# Patient Record
Sex: FEMALE | Race: BLACK OR AFRICAN AMERICAN | Employment: OTHER | URBAN - METROPOLITAN AREA
[De-identification: names, ages, dates, MRNs, and addresses within clinical notes are randomized per-mention and may not be internally consistent; named-entity substitution may affect disease eponyms.]

---

## 2017-01-05 ENCOUNTER — OFFICE VISIT (OUTPATIENT)
Dept: INTERNAL MEDICINE CLINIC | Age: 72
End: 2017-01-05

## 2017-01-05 VITALS
RESPIRATION RATE: 18 BRPM | HEART RATE: 77 BPM | DIASTOLIC BLOOD PRESSURE: 93 MMHG | SYSTOLIC BLOOD PRESSURE: 168 MMHG | WEIGHT: 167 LBS | BODY MASS INDEX: 25.31 KG/M2 | HEIGHT: 68 IN | TEMPERATURE: 96.2 F

## 2017-01-05 DIAGNOSIS — K59.00 CONSTIPATION, UNSPECIFIED CONSTIPATION TYPE: ICD-10-CM

## 2017-01-05 DIAGNOSIS — Z91.14 NONCOMPLIANCE WITH MEDICATION REGIMEN: ICD-10-CM

## 2017-01-05 DIAGNOSIS — F41.9 SEVERE ANXIETY: ICD-10-CM

## 2017-01-05 DIAGNOSIS — F32.A DEPRESSION WITH SOMATIZATION: ICD-10-CM

## 2017-01-05 DIAGNOSIS — F45.0 DEPRESSION WITH SOMATIZATION: ICD-10-CM

## 2017-01-05 DIAGNOSIS — M15.9 GENERALIZED OA: Primary | ICD-10-CM

## 2017-01-05 DIAGNOSIS — Z71.2 ENCOUNTER TO DISCUSS TEST RESULTS: ICD-10-CM

## 2017-01-05 RX ORDER — CLONAZEPAM 1 MG/1
1 TABLET ORAL 2 TIMES DAILY
Qty: 40 TAB | Refills: 0 | Status: SHIPPED | OUTPATIENT
Start: 2017-01-05 | End: 2017-01-26 | Stop reason: SDUPTHER

## 2017-01-05 NOTE — PATIENT INSTRUCTIONS
Limit the use of miralax if you having regular bowel movements done take it   Call and see a psychiatrist right away. Call your insurance company and get a list of doctors.

## 2017-01-05 NOTE — PROGRESS NOTES
Chief Complaint   Patient presents with    Head Pain     says head is tight; eye sight blurry    Rib Pain     bilateral;            Subjective:   Teddy Everett 70 y.o.  female with a  past medical history reviewed see below. Here for f/up from the er she was given miralx for consitpation  She has bene gong all week. bs 79-92 136 highest.  She has been to the neurologist. But has not been to the psychiatrist.   Jordy Liters weight loss but pt is at normal weight   Treated for a uti as well finished all the abx   She is not taking her chol medication as it was too expensive   ROS: \"my vaginia hurts\"  otherwise feeling generally well. All other systems reviewed and are negative      Current Outpatient Prescriptions   Medication Sig Dispense Refill    clonazePAM (KLONOPIN) 1 mg tablet Take 1 Tab by mouth two (2) times a day. Max Daily Amount: 2 mg. As needed 40 Tab 0    polyethylene glycol (MIRALAX) 17 gram/dose powder TAKE 1 CAPFUL IN 8 OUNCES OF WATER EVERY  g 3    losartan (COZAAR) 25 mg tablet Take 0.5 Tabs by mouth nightly. Do not take the 50mg losartan pharmacist please discard all 50mg losartan rx's 45 Tab 3    aspirin delayed-release 81 mg tablet Take 1 Tab by mouth daily. 30 Tab 11    hydrocortisone (HYCORT) 1 % ointment Apply  to affected area two (2) times a day. use thin layer 30 g 0    gabapentin (NEURONTIN) 300 mg capsule Take 1 Cap by mouth three (3) times daily. Indications: NEUROPATHIC PAIN 90 Cap 6    dilTIAZem XR (DILACOR XR) 240 mg XR capsule Take  by mouth daily.  famotidine (PEPCID) 40 mg tablet Take 40 mg by mouth two (2) times a day.  fluticasone (FLONASE) 50 mcg/actuation nasal spray 2 Sprays by Both Nostrils route daily.  mirtazapine (REMERON) 15 mg tablet Take  by mouth nightly.  traZODone (DESYREL) 100 mg tablet Take 100 mg by mouth nightly.       camphor-menthol (SARNA ANTI-ITCH) 0.5-0.5 % lotion Apply  to affected area two (2) times daily as needed for Itching.  pitavastatin (LIVALO) 2 mg tablet Take 1 Tab by mouth daily. 30 Tab 1    diltiazem CD (CARDIZEM CD) 120 mg ER capsule TAKE ONE CAPSULE BY MOUTH DAILY **THIS REPLACES THE 240MG**  1    venlafaxine-SR (EFFEXOR-XR) 75 mg capsule TAKE 1 CAP BY MOUTH DAILY. 60 Cap 3    risperiDONE (RISPERDAL) 0.25 mg tablet Take 1 Tab by mouth two (2) times a day. 60 Tab 0    azelastine (OPTIVAR) 0.05 % ophthalmic solution INSTILL 1 DROP INTO BOTH EYES TWICE A DAY 6 mL 3    Diclofenac Potassium (CAMBIA) 50 mg pwpk Take 1 Package by mouth daily as needed. 1 Packet 0    escitalopram oxalate (LEXAPRO) 10 mg tablet Take 1 Tab by mouth daily. 30 Tab 5    ONETOUCH ULTRA TEST strip TEST AS DIRECTED 100 Strip 11    ONETOUCH ULTRA TEST strip TEST AS DIRECTED 100 Strip 0    glucose blood VI test strips (ASCENSIA AUTODISC VI, ONE TOUCH ULTRA TEST VI) strip by Does Not Apply route See Admin Instructions. DX NIDDM test daily 100 Strip 0    dextromethorphan-quiNIDine (NUEDEXTA) 20-10 mg per capsule 1 daily 30 Cap 3    lidocaine-prilocaine (EMLA) topical cream Apply  to affected area as needed for Pain. 30 g 0    nystatin (MYCOSTATIN) topical cream Apply  to affected area two (2) times a day. 15 g 0    glipiZIDE (GLUCOTROL) 5 mg tablet Take 0.5 Tabs by mouth daily. If blood sugar is > 150 90 Tab 0    meclizine (ANTIVERT) 25 mg tablet Take 1 Tab by mouth three (3) times daily as needed for Dizziness. 21 Tab 0    ranitidine (ZANTAC) 150 mg tablet Take 1 Tab by mouth two (2) times a day. 60 Tab 6    docusate sodium (COLACE) 100 mg capsule Take 100 mg by mouth two (2) times daily as needed.        Allergies   Allergen Reactions    Amoxicillin Hives    Sulfa (Sulfonamide Antibiotics) Hives and Itching    Amoxicillin Hives and Itching     Long time ago - patient not exactly certain what it does    Mirtazapine Itching and Nausea Only     Funny feeling in chest    Percocet [Oxycodone-Acetaminophen] Nausea and Vomiting    Codeine Nausea and Vomiting    Prednisone Itching    Sulfa (Sulfonamide Antibiotics) Hives, Itching and Palpitations     Think it was increased heart rate or itching - many years ago    Zithromax [Azithromycin] Itching     Not sure what it does,taken long time ago     Past Medical History   Diagnosis Date    Agoraphobia without mention of panic attacks 2/17/2014    Anxiety disorder 8/18/2013    Arthritis      osteo    Breast pain, left 4/7/2015    CAD (coronary artery disease), native coronary artery 12/1/2015    Chronic chest pain 1/13/2014    Chronic pain associated with significant psychosocial dysfunction 2/17/2014    Depression 8/18/2013    Diabetes ([facility] [address]      type II    Duplicated right renal collecting system 3/13/2014    GERD (gastroesophageal reflux disease)     Gout, joint     HTN, goal below 130/80 9/7/2012    Hypertension     Nephrolithiasis 3/13/2014    Other ill-defined conditions(799.89)      hyercholesterolemia    Other ill-defined conditions(799.89)      anxiety    Other ill-defined conditions(799.89)      pt states head get tight,due to wax bill up    Other ill-defined conditions(799.89)      pain left shoulder due to fall many years ago    Personal history of noncompliance with medical treatment, presenting hazards to health 5/30/2014    Psychiatric disorder      anxiety/ depression    Psychotic disorder     Vaginal pain 7/30/2014     Past Surgical History   Procedure Laterality Date    Egd  4/23/2010          Hx urological       kidney stones    Hx tubal ligation      Hx hysterectomy       partial    Hx gyn       cyst removed from vagina    Hx gyn       vaginal birth x7    Hx other surgical       egd    Hx other surgical       cyst removed from left wrist    Hx other surgical       bladder dilitation     Family History   Problem Relation Age of Onset    Stroke Mother     Heart Disease Mother     Cancer Father     Heart Disease Son     Liver Disease Son    Nolia Stamp Heart Disease Daughter     Malignant Hyperthermia Neg Hx     Pseudocholinesterase Deficiency Neg Hx     Delayed Awakening Neg Hx     Post-op Nausea/Vomiting Neg Hx     Emergence Delirium Neg Hx     Post-op Cognitive Dysfunction Neg Hx     Other Neg Hx      Social History   Substance Use Topics    Smoking status: Never Smoker    Smokeless tobacco: Never Used    Alcohol use No          Objective:     Visit Vitals    BP (!) 168/93 (BP 1 Location: Left arm, BP Patient Position: Sitting)    Pulse 77    Temp 96.2 °F (35.7 °C) (Oral)    Resp 18    Ht 5' 8\" (1.727 m)    Wt 167 lb (75.8 kg)    BMI 25.39 kg/m2     Gen: NAD, pleasant  HEENT: normal appearing head, nares patent, PERRLA, EOMI, oropharynx no erythema, no cervical lymphadenopathy neck supple   Cardio: RRR nl S1S2 no murmur  Lungs CTAB no wheeze no rales no rhonchi  ABD Soft non tender non distended + bowel sounds  Extremities: full ROM X 4 no clubbing no cyanosis  Neuro: no gross focal deficits noted, alert and orientated X 3  Psych.: well groomed  Anxiety and + stable depression. No si/hi. Assessment/Plan:   Sherry Talbert was seen today for head pain and rib pain. Diagnoses and all orders for this visit:    Generalized OA    Constipation, unspecified constipation type    Encounter to discuss test results    Depression with somatization    Severe anxiety  -     clonazePAM (KLONOPIN) 1 mg tablet; Take 1 Tab by mouth two (2) times a day. Max Daily Amount: 2 mg. As needed    Noncompliance with medication regimen-chol medication       Follow-up Disposition:  Return in about 3 weeks (around 1/26/2017) for 30 min appt f/up somatization disorder . .  avs printed and given to the pt. Stop taking the miralax daily as no longer constipated advised to call for another appt. with the gyn regarding vaginal pain. The patient voiced understanding of the above. Medication side effects were reviewed with the patient. Call with any concerns.

## 2017-01-05 NOTE — MR AVS SNAPSHOT
Visit Information Date & Time Provider Department Dept. Phone Encounter #  
 1/5/2017  1:30  [facility], [address] [place] [address] [phone] [id number] Follow-up Instructions Return in about 3 weeks (around 1/26/2017) for 30 min appt f/up somatization disorder . Upcoming Health Maintenance Date Due DTaP/Tdap/Td series (1 - Tdap) 4/30/2006 Pneumococcal 65+ Low/Medium Risk (1 of 2 - PCV13) 9/22/2010 FOOT EXAM Q1 6/12/2015 EYE EXAM RETINAL OR DILATED Q1 7/1/2015 FOBT Q 1 YEAR AGE 50-75 7/29/2015 GLAUCOMA SCREENING Q2Y 7/1/2016 HEMOGLOBIN A1C Q6M 7/18/2016 INFLUENZA AGE 9 TO ADULT 8/1/2016 MEDICARE YEARLY EXAM 8/10/2016 MICROALBUMIN Q1 8/10/2016 BREAST CANCER SCRN MAMMOGRAM 11/2/2017 LIPID PANEL Q1 12/15/2017 Allergies as of 1/5/2017  Review Complete On: 1/5/2017 By: Min Zapien RN Severity Noted Reaction Type Reactions Amoxicillin High 09/10/2010   Systemic Hives Sulfa (Sulfonamide Antibiotics) High 09/10/2010   Systemic Hives, Itching Amoxicillin Medium 04/22/2010   Systemic Hives, Itching Long time ago - patient not exactly certain what it does Mirtazapine Medium 11/20/2012   Side Effect Itching, Nausea Only Funny feeling in chest  
 Percocet [Oxycodone-acetaminophen] Medium 01/15/2014   Intolerance Nausea and Vomiting Codeine  11/26/2012    Nausea and Vomiting Prednisone  05/27/2010    Itching Sulfa (Sulfonamide Antibiotics)  04/22/2010   Systemic Hives, Itching, Palpitations Think it was increased heart rate or itching - many years ago Zithromax [Azithromycin]  11/20/2012   Side Effect Itching Not sure what it does,taken long time ago Current Immunizations  Reviewed on 12/15/2016 Name Date Pneumococcal Polysaccharide (PPSV-23)  Deferred (Patient Refused) TD Vaccine 4/29/2006 Not reviewed this visit You Were Diagnosed With   
  
 Codes Comments Generalized OA    -  Primary ICD-10-CM: M15.9 ICD-9-CM: 715.00 Constipation, unspecified constipation type     ICD-10-CM: K59.00 ICD-9-CM: 564.00 Encounter to discuss test results     ICD-10-CM: Z71.89 ICD-9-CM: V65.49 Depression with somatization     ICD-10-CM: F32.9, F45.0 ICD-9-CM: 311, 300.81 Severe anxiety     ICD-10-CM: F41.9 ICD-9-CM: 300.00 Noncompliance with medication regimen     ICD-10-CM: Z91.14 
ICD-9-CM: V15.81 Vitals BP Pulse Temp Resp Height(growth percentile) Weight(growth percentile) (!) 168/93 (BP 1 Location: Left arm, BP Patient Position: Sitting) 77 96.2 °F (35.7 °C) (Oral) 18 5' 8\" (1.727 m) 167 lb (75.8 kg) BMI OB Status Smoking Status 25.39 kg/m2 Hysterectomy Never Smoker Vitals History BMI and BSA Data Body Mass Index Body Surface Area  
 25.39 kg/m 2 1.91 m 2 Preferred Pharmacy Pharmacy Name Phone [id number]- [place], [place] - [address] [phone] Your Updated Medication List  
  
   
This list is accurate as of: 1/5/17  2:16 PM.  Always use your most recent med list.  
  
  
  
  
 aspirin delayed-release 81 mg tablet Take 1 Tab by mouth daily. azelastine 0.05 % ophthalmic solution Commonly known as:  OPTIVAR INSTILL 1 DROP INTO BOTH EYES TWICE A DAY  
  
 clonazePAM 1 mg tablet Commonly known as:  Angelina Kava Take 1 Tab by mouth two (2) times a day. Max Daily Amount: 2 mg. As needed  
  
 dextromethorphan-quiNIDine 20-10 mg per capsule Commonly known as:  Denita Knight 1 daily Diclofenac Potassium 50 mg Pwpk Commonly known as:  CAMBIA Take 1 Package by mouth daily as needed. * dilTIAZem  mg XR capsule Commonly known as:  DILACOR XR Take  by mouth daily. * dilTIAZem  mg ER capsule Commonly known as:  CARDIZEM CD  
TAKE ONE CAPSULE BY MOUTH DAILY **THIS REPLACES THE 240MG**  
  
 docusate sodium 100 mg capsule Commonly known as:  Eliud Bañuelos Take 100 mg by mouth two (2) times daily as needed. escitalopram oxalate 10 mg tablet Commonly known as:  Miguel Tapia Take 1 Tab by mouth daily. famotidine 40 mg tablet Commonly known as:  PEPCID Take 40 mg by mouth two (2) times a day. FLONASE 50 mcg/actuation nasal spray Generic drug:  fluticasone 2 Sprays by Both Nostrils route daily. gabapentin 300 mg capsule Commonly known as:  NEURONTIN Take 1 Cap by mouth three (3) times daily. Indications: NEUROPATHIC PAIN  
  
 glipiZIDE 5 mg tablet Commonly known as:  Selestine Rodriguez Take 0.5 Tabs by mouth daily. If blood sugar is > 150 * glucose blood VI test strips strip Commonly known as:  ASCENSIA AUTODISC VI, ONE TOUCH ULTRA TEST VI  
by Does Not Apply route See Admin Instructions. DX NIDDM test daily * ONETOUCH ULTRA TEST strip Generic drug:  glucose blood VI test strips TEST AS DIRECTED * ONETOUCH ULTRA TEST strip Generic drug:  glucose blood VI test strips TEST AS DIRECTED  
  
 hydrocortisone 1 % ointment Commonly known as:  HYCORT Apply  to affected area two (2) times a day. use thin layer  
  
 lidocaine-prilocaine topical cream  
Commonly known as:  EMLA Apply  to affected area as needed for Pain.  
  
 losartan 25 mg tablet Commonly known as:  COZAAR Take 0.5 Tabs by mouth nightly. Do not take the 50mg losartan pharmacist please discard all 50mg losartan rx's  
  
 meclizine 25 mg tablet Commonly known as:  ANTIVERT Take 1 Tab by mouth three (3) times daily as needed for Dizziness. mirtazapine 15 mg tablet Commonly known as:  Jennifer Dom Take  by mouth nightly. nystatin topical cream  
Commonly known as:  MYCOSTATIN Apply  to affected area two (2) times a day. pitavastatin 2 mg tablet Commonly known as:  LIVALO Take 1 Tab by mouth daily. polyethylene glycol 17 gram/dose powder Commonly known as:  Leata Sans TAKE 1 CAPFUL IN 8 OUNCES OF WATER EVERY DAY  
  
 raNITIdine 150 mg tablet Commonly known as:  ZANTAC Take 1 Tab by mouth two (2) times a day. risperiDONE 0.25 mg tablet Commonly known as:  RisperDAL Take 1 Tab by mouth two (2) times a day. SARNA ANTI-ITCH 0.5-0.5 % lotion Generic drug:  camphor-menthol Apply  to affected area two (2) times daily as needed for Itching. traZODone 100 mg tablet Commonly known as:  Debbe Gunner Take 100 mg by mouth nightly. venlafaxine-SR 75 mg capsule Commonly known as:  EFFEXOR-XR  
TAKE 1 CAP BY MOUTH DAILY. * Notice: This list has 5 medication(s) that are the same as other medications prescribed for you. Read the directions carefully, and ask your doctor or other care provider to review them with you. Prescriptions Printed Refills  
 clonazePAM (KLONOPIN) 1 mg tablet 0 Sig: Take 1 Tab by mouth two (2) times a day. Max Daily Amount: 2 mg. As needed Class: Print Route: Oral  
  
Follow-up Instructions Return in about 3 weeks (around 1/26/2017) for 30 min appt f/up somatization disorder . Patient Instructions Limit the use of miralax if you having regular bowel movements done take it Call and see a psychiatrist right away. Call your insurance company and get a list of doctors. Introducing [facility]! Magdalene Bach introduces [employer] patient portal. Now you can access parts of your medical record, email your doctor's office, and request medication refills online. 1. In your internet browser, go to [email or web address] 2. Click on the First Time User? Click Here link in the Sign In box. You will see the New Member Sign Up page. 3. Enter your [employer] Access Code exactly as it appears below. You will not need to use this code after youve completed the sign-up process.  If you do not sign up before the expiration date, you must request a new code. · [employer] Access Code: [email or web address] Expires: 2/15/2017  3:17 PM 
 
4. Enter the last four digits of your Social Security Number (xxxx) and Date of Birth (mm/dd/yyyy) as indicated and click Submit. You will be taken to the next sign-up page. 5. Create a [employer] ID. This will be your [employer] login ID and cannot be changed, so think of one that is secure and easy to remember. 6. Create a [employer] password. You can change your password at any time. 7. Enter your Password Reset Question and Answer. This can be used at a later time if you forget your password. 8. Enter your e-mail address. You will receive e-mail notification when new information is available in [address] 9. Click Sign Up. You can now view and download portions of your medical record. 10. Click the Download Summary menu link to download a portable copy of your medical information. If you have questions, please visit the Frequently Asked Questions section of the [employer] website. Remember, [employer] is NOT to be used for urgent needs. For medical emergencies, dial 911. Now available from your iPhone and Android! Please provide this summary of care documentation to your next provider. Your primary care clinician is listed as Princess Velazquez. If you have any questions after today's visit, please call [phone].

## 2017-01-26 ENCOUNTER — OFFICE VISIT (OUTPATIENT)
Dept: INTERNAL MEDICINE CLINIC | Age: 72
End: 2017-01-26

## 2017-01-26 VITALS
OXYGEN SATURATION: 99 % | HEART RATE: 66 BPM | WEIGHT: 168.7 LBS | SYSTOLIC BLOOD PRESSURE: 132 MMHG | BODY MASS INDEX: 25.57 KG/M2 | HEIGHT: 68 IN | RESPIRATION RATE: 14 BRPM | TEMPERATURE: 96.9 F | DIASTOLIC BLOOD PRESSURE: 80 MMHG

## 2017-01-26 DIAGNOSIS — F41.9 SEVERE ANXIETY: ICD-10-CM

## 2017-01-26 DIAGNOSIS — Z86.69 HISTORY OF BLURRY VISION: ICD-10-CM

## 2017-01-26 DIAGNOSIS — E11.9 DIABETES MELLITUS TYPE 2, DIET-CONTROLLED (HCC): ICD-10-CM

## 2017-01-26 DIAGNOSIS — E78.2 MIXED HYPERLIPIDEMIA: Primary | ICD-10-CM

## 2017-01-26 DIAGNOSIS — K59.00 CONSTIPATION, UNSPECIFIED CONSTIPATION TYPE: ICD-10-CM

## 2017-01-26 DIAGNOSIS — F22 SOMATIC DELUSION DISORDER (HCC): ICD-10-CM

## 2017-01-26 RX ORDER — ROSUVASTATIN CALCIUM 5 MG/1
5 TABLET, COATED ORAL
Qty: 30 TAB | Refills: 0 | Status: SHIPPED | OUTPATIENT
Start: 2017-01-26 | End: 2017-02-23 | Stop reason: CLARIF

## 2017-01-26 RX ORDER — CLONAZEPAM 1 MG/1
1 TABLET ORAL 2 TIMES DAILY
Qty: 40 TAB | Refills: 0 | Status: SHIPPED | OUTPATIENT
Start: 2017-01-26 | End: 2017-08-15 | Stop reason: ALTCHOICE

## 2017-01-26 NOTE — PATIENT INSTRUCTIONS
Check bs in the am and 2 hrs after  (do not test 4 times a day) eating bring in a blood sugar log to the next appt.       fasting blood sugar first thing in the am __________   __________  ___________    2 hrs after break fast _______     ___________    ___________    2 hrs after lunch _______  ________  __________    2 hrs after dinner ________  _________ _________    Random one around 10 pm  _________ ___________    _______

## 2017-01-26 NOTE — MR AVS SNAPSHOT
Visit Information Date & Time Provider Department Dept. Phone Encounter #  
 1/26/2017  1:30  [facility], [address] [address] - [place] [id number] Follow-up Instructions Return in about 3 weeks (around 2/16/2017) for somatization disorder 30 min at 1:30 please . Your Appointments 2/3/2017  9:00 AM  
Follow Up with Shannon Ortiz NP [facility] [address]) Appt Note: follow up. ..tlw 1/6/17  
 [address] [place] [address], 
[address] [facility], [address] [phone]  
[address] [facility], [address] [facility], [address] [facility] [address] [address] Upcoming Health Maintenance Date Due DTaP/Tdap/Td series (1 - Tdap) 4/30/2006 Pneumococcal 65+ Low/Medium Risk (1 of 2 - PCV13) 9/22/2010 FOOT EXAM Q1 6/12/2015 EYE EXAM RETINAL OR DILATED Q1 7/1/2015 FOBT Q 1 YEAR AGE 50-75 7/29/2015 GLAUCOMA SCREENING Q2Y 7/1/2016 HEMOGLOBIN A1C Q6M 7/18/2016 MEDICARE YEARLY EXAM 8/10/2016 MICROALBUMIN Q1 8/10/2016 BREAST CANCER SCRN MAMMOGRAM 11/2/2017 LIPID PANEL Q1 12/15/2017 Allergies as of 1/26/2017  Review Complete On: 1/26/2017 By: Prudence Guess, LPN Severity Noted Reaction Type Reactions Amoxicillin High 09/10/2010   Systemic Hives Sulfa (Sulfonamide Antibiotics) High 09/10/2010   Systemic Hives, Itching Amoxicillin Medium 04/22/2010   Systemic Hives, Itching Long time ago - patient not exactly certain what it does Mirtazapine Medium 11/20/2012   Side Effect Itching, Nausea Only Funny feeling in chest  
 Percocet [Oxycodone-acetaminophen] Medium 01/15/2014   Intolerance Nausea and Vomiting Codeine  11/26/2012    Nausea and Vomiting Prednisone  05/27/2010    Itching Sulfa (Sulfonamide Antibiotics)  04/22/2010   Systemic Hives, Itching, Palpitations Think it was increased heart rate or itching - many years ago Zithromax [Azithromycin]  11/20/2012   Side Effect Itching Not sure what it does,taken long time ago Current Immunizations  Reviewed on 12/15/2016 Name Date Pneumococcal Polysaccharide (PPSV-23)  Deferred (Patient Refused) TD Vaccine 4/29/2006 Not reviewed this visit You Were Diagnosed With   
  
 Codes Comments Mixed hyperlipidemia    -  Primary ICD-10-CM: R77.3 ICD-9-CM: 272.2 Constipation, unspecified constipation type     ICD-10-CM: K59.00 ICD-9-CM: 564.00 Somatic delusion disorder ([facility]     ICD-10-CM: F22 
ICD-9-CM: 297.1 History of blurry vision     ICD-10-CM: Z86.69 
ICD-9-CM: V12.49 Severe anxiety     ICD-10-CM: F41.9 ICD-9-CM: 300.00 Diabetes mellitus type 2, diet-controlled ([facility]     ICD-10-CM: E11.9 ICD-9-CM: 250.00 Vitals BP Pulse Temp Resp Height(growth percentile) Weight(growth percentile) 142/84 (BP 1 Location: Left arm, BP Patient Position: At rest) 66 96.9 °F (36.1 °C) (Oral) 14 5' 8\" (1.727 m) 168 lb 11.2 oz (76.5 kg) SpO2 BMI OB Status Smoking Status 99% 25.65 kg/m2 Hysterectomy Never Smoker Vitals History BMI and BSA Data Body Mass Index Body Surface Area  
 25.65 kg/m 2 1.92 m 2 Preferred Pharmacy Pharmacy Name Phone [id number] [address] [phone] Your Updated Medication List  
  
   
This list is accurate as of: 1/26/17  1:55 PM.  Always use your most recent med list.  
  
  
  
  
 aspirin delayed-release 81 mg tablet Take 1 Tab by mouth daily. azelastine 0.05 % ophthalmic solution Commonly known as:  OPTIVAR INSTILL 1 DROP INTO BOTH EYES TWICE A DAY  
  
 clonazePAM 1 mg tablet Commonly known as:  Nel Morgan Take 1 Tab by mouth two (2) times a day. Max Daily Amount: 2 mg. As needed fill after 2/5/17  
  
 dextromethorphan-quiNIDine 20-10 mg per capsule Commonly known as:  Mabeline Patient 1 daily Diclofenac Potassium 50 mg Pwpk Commonly known as:  CAMBIA Take 1 Package by mouth daily as needed. * dilTIAZem  mg XR capsule Commonly known as:  DILACOR XR Take  by mouth daily. * dilTIAZem  mg ER capsule Commonly known as:  CARDIZEM CD  
TAKE ONE CAPSULE BY MOUTH DAILY **THIS REPLACES THE 240MG**  
  
 docusate sodium 100 mg capsule Commonly known as:  Dorthey Matsu Take 100 mg by mouth two (2) times daily as needed. escitalopram oxalate 10 mg tablet Commonly known as:  Zak Fabián Take 1 Tab by mouth daily. famotidine 40 mg tablet Commonly known as:  PEPCID Take 40 mg by mouth two (2) times a day. FLONASE 50 mcg/actuation nasal spray Generic drug:  fluticasone 2 Sprays by Both Nostrils route daily. gabapentin 300 mg capsule Commonly known as:  NEURONTIN Take 1 Cap by mouth three (3) times daily. Indications: NEUROPATHIC PAIN  
  
 glipiZIDE 5 mg tablet Commonly known as:  Deanna Alcala Take 0.5 Tabs by mouth daily. If blood sugar is > 150 * glucose blood VI test strips strip Commonly known as:  ASCENSIA AUTODISC VI, ONE TOUCH ULTRA TEST VI  
by Does Not Apply route See Admin Instructions. DX NIDDM test daily * ONETOUCH ULTRA TEST strip Generic drug:  glucose blood VI test strips TEST AS DIRECTED * ONETOUCH ULTRA TEST strip Generic drug:  glucose blood VI test strips TEST AS DIRECTED  
  
 hydrocortisone 1 % ointment Commonly known as:  HYCORT Apply  to affected area two (2) times a day. use thin layer  
  
 lidocaine-prilocaine topical cream  
Commonly known as:  EMLA Apply  to affected area as needed for Pain.  
  
 losartan 25 mg tablet Commonly known as:  COZAAR Take 0.5 Tabs by mouth nightly. Do not take the 50mg losartan pharmacist please discard all 50mg losartan rx's  
  
 meclizine 25 mg tablet Commonly known as:  ANTIVERT Take 1 Tab by mouth three (3) times daily as needed for Dizziness. mirtazapine 15 mg tablet Commonly known as:  Jennifer Dom Take  by mouth nightly. nystatin topical cream  
Commonly known as:  MYCOSTATIN Apply  to affected area two (2) times a day. polyethylene glycol 17 gram/dose powder Commonly known as:  Kreg Patron TAKE 1 CAPFUL IN 8 OUNCES OF WATER EVERY DAY  
  
 raNITIdine 150 mg tablet Commonly known as:  ZANTAC Take 1 Tab by mouth two (2) times a day. risperiDONE 0.25 mg tablet Commonly known as:  RisperDAL Take 1 Tab by mouth two (2) times a day. rosuvastatin 5 mg tablet Commonly known as:  CRESTOR Take 1 Tab by mouth nightly. SARNA ANTI-ITCH 0.5-0.5 % lotion Generic drug:  camphor-menthol Apply  to affected area two (2) times daily as needed for Itching. traZODone 100 mg tablet Commonly known as:  Rebbecca Bunde Take 100 mg by mouth nightly. venlafaxine-SR 75 mg capsule Commonly known as:  EFFEXOR-XR  
TAKE 1 CAP BY MOUTH DAILY. * Notice: This list has 5 medication(s) that are the same as other medications prescribed for you. Read the directions carefully, and ask your doctor or other care provider to review them with you. Prescriptions Printed Refills  
 clonazePAM (KLONOPIN) 1 mg tablet 0 Sig: Take 1 Tab by mouth two (2) times a day. Max Daily Amount: 2 mg. As needed fill after 2/5/17 Class: Print Route: Oral  
  
Prescriptions Sent to Pharmacy Refills  
 rosuvastatin (CRESTOR) 5 mg tablet 0 Sig: Take 1 Tab by mouth nightly. Class: Normal  
 Pharmacy: [facility]/pharmacy [address] [place] [address] [address] Ph #: [phone] Route: Oral  
  
We Performed the Following HEMOGLOBIN A1C WITH EAG [02902 CPT(R)] LDL, DIRECT [id number] CPT(R)] MAGNESIUM [id number] CPT(R)] METABOLIC PANEL, COMPREHENSIVE [49309 CPT(R)] Follow-up Instructions  Return in about 3 weeks (around 2/16/2017) for somatization disorder 30 min at 1:30 please . To-Do List   
 01/26/2017 Imaging:  XR ABD FLAT/ ERECT Patient Instructions Check bs in the am and 2 hrs after  (do not test 4 times a day) eating bring in a blood sugar log to the next appt. fasting blood sugar first thing in the am __________   __________  ___________ 
 
2 hrs after break fast _______     ___________    ___________ 
 
2 hrs after lunch _______  ________  __________ 
 
2 hrs after dinner ________  _________ _________ Random one around 10 pm  _________ ___________    _______ Introducing [facility]! Femi Carrington introduces [employer] patient portal. Now you can access parts of your medical record, email your doctor's office, and request medication refills online. 1. In your internet browser, go to [email or web address] 2. Click on the First Time User? Click Here link in the Sign In box. You will see the New Member Sign Up page. 3. Enter your [employer] Access Code exactly as it appears below. You will not need to use this code after youve completed the sign-up process. If you do not sign up before the expiration date, you must request a new code. · [employer] Access Code: [email or web address] Expires: 2/15/2017  3:17 PM 
 
4. Enter the last four digits of your Social Security Number (xxxx) and Date of Birth (mm/dd/yyyy) as indicated and click Submit. You will be taken to the next sign-up page. 5. Create a [employer] ID. This will be your [employer] login ID and cannot be changed, so think of one that is secure and easy to remember. 6. Create a [employer] password. You can change your password at any time. 7. Enter your Password Reset Question and Answer. This can be used at a later time if you forget your password. 8. Enter your e-mail address. You will receive e-mail notification when new information is available in [address] 9. Click Sign Up. You can now view and download portions of your medical record. 10. Click the Download Summary menu link to download a portable copy of your medical information. If you have questions, please visit the Frequently Asked Questions section of the [employer] website. Remember, [employer] is NOT to be used for urgent needs. For medical emergencies, dial 911. Now available from your iPhone and Android! Please provide this summary of care documentation to your next provider. Your primary care clinician is listed as Adry Clark. If you have any questions after today's visit, please call [phone].

## 2017-01-26 NOTE — PROGRESS NOTES
1. Have you been to the ER, urgent care clinic since your last visit? Hospitalized since your last visit? No    2. Have you seen or consulted any other health care providers outside of the [facility] since your last visit? Include any pap smears or colon screening.  No     Chief Complaint   Patient presents with    Follow-up     3 week follow up on somatization disorder     Not fasting

## 2017-01-26 NOTE — PROGRESS NOTES
Chief Complaint   Patient presents with    Follow-up     3 week follow up on somatization disorder           Subjective:   Margarita Salcedo 70 y.o.  female with a  past medical history reviewed see below. Here for f/up she has not yet set up a psychiatrist   She is having pain in her collar bone, still. She is stil c/o poor eye sight/blurry she has been to the eye doctor. And there was no abnormalities   She still reports pain \"down below\". C.o constipation and has bought  MOM she wants to drink the whole bottle   Still having insomnia -she reports the nerve pills help her sleep an hour or two. fbs [phone]  ROS: otherwise feeling generally well. All other systems reviewed and are negative      Current Outpatient Prescriptions   Medication Sig Dispense Refill    clonazePAM (KLONOPIN) 1 mg tablet Take 1 Tab by mouth two (2) times a day. Max Daily Amount: 2 mg. As needed fill after 2/5/17 40 Tab 0    rosuvastatin (CRESTOR) 5 mg tablet Take 1 Tab by mouth nightly. 30 Tab 0    polyethylene glycol (MIRALAX) 17 gram/dose powder TAKE 1 CAPFUL IN 8 OUNCES OF WATER EVERY  g 3    camphor-menthol (SARNA ANTI-ITCH) 0.5-0.5 % lotion Apply  to affected area two (2) times daily as needed for Itching.  diltiazem CD (CARDIZEM CD) 120 mg ER capsule TAKE ONE CAPSULE BY MOUTH DAILY **THIS REPLACES THE 240MG**  1    losartan (COZAAR) 25 mg tablet Take 0.5 Tabs by mouth nightly. Do not take the 50mg losartan pharmacist please discard all 50mg losartan rx's 45 Tab 3    ONETOUCH ULTRA TEST strip TEST AS DIRECTED 100 Strip 11    ONETOUCH ULTRA TEST strip TEST AS DIRECTED 100 Strip 0    glucose blood VI test strips (ASCENSIA AUTODISC VI, ONE TOUCH ULTRA TEST VI) strip by Does Not Apply route See Admin Instructions. DX NIDDM test daily 100 Strip 0    lidocaine-prilocaine (EMLA) topical cream Apply  to affected area as needed for Pain.  30 g 0    docusate sodium (COLACE) 100 mg capsule Take 100 mg by mouth two (2) times daily as needed.  aspirin delayed-release 81 mg tablet Take 1 Tab by mouth daily. 30 Tab 11    hydrocortisone (HYCORT) 1 % ointment Apply  to affected area two (2) times a day. use thin layer 30 g 0    gabapentin (NEURONTIN) 300 mg capsule Take 1 Cap by mouth three (3) times daily. Indications: NEUROPATHIC PAIN 90 Cap 6    dilTIAZem XR (DILACOR XR) 240 mg XR capsule Take  by mouth daily.  famotidine (PEPCID) 40 mg tablet Take 40 mg by mouth two (2) times a day.  fluticasone (FLONASE) 50 mcg/actuation nasal spray 2 Sprays by Both Nostrils route daily.  mirtazapine (REMERON) 15 mg tablet Take  by mouth nightly.  traZODone (DESYREL) 100 mg tablet Take 100 mg by mouth nightly.  venlafaxine-SR (EFFEXOR-XR) 75 mg capsule TAKE 1 CAP BY MOUTH DAILY. 60 Cap 3    risperiDONE (RISPERDAL) 0.25 mg tablet Take 1 Tab by mouth two (2) times a day. 60 Tab 0    azelastine (OPTIVAR) 0.05 % ophthalmic solution INSTILL 1 DROP INTO BOTH EYES TWICE A DAY 6 mL 3    Diclofenac Potassium (CAMBIA) 50 mg pwpk Take 1 Package by mouth daily as needed. 1 Packet 0    escitalopram oxalate (LEXAPRO) 10 mg tablet Take 1 Tab by mouth daily. 30 Tab 5    dextromethorphan-quiNIDine (NUEDEXTA) 20-10 mg per capsule 1 daily 30 Cap 3    nystatin (MYCOSTATIN) topical cream Apply  to affected area two (2) times a day. 15 g 0    glipiZIDE (GLUCOTROL) 5 mg tablet Take 0.5 Tabs by mouth daily. If blood sugar is > 150 90 Tab 0    meclizine (ANTIVERT) 25 mg tablet Take 1 Tab by mouth three (3) times daily as needed for Dizziness. 21 Tab 0    ranitidine (ZANTAC) 150 mg tablet Take 1 Tab by mouth two (2) times a day.  60 Tab 6     Allergies   Allergen Reactions    Amoxicillin Hives    Sulfa (Sulfonamide Antibiotics) Hives and Itching    Amoxicillin Hives and Itching     Long time ago - patient not exactly certain what it does    Mirtazapine Itching and Nausea Only     Funny feeling in chest    Percocet [Oxycodone-Acetaminophen] Nausea and Vomiting    Codeine Nausea and Vomiting    Prednisone Itching    Sulfa (Sulfonamide Antibiotics) Hives, Itching and Palpitations     Think it was increased heart rate or itching - many years ago    Zithromax [Azithromycin] Itching     Not sure what it does,taken long time ago     Past Medical History   Diagnosis Date    Agoraphobia without mention of panic attacks 2/17/2014    Anxiety disorder 8/18/2013    Arthritis      osteo    Breast pain, left 4/7/2015    CAD (coronary artery disease), native coronary artery 12/1/2015    Chronic chest pain 1/13/2014    Chronic pain associated with significant psychosocial dysfunction 2/17/2014    Depression 8/18/2013    Diabetes ([facility]      type II    Duplicated right renal collecting system 3/13/2014    GERD (gastroesophageal reflux disease)     Gout, joint     HTN, goal below 130/80 9/7/2012    Hypertension     Nephrolithiasis 3/13/2014    Other ill-defined conditions(799.89)      hyercholesterolemia    Other ill-defined conditions(799.89)      anxiety    Other ill-defined conditions(799.89)      pt states head get tight,due to wax bill up    Other ill-defined conditions(799.89)      pain left shoulder due to fall many years ago    Personal history of noncompliance with medical treatment, presenting hazards to health 5/30/2014    Psychiatric disorder      anxiety/ depression    Psychotic disorder     Vaginal pain 7/30/2014     Past Surgical History   Procedure Laterality Date    Egd  4/23/2010          Hx urological       kidney stones    Hx tubal ligation      Hx hysterectomy       partial    Hx gyn       cyst removed from vagina    Hx gyn       vaginal birth x7    Hx other surgical       egd    Hx other surgical       cyst removed from left wrist    Hx other surgical       bladder dilitation     Family History   Problem Relation Age of Onset    Stroke Mother     Heart Disease Mother     Cancer Father  Heart Disease Son     Liver Disease Son     Heart Disease Daughter     Malignant Hyperthermia Neg Hx     Pseudocholinesterase Deficiency Neg Hx     Delayed Awakening Neg Hx     Post-op Nausea/Vomiting Neg Hx     Emergence Delirium Neg Hx     Post-op Cognitive Dysfunction Neg Hx     Other Neg Hx      Social History   Substance Use Topics    Smoking status: Never Smoker    Smokeless tobacco: Never Used    Alcohol use No          Objective:     Visit Vitals    /80 (BP 1 Location: Left arm, BP Patient Position: At rest)    Pulse 66    Temp 96.9 °F (36.1 °C) (Oral)    Resp 14    Ht 5' 8\" (1.727 m)    Wt 168 lb 11.2 oz (76.5 kg)    SpO2 99%    BMI 25.65 kg/m2     Gen: NAD, pleasant  HEENT: normal appearing head, nares patent, PERRLA, EOMI, oropharynx no erythema, no cervical lymphadenopathy neck supple arthritic changes noted in collar bone   Cardio: RRR nl S1S2 no murmur  Lungs CTAB no wheeze no rales no rhonchi  ABD Soft non tender non distended + bowel sounds  Extremities: full ROM X 4  Shuffling like gait not new no clubbing no cyanosis  Neuro: no gross focal deficits noted, alert and orientated X 3  Psych.: well groomed stable anxiety and  depression. Tangential thoughts tearful thru out exam at baseline no new psych issues     Assessment/Plan:   Mojgan Gallagher was seen today for follow-up. Diagnoses and all orders for this visit:    Mixed hyperlipidemia  -     METABOLIC PANEL, COMPREHENSIVE  -     LDL, DIRECT    Constipation, unspecified constipation type  -     METABOLIC PANEL, COMPREHENSIVE  -     MAGNESIUM  -     XR ABD FLAT/ ERECT; Future    Somatic delusion disorder ([facility]    History of blurry vision    Severe anxiety  -     clonazePAM (KLONOPIN) 1 mg tablet; Take 1 Tab by mouth two (2) times a day. Max Daily Amount: 2 mg.  As needed fill after 2/5/17    Diabetes mellitus type 2, diet-controlled ([address]  -     HEMOGLOBIN A1C WITH EAG    Other orders  -     rosuvastatin (CRESTOR) 5 mg tablet; Take 1 Tab by mouth nightly. Follow-up Disposition:  Return in about 3 weeks (around 2/16/2017) for somatization disorder 30 min at 1:30 please . .she is again encouraged to see the gynecologist for her female concerns and make an appt with a psychiatrist   avs printed and given to the pt. .  Advised to drink 1/2 the bottle of MOM not the entire bottle. And monitor consitapation   The patient voiced understanding of the above. Medication side effects were reviewed with the patient. Call with any concerns.

## 2017-01-27 LAB
ALBUMIN SERPL-MCNC: 4 G/DL (ref 3.5–4.8)
ALBUMIN/GLOB SERPL: 1.5 {RATIO} (ref 1.1–2.5)
ALP SERPL-CCNC: 72 IU/L (ref 39–117)
ALT SERPL-CCNC: 7 IU/L (ref 0–32)
AST SERPL-CCNC: 11 IU/L (ref 0–40)
BILIRUB SERPL-MCNC: 0.3 MG/DL (ref 0–1.2)
BUN SERPL-MCNC: 14 MG/DL (ref 8–27)
BUN/CREAT SERPL: 15 (ref 11–26)
CALCIUM SERPL-MCNC: 9.4 MG/DL (ref 8.7–10.3)
CHLORIDE SERPL-SCNC: 100 MMOL/L (ref 96–106)
CO2 SERPL-SCNC: 28 MMOL/L (ref 18–29)
COMMENT, 011824: ABNORMAL
CREAT SERPL-MCNC: 0.91 MG/DL (ref 0.57–1)
EST. AVERAGE GLUCOSE BLD GHB EST-MCNC: 123 MG/DL
GLOBULIN SER CALC-MCNC: 2.7 G/DL (ref 1.5–4.5)
GLUCOSE SERPL-MCNC: 112 MG/DL (ref 65–99)
HBA1C MFR BLD: 5.9 % (ref 4.8–5.6)
LDLC SERPL DIRECT ASSAY-MCNC: 209 MG/DL (ref 0–99)
MAGNESIUM SERPL-MCNC: 2.1 MG/DL (ref 1.6–2.3)
POTASSIUM SERPL-SCNC: 4.5 MMOL/L (ref 3.5–5.2)
PROT SERPL-MCNC: 6.7 G/DL (ref 6–8.5)
SODIUM SERPL-SCNC: 143 MMOL/L (ref 134–144)

## 2017-02-23 ENCOUNTER — APPOINTMENT (OUTPATIENT)
Dept: GENERAL RADIOLOGY | Age: 72
End: 2017-02-23
Attending: PHYSICIAN ASSISTANT
Payer: MEDICARE

## 2017-02-23 ENCOUNTER — HOSPITAL ENCOUNTER (EMERGENCY)
Age: 72
Discharge: HOME | End: 2017-02-23
Attending: EMERGENCY MEDICINE
Payer: MEDICARE

## 2017-02-23 VITALS
TEMPERATURE: 98.2 F | HEART RATE: 73 BPM | RESPIRATION RATE: 17 BRPM | WEIGHT: 163.14 LBS | SYSTOLIC BLOOD PRESSURE: 158 MMHG | BODY MASS INDEX: 24.73 KG/M2 | DIASTOLIC BLOOD PRESSURE: 93 MMHG | HEIGHT: 68 IN | OXYGEN SATURATION: 97 %

## 2017-02-23 DIAGNOSIS — R51.9 CHRONIC NONINTRACTABLE HEADACHE, UNSPECIFIED HEADACHE TYPE: Chronic | ICD-10-CM

## 2017-02-23 DIAGNOSIS — G89.29 CHRONIC NONINTRACTABLE HEADACHE, UNSPECIFIED HEADACHE TYPE: Chronic | ICD-10-CM

## 2017-02-23 DIAGNOSIS — M15.9 GENERALIZED OA: Primary | ICD-10-CM

## 2017-02-23 DIAGNOSIS — G89.4 CHRONIC PAIN ASSOCIATED WITH SIGNIFICANT PSYCHOSOCIAL DYSFUNCTION: Chronic | ICD-10-CM

## 2017-02-23 DIAGNOSIS — R05.9 COUGH: ICD-10-CM

## 2017-02-23 LAB
ALBUMIN SERPL BCP-MCNC: 3.5 G/DL (ref 3.5–5)
ALBUMIN/GLOB SERPL: 0.9 {RATIO} (ref 1.1–2.2)
ALP SERPL-CCNC: 60 U/L (ref 45–117)
ALT SERPL-CCNC: 24 U/L (ref 12–78)
ANION GAP BLD CALC-SCNC: 9 MMOL/L (ref 5–15)
APPEARANCE UR: ABNORMAL
AST SERPL W P-5'-P-CCNC: 38 U/L (ref 15–37)
BACTERIA URNS QL MICRO: ABNORMAL /HPF
BASOPHILS # BLD AUTO: 0 K/UL
BASOPHILS # BLD: 0 %
BILIRUB SERPL-MCNC: 0.4 MG/DL (ref 0.2–1)
BILIRUB UR QL CFM: NEGATIVE
BUN SERPL-MCNC: 17 MG/DL (ref 6–20)
BUN/CREAT SERPL: 14 (ref 12–20)
CALCIUM SERPL-MCNC: 8.6 MG/DL (ref 8.5–10.1)
CHLORIDE SERPL-SCNC: 102 MMOL/L (ref 97–108)
CO2 SERPL-SCNC: 27 MMOL/L (ref 21–32)
COLOR UR: ABNORMAL
CREAT SERPL-MCNC: 1.22 MG/DL (ref 0.55–1.02)
EOSINOPHIL # BLD: 0 K/UL
EOSINOPHIL NFR BLD: 0 %
EPITH CASTS URNS QL MICRO: ABNORMAL /LPF
ERYTHROCYTE [DISTWIDTH] IN BLOOD BY AUTOMATED COUNT: 13.4 % (ref 11.5–14.5)
GLOBULIN SER CALC-MCNC: 3.9 G/DL (ref 2–4)
GLUCOSE SERPL-MCNC: 104 MG/DL (ref 65–100)
GLUCOSE UR STRIP.AUTO-MCNC: NEGATIVE MG/DL
HCT VFR BLD AUTO: 39.8 % (ref 35–47)
HGB BLD-MCNC: 12.7 G/DL (ref 11.5–16)
HGB UR QL STRIP: ABNORMAL
KETONES UR QL STRIP.AUTO: ABNORMAL MG/DL
LEUKOCYTE ESTERASE UR QL STRIP.AUTO: NEGATIVE
LYMPHOCYTES # BLD AUTO: 46 %
LYMPHOCYTES # BLD: 0.9 K/UL
MCH RBC QN AUTO: 26.5 PG (ref 26–34)
MCHC RBC AUTO-ENTMCNC: 31.9 G/DL (ref 30–36.5)
MCV RBC AUTO: 82.9 FL (ref 80–99)
MONOCYTES # BLD: 0.1 K/UL
MONOCYTES NFR BLD AUTO: 4 %
MUCOUS THREADS URNS QL MICRO: ABNORMAL /LPF
NEUTS BAND NFR BLD MANUAL: 5 %
NEUTS SEG # BLD: 0.9 K/UL
NEUTS SEG NFR BLD AUTO: 45 %
NITRITE UR QL STRIP.AUTO: NEGATIVE
PH UR STRIP: 5.5 [PH] (ref 5–8)
PLATELET # BLD AUTO: 132 K/UL (ref 150–400)
POTASSIUM SERPL-SCNC: 4.1 MMOL/L (ref 3.5–5.1)
PROT SERPL-MCNC: 7.4 G/DL (ref 6.4–8.2)
PROT UR STRIP-MCNC: 100 MG/DL
RBC # BLD AUTO: 4.8 M/UL (ref 3.8–5.2)
RBC #/AREA URNS HPF: ABNORMAL /HPF (ref 0–5)
RBC MORPH BLD: ABNORMAL
SODIUM SERPL-SCNC: 138 MMOL/L (ref 136–145)
SP GR UR REFRACTOMETRY: 1.03 (ref 1–1.03)
TROPONIN I SERPL-MCNC: <0.04 NG/ML
UA: UC IF INDICATED,UAUC: ABNORMAL
UROBILINOGEN UR QL STRIP.AUTO: 2 EU/DL (ref 0.2–1)
WBC # BLD AUTO: 1.9 K/UL (ref 3.6–11)
WBC MORPH BLD: ABNORMAL
WBC URNS QL MICRO: ABNORMAL /HPF (ref 0–4)

## 2017-02-23 PROCEDURE — 99284 EMERGENCY DEPT VISIT MOD MDM: CPT

## 2017-02-23 PROCEDURE — 36415 COLL VENOUS BLD VENIPUNCTURE: CPT | Performed by: EMERGENCY MEDICINE

## 2017-02-23 PROCEDURE — 93005 ELECTROCARDIOGRAM TRACING: CPT

## 2017-02-23 PROCEDURE — 80053 COMPREHEN METABOLIC PANEL: CPT | Performed by: EMERGENCY MEDICINE

## 2017-02-23 PROCEDURE — 85025 COMPLETE CBC W/AUTO DIFF WBC: CPT | Performed by: EMERGENCY MEDICINE

## 2017-02-23 PROCEDURE — 81001 URINALYSIS AUTO W/SCOPE: CPT | Performed by: EMERGENCY MEDICINE

## 2017-02-23 PROCEDURE — 71020 XR CHEST PA LAT: CPT

## 2017-02-23 PROCEDURE — 74011250637 HC RX REV CODE- 250/637: Performed by: PHYSICIAN ASSISTANT

## 2017-02-23 PROCEDURE — 87086 URINE CULTURE/COLONY COUNT: CPT | Performed by: EMERGENCY MEDICINE

## 2017-02-23 PROCEDURE — 84484 ASSAY OF TROPONIN QUANT: CPT | Performed by: EMERGENCY MEDICINE

## 2017-02-23 RX ORDER — ACETAMINOPHEN 325 MG/1
325 TABLET ORAL
Status: COMPLETED | OUTPATIENT
Start: 2017-02-23 | End: 2017-02-23

## 2017-02-23 RX ORDER — MECLIZINE HCL 12.5 MG 12.5 MG/1
12.5 TABLET ORAL
Status: COMPLETED | OUTPATIENT
Start: 2017-02-23 | End: 2017-02-23

## 2017-02-23 RX ORDER — ROSUVASTATIN CALCIUM 5 MG/1
5 TABLET, COATED ORAL
COMMUNITY
End: 2017-04-16 | Stop reason: SDUPTHER

## 2017-02-23 RX ORDER — MECLIZINE HCL 12.5 MG 12.5 MG/1
12.5 TABLET ORAL
Qty: 20 TAB | Refills: 0 | Status: SHIPPED | OUTPATIENT
Start: 2017-02-23 | End: 2017-03-05

## 2017-02-23 RX ORDER — DEXTROMETHORPHAN HYDROBROMIDE, GUAIFENESIN 5; 100 MG/5ML; MG/5ML
650 LIQUID ORAL 2 TIMES DAILY
Qty: 20 TAB | Refills: 0 | Status: SHIPPED | OUTPATIENT
Start: 2017-02-23 | End: 2017-04-04

## 2017-02-23 RX ORDER — IBUPROFEN 400 MG/1
400 TABLET ORAL
Status: COMPLETED | OUTPATIENT
Start: 2017-02-23 | End: 2017-02-23

## 2017-02-23 RX ADMIN — MECLIZINE 12.5 MG: 12.5 TABLET ORAL at 17:41

## 2017-02-23 RX ADMIN — ACETAMINOPHEN 325 MG: 325 TABLET, FILM COATED ORAL at 17:41

## 2017-02-23 RX ADMIN — IBUPROFEN 400 MG: 400 TABLET, FILM COATED ORAL at 17:39

## 2017-02-23 NOTE — ED NOTES
Patient presents to ED with C/O dysuria, weakness, \"head tight\" and dizziness that started weeks ago. The pt stated she visited her daughter's [facility], was dx with vaginal dryness, and given \"a cream\", but stated the cream did not help. The pt stated she is always dizzy and feels like is she is going to have a syncopal episode, but has never had an episode. Patient is A&Ox3, side rails up, call bell w/in reach, and aware of plan of care. The patient is in NAD.

## 2017-02-23 NOTE — ED PROVIDER NOTES
HPI Comments: Alma Dobson is a 70 y.o. female with extensive PMHx including but not limited to HTN, GERD, anxiety, depression, arthritis, psychoses, chronic HA, chronic clavicle pain, chronic pain associated with significant psychosocial dysfunction, non-compliance, and chronic vaginal pain presenting ambulatory to ED with chronic complaints. Pt states her Marques Willoughby been getting tight on me\" x awhile. Pt denies any acute change recently as well as vision changes, N/V, acute thunderclap HA, coordination change, head injury. Of note, patient states that she saw her eye doctor last week and had a normal eye exam. Pt also indicates \"everyday I wake up and I got pains down below. \" Pt states that she has experienced burning with urination. Pt has followed-up with her gynecologist in the last two weeks, Dr. Crissy Guardado, who informed her that she has vaginal atrophy and prescribed her a cream to apply. Pt has not been compliant with this cream. Pt indicates that she has also felt dizzy without experiencing a bout of syncope or loss of coordination. Pt denies associated symptoms of CP, SOB, N/V, diarrhea, constipation, hematuria, abdominal pain, SI/HI. PCP: [address] [place], MD  Social Hx: Tobacco (-), alcohol use (-), illicit drug use (-)    PMH: per HPI, see list  Surgical Hx: tubal ligation, hysterectomy, bladder dilatation    There are no other complaints, changes, or physical findings at this time. Patient is a 70 y.o. female presenting with dizziness and urinary pain. The history is provided by the patient. Dizziness   Pertinent negatives include no shortness of breath, no chest pain, no vomiting, no headaches and no nausea. Urinary Pain    Pertinent negatives include no chills, no nausea, no vomiting and no abdominal pain.         Past Medical History:   Diagnosis Date    Agoraphobia without mention of panic attacks 2/17/2014    Anxiety disorder 8/18/2013    Arthritis     osteo    Breast pain, left 4/7/2015    CAD (coronary artery disease), native coronary artery 12/1/2015    Chronic chest pain 1/13/2014    Chronic pain associated with significant psychosocial dysfunction 2/17/2014    Depression 8/18/2013    Diabetes ([facility] [address]     type II    Duplicated right renal collecting system 3/13/2014    GERD (gastroesophageal reflux disease)     Gout, joint     HTN, goal below 130/80 9/7/2012    Hypertension     Nephrolithiasis 3/13/2014    Other ill-defined conditions(799.89)     hyercholesterolemia    Other ill-defined conditions(799.89)     anxiety    Other ill-defined conditions(799.89)     pt states head get tight,due to wax bill up    Other ill-defined conditions(799.89)     pain left shoulder due to fall many years ago    Personal history of noncompliance with medical treatment, presenting hazards to health 5/30/2014    Psychiatric disorder     anxiety/ depression    Psychotic disorder     Vaginal pain 7/30/2014       Past Surgical History:   Procedure Laterality Date    EGD  4/23/2010         HX GYN      cyst removed from vagina    HX GYN      vaginal birth x7    HX HYSTERECTOMY      partial    HX OTHER SURGICAL      egd    HX OTHER SURGICAL      cyst removed from left wrist    HX OTHER SURGICAL      bladder dilitation    HX TUBAL LIGATION      HX UROLOGICAL      kidney stones         Family History:   Problem Relation Age of Onset    Stroke Mother     Heart Disease Mother     Cancer Father     Heart Disease Son     Liver Disease Son     Heart Disease Daughter     Malignant Hyperthermia Neg Hx     Pseudocholinesterase Deficiency Neg Hx     Delayed Awakening Neg Hx     Post-op Nausea/Vomiting Neg Hx     Emergence Delirium Neg Hx     Post-op Cognitive Dysfunction Neg Hx     Other Neg Hx        Social History     Social History    Marital status:      Spouse name: N/A    Number of children: N/A    Years of education: N/A     Occupational History    Not on file. Social History Main Topics    Smoking status: Never Smoker    Smokeless tobacco: Never Used    Alcohol use No    Drug use: No    Sexual activity: No     Other Topics Concern    Not on file     Social History Narrative    ** Merged History Encounter **              ALLERGIES: Amoxicillin; Sulfa (sulfonamide antibiotics); Amoxicillin; Mirtazapine; Percocet [oxycodone-acetaminophen]; Codeine; Prednisone; Sulfa (sulfonamide antibiotics); and Zithromax [azithromycin]    Review of Systems   Constitutional: Negative. Negative for activity change, appetite change, chills, diaphoresis, fever and unexpected weight change. HENT: Negative for congestion, hearing loss, rhinorrhea, sinus pressure, sneezing, sore throat and trouble swallowing. Eyes: Negative for pain, redness, itching and visual disturbance. Respiratory: Negative for cough, shortness of breath and wheezing. Cardiovascular: Negative for chest pain, palpitations and leg swelling. Gastrointestinal: Negative for abdominal pain, constipation, diarrhea, nausea and vomiting. Genitourinary: Positive for dysuria and vaginal pain. Musculoskeletal: Positive for arthralgias. Negative for gait problem and myalgias. Skin: Negative for color change, pallor, rash and wound. Neurological: Positive for dizziness. Negative for tremors, weakness, light-headedness, numbness and headaches. All other systems reviewed and are negative. Vitals:    02/23/17 1208 02/23/17 1343   BP: 120/79 101/70   Pulse: 84 75   Resp: 16 17   Temp: 98.2 °F (36.8 °C)    SpO2: 100% 98%   Weight: 74 kg (163 lb 2.3 oz)    Height: 5' 8\" (1.727 m)             Physical Exam   Constitutional: She is oriented to person, place, and time. Vital signs are normal. She appears well-developed and well-nourished. No distress. 71 yo AAF   Thin, awake, alert in NAD   HENT:   Head: Normocephalic and atraumatic.    Right Ear: External ear normal.   Left Ear: External ear normal. Mouth/Throat: Oropharynx is clear and moist. No oropharyngeal exudate. Eyes: Conjunctivae and EOM are normal. Pupils are equal, round, and reactive to light. Right eye exhibits no discharge. Left eye exhibits no discharge. No scleral icterus. Neck: Normal range of motion. Neck supple. No tracheal deviation present. Cardiovascular: Normal rate, regular rhythm, normal heart sounds and intact distal pulses. Exam reveals no gallop and no friction rub. No murmur heard. Pulmonary/Chest: Effort normal and breath sounds normal. No stridor. No respiratory distress. She has no wheezes. She has no rales. She exhibits no tenderness. Active dry cough   Abdominal: Soft. Bowel sounds are normal. She exhibits no distension. There is no tenderness. No abdominal pain elicited with light and deep palpation   Genitourinary:   Genitourinary Comments: Declines  exam due to stroud bed placement   Musculoskeletal: Normal range of motion. She exhibits no edema, tenderness or deformity. No neurologic, motor, vascular, or compartment embarrassment observed on exam. No focal neurologic deficits. Ambulatory throughout department   Lymphadenopathy:     She has no cervical adenopathy. Neurological: She is alert and oriented to person, place, and time. No cranial nerve deficit. Coordination normal.   Skin: Skin is warm and dry. No rash noted. She is not diaphoretic. No erythema. No pallor. Nursing note and vitals reviewed. MDM  Number of Diagnoses or Management Options  Chronic nonintractable headache, unspecified headache type:   Chronic pain associated with significant psychosocial dysfunction:   Cough:   Generalized OA:   Diagnosis management comments: DDx: chronic issues, OA, PNA, cough, low suspicion ACS, psychiatric issue    69 yo presenting with chronic complaints. Labs baseline, CXR negative for PNA, vitals stable, reassuring physical exam. Pt feels better following tylenol and meclizine.  Stable for discharge and encouraged strict follow-up. Amount and/or Complexity of Data Reviewed  Clinical lab tests: ordered and reviewed  Tests in the radiology section of CPT®: ordered and reviewed  Tests in the medicine section of CPT®: ordered and reviewed  Review and summarize past medical records: yes  Independent visualization of images, tracings, or specimens: yes      ED Course       Procedures    I reviewed our electronic medical record system for any past medical records that were available that may contribute to the patients current condition, the nursing notes and and vital signs from today's visit     Nursing notes will be reviewed as they become available in realtime while the pt is in the ED. Progress Note:  3:49 PM PM  Chart review complete. Pt has been evaluated numerous times by [facility] ED and her PCP for similar complaints. Pt has had \"pain down below\" x years, collar bone pain x years with normal XR, multiple negative head CTs, most recently being 08/16/2016, and negative abdominal CTs. Pt states she has not been seeing her psychiatrist but she would like to see him again. Urged patient to follow up with psychiatrist. Will continue to monitor. Progress Note:  5:27 PM  Pt is feeling much improved following NSAID and meclizine. Son is at bedside and explained patient's work-up thus far. Stable for discharge      DISCHARGE NOTE:  5:35 PM  Renan Sow  results have been reviewed with her. She has been counseled regarding her diagnosis. She verbally conveys understanding and agreement of the signs, symptoms, diagnosis, treatment and prognosis and additionally agrees to follow up as recommended with Dr. [address] [place], MD in 24 - 48 hours. She also agrees with the care-plan and conveys that all of her questions have been answered.   I have also put together some discharge instructions for her that include: 1) educational information regarding their diagnosis, 2) how to care for their diagnosis at home, as well a 3) list of reasons why they would want to return to the ED prior to their follow-up appointment, should their condition change. She and/or family's questions have been answered. I have encouraged them to see the official results in [facility]\" or to retrieve the specifics of their results from medical records.        LABS COMPLETED AND REVIEWED:  Recent Results (from the past 12 hour(s))   EKG, 12 LEAD, INITIAL    Collection Time: 02/23/17 11:57 AM   Result Value Ref Range    Ventricular Rate 82 BPM    Atrial Rate 82 BPM    P-R Interval 136 ms    QRS Duration 94 ms    Q-T Interval 380 ms    QTC Calculation (Bezet) 443 ms    Calculated P Axis 58 degrees    Calculated R Axis 17 degrees    Calculated T Axis 44 degrees    Diagnosis       Normal sinus rhythm  Normal ECG  When compared with ECG of 16-AUG-2016 13:54,  No significant change was found     URINALYSIS W/ REFLEX CULTURE    Collection Time: 02/23/17  4:24 PM   Result Value Ref Range    Color DARK YELLOW      Appearance CLOUDY (A) CLEAR      Specific gravity 1.026 1.003 - 1.030      pH (UA) 5.5 5.0 - 8.0      Protein 100 (A) NEG mg/dL    Glucose NEGATIVE  NEG mg/dL    Ketone TRACE (A) NEG mg/dL    Blood SMALL (A) NEG      Urobilinogen 2.0 (H) 0.2 - 1.0 EU/dL    Nitrites NEGATIVE  NEG      Leukocyte Esterase NEGATIVE  NEG      WBC 5-10 0 - 4 /hpf    RBC 0-5 0 - 5 /hpf    Epithelial cells FEW FEW /lpf    Bacteria 1+ (A) NEG /hpf    UA:UC IF INDICATED URINE CULTURE ORDERED (A) CNI      Mucus 1+ (A) NEG /lpf   CBC WITH AUTOMATED DIFF    Collection Time: 02/23/17  4:24 PM   Result Value Ref Range    WBC 1.9 (L) 3.6 - 11.0 K/uL    RBC 4.80 3.80 - 5.20 M/uL    HGB 12.7 11.5 - 16.0 g/dL    HCT 39.8 35.0 - 47.0 %    MCV 82.9 80.0 - 99.0 FL    MCH 26.5 26.0 - 34.0 PG    MCHC 31.9 30.0 - 36.5 g/dL    RDW 13.4 11.5 - 14.5 %    PLATELET 915 (L) 183 - 400 K/uL    NEUTROPHILS 45 %    BAND NEUTROPHILS 5 %    LYMPHOCYTES 46 %    MONOCYTES 4 %    EOSINOPHILS 0 % BASOPHILS 0 %    ABS. NEUTROPHILS 0.9 K/UL    ABS. LYMPHOCYTES 0.9 K/UL    ABS. MONOCYTES 0.1 K/UL    ABS. EOSINOPHILS 0.0 K/UL    ABS. BASOPHILS 0.0 K/UL    RBC COMMENTS NORMOCYTIC, NORMOCHROMIC      WBC COMMENTS REACTIVE LYMPHS     METABOLIC PANEL, COMPREHENSIVE    Collection Time: 02/23/17  4:24 PM   Result Value Ref Range    Sodium 138 136 - 145 mmol/L    Potassium 4.1 3.5 - 5.1 mmol/L    Chloride 102 97 - 108 mmol/L    CO2 27 21 - 32 mmol/L    Anion gap 9 5 - 15 mmol/L    Glucose 104 (H) 65 - 100 mg/dL    BUN 17 6 - 20 MG/DL    Creatinine 1.22 (H) 0.55 - 1.02 MG/DL    BUN/Creatinine ratio 14 12 - 20      GFR est AA 53 (L) >60 ml/min/1.73m2    GFR est non-AA 43 (L) >60 ml/min/1.73m2    Calcium 8.6 8.5 - 10.1 MG/DL    Bilirubin, total 0.4 0.2 - 1.0 MG/DL    ALT (SGPT) 24 12 - 78 U/L    AST (SGOT) 38 (H) 15 - 37 U/L    Alk. phosphatase 60 45 - 117 U/L    Protein, total 7.4 6.4 - 8.2 g/dL    Albumin 3.5 3.5 - 5.0 g/dL    Globulin 3.9 2.0 - 4.0 g/dL    A-G Ratio 0.9 (L) 1.1 - 2.2     TROPONIN I    Collection Time: 02/23/17  4:24 PM   Result Value Ref Range    Troponin-I, Qt. <0.04 <0.05 ng/mL   BILIRUBIN, CONFIRM    Collection Time: 02/23/17  4:24 PM   Result Value Ref Range    Bilirubin UA, confirm NEGATIVE  NEG         IMAGING COMPLETED AND REVIEWED:  CXR Results  (Last 48 hours)               02/23/17 1708  XR CHEST PA LAT Final result    Impression:  IMPRESSION: No airspace disease or other acute abnormality. Narrative:  EXAM:  XR CHEST PA LAT. INDICATION: Productive cough. COMPARISON: 12/23/2016. FINDINGS:    PA and lateral radiographs of the chest were obtained. Lungs: The lungs are clear of mass, nodule, airspace disease or edema. Pleura: There is no pleural effusion or pneumothorax. Mediastinum: The cardiac and mediastinal contours and pulmonary vascularity are   normal.  The aorta is atherosclerotic. Bones and soft tissues: There are degenerative changes of the spine. CLINICAL IMPRESSION:  1. Generalized OA    2. Chronic nonintractable headache, unspecified headache type    3. Chronic pain associated with significant psychosocial dysfunction    4. Cough        Plan:  1. Return precautions  2. Medications as prescribed  3. Follow-ups as discussed  4. Psychiatrist follow-up  5. Compliance with medical regime    Discharge Medication List as of 2/23/2017  5:33 PM      START taking these medications    Details   acetaminophen (TYLENOL ARTHRITIS PAIN) 650 mg CR tablet Take 1 Tab by mouth two (2) times a day., Normal, Disp-20 Tab, R-0         CONTINUE these medications which have CHANGED    Details   meclizine (ANTIVERT) 12.5 mg tablet Take 1 Tab by mouth three (3) times daily as needed for up to 10 days. , Normal, Disp-20 Tab, R-0         CONTINUE these medications which have NOT CHANGED    Details   rosuvastatin (CRESTOR) 5 mg tablet Take 5 mg by mouth nightly., Historical Med      clonazePAM (KLONOPIN) 1 mg tablet Take 1 Tab by mouth two (2) times a day. Max Daily Amount: 2 mg. As needed fill after 2/5/17, Print, Disp-40 Tab, R-0      polyethylene glycol (MIRALAX) 17 gram/dose powder TAKE 1 CAPFUL IN 8 OUNCES OF WATER EVERY DAY, Normal, Disp-255 g, R-3      hydrocortisone (HYCORT) 1 % ointment Apply  to affected area two (2) times a day. use thin layer, Normal, Disp-30 g, R-0      famotidine (PEPCID) 40 mg tablet Take 40 mg by mouth two (2) times a day., Historical Med      losartan (COZAAR) 25 mg tablet Take 0.5 Tabs by mouth nightly. Do not take the 50mg losartan pharmacist please discard all 50mg losartan rx's, Normal, Disp-45 Tab, R-3      !! ONETOUCH ULTRA TEST strip TEST AS DIRECTED, Normal, Disp-100 Strip, R-11      !! ONETOUCH ULTRA TEST strip TEST AS DIRECTED, Normal, Disp-100 Strip, R-0      !! glucose blood VI test strips (ASCENSIA AUTODISC VI, ONE TOUCH ULTRA TEST VI) strip by Does Not Apply route See Admin Instructions.  DX NIDDM test daily, Normal, Disp-100 Strip, R-0      aspirin delayed-release 81 mg tablet Take 1 Tab by mouth daily. , No Print, Disp-30 Tab, R-11      gabapentin (NEURONTIN) 300 mg capsule Take 1 Cap by mouth three (3) times daily. Indications: NEUROPATHIC PAIN, Normal, Disp-90 Cap, R-6      dilTIAZem XR (DILACOR XR) 240 mg XR capsule Take  by mouth daily. , Historical Med      fluticasone (FLONASE) 50 mcg/actuation nasal spray 2 Sprays by Both Nostrils route daily. , Historical Med      mirtazapine (REMERON) 15 mg tablet Take  by mouth nightly., Historical Med      traZODone (DESYREL) 100 mg tablet Take 100 mg by mouth nightly., Historical Med      camphor-menthol (SARNA ANTI-ITCH) 0.5-0.5 % lotion Apply  to affected area two (2) times daily as needed for Itching., Historical Med      diltiazem CD (CARDIZEM CD) 120 mg ER capsule TAKE ONE CAPSULE BY MOUTH DAILY **THIS REPLACES THE 240MG**, Historical Med, R-1      azelastine (OPTIVAR) 0.05 % ophthalmic solution INSTILL 1 DROP INTO BOTH EYES TWICE A DAY, Normal, Disp-6 mL, R-3      Diclofenac Potassium (CAMBIA) 50 mg pwpk Take 1 Package by mouth daily as needed. , Sample, Disp-1 Packet, R-0      dextromethorphan-quiNIDine (NUEDEXTA) 20-10 mg per capsule 1 daily, Normal, Disp-30 Cap, R-3      nystatin (MYCOSTATIN) topical cream Apply  to affected area two (2) times a day., Normal, Disp-15 g, R-0      ranitidine (ZANTAC) 150 mg tablet Take 1 Tab by mouth two (2) times a day., Normal, Disp-60 Tab, R-6      docusate sodium (COLACE) 100 mg capsule Take 100 mg by mouth two (2) times daily as needed., OTC       !! - Potential duplicate medications found. Please discuss with provider. STOP taking these medications       lidocaine-prilocaine (EMLA) topical cream Comments:   Reason for Stopping:              Follow-up Information     Follow up With Details Comments Josh Esparza [address] MD Schedule an appointment as soon as possible for a visit in 1 day As needed, If symptoms worsen, Possible further evaluation and treatment, Follow-up about lab results [address] [facility] [address]  [phone]      [facility] EMERGENCY DEPT Go to As needed, If symptoms worsen [address] [place] [address]  [phone]        Return to the closest emergency room or follow up sooner for any deterioration                       This note will not be viewable in [facility].

## 2017-02-23 NOTE — ED NOTES
Patient discharged and given discharge instructions by JD Appiah. Patient had an opportunity to ask questions. Patient verbalized understanding of discharge instructions. Patient d/c from ED in wheelchair, discharge instructions and prescriptions in hand. Patient accompanied by family. PA notified of pt's BP of 158/93. PA okay for pt to be d/c'd.

## 2017-02-23 NOTE — DISCHARGE INSTRUCTIONS
Thank you for allowing us to provide you with excellent care today. We hope we addressed all of your concerns and needs. We strive to provide excellent quality care in the Emergency Department. Please rate us as excellent, as anything less than excellent does not meet our expectations. If you feel that you have not received excellent quality care or timely care, please ask to speak to the nurse manager. Please choose us in the future for your continued health care needs. The exam and treatment you received in the Emergency Department were for an urgent problem and are not intended as complete care. It is important that you follow-up with a doctor, nurse practitioner, or physician assistant to:  (1) confirm your diagnosis,  (2) re-evaluation of changes in your illness and treatment, and  (3) for ongoing care. If your symptoms become worse or you do not improve as expected and you are unable to reach your usual health care provider, you should return to the Emergency Department. We are available 24 hours a day. Take this sheet with you when you go to your follow-up visit. If you have any problem arranging the follow-up visit, contact [address] [place] [address] 21 [phone])    Make an appointment with your Primary Care doctor for follow up of this visit. Return to the ER if you are unable to be seen in the time recommended on your discharge instructions. If a prescription has been provided, please have it filled as soon as possible to avoid a delay in treatment. Read the entire medication instruction sheet provided to you by the pharmacy. If you have any questions or reservations about taking the medication due to side effects or interactions with other medications please call the ER or your primary care physician. The exam and treatment you received in the Emergency Department were for an emergent problem and is not intended as complete care.  It is important that you follow up with a doctor, nurse practitioner, or [de-identified] assistant for ongoing care. If your symptoms worsen or you do not improve as expected and you are unable to reach your usual health care provider, you should return to the Emergency Department. We are available 24 hours a day. You may be contacted by a [address] for your opinion of this visit. We would appreciate it if you answer \"very satisfied\" to the survey questions. If you are unable to answer that you are \"very satisfied\" with your visit please contact our nurse manager at [phone] to discuss your concerns. We strive to do the best we can and your opinions are important to us and anything less that \"very satisfied\" is not acceptable to Kanwal Wilson or [facility]. Arthritis: Care Instructions  Your Care Instructions  Arthritis, also called osteoarthritis, is a breakdown of the cartilage that cushions your joints. When the cartilage wears down, your bones rub against each other. This causes pain and stiffness. Many people have some arthritis as they age. Arthritis most often affects the joints of the spine, hands, hips, knees, or feet. You can take simple measures to protect your joints, ease your pain, and help you stay active. Follow-up care is a key part of your treatment and safety. Be sure to make and go to all appointments, and call your doctor if you are having problems. It's also a good idea to know your test results and keep a list of the medicines you take. How can you care for yourself at home? · Stay at a healthy weight. Being overweight puts extra strain on your joints. · Talk to your doctor or physical therapist about exercises that will help ease joint pain. ¨ Stretch. You may enjoy gentle forms of yoga to help keep your joints and muscles flexible. ¨ Walk instead of jog. Other types of exercise that are less stressful on the joints include riding a bicycle, swimming, or water exercise. ¨ Lift weights. Strong muscles help reduce stress on your joints.  Stronger thigh muscles, for example, take some of the stress off of the knees and hips. Learn the right way to lift weights so you do not make joint pain worse. · Take your medicines exactly as prescribed. Call your doctor if you think you are having a problem with your medicine. · Take pain medicines exactly as directed. ¨ If the doctor gave you a prescription medicine for pain, take it as prescribed. ¨ If you are not taking a prescription pain medicine, ask your doctor if you can take an over-the-counter medicine. · Use a cane, crutch, walker, or another device if you need help to get around. These can help rest your joints. You also can use other things to make life easier, such as a higher toilet seat and padded handles on kitchen utensils. · Do not sit in low chairs, which can make it hard to get up. · Put heat or cold on your sore joints as needed. Use whichever helps you most. You also can take turns with hot and cold packs. ¨ Apply heat 2 or 3 times a day for 20 to 30 minutes--using a heating pad, hot shower, or hot pack--to relieve pain and stiffness. ¨ Put ice or a cold pack on your sore joint for 10 to 20 minutes at a time. Put a thin cloth between the ice and your skin. When should you call for help? Call your doctor now or seek immediate medical care if:  · You have sudden swelling, warmth, or pain in any joint. · You have joint pain and a fever or rash. · You have such bad pain that you cannot use a joint. Watch closely for changes in your health, and be sure to contact your doctor if:  · You have mild joint symptoms that continue even with more than 6 weeks of care at home. · You have stomach pain or other problems with your medicine. Where can you learn more? Go to http://lobo-michel.info/. Enter X931 in the search box to learn more about \"Arthritis: Care Instructions. \"  Current as of: February 24, 2016  Content Version: 11.1  © 5936-7517 [employer].  Care instructions adapted under license by [employer] (which disclaims liability or warranty for this information). If you have questions about a medical condition or this instruction, always ask your healthcare professional. [address] any warranty or liability for your use of this information. Chronic Cough: Care Instructions  Your Care Instructions    A cough is your body's response to something that bothers your throat or airways. Many things can cause a cough. You might cough because of a cold or the flu, bronchitis, or asthma. Smoking, postnasal drip, allergies, and stomach acid that backs up into your throat also can cause a cough. A cough can be short-term (acute) or long-term (chronic). A chronic cough lasts more than 3 weeks. A chronic cough is often caused by a long-term problem, such as asthma. Another cause might be a medicine, such as an ACE inhibitor. A cough is a symptom, not a disease. To treat a chronic cough, you may need to treat the problem that causes it. You can take a few steps at home to cough less and feel better. Some people cough or clear their throat out of habit for no clear reason. Follow-up care is a key part of your treatment and safety. Be sure to make and go to all appointments, and call your doctor if you are having problems. It's also a good idea to know your test results and keep a list of the medicines you take. How can you care for yourself at home? · Drink plenty of water and other fluids. This may help soothe a dry or sore throat. Honey or lemon juice in hot water or tea may ease a dry cough. · Prop up your head on pillows to help you breathe and ease a cough. · Do not smoke or allow others to smoke around you. Smoke can make a cough worse. If you need help quitting, talk to your doctor about stop-smoking programs and medicines. These can increase your chances of quitting for good.   · Avoid exposure to smoke, dust, or other pollutants, or wear a face mask. Check with your doctor or pharmacist to find out which type of face mask will give you the most benefit. · Take cough medicine as directed by your doctor. · Try cough drops to soothe a dry or sore throat. Cough drops don't stop a cough. Medicine-flavored cough drops are no better than candy-flavored drops or hard candy. Throat clearing  When you have a chronic cough or a disease that may cause this type of cough, you may often feel like you want to clear your throat. This helps bring up mucus. But throat clearing does not always have a cause. Throat clearing can become a habit. The more you do it, the more you feel like you need to do it. But frequent throat clearing can be hard on your vocal cords. It's like slamming them together. To help lessen throat clearing, you can try:  · Taking small sips of water. · Not clearing your throat when you feel you need to. · Swallowing hard when you want to clear your throat. You may want to ask your doctor if a medicine that thins mucus would help. When should you call for help? Call 911 anytime you think you may need emergency care. For example, call if:  · You have severe trouble breathing. Call your doctor now or seek immediate medical care if:  · You cough up blood. · You have new or worse trouble breathing. · You have a new or higher fever. Watch closely for changes in your health, and be sure to contact your doctor if:  · You cough more deeply or more often, especially if you notice more mucus or a change in the color of your mucus. · You do not get better as expected. Where can you learn more? Go to http://lobo-michel.info/. Enter F924 in the search box to learn more about \"Chronic Cough: Care Instructions. \"  Current as of: May 23, 2016  Content Version: 11.1  © 3189-6917 [employer].  Care instructions adapted under license by [employer] (which disclaims liability or warranty for this information). If you have questions about a medical condition or this instruction, always ask your healthcare professional. [place] [address] any warranty or liability for your use of this information. Head or Face Pain: Care Instructions  Your Care Instructions  Common causes of head or face pain are allergies, stress, and injuries. Other causes include tooth problems and sinus infections. Eating certain foods, such as chocolate or cheese, or drinking certain liquids, such as coffee or cola, can cause head pain for some people. If you have mild head pain, you may not need treatment. It is important to watch your symptoms and talk to your doctor if your pain continues or gets worse. Follow-up care is a key part of your treatment and safety. Be sure to make and go to all appointments, and call your doctor if you are having problems. It's also a good idea to know your test results and keep a list of the medicines you take. How can you care for yourself at home? · Take pain medicines exactly as directed. ¨ If the doctor gave you a prescription medicine for pain, take it as prescribed. ¨ If you are not taking a prescription pain medicine, ask your doctor if you can take an over-the-counter pain medicine. · Take it easy for the next few days or longer if you are not feeling well. · Use a warm, moist towel or heating pad set on low to relax tight muscles in your shoulder and neck. Have someone gently massage your neck and shoulders. · Put ice or a cold pack on the area for 10 to 20 minutes at a time. Put a thin cloth between the ice and your skin. When should you call for help? Call 911 anytime you think you may need emergency care. For example, call if:  · You have twitching, jerking, or a seizure. · You passed out (lost consciousness). · You have symptoms of a stroke.  These may include:  ¨ Sudden numbness, tingling, weakness, or loss of movement in your face, arm, or leg, especially on only one side of your body. ¨ Sudden vision changes. ¨ Sudden trouble speaking. ¨ Sudden confusion or trouble understanding simple statements. ¨ Sudden problems with walking or balance. ¨ A sudden, severe headache that is different from past headaches. · You have jaw pain and pain in your chest, shoulder, neck, or arm. Call your doctor now or seek immediate medical care if:  · You have a fever with a stiff neck or a severe headache. · You have nausea and vomiting, or you cannot keep food or liquids down. Watch closely for changes in your health, and be sure to contact your doctor if:  · Your head or face pain does not get better as expected. Where can you learn more? Go to http://lobo-michel.info/. Enter P568 in the search box to learn more about \"Head or Face Pain: Care Instructions. \"  Current as of: May 27, 2016  Content Version: 11.1  © 4487-8429 [employer]. Care instructions adapted under license by [employer] (which disclaims liability or warranty for this information). If you have questions about a medical condition or this instruction, always ask your healthcare professional. [place] [address] any warranty or liability for your use of this information.

## 2017-02-24 LAB
ATRIAL RATE: 82 BPM
CALCULATED P AXIS, ECG09: 58 DEGREES
CALCULATED R AXIS, ECG10: 17 DEGREES
CALCULATED T AXIS, ECG11: 44 DEGREES
DIAGNOSIS, 93000: NORMAL
P-R INTERVAL, ECG05: 136 MS
Q-T INTERVAL, ECG07: 380 MS
QRS DURATION, ECG06: 94 MS
QTC CALCULATION (BEZET), ECG08: 443 MS
VENTRICULAR RATE, ECG03: 82 BPM

## 2017-02-25 LAB
BACTERIA SPEC CULT: NORMAL
CC UR VC: NORMAL
SERVICE CMNT-IMP: NORMAL

## 2017-02-25 NOTE — PROGRESS NOTES
Talked to patient and pt stated she wasn't feeling much better. Advised that she could come back in to be seen or f/u with Dr Wade Soliman. No abx needed for urine culture. Jonathan Close  Pagé FNP-BC

## 2017-02-27 ENCOUNTER — OFFICE VISIT (OUTPATIENT)
Dept: INTERNAL MEDICINE CLINIC | Age: 72
End: 2017-02-27

## 2017-02-27 VITALS
HEART RATE: 86 BPM | BODY MASS INDEX: 24.57 KG/M2 | DIASTOLIC BLOOD PRESSURE: 79 MMHG | WEIGHT: 162.1 LBS | OXYGEN SATURATION: 98 % | HEIGHT: 68 IN | SYSTOLIC BLOOD PRESSURE: 146 MMHG | RESPIRATION RATE: 17 BRPM | TEMPERATURE: 98.4 F

## 2017-02-27 DIAGNOSIS — R05.8 COUGH PRODUCTIVE OF CLEAR SPUTUM: ICD-10-CM

## 2017-02-27 DIAGNOSIS — N28.9 RENAL INSUFFICIENCY: ICD-10-CM

## 2017-02-27 DIAGNOSIS — E78.01 FAMILIAL HYPERCHOLESTEREMIA: ICD-10-CM

## 2017-02-27 DIAGNOSIS — Z71.2 ENCOUNTER TO DISCUSS TEST RESULTS: ICD-10-CM

## 2017-02-27 DIAGNOSIS — R63.4 WEIGHT LOSS: ICD-10-CM

## 2017-02-27 DIAGNOSIS — D70.9 NEUTROPENIA, UNSPECIFIED TYPE (HCC): Primary | ICD-10-CM

## 2017-02-27 RX ORDER — BENZONATATE 100 MG/1
100 CAPSULE ORAL
Qty: 21 CAP | Refills: 0 | Status: SHIPPED | OUTPATIENT
Start: 2017-02-27 | End: 2017-03-06

## 2017-02-27 NOTE — PROGRESS NOTES
Chief Complaint   Patient presents with    Cough    Urinary Pain     vaginal pain    Eye Problem    [address] Follow Up     Bacterial infection      Room 7

## 2017-02-27 NOTE — PROGRESS NOTES
Chief Complaint   Patient presents with    Cough    Urinary Pain     vaginal pain    Eye Problem    Dizziness   [facility] Follow Up     Bacterial infection            Subjective:   Lily Santos 70 y.o.  female with a  past medical history reviewed see below. comes in today c/o:Coughing  for over a week  States she went to [facility] notes reviewed not a bacterial infection not a urinary tract infection  C/o cough no fever or chills roof of mouth is \"bitter and feels like she has something on it, taste sensation is abnl water taste bitter  Symptoms present for  \"a minute\"  Continues to hae weight loss she has been using her chol medication   She has been using her crestor h/o Familial hypercholesterolemia   ROS: otherwise feeling generally well. All other systems reviewed and are negative      Current Outpatient Prescriptions   Medication Sig Dispense Refill    rosuvastatin (CRESTOR) 5 mg tablet Take 5 mg by mouth nightly.  acetaminophen (TYLENOL ARTHRITIS PAIN) 650 mg CR tablet Take 1 Tab by mouth two (2) times a day. 20 Tab 0    meclizine (ANTIVERT) 12.5 mg tablet Take 1 Tab by mouth three (3) times daily as needed for up to 10 days. 20 Tab 0    clonazePAM (KLONOPIN) 1 mg tablet Take 1 Tab by mouth two (2) times a day. Max Daily Amount: 2 mg. As needed fill after 2/5/17 40 Tab 0    polyethylene glycol (MIRALAX) 17 gram/dose powder TAKE 1 CAPFUL IN 8 OUNCES OF WATER EVERY  g 3    hydrocortisone (HYCORT) 1 % ointment Apply  to affected area two (2) times a day. use thin layer 30 g 0    gabapentin (NEURONTIN) 300 mg capsule Take 1 Cap by mouth three (3) times daily. Indications: NEUROPATHIC PAIN 90 Cap 6    famotidine (PEPCID) 40 mg tablet Take 40 mg by mouth two (2) times a day.  fluticasone (FLONASE) 50 mcg/actuation nasal spray 2 Sprays by Both Nostrils route daily.  mirtazapine (REMERON) 15 mg tablet Take  by mouth nightly.       camphor-menthol (SARNA ANTI-ITCH) 0.5-0.5 % lotion Apply  to affected area two (2) times daily as needed for Itching.  diltiazem CD (CARDIZEM CD) 120 mg ER capsule TAKE ONE CAPSULE BY MOUTH DAILY **THIS REPLACES THE 240MG**  1    losartan (COZAAR) 25 mg tablet Take 0.5 Tabs by mouth nightly. Do not take the 50mg losartan pharmacist please discard all 50mg losartan rx's 45 Tab 3    ONETOUCH ULTRA TEST strip TEST AS DIRECTED 100 Strip 11    ONETOUCH ULTRA TEST strip TEST AS DIRECTED 100 Strip 0    glucose blood VI test strips (ASCENSIA AUTODISC VI, ONE TOUCH ULTRA TEST VI) strip by Does Not Apply route See Admin Instructions. DX NIDDM test daily 100 Strip 0    dextromethorphan-quiNIDine (NUEDEXTA) 20-10 mg per capsule 1 daily 30 Cap 3    nystatin (MYCOSTATIN) topical cream Apply  to affected area two (2) times a day. 15 g 0    ranitidine (ZANTAC) 150 mg tablet Take 1 Tab by mouth two (2) times a day. 60 Tab 6    docusate sodium (COLACE) 100 mg capsule Take 100 mg by mouth two (2) times daily as needed.  aspirin delayed-release 81 mg tablet Take 1 Tab by mouth daily. 30 Tab 11    dilTIAZem XR (DILACOR XR) 240 mg XR capsule Take  by mouth daily.  traZODone (DESYREL) 100 mg tablet Take 100 mg by mouth nightly.  azelastine (OPTIVAR) 0.05 % ophthalmic solution INSTILL 1 DROP INTO BOTH EYES TWICE A DAY 6 mL 3    Diclofenac Potassium (CAMBIA) 50 mg pwpk Take 1 Package by mouth daily as needed.  1 Packet 0     Allergies   Allergen Reactions    Amoxicillin Hives    Sulfa (Sulfonamide Antibiotics) Hives and Itching    Amoxicillin Hives and Itching     Long time ago - patient not exactly certain what it does    Mirtazapine Itching and Nausea Only     Funny feeling in chest    Percocet [Oxycodone-Acetaminophen] Nausea and Vomiting    Codeine Nausea and Vomiting    Prednisone Itching    Sulfa (Sulfonamide Antibiotics) Hives, Itching and Palpitations     Think it was increased heart rate or itching - many years ago    Zithromax [Azithromycin] Itching     Not sure what it does,taken long time ago     Past Medical History:   Diagnosis Date    Agoraphobia without mention of panic attacks 2/17/2014    Anxiety disorder 8/18/2013    Arthritis     osteo    Breast pain, left 4/7/2015    CAD (coronary artery disease), native coronary artery 12/1/2015    Chronic chest pain 1/13/2014    Chronic pain associated with significant psychosocial dysfunction 2/17/2014    Depression 8/18/2013    Diabetes ([facility] [address]     type II    Duplicated right renal collecting system 3/13/2014    GERD (gastroesophageal reflux disease)     Gout, joint     HTN, goal below 130/80 9/7/2012    Hypertension     Nephrolithiasis 3/13/2014    Other ill-defined conditions(799.89)     hyercholesterolemia    Other ill-defined conditions(799.89)     anxiety    Other ill-defined conditions(799.89)     pt states head get tight,due to wax bill up    Other ill-defined conditions(799.89)     pain left shoulder due to fall many years ago    Personal history of noncompliance with medical treatment, presenting hazards to health 5/30/2014    Psychiatric disorder     anxiety/ depression    Psychotic disorder     Vaginal pain 7/30/2014     Past Surgical History:   Procedure Laterality Date    EGD  4/23/2010         HX GYN      cyst removed from vagina    HX GYN      vaginal birth x7    HX HYSTERECTOMY      partial    HX OTHER SURGICAL      egd    HX OTHER SURGICAL      cyst removed from left wrist    HX OTHER SURGICAL      bladder dilitation    HX TUBAL LIGATION      HX UROLOGICAL      kidney stones     Family History   Problem Relation Age of Onset    Stroke Mother     Heart Disease Mother     Cancer Father     Heart Disease Son     Liver Disease Son     Heart Disease Daughter     Malignant Hyperthermia Neg Hx     Pseudocholinesterase Deficiency Neg Hx     Delayed Awakening Neg Hx     Post-op Nausea/Vomiting Neg Hx     Emergence Delirium Neg Hx     Post-op Cognitive Dysfunction Neg Hx     Other Neg Hx      Social History   Substance Use Topics    Smoking status: Never Smoker    Smokeless tobacco: Never Used    Alcohol use No          Objective:     Visit Vitals    /79 (BP 1 Location: Left arm, BP Patient Position: Sitting)    Pulse 86    Temp 98.4 °F (36.9 °C) (Oral)    Resp 17    Ht 5' 8\" (1.727 m)    Wt 162 lb 1.6 oz (73.5 kg)    SpO2 98%    BMI 24.65 kg/m2     Gen: NAD, pleasant  HEENT: normal appearing head, nares patent, PERRLA, EOMI, oropharynx no erythema, no cervical lymphadenopathy neck supple   Cardio: RRR nl S1S2 no murmur  Lungs Cpugh clr sputum no wheeze no rales no rhonchi  ABD Soft non tender non distended + bowel sounds  Extremities: full ROM X 4 no clubbing no cyanosis  Neuro: no gross focal deficits noted, alert and orientated X 3  Psych.: well groomed +stable  depression. Assessment/Plan:   Luzma Michael was seen today for cough, urinary pain, eye problem, dizziness and hospital follow up. Diagnoses and all orders for this visit:    Neutropenia, unspecified type ([facility] [address]  -     CBC WITH AUTOMATED DIFF  -     PATHOLOGIST REVIEW SMEARS  -     CRP, HIGH SENSITIVITY  -     LDH  -     CALCIUM  -     METABOLIC PANEL, BASIC  -     ALBUMIN    Weight loss  -     CRP, HIGH SENSITIVITY  -     LDH  -     CALCIUM  -     METABOLIC PANEL, BASIC  -     ALBUMIN    Cough productive of clear sputum    Renal insufficiency  -     CRP, HIGH SENSITIVITY  -     LDH  -     CALCIUM  -     METABOLIC PANEL, BASIC    Encounter to discuss test results    Familial hypercholesteremia    Other orders  -     benzonatate (TESSALON) 100 mg capsule; Take 1 Cap by mouth three (3) times daily as needed for Cough for up to 7 days. -     alirocumab (PRALUENT PEN) 75 mg/mL injector pen; 1 mL by SubCUTAneous route every fourteen (14) days for 3 doses.       Follow-up Disposition:  Return in about 8 days (around 3/7/2017) for 1:30 -30 min please call transportation for pt review labs discuss cough . .  avs printed and given to the pt. .    The patient voiced understanding of the above. Medication side effects were reviewed with the patient. Call with any concerns.

## 2017-02-27 NOTE — MR AVS SNAPSHOT
Visit Information Date & Time Provider Department Dept. Phone Encounter #  
 2/27/2017 11:00  Medical Virginie Alvarez MD [address] [facility] [phone] [id number] Follow-up Instructions Return in about 8 days (around 3/7/2017) for 1:30 -30 min please call transportation for pt review labs discuss cough . Your Appointments 3/3/2017 10:15 AM  
ROUTINE CARE with Kristy Torres MD  
[address] [facility] ([facility]) Appt Note: Check up [address] [facility],[address] [place] [facility]  
[phone]  
  
   
 [address] [facility],[address] [place] [address]  
  
    
 3/8/2017  2:00 PM  
Follow Up with Sushil De Luna NP [facility]) Appt Note: follow up. ..tlw 2/3/17  
 [address] [place] [address], 
[address] [place] [address], [address] [phone]  
[address] [facility], [address] [place] [address], [address] [place] [address] St [address] [phone] Upcoming Health Maintenance Date Due DTaP/Tdap/Td series (1 - Tdap) 4/30/2006 Pneumococcal 65+ Low/Medium Risk (1 of 2 - PCV13) 9/22/2010 FOOT EXAM Q1 6/12/2015 EYE EXAM RETINAL OR DILATED Q1 7/1/2015 FOBT Q 1 YEAR AGE 50-75 7/29/2015 GLAUCOMA SCREENING Q2Y 7/1/2016 MEDICARE YEARLY EXAM 8/10/2016 MICROALBUMIN Q1 8/10/2016 HEMOGLOBIN A1C Q6M 7/26/2017 BREAST CANCER SCRN MAMMOGRAM 11/2/2017 LIPID PANEL Q1 12/15/2017 Allergies as of 2/27/2017  Review Complete On: 2/27/2017 By: Shaniqua Yun Severity Noted Reaction Type Reactions Amoxicillin High 09/10/2010   Systemic Hives Sulfa (Sulfonamide Antibiotics) High 09/10/2010   Systemic Hives, Itching Amoxicillin Medium 04/22/2010   Systemic Hives, Itching Long time ago - patient not exactly certain what it does Mirtazapine Medium 11/20/2012   Side Effect Itching, Nausea Only  Funny feeling in chest  
 Percocet [Oxycodone-acetaminophen] Medium 01/15/2014   Intolerance Nausea and Vomiting Codeine  11/26/2012    Nausea and Vomiting Prednisone  05/27/2010    Itching Sulfa (Sulfonamide Antibiotics)  04/22/2010   Systemic Hives, Itching, Palpitations Think it was increased heart rate or itching - many years ago Zithromax [Azithromycin]  11/20/2012   Side Effect Itching Not sure what it does,taken long time ago Current Immunizations  Reviewed on 12/15/2016 Name Date Pneumococcal Polysaccharide (PPSV-23)  Deferred (Patient Refused) TD Vaccine 4/29/2006 Not reviewed this visit You Were Diagnosed With   
  
 Codes Comments Neutropenia, unspecified type ([facility]    -  Primary ICD-10-CM: D70.9 ICD-9-CM: 288.00 Weight loss     ICD-10-CM: R63.4 ICD-9-CM: 783.21 Cough productive of clear sputum     ICD-10-CM: R05 ICD-9-CM: 786.2 Renal insufficiency     ICD-10-CM: N28.9 ICD-9-CM: 593.9 Encounter to discuss test results     ICD-10-CM: Z71.89 ICD-9-CM: V65.49 Familial hypercholesteremia     ICD-10-CM: E78.01 
ICD-9-CM: 272.0 Vitals BP  
  
  
  
  
  
 146/79 (BP 1 Location: Left arm, BP Patient Position: Sitting) Vitals History BMI and BSA Data Body Mass Index Body Surface Area  
 24.65 kg/m 2 1.88 m 2 Preferred Pharmacy Pharmacy Name Phone [id number]- [place], [place] - [address] [phone] Your Updated Medication List  
  
   
This list is accurate as of: 2/27/17 12:08 PM.  Always use your most recent med list.  
  
  
  
  
 acetaminophen 650 mg CR tablet Commonly known as:  TYLENOL ARTHRITIS PAIN Take 1 Tab by mouth two (2) times a day. alirocumab 75 mg/mL injector pen Commonly known as:  PRALUENT PEN  
1 mL by SubCUTAneous route every fourteen (14) days for 3 doses. aspirin delayed-release 81 mg tablet Take 1 Tab by mouth daily. azelastine 0.05 % ophthalmic solution Commonly known as:  OPTIVAR INSTILL 1 DROP INTO BOTH EYES TWICE A DAY  
  
 benzonatate 100 mg capsule Commonly known as:  TESSALON Take 1 Cap by mouth three (3) times daily as needed for Cough for up to 7 days. clonazePAM 1 mg tablet Commonly known as:  Maida Humble Take 1 Tab by mouth two (2) times a day. Max Daily Amount: 2 mg. As needed fill after 2/5/17 CRESTOR 5 mg tablet Generic drug:  rosuvastatin Take 5 mg by mouth nightly. dextromethorphan-quiNIDine 20-10 mg per capsule Commonly known as:  Ricka Sachs 1 daily Diclofenac Potassium 50 mg Pwpk Commonly known as:  CAMBIA Take 1 Package by mouth daily as needed. * dilTIAZem  mg XR capsule Commonly known as:  DILACOR XR Take  by mouth daily. * dilTIAZem  mg ER capsule Commonly known as:  CARDIZEM CD  
TAKE ONE CAPSULE BY MOUTH DAILY **THIS REPLACES THE 240MG**  
  
 docusate sodium 100 mg capsule Commonly known as:  Maretta Mandujano Take 100 mg by mouth two (2) times daily as needed. famotidine 40 mg tablet Commonly known as:  PEPCID Take 40 mg by mouth two (2) times a day. FLONASE 50 mcg/actuation nasal spray Generic drug:  fluticasone 2 Sprays by Both Nostrils route daily. gabapentin 300 mg capsule Commonly known as:  NEURONTIN Take 1 Cap by mouth three (3) times daily. Indications: NEUROPATHIC PAIN  
  
 * glucose blood VI test strips strip Commonly known as:  ASCENSIA AUTODISC VI, ONE TOUCH ULTRA TEST VI  
by Does Not Apply route See Admin Instructions. DX NIDDM test daily * ONETOUCH ULTRA TEST strip Generic drug:  glucose blood VI test strips TEST AS DIRECTED * ONETOUCH ULTRA TEST strip Generic drug:  glucose blood VI test strips TEST AS DIRECTED  
  
 hydrocortisone 1 % ointment Commonly known as:  HYCORT Apply  to affected area two (2) times a day. use thin layer  
  
 losartan 25 mg tablet Commonly known as:  COZAAR Take 0.5 Tabs by mouth nightly. Do not take the 50mg losartan pharmacist please discard all 50mg losartan rx's  
  
 meclizine 12.5 mg tablet Commonly known as:  ANTIVERT Take 1 Tab by mouth three (3) times daily as needed for up to 10 days. mirtazapine 15 mg tablet Commonly known as:  Dalila Costain Take  by mouth nightly. nystatin topical cream  
Commonly known as:  MYCOSTATIN Apply  to affected area two (2) times a day. polyethylene glycol 17 gram/dose powder Commonly known as:  Alina Clos TAKE 1 CAPFUL IN 8 OUNCES OF WATER EVERY DAY  
  
 raNITIdine 150 mg tablet Commonly known as:  ZANTAC Take 1 Tab by mouth two (2) times a day. SARNA ANTI-ITCH 0.5-0.5 % lotion Generic drug:  camphor-menthol Apply  to affected area two (2) times daily as needed for Itching. traZODone 100 mg tablet Commonly known as:  Renzo Sahu Take 100 mg by mouth nightly. * Notice: This list has 5 medication(s) that are the same as other medications prescribed for you. Read the directions carefully, and ask your doctor or other care provider to review them with you. Prescriptions Printed Refills  
 alirocumab (PRALUENT PEN) 75 mg/mL injector pen 0 Si mL by SubCUTAneous route every fourteen (14) days for 3 doses. Class: Print Route: SubCUTAneous Prescriptions Sent to Pharmacy Refills  
 benzonatate (TESSALON) 100 mg capsule 0 Sig: Take 1 Cap by mouth three (3) times daily as needed for Cough for up to 7 days. Class: Normal  
 Pharmacy: [facility]/pharmacy [id number] Ph #: [phone] Route: Oral  
  
We Performed the Following ALBUMIN [id number] CPT(R)] CALCIUM [id number] CPT(R)] CBC WITH AUTOMATED DIFF [55980 CPT(R)] CRP, HIGH SENSITIVITY [27135 CPT(R)]   
 LD [44028 CPT(R)] METABOLIC PANEL, BASIC [17162 CPT(R)] PATHOLOGIST REVIEW SMEARS [[id number] Custom] Follow-up Instructions Return in about 8 days (around 3/7/2017) for 1:30 -30 min please call transportation for pt review labs discuss cough . Patient Instructions Call and set up your transportation Introducing [facility] SERVICES! Poppy Ravi introduces [employer] patient portal. Now you can access parts of your medical record, email your doctor's office, and request medication refills online. 1. In your internet browser, go to [email or web address] 2. Click on the First Time User? Click Here link in the Sign In box. You will see the New Member Sign Up page. 3. Enter your [employer] Access Code exactly as it appears below. You will not need to use this code after youve completed the sign-up process. If you do not sign up before the expiration date, you must request a new code. · [employer] Access Code: [email or web address] Expires: 5/24/2017  1:24 PM 
 
4. Enter the last four digits of your Social Security Number (xxxx) and Date of Birth (mm/dd/yyyy) as indicated and click Submit. You will be taken to the next sign-up page. 5. Create a [employer] ID. This will be your [employer] login ID and cannot be changed, so think of one that is secure and easy to remember. 6. Create a [employer] password. You can change your password at any time. 7. Enter your Password Reset Question and Answer. This can be used at a later time if you forget your password. 8. Enter your e-mail address. You will receive e-mail notification when new information is available in [address] 9. Click Sign Up. You can now view and download portions of your medical record. 10. Click the Download Summary menu link to download a portable copy of your medical information. If you have questions, please visit the Frequently Asked Questions section of the [employer] website. Remember, [employer] is NOT to be used for urgent needs. For medical emergencies, dial 911. Now available from your iPhone and Android! Please provide this summary of care documentation to your next provider. Your primary care clinician is listed as Lilo Noel. If you have any questions after today's visit, please call [phone].

## 2017-02-28 LAB
ALBUMIN SERPL-MCNC: 3.6 G/DL (ref 3.5–4.8)
BASOPHILS # BLD AUTO: 0 X10E3/UL (ref 0–0.2)
BASOPHILS NFR BLD AUTO: 0 %
BUN SERPL-MCNC: 16 MG/DL (ref 8–27)
BUN/CREAT SERPL: 18 (ref 11–26)
CALCIUM SERPL-MCNC: 8.6 MG/DL (ref 8.7–10.3)
CHLORIDE SERPL-SCNC: 101 MMOL/L (ref 96–106)
CO2 SERPL-SCNC: 29 MMOL/L (ref 18–29)
CREAT SERPL-MCNC: 0.9 MG/DL (ref 0.57–1)
CRP SERPL HS-MCNC: 197.02 MG/L (ref 0–3)
EOSINOPHIL # BLD AUTO: 0 X10E3/UL (ref 0–0.4)
EOSINOPHIL NFR BLD AUTO: 0 %
ERYTHROCYTE [DISTWIDTH] IN BLOOD BY AUTOMATED COUNT: 14.6 % (ref 12.3–15.4)
GLUCOSE SERPL-MCNC: 131 MG/DL (ref 65–99)
HCT VFR BLD AUTO: 35.3 % (ref 34–46.6)
HGB BLD-MCNC: 11.5 G/DL (ref 11.1–15.9)
IMM GRANULOCYTES # BLD: 0 X10E3/UL (ref 0–0.1)
IMM GRANULOCYTES NFR BLD: 0 %
LDH SERPL-CCNC: 221 IU/L (ref 119–226)
LYMPHOCYTES # BLD AUTO: 1 X10E3/UL (ref 0.7–3.1)
LYMPHOCYTES NFR BLD AUTO: 11 %
MCH RBC QN AUTO: 25.9 PG (ref 26.6–33)
MCHC RBC AUTO-ENTMCNC: 32.6 G/DL (ref 31.5–35.7)
MCV RBC AUTO: 80 FL (ref 79–97)
MONOCYTES # BLD AUTO: 0.4 X10E3/UL (ref 0.1–0.9)
MONOCYTES NFR BLD AUTO: 5 %
NEUTROPHILS # BLD AUTO: 7.5 X10E3/UL (ref 1.4–7)
NEUTROPHILS NFR BLD AUTO: 84 %
PLATELET # BLD AUTO: 224 X10E3/UL (ref 150–379)
POTASSIUM SERPL-SCNC: 3.9 MMOL/L (ref 3.5–5.2)
RBC # BLD AUTO: 4.44 X10E6/UL (ref 3.77–5.28)
SODIUM SERPL-SCNC: 143 MMOL/L (ref 134–144)
WBC # BLD AUTO: 9 X10E3/UL (ref 3.4–10.8)

## 2017-03-06 LAB
PATH REV BLD -IMP: NORMAL
PATHOLOGIST NAME: NORMAL

## 2017-03-08 ENCOUNTER — OFFICE VISIT (OUTPATIENT)
Dept: NEUROLOGY | Age: 72
End: 2017-03-08

## 2017-03-08 VITALS
WEIGHT: 162 LBS | OXYGEN SATURATION: 96 % | BODY MASS INDEX: 24.55 KG/M2 | SYSTOLIC BLOOD PRESSURE: 98 MMHG | DIASTOLIC BLOOD PRESSURE: 62 MMHG | HEART RATE: 87 BPM | HEIGHT: 68 IN

## 2017-03-08 DIAGNOSIS — G44.229 CHRONIC TENSION-TYPE HEADACHE, NOT INTRACTABLE: Chronic | ICD-10-CM

## 2017-03-08 DIAGNOSIS — G89.4 CHRONIC PAIN ASSOCIATED WITH SIGNIFICANT PSYCHOSOCIAL DYSFUNCTION: Chronic | ICD-10-CM

## 2017-03-08 DIAGNOSIS — Z78.9 TIGHTNESS SENSATION: ICD-10-CM

## 2017-03-08 DIAGNOSIS — F41.8 DEPRESSION WITH ANXIETY: Chronic | ICD-10-CM

## 2017-03-08 DIAGNOSIS — F45.0 SOMATIZATION DISORDER: Primary | ICD-10-CM

## 2017-03-08 RX ORDER — TOPIRAMATE 25 MG/1
TABLET ORAL
Qty: 60 TAB | Refills: 5 | Status: SHIPPED | OUTPATIENT
Start: 2017-03-08 | End: 2017-04-16 | Stop reason: SDUPTHER

## 2017-03-08 NOTE — PROGRESS NOTES
Date:             2017    Name:  Omero Falcon YOB: 1945  MRN:  [id number]     PCP:  Sai Crowe MD    Chief Complaint   Patient presents with    Follow-up    Headache     HISTORY OF PRESENT ILLNESS:  Rosy Smart is a 70 y.o., female who presents today for follow up for headaches, anxiety, depression. Her daughter is with her, she helps provide her history. Her primary complaint today is tightness in her head. She has tried and failed depakote, cymbalta, effexor, elavil for preventative. She was last started on celexa, she stopped taking it because she thinks that it made her sick. Historically, it has been difficult to  get her to stay on a medication trial for preventative long enough for to be effective. She has severe depression and anxiety with somatization, she used to see Dr Scooter Correa for that but her daughter believes that she was discharged for missing appointments. She is no longer seeing any psychiatrist. She also complains repeatedly of blurriness in her vision, has cataracts and these are too small to operate on. She will be seen her ophthalmologist again later this year. As far as her other medications, she is not entirely sure what she is taking, she reports that she is taking a \"nerve pill\" which she thinks is clonazepam and a blood pressure pill. She does not appear to be taking most of the medication that she is prescribed. She is not taking her statin. She manages her own medications, she lives with her friend and her grandson. Takes Tylenol PRN. She has been referred for neuropsychological testing in the past, has not set up any appointments. 2016 recap  Ms. Amara David is a 79year-old female presents to the clinic today for headaches, inability to sleep and continued depression. Patient has had multiple recent call to our office and her PCP to attempt to get refills of Klonopin.  She has empty bottle with her today, and states she needs this medication. She is anxious and feels that something is wrong with her. She states that her chest hurts, but she has had an extensive workup by cardiology. She feels that she is going blind. Patient has vision problems, and states increased eye pain and blurred vision. She is not wearing glasses today. She has been consistent with ophthalmology appointments. She was told she has cataracts. She has trouble walking due to her vision issues. Patient also reports head pressure that is unchanged from previous appointments. She has not taken any preventatives successfully due to side effects. Patient has not taken her Klonopin 1mg or Respirdin for 2 weeks as she was not able to get refill for her medications. She discontinued Effexor as she had side effects. She had CT scan of head in July 2016 that was normal.   She plans to follow up with Dr. Colonel Mcgrath. She reports of weight loss (approximately 10 pounds) since her last office visit in August 16, 2016. She reports decreased in appetite and loss of energy. She states this might be due to her anxiety. She denies history of stroke or seizures. Patient reports of abdominal pain, with bloating. She has taken milk of magnesia that provided her some relief. She has constipation and general abdominal discomfort. X-ray recently conducted was normal.   Patient lives with her daughter and granddaughter. Current Outpatient Prescriptions   Medication Sig    topiramate (TOPAMAX) 25 mg tablet Take 1 tab at night for 1 week, then 2 tabs at night    acetaminophen (TYLENOL ARTHRITIS PAIN) 650 mg CR tablet Take 1 Tab by mouth two (2) times a day.  clonazePAM (KLONOPIN) 1 mg tablet Take 1 Tab by mouth two (2) times a day. Max Daily Amount: 2 mg. As needed fill after 2/5/17    polyethylene glycol (MIRALAX) 17 gram/dose powder TAKE 1 CAPFUL IN 8 OUNCES OF WATER EVERY DAY    hydrocortisone (HYCORT) 1 % ointment Apply  to affected area two (2) times a day.  use thin layer    losartan (COZAAR) 25 mg tablet Take 0.5 Tabs by mouth nightly. Do not take the 50mg losartan pharmacist please discard all 50mg losartan rx's    aspirin delayed-release 81 mg tablet Take 1 Tab by mouth daily.  rosuvastatin (CRESTOR) 5 mg tablet Take 5 mg by mouth nightly. No current facility-administered medications for this visit.       Allergies   Allergen Reactions    Amoxicillin Hives    Sulfa (Sulfonamide Antibiotics) Hives and Itching    Amoxicillin Hives and Itching     Long time ago - patient not exactly certain what it does    Mirtazapine Itching and Nausea Only     Funny feeling in chest    Percocet [Oxycodone-Acetaminophen] Nausea and Vomiting    Codeine Nausea and Vomiting    Prednisone Itching    Sulfa (Sulfonamide Antibiotics) Hives, Itching and Palpitations     Think it was increased heart rate or itching - many years ago    Zithromax [Azithromycin] Itching     Not sure what it does,taken long time ago     Past Medical History:   Diagnosis Date    Agoraphobia without mention of panic attacks 2/17/2014    Anxiety disorder 8/18/2013    Arthritis     osteo    Breast pain, left 4/7/2015    CAD (coronary artery disease), native coronary artery 12/1/2015    Chronic chest pain 1/13/2014    Chronic pain associated with significant psychosocial dysfunction 2/17/2014    Depression 8/18/2013    Diabetes ([facility] [address]     type II    Duplicated right renal collecting system 3/13/2014    GERD (gastroesophageal reflux disease)     Gout, joint     HTN, goal below 130/80 9/7/2012    Hypertension     Nephrolithiasis 3/13/2014    Other ill-defined conditions(799.89)     hyercholesterolemia    Other ill-defined conditions(799.89)     anxiety    Other ill-defined conditions(799.89)     pt states head get tight,due to wax bill up    Other ill-defined conditions(799.89)     pain left shoulder due to fall many years ago    Personal history of noncompliance with medical treatment, presenting hazards to health 5/30/2014    Psychiatric disorder     anxiety/ depression    Psychotic disorder     Vaginal pain 7/30/2014     Past Surgical History:   Procedure Laterality Date    EGD  4/23/2010         HX GYN      cyst removed from vagina    HX GYN      vaginal birth x7    HX HYSTERECTOMY      partial    HX OTHER SURGICAL      egd    HX OTHER SURGICAL      cyst removed from left wrist    HX OTHER SURGICAL      bladder dilitation    HX TUBAL LIGATION      HX UROLOGICAL      kidney stones     Social History     Social History    Marital status:      Spouse name: N/A    Number of children: N/A    Years of education: N/A     Occupational History    Not on file. Social History Main Topics    Smoking status: Never Smoker    Smokeless tobacco: Never Used    Alcohol use No    Drug use: No    Sexual activity: No     Other Topics Concern    Not on file     Social History Narrative    ** Merged History Encounter **          Family History   Problem Relation Age of Onset    Stroke Mother     Heart Disease Mother     Cancer Father     Heart Disease Son     Liver Disease Son     Heart Disease Daughter     Malignant Hyperthermia Neg Hx     Pseudocholinesterase Deficiency Neg Hx     Delayed Awakening Neg Hx     Post-op Nausea/Vomiting Neg Hx     Emergence Delirium Neg Hx     Post-op Cognitive Dysfunction Neg Hx     Other Neg Hx          PHYSICAL EXAMINATION:    Visit Vitals    BP 98/62    Pulse 87    Ht 5' 8\" (1.727 m)    Wt 162 lb (73.5 kg)    SpO2 96%    BMI 24.63 kg/m2     General:  Well defined, nourished, and groomed individual in no acute distress. Neck: Supple, nontender, no bruits, no pain with resistance to active range of motion. Heart: Regular rate and rhythm, no murmurs, rub, or gallop. Normal S1S2.   Lungs:  Clear to auscultation bilaterally with equal chest expansion, no cough, no wheeze  Musculoskeletal:  Extremities revealed no edema and had full range of motion of joints. Psych: Flat, depressed appearing    NEUROLOGICAL EXAMINATION:     Mental Status:   Alert. Follows simple commands well, difficulty with multistep. Unable to provide conference history, history provided is tangential.  She tends to repeat herself, perseverates. Cranial Nerves:    II, III, IV, VI:  Visual acuity grossly intact. Pupils are equal, round, and non-reactive to light. Bilateral cataracts  Extra-ocular movements are full and fluid. No ptosis or nystagmus. V-XII: Hearing is grossly intact. Facial features are symmetric, with normal sensation and strength. The palate rises symmetrically and the tongue protrudes midline. Sternocleidomastoids 5/5. Motor Examination: Normal tone, bulk, and strength, 5/5 muscle strength throughout. Coordination:  Finger to nose testing was normal.   No resting or intention tremor  Gait and Station:  Steady while walking. Normal arm swing. No pronator drift. No muscle wasting or fasciculations noted. ASSESSMENT AND PLAN    ICD-10-CM ICD-9-CM    1. Somatization disorder F45.0 300.81 REFERRAL TO PSYCHIATRY   2. Tightness sensation-head Z78.9 V49.89    3. Depression with anxiety F41.8 300.4 REFERRAL TO PSYCHIATRY   4. Chronic pain associated with significant psychosocial dysfunction G89.4 338.4    5. Chronic tension-type headache, not intractable G44.229 339.12 topiramate (TOPAMAX) 25 mg tablet     79-year-old female seen in follow-up for headaches, anxiety, depression, somatization. She has tried and failed a host of preventatives, she has difficulty with compliance. She is not taking most of medications she is prescribed. Family is not helping her manage her medications, although she does live with a friend and her grandson. Complains primarily about tightness in her head and difficulty  with blurry vision. She has seen an ophthalmologist for the latter, has cataracts that are not big enough to fix surgically.   Is not currently seeing a psychiatrist, saw Dr. Keira Rock in the past.  Has not had neuropsychological testing is ordered. 1.  We will try Topamax for headache preventative, 25 mg for 1 week, then 50 mg for 1 week. Patient's daughter or patient can call if this medication is tolerated and we can discuss an increase  2. Extensive discussion with patient and daughter of the fact that she needs to give preventative medicine a good month trial before deciding that he does not work for her  3. Referral put in for patient to go back to see Dr. Keira Rock, many of her symptoms are likely related to poorly managed psychiatric issues  4. Encouraged patient and her daughter to schedule her neuropsychological testing to fully evaluate her mood, memory. I am concerned that this patient is not safe living in her current environment if family is not helping her with medication adherence and ensuring that she can get good psychiatric care  5.   Neuroimaging has been negative for explanation for vision loss, encouraged patient to follow with ophthalmologist for cataracts    Follow-up after neuropsych testing, call sooner with concerns    Andrea Camp NP

## 2017-03-08 NOTE — PATIENT INSTRUCTIONS
Topamax (topiramate) 25 mg tabs: Take 1 tab at night for 1 week, then 2 tabs at night    If you tolerate this medication, call and we can increase the dose        10 [facility]   Statement to Patients  April 1, 2014      In an effort to ensure the large volume of patient prescription refills is processed in the most efficient and expeditious manner, we are asking our patients to assist us by calling your Pharmacy for all prescription refills, this will include also your  Mail Order Pharmacy. The pharmacy will contact our office electronically to continue the refill process. Please do not wait until the last minute to call your pharmacy. We need at least 48 hours (2days) to fill prescriptions. We also encourage you to call your pharmacy before going to  your prescription to make sure it is ready. With regard to controlled substance prescription refill requests (narcotic refills) that need to be picked up at our office, we ask your cooperation by providing us with at least 72 hours (3days) notice that you will need a refill. We will not refill narcotic prescription refill requests after 4:00pm on any weekday, Monday through Thursday, or after 2:00pm on Fridays, or on the weekends. We encourage everyone to explore another way of getting your prescription refill request processed using Rudder, our patient web portal through our electronic medical record system. Rudder is an efficient and effective way to communicate your medication request directly to the office and  downloadable as an dereje on your smart phone . Rudder also features a review functionality that allows you to view your medication list as well as leave messages for your physician. Are you ready to get connected? If so please review the attatched instructions or speak to any of our staff to get you set up right away! Thank you so much for your cooperation.  Should you have any questions please contact our Practice Administrator. The Physicians and Staff,  [facility]          A Healthy Lifestyle: Care Instructions  Your Care Instructions  A healthy lifestyle can help you feel good, stay at a healthy weight, and have plenty of energy for both work and play. A healthy lifestyle is something you can share with your whole family. A healthy lifestyle also can lower your risk for serious health problems, such as high blood pressure, heart disease, and diabetes. You can follow a few steps listed below to improve your health and the health of your family. Follow-up care is a key part of your treatment and safety. Be sure to make and go to all appointments, and call your doctor if you are having problems. Its also a good idea to know your test results and keep a list of the medicines you take. How can you care for yourself at home? · Do not eat too much sugar, fat, or fast foods. You can still have dessert and treats now and then. The goal is moderation. · Start small to improve your eating habits. Pay attention to portion sizes, drink less juice and soda pop, and eat more fruits and vegetables. ¨ Eat a healthy amount of food. A 3-ounce serving of meat, for example, is about the size of a deck of cards. Fill the rest of your plate with vegetables and whole grains. ¨ Limit the amount of soda and sports drinks you have every day. Drink more water when you are thirsty. ¨ Eat at least 5 servings of fruits and vegetables every day. It may seem like a lot, but it is not hard to reach this goal. A serving or helping is 1 piece of fruit, 1 cup of vegetables, or 2 cups of leafy, raw vegetables. Have an apple or some carrot sticks as an afternoon snack instead of a candy bar. Try to have fruits and/or vegetables at every meal.  · Make exercise part of your daily routine. You may want to start with simple activities, such as walking, bicycling, or slow swimming.  Try to be active 30 to 60 minutes every day. You do not need to do all 30 to 60 minutes all at once. For example, you can exercise 3 times a day for 10 or 20 minutes. Moderate exercise is safe for most people, but it is always a good idea to talk to your doctor before starting an exercise program.  · Keep moving. Andriy Jorge L the lawn, work in the garden, or Dianxin. Take the stairs instead of the elevator at work. · If you smoke, quit. People who smoke have an increased risk for heart attack, stroke, cancer, and other lung illnesses. Quitting is hard, but there are ways to boost your chance of quitting tobacco for good. ¨ Use nicotine gum, patches, or lozenges. ¨ Ask your doctor about stop-smoking programs and medicines. ¨ Keep trying. In addition to reducing your risk of diseases in the future, you will notice some benefits soon after you stop using tobacco. If you have shortness of breath or asthma symptoms, they will likely get better within a few weeks after you quit. · Limit how much alcohol you drink. Moderate amounts of alcohol (up to 2 drinks a day for men, 1 drink a day for women) are okay. But drinking too much can lead to liver problems, high blood pressure, and other health problems. Family health  If you have a family, there are many things you can do together to improve your health. · Eat meals together as a family as often as possible. · Eat healthy foods. This includes fruits, vegetables, lean meats and dairy, and whole grains. · Include your family in your fitness plan. Most people think of activities such as jogging or tennis as the way to fitness, but there are many ways you and your family can be more active. Anything that makes you breathe hard and gets your heart pumping is exercise. Here are some tips:  ¨ Walk to do errands or to take your child to school or the bus. ¨ Go for a family bike ride after dinner instead of watching TV. Where can you learn more?   Go to http://lobo-michel.info/. Enter E978 in the search box to learn more about \"A Healthy Lifestyle: Care Instructions. \"  Current as of: July 26, 2016  Content Version: 11.1  © 0016-2274 [employer]. Care instructions adapted under license by [employer] (which disclaims liability or warranty for this information). If you have questions about a medical condition or this instruction, always ask your healthcare professional. [place] [address] any warranty or liability for your use of this information.

## 2017-03-08 NOTE — MR AVS SNAPSHOT
Visit Information Date & Time Provider Department Dept. Phone Encounter #  
 3/8/2017  2:00 PM Delfino House NP [facility] at [facility] [phone] [id number] Follow-up Instructions Return for after neuropsych. Upcoming Health Maintenance Date Due DTaP/Tdap/Td series (1 - Tdap) 4/30/2006 Pneumococcal 65+ Low/Medium Risk (1 of 2 - PCV13) 9/22/2010 FOOT EXAM Q1 6/12/2015 EYE EXAM RETINAL OR DILATED Q1 7/1/2015 FOBT Q 1 YEAR AGE 50-75 7/29/2015 GLAUCOMA SCREENING Q2Y 7/1/2016 MEDICARE YEARLY EXAM 8/10/2016 MICROALBUMIN Q1 8/10/2016 HEMOGLOBIN A1C Q6M 7/26/2017 BREAST CANCER SCRN MAMMOGRAM 11/2/2017 LIPID PANEL Q1 12/15/2017 Allergies as of 3/8/2017  Review Complete On: 3/8/2017 By: Jeffery Live LPN Severity Noted Reaction Type Reactions Amoxicillin High 09/10/2010   Systemic Hives Sulfa (Sulfonamide Antibiotics) High 09/10/2010   Systemic Hives, Itching Amoxicillin Medium 04/22/2010   Systemic Hives, Itching Long time ago - patient not exactly certain what it does Mirtazapine Medium 11/20/2012   Side Effect Itching, Nausea Only Funny feeling in chest  
 Percocet [Oxycodone-acetaminophen] Medium 01/15/2014   Intolerance Nausea and Vomiting Codeine  11/26/2012    Nausea and Vomiting Prednisone  05/27/2010    Itching Sulfa (Sulfonamide Antibiotics)  04/22/2010   Systemic Hives, Itching, Palpitations Think it was increased heart rate or itching - many years ago Zithromax [Azithromycin]  11/20/2012   Side Effect Itching Not sure what it does,taken long time ago Current Immunizations  Reviewed on 12/15/2016 Name Date Pneumococcal Polysaccharide (PPSV-23)  Deferred (Patient Refused) TD Vaccine 4/29/2006 Not reviewed this visit You Were Diagnosed With   
  
 Codes Comments Somatization disorder    -  Primary ICD-10-CM: F45.0 ICD-9-CM: 300.81   
 Tightness sensation     ICD-10-CM: Z78.9 ICD-9-CM: V49.89 Depression with anxiety     ICD-10-CM: F41.8 ICD-9-CM: 300.4 Chronic pain associated with significant psychosocial dysfunction     ICD-10-CM: G89.4 ICD-9-CM: 338. 4 Chronic tension-type headache, not intractable     ICD-10-CM: O78.150 ICD-9-CM: 339.12 Vitals BP Pulse Height(growth percentile) Weight(growth percentile) SpO2 BMI  
 98/62 87 5' 8\" (1.727 m) 162 lb (73.5 kg) 96% 24.63 kg/m2 OB Status Smoking Status Hysterectomy Never Smoker Vitals History BMI and BSA Data Body Mass Index Body Surface Area  
 24.63 kg/m 2 1.88 m 2 Preferred Pharmacy Pharmacy Name Phone [id number], [place] - [address] [phone] Your Updated Medication List  
  
   
This list is accurate as of: 3/8/17  2:40 PM.  Always use your most recent med list.  
  
  
  
  
 acetaminophen 650 mg CR tablet Commonly known as:  TYLENOL ARTHRITIS PAIN Take 1 Tab by mouth two (2) times a day. aspirin delayed-release 81 mg tablet Take 1 Tab by mouth daily. clonazePAM 1 mg tablet Commonly known as:  [place] Take 1 Tab by mouth two (2) times a day. Max Daily Amount: 2 mg. As needed fill after 2/5/17 CRESTOR 5 mg tablet Generic drug:  rosuvastatin Take 5 mg by mouth nightly. hydrocortisone 1 % ointment Commonly known as:  HYCORT Apply  to affected area two (2) times a day. use thin layer  
  
 losartan 25 mg tablet Commonly known as:  COZAAR Take 0.5 Tabs by mouth nightly. Do not take the 50mg losartan pharmacist please discard all 50mg losartan rx's  
  
 polyethylene glycol 17 gram/dose powder Commonly known as:  Nighat  TAKE 1 CAPFUL IN 8 OUNCES OF WATER EVERY DAY  
  
 topiramate 25 mg tablet Commonly known as:  TOPAMAX Take 1 tab at night for 1 week, then 2 tabs at night Prescriptions Sent to Pharmacy Refills  
 topiramate (TOPAMAX) 25 mg tablet 5 Sig: Take 1 tab at night for 1 week, then 2 tabs at night Class: Normal  
 Pharmacy: [facility]/pharmacy [address] [facility] [address]  #: [phone] We Performed the Following REFERRAL TO PSYCHIATRY [REF91 Custom] Comments: Anxiety, depression, somatization Follow-up Instructions Return for after neuropsych. Referral Information Referral ID Referred By Referred To  
  
 [id number] Iris CLAUDIO MD   
   [address] [facility] [address] [place] [address], [address] [facility] Phone: [phone] Fax: [phone] Visits Status Start Date End Date 1 New Request 3/8/17 3/8/18 If your referral has a status of pending review or denied, additional information will be sent to support the outcome of this decision. Patient Instructions Topamax (topiramate) 25 mg tabs: Take 1 tab at night for 1 week, then 2 tabs at night If you tolerate this medication, call and we can increase the dose PRESCRIPTION REFILL POLICY [facility] Statement to Patients April 1, 2014 In an effort to ensure the large volume of patient prescription refills is processed in the most efficient and expeditious manner, we are asking our patients to assist us by calling your Pharmacy for all prescription refills, this will include also your  Mail Order Pharmacy. The pharmacy will contact our office electronically to continue the refill process. Please do not wait until the last minute to call your pharmacy. We need at least 48 hours (2days) to fill prescriptions. We also encourage you to call your pharmacy before going to  your prescription to make sure it is ready.   
 
With regard to controlled substance prescription refill requests (narcotic refills) that need to be picked up at our office, we ask your cooperation by providing us with at least 72 hours (3days) notice that you will need a refill. We will not refill narcotic prescription refill requests after 4:00pm on any weekday, Monday through Thursday, or after 2:00pm on Fridays, or on the weekends. We encourage everyone to explore another way of getting your prescription refill request processed using Warm Health, our patient web portal through our electronic medical record system. Warm Health is an efficient and effective way to communicate your medication request directly to the office and  downloadable as an dereje on your smart phone . Warm Health also features a review functionality that allows you to view your medication list as well as leave messages for your physician. Are you ready to get connected? If so please review the attatched instructions or speak to any of our staff to get you set up right away! Thank you so much for your cooperation. Should you have any questions please contact our Practice Administrator. The Physicians and Staff,  [facility] A Healthy Lifestyle: Care Instructions Your Care Instructions A healthy lifestyle can help you feel good, stay at a healthy weight, and have plenty of energy for both work and play. A healthy lifestyle is something you can share with your whole family. A healthy lifestyle also can lower your risk for serious health problems, such as high blood pressure, heart disease, and diabetes. You can follow a few steps listed below to improve your health and the health of your family. Follow-up care is a key part of your treatment and safety. Be sure to make and go to all appointments, and call your doctor if you are having problems. Its also a good idea to know your test results and keep a list of the medicines you take. How can you care for yourself at home? · Do not eat too much sugar, fat, or fast foods. You can still have dessert and treats now and then. The goal is moderation. · Start small to improve your eating habits. Pay attention to portion sizes, drink less juice and soda pop, and eat more fruits and vegetables. ¨ Eat a healthy amount of food. A 3-ounce serving of meat, for example, is about the size of a deck of cards. Fill the rest of your plate with vegetables and whole grains. ¨ Limit the amount of soda and sports drinks you have every day. Drink more water when you are thirsty. ¨ Eat at least 5 servings of fruits and vegetables every day. It may seem like a lot, but it is not hard to reach this goal. A serving or helping is 1 piece of fruit, 1 cup of vegetables, or 2 cups of leafy, raw vegetables. Have an apple or some carrot sticks as an afternoon snack instead of a candy bar. Try to have fruits and/or vegetables at every meal. 
· Make exercise part of your daily routine. You may want to start with simple activities, such as walking, bicycling, or slow swimming. Try to be active 30 to 60 minutes every day. You do not need to do all 30 to 60 minutes all at once. For example, you can exercise 3 times a day for 10 or 20 minutes. Moderate exercise is safe for most people, but it is always a good idea to talk to your doctor before starting an exercise program. 
· Keep moving. Pierce Cainis the lawn, work in the garden, or [place]. Take the stairs instead of the elevator at work. · If you smoke, quit. People who smoke have an increased risk for heart attack, stroke, cancer, and other lung illnesses. Quitting is hard, but there are ways to boost your chance of quitting tobacco for good. ¨ Use nicotine gum, patches, or lozenges. ¨ Ask your doctor about stop-smoking programs and medicines. ¨ Keep trying.  
In addition to reducing your risk of diseases in the future, you will notice some benefits soon after you stop using tobacco. If you have shortness of breath or asthma symptoms, they will likely get better within a few weeks after you quit. · Limit how much alcohol you drink. Moderate amounts of alcohol (up to 2 drinks a day for men, 1 drink a day for women) are okay. But drinking too much can lead to liver problems, high blood pressure, and other health problems. Family health If you have a family, there are many things you can do together to improve your health. · Eat meals together as a family as often as possible. · Eat healthy foods. This includes fruits, vegetables, lean meats and dairy, and whole grains. · Include your family in your fitness plan. Most people think of activities such as jogging or tennis as the way to fitness, but there are many ways you and your family can be more active. Anything that makes you breathe hard and gets your heart pumping is exercise. Here are some tips: 
¨ Walk to do errands or to take your child to school or the bus. ¨ Go for a family bike ride after dinner instead of watching TV. Where can you learn more? Go to http://lobo-michel.info/. Enter H566 in the search box to learn more about \"A Healthy Lifestyle: Care Instructions. \" Current as of: July 26, 2016 Content Version: 11.1 © 6409-5615 [employer]. Care instructions adapted under license by [employer] (which disclaims liability or warranty for this information). If you have questions about a medical condition or this instruction, always ask your healthcare professional. [place] [address] any warranty or liability for your use of this information. Introducing [facility]! Mildred Kumar introduces Motiga patient portal. Now you can access parts of your medical record, email your doctor's office, and request medication refills online. 1. In your internet browser, go to [email or web address] 2. Click on the First Time User? Click Here link in the Sign In box. You will see the New Member Sign Up page. 3. Enter your VoiceObjects Access Code exactly as it appears below. You will not need to use this code after youve completed the sign-up process. If you do not sign up before the expiration date, you must request a new code. · VoiceObjects Access Code: [email or web address] Expires: 5/24/2017  1:24 PM 
 
4. Enter the last four digits of your Social Security Number (xxxx) and Date of Birth (mm/dd/yyyy) as indicated and click Submit. You will be taken to the next sign-up page. 5. Create a VoiceObjects ID. This will be your VoiceObjects login ID and cannot be changed, so think of one that is secure and easy to remember. 6. Create a VoiceObjects password. You can change your password at any time. 7. Enter your Password Reset Question and Answer. This can be used at a later time if you forget your password. 8. Enter your e-mail address. You will receive e-mail notification when new information is available in [address] 9. Click Sign Up. You can now view and download portions of your medical record. 10. Click the Download Summary menu link to download a portable copy of your medical information. If you have questions, please visit the Frequently Asked Questions section of the VoiceObjects website. Remember, VoiceObjects is NOT to be used for urgent needs. For medical emergencies, dial 911. Now available from your iPhone and Android! Please provide this summary of care documentation to your next provider. Your primary care clinician is listed as Moe Calvert. If you have any questions after today's visit, please call [phone].

## 2017-03-24 ENCOUNTER — TELEPHONE (OUTPATIENT)
Dept: OBGYN CLINIC | Age: 72
End: 2017-03-24

## 2017-03-24 NOTE — TELEPHONE ENCOUNTER
Patient called with complaints of vaginal pain and request for hydrocortisone cream refill; Dr. Jamal Caraballo informed and states patient can receive medication with 3 refills. Prescription called to patient's pharmacy and filled by Ruben Anton, pharmacist.   Appointment offered to patient but declined at this time; she wants to use the cream for now and will call if she needs an appointment.

## 2017-04-04 ENCOUNTER — APPOINTMENT (OUTPATIENT)
Dept: GENERAL RADIOLOGY | Age: 72
DRG: 312 | End: 2017-04-04
Attending: EMERGENCY MEDICINE
Payer: MEDICARE

## 2017-04-04 ENCOUNTER — HOSPITAL ENCOUNTER (INPATIENT)
Age: 72
LOS: 10 days | Discharge: HOME | DRG: 312 | End: 2017-04-14
Attending: EMERGENCY MEDICINE | Admitting: FAMILY MEDICINE
Payer: MEDICARE

## 2017-04-04 ENCOUNTER — APPOINTMENT (OUTPATIENT)
Dept: CT IMAGING | Age: 72
DRG: 312 | End: 2017-04-04
Attending: EMERGENCY MEDICINE
Payer: MEDICARE

## 2017-04-04 DIAGNOSIS — F41.8 DEPRESSION WITH ANXIETY: Chronic | ICD-10-CM

## 2017-04-04 DIAGNOSIS — R26.81 GAIT INSTABILITY: ICD-10-CM

## 2017-04-04 DIAGNOSIS — E11.9 DIABETES MELLITUS TYPE 2, DIET-CONTROLLED (HCC): ICD-10-CM

## 2017-04-04 DIAGNOSIS — G89.29 CHRONIC CHEST PAIN: Chronic | ICD-10-CM

## 2017-04-04 DIAGNOSIS — G47.00 INSOMNIA, UNSPECIFIED TYPE: Chronic | ICD-10-CM

## 2017-04-04 DIAGNOSIS — R33.9 URINARY RETENTION: ICD-10-CM

## 2017-04-04 DIAGNOSIS — F45.8 OTHER SOMATOFORM DISORDERS: Chronic | ICD-10-CM

## 2017-04-04 DIAGNOSIS — R10.84 GENERALIZED ABDOMINAL PAIN: ICD-10-CM

## 2017-04-04 DIAGNOSIS — R51.9 NONINTRACTABLE HEADACHE, UNSPECIFIED CHRONICITY PATTERN, UNSPECIFIED HEADACHE TYPE: ICD-10-CM

## 2017-04-04 DIAGNOSIS — K59.00 CONSTIPATION, UNSPECIFIED CONSTIPATION TYPE: Primary | ICD-10-CM

## 2017-04-04 DIAGNOSIS — Z91.14 NONCOMPLIANCE WITH MEDICATION REGIMEN: ICD-10-CM

## 2017-04-04 DIAGNOSIS — I10 HTN, GOAL BELOW 130/80: Chronic | ICD-10-CM

## 2017-04-04 DIAGNOSIS — G89.29 CHRONIC NONINTRACTABLE HEADACHE, UNSPECIFIED HEADACHE TYPE: ICD-10-CM

## 2017-04-04 DIAGNOSIS — N94.10 DYSPAREUNIA, FEMALE: ICD-10-CM

## 2017-04-04 DIAGNOSIS — Z74.8 ASSISTANCE NEEDED WITH TRANSPORTATION: ICD-10-CM

## 2017-04-04 DIAGNOSIS — N13.30 HYDRONEPHROSIS OF LEFT KIDNEY: ICD-10-CM

## 2017-04-04 DIAGNOSIS — R53.1 WEAKNESS: ICD-10-CM

## 2017-04-04 DIAGNOSIS — R51.9 CHRONIC NONINTRACTABLE HEADACHE, UNSPECIFIED HEADACHE TYPE: ICD-10-CM

## 2017-04-04 DIAGNOSIS — R07.9 CHRONIC CHEST PAIN: Chronic | ICD-10-CM

## 2017-04-04 DIAGNOSIS — I95.1 ORTHOSTATIC HYPOTENSION: ICD-10-CM

## 2017-04-04 DIAGNOSIS — G89.4 CHRONIC PAIN ASSOCIATED WITH SIGNIFICANT PSYCHOSOCIAL DYSFUNCTION: Chronic | ICD-10-CM

## 2017-04-04 LAB
ALBUMIN SERPL BCP-MCNC: 3.7 G/DL (ref 3.5–5)
ALBUMIN/GLOB SERPL: 0.8 {RATIO} (ref 1.1–2.2)
ALP SERPL-CCNC: 89 U/L (ref 45–117)
ALT SERPL-CCNC: 89 U/L (ref 12–78)
ANION GAP BLD CALC-SCNC: 7 MMOL/L (ref 5–15)
APPEARANCE UR: ABNORMAL
AST SERPL W P-5'-P-CCNC: 49 U/L (ref 15–37)
ATRIAL RATE: 84 BPM
BACTERIA URNS QL MICRO: NEGATIVE /HPF
BASOPHILS # BLD AUTO: 0 K/UL (ref 0–0.1)
BASOPHILS # BLD: 0 % (ref 0–1)
BILIRUB SERPL-MCNC: 0.4 MG/DL (ref 0.2–1)
BILIRUB UR QL: NEGATIVE
BUN SERPL-MCNC: 18 MG/DL (ref 6–20)
BUN/CREAT SERPL: 17 (ref 12–20)
CALCIUM SERPL-MCNC: 9.6 MG/DL (ref 8.5–10.1)
CALCULATED P AXIS, ECG09: 41 DEGREES
CALCULATED R AXIS, ECG10: 17 DEGREES
CALCULATED T AXIS, ECG11: 14 DEGREES
CHLORIDE SERPL-SCNC: 105 MMOL/L (ref 97–108)
CK MB CFR SERPL CALC: 1 % (ref 0–2.5)
CK MB SERPL-MCNC: 1.1 NG/ML (ref 5–25)
CK SERPL-CCNC: 110 U/L (ref 26–192)
CLUE CELLS VAG QL WET PREP: NORMAL
CO2 SERPL-SCNC: 29 MMOL/L (ref 21–32)
COLOR UR: ABNORMAL
CREAT SERPL-MCNC: 1.09 MG/DL (ref 0.55–1.02)
DIAGNOSIS, 93000: NORMAL
EOSINOPHIL # BLD: 0 K/UL (ref 0–0.4)
EOSINOPHIL NFR BLD: 0 % (ref 0–7)
EPITH CASTS URNS QL MICRO: ABNORMAL /LPF
ERYTHROCYTE [DISTWIDTH] IN BLOOD BY AUTOMATED COUNT: 14.2 % (ref 11.5–14.5)
GLOBULIN SER CALC-MCNC: 4.4 G/DL (ref 2–4)
GLUCOSE BLD STRIP.AUTO-MCNC: 82 MG/DL (ref 65–100)
GLUCOSE BLD STRIP.AUTO-MCNC: 95 MG/DL (ref 65–100)
GLUCOSE BLD STRIP.AUTO-MCNC: 96 MG/DL (ref 65–100)
GLUCOSE SERPL-MCNC: 162 MG/DL (ref 65–100)
GLUCOSE UR STRIP.AUTO-MCNC: NEGATIVE MG/DL
HCT VFR BLD AUTO: 38.4 % (ref 35–47)
HGB BLD-MCNC: 12 G/DL (ref 11.5–16)
HGB UR QL STRIP: ABNORMAL
HYALINE CASTS URNS QL MICRO: ABNORMAL /LPF (ref 0–5)
KETONES UR QL STRIP.AUTO: NEGATIVE MG/DL
KOH PREP SPEC: NORMAL
LEUKOCYTE ESTERASE UR QL STRIP.AUTO: NEGATIVE
LYMPHOCYTES # BLD AUTO: 22 % (ref 12–49)
LYMPHOCYTES # BLD: 1.6 K/UL (ref 0.8–3.5)
MCH RBC QN AUTO: 26.4 PG (ref 26–34)
MCHC RBC AUTO-ENTMCNC: 31.3 G/DL (ref 30–36.5)
MCV RBC AUTO: 84.4 FL (ref 80–99)
MONOCYTES # BLD: 0.4 K/UL (ref 0–1)
MONOCYTES NFR BLD AUTO: 5 % (ref 5–13)
NEUTS SEG # BLD: 5.2 K/UL (ref 1.8–8)
NEUTS SEG NFR BLD AUTO: 73 % (ref 32–75)
NITRITE UR QL STRIP.AUTO: NEGATIVE
P-R INTERVAL, ECG05: 144 MS
PH UR STRIP: 6.5 [PH] (ref 5–8)
PLATELET # BLD AUTO: 251 K/UL (ref 150–400)
POTASSIUM SERPL-SCNC: 3.9 MMOL/L (ref 3.5–5.1)
PROT SERPL-MCNC: 8.1 G/DL (ref 6.4–8.2)
PROT UR STRIP-MCNC: NEGATIVE MG/DL
Q-T INTERVAL, ECG07: 370 MS
QRS DURATION, ECG06: 100 MS
QTC CALCULATION (BEZET), ECG08: 437 MS
RBC # BLD AUTO: 4.55 M/UL (ref 3.8–5.2)
RBC #/AREA URNS HPF: ABNORMAL /HPF (ref 0–5)
SERVICE CMNT-IMP: NORMAL
SODIUM SERPL-SCNC: 141 MMOL/L (ref 136–145)
SP GR UR REFRACTOMETRY: 1 (ref 1–1.03)
T VAGINALIS VAG QL WET PREP: NORMAL
TROPONIN I SERPL-MCNC: <0.04 NG/ML
UA: UC IF INDICATED,UAUC: ABNORMAL
UROBILINOGEN UR QL STRIP.AUTO: 0.2 EU/DL (ref 0.2–1)
VENTRICULAR RATE, ECG03: 84 BPM
WBC # BLD AUTO: 7.2 K/UL (ref 3.6–11)
WBC URNS QL MICRO: ABNORMAL /HPF (ref 0–4)

## 2017-04-04 PROCEDURE — 84484 ASSAY OF TROPONIN QUANT: CPT | Performed by: EMERGENCY MEDICINE

## 2017-04-04 PROCEDURE — 74011250637 HC RX REV CODE- 250/637: Performed by: FAMILY MEDICINE

## 2017-04-04 PROCEDURE — 73120 X-RAY EXAM OF HAND: CPT

## 2017-04-04 PROCEDURE — 65270000015 HC RM PRIVATE ONCOLOGY

## 2017-04-04 PROCEDURE — 74011250637 HC RX REV CODE- 250/637: Performed by: EMERGENCY MEDICINE

## 2017-04-04 PROCEDURE — 74011250636 HC RX REV CODE- 250/636: Performed by: EMERGENCY MEDICINE

## 2017-04-04 PROCEDURE — 87210 SMEAR WET MOUNT SALINE/INK: CPT | Performed by: EMERGENCY MEDICINE

## 2017-04-04 PROCEDURE — 74011250636 HC RX REV CODE- 250/636: Performed by: FAMILY MEDICINE

## 2017-04-04 PROCEDURE — 85025 COMPLETE CBC W/AUTO DIFF WBC: CPT | Performed by: EMERGENCY MEDICINE

## 2017-04-04 PROCEDURE — 99285 EMERGENCY DEPT VISIT HI MDM: CPT

## 2017-04-04 PROCEDURE — 80053 COMPREHEN METABOLIC PANEL: CPT | Performed by: EMERGENCY MEDICINE

## 2017-04-04 PROCEDURE — 70450 CT HEAD/BRAIN W/O DYE: CPT

## 2017-04-04 PROCEDURE — 82550 ASSAY OF CK (CPK): CPT | Performed by: EMERGENCY MEDICINE

## 2017-04-04 PROCEDURE — 73110 X-RAY EXAM OF WRIST: CPT

## 2017-04-04 PROCEDURE — 82962 GLUCOSE BLOOD TEST: CPT

## 2017-04-04 PROCEDURE — 36415 COLL VENOUS BLD VENIPUNCTURE: CPT | Performed by: EMERGENCY MEDICINE

## 2017-04-04 PROCEDURE — 51798 US URINE CAPACITY MEASURE: CPT

## 2017-04-04 PROCEDURE — 51702 INSERT TEMP BLADDER CATH: CPT

## 2017-04-04 PROCEDURE — 94762 N-INVAS EAR/PLS OXIMTRY CONT: CPT

## 2017-04-04 PROCEDURE — 81001 URINALYSIS AUTO W/SCOPE: CPT | Performed by: EMERGENCY MEDICINE

## 2017-04-04 PROCEDURE — 77030034849

## 2017-04-04 PROCEDURE — 74020 XR ABD FLAT/ ERECT: CPT

## 2017-04-04 PROCEDURE — 73130 X-RAY EXAM OF HAND: CPT

## 2017-04-04 PROCEDURE — 87491 CHLMYD TRACH DNA AMP PROBE: CPT | Performed by: EMERGENCY MEDICINE

## 2017-04-04 PROCEDURE — 93005 ELECTROCARDIOGRAM TRACING: CPT

## 2017-04-04 RX ORDER — CLONAZEPAM 1 MG/1
1 TABLET ORAL 2 TIMES DAILY
Status: DISCONTINUED | OUTPATIENT
Start: 2017-04-04 | End: 2017-04-09

## 2017-04-04 RX ORDER — SODIUM CHLORIDE 0.9 % (FLUSH) 0.9 %
5-10 SYRINGE (ML) INJECTION EVERY 8 HOURS
Status: DISCONTINUED | OUTPATIENT
Start: 2017-04-04 | End: 2017-04-11

## 2017-04-04 RX ORDER — ACETAMINOPHEN 325 MG/1
650 TABLET ORAL
Status: DISCONTINUED | OUTPATIENT
Start: 2017-04-04 | End: 2017-04-04

## 2017-04-04 RX ORDER — HEPARIN SODIUM 5000 [USP'U]/ML
5000 INJECTION, SOLUTION INTRAVENOUS; SUBCUTANEOUS EVERY 8 HOURS
Status: DISCONTINUED | OUTPATIENT
Start: 2017-04-04 | End: 2017-04-14 | Stop reason: HOSPADM

## 2017-04-04 RX ORDER — SODIUM CHLORIDE 9 MG/ML
75 INJECTION, SOLUTION INTRAVENOUS CONTINUOUS
Status: DISCONTINUED | OUTPATIENT
Start: 2017-04-04 | End: 2017-04-14 | Stop reason: HOSPADM

## 2017-04-04 RX ORDER — MAGNESIUM SULFATE 100 %
4 CRYSTALS MISCELLANEOUS AS NEEDED
Status: DISCONTINUED | OUTPATIENT
Start: 2017-04-04 | End: 2017-04-14 | Stop reason: HOSPADM

## 2017-04-04 RX ORDER — INSULIN LISPRO 100 [IU]/ML
INJECTION, SOLUTION INTRAVENOUS; SUBCUTANEOUS
Status: DISCONTINUED | OUTPATIENT
Start: 2017-04-04 | End: 2017-04-14 | Stop reason: HOSPADM

## 2017-04-04 RX ORDER — ASPIRIN 81 MG/1
81 TABLET ORAL DAILY
Status: DISCONTINUED | OUTPATIENT
Start: 2017-04-05 | End: 2017-04-14 | Stop reason: HOSPADM

## 2017-04-04 RX ORDER — DOXYCYCLINE HYCLATE 100 MG
100 TABLET ORAL
Status: COMPLETED | OUTPATIENT
Start: 2017-04-04 | End: 2017-04-04

## 2017-04-04 RX ORDER — IPRATROPIUM BROMIDE AND ALBUTEROL SULFATE 2.5; .5 MG/3ML; MG/3ML
3 SOLUTION RESPIRATORY (INHALATION)
Status: DISCONTINUED | OUTPATIENT
Start: 2017-04-04 | End: 2017-04-14 | Stop reason: HOSPADM

## 2017-04-04 RX ORDER — SODIUM CHLORIDE 0.9 % (FLUSH) 0.9 %
5-10 SYRINGE (ML) INJECTION AS NEEDED
Status: DISCONTINUED | OUTPATIENT
Start: 2017-04-04 | End: 2017-04-14 | Stop reason: HOSPADM

## 2017-04-04 RX ORDER — DEXTROSE 50 % IN WATER (D50W) INTRAVENOUS SYRINGE
12.5-25 AS NEEDED
Status: DISCONTINUED | OUTPATIENT
Start: 2017-04-04 | End: 2017-04-14 | Stop reason: HOSPADM

## 2017-04-04 RX ORDER — POLYETHYLENE GLYCOL 3350 17 G/17G
17 POWDER, FOR SOLUTION ORAL DAILY
Status: DISCONTINUED | OUTPATIENT
Start: 2017-04-05 | End: 2017-04-10

## 2017-04-04 RX ADMIN — SODIUM CHLORIDE 75 ML/HR: 900 INJECTION, SOLUTION INTRAVENOUS at 16:34

## 2017-04-04 RX ADMIN — DOXYCYCLINE HYCLATE 100 MG: 100 TABLET, COATED ORAL at 13:48

## 2017-04-04 RX ADMIN — SODIUM CHLORIDE 1000 ML: 900 INJECTION, SOLUTION INTRAVENOUS at 08:33

## 2017-04-04 RX ADMIN — Medication 10 ML: at 17:35

## 2017-04-04 RX ADMIN — SODIUM CHLORIDE 1000 ML: 900 INJECTION, SOLUTION INTRAVENOUS at 08:26

## 2017-04-04 RX ADMIN — HEPARIN SODIUM 5000 UNITS: 5000 INJECTION, SOLUTION INTRAVENOUS; SUBCUTANEOUS at 17:35

## 2017-04-04 NOTE — IP AVS SNAPSHOT
[address] [facility] 
[phone] Patient: Edelmira Enrique MRN: [id number] RWY:[id number] You are allergic to the following Allergen Reactions Amoxicillin Hives Sulfa (Sulfonamide Antibiotics) Hives Itching Amoxicillin Hives Itching Long time ago - patient not exactly certain what it does Mirtazapine Itching Nausea Only Funny feeling in chest  
    
 Percocet (Oxycodone-Acetaminophen) Nausea and Vomiting Codeine Nausea and Vomiting Prednisone Itching Sulfa (Sulfonamide Antibiotics) Hives Itching Palpitations Think it was increased heart rate or itching - many years ago Zithromax (Azithromycin) Itching Not sure what it does,taken long time ago Recent Documentation Height Weight BMI OB Status Smoking Status 1.727 m 77.1 kg 25.85 kg/m2 Hysterectomy Never Smoker Emergency Contacts Name Discharge Info Relation Home Work Mobile Maral Duncan DISCHARGE CAREGIVER [3] Child [2] [phone] About your hospitalization You were admitted on:  April 4, 2017 You last received care in the:  [facility] 3 RENAL MEDICINE You were discharged on:  April 14, 2017 Unit phone number:  [phone] Why you were hospitalized Your primary diagnosis was:  Abdominal Pain Your diagnoses also included:  Orthostatic Hypotension, Hydronephrosis Of Left Kidney, Diabetes Mellitus Type 2, Diet-Controlled (Hcc), Htn, Goal Below 130/80, Other Somatoform Disorders, Left-Sided Chest Wall Pain, Weakness, Gait Instability, Assistance Needed With Transportation, 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, Constipation, Chronic Chest Pain, Depression With Anxiety Providers Seen During Your Hospitalizations Provider Role Specialty Primary office phone Umberto Henderson MD Attending Provider Emergency Medicine [phone] [address], [place] Attending Provider [facility] [phone] Your Primary Care Physician (PCP) Primary Care Physician Office Phone Office Fax Gui Banks [phone] Follow-up Information Follow up With Details Comments Contact Info [address], MD Schedule an appointment as soon as possible for a visit  [address] [address], [place] 
[phone] Vaishali Spring MD Schedule an appointment as soon as possible for a visit in 2 days Urology [facility] [address] [facility] 
[phone] [facility] EMERGENCY DEPT  If symptoms worsen [address] [facility] 
[phone] Everett Donohue MD Schedule an appointment as soon as possible for a visit in 1 month  [address] 
[phone] Current Discharge Medication List  
  
START taking these medications Dose & Instructions Dispensing Information Comments Morning Noon Evening Bedtime  
 dilTIAZem  mg ER capsule Commonly known as:  CARDIZEM CD Your last dose was: Your next dose is:    
   
   
 Dose:  120 mg Take 1 Cap by mouth daily. Quantity:  30 Cap Refills:  1  
     
   
   
   
  
 docusate sodium 100 mg capsule Commonly known as:  Mary Sers Your last dose was: Your next dose is:    
   
   
 Dose:  100 mg Take 1 Cap by mouth daily as needed for Constipation for up to 90 days. Quantity:  90 Cap Refills:  0  
     
   
   
   
  
 lactulose 10 gram/15 mL solution Commonly known as:  Merissa Brunt Your last dose was: Your next dose is:    
   
   
 Dose:  30 g Take 45 mL by mouth two (2) times a day for 30 days. Quantity:  1200 mL Refills:  0  
     
   
   
   
  
 sertraline 25 mg tablet Commonly known as:  ZOLOFT  
   
 Your last dose was: Your next dose is:    
   
   
 Dose:  25 mg Take 1 Tab by mouth daily. Quantity:  30 Tab Refills:  0  
     
   
   
   
  
 valsartan 80 mg tablet Commonly known as:  DIOVAN Your last dose was: Your next dose is:    
   
   
 Dose:  80 mg Take 1 Tab by mouth daily. Quantity:  30 Tab Refills:  0 CONTINUE these medications which have NOT CHANGED Dose & Instructions Dispensing Information Comments Morning Noon Evening Bedtime  
 aspirin delayed-release 81 mg tablet Your last dose was: Your next dose is:    
   
   
 Dose:  81 mg Take 1 Tab by mouth daily. Quantity:  30 Tab Refills:  11  
     
   
   
   
  
 clonazePAM 1 mg tablet Commonly known as:  Darvin Souza Your last dose was: Your next dose is:    
   
   
 Dose:  1 mg Take 1 Tab by mouth two (2) times a day. Max Daily Amount: 2 mg. As needed fill after 2/5/17 Quantity:  40 Tab Refills:  0  
     
   
   
   
  
 CRESTOR 5 mg tablet Generic drug:  rosuvastatin Your last dose was: Your next dose is:    
   
   
 Dose:  5 mg Take 5 mg by mouth nightly. Refills:  0  
     
   
   
   
  
 polyethylene glycol 17 gram/dose powder Commonly known as:  Beuford Ely Your last dose was: Your next dose is: TAKE 1 CAPFUL IN 8 OUNCES OF WATER EVERY DAY Quantity:  255 g Refills:  3  
     
   
   
   
  
 topiramate 25 mg tablet Commonly known as:  TOPAMAX Your last dose was: Your next dose is: Take 1 tab at night for 1 week, then 2 tabs at night Quantity:  60 Tab Refills:  5 STOP taking these medications   
 hydrocortisone 1 % ointment Commonly known as:  HYCORT  
   
  
 losartan 25 mg tablet Commonly known as:  COZAAR Where to Get Your Medications Information on where to get these meds will be given to you by the nurse or doctor. ! Ask your nurse or doctor about these medications  
  dilTIAZem  mg ER capsule  
 docusate sodium 100 mg capsule  
 lactulose 10 gram/15 mL solution  
 sertraline 25 mg tablet  
 valsartan 80 mg tablet Discharge Instructions Constipation: Care Instructions Your Care Instructions Constipation means that you have a hard time passing stools (bowel movements). People pass stools from 3 times a day to once every 3 days. What is normal for you may be different. Constipation may occur with pain in the rectum and cramping. The pain may get worse when you try to pass stools. Sometimes there are small amounts of bright red blood on toilet paper or the surface of stools. This is because of enlarged veins near the rectum (hemorrhoids). A few changes in your diet and lifestyle may help you avoid ongoing constipation. Your doctor may also prescribe medicine to help loosen your stool. Some medicines can cause constipation. These include pain medicines and antidepressants. Tell your doctor about all the medicines you take. Your doctor may want to make a medicine change to ease your symptoms. Follow-up care is a key part of your treatment and safety. Be sure to make and go to all appointments, and call your doctor if you are having problems. It's also a good idea to know your test results and keep a list of the medicines you take. How can you care for yourself at home? · Drink plenty of fluids, enough so that your urine is light yellow or clear like water. If you have kidney, heart, or liver disease and have to limit fluids, talk with your doctor before you increase the amount of fluids you drink. · Include high-fiber foods in your diet each day. These include fruits, vegetables, beans, and whole grains. · Get at least 30 minutes of exercise on most days of the week.  Walking is a good choice. You also may want to do other activities, such as running, swimming, cycling, or playing tennis or team sports. · Take a fiber supplement, such as Citrucel or Metamucil, every day. Read and follow all instructions on the label. · Schedule time each day for a bowel movement. A daily routine may help. Take your time having your bowel movement. · Support your feet with a small step stool when you sit on the toilet. This helps flex your hips and places your pelvis in a squatting position. · Your doctor may recommend an over-the-counter laxative to relieve your constipation. Examples are Milk of Magnesia and MiraLax. Read and follow all instructions on the label. Do not use laxatives on a long-term basis. When should you call for help? Call your doctor now or seek immediate medical care if: 
· You have new or worse belly pain. · You have new or worse nausea or vomiting. · You have blood in your stools. Watch closely for changes in your health, and be sure to contact your doctor if: 
· Your constipation is getting worse. · You do not get better as expected. Where can you learn more? Go to http://lboo-mcihel.info/. Enter 21  in the search box to learn more about \"Constipation: Care Instructions. \" Current as of: May 27, 2016 Content Version: 11.2 © 3915-3865 [employer]. Care instructions adapted under license by [employer] (which disclaims liability or warranty for this information). If you have questions about a medical condition or this instruction, always ask your healthcare professional. [address] [address] any warranty or liability for your use of this information. Headache: Care Instructions Your Care Instructions Headaches have many possible causes. Most headaches aren't a sign of a more serious problem, and they will get better on their own. Home treatment may help you feel better faster. The doctor has checked you carefully, but problems can develop later. If you notice any problems or new symptoms, get medical treatment right away. Follow-up care is a key part of your treatment and safety. Be sure to make and go to all appointments, and call your doctor if you are having problems. It's also a good idea to know your test results and keep a list of the medicines you take. How can you care for yourself at home? · Do not drive if you have taken a prescription pain medicine. · Rest in a quiet, dark room until your headache is gone. Close your eyes and try to relax or go to sleep. Don't watch TV or read. · Put a cold, moist cloth or cold pack on the painful area for 10 to 20 minutes at a time. Put a thin cloth between the cold pack and your skin. · Use a warm, moist towel or a heating pad set on low to relax tight shoulder and neck muscles. · Have someone gently massage your neck and shoulders. · Take pain medicines exactly as directed. ¨ If the doctor gave you a prescription medicine for pain, take it as prescribed. ¨ If you are not taking a prescription pain medicine, ask your doctor if you can take an over-the-counter medicine. · Be careful not to take pain medicine more often than the instructions allow, because you may get worse or more frequent headaches when the medicine wears off. · Do not ignore new symptoms that occur with a headache, such as a fever, weakness or numbness, vision changes, or confusion. These may be signs of a more serious problem. To prevent headaches · Keep a headache diary so you can figure out what triggers your headaches. Avoiding triggers may help you prevent headaches. Record when each headache began, how long it lasted, and what the pain was like (throbbing, aching, stabbing, or dull).  Write down any other symptoms you had with the headache, such as nausea, flashing lights or dark spots, or sensitivity to bright light or loud noise. Note if the headache occurred near your period. List anything that might have triggered the headache, such as certain foods (chocolate, cheese, wine) or odors, smoke, bright light, stress, or lack of sleep. · Find healthy ways to deal with stress. Headaches are most common during or right after stressful times. Take time to relax before and after you do something that has caused a headache in the past. 
· Try to keep your muscles relaxed by keeping good posture. Check your jaw, face, neck, and shoulder muscles for tension, and try relaxing them. When sitting at a desk, change positions often, and stretch for 30 seconds each hour. · Get plenty of sleep and exercise. · Eat regularly and well. Long periods without food can trigger a headache. · Treat yourself to a massage. Some people find that regular massages are very helpful in relieving tension. · Limit caffeine by not drinking too much coffee, tea, or soda. But don't quit caffeine suddenly, because that can also give you headaches. · Reduce eyestrain from computers by blinking frequently and looking away from the computer screen every so often. Make sure you have proper eyewear and that your monitor is set up properly, about an arm's length away. · Seek help if you have depression or anxiety. Your headaches may be linked to these conditions. Treatment can both prevent headaches and help with symptoms of anxiety or depression. When should you call for help? Call 911 anytime you think you may need emergency care. For example, call if: 
· You have signs of a stroke. These may include: 
¨ Sudden numbness, paralysis, or weakness in your face, arm, or leg, especially on only one side of your body. ¨ Sudden vision changes. ¨ Sudden trouble speaking. ¨ Sudden confusion or trouble understanding simple statements. ¨ Sudden problems with walking or balance. ¨ A sudden, severe headache that is different from past headaches. Call your doctor now or seek immediate medical care if: 
· You have a new or worse headache. · Your headache gets much worse. Where can you learn more? Go to http://lobo-michel.info/. Enter M271 in the search box to learn more about \"Headache: Care Instructions. \" Current as of: October 14, 2016 Content Version: 11.2 © 2063-3540 [employer]. Care instructions adapted under license by [employer] (which disclaims liability or warranty for this information). If you have questions about a medical condition or this instruction, always ask your healthcare professional. [place] [address] any warranty or liability for your use of this information. Urinary Retention: Care Instructions Your Care Instructions Urinary retention means that you aren't able to urinate. In men, it is often caused by a blockage of the urinary tract from an enlarged prostate gland. In men and women, it can also be caused by an infection or nerve damage. Or it may be a side effect of a medicine. The doctor may have drained the urine from your bladder. If you still have problems passing urine, you may need to use a catheter at home. This is used to empty your bladder until the problem can be fixed. Your doctor may put a catheter in your bladder before you go home. If so, he or she will tell you when to come back to have the catheter removed. The doctor has checked you closely. But problems can develop later. If you notice any problems or new symptoms, get medical treatment right away. Follow-up care is a key part of your treatment and safety. Be sure to make and go to all appointments, and call your doctor if you are having problems. It's also a good idea to know your test results and keep a list of the medicines you take. How can you care for yourself at home? · Take your medicines exactly as prescribed. Call your doctor if you think you are having a problem with your medicine. You will get more details on the specific medicines your doctor prescribes. · Check with your doctor before you use any over-the-counter medicines. Many cold and allergy medicines, for example, can make this problem worse. Make sure your doctor knows all of the medicines, vitamins, supplements, and herbal remedies you take. · Spread out through the day the amount of fluid you drink. Do not drink a lot at bedtime. · Avoid alcohol and caffeine. · If you have been given a catheter, or if one is already in place, follow the instructions you were given. Always wash your hands before and after you handle the catheter. When should you call for help? Call your doctor now or seek immediate medical care if: 
· You cannot urinate at all, or it is getting harder to urinate. · You have symptoms of a urinary tract infection. These may include: 
¨ Pain or burning when you urinate. ¨ A frequent need to urinate without being able to pass much urine. ¨ Pain in the flank, which is just below the rib cage and above the waist on either side of the back. ¨ Blood in your urine. ¨ A fever. Watch closely for changes in your health, and be sure to contact your doctor if: 
· You have any problems with your catheter. · You do not get better as expected. Where can you learn more? Go to http://lobo-michel.info/. Enter M244 in the search box to learn more about \"Urinary Retention: Care Instructions. \" Current as of: August 12, 2016 Content Version: 11.2 © 0696-7971 [employer]. Care instructions adapted under license by [employer] (which disclaims liability or warranty for this information).  If you have questions about a medical condition or this instruction, always ask your healthcare professional. [employer] disclaims any warranty or liability for your use of this information. PATIENT DISCHARGE INSTRUCTIONS PATIENT DISCHARGE INSTRUCTIONS Estela Vargas / [id number] : 1945 Admitted 2017 Discharged: 2017 · It is important that you take the medication exactly as they are prescribed. · Keep your medication in the bottles provided by the pharmacist and keep a list of the medication names, dosages, and times to be taken in your wallet. · Do not take other medications without consulting your doctor. What to do at [facility] Recommended Diet: Cardiac Diet and Diabetic Diet Recommended Activity: Activity as tolerated If you experience any concerns contact your doctor Signed By: Galo Palm MD   
 2017 Discharge Orders None ACO Transitions of Care Introducing [employer] [address] Jackie Humphrey offers a voluntary care coordination program to provide high quality service and care to [facility] fee-for-service beneficiaries. Eden Adame was designed to help you enhance your health and well-being through the following services: ? Transitions of Care  support for individuals who are transitioning from one care setting to another (example: Hospital to home). ? Chronic and Complex Care Coordination  support for individuals and caregivers of those with serious or chronic illnesses or with more than one chronic (ongoing) condition and those who take a number of different medications. If you meet specific medical criteria, a [facility] [address] may call you directly to coordinate your care with your primary care physician and your other care providers. For questions about the [facility] programs, please, contact your physicians office. For general questions or additional information about Accountable Care Organizations: Please visit www.medicare.gov/acos. html or call 1-800-MEDICARE (8-326.720.4093) TTY users should call 6-810.769.9814. [employer] Announcement We are excited to announce that we are making your provider's discharge notes available to you in [employer]. You will see these notes when they are completed and signed by the physician that discharged you from your recent hospital stay. If you have any questions or concerns about any information you see in [employer], please call the Health Information Department where you were seen or reach out to your Primary Care Provider for more information about your plan of care. Introducing hospitals & HEALTH SERVICES! Cherise Nash introduces [employer] patient portal. Now you can access parts of your medical record, email your doctor's office, and request medication refills online. 1. In your internet browser, go to [email or web address] 2. Click on the First Time User? Click Here link in the Sign In box. You will see the New Member Sign Up page. 3. Enter your [employer] Access Code exactly as it appears below. You will not need to use this code after youve completed the sign-up process. If you do not sign up before the expiration date, you must request a new code. · [employer] Access Code: [email or web address] Expires: 5/24/2017  2:24 PM 
 
4. Enter the last four digits of your Social Security Number (xxxx) and Date of Birth (mm/dd/yyyy) as indicated and click Submit. You will be taken to the next sign-up page. 5. Create a Aristotlt ID. This will be your [employer] login ID and cannot be changed, so think of one that is secure and easy to remember. 6. Create a [employer] password. You can change your password at any time. 7. Enter your Password Reset Question and Answer. This can be used at a later time if you forget your password. 8. Enter your e-mail address. You will receive e-mail notification when new information is available in [address] 9. Click Sign Up. You can now view and download portions of your medical record. 10. Click the Download Summary menu link to download a portable copy of your medical information. If you have questions, please visit the Frequently Asked Questions section of the [employer] website. Remember, [employer] is NOT to be used for urgent needs. For medical emergencies, dial 911. Now available from your iPhone and Android! General Information Please provide this summary of care documentation to your next provider. Patient Signature:  ____________________________________________________________ Date:  ____________________________________________________________  
  
Toshia Flores Provider Signature:  ____________________________________________________________ Date:  ____________________________________________________________

## 2017-04-04 NOTE — ED NOTES
Patient now complaining of vaginal pain. Reports she has the vaginal pain after intercourse. MD at the bedside to do a modified pelvic exam. Patient tolerated well.

## 2017-04-04 NOTE — ED NOTES
Assumed care of the patient. Patient is resting comfortably, ED tech at bedside completing IV. Patient reports that \"her pee hole\" hurts. MD made aware. Patient connected to the monitor x3 resting comfortably. Call bell in hand. Will continue monitor.

## 2017-04-04 NOTE — ED PROVIDER NOTES
HPI Comments: Luana Flores is a 70 y.o. female, pmhx HTN / DM / GERD / CAD, who presents ambulatory to the ED c/o constant constipation x 2 weeks with new urinary retention x yesterday. She reports additional suprapubic abd pain, dizziness, and HA. She notes a hx of urinary retention \"a long time ago\". Per chart review, pt's last episode of constipation was in 12/2016. Pt specifically denies any recent nausea, vomiting, diarrhea, CP, SOB, heart palpitations, BLE swelling, or lightheadedness. PCP: [address] [place], [place]    PMHx: Significant for HTN, GERD, DM, arthritis, gout, anxiety, depression, hypercholesterolemia, CAD  PSHx: Significant for EGD, kidney stones, hysterectomy, tubal ligation, vaginal birth (x7), vaginal cyst removal, bladder dilation  Social Hx: -tobacco, -EtOH, -Illicit Drugs    There are no other complaints, changes, or physical findings at this time. The history is provided by the patient.         Past Medical History:   Diagnosis Date    Agoraphobia without mention of panic attacks 2/17/2014    Anxiety disorder 8/18/2013    Arthritis     osteo    Breast pain, left 4/7/2015    CAD (coronary artery disease), native coronary artery 12/1/2015    Chronic chest pain 1/13/2014    Chronic pain associated with significant psychosocial dysfunction 2/17/2014    Depression 8/18/2013    Diabetes ([facility] [address]     type II    Duplicated right renal collecting system 3/13/2014    GERD (gastroesophageal reflux disease)     Gout, joint     HTN, goal below 130/80 9/7/2012    Hypertension     Nephrolithiasis 3/13/2014    Other ill-defined conditions     hyercholesterolemia    Other ill-defined conditions     anxiety    Other ill-defined conditions     pt states head get tight,due to wax bill up    Other ill-defined conditions     pain left shoulder due to fall many years ago    Personal history of noncompliance with medical treatment, presenting hazards to health 5/30/2014   [facility] disorder     anxiety/ depression    Psychotic disorder     Vaginal pain 7/30/2014       Past Surgical History:   Procedure Laterality Date    EGD  4/23/2010         HX GYN      cyst removed from vagina    HX GYN      vaginal birth x7    HX HYSTERECTOMY      partial    HX OTHER SURGICAL      egd    HX OTHER SURGICAL      cyst removed from left wrist    HX OTHER SURGICAL      bladder dilitation    HX TUBAL LIGATION      HX UROLOGICAL      kidney stones         Family History:   Problem Relation Age of Onset    Stroke Mother     Heart Disease Mother     Cancer Father     Heart Disease Son     Liver Disease Son     Heart Disease Daughter     Malignant Hyperthermia Neg Hx     Pseudocholinesterase Deficiency Neg Hx     Delayed Awakening Neg Hx     Post-op Nausea/Vomiting Neg Hx     Emergence Delirium Neg Hx     Post-op Cognitive Dysfunction Neg Hx     Other Neg Hx        Social History     Social History    Marital status:      Spouse name: N/A    Number of children: N/A    Years of education: N/A     Occupational History    Not on file. Social History Main Topics    Smoking status: Never Smoker    Smokeless tobacco: Never Used    Alcohol use No    Drug use: No    Sexual activity: No     Other Topics Concern    Not on file     Social History Narrative    ** Merged History Encounter **              ALLERGIES: Amoxicillin; Sulfa (sulfonamide antibiotics); Amoxicillin; Mirtazapine; Percocet [oxycodone-acetaminophen]; Codeine; Prednisone; Sulfa (sulfonamide antibiotics); and Zithromax [azithromycin]    Review of Systems   Constitutional: Negative. Negative for chills and fever. Eyes: Negative. Respiratory: Negative. Negative for shortness of breath. Cardiovascular: Negative for chest pain. Gastrointestinal: Positive for abdominal pain and constipation. Negative for diarrhea, nausea and vomiting. Endocrine: Negative. Genitourinary: Positive for difficulty urinating. Negative for dysuria, frequency and hematuria. Musculoskeletal: Negative. Skin: Negative. Allergic/Immunologic: Negative. Neurological: Positive for headaches. Negative for dizziness and light-headedness. Psychiatric/Behavioral: Negative for suicidal ideas. All other systems reviewed and are negative. Vitals:    04/04/17 0827 04/04/17 0830 04/04/17 1100 04/04/17 1130   BP: (!) 168/91 (!) 167/94 170/85 158/90   Pulse: 83 83 85 85   Resp:  14 20 18   Temp:       SpO2:  98%  94%   Weight:       Height:                Physical Exam   Nursing note and vitals reviewed.   General appearance - well nourished, well appearing, and in no distress  Eyes - pupils equal and reactive, extraocular eye movements intact  ENT - mucous membranes moist, pharynx normal without lesions  Neck - supple, no significant adenopathy; non-tender to palpation  Chest - clear to auscultation, no wheezes, rales or rhonchi; non-tender to palpation  Heart - normal rate and regular rhythm, S1 and S2 normal, no murmurs noted  Abdomen - soft, generalized abd tenderness to palpation greater across the lower abdomen, minimal abd distention, no masses or organomegaly   - erythema to left lower labia majora, yellow vaginal discharge  Musculoskeletal - no joint tenderness, deformity; normal ROM, trace BLE edema  Extremities - peripheral pulses normal  Skin - normal coloration and turgor, no rashes  Neurological - alert, oriented x3, normal speech, no focal findings or movement disorder noted  Written by TATO Peterson, as dictated by Chuy Johnson MD     MDM  Number of Diagnoses or Management Options  Constipation, unspecified constipation type:   Dyspareunia, female:   Nonintractable headache, unspecified chronicity pattern, unspecified headache type:   Orthostatic hypotension:   Urinary retention:   Diagnosis management comments:   DDx: constipation, urinary retention, dehydration, anemia, electrolyte abnormality Amount and/or Complexity of Data Reviewed  Clinical lab tests: reviewed and ordered  Tests in the radiology section of CPT®: ordered and reviewed  Tests in the medicine section of CPT®: reviewed and ordered  Review and summarize past medical records: yes  Discuss the patient with other providers: yes (Hospitalist)  Independent visualization of images, tracings, or specimens: yes    Patient Progress  Patient progress: stable        Procedures    EKG interpretation: (Preliminary) [id number]  Rhythm: normal sinus rhythm. Rate (approx.): 84bpm; Axis: normal; Normal GA, QRS, QTc intervals; ST/T wave: Non-specific st changes  Written by TATO Grant, as dictated by Alex Christy MD    Procedure Note - Rectal Exam:   7:27 AM  Performed by: Alex Christy MD  Rectal exam performed. Large amount of soft stool in the rectal vault. No impaction noted. The procedure took 1-15 minutes, and pt tolerated well. Written by TATO Grant, as dictated by Alex Christy MD    SIGN OUT:  7:50 AM  Patient's presentation, labs/imaging and plan of care was reviewed with Henry Guardado MD as part of sign out. They will reassess pt following enema and dispo as part of the plan discussed with the patient. Henry Guardado MD's assistance in completion of this plan is greatly appreciated but it should be noted that I will be the provider of record for this patient. Written by TATO Grant, as dictated by Alex Christy MD    This note is prepared by Karen Johnson, acting as Scribe for MD Sridevi Melchor MD : The scribe's documentation has been prepared under my direction and personally reviewed by me in its entirety. I confirm that the note above accurately reflects all work, treatment, procedures, and medical decision making performed by me. PROGRESS NOTE:  8:37 AM  Pt was lightheaded while doing orthostatics.   Written by TATO Mejias, as dictated by Dawson John MD    PROGRESS NOTE:   10:52 AM  Pt has only gotten 100mL of the fluids ordered by Dr. Montse Del Castillo and has not started the second bag ordered so nurse will put in a second line. Pt is also c/o intermittent right hand pain which has been ongoing for 6 months and thinks it is from a catheterization performed by Dr. Leana Medeiros, so will order an x-ray. She is also now requesting a pelvic exam because she has had vaginal pain for the last 6 months which worsens after intercourse, saw her PCP regarding the pain, had a pelvic exam performed, and was told everything looked normal, but the pain has persisted. Written by TATO Brown, as dictated by Dawson John MD.     PROCEDURE NOTE - PELVIC EXAM:    11:04 AM  Performed by: Dawson John MD  Pelvic exam was performed using bimanual and speculum. Further findings noted in physical exam. Specimen collected and sent to lab. Procedure chaperoned by nursing staff. The procedure took 1-15 minutes, and pt tolerated well. Written by TATO Brown, as dictated by Dawson John MD.    PROGRESS NOTE:   1:07 PM  Pt is still orthostatic, c/o lightheadedness upon standing, and her BP dropped from the 180's to 140's when she went from sitting to standing. Will admit to the hospitalist.  Written by TATO Brown, as dictated by Dawson John MD.     CONSULT NOTE:   1:15 PM  Dawson John MD spoke with Dr. Estephaine Patricia,   Specialty: Hospitalist  Discussed pt's hx, disposition, and available diagnostic and imaging results. Reviewed care plans. Consultant will evaluate pt for admission.   Written by TATO Brown, as dictated by Dawson John MD.    LABORATORY TESTS:  Recent Results (from the past 12 hour(s))   CBC WITH AUTOMATED DIFF    Collection Time: 04/04/17  4:51 AM   Result Value Ref Range    WBC 7.2 3.6 - 11.0 K/uL    RBC 4.55 3.80 - 5.20 M/uL    HGB 12.0 11.5 - 16.0 g/dL    HCT 38.4 35.0 - 47.0 % MCV 84.4 80.0 - 99.0 FL    MCH 26.4 26.0 - 34.0 PG    MCHC 31.3 30.0 - 36.5 g/dL    RDW 14.2 11.5 - 14.5 %    PLATELET 273 478 - 619 K/uL    NEUTROPHILS 73 32 - 75 %    LYMPHOCYTES 22 12 - 49 %    MONOCYTES 5 5 - 13 %    EOSINOPHILS 0 0 - 7 %    BASOPHILS 0 0 - 1 %    ABS. NEUTROPHILS 5.2 1.8 - 8.0 K/UL    ABS. LYMPHOCYTES 1.6 0.8 - 3.5 K/UL    ABS. MONOCYTES 0.4 0.0 - 1.0 K/UL    ABS. EOSINOPHILS 0.0 0.0 - 0.4 K/UL    ABS. BASOPHILS 0.0 0.0 - 0.1 K/UL   METABOLIC PANEL, COMPREHENSIVE    Collection Time: 04/04/17  4:51 AM   Result Value Ref Range    Sodium 141 136 - 145 mmol/L    Potassium 3.9 3.5 - 5.1 mmol/L    Chloride 105 97 - 108 mmol/L    CO2 29 21 - 32 mmol/L    Anion gap 7 5 - 15 mmol/L    Glucose 162 (H) 65 - 100 mg/dL    BUN 18 6 - 20 MG/DL    Creatinine 1.09 (H) 0.55 - 1.02 MG/DL    BUN/Creatinine ratio 17 12 - 20      GFR est AA 60 (L) >60 ml/min/1.73m2    GFR est non-AA 49 (L) >60 ml/min/1.73m2    Calcium 9.6 8.5 - 10.1 MG/DL    Bilirubin, total 0.4 0.2 - 1.0 MG/DL    ALT (SGPT) 89 (H) 12 - 78 U/L    AST (SGOT) 49 (H) 15 - 37 U/L    Alk.  phosphatase 89 45 - 117 U/L    Protein, total 8.1 6.4 - 8.2 g/dL    Albumin 3.7 3.5 - 5.0 g/dL    Globulin 4.4 (H) 2.0 - 4.0 g/dL    A-G Ratio 0.8 (L) 1.1 - 2.2     CK W/ CKMB & INDEX    Collection Time: 04/04/17  4:51 AM   Result Value Ref Range     26 - 192 U/L    CK - MB 1.1 <3.6 NG/ML    CK-MB Index 1.0 0 - 2.5     TROPONIN I    Collection Time: 04/04/17  4:51 AM   Result Value Ref Range    Troponin-I, Qt. <0.04 <0.05 ng/mL   EKG, 12 LEAD, INITIAL    Collection Time: 04/04/17  6:03 AM   Result Value Ref Range    Ventricular Rate 84 BPM    Atrial Rate 84 BPM    P-R Interval 144 ms    QRS Duration 100 ms    Q-T Interval 370 ms    QTC Calculation (Bezet) 437 ms    Calculated P Axis 41 degrees    Calculated R Axis 17 degrees    Calculated T Axis 14 degrees    Diagnosis       Normal sinus rhythm  Cannot rule out Inferior infarct , age undetermined  Abnormal ECG  When compared with ECG of 23-FEB-2017 11:57,  Minimal criteria for Inferior infarct are now present  ST elevation now present in Anterior leads     URINALYSIS W/ REFLEX CULTURE    Collection Time: 04/04/17  7:09 AM   Result Value Ref Range    Color YELLOW/STRAW      Appearance CLOUDY (A) CLEAR      Specific gravity 1.005 1.003 - 1.030      pH (UA) 6.5 5.0 - 8.0      Protein NEGATIVE  NEG mg/dL    Glucose NEGATIVE  NEG mg/dL    Ketone NEGATIVE  NEG mg/dL    Bilirubin NEGATIVE  NEG      Blood SMALL (A) NEG      Urobilinogen 0.2 0.2 - 1.0 EU/dL    Nitrites NEGATIVE  NEG      Leukocyte Esterase NEGATIVE  NEG      WBC 0-4 0 - 4 /hpf    RBC 0-5 0 - 5 /hpf    Epithelial cells FEW FEW /lpf    Bacteria NEGATIVE  NEG /hpf    UA:UC IF INDICATED CULTURE NOT INDICATED BY UA RESULT CNI      Hyaline cast 0-2 0 - 5 /lpf   WET PREP    Collection Time: 04/04/17 11:20 AM   Result Value Ref Range    Clue cells CLUE CELLS ABSENT      Wet prep NO TRICHOMONAS SEEN     KOH, OTHER SOURCES    Collection Time: 04/04/17 11:20 AM   Result Value Ref Range    Special Requests: NO SPECIAL REQUESTS      KOH NO YEAST SEEN         IMAGING RESULTS:  XR HAND RT MIN 3 V   Final Result   EXAM: XR HAND RT MIN 3 V     INDICATION: pain. Patient grabbed by wrist several weeks ago; pain persists.     COMPARISON: None.     FINDINGS: Three views of the right hand demonstrate no fracture or other acute  osseous or articular abnormality. The soft tissues are within normal limits. There is diffuse osteopenia. Well-corticated ossicle at ulnar styloid process  likely reflect prior trauma. Mild osteophytic change. Atherosclerotic vascular  calcifications are noted.     IMPRESSION  IMPRESSION: No fracture or other acute abnormality. Chronic changes as above. .  Signed by      Signed Date/Time    Phone Pager     Daisy Maffucci 4/04/2017 12:10 [phone]       XR WRIST RT AP/LAT/OBL MIN 3V   Final Result   EXAM: XR WRIST RT AP/LAT/OBL MIN 3V     INDICATION: pain. Patient grabbed by wrist several weeks ago; pain persists.     COMPARISON: None.     FINDINGS: Three views of the right wrist demonstrate no fracture or other acute  osseous or articular abnormality. The soft tissues are within normal limits. There is diffuse osteopenia. There is a well-corticated ossicle just distal to  the ulnar styloid process likely reflective of prior injury. There is mild  osteoarthritic change. Atherosclerotic calcifications are noted.     IMPRESSION  IMPRESSION: No fracture or other acute abnormality. Chronic changes as above  Area  Signed by      Signed Date/Time    Phone Pager     Aubreylluvia Ella 4/04/2017 12:09 [phone]       XR ABD FLAT/ ERECT   Final Result   INDICATION: Abdominal pain     EXAM: 2 views of the abdomen . Comparison December 23, 2016      FINDINGS: There is significant retained stool but no dilated loops of bowel or  air-fluid levels. No free air. No pathologic calcifications.      IMPRESSION  IMPRESSION:  1. Significant retained stool with large rectal stool ball. No evidence of  Obstruction. Signed by      Signed Date/Time    Phone Pager     Rani Azarner 4/04/2017 06:40 [phone]       CT HEAD WO CONT   Final Result   EXAM: CT HEAD WO CONT     INDICATION: dizzy, headache     COMPARISON: August 16, 2016.     TECHNIQUE: Unenhanced CT of the head was performed using 5 mm images. Brain and  bone windows were generated. CT dose reduction was achieved through use of a  standardized protocol tailored for this examination and automatic exposure  control for dose modulation.      FINDINGS:  The ventricles and sulci are normal in size, shape and configuration and  midline. There is no significant white matter disease. There is no intracranial  hemorrhage, extra-axial collection, mass, mass effect or midline shift. The  basilar cisterns are open. No acute infarct is identified. The bone windows  demonstrate no abnormalities. No bone destruction.  No depressed skull fracture. Near complete opacification right maxillary sinus.     IMPRESSION  IMPRESSION:     No acute intracranial abnormality  Signed by      Signed Date/Time    Phone Pager     Almita Valladares 4/04/2017 06:32 [phone]                MEDICATIONS GIVEN:  Medications   doxycycline (VIBRA-TABS) tablet 100 mg (not administered)   sodium chloride 0.9 % bolus infusion 1,000 mL (1,000 mL IntraVENous New Bag 4/4/17 0897)   sodium chloride 0.9 % bolus infusion 1,000 mL (1,000 mL IntraVENous New Bag 4/4/17 0818)       IMPRESSION:  1. Constipation, unspecified constipation type    2. Urinary retention    3. Nonintractable headache, unspecified chronicity pattern, unspecified headache type    4. Orthostatic hypotension    5. Dyspareunia, female        PLAN: Admit to Hospitalist    Admit Note:  1:15 PM  Patient is being admitted to the hospital by Dr. Carrol Castañeda. The results of their tests and reasons for their admission have been discussed with the patient and/or available family. They convey their agreement and understanding for the need to be admitted and for their admission diagnosis. Written by Briana Mcconnell, ED Scribe, as dictated by Kenna Aguirre MD.       Attestation: This note is prepared by Briana Mcconnell, acting as Scribe for Kenna Aguirre MD.    Kenna Aguirre MD: The scribe's documentation has been prepared under my direction and personally reviewed by me in its entirety. I confirm that the note above accurately reflects all work, treatment, procedures, and medical decision making performed by me. This note will not be viewable in [address]

## 2017-04-04 NOTE — ED NOTES
TRANSFER - OUT REPORT:    Verbal report given to Francesco (name) on Ariana Coombs  being transferred to [facility] (unit) for routine progression of care       Report consisted of patients Situation, Background, Assessment and   Recommendations(SBAR). Information from the following report(s) SBAR, Kardex, ED Summary, Procedure Summary, Intake/Output, MAR, Recent Results and Med Rec Status was reviewed with the receiving nurse. Lines:   Peripheral IV 04/04/17 Right Wrist (Active)   Site Assessment Clean, dry, & intact 4/4/2017  4:56 PM   Phlebitis Assessment 0 4/4/2017  4:56 PM   Infiltration Assessment 0 4/4/2017  4:56 PM   Dressing Status Clean, dry, & intact 4/4/2017  4:56 PM   Hub Color/Line Status Blue 4/4/2017  4:56 PM        Opportunity for questions and clarification was provided.

## 2017-04-04 NOTE — ED NOTES
Patient is back into the room, connected to the monitor, repositioned for comfort.  Updated on the plan of care: xray results and discharge

## 2017-04-04 NOTE — ED TRIAGE NOTES
Pt arrived to ED w2ith c/o constipation for 2-3 weeks. Unable to urinate since yesterday. Also c/o head feeling tight and blurry vision for a few months. C/o L sided pain for 3 or 4 months.

## 2017-04-04 NOTE — ED NOTES
Soap Suds Enema complete. Patient had a small bowel movement, but expelled a moderate amount of dark brown liquid.

## 2017-04-04 NOTE — IP AVS SNAPSHOT
Current Discharge Medication List  
  
START taking these medications Dose & Instructions Dispensing Information Comments Morning Noon Evening Bedtime  
 dilTIAZem  mg ER capsule Commonly known as:  CARDIZEM CD Your last dose was: Your next dose is:    
   
   
 Dose:  120 mg Take 1 Cap by mouth daily. Quantity:  30 Cap Refills:  1  
     
   
   
   
  
 docusate sodium 100 mg capsule Commonly known as:  Sara Gaw Your last dose was: Your next dose is:    
   
   
 Dose:  100 mg Take 1 Cap by mouth daily as needed for Constipation for up to 90 days. Quantity:  90 Cap Refills:  0  
     
   
   
   
  
 lactulose 10 gram/15 mL solution Commonly known as:  Kellie Rochelle Your last dose was: Your next dose is:    
   
   
 Dose:  30 g Take 45 mL by mouth two (2) times a day for 30 days. Quantity:  1200 mL Refills:  0  
     
   
   
   
  
 sertraline 25 mg tablet Commonly known as:  ZOLOFT Your last dose was: Your next dose is:    
   
   
 Dose:  25 mg Take 1 Tab by mouth daily. Quantity:  30 Tab Refills:  0  
     
   
   
   
  
 valsartan 80 mg tablet Commonly known as:  DIOVAN Your last dose was: Your next dose is:    
   
   
 Dose:  80 mg Take 1 Tab by mouth daily. Quantity:  30 Tab Refills:  0 CONTINUE these medications which have NOT CHANGED Dose & Instructions Dispensing Information Comments Morning Noon Evening Bedtime  
 aspirin delayed-release 81 mg tablet Your last dose was: Your next dose is:    
   
   
 Dose:  81 mg Take 1 Tab by mouth daily. Quantity:  30 Tab Refills:  11  
     
   
   
   
  
 clonazePAM 1 mg tablet Commonly known as:  Binu Donnelly Your last dose was: Your next dose is:    
   
   
 Dose:  1 mg Take 1 Tab by mouth two (2) times a day. Max Daily Amount: 2 mg.  As needed fill after 2/5/17 Quantity:  40 Tab Refills:  0  
     
   
   
   
  
 CRESTOR 5 mg tablet Generic drug:  rosuvastatin Your last dose was: Your next dose is:    
   
   
 Dose:  5 mg Take 5 mg by mouth nightly. Refills:  0  
     
   
   
   
  
 polyethylene glycol 17 gram/dose powder Commonly known as:  Bettylou [place] Your last dose was: Your next dose is: TAKE 1 CAPFUL IN 8 OUNCES OF WATER EVERY DAY Quantity:  255 g Refills:  3  
     
   
   
   
  
 topiramate 25 mg tablet Commonly known as:  TOPAMAX Your last dose was: Your next dose is: Take 1 tab at night for 1 week, then 2 tabs at night Quantity:  60 Tab Refills:  5 STOP taking these medications   
 hydrocortisone 1 % ointment Commonly known as:  HYCORT  
   
  
 losartan 25 mg tablet Commonly known as:  COZAAR Where to Get Your Medications Information on where to get these meds will be given to you by the nurse or doctor. ! Ask your nurse or doctor about these medications  
  dilTIAZem  mg ER capsule  
 docusate sodium 100 mg capsule  
 lactulose 10 gram/15 mL solution  
 sertraline 25 mg tablet  
 valsartan 80 mg tablet
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
## 2017-04-04 NOTE — ED NOTES
Hourly rounding complete on the patient. Patient is resting comfortably, bed in the lowest position, side rails raised, call bell in hand, lights dim. Instructed patient to not get up without assistance, and to ring the call bell for any questions or concerns.  Updated patient on the plan of care: reeval from Dr. Alfredo Pillai

## 2017-04-05 ENCOUNTER — APPOINTMENT (OUTPATIENT)
Dept: ULTRASOUND IMAGING | Age: 72
DRG: 312 | End: 2017-04-05
Attending: FAMILY MEDICINE
Payer: MEDICARE

## 2017-04-05 LAB
ANION GAP BLD CALC-SCNC: 10 MMOL/L (ref 5–15)
BUN SERPL-MCNC: 15 MG/DL (ref 6–20)
BUN/CREAT SERPL: 17 (ref 12–20)
C TRACH DNA SPEC QL NAA+PROBE: NEGATIVE
CALCIUM SERPL-MCNC: 8.4 MG/DL (ref 8.5–10.1)
CHLORIDE SERPL-SCNC: 111 MMOL/L (ref 97–108)
CO2 SERPL-SCNC: 25 MMOL/L (ref 21–32)
CREAT SERPL-MCNC: 0.86 MG/DL (ref 0.55–1.02)
ERYTHROCYTE [DISTWIDTH] IN BLOOD BY AUTOMATED COUNT: 14.4 % (ref 11.5–14.5)
GLUCOSE BLD STRIP.AUTO-MCNC: 103 MG/DL (ref 65–100)
GLUCOSE BLD STRIP.AUTO-MCNC: 151 MG/DL (ref 65–100)
GLUCOSE BLD STRIP.AUTO-MCNC: 59 MG/DL (ref 65–100)
GLUCOSE BLD STRIP.AUTO-MCNC: 65 MG/DL (ref 65–100)
GLUCOSE BLD STRIP.AUTO-MCNC: 74 MG/DL (ref 65–100)
GLUCOSE BLD STRIP.AUTO-MCNC: 83 MG/DL (ref 65–100)
GLUCOSE BLD STRIP.AUTO-MCNC: 99 MG/DL (ref 65–100)
GLUCOSE SERPL-MCNC: 82 MG/DL (ref 65–100)
HCT VFR BLD AUTO: 35.5 % (ref 35–47)
HGB BLD-MCNC: 11.2 G/DL (ref 11.5–16)
MCH RBC QN AUTO: 26.5 PG (ref 26–34)
MCHC RBC AUTO-ENTMCNC: 31.5 G/DL (ref 30–36.5)
MCV RBC AUTO: 84.1 FL (ref 80–99)
N GONORRHOEA DNA SPEC QL NAA+PROBE: NEGATIVE
PLATELET # BLD AUTO: 199 K/UL (ref 150–400)
POTASSIUM SERPL-SCNC: 4 MMOL/L (ref 3.5–5.1)
RBC # BLD AUTO: 4.22 M/UL (ref 3.8–5.2)
SAMPLE TYPE: NORMAL
SERVICE CMNT-IMP: ABNORMAL
SERVICE CMNT-IMP: NORMAL
SODIUM SERPL-SCNC: 146 MMOL/L (ref 136–145)
SPECIMEN SOURCE: NORMAL
WBC # BLD AUTO: 4.7 K/UL (ref 3.6–11)

## 2017-04-05 PROCEDURE — 74011250636 HC RX REV CODE- 250/636: Performed by: FAMILY MEDICINE

## 2017-04-05 PROCEDURE — 82962 GLUCOSE BLOOD TEST: CPT

## 2017-04-05 PROCEDURE — 74011250637 HC RX REV CODE- 250/637: Performed by: FAMILY MEDICINE

## 2017-04-05 PROCEDURE — 65270000015 HC RM PRIVATE ONCOLOGY

## 2017-04-05 PROCEDURE — 74011636637 HC RX REV CODE- 636/637: Performed by: FAMILY MEDICINE

## 2017-04-05 PROCEDURE — 85027 COMPLETE CBC AUTOMATED: CPT | Performed by: FAMILY MEDICINE

## 2017-04-05 PROCEDURE — 36415 COLL VENOUS BLD VENIPUNCTURE: CPT | Performed by: FAMILY MEDICINE

## 2017-04-05 PROCEDURE — 76700 US EXAM ABDOM COMPLETE: CPT

## 2017-04-05 PROCEDURE — 51798 US URINE CAPACITY MEASURE: CPT

## 2017-04-05 PROCEDURE — 80048 BASIC METABOLIC PNL TOTAL CA: CPT | Performed by: FAMILY MEDICINE

## 2017-04-05 RX ORDER — HYDROCODONE BITARTRATE AND ACETAMINOPHEN 5; 325 MG/1; MG/1
1 TABLET ORAL
Status: DISCONTINUED | OUTPATIENT
Start: 2017-04-05 | End: 2017-04-14 | Stop reason: HOSPADM

## 2017-04-05 RX ADMIN — Medication 10 ML: at 21:05

## 2017-04-05 RX ADMIN — HEPARIN SODIUM 5000 UNITS: 5000 INJECTION, SOLUTION INTRAVENOUS; SUBCUTANEOUS at 05:54

## 2017-04-05 RX ADMIN — CLONAZEPAM 1 MG: 1 TABLET ORAL at 06:04

## 2017-04-05 RX ADMIN — SODIUM CHLORIDE 75 ML/HR: 900 INJECTION, SOLUTION INTRAVENOUS at 08:15

## 2017-04-05 RX ADMIN — HYDROCODONE BITARTRATE AND ACETAMINOPHEN 1 TABLET: 5; 325 TABLET ORAL at 22:51

## 2017-04-05 RX ADMIN — HYDROCODONE BITARTRATE AND ACETAMINOPHEN 1 TABLET: 5; 325 TABLET ORAL at 17:34

## 2017-04-05 RX ADMIN — CLONAZEPAM 1 MG: 1 TABLET ORAL at 17:34

## 2017-04-05 RX ADMIN — INSULIN LISPRO 2 UNITS: 100 INJECTION, SOLUTION INTRAVENOUS; SUBCUTANEOUS at 19:14

## 2017-04-05 RX ADMIN — HEPARIN SODIUM 5000 UNITS: 5000 INJECTION, SOLUTION INTRAVENOUS; SUBCUTANEOUS at 17:33

## 2017-04-05 RX ADMIN — Medication 10 ML: at 14:46

## 2017-04-05 RX ADMIN — ASPIRIN 81 MG: 81 TABLET, COATED ORAL at 12:41

## 2017-04-05 NOTE — PROGRESS NOTES
1300:  Patient is anxious and stated she is concerned because she had not had a bowel movement. Patient has had 1 medium formed stool in patient bathroom this afternoon. Patient was informed by nurse she had a bowel movement today.

## 2017-04-05 NOTE — PROGRESS NOTES
9: 27 AM order to remove martinez and bladder scan after first void. Primary RN made aware. 10:00 AM Martinez removed per order. Pt updated that she has 6 hrs to void. 2:04 PM pt with c/o being very uncomfortable and being able to void, stating \"something is wrong, I need you to fix me. \". Bladder scan shows 569mL. Called Dr. Germania Orona cell, no answer, no voicemail set up.

## 2017-04-05 NOTE — PROGRESS NOTES
HYPOGLYCEMIC EPISODE DOCUMENTATION    Patient with hypoglycemic episode(s) at 1220(time) on 4/5/17(date). BG value(s) pre-treatment 72    Was patient symptomatic?  [] yes, [x] no  Patient was treated with the following rescue medications/treatments: [] D50                [] Glucose tablets                [] Glucagon                [x] 4oz juice                [] 6oz reg soda                [] 8oz low fat milk  BG value post-treatment: 99  Once BG treated and value greater than 80mg/dl, pt was provided with the following:  [] snack  [x] meal  Name of MD notified:Walker  The following orders were received: Continue to asssess

## 2017-04-05 NOTE — PROGRESS NOTES
Urine specimen collected and labeled. Additional yellow tube sent without patient label and was notified by lab they could not run collection. Duplicate order put into for urine culture. Will test specimen when patient is able to produce urine or if straight cath is needed due to retention. Patient currently does not report any pain or discomfort.

## 2017-04-05 NOTE — PROGRESS NOTES
Spiritual Care Partner Volunteer visited patient in Oncology on 4/5/17.   Documented by:  EARNESTINE Barrett, [facility], [address]     Paging Service  [phone]

## 2017-04-05 NOTE — PROGRESS NOTES
General Daily Progress Note    Admit Date: 4/4/2017  Hospital day 2    Subjective:     Patient has no complaint of cp but feeling a bit dizzy. Medication side effects: none    Current Facility-Administered Medications   Medication Dose Route Frequency    sodium chloride (NS) flush 5-10 mL  5-10 mL IntraVENous Q8H    sodium chloride (NS) flush 5-10 mL  5-10 mL IntraVENous PRN    heparin (porcine) injection 5,000 Units  5,000 Units SubCUTAneous Q8H    albuterol-ipratropium (DUO-NEB) 2.5 MG-0.5 MG/3 ML  3 mL Nebulization Q6H PRN    insulin lispro (HUMALOG) injection   SubCUTAneous AC&HS    glucose chewable tablet 16 g  4 Tab Oral PRN    dextrose (D50W) injection syrg 12.5-25 g  12.5-25 g IntraVENous PRN    glucagon (GLUCAGEN) injection 1 mg  1 mg IntraMUSCular PRN    0.9% sodium chloride infusion  75 mL/hr IntraVENous CONTINUOUS    aspirin delayed-release tablet 81 mg  81 mg Oral DAILY    clonazePAM (KlonoPIN) tablet 1 mg  1 mg Oral BID    polyethylene glycol (MIRALAX) packet 17 g  17 g Oral DAILY        Review of Systems  A comprehensive review of systems was negative except for that written in the HPI. Objective:     Patient Vitals for the past 8 hrs:   BP Temp Pulse Resp SpO2   04/05/17 0718 150/81 98.2 °F (36.8 °C) 72 17 98 %        04/03 1901 - 04/05 0700  In: -   Out: 3200 [Urine:3200]    Physical Exam: General appearance: alert, fatigued, cooperative, no distress, appears stated age  Lungs: clear to auscultation bilaterally  Heart: regular rate and rhythm  Abdomen: soft, non-tender.  Bowel sounds normal. No masses,  no organomegaly  Extremities: extremities normal, atraumatic, no cyanosis or edema  Neurologic: Grossly normal      Data Review   Recent Results (from the past 24 hour(s))   WET PREP    Collection Time: 04/04/17 11:20 AM   Result Value Ref Range    Clue cells CLUE CELLS ABSENT      Wet prep NO TRICHOMONAS SEEN     KOH, OTHER SOURCES    Collection Time: 04/04/17 11:20 AM   Result Value Ref Range    Special Requests: NO SPECIAL REQUESTS      KOH NO YEAST SEEN     GLUCOSE, POC    Collection Time: 04/04/17  4:36 PM   Result Value Ref Range    Glucose (POC) 82 65 - 100 mg/dL    Performed by Raysa MATHIS (AUREA)    GLUCOSE, POC    Collection Time: 04/04/17  5:35 PM   Result Value Ref Range    Glucose (POC) 96 65 - 100 mg/dL    Performed by Lily Luis    GLUCOSE, POC    Collection Time: 04/04/17 10:16 PM   Result Value Ref Range    Glucose (POC) 95 65 - 100 mg/dL    Performed by Enrique Hunt, BASIC    Collection Time: 04/05/17  5:52 AM   Result Value Ref Range    Sodium 146 (H) 136 - 145 mmol/L    Potassium 4.0 3.5 - 5.1 mmol/L    Chloride 111 (H) 97 - 108 mmol/L    CO2 25 21 - 32 mmol/L    Anion gap 10 5 - 15 mmol/L    Glucose 82 65 - 100 mg/dL    BUN 15 6 - 20 MG/DL    Creatinine 0.86 0.55 - 1.02 MG/DL    BUN/Creatinine ratio 17 12 - 20      GFR est AA >60 >60 ml/min/1.73m2    GFR est non-AA >60 >60 ml/min/1.73m2    Calcium 8.4 (L) 8.5 - 10.1 MG/DL   CBC W/O DIFF    Collection Time: 04/05/17  5:52 AM   Result Value Ref Range    WBC 4.7 3.6 - 11.0 K/uL    RBC 4.22 3.80 - 5.20 M/uL    HGB 11.2 (L) 11.5 - 16.0 g/dL    HCT 35.5 35.0 - 47.0 %    MCV 84.1 80.0 - 99.0 FL    MCH 26.5 26.0 - 34.0 PG    MCHC 31.5 30.0 - 36.5 g/dL    RDW 14.4 11.5 - 14.5 %    PLATELET 953 454 - 952 K/uL   GLUCOSE, POC    Collection Time: 04/05/17  7:20 AM   Result Value Ref Range    Glucose (POC) 83 65 - 100 mg/dL    Performed by Yuni Lua            Assessment:     Active Problems:    Orthostatic hypotension (4/4/2017)        Plan:   Asked nursing to check orthostatics   hydroneph- imaging reviewed will send off a urine cx ua reviewed will ask nephrology to see pt as well  Suspect pt was not complaint with medications as she thinks she may have mixed up her medications  Will restart home bp meds and monitor  Kali Tracy M.D.   Family Medicine [phone] call with any concerns before 5pm

## 2017-04-06 ENCOUNTER — APPOINTMENT (OUTPATIENT)
Dept: CT IMAGING | Age: 72
DRG: 312 | End: 2017-04-06
Attending: UROLOGY
Payer: MEDICARE

## 2017-04-06 LAB
ANION GAP BLD CALC-SCNC: 8 MMOL/L (ref 5–15)
BASOPHILS # BLD AUTO: 0 K/UL (ref 0–0.1)
BASOPHILS # BLD: 1 % (ref 0–1)
BUN SERPL-MCNC: 13 MG/DL (ref 6–20)
BUN/CREAT SERPL: 15 (ref 12–20)
CALCIUM SERPL-MCNC: 8.8 MG/DL (ref 8.5–10.1)
CHLORIDE SERPL-SCNC: 108 MMOL/L (ref 97–108)
CO2 SERPL-SCNC: 26 MMOL/L (ref 21–32)
CREAT SERPL-MCNC: 0.85 MG/DL (ref 0.55–1.02)
EOSINOPHIL # BLD: 0.1 K/UL (ref 0–0.4)
EOSINOPHIL NFR BLD: 3 % (ref 0–7)
ERYTHROCYTE [DISTWIDTH] IN BLOOD BY AUTOMATED COUNT: 14.3 % (ref 11.5–14.5)
GLUCOSE BLD STRIP.AUTO-MCNC: 111 MG/DL (ref 65–100)
GLUCOSE BLD STRIP.AUTO-MCNC: 114 MG/DL (ref 65–100)
GLUCOSE BLD STRIP.AUTO-MCNC: 120 MG/DL (ref 65–100)
GLUCOSE BLD STRIP.AUTO-MCNC: 66 MG/DL (ref 65–100)
GLUCOSE BLD STRIP.AUTO-MCNC: 92 MG/DL (ref 65–100)
GLUCOSE SERPL-MCNC: 94 MG/DL (ref 65–100)
HCT VFR BLD AUTO: 37.1 % (ref 35–47)
HGB BLD-MCNC: 11.7 G/DL (ref 11.5–16)
LYMPHOCYTES # BLD AUTO: 38 % (ref 12–49)
LYMPHOCYTES # BLD: 1.6 K/UL (ref 0.8–3.5)
MCH RBC QN AUTO: 26.7 PG (ref 26–34)
MCHC RBC AUTO-ENTMCNC: 31.5 G/DL (ref 30–36.5)
MCV RBC AUTO: 84.7 FL (ref 80–99)
MONOCYTES # BLD: 0.3 K/UL (ref 0–1)
MONOCYTES NFR BLD AUTO: 7 % (ref 5–13)
NEUTS SEG # BLD: 2.3 K/UL (ref 1.8–8)
NEUTS SEG NFR BLD AUTO: 51 % (ref 32–75)
PLATELET # BLD AUTO: 194 K/UL (ref 150–400)
POTASSIUM SERPL-SCNC: 3.6 MMOL/L (ref 3.5–5.1)
RBC # BLD AUTO: 4.38 M/UL (ref 3.8–5.2)
SERVICE CMNT-IMP: ABNORMAL
SERVICE CMNT-IMP: NORMAL
SERVICE CMNT-IMP: NORMAL
SODIUM SERPL-SCNC: 142 MMOL/L (ref 136–145)
WBC # BLD AUTO: 4.4 K/UL (ref 3.6–11)

## 2017-04-06 PROCEDURE — 77030034849

## 2017-04-06 PROCEDURE — 74176 CT ABD & PELVIS W/O CONTRAST: CPT

## 2017-04-06 PROCEDURE — 36415 COLL VENOUS BLD VENIPUNCTURE: CPT | Performed by: FAMILY MEDICINE

## 2017-04-06 PROCEDURE — 82962 GLUCOSE BLOOD TEST: CPT

## 2017-04-06 PROCEDURE — 74011250636 HC RX REV CODE- 250/636: Performed by: FAMILY MEDICINE

## 2017-04-06 PROCEDURE — 85025 COMPLETE CBC W/AUTO DIFF WBC: CPT | Performed by: FAMILY MEDICINE

## 2017-04-06 PROCEDURE — 87086 URINE CULTURE/COLONY COUNT: CPT | Performed by: FAMILY MEDICINE

## 2017-04-06 PROCEDURE — 80048 BASIC METABOLIC PNL TOTAL CA: CPT | Performed by: FAMILY MEDICINE

## 2017-04-06 PROCEDURE — 51798 US URINE CAPACITY MEASURE: CPT

## 2017-04-06 PROCEDURE — 65270000015 HC RM PRIVATE ONCOLOGY

## 2017-04-06 PROCEDURE — 74011250637 HC RX REV CODE- 250/637: Performed by: FAMILY MEDICINE

## 2017-04-06 PROCEDURE — 77030005538 HC CATH URETH FOL44 BARD -B

## 2017-04-06 RX ORDER — VALSARTAN 40 MG/1
40 TABLET ORAL DAILY
Status: DISCONTINUED | OUTPATIENT
Start: 2017-04-06 | End: 2017-04-12

## 2017-04-06 RX ADMIN — CLONAZEPAM 1 MG: 1 TABLET ORAL at 17:39

## 2017-04-06 RX ADMIN — VALSARTAN 40 MG: 40 TABLET ORAL at 10:53

## 2017-04-06 RX ADMIN — POLYETHYLENE GLYCOL 3350 17 G: 17 POWDER, FOR SOLUTION ORAL at 10:56

## 2017-04-06 RX ADMIN — Medication 10 ML: at 06:00

## 2017-04-06 RX ADMIN — ASPIRIN 81 MG: 81 TABLET, COATED ORAL at 10:52

## 2017-04-06 RX ADMIN — HEPARIN SODIUM 5000 UNITS: 5000 INJECTION, SOLUTION INTRAVENOUS; SUBCUTANEOUS at 10:56

## 2017-04-06 RX ADMIN — Medication 10 ML: at 17:40

## 2017-04-06 RX ADMIN — HEPARIN SODIUM 5000 UNITS: 5000 INJECTION, SOLUTION INTRAVENOUS; SUBCUTANEOUS at 17:40

## 2017-04-06 RX ADMIN — CLONAZEPAM 1 MG: 1 TABLET ORAL at 10:53

## 2017-04-06 NOTE — PROGRESS NOTES
2219 Accucheck 59. Pt given orange juice with 2 packs of sugar. 2247 Accucheck 74. Pt given orange juice with 4 packs of sugar. [address] [facility] [address] Pt refused any bedtime snacks or beverage. 0300 Pt bladder scanned. Greater than 999 noted to be in bladder. Pt straight cathed. 1100ml of urine emptied from bladder                    Oncology Nursing Communication Tool      6:40 AM  4/6/2017     Bedside shift change report given to Lindsey Ramos RN (incoming nurse) by Luis Arrington RN (outgoing nurse) on Jourdan Nazario a 70 y.o. female who was admitted on 4/4/2017  5:15 AM. Report included the following information SBAR, Kardex, MAR, Accordion and Recent Results. Significant changes during shift: Straight cath x1, medicated for pain x1, multiple small BM, pt constantly reoriented to place      Issues for physician to address: urinary retention            Code Status: Full Code     Infections: No current active infections     Allergies: Amoxicillin; Sulfa (sulfonamide antibiotics); Amoxicillin; Mirtazapine; Percocet [oxycodone-acetaminophen];  Codeine; Prednisone; Sulfa (sulfonamide antibiotics); and Zithromax [azithromycin]     Current diet: DIET DIABETIC CONSISTENT CARB Regular       Pain Controlled [x] yes [] no   Bowel Movement [x] yes [] no   Last Bowel Movement (date)     4-6-17            Vital Signs:   Patient Vitals for the past 12 hrs:   Temp Pulse Resp BP SpO2   04/05/17 2118 98.2 °F (36.8 °C) 77 18 167/88 100 %      Intake & Output:     Intake/Output Summary (Last 24 hours) at 04/06/17 0640  Last data filed at 04/06/17 2496   Gross per 24 hour   Intake                0 ml   Output             3300 ml   Net            -3300 ml      Laboratory Results:     Recent Results (from the past 12 hour(s))   GLUCOSE, POC    Collection Time: 04/05/17 10:19 PM   Result Value Ref Range    Glucose (POC) 59 (L) 65 - 100 mg/dL    Performed by Jennifer Crowder, POC    Collection Time: 04/05/17 10:47 PM Result Value Ref Range    Glucose (POC) 74 65 - 100 mg/dL    Performed by Janey Brantley    GLUCOSE, POC    Collection Time: 04/05/17 11:14 PM   Result Value Ref Range    Glucose (POC) 103 (H) 65 - 100 mg/dL    Performed by [facility]    METABOLIC PANEL, BASIC    Collection Time: 04/06/17  3:24 AM   Result Value Ref Range    Sodium 142 136 - 145 mmol/L    Potassium 3.6 3.5 - 5.1 mmol/L    Chloride 108 97 - 108 mmol/L    CO2 26 21 - 32 mmol/L    Anion gap 8 5 - 15 mmol/L    Glucose 94 65 - 100 mg/dL    BUN 13 6 - 20 MG/DL    Creatinine 0.85 0.55 - 1.02 MG/DL    BUN/Creatinine ratio 15 12 - 20      GFR est AA >60 >60 ml/min/1.73m2    GFR est non-AA >60 >60 ml/min/1.73m2    Calcium 8.8 8.5 - 10.1 MG/DL   CBC WITH AUTOMATED DIFF    Collection Time: 04/06/17  3:24 AM   Result Value Ref Range    WBC 4.4 3.6 - 11.0 K/uL    RBC 4.38 3.80 - 5.20 M/uL    HGB 11.7 11.5 - 16.0 g/dL    HCT 37.1 35.0 - 47.0 %    MCV 84.7 80.0 - 99.0 FL    MCH 26.7 26.0 - 34.0 PG    MCHC 31.5 30.0 - 36.5 g/dL    RDW 14.3 11.5 - 14.5 %    PLATELET 140 453 - 075 K/uL    NEUTROPHILS 51 32 - 75 %    LYMPHOCYTES 38 12 - 49 %    MONOCYTES 7 5 - 13 %    EOSINOPHILS 3 0 - 7 %    BASOPHILS 1 0 - 1 %    ABS. NEUTROPHILS 2.3 1.8 - 8.0 K/UL    ABS. LYMPHOCYTES 1.6 0.8 - 3.5 K/UL    ABS. MONOCYTES 0.3 0.0 - 1.0 K/UL    ABS. EOSINOPHILS 0.1 0.0 - 0.4 K/UL    ABS. BASOPHILS 0.0 0.0 - 0.1 K/UL              Opportunity for questions and clarifications were given to the incoming nurse. Patient's bed is in low position, side rails x2, door open PRN, call bell within reach and patient not in distress.       Valente Mishra RN

## 2017-04-06 NOTE — DIABETES MGMT
DTC Progress Note    Recommendations/ Comments: If appropriate, please consider changing correctional insulin scale to high sensitivity given pt and she received 2 units of correction last night and had BG of 59mg/dL. Pt was also hypoglycemic again this morning. Also may consider adding D5% to IVF to support BG. Chart reviewed on Beulah Chau for hypoglcyemia. Patient is a 70 y.o. female with known history of Type 2 Diabetes on none at home. A1c:   Lab Results   Component Value Date/Time    Hemoglobin A1c 5.9 01/26/2017 02:06 PM    Hemoglobin A1c 6.0 01/18/2016 11:47 AM       Recent Glucose Results:   Lab Results   Component Value Date/Time    GLU 94 04/06/2017 03:24 AM    GLUCPOC 92 04/06/2017 08:00 AM    GLUCPOC 66 04/06/2017 07:38 AM    GLUCPOC 103 (H) 04/05/2017 11:14 PM        Lab Results   Component Value Date/Time    Creatinine 0.85 04/06/2017 03:24 AM       Active Orders   Diet    DIET DIABETIC CONSISTENT CARB Regular        PO intake: No data found. Current hospital DM medication:   -Humalog normal sensitivity correction    Will continue to follow as needed.     Thank you    Ronna Goel, [address] [facility]  Office: [phone]

## 2017-04-06 NOTE — CONSULTS
Urology Consult    Patient: Sage Pierce MRN: [id number]  SSN: [id number]    YOB: 1945  Age: 70 y.o. Sex: female          Date of Consultation:  April 6, 2017  Requesting Physician: [address] [place], [place]  Reason for Consultation:  Urinary retention, left hydronephrosis  Pre-existing [facility] Patient: yes  Physician: Dr. Ammon Carr         Assessment/Plan:  1. Left hydronephrosis by renal ultrasound  2. Urinary retention  -Will obtain a noncontrast CT to evaluate for ureteral stone (remote history of punctate renal stones). Avoid alpha blockers for now given history of orthostatic hypotension/dizziness. Catheter will likely need to stay at least 4-5 days given amount of over distention (>1L when martinez placed). Can perform voiding trial in office if she is ready for discharge prior to Monday. Will check on CT later today. History of Present Illness:  Patient is a 70 y.o. female admitted 4/4/2017 to the hospital for dizziness/orthostatic hypotension. She reports generalized abdominal pain only upon questioning but nothing lateralized. Difficult historian. No hematuria. CT from 2014 showed punctate bilateral renal stones, duplicated right collecting system, no hydro. Renal US yesterday showed left hydro. Renal function is normal. She complained of bladder pain and bladder scan was >1L therefore martinez catheter was placed after several rounds of straight cathing with high volumes. Underwent cystoscopy with Dr. Ammon Carr in 2014 which was negative. Past Medical History:   Allergies   Allergen Reactions    Amoxicillin Hives    Sulfa (Sulfonamide Antibiotics) Hives and Itching    Amoxicillin Hives and Itching     Long time ago - patient not exactly certain what it does    Mirtazapine Itching and Nausea Only     Funny feeling in chest    Percocet [Oxycodone-Acetaminophen] Nausea and Vomiting    Codeine Nausea and Vomiting    Prednisone Itching    Sulfa (Sulfonamide Antibiotics) Hives, Itching and Palpitations     Think it was increased heart rate or itching - many years ago    Zithromax [Azithromycin] Itching     Not sure what it does,taken long time ago      Prior to Admission medications    Medication Sig Start Date End Date Taking? Authorizing Provider   clonazePAM (KLONOPIN) 1 mg tablet Take 1 Tab by mouth two (2) times a day. Max Daily Amount: 2 mg. As needed fill after 2/5/17 1/26/17  Yes [address] [place], MD   losartan (COZAAR) 25 mg tablet Take 0.5 Tabs by mouth nightly. Do not take the 50mg losartan pharmacist please discard all 50mg losartan rx's 9/20/16  Yes [address] [place], MD   topiramate (TOPAMAX) 25 mg tablet Take 1 tab at night for 1 week, then 2 tabs at night 3/8/17   Cris Londono NP   rosuvastatin (CRESTOR) 5 mg tablet Take 5 mg by mouth nightly. Shun Sevilla MD   polyethylene glycol (MIRALAX) 17 gram/dose powder TAKE 1 CAPFUL IN 8 OUNCES OF WATER EVERY DAY 12/15/16   Kristy Yanes MD   hydrocortisone (HYCORT) 1 % ointment Apply  to affected area two (2) times a day. use thin layer 11/9/16   Aline Warren MD   aspirin delayed-release 81 mg tablet Take 1 Tab by mouth daily. 9/10/13   Mark Vicente MD      PMHx:  has a past medical history of Agoraphobia without mention of panic attacks (2/17/2014); Anxiety disorder (8/18/2013); Arthritis; Breast pain, left (4/7/2015); CAD (coronary artery disease), native coronary artery (12/1/2015); Chronic chest pain (1/13/2014); Chronic pain associated with significant psychosocial dysfunction (2/17/2014); Depression (8/18/2013); Diabetes ([facility] [address] Duplicated right renal collecting system (3/13/2014); GERD (gastroesophageal reflux disease); Gout, joint; HTN, goal below 130/80 (9/7/2012); Hypertension; Nephrolithiasis (3/13/2014); Other ill-defined conditions; Other ill-defined conditions; Other ill-defined conditions;  Other ill-defined conditions; Personal history of noncompliance with medical treatment, presenting hazards to health (2014); Psychiatric disorder; Psychotic disorder; and Vaginal pain (2014). She also has no past medical history of History of abuse. PSurgHx:  has a past surgical history that includes egd (2010); urological; tubal ligation; hysterectomy; gyn; gyn; other surgical; other surgical; and other surgical.  PSocHx:  reports that she has never smoked. She has never used smokeless tobacco. She reports that she does not drink alcohol or use illicit drugs. ROS:  Admission ROS by Jaycee Alfredo MD from 2017 were reviewed with the patient and changes (other than per HPI) include: none. Physical Exam:            Vitals[de-identified]    Temp (24hrs), Av.2 °F (36.8 °C), Min:98.2 °F (36.8 °C), Max:98.2 °F (36.8 °C)   Blood pressure 167/88, pulse 77, temperature 98.2 °F (36.8 °C), resp. rate 18, height 5' 8\" (1.727 m), weight 77.1 kg (170 lb), SpO2 100 %.              I&O's:                  General:    appears nontoxic                     Skin:  no clubbing, cyanosis, edema                HEENT:  NCAT, O/P Clear        Throat/Neck:  supple, no LAD                 Chest[de-identified]  CTA      Heart[de-identified]  Regular rate and rhythm             Abdomen/Flank[de-identified]  no CVAT, non-tender abdomen without masses             Bladder[de-identified]  Bladder not palpable, urine clear in martinez tubing   Lymph nodes:  no Cervical, supraclavicular, and axillary LAD     Lab Results   Component Value Date/Time    WBC 4.4 2017 03:24 AM    HCT 37.1 2017 03:24 AM    PLATELET 126  03:24 AM    Sodium 142 2017 03:24 AM    Potassium 3.6 2017 03:24 AM    Chloride 108 2017 03:24 AM    CO2 26 2017 03:24 AM    BUN 13 2017 03:24 AM    Creatinine 0.85 2017 03:24 AM    Glucose 94 2017 03:24 AM    Calcium 8.8 2017 03:24 AM    Magnesium 2.1 2017 02:06 PM    INR 1.0 2014 03:25 PM    INR (POC) 1.1 2015 09:20 AM       UA:   Lab Results   Component Value Date/Time    Color YELLOW/STRAW 04/04/2017 07:09 AM    Appearance CLOUDY 04/04/2017 07:09 AM    Specific gravity 1.005 04/04/2017 07:09 AM    Specific gravity 1.015 05/04/2015 02:59 PM    pH (UA) 6.5 04/04/2017 07:09 AM    Protein NEGATIVE  04/04/2017 07:09 AM    Glucose NEGATIVE  04/04/2017 07:09 AM    Ketone NEGATIVE  04/04/2017 07:09 AM    Bilirubin NEGATIVE  04/04/2017 07:09 AM    Urobilinogen 0.2 04/04/2017 07:09 AM    Nitrites NEGATIVE  04/04/2017 07:09 AM    Leukocyte Esterase NEGATIVE  04/04/2017 07:09 AM    Epithelial cells FEW 04/04/2017 07:09 AM    Bacteria NEGATIVE  04/04/2017 07:09 AM    WBC 0-4 04/04/2017 07:09 AM    RBC 0-5 04/04/2017 07:09 AM           Signed By: Sam Bravo MD  - April 6, 2017

## 2017-04-06 NOTE — CONSULTS
Gabo Giraldo  RLO:[id number]   DQB:0/18/4340   -          CONSULT RECEIVED FOR Hydronephrosis  Her cr. Stable. i have d./w DR. Ludy Plascencia - we need urology to see pt for hydronephrosis. Patient not seen. We will be available to see her if renal function get worse. Alli Barreto, [address] [place] [address] [facility]  [facility] SYSTM FRANCISCount includes the [facility]  [address]  [place], [address] [facility]  Phone - [phone]         Fax - [phone] [facility] Office  [address] [place] [address], [facility]  Phone - [phone]        Fax - [phone]     [email or web address]

## 2017-04-06 NOTE — PROGRESS NOTES
Spiritual Care Assessment/Progress Notes    Vesna Elmore [id number]  [id number]    1945  70 y.o.  female    Patient Telephone Number: [phone] (home)   Worship Affiliation: [employer]   Language: English   Extended Emergency Contact Information  Primary Emergency Contact: [address] Phone: [phone]  Relation: Child   Patient Active Problem List    Diagnosis Date Noted    Orthostatic hypotension 04/04/2017    Generalized OA 01/05/2017    Noncompliance with medication regimen-chol medication  01/05/2017    Tightness sensation-head 09/20/2016    Diabetes mellitus type 2, diet-controlled ([address] 05/26/2016    Somatization disorder 05/26/2016    Death of parent 05/26/2016    Somatic delusion disorder ([address] 03/22/2016    Death of child 02/17/2016    Broken heart syndrome 01/18/2016    SOB (shortness of breath) 12/22/2015    CAD (coronary artery disease), native coronary artery 12/01/2015    Pseudobulbar affect 10/16/2015    Breast pain, left 04/07/2015    Personal history of noncompliance with medical treatment, presenting hazards to health 05/30/2014     Class: Chronic    Duplicated right renal collecting system 03/13/2014    Nephrolithiasis 03/13/2014    Onycholysis of toenail 02/27/2014     Class: Chronic    Chronic pain associated with significant psychosocial dysfunction 02/17/2014    Depression with anxiety 02/17/2014     Class: Chronic    Other somatoform disorders 02/17/2014     Class: Chronic    Chronic chest pain 01/13/2014     Class: Chronic    Hyperlipidemia 12/09/2013     Class: Chronic    UTI (urinary tract infection) 12/09/2013    Constipation 08/22/2013     Class: Chronic    Abdominal pain 08/16/2013    Dizziness of unknown cause 08/13/2013    Neutropenia ([address] 08/13/2013    HTN, goal below 130/80 09/07/2012     Class: Chronic    Chronic headache 09/07/2012     Class: Chronic    Acid reflux 09/07/2012        Date: 4/6/2017 Level of Samaritan/Spiritual Activity:  [x]         Involved in hossein tradition/spiritual practice    []         Not involved in hossein tradition/spiritual practice  [x]         Spiritually oriented    []         Claims no spiritual orientation    []         seeking spiritual identity  []         Feels alienated from Voodoo practice/tradition  []         Feels angry about Voodoo practice/tradition  [x]         Spirituality/Voodoo tradition is a resource for coping at this time. []         Not able to assess due to medical condition    Services Provided Today:  []         crisis intervention    []         reading Scriptures  [x]         spiritual assessment    []         prayer  [x]         empathic listening/emotional support  []         rites and rituals (cite in comments)  []         life review     []         Voodoo support  []         theological development   []         advocacy  []         ethical dialog     []         blessing  []         bereavement support    []         support to family  []         anticipatory grief support   []         help with AMD  []         spiritual guidance    []         meditation      Spiritual Care Needs  []         Emotional Support  [x]         Spiritual/Samaritan Care  []         Loss/Adjustment  []         Advocacy/Referral                /Ethics  []         No needs expressed at               this time  []         Other: (note in               comments)  [address]  []         Follow up visits with               Pt/family as able  []         Provide materials  []         Schedule sacraments  []         Contact Community               Clergy  [x]         Follow up as needed  []         Other: (note in               comments)     Comments:  initiated visit due to pt's length of stay. Pt shared that she was in a lot of pain,  notified her RN about it.  Pt mentioned that she is a member of [employer] and appreciates prayer and visits from . Pt uses hossein as a coping resource. Advised of  availability and assured her of continued support as needed/ desired. Nalini Mcfarland M.S.   Spiritual Care Department  If need arise please call YOVANNY [phone])

## 2017-04-06 NOTE — PROGRESS NOTES
Oncology Nursing Communication Tool      8:20 PM  4/5/2017     Bedside shift change report given to Dunia RN (incoming nurse) by Veronica Parker RN (outgoing nurse) on Luana Flores a 70 y.o. female who was admitted on 4/4/2017  5:15 AM. Report included the following information SBAR, Kardex, Intake/Output, MAR, Accordion and Recent Results. Significant changes during shift: DC'd martinez catheter; patient retaining urine, bladder scanning q6h; Straight cathed patient for urine >600 and Dr. Laymond Severe notified. Urine culture ordered when specimen is available. Issues for physician to address: Urine retention            Code Status: Full Code     Infections: No current active infections     Allergies: Amoxicillin; Sulfa (sulfonamide antibiotics); Amoxicillin; Mirtazapine; Percocet [oxycodone-acetaminophen];  Codeine; Prednisone; Sulfa (sulfonamide antibiotics); and Zithromax [azithromycin]     Current diet: DIET DIABETIC CONSISTENT CARB Regular       Pain Controlled [] yes [x] no   Bowel Movement [x] yes [] no   Last Bowel Movement (date) 4/5/17            Vital Signs:   Patient Vitals for the past 12 hrs:   Temp Pulse Resp BP SpO2   04/05/17 1650 - - - 186/84 -   04/05/17 1345 98.2 °F (36.8 °C) 71 17 183/90 100 %      Intake & Output:     Intake/Output Summary (Last 24 hours) at 04/05/17 2020  Last data filed at 04/05/17 1724   Gross per 24 hour   Intake                0 ml   Output             2200 ml   Net            -2200 ml      Laboratory Results:     Recent Results (from the past 12 hour(s))   GLUCOSE, POC    Collection Time: 04/05/17 12:13 PM   Result Value Ref Range    Glucose (POC) 65 65 - 100 mg/dL    Performed by Neelam Duval    GLUCOSE, POC    Collection Time: 04/05/17 12:36 PM   Result Value Ref Range    Glucose (POC) 99 65 - 100 mg/dL    Performed by Mayda De La Rosa    GLUCOSE, POC    Collection Time: 04/05/17  4:43 PM   Result Value Ref Range    Glucose (POC) 151 (H) 65 - 100 mg/dL    Performed by Forest Burnham \"Danica\"               Opportunity for questions and clarifications were given to the incoming nurse. Patient's bed is in low position, side rails x2, door open PRN, call bell within reach and patient not in distress.       Jose Moore RN

## 2017-04-06 NOTE — PROGRESS NOTES
General Daily Progress Note    Admit Date: 4/4/2017  Hospital day 2    Subjective:     Patient has no complaint of cp but feeling a bit dizzy. S/w nursing   Medication side effects: none    Current Facility-Administered Medications   Medication Dose Route Frequency    HYDROcodone-acetaminophen (NORCO) 5-325 mg per tablet 1 Tab  1 Tab Oral Q6H PRN    sodium chloride (NS) flush 5-10 mL  5-10 mL IntraVENous Q8H    sodium chloride (NS) flush 5-10 mL  5-10 mL IntraVENous PRN    heparin (porcine) injection 5,000 Units  5,000 Units SubCUTAneous Q8H    albuterol-ipratropium (DUO-NEB) 2.5 MG-0.5 MG/3 ML  3 mL Nebulization Q6H PRN    insulin lispro (HUMALOG) injection   SubCUTAneous AC&HS    glucose chewable tablet 16 g  4 Tab Oral PRN    dextrose (D50W) injection syrg 12.5-25 g  12.5-25 g IntraVENous PRN    glucagon (GLUCAGEN) injection 1 mg  1 mg IntraMUSCular PRN    0.9% sodium chloride infusion  75 mL/hr IntraVENous CONTINUOUS    aspirin delayed-release tablet 81 mg  81 mg Oral DAILY    clonazePAM (KlonoPIN) tablet 1 mg  1 mg Oral BID    polyethylene glycol (MIRALAX) packet 17 g  17 g Oral DAILY        Review of Systems  A comprehensive review of systems was negative except for that written in the HPI. Objective:     No data found. 04/04 1901 - 04/06 0700  In: -   Out: 3300 [Urine:3300]    Physical Exam: General appearance: alert, fatigued, cooperative, no distress, appears stated age  Lungs: clear to auscultation bilaterally  Heart: regular rate and rhythm  Abdomen: soft, non-tender.  Bowel sounds normal. No masses,  no organomegaly  Extremities: extremities normal, atraumatic, no cyanosis or edema  Neurologic: Grossly normal      Data Review   Recent Results (from the past 24 hour(s))   GLUCOSE, POC    Collection Time: 04/05/17 12:13 PM   Result Value Ref Range    Glucose (POC) 65 65 - 100 mg/dL    Performed by Nesha Abdalla, POC    Collection Time: 04/05/17 12:36 PM   Result Value Ref Range    Glucose (POC) 99 65 - 100 mg/dL    Performed by Shikha Martinez    GLUCOSE, POC    Collection Time: 04/05/17  4:43 PM   Result Value Ref Range    Glucose (POC) 151 (H) 65 - 100 mg/dL    Performed by Rebecca Otero \"Danica\"    GLUCOSE, POC    Collection Time: 04/05/17 10:19 PM   Result Value Ref Range    Glucose (POC) 59 (L) 65 - 100 mg/dL    Performed by Jennifer 61, POC    Collection Time: 04/05/17 10:47 PM   Result Value Ref Range    Glucose (POC) 74 65 - 100 mg/dL    Performed by Janey Brantley    GLUCOSE, POC    Collection Time: 04/05/17 11:14 PM   Result Value Ref Range    Glucose (POC) 103 (H) 65 - 100 mg/dL    Performed by Pasquale [facility]    METABOLIC PANEL, BASIC    Collection Time: 04/06/17  3:24 AM   Result Value Ref Range    Sodium 142 136 - 145 mmol/L    Potassium 3.6 3.5 - 5.1 mmol/L    Chloride 108 97 - 108 mmol/L    CO2 26 21 - 32 mmol/L    Anion gap 8 5 - 15 mmol/L    Glucose 94 65 - 100 mg/dL    BUN 13 6 - 20 MG/DL    Creatinine 0.85 0.55 - 1.02 MG/DL    BUN/Creatinine ratio 15 12 - 20      GFR est AA >60 >60 ml/min/1.73m2    GFR est non-AA >60 >60 ml/min/1.73m2    Calcium 8.8 8.5 - 10.1 MG/DL   CBC WITH AUTOMATED DIFF    Collection Time: 04/06/17  3:24 AM   Result Value Ref Range    WBC 4.4 3.6 - 11.0 K/uL    RBC 4.38 3.80 - 5.20 M/uL    HGB 11.7 11.5 - 16.0 g/dL    HCT 37.1 35.0 - 47.0 %    MCV 84.7 80.0 - 99.0 FL    MCH 26.7 26.0 - 34.0 PG    MCHC 31.5 30.0 - 36.5 g/dL    RDW 14.3 11.5 - 14.5 %    PLATELET 063 498 - 595 K/uL    NEUTROPHILS 51 32 - 75 %    LYMPHOCYTES 38 12 - 49 %    MONOCYTES 7 5 - 13 %    EOSINOPHILS 3 0 - 7 %    BASOPHILS 1 0 - 1 %    ABS. NEUTROPHILS 2.3 1.8 - 8.0 K/UL    ABS. LYMPHOCYTES 1.6 0.8 - 3.5 K/UL    ABS. MONOCYTES 0.3 0.0 - 1.0 K/UL    ABS. EOSINOPHILS 0.1 0.0 - 0.4 K/UL    ABS.  BASOPHILS 0.0 0.0 - 0.1 K/UL           Assessment:     Active Problems:    Orthostatic hypotension (4/4/2017)        Plan:   Asked nursing to check orthostatics   hydroneph- imaging reviewed will send off a urine cx ua reviewed urology to see pt   Suspect pt was not complaint with medications as she thinks she may have mixed up her medications  htn bps restarted home meds and will monitor   Myrna Yarbrough M.D.   Family Medicine [phone] call with any concerns before 5pm

## 2017-04-07 PROBLEM — N13.30 HYDRONEPHROSIS OF LEFT KIDNEY: Status: ACTIVE | Noted: 2017-04-07

## 2017-04-07 LAB
BACTERIA SPEC CULT: NORMAL
CC UR VC: NORMAL
GLUCOSE BLD STRIP.AUTO-MCNC: 101 MG/DL (ref 65–100)
GLUCOSE BLD STRIP.AUTO-MCNC: 114 MG/DL (ref 65–100)
GLUCOSE BLD STRIP.AUTO-MCNC: 182 MG/DL (ref 65–100)
GLUCOSE BLD STRIP.AUTO-MCNC: 88 MG/DL (ref 65–100)
SERVICE CMNT-IMP: ABNORMAL
SERVICE CMNT-IMP: NORMAL
SERVICE CMNT-IMP: NORMAL

## 2017-04-07 PROCEDURE — 74011250637 HC RX REV CODE- 250/637: Performed by: FAMILY MEDICINE

## 2017-04-07 PROCEDURE — 65270000015 HC RM PRIVATE ONCOLOGY

## 2017-04-07 PROCEDURE — 74011250637 HC RX REV CODE- 250/637: Performed by: INTERNAL MEDICINE

## 2017-04-07 PROCEDURE — 74011250636 HC RX REV CODE- 250/636: Performed by: FAMILY MEDICINE

## 2017-04-07 PROCEDURE — 82962 GLUCOSE BLOOD TEST: CPT

## 2017-04-07 RX ORDER — DOCUSATE SODIUM 100 MG/1
100 CAPSULE, LIQUID FILLED ORAL
Status: DISCONTINUED | OUTPATIENT
Start: 2017-04-07 | End: 2017-04-14 | Stop reason: HOSPADM

## 2017-04-07 RX ADMIN — LACTULOSE 40 G: 10 SOLUTION ORAL at 16:43

## 2017-04-07 RX ADMIN — HYDROCODONE BITARTRATE AND ACETAMINOPHEN 1 TABLET: 5; 325 TABLET ORAL at 22:38

## 2017-04-07 RX ADMIN — Medication 10 ML: at 21:46

## 2017-04-07 RX ADMIN — CLONAZEPAM 1 MG: 1 TABLET ORAL at 17:31

## 2017-04-07 RX ADMIN — HEPARIN SODIUM 5000 UNITS: 5000 INJECTION, SOLUTION INTRAVENOUS; SUBCUTANEOUS at 02:20

## 2017-04-07 RX ADMIN — POLYETHYLENE GLYCOL 3350 17 G: 17 POWDER, FOR SOLUTION ORAL at 09:45

## 2017-04-07 RX ADMIN — HYDROCODONE BITARTRATE AND ACETAMINOPHEN 1 TABLET: 5; 325 TABLET ORAL at 02:15

## 2017-04-07 RX ADMIN — LACTULOSE 40 G: 10 SOLUTION ORAL at 21:37

## 2017-04-07 RX ADMIN — HEPARIN SODIUM 5000 UNITS: 5000 INJECTION, SOLUTION INTRAVENOUS; SUBCUTANEOUS at 16:43

## 2017-04-07 RX ADMIN — ASPIRIN 81 MG: 81 TABLET, COATED ORAL at 09:46

## 2017-04-07 RX ADMIN — VALSARTAN 40 MG: 40 TABLET ORAL at 09:46

## 2017-04-07 RX ADMIN — HEPARIN SODIUM 5000 UNITS: 5000 INJECTION, SOLUTION INTRAVENOUS; SUBCUTANEOUS at 09:45

## 2017-04-07 RX ADMIN — CLONAZEPAM 1 MG: 1 TABLET ORAL at 09:46

## 2017-04-07 NOTE — PROGRESS NOTES
CT reviewed. Left hydronephrosis secondary to massive distention of rectum from stool, displacing base of bladder. Renal function is normal so I do not feel a ureteral stent is necessary at this time. Would treat the apparent primary cause which would be severe constipation.     Impression: left hydronephrosis secondary to severe constipation/massive distention of rectum    Recs:  -would consider GI consult if unable to relieve impaction  -will need follow up renal ultrasound 2-3 days after relief of impaction to make sure left hydro has improved

## 2017-04-07 NOTE — PROGRESS NOTES
Oncology Nursing Communication Tool      8:32 PM  4/6/2017     Bedside and Verbal shift change report given to Juan Latham RN (incoming nurse) by Ramya Peck RN (outgoing nurse) on Vesna Elmore a 70 y.o. female who was admitted on 4/4/2017  5:15 AM. Report included the following information SBAR, Kardex, Procedure Summary, Intake/Output, MAR, Accordion and Recent Results. Significant changes during shift: Crowder catheter placed with 900 cc out on insertion. CT of abdomen. Issues for physician to address: Bowel status          Code Status: Full Code     Infections: No current active infections     Allergies: Amoxicillin; Sulfa (sulfonamide antibiotics); Amoxicillin; Mirtazapine; Percocet [oxycodone-acetaminophen]; Codeine; Prednisone; Sulfa (sulfonamide antibiotics); and Zithromax [azithromycin]     Current diet: DIET DIABETIC CONSISTENT CARB Regular       Pain Meds [] yes [x] no   Bowel Movement [x] yes [] no   Last Bowel Movement (date)     4/6/17            Vital Signs:   Patient Vitals for the past 12 hrs:   Temp Pulse Resp BP SpO2   04/06/17 1438 98.2 °F (36.8 °C) 81 18 149/84 99 %      Intake & Output:     Intake/Output Summary (Last 24 hours) at 04/06/17 2032  Last data filed at 04/06/17 1515   Gross per 24 hour   Intake                0 ml   Output             2425 ml   Net            -2425 ml      Laboratory Results:     Recent Results (from the past 12 hour(s))   GLUCOSE, POC    Collection Time: 04/06/17 11:14 AM   Result Value Ref Range    Glucose (POC) 114 (H) 65 - 100 mg/dL    Performed by [employer] [address] [place] [address], POC    Collection Time: 04/06/17  4:21 PM   Result Value Ref Range    Glucose (POC) 111 (H) 65 - 100 mg/dL    Performed by [address] [place] [address] for questions and clarifications were given to the incoming nurse. Patient's bed is in low position, side rails x2, door open PRN, call bell within reach and patient not in distress.       Ramya Peck RN

## 2017-04-07 NOTE — CONSULTS
[facility] [address]  1945  [id number]    Referring Physician:    Consult Date: 4/7/2017     Subjective:     Chief Complaint: constipation    History of Present Illness: Vesna Elmore is a 70 y.o. female who is seen in consultation for severe constipation with a stool every 2 weeks. Now she cant urinate and has a catheter. CT shows lots of stool and distended rectum. No melena or hematochezia. Normal colonoscopy 2010 by me.   Multiple symptoms noted by hospitalist.  .    Past Medical History:   Diagnosis Date    Agoraphobia without mention of panic attacks 2/17/2014    Anxiety disorder 8/18/2013    Arthritis     osteo    Breast pain, left 4/7/2015    CAD (coronary artery disease), native coronary artery 12/1/2015    Chronic chest pain 1/13/2014    Chronic pain associated with significant psychosocial dysfunction 2/17/2014    Depression 8/18/2013    Diabetes ([address]     type II    Duplicated right renal collecting system 3/13/2014    GERD (gastroesophageal reflux disease)     Gout, joint     HTN, goal below 130/80 9/7/2012    Hypertension     Nephrolithiasis 3/13/2014    Other ill-defined conditions     hyercholesterolemia    Other ill-defined conditions     anxiety    Other ill-defined conditions     pt states head get tight,due to wax bill up    Other ill-defined conditions     pain left shoulder due to fall many years ago    Personal history of noncompliance with medical treatment, presenting hazards to health 5/30/2014    Psychiatric disorder     anxiety/ depression    Psychotic disorder     Vaginal pain 7/30/2014     Past Surgical History:   Procedure Laterality Date    EGD  4/23/2010         HX GYN      cyst removed from vagina    HX GYN      vaginal birth x7    HX HYSTERECTOMY      partial    HX OTHER SURGICAL      egd    HX OTHER SURGICAL      cyst removed from left wrist    HX OTHER SURGICAL      bladder dilitation    HX TUBAL LIGATION      HX UROLOGICAL      kidney stones      Family History   Problem Relation Age of Onset    Stroke Mother     Heart Disease Mother     Cancer Father     Heart Disease Son     Liver Disease Son     Heart Disease Daughter     Malignant Hyperthermia Neg Hx     Pseudocholinesterase Deficiency Neg Hx     Delayed Awakening Neg Hx     Post-op Nausea/Vomiting Neg Hx     Emergence Delirium Neg Hx     Post-op Cognitive Dysfunction Neg Hx     Other Neg Hx      Social History   Substance Use Topics    Smoking status: Never Smoker    Smokeless tobacco: Never Used    Alcohol use No      Allergies   Allergen Reactions    Amoxicillin Hives    Sulfa (Sulfonamide Antibiotics) Hives and Itching    Amoxicillin Hives and Itching     Long time ago - patient not exactly certain what it does    Mirtazapine Itching and Nausea Only     Funny feeling in chest    Percocet [Oxycodone-Acetaminophen] Nausea and Vomiting    Codeine Nausea and Vomiting    Prednisone Itching    Sulfa (Sulfonamide Antibiotics) Hives, Itching and Palpitations     Think it was increased heart rate or itching - many years ago    Zithromax [Azithromycin] Itching     Not sure what it does,taken long time ago     Current Facility-Administered Medications   Medication Dose Route Frequency    docusate sodium (COLACE) capsule 100 mg  100 mg Oral DAILY PRN    lactulose (CHRONULAC) solution 40 g  60 mL Oral TID    valsartan (DIOVAN) tablet 40 mg  40 mg Oral DAILY    HYDROcodone-acetaminophen (NORCO) 5-325 mg per tablet 1 Tab  1 Tab Oral Q6H PRN    sodium chloride (NS) flush 5-10 mL  5-10 mL IntraVENous Q8H    sodium chloride (NS) flush 5-10 mL  5-10 mL IntraVENous PRN    heparin (porcine) injection 5,000 Units  5,000 Units SubCUTAneous Q8H    albuterol-ipratropium (DUO-NEB) 2.5 MG-0.5 MG/3 ML  3 mL Nebulization Q6H PRN    insulin lispro (HUMALOG) injection   SubCUTAneous AC&HS    glucose chewable tablet 16 g  4 Tab Oral PRN    dextrose (D50W) injection syrg 12.5-25 g  12.5-25 g IntraVENous PRN    glucagon (GLUCAGEN) injection 1 mg  1 mg IntraMUSCular PRN    0.9% sodium chloride infusion  75 mL/hr IntraVENous CONTINUOUS    aspirin delayed-release tablet 81 mg  81 mg Oral DAILY    clonazePAM (KlonoPIN) tablet 1 mg  1 mg Oral BID    polyethylene glycol (MIRALAX) packet 17 g  17 g Oral DAILY        Review of Systems:  A detailed 10 organ review of systems is obtained with pertinent positives as listed in the History of Present Illness and Past Medical History. All others are negative. Objective:     Physical Exam:  Visit Vitals    /80 (BP 1 Location: Right arm, BP Patient Position: At rest)  Comment: nurse notified    Pulse 73    Temp 98 °F (36.7 °C)    Resp 16    Ht 5' 8\" (1.727 m)    Wt 77.1 kg (170 lb)    SpO2 100%    BMI 25.85 kg/m2        Skin:  Extremities and face reveal no rashes. No blackwell erythema. No telangiectasias on the chest wall. HEENT: Sclerae anicteric. Extra-occular muscles are intact. No oral ulcers. No abnormal pigmentation of the lips. The neck is supple. Cardiovascular: Regular rate and rhythm. No murmurs, gallops, or rubs. PMI nondisplaced. Carotids without bruits. Respiratory:  Comfortable breathing with no accessory muscle use. Clear breath sounds with no wheezes, rales, or rhonchi. GI:  Abdomen nondistended, soft, and nontender. Normal active bowel sounds. No enlargement of the liver or spleen. No masses palpable. Rectal:  Deferred  Musculoskeletal:  No pitting edema of the lower legs. Extremities have good range of motion. No costovertebral tenderness. Neurological:  Gross memory appears intact. Patient is alert and oriented. Psychiatric:  Mood appears appropriate with judgement intact. Lymphatic:  No cervical or supraclavicular adenopathy.     Laboratory:    Recent Results (from the past 24 hour(s))   GLUCOSE, POC    Collection Time: 04/06/17  4:21 PM   Result Value Ref Range    Glucose (POC) 111 (H) 65 - 100 mg/dL    Performed by Adryan Bryant, POC    Collection Time: 04/06/17  9:36 PM   Result Value Ref Range    Glucose (POC) 120 (H) 65 - 100 mg/dL    Performed by [address] [place] [address], POC    Collection Time: 04/07/17  7:22 AM   Result Value Ref Range    Glucose (POC) 88 65 - 100 mg/dL    Performed by Alec Frederick    GLUCOSE, POC    Collection Time: 04/07/17 11:11 AM   Result Value Ref Range    Glucose (POC) 114 (H) 65 - 100 mg/dL    Performed by Ludwig Haji          Assessment/Plan:     Active Problems:    HTN, goal below 130/80 (9/7/2012)      Overview: Goal BP: < 130/80      Goal Progress: Had been at goal and is now not at goal.      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   Increase dose if needed

## 2017-04-07 NOTE — PROGRESS NOTES
General Daily Progress Note    Admit Date: 4/4/2017  Hospital day 3    Subjective:     Patient has no complaint of cp but feeling a bit dizzy normal head pain and chest tightness. S/w nursing   Medication side effects: none    Current Facility-Administered Medications   Medication Dose Route Frequency    valsartan (DIOVAN) tablet 40 mg  40 mg Oral DAILY    HYDROcodone-acetaminophen (NORCO) 5-325 mg per tablet 1 Tab  1 Tab Oral Q6H PRN    sodium chloride (NS) flush 5-10 mL  5-10 mL IntraVENous Q8H    sodium chloride (NS) flush 5-10 mL  5-10 mL IntraVENous PRN    heparin (porcine) injection 5,000 Units  5,000 Units SubCUTAneous Q8H    albuterol-ipratropium (DUO-NEB) 2.5 MG-0.5 MG/3 ML  3 mL Nebulization Q6H PRN    insulin lispro (HUMALOG) injection   SubCUTAneous AC&HS    glucose chewable tablet 16 g  4 Tab Oral PRN    dextrose (D50W) injection syrg 12.5-25 g  12.5-25 g IntraVENous PRN    glucagon (GLUCAGEN) injection 1 mg  1 mg IntraMUSCular PRN    0.9% sodium chloride infusion  75 mL/hr IntraVENous CONTINUOUS    aspirin delayed-release tablet 81 mg  81 mg Oral DAILY    clonazePAM (KlonoPIN) tablet 1 mg  1 mg Oral BID    polyethylene glycol (MIRALAX) packet 17 g  17 g Oral DAILY        Review of Systems  A comprehensive review of systems was negative except for that written in the HPI. Objective:     Patient Vitals for the past 8 hrs:   BP Temp Pulse Resp SpO2   04/07/17 0648 154/87 97.8 °F (36.6 °C) 72 17 99 %        04/05 1901 - 04/07 0700  In: -   Out: 6295 [Urine:2775]    Physical Exam: General appearance: alert, fatigued, cooperative, no distress, appears stated age  Lungs: clear to auscultation bilaterally  Heart: regular rate and rhythm  Abdomen: soft, improved tenderness.  Bowel sounds normal. No masses,  no organomegaly  Extremities: extremities normal, atraumatic, no cyanosis or edema  Neurologic: Grossly normal      Data Review   Recent Results (from the past 24 hour(s))   GLUCOSE, POC Collection Time: 04/06/17 11:14 AM   Result Value Ref Range    Glucose (POC) 114 (H) 65 - 100 mg/dL    Performed by Irma Becerra, POC    Collection Time: 04/06/17  4:21 PM   Result Value Ref Range    Glucose (POC) 111 (H) 65 - 100 mg/dL    Performed by Darlin Parks, POC    Collection Time: 04/06/17  9:36 PM   Result Value Ref Range    Glucose (POC) 120 (H) 65 - 100 mg/dL    Performed by Robert Zamora    GLUCOSE, POC    Collection Time: 04/07/17  7:22 AM   Result Value Ref Range    Glucose (POC) 88 65 - 100 mg/dL    Performed by Radha Dotson            Assessment:     Active Problems:    HTN, goal below 130/80 (9/7/2012)      Overview: Goal BP: < 130/80      Goal Progress: Had been at goal and is now not at goal.      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   Family Medicine [phone] call with any concerns before 5pm       Ct completed pt is FOS consulted GI

## 2017-04-08 LAB
GLUCOSE BLD STRIP.AUTO-MCNC: 103 MG/DL (ref 65–100)
GLUCOSE BLD STRIP.AUTO-MCNC: 104 MG/DL (ref 65–100)
GLUCOSE BLD STRIP.AUTO-MCNC: 118 MG/DL (ref 65–100)
GLUCOSE BLD STRIP.AUTO-MCNC: 74 MG/DL (ref 65–100)
GLUCOSE BLD STRIP.AUTO-MCNC: 76 MG/DL (ref 65–100)
GLUCOSE BLD STRIP.AUTO-MCNC: 87 MG/DL (ref 65–100)
SERVICE CMNT-IMP: ABNORMAL
SERVICE CMNT-IMP: NORMAL

## 2017-04-08 PROCEDURE — 74011250637 HC RX REV CODE- 250/637: Performed by: FAMILY MEDICINE

## 2017-04-08 PROCEDURE — 74011250637 HC RX REV CODE- 250/637: Performed by: INTERNAL MEDICINE

## 2017-04-08 PROCEDURE — 74011250636 HC RX REV CODE- 250/636: Performed by: FAMILY MEDICINE

## 2017-04-08 PROCEDURE — 65270000015 HC RM PRIVATE ONCOLOGY

## 2017-04-08 PROCEDURE — 82962 GLUCOSE BLOOD TEST: CPT

## 2017-04-08 RX ORDER — MAGNESIUM CITRATE
296 SOLUTION, ORAL ORAL
Status: COMPLETED | OUTPATIENT
Start: 2017-04-08 | End: 2017-04-08

## 2017-04-08 RX ADMIN — SODIUM CHLORIDE 75 ML/HR: 900 INJECTION, SOLUTION INTRAVENOUS at 02:46

## 2017-04-08 RX ADMIN — LACTULOSE 40 G: 10 SOLUTION ORAL at 08:46

## 2017-04-08 RX ADMIN — SODIUM CHLORIDE 75 ML/HR: 900 INJECTION, SOLUTION INTRAVENOUS at 19:34

## 2017-04-08 RX ADMIN — LACTULOSE 40 G: 10 SOLUTION ORAL at 16:55

## 2017-04-08 RX ADMIN — HYDROCODONE BITARTRATE AND ACETAMINOPHEN 1 TABLET: 5; 325 TABLET ORAL at 19:42

## 2017-04-08 RX ADMIN — POLYETHYLENE GLYCOL 3350 17 G: 17 POWDER, FOR SOLUTION ORAL at 08:46

## 2017-04-08 RX ADMIN — MAGESIUM CITRATE 296 ML: 1.75 LIQUID ORAL at 11:59

## 2017-04-08 RX ADMIN — Medication 10 ML: at 16:55

## 2017-04-08 RX ADMIN — HEPARIN SODIUM 5000 UNITS: 5000 INJECTION, SOLUTION INTRAVENOUS; SUBCUTANEOUS at 23:46

## 2017-04-08 RX ADMIN — LACTULOSE 40 G: 10 SOLUTION ORAL at 23:52

## 2017-04-08 RX ADMIN — ASPIRIN 81 MG: 81 TABLET, COATED ORAL at 08:46

## 2017-04-08 RX ADMIN — HEPARIN SODIUM 5000 UNITS: 5000 INJECTION, SOLUTION INTRAVENOUS; SUBCUTANEOUS at 08:46

## 2017-04-08 RX ADMIN — Medication 10 ML: at 21:04

## 2017-04-08 RX ADMIN — SODIUM PHOSPHATE, DIBASIC AND SODIUM PHOSPHATE, MONOBASIC 118 ML: 7; 19 ENEMA RECTAL at 11:59

## 2017-04-08 RX ADMIN — CLONAZEPAM 1 MG: 1 TABLET ORAL at 16:56

## 2017-04-08 RX ADMIN — CLONAZEPAM 1 MG: 1 TABLET ORAL at 08:46

## 2017-04-08 RX ADMIN — VALSARTAN 40 MG: 40 TABLET ORAL at 08:46

## 2017-04-08 RX ADMIN — HEPARIN SODIUM 5000 UNITS: 5000 INJECTION, SOLUTION INTRAVENOUS; SUBCUTANEOUS at 16:55

## 2017-04-08 RX ADMIN — HYDROCODONE BITARTRATE AND ACETAMINOPHEN 1 TABLET: 5; 325 TABLET ORAL at 10:33

## 2017-04-08 RX ADMIN — HEPARIN SODIUM 5000 UNITS: 5000 INJECTION, SOLUTION INTRAVENOUS; SUBCUTANEOUS at 01:10

## 2017-04-08 NOTE — PROGRESS NOTES
Oncology Nursing Communication Tool      7:05 PM  4/8/2017     Bedside shift change report given to Saul Cheng RN (incoming nurse) by America Russell (outgoing nurse) on Beulah Chau a 70 y.o. female who was admitted on 4/4/2017  5:15 AM. Report included the following information SBAR, Kardex, Intake/Output, MAR and Recent Results. Significant changes during shift:       Issues for physician to address:             Code Status: Full Code     Infections: No current active infections     Allergies: Amoxicillin; Sulfa (sulfonamide antibiotics); Amoxicillin; Mirtazapine; Percocet [oxycodone-acetaminophen];  Codeine; Prednisone; Sulfa (sulfonamide antibiotics); and Zithromax [azithromycin]     Current diet: DIET DIABETIC CONSISTENT CARB Regular       Pain Controlled [] yes [] no   Bowel Movement [] yes [] no   Last Bowel Movement (date)                Vital Signs:   Patient Vitals for the past 12 hrs:   Temp Pulse Resp BP SpO2   04/08/17 1322 - 73 - 159/80 -   04/08/17 1304 98.6 °F (37 °C) 89 16 185/89 99 %   04/08/17 0846 - 74 - 153/81 -      Intake & Output:     Intake/Output Summary (Last 24 hours) at 04/08/17 1905  Last data filed at 04/08/17 1200   Gross per 24 hour   Intake                0 ml   Output             1700 ml   Net            -1700 ml      Laboratory Results:     Recent Results (from the past 12 hour(s))   GLUCOSE, POC    Collection Time: 04/08/17  7:27 AM   Result Value Ref Range    Glucose (POC) 76 65 - 100 mg/dL    Performed by [employer]    GLUCOSE, POC    Collection Time: 04/08/17  7:38 AM   Result Value Ref Range    Glucose (POC) 87 65 - 100 mg/dL    Performed by [employer]    GLUCOSE, POC    Collection Time: 04/08/17 11:07 AM   Result Value Ref Range    Glucose (POC) 74 65 - 100 mg/dL    Performed by [employer]    GLUCOSE, POC    Collection Time: 04/08/17 11:48 AM   Result Value Ref Range    Glucose (POC) 118 (H) 65 - 100 mg/dL    Performed by Kurt Vargas    GLUCOSE, POC    Collection Time: 04/08/17  4:52 PM   Result Value Ref Range    Glucose (POC) 103 (H) 65 - 100 mg/dL    Performed by [email or web address] for questions and clarifications were given to the incoming nurse. Patient's bed is in low position, side rails x2, door open PRN, call bell within reach and patient not in distress.       Amalia Donovan

## 2017-04-08 NOTE — PROGRESS NOTES
Progress Note    Patient: Jocelyn Steele MRN: [id number]  SSN: [id number]    YOB: 1945  Age: 70 y.o. Sex: female        ADMITTED:  2017 to Abram Carranza MD  for Orthostatic hypotension         Jocelyn Steele was admitted for Orthostatic hypotension. Has severe constipation and L hydro/urinary retention    C/o abd pain and headache    Vitals:  Temp (24hrs), Av.1 °F (36.7 °C), Min:98 °F (36.7 °C), Max:98.3 °F (36.8 °C)     Blood pressure 153/81, pulse 74, temperature 98 °F (36.7 °C), resp. rate 16, height 5' 8\" (1.727 m), weight 77.1 kg (170 lb), SpO2 99 %.       I&O's:   1901 -  0700  In: 6307.5 [P.O.:1080; I.V.:5227.5]  Out: 2900 [Urine:2900]         Exam:   NAD     Labs:   Recent Labs      17   0324   WBC  4.4   HGB  11.7   HCT  37.1   PLT  194     Recent Labs      17   0324   NA  142   K  3.6   CL  108   CO2  26   GLU  94   BUN  13   CREA  0.85   CA  8.8        Cultures:      Imaging:       Assessment:     - Active Problems:    HTN, goal below 130/80 (2012)      Overview: Goal BP: < 130/80      Goal Progress: Had been at goal and is now not at goal.      BP Readings from Last 3 Encounters:       14 165/91       14 130/90       14 124/80                   Other somatoform disorders (2014)      Diabetes mellitus type 2, diet-controlled ([facility] (2016)      Orthostatic hypotension (2017)      Hydronephrosis of left kidney (2017)        Plan:     - await BM, GI following  - repeat US post bm, cr nl, urine output adequate    Signed By: Akila Almonte MD - 2017

## 2017-04-08 NOTE — PROGRESS NOTES
General Daily Progress Note    Admit Date: 4/4/2017  Hospital day 4    Subjective:     Patient has no complaint of vomiting. STill hasnt had bm , has some mild abdominal pain. Very miserable. Says that golytely has made her throw up in the past, has had some success with mag citrate. Taking lactulose 40 g tid. ..   Medication side effects: none    Current Facility-Administered Medications   Medication Dose Route Frequency    magnesium citrate solution 296 mL  296 mL Oral NOW    sodium phosphate (FLEET'S) enema 118 mL  1 Enema Rectal NOW    docusate sodium (COLACE) capsule 100 mg  100 mg Oral DAILY PRN    lactulose (CHRONULAC) solution 40 g  60 mL Oral TID    valsartan (DIOVAN) tablet 40 mg  40 mg Oral DAILY    HYDROcodone-acetaminophen (NORCO) 5-325 mg per tablet 1 Tab  1 Tab Oral Q6H PRN    sodium chloride (NS) flush 5-10 mL  5-10 mL IntraVENous Q8H    sodium chloride (NS) flush 5-10 mL  5-10 mL IntraVENous PRN    heparin (porcine) injection 5,000 Units  5,000 Units SubCUTAneous Q8H    albuterol-ipratropium (DUO-NEB) 2.5 MG-0.5 MG/3 ML  3 mL Nebulization Q6H PRN    insulin lispro (HUMALOG) injection   SubCUTAneous AC&HS    glucose chewable tablet 16 g  4 Tab Oral PRN    dextrose (D50W) injection syrg 12.5-25 g  12.5-25 g IntraVENous PRN    glucagon (GLUCAGEN) injection 1 mg  1 mg IntraMUSCular PRN    0.9% sodium chloride infusion  75 mL/hr IntraVENous CONTINUOUS    aspirin delayed-release tablet 81 mg  81 mg Oral DAILY    clonazePAM (KlonoPIN) tablet 1 mg  1 mg Oral BID    polyethylene glycol (MIRALAX) packet 17 g  17 g Oral DAILY        Review of Systems  Pertinent items are noted in HPI.     Objective:     Patient Vitals for the past 8 hrs:   BP Temp Pulse Resp SpO2   04/08/17 0846 153/81 - 74 - -   04/08/17 0530 144/87 98 °F (36.7 °C) 78 16 99 %        04/06 1901 - 04/08 0700  In: 6307.5 [P.O.:1080; I.V.:5227.5]  Out: 2900 [Urine:2900]    Physical Exam:   Visit Vitals    /81    Pulse 74  Temp 98 °F (36.7 °C)    Resp 16    Ht 5' 8\" (1.727 m)    Wt 170 lb (77.1 kg)    SpO2 99%    BMI 25.85 kg/m2     Lungs: clear to auscultation bilaterally  Heart: regular rate and rhythm, S1, S2 normal, no murmur, click, rub or gallop  Abdomen: mild lower abdominal tenderness  Extremities: extremities normal, atraumatic, no cyanosis or edema      ECG: normal sinus rhythm     Data Review   Recent Results (from the past 24 hour(s))   GLUCOSE, POC    Collection Time: 04/07/17  4:47 PM   Result Value Ref Range    Glucose (POC) 101 (H) 65 - 100 mg/dL    Performed by Kathe Gandhi    GLUCOSE, POC    Collection Time: 04/07/17  9:45 PM   Result Value Ref Range    Glucose (POC) 182 (H) 65 - 100 mg/dL    Performed by Romelia Naranjo    GLUCOSE, POC    Collection Time: 04/08/17  7:27 AM   Result Value Ref Range    Glucose (POC) 76 65 - 100 mg/dL    Performed by Marquis Higgins    GLUCOSE, POC    Collection Time: 04/08/17  7:38 AM   Result Value Ref Range    Glucose (POC) 87 65 - 100 mg/dL    Performed by Marquis Higgins    GLUCOSE, POC    Collection Time: 04/08/17 11:07 AM   Result Value Ref Range    Glucose (POC) 74 65 - 100 mg/dL    Performed by Marquis Higgins            Assessment:     Active Problems:    HTN, goal below 130/80 (9/7/2012)      Overview: Goal BP: < 130/80      Goal Progress: Had been at goal and is now not at goal.      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 Severe constipation- will try mag citrate and fleets enema, continue lactulose  2. L hydronephrosis secondary to constipation  3. Diabetes- will adjust sliding scale insulin downwards.

## 2017-04-08 NOTE — PROGRESS NOTES
Responded to staff request to visit with patient on Oncology who was very lonely. Provided listening presence and emotional support as patient shared about 4 significant losses in her life since December of 2016 including her son and her mother. She also lost a daughter many years ago and raised her 3 children. She had just finished speaking with her  on the telephone and her daughter had visited earlier in the day. Patient is confined to bed due to current condition. She tired of being in bed. Ms. Astrid Nash expressed that she fears she will always have to have her \"pee bag\". She talked about always being nervous and was complaining of a headache at the time. Shared prayer for patient's concerns. Chaplains and Spirit[employer] volunteers will provide ongoing support as able.   EARNESTINE Barrett, [address] [facility], [address] [place] [address] [address]     Paging Service  [phone]

## 2017-04-09 LAB
GLUCOSE BLD STRIP.AUTO-MCNC: 111 MG/DL (ref 65–100)
GLUCOSE BLD STRIP.AUTO-MCNC: 166 MG/DL (ref 65–100)
GLUCOSE BLD STRIP.AUTO-MCNC: 86 MG/DL (ref 65–100)
GLUCOSE BLD STRIP.AUTO-MCNC: 97 MG/DL (ref 65–100)
SERVICE CMNT-IMP: ABNORMAL
SERVICE CMNT-IMP: ABNORMAL
SERVICE CMNT-IMP: NORMAL
SERVICE CMNT-IMP: NORMAL
TROPONIN I SERPL-MCNC: <0.04 NG/ML

## 2017-04-09 PROCEDURE — 74011250637 HC RX REV CODE- 250/637: Performed by: FAMILY MEDICINE

## 2017-04-09 PROCEDURE — 93005 ELECTROCARDIOGRAM TRACING: CPT

## 2017-04-09 PROCEDURE — 82962 GLUCOSE BLOOD TEST: CPT

## 2017-04-09 PROCEDURE — 36415 COLL VENOUS BLD VENIPUNCTURE: CPT | Performed by: FAMILY MEDICINE

## 2017-04-09 PROCEDURE — 84484 ASSAY OF TROPONIN QUANT: CPT | Performed by: FAMILY MEDICINE

## 2017-04-09 PROCEDURE — 74011250637 HC RX REV CODE- 250/637: Performed by: INTERNAL MEDICINE

## 2017-04-09 PROCEDURE — 74011250636 HC RX REV CODE- 250/636: Performed by: FAMILY MEDICINE

## 2017-04-09 PROCEDURE — 65660000000 HC RM CCU STEPDOWN

## 2017-04-09 RX ORDER — CLONAZEPAM 1 MG/1
1 TABLET ORAL 3 TIMES DAILY
Status: DISCONTINUED | OUTPATIENT
Start: 2017-04-09 | End: 2017-04-14 | Stop reason: HOSPADM

## 2017-04-09 RX ADMIN — VALSARTAN 40 MG: 40 TABLET ORAL at 07:59

## 2017-04-09 RX ADMIN — LACTULOSE 40 G: 10 SOLUTION ORAL at 16:09

## 2017-04-09 RX ADMIN — POLYETHYLENE GLYCOL 3350 17 G: 17 POWDER, FOR SOLUTION ORAL at 07:58

## 2017-04-09 RX ADMIN — Medication 10 ML: at 20:47

## 2017-04-09 RX ADMIN — HYDROCODONE BITARTRATE AND ACETAMINOPHEN 1 TABLET: 5; 325 TABLET ORAL at 05:23

## 2017-04-09 RX ADMIN — CLONAZEPAM 1 MG: 1 TABLET ORAL at 21:09

## 2017-04-09 RX ADMIN — HEPARIN SODIUM 5000 UNITS: 5000 INJECTION, SOLUTION INTRAVENOUS; SUBCUTANEOUS at 16:04

## 2017-04-09 RX ADMIN — LACTULOSE 40 G: 10 SOLUTION ORAL at 22:39

## 2017-04-09 RX ADMIN — SODIUM CHLORIDE 75 ML/HR: 900 INJECTION, SOLUTION INTRAVENOUS at 06:59

## 2017-04-09 RX ADMIN — CLONAZEPAM 1 MG: 1 TABLET ORAL at 16:04

## 2017-04-09 RX ADMIN — CLONAZEPAM 1 MG: 1 TABLET ORAL at 07:59

## 2017-04-09 RX ADMIN — Medication 10 ML: at 16:05

## 2017-04-09 RX ADMIN — SODIUM CHLORIDE 75 ML/HR: 900 INJECTION, SOLUTION INTRAVENOUS at 21:10

## 2017-04-09 RX ADMIN — HYDROCODONE BITARTRATE AND ACETAMINOPHEN 1 TABLET: 5; 325 TABLET ORAL at 20:46

## 2017-04-09 RX ADMIN — HEPARIN SODIUM 5000 UNITS: 5000 INJECTION, SOLUTION INTRAVENOUS; SUBCUTANEOUS at 07:58

## 2017-04-09 RX ADMIN — LACTULOSE 40 G: 10 SOLUTION ORAL at 07:59

## 2017-04-09 RX ADMIN — ASPIRIN 81 MG: 81 TABLET, COATED ORAL at 08:00

## 2017-04-09 RX ADMIN — Medication 10 ML: at 05:13

## 2017-04-09 NOTE — PROGRESS NOTES
Oncology Nursing Communication Tool      7:44 AM  4/9/2017     Bedside and Verbal shift change report given to Manjinder Allen RN (incoming nurse) by Selwyn Contreras RN (outgoing nurse) [facility] [place] [facility] [facility] 70 y.[facility] female who was admitted on 4/4/2017  5:15 AM. Report included the following information SBAR, Kardex, [facility], Recent Results and Med Rec Status. Significant changes during shift: none      Issues for physician to address: none           Code Status: Full Code     Infections: No current active infections     Allergies: Amoxicillin; Sulfa (sulfonamide antibiotics); Amoxicillin; Mirtazapine; Percocet [oxycodone-acetaminophen]; Codeine; Prednisone; Sulfa (sulfonamide antibiotics); and Zithromax [azithromycin]     Current diet: DIET DIABETIC CONSISTENT CARB Regular       Pain Controlled [x] yes [] no   Bowel Movement [x] yes [] no   Last Bowel Movement (date) 4/9/2017            Vital Signs:   Patient Vitals for the past 12 hrs:   Temp Pulse Resp BP SpO2   04/09/17 0508 98.5 °F (36.9 °C) 77 18 171/79 99 %   04/08/17 2055 98 °F (36.7 °C) 79 18 154/88 100 %      Intake & Output:     Intake/Output Summary (Last 24 hours) at 04/09/17 0744  Last data filed at 04/09/17 0516   Gross per 24 hour   Intake                0 ml   Output             3000 ml   Net            -3000 ml      Laboratory Results:     Recent Results (from the past 12 hour(s))   GLUCOSE, POC    Collection Time: 04/08/17  9:02 PM   Result Value Ref Range    Glucose (POC) 104 (H) 65 - 100 mg/dL    Performed by Amy Benson 30, POC    Collection Time: 04/09/17  7:22 AM   Result Value Ref Range    Glucose (POC) 86 65 - 100 mg/dL    Performed by [address] [place] [address] for questions and clarifications were given to the incoming nurse. Patient's bed is in low position, side rails x2, door open PRN, call bell within reach and patient not in distress.       Selwyn Contreras RN

## 2017-04-09 NOTE — PROGRESS NOTES
[address] [facility], [address]    GI PROGRESS NOTE    NAME: Estela Vargas   :  1945   MRN:  [id number]       Subjective:   Having bowel movements since yesterday. Still feels constipated. Review of Systems    Constitutional: negative fever, negative chills, negative weight loss  Eyes:   negative visual changes  ENT:   negative sore throat, tongue or lip swelling  Respiratory:  negative cough, negative dyspnea  Cards:  negative for chest pain, palpitations, lower extremity edema  GI:   See HPI  :  negative for frequency, dysuria  Integument:  negative for rash and pruritus  Heme:  negative for easy bruising and gum/nose bleeding  Musculoskel: negative for myalgias,  back pain and muscle weakness  Neuro: negative for headaches, dizziness, vertigo  Psych:  negative for feelings of anxiety, depression         Objective:     VITALS:   Last 24hrs VS reviewed since prior progress note. Most recent are:  Visit Vitals    BP (!) 172/97    Pulse 88    Temp 98.6 °F (37 °C)    Resp 18    Ht 5' 8\" (1.727 m)    Wt 77.1 kg (170 lb)    SpO2 100%    BMI 25.85 kg/m2       Intake/Output Summary (Last 24 hours) at 17 1625  Last data filed at 17 0516   Gross per 24 hour   Intake                0 ml   Output             1300 ml   Net            -1300 ml     PHYSICAL EXAM:  General: WD, WN. Alert, cooperative, no acute distress    HEENT: NC, Atraumatic. Anicteric sclerae. Abdomen: Soft, Non distended, Non tender.  +Bowel sounds, no HSM  Extremities: No c/c/e  Neurologic:  Alert and oriented X 3. No acute neurological distress       Lab Data Reviewed:   No results for input(s): WBC, HGB, HCT, PLT, HGBEXT, HCTEXT, PLTEXT in the last 72 hours. No results for input(s): NA, K, CL, CO2, BUN, CREA, GLU, PHOS, CA in the last 72 hours. No results for input(s): SGOT, GPT, AP, TBIL, TP, ALB, GLOB, GGT, AML, LPSE in the last 72 hours.     No lab exists for component: Rosendo Vaughan    ________________________________________________________________________       Assessment:   · Constipation     Plan:   · Discontinue opiates  · Continue scheduled lactulose and Miralax as this seems to help  · Would try mineral oil enemas next  · Dr. Parish Scott to follow on Monday     Signed By: Ana María Renee MD     4/9/2017  4:25 PM

## 2017-04-09 NOTE — PROGRESS NOTES
General Daily Progress Note    Admit Date: 4/4/2017  Hospital day 5    Subjective:     Patient has no complaint of abdominal pain. Large bm last night. Remains quite anxious, co chest tightness this am. ..   Medication side effects: none    Current Facility-Administered Medications   Medication Dose Route Frequency    docusate sodium (COLACE) capsule 100 mg  100 mg Oral DAILY PRN    lactulose (CHRONULAC) solution 40 g  60 mL Oral TID    valsartan (DIOVAN) tablet 40 mg  40 mg Oral DAILY    HYDROcodone-acetaminophen (NORCO) 5-325 mg per tablet 1 Tab  1 Tab Oral Q6H PRN    sodium chloride (NS) flush 5-10 mL  5-10 mL IntraVENous Q8H    sodium chloride (NS) flush 5-10 mL  5-10 mL IntraVENous PRN    heparin (porcine) injection 5,000 Units  5,000 Units SubCUTAneous Q8H    albuterol-ipratropium (DUO-NEB) 2.5 MG-0.5 MG/3 ML  3 mL Nebulization Q6H PRN    insulin lispro (HUMALOG) injection   SubCUTAneous AC&HS    glucose chewable tablet 16 g  4 Tab Oral PRN    dextrose (D50W) injection syrg 12.5-25 g  12.5-25 g IntraVENous PRN    glucagon (GLUCAGEN) injection 1 mg  1 mg IntraMUSCular PRN    0.9% sodium chloride infusion  75 mL/hr IntraVENous CONTINUOUS    aspirin delayed-release tablet 81 mg  81 mg Oral DAILY    clonazePAM (KlonoPIN) tablet 1 mg  1 mg Oral BID    polyethylene glycol (MIRALAX) packet 17 g  17 g Oral DAILY        Review of Systems  Pertinent items are noted in HPI.     Objective:     Patient Vitals for the past 8 hrs:   BP Temp Pulse Resp SpO2   04/09/17 0759 140/73 - 80 - -   04/09/17 0508 171/79 98.5 °F (36.9 °C) 77 18 99 %        04/07 1901 - 04/09 0700  In: -   Out: 3000 [Urine:3000]    Physical Exam:   Visit Vitals    /73    Pulse 80    Temp 98.5 °F (36.9 °C)    Resp 18    Ht 5' 8\" (1.727 m)    Wt 170 lb (77.1 kg)    SpO2 99%    BMI 25.85 kg/m2     Lungs: clear to auscultation bilaterally  Heart: regular rate and rhythm, S1, S2 normal, no murmur, click, rub or gallop  Abdomen: soft, non-tender. Bowel sounds normal. No masses,  no organomegaly  Extremities: extremities normal, atraumatic, no cyanosis or edema      ECG: normal sinus rhythm     Data Review   Recent Results (from the past 24 hour(s))   GLUCOSE, POC    Collection Time: 04/08/17 11:07 AM   Result Value Ref Range    Glucose (POC) 74 65 - 100 mg/dL    Performed by [address] [place] [address], POC    Collection Time: 04/08/17 11:48 AM   Result Value Ref Range    Glucose (POC) 118 (H) 65 - 100 mg/dL    Performed by [employer]    GLUCOSE, POC    Collection Time: 04/08/17  4:52 PM   Result Value Ref Range    Glucose (POC) 103 (H) 65 - 100 mg/dL    Performed by [employer]    GLUCOSE, POC    Collection Time: 04/08/17  9:02 PM   Result Value Ref Range    Glucose (POC) 104 (H) 65 - 100 mg/dL    Performed by Amy Benson [address], POC    Collection Time: 04/09/17  7:22 AM   Result Value Ref Range    Glucose (POC) 86 65 - 100 mg/dL    Performed by Cameron Hope            Assessment:     Active Problems:    HTN, goal below 130/80 (9/7/2012)      Overview: Goal BP: < 130/80      Goal Progress: Had been at goal and is now not at goal.      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 Severe constipation- had several large bms last night. Continue lactulose and miralax for now. 2. L hydronephrosis secondary to constipation- recheck ultrasound in another day or two  3. Diabetes- will adjust sliding scale insulin downwards. 4. Chest tightness- had cath by Dr. Lakia Romo 2 years ago, showing some 30-50% lesions.  I think chest tightness may just be anxiety (she is very anxious), however, will check EKG and troponin and  move to telemetry. Increase klonopin to tid.

## 2017-04-09 NOTE — PROGRESS NOTES
TRANSFER - IN REPORT:    Verbal report received from Abhay RN(name) on Tati Hair  being received from Oncology(unit) for routine progression of care      Report consisted of patients Situation, Background, Assessment and   Recommendations(SBAR). Information from the following report(s) SBAR and Kardex was reviewed with the receiving nurse. Opportunity for questions and clarification was provided. Assessment completed upon patients arrival to unit and care assumed.

## 2017-04-09 NOTE — PROGRESS NOTES
Primary Nurse Nelson Dueñas, SANTOSH and Diego Johnson RN performed a dual skin assessment on this patient No impairment noted  Freddy score is 19

## 2017-04-10 LAB
ATRIAL RATE: 76 BPM
CALCULATED P AXIS, ECG09: 51 DEGREES
CALCULATED R AXIS, ECG10: 27 DEGREES
CALCULATED T AXIS, ECG11: 20 DEGREES
DIAGNOSIS, 93000: NORMAL
GLUCOSE BLD STRIP.AUTO-MCNC: 101 MG/DL (ref 65–100)
GLUCOSE BLD STRIP.AUTO-MCNC: 116 MG/DL (ref 65–100)
GLUCOSE BLD STRIP.AUTO-MCNC: 81 MG/DL (ref 65–100)
GLUCOSE BLD STRIP.AUTO-MCNC: 84 MG/DL (ref 65–100)
P-R INTERVAL, ECG05: 168 MS
Q-T INTERVAL, ECG07: 380 MS
QRS DURATION, ECG06: 94 MS
QTC CALCULATION (BEZET), ECG08: 427 MS
SERVICE CMNT-IMP: ABNORMAL
SERVICE CMNT-IMP: ABNORMAL
SERVICE CMNT-IMP: NORMAL
SERVICE CMNT-IMP: NORMAL
VENTRICULAR RATE, ECG03: 76 BPM

## 2017-04-10 PROCEDURE — 74011250637 HC RX REV CODE- 250/637: Performed by: INTERNAL MEDICINE

## 2017-04-10 PROCEDURE — 74011250637 HC RX REV CODE- 250/637: Performed by: FAMILY MEDICINE

## 2017-04-10 PROCEDURE — 74011250636 HC RX REV CODE- 250/636: Performed by: FAMILY MEDICINE

## 2017-04-10 PROCEDURE — 82962 GLUCOSE BLOOD TEST: CPT

## 2017-04-10 PROCEDURE — 65660000000 HC RM CCU STEPDOWN

## 2017-04-10 RX ORDER — POLYETHYLENE GLYCOL 3350 17 G/17G
17 POWDER, FOR SOLUTION ORAL 2 TIMES DAILY
Status: DISCONTINUED | OUTPATIENT
Start: 2017-04-10 | End: 2017-04-14 | Stop reason: HOSPADM

## 2017-04-10 RX ADMIN — POLYETHYLENE GLYCOL 3350 17 G: 17 POWDER, FOR SOLUTION ORAL at 17:16

## 2017-04-10 RX ADMIN — HEPARIN SODIUM 5000 UNITS: 5000 INJECTION, SOLUTION INTRAVENOUS; SUBCUTANEOUS at 23:54

## 2017-04-10 RX ADMIN — LACTULOSE 40 G: 10 SOLUTION ORAL at 09:22

## 2017-04-10 RX ADMIN — LACTULOSE 40 G: 10 SOLUTION ORAL at 14:35

## 2017-04-10 RX ADMIN — VALSARTAN 40 MG: 40 TABLET ORAL at 09:22

## 2017-04-10 RX ADMIN — CLONAZEPAM 1 MG: 1 TABLET ORAL at 09:22

## 2017-04-10 RX ADMIN — LACTULOSE 40 G: 10 SOLUTION ORAL at 17:15

## 2017-04-10 RX ADMIN — CLONAZEPAM 1 MG: 1 TABLET ORAL at 22:36

## 2017-04-10 RX ADMIN — HEPARIN SODIUM 5000 UNITS: 5000 INJECTION, SOLUTION INTRAVENOUS; SUBCUTANEOUS at 00:33

## 2017-04-10 RX ADMIN — ASPIRIN 81 MG: 81 TABLET, COATED ORAL at 09:22

## 2017-04-10 RX ADMIN — Medication 10 ML: at 23:57

## 2017-04-10 RX ADMIN — HEPARIN SODIUM 5000 UNITS: 5000 INJECTION, SOLUTION INTRAVENOUS; SUBCUTANEOUS at 09:23

## 2017-04-10 RX ADMIN — SODIUM CHLORIDE 75 ML/HR: 900 INJECTION, SOLUTION INTRAVENOUS at 14:38

## 2017-04-10 RX ADMIN — CLONAZEPAM 1 MG: 1 TABLET ORAL at 17:15

## 2017-04-10 RX ADMIN — HEPARIN SODIUM 5000 UNITS: 5000 INJECTION, SOLUTION INTRAVENOUS; SUBCUTANEOUS at 17:16

## 2017-04-10 RX ADMIN — Medication 10 ML: at 14:35

## 2017-04-10 RX ADMIN — Medication 10 ML: at 22:38

## 2017-04-10 RX ADMIN — Medication 10 ML: at 06:43

## 2017-04-10 RX ADMIN — HYDROCODONE BITARTRATE AND ACETAMINOPHEN 1 TABLET: 5; 325 TABLET ORAL at 17:15

## 2017-04-10 RX ADMIN — POLYETHYLENE GLYCOL 3350 17 G: 17 POWDER, FOR SOLUTION ORAL at 11:39

## 2017-04-10 NOTE — PROGRESS NOTES
General Daily Progress Note    Admit Date: 4/4/2017  Hospital day 5    Subjective:     Patient has no complaint of abdominal pain. Large bm last night. Remains quite anxious, co chest tightness this am.normal complaint present for yrs reviewed weekend notes cardiac ez's neg   Medication side effects: none    Current Facility-Administered Medications   Medication Dose Route Frequency    clonazePAM (KlonoPIN) tablet 1 mg  1 mg Oral TID    docusate sodium (COLACE) capsule 100 mg  100 mg Oral DAILY PRN    lactulose (CHRONULAC) solution 40 g  60 mL Oral TID    valsartan (DIOVAN) tablet 40 mg  40 mg Oral DAILY    HYDROcodone-acetaminophen (NORCO) 5-325 mg per tablet 1 Tab  1 Tab Oral Q6H PRN    sodium chloride (NS) flush 5-10 mL  5-10 mL IntraVENous Q8H    sodium chloride (NS) flush 5-10 mL  5-10 mL IntraVENous PRN    heparin (porcine) injection 5,000 Units  5,000 Units SubCUTAneous Q8H    albuterol-ipratropium (DUO-NEB) 2.5 MG-0.5 MG/3 ML  3 mL Nebulization Q6H PRN    insulin lispro (HUMALOG) injection   SubCUTAneous AC&HS    glucose chewable tablet 16 g  4 Tab Oral PRN    dextrose (D50W) injection syrg 12.5-25 g  12.5-25 g IntraVENous PRN    glucagon (GLUCAGEN) injection 1 mg  1 mg IntraMUSCular PRN    0.9% sodium chloride infusion  75 mL/hr IntraVENous CONTINUOUS    aspirin delayed-release tablet 81 mg  81 mg Oral DAILY    polyethylene glycol (MIRALAX) packet 17 g  17 g Oral DAILY        Review of Systems  Pertinent items are noted in HPI.     Objective:     Patient Vitals for the past 8 hrs:   BP Temp Pulse Resp SpO2   04/10/17 0407 121/56 98.1 °F (36.7 °C) 60 16 98 %        04/08 1901 - 04/10 0700  In: 480 [P.O.:480]  Out: 3376 [Urine:3375]    Physical Exam:   Visit Vitals    /56    Pulse 60    Temp 98.1 °F (36.7 °C)    Resp 16    Ht 5' 8\" (1.727 m)    Wt 170 lb (77.1 kg)    SpO2 98%    BMI 25.85 kg/m2     Lungs: clear to auscultation bilaterally  Heart: regular rate and rhythm, S1, S2 normal, no murmur, click, rub or gallop  Abdomen: soft, non-tender. Bowel sounds normal. No masses,  no organomegaly  Extremities: extremities normal, atraumatic, no cyanosis or edema      ECG: normal sinus rhythm     Data Review   Recent Results (from the past 24 hour(s))   TROPONIN I    Collection Time: 04/09/17  8:53 AM   Result Value Ref Range    Troponin-I, Qt. <0.04 <0.05 ng/mL   GLUCOSE, POC    Collection Time: 04/09/17 11:29 AM   Result Value Ref Range    Glucose (POC) 97 65 - 100 mg/dL    Performed by Chanlele Sevilla (PCT)    GLUCOSE, POC    Collection Time: 04/09/17  3:28 PM   Result Value Ref Range    Glucose (POC) 111 (H) 65 - 100 mg/dL    Performed by Chanelle Sevilla (PCT)    GLUCOSE, POC    Collection Time: 04/09/17  9:30 PM   Result Value Ref Range    Glucose (POC) 166 (H) 65 - 100 mg/dL    Performed by Vania Brain            Assessment:     Active Problems:    HTN, goal below 130/80 (9/7/2012)      Overview: Goal BP: < 130/80      Goal Progress: Had been at goal and is now not at goal.      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 Severe constipation- had several large bms last night. Continue lactulose and miralax for now. 2. L hydronephrosis secondary to constipation- recheck ultrasound in another day or two  3. Diabetes- will adjust sliding scale insulin downwards. 4. Chest tightness- had cath by Dr. Lesli Tena 2 years ago, showing some 30-50% lesions. I think chest tightness may just be anxiety (she is very anxious), however, will check EKG and troponin and  move to telemetry. Increase klonopin to tid. Discharge telemetry in am   Christina Kay M.D.   Family Medicine

## 2017-04-10 NOTE — PROGRESS NOTES
Chart reviewed. Please order PT consult and Preston Luna for nursing//PT// if in agreement. Care Management Interventions  PCP Verified by CM: Yes (Marika Jones [phone])  Transition of Care Consult (CM Consult): Discharge Planning  Current Support Network:  Other  Confirm Follow Up Transport: Family

## 2017-04-10 NOTE — PROGRESS NOTES
Bedside and Verbal shift change report given to Charles Bright (oncoming nurse) by Dimas Garcia (offgoing nurse). Report included the following information SBAR, Kardex, Intake/Output, MAR, Recent Results and Med Rec Status. Zone Phone for oncoming shift:   [phone]    Shift Summary: Uneventful shift. LDAs               Peripheral IV 04/04/17 Left Wrist (Active)   Site Assessment Clean, dry, & intact 4/10/2017  3:50 AM   Phlebitis Assessment 0 4/10/2017  3:50 AM   Infiltration Assessment 0 4/10/2017  3:50 AM   Dressing Status Clean, dry, & intact 4/10/2017  3:50 AM   Dressing Type Transparent 4/10/2017  3:50 AM   Hub Color/Line Status Blue;Flushed 4/10/2017  3:50 AM   Action Taken Other (comment) 4/5/2017  8:15 AM                    [REMOVED] Urinary Catheter 04/04/17 Crowder-Criteria for Appropriate Use: Obstruction/retention  Urinary Catheter 04/06/17 Crowder-Criteria for Appropriate Use: Obstruction/retention   Intake & Output   Date 04/09/17 0700 - 04/10/17 0659 04/10/17 0700 - 04/11/17 0659   Shift 4917-9629 0749-0893 24 Hour Total 4477-2853 1918-7082 24 Hour Total   I  N  T  A  K  E   P.O. 240 240 480         P. O. 240 240 480       Shift Total  (mL/kg) 240  (3.1) 240  (3.1) 480  (6.2)      O  U  T  P  U  T   Urine  (mL/kg/hr) 1800  (1.9) 275  (0.3) 2075  (1.1)         Urine Voided 1200  1200         Urine Output (mL) (Urinary Catheter 04/06/17 Crowder) 600 275 875       Stool 1  1         Stool Occurrence(s) 1 x  1 x         Stool 1  1       Shift Total  (mL/kg) 1801  (23.4) 275  (3.6) 2076  (26.9)      NET -1513 -13 -4072      Weight (kg) 77.1 77.1 77.1 77.1 77.1 77.1      Last Bowel Movement Last Bowel Movement Date: 04/09/17   Glucose Checks [] N/A  [x] AC/HS  [] Q6  Concerns:   Nutrition Active Orders   Diet    DIET DIABETIC CONSISTENT CARB Regular       Consults []PT  []OT  []Speech  []Case Management   Cardiac Monitoring []N/A [x]Yes Expires:

## 2017-04-10 NOTE — PROGRESS NOTES
support requested by staff. Pt's cousin Suma Cole), and cousin's wife and granddaughter was bedside. Alex Villanuevalier shared that he is a deacon at [employer] and that the pt had a lot of family as well as spiritual support in the area. Pt shared that she was scared and wanted to be healthy again but the mystery of the unknown is keeping her spirits down.  acknowledged her concern and normalized it. Pt became teary as she spoke about her loneliness despite everyone who visits her.  advised her of availability and assured her of continued support through her stay as able. Laura Toney M.S.   Spiritual Care Department  If need arise please call Dominique-MARVIN ([phone])

## 2017-04-10 NOTE — PROGRESS NOTES
Bedside and Verbal shift change report given to Darlene French RN (oncoming nurse) by Glenna Quezada RN (offgoing nurse). Report included the following information SBAR, Kardex and Cardiac Rhythm . [facility] Phone for oncoming shift:   [phone]    Shift Summary: Patient alert and verbal with periods of confusion. Patient frequently asks the same questions. LDAs               Peripheral IV 04/04/17 Left Wrist (Active)   Site Assessment Clean, dry, & intact 4/9/2017  4:00 PM   Phlebitis Assessment 0 4/9/2017  4:00 PM   Infiltration Assessment 0 4/9/2017  4:00 PM   Dressing Status Clean, dry, & intact 4/9/2017  4:00 PM   Dressing Type Tape;Transparent 4/9/2017  4:00 PM   Hub Color/Line Status Blue;Patent 4/9/2017  4:00 PM   Action Taken Other (comment) 4/5/2017  8:15 AM                    [REMOVED] Urinary Catheter 04/04/17 Crowder-Criteria for Appropriate Use: Obstruction/retention  Urinary Catheter 04/06/17 Crowder-Criteria for Appropriate Use: Obstruction/retention   Intake & Output   Date 04/08/17 1900 - 04/09/17 0659 04/09/17 0700 - 04/10/17 0659   Shift 3337-5795 24 Hour Total 4631-6791 3268-3189 24 Hour Total   I  N  T  A  K  E   P.O.   240  240      P. O.   240  240    Shift Total  (mL/kg)   240  (3.1)  240  (3.1)   O  U  T  P  U  T   Urine  (mL/kg/hr) 1300 3000 1800  (1.9)  1800      Urine 800 800         Urine Voided   1200  1200      Urine Output (mL) (Urinary Catheter 04/06/17 Crowder) 500 2200 600  600    Stool   1  1      Stool Occurrence(s) 1 x 1 x 1 x  1 x      Stool   1  1    Shift Total  (mL/kg) 1300  (16.9) 3000  (38.9) 1801  (23.4)  1801  (23.4)   NET -1300 -3000 -1561  -1561   Weight (kg) 77.1 77.1 77.1 77.1 77.1      Last Bowel Movement Last Bowel Movement Date: 04/09/17   Glucose Checks [] N/A  [x] AC/HS  [] Q6  Concerns:   Nutrition Active Orders   Diet    DIET DIABETIC CONSISTENT CARB Regular       Consults []PT  []OT  []Speech  []Case Management   Cardiac Monitoring []N/A [x]Yes Expires:

## 2017-04-10 NOTE — PROGRESS NOTES
General Daily Progress Note    Admit Date: 4/4/2017  Hospital day 4/10/2017    Subjective:one large stool today, no GI complaints but has chest pain, blurred vision and head discomfort which I told her to discuss with Dr Jerad Tijerina  [] [x]  Abd Pain:    [] [x]  Nausea:  [] [x] Vomiting:  [] []  Diarrhea:  [] []  Constipation:  [] []  Melena:  [] []  Hematochezia:  [x] []  Tolerating Diet:    Current Facility-Administered Medications   Medication Dose Route Frequency    polyethylene glycol (MIRALAX) packet 17 g  17 g Oral BID    clonazePAM (KlonoPIN) tablet 1 mg  1 mg Oral TID    docusate sodium (COLACE) capsule 100 mg  100 mg Oral DAILY PRN    lactulose (CHRONULAC) solution 40 g  60 mL Oral TID    valsartan (DIOVAN) tablet 40 mg  40 mg Oral DAILY    HYDROcodone-acetaminophen (NORCO) 5-325 mg per tablet 1 Tab  1 Tab Oral Q6H PRN    sodium chloride (NS) flush 5-10 mL  5-10 mL IntraVENous Q8H    sodium chloride (NS) flush 5-10 mL  5-10 mL IntraVENous PRN    heparin (porcine) injection 5,000 Units  5,000 Units SubCUTAneous Q8H    albuterol-ipratropium (DUO-NEB) 2.5 MG-0.5 MG/3 ML  3 mL Nebulization Q6H PRN    insulin lispro (HUMALOG) injection   SubCUTAneous AC&HS    glucose chewable tablet 16 g  4 Tab Oral PRN    dextrose (D50W) injection syrg 12.5-25 g  12.5-25 g IntraVENous PRN    glucagon (GLUCAGEN) injection 1 mg  1 mg IntraMUSCular PRN    0.9% sodium chloride infusion  75 mL/hr IntraVENous CONTINUOUS    aspirin delayed-release tablet 81 mg  81 mg Oral DAILY        Review of Systems  Pertinent items are noted in HPI.     Objective:     Patient Vitals for the past 8 hrs:   BP Temp Pulse Resp SpO2   04/10/17 0744 158/78 98 °F (36.7 °C) 73 18 98 %   04/10/17 0407 121/56 98.1 °F (36.7 °C) 60 16 98 %        04/08 1901 - 04/10 0700  In: 480 [P.O.:480]  Out: 3376 [Urine:3375]    Physical Exam:   Visit Vitals    /78 (BP 1 Location: Right arm, BP Patient Position: At rest)    Pulse 73    Temp 98 °F (36.7 °C)    Resp 18    Ht 5' 8\" (1.727 m)    Wt 77.1 kg (170 lb)    SpO2 98%    BMI 25.85 kg/m2     General appearance: alert, cooperative, no distress, appears stated age  Abdomen: soft, non-tender.  Bowel sounds normal. No masses,  no organomegaly      Data Review   Recent Results (from the past 24 hour(s))   GLUCOSE, POC    Collection Time: 04/09/17 11:29 AM   Result Value Ref Range    Glucose (POC) 97 65 - 100 mg/dL    Performed by Loco Tello (PCT)    GLUCOSE, POC    Collection Time: 04/09/17  3:28 PM   Result Value Ref Range    Glucose (POC) 111 (H) 65 - 100 mg/dL    Performed by Loco Tello (PCT)    GLUCOSE, POC    Collection Time: 04/09/17  9:30 PM   Result Value Ref Range    Glucose (POC) 166 (H) 65 - 100 mg/dL    Performed by [address], POC    Collection Time: 04/10/17  7:47 AM   Result Value Ref Range    Glucose (POC) 81 65 - 100 mg/dL    Performed by Arabella Guerrero          Assessment:     Active Problems:    HTN, goal below 130/80 (9/7/2012)      Overview: Goal BP: < 130/80      Goal Progress: Had been at goal and is now not at goal.      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

## 2017-04-10 NOTE — PROGRESS NOTES
Patient requested  to visit for prayer.  provided empathic listening, compassionate presence, and prayer as pt shared of her fears of the unknown. Pt also shared that she has been thinking about her own death and if the time has come. Pt shared that she was scared of that as well and wanted prayer so that she can rest. Pt is a member of [facility] [employer] and Taisha Michael has been supporting her well. Reminded pt of availability and assured her of continued support. Silver Ramirez M.S.   Spiritual Care Department  If need arise please call [email or web address] ([phone])

## 2017-04-11 ENCOUNTER — APPOINTMENT (OUTPATIENT)
Dept: ULTRASOUND IMAGING | Age: 72
DRG: 312 | End: 2017-04-11
Attending: UROLOGY
Payer: MEDICARE

## 2017-04-11 PROBLEM — R07.89 LEFT-SIDED CHEST WALL PAIN: Status: ACTIVE | Noted: 2017-04-11

## 2017-04-11 LAB
GLUCOSE BLD STRIP.AUTO-MCNC: 106 MG/DL (ref 65–100)
GLUCOSE BLD STRIP.AUTO-MCNC: 132 MG/DL (ref 65–100)
GLUCOSE BLD STRIP.AUTO-MCNC: 79 MG/DL (ref 65–100)
GLUCOSE BLD STRIP.AUTO-MCNC: 87 MG/DL (ref 65–100)
GLUCOSE BLD STRIP.AUTO-MCNC: 99 MG/DL (ref 65–100)
SERVICE CMNT-IMP: ABNORMAL
SERVICE CMNT-IMP: ABNORMAL
SERVICE CMNT-IMP: NORMAL

## 2017-04-11 PROCEDURE — 65660000000 HC RM CCU STEPDOWN

## 2017-04-11 PROCEDURE — 76770 US EXAM ABDO BACK WALL COMP: CPT

## 2017-04-11 PROCEDURE — 74011250637 HC RX REV CODE- 250/637: Performed by: FAMILY MEDICINE

## 2017-04-11 PROCEDURE — 74011250637 HC RX REV CODE- 250/637: Performed by: INTERNAL MEDICINE

## 2017-04-11 PROCEDURE — 74011250637 HC RX REV CODE- 250/637: Performed by: NURSE PRACTITIONER

## 2017-04-11 PROCEDURE — 74011250636 HC RX REV CODE- 250/636: Performed by: FAMILY MEDICINE

## 2017-04-11 PROCEDURE — 82962 GLUCOSE BLOOD TEST: CPT

## 2017-04-11 PROCEDURE — 74011250636 HC RX REV CODE- 250/636: Performed by: NURSE PRACTITIONER

## 2017-04-11 RX ORDER — DILTIAZEM HYDROCHLORIDE 120 MG/1
120 CAPSULE, COATED, EXTENDED RELEASE ORAL DAILY
Status: DISCONTINUED | OUTPATIENT
Start: 2017-04-11 | End: 2017-04-14 | Stop reason: HOSPADM

## 2017-04-11 RX ORDER — IBUPROFEN 400 MG/1
400 TABLET ORAL
Status: DISCONTINUED | OUTPATIENT
Start: 2017-04-11 | End: 2017-04-14 | Stop reason: HOSPADM

## 2017-04-11 RX ORDER — KETOROLAC TROMETHAMINE 30 MG/ML
30 INJECTION, SOLUTION INTRAMUSCULAR; INTRAVENOUS
Status: COMPLETED | OUTPATIENT
Start: 2017-04-11 | End: 2017-04-11

## 2017-04-11 RX ADMIN — LACTULOSE 40 G: 10 SOLUTION ORAL at 18:00

## 2017-04-11 RX ADMIN — CLONAZEPAM 1 MG: 1 TABLET ORAL at 17:16

## 2017-04-11 RX ADMIN — DILTIAZEM HYDROCHLORIDE 120 MG: 120 CAPSULE, COATED, EXTENDED RELEASE ORAL at 12:15

## 2017-04-11 RX ADMIN — Medication 10 ML: at 05:57

## 2017-04-11 RX ADMIN — HYDROCODONE BITARTRATE AND ACETAMINOPHEN 1 TABLET: 5; 325 TABLET ORAL at 06:12

## 2017-04-11 RX ADMIN — CLONAZEPAM 1 MG: 1 TABLET ORAL at 08:21

## 2017-04-11 RX ADMIN — ASPIRIN 81 MG: 81 TABLET, COATED ORAL at 08:20

## 2017-04-11 RX ADMIN — HEPARIN SODIUM 5000 UNITS: 5000 INJECTION, SOLUTION INTRAVENOUS; SUBCUTANEOUS at 17:16

## 2017-04-11 RX ADMIN — KETOROLAC TROMETHAMINE 30 MG: 30 INJECTION, SOLUTION INTRAMUSCULAR at 12:16

## 2017-04-11 RX ADMIN — SODIUM CHLORIDE 75 ML/HR: 900 INJECTION, SOLUTION INTRAVENOUS at 05:58

## 2017-04-11 RX ADMIN — SODIUM CHLORIDE 75 ML/HR: 900 INJECTION, SOLUTION INTRAVENOUS at 19:28

## 2017-04-11 RX ADMIN — Medication 10 ML: at 12:17

## 2017-04-11 RX ADMIN — POLYETHYLENE GLYCOL 3350 17 G: 17 POWDER, FOR SOLUTION ORAL at 08:23

## 2017-04-11 RX ADMIN — VALSARTAN 40 MG: 40 TABLET ORAL at 08:20

## 2017-04-11 RX ADMIN — HEPARIN SODIUM 5000 UNITS: 5000 INJECTION, SOLUTION INTRAVENOUS; SUBCUTANEOUS at 08:22

## 2017-04-11 RX ADMIN — POLYETHYLENE GLYCOL 3350 17 G: 17 POWDER, FOR SOLUTION ORAL at 17:19

## 2017-04-11 RX ADMIN — HYDROCODONE BITARTRATE AND ACETAMINOPHEN 1 TABLET: 5; 325 TABLET ORAL at 08:20

## 2017-04-11 RX ADMIN — LACTULOSE 40 G: 10 SOLUTION ORAL at 08:24

## 2017-04-11 RX ADMIN — CLONAZEPAM 1 MG: 1 TABLET ORAL at 21:25

## 2017-04-11 NOTE — PROGRESS NOTES
General Daily Progress Note    Admit Date: 4/4/2017  Hospital day 4/11/2017    Subjective:vomited lunch, 2-3 stools yesterday, none today     Y  N  [] []  Abd Pain:    [] []  Nausea:  [x] [] Vomiting:  [] []  Diarrhea:  [] []  Constipation:  [] []  Melena:  [] []  Hematochezia:  [] [x]  Tolerating Diet:    Current Facility-Administered Medications   Medication Dose Route Frequency    ibuprofen (MOTRIN) tablet 400 mg  400 mg Oral Q6H PRN    dilTIAZem CD (CARDIZEM CD) capsule 120 mg  120 mg Oral DAILY    polyethylene glycol (MIRALAX) packet 17 g  17 g Oral BID    lactulose (CHRONULAC) solution 40 g  60 mL Oral QID    clonazePAM (KlonoPIN) tablet 1 mg  1 mg Oral TID    docusate sodium (COLACE) capsule 100 mg  100 mg Oral DAILY PRN    valsartan (DIOVAN) tablet 40 mg  40 mg Oral DAILY    HYDROcodone-acetaminophen (NORCO) 5-325 mg per tablet 1 Tab  1 Tab Oral Q6H PRN    sodium chloride (NS) flush 5-10 mL  5-10 mL IntraVENous Q8H    sodium chloride (NS) flush 5-10 mL  5-10 mL IntraVENous PRN    heparin (porcine) injection 5,000 Units  5,000 Units SubCUTAneous Q8H    albuterol-ipratropium (DUO-NEB) 2.5 MG-0.5 MG/3 ML  3 mL Nebulization Q6H PRN    insulin lispro (HUMALOG) injection   SubCUTAneous AC&HS    glucose chewable tablet 16 g  4 Tab Oral PRN    dextrose (D50W) injection syrg 12.5-25 g  12.5-25 g IntraVENous PRN    glucagon (GLUCAGEN) injection 1 mg  1 mg IntraMUSCular PRN    0.9% sodium chloride infusion  75 mL/hr IntraVENous CONTINUOUS    aspirin delayed-release tablet 81 mg  81 mg Oral DAILY        Review of Systems  Pertinent items are noted in HPI.     Objective:     Patient Vitals for the past 8 hrs:   BP Temp Pulse Resp SpO2   04/11/17 1115 148/90 98.5 °F (36.9 °C) 72 18 99 %   04/11/17 0800 161/87 97.8 °F (36.6 °C) 78 16 98 %     04/11 0701 - 04/11 1900  In: -   Out: 200 [Urine:200]  04/09 1901 - 04/11 0700  In: 480 [P.O.:480]  Out: 2500 [Urine:2500]    Physical Exam:   Visit Vitals    BP 148/90 (BP 1 Location: Right arm, BP Patient Position: At rest)    Pulse 72    Temp 98.5 °F (36.9 °C)    Resp 18    Ht 5' 8\" (1.727 m)    Wt 77.1 kg (170 lb)    SpO2 99%    BMI 25.85 kg/m2     General appearance: alert, cooperative, no distress, appears stated age  Abdomen: soft, non-tender.  Bowel sounds normal. No masses,  no organomegaly      Data Review   Recent Results (from the past 24 hour(s))   GLUCOSE, POC    Collection Time: 04/10/17  4:14 PM   Result Value Ref Range    Glucose (POC) 101 (H) 65 - 100 mg/dL    Performed by Chanelle Sevilla (PCT)    GLUCOSE, POC    Collection Time: 04/10/17  9:08 PM   Result Value Ref Range    Glucose (POC) 116 (H) 65 - 100 mg/dL    Performed by Jennifer Hancock (PCT)    GLUCOSE, POC    Collection Time: 04/11/17  7:27 AM   Result Value Ref Range    Glucose (POC) 79 65 - 100 mg/dL    Performed by Jonathon TRISTAN(CON)    GLUCOSE, POC    Collection Time: 04/11/17  7:48 AM   Result Value Ref Range    Glucose (POC) 87 65 - 100 mg/dL    Performed by Jonathon TRISTAN(CON)    GLUCOSE, POC    Collection Time: 04/11/17 12:07 PM   Result Value Ref Range    Glucose (POC) 132 (H) 65 - 100 mg/dL    Performed by [facility] BRENDEN RODRIGES(CON)          Assessment:     Active Problems:    HTN, goal below 130/80 (9/7/2012)      Overview: Goal BP: < 130/80      Goal Progress: Had been at goal and is now not at goal.      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

## 2017-04-11 NOTE — PROGRESS NOTES
Spiritual Care Partner Volunteer visited patient in Renal on 04/11/17. Documented by:    Bronson uGan M.S., M.Div.   [address] [place] [address] Sarkis ([phone])

## 2017-04-11 NOTE — PROGRESS NOTES
Tele room called and stated patient had 12 beat run vtach. Patient asyptomatic. Dr. Horace Pineda called , no orders given except watch patient and call if any changes.

## 2017-04-11 NOTE — PROGRESS NOTES
Initial Nutrition Assessment:    INTERVENTIONS/RECOMMENDATIONS:   · Meals/Snacks: General/healthful diet: Continue CCD  · Supplements: Commercial supplement: Glucerna daily     ASSESSMENT:   Chart reviewed; Pt admitted with orthostatic hypotension; Past medical hx of T2DM, GERD and CAD. Pt was sleeping at time of visit. Nursing unsure of usual intake. Nurse stated pt used menu and room service to order breakfast this morning but unsure how much the pt actually consumed. Will send Glucerna shake for pt to try. Will monitor appetite/po intake and acceptance of supplement. Diet Order: Consistent carb  % Eaten:  Patient Vitals for the past 72 hrs:   % Diet Eaten   04/11/17 0906 25 %     Pertinent Medications: [x]Reviewed []Other: Humalog; Miralax; Lactulose  Pertinent Labs: [x]Reviewed []Other: BG 62--101  Food Allergies: [x]None []Other   Last BM: 4/10   [x]Active     []Hyperactive  []Hypoactive       [] Absent BS  Skin:    [x] Intact   [] Incision  [] Breakdown  [] Other:    Anthropometrics:   Height: 5' 8\" (172.7 cm) Weight: 77.1 kg (170 lb)   IBW (%IBW):   ( ) UBW (%UBW):   (  %)   Last Weight Metrics:  Weight Loss Metrics 4/4/2017 3/8/2017 2/27/2017 2/23/2017 1/26/2017 1/5/2017 12/23/2016   Today's Wt 170 lb 162 lb 162 lb 1.6 oz 163 lb 2.3 oz 168 lb 11.2 oz 167 lb 170 lb   BMI 25.85 kg/m2 24.63 kg/m2 24.65 kg/m2 24.81 kg/m2 25.65 kg/m2 25.39 kg/m2 25.85 kg/m2       BMI: Body mass index is 25.85 kg/(m^2). This BMI is indicative of:   []Underweight    []Normal    [x]Overweight    [] Obesity   [] Extreme Obesity (BMI>40)     Estimated Nutrition Needs (Based on):   1735 Kcals/day (BMR 1335 X 1.3AF) , 77 g (1.0g/kg bw) Protein  Carbohydrate:  At Least 130 g/day  Fluids: 1750 mL/day (1ml/kcal)    Pt expected to meet estimated nutrient needs: [x]Yes []No    NUTRITION DIAGNOSES:   Problem:  Inadequate oral food/beverage intake      Etiology: related to current medical condition     Signs/Symptoms: as evidenced by poor appetite reported on admission      NUTRITION INTERVENTIONS:  Meals/Snacks: General/healthful diet   Supplements: Commercial supplement              GOAL:   PO intake >50% of meals/supplements next 3-5 days    LEARNING NEEDS (Diet, Food/Nutrient-Drug Interaction):    [x] None Identified   [] Identified and Education Provided/Documented   [] Identified and Pt declined/was not appropriate     Cultureal, Methodist, OR Ethnic Dietary Needs:    [x] None Identified   [] Identified and Addressed     [x] Interdisciplinary Care Plan Reviewed/Documented    [x] Discharge Planning: Continue CCD    MONITORING /EVALUATION:      Food/Nutrient Intake Outcomes:  Total energy intake  Physical Signs/Symptoms Outcomes: Weight/weight change, Glucose profile    NUTRITION RISK:    [] High              [x] Moderate           []  Low  []  Minimal/Uncompromised    PT SEEN FOR:    []  MD Consult: []Calorie Count      []Diabetic Diet Education        []Diet Education     []Electrolyte Management     []General Nutrition Management and Supplements     []Management of Tube Feeding     []TPN Recommendations    []  RN Referral:  []MST score >=2     []Enteral/Parenteral Nutrition PTA     []Pregnant: Gestational DM or Multigestation     []Pressure Ulcer/Wound Care needs        []  Low BMI  []  DTR Referral   AGNES Aranda  Pager [phone]                Weekend Pager [phone]

## 2017-04-11 NOTE — CONSULTS
[address] [place] [address] [address] [facility]     Date of  Admission: 4/4/2017  5:15 AM     Admission type:Emergency    Consult for: chest tightness  Consult by: family practice      Subjective:     Carmella To is a 70 y.o. female with PMH HTN, anxiety, CAD, chronic pain who was admitted for Orthostatic hypotension, dizziness, abd pain, constipation. Cardiology consult was placed for chest tightness. Ms. Mikaela White is well known to Dr. Heather Kitchen for cardiology needs. She has a history of frequent chest pain/tightness. Last cath 12/15 with 30% stenosis in proximal LAD and 50% stenosis in distal tortuous LAD with no intervention. Last stress prior to that cath. Last ECHO 01/16 with EF 55-60%; no wall motion abnormalities, grade 1 diastolic dysfunction. Currently Ms. Mikaela White is c/o left sided chest wall pain at her ribs, a headache, LUQ abd soreness, and nausea.   She denies SOB, palpitations, leg swelling    Cardiac risk factors: family history, diabetes mellitus, hypertension, stress, post-menopausal.      Patient Active Problem List    Diagnosis Date Noted    Left-sided chest wall pain 04/11/2017    Hydronephrosis of left kidney 04/07/2017    Orthostatic hypotension 04/04/2017    Generalized OA 01/05/2017    Noncompliance with medication regimen-chol medication  01/05/2017    Tightness sensation-head 09/20/2016    Diabetes mellitus type 2, diet-controlled ([facility] [address] 05/26/2016    Somatization disorder 05/26/2016    Death of parent 05/26/2016    Somatic delusion disorder ([facility] [address] 03/22/2016    Death of child 02/17/2016    Broken heart syndrome 01/18/2016    SOB (shortness of breath) 12/22/2015    CAD (coronary artery disease), native coronary artery 12/01/2015    Pseudobulbar affect 10/16/2015    Breast pain, left 04/07/2015    Personal history of noncompliance with medical treatment, presenting hazards to health 05/30/2014     Class: Chronic    Duplicated right renal collecting system 03/13/2014    Nephrolithiasis 03/13/2014    Onycholysis of toenail 02/27/2014     Class: Chronic    Chronic pain associated with significant psychosocial dysfunction 02/17/2014    Depression with anxiety 02/17/2014     Class: Chronic    Other somatoform disorders 02/17/2014     Class: Chronic    Chronic chest pain 01/13/2014     Class: Chronic    Hyperlipidemia 12/09/2013     Class: Chronic    UTI (urinary tract infection) 12/09/2013    Constipation 08/22/2013     Class: Chronic    Abdominal pain 08/16/2013    Dizziness of unknown cause 08/13/2013    Neutropenia ([facility] [address] 08/13/2013    HTN, goal below 130/80 09/07/2012     Class: Chronic    Chronic headache 09/07/2012     Class: Chronic    Acid reflux 09/07/2012      Josie Ibarra MD  Past Medical History:   Diagnosis Date    Agoraphobia without mention of panic attacks 2/17/2014    Anxiety disorder 8/18/2013    Arthritis     osteo    Breast pain, left 4/7/2015    CAD (coronary artery disease), native coronary artery 12/1/2015    Chronic chest pain 1/13/2014    Chronic pain associated with significant psychosocial dysfunction 2/17/2014    Depression 8/18/2013    Diabetes ([facility] [address]     type II    Duplicated right renal collecting system 3/13/2014    GERD (gastroesophageal reflux disease)     Gout, joint     HTN, goal below 130/80 9/7/2012    Hypertension     Nephrolithiasis 3/13/2014    Other ill-defined conditions     hyercholesterolemia    Other ill-defined conditions     anxiety    Other ill-defined conditions     pt states head get tight,due to wax bill up    Other ill-defined conditions     pain left shoulder due to fall many years ago    Personal history of noncompliance with medical treatment, presenting hazards to health 5/30/2014    Psychiatric disorder     anxiety/ depression    Psychotic disorder     Vaginal pain 7/30/2014      Social History Social History    Marital status:      Spouse name: N/A    Number of children: N/A    Years of education: N/A     Social History Main Topics    Smoking status: Never Smoker    Smokeless tobacco: Never Used    Alcohol use No    Drug use: No    Sexual activity: No     Other Topics Concern    None     Social History Narrative    ** Merged History Encounter **          Allergies   Allergen Reactions    Amoxicillin Hives    Sulfa (Sulfonamide Antibiotics) Hives and Itching    Amoxicillin Hives and Itching     Long time ago - patient not exactly certain what it does    Mirtazapine Itching and Nausea Only     Funny feeling in chest    Percocet [Oxycodone-Acetaminophen] Nausea and Vomiting    Codeine Nausea and Vomiting    Prednisone Itching    Sulfa (Sulfonamide Antibiotics) Hives, Itching and Palpitations     Think it was increased heart rate or itching - many years ago    Zithromax [Azithromycin] Itching     Not sure what it does,taken long time ago      Family History   Problem Relation Age of Onset    Stroke Mother     Heart Disease Mother     Cancer Father     Heart Disease Son     Liver Disease Son     Heart Disease Daughter     Malignant Hyperthermia Neg Hx     Pseudocholinesterase Deficiency Neg Hx     Delayed Awakening Neg Hx     Post-op Nausea/Vomiting Neg Hx     Emergence Delirium Neg Hx     Post-op Cognitive Dysfunction Neg Hx     Other Neg Hx       Current Facility-Administered Medications   Medication Dose Route Frequency    polyethylene glycol (MIRALAX) packet 17 g  17 g Oral BID    lactulose (CHRONULAC) solution 40 g  60 mL Oral QID    clonazePAM (KlonoPIN) tablet 1 mg  1 mg Oral TID    docusate sodium (COLACE) capsule 100 mg  100 mg Oral DAILY PRN    valsartan (DIOVAN) tablet 40 mg  40 mg Oral DAILY    HYDROcodone-acetaminophen (NORCO) 5-325 mg per tablet 1 Tab  1 Tab Oral Q6H PRN    sodium chloride (NS) flush 5-10 mL  5-10 mL IntraVENous Q8H    sodium chloride (NS) flush 5-10 mL  5-10 mL IntraVENous PRN    heparin (porcine) injection 5,000 Units  5,000 Units SubCUTAneous Q8H    albuterol-ipratropium (DUO-NEB) 2.5 MG-0.5 MG/3 ML  3 mL Nebulization Q6H PRN    insulin lispro (HUMALOG) injection   SubCUTAneous AC&HS    glucose chewable tablet 16 g  4 Tab Oral PRN    dextrose (D50W) injection syrg 12.5-25 g  12.5-25 g IntraVENous PRN    glucagon (GLUCAGEN) injection 1 mg  1 mg IntraMUSCular PRN    0.9% sodium chloride infusion  75 mL/hr IntraVENous CONTINUOUS    aspirin delayed-release tablet 81 mg  81 mg Oral DAILY        Review of Symptoms:   Constitutional: negative  Eyes: negative   Ears, nose, mouth, throat, and face: negative  Respiratory: negative   Cardiovascular: negative   Gastrointestinal: abd pain, nausea   Genitourinary:negative   Musculoskeletal:left sided chest wall pain   Neurological: headache  Endocrine: negative          Objective:      Visit Vitals    /90 (BP 1 Location: Right arm, BP Patient Position: At rest)    Pulse 72    Temp 98.5 °F (36.9 °C)    Resp 18    Ht 5' 8\" (1.727 m)    Wt 77.1 kg (170 lb)    SpO2 99%    BMI 25.85 kg/m2       Physical:   General: anxious AAF resting in bed in NAD   Heart: RRR, 3/6 systolic murmur, no carotid bruits   Lungs: clear   Abdomen: Soft, +BS, NT, slightly distended. Extremities: LE benny +DP/PT, no edema   Neurologic: Grossly normal    Data Review:   No results for input(s): WBC, HGB, HCT, PLT, HGBEXT, HCTEXT, PLTEXT, HGBEXT, HCTEXT, PLTEXT in the last 72 hours. No results for input(s): NA, K, CL, CO2, GLU, BUN, CREA, CA, MG, PHOS, ALB, TBIL, TBILI, SGOT, ALT, INR in the last 72 hours.     No lab exists for component:  BNP, INREXT, INREXT    Recent Labs      04/09/17   0853   TROIQ  <0.04         Intake/Output Summary (Last 24 hours) at 04/11/17 1130  Last data filed at 04/11/17 0906   Gross per 24 hour   Intake                0 ml   Output             2425 ml   Net            -2425 ml Cardiographics    Telemetry: SR   ECG: (4/9) NSR   Echocardiogram: ordered          Assessment:       Active Problems:    HTN, goal below 130/80 (9/7/2012)      Overview: Goal BP: < 130/80      Goal Progress: Had been at goal and is now not at goal.      BP Readings from Last 3 Encounters:       07/29/14 165/91       07/11/14 130/90       07/09/14 124/80                   Other somatoform disorders (2/17/2014)      Diabetes mellitus type 2, diet-controlled ([facility] [address] (5/26/2016)      Orthostatic hypotension (4/4/2017)      Hydronephrosis of left kidney (4/7/2017)      Left-sided chest wall pain (4/11/2017)         Plan:     Yi Galaviz is a 70 y.o. female who is being treated for severe constipation and urinary retention/hydronephrosis 2/2 constipation. Cardiology consult for chest tightness, which she has a history of. Most recent cardiology testing as above. Negative troponin. Left sided chest wall pain today. BPs have also been elevated. · Continue ASA, statin for CAD  · Continue ARB for HTN, but will add cardizem as well - was on in past and patient stopped b/c she was worried it would increase her BP (per provider notes).     · toradol for chest wall pain now and then PRN NSAID   · Repeat ECHO          Thank you for consulting [facility]      Minal Ribeiro NP  DNP, RN, AGACNP-BC

## 2017-04-11 NOTE — PROGRESS NOTES
General Daily Progress Note    Admit Date: 4/4/2017  Hospital day 5    Subjective:     Patient has no new symptoms has some cp not new present for 7 yrs! S/w nrusing     Medication side effects: none    Current Facility-Administered Medications   Medication Dose Route Frequency    polyethylene glycol (MIRALAX) packet 17 g  17 g Oral BID    lactulose (CHRONULAC) solution 40 g  60 mL Oral QID    clonazePAM (KlonoPIN) tablet 1 mg  1 mg Oral TID    docusate sodium (COLACE) capsule 100 mg  100 mg Oral DAILY PRN    valsartan (DIOVAN) tablet 40 mg  40 mg Oral DAILY    HYDROcodone-acetaminophen (NORCO) 5-325 mg per tablet 1 Tab  1 Tab Oral Q6H PRN    sodium chloride (NS) flush 5-10 mL  5-10 mL IntraVENous Q8H    sodium chloride (NS) flush 5-10 mL  5-10 mL IntraVENous PRN    heparin (porcine) injection 5,000 Units  5,000 Units SubCUTAneous Q8H    albuterol-ipratropium (DUO-NEB) 2.5 MG-0.5 MG/3 ML  3 mL Nebulization Q6H PRN    insulin lispro (HUMALOG) injection   SubCUTAneous AC&HS    glucose chewable tablet 16 g  4 Tab Oral PRN    dextrose (D50W) injection syrg 12.5-25 g  12.5-25 g IntraVENous PRN    glucagon (GLUCAGEN) injection 1 mg  1 mg IntraMUSCular PRN    0.9% sodium chloride infusion  75 mL/hr IntraVENous CONTINUOUS    aspirin delayed-release tablet 81 mg  81 mg Oral DAILY        Review of Systems  Pertinent items are noted in HPI.     Objective:     Patient Vitals for the past 8 hrs:   BP Temp Pulse Resp SpO2   04/11/17 0312 164/73 98 °F (36.7 °C) 74 16 97 %   04/11/17 0035 142/68 98.6 °F (37 °C) 68 18 98 %        04/09 1901 - 04/11 0700  In: 480 [P.O.:480]  Out: 2500 [Urine:2500]    Physical Exam:   Visit Vitals    /73    Pulse 74    Temp 98 °F (36.7 °C)    Resp 16    Ht 5' 8\" (1.727 m)    Wt 170 lb (77.1 kg)    SpO2 97%    BMI 25.85 kg/m2     Lungs: clear to auscultation bilaterally  Heart: regular rate and rhythm, S1, S2 normal, no murmur, click, rub or gallop  Abdomen: soft, non-tender. Bowel sounds normal. No masses,  no organomegaly  Extremities: extremities normal, atraumatic, no cyanosis or edema      ECG: normal sinus rhythm     Data Review   Recent Results (from the past 24 hour(s))   GLUCOSE, POC    Collection Time: 04/10/17  7:47 AM   Result Value Ref Range    Glucose (POC) 81 65 - 100 mg/dL    Performed by Paco Zimmer, POC    Collection Time: 04/10/17 11:26 AM   Result Value Ref Range    Glucose (POC) 84 65 - 100 mg/dL    Performed by Paco Zimmer, POC    Collection Time: 04/10/17  4:14 PM   Result Value Ref Range    Glucose (POC) 101 (H) 65 - 100 mg/dL    Performed by Renuka Cagle (PCT)    GLUCOSE, POC    Collection Time: 04/10/17  9:08 PM   Result Value Ref Range    Glucose (POC) 116 (H) 65 - 100 mg/dL    Performed by Bibiana Cabrera (PCT)    GLUCOSE, POC    Collection Time: 04/11/17  7:27 AM   Result Value Ref Range    Glucose (POC) 79 65 - 100 mg/dL    Performed by Bernice TRISTAN(CON)            Assessment:     Active Problems:    HTN, goal below 130/80 (9/7/2012)      Overview: Goal BP: < 130/80      Goal Progress: Had been at goal and is now not at goal.      BP Readings from Last 3 Encounters:       07/29/14 165/91       07/11/14 130/90       07/09/14 124/80                   Other somatoform disorders (2/17/2014)      Diabetes mellitus type 2, diet-controlled ([facility] [address] (5/26/2016)      Orthostatic hypotension (4/4/2017)      Hydronephrosis of left kidney (4/7/2017)        Plan:     1. Severe constipation- had several large bms Continue lactulose and miralax for now. Monitoring electrolytes   2. L hydronephrosis secondary to constipation- recheck ultrasound  Today  3. Diabetes- continue iss and diabetic diet   4. Chest tightness- had cath by Dr. Gallegos Fell 2 years ago, showing some 30-50% lesions.  I think chest tightness may just be anxiety (she is very anxious), however, normal  EKG and troponin will ask cardio to reeval \    Adriana Bryant MILLIE Pickens

## 2017-04-11 NOTE — PROGRESS NOTES
Bedside and Verbal shift change report given to Aparna Taylor (oncoming nurse) by Cari Tran RN   (offgoing nurse). Report included the following information SBAR, Kardex and Recent Results. Zone Phone for oncoming shift:   [phone]    Shift Summary: not feeling well, multiple complaints, docs are aware    LDAs               Peripheral IV 04/04/17 Left Wrist (Active)   Site Assessment Clean, dry, & intact 4/11/2017  9:06 AM   Phlebitis Assessment 0 4/11/2017  9:06 AM   Infiltration Assessment 0 4/11/2017  9:06 AM   Dressing Status Clean, dry, & intact 4/11/2017  9:06 AM   Dressing Type Transparent;Tape 4/11/2017  9:06 AM   Hub Color/Line Status Blue; Infusing 4/11/2017  9:06 AM   Action Taken Other (comment) 4/5/2017  8:15 AM                    [REMOVED] Urinary Catheter 04/04/17 Crowder-Criteria for Appropriate Use: Obstruction/retention  Urinary Catheter 04/06/17 Crowder-Criteria for Appropriate Use: Obstruction/retention   Intake & Output   Date 04/10/17 0700 - 04/11/17 0659 04/11/17 0700 - 04/12/17 0659   Shift 3801-4119 3611-7186 24 Hour Total 1698-3677 3440-3867 24 Hour Total   I  N  T  A  K  E   P.O. 240  240         P. O. 240  240       Shift Total  (mL/kg) 240  (3.1)  240  (3.1)      O  U  T  P  U  T   Urine  (mL/kg/hr) 1600  (1.7) 625  (0.7) 2225  (1.2) 200  200      Urine Voided 600  600         Urine Occurrence(s) 2 x  2 x         Urine Output (mL) (Urinary Catheter 04/06/17 Crowder) 0758 449 7805 200  200    Stool            Stool Occurrence(s) 1 x  1 x       Shift Total  (mL/kg) 1600  (20.7) 625  (8.1) 2225  (28.9) 200  (2.6)  200  (2.6)   NET -1360 -625 -1985 -200  -200   Weight (kg) 77.1 77.1 77.1 77.1 77.1 77.1      Last Bowel Movement Last Bowel Movement Date: 04/10/17   Glucose Checks [] N/A  [x] AC/HS  [] Q6  Concerns:   Nutrition Active Orders   Diet    DIET DIABETIC CONSISTENT CARB Regular       Consults [x]PT  [x]OT  []Speech  [x]Case Management   Cardiac Monitoring []N/A [x]Yes Expires:

## 2017-04-11 NOTE — PROGRESS NOTES
Bedside and Verbal shift change report given to Rosie Anderson RN (oncoming nurse) by Mary Kay Burroughs RN (offgoing nurse). Report included the following information SBAR, Kardex and Cardiac Rhythm . [facility] Phone for oncoming shift:   [phone]    Shift Summary: Patient alert and verbal. Pleasantly confused. Asks the same questions repeatedly; not easily re-directed. Up x 1 person assist to restroom as needed. LDAs               Peripheral IV 04/04/17 Left Wrist (Active)   Site Assessment Clean, dry, & intact 4/10/2017  4:20 PM   Phlebitis Assessment 0 4/10/2017  4:20 PM   Infiltration Assessment 0 4/10/2017  4:20 PM   Dressing Status Clean, dry, & intact 4/10/2017  4:20 PM   Dressing Type Transparent 4/10/2017  4:20 PM   Hub Color/Line Status Blue; Infusing;Flushed 4/10/2017  4:20 PM   Action Taken Other (comment) 4/5/2017  8:15 AM                    [REMOVED] Urinary Catheter 04/04/17 Crowder-Criteria for Appropriate Use: Obstruction/retention  Urinary Catheter 04/06/17 Crowder-Criteria for Appropriate Use: Obstruction/retention   Intake & Output   Date 04/09/17 1900 - 04/10/17 0659 04/10/17 0700 - 04/11/17 0659   Shift 1496-2111 24 Hour Total 1633-4632 4668-5503 24 Hour Total   I  N  T  A  K  E   P.O. 240 480 240  240      P. O. 240 480 240  240    Shift Total  (mL/kg) 240  (3.1) 480  (6.2) 240  (3.1)  240  (3.1)   O  U  T  P  U  T   Urine  (mL/kg/hr) 275 2075 1600  (1.7)  1600      Urine Voided  1200 600  600      Urine Occurrence(s)   2 x  2 x      Urine Output (mL) (Urinary Catheter 04/06/17 Crowder)   1000    Stool  1         Stool Occurrence(s)  1 x 1 x  1 x      Stool  1       Shift Total  (mL/kg) 275  (3.6) 2076  (26.9) 1600  (20.7)  1600  (20.7)   NET -35 -1596 -1220 -1365   Weight (kg) 77.1 77.1 77.1 77.1 77.1      Last Bowel Movement Last Bowel Movement Date: 04/10/17   Glucose Checks [] N/A  [x] AC/HS  [] Q6  Concerns:   Nutrition Active Orders   Diet    DIET DIABETIC CONSISTENT CARB Regular       Consults [x]PT  [x]OT  []Speech  []Case Management   Cardiac Monitoring []N/A [x]Yes Expires:

## 2017-04-11 NOTE — PROGRESS NOTES
Bedside and Verbal shift change report given to Milan Aggarwal (oncoming nurse) by Tamra Ackerman (offgoing nurse). Report included the following information SBAR, Kardex, Intake/Output, MAR, Recent Results and Med Rec Status. Zone Phone for oncoming shift:   [phone]    Shift Summary: Uneventful shift. LDAs               Peripheral IV 04/04/17 Left Wrist (Active)   Site Assessment Clean, dry, & intact 4/10/2017 11:35 PM   Phlebitis Assessment 0 4/10/2017 11:35 PM   Infiltration Assessment 0 4/10/2017 11:35 PM   Dressing Status Clean, dry, & intact 4/10/2017 11:35 PM   Dressing Type Transparent 4/10/2017 11:35 PM   Hub Color/Line Status Blue;Flushed 4/10/2017 11:35 PM   Action Taken Other (comment) 4/5/2017  8:15 AM                    [REMOVED] Urinary Catheter 04/04/17 Crowder-Criteria for Appropriate Use: Obstruction/retention  Urinary Catheter 04/06/17 Crowder-Criteria for Appropriate Use: Obstruction/retention   Intake & Output   Date 04/10/17 0700 - 04/11/17 0659 04/11/17 0700 - 04/12/17 0659   Shift 2881-8241 9959-7581 24 Hour Total 7706-2609 6429-3173 24 Hour Total   I  N  T  A  K  E   P.O. 240  240         P. O. 240  240       Shift Total  (mL/kg) 240  (3.1)  240  (3.1)      O  U  T  P  U  T   Urine  (mL/kg/hr) 1600  (1.7) 625  (0.7) 2225  (1.2)         Urine Voided 600  600         Urine Occurrence(s) 2 x  2 x         Urine Output (mL) (Urinary Catheter 04/06/17 Crowder) 0920 257 4865       Stool            Stool Occurrence(s) 1 x  1 x       Shift Total  (mL/kg) 1600  (20.7) 625  (8.1) 2225  (28.9)      NET -1360 -625 -1985      Weight (kg) 77.1 77.1 77.1 77.1 77.1 77.1      Last Bowel Movement Last Bowel Movement Date: 04/10/17   Glucose Checks [] N/A  [x] AC/HS  [] Q6  Concerns:   Nutrition Active Orders   Diet    DIET DIABETIC CONSISTENT CARB Regular       Consults []PT  []OT  []Speech  []Case Management   Cardiac Monitoring []N/A [x]Yes Expires:

## 2017-04-12 LAB
GLUCOSE BLD STRIP.AUTO-MCNC: 107 MG/DL (ref 65–100)
GLUCOSE BLD STRIP.AUTO-MCNC: 124 MG/DL (ref 65–100)
GLUCOSE BLD STRIP.AUTO-MCNC: 174 MG/DL (ref 65–100)
GLUCOSE BLD STRIP.AUTO-MCNC: 85 MG/DL (ref 65–100)
SERVICE CMNT-IMP: ABNORMAL
SERVICE CMNT-IMP: NORMAL

## 2017-04-12 PROCEDURE — 74011250636 HC RX REV CODE- 250/636: Performed by: FAMILY MEDICINE

## 2017-04-12 PROCEDURE — 74011250637 HC RX REV CODE- 250/637: Performed by: FAMILY MEDICINE

## 2017-04-12 PROCEDURE — 76450000000

## 2017-04-12 PROCEDURE — 74011250637 HC RX REV CODE- 250/637: Performed by: NURSE PRACTITIONER

## 2017-04-12 PROCEDURE — 82962 GLUCOSE BLOOD TEST: CPT

## 2017-04-12 PROCEDURE — 93306 TTE W/DOPPLER COMPLETE: CPT

## 2017-04-12 PROCEDURE — 65660000000 HC RM CCU STEPDOWN

## 2017-04-12 PROCEDURE — 74011250637 HC RX REV CODE- 250/637: Performed by: INTERNAL MEDICINE

## 2017-04-12 RX ORDER — ATORVASTATIN CALCIUM 10 MG/1
10 TABLET, FILM COATED ORAL DAILY
Status: DISCONTINUED | OUTPATIENT
Start: 2017-04-12 | End: 2017-04-14 | Stop reason: HOSPADM

## 2017-04-12 RX ORDER — HYDRALAZINE HYDROCHLORIDE 20 MG/ML
20 INJECTION INTRAMUSCULAR; INTRAVENOUS
Status: DISCONTINUED | OUTPATIENT
Start: 2017-04-12 | End: 2017-04-14 | Stop reason: HOSPADM

## 2017-04-12 RX ORDER — VALSARTAN 80 MG/1
80 TABLET ORAL DAILY
Status: DISCONTINUED | OUTPATIENT
Start: 2017-04-13 | End: 2017-04-14 | Stop reason: HOSPADM

## 2017-04-12 RX ADMIN — HYDROCODONE BITARTRATE AND ACETAMINOPHEN 1 TABLET: 5; 325 TABLET ORAL at 07:49

## 2017-04-12 RX ADMIN — ATORVASTATIN CALCIUM 10 MG: 10 TABLET, FILM COATED ORAL at 21:25

## 2017-04-12 RX ADMIN — ASPIRIN 81 MG: 81 TABLET, COATED ORAL at 08:56

## 2017-04-12 RX ADMIN — HEPARIN SODIUM 5000 UNITS: 5000 INJECTION, SOLUTION INTRAVENOUS; SUBCUTANEOUS at 15:52

## 2017-04-12 RX ADMIN — CLONAZEPAM 1 MG: 1 TABLET ORAL at 15:52

## 2017-04-12 RX ADMIN — HYDROCODONE BITARTRATE AND ACETAMINOPHEN 1 TABLET: 5; 325 TABLET ORAL at 21:21

## 2017-04-12 RX ADMIN — VALSARTAN 40 MG: 40 TABLET ORAL at 08:56

## 2017-04-12 RX ADMIN — Medication 10 ML: at 21:22

## 2017-04-12 RX ADMIN — HEPARIN SODIUM 5000 UNITS: 5000 INJECTION, SOLUTION INTRAVENOUS; SUBCUTANEOUS at 00:24

## 2017-04-12 RX ADMIN — POLYETHYLENE GLYCOL 3350 17 G: 17 POWDER, FOR SOLUTION ORAL at 17:16

## 2017-04-12 RX ADMIN — LACTULOSE 40 G: 10 SOLUTION ORAL at 21:20

## 2017-04-12 RX ADMIN — DILTIAZEM HYDROCHLORIDE 120 MG: 120 CAPSULE, COATED, EXTENDED RELEASE ORAL at 08:56

## 2017-04-12 RX ADMIN — POLYETHYLENE GLYCOL 3350 17 G: 17 POWDER, FOR SOLUTION ORAL at 08:57

## 2017-04-12 RX ADMIN — HYDROCODONE BITARTRATE AND ACETAMINOPHEN 1 TABLET: 5; 325 TABLET ORAL at 15:05

## 2017-04-12 RX ADMIN — LACTULOSE 40 G: 10 SOLUTION ORAL at 08:56

## 2017-04-12 RX ADMIN — LACTULOSE 40 G: 10 SOLUTION ORAL at 17:16

## 2017-04-12 RX ADMIN — LACTULOSE 40 G: 10 SOLUTION ORAL at 14:00

## 2017-04-12 RX ADMIN — CLONAZEPAM 1 MG: 1 TABLET ORAL at 08:56

## 2017-04-12 RX ADMIN — HEPARIN SODIUM 5000 UNITS: 5000 INJECTION, SOLUTION INTRAVENOUS; SUBCUTANEOUS at 07:50

## 2017-04-12 RX ADMIN — CLONAZEPAM 1 MG: 1 TABLET ORAL at 21:21

## 2017-04-12 NOTE — PROGRESS NOTES
Bedside and Verbal shift change report given to Terkasey Pedro rn (oncoming nurse) by Vidya Yung (offgoing nurse). Report included the following information SBAR, Procedure Summary, Intake/Output, MAR, Recent Results and Cardiac Rhythm NSR.

## 2017-04-12 NOTE — PROGRESS NOTES
Tried to get in touch with Dr. Cornelio Fraser regarding  telemetry orders. Called office and they gave us her cell number, no answer after two attempts and voice mail not activated. Will just ask her in the morning if she wants to d/c this or not.

## 2017-04-12 NOTE — PROGRESS NOTES
Chart reviewed. Please order PT consult and Preston Luna for nursing//PT// if in agreement.        15  MD anticipates d/c to SNF on Friday. Await consults recommendations.

## 2017-04-12 NOTE — PROGRESS NOTES
General Daily Progress Note    Admit Date: 4/4/2017  Hospital day 5    Subjective:     Patient has no new symptoms has some cp not new present for 7 yrs! S/w nrusing     Medication side effects: none    Current Facility-Administered Medications   Medication Dose Route Frequency    ibuprofen (MOTRIN) tablet 400 mg  400 mg Oral Q6H PRN    dilTIAZem CD (CARDIZEM CD) capsule 120 mg  120 mg Oral DAILY    polyethylene glycol (MIRALAX) packet 17 g  17 g Oral BID    lactulose (CHRONULAC) solution 40 g  60 mL Oral QID    clonazePAM (KlonoPIN) tablet 1 mg  1 mg Oral TID    docusate sodium (COLACE) capsule 100 mg  100 mg Oral DAILY PRN    valsartan (DIOVAN) tablet 40 mg  40 mg Oral DAILY    HYDROcodone-acetaminophen (NORCO) 5-325 mg per tablet 1 Tab  1 Tab Oral Q6H PRN    sodium chloride (NS) flush 5-10 mL  5-10 mL IntraVENous PRN    heparin (porcine) injection 5,000 Units  5,000 Units SubCUTAneous Q8H    albuterol-ipratropium (DUO-NEB) 2.5 MG-0.5 MG/3 ML  3 mL Nebulization Q6H PRN    insulin lispro (HUMALOG) injection   SubCUTAneous AC&HS    glucose chewable tablet 16 g  4 Tab Oral PRN    dextrose (D50W) injection syrg 12.5-25 g  12.5-25 g IntraVENous PRN    glucagon (GLUCAGEN) injection 1 mg  1 mg IntraMUSCular PRN    0.9% sodium chloride infusion  75 mL/hr IntraVENous CONTINUOUS    aspirin delayed-release tablet 81 mg  81 mg Oral DAILY        Review of Systems  Pertinent items are noted in HPI.     Objective:     Patient Vitals for the past 8 hrs:   BP Temp Pulse Resp SpO2   04/12/17 1109 184/81 97.8 °F (36.6 °C) 80 18 98 %   04/12/17 0700 167/79 98.3 °F (36.8 °C) 67 18 95 %     04/12 0701 - 04/12 1900  In: 600 [I.V.:600]  Out: 650 [Urine:650]  04/10 1901 - 04/12 0700  In: -   Out: 2625 [Urine:2625]    Physical Exam:   Visit Vitals    /81 (BP 1 Location: Right arm, BP Patient Position: Sitting)    Pulse 80    Temp 97.8 °F (36.6 °C)    Resp 18    Ht 5' 8\" (1.727 m)    Wt 170 lb (77.1 kg)    SpO2 98%    BMI 25.85 kg/m2     Lungs: clear to auscultation bilaterally  Heart: regular rate and rhythm, S1, S2 normal, no murmur, click, rub or gallop  Abdomen: soft, non-tender. Bowel sounds normal. No masses,  no organomegaly  Extremities: extremities normal, atraumatic, no cyanosis or edema      ECG: normal sinus rhythm     Data Review   Recent Results (from the past 24 hour(s))   GLUCOSE, POC    Collection Time: 04/11/17  5:14 PM   Result Value Ref Range    Glucose (POC) 106 (H) 65 - 100 mg/dL    Performed by [place] Foil \"Tammy\"*    GLUCOSE, POC    Collection Time: 04/11/17  8:51 PM   Result Value Ref Range    Glucose (POC) 99 65 - 100 mg/dL    Performed by [place] [employer] \"Tammy\"*    GLUCOSE, POC    Collection Time: 04/12/17  8:17 AM   Result Value Ref Range    Glucose (POC) 85 65 - 100 mg/dL    Performed by Paco Stevensand, POC    Collection Time: 04/12/17 11:12 AM   Result Value Ref Range    Glucose (POC) 124 (H) 65 - 100 mg/dL    Performed by Cloria Led PCT            Assessment:     Active Problems:    HTN, goal below 130/80 (9/7/2012)      Overview: Goal BP: < 130/80      Goal Progress: Had been at goal and is now not at goal.      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 Severe constipation- had several large bms, hold stool softner for now     2. L hydronephrosis secondary to constipation- recheck ultrasound  Today  3. Diabetes- continue iss and diabetic diet   4. Chest tightness- had cath by Dr. Lotus Nieves 2 years ago, showing some 30-50% lesions.  I think chest tightness may just be anxiety (she is very anxious), however, normal  EKG and troponin will ask cardio to reeval \  5. Depression will ask psych and palliative to evaluate    Joana Hanna M.D.   Family Medicine

## 2017-04-12 NOTE — PROGRESS NOTES
[address] [place] [address]  [phone]      Cardiology Progress Note      4/12/2017 12:36 PM    Admit Date: 4/4/2017    Admit Diagnosis:   Orthostatic hypotension    Subjective:     Ashok Ulloa is a 70 y.o. female with PMH HTN, anxiety, CAD, chronic pain who was admitted for Orthostatic hypotension, dizziness, abd pain, constipation. Overnight events:  -BP continues to be elevated  -ECHO completed today  -Ms. Saundra Irby continues to complain of left-sided chest wall pain. Does not appear patient has received any of the PRN NSAID. Denies SOB, palpitations.       Visit Vitals    /81 (BP 1 Location: Right arm, BP Patient Position: Sitting)    Pulse 80    Temp 97.8 °F (36.6 °C)    Resp 18    Ht 5' 8\" (1.727 m)    Wt 77.1 kg (170 lb)    SpO2 98%    BMI 25.85 kg/m2       Current Facility-Administered Medications   Medication Dose Route Frequency    ibuprofen (MOTRIN) tablet 400 mg  400 mg Oral Q6H PRN    dilTIAZem CD (CARDIZEM CD) capsule 120 mg  120 mg Oral DAILY    polyethylene glycol (MIRALAX) packet 17 g  17 g Oral BID    lactulose (CHRONULAC) solution 40 g  60 mL Oral QID    clonazePAM (KlonoPIN) tablet 1 mg  1 mg Oral TID    docusate sodium (COLACE) capsule 100 mg  100 mg Oral DAILY PRN    valsartan (DIOVAN) tablet 40 mg  40 mg Oral DAILY    HYDROcodone-acetaminophen (NORCO) 5-325 mg per tablet 1 Tab  1 Tab Oral Q6H PRN    sodium chloride (NS) flush 5-10 mL  5-10 mL IntraVENous PRN    heparin (porcine) injection 5,000 Units  5,000 Units SubCUTAneous Q8H    albuterol-ipratropium (DUO-NEB) 2.5 MG-0.5 MG/3 ML  3 mL Nebulization Q6H PRN    insulin lispro (HUMALOG) injection   SubCUTAneous AC&HS    glucose chewable tablet 16 g  4 Tab Oral PRN    dextrose (D50W) injection syrg 12.5-25 g  12.5-25 g IntraVENous PRN    glucagon (GLUCAGEN) injection 1 mg  1 mg IntraMUSCular PRN    0.9% sodium chloride infusion  75 mL/hr IntraVENous CONTINUOUS    aspirin delayed-release tablet 81 mg  81 mg Oral DAILY       Objective:      Physical Exam:  General: anxious AAF resting in bed in NAD   Heart: RRR, 3/6 systolic murmur, no carotid bruits   Lungs: clear   Abdomen: Soft, +BS, NT, slightly distended. Extremities: LE benny +DP/PT, no edema   Neurologic: Grossly normal  Skin:  Warm and dry.     Data Review:   No results for input(s): WBC, HGB, HCT, PLT, HGBEXT, HCTEXT, PLTEXT in the last 72 hours. No results for input(s): NA, K, CL, CO2, GLU, BUN, CREA, CA, MG, PHOS, ALB, TBIL, TBILI, SGOT, ALT, INR in the last 72 hours. No lab exists for component: INREXT    No results for input(s): TROIQ, CPK, CKMB in the last 72 hours. Intake/Output Summary (Last 24 hours) at 04/12/17 1236  Last data filed at 04/12/17 1109   Gross per 24 hour   Intake              600 ml   Output             2450 ml   Net            -1850 ml        Telemetry: SR   ECG: (4/9) NSR   Echocardiogram: (4/12) EF 55-60%; no wall motion abnormalities; moderate concentric hypertrophy. Mild AS        Assessment:     Active Problems:    HTN, goal below 130/80 (9/7/2012)      Overview: Goal BP: < 130/80      Goal Progress: Had been at goal and is now not at goal.      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 Patient states toradol yesterday helped some. Has not received PRN NSAID, but is taking Norco.  ECHO as above nonconcerning for cardiac contribution. · Can try an ice pack and encourage RN to administer NSAID    HTN:  Still elevated today  · Continue home cardizem and increase home ARB.     · Added PRN hydralazine     CAD history: · continue ASA and statin      No further cardiac work-up needed at this time. Ms. Sumit Olivares can follow-up with Dr. Imelda Buchanan 1 month post-discharge. Please call if any new cardiac concerns require our attention during her hospital stay. Will sign off.         Dariusz Rocha NP DNP, RN, AGACNP-BC

## 2017-04-12 NOTE — PROGRESS NOTES
Courtesy note. Checked ultrasound results remotely. Resolution of hydronephrosis noted. Can perform voiding trial when you feel she is medically okay to do so. Will check on her progress in person tomorrow.

## 2017-04-12 NOTE — PROGRESS NOTES
General Daily Progress Note    Admit Date: 4/4/2017  Hospital day 4/12/2017    Subjective:diarrhea last night with laxatives  O- Hydronephrosis better on US     Y  N  [] []  Abd Pain:    [] [x]  Nausea:  [] [x] Vomiting:  [x] []  Diarrhea:  [] []  Constipation:  [] []  Melena:  [] []  Hematochezia:  [x] []  Tolerating Diet:    Current Facility-Administered Medications   Medication Dose Route Frequency    [START ON 4/13/2017] valsartan (DIOVAN) tablet 80 mg  80 mg Oral DAILY    hydrALAZINE (APRESOLINE) 20 mg/mL injection 20 mg  20 mg IntraVENous Q6H PRN    ibuprofen (MOTRIN) tablet 400 mg  400 mg Oral Q6H PRN    dilTIAZem CD (CARDIZEM CD) capsule 120 mg  120 mg Oral DAILY    polyethylene glycol (MIRALAX) packet 17 g  17 g Oral BID    lactulose (CHRONULAC) solution 40 g  60 mL Oral QID    clonazePAM (KlonoPIN) tablet 1 mg  1 mg Oral TID    docusate sodium (COLACE) capsule 100 mg  100 mg Oral DAILY PRN    HYDROcodone-acetaminophen (NORCO) 5-325 mg per tablet 1 Tab  1 Tab Oral Q6H PRN    sodium chloride (NS) flush 5-10 mL  5-10 mL IntraVENous PRN    heparin (porcine) injection 5,000 Units  5,000 Units SubCUTAneous Q8H    albuterol-ipratropium (DUO-NEB) 2.5 MG-0.5 MG/3 ML  3 mL Nebulization Q6H PRN    insulin lispro (HUMALOG) injection   SubCUTAneous AC&HS    glucose chewable tablet 16 g  4 Tab Oral PRN    dextrose (D50W) injection syrg 12.5-25 g  12.5-25 g IntraVENous PRN    glucagon (GLUCAGEN) injection 1 mg  1 mg IntraMUSCular PRN    0.9% sodium chloride infusion  75 mL/hr IntraVENous CONTINUOUS    aspirin delayed-release tablet 81 mg  81 mg Oral DAILY        Review of Systems  Pertinent items are noted in HPI.     Objective:     Patient Vitals for the past 8 hrs:   BP Temp Pulse Resp SpO2   04/12/17 1109 184/81 97.8 °F (36.6 °C) 80 18 98 %   04/12/17 0700 167/79 98.3 °F (36.8 °C) 67 18 95 %     04/12 0701 - 04/12 1900  In: 600 [I.V.:600]  Out: 650 [Urine:650]  04/10 1901 - 04/12 0700  In: -   Out: 2625 [Urine:2625]    Physical Exam:   Visit Vitals    /81 (BP 1 Location: Right arm, BP Patient Position: Sitting)    Pulse 80    Temp 97.8 °F (36.6 °C)    Resp 18    Ht 5' 8\" (1.727 m)    Wt 77.1 kg (170 lb)    SpO2 98%    BMI 25.85 kg/m2     General appearance: alert, cooperative, no distress, appears stated age  Abdomen: soft, non-tender.  Bowel sounds normal. No masses,  no organomegaly      Data Review   Recent Results (from the past 24 hour(s))   GLUCOSE, POC    Collection Time: 04/11/17  5:14 PM   Result Value Ref Range    Glucose (POC) 106 (H) 65 - 100 mg/dL    Performed by Kim Wooten \"Tammy\"*    GLUCOSE, POC    Collection Time: 04/11/17  8:51 PM   Result Value Ref Range    Glucose (POC) 99 65 - 100 mg/dL    Performed by Kim Wooten \"Tammy\"*    GLUCOSE, POC    Collection Time: 04/12/17  8:17 AM   Result Value Ref Range    Glucose (POC) 85 65 - 100 mg/dL    Performed by Paco Zimmer, POC    Collection Time: 04/12/17 11:12 AM   Result Value Ref Range    Glucose (POC) 124 (H) 65 - 100 mg/dL    Performed by Mehul Cristobal PCT          Assessment:     Active Problems:    HTN, goal below 130/80 (9/7/2012)      Overview: Goal BP: < 130/80      Goal Progress: Had been at goal and is now not at goal.      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

## 2017-04-12 NOTE — PROGRESS NOTES
Came by to see patient. Another physician is currently speaking with her. Has been having bowel movements. VSS/AF  Adequate UO  Creatinine 0.85    Impression: 1. Left hydro resolved    2. Urinary retention, likely secondary to severe constipation. Plan:  Can remove martinez whenever you want. If she fails voiding trial, replace martinez and she may follow-up with us as an outpatient. We are available should questions or new issues arise.

## 2017-04-13 PROBLEM — Z74.8 ASSISTANCE NEEDED WITH TRANSPORTATION: Status: ACTIVE | Noted: 2017-04-13

## 2017-04-13 PROBLEM — Z78.9 FREQUENT HOSPITAL ADMISSIONS: Status: ACTIVE | Noted: 2017-04-13

## 2017-04-13 PROBLEM — R53.1 WEAKNESS: Status: ACTIVE | Noted: 2017-04-13

## 2017-04-13 LAB
GLUCOSE BLD STRIP.AUTO-MCNC: 104 MG/DL (ref 65–100)
GLUCOSE BLD STRIP.AUTO-MCNC: 109 MG/DL (ref 65–100)
GLUCOSE BLD STRIP.AUTO-MCNC: 114 MG/DL (ref 65–100)
GLUCOSE BLD STRIP.AUTO-MCNC: 82 MG/DL (ref 65–100)
SERVICE CMNT-IMP: ABNORMAL
SERVICE CMNT-IMP: NORMAL

## 2017-04-13 PROCEDURE — 74011250637 HC RX REV CODE- 250/637: Performed by: FAMILY MEDICINE

## 2017-04-13 PROCEDURE — 74011250636 HC RX REV CODE- 250/636: Performed by: FAMILY MEDICINE

## 2017-04-13 PROCEDURE — 97161 PT EVAL LOW COMPLEX 20 MIN: CPT | Performed by: PHYSICAL THERAPIST

## 2017-04-13 PROCEDURE — 74011250637 HC RX REV CODE- 250/637: Performed by: INTERNAL MEDICINE

## 2017-04-13 PROCEDURE — 97116 GAIT TRAINING THERAPY: CPT | Performed by: PHYSICAL THERAPIST

## 2017-04-13 PROCEDURE — 82962 GLUCOSE BLOOD TEST: CPT

## 2017-04-13 PROCEDURE — G8979 MOBILITY GOAL STATUS: HCPCS | Performed by: PHYSICAL THERAPIST

## 2017-04-13 PROCEDURE — 74011250637 HC RX REV CODE- 250/637: Performed by: NURSE PRACTITIONER

## 2017-04-13 PROCEDURE — 65660000000 HC RM CCU STEPDOWN

## 2017-04-13 PROCEDURE — 51798 US URINE CAPACITY MEASURE: CPT

## 2017-04-13 PROCEDURE — G8978 MOBILITY CURRENT STATUS: HCPCS | Performed by: PHYSICAL THERAPIST

## 2017-04-13 RX ORDER — SERTRALINE HYDROCHLORIDE 50 MG/1
25 TABLET, FILM COATED ORAL DAILY
Status: DISCONTINUED | OUTPATIENT
Start: 2017-04-13 | End: 2017-04-14 | Stop reason: HOSPADM

## 2017-04-13 RX ADMIN — CLONAZEPAM 1 MG: 1 TABLET ORAL at 18:32

## 2017-04-13 RX ADMIN — HYDROCODONE BITARTRATE AND ACETAMINOPHEN 1 TABLET: 5; 325 TABLET ORAL at 22:00

## 2017-04-13 RX ADMIN — HEPARIN SODIUM 5000 UNITS: 5000 INJECTION, SOLUTION INTRAVENOUS; SUBCUTANEOUS at 07:57

## 2017-04-13 RX ADMIN — VALSARTAN 80 MG: 80 TABLET ORAL at 08:00

## 2017-04-13 RX ADMIN — CLONAZEPAM 1 MG: 1 TABLET ORAL at 22:00

## 2017-04-13 RX ADMIN — HYDROCODONE BITARTRATE AND ACETAMINOPHEN 1 TABLET: 5; 325 TABLET ORAL at 07:56

## 2017-04-13 RX ADMIN — HYDROCODONE BITARTRATE AND ACETAMINOPHEN 1 TABLET: 5; 325 TABLET ORAL at 15:00

## 2017-04-13 RX ADMIN — POLYETHYLENE GLYCOL 3350 17 G: 17 POWDER, FOR SOLUTION ORAL at 08:01

## 2017-04-13 RX ADMIN — CLONAZEPAM 1 MG: 1 TABLET ORAL at 08:00

## 2017-04-13 RX ADMIN — POLYETHYLENE GLYCOL 3350 17 G: 17 POWDER, FOR SOLUTION ORAL at 18:31

## 2017-04-13 RX ADMIN — HEPARIN SODIUM 5000 UNITS: 5000 INJECTION, SOLUTION INTRAVENOUS; SUBCUTANEOUS at 18:31

## 2017-04-13 RX ADMIN — LACTULOSE 40 G: 10 SOLUTION ORAL at 08:00

## 2017-04-13 RX ADMIN — Medication 10 ML: at 22:01

## 2017-04-13 RX ADMIN — ASPIRIN 81 MG: 81 TABLET, COATED ORAL at 08:01

## 2017-04-13 RX ADMIN — DILTIAZEM HYDROCHLORIDE 120 MG: 120 CAPSULE, COATED, EXTENDED RELEASE ORAL at 08:00

## 2017-04-13 NOTE — PROGRESS NOTES
General Daily Progress Note    Admit Date: 4/4/2017  Hospital day several     Subjective:     Patient has no new symptoms has some cp and head tightness not new present for 7 yrs! Denies any abd pain and stooling fine      Medication side effects: none    Current Facility-Administered Medications   Medication Dose Route Frequency    sertraline (ZOLOFT) tablet 25 mg  25 mg Oral DAILY    valsartan (DIOVAN) tablet 80 mg  80 mg Oral DAILY    hydrALAZINE (APRESOLINE) 20 mg/mL injection 20 mg  20 mg IntraVENous Q6H PRN    atorvastatin (LIPITOR) tablet 10 mg  10 mg Oral DAILY    ibuprofen (MOTRIN) tablet 400 mg  400 mg Oral Q6H PRN    dilTIAZem CD (CARDIZEM CD) capsule 120 mg  120 mg Oral DAILY    polyethylene glycol (MIRALAX) packet 17 g  17 g Oral BID    lactulose (CHRONULAC) solution 40 g  60 mL Oral QID    clonazePAM (KlonoPIN) tablet 1 mg  1 mg Oral TID    docusate sodium (COLACE) capsule 100 mg  100 mg Oral DAILY PRN    HYDROcodone-acetaminophen (NORCO) 5-325 mg per tablet 1 Tab  1 Tab Oral Q6H PRN    sodium chloride (NS) flush 5-10 mL  5-10 mL IntraVENous PRN    heparin (porcine) injection 5,000 Units  5,000 Units SubCUTAneous Q8H    albuterol-ipratropium (DUO-NEB) 2.5 MG-0.5 MG/3 ML  3 mL Nebulization Q6H PRN    insulin lispro (HUMALOG) injection   SubCUTAneous AC&HS    glucose chewable tablet 16 g  4 Tab Oral PRN    dextrose (D50W) injection syrg 12.5-25 g  12.5-25 g IntraVENous PRN    glucagon (GLUCAGEN) injection 1 mg  1 mg IntraMUSCular PRN    0.9% sodium chloride infusion  75 mL/hr IntraVENous CONTINUOUS    aspirin delayed-release tablet 81 mg  81 mg Oral DAILY        Review of Systems  Pertinent items are noted in HPI. Objective:     No data found.        04/11 1901 - 04/13 0700  In: 600 [I.V.:600]  Out: 5 [Urine:5900]    Physical Exam:   Visit Vitals    /68    Pulse 68    Temp 98.1 °F (36.7 °C)    Resp 16    Ht 5' 8\" (1.727 m)    Wt 170 lb (77.1 kg)    SpO2 96%    BMI 25.85 kg/m2     Lungs: clear to auscultation bilaterally  Heart: regular rate and rhythm, S1, S2 normal, no murmur, click, rub or gallop  Abdomen: soft, non-tender. Bowel sounds normal. No masses,  no organomegaly  Extremities: extremities normal, atraumatic, no cyanosis or edema      ECG: normal sinus rhythm     Data Review   Recent Results (from the past 24 hour(s))   GLUCOSE, POC    Collection Time: 04/12/17  8:17 AM   Result Value Ref Range    Glucose (POC) 85 65 - 100 mg/dL    Performed by Paco Zimmer, POC    Collection Time: 04/12/17 11:12 AM   Result Value Ref Range    Glucose (POC) 124 (H) 65 - 100 mg/dL    Performed by Lm Cantu PCT    GLUCOSE, POC    Collection Time: 04/12/17  4:31 PM   Result Value Ref Range    Glucose (POC) 107 (H) 65 - 100 mg/dL    Performed by Lm Cantu PCT    GLUCOSE, POC    Collection Time: 04/12/17  8:37 PM   Result Value Ref Range    Glucose (POC) 174 (H) 65 - 100 mg/dL    Performed by Kiana Wen    GLUCOSE, POC    Collection Time: 04/13/17  7:18 AM   Result Value Ref Range    Glucose (POC) 82 65 - 100 mg/dL    Performed by Donita Villasenor (PCT)            Assessment:     Active Problems:    HTN, goal below 130/80 (9/7/2012)      Overview: Goal BP: < 130/80      Goal Progress: Had been at goal and is now not at goal.      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 Severe constipation- had several large bms,      2. L hydronephrosis secondary to constipation resolved on repeat ultrasound   Will discontinue martinez and monitor bladder scan prn   3. Diabetes- continue iss and diabetic diet   4.  Chest tightness- had cath by  Freida 2 years ago, showing some 30-50% lesions. I think chest tightness may just be anxiety (she is very anxious), however, normal  EKG and troponin will ask cardio to reeval \  5. Depression will ask psych  appreciate [facility] note however pt was dismissed from Dr Hi Reed for multiple no shows and has been dismissed from several psychiatrists in the area and does not show up for scheduled appts to [facility]. Will start zoloft and begin tapering of klonopin in a afew weeks thanks for consultation   Awaiting palliative medicine consultation   Judy Garcia M.D.   Family Medicine

## 2017-04-13 NOTE — PROGRESS NOTES
Bedside and Verbal shift change report given to [address] [place] [address] (oncoming nurse) by Laura Varner RN (offgoing nurse). Report included the following information SBAR, Kardex, Intake/Output and MAR.     Zone Phone for oncoming shift:   [phone]    Shift Summary: Vitals stable, complaints of headache medicated once    LDAs               Peripheral IV 04/04/17 Left Wrist (Active)   Site Assessment Clean, dry, & intact 4/13/2017  3:30 AM   Phlebitis Assessment 0 4/13/2017  3:30 AM   Infiltration Assessment 0 4/13/2017  3:30 AM   Dressing Status Clean, dry, & intact 4/13/2017  3:30 AM   Dressing Type Transparent;Tape 4/13/2017  3:30 AM   Hub Color/Line Status Blue;Capped;Flushed 4/13/2017  3:30 AM   Action Taken Other (comment) 4/5/2017  8:15 AM                    [REMOVED] Urinary Catheter 04/04/17 Crowder-Criteria for Appropriate Use: Obstruction/retention  Urinary Catheter 04/06/17 Crowder-Criteria for Appropriate Use: Obstruction/retention   Intake & Output   Date 04/12/17 0700 - 04/13/17 0659 04/13/17 0700 - 04/14/17 0659   Shift 1761-2785 2708-5159 24 Hour Total 4927-0727 4465-8021 24 Hour Total   I  N  T  A  K  E   I.V.  (mL/kg/hr) 600  (0.6)  600         Volume (0.9% sodium chloride infusion) 600  600       Shift Total  (mL/kg) 600  (7.8)  600  (7.8)      O  U  T  P  U  T   Urine  (mL/kg/hr) 1500  (1.6) 2600 4100         Urine Output (mL) (Urinary Catheter 04/06/17 Crowder) 1500 2600 4100       Shift Total  (mL/kg) 1500  (19.5) 2600  (33.7) 4100  (53.2)      NET -900 -2600 -3500      Weight (kg) 77.1 77.1 77.1 77.1 77.1 77.1      Last Bowel Movement Last Bowel Movement Date: 04/12/17   Glucose Checks [] N/A  [x] AC/HS  [] Q6  Concerns:   Nutrition Active Orders   Diet    DIET DIABETIC CONSISTENT CARB Regular       Consults []PT  []OT  []Speech  [x]Case Management   Cardiac Monitoring [x]N/A []Yes Expires:

## 2017-04-13 NOTE — CONSULTS
PSYCHIATRIC CONSULTATION                         IDENTIFICATION:    Patient Name  Chalino Bustos   Date of Birth 1945   [facility] [id number]   Medical Record Number  [id number]      Age  70 y.o.  [address] [place], MD   Admit date:  4/4/2017    Room Number  3253/01  @ [facility]   Date of Service  4/12/2017            HISTORY         REASON FOR HOSPITALIZATION/CONSULTATION:  A psychiatric consultation was requested by [address] [place], MD to evaluate or provide advice/opinion related to evaluating depression and anxiety. CC: \"I want to feel better\"    HISTORY OF PRESENT ILLNESS:    The patient, Chalino Bustos, is a 70 y.o. BLACK OR  female with a past psychiatric history significant for anxiety  who was admitted to the medical floor for the treatment of <principal problem not specified>. Pt seen today for evaluation. Pt reports several medical c/o for which she is already under investigation. She reports hx of anxiety and depression which has been chronic for many years. She reports poor sleep, erratic appetite, low mood and decreased energy. She is hopeful and denies feeling worthless and helpless. She is preoccupied with c/o chronic pain, CP, HA, constipation,and other abdominal issue and tend to get vague when asked to specify. review of her record indicate they are chronic problem. She has been taking klonopin from her PCP and has past hx of taking AD but reports \"they made me funny\". Pt denies SI/HI/AVH. She denies hx of shara or psychosis. Denies memory impairment and aware of her current surrounding and reason for admission. She denies drug or alcohol abuse. Pt has admission to [facility] for depression and f/u with Dr Louie Castaneda at [facility] in the past. Pt follows up with her PCP for her klonopin.    ALLERGIES:  Allergies   Allergen Reactions    Amoxicillin Hives    Sulfa (Sulfonamide Antibiotics) Hives and Itching    Amoxicillin Hives and Itching     Long time ago - patient not exactly certain what it does    Mirtazapine Itching and Nausea Only     Funny feeling in chest    Percocet [Oxycodone-Acetaminophen] Nausea and Vomiting    Codeine Nausea and Vomiting    Prednisone Itching    Sulfa (Sulfonamide Antibiotics) Hives, Itching and Palpitations     Think it was increased heart rate or itching - many years ago    Zithromax [Azithromycin] Itching     Not sure what it does,taken long time ago      MEDICATIONS PRIOR TO ADMISSION:  Prescriptions Prior to Admission   Medication Sig    clonazePAM (KLONOPIN) 1 mg tablet Take 1 Tab by mouth two (2) times a day. Max Daily Amount: 2 mg. As needed fill after 2/5/17    losartan (COZAAR) 25 mg tablet Take 0.5 Tabs by mouth nightly. Do not take the 50mg losartan pharmacist please discard all 50mg losartan rx's    topiramate (TOPAMAX) 25 mg tablet Take 1 tab at night for 1 week, then 2 tabs at night    rosuvastatin (CRESTOR) 5 mg tablet Take 5 mg by mouth nightly.  polyethylene glycol (MIRALAX) 17 gram/dose powder TAKE 1 CAPFUL IN 8 OUNCES OF WATER EVERY DAY    hydrocortisone (HYCORT) 1 % ointment Apply  to affected area two (2) times a day. use thin layer    aspirin delayed-release 81 mg tablet Take 1 Tab by mouth daily.       PAST MEDICAL HISTORY:  Past Medical History:   Diagnosis Date    Agoraphobia without mention of panic attacks 2/17/2014    Anxiety disorder 8/18/2013    Arthritis     osteo    Breast pain, left 4/7/2015    CAD (coronary artery disease), native coronary artery 12/1/2015    Chronic chest pain 1/13/2014    Chronic pain associated with significant psychosocial dysfunction 2/17/2014    Depression 8/18/2013    Diabetes ([facility] [address]     type II    Duplicated right renal collecting system 3/13/2014    GERD (gastroesophageal reflux disease)     Gout, joint     HTN, goal below 130/80 9/7/2012    Hypertension     Nephrolithiasis 3/13/2014    Other ill-defined conditions     hyercholesterolemia    Other ill-defined conditions     anxiety    Other ill-defined conditions     pt states head get tight,due to wax bill up    Other ill-defined conditions     pain left shoulder due to fall many years ago    Personal history of noncompliance with medical treatment, presenting hazards to health 5/30/2014    Psychiatric disorder     anxiety/ depression    Psychotic disorder     Vaginal pain 7/30/2014     Past Surgical History:   Procedure Laterality Date    EGD  4/23/2010         HX GYN      cyst removed from vagina    HX GYN      vaginal birth x7    HX HYSTERECTOMY      partial    HX OTHER SURGICAL      egd    HX OTHER SURGICAL      cyst removed from left wrist    HX OTHER SURGICAL      bladder dilitation    HX TUBAL LIGATION      HX UROLOGICAL      kidney stones      SOCIAL HISTORY:   Social History     Social History    Marital status:      Spouse name: N/A    Number of children: N/A    Years of education: N/A     Occupational History    Not on file. Social History Main Topics    Smoking status: Never Smoker    Smokeless tobacco: Never Used    Alcohol use No    Drug use: No    Sexual activity: No     Other Topics Concern    Not on file     Social History Narrative    ** Merged History Encounter **     , lives with her son and Friend. FAMILY HISTORY: History reviewed. No pertinent family history. Family History   Problem Relation Age of Onset    Stroke Mother     Heart Disease Mother     Cancer Father     Heart Disease Son     Liver Disease Son     Heart Disease Daughter     Malignant Hyperthermia Neg Hx     Pseudocholinesterase Deficiency Neg Hx     Delayed Awakening Neg Hx     Post-op Nausea/Vomiting Neg Hx     Emergence Delirium Neg Hx     Post-op Cognitive Dysfunction Neg Hx     Other Neg Hx        REVIEW of SYSTEMS:   Psychological ROS: positive for - anxiety and depression  Pertinent items are noted in the History of Present Illness.   All other Systems reviewed and are considered negative. MENTAL STATUS EXAM & VITALS       MENTAL STATUS EXAM (MSE):    MSE FINDINGS ARE WITHIN NORMAL LIMITS (WNL) UNLESS OTHERWISE STATED BELOW. Orientation oriented to time, place and person   Vital Signs (BP,Pulse, Temp) See below (reviewed 4/12/2017); vital signs are WNL if not listed below.    Gait and Station Stable, no ataxia   Abnormal Muscular Movements/Tone/Behavior No EPS, no Tardive Dyskinesia, no abnormal muscular movements; wnl tone   Relations cooperative   General Appearance:  age appropriate   Language No aphasia or dysarthria   Speech:  hypoverbal   Thought Processes logical, wnl rate of thoughts, good abstract reasoning and computation   Thought Associations logical   Thought Content free of delusions, free of hallucinations and preoccupations   Suicidal Ideations none   Homicidal Ideations none   Mood:  anxious and sad   Affect:  anxious, constricted and mood-congruent   Memory recent  adequate   Memory remote:  adequate   Concentration/Attention:  adequate   Fund of Knowledge average   Insight:  fair   Reliability fair   Judgment:  limited            VITALS:     Patient Vitals for the past 24 hrs:   Temp Pulse Resp BP SpO2   04/12/17 2112 98 °F (36.7 °C) 68 18 147/69 95 %   04/12/17 1603 97.7 °F (36.5 °C) 67 18 160/68 95 %   04/12/17 1109 97.8 °F (36.6 °C) 80 18 184/81 98 %   04/12/17 0700 98.3 °F (36.8 °C) 67 18 167/79 95 %   04/12/17 0345 98.1 °F (36.7 °C) (!) 59 16 141/67 94 %   04/12/17 0024 98.6 °F (37 °C) (!) 58 16 147/67 98 %              DATA     LABORATORY DATA:  Labs Reviewed   METABOLIC PANEL, COMPREHENSIVE - Abnormal; Notable for the following:        Result Value    Glucose 162 (*)     Creatinine 1.09 (*)     GFR est AA 60 (*)     GFR est non-AA 49 (*)     ALT (SGPT) 89 (*)     AST (SGOT) 49 (*)     Globulin 4.4 (*)     A-G Ratio 0.8 (*)     All other components within normal limits   URINALYSIS W/ REFLEX CULTURE - Abnormal; Notable for the following:     Appearance CLOUDY (*)     Blood SMALL (*)     All other components within normal limits   METABOLIC PANEL, BASIC - Abnormal; Notable for the following:     Sodium 146 (*)     Chloride 111 (*)     Calcium 8.4 (*)     All other components within normal limits   CBC W/O DIFF - Abnormal; Notable for the following:     HGB 11.2 (*)     All other components within normal limits   GLUCOSE, POC - Abnormal; Notable for the following:     Glucose (POC) 151 (*)     All other components within normal limits   GLUCOSE, POC - Abnormal; Notable for the following:     Glucose (POC) 59 (*)     All other components within normal limits   GLUCOSE, POC - Abnormal; Notable for the following:     Glucose (POC) 103 (*)     All other components within normal limits   GLUCOSE, POC - Abnormal; Notable for the following:     Glucose (POC) 114 (*)     All other components within normal limits   GLUCOSE, POC - Abnormal; Notable for the following:     Glucose (POC) 111 (*)     All other components within normal limits   GLUCOSE, POC - Abnormal; Notable for the following:     Glucose (POC) 120 (*)     All other components within normal limits   GLUCOSE, POC - Abnormal; Notable for the following:     Glucose (POC) 114 (*)     All other components within normal limits   GLUCOSE, POC - Abnormal; Notable for the following:     Glucose (POC) 101 (*)     All other components within normal limits   GLUCOSE, POC - Abnormal; Notable for the following:     Glucose (POC) 182 (*)     All other components within normal limits   GLUCOSE, POC - Abnormal; Notable for the following:     Glucose (POC) 118 (*)     All other components within normal limits   GLUCOSE, POC - Abnormal; Notable for the following:     Glucose (POC) 103 (*)     All other components within normal limits   GLUCOSE, POC - Abnormal; Notable for the following:     Glucose (POC) 104 (*)     All other components within normal limits   GLUCOSE, POC - Abnormal; Notable for the following:     Glucose (POC) 111 (*)     All other components within normal limits   GLUCOSE, POC - Abnormal; Notable for the following:     Glucose (POC) 166 (*)     All other components within normal limits   GLUCOSE, POC - Abnormal; Notable for the following:     Glucose (POC) 101 (*)     All other components within normal limits   GLUCOSE, POC - Abnormal; Notable for the following:     Glucose (POC) 116 (*)     All other components within normal limits   GLUCOSE, POC - Abnormal; Notable for the following:     Glucose (POC) 132 (*)     All other components within normal limits   GLUCOSE, POC - Abnormal; Notable for the following:     Glucose (POC) 106 (*)     All other components within normal limits   GLUCOSE, POC - Abnormal; Notable for the following:     Glucose (POC) 124 (*)     All other components within normal limits   GLUCOSE, POC - Abnormal; Notable for the following:     Glucose (POC) 107 (*)     All other components within normal limits   GLUCOSE, POC - Abnormal; Notable for the following:     Glucose (POC) 174 (*)     All other components within normal limits   WET PREP   KOH, OTHER SOURCES   CULTURE, URINE   CBC WITH AUTOMATED DIFF   CK W/ CKMB & INDEX   TROPONIN I   CHLAMYDIA / GC AMPLIFICATION   METABOLIC PANEL, BASIC   CBC WITH AUTOMATED DIFF   TROPONIN I   GLUCOSE, POC   GLUCOSE, POC   GLUCOSE, POC   GLUCOSE, POC   GLUCOSE, POC   GLUCOSE, POC   GLUCOSE, POC   GLUCOSE, POC   GLUCOSE, POC   GLUCOSE, POC   GLUCOSE, POC   GLUCOSE, POC   GLUCOSE, POC   GLUCOSE, POC   GLUCOSE, POC   GLUCOSE, POC   GLUCOSE, POC   GLUCOSE, POC   GLUCOSE, POC   GLUCOSE, POC   GLUCOSE, POC   POC GLUCOSE   POC GLUCOSE   POC GLUCOSE   POC GLUCOSE   POC GLUCOSE   POC GLUCOSE   POC GLUCOSE   POC GLUCOSE   POC GLUCOSE   POC GLUCOSE   POC GLUCOSE   POC GLUCOSE   POC GLUCOSE   POC GLUCOSE   POC GLUCOSE   POC GLUCOSE   POC GLUCOSE   POC GLUCOSE   POC GLUCOSE   POC GLUCOSE   POC GLUCOSE   POC GLUCOSE   POC GLUCOSE   POC GLUCOSE POC GLUCOSE   POC GLUCOSE   POC GLUCOSE   POC GLUCOSE   POC GLUCOSE   POC GLUCOSE   POC GLUCOSE   POC GLUCOSE   POC GLUCOSE   POC GLUCOSE     Admission on 04/04/2017   No results displayed because visit has over 200 results. RADIOLOGY REPORTS:    Results from Hospital Encounter encounter on 04/04/17   XR HAND RT MIN 3 V   Narrative EXAM:  XR HAND RT MIN 3 V    INDICATION:  pain. Patient grabbed by wrist several weeks ago; pain persists. COMPARISON: None. FINDINGS: Three views of the right hand demonstrate no fracture or other acute  osseous or articular abnormality. The soft tissues are within normal limits. There is diffuse osteopenia. Well-corticated ossicle at ulnar styloid process  likely reflect prior trauma. Mild osteophytic change. Atherosclerotic vascular  calcifications are noted. Impression IMPRESSION:  No fracture or other acute abnormality. Chronic changes as above. .        Xr Chest Pa Lat    Result Date: 2/23/2017  EXAM:  XR CHEST PA LAT. INDICATION: Productive cough. COMPARISON: 12/23/2016. FINDINGS: PA and lateral radiographs of the chest were obtained. Lungs: The lungs are clear of mass, nodule, airspace disease or edema. Pleura: There is no pleural effusion or pneumothorax. Mediastinum: The cardiac and mediastinal contours and pulmonary vascularity are normal.  The aorta is atherosclerotic. Bones and soft tissues: There are degenerative changes of the spine. IMPRESSION: No airspace disease or other acute abnormality. Xr Wrist Rt Ap/lat/obl Min 3v    Result Date: 4/4/2017  EXAM: XR WRIST RT AP/LAT/OBL MIN 3V INDICATION:  pain. Patient grabbed by wrist several weeks ago; pain persists. COMPARISON: None. FINDINGS: Three  views of the right wrist demonstrate no fracture or other acute osseous or articular abnormality. The soft tissues are within normal limits. There is diffuse osteopenia.  There is a well-corticated ossicle just distal to the ulnar styloid process likely reflective of prior injury. There is mild osteoarthritic change. Atherosclerotic calcifications are noted. IMPRESSION:  No fracture or other acute abnormality. Chronic changes as above area    Xr Hand Rt Min 3 V    Result Date: 4/4/2017  EXAM:  XR HAND RT MIN 3 V INDICATION:  pain. Patient grabbed by wrist several weeks ago; pain persists. COMPARISON: None. FINDINGS: Three views of the right hand demonstrate no fracture or other acute osseous or articular abnormality. The soft tissues are within normal limits. There is diffuse osteopenia. Well-corticated ossicle at ulnar styloid process likely reflect prior trauma. Mild osteophytic change. Atherosclerotic vascular calcifications are noted. IMPRESSION:  No fracture or other acute abnormality. Chronic changes as above. Frederich Castles Abd Flat/ Erect    Result Date: 4/4/2017  INDICATION: Abdominal pain EXAM: 2 views of the abdomen . Comparison December 23, 2016 FINDINGS: There is significant retained stool but no dilated loops of bowel or air-fluid levels. No free air. No pathologic calcifications. IMPRESSION: 1. Significant retained stool with large rectal stool ball. No evidence of obstruction. Ct Head Wo Cont    Result Date: 4/4/2017  EXAM:  CT HEAD WO CONT INDICATION:   dizzy, headache COMPARISON: August 16, 2016. TECHNIQUE: Unenhanced CT of the head was performed using 5 mm images. Brain and bone windows were generated. CT dose reduction was achieved through use of a standardized protocol tailored for this examination and automatic exposure control for dose modulation. FINDINGS: The ventricles and sulci are normal in size, shape and configuration and midline. There is no significant white matter disease. There is no intracranial hemorrhage, extra-axial collection, mass, mass effect or midline shift. The basilar cisterns are open. No acute infarct is identified. The bone windows demonstrate no abnormalities. No bone destruction.  No depressed skull fracture. Near complete opacification right maxillary sinus. IMPRESSION: No acute intracranial abnormality    Ct Abd Pelv Wo Cont    Result Date: 4/6/2017  EXAM: CT ABDOMEN AND PELVIS WITHOUT CONTRAST INDICATION:  Left hydronephrosis on renal ultrasound examination. . COMPARISON: 7/7/2015. CONTRAST: None. TECHNIQUE: Unenhanced multislice helical CT was performed from the diaphragm to the symphysis pubis without intravenous contrast administration. Oral contrast was not administered. Contiguous 5 mm axial images were reconstructed and lung and soft tissue windows were generated. Coronal and sagittal reformations were generated. CT dose reduction was achieved through use of a standardized protocol tailored for this examination and automatic exposure control for dose modulation. FINDINGS: LOWER CHEST: The visualized portions of the lung bases are clear. There are calcifications of the aortic valve and coronary arteries and there is a calcified granuloma in the left lower lobe and a calcified left-sided lymph node. The absence of intravenous contrast material reduces the sensitivity for evaluation of the solid parenchymal organs of the abdomen. ABDOMEN: Liver: The liver is normal in size and contour with no focal abnormality. Gallbladder and bile ducts: There are no calcified stones and there is no biliary duct dilatation. Spleen: No abnormality. Pancreas: No abnormality. Adrenal glands: No abnormality. Kidneys: No focal parenchymal abnormality. There are small medullary calcifications in the kidneys bilaterally and nonobstructing left renal stones. There is hydronephrosis and hydroureter on the left with no ureteral calculus identified. PELVIS: Reproductive organs: The uterus is absent. Bladder: There is a Crowder catheter within the urinary bladder and the bladder is empty. RETROPERITONEUM: The aorta is atherosclerotic and tapers without aneurysm. There is no retroperitoneal adenopathy or mass.  There is no pelvic mass or adenopathy. BOWEL AND MESENTERY: The stomach is markedly distended with food. The small bowel is not obstructed. The appendix is not identified. There is a large amount of stool in the colon. The rectum is markedly distended with stool and measures 13.2 x 11.3 x 20.2 cm displacing the bladder and ureterovesical junctions anteriorly. PERITONEUM: There is no ascites or free intraperitoneal air. BONES AND SOFT TISSUES: There is a compression fracture of T12 which is unchanged. IMPRESSION: 1. Marked distention of the stomach with food and marked distention of the colon with stool. The rectum is massively distended. 2. Nonobstructing renal calculi. 3. Left hydronephrosis and hydroureter probably secondary to compression of the ureterovesical junction by the distended rectum. 4. Atherosclerotic abdominal aorta without aneurysm. Aortic valve and coronary artery calcifications. 5. Status post hysterectomy. 6. T12 compression fracture unchanged. Us Abd Comp    Result Date: 4/5/2017  EXAM:  US ABD COMP INDICATION: Abdominal pain and urinary retention. COMPARISON: None. TECHNIQUE: Real-time sonography of the abdomen was performed with multiple static images of the liver, gallbladder, pancreas, spleen, kidneys and retroperitoneum obtained. FINDINGS: LIVER: The liver is echogenic with no mass or other focal abnormality. LIVER VASCULATURE: The portal vein flow is hepatopedal. GALLBLADDER: The gallbladder is fluid filled. There is cholelithiasis. There is no wall thickening or fluid around the gallbladder. COMMON BILE DUCT: There is no biliary duct dilatation and the common duct measures 3.8 mm in diameter. PANCREAS: The visualized pancreas is normal. SPLEEN: The spleen is normal in echotexture and size and measures 10.5 cm in length. RIGHT KIDNEY: The right kidney demonstrates normal echogenicity with no mass, stone or hydronephrosis. The right kidney measures 11.9 cm in length.  LEFT KIDNEY: The left kidney demonstrates normal echogenicity with moderate left hydronephrosis. The left kidney measures 10.7 cm in length. RETROPERITONEUM: The aorta tapers normally. The IVC is normal. No retroperitoneal mass is identified. IMPRESSION: 1. Echogenic liver compatible fatty infiltration or fibrosis. 2. Left hydronephrosis. Us Retroperitoneum Comp    Result Date: 4/11/2017  INDICATION: UTI. Exam: Bilateral renal ultrasound. FINDINGS: The right kidney measures 10.3 cm in sagittal length. The left kidney measures 10.7 cm in sagittal length. There is bilateral renal cortical thinning. There is no hydronephrosis. No renal cyst, calculus or mass is visualized. The abdominal aorta is of normal course and caliber. The aortic bifurcation is not well-visualized due to overlying bowel gas. The visualized IVC is normal. Crowder catheter is present within the urinary bladder. IMPRESSION: No hydronephrosis.               MEDICATIONS       ALL MEDICATIONS  Current Facility-Administered Medications   Medication Dose Route Frequency    [START ON 4/13/2017] valsartan (DIOVAN) tablet 80 mg  80 mg Oral DAILY    hydrALAZINE (APRESOLINE) 20 mg/mL injection 20 mg  20 mg IntraVENous Q6H PRN    atorvastatin (LIPITOR) tablet 10 mg  10 mg Oral DAILY    ibuprofen (MOTRIN) tablet 400 mg  400 mg Oral Q6H PRN    dilTIAZem CD (CARDIZEM CD) capsule 120 mg  120 mg Oral DAILY    polyethylene glycol (MIRALAX) packet 17 g  17 g Oral BID    lactulose (CHRONULAC) solution 40 g  60 mL Oral QID    clonazePAM (KlonoPIN) tablet 1 mg  1 mg Oral TID    docusate sodium (COLACE) capsule 100 mg  100 mg Oral DAILY PRN    HYDROcodone-acetaminophen (NORCO) 5-325 mg per tablet 1 Tab  1 Tab Oral Q6H PRN    sodium chloride (NS) flush 5-10 mL  5-10 mL IntraVENous PRN    heparin (porcine) injection 5,000 Units  5,000 Units SubCUTAneous Q8H    albuterol-ipratropium (DUO-NEB) 2.5 MG-0.5 MG/3 ML  3 mL Nebulization Q6H PRN    insulin lispro (HUMALOG) injection   SubCUTAneous AC&HS    glucose chewable tablet 16 g  4 Tab Oral PRN    dextrose (D50W) injection syrg 12.5-25 g  12.5-25 g IntraVENous PRN    glucagon (GLUCAGEN) injection 1 mg  1 mg IntraMUSCular PRN    0.9% sodium chloride infusion  75 mL/hr IntraVENous CONTINUOUS    aspirin delayed-release tablet 81 mg  81 mg Oral DAILY      SCHEDULED MEDICATIONS  Current Facility-Administered Medications   Medication Dose Route Frequency    [START ON 4/13/2017] valsartan (DIOVAN) tablet 80 mg  80 mg Oral DAILY    atorvastatin (LIPITOR) tablet 10 mg  10 mg Oral DAILY    dilTIAZem CD (CARDIZEM CD) capsule 120 mg  120 mg Oral DAILY    polyethylene glycol (MIRALAX) packet 17 g  17 g Oral BID    lactulose (CHRONULAC) solution 40 g  60 mL Oral QID    clonazePAM (KlonoPIN) tablet 1 mg  1 mg Oral TID    heparin (porcine) injection 5,000 Units  5,000 Units SubCUTAneous Q8H    insulin lispro (HUMALOG) injection   SubCUTAneous AC&HS    0.9% sodium chloride infusion  75 mL/hr IntraVENous CONTINUOUS    aspirin delayed-release tablet 81 mg  81 mg Oral DAILY                ASSESSMENT & PLAN        The patient Ella Neil is a 70 y.o.  female who presents at this time for treatment of the following diagnoses:    1. Generalized Anxiety disorder  2. Dysthymia r/o somatoform disorder      Assessment: pt reports several physical c/o for which she is under investigation and/or received treatment. Currently has anxiety with depressive features. Pt denies SI/HI/AVH. Discussed medication options including Antidepressant. Pt states that she will review her med options with her family before starting it. Plan: consider starting Zoloft 25 mg daily with a plan to titrate further to 50 mg daily once pt agrees to accept treatment. Pt is on high dose benzo- Klonopin 1 mg TID which should be tapered slowly to avoid risk of withdrawal. Pt is not a appropriate for Inpatient psych admission.  Pt has been f/u with Dr Maxwell Rashid at [facility] in the past but want to another provider in her area. Disposition: to be followed on an outpatient basis with Psychiatrist. Recommend Psychotherapy. Thank you very much for the opportunity to participate in the care of your patient. I will continue to follow up with patient as deemed appropriate. I have reviewed admission (and previous/old) labs and medical tests in the EHR and or transferring hospital documents. I will continue to order blood tests/labs and diagnostic tests as deemed appropriate and review results as they become available (see orders for details). I have reviewed old psychiatric and medical records available in the EHR. I Will order additional psychiatric records from other institutions to further elucidate the nature of patient's psychopathology and review once available. I will gather additional collateral information from friends, family and o/p treatment team to further elucidate the nature of patient's psychopathology and baselline level of psychiatric functioning.                      SIGNED:    Tamiko Wylie MD  4/12/2017

## 2017-04-13 NOTE — PROGRESS NOTES
Problem: Mobility Impaired (Adult and Pediatric)  Goal: *Acute Goals and Plan of Care (Insert Text)  Physical Therapy Goals  Initiated 4/13/2017  1. Patient will move from supine to sit and sit to supine , scoot up and down and roll side to side in bed with independence within 7 day(s). 2. Patient will transfer from bed to chair and chair to bed with supervision/set-up using the least restrictive device within 7 day(s). 3. Patient will perform sit to stand with supervision/set-up within 7 day(s). 4. Patient will ambulate with supervision/set-up for 200 feet with the least restrictive device within 7 day(s). 5. Patient will ascend/descend 2 stairs with 1 handrail(s) with supervision/set-up within 7 day(s). PHYSICAL THERAPY EVALUATION  Patient: Luana Flores (67 y.o. female)  Date: 4/13/2017  Primary Diagnosis: Orthostatic hypotension        Precautions: falls         ASSESSMENT :  Based on the objective data described below, the patient presents with generalized weakness and impaired functional mobility, balance, endurance and safety awareness. Patient is verbally perseverative regarding medication and headache. She also is perseverative regarding Easter dinner. She is somewhat difficult to redirect. Patient requiring min A and VC for safety for overall mobility. Gait is mildly unsteady using RW for support demonstrating short shuffled steps and narrowed base of support. Patient reports independence at baseline and lives with family. Depending on further progress with therapy recommend SNF vs HHPT with 24 hour supervision. Patient is focused on returning to home for Easter and will likely refuse SNF rehab     Patient will benefit from skilled intervention to address the above impairments.   Patients rehabilitation potential is considered to be Fair  Factors which may influence rehabilitation potential include:   [ ]         None noted  [ ]         Mental ability/status  [ ]         Medical condition  [ ] Home/family situation and support systems  [ ]         Safety awareness  [ ]         Pain tolerance/management  [ ]         Other:        PLAN :  Recommendations and Planned Interventions:  [ ]           Bed Mobility Training             [ ]    Neuromuscular Re-Education  [ ]           Transfer Training                   [ ]    Orthotic/Prosthetic Training  [ ]           Gait Training                         [ ]    Modalities  [ ]           Therapeutic Exercises           [ ]    Edema Management/Control  [ ]           Therapeutic Activities            [ ]    Patient and Family Training/Education  [ ]           Other (comment):     Frequency/Duration: Patient will be followed by physical therapy  4 times a week to address goals. Discharge Recommendations: [facility] with 24 hour supervision  Further Equipment Recommendations for Discharge: rolling walker       SUBJECTIVE:   Patient stated I think that Zoloft gave me this headache. I'm not going to take it anymore.       OBJECTIVE DATA SUMMARY:   HISTORY:    Past Medical History:   Diagnosis Date    Agoraphobia without mention of panic attacks 2/17/2014    Anxiety disorder 8/18/2013    Arthritis       osteo    Breast pain, left 4/7/2015    CAD (coronary artery disease), native coronary artery 12/1/2015    Chronic chest pain 1/13/2014    Chronic pain associated with significant psychosocial dysfunction 2/17/2014    Depression 8/18/2013    Diabetes ([facility] [address]       type II    Duplicated right renal collecting system 3/13/2014    GERD (gastroesophageal reflux disease)      Gout, joint      HTN, goal below 130/80 9/7/2012    Hypertension      Nephrolithiasis 3/13/2014    Other ill-defined conditions       hyercholesterolemia    Other ill-defined conditions       anxiety    Other ill-defined conditions       pt states head get tight,due to wax bill up    Other ill-defined conditions       pain left shoulder due to fall many years ago    Personal history of noncompliance with medical treatment, presenting hazards to health 5/30/2014    Psychiatric disorder       anxiety/ depression    Psychotic disorder      Vaginal pain 7/30/2014     Past Surgical History:   Procedure Laterality Date    EGD   4/23/2010          HX GYN         cyst removed from vagina    HX GYN         vaginal birth x7    HX HYSTERECTOMY         partial    HX OTHER SURGICAL         egd    HX OTHER SURGICAL         cyst removed from left wrist    HX OTHER SURGICAL         bladder dilitation    HX TUBAL LIGATION        HX UROLOGICAL         kidney stones     Prior Level of Function/Home Situation: patient reports independence with all mobility and ADLs        Home Situation  Home Environment: Private residence  # Steps to Enter: 2  Rails to Enter: Yes  Hand Rails : Bilateral  One/Two Story Residence: Two story, live on 1st floor  Living Alone: No  Support Systems: Family member(s)  Patient Expects to be Discharged to[de-identified] Private residence  Current DME Used/Available at Home: None  Tub or Shower Type: Tub/Shower combination     EXAMINATION/PRESENTATION/DECISION MAKING:   Critical Behavior:  Neurologic State: Alert  Orientation Level: Oriented to person, Oriented to place, Disoriented to situation, Disoriented to time  Cognition: Follows commands     Hearing: Auditory  Auditory Impairment: None     Range Of Motion:  AROM: Generally decreased, functional                       Strength:    Strength: Generally decreased, functional                    Tone & Sensation:   Tone: Normal                              Coordination:  Coordination: Generally decreased, functional  Vision:      Functional Mobility:  Bed Mobility:  Rolling: Minimum assistance; Additional time  Supine to Sit: Minimum assistance     Scooting: Contact guard assistance  Transfers:  Sit to Stand: Minimum assistance (VC for safety)  Stand to Sit: Contact guard assistance (VC for safety) Balance:   Sitting: Intact  Standing: Impaired  Standing - Static: Good;Constant support  Standing - Dynamic : Fair (using RW for support)  Ambulation/Gait Training:  Distance (ft): 125 Feet (ft)  Assistive Device: Walker, rolling  Ambulation - Level of Assistance: Minimal assistance        Gait Abnormalities: Decreased step clearance        Base of Support: Narrowed     Speed/Gale: Pace decreased (<100 feet/min); Shuffled; Slow  Step Length: Left shortened;Right shortened      Gait is slow and unsteady demonstrating short shuffled steps            Functional Measure:     Elder Mobility Scale      8/20            EMS and G-code impairment scale:  Percentage of impairment CH  0% CI  1-19% CJ  20-39% CK  40-59% CL  60-79% CM  80-99% CN  100%   EMS Score 0-20 20 17-19 13-16 9-12 5-8 1-4 0      Scores under 10 - generally these patients are dependent in mobility maneuvers; require help with  basic ADL, such as transfers, toileting and dressing. Scores between 10 - 13 - generally these patients are borderline in terms of safe mobility and  independence in ADL i.e. they require some help with some mobility maneuvers. Scores over 14 - Generally these patients are able to perform mobility maneuvers alone and safely  and are independent in basic ADL. G codes: In compliance with CMSs Claims Based Outcome Reporting, the following G-code set was chosen for this patient based on their primary functional limitation being treated: The outcome measure chosen to determine the severity of the functional limitation was the Elderly Mobility scale with a score of 8/20 which was correlated with the impairment scale.       · Mobility - Walking and Moving Around:               - CURRENT STATUS:    CL - 60%-79% impaired, limited or restricted               - GOAL STATUS:           CI - 1%-19% impaired, limited or restricted               - D/C STATUS: ---------------To be determined---------------            Pain:  Pain Scale 1: Visual  Pain Intensity 1: 0  Pain Location 1: Head  Pain Orientation 1: Medial  Pain Description 1: Aching  Pain Intervention(s) 1: Medication (see MAR)  Activity Tolerance:   VSS  Please refer to the flowsheet for vital signs taken during this treatment. After treatment:   [X]         Patient left in no apparent distress sitting up in chair  [ ]         Patient left in no apparent distress in bed  [X]         Call bell left within reach  [X]         Nursing notified  [ ]         Caregiver present  [X]         Bed alarm activated      COMMUNICATION/EDUCATION:   The patients plan of care was discussed with: Registered Nurse and Rehabilitation Attendant. [X]         Fall prevention education was provided and the patient/caregiver indicated understanding. [X]         Patient/family have participated as able in goal setting and plan of care. [X]         Patient/family agree to work toward stated goals and plan of care. [ ]         Patient understands intent and goals of therapy, but is neutral about his/her participation. [ ]         Patient is unable to participate in goal setting and plan of care.      Thank you for this referral.  Rachel Jhaveri, PT   Time Calculation: 34 mins

## 2017-04-13 NOTE — CONSULTS
Palliative Medicine Consult  [place]: [phone] ([phone])    Patient Name: Yi Galaviz  YOB: 1945    Date of Initial Consult: 2017  Reason for Consult: psychosocial distress  Requesting Provider: Dr. Nicanor Tolentino  Primary Care Physician: [address] [place], MD      SUMMARY:   Yi Galaviz is a 70 y.o. woman with PMH most significant for HTN, DM2, CAD, GERD, anxiety and depression who was admitted on 2017 from home for dizziness, HA, and abd pain    Current medical issues leading to Palliative Medicine involvement include: need for psychosocial support in setting of coming to ED frequently instead of f/u regularly w/ her PCP  Presenting w/ dizziness, acute renal insufficiency, abd pain, CP, elevated LFTs     PALLIATIVE DIAGNOSES:   1. Chronic HA  2. Chronic CP  3. Anxiety  4. Depression  5. Somatoform d/o  6. Insomnia  7. Weakness  8. Nausea  9. Noncompliance  10. Assistance needed w/ transportation     PLAN:   1. Visited pt in her rm. No family at bedside. Reggie Burns (RADHA) and I visited from our team  2. We introduced ourselves and provided simplified explanation of our role as the supportive care team.  Pt was somewhat amenable to our visit. She wanted to confirm that I am not a psychiatrist  3. We did not speak about goals of care or advance care planning. This is per discussion w/ Dr. Michael Yu prior to making our visit. 4. Pt shared a great deal about her family including having lost 4 family members in the past few months. One of those was her son. She has 5 living children; 2 are . She lives w/ her son, GS, and a friend. Some family is local.    5. Pt reports that she sees Dr. Michael Yu ~q6 wks. She also sees Dr. LYSSA FONTAINE. She says she has not seen a psychiatrist in many years. She is focused on her chronic HAs. She says that she has missed appointments b/c she does not have a ride to them frequently.     6. Chronic HAs- unsure of etiology but likely multifactorial.  Pt has ibuprofen 400 mg PO q6h prn and Norco 5-325 mg tabs #1 PO q6h prn. We also recommend sparingly using NSAIDs given her age and relative contraindication. Recommend not using opioid prn regimen d/t high likelihood of refractory, worsening effect. Recommend Tylenol prn. Also, encourage pt to comb her hair less and to wash her hair more often. Reducing her anxiety level will likely reduce severity and frequency of HAs. 7. Chronic CP- see w/u and assessment by Cards  8. Anxiety- continue current med regimen. 9. Depression- continue current med regimen. Consider, however, d/c'ing Zoloft and starting low dose Remeron qhs. 10. Insomnia- Pt reports that she has not been able to sleep for days. Trouble w/ sleeping has been an ongoing problem. see #9  11. Nausea- pt states that she has some nausea but does not wish to take anything for it. 12. Other- we will try to encourage pt to engage in psychotherapy if possible. 13. We will continue establish therapeutic alliance w/ pt as well as provide psychosocial support. 14. Discussed w/ Dr. Alphonse Bales. Time and input appreciated  15. Discussed w/ bedside RNsPriya. Time and input appreciated. 16. Communicated plan of care with: Palliative IDT     GOALS OF CARE / TREATMENT PREFERENCES:   [====Goals of Care====]  GOALS OF CARE:  Patient / health care proxy stated goals: not discussed  Assumed to be for full, restorative tx and all measures that support this      TREATMENT PREFERENCES:   Code Status: Full Code    Advance Care Planning:  Advance Care Planning 4/4/2017   Patient's Healthcare Decision Maker is: Legal Next of Kin   Primary Decision Maker Name -   Primary Decision [address] [place] [address] Phone Number -   Primary Decision Maker Relationship to Patient -   Confirm Advance Directive None   Patient Would Like to Complete Advance Directive -   Pt does not have spouse.   Surrogate decision making would fall upon majority of her 5 living children    Other:    The palliative care team has discussed with patient / health care proxy about goals of care / treatment preferences for patient.  [====Goals of Care====]         HISTORY:     History obtained from: pt, PCP, and chart    CHIEF COMPLAINT: HA    HPI/SUBJECTIVE:    The patient is:   [x] Verbal and participatory  [] Non-participatory due to:     Pt is a 69 y/o AAF w/ PMH as noted above who presented to our ED w/ dizziness, HA, and abd pain. Pt apparently was having constipation x several days as well as poor appetite, some abd pain. Pt reported having chronic HA. In our ED, pt was found to be orthostatic and elevated LFTs. BUN/Cr 18/1.09. Pt was experiencing urinary retention.      Clinical Pain Assessment (nonverbal scale for severity on nonverbal patients):   [++++ Clinical Pain Assessment++++]  [++++Pain Severity++++]: Pain: 4  [++++Pain Character++++]: pt is not able to relay  [++++Pain Duration++++]: persistent  [++++Pain Effect++++]:   [++++Pain Factors++++]: relieved w/ Norco prn  [++++Pain Frequency++++]: pt is not able to report  [++++Pain Location++++]:  over front and top of head  [++++ Clinical Pain Assessment++++]     FUNCTIONAL ASSESSMENT:     Palliative Performance Scale (PPS):  PPS: 60       PSYCHOSOCIAL/SPIRITUAL SCREENING:     Advance Care Planning:  Advance Care Planning 4/4/2017   Patient's Healthcare Decision Maker is: Legal Next of Kin   Primary Decision Maker Name -   Primary Decision [address] [place] [address] Phone Number -   Primary Decision Maker Relationship to Patient -   Confirm Advance Directive None   Patient Would Like to Complete Advance Directive -        Any spiritual / Druze concerns:  [] Yes /  [x] No     Caregiver Burnout:  [] Yes /  [] No /  [x] No Caregiver Present      Anticipatory grief assessment:   [] Normal  / [] Maladaptive       ESAS Anxiety: Anxiety: 5    ESAS Depression: Depression: 3        REVIEW OF SYSTEMS:     Positive and pertinent negative findings in ROS are noted above in HPI. The following systems were [x] reviewed / [] unable to be reviewed as noted in HPI  Other findings are noted below. Systems: constitutional, ears/nose/mouth/throat, respiratory, gastrointestinal, genitourinary, musculoskeletal, integumentary, neurologic, psychiatric, endocrine. Positive findings noted below. Modified ESAS Completed by: provider   Fatigue: 3 Drowsiness: 0   Depression: 3 Pain: 4   Anxiety: 5 Nausea: 2   Anorexia: 0 Dyspnea: 0           Stool Occurrence(s): 1        PHYSICAL EXAM:     From RN flowsheet:  Wt Readings from Last 3 Encounters:   04/04/17 170 lb (77.1 kg)   03/08/17 162 lb (73.5 kg)   02/27/17 162 lb 1.6 oz (73.5 kg)     Blood pressure 156/78, pulse 66, temperature 97.6 °F (36.4 °C), resp. rate 18, height 5' 8\" (1.727 m), weight 170 lb (77.1 kg), SpO2 98 %.     Pain Scale 1: Numeric (0 - 10)  Pain Intensity 1: 0  Pain Onset 1: acute  Pain Location 1: Head  Pain Orientation 1: Medial  Pain Description 1: Aching  Pain Intervention(s) 1: Medication (see MAR)    Gen: sitting up in chair at bedside, unkempt, in NAD  HEENT: NC/AT, MMM  Respiratory: breathing not labored, symmetric  Skin: warm, dry  Neurologic: awake, alert, COPPOLA spontaneously  Psychiatric: bizarre and constricted affect, no obvious signs of agitation, anxious  Poor memory at times  Insight and judgment are fair     HISTORY:     Active Problems:    HTN, goal below 130/80 (9/7/2012)      Overview: Goal BP: < 130/80      Goal Progress: Had been at goal and is now not at goal.      BP Readings from Last 3 Encounters:       07/29/14 165/91       07/11/14 130/90       07/09/14 124/80                   Other somatoform disorders (2/17/2014)      Diabetes mellitus type 2, diet-controlled (HCC) (5/26/2016)      Orthostatic hypotension (4/4/2017)      Hydronephrosis of left kidney (4/7/2017)      Left-sided chest wall pain (4/11/2017)      Weakness (4/13/2017)      Assistance needed with transportation (4/13/2017)      Past Medical History:   Diagnosis Date    Agoraphobia without mention of panic attacks 2/17/2014    Anxiety disorder 8/18/2013    Arthritis     osteo    Breast pain, left 4/7/2015    CAD (coronary artery disease), native coronary artery 12/1/2015    Chronic chest pain 1/13/2014    Chronic pain associated with significant psychosocial dysfunction 2/17/2014    Depression 8/18/2013    Diabetes ([facility] [address]     type II    Duplicated right renal collecting system 3/13/2014    GERD (gastroesophageal reflux disease)     Gout, joint     HTN, goal below 130/80 9/7/2012    Hypertension     Nephrolithiasis 3/13/2014    Other ill-defined conditions     hyercholesterolemia    Other ill-defined conditions     anxiety    Other ill-defined conditions     pt states head get tight,due to wax bill up    Other ill-defined conditions     pain left shoulder due to fall many years ago    Personal history of noncompliance with medical treatment, presenting hazards to health 5/30/2014    Psychiatric disorder     anxiety/ depression    Psychotic disorder     Vaginal pain 7/30/2014      Past Surgical History:   Procedure Laterality Date    EGD  4/23/2010         HX GYN      cyst removed from vagina    HX GYN      vaginal birth x7    HX HYSTERECTOMY      partial    HX OTHER SURGICAL      egd    HX OTHER SURGICAL      cyst removed from left wrist    HX OTHER SURGICAL      bladder dilitation    HX TUBAL LIGATION      HX UROLOGICAL      kidney stones      Family History   Problem Relation Age of Onset    Stroke Mother     Heart Disease Mother     Cancer Father     Heart Disease Son     Liver Disease Son     Heart Disease Daughter     Malignant Hyperthermia Neg Hx     Pseudocholinesterase Deficiency Neg Hx     Delayed Awakening Neg Hx     Post-op Nausea/Vomiting Neg Hx     Emergence Delirium Neg Hx     Post-op Cognitive Dysfunction Neg Hx     Other Neg Hx       History reviewed, no pertinent family history.   Social History   Substance Use Topics    Smoking status: Never Smoker    Smokeless tobacco: Never Used    Alcohol use No     Allergies   Allergen Reactions    Amoxicillin Hives    Sulfa (Sulfonamide Antibiotics) Hives and Itching    Amoxicillin Hives and Itching     Long time ago - patient not exactly certain what it does    Mirtazapine Itching and Nausea Only     Funny feeling in chest    Percocet [Oxycodone-Acetaminophen] Nausea and Vomiting    Codeine Nausea and Vomiting    Prednisone Itching    Sulfa (Sulfonamide Antibiotics) Hives, Itching and Palpitations     Think it was increased heart rate or itching - many years ago    Zithromax [Azithromycin] Itching     Not sure what it does,taken long time ago      Current Facility-Administered Medications   Medication Dose Route Frequency    sertraline (ZOLOFT) tablet 25 mg  25 mg Oral DAILY    valsartan (DIOVAN) tablet 80 mg  80 mg Oral DAILY    hydrALAZINE (APRESOLINE) 20 mg/mL injection 20 mg  20 mg IntraVENous Q6H PRN    atorvastatin (LIPITOR) tablet 10 mg  10 mg Oral DAILY    ibuprofen (MOTRIN) tablet 400 mg  400 mg Oral Q6H PRN    dilTIAZem CD (CARDIZEM CD) capsule 120 mg  120 mg Oral DAILY    polyethylene glycol (MIRALAX) packet 17 g  17 g Oral BID    lactulose (CHRONULAC) solution 40 g  60 mL Oral QID    clonazePAM (KlonoPIN) tablet 1 mg  1 mg Oral TID    docusate sodium (COLACE) capsule 100 mg  100 mg Oral DAILY PRN    HYDROcodone-acetaminophen (NORCO) 5-325 mg per tablet 1 Tab  1 Tab Oral Q6H PRN    sodium chloride (NS) flush 5-10 mL  5-10 mL IntraVENous PRN    heparin (porcine) injection 5,000 Units  5,000 Units SubCUTAneous Q8H    albuterol-ipratropium (DUO-NEB) 2.5 MG-0.5 MG/3 ML  3 mL Nebulization Q6H PRN    insulin lispro (HUMALOG) injection   SubCUTAneous AC&HS    glucose chewable tablet 16 g  4 Tab Oral PRN    dextrose (D50W) injection syrg 12.5-25 g  12.5-25 g IntraVENous PRN    glucagon (GLUCAGEN) injection 1 mg  1 mg IntraMUSCular PRN    0.9% sodium chloride infusion  75 mL/hr IntraVENous CONTINUOUS    aspirin delayed-release tablet 81 mg  81 mg Oral DAILY          LAB AND IMAGING FINDINGS:     Lab Results   Component Value Date/Time    WBC 4.4 04/06/2017 03:24 AM    HGB 11.7 04/06/2017 03:24 AM    PLATELET 936 34/39/1586 03:24 AM     Lab Results   Component Value Date/Time    Sodium 142 04/06/2017 03:24 AM    Potassium 3.6 04/06/2017 03:24 AM    Chloride 108 04/06/2017 03:24 AM    CO2 26 04/06/2017 03:24 AM    BUN 13 04/06/2017 03:24 AM    Creatinine 0.85 04/06/2017 03:24 AM    Calcium 8.8 04/06/2017 03:24 AM    Magnesium 2.1 01/26/2017 02:06 PM      Lab Results   Component Value Date/Time    AST (SGOT) 49 04/04/2017 04:51 AM    Alk. phosphatase 89 04/04/2017 04:51 AM    Protein, total 8.1 04/04/2017 04:51 AM    Albumin 3.7 04/04/2017 04:51 AM    Globulin 4.4 04/04/2017 04:51 AM     Lab Results   Component Value Date/Time    INR 1.0 09/24/2014 03:25 PM    INR (POC) 1.1 12/01/2015 09:20 AM    Prothrombin time 10.7 09/24/2014 03:25 PM    aPTT 22.8 09/24/2014 03:25 PM      Lab Results   Component Value Date/Time    Iron 76 07/31/2015 01:27 PM    TIBC 238 07/31/2015 01:27 PM    Iron % saturation 32 07/31/2015 01:27 PM      No results found for: PH, PCO2, PO2  No components found for: [place]   Lab Results   Component Value Date/Time     04/04/2017 04:51 AM    CK - MB 1.1 04/04/2017 04:51 AM                Total time:  50 min  Counseling / coordination time: 35 min   > 50% counseling / coordination?: yes    Prolonged service was provided for  []30 min   []75 min in face to face time in the presence of the patient. Time Start:   Time End:   Note: this can only be billed with 57105 (initial) or 42976 (follow up). If multiple start / stop times, list each separately.

## 2017-04-13 NOTE — PROGRESS NOTES
General Daily Progress Note    Admit Date: 4/4/2017  Hospital day 4/13/2017    Subjective:some stool with laxatives     Y  N  [] [x]  Abd Pain:    [] [x]  Nausea:  [] [x] Vomiting:  [] []  Diarrhea:  [] []  Constipation:  [] []  Melena:  [] []  Hematochezia:  [] []  Tolerating Diet:    Current Facility-Administered Medications   Medication Dose Route Frequency    sertraline (ZOLOFT) tablet 25 mg  25 mg Oral DAILY    valsartan (DIOVAN) tablet 80 mg  80 mg Oral DAILY    hydrALAZINE (APRESOLINE) 20 mg/mL injection 20 mg  20 mg IntraVENous Q6H PRN    atorvastatin (LIPITOR) tablet 10 mg  10 mg Oral DAILY    ibuprofen (MOTRIN) tablet 400 mg  400 mg Oral Q6H PRN    dilTIAZem CD (CARDIZEM CD) capsule 120 mg  120 mg Oral DAILY    polyethylene glycol (MIRALAX) packet 17 g  17 g Oral BID    lactulose (CHRONULAC) solution 40 g  60 mL Oral QID    clonazePAM (KlonoPIN) tablet 1 mg  1 mg Oral TID    docusate sodium (COLACE) capsule 100 mg  100 mg Oral DAILY PRN    HYDROcodone-acetaminophen (NORCO) 5-325 mg per tablet 1 Tab  1 Tab Oral Q6H PRN    sodium chloride (NS) flush 5-10 mL  5-10 mL IntraVENous PRN    heparin (porcine) injection 5,000 Units  5,000 Units SubCUTAneous Q8H    albuterol-ipratropium (DUO-NEB) 2.5 MG-0.5 MG/3 ML  3 mL Nebulization Q6H PRN    insulin lispro (HUMALOG) injection   SubCUTAneous AC&HS    glucose chewable tablet 16 g  4 Tab Oral PRN    dextrose (D50W) injection syrg 12.5-25 g  12.5-25 g IntraVENous PRN    glucagon (GLUCAGEN) injection 1 mg  1 mg IntraMUSCular PRN    0.9% sodium chloride infusion  75 mL/hr IntraVENous CONTINUOUS    aspirin delayed-release tablet 81 mg  81 mg Oral DAILY        Review of Systems  Pertinent items are noted in HPI. Objective:     No data found.        04/11 1901 - 04/13 0700  In: 600 [I.V.:600]  Out: 5900 [Urine:5900]    Physical Exam:   Visit Vitals    /68    Pulse 68    Temp 98.1 °F (36.7 °C)    Resp 16    Ht 5' 8\" (1.727 m)    Wt 77.1 kg (170 lb)    SpO2 96%    BMI 25.85 kg/m2     General appearance: alert, cooperative, no distress, appears stated age  Abdomen: soft, non-tender. Bowel sounds normal. No masses,  no organomegaly      Data Review   Recent Results (from the past 24 hour(s))   GLUCOSE, POC    Collection Time: 04/12/17  8:17 AM   Result Value Ref Range    Glucose (POC) 85 65 - 100 mg/dL    Performed by Paco Zimmer, POC    Collection Time: 04/12/17 11:12 AM   Result Value Ref Range    Glucose (POC) 124 (H) 65 - 100 mg/dL    Performed by Ilsa Carrillo PCT    GLUCOSE, POC    Collection Time: 04/12/17  4:31 PM   Result Value Ref Range    Glucose (POC) 107 (H) 65 - 100 mg/dL    Performed by Ilsa Carrillo PCT    GLUCOSE, POC    Collection Time: 04/12/17  8:37 PM   Result Value Ref Range    Glucose (POC) 174 (H) 65 - 100 mg/dL    Performed by [address] [place] [address] [address], POC    Collection Time: 04/13/17  7:18 AM   Result Value Ref Range    Glucose (POC) 82 65 - 100 mg/dL    Performed by Hui Marrero (PCT)          Assessment:     Active Problems:    HTN, goal below 130/80 (9/7/2012)      Overview: Goal BP: < 130/80      Goal Progress: Had been at goal and is now not at goal.      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   Will keep on laxatives at home to prevent further impaction

## 2017-04-13 NOTE — PROGRESS NOTES
Bedside and Verbal shift change report given to Shawnee (oncoming nurse) by Santiago Doyle RN   (offgoing nurse). Report included the following information SBAR, Kardex and Recent Results. Zone Phone for oncoming shift:   [phone]    Shift Summary: pt anxious, worried about all things. Psych in to see her this evening. Recommended that Dr. Oscar Lala start her on Zoloft, which she has been on in the past.    LDAs               Peripheral IV 04/04/17 Left Wrist (Active)   Site Assessment Clean, dry, & intact 4/12/2017  6:00 PM   Phlebitis Assessment 0 4/12/2017  6:00 PM   Infiltration Assessment 0 4/12/2017  6:00 PM   Dressing Status Clean, dry, & intact 4/12/2017  6:00 PM   Dressing Type Tape;Transparent 4/12/2017  6:00 PM   Hub Color/Line Status Blue; Infusing 4/12/2017  6:00 PM   Action Taken Other (comment) 4/5/2017  8:15 AM                    [REMOVED] Urinary Catheter 04/04/17 Crowder-Criteria for Appropriate Use: Obstruction/retention  Urinary Catheter 04/06/17 Crowder-Criteria for Appropriate Use: Obstruction/retention   Intake & Output   Date 04/11/17 1900 - 04/12/17 0659 04/12/17 0700 - 04/13/17 0659   Shift 8980-5506 24 Hour Total 1556-0765 2872-3667 24 Hour Total   I  N  T  A  K  E   I.V.  (mL/kg/hr)   600  (0.6)  600      Volume (0.9% sodium chloride infusion)   600  600    Shift Total  (mL/kg)   600  (7.8)  600  (7.8)   O  U  T  P  U  T   Urine  (mL/kg/hr) 1800 2000 1500  (1.6)  1500      Urine Output (mL) (Urinary Catheter 04/06/17 Crowder) 1800 2000 1500  1500    Stool           Stool Occurrence(s) 1 x 2 x       Shift Total  (mL/kg) 1800  (23.3) 2000  (25.9) 1500  (19.5)  1500  (19.5)   NET -1800 -2000 -900  -900   Weight (kg) 77.1 77.1 77.1 77.1 77.1      Last Bowel Movement Last Bowel Movement Date: 04/11/17   Glucose Checks [] N/A  [x] AC/HS  [] Q6  Concerns:   Nutrition Active Orders   Diet    DIET DIABETIC CONSISTENT CARB Regular       Consults [x]PT  [x]OT  []Speech  [x]Case Management   Cardiac Monitoring [x]N/A []Yes Expires:

## 2017-04-13 NOTE — PROGRESS NOTES
D/C options discussed with pt who is adamant about d/c to home tomorrow as she has family coming from [place] for the Easter weekend. She asked that I call her dtr which I did and left a VM. She will wait to hear from dtr as to whether she'll agree to [facility](she's fine with [facility]). 1500  Orders for [facility] PT, OT, and nursing sent to them.

## 2017-04-14 ENCOUNTER — HOME HEALTH ADMISSION (OUTPATIENT)
Dept: HOME HEALTH SERVICES | Facility: HOME HEALTH | Age: 72
End: 2017-04-14
Payer: MEDICARE

## 2017-04-14 VITALS
WEIGHT: 170 LBS | HEIGHT: 68 IN | BODY MASS INDEX: 25.76 KG/M2 | OXYGEN SATURATION: 98 % | DIASTOLIC BLOOD PRESSURE: 90 MMHG | HEART RATE: 71 BPM | RESPIRATION RATE: 20 BRPM | SYSTOLIC BLOOD PRESSURE: 139 MMHG | TEMPERATURE: 98.3 F

## 2017-04-14 PROBLEM — R26.81 GAIT INSTABILITY: Status: ACTIVE | Noted: 2017-04-14

## 2017-04-14 LAB
GLUCOSE BLD STRIP.AUTO-MCNC: 108 MG/DL (ref 65–100)
GLUCOSE BLD STRIP.AUTO-MCNC: 125 MG/DL (ref 65–100)
GLUCOSE BLD STRIP.AUTO-MCNC: 87 MG/DL (ref 65–100)
SERVICE CMNT-IMP: ABNORMAL
SERVICE CMNT-IMP: ABNORMAL
SERVICE CMNT-IMP: NORMAL

## 2017-04-14 PROCEDURE — 74011250637 HC RX REV CODE- 250/637: Performed by: FAMILY MEDICINE

## 2017-04-14 PROCEDURE — 74011250637 HC RX REV CODE- 250/637: Performed by: INTERNAL MEDICINE

## 2017-04-14 PROCEDURE — 82962 GLUCOSE BLOOD TEST: CPT

## 2017-04-14 PROCEDURE — 74011250636 HC RX REV CODE- 250/636: Performed by: FAMILY MEDICINE

## 2017-04-14 PROCEDURE — 74011250637 HC RX REV CODE- 250/637: Performed by: NURSE PRACTITIONER

## 2017-04-14 PROCEDURE — 97116 GAIT TRAINING THERAPY: CPT | Performed by: PHYSICAL THERAPIST

## 2017-04-14 RX ORDER — SERTRALINE HYDROCHLORIDE 25 MG/1
25 TABLET, FILM COATED ORAL DAILY
Qty: 30 TAB | Refills: 0 | Status: SHIPPED | OUTPATIENT
Start: 2017-04-14 | End: 2017-04-16 | Stop reason: SDUPTHER

## 2017-04-14 RX ORDER — DILTIAZEM HYDROCHLORIDE 120 MG/1
120 CAPSULE, COATED, EXTENDED RELEASE ORAL DAILY
Qty: 30 CAP | Refills: 1 | Status: SHIPPED | OUTPATIENT
Start: 2017-04-14 | End: 2017-04-16 | Stop reason: SDUPTHER

## 2017-04-14 RX ORDER — VALSARTAN 80 MG/1
80 TABLET ORAL DAILY
Qty: 30 TAB | Refills: 0 | Status: SHIPPED | OUTPATIENT
Start: 2017-04-14 | End: 2017-04-16 | Stop reason: SDUPTHER

## 2017-04-14 RX ORDER — DOCUSATE SODIUM 100 MG/1
100 CAPSULE, LIQUID FILLED ORAL
Qty: 90 CAP | Refills: 0 | Status: SHIPPED | OUTPATIENT
Start: 2017-04-14 | End: 2017-04-16 | Stop reason: SDUPTHER

## 2017-04-14 RX ADMIN — SERTRALINE 25 MG: 50 TABLET, FILM COATED ORAL at 08:49

## 2017-04-14 RX ADMIN — LACTULOSE 40 G: 10 SOLUTION ORAL at 14:20

## 2017-04-14 RX ADMIN — POLYETHYLENE GLYCOL 3350 17 G: 17 POWDER, FOR SOLUTION ORAL at 08:49

## 2017-04-14 RX ADMIN — ATORVASTATIN CALCIUM 10 MG: 10 TABLET, FILM COATED ORAL at 08:49

## 2017-04-14 RX ADMIN — LACTULOSE 40 G: 10 SOLUTION ORAL at 08:51

## 2017-04-14 RX ADMIN — HEPARIN SODIUM 5000 UNITS: 5000 INJECTION, SOLUTION INTRAVENOUS; SUBCUTANEOUS at 08:47

## 2017-04-14 RX ADMIN — VALSARTAN 80 MG: 80 TABLET ORAL at 08:49

## 2017-04-14 RX ADMIN — ASPIRIN 81 MG: 81 TABLET, COATED ORAL at 08:49

## 2017-04-14 RX ADMIN — DILTIAZEM HYDROCHLORIDE 120 MG: 120 CAPSULE, COATED, EXTENDED RELEASE ORAL at 08:49

## 2017-04-14 RX ADMIN — CLONAZEPAM 1 MG: 1 TABLET ORAL at 08:49

## 2017-04-14 RX ADMIN — CLONAZEPAM 1 MG: 1 TABLET ORAL at 17:17

## 2017-04-14 NOTE — PROGRESS NOTES
Pt was just finishing on the phone as  entered.  provided empathetic listening as pt shared how much she just wanted to go home. Supported her in reaching out to family. [address] will continue to follow as possible. Dru Chavez M.Div.   Palliative  Fellow

## 2017-04-14 NOTE — PROGRESS NOTES
Problem: Mobility Impaired (Adult and Pediatric)  Goal: *Acute Goals and Plan of Care (Insert Text)  Physical Therapy Goals  Initiated 4/13/2017  1. Patient will move from supine to sit and sit to supine , scoot up and down and roll side to side in bed with independence within 7 day(s). 2. Patient will transfer from bed to chair and chair to bed with supervision/set-up using the least restrictive device within 7 day(s). 3. Patient will perform sit to stand with supervision/set-up within 7 day(s). 4. Patient will ambulate with supervision/set-up for 200 feet with the least restrictive device within 7 day(s). 5. Patient will ascend/descend 2 stairs with 1 handrail(s) with supervision/set-up within 7 day(s). PHYSICAL THERAPY TREATMENT  Patient: Emiliana Johnson (43 y.o. female)  Date: 4/14/2017  Diagnosis: Orthostatic hypotension <principal problem not specified>       Precautions:  falls      ASSESSMENT: Patient continues to require assistance for overall mobility. She requires min A for transfers and ambulation. Gait remains unsteady using RW for support demonstrating decreased skyler and mild path deviations. She requires min A to maintain balance during ambulation. Patient requires increased encouragement to participate in therapy and continues to verbally perseverate on headache pain. Patient would benefit from SNF rehab following discharge to regain functional independence prior to returning to home. Progression toward goals:  [ ]    Improving appropriately and progressing toward goals  [X]    Improving slowly and progressing toward goals  [ ]    Not making progress toward goals and plan of care will be adjusted       PLAN:  Patient continues to benefit from skilled intervention to address the above impairments. Continue treatment per established plan of care.   Discharge Recommendations:  Joshua Carter  Further Equipment Recommendations for Discharge:  TBD by SNF; likely needs RW SUBJECTIVE:   Patient stated I don't why they can't find out about this headache.       OBJECTIVE DATA SUMMARY:   Critical Behavior:  Neurologic State: Alert  Orientation Level: Oriented X4  Cognition: Follows commands     Functional Mobility Training:  Bed Mobility:  Rolling: Contact guard assistance  Supine to Sit: Contact guard assistance     Scooting: Contact guard assistance        Transfers:  Sit to Stand: Minimum assistance  Stand to Sit: Contact guard assistance                             Balance:  Sitting: Intact  Standing: Impaired  Standing - Static: Fair;Constant support  Standing - Dynamic : Fair (using RW for support)  Ambulation/Gait Training:  Distance (ft): 150 Feet (ft)  Assistive Device: Walker, rolling;Gait belt  Ambulation - Level of Assistance: Minimal assistance        Gait Abnormalities: Decreased step clearance; Path deviations        Base of Support: Narrowed     Speed/Gale: Pace decreased (<100 feet/min); Slow  Step Length: Left shortened;Right shortened      Gait is unsteady using RW for support demonstrating narrowed base of support and path deviations      Pain:  Pain Scale 1: Numeric (0 - 10)  Pain Intensity 1:  (\"I hurt all over\" \"I just hurt\")  Pain Location 1: Abdomen; Chest;Eye;Head        After treatment:   [X]    Patient left in no apparent distress sitting up in chair  [ ]    Patient left in no apparent distress in bed  [X]    Call bell left within reach  [X]    Nursing notified  [X]    Caregiver present  [ ]    Bed alarm activated      COMMUNICATION/COLLABORATION:   The patients plan of care was discussed with: Registered Nurse     Tahir Parent, PT   Time Calculation: 15 mins

## 2017-04-14 NOTE — PROGRESS NOTES
pts sister has agreed to take patient home to her house. She states that she is able to provide 24/7 care for the weekend and the patients daughter Erica Whitlock has verified this. She will be discharged to home.

## 2017-04-14 NOTE — PROGRESS NOTES
Bedside and Verbal shift change report given to Jhoan Gibbs (oncoming nurse) by Victor Hugo Nieves RN (offgoing nurse). Report included the following information SBAR, Kardex, Intake/Output and MAR. Zone Phone for oncoming shift:       Shift Summary: Complaints of headache and neck pain,     LDAs               Peripheral IV 04/04/17 Left Wrist (Active)   Site Assessment Clean, dry, & intact 4/13/2017 11:38 PM   Phlebitis Assessment 0 4/13/2017 11:38 PM   Infiltration Assessment 0 4/13/2017 11:38 PM   Dressing Status Clean, dry, & intact 4/13/2017 11:38 PM   Dressing Type Transparent;Tape 4/13/2017 11:38 PM   Hub Color/Line Status Blue;Capped;Flushed 4/13/2017 11:38 PM   Action Taken Other (comment) 4/5/2017  8:15 AM                    [REMOVED] Urinary Catheter 04/04/17 Crowder-Criteria for Appropriate Use: Obstruction/retention  [REMOVED] Urinary Catheter 04/06/17 Crowder-Criteria for Appropriate Use: Obstruction/retention   Intake & Output   Date 04/13/17 0700 - 04/14/17 0659 04/14/17 0700 - 04/15/17 0659   Shift 0866-8265 0179-1292 24 Hour Total 9724-5405 2345-8994 24 Hour Total   I  N  T  A  K  E   P. O. 700  700         P. O. 700  700       I.V.  (mL/kg/hr)  2736.3  (3) 2736.3  (1.5)         Volume (0.9% sodium chloride infusion)  2736.3 2736.3       Shift Total  (mL/kg) 700  (9.1) 2736.3  (35.5) 3436.3  (44.6)      O  U  T  P  U  T   Urine  (mL/kg/hr) 600  (0.6)  600  (0.3)         Urine Voided 200  200         Urine Occurrence(s) 1 x 5 x 6 x         Urine Output (mL) ([REMOVED] Urinary Catheter 04/06/17 Crowder) 400  400       Stool            Stool Occurrence(s) 2 x  2 x       Shift Total  (mL/kg) 600  (7.8)  600  (7.8)       2736.3 2836. 3      Weight (kg) 77.1 77.1 77.1 77.1 77.1 77.1      Last Bowel Movement Last Bowel Movement Date: 04/13/17   Glucose Checks [] N/A  [x] AC/HS  [] Q6  Concerns:   Nutrition Active Orders   Diet    DIET DIABETIC CONSISTENT CARB Regular       Consults [x]PT  []OT  []Speech  [x]Case Management   Cardiac Monitoring [x]N/A []Yes Expires:

## 2017-04-14 NOTE — PROGRESS NOTES
D/C options discussed with pt who is adamant about d/c to home today as she has family coming from [place] for the Easter weekend. She asked that I call her dtr which I did and left a VM. She will wait to hear from dtr as to whether she'll agree to [facility](she's fine with [facility]).  Orders for [facility] PT, OT, and nursing sent to them.        08  [facility] will follow at d/c. Pt hopes for d/c to home today. Per pt, family will transport. 09  She states she is nauseated; seems depressed; verbalizing that her family is traveling somewhere; will contact her family to transport her home if discharged. 0  MD requests d/c to SNF as pt needs 24/7 care at home; dtr Melissa Box is presently in [place]. Pt states there is not 24/7 care at home. [facility] can accept pt today. [facility] ambulance set to transport pt at [phone].      1500 Pt refusing to go to SNF. I spoke with dtr Melissa Box and pt. Pt wants to go home, 24/7 is not in place, pt refuses SNF, Melissa Box is trying to reach pt's sister. Ambulance canceled    1600 Pt's boyfriend(\"\") visited with 2 young men. He appeared upset that Melissa Box had told us that pt had 24/7 care as he was only there when not at work. He left. 1630  D/C plans discussed with Dr Tod Roberto who states the pt can not go home unless the family provides 24/7 supervision. She says the pt can go to the SNF or leave AMA or to have 24/7 care in place at her d/c destination. D/C plans discussed with staff. [address] and stated pt's sister was on her way to the hospital to take pt to her home for the weekend and would provide 24/7 care. Discussed with NM//RN.

## 2017-04-14 NOTE — PROGRESS NOTES
pts sister still has not come to pick her up and take her back home. Now pt is unable to get in touch with this sister and is unsure that she is coming. Wants to spend the night at the nursing home and leave from there in the morning. i told her this was not an option.

## 2017-04-14 NOTE — DISCHARGE INSTRUCTIONS
Constipation: Care Instructions  Your Care Instructions  Constipation means that you have a hard time passing stools (bowel movements). People pass stools from 3 times a day to once every 3 days. What is normal for you may be different. Constipation may occur with pain in the rectum and cramping. The pain may get worse when you try to pass stools. Sometimes there are small amounts of bright red blood on toilet paper or the surface of stools. This is because of enlarged veins near the rectum (hemorrhoids). A few changes in your diet and lifestyle may help you avoid ongoing constipation. Your doctor may also prescribe medicine to help loosen your stool. Some medicines can cause constipation. These include pain medicines and antidepressants. Tell your doctor about all the medicines you take. Your doctor may want to make a medicine change to ease your symptoms. Follow-up care is a key part of your treatment and safety. Be sure to make and go to all appointments, and call your doctor if you are having problems. It's also a good idea to know your test results and keep a list of the medicines you take. How can you care for yourself at home? · Drink plenty of fluids, enough so that your urine is light yellow or clear like water. If you have kidney, heart, or liver disease and have to limit fluids, talk with your doctor before you increase the amount of fluids you drink. · Include high-fiber foods in your diet each day. These include fruits, vegetables, beans, and whole grains. · Get at least 30 minutes of exercise on most days of the week. Walking is a good choice. You also may want to do other activities, such as running, swimming, cycling, or playing tennis or team sports. · Take a fiber supplement, such as Citrucel or Metamucil, every day. Read and follow all instructions on the label. · Schedule time each day for a bowel movement. A daily routine may help.  Take your time having your bowel movement. · Support your feet with a small step stool when you sit on the toilet. This helps flex your hips and places your pelvis in a squatting position. · Your doctor may recommend an over-the-counter laxative to relieve your constipation. Examples are Milk of Magnesia and MiraLax. Read and follow all instructions on the label. Do not use laxatives on a long-term basis. When should you call for help? Call your doctor now or seek immediate medical care if:  · You have new or worse belly pain. · You have new or worse nausea or vomiting. · You have blood in your stools. Watch closely for changes in your health, and be sure to contact your doctor if:  · Your constipation is getting worse. · You do not get better as expected. Where can you learn more? Go to http://lobo-michel.info/. Enter 21 [phone] in the search box to learn more about \"Constipation: Care Instructions. \"  Current as of: May 27, 2016  Content Version: 11.2  © 6058-3298 [employer]. Care instructions adapted under license by [employer] (which disclaims liability or warranty for this information). If you have questions about a medical condition or this instruction, always ask your healthcare professional. [address] any warranty or liability for your use of this information. Headache: Care Instructions  Your Care Instructions    Headaches have many possible causes. Most headaches aren't a sign of a more serious problem, and they will get better on their own. Home treatment may help you feel better faster. The doctor has checked you carefully, but problems can develop later. If you notice any problems or new symptoms, get medical treatment right away. Follow-up care is a key part of your treatment and safety. Be sure to make and go to all appointments, and call your doctor if you are having problems.  It's also a good idea to know your test results and keep a list of the medicines you take. How can you care for yourself at home? · Do not drive if you have taken a prescription pain medicine. · Rest in a quiet, dark room until your headache is gone. Close your eyes and try to relax or go to sleep. Don't watch TV or read. · Put a cold, moist cloth or cold pack on the painful area for 10 to 20 minutes at a time. Put a thin cloth between the cold pack and your skin. · Use a warm, moist towel or a heating pad set on low to relax tight shoulder and neck muscles. · Have someone gently massage your neck and shoulders. · Take pain medicines exactly as directed. ¨ If the doctor gave you a prescription medicine for pain, take it as prescribed. ¨ If you are not taking a prescription pain medicine, ask your doctor if you can take an over-the-counter medicine. · Be careful not to take pain medicine more often than the instructions allow, because you may get worse or more frequent headaches when the medicine wears off. · Do not ignore new symptoms that occur with a headache, such as a fever, weakness or numbness, vision changes, or confusion. These may be signs of a more serious problem. To prevent headaches  · Keep a headache diary so you can figure out what triggers your headaches. Avoiding triggers may help you prevent headaches. Record when each headache began, how long it lasted, and what the pain was like (throbbing, aching, stabbing, or dull). Write down any other symptoms you had with the headache, such as nausea, flashing lights or dark spots, or sensitivity to bright light or loud noise. Note if the headache occurred near your period. List anything that might have triggered the headache, such as certain foods (chocolate, cheese, wine) or odors, smoke, bright light, stress, or lack of sleep. · Find healthy ways to deal with stress. Headaches are most common during or right after stressful times.  Take time to relax before and after you do something that has caused a headache in the past.  · Try to keep your muscles relaxed by keeping good posture. Check your jaw, face, neck, and shoulder muscles for tension, and try relaxing them. When sitting at a desk, change positions often, and stretch for 30 seconds each hour. · Get plenty of sleep and exercise. · Eat regularly and well. Long periods without food can trigger a headache. · Treat yourself to a massage. Some people find that regular massages are very helpful in relieving tension. · Limit caffeine by not drinking too much coffee, tea, or soda. But don't quit caffeine suddenly, because that can also give you headaches. · Reduce eyestrain from computers by blinking frequently and looking away from the computer screen every so often. Make sure you have proper eyewear and that your monitor is set up properly, about an arm's length away. · Seek help if you have depression or anxiety. Your headaches may be linked to these conditions. Treatment can both prevent headaches and help with symptoms of anxiety or depression. When should you call for help? Call 911 anytime you think you may need emergency care. For example, call if:  · You have signs of a stroke. These may include:  ¨ Sudden numbness, paralysis, or weakness in your face, arm, or leg, especially on only one side of your body. ¨ Sudden vision changes. ¨ Sudden trouble speaking. ¨ Sudden confusion or trouble understanding simple statements. ¨ Sudden problems with walking or balance. ¨ A sudden, severe headache that is different from past headaches. Call your doctor now or seek immediate medical care if:  · You have a new or worse headache. · Your headache gets much worse. Where can you learn more? Go to http://lobo-michel.info/. Enter M271 in the search box to learn more about \"Headache: Care Instructions. \"  Current as of: October 14, 2016  Content Version: 11.2  © 9950-2175 [employer].  Care instructions adapted under license by [facility] (which disclaims liability or warranty for this information). If you have questions about a medical condition or this instruction, always ask your healthcare professional. [address] any warranty or liability for your use of this information. Urinary Retention: Care Instructions  Your Care Instructions    Urinary retention means that you aren't able to urinate. In men, it is often caused by a blockage of the urinary tract from an enlarged prostate gland. In men and women, it can also be caused by an infection or nerve damage. Or it may be a side effect of a medicine. The doctor may have drained the urine from your bladder. If you still have problems passing urine, you may need to use a catheter at home. This is used to empty your bladder until the problem can be fixed. Your doctor may put a catheter in your bladder before you go home. If so, he or she will tell you when to come back to have the catheter removed. The doctor has checked you closely. But problems can develop later. If you notice any problems or new symptoms, get medical treatment right away. Follow-up care is a key part of your treatment and safety. Be sure to make and go to all appointments, and call your doctor if you are having problems. It's also a good idea to know your test results and keep a list of the medicines you take. How can you care for yourself at home? · Take your medicines exactly as prescribed. Call your doctor if you think you are having a problem with your medicine. You will get more details on the specific medicines your doctor prescribes. · Check with your doctor before you use any over-the-counter medicines. Many cold and allergy medicines, for example, can make this problem worse. Make sure your doctor knows all of the medicines, vitamins, supplements, and herbal remedies you take. · Spread out through the day the amount of fluid you drink.  Do not drink a lot at bedtime. · Avoid alcohol and caffeine. · If you have been given a catheter, or if one is already in place, follow the instructions you were given. Always wash your hands before and after you handle the catheter. When should you call for help? Call your doctor now or seek immediate medical care if:  · You cannot urinate at all, or it is getting harder to urinate. · You have symptoms of a urinary tract infection. These may include:  ¨ Pain or burning when you urinate. ¨ A frequent need to urinate without being able to pass much urine. ¨ Pain in the flank, which is just below the rib cage and above the waist on either side of the back. ¨ Blood in your urine. ¨ A fever. Watch closely for changes in your health, and be sure to contact your doctor if:  · You have any problems with your catheter. · You do not get better as expected. Where can you learn more? Go to http://lobo-michel.info/. Enter M244 in the search box to learn more about \"Urinary Retention: Care Instructions. \"  Current as of: 2016  Content Version: 11.2  © 4364-1685 [employer]. Care instructions adapted under license by [employer] (which disclaims liability or warranty for this information). If you have questions about a medical condition or this instruction, always ask your healthcare professional. [place] [address] any warranty or liability for your use of this information. PATIENT DISCHARGE INSTRUCTIONS      PATIENT DISCHARGE INSTRUCTIONS    [facility] [id number] : 1945    Admitted 2017 Discharged: 2017       · It is important that you take the medication exactly as they are prescribed. · Keep your medication in the bottles provided by the pharmacist and keep a list of the medication names, dosages, and times to be taken in your wallet. · Do not take other medications without consulting your doctor.      What to do at Home    Recommended Diet: Cardiac Diet and Diabetic Diet    Recommended Activity: Activity as tolerated    If you experience any concerns contact your doctor      Signed By: Alec Lomas MD     April 14, 2017

## 2017-04-14 NOTE — PROGRESS NOTES
General Daily Progress Note    Admit Date: 4/4/2017  Hospital day several     Subjective:     Patient has no new symptoms has some cp and head tightness not new present for 7 yrs! Denies any abd pain and stooling fine      Medication side effects: none    Current Facility-Administered Medications   Medication Dose Route Frequency    sertraline (ZOLOFT) tablet 25 mg  25 mg Oral DAILY    valsartan (DIOVAN) tablet 80 mg  80 mg Oral DAILY    hydrALAZINE (APRESOLINE) 20 mg/mL injection 20 mg  20 mg IntraVENous Q6H PRN    atorvastatin (LIPITOR) tablet 10 mg  10 mg Oral DAILY    ibuprofen (MOTRIN) tablet 400 mg  400 mg Oral Q6H PRN    dilTIAZem CD (CARDIZEM CD) capsule 120 mg  120 mg Oral DAILY    polyethylene glycol (MIRALAX) packet 17 g  17 g Oral BID    lactulose (CHRONULAC) solution 40 g  60 mL Oral QID    clonazePAM (KlonoPIN) tablet 1 mg  1 mg Oral TID    docusate sodium (COLACE) capsule 100 mg  100 mg Oral DAILY PRN    HYDROcodone-acetaminophen (NORCO) 5-325 mg per tablet 1 Tab  1 Tab Oral Q6H PRN    sodium chloride (NS) flush 5-10 mL  5-10 mL IntraVENous PRN    heparin (porcine) injection 5,000 Units  5,000 Units SubCUTAneous Q8H    albuterol-ipratropium (DUO-NEB) 2.5 MG-0.5 MG/3 ML  3 mL Nebulization Q6H PRN    insulin lispro (HUMALOG) injection   SubCUTAneous AC&HS    glucose chewable tablet 16 g  4 Tab Oral PRN    dextrose (D50W) injection syrg 12.5-25 g  12.5-25 g IntraVENous PRN    glucagon (GLUCAGEN) injection 1 mg  1 mg IntraMUSCular PRN    0.9% sodium chloride infusion  75 mL/hr IntraVENous CONTINUOUS    aspirin delayed-release tablet 81 mg  81 mg Oral DAILY        Review of Systems  Pertinent items are noted in HPI. Objective:     Patient Vitals for the past 8 hrs:   BP Temp Pulse Resp SpO2   04/14/17 0729 181/81 97.7 °F (36.5 °C) 75 20 96 %        04/12 1901 - 04/14 0700  In: 3436.3 [P.O.:700;  I.V.:2736.3]  Out: 3200 [Urine:3200]    Physical Exam:   Visit Vitals    /81    Pulse 75    Temp 97.7 °F (36.5 °C)    Resp 20    Ht 5' 8\" (1.727 m)    Wt 170 lb (77.1 kg)    SpO2 96%    BMI 25.85 kg/m2     Lungs: clear to auscultation bilaterally  Heart: regular rate and rhythm, S1, S2 normal, no murmur, click, rub or gallop  Abdomen: soft, non-tender.  Bowel sounds normal. No masses,  no organomegaly  Extremities: extremities normal, atraumatic, no cyanosis or edema      ECG: normal sinus rhythm     Data Review   Recent Results (from the past 24 hour(s))   GLUCOSE, POC    Collection Time: 04/13/17  4:33 PM   Result Value Ref Range    Glucose (POC) 109 (H) 65 - 100 mg/dL    Performed by Oswaldo Roe, POC    Collection Time: 04/13/17  9:20 PM   Result Value Ref Range    Glucose (POC) 114 (H) 65 - 100 mg/dL    Performed by Oswaldo Roe, POC    Collection Time: 04/14/17  7:31 AM   Result Value Ref Range    Glucose (POC) 87 65 - 100 mg/dL    Performed by Leonor Counts (PCT)    GLUCOSE, POC    Collection Time: 04/14/17 11:17 AM   Result Value Ref Range    Glucose (POC) 125 (H) 65 - 100 mg/dL    Performed by Leonor Counts (PCT)            Assessment:     Principal Problem:    Abdominal pain (8/16/2013)    Active Problems:    HTN, goal below 130/80 (9/7/2012)      Overview: Goal BP: < 130/80      Goal Progress: Had been at goal and is now not at goal.      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 Severe constipation- had several large bms,  Resolved mild nausea this am but able to tolerate diet for breakfast and lunch    2. L hydronephrosis secondary to constipation resolved on repeat ultrasound    discontinued martinez and monitor urinary function bladder scan prn    3. Diabetes- continue iss and diabetic diet   4. Chest tightness- had cath by Dr. Courtney Katz 2 years ago, showing some 30-50% lesions. I think chest tightness may just be anxiety (she is very anxious), however, normal  EKG and troponin will ask cardio eval see not no new issues   5. Depression  appreciate [facility] note however pt was dismissed from Dr Lauren Elder for multiple no shows and has been dismissed from several psychiatrists in the area and does not show up for scheduled appts to [facility]. started zoloft per recommendations yesterday and will begin tapering of klonopin in a a few weeks   6 Appreciate  palliative medicine consultation-   7 discharge planning to snf when bed available  Reviewed noted from PT s/w pt today pt will need 24 hr supervision her dtr is out of town until Tuesday and does not have adequate support at home s/w case management   Lauren Paredes M.D.   Family Medicine

## 2017-04-14 NOTE — DISCHARGE SUMMARY
Physician Discharge Summary       Patient: Irma Gutierres MRN: [id number]  SSN: [id number]    YOB: 1945  Age: 70 y.o.   Sex: female    PCP: [address] [place], [place]    Admit date: 4/4/2017  Admitting Provider: Talya Woodward MD    Discharge date: 4/14/2017  Discharging Provider: [address] [place], [place]    * Admission Diagnoses: Orthostatic hypotension    * Discharge Diagnoses:    Hospital Problems as of 4/14/2017  Date Reviewed: 3/8/2017          Codes Class Noted - Resolved POA    Gait instability ICD-10-CM: R26.81  ICD-9-CM: 781.2  4/14/2017 - Present Unknown        Weakness ICD-10-CM: R53.1  ICD-9-CM: 780.79  4/13/2017 - Present Unknown        Assistance needed with transportation ICD-10-CM: Z74.8  ICD-9-CM: V60.89  4/13/2017 - Present Yes        Left-sided chest wall pain ICD-10-CM: R07.89  ICD-9-CM: 786.52  4/11/2017 - Present Unknown        Hydronephrosis of left kidney ICD-10-CM: N13.30  ICD-9-CM: 591  4/7/2017 - Present Unknown        Orthostatic hypotension ICD-10-CM: I95.1  ICD-9-CM: 458.0  4/4/2017 - Present Unknown        Diabetes mellitus type 2, diet-controlled ([facility] ICD-10-CM: E11.9  ICD-9-CM: 250.00  5/26/2016 - Present Yes        Chronic pain associated with significant psychosocial dysfunction (Chronic) ICD-10-CM: G89.4  ICD-9-CM: 338.4  2/17/2014 - Present Yes        Depression with anxiety (Chronic) ICD-10-CM: F41.8  ICD-9-CM: 300.4 Chronic 2/17/2014 - Present Yes        Other somatoform disorders (Chronic) ICD-10-CM: F45.8  ICD-9-CM: 300.89 Chronic 2/17/2014 - Present Yes        Chronic chest pain (Chronic) ICD-10-CM: R07.9, G89.29  ICD-9-CM: 786.50, 338.29 Chronic 1/13/2014 - Present Yes        Constipation (Chronic) ICD-10-CM: K59.00  ICD-9-CM: 564.00 Chronic 8/22/2013 - Present Yes        * (Principal)Abdominal pain ICD-10-CM: R10.9  ICD-9-CM: 789.00  8/16/2013 - Present Yes        HTN, goal below 130/80 (Chronic) ICD-10-CM: I10  ICD-9-CM: 401.9 Chronic 9/7/2012 - Present Yes    Overview Signed 7/30/2014 12:19 AM by Jonas Massey     Goal BP: < 130/80  Goal Progress: Had been at goal and is now not at goal.  BP Readings from Last 3 Encounters:   07/29/14 165/91   07/11/14 130/90   07/09/14 124/80                        * Hospital Course: 1. Severe constipation- had several large bms,  Resolved mild nausea this am but able to tolerate diet for breakfast and lunch    2. L hydronephrosis secondary to constipation resolved on repeat ultrasound    discontinued martinez and monitored urinary function bladder scan prn  Normal function returned   3. Diabetes- continue iss and diabetic diet   4. Chest tightness- had cath by Dr. Svetlana Merino 2 years ago, showing some 30-50% lesions. I think chest tightness may just be anxiety (she is very anxious), however, normal  EKG and troponin underwent a cardio eval see not a no new issue   5. Depression  appreciate [facility] note however pt was dismissed from Dr Jacoby Mendez for multiple no shows and has been dismissed from several psychiatrists in the area and does not show up for scheduled appts to [facility].  started zoloft per recommendations yesterday and will begin tapering of klonopin in a a few weeks   6 Appreciate  palliative medicine consultation- will need f/up   7 gait instability  See note from PT  pt will need 24 hr supervision her dtr is out of town until Tuesday and does not have adequate support at home will be discharged to snf     * Procedures: * No surgery found *      Consults: Cardiology, GI, Psychiatry and Palliative Care    Significant Diagnostic Studies: labs: neg cardiac enzymes ct scan hydronephrois resolved on U/S    Discharge Exam:  See daily progress note from today 4.14.17    * Discharge Condition: improved  * Disposition: [place] ([facility])    Discharge Medications:  Current Discharge Medication List      START taking these medications    Details   dilTIAZem CD (CARDIZEM CD) 120 mg ER capsule Take 1 Cap by mouth daily.  Qty: 30 Cap, Refills: 1      docusate sodium (COLACE) 100 mg capsule Take 1 Cap by mouth daily as needed for Constipation for up to 90 days. Qty: 90 Cap, Refills: 0      lactulose (CHRONULAC) 10 gram/15 mL solution Take 45 mL by mouth two (2) times a day for 30 days. Qty: 1200 mL, Refills: 0      sertraline (ZOLOFT) 25 mg tablet Take 1 Tab by mouth daily. Qty: 30 Tab, Refills: 0      valsartan (DIOVAN) 80 mg tablet Take 1 Tab by mouth daily. Qty: 30 Tab, Refills: 0         CONTINUE these medications which have NOT CHANGED    Details   clonazePAM (KLONOPIN) 1 mg tablet Take 1 Tab by mouth two (2) times a day. Max Daily Amount: 2 mg. As needed fill after 2/5/17  Qty: 40 Tab, Refills: 0    Associated Diagnoses: Severe anxiety      topiramate (TOPAMAX) 25 mg tablet Take 1 tab at night for 1 week, then 2 tabs at night  Qty: 60 Tab, Refills: 5    Associated Diagnoses: Chronic tension-type headache, not intractable      rosuvastatin (CRESTOR) 5 mg tablet Take 5 mg by mouth nightly. polyethylene glycol (MIRALAX) 17 gram/dose powder TAKE 1 CAPFUL IN 8 OUNCES OF WATER EVERY DAY  Qty: 255 g, Refills: 3      aspirin delayed-release 81 mg tablet Take 1 Tab by mouth daily. Qty: 30 Tab, Refills: 11         STOP taking these medications       losartan (COZAAR) 25 mg tablet Comments:   Reason for Stopping:         hydrocortisone (HYCORT) 1 % ointment Comments:   Reason for Stopping:               * Follow-up Care/Patient Instructions:   Activity: PT/OT Eval and Treat  Diet: Cardiac Diet and Diabetic Diet  Wound Care: None needed    Follow-up Information     Follow up With Details Comments Contact Info    Sushil Franco MD Schedule an appointment as soon as possible for a visit  [facility]  [phone]      Bk Law MD Schedule an appointment as soon as possible for a visit in 2 days Urology [address]  [phone]      \[facility]\"" EMERGENCY DEPT  If symptoms worsen [address] [facility] [address] [address] Joesph Melvin MD Schedule an appointment as soon as possible for a visit in 1 month  [address] [facility]  [phone]            Signed:  Yuval Anderson MD  4/14/2017  2:42 PM      Pended snf discharge planning underway pt left to home instead of snf left ama

## 2017-04-14 NOTE — PROGRESS NOTES
Nutrition Assessment:    INTERVENTIONS/RECOMMENDATIONS:   Meals/Snacks: General/healthful diet: Continue CCD diet    ASSESSMENT:   Chart reviewed. Pt medically noted for orthostatic hypotension, chest pain and hydronephrosis of left kidney; PMH of DM, CAD and HTN. Pt seen at bedside. Pt states her appetite is pretty good but does not like the Glucerna shakes given at lunch. Visit was shortened due to pt presenting with emesis. Will monitor PO intake of meals and discontinue ONS. Diet Order: Consistent carb  % Eaten:  Patient Vitals for the past 72 hrs:   % Diet Eaten   04/13/17 1200 100 %   04/13/17 0820 80 %     Pertinent Medications: [x] Reviewed []Other:Lipitor; Clonzaepam; humalog; lactulose; miralax; zoloft; diovan  Pertinent Labs: [x]Reviewed  []Other:  -353  Food Allergies: [x]None []Other:     Last BM: 4/13   [x]Active     []Hyperactive  []Hypoactive       [] Absent  BS  Skin:    [x] Intact   [] Incision  [] Breakdown   []Edema   []Other:    Anthropometrics: Height: 5' 8\" (172.7 cm) Weight: 77.1 kg (170 lb)    IBW (%IBW):   ( ) UBW (%UBW):   (  %)    BMI: Body mass index is 25.85 kg/(m^2). This BMI is indicative of:  []Underweight   []Normal   [x]Overweight   [] Obesity   [] Extreme Obesity (BMI>40)  Last Weight Metrics:  Weight Loss Metrics 4/4/2017 3/8/2017 2/27/2017 2/23/2017 1/26/2017 1/5/2017 12/23/2016   Today's Wt 170 lb 162 lb 162 lb 1.6 oz 163 lb 2.3 oz 168 lb 11.2 oz 167 lb 170 lb   BMI 25.85 kg/m2 24.63 kg/m2 24.65 kg/m2 24.81 kg/m2 25.65 kg/m2 25.39 kg/m2 25.85 kg/m2       Estimated Nutrition Needs (Based on): 1735 Kcals/day (BMR 1335 X 1.3AF) , 77 g (1.0g/kg bw) Protein  Carbohydrate:  At Least 130 g/day  Fluids: 1750 mL/day     Pt expected to meet estimated nutrient needs: [x]Yes []No    NUTRITION DIAGNOSES:   Problem:  Inadequate oral food/beverage intake      Etiology: related to current medical condition     Signs/Symptoms: as evidenced by poor appetite reported on admission NUTRITION INTERVENTIONS:  Meals/Snacks: General/healthful diet   Supplements: Commercial supplement              GOAL:   PO intake >75% of meals next 3-5 days    NUTRITION MONITORING AND EVALUATION      Food/Nutrient Intake Outcomes:  Total energy intake  Physical Signs/Symptoms Outcomes: Weight/weight change, Glucose profile    Previous Goal Met:   [x] Met              [] Progressing Towards Goal              [] Not Progressing Towards Goal   Previous Recommendations:   [x] Implemented          [] Not Implemented          [] Not Applicable    LEARNING NEEDS (Diet, Food/Nutrient-Drug Interaction):    [x] None Identified   [] Identified and Education Provided/Documented   [] Identified and Pt declined/was not appropriate     Cultural, Jain, OR Ethnic Dietary Needs:    [x] None Identified   [] Identified and Addressed     [x] Interdisciplinary Care Plan Reviewed/Documented    [x] Discharge Planning: Continue CCD diet   [] Participated in Interdisciplinary Rounds    NUTRITION RISK:    [] High              [] Moderate           [x]  Low  []  Minimal/Uncompromised      [employer]  Pager [phone]    Weekend Pager [phone]

## 2017-04-14 NOTE — PROGRESS NOTES
Palliative Medicine Psychosocial Assessment  [facility]: [phone] ([id number])  [facility]: [phone] ([phone])      Sources of Information:    [x]Patient  []Family  [x]Staff  [x]Medical Record    Medical/Physical Functioning: Active Problems:    HTN, goal below 130/80 (2012)      Overview: Goal BP: < 130/80      Goal Progress: Had been at goal and is now not at goal.      BP Readings from Last 3 Encounters:       14 165/91       14 130/90       14 124/80                   Other somatoform disorders (2014)      Diabetes mellitus type 2, diet-controlled ([facility] [address] (2016)      Orthostatic hypotension (2017)      Hydronephrosis of left kidney (2017)      Left-sided chest wall pain (2017)      Weakness (2017)      Assistance needed with transportation (2017)    depression, anxiety    Advance Care Planning:  Advance Care Planning 2017   Patient's Healthcare Decision Maker is: Legal Next of Kin   Primary Decision Maker Name Marisol Francis and 4 other adult children   Primary Decision Maker Phone Number [phone]   Primary Decision Maker Relationship to Patient Adult child   Confirm Advance Directive None   Patient Would Like to Complete Advance Directive -   Pt's next of kin are her five living children. She had 7 children but 2 are .     Mental Status:    [x]Alert  []Lethargic  []Unresponsive  Oriented to:  [x]Person  [x]Place  [x]Time  [x]Situation      Barriers to Learning:    []Language  []Developmental  []Cognitive  []Altered Mental Status  []Visual/Hearing Impairment  []Unable to Read/Write  [x]Motivational   []No Barriers Identified  [x]Other: denies mental disorders and therefore is not seeking treatment    Relationship Status:  []Single  []  []Significant Other/Life Partner  []  []  [x]      Living Circumstances:  []Lives Alone  [x]Family/Significant Other in Household  []Roommates  []Children in the Home  []Paid Caregivers  []Assisted Living Facility/Group Home  []Skilled Nursing Facility  []Homeless  []Incarcerated  []Environmental/Care Concerns  []Other: There are 4 other people residing in home with Patient. Her grandson (late 25s), her friend, a son and occasionally a granddtr. Support System:    []Strong  [x]Fair  []Limited  Pt identifies having family support (extended) and [employer] member support. However, she does have difficulties securing reliable transportation to medical appointments at times. She generally relies on the grandson living with her or her dtr, To Junior. Pt lives in [facility] [place]. Financial/Legal Concerns:  Did not assess. Consulting provider knows Pt well and defer to their notes. []Uninsured  []Limited Income/Resources  []Non-Citizen  []No Concerns Identified  []Financial POA:    []Other:    Sabianism/Spiritual/Existential:  [x]Strong Sense of Spirituality  [x]Involved in [address] - Pt expects Yarsani member(s) to visit this evening to support her spiritual needs. []Request  Visit  []Expressing Travis Casey  []No Concerns Identified            Narrative: Dr. Harjinder Merida MD spoke with attending physician regarding consult and she was clear that our consult was not to discuss goc/ACP conversations but to provide support and encourage her cooperation with following up on doctor appointments. Pt engaged with Palliative team. She said her head has been hurting and through inquiry she attributes it to not sleeping well. At home, she tries to relieve headaches by lying down. Pt reports having history of not sleeping. She rosenbaum her hair often during the day and goes to beautician every 2 weeks to have hair washed. Pt is mourning loss of 4 close relatives since December. She became teary eyed and perfered not to discuss the losses; Palliative team was respectful and honored her wishes.      Pt said she goes to see Dr. Reena Raymundo every 6 weeks in office and also sees Dr. Trish Rubio (cardiologist). Pt denies going to see a psychiatrist.  She does not want to be called \"crazy. \"     Pt's dtr, Krista Casanova, is on vacation in [place], [place] until Monday, April 17th. Pt was agreeable to PM team visiting tomorrow for support. Goals/Plan:   Palliative Medicine team will visit tomorrow for supportive visit. Thank you for including Palliative Medicine in Ms. Logan's care.       Christine Stewart, [address] [place] [address] Drive ([id number])  Work cell: [phone]

## 2017-04-14 NOTE — PROGRESS NOTES
Discharged home with family. On the way out she asked multiple times if she could come back tonight if she had any problems. i told her that if she had an emergency she could go to the emergency room, but only for emergencies.

## 2017-04-15 ENCOUNTER — HOME CARE VISIT (OUTPATIENT)
Dept: SCHEDULING | Facility: HOME HEALTH | Age: 72
End: 2017-04-15

## 2017-04-16 ENCOUNTER — HOME CARE VISIT (OUTPATIENT)
Dept: HOME HEALTH SERVICES | Facility: HOME HEALTH | Age: 72
End: 2017-04-16

## 2017-04-16 ENCOUNTER — HOME CARE VISIT (OUTPATIENT)
Dept: SCHEDULING | Facility: HOME HEALTH | Age: 72
End: 2017-04-16
Payer: MEDICARE

## 2017-04-16 DIAGNOSIS — G44.229 CHRONIC TENSION-TYPE HEADACHE, NOT INTRACTABLE: Chronic | ICD-10-CM

## 2017-04-16 PROCEDURE — 3331090001 HH PPS REVENUE CREDIT

## 2017-04-16 PROCEDURE — G0299 HHS/HOSPICE OF RN EA 15 MIN: HCPCS

## 2017-04-16 PROCEDURE — 400013 HH SOC

## 2017-04-16 PROCEDURE — 3331090002 HH PPS REVENUE DEBIT

## 2017-04-16 RX ORDER — DOCUSATE SODIUM 100 MG/1
100 CAPSULE, LIQUID FILLED ORAL
Qty: 90 CAP | Refills: 0 | Status: SHIPPED | OUTPATIENT
Start: 2017-04-16 | End: 2017-09-08 | Stop reason: SDUPTHER

## 2017-04-16 RX ORDER — DILTIAZEM HYDROCHLORIDE 120 MG/1
120 CAPSULE, COATED, EXTENDED RELEASE ORAL DAILY
Qty: 30 CAP | Refills: 1 | Status: SHIPPED | OUTPATIENT
Start: 2017-04-16 | End: 2017-06-05 | Stop reason: SDUPTHER

## 2017-04-16 RX ORDER — TOPIRAMATE 25 MG/1
TABLET ORAL
Qty: 60 TAB | Refills: 5 | Status: SHIPPED | OUTPATIENT
Start: 2017-04-16 | End: 2017-05-10

## 2017-04-16 RX ORDER — SERTRALINE HYDROCHLORIDE 25 MG/1
25 TABLET, FILM COATED ORAL DAILY
Qty: 30 TAB | Refills: 0 | Status: SHIPPED | OUTPATIENT
Start: 2017-04-16 | End: 2017-05-10

## 2017-04-16 RX ORDER — POLYETHYLENE GLYCOL 3350 17 G/17G
POWDER, FOR SOLUTION ORAL
Qty: 255 G | Refills: 3 | Status: SHIPPED | OUTPATIENT
Start: 2017-04-16 | End: 2017-09-13

## 2017-04-16 RX ORDER — ROSUVASTATIN CALCIUM 5 MG/1
5 TABLET, COATED ORAL
Qty: 30 TAB | Refills: 0 | Status: SHIPPED | OUTPATIENT
Start: 2017-04-16 | End: 2017-05-10 | Stop reason: SDUPTHER

## 2017-04-16 RX ORDER — VALSARTAN 80 MG/1
80 TABLET ORAL DAILY
Qty: 30 TAB | Refills: 0 | Status: SHIPPED | OUTPATIENT
Start: 2017-04-16 | End: 2017-05-05 | Stop reason: SDUPTHER

## 2017-04-17 ENCOUNTER — PATIENT OUTREACH (OUTPATIENT)
Dept: INTERNAL MEDICINE CLINIC | Age: 72
End: 2017-04-17

## 2017-04-17 ENCOUNTER — HOME CARE VISIT (OUTPATIENT)
Dept: SCHEDULING | Facility: HOME HEALTH | Age: 72
End: 2017-04-17
Payer: MEDICARE

## 2017-04-17 ENCOUNTER — HOME CARE VISIT (OUTPATIENT)
Dept: HOME HEALTH SERVICES | Facility: HOME HEALTH | Age: 72
End: 2017-04-17

## 2017-04-17 VITALS
DIASTOLIC BLOOD PRESSURE: 76 MMHG | BODY MASS INDEX: 25.46 KG/M2 | RESPIRATION RATE: 18 BRPM | OXYGEN SATURATION: 97 % | HEIGHT: 68 IN | SYSTOLIC BLOOD PRESSURE: 128 MMHG | HEART RATE: 76 BPM | TEMPERATURE: 97.8 F | WEIGHT: 168 LBS

## 2017-04-17 DIAGNOSIS — F41.9 SEVERE ANXIETY: ICD-10-CM

## 2017-04-17 PROCEDURE — 3331090002 HH PPS REVENUE DEBIT

## 2017-04-17 PROCEDURE — 3331090001 HH PPS REVENUE CREDIT

## 2017-04-17 PROCEDURE — G0300 HHS/HOSPICE OF LPN EA 15 MIN: HCPCS

## 2017-04-17 NOTE — TELEPHONE ENCOUNTER
Jennifer from [facility] called stating that she saw pt on Sunday 04/16/17 instead of Saturday 04/15/17 since pt was not at the home after hospital discharge. Pt did not have new rx's with her at the visit and stated that she did not receive them and the nurse didn't go over her instructions with her. Jennifer explained the instructions to the pt and also attempted to contact pt's relative who was with pt at the time of discharge and hospital staff. Jennifer had to eventually contact on-call physician Dr. Brittney Wu, who refilled pt's meds (except for Clonazepam since it's controlled). Losartan was discontinued at the hospital but pt continued to take med due to her not having her new bp med Diovan 80mg. Once pt received her new meds, she discontinued Losartan. Pt is in need for a refill for Clonazepam. Jennifer stated that pt's leg was shaking a lot while pt kept emphasizing that she need Clonazepam. I informed Jennifer that I will give the msg to Dr. Zachariah Palacios and pend Clonazepam for approval. Larry John also stated that pt states that she have transportation issues, although pt's son said that he has no problem getting pt to her appts. Verbalization was understood with no further questions or concerns.

## 2017-04-17 NOTE — PROGRESS NOTES
[facility] Discharge Follow-Up      Date/Time:  2017 4:52 PM    Patient listed on [facility] discharge [facility]) report on 2017  Patient discharged from [facility] for severe constipation. RRAT score: 17 Moderate    Medical History:     Past Medical History:   Diagnosis Date    Agoraphobia without mention of panic attacks 2014    Anxiety disorder 2013    Arthritis     osteo    Breast pain, left 2015    CAD (coronary artery disease), native coronary artery 2015    Chronic chest pain 2014    Chronic pain associated with significant psychosocial dysfunction 2014    Depression 2013    Diabetes ([facility] [address]     type II    Duplicated right renal collecting system 3/13/2014    GERD (gastroesophageal reflux disease)     Gout, joint     HTN, goal below 130/80 2012    Hypertension     Nephrolithiasis 3/13/2014    Other ill-defined conditions     hyercholesterolemia    Other ill-defined conditions     anxiety    Other ill-defined conditions     pt states head get tight,due to wax bill up    Other ill-defined conditions     pain left shoulder due to fall many years ago    Personal history of noncompliance with medical treatment, presenting hazards to health 2014    Psychiatric disorder     anxiety/ depression    Psychotic disorder     Vaginal pain 2014       Nurse Navigator(NN) contacted the patient by telephone to perform post hospital discharge assessment. Verified  and address with patient as identifiers. Provided introduction to self, and explanation of the Nurses Navigator role. The patient states       Medication:   Performed medication reconciliation with patient, and patient verbalizes understanding of administration of home medications. The patient states that she did not receive the lactulose when she picked up her discharge medication last night.  Navigator contacted the patient's pharmacy to confirm if the prescription was received. Spoke with Yobani Aleman. Confirmed HIPAA with name and . Yobani Aleman states that they did not receive the script for the lactulose. I provided the details of the Lactulose order to the pharmacist.  Patient c/o dizziness after taking Zoloft. I explained to her that the dizziness is one of the  S/E of Zoloft. And to take the necessary precautions to prevent a fall. There were no other barriers to obtaining medications identified at this time. Discharge Instructions :  Reviewed discharge instructions with patient. Patient verbalizes understanding of discharge instructions and follow-up care. Red Flags:  Chest pain, abdominal pain. PCP/Specialist follow up: Patient was not willing to schedule a follow up appointment with Gurinder Bagley MD b/c she did not have her calender. She requests that I call back tomorrow to schedule an appointment. Reviewed red flags with patient, and patient verbalizes understanding. Patient given an opportunity to ask questions. No other clinical/social/functional needs noted. The patient agrees to contact the PCP office for questions related to their healthcare. The patient expressed thanks, offered no additional questions and ended the call. Case management plan: Contact the patient in 1 day to schedule transition of care appointment. Will continue to follow as necessary for the next 30 days. Will reassess for case management needs prior to discharge from case management service on or about 30 days.

## 2017-04-18 ENCOUNTER — HOME CARE VISIT (OUTPATIENT)
Dept: SCHEDULING | Facility: HOME HEALTH | Age: 72
End: 2017-04-18
Payer: MEDICARE

## 2017-04-18 ENCOUNTER — HOME CARE VISIT (OUTPATIENT)
Dept: HOME HEALTH SERVICES | Facility: HOME HEALTH | Age: 72
End: 2017-04-18
Payer: MEDICARE

## 2017-04-18 ENCOUNTER — HOME CARE VISIT (OUTPATIENT)
Dept: SCHEDULING | Facility: HOME HEALTH | Age: 72
End: 2017-04-18

## 2017-04-18 ENCOUNTER — TELEPHONE (OUTPATIENT)
Dept: INTERNAL MEDICINE CLINIC | Age: 72
End: 2017-04-18

## 2017-04-18 VITALS
RESPIRATION RATE: 12 BRPM | TEMPERATURE: 97.8 F | OXYGEN SATURATION: 98 % | DIASTOLIC BLOOD PRESSURE: 78 MMHG | SYSTOLIC BLOOD PRESSURE: 140 MMHG | HEART RATE: 87 BPM

## 2017-04-18 PROCEDURE — G0151 HHCP-SERV OF PT,EA 15 MIN: HCPCS

## 2017-04-18 PROCEDURE — 3331090001 HH PPS REVENUE CREDIT

## 2017-04-18 PROCEDURE — 3331090002 HH PPS REVENUE DEBIT

## 2017-04-18 NOTE — TELEPHONE ENCOUNTER
Ary 3 on site with patient: states pt's BP: 160/90 sitting, 112/60 when standing up. Dr Kei [address] informed. Pt instructed through Ms Luis Sharpe to avoid standing up too quickly and to rehydrate. And to be sure to keep upcomming appt. with Dr Kei [address]. Ms Smith Annemarie states, will recommend home visit with  to discuss moving to Nursing facility.

## 2017-04-19 ENCOUNTER — HOME CARE VISIT (OUTPATIENT)
Dept: HOME HEALTH SERVICES | Facility: HOME HEALTH | Age: 72
End: 2017-04-19
Payer: MEDICARE

## 2017-04-19 PROCEDURE — 3331090002 HH PPS REVENUE DEBIT

## 2017-04-19 PROCEDURE — 3331090001 HH PPS REVENUE CREDIT

## 2017-04-20 ENCOUNTER — HOME CARE VISIT (OUTPATIENT)
Dept: SCHEDULING | Facility: HOME HEALTH | Age: 72
End: 2017-04-20
Payer: MEDICARE

## 2017-04-20 VITALS
SYSTOLIC BLOOD PRESSURE: 112 MMHG | TEMPERATURE: 98.1 F | DIASTOLIC BLOOD PRESSURE: 60 MMHG | HEART RATE: 75 BPM | OXYGEN SATURATION: 99 %

## 2017-04-20 PROCEDURE — 3331090001 HH PPS REVENUE CREDIT

## 2017-04-20 PROCEDURE — 3331090002 HH PPS REVENUE DEBIT

## 2017-04-21 ENCOUNTER — HOME CARE VISIT (OUTPATIENT)
Dept: SCHEDULING | Facility: HOME HEALTH | Age: 72
End: 2017-04-21
Payer: MEDICARE

## 2017-04-21 VITALS
HEART RATE: 79 BPM | DIASTOLIC BLOOD PRESSURE: 74 MMHG | SYSTOLIC BLOOD PRESSURE: 116 MMHG | OXYGEN SATURATION: 96 % | TEMPERATURE: 97.4 F

## 2017-04-21 VITALS
SYSTOLIC BLOOD PRESSURE: 112 MMHG | RESPIRATION RATE: 20 BRPM | TEMPERATURE: 97 F | DIASTOLIC BLOOD PRESSURE: 70 MMHG | OXYGEN SATURATION: 98 % | HEART RATE: 78 BPM

## 2017-04-21 VITALS
RESPIRATION RATE: 15 BRPM | TEMPERATURE: 97.6 F | HEART RATE: 89 BPM | OXYGEN SATURATION: 99 % | SYSTOLIC BLOOD PRESSURE: 118 MMHG | DIASTOLIC BLOOD PRESSURE: 62 MMHG

## 2017-04-21 PROCEDURE — G0151 HHCP-SERV OF PT,EA 15 MIN: HCPCS

## 2017-04-21 PROCEDURE — G0152 HHCP-SERV OF OT,EA 15 MIN: HCPCS

## 2017-04-21 PROCEDURE — G0299 HHS/HOSPICE OF RN EA 15 MIN: HCPCS

## 2017-04-21 PROCEDURE — 3331090002 HH PPS REVENUE DEBIT

## 2017-04-21 PROCEDURE — 3331090001 HH PPS REVENUE CREDIT

## 2017-04-22 PROCEDURE — 3331090001 HH PPS REVENUE CREDIT

## 2017-04-22 PROCEDURE — 3331090002 HH PPS REVENUE DEBIT

## 2017-04-23 PROCEDURE — 3331090002 HH PPS REVENUE DEBIT

## 2017-04-23 PROCEDURE — 3331090001 HH PPS REVENUE CREDIT

## 2017-04-24 ENCOUNTER — PATIENT OUTREACH (OUTPATIENT)
Dept: INTERNAL MEDICINE CLINIC | Age: 72
End: 2017-04-24

## 2017-04-24 ENCOUNTER — HOME CARE VISIT (OUTPATIENT)
Dept: SCHEDULING | Facility: HOME HEALTH | Age: 72
End: 2017-04-24
Payer: MEDICARE

## 2017-04-24 PROCEDURE — 3331090001 HH PPS REVENUE CREDIT

## 2017-04-24 PROCEDURE — G0300 HHS/HOSPICE OF LPN EA 15 MIN: HCPCS

## 2017-04-24 PROCEDURE — 3331090002 HH PPS REVENUE DEBIT

## 2017-04-24 NOTE — PROGRESS NOTES
Transition of care  Navigator contacted the patient by telephone  Unable to leave a message on voicemail b/c the mailbox is full. Alternate number rings a fast busy signal.  Will attempt to contact again in about 2 days or during PCP office visit. Need to complete post-discharge assessment.

## 2017-04-25 ENCOUNTER — HOME CARE VISIT (OUTPATIENT)
Dept: HOME HEALTH SERVICES | Facility: HOME HEALTH | Age: 72
End: 2017-04-25
Payer: MEDICARE

## 2017-04-25 ENCOUNTER — HOME CARE VISIT (OUTPATIENT)
Dept: SCHEDULING | Facility: HOME HEALTH | Age: 72
End: 2017-04-25
Payer: MEDICARE

## 2017-04-25 VITALS
RESPIRATION RATE: 12 BRPM | SYSTOLIC BLOOD PRESSURE: 148 MMHG | OXYGEN SATURATION: 98 % | HEART RATE: 87 BPM | DIASTOLIC BLOOD PRESSURE: 82 MMHG | TEMPERATURE: 97.7 F

## 2017-04-25 PROCEDURE — 3331090002 HH PPS REVENUE DEBIT

## 2017-04-25 PROCEDURE — 3331090001 HH PPS REVENUE CREDIT

## 2017-04-26 ENCOUNTER — OFFICE VISIT (OUTPATIENT)
Dept: INTERNAL MEDICINE CLINIC | Age: 72
End: 2017-04-26

## 2017-04-26 VITALS
OXYGEN SATURATION: 100 % | HEART RATE: 76 BPM | BODY MASS INDEX: 25.22 KG/M2 | TEMPERATURE: 97.7 F | RESPIRATION RATE: 18 BRPM | SYSTOLIC BLOOD PRESSURE: 155 MMHG | HEIGHT: 68 IN | DIASTOLIC BLOOD PRESSURE: 80 MMHG | WEIGHT: 166.4 LBS

## 2017-04-26 DIAGNOSIS — Z91.14 NON COMPLIANCE W MEDICATION REGIMEN: ICD-10-CM

## 2017-04-26 DIAGNOSIS — N30.00 ACUTE CYSTITIS WITHOUT HEMATURIA: ICD-10-CM

## 2017-04-26 DIAGNOSIS — I10 UNCONTROLLED HYPERTENSION: ICD-10-CM

## 2017-04-26 DIAGNOSIS — R53.1 WEAKNESS GENERALIZED: ICD-10-CM

## 2017-04-26 DIAGNOSIS — F22 SOMATIC DELUSION DISORDER (HCC): ICD-10-CM

## 2017-04-26 DIAGNOSIS — Z09 HOSPITAL DISCHARGE FOLLOW-UP: Primary | ICD-10-CM

## 2017-04-26 PROCEDURE — 3331090001 HH PPS REVENUE CREDIT

## 2017-04-26 PROCEDURE — 3331090002 HH PPS REVENUE DEBIT

## 2017-04-26 NOTE — PROGRESS NOTES
Connect care and vivi not communicating therefore note was hand written please see scan     Diagnoses and all orders for this visit:    Hospital discharge follow-up  -     METABOLIC PANEL, BASIC    Uncontrolled hypertension    Non compliance w medication regimen    Weakness generalized  -     METABOLIC PANEL, BASIC    Somatic delusion disorder ([facility] [address]    Acute cystitis without hematuria  -     METABOLIC PANEL, BASIC      Follow-up Disposition:  Return in about 2 weeks (around 5/10/2017) for review labs 30 minutes .    cotinue pt at home and recheck bmp

## 2017-04-26 NOTE — MR AVS SNAPSHOT
Visit Information Date & Time Provider Department Dept. Phone Encounter #  
 4/26/2017 11:00 AM Chio Moore MD [facility] [phone] [id number] Follow-up Instructions Return in about 2 weeks (around 5/10/2017) for review labs 30 minutes . Upcoming Health Maintenance Date Due DTaP/Tdap/Td series (1 - Tdap) 4/30/2006 Pneumococcal 65+ Low/Medium Risk (1 of 2 - PCV13) 9/22/2010 FOOT EXAM Q1 6/12/2015 EYE EXAM RETINAL OR DILATED Q1 7/1/2015 FOBT Q 1 YEAR AGE 50-75 7/29/2015 GLAUCOMA SCREENING Q2Y 7/1/2016 MEDICARE YEARLY EXAM 8/10/2016 MICROALBUMIN Q1 8/10/2016 HEMOGLOBIN A1C Q6M 7/26/2017 BREAST CANCER SCRN MAMMOGRAM 11/2/2017 LIPID PANEL Q1 12/15/2017 Allergies as of 4/26/2017  Review Complete On: 4/24/2017 By: Yuval Burnett LPN Severity Noted Reaction Type Reactions Amoxicillin High 09/10/2010   Systemic Hives Sulfa (Sulfonamide Antibiotics) High 09/10/2010   Systemic Hives, Itching Amoxicillin Medium 04/22/2010   Systemic Hives, Itching Long time ago - patient not exactly certain what it does Mirtazapine Medium 11/20/2012   Side Effect Itching, Nausea Only Funny feeling in chest  
 Percocet [Oxycodone-acetaminophen] Medium 01/15/2014   Intolerance Nausea and Vomiting Codeine  11/26/2012    Nausea and Vomiting Prednisone  05/27/2010    Itching Sulfa (Sulfonamide Antibiotics)  04/22/2010   Systemic Hives, Itching, Palpitations Think it was increased heart rate or itching - many years ago Zithromax [Azithromycin]  11/20/2012   Side Effect Itching Not sure what it does,taken long time ago Current Immunizations  Reviewed on 4/13/2017 Name Date Pneumococcal Polysaccharide (PPSV-23)  Deferred (Patient Refused) TD Vaccine 4/29/2006 Not reviewed this visit You Were Diagnosed With   
  
 Codes Comments Hospital discharge follow-up    -  Primary ICD-10-CM: [facility] ICD-9-CM: V67.59 Uncontrolled hypertension     ICD-10-CM: I10 
ICD-9-CM: 401.9 Non compliance w medication regimen     ICD-10-CM: Z91.14 
ICD-9-CM: V15.81 Weakness generalized     ICD-10-CM: R53.1 ICD-9-CM: 780.79 Somatic delusion disorder ([facility] [address]     ICD-10-CM: F22 
ICD-9-CM: 297.1 Acute cystitis without hematuria     ICD-10-CM: N30.00 ICD-9-CM: 595.0 Vitals OB Status Smoking Status Hysterectomy Never Smoker Preferred Pharmacy Pharmacy Name Phone [id number]- [place] - [address] [phone] Your Updated Medication List  
  
   
This list is accurate as of: 4/26/17 11:22 AM.  Always use your most recent med list.  
  
  
  
  
 aspirin delayed-release 81 mg tablet Take 1 Tab by mouth daily. clonazePAM 1 mg tablet Commonly known as:  Juvencio Botelloe Take 1 Tab by mouth two (2) times a day. Max Daily Amount: 2 mg. As needed fill after 2/5/17  
  
 dilTIAZem  mg ER capsule Commonly known as:  CARDIZEM CD Take 1 Cap by mouth daily. docusate sodium 100 mg capsule Commonly known as:  Gela [place] Take 1 Cap by mouth daily as needed for Constipation for up to 90 days. lactulose 10 gram/15 mL solution Commonly known as:  Jayden Caul Take 45 mL by mouth two (2) times a day for 30 days. polyethylene glycol 17 gram/dose powder Commonly known as:  Nuzhat Lucinda TAKE 1 CAPFUL IN 8 OUNCES OF WATER EVERY DAY  
  
 rosuvastatin 5 mg tablet Commonly known as:  CRESTOR Take 1 Tab by mouth nightly. sertraline 25 mg tablet Commonly known as:  ZOLOFT Take 1 Tab by mouth daily. topiramate 25 mg tablet Commonly known as:  TOPAMAX Take 1 tab at night for 1 week, then 2 tabs at night  
  
 valsartan 80 mg tablet Commonly known as:  DIOVAN Take 1 Tab by mouth daily. We Performed the Following METABOLIC PANEL, BASIC [05427 CPT(R)] Follow-up Instructions Return in about 2 weeks (around 5/10/2017) for review labs 30 minutes . To-Do List   
 04/27/2017 To Be Determined Appointment with Cristy Strong LPN at [place] [address]  
  
 04/27/2017 11:00 AM  
  Appointment with Rizwan King at [place] [address]  
  
 04/28/2017 1:00 PM  
  Appointment with Lamar Kuhn at [place] [address]  
  
 05/01/2017 To Be Determined Appointment with Cristy Strong LPN at [place] [address]  
  
 05/02/2017 To Be Determined Appointment with Rizwan King at [place] [address]  
  
 05/03/2017 To Be Determined Appointment with Lamar Kuhn at [place] [address]  
  
 05/04/2017 To Be Determined Appointment with Richard Padilla RN at [place] [address]  
  
 05/04/2017 To Be Determined Appointment with Rizwan King at [place] [address] Patient Instructions Continue all medications as directed Introducing [facility]! Florence Part introduces [employer] patient portal. Now you can access parts of your medical record, email your doctor's office, and request medication refills online. 1. In your internet browser, go to [email or web address] 2. Click on the First Time User? Click Here link in the Sign In box. You will see the New Member Sign Up page. 3. Enter your [employer] Access Code exactly as it appears below. You will not need to use this code after youve completed the sign-up process. If you do not sign up before the expiration date, you must request a new code. · [employer] Access Code: [email or web address] Expires: 5/24/2017  2:24 PM 
 
4.  Enter the last four digits of your Social Security Number (xxxx) and Date of Birth (mm/dd/yyyy) as indicated and click Submit. You will be taken to the next sign-up page. 5. Create a [employer] ID. This will be your [employer] login ID and cannot be changed, so think of one that is secure and easy to remember. 6. Create a [employer] password. You can change your password at any time. 7. Enter your Password Reset Question and Answer. This can be used at a later time if you forget your password. 8. Enter your e-mail address. You will receive e-mail notification when new information is available in [address] 9. Click Sign Up. You can now view and download portions of your medical record. 10. Click the Download Summary menu link to download a portable copy of your medical information. If you have questions, please visit the Frequently Asked Questions section of the [employer] website. Remember, [employer] is NOT to be used for urgent needs. For medical emergencies, dial 911. Now available from your iPhone and Android! Please provide this summary of care documentation to your next provider. Your primary care clinician is listed as Collin Arvizu. If you have any questions after today's visit, please call [phone].

## 2017-04-27 ENCOUNTER — HOME CARE VISIT (OUTPATIENT)
Dept: SCHEDULING | Facility: HOME HEALTH | Age: 72
End: 2017-04-27
Payer: MEDICARE

## 2017-04-27 LAB
BUN SERPL-MCNC: 17 MG/DL (ref 8–27)
BUN/CREAT SERPL: 20 (ref 12–28)
CALCIUM SERPL-MCNC: 9.3 MG/DL (ref 8.7–10.3)
CHLORIDE SERPL-SCNC: 102 MMOL/L (ref 96–106)
CO2 SERPL-SCNC: 27 MMOL/L (ref 18–29)
CREAT SERPL-MCNC: 0.83 MG/DL (ref 0.57–1)
GLUCOSE SERPL-MCNC: 101 MG/DL (ref 65–99)
POTASSIUM SERPL-SCNC: 4.4 MMOL/L (ref 3.5–5.2)
SODIUM SERPL-SCNC: 142 MMOL/L (ref 134–144)

## 2017-04-27 PROCEDURE — G0300 HHS/HOSPICE OF LPN EA 15 MIN: HCPCS

## 2017-04-27 PROCEDURE — 3331090002 HH PPS REVENUE DEBIT

## 2017-04-27 PROCEDURE — 3331090001 HH PPS REVENUE CREDIT

## 2017-04-28 ENCOUNTER — HOME CARE VISIT (OUTPATIENT)
Dept: SCHEDULING | Facility: HOME HEALTH | Age: 72
End: 2017-04-28
Payer: MEDICARE

## 2017-04-28 VITALS
DIASTOLIC BLOOD PRESSURE: 78 MMHG | HEART RATE: 66 BPM | OXYGEN SATURATION: 95 % | SYSTOLIC BLOOD PRESSURE: 136 MMHG | RESPIRATION RATE: 14 BRPM | TEMPERATURE: 97.7 F

## 2017-04-28 VITALS
DIASTOLIC BLOOD PRESSURE: 90 MMHG | TEMPERATURE: 98.2 F | OXYGEN SATURATION: 99 % | HEART RATE: 81 BPM | SYSTOLIC BLOOD PRESSURE: 141 MMHG

## 2017-04-28 PROCEDURE — G0152 HHCP-SERV OF OT,EA 15 MIN: HCPCS

## 2017-04-28 PROCEDURE — 3331090001 HH PPS REVENUE CREDIT

## 2017-04-28 PROCEDURE — G0151 HHCP-SERV OF PT,EA 15 MIN: HCPCS

## 2017-04-28 PROCEDURE — 3331090002 HH PPS REVENUE DEBIT

## 2017-04-29 VITALS
SYSTOLIC BLOOD PRESSURE: 101 MMHG | OXYGEN SATURATION: 98 % | HEART RATE: 90 BPM | RESPIRATION RATE: 17 BRPM | TEMPERATURE: 98.1 F | DIASTOLIC BLOOD PRESSURE: 70 MMHG

## 2017-04-29 PROCEDURE — 3331090002 HH PPS REVENUE DEBIT

## 2017-04-29 PROCEDURE — 3331090001 HH PPS REVENUE CREDIT

## 2017-04-30 PROCEDURE — 3331090002 HH PPS REVENUE DEBIT

## 2017-04-30 PROCEDURE — 3331090001 HH PPS REVENUE CREDIT

## 2017-05-01 ENCOUNTER — HOME CARE VISIT (OUTPATIENT)
Dept: HOME HEALTH SERVICES | Facility: HOME HEALTH | Age: 72
End: 2017-05-01
Payer: MEDICARE

## 2017-05-01 PROCEDURE — 3331090002 HH PPS REVENUE DEBIT

## 2017-05-01 PROCEDURE — 3331090001 HH PPS REVENUE CREDIT

## 2017-05-02 ENCOUNTER — HOME CARE VISIT (OUTPATIENT)
Dept: SCHEDULING | Facility: HOME HEALTH | Age: 72
End: 2017-05-02
Payer: MEDICARE

## 2017-05-02 VITALS
RESPIRATION RATE: 17 BRPM | SYSTOLIC BLOOD PRESSURE: 107 MMHG | HEART RATE: 87 BPM | OXYGEN SATURATION: 97 % | DIASTOLIC BLOOD PRESSURE: 67 MMHG | TEMPERATURE: 98.2 F

## 2017-05-02 VITALS
TEMPERATURE: 97.5 F | OXYGEN SATURATION: 95 % | HEART RATE: 84 BPM | DIASTOLIC BLOOD PRESSURE: 83 MMHG | SYSTOLIC BLOOD PRESSURE: 116 MMHG

## 2017-05-02 PROCEDURE — G0151 HHCP-SERV OF PT,EA 15 MIN: HCPCS

## 2017-05-02 PROCEDURE — G0152 HHCP-SERV OF OT,EA 15 MIN: HCPCS

## 2017-05-02 PROCEDURE — 3331090001 HH PPS REVENUE CREDIT

## 2017-05-02 PROCEDURE — 3331090002 HH PPS REVENUE DEBIT

## 2017-05-03 ENCOUNTER — HOME CARE VISIT (OUTPATIENT)
Dept: HOME HEALTH SERVICES | Facility: HOME HEALTH | Age: 72
End: 2017-05-03
Payer: MEDICARE

## 2017-05-03 PROCEDURE — 3331090002 HH PPS REVENUE DEBIT

## 2017-05-03 PROCEDURE — 3331090001 HH PPS REVENUE CREDIT

## 2017-05-04 ENCOUNTER — HOME CARE VISIT (OUTPATIENT)
Dept: SCHEDULING | Facility: HOME HEALTH | Age: 72
End: 2017-05-04
Payer: MEDICARE

## 2017-05-04 VITALS
TEMPERATURE: 97.6 F | HEART RATE: 82 BPM | SYSTOLIC BLOOD PRESSURE: 116 MMHG | RESPIRATION RATE: 18 BRPM | DIASTOLIC BLOOD PRESSURE: 71 MMHG | OXYGEN SATURATION: 96 %

## 2017-05-04 VITALS
HEART RATE: 85 BPM | SYSTOLIC BLOOD PRESSURE: 116 MMHG | RESPIRATION RATE: 20 BRPM | TEMPERATURE: 96.7 F | DIASTOLIC BLOOD PRESSURE: 74 MMHG

## 2017-05-04 DIAGNOSIS — I10 HTN, GOAL BELOW 130/80: Primary | Chronic | ICD-10-CM

## 2017-05-04 DIAGNOSIS — E11.9 DIABETES MELLITUS TYPE 2, DIET-CONTROLLED (HCC): ICD-10-CM

## 2017-05-04 DIAGNOSIS — E78.5 HYPERLIPIDEMIA, UNSPECIFIED HYPERLIPIDEMIA TYPE: Chronic | ICD-10-CM

## 2017-05-04 PROCEDURE — G0151 HHCP-SERV OF PT,EA 15 MIN: HCPCS

## 2017-05-04 PROCEDURE — 3331090001 HH PPS REVENUE CREDIT

## 2017-05-04 PROCEDURE — 3331090002 HH PPS REVENUE DEBIT

## 2017-05-04 PROCEDURE — G0299 HHS/HOSPICE OF RN EA 15 MIN: HCPCS

## 2017-05-04 NOTE — TELEPHONE ENCOUNTER
Angelika Rachel, [facility] nurse, called with some concerns regarding pt. Ely states that pt is still having c/o chronic abd pain. Pt possibly had not had a bowel movement in 7 days. Pt has taken lactulose, Miralax, and Doculax the past 2 days. Pt is also having c/o eye pain and urinary pain. Pt has completed Macrobid that was prescribed to her by another doctor. Pt refuse to take med Sertraline, which she states  That she doesn't like how it makes her feel. She is not taking Klonopin as well. Ley would like to have 2 more additional visits with pt so that she can continue to get pt on the right track. An updated homehealth referral will need to be placed. Pt need a refill for med Valsartan. Rx and referral is pending. Ely's phone number is [phone]. Msg forwarded to Dr. Macario Nair.

## 2017-05-05 PROCEDURE — 3331090001 HH PPS REVENUE CREDIT

## 2017-05-05 PROCEDURE — 3331090002 HH PPS REVENUE DEBIT

## 2017-05-05 RX ORDER — VALSARTAN 80 MG/1
TABLET ORAL
Qty: 30 TAB | Refills: 0 | Status: SHIPPED | OUTPATIENT
Start: 2017-05-05 | End: 2017-06-05 | Stop reason: SDUPTHER

## 2017-05-06 PROCEDURE — 3331090001 HH PPS REVENUE CREDIT

## 2017-05-06 PROCEDURE — 3331090002 HH PPS REVENUE DEBIT

## 2017-05-07 PROCEDURE — 3331090002 HH PPS REVENUE DEBIT

## 2017-05-07 PROCEDURE — 3331090001 HH PPS REVENUE CREDIT

## 2017-05-08 ENCOUNTER — HOME CARE VISIT (OUTPATIENT)
Dept: SCHEDULING | Facility: HOME HEALTH | Age: 72
End: 2017-05-08
Payer: MEDICARE

## 2017-05-08 VITALS
SYSTOLIC BLOOD PRESSURE: 118 MMHG | RESPIRATION RATE: 12 BRPM | DIASTOLIC BLOOD PRESSURE: 72 MMHG | TEMPERATURE: 97.3 F | OXYGEN SATURATION: 97 % | HEART RATE: 77 BPM

## 2017-05-08 PROCEDURE — G0300 HHS/HOSPICE OF LPN EA 15 MIN: HCPCS

## 2017-05-08 PROCEDURE — 3331090001 HH PPS REVENUE CREDIT

## 2017-05-08 PROCEDURE — 3331090002 HH PPS REVENUE DEBIT

## 2017-05-09 PROCEDURE — 3331090001 HH PPS REVENUE CREDIT

## 2017-05-09 PROCEDURE — 3331090002 HH PPS REVENUE DEBIT

## 2017-05-10 ENCOUNTER — OFFICE VISIT (OUTPATIENT)
Dept: INTERNAL MEDICINE CLINIC | Age: 72
End: 2017-05-10

## 2017-05-10 VITALS
OXYGEN SATURATION: 100 % | SYSTOLIC BLOOD PRESSURE: 122 MMHG | WEIGHT: 165.8 LBS | HEIGHT: 68 IN | RESPIRATION RATE: 16 BRPM | DIASTOLIC BLOOD PRESSURE: 65 MMHG | HEART RATE: 72 BPM | TEMPERATURE: 98.3 F | BODY MASS INDEX: 25.13 KG/M2

## 2017-05-10 DIAGNOSIS — R42 DIZZINESS: ICD-10-CM

## 2017-05-10 DIAGNOSIS — K14.9 TONGUE DISORDER: ICD-10-CM

## 2017-05-10 DIAGNOSIS — G47.00 INSOMNIA, UNSPECIFIED TYPE: ICD-10-CM

## 2017-05-10 DIAGNOSIS — F22 SOMATIC DELUSION DISORDER (HCC): Primary | ICD-10-CM

## 2017-05-10 PROCEDURE — 3331090001 HH PPS REVENUE CREDIT

## 2017-05-10 PROCEDURE — 3331090002 HH PPS REVENUE DEBIT

## 2017-05-10 RX ORDER — VALSARTAN 80 MG/1
80 TABLET ORAL DAILY
Qty: 30 TAB | Refills: 3 | Status: SHIPPED | OUTPATIENT
Start: 2017-05-10 | End: 2017-06-05 | Stop reason: SDUPTHER

## 2017-05-10 RX ORDER — ROSUVASTATIN CALCIUM 5 MG/1
5 TABLET, COATED ORAL
Qty: 30 TAB | Refills: 3 | Status: SHIPPED | OUTPATIENT
Start: 2017-05-10 | End: 2017-08-15 | Stop reason: SDUPTHER

## 2017-05-10 RX ORDER — MECLIZINE HCL 12.5 MG 12.5 MG/1
12.5 TABLET ORAL
Qty: 30 TAB | Refills: 0 | Status: SHIPPED | OUTPATIENT
Start: 2017-05-10 | End: 2017-06-09

## 2017-05-10 RX ORDER — CLONAZEPAM 1 MG/1
1 TABLET ORAL 2 TIMES DAILY
Qty: 40 TAB | Refills: 1 | OUTPATIENT
Start: 2017-05-10

## 2017-05-10 NOTE — MR AVS SNAPSHOT
Visit Information Date & Time Provider Department Dept. Phone Encounter #  
 5/10/2017 10:30  [facility] [address] [place] [id number] Follow-up Instructions Return in about 3 weeks (around 5/31/2017) for recheck . Your Appointments 8/10/2017 10:15 AM  
ROUTINE CARE with Kristy Nuñez MD  
[address] [facility] ([facility]) Appt Note: [address] due today  
 [address] [facility],[address]Th Floor [facility] 7 [address]  
[phone]  
  
   
 [address] [place] [address],[address] [facility] 7 [address] Upcoming Health Maintenance Date Due DTaP/Tdap/Td series (1 - Tdap) 4/30/2006 Pneumococcal 65+ Low/Medium Risk (1 of 2 - PCV13) 9/22/2010 FOOT EXAM Q1 6/12/2015 EYE EXAM RETINAL OR DILATED Q1 7/1/2015 FOBT Q 1 YEAR AGE 50-75 7/29/2015 GLAUCOMA SCREENING Q2Y 7/1/2016 MEDICARE YEARLY EXAM 8/10/2016 MICROALBUMIN Q1 8/10/2016 HEMOGLOBIN A1C Q6M 7/26/2017 INFLUENZA AGE 9 TO ADULT 8/1/2017 BREAST CANCER SCRN MAMMOGRAM 11/2/2017 LIPID PANEL Q1 12/15/2017 Allergies as of 5/10/2017  Review Complete On: 5/10/2017 By: Kelsie Garcia Severity Noted Reaction Type Reactions Amoxicillin High 09/10/2010   Systemic Hives Sulfa (Sulfonamide Antibiotics) High 09/10/2010   Systemic Hives, Itching Amoxicillin Medium 04/22/2010   Systemic Hives, Itching Long time ago - patient not exactly certain what it does Mirtazapine Medium 11/20/2012   Side Effect Itching, Nausea Only Funny feeling in chest  
 Percocet [Oxycodone-acetaminophen] Medium 01/15/2014   Intolerance Nausea and Vomiting Codeine  11/26/2012    Nausea and Vomiting Prednisone  05/27/2010    Itching Sulfa (Sulfonamide Antibiotics)  04/22/2010   Systemic Hives, Itching, Palpitations Think it was increased heart rate or itching - many years ago Zithromax [Azithromycin]  11/20/2012   Side Effect Itching Not sure what it does,taken long time ago Current Immunizations  Reviewed on 4/13/2017 Name Date Pneumococcal Polysaccharide (PPSV-23)  Deferred (Patient Refused) TD Vaccine 4/29/2006 Not reviewed this visit You Were Diagnosed With   
  
 Codes Comments Somatic delusion disorder ([facility]    -  Primary ICD-10-CM: F22 
ICD-9-CM: 297.1 Tongue disorder     ICD-10-CM: K14.9 ICD-9-CM: 529.9 Insomnia, unspecified type     ICD-10-CM: G47.00 ICD-9-CM: 780.52 Dizziness     ICD-10-CM: G51 ICD-9-CM: 780.4 Vitals BP Pulse Temp Resp Height(growth percentile) Weight(growth percentile) 122/65 (BP 1 Location: Left arm, BP Patient Position: Sitting) 72 98.3 °F (36.8 °C) (Oral) 16 5' 8\" (1.727 m) 165 lb 12.8 oz (75.2 kg) SpO2 BMI OB Status Smoking Status 100% 25.21 kg/m2 Hysterectomy Never Smoker Vitals History BMI and BSA Data Body Mass Index Body Surface Area  
 25.21 kg/m 2 1.9 m 2 Preferred Pharmacy Pharmacy Name Phone [id number]- [place], [place] - [address] [phone] Your Updated Medication List  
  
   
This list is accurate as of: 5/10/17 10:46 AM.  Always use your most recent med list.  
  
  
  
  
 aspirin delayed-release 81 mg tablet Take 1 Tab by mouth daily. clonazePAM 1 mg tablet Commonly known as:  Surekha Rodriguez Take 1 Tab by mouth two (2) times a day. Max Daily Amount: 2 mg. As needed fill after 2/5/17  
  
 dilTIAZem  mg ER capsule Commonly known as:  CARDIZEM CD Take 1 Cap by mouth daily. docusate sodium 100 mg capsule Commonly known as:  Major Mclean Take 1 Cap by mouth daily as needed for Constipation for up to 90 days. lactulose 10 gram/15 mL solution Commonly known as:  Dallie Henri Take 45 mL by mouth two (2) times a day for 30 days. meclizine 12.5 mg tablet Commonly known as:  ANTIVERT Take 1 Tab by mouth two (2) times daily as needed for up to 30 days. For dizziness  
  
 polyethylene glycol 17 gram/dose powder Commonly known as:  Lynyue Glad TAKE 1 CAPFUL IN 8 OUNCES OF WATER EVERY DAY  
  
 rosuvastatin 5 mg tablet Commonly known as:  CRESTOR Take 1 Tab by mouth nightly. * valsartan 80 mg tablet Commonly known as:  DIOVAN  
TAKE 1 TAB BY MOUTH DAILY. * valsartan 80 mg tablet Commonly known as:  DIOVAN Take 1 Tab by mouth daily. * Notice: This list has 2 medication(s) that are the same as other medications prescribed for you. Read the directions carefully, and ask your doctor or other care provider to review them with you. Prescriptions Sent to Pharmacy Refills  
 rosuvastatin (CRESTOR) 5 mg tablet 3 Sig: Take 1 Tab by mouth nightly. Class: Normal  
 Pharmacy: [facility]/pharmacy [address] [place] [address] [address] Ph #: [phone] Route: Oral  
 meclizine (ANTIVERT) 12.5 mg tablet 0 Sig: Take 1 Tab by mouth two (2) times daily as needed for up to 30 days. For dizziness Class: Normal  
 Pharmacy: [facility]/pharmacy [facility] [address] Ph #: [phone] Route: Oral  
  
We Performed the Following REFERRAL TO ENT-OTOLARYNGOLOGY [KJX54 Custom] Comments:  
 Please evaluate patient for oral concerns- pt c/o bump under tounge and tongue feels funny in the am and it tastes bitter Follow-up Instructions Return in about 3 weeks (around 5/31/2017) for recheck . To-Do List   
 05/12/2017 To Be Determined Appointment with Jorje Blackburn RN at [place] [address] Referral Information Referral ID Referred By Referred To  
  
 [id number] MARTHA Almeida Not Available Visits Status Start Date End Date 1 New Request 5/10/17 5/10/18 If your referral has a status of pending review or denied, additional information will be sent to support the outcome of this decision. Patient Instructions   
continue all medications as directed Introducing hospitals & HEALTH SERVICES! Stewart Ceballos introduces [employer] patient portal. Now you can access parts of your medical record, email your doctor's office, and request medication refills online. 1. In your internet browser, go to [email or web address] 2. Click on the First Time User? Click Here link in the Sign In box. You will see the New Member Sign Up page. 3. Enter your [employer] Access Code exactly as it appears below. You will not need to use this code after youve completed the sign-up process. If you do not sign up before the expiration date, you must request a new code. · [employer] Access Code: [email or web address] Expires: 5/24/2017  2:24 PM 
 
4. Enter the last four digits of your Social Security Number (xxxx) and Date of Birth (mm/dd/yyyy) as indicated and click Submit. You will be taken to the next sign-up page. 5. Create a [employer] ID. This will be your [employer] login ID and cannot be changed, so think of one that is secure and easy to remember. 6. Create a [employer] password. You can change your password at any time. 7. Enter your Password Reset Question and Answer. This can be used at a later time if you forget your password. 8. Enter your e-mail address. You will receive e-mail notification when new information is available in [address] 9. Click Sign Up. You can now view and download portions of your medical record. 10. Click the Download Summary menu link to download a portable copy of your medical information. If you have questions, please visit the Frequently Asked Questions section of the [employer] website. Remember, [employer] is NOT to be used for urgent needs. For medical emergencies, dial 911. Now available from your iPhone and Android! Please provide this summary of care documentation to your next provider. Your primary care clinician is listed as Blanka Davis. If you have any questions after today's visit, please call [phone].

## 2017-05-10 NOTE — PROGRESS NOTES
Chief Complaint   Patient presents with    Hypertension     (RM 7) c/o Lt foot pain constipation    Diabetes     Dizziness, Blurred vision    Results     HM: Eye and Foot exam due

## 2017-05-10 NOTE — PROGRESS NOTES
Chief Complaint   Patient presents with    Hypertension     (RM 7) c/o constipation    Diabetes     Dizziness, Blurred vision, Foot Pain -  Exam Due    Results           Subjective:   Erwin Province 70 y.o.  female with a  past medical history reviewed see below. comes in today c/o:States \" i just don't feel good\" only slept an hour last night and still has head tightness and this is not new and still has dim and blurry vision bs was 82 this am. And 2 hr PPBS . Stopped the antidepressant    Home health  follow up  shehas been going to the foot doctor for foot concerns   She has not been taking the topamax either   She has been taking the crestor as well. ROS: otherwise feeling generally well. All other systems reviewed and are negative      Current Outpatient Prescriptions   Medication Sig Dispense Refill    valsartan (DIOVAN) 80 mg tablet Take 1 Tab by mouth daily. 30 Tab 3    dilTIAZem CD (CARDIZEM CD) 120 mg ER capsule Take 1 Cap by mouth daily. 30 Cap 1    docusate sodium (COLACE) 100 mg capsule Take 1 Cap by mouth daily as needed for Constipation for up to 90 days. 90 Cap 0    lactulose (CHRONULAC) 10 gram/15 mL solution Take 45 mL by mouth two (2) times a day for 30 days. 1200 mL 0    rosuvastatin (CRESTOR) 5 mg tablet Take 1 Tab by mouth nightly. 30 Tab 0    aspirin delayed-release 81 mg tablet Take 1 Tab by mouth daily. 30 Tab 11    valsartan (DIOVAN) 80 mg tablet TAKE 1 TAB BY MOUTH DAILY. 30 Tab 0    sertraline (ZOLOFT) 25 mg tablet Take 1 Tab by mouth daily. (Patient not taking: Reported on 5/4/2017) 30 Tab 0    topiramate (TOPAMAX) 25 mg tablet Take 1 tab at night for 1 week, then 2 tabs at night (Patient not taking: Reported on 5/4/2017) 60 Tab 5    polyethylene glycol (MIRALAX) 17 gram/dose powder TAKE 1 CAPFUL IN 8 OUNCES OF WATER EVERY DAY (Patient not taking: Reported on 4/21/2017) 255 g 3    clonazePAM (KLONOPIN) 1 mg tablet Take 1 Tab by mouth two (2) times a day.  Max Daily Amount: 2 mg.  As needed fill after 2/5/17 (Patient not taking: Reported on 5/4/2017) 40 Tab 0     Allergies   Allergen Reactions    Amoxicillin Hives    Sulfa (Sulfonamide Antibiotics) Hives and Itching    Amoxicillin Hives and Itching     Long time ago - patient not exactly certain what it does    Mirtazapine Itching and Nausea Only     Funny feeling in chest    Percocet [Oxycodone-Acetaminophen] Nausea and Vomiting    Codeine Nausea and Vomiting    Prednisone Itching    Sulfa (Sulfonamide Antibiotics) Hives, Itching and Palpitations     Think it was increased heart rate or itching - many years ago    Zithromax [Azithromycin] Itching     Not sure what it does,taken long time ago     Past Medical History:   Diagnosis Date    Agoraphobia without mention of panic attacks 2/17/2014    Anxiety disorder 8/18/2013    Arthritis     osteo    Breast pain, left 4/7/2015    CAD (coronary artery disease), native coronary artery 12/1/2015    Chronic chest pain 1/13/2014    Chronic pain associated with significant psychosocial dysfunction 2/17/2014    Depression 8/18/2013    Diabetes ([facility] [facility]     type II    Duplicated right renal collecting system 3/13/2014    GERD (gastroesophageal reflux disease)     Gout, joint     HTN, goal below 130/80 9/7/2012    Hypertension     Nephrolithiasis 3/13/2014    Other ill-defined conditions     hyercholesterolemia    Other ill-defined conditions     anxiety    Other ill-defined conditions     pt states head get tight,due to wax bill up    Other ill-defined conditions     pain left shoulder due to fall many years ago    Personal history of noncompliance with medical treatment, presenting hazards to health 5/30/2014    Psychiatric disorder     anxiety/ depression    Psychotic disorder     Vaginal pain 7/30/2014     Past Surgical History:   Procedure Laterality Date    EGD  4/23/2010         HX GYN      cyst removed from vagina    HX GYN      vaginal birth x7  HX HYSTERECTOMY      partial    HX OTHER SURGICAL      egd    HX OTHER SURGICAL      cyst removed from left wrist    HX OTHER SURGICAL      bladder dilitation    HX TUBAL LIGATION      HX UROLOGICAL      kidney stones     Family History   Problem Relation Age of Onset    Stroke Mother     Heart Disease Mother     Cancer Father     Heart Disease Son     Liver Disease Son     Heart Disease Daughter     Malignant Hyperthermia Neg Hx     Pseudocholinesterase Deficiency Neg Hx     Delayed Awakening Neg Hx     Post-op Nausea/Vomiting Neg Hx     Emergence Delirium Neg Hx     Post-op Cognitive Dysfunction Neg Hx     Other Neg Hx      Social History   Substance Use Topics    Smoking status: Never Smoker    Smokeless tobacco: Never Used    Alcohol use No          Objective:     Visit Vitals    /65 (BP 1 Location: Left arm, BP Patient Position: Sitting)    Pulse 72    Temp 98.3 °F (36.8 °C) (Oral)    Resp 16    Ht 5' 8\" (1.727 m)    Wt 165 lb 12.8 oz (75.2 kg)    SpO2 100%    BMI 25.21 kg/m2     Gen: NAD, pleasant  HEENT: normal appearing head, nares patent, PERRLA, EOMI, oropharynx no erythema, no cervical lymphadenopathy neck supple   Cardio: RRR nl S1S2 no murmur  Lungs CTAB no wheeze no rales no rhonchi  ABD Soft non tender non distended + bowel sounds  Extremities: full ROM X 4 no clubbing no cyanosis  Neuro: no gross focal deficits noted, alert and orientated X 3  Psych.: well groomed tearful and stable depression. Assessment/Plan:   Faith Zamora was seen today for hypertension, diabetes and results. Diagnoses and all orders for this visit:    Somatic delusion disorder ([facility] [address]    Tongue disorder  -     REFERRAL TO ENT-OTOLARYNGOLOGY    Insomnia, unspecified type    Dizziness    Other orders  -     rosuvastatin (CRESTOR) 5 mg tablet; Take 1 Tab by mouth nightly. -     meclizine (ANTIVERT) 12.5 mg tablet; Take 1 Tab by mouth two (2) times daily as needed for up to 30 days.  For dizziness      Follow-up Disposition:  Return in about 3 weeks (around 5/31/2017) for recheck . .  avs printed and given to the pt. .  Appears to be tolerating the crestor at low dose ok will not increase at this time encouraged to contact a psychatrist as previously directed   F/up with GYN as directed continue to stop \"nerve pills\" as directed by psychiatrist at the hospital pt declines to take the zoloft    The patient voiced understanding of the above. Medication side effects were reviewed with the patient. Call with any concerns.

## 2017-05-11 PROCEDURE — 3331090002 HH PPS REVENUE DEBIT

## 2017-05-11 PROCEDURE — 3331090001 HH PPS REVENUE CREDIT

## 2017-05-12 ENCOUNTER — HOME CARE VISIT (OUTPATIENT)
Dept: SCHEDULING | Facility: HOME HEALTH | Age: 72
End: 2017-05-12
Payer: MEDICARE

## 2017-05-12 VITALS
SYSTOLIC BLOOD PRESSURE: 128 MMHG | DIASTOLIC BLOOD PRESSURE: 84 MMHG | OXYGEN SATURATION: 98 % | HEART RATE: 83 BPM | RESPIRATION RATE: 18 BRPM | TEMPERATURE: 98 F

## 2017-05-12 PROCEDURE — 3331090001 HH PPS REVENUE CREDIT

## 2017-05-12 PROCEDURE — G0299 HHS/HOSPICE OF RN EA 15 MIN: HCPCS

## 2017-05-12 PROCEDURE — 3331090002 HH PPS REVENUE DEBIT

## 2017-05-13 PROCEDURE — 3331090001 HH PPS REVENUE CREDIT

## 2017-05-13 PROCEDURE — 3331090002 HH PPS REVENUE DEBIT

## 2017-05-14 PROCEDURE — 3331090002 HH PPS REVENUE DEBIT

## 2017-05-14 PROCEDURE — 3331090001 HH PPS REVENUE CREDIT

## 2017-05-15 PROCEDURE — 3331090002 HH PPS REVENUE DEBIT

## 2017-05-15 PROCEDURE — 3331090001 HH PPS REVENUE CREDIT

## 2017-05-16 PROCEDURE — 3331090001 HH PPS REVENUE CREDIT

## 2017-05-16 PROCEDURE — 3331090002 HH PPS REVENUE DEBIT

## 2017-05-17 PROCEDURE — 3331090002 HH PPS REVENUE DEBIT

## 2017-05-17 PROCEDURE — 3331090001 HH PPS REVENUE CREDIT

## 2017-05-24 ENCOUNTER — TELEPHONE (OUTPATIENT)
Dept: CARDIOLOGY CLINIC | Age: 72
End: 2017-05-24

## 2017-05-24 NOTE — TELEPHONE ENCOUNTER
Called patient left message on voicemail to return call. Patient need to schedule annual follow up.   Received medication refill request.

## 2017-06-01 RX ORDER — MAG HYDROX/ALUMINUM HYD/SIMETH 200-200-20
SUSPENSION, ORAL (FINAL DOSE FORM) ORAL 2 TIMES DAILY
Qty: 30 G | Refills: 0 | Status: SHIPPED | OUTPATIENT
Start: 2017-06-01 | End: 2017-09-13

## 2017-06-05 ENCOUNTER — OFFICE VISIT (OUTPATIENT)
Dept: CARDIOLOGY CLINIC | Age: 72
End: 2017-06-05

## 2017-06-05 ENCOUNTER — TELEPHONE (OUTPATIENT)
Dept: CARDIOLOGY CLINIC | Age: 72
End: 2017-06-05

## 2017-06-05 VITALS
DIASTOLIC BLOOD PRESSURE: 84 MMHG | HEART RATE: 88 BPM | WEIGHT: 164.5 LBS | RESPIRATION RATE: 16 BRPM | SYSTOLIC BLOOD PRESSURE: 140 MMHG | BODY MASS INDEX: 24.93 KG/M2 | HEIGHT: 68 IN | OXYGEN SATURATION: 99 %

## 2017-06-05 DIAGNOSIS — I10 HTN, GOAL BELOW 130/80: Chronic | ICD-10-CM

## 2017-06-05 DIAGNOSIS — E78.5 HYPERLIPIDEMIA, UNSPECIFIED HYPERLIPIDEMIA TYPE: Chronic | ICD-10-CM

## 2017-06-05 DIAGNOSIS — F45.0 SOMATIZATION DISORDER: ICD-10-CM

## 2017-06-05 DIAGNOSIS — I25.10 CORONARY ARTERY DISEASE INVOLVING NATIVE CORONARY ARTERY OF NATIVE HEART WITHOUT ANGINA PECTORIS: Primary | ICD-10-CM

## 2017-06-05 RX ORDER — VALSARTAN 80 MG/1
80 TABLET ORAL DAILY
Qty: 90 TAB | Refills: 4 | Status: SHIPPED | OUTPATIENT
Start: 2017-06-05 | End: 2018-04-11 | Stop reason: SDUPTHER

## 2017-06-05 RX ORDER — CIPROFLOXACIN 500 MG/1
TABLET ORAL
COMMUNITY
Start: 2017-06-01 | End: 2017-09-13 | Stop reason: ALTCHOICE

## 2017-06-05 RX ORDER — DILTIAZEM HYDROCHLORIDE 120 MG/1
120 CAPSULE, COATED, EXTENDED RELEASE ORAL DAILY
Qty: 90 CAP | Refills: 4 | Status: SHIPPED | OUTPATIENT
Start: 2017-06-05 | End: 2018-04-11 | Stop reason: SDUPTHER

## 2017-06-05 NOTE — PROGRESS NOTES
Ivan Chinchilla DNP, ANP-BC  Subjective/HPI:     Ariana Coombs is a 70 y.o. female is here for routine f/u. The patient denies exertional chest pain/resting shortness of breath, orthopnea, PND, LE edema, palpitations, syncope, presyncope or fatigue. Patient reports when she is anxious she feels chest tightness otherwise she is able to perform usual physical activities without triggering chest discomfort.          PCP Provider  Luigi Youngblood MD  Past Medical History:   Diagnosis Date    Agoraphobia without mention of panic attacks 2/17/2014    Anxiety disorder 8/18/2013    Arthritis     osteo    Breast pain, left 4/7/2015    CAD (coronary artery disease), native coronary artery 12/1/2015    Chronic chest pain 1/13/2014    Chronic pain associated with significant psychosocial dysfunction 2/17/2014    Depression 8/18/2013    Diabetes ([address]     type II    Duplicated right renal collecting system 3/13/2014    GERD (gastroesophageal reflux disease)     Gout, joint     HTN, goal below 130/80 9/7/2012    Hypertension     Nephrolithiasis 3/13/2014    Other ill-defined conditions     hyercholesterolemia    Other ill-defined conditions     anxiety    Other ill-defined conditions     pt states head get tight,due to wax bill up    Other ill-defined conditions     pain left shoulder due to fall many years ago    Personal history of noncompliance with medical treatment, presenting hazards to health 5/30/2014    Psychiatric disorder     anxiety/ depression    Psychotic disorder     Vaginal pain 7/30/2014      Past Surgical History:   Procedure Laterality Date    EGD  4/23/2010         HX GYN      cyst removed from vagina    HX GYN      vaginal birth x7    HX HYSTERECTOMY      partial    HX OTHER SURGICAL      egd    HX OTHER SURGICAL      cyst removed from left wrist    HX OTHER SURGICAL      bladder dilitation    HX TUBAL LIGATION      HX UROLOGICAL      kidney stones     Allergies Allergen Reactions    Amoxicillin Hives    Sulfa (Sulfonamide Antibiotics) Hives and Itching    Amoxicillin Hives and Itching     Long time ago - patient not exactly certain what it does    Mirtazapine Itching and Nausea Only     Funny feeling in chest    Percocet [Oxycodone-Acetaminophen] Nausea and Vomiting    Codeine Nausea and Vomiting    Prednisone Itching    Sulfa (Sulfonamide Antibiotics) Hives, Itching and Palpitations     Think it was increased heart rate or itching - many years ago    Zithromax [Azithromycin] Itching     Not sure what it does,taken long time ago      Family History   Problem Relation Age of Onset    Stroke Mother     Heart Disease Mother     Cancer Father     Heart Disease Son     Liver Disease Son     Heart Disease Daughter     Malignant Hyperthermia Neg Hx     Pseudocholinesterase Deficiency Neg Hx     Delayed Awakening Neg Hx     Post-op Nausea/Vomiting Neg Hx     Emergence Delirium Neg Hx     Post-op Cognitive Dysfunction Neg Hx     Other Neg Hx       Current Outpatient Prescriptions   Medication Sig    ciprofloxacin HCl (CIPRO) 500 mg tablet     valsartan (DIOVAN) 80 mg tablet Take 1 Tab by mouth daily.  dilTIAZem CD (CARDIZEM CD) 120 mg ER capsule Take 1 Cap by mouth daily.  hydrocortisone (HYCORT) 1 % ointment Apply  to affected area two (2) times a day. use thin layer    rosuvastatin (CRESTOR) 5 mg tablet Take 1 Tab by mouth nightly.  meclizine (ANTIVERT) 12.5 mg tablet Take 1 Tab by mouth two (2) times daily as needed for up to 30 days. For dizziness    docusate sodium (COLACE) 100 mg capsule Take 1 Cap by mouth daily as needed for Constipation for up to 90 days.  aspirin delayed-release 81 mg tablet Take 1 Tab by mouth daily.     polyethylene glycol (MIRALAX) 17 gram/dose powder TAKE 1 CAPFUL IN 8 OUNCES OF WATER EVERY DAY (Patient not taking: Reported on 4/21/2017)    clonazePAM (KLONOPIN) 1 mg tablet Take 1 Tab by mouth two (2) times a day. Max Daily Amount: 2 mg. As needed fill after 2/5/17 (Patient not taking: Reported on 5/4/2017)     No current facility-administered medications for this visit. Vitals:    06/05/17 1017 06/05/17 1027   BP: 132/84 140/84   Pulse: 88    Resp: 16    SpO2: 99%    Weight: 164 lb 8 oz (74.6 kg)    Height: 5' 8\" (1.727 m)      Social History     Social History    Marital status:      Spouse name: N/A    Number of children: N/A    Years of education: N/A     Occupational History    Not on file. Social History Main Topics    Smoking status: Never Smoker    Smokeless tobacco: Never Used    Alcohol use No    Drug use: No    Sexual activity: No     Other Topics Concern    Not on file     Social History Narrative    ** Merged History Encounter **     , lives with her son and Friend. I have reviewed the nurses notes, vitals, problem list, allergy list, medical history, family, social history and medications. Review of Symptoms:    General: Pt denies excessive weight gain or loss. Pt is able to conduct ADL's  HEENT: + Blurred vision- she states she has seen ophthalmology for this +, headaches has seen neurology for this denies, epistaxis and difficulty swallowing. Respiratory: Denies shortness of breath, MOORE, wheezing or stridor. Cardiovascular: Denies exertional chest pain, palpitations, edema or PND  Gastrointestinal: Denies poor appetite, indigestion, abdominal pain or blood in stool  Musculoskeletal: Denies pain or swelling from muscles or joints  Neurologic: Denies tremor, paresthesias, or sensory motor disturbance  Skin: Denies rash, itching or texture change. Physical Exam:      General: Well developed, in no acute distress, cooperative and alert  HEENT: No carotid bruits, no JVD, trach is midline. Neck Supple, PEERL, EOM intact. Heart:  Normal S1/S2 negative S3 or S4.  Regular, no murmur, gallop or rub.   Respiratory: Clear bilaterally x 4, no wheezing or rales  Abdomen:   Soft, non-tender, no masses, bowel sounds are active.   Extremities:  No edema, normal cap refill, no cyanosis, atraumatic. Neuro: A&Ox3, speech clear, gait stable. Skin: Skin color is normal. No rashes or lesions. Non diaphoretic  Vascular: 2+ pulses symmetric in all extremities    Cardiographics    ECG: Normal sinus rhythm  Results for orders placed or performed during the hospital encounter of 04/04/17   EKG, 12 LEAD, INITIAL   Result Value Ref Range    Ventricular Rate 76 BPM    Atrial Rate 76 BPM    P-R Interval 168 ms    QRS Duration 94 ms    Q-T Interval 380 ms    QTC Calculation (Bezet) 427 ms    Calculated P Axis 51 degrees    Calculated R Axis 27 degrees    Calculated T Axis 20 degrees    Diagnosis       Normal sinus rhythm  Anterior infarct , age undetermined  Abnormal ECG  When compared with ECG of 04-APR-2017 06:03,  Minimal criteria for Inferior infarct are no longer present  ST no longer elevated in Anterior leads  Confirmed by Maurisio Mathew ([id number]) on 4/10/2017 5:15:35 PM           Cardiology Labs:  Lab Results   Component Value Date/Time    Cholesterol, total 305 12/15/2016 02:37 PM    HDL Cholesterol 57 12/15/2016 02:37 PM    LDL,Direct 209 01/26/2017 02:06 PM    LDL, calculated 218 12/15/2016 02:37 PM    Triglyceride 149 12/15/2016 02:37 PM       Lab Results   Component Value Date/Time    Sodium 142 04/26/2017 11:26 AM    Potassium 4.4 04/26/2017 11:26 AM    Chloride 102 04/26/2017 11:26 AM    CO2 27 04/26/2017 11:26 AM    Anion gap 8 04/06/2017 03:24 AM    Glucose 101 04/26/2017 11:26 AM    BUN 17 04/26/2017 11:26 AM    Creatinine 0.83 04/26/2017 11:26 AM    BUN/Creatinine ratio 20 04/26/2017 11:26 AM    GFR est AA 82 04/26/2017 11:26 AM    GFR est non-AA 71 04/26/2017 11:26 AM    Calcium 9.3 04/26/2017 11:26 AM    Bilirubin, total 0.4 04/04/2017 04:51 AM    AST (SGOT) 49 04/04/2017 04:51 AM    Alk.  phosphatase 89 04/04/2017 04:51 AM    Protein, total 8.1 04/04/2017 04:51 AM    Albumin 3.7 04/04/2017 04:51 AM    Globulin 4.4 04/04/2017 04:51 AM    A-G Ratio 0.8 04/04/2017 04:51 AM    ALT (SGPT) 89 04/04/2017 04:51 AM           Assessment:     Assessment:     Richard Souza was seen today for cholesterol problem. Diagnoses and all orders for this visit:    Coronary artery disease involving native coronary artery of native heart without angina pectoris    Hyperlipidemia, unspecified hyperlipidemia type  -     AMB POC EKG ROUTINE W/ 12 LEADS, INTER & REP    HTN, goal below 130/80    Somatization disorder    Other orders  -     valsartan (DIOVAN) 80 mg tablet; Take 1 Tab by mouth daily. -     dilTIAZem CD (CARDIZEM CD) 120 mg ER capsule; Take 1 Cap by mouth daily. ICD-10-CM ICD-9-CM    1. Coronary artery disease involving native coronary artery of native heart without angina pectoris I25.10 414.01    2. Hyperlipidemia, unspecified hyperlipidemia type E78.5 272.4 AMB POC EKG ROUTINE W/ 12 LEADS, INTER & REP   3. HTN, goal below 130/80 I10 401.9    4. Somatization disorder F45.0 300.81      Orders Placed This Encounter    AMB POC EKG ROUTINE W/ 12 LEADS, INTER & REP     Order Specific Question:   Reason for Exam:     Answer:   routine    ciprofloxacin HCl (CIPRO) 500 mg tablet    valsartan (DIOVAN) 80 mg tablet     Sig: Take 1 Tab by mouth daily. Dispense:  90 Tab     Refill:  4    dilTIAZem CD (CARDIZEM CD) 120 mg ER capsule     Sig: Take 1 Cap by mouth daily. Dispense:  90 Cap     Refill:  4        Plan:     CAD/atypical chest discomfort for years: Hypertension:  Patient presents doing well and is stable from cardiac stand point. Noncardiac chest pain for years, reproducible with movement and palpation or with anxiety. History of mild nonobstructive CAD in the past.      Hypertension:  Controlled. Refilled Diovan and diltiazem. Continue current care and f/u in 6 months.     Trevor Duke MD

## 2017-06-05 NOTE — MR AVS SNAPSHOT
Visit Information Date & Time Provider Department Dept. Phone Encounter #  
 6/5/2017 10:45 AM Marquita Rodgers, [address] [place] [address] [facility] [phone] [id number] Your Appointments 6/21/2017 11:00 AM  
ROUTINE CARE with Mundo Lipscomb MD  
[facility] [facility] ([address]) Appt Note: f/up dmt 06/01/2017  
 [address] [facility]  
[phone]  
  
   
 [address] [place] [address] [facility] [phone]  
  
    
 8/10/2017 10:15 AM  
ROUTINE CARE with Mundo Lipscomb MD  
[address] [facility] ([address]) Appt Note: [address] due today  
 [address] [place] [address] [facility] [phone]  
[phone] Upcoming Health Maintenance Date Due DTaP/Tdap/Td series (1 - Tdap) 4/30/2006 Pneumococcal 65+ Low/Medium Risk (1 of 2 - PCV13) 9/22/2010 FOOT EXAM Q1 6/12/2015 EYE EXAM RETINAL OR DILATED Q1 7/1/2015 FOBT Q 1 YEAR AGE 50-75 7/29/2015 GLAUCOMA SCREENING Q2Y 7/1/2016 MEDICARE YEARLY EXAM 8/10/2016 MICROALBUMIN Q1 8/10/2016 HEMOGLOBIN A1C Q6M 7/26/2017 INFLUENZA AGE 9 TO ADULT 8/1/2017 BREAST CANCER SCRN MAMMOGRAM 11/2/2017 LIPID PANEL Q1 12/15/2017 Allergies as of 6/5/2017  Review Complete On: 6/5/2017 By: Marquita Rodgers MD  
  
 Severity Noted Reaction Type Reactions Amoxicillin High 09/10/2010   Systemic Hives Sulfa (Sulfonamide Antibiotics) High 09/10/2010   Systemic Hives, Itching Amoxicillin Medium 04/22/2010   Systemic Hives, Itching Long time ago - patient not exactly certain what it does Mirtazapine Medium 11/20/2012   Side Effect Itching, Nausea Only Funny feeling in chest  
 Percocet [Oxycodone-acetaminophen] Medium 01/15/2014   Intolerance Nausea and Vomiting Codeine  11/26/2012    Nausea and Vomiting Prednisone  05/27/2010    Itching  Sulfa (Sulfonamide Antibiotics)  04/22/2010   Systemic Hives, Itching, Palpitations Think it was increased heart rate or itching - many years ago Zithromax [Azithromycin]  11/20/2012   Side Effect Itching Not sure what it does,taken long time ago Current Immunizations  Reviewed on 4/13/2017 Name Date Pneumococcal Polysaccharide (PPSV-23)  Deferred (Patient Refused) TD Vaccine 4/29/2006 Not reviewed this visit You Were Diagnosed With   
  
 Codes Comments Coronary artery disease involving native coronary artery of native heart without angina pectoris    -  Primary ICD-10-CM: I25.10 ICD-9-CM: 414.01 Hyperlipidemia, unspecified hyperlipidemia type     ICD-10-CM: E78.5 ICD-9-CM: 272.4 HTN, goal below 130/80     ICD-10-CM: I10 
ICD-9-CM: 401.9 Somatization disorder     ICD-10-CM: F45.0 ICD-9-CM: 300.81 Vitals BP Pulse Resp Height(growth percentile) Weight(growth percentile) SpO2  
 140/84 (BP 1 Location: Right arm, BP Patient Position: Sitting) 88 16 5' 8\" (1.727 m) 164 lb 8 oz (74.6 kg) 99% BMI OB Status Smoking Status 25.01 kg/m2 Hysterectomy Never Smoker Vitals History BMI and BSA Data Body Mass Index Body Surface Area 25.01 kg/m 2 1.89 m 2 Preferred Pharmacy Pharmacy Name Phone [id number]- [place], [place] - [address] [phone] Your Updated Medication List  
  
   
This list is accurate as of: 6/5/17 11:29 AM.  Always use your most recent med list.  
  
  
  
  
 aspirin delayed-release 81 mg tablet Take 1 Tab by mouth daily. ciprofloxacin HCl 500 mg tablet Commonly known as:  CIPRO clonazePAM 1 mg tablet Commonly known as:  Moira Sheppard Take 1 Tab by mouth two (2) times a day. Max Daily Amount: 2 mg. As needed fill after 2/5/17  
  
 dilTIAZem  mg ER capsule Commonly known as:  CARDIZEM CD Take 1 Cap by mouth daily. docusate sodium 100 mg capsule Commonly known as:  Liliane Babcock  
 Take 1 Cap by mouth daily as needed for Constipation for up to 90 days. hydrocortisone 1 % ointment Commonly known as:  HYCORT Apply  to affected area two (2) times a day. use thin layer  
  
 meclizine 12.5 mg tablet Commonly known as:  ANTIVERT Take 1 Tab by mouth two (2) times daily as needed for up to 30 days. For dizziness  
  
 polyethylene glycol 17 gram/dose powder Commonly known as:  Demetrice Pa TAKE 1 CAPFUL IN 8 OUNCES OF WATER EVERY DAY  
  
 rosuvastatin 5 mg tablet Commonly known as:  CRESTOR Take 1 Tab by mouth nightly. valsartan 80 mg tablet Commonly known as:  DIOVAN Take 1 Tab by mouth daily. Prescriptions Sent to Pharmacy Refills  
 valsartan (DIOVAN) 80 mg tablet 4 Sig: Take 1 Tab by mouth daily. Class: Normal  
 Pharmacy: [facility]/pharmacy [id number] Ph #: [phone] Route: Oral  
 dilTIAZem CD (CARDIZEM CD) 120 mg ER capsule 4 Sig: Take 1 Cap by mouth daily. Class: Normal  
 Pharmacy: [facility]/pharmacy [address] [place] [address] [address] Ph #: [phone] Route: Oral  
  
We Performed the Following AMB POC EKG ROUTINE W/ 12 LEADS, INTER & REP [69508 CPT(R)] Introducing [facility] SERVICES! Corey Dumont introduces [employer] patient portal. Now you can access parts of your medical record, email your doctor's office, and request medication refills online. 1. In your internet browser, go to [email or web address] 2. Click on the First Time User? Click Here link in the Sign In box. You will see the New Member Sign Up page. 3. Enter your [employer] Access Code exactly as it appears below. You will not need to use this code after youve completed the sign-up process. If you do not sign up before the expiration date, you must request a new code. · [employer] Access Code: [email or web address] Expires: 9/3/2017 10:16 AM 
 
 4. Enter the last four digits of your Social Security Number (xxxx) and Date of Birth (mm/dd/yyyy) as indicated and click Submit. You will be taken to the next sign-up page. 5. Create a [employer] ID. This will be your [employer] login ID and cannot be changed, so think of one that is secure and easy to remember. 6. Create a [employer] password. You can change your password at any time. 7. Enter your Password Reset Question and Answer. This can be used at a later time if you forget your password. 8. Enter your e-mail address. You will receive e-mail notification when new information is available in [address] 9. Click Sign Up. You can now view and download portions of your medical record. 10. Click the Download Summary menu link to download a portable copy of your medical information. If you have questions, please visit the Frequently Asked Questions section of the [employer] website. Remember, [employer] is NOT to be used for urgent needs. For medical emergencies, dial 911. Now available from your iPhone and Android! Please provide this summary of care documentation to your next provider. Your primary care clinician is listed as Penny Gowers. If you have any questions after today's visit, please call [phone].

## 2017-06-05 NOTE — PROGRESS NOTES
Chief Complaint   Patient presents with    Cholesterol Problem     annual follow up, C/O off/on chest tightness normally occurs when anxious

## 2017-06-07 ENCOUNTER — TELEPHONE (OUTPATIENT)
Dept: OBGYN CLINIC | Age: 72
End: 2017-06-07

## 2017-06-07 RX ORDER — NYSTATIN 100000 U/G
CREAM TOPICAL 2 TIMES DAILY
Qty: 15 G | Refills: 0 | Status: SHIPPED | OUTPATIENT
Start: 2017-06-07 | End: 2017-09-13

## 2017-06-07 NOTE — TELEPHONE ENCOUNTER
Pt called requesting a refill of the Nystatin cream. She states that Dr. Jez Oliveira had prescribed it for her before and it helped with her vaginal issues. The hydrocortisone cream is not helping.

## 2017-06-08 RX ORDER — DILTIAZEM HYDROCHLORIDE 120 MG/1
CAPSULE, COATED, EXTENDED RELEASE ORAL
Qty: 30 CAP | Refills: 1 | Status: SHIPPED | OUTPATIENT
Start: 2017-06-08 | End: 2017-09-13 | Stop reason: SDUPTHER

## 2017-07-06 ENCOUNTER — OFFICE VISIT (OUTPATIENT)
Dept: INTERNAL MEDICINE CLINIC | Age: 72
End: 2017-07-06

## 2017-07-06 VITALS
BODY MASS INDEX: 25.48 KG/M2 | DIASTOLIC BLOOD PRESSURE: 83 MMHG | OXYGEN SATURATION: 99 % | TEMPERATURE: 96.3 F | RESPIRATION RATE: 17 BRPM | HEART RATE: 80 BPM | HEIGHT: 68 IN | WEIGHT: 168.1 LBS | SYSTOLIC BLOOD PRESSURE: 137 MMHG

## 2017-07-06 DIAGNOSIS — Z12.11 COLON CANCER SCREENING: ICD-10-CM

## 2017-07-06 DIAGNOSIS — G89.4 CHRONIC PAIN SYNDROME: ICD-10-CM

## 2017-07-06 DIAGNOSIS — H61.22 IMPACTED CERUMEN OF LEFT EAR: ICD-10-CM

## 2017-07-06 DIAGNOSIS — R42 DIZZINESSES: ICD-10-CM

## 2017-07-06 DIAGNOSIS — Z00.00 MEDICARE ANNUAL WELLNESS VISIT, SUBSEQUENT: Primary | ICD-10-CM

## 2017-07-06 DIAGNOSIS — Z71.89 ADVANCE DIRECTIVE DISCUSSED WITH PATIENT: ICD-10-CM

## 2017-07-06 DIAGNOSIS — F22 SOMATIC DELUSION DISORDER (HCC): ICD-10-CM

## 2017-07-06 DIAGNOSIS — E11.9 DIABETES MELLITUS TYPE 2, DIET-CONTROLLED (HCC): ICD-10-CM

## 2017-07-06 DIAGNOSIS — E78.5 HYPERLIPIDEMIA, UNSPECIFIED HYPERLIPIDEMIA TYPE: Chronic | ICD-10-CM

## 2017-07-06 RX ORDER — MECLIZINE HCL 12.5 MG 12.5 MG/1
TABLET ORAL
COMMUNITY
End: 2017-09-13 | Stop reason: SDUPTHER

## 2017-07-06 NOTE — PROGRESS NOTES
Chief Complaint   Patient presents with    Hypertension    Cholesterol Problem     1. Have you been to the ER, urgent care clinic since your last visit? Hospitalized since your last visit? No    2. Have you seen or consulted any other health care providers outside of the [address] [place] [address] since your last visit? Include any pap smears or colon screening.  No

## 2017-07-06 NOTE — MR AVS SNAPSHOT
Visit Information Date & Time Provider Department Dept. Phone Encounter #  
 7/6/2017 11:15 AM Garrett Andrew, [address] [address] Mark Anthony Ramesh [phone] [id number] Follow-up Instructions Return in about 3 weeks (around 7/27/2017) for cerumen irrigation and review labs 30 min appt needed . Your Appointments 7/11/2017  1:40 PM  
PROBLEM VISIT with Sarabjit Meza MD  
[facility] [address] [place] [address]) Appt Note: vaginal pain and irritation [place] [address] [place] [address]  
[phone]  
  
   
 [place] [address] [address]  
  
    
 8/10/2017 10:15 AM  
ROUTINE CARE with Kristy Estrada MD  
[address] [facility] ([address]) Appt Note: [address] due today  
 [address] [place] [address],[address] [place] [address]  
[phone] [address] [address]  
  
    
 12/7/2017 10:45 AM  
6 MONTH with Jenny Cameron MD  
[facility] [address] [place] [address]) Appt Note: $0cp  ekr [id number] [facility]  
[phone] [address] [facility] Upcoming Health Maintenance Date Due DTaP/Tdap/Td series (1 - Tdap) 4/30/2006 Pneumococcal 65+ Low/Medium Risk (1 of 2 - PCV13) 9/22/2010 FOOT EXAM Q1 6/12/2015 EYE EXAM RETINAL OR DILATED Q1 7/1/2015 FOBT Q 1 YEAR AGE 50-75 7/29/2015 GLAUCOMA SCREENING Q2Y 7/1/2016 MICROALBUMIN Q1 8/10/2016 HEMOGLOBIN A1C Q6M 7/26/2017 BREAST CANCER SCRN MAMMOGRAM 11/2/2017 INFLUENZA AGE 9 TO ADULT 8/1/2017 LIPID PANEL Q1 12/15/2017 MEDICARE YEARLY EXAM 7/7/2018 Allergies as of 7/6/2017  Review Complete On: 7/6/2017 By: Stewart Sheridan Severity Noted Reaction Type Reactions Amoxicillin High 09/10/2010   Systemic Hives Sulfa (Sulfonamide Antibiotics) High 09/10/2010   Systemic Hives, Itching Amoxicillin Medium 04/22/2010   Systemic Hives, Itching Long time ago - patient not exactly certain what it does Mirtazapine Medium 11/20/2012   Side Effect Itching, Nausea Only Funny feeling in chest  
 Percocet [Oxycodone-acetaminophen] Medium 01/15/2014   Intolerance Nausea and Vomiting Codeine  11/26/2012    Nausea and Vomiting Prednisone  05/27/2010    Itching Sulfa (Sulfonamide Antibiotics)  04/22/2010   Systemic Hives, Itching, Palpitations Think it was increased heart rate or itching - many years ago Zithromax [Azithromycin]  11/20/2012   Side Effect Itching Not sure what it does,taken long time ago Current Immunizations  Reviewed on 4/13/2017 Name Date Pneumococcal Polysaccharide (PPSV-23)  Deferred (Patient Refused) TD Vaccine 4/29/2006 Not reviewed this visit You Were Diagnosed With   
  
 Codes Comments Medicare annual wellness visit, subsequent    -  Primary ICD-10-CM: Z00.00 ICD-9-CM: V70.0 Advance directive discussed with patient     ICD-10-CM: Z71.89 ICD-9-CM: V65.49 Chronic pain syndrome     ICD-10-CM: G89.4 ICD-9-CM: 338.4 S[facility] delusion disorder ([facility]     ICD-10-CM: F22 
ICD-9-CM: 297.1 Diabetes mellitus type 2, diet-controlled ([facility]     ICD-10-CM: E11.9 ICD-9-CM: 250.00 Hyperlipidemia, unspecified hyperlipidemia type     ICD-10-CM: E78.5 ICD-9-CM: 272.4 Dizzinesses     ICD-10-CM: N01 ICD-9-CM: 780.4 Colon cancer screening     ICD-10-CM: Z12.11 ICD-9-CM: V76.51 Impacted cerumen of left ear     ICD-10-CM: H61.22 
ICD-9-CM: 380.4 Vitals BP Pulse Temp Resp Height(growth percentile) Weight(growth percentile) 137/83 (BP 1 Location: Left arm, BP Patient Position: Sitting) 80 96.3 °F (35.7 °C) (Oral) 17 5' 8\" (1.727 m) 168 lb 1.6 oz (76.2 kg) SpO2 BMI OB Status Smoking Status 99% 25.56 kg/m2 Hysterectomy Never Smoker Vitals History BMI and BSA Data Body Mass Index Body Surface Area 25.56 kg/m 2 1.91 m 2 Preferred Pharmacy Pharmacy Name Phone [id number] [place] - [address] [phone] Your Updated Medication List  
  
   
This list is accurate as of: 7/6/17 12:03 PM.  Always use your most recent med list.  
  
  
  
  
 aspirin delayed-release 81 mg tablet Take 1 Tab by mouth daily. ciprofloxacin HCl 500 mg tablet Commonly known as:  CIPRO clonazePAM 1 mg tablet Commonly known as:  Gina Mortimer Take 1 Tab by mouth two (2) times a day. Max Daily Amount: 2 mg. As needed fill after 2/5/17 * dilTIAZem  mg ER capsule Commonly known as:  CARDIZEM CD Take 1 Cap by mouth daily. * dilTIAZem  mg ER capsule Commonly known as:  CARDIZEM CD  
TAKE 1 CAP BY MOUTH DAILY. docusate sodium 100 mg capsule Commonly known as:  Kailey Rout Take 1 Cap by mouth daily as needed for Constipation for up to 90 days. hydrocortisone 1 % ointment Commonly known as:  HYCORT Apply  to affected area two (2) times a day. use thin layer  
  
 meclizine 12.5 mg tablet Commonly known as:  ANTIVERT Take  by mouth three (3) times daily as needed. nystatin topical cream  
Commonly known as:  MYCOSTATIN Apply  to affected area two (2) times a day. polyethylene glycol 17 gram/dose powder Commonly known as:  Colin Holster TAKE 1 CAPFUL IN 8 OUNCES OF WATER EVERY DAY  
  
 rosuvastatin 5 mg tablet Commonly known as:  CRESTOR Take 1 Tab by mouth nightly. valsartan 80 mg tablet Commonly known as:  DIOVAN Take 1 Tab by mouth daily. * Notice: This list has 2 medication(s) that are the same as other medications prescribed for you. Read the directions carefully, and ask your doctor or other care provider to review them with you. We Performed the Following HEMOGLOBIN A1C WITH EAG [00246 CPT(R)] LIPID PANEL [70523 CPT(R)] MICROALBUMIN, UR, RAND W/ MICROALBUMIN/CREA RATIO [id number] CPT(R)] OCCULT BLOOD, IMMUNOASSAY (FIT) [id number] CPT(R)] Follow-up Instructions Return in about 3 weeks (around 7/27/2017) for cerumen irrigation and review labs 30 min appt needed . Introducing [facility]! [employer] Pay[employer] introduces [employer] patient portal. Now you can access parts of your medical record, email your doctor's office, and request medication refills online. 1. In your internet browser, go to [email or web address] 2. Click on the First Time User? Click Here link in the Sign In box. You will see the New Member Sign Up page. 3. Enter your [employer] Access Code exactly as it appears below. You will not need to use this code after youve completed the sign-up process. If you do not sign up before the expiration date, you must request a new code. · [employer] Access Code: [email or web address] Expires: 9/3/2017 10:16 AM 
 
4. Enter the last four digits of your Social Security Number (xxxx) and Date of Birth (mm/dd/yyyy) as indicated and click Submit. You will be taken to the next sign-up page. 5. Create a [employer] ID. This will be your [employer] login ID and cannot be changed, so think of one that is secure and easy to remember. 6. Create a [employer] password. You can change your password at any time. 7. Enter your Password Reset Question and Answer. This can be used at a later time if you forget your password. 8. Enter your e-mail address. You will receive e-mail notification when new information is available in [address] 9. Click Sign Up. You can now view and download portions of your medical record. 10. Click the Download Summary menu link to download a portable copy of your medical information. If you have questions, please visit the Frequently Asked Questions section of the [employer] website.  Remember, [employer] is NOT to be used for urgent needs. For medical emergencies, dial 911. Now available from your iPhone and Android! Please provide this summary of care documentation to your next provider. Your primary care clinician is listed as Gabino Rangel. If you have any questions after today's visit, please call [phone].

## 2017-07-06 NOTE — PROGRESS NOTES
Subjective:   Oniel Irizarry is a 70 y.o. female and presents for annual Medicare Wellness Visit. She has bene having chronic pain \"down there\" but has been to her gyn and has an appt set up. C/o ears popping and thumping and her head again feels tight. she was referred to psychiatry multiple times but has been a no show to Dr Sirena Lockwood and he has refused to see the pt. She has still her normal somatization complaints not sleeping nerves are bad and pain all over    ROS: otherwise feeling generally well. All other systems reviewed and are negative      Current Outpatient Prescriptions   Medication Sig Dispense Refill    meclizine (ANTIVERT) 12.5 mg tablet Take  by mouth three (3) times daily as needed.  valsartan (DIOVAN) 80 mg tablet Take 1 Tab by mouth daily. 90 Tab 4    dilTIAZem CD (CARDIZEM CD) 120 mg ER capsule Take 1 Cap by mouth daily. 90 Cap 4    rosuvastatin (CRESTOR) 5 mg tablet Take 1 Tab by mouth nightly. 30 Tab 3    docusate sodium (COLACE) 100 mg capsule Take 1 Cap by mouth daily as needed for Constipation for up to 90 days. 90 Cap 0    aspirin delayed-release 81 mg tablet Take 1 Tab by mouth daily. 30 Tab 11    dilTIAZem CD (CARDIZEM CD) 120 mg ER capsule TAKE 1 CAP BY MOUTH DAILY. 30 Cap 1    nystatin (MYCOSTATIN) topical cream Apply  to affected area two (2) times a day. 15 g 0    ciprofloxacin HCl (CIPRO) 500 mg tablet       hydrocortisone (HYCORT) 1 % ointment Apply  to affected area two (2) times a day. use thin layer 30 g 0    polyethylene glycol (MIRALAX) 17 gram/dose powder TAKE 1 CAPFUL IN 8 OUNCES OF WATER EVERY DAY (Patient not taking: Reported on 4/21/2017) 255 g 3    clonazePAM (KLONOPIN) 1 mg tablet Take 1 Tab by mouth two (2) times a day. Max Daily Amount: 2 mg.  As needed fill after 2/5/17 (Patient not taking: Reported on 5/4/2017) 40 Tab 0     Allergies   Allergen Reactions    Amoxicillin Hives    Sulfa (Sulfonamide Antibiotics) Hives and Itching    Amoxicillin Hives and Itching     Long time ago - patient not exactly certain what it does    Mirtazapine Itching and Nausea Only     Funny feeling in chest    Percocet [Oxycodone-Acetaminophen] Nausea and Vomiting    Codeine Nausea and Vomiting    Prednisone Itching    Sulfa (Sulfonamide Antibiotics) Hives, Itching and Palpitations     Think it was increased heart rate or itching - many years ago    Zithromax [Azithromycin] Itching     Not sure what it does,taken long time ago     Past Medical History:   Diagnosis Date    Agoraphobia without mention of panic attacks 2/17/2014    Anxiety disorder 8/18/2013    Arthritis     osteo    Breast pain, left 4/7/2015    CAD (coronary artery disease), native coronary artery 12/1/2015    Chronic chest pain 1/13/2014    Chronic pain associated with significant psychosocial dysfunction 2/17/2014    Depression 8/18/2013    Diabetes ([facility]     type II    Duplicated right renal collecting system 3/13/2014    GERD (gastroesophageal reflux disease)     Gout, joint     HTN, goal below 130/80 9/7/2012    Hypertension     Nephrolithiasis 3/13/2014    Other ill-defined conditions     hyercholesterolemia    Other ill-defined conditions     anxiety    Other ill-defined conditions     pt states head get tight,due to wax bill up    Other ill-defined conditions     pain left shoulder due to fall many years ago    Personal history of noncompliance with medical treatment, presenting hazards to health 5/30/2014    Psychiatric disorder     anxiety/ depression    Psychotic disorder     Vaginal pain 7/30/2014     Past Surgical History:   Procedure Laterality Date    EGD  4/23/2010         HX GYN      cyst removed from vagina    HX GYN      vaginal birth x7    HX HYSTERECTOMY      partial    HX OTHER SURGICAL      egd    HX OTHER SURGICAL      cyst removed from left wrist    HX OTHER SURGICAL      bladder dilitation    HX TUBAL LIGATION      HX UROLOGICAL kidney stones     Family History   Problem Relation Age of Onset    Stroke Mother     Heart Disease Mother     Cancer Father     Heart Disease Son     Liver Disease Son     Heart Disease Daughter     Malignant Hyperthermia Neg Hx     Pseudocholinesterase Deficiency Neg Hx     Delayed Awakening Neg Hx     Post-op Nausea/Vomiting Neg Hx     Emergence Delirium Neg Hx     Post-op Cognitive Dysfunction Neg Hx     Other Neg Hx      Social History   Substance Use Topics    Smoking status: Never Smoker    Smokeless tobacco: Never Used    Alcohol use No          Objective:     Visit Vitals    /83 (BP 1 Location: Left arm, BP Patient Position: Sitting)    Pulse 80    Temp 96.3 °F (35.7 °C) (Oral)    Resp 17    Ht 5' 8\" (1.727 m)    Wt 168 lb 1.6 oz (76.2 kg)    SpO2 99%    BMI 25.56 kg/m2     Gen: NAD, pleasant  HEENT: normal appearing head, nares patent, PERRLA, EOMI, oropharynx no erythema, no cervical lymphadenopathy neck supple left ear cerumen impaction   Cardio: RRR nl S1S2 no murmur  Lungs CTAB no wheeze no rales no rhonchi  ABD Soft non tender non distended + bowel sounds  Extremities: full ROM X 4 no clubbing no cyanosis  Neuro: no gross focal deficits noted, alert and orientated X 3  Psych.: well groomed at baseline still quit ttearful and crying and laughing during the exam  Stable depression. Assessment/Plan:   Delores Tai was seen today for hypertension and cholesterol problem.     Diagnoses and all orders for this visit:    Medicare annual wellness visit, subsequent  -     OCCULT BLOOD, IMMUNOASSAY (FIT)    Advance directive discussed with patient    Chronic pain syndrome    Somatic delusion disorder ([facility]    Diabetes mellitus type 2, diet-controlled ([facility]  -     MICROALBUMIN, UR, RAND W/ MICROALBUMIN/CREA RATIO  -     HEMOGLOBIN A1C WITH EAG    Hyperlipidemia, unspecified hyperlipidemia type  -     LIPID PANEL    Dizzinesses  -     HEMOGLOBIN A1C WITH EAG    Colon cancer screening  -     OCCULT BLOOD, IMMUNOASSAY (FIT)    Impacted cerumen of left ear      Follow-up Disposition:  Return in about 3 weeks (around 7/27/2017) for cerumen irrigation and review labs 30 min appt needed . .  avs printed and given to the pt. .advised to f/up with psychatry as previously directed she will need to find her own psychatirsit multiple referrals given to pt declined to restart xanax see recommendations from previous psychatrist and pt to f/up with her gyn return for ear irrigation     The patient voiced understanding of the above. Medication side effects were reviewed with the patient. Call with any concerns. Assessment of cognitive impairment: Alert and oriented to person and yr    Depression Screen:   PHQ over the last two weeks 9/23/2014   Little interest or pleasure in doing things Nearly every day   Feeling down, depressed or hopeless More than half the days   Total Score PHQ 2 5       Fall Risk Assessment:    Fall Risk Assessment, last 12 mths 6/5/2017   Able to walk? Yes   Fall in past 12 months? Yes   Fall with injury? No   Number of falls in past 12 months 1   Fall Risk Score 1       Functional Ability:   Does the patient exhibit a steady gait?  no   How long did it take the patient to get up and walk from a sitting position? <20 seconds    Is the patient self reliant?  (ie can do own laundry, meals, household chores)  yes     Does the patient handle his/her own medications? yes     Does the patient handle his/her own money? yes     Is the patients home safe (ie good lighting, handrails on stairs and bath, etc.)? yes     Did you notice or did patient express any hearing difficulties? She states she has a popping and thumping sensation      Did you notice or did patient express any vision difficulties? C/o blurry vision but she is not wearing glasses now she has been to several eye doctor    Were distance and reading eye charts used?   no       Advance Care Planning: Patient was offered the opportunity to discuss advance care planning:  yes     Does patient have an Advance Directive:  yes   If no, did you provide information on Caring Connections? NA honoring choices discussed        Plan:      Orders Placed This Encounter    OCCULT BLOOD, IMMUNOASSAY (FIT)    MICROALBUMIN, UR, RAND W/ MICROALBUMIN/CREA RATIO    HEMOGLOBIN A1C WITH EAG    LIPID PANEL    meclizine (ANTIVERT) 12.5 mg tablet       Health Maintenance   Topic Date Due    DTaP/Tdap/Td series (1 - Tdap) 04/30/2006    Pneumococcal 65+ Low/Medium Risk (1 of 2 - PCV13) 09/22/2010    FOOT EXAM Q1  06/12/2015    EYE EXAM RETINAL OR DILATED Q1  07/01/2015    FOBT Q 1 YEAR AGE 50-75  07/29/2015    GLAUCOMA SCREENING Q2Y  07/01/2016    MICROALBUMIN Q1  08/10/2016    HEMOGLOBIN A1C Q6M  07/26/2017    BREAST CANCER SCRN MAMMOGRAM  11/02/2017    INFLUENZA AGE 9 TO ADULT  08/01/2017    LIPID PANEL Q1  12/15/2017    MEDICARE YEARLY EXAM  07/07/2018    Hepatitis C Screening  Completed    OSTEOPOROSIS SCREENING (DEXA)  Addressed    ZOSTER VACCINE AGE 60>  Addressed       *Patient verbalized understanding and agreement with the plan. A copy of the After Visit Summary with personalized health plan was given to the patient today.

## 2017-07-06 NOTE — ACP (ADVANCE CARE PLANNING)
.I met with the patient discuss advanced medical directives (AMD). The meeting was held at my  office. I discussed the advantages to completing the AMD through a facilitated discussion with his chosen healthcare agent as well as criteria to consider when selecting an agent. The patient was agreeable to receiving and reviewing Honoring Choices written information. And is planning to discuss advanced directives during the next office visit . Handouts given to pt on honoring choices tube feeding cpr ventilation support and how to choose a health care agent. Time spent with pt  5 min     Titi Ellis M.D.   [facility] facilitator

## 2017-07-07 LAB
ALBUMIN/CREAT UR: 8.4 MG/G CREAT (ref 0–30)
CHOLEST SERPL-MCNC: 258 MG/DL (ref 100–199)
CREAT UR-MCNC: 121.6 MG/DL
EST. AVERAGE GLUCOSE BLD GHB EST-MCNC: 117 MG/DL
HBA1C MFR BLD: 5.7 % (ref 4.8–5.6)
HDLC SERPL-MCNC: 64 MG/DL
INTERPRETATION, 910389: NORMAL
LDLC SERPL CALC-MCNC: 175 MG/DL (ref 0–99)
Lab: NORMAL
MICROALBUMIN UR-MCNC: 10.2 UG/ML
TRIGL SERPL-MCNC: 95 MG/DL (ref 0–149)
VLDLC SERPL CALC-MCNC: 19 MG/DL (ref 5–40)

## 2017-07-11 ENCOUNTER — OFFICE VISIT (OUTPATIENT)
Dept: OBGYN CLINIC | Age: 72
End: 2017-07-11

## 2017-07-11 DIAGNOSIS — R10.2 VAGINAL PAIN: ICD-10-CM

## 2017-07-11 DIAGNOSIS — N89.8 VAGINAL IRRITATION: Primary | ICD-10-CM

## 2017-07-11 RX ORDER — ESTRADIOL 0.1 MG/G
CREAM VAGINAL
Qty: 1 TUBE | Refills: 11 | Status: SHIPPED | OUTPATIENT
Start: 2017-07-11 | End: 2017-09-13

## 2017-07-11 NOTE — PROGRESS NOTES
Pelvic Pain evaluation    Buzzy Essex is a 70 y.o. female who complains of pelvic pain. The pain is described as Sharp. She also has vaginal discomfort. She also has vulvar discomfort. She says that the usual topical Nystatin and Cortisone is not helping. Pain is located in the vagina and vulva and in the RUQ. The pain started years ago. Her symptoms have been unchanged since. Aggravating factors: none. Alleviating factors: none. Associated symptoms: none. The patient denies fever. Past Medical History:   Diagnosis Date    Agoraphobia without mention of panic attacks 2/17/2014    Anxiety disorder 8/18/2013    Arthritis     osteo    Breast pain, left 4/7/2015    CAD (coronary artery disease), native coronary artery 12/1/2015    Chronic chest pain 1/13/2014    Chronic pain associated with significant psychosocial dysfunction 2/17/2014    Depression 8/18/2013    Diabetes ([facility]     type II    Duplicated right renal collecting system 3/13/2014    GERD (gastroesophageal reflux disease)     Gout, joint     Hypercholesteremia     hyercholesterolemia    Hypertension     Nephrolithiasis 3/13/2014    Personal history of noncompliance with medical treatment, presenting hazards to health 5/30/2014    Psychotic disorder     Vaginal pain 7/30/2014     Past Surgical History:   Procedure Laterality Date    EGD  4/23/2010         HX CYST REMOVAL      cyst removed from left wrist    HX HYSTERECTOMY      partial    HX OTHER SURGICAL      bladder dilitation    HX TUBAL LIGATION      HX UROLOGICAL      kidney stones     Social History     Occupational History    Not on file.      Social History Main Topics    Smoking status: Never Smoker    Smokeless tobacco: Never Used    Alcohol use No    Drug use: No    Sexual activity: No     Family History   Problem Relation Age of Onset    Stroke Mother     Heart Disease Mother     Cancer Father     Heart Disease Son     Liver Disease Son  Heart Disease Daughter     Malignant Hyperthermia Neg Hx     Pseudocholinesterase Deficiency Neg Hx     Delayed Awakening Neg Hx     Post-op Nausea/Vomiting Neg Hx     Emergence Delirium Neg Hx     Post-op Cognitive Dysfunction Neg Hx     Other Neg Hx        Allergies   Allergen Reactions    Amoxicillin Hives    Sulfa (Sulfonamide Antibiotics) Hives and Itching    Amoxicillin Hives and Itching     Long time ago - patient not exactly certain what it does    Mirtazapine Itching and Nausea Only     Funny feeling in chest    Percocet [Oxycodone-Acetaminophen] Nausea and Vomiting    Codeine Nausea and Vomiting    Prednisone Itching    Sulfa (Sulfonamide Antibiotics) Hives, Itching and Palpitations     Think it was increased heart rate or itching - many years ago    Zithromax [Azithromycin] Itching     Not sure what it does,taken long time ago     Prior to Admission medications    Medication Sig Start Date End Date Taking? Authorizing Provider   meclizine (ANTIVERT) 12.5 mg tablet Take  by mouth three (3) times daily as needed. Yes Historical Provider   dilTIAZem CD (CARDIZEM CD) 120 mg ER capsule TAKE 1 CAP BY MOUTH DAILY. 6/8/17  Yes Judy Figueroa MD   nystatin (MYCOSTATIN) topical cream Apply  to affected area two (2) times a day. 6/7/17  Yes Ruel Paulson NP   ciprofloxacin HCl (CIPRO) 500 mg tablet  6/1/17  Yes Historical Provider   valsartan (DIOVAN) 80 mg tablet Take 1 Tab by mouth daily. 6/5/17  Yes Asad Lackey MD   dilTIAZem CD (CARDIZEM CD) 120 mg ER capsule Take 1 Cap by mouth daily. 6/5/17  Yes Asad Lackey MD   hydrocortisone (HYCORT) 1 % ointment Apply  to affected area two (2) times a day. use thin layer 6/1/17  Yes Ruel Paulson NP   rosuvastatin (CRESTOR) 5 mg tablet Take 1 Tab by mouth nightly. 5/10/17  Yes Ben Ludwig MD   docusate sodium (COLACE) 100 mg capsule Take 1 Cap by mouth daily as needed for Constipation for up to 90 days.  4/16/17 7/15/17 Yes Luz Marina Saeed MD   polyethylene glycol [facility]) 17 gram/dose powder TAKE 1 CAPFUL IN 8 OUNCES OF WATER EVERY DAY 4/16/17  Yes Luz Marina Saeed MD   clonazePAM [facility]) 1 mg tablet Take 1 Tab by mouth two (2) times a day. Max Daily Amount: 2 mg. As needed fill after 2/5/17 1/26/17  Yes Dede Farnsworth MD   aspirin delayed-release 81 mg tablet Take 1 Tab by mouth daily. 9/10/13  Yes Jered Bernal MD        Review of Systems: History obtained from the patient  Constitutional: negative for weight loss, fever, night sweats  Breast: negative for breast lumps, nipple discharge, galactorrhea  GI: negative for change in bowel habits, abdominal pain, black or bloody stools  : negative for frequency, dysuria, hematuria, vaginal discharge  MSK: negative for back pain, joint pain, muscle pain  Skin: negative for itching, rash, hives  Psych: negative for anxiety, depression, change in mood      Objective: There were no vitals taken for this visit.     Physical Exam:     Constitutional  · Appearance: well-nourished, well developed, alert, in no acute distress    Gastrointestinal  · Abdominal Examination: abdomen non-tender to palpation, normal bowel sounds, no masses present  · Liver and spleen: no hepatomegaly present, spleen not palpable  · Hernias: no hernias identified    Genitourinary  · External Genitalia: normal appearance for age, no discharge present, no tenderness present, no inflammatory lesions present, no masses present, no atrophy present  · Vagina: normal vaginal vault with atrophic changes  · Bladder: non-tender to palpation  · Urethra: appears normal  · Cervix: absent   · Uterus: absent  · Adnexa: no adnexal tenderness present, no adnexal masses present  · Perineum: perineum within normal limits, no evidence of trauma, no rashes or skin lesions present  · Anus: anus within normal limits, no hemorrhoids present  · Inguinal Lymph Nodes: no lymphadenopathy present    Skin  · General Inspection: no rash, no lesions identified    Neurologic/Psychiatric  · Mental Status:  · Orientation: grossly oriented to person, place and time  · Mood and Affect: mood normal, affect appropriate    Assessment:  Likely atrophic vaginitis. Plan:   Try vaginal estrogen. Check vaginal culture. See PCP for RUQ pain. RTO prn if symptoms persist or worsen. Instructions given to pt.

## 2017-07-14 LAB — BACTERIA GENITAL AEROBE CULT: NORMAL

## 2017-07-28 ENCOUNTER — OFFICE VISIT (OUTPATIENT)
Dept: INTERNAL MEDICINE CLINIC | Age: 72
End: 2017-07-28

## 2017-07-28 VITALS
TEMPERATURE: 98.2 F | WEIGHT: 168.1 LBS | OXYGEN SATURATION: 99 % | SYSTOLIC BLOOD PRESSURE: 156 MMHG | BODY MASS INDEX: 25.48 KG/M2 | HEIGHT: 68 IN | RESPIRATION RATE: 16 BRPM | HEART RATE: 85 BPM | DIASTOLIC BLOOD PRESSURE: 84 MMHG

## 2017-07-28 DIAGNOSIS — H61.22 IMPACTED CERUMEN, LEFT EAR: ICD-10-CM

## 2017-07-28 DIAGNOSIS — F22 SOMATIC DELUSION DISORDER (HCC): ICD-10-CM

## 2017-07-28 DIAGNOSIS — H93.8X3 EAR POPPING, BILATERAL: ICD-10-CM

## 2017-07-28 DIAGNOSIS — I10 BENIGN ESSENTIAL HTN: Primary | ICD-10-CM

## 2017-07-28 DIAGNOSIS — Z91.14 NONCOMPLIANCE WITH MEDICATION REGIMEN: ICD-10-CM

## 2017-07-28 NOTE — PROGRESS NOTES
Chief Complaint   Patient presents with    Ear Pain    Results           Subjective:   Sd Garcia 70 y.o.  female with a  past medical history reviewed see below. comes in today c/o: She is still c/o head tightness and her ears are popping and making  Funny sound when she lays on them and her sight is quite blurry- these are not new symptoms and pt has had a full work up several times in the past she reprots not new symptoms   She is havingincreased nerve as her uncle  and wants a nerve plills   She did not take her am bp meds today     ROS: otherwise feeling generally well. All other systems reviewed and are negative      Current Outpatient Prescriptions   Medication Sig Dispense Refill    estradiol (ESTRACE) 0.01 % (0.1 mg/gram) vaginal cream 1/2 applicator in vagina twice a week 1 Tube 11    meclizine (ANTIVERT) 12.5 mg tablet Take  by mouth three (3) times daily as needed.  dilTIAZem CD (CARDIZEM CD) 120 mg ER capsule TAKE 1 CAP BY MOUTH DAILY. 30 Cap 1    nystatin (MYCOSTATIN) topical cream Apply  to affected area two (2) times a day. 15 g 0    ciprofloxacin HCl (CIPRO) 500 mg tablet       valsartan (DIOVAN) 80 mg tablet Take 1 Tab by mouth daily. 90 Tab 4    dilTIAZem CD (CARDIZEM CD) 120 mg ER capsule Take 1 Cap by mouth daily. 90 Cap 4    hydrocortisone (HYCORT) 1 % ointment Apply  to affected area two (2) times a day. use thin layer 30 g 0    rosuvastatin (CRESTOR) 5 mg tablet Take 1 Tab by mouth nightly. 30 Tab 3    polyethylene glycol (MIRALAX) 17 gram/dose powder TAKE 1 CAPFUL IN 8 OUNCES OF WATER EVERY  g 3    clonazePAM (KLONOPIN) 1 mg tablet Take 1 Tab by mouth two (2) times a day. Max Daily Amount: 2 mg. As needed fill after 17 40 Tab 0    aspirin delayed-release 81 mg tablet Take 1 Tab by mouth daily.  30 Tab 11     Allergies   Allergen Reactions    Amoxicillin Hives    Sulfa (Sulfonamide Antibiotics) Hives and Itching    Amoxicillin Hives and Itching Long time ago - patient not exactly certain what it does    Mirtazapine Itching and Nausea Only     Funny feeling in chest    Percocet [Oxycodone-Acetaminophen] Nausea and Vomiting    Codeine Nausea and Vomiting    Prednisone Itching    Sulfa (Sulfonamide Antibiotics) Hives, Itching and Palpitations     Think it was increased heart rate or itching - many years ago    Zithromax [Azithromycin] Itching     Not sure what it does,taken long time ago     Past Medical History:   Diagnosis Date    Agoraphobia without mention of panic attacks 2/17/2014    Anxiety disorder 8/18/2013    Arthritis     osteo    Breast pain, left 4/7/2015    CAD (coronary artery disease), native coronary artery 12/1/2015    Chronic chest pain 1/13/2014    Chronic pain associated with significant psychosocial dysfunction 2/17/2014    Depression 8/18/2013    Diabetes ([facility]     type II    Duplicated right renal collecting system 3/13/2014    GERD (gastroesophageal reflux disease)     Gout, joint     Hypercholesteremia     hyercholesterolemia    Hypertension     Nephrolithiasis 3/13/2014    Personal history of noncompliance with medical treatment, presenting hazards to health 5/30/2014    Psychotic disorder     Vaginal pain 7/30/2014     Past Surgical History:   Procedure Laterality Date    EGD  4/23/2010         HX CYST REMOVAL      cyst removed from left wrist    HX HYSTERECTOMY      partial    HX OTHER SURGICAL      bladder dilitation    HX TUBAL LIGATION      HX UROLOGICAL      kidney stones     Family History   Problem Relation Age of Onset    Stroke Mother     Heart Disease Mother     Cancer Father     Heart Disease Son     Liver Disease Son     Heart Disease Daughter     Malignant Hyperthermia Neg Hx     Pseudocholinesterase Deficiency Neg Hx     Delayed Awakening Neg Hx     Post-op Nausea/Vomiting Neg Hx     Emergence Delirium Neg Hx     Post-op Cognitive Dysfunction Neg Hx     Other Neg Hx Social History   Substance Use Topics    Smoking status: Never Smoker    Smokeless tobacco: Never Used    Alcohol use No          Objective:     Visit Vitals    /84 (BP 1 Location: Left arm, BP Patient Position: Sitting)    Pulse 85    Temp 98.2 °F (36.8 °C) (Oral)    Ht 5' 8\" (1.727 m)    Wt 168 lb 1.6 oz (76.2 kg)    BMI 25.56 kg/m2     Gen: NAD, pleasant  HEENT: normal appearing head, nares patent, PERRLA, EOMI, oropharynx no erythema, no cervical lymphadenopathy neck supple tm impacted on righ tand post irrigation tm better visualised no sis of inection   Cardio: RRR nl S1S2 no murmur  Lungs CTAB no wheeze no rales no rhonchi  ABD Soft non tender non distended + bowel sounds  Extremities: full ROM X 4 no clubbing no cyanosis  Neuro: no gross focal deficits noted, alert and orientated X 3  Psych.: well groomed no outward signs of depression. Assessment/Plan:   Diagnoses and all orders for this visit:    1. Benign essential HTN    2. Impacted cerumen, left ear  -     [place] REMOVAL IMPACTED CERUMEN IRRIGATION/LVG UNILAT    3. Noncompliance with medication regimen    4. Ear popping, bilateral  -     REFERRAL TO ENT-OTOLARYNGOLOGY    5. Somatic delusion disorder ([address]      Follow-up Disposition:  Return in about 3 weeks (around 8/18/2017) for 30 min appt recheck bp . .  avs printed and given to the pt. .take meds as directed   Declined a \"nerve pill\" as it was recommended by the last psychiatrist to stop she stopped taking all antidepressants as well advised to restart antidepressants or see spych if she desires a benzo  The patient voiced understanding of the above. Medication side effects were reviewed with the patient. Call with any concerns.

## 2017-07-28 NOTE — PROGRESS NOTES
Chief Complaint   Patient presents with    Ear Pain    Results     1. Have you been to the ER, urgent care clinic since your last visit? Hospitalized since your last visit? No    2. Have you seen or consulted any other health care providers outside of the [address] [place] [address] since your last visit? Include any pap smears or colon screening.  No

## 2017-07-28 NOTE — MR AVS SNAPSHOT
Visit Information Date & Time Provider Department Dept. Phone Encounter #  
 7/28/2017 11:00  Medical [place], [address] [facility] [phone] [id number] Follow-up Instructions Return in about 3 weeks (around 8/18/2017) for 30 min appt recheck bp . Your Appointments 8/10/2017 10:15 AM  
ROUTINE CARE with Kristy Alvarado MD  
[address] [facility] ([facility] Appt Note: [address] due today  
 [address] [facility] [address] [facility]  
[phone]  
  
   
 [address] [address]  
  
    
 12/7/2017 10:45 AM  
6 MONTH with Mally Louise MD  
[facility]) Appt Note: $0cp  ekr [address]  
[phone] [address] Upcoming Health Maintenance Date Due DTaP/Tdap/Td series (1 - Tdap) 4/30/2006 Pneumococcal 65+ Low/Medium Risk (1 of 2 - PCV13) 9/22/2010 FOOT EXAM Q1 6/12/2015 EYE EXAM RETINAL OR DILATED Q1 7/1/2015 FOBT Q 1 YEAR AGE 50-75 7/29/2015 GLAUCOMA SCREENING Q2Y 7/1/2016 BREAST CANCER SCRN MAMMOGRAM 11/2/2017 INFLUENZA AGE 9 TO ADULT 8/1/2017 HEMOGLOBIN A1C Q6M 1/6/2018 MICROALBUMIN Q1 7/6/2018 LIPID PANEL Q1 7/6/2018 MEDICARE YEARLY EXAM 7/7/2018 Allergies as of 7/28/2017  Review Complete On: 7/28/2017 By: Abelardo Schmitt Severity Noted Reaction Type Reactions Amoxicillin High 09/10/2010   Systemic Hives Sulfa (Sulfonamide Antibiotics) High 09/10/2010   Systemic Hives, Itching Amoxicillin Medium 04/22/2010   Systemic Hives, Itching Long time ago - patient not exactly certain what it does Mirtazapine Medium 11/20/2012   Side Effect Itching, Nausea Only Funny feeling in chest  
 Percocet [Oxycodone-acetaminophen] Medium 01/15/2014   Intolerance Nausea and Vomiting Codeine  11/26/2012    Nausea and Vomiting Prednisone  05/27/2010    Itching Sulfa (Sulfonamide Antibiotics)  04/22/2010   Systemic Hives, Itching, Palpitations Think it was increased heart rate or itching - many years ago Zithromax [Azithromycin]  11/20/2012   Side Effect Itching Not sure what it does,taken long time ago Current Immunizations  Reviewed on 4/13/2017 Name Date Pneumococcal Polysaccharide (PPSV-23)  Deferred (Patient Refused) TD Vaccine 4/29/2006 Not reviewed this visit You Were Diagnosed With   
  
 Codes Comments Benign essential HTN    -  Primary ICD-10-CM: I10 
ICD-9-CM: 401.1 Impacted cerumen, left ear     ICD-10-CM: H61.22 
ICD-9-CM: 380.4 Noncompliance with medication regimen     ICD-10-CM: Z91.14 
ICD-9-CM: V15.81 Ear popping, bilateral     ICD-10-CM: N94.0T9 ICD-9-CM: 388.8 Somatic delusion disorder ([facility]     ICD-10-CM: F22 
ICD-9-CM: 297.1 Vitals BP Pulse Temp Resp Height(growth percentile) Weight(growth percentile) 156/84 (BP 1 Location: Left arm, BP Patient Position: Sitting) 85 98.2 °F (36.8 °C) (Oral) 16 5' 8\" (1.727 m) 168 lb 1.6 oz (76.2 kg) SpO2 BMI OB Status Smoking Status 99% 25.56 kg/m2 Hysterectomy Never Smoker Vitals History BMI and BSA Data Body Mass Index Body Surface Area 25.56 kg/m 2 1.91 m 2 Preferred Pharmacy Pharmacy Name Phone [facility]/PHARMACY #[id number], [place] - [address] [phone] Your Updated Medication List  
  
   
This list is accurate as of: 7/28/17 12:29 PM.  Always use your most recent med list.  
  
  
  
  
 aspirin delayed-release 81 mg tablet Take 1 Tab by mouth daily. ciprofloxacin HCl 500 mg tablet Commonly known as:  CIPRO clonazePAM 1 mg tablet Commonly known as:  Perla Furnish Take 1 Tab by mouth two (2) times a day. Max Daily Amount: 2 mg. As needed fill after 2/5/17 * dilTIAZem  mg ER capsule Commonly known as:  CARDIZEM CD Take 1 Cap by mouth daily. * dilTIAZem  mg ER capsule Commonly known as:  CARDIZEM CD  
TAKE 1 CAP BY MOUTH DAILY. estradiol 0.01 % (0.1 mg/gram) vaginal cream  
Commonly known as:  ESTRACE  
1/2 applicator in vagina twice a week  
  
 hydrocortisone 1 % ointment Commonly known as:  HYCORT Apply  to affected area two (2) times a day. use thin layer  
  
 meclizine 12.5 mg tablet Commonly known as:  ANTIVERT Take  by mouth three (3) times daily as needed. nystatin topical cream  
Commonly known as:  MYCOSTATIN Apply  to affected area two (2) times a day. polyethylene glycol 17 gram/dose powder Commonly known as:  Colin Holster TAKE 1 CAPFUL IN 8 OUNCES OF WATER EVERY DAY  
  
 rosuvastatin 5 mg tablet Commonly known as:  CRESTOR Take 1 Tab by mouth nightly. valsartan 80 mg tablet Commonly known as:  DIOVAN Take 1 Tab by mouth daily. * Notice: This list has 2 medication(s) that are the same as other medications prescribed for you. Read the directions carefully, and ask your doctor or other care provider to review them with you. We Performed the Following [place] REMOVAL IMPACTED CERUMEN IRRIGATION/LVG UNILAT [27913 CPT(R)] REFERRAL TO ENT-OTOLARYNGOLOGY [FXR17 Custom] Comments:  
 Please evaluate patient for ear popping Follow-up Instructions Return in about 3 weeks (around 8/18/2017) for 30 min appt recheck bp . Referral Information Referral ID Referred By Referred To  
  
 [id number] [facility] [address], [address] [place] [address] Visits Status Start Date End Date 1 New Request 7/28/17 7/28/18 If your referral has a status of pending review or denied, additional information will be sent to support the outcome of this decision. Introducing [facility] SERVICES! Hesham Bianchi introduces Backlift patient portal. Now you can access parts of your medical record, email your doctor's office, and request medication refills online. 1. In your internet browser, go to [email or web address] 2. Click on the First Time User? Click Here link in the Sign In box. You will see the New Member Sign Up page. 3. Enter your Backlift Access Code exactly as it appears below. You will not need to use this code after youve completed the sign-up process. If you do not sign up before the expiration date, you must request a new code. · Backlift Access Code: [email or web address] Expires: 9/3/2017 10:16 AM 
 
4. Enter the last four digits of your Social Security Number (xxxx) and Date of Birth (mm/dd/yyyy) as indicated and click Submit. You will be taken to the next sign-up page. 5. Create a Backlift ID. This will be your Backlift login ID and cannot be changed, so think of one that is secure and easy to remember. 6. Create a Backlift password. You can change your password at any time. 7. Enter your Password Reset Question and Answer. This can be used at a later time if you forget your password. 8. Enter your e-mail address. You will receive e-mail notification when new information is available in [address] 9. Click Sign Up. You can now view and download portions of your medical record. 10. Click the Download Summary menu link to download a portable copy of your medical information. If you have questions, please visit the Frequently Asked Questions section of the Backlift website. Remember, Backlift is NOT to be used for urgent needs. For medical emergencies, dial 911. Now available from your iPhone and Android! Please provide this summary of care documentation to your next provider. Your primary care clinician is listed as Robert Valdez. If you have any questions after today's visit, please call [phone].

## 2017-08-08 ENCOUNTER — TELEPHONE (OUTPATIENT)
Dept: OBGYN CLINIC | Age: 72
End: 2017-08-08

## 2017-08-08 NOTE — TELEPHONE ENCOUNTER
Pt called stating that she is still having vaginal irritation and pain and it is hurting a lot down there. She states that she used the cream prescribed for her and it is not working. She requests some advice.

## 2017-08-09 NOTE — TELEPHONE ENCOUNTER
If she has been using estrogen on the vulva and vagina for 3 months and is still having symptoms then I have nothing else to offer. Perhaps she should get another opinion from another provider or [facility]. She needs to try the estrogen consistently for 3 months. If it's going to help, it takes time.

## 2017-08-15 ENCOUNTER — OFFICE VISIT (OUTPATIENT)
Dept: INTERNAL MEDICINE CLINIC | Age: 72
End: 2017-08-15

## 2017-08-15 VITALS
TEMPERATURE: 97.9 F | SYSTOLIC BLOOD PRESSURE: 104 MMHG | RESPIRATION RATE: 16 BRPM | BODY MASS INDEX: 25.75 KG/M2 | DIASTOLIC BLOOD PRESSURE: 57 MMHG | WEIGHT: 169.9 LBS | HEART RATE: 84 BPM | HEIGHT: 68 IN | OXYGEN SATURATION: 97 %

## 2017-08-15 DIAGNOSIS — F41.8 DEPRESSION WITH ANXIETY: Chronic | ICD-10-CM

## 2017-08-15 DIAGNOSIS — E78.5 HYPERLIPIDEMIA, UNSPECIFIED HYPERLIPIDEMIA TYPE: Chronic | ICD-10-CM

## 2017-08-15 DIAGNOSIS — F45.1 SOMATIC SYMPTOM DISORDER, MILD, WITH PREDOMINANT PAIN: Primary | ICD-10-CM

## 2017-08-15 DIAGNOSIS — I10 HTN, GOAL BELOW 130/80: Chronic | ICD-10-CM

## 2017-08-15 RX ORDER — ROSUVASTATIN CALCIUM 5 MG/1
5 TABLET, COATED ORAL
Qty: 30 TAB | Refills: 3 | Status: SHIPPED | OUTPATIENT
Start: 2017-08-15 | End: 2017-12-07

## 2017-08-15 NOTE — MR AVS SNAPSHOT
Visit Information Date & Time Provider Department Dept. Phone Encounter #  
 8/15/2017  2:00  Medical Virginie Alvarez MD [address] [facility] [phone] [id number] Follow-up Instructions Return in about 2 weeks (around 8/29/2017) for review meds 15 min . Your Appointments 12/7/2017 10:45 AM  
6 MONTH with Mya Ma MD  
[facility]: $0cp  ekr [address] [facility]  
[phone] [address] [facility] Upcoming Health Maintenance Date Due DTaP/Tdap/Td series (1 - Tdap) 4/30/2006 Pneumococcal 65+ Low/Medium Risk (1 of 2 - PCV13) 9/22/2010 FOOT EXAM Q1 6/12/2015 EYE EXAM RETINAL OR DILATED Q1 7/1/2015 FOBT Q 1 YEAR AGE 50-75 7/29/2015 GLAUCOMA SCREENING Q2Y 7/1/2016 INFLUENZA AGE 9 TO ADULT 8/1/2017 BREAST CANCER SCRN MAMMOGRAM 11/2/2017 HEMOGLOBIN A1C Q6M 1/6/2018 MICROALBUMIN Q1 7/6/2018 LIPID PANEL Q1 7/6/2018 MEDICARE YEARLY EXAM 7/7/2018 Allergies as of 8/15/2017  Review Complete On: 8/15/2017 By: Elida Castrejon Severity Noted Reaction Type Reactions Amoxicillin High 09/10/2010   Systemic Hives Sulfa (Sulfonamide Antibiotics) High 09/10/2010   Systemic Hives, Itching Amoxicillin Medium 04/22/2010   Systemic Hives, Itching Long time ago - patient not exactly certain what it does Mirtazapine Medium 11/20/2012   Side Effect Itching, Nausea Only Funny feeling in chest  
 Percocet [Oxycodone-acetaminophen] Medium 01/15/2014   Intolerance Nausea and Vomiting Codeine  11/26/2012    Nausea and Vomiting Prednisone  05/27/2010    Itching Sulfa (Sulfonamide Antibiotics)  04/22/2010   Systemic Hives, Itching, Palpitations Think it was increased heart rate or itching - many years ago Zithromax [Azithromycin]  11/20/2012   Side Effect Itching Not sure what it does,taken long time ago Current Immunizations  Reviewed on 4/13/2017 Name Date Pneumococcal Polysaccharide (PPSV-23)  Deferred (Patient Refused) TD Vaccine 4/29/2006 Not reviewed this visit You Were Diagnosed With   
  
 Codes Comments Somatic symptom disorder, mild, with predominant pain    -  Primary ICD-10-CM: F45.1 ICD-9-CM: 300.82 HTN, goal below 130/80     ICD-10-CM: I10 
ICD-9-CM: 401.9 Depression with anxiety     ICD-10-CM: F41.8 ICD-9-CM: 300.4 Hyperlipidemia, unspecified hyperlipidemia type     ICD-10-CM: E78.5 ICD-9-CM: 272.4 Vitals BP Pulse Temp Resp Height(growth percentile) Weight(growth percentile) 104/57 (BP 1 Location: Left arm, BP Patient Position: Sitting) 84 97.9 °F (36.6 °C) (Oral) 16 5' 8\" (1.727 m) 169 lb 14.4 oz (77.1 kg) SpO2 BMI OB Status Smoking Status 97% 25.83 kg/m2 Hysterectomy Never Smoker Vitals History BMI and BSA Data Body Mass Index Body Surface Area  
 25.83 kg/m 2 1.92 m 2 Preferred Pharmacy Pharmacy Name Phone [facility]/PHARMACY #[id number]- [place], [place] - [address] [phone] Your Updated Medication List  
  
   
This list is accurate as of: 8/15/17  2:32 PM.  Always use your most recent med list.  
  
  
  
  
 aspirin delayed-release 81 mg tablet Take 1 Tab by mouth daily. ciprofloxacin HCl 500 mg tablet Commonly known as:  CIPRO  
  
 * dilTIAZem  mg ER capsule Commonly known as:  CARDIZEM CD Take 1 Cap by mouth daily. * dilTIAZem  mg ER capsule Commonly known as:  CARDIZEM CD  
TAKE 1 CAP BY MOUTH DAILY. estradiol 0.01 % (0.1 mg/gram) vaginal cream  
Commonly known as:  ESTRACE  
1/2 applicator in vagina twice a week  
  
 hydrocortisone 1 % ointment Commonly known as:  HYCORT Apply  to affected area two (2) times a day. use thin layer  
  
 meclizine 12.5 mg tablet Commonly known as:  ANTIVERT Take  by mouth three (3) times daily as needed. nystatin topical cream  
Commonly known as:  MYCOSTATIN Apply  to affected area two (2) times a day. polyethylene glycol 17 gram/dose powder Commonly known as:  Junior Freud TAKE 1 CAPFUL IN 8 OUNCES OF WATER EVERY DAY  
  
 rosuvastatin 5 mg tablet Commonly known as:  CRESTOR Take 1 Tab by mouth nightly. valsartan 80 mg tablet Commonly known as:  DIOVAN Take 1 Tab by mouth daily. * Notice: This list has 2 medication(s) that are the same as other medications prescribed for you. Read the directions carefully, and ask your doctor or other care provider to review them with you. Prescriptions Sent to Pharmacy Refills  
 rosuvastatin (CRESTOR) 5 mg tablet 3 Sig: Take 1 Tab by mouth nightly. Class: Normal  
 Pharmacy: [facility]/pharmacy [address]  #: [phone] Route: Oral  
  
We Performed the Following CK [id number] CPT(R)] METABOLIC PANEL, COMPREHENSIVE [49409 CPT(R)] Follow-up Instructions Return in about 2 weeks (around 8/29/2017) for review meds 15 min . Patient Instructions Omero and make an appt with the psychiatrist as directed Introducing [facility] HEALTH SERVICES! [employer] introduces [employer]ooroo patient portal. Now you can access parts of your medical record, email your doctor's office, and request medication refills online. 1. In your internet browser, go to [email or web address] 2. Click on the First Time User? Click Here link in the Sign In box. You will see the New Member Sign Up page. 3. Enter your DesignGooroo Access Code exactly as it appears below. You will not need to use this code after youve completed the sign-up process. If you do not sign up before the expiration date, you must request a new code. · DesignGooroo Access Code: [email or web address] Expires: 9/3/2017 10:16 AM 
 
4. Enter the last four digits of your Social Security Number (xxxx) and Date of Birth (mm/dd/yyyy) as indicated and click Submit. You will be taken to the next sign-up page. 5. Create a Woisio ID. This will be your Woisio login ID and cannot be changed, so think of one that is secure and easy to remember. 6. Create a Woisio password. You can change your password at any time. 7. Enter your Password Reset Question and Answer. This can be used at a later time if you forget your password. 8. Enter your e-mail address. You will receive e-mail notification when new information is available in [address] 9. Click Sign Up. You can now view and download portions of your medical record. 10. Click the Download Summary menu link to download a portable copy of your medical information. If you have questions, please visit the Frequently Asked Questions section of the Woisio website. Remember, Woisio is NOT to be used for urgent needs. For medical emergencies, dial 911. Now available from your iPhone and Android! Please provide this summary of care documentation to your next provider. Your primary care clinician is listed as Kyleigh Ramos. If you have any questions after today's visit, please call [phone].

## 2017-08-15 NOTE — PROGRESS NOTES
Chief Complaint   Patient presents with    Medication Refill    Hypertension    Anxiety       Subjective:   Johanna Bergman 70 y.o.  female with a  past medical history reviewed see below. comes in today for HTN. And anxiety   She has not found a psychiatrist yet she still has the same complaints of blurry vision and head tightness and hears a heart beat in her ear. the symptoms are not any different than similar symptoms in the past yr. She notes \"my nerves are bad\" and she has intermittent chest pain this is also not new she has seen Dr Doug Moreno in June and no new symptoms   She is drinking and eating ok she is not sleeping well. She had some ear popping and has not been to see Dr Timmy Tate due to transportation   C/o soreness in muscles she stopped the crestor about a week or so and since stoppign he rleg muscles are better   ROS: otherwise feeling generally well. All other systems reviewed and are negative       Current Outpatient Prescriptions   Medication Sig Dispense Refill    rosuvastatin (CRESTOR) 5 mg tablet Take 1 Tab by mouth nightly. 30 Tab 3    estradiol (ESTRACE) 0.01 % (0.1 mg/gram) vaginal cream 1/2 applicator in vagina twice a week 1 Tube 11    meclizine (ANTIVERT) 12.5 mg tablet Take  by mouth three (3) times daily as needed.  dilTIAZem CD (CARDIZEM CD) 120 mg ER capsule TAKE 1 CAP BY MOUTH DAILY. 30 Cap 1    nystatin (MYCOSTATIN) topical cream Apply  to affected area two (2) times a day. 15 g 0    ciprofloxacin HCl (CIPRO) 500 mg tablet       valsartan (DIOVAN) 80 mg tablet Take 1 Tab by mouth daily. 90 Tab 4    dilTIAZem CD (CARDIZEM CD) 120 mg ER capsule Take 1 Cap by mouth daily. 90 Cap 4    hydrocortisone (HYCORT) 1 % ointment Apply  to affected area two (2) times a day. use thin layer 30 g 0    polyethylene glycol (MIRALAX) 17 gram/dose powder TAKE 1 CAPFUL IN 8 OUNCES OF WATER EVERY  g 3    aspirin delayed-release 81 mg tablet Take 1 Tab by mouth daily.  30 Tab 11 Allergies   Allergen Reactions    Amoxicillin Hives    Sulfa (Sulfonamide Antibiotics) Hives and Itching    Amoxicillin Hives and Itching     Long time ago - patient not exactly certain what it does    Mirtazapine Itching and Nausea Only     Funny feeling in chest    Percocet [Oxycodone-Acetaminophen] Nausea and Vomiting    Codeine Nausea and Vomiting    Prednisone Itching    Sulfa (Sulfonamide Antibiotics) Hives, Itching and Palpitations     Think it was increased heart rate or itching - many years ago    Zithromax [Azithromycin] Itching     Not sure what it does,taken long time ago     Past Medical History:   Diagnosis Date    Agoraphobia without mention of panic attacks 2/17/2014    Anxiety disorder 8/18/2013    Arthritis     osteo    Breast pain, left 4/7/2015    CAD (coronary artery disease), native coronary artery 12/1/2015    Chronic chest pain 1/13/2014    Chronic pain associated with significant psychosocial dysfunction 2/17/2014    Depression 8/18/2013    Diabetes ([facility] [address]     type II    Duplicated right renal collecting system 3/13/2014    GERD (gastroesophageal reflux disease)     Gout, joint     Hypercholesteremia     hyercholesterolemia    Hypertension     Nephrolithiasis 3/13/2014    Personal history of noncompliance with medical treatment, presenting hazards to health 5/30/2014    Psychotic disorder     Vaginal pain 7/30/2014     Past Surgical History:   Procedure Laterality Date    EGD  4/23/2010         HX CYST REMOVAL      cyst removed from left wrist    HX HYSTERECTOMY      partial    HX OTHER SURGICAL      bladder dilitation    HX TUBAL LIGATION      HX UROLOGICAL      kidney stones     Family History   Problem Relation Age of Onset    Stroke Mother     Heart Disease Mother     Cancer Father     Heart Disease Son     Liver Disease Son     Heart Disease Daughter     Malignant Hyperthermia Neg Hx     Pseudocholinesterase Deficiency Neg Hx     Delayed Awakening Neg Hx     Post-op Nausea/Vomiting Neg Hx     Emergence Delirium Neg Hx     Post-op Cognitive Dysfunction Neg Hx     Other Neg Hx      Social History   Substance Use Topics    Smoking status: Never Smoker    Smokeless tobacco: Never Used    Alcohol use No          Objective:     Visit Vitals    /57 (BP 1 Location: Left arm, BP Patient Position: Sitting)    Pulse 84    Temp 97.9 °F (36.6 °C) (Oral)    Resp 16    Ht 5' 8\" (1.727 m)    Wt 169 lb 14.4 oz (77.1 kg)    SpO2 97%    BMI 25.83 kg/m2     Gen: NAD, pleasant  HEENT: normal appearing head, nares patent, PERRLA, EOMI, oropharynx no erythema, no cervical lymphadenopathy neck supple   Cardio: RRR nl S1S2 no murmur  Lungs CTAB no wheeze no rales no rhonchi  ABD Soft non tender non distended + bowel sounds  Extremities: full ROM X 4 no clubbing no cyanosis  Neuro: no gross focal deficits noted, alert and orientated X 3  Psych.: well groomed  ++ depression. Assessment/Plan:   Diagnoses and all orders for this visit:    1. Somatic symptom disorder, mild, with predominant pain    2. HTN, goal below 130/80    3. Depression with anxiety    4. Hyperlipidemia, unspecified hyperlipidemia type  -     rosuvastatin (CRESTOR) 5 mg tablet; Take 1 Tab by mouth nightly. -     CK  -     METABOLIC PANEL, COMPREHENSIVE      Follow-up Disposition:  Return in about 2 weeks (around 8/29/2017) for review meds & labs 15 min . Annrussell Ripa avs printed and given to the pt. .  Pt encouraged to see the psychiatrist and to f/up with cardio as directed   The patient voiced understanding of the above. Medication side effects were reviewed with the patient. Call with any concerns.

## 2017-08-15 NOTE — PROGRESS NOTES
Chief Complaint   Patient presents with    Medication Refill    Hypertension    Anxiety     1. Have you been to the ER, urgent care clinic since your last visit? Hospitalized since your last visit? No    2. Have you seen or consulted any other health care providers outside of the [address] [place] [address] since your last visit? Include any pap smears or colon screening.  No

## 2017-08-16 LAB
ALBUMIN SERPL-MCNC: 4 G/DL (ref 3.5–4.8)
ALBUMIN/GLOB SERPL: 1.7 {RATIO} (ref 1.2–2.2)
ALP SERPL-CCNC: 65 IU/L (ref 39–117)
ALT SERPL-CCNC: 8 IU/L (ref 0–32)
AST SERPL-CCNC: 11 IU/L (ref 0–40)
BILIRUB SERPL-MCNC: 0.2 MG/DL (ref 0–1.2)
BUN SERPL-MCNC: 16 MG/DL (ref 8–27)
BUN/CREAT SERPL: 18 (ref 12–28)
CALCIUM SERPL-MCNC: 9 MG/DL (ref 8.7–10.3)
CHLORIDE SERPL-SCNC: 104 MMOL/L (ref 96–106)
CK SERPL-CCNC: 57 U/L (ref 24–173)
CO2 SERPL-SCNC: 26 MMOL/L (ref 18–29)
CREAT SERPL-MCNC: 0.91 MG/DL (ref 0.57–1)
GLOBULIN SER CALC-MCNC: 2.4 G/DL (ref 1.5–4.5)
GLUCOSE SERPL-MCNC: 134 MG/DL (ref 65–99)
POTASSIUM SERPL-SCNC: 4 MMOL/L (ref 3.5–5.2)
PROT SERPL-MCNC: 6.4 G/DL (ref 6–8.5)
SODIUM SERPL-SCNC: 145 MMOL/L (ref 134–144)

## 2017-09-11 RX ORDER — DOCUSATE SODIUM 100 MG/1
CAPSULE, LIQUID FILLED ORAL
Qty: 90 CAP | Refills: 0 | Status: SHIPPED | OUTPATIENT
Start: 2017-09-11 | End: 2018-08-30

## 2017-09-13 ENCOUNTER — OFFICE VISIT (OUTPATIENT)
Dept: INTERNAL MEDICINE CLINIC | Age: 72
End: 2017-09-13

## 2017-09-13 VITALS
DIASTOLIC BLOOD PRESSURE: 75 MMHG | WEIGHT: 171 LBS | TEMPERATURE: 98.2 F | RESPIRATION RATE: 18 BRPM | BODY MASS INDEX: 25.91 KG/M2 | OXYGEN SATURATION: 98 % | HEIGHT: 68 IN | SYSTOLIC BLOOD PRESSURE: 149 MMHG | HEART RATE: 86 BPM

## 2017-09-13 DIAGNOSIS — R42 DIZZINESS: Primary | ICD-10-CM

## 2017-09-13 DIAGNOSIS — H53.8 BLURRING OF VISION: ICD-10-CM

## 2017-09-13 DIAGNOSIS — N89.8 VAGINAL IRRITATION: ICD-10-CM

## 2017-09-13 DIAGNOSIS — T46.6X5A STATIN MYOPATHY: ICD-10-CM

## 2017-09-13 DIAGNOSIS — N89.8 VAGINAL DISCHARGE: ICD-10-CM

## 2017-09-13 DIAGNOSIS — G47.00 INSOMNIA, UNSPECIFIED TYPE: ICD-10-CM

## 2017-09-13 DIAGNOSIS — R21 RASH AND OTHER NONSPECIFIC SKIN ERUPTION: ICD-10-CM

## 2017-09-13 DIAGNOSIS — J30.89 NON-SEASONAL ALLERGIC RHINITIS, UNSPECIFIED ALLERGIC RHINITIS TRIGGER: ICD-10-CM

## 2017-09-13 DIAGNOSIS — G72.0 STATIN MYOPATHY: ICD-10-CM

## 2017-09-13 RX ORDER — ACETAMINOPHEN 160 MG/5ML
SUSPENSION, ORAL (FINAL DOSE FORM) ORAL
Qty: 30 CAP | Refills: 2 | Status: SHIPPED | OUTPATIENT
Start: 2017-09-13 | End: 2018-04-19

## 2017-09-13 RX ORDER — FLUTICASONE PROPIONATE 50 MCG
2 SPRAY, SUSPENSION (ML) NASAL DAILY
Qty: 1 BOTTLE | Refills: 0 | Status: SHIPPED | OUTPATIENT
Start: 2017-09-13 | End: 2018-04-19

## 2017-09-13 RX ORDER — MECLIZINE HCL 12.5 MG 12.5 MG/1
12.5 TABLET ORAL
Qty: 30 TAB | Refills: 0 | Status: SHIPPED | OUTPATIENT
Start: 2017-09-13 | End: 2017-12-07

## 2017-09-13 RX ORDER — LIDOCAINE HYDROCHLORIDE 20 MG/ML
15 SOLUTION OROPHARYNGEAL AS NEEDED
Qty: 100 ML | Refills: 2 | Status: SHIPPED | OUTPATIENT
Start: 2017-09-13 | End: 2017-10-11 | Stop reason: SDUPTHER

## 2017-09-13 NOTE — PROGRESS NOTES
Josh Merino is a 70 y.o. female and presents with Medication Evaluation (pt would like to discuss her Blood pressure medication); Vaginal Itching (with burning and pain, pt states x 1 month. . unable to afford the medication estrace); and Sleep Problem (pt states she has trouble sleeping, pt states that she gets only 1 hour of sleep a night )    Subjective:  Pt here to establish care with this provider, former patient of Dr. Dilshad Corrales. Pt here with multiple complaints, but felt those addressed below were most significant and will return at a later time to address other concerns. Hypertension Review:  The patient has hypertension  Diet and Lifestyle: generally does try to follow a  low sodium diet, exercises sporadically   Home BP Monitoring: is not measured at home. Pertinent ROS: taking medications as instructed, but has dizziness for several hours following. Tries to lay down to help, but little relief noted. Also with pulsing feeling in ears, worse in right when lying down. No TIA's, chest pain on exertion, dyspnea on exertion, or swelling of ankles. BP Readings from Last 3 Encounters:   09/13/17 149/75   08/15/17 104/57   07/28/17 156/84     Insomnia Review:  Pt presents for insomnia, reporting difficulty falling and staying asleep. Only slept 1 hr last night. Started months ago, unchanged. Aggravated by anxiety. Alleviated by Rx meds: ativan. Related symptoms include daytime drowsiness. Denies h/o PTSD, substance abuse, and alcoholism. No OTC sleep agents tried in past reportedly. Lastly complains of vaginal discomfort and irritation to touch. Ongoing for years. Prescribed estrogen cream, but cost too high unable to afford. Also tried OTC lubricant like KY Jelly with little relief. Unsure of discharge, but definitely with painful intercourse. Last intercourse 1 month ago without protection. Review of Systems  Constitutional: + right axillary/breast irritation.  negative for fevers, chills, anorexia and weight loss  Eyes:   + blurring vision, seen by 2 providers and advised NO PROBLEM with vision however. Has appt for TOMORROW to return to [facility] clinic however. negative for visual disturbance, drainage, and irritation  ENT:   negative for sore throat,nasal congestion,ear pain,and hoarseness  Respiratory:  negative for cough, hemoptysis, dyspnea, and wheezing  CV:   negative for chest pain, palpitations, and lower extremity edema  GI:   negative for nausea, vomiting, diarrhea, abdominal pain, and melena  Endo:               negative for polyuria,polydipsia,polyphagia, and heat intolerance  Genitourinary: negative for frequency, urgency, dysuria, retention, and hematuria  Integument:  negative for rash, ulcerations, and pruritus  Hematologic:  negative for easy bruising and bleeding  Musculoskel: + knee pain/swelling. 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 Stopped crestor for past few months per suggestion, would like to know if she should resume or not. negative for  muscle weakness,and joint pain/swelling  Neurological:  negative for headaches, vertigo,and memory/gait problems  Behavl/Psych: + anxiety.  negative for feelings of depression, suicide, and mood changes    Past Medical History:   Diagnosis Date    Agoraphobia without mention of panic attacks 2/17/2014    Anxiety disorder 8/18/2013    Arthritis     osteo    Breast pain, left 4/7/2015    CAD (coronary artery disease), native coronary artery 12/1/2015    Chronic chest pain 1/13/2014    Chronic pain associated with significant psychosocial dysfunction 2/17/2014    Depression 8/18/2013    Diabetes ([facility] [address]     type II    Duplicated right renal collecting system 3/13/2014    GERD (gastroesophageal reflux disease)     Gout, joint     Hypercholesteremia     hyercholesterolemia    Hypertension     Nephrolithiasis 3/13/2014    Personal history of noncompliance with medical treatment, presenting hazards to health 5/30/2014    Psychotic disorder     Vaginal pain 7/30/2014     Past Surgical History:   Procedure Laterality Date    EGD  4/23/2010         HX CYST REMOVAL      cyst removed from left wrist    HX HYSTERECTOMY      partial    HX OTHER SURGICAL      bladder dilitation    HX TUBAL LIGATION      HX UROLOGICAL      kidney stones     Social History     Social History    Marital status:      Spouse name: N/A    Number of children: N/A    Years of education: N/A     Social History Main Topics    Smoking status: Never Smoker    Smokeless tobacco: Never Used    Alcohol use No    Drug use: No    Sexual activity: No     Other Topics Concern    None     Social History Narrative    ** Merged History Encounter **     , lives with her son and Friend. Family History   Problem Relation Age of Onset    Stroke Mother     Heart Disease Mother     Cancer Father     Heart Disease Son     Liver Disease Son     Heart Disease Daughter     Malignant Hyperthermia Neg Hx     Pseudocholinesterase Deficiency Neg Hx     Delayed Awakening Neg Hx     Post-op Nausea/Vomiting Neg Hx     Emergence Delirium Neg Hx     Post-op Cognitive Dysfunction Neg Hx     Other Neg Hx      Current Outpatient Prescriptions   Medication Sig Dispense Refill    coenzyme Q-10 (CO Q-10) 200 mg capsule Take 1 cap for TWO WEEKS then continue taking WITH Crestor. 30 Cap 2    meclizine (ANTIVERT) 12.5 mg tablet Take 1 Tab by mouth three (3) times daily as needed. Indications: VERTIGO 30 Tab 0    fluticasone (FLONASE) 50 mcg/actuation nasal spray 2 Sprays by Both Nostrils route daily. 1 Bottle 0    lidocaine (XYLOCAINE) 2 % solution Take 15 mL by mouth as needed for Pain. Indications: vaginal irritation 100 mL 2    docusate sodium (COLACE) 100 mg capsule TAKE 1 CAP BY MOUTH DAILY AS NEEDED FOR CONSTIPATION FOR UP TO 90 DAYS. 90 Cap 0    valsartan (DIOVAN) 80 mg tablet Take 1 Tab by mouth daily.  90 Tab 4    dilTIAZem CD (CARDIZEM CD) 120 mg ER capsule Take 1 Cap by mouth daily. 90 Cap 4    aspirin delayed-release 81 mg tablet Take 1 Tab by mouth daily. 30 Tab 11    rosuvastatin (CRESTOR) 5 mg tablet Take 1 Tab by mouth nightly. 30 Tab 3     Allergies   Allergen Reactions    Amoxicillin Hives    Sulfa (Sulfonamide Antibiotics) Hives and Itching    Amoxicillin Hives and Itching     Long time ago - patient not exactly certain what it does    Mirtazapine Itching and Nausea Only     Funny feeling in chest    Percocet [Oxycodone-Acetaminophen] Nausea and Vomiting    Codeine Nausea and Vomiting    Prednisone Itching    Sulfa (Sulfonamide Antibiotics) Hives, Itching and Palpitations     Think it was increased heart rate or itching - many years ago    Zithromax [Azithromycin] Itching     Not sure what it does,taken long time ago       Objective:  Visit Vitals    /75 (BP 1 Location: Left arm, BP Patient Position: Sitting)    Pulse 86    Temp 98.2 °F (36.8 °C) (Oral)    Resp 18    Ht 5' 8\" (1.727 m)    Wt 171 lb (77.6 kg)    SpO2 98%    BMI 26 kg/m2     Wt Readings from Last 3 Encounters:   09/13/17 171 lb (77.6 kg)   08/15/17 169 lb 14.4 oz (77.1 kg)   07/28/17 168 lb 1.6 oz (76.2 kg)     Physical Exam:   General appearance - alert, well appearing, and in no distress. Mental status - A/O x 4, anxious mood and affect. +hyperverbal  Eyes- TWILA, EOMI. No nystagmus or cataracts. Ears- TM injected BL, no erythema or drainage. Nose- septum midline. Turbinates boggy and pale. Mild periorbital edema. Neck -Supple ,normal CSP. No JVD. Chest - CTA. Symmetric chest rise. No wheezing, rales or rhonchi. Heart - Normal rate, regular rhythm. Normal S1, S2. No MGR or clicks. Abdomen - Soft,non-distended. Normoactive BS in all quadrants. NT, no mass or HSM. Female Genitalia: normal, no lesions; urethra, left sided tenderness. Bartholin's gland palpated without mass or enlargement noted. Smooth.   Vagina: healthy pink MOIST mucosa without any lesions, but hyperpigmentation. no abnormal discharge. Cervix: normal appearance, no lesions or bleeding; + yellow abnormal vaginal discharge present. Ext- Radial, DP pulses, 2+ bilaterally. No pedal edema, clubbing, or cyanosis. Skin-Warm and dry. No hyperpigmentation, ulcerations, or suspicious lesions. Neuro - Normal speech, no focal findings or movement disorder. Normal strength, gait, and muscle tone. Assessment/Plan:  NuSwab collected and sent, no prophylaxis at this time. Viscous lidocaine to assist with pain reduction, no vag dryness today. EKG with slight change to lead II noted with less defined wave, flattened. Unsure if interference or ectopy, other leads appear the same. Due to dizziness, sending EKG to cardio for review, otherwise next appt due in Dec per last OV notes. Since pt has NOT tried OTC meds, advised for insomnia reported. Will NOT prescribe benzo at this time, reviewed addictive nature of med with need for daily use reported. CoQ10 x 2 weeks for myopathy and resume Crestor, if still unable to tolerate will try Repatha instead. Also advised use of cardizem at BEDTIME to help with dizziness, as pt notes with use FOLLOWING meds and took both at same time. I spent greater than 50% of 40 minute visit counseling patient about above mentioned topics. Medication Side Effects and Warnings were discussed with patient: yes   Patient Labs were reviewed: yes  Patient Past Records were reviewed: yes    See below for other orders   Follow-up Disposition:  Return in about 4 weeks (around 10/11/2017) for dizziness, vaginal irratition, knee pain f/u, insomnia. ICD-10-CM ICD-9-CM    1. Dizziness R42 780.4 AMB POC EKG ROUTINE W/ 12 LEADS, INTER & REP      meclizine (ANTIVERT) 12.5 mg tablet   2. 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 Insomnia, unspecified type G47.00 780.52    4.  Vaginal irritation N89.8 623.9 NUSWAB VAGINITIS PLUS      lidocaine (XYLOCAINE) 2 % solution   5. Vaginal discharge N89.8 623.5 NUSWAB VAGINITIS PLUS   6. Blurring of vision H53.8 368.8 REFERRAL TO OPHTHALMOLOGY   7. Non-seasonal allergic rhinitis, unspecified allergic rhinitis trigger J30.89 477.8 fluticasone (FLONASE) 50 mcg/actuation nasal spray   8. Rash and other nonspecific skin eruption  R21 782.1 NUSWAB VAGINITIS PLUS     Orders Placed This Encounter    NUSWAB VAGINITIS PLUS    REFERRAL TO OPHTHALMOLOGY     Referral Priority:   Routine     Referral Type:   Consultation     Referral Reason:   Specialty Services Required     Referral Location:   Tiffani Enriquez M.D. Referred to Provider:   Tiffani Enriquez MD    AMB POC EKG ROUTINE W LEADS, INTER & REP     Order Specific Question:   Reason for Exam:     Answer:   dizziness    coenzyme Q-10 (CO Q-10) 200 mg capsule     Sig: Take 1 cap for TWO WEEKS then continue taking WITH Crestor. Dispense:  30 Cap     Refill:  2    meclizine (ANTIVERT) 12.5 mg tablet     Sig: Take 1 Tab by mouth three (3) times daily as needed. Indications: VERTIGO     Dispense:  30 Tab     Refill:  0    fluticasone (FLONASE) 50 mcg/actuation nasal spray     Si Sprays by Both Nostrils route daily. Dispense:  1 Bottle     Refill:  0    lidocaine (XYLOCAINE) 2 % solution     Sig: Take 15 mL by mouth as needed for Pain. Indications: vaginal irritation     Dispense:  100 mL     Refill:  2       Maisha Logan expressed understanding of plan. An After Visit Summary was offered/printed and given to the patient.

## 2017-09-13 NOTE — PATIENT INSTRUCTIONS
You would benefit from more adequate rest. Please be sure to remove yourself from stimulating activities, like using your cellphone, tablet, or watching T.V. About 2 hours before bedtime. Instead engage in relaxing activities, like reading, yoga, or listening to calming music. Also try to refrain from using these devices in your sleep space or bed, as this trains your brain to stay awake in the bed versus sleeping. Avoid caffeine containing products like coffee, tea, chocolate, and soda within 2 hours of bedtime and try over the counter meds as needed to help like ZZZquil, benadryl, UNISOM or melatonin (they are non-habit forming). Learning About Sleeping Well  What does sleeping well mean? Sleeping well means getting enough sleep. How much sleep is enough varies among people. The number of hours you sleep is not as important as how you feel when you wake up. If you do not feel refreshed, you probably need more sleep. Another sign of not getting enough sleep is feeling tired during the day. The average total nightly sleep time is 7½ to 8 hours. Healthy adults may need a little more or a little less than this. Why is getting enough sleep important? Getting enough quality sleep is a basic part of good health. When your sleep suffers, your mood and your thoughts can suffer too. You may find yourself feeling more grumpy or stressed. Not getting enough sleep also can lead to serious problems, including injury, accidents, anxiety, and depression. What might cause poor sleeping? Many things can cause sleep problems, including:  · Stress. Stress can be caused by fear about a single event, such as giving a speech. Or you may have ongoing stress, such as worry about work or school. · Depression, anxiety, and other mental or emotional conditions. · Changes in your sleep habits or surroundings. This includes changes that happen where you sleep, such as noise, light, or sleeping in a different bed.  It also includes changes in your sleep pattern, such as having jet lag or working a late shift. · Health problems, such as pain, breathing problems, and restless legs syndrome. · Lack of regular exercise. How can you help yourself? Here are some tips that may help you sleep more soundly and wake up feeling more refreshed. Your sleeping area  · Use your bedroom only for sleeping and sex. A bit of light reading may help you fall asleep. But if it doesn't, do your reading elsewhere in the house. Don't watch TV in bed. · Be sure your bed is big enough to stretch out comfortably, especially if you have a sleep partner. · Keep your bedroom quiet, dark, and cool. Use curtains, blinds, or a sleep mask to block out light. To block out noise, use earplugs, soothing music, or a \"white noise\" machine. Your evening and bedtime routine  · Create a relaxing bedtime routine. You might want to take a warm shower or bath, listen to soothing music, or drink a cup of noncaffeinated tea. · Go to bed at the same time every night. And get up at the same time every morning, even if you feel tired. What to avoid  · Limit caffeine (coffee, tea, caffeinated sodas) during the day, and don't have any for at least 4 to 6 hours before bedtime. · Don't drink alcohol before bedtime. Alcohol can cause you to wake up more often during the night. · Don't smoke or use tobacco, especially in the evening. Nicotine can keep you awake. · Don't take naps during the day, especially close to bedtime. · Don't lie in bed awake for too long. If you can't fall asleep, or if you wake up in the middle of the night and can't get back to sleep within 15 minutes or so, get out of bed and go to another room until you feel sleepy. · Don't take medicine right before bed that may keep you awake or make you feel hyper or energized. Your doctor can tell you if your medicine may do this and if you can take it earlier in the day.   If you can't sleep  · Imagine yourself in a peaceful, pleasant scene. Focus on the details and feelings of being in a place that is relaxing. · Get up and do a quiet or boring activity until you feel sleepy. · Don't drink any liquids after 6 p.m. if you wake up often because you have to go to the bathroom. Where can you learn more? Go to http://lobo-michel.info/. Enter F011 in the search box to learn more about \"Learning About Sleeping Well. \"  Current as of: July 26, 2016  Content Version: 11.3  © 7395-2025 [employer]. Care instructions adapted under license by "[employer] (which disclaims liability or warranty for this information). If you have questions about a medical condition or this instruction, always ask your healthcare professional. [place] [address] any warranty or liability for your use of this information. Insomnia: Care Instructions  Your Care Instructions  Insomnia is the inability to sleep well. It is a common problem for most people at some time. Insomnia may make it hard for you to get to sleep, stay asleep, or sleep as long as you need to. This can make you tired and grouchy during the day. It can also make you forgetful, less effective at work, and unhappy. Insomnia can be caused by conditions such as depression or anxiety. Pain can also affect your ability to sleep. When these problems are solved, the insomnia usually clears up. But sometimes bad sleep habits can cause insomnia. If insomnia is affecting your work or your enjoyment of life, you can take steps to improve your sleep. Follow-up care is a key part of your treatment and safety. Be sure to make and go to all appointments, and call your doctor if you are having problems. It's also a good idea to know your test results and keep a list of the medicines you take. How can you care for yourself at home?   What to avoid  · Do not have drinks with caffeine, such as coffee or black tea, for 8 hours before bed.  · Do not smoke or use other types of tobacco near bedtime. Nicotine is a stimulant and can keep you awake. · Avoid drinking alcohol late in the evening, because it can cause you to wake in the middle of the night. · Do not eat a big meal close to bedtime. If you are hungry, eat a light snack. · Do not drink a lot of water close to bedtime, because the need to urinate may wake you up during the night. · Do not read or watch TV in bed. Use the bed only for sleeping and sexual activity. What to try  · Go to bed at the same time every night, and wake up at the same time every morning. Do not take naps during the day. · Keep your bedroom quiet, dark, and cool. · Sleep on a comfortable pillow and mattress. · If watching the clock makes you anxious, turn it facing away from you so you cannot see the time. · If you worry when you lie down, start a worry book. Well before bedtime, write down your worries, and then set the book and your concerns aside. · Try meditation or other relaxation techniques before you go to bed. · If you cannot fall asleep, get up and go to another room until you feel sleepy. Do something relaxing. Repeat your bedtime routine before you go to bed again. · Make your house quiet and calm about an hour before bedtime. Turn down the lights, turn off the TV, log off the computer, and turn down the volume on music. This can help you relax after a busy day. When should you call for help? Watch closely for changes in your health, and be sure to contact your doctor if:  · Your efforts to improve your sleep do not work. · Your insomnia gets worse. · You have been feeling down, depressed, or hopeless or have lost interest in things that you usually enjoy. Where can you learn more? Go to http://lobo-michel.info/. Enter P513 in the search box to learn more about \"Insomnia: Care Instructions. \"  Current as of: July 26, 2016  Content Version: 11.3  © 7129-4123 [employer]. Care instructions adapted under license by [employer] (which disclaims liability or warranty for this information). If you have questions about a medical condition or this instruction, always ask your healthcare professional. [address] any warranty or liability for your use of this information.

## 2017-09-13 NOTE — MR AVS SNAPSHOT
Visit Information Date & Time Provider Department Dept. Phone Encounter #  
 9/13/2017  1:40 PM Janeth Canales NP [address] [facility] [phone] [id number] Follow-up Instructions Return in about 4 weeks (around 10/11/2017) for dizziness, vaginal irratition, knee pain f/u, insomnia. Your Appointments 12/7/2017 10:45 AM  
6 MONTH with Jenny Cameron MD  
[facility] [address] [place] [address]) Appt Note: $0cp  ekr [id number] [facility]  
[phone] [id number] [facility] Upcoming Health Maintenance Date Due DTaP/Tdap/Td series (1 - Tdap) 4/30/2006 Pneumococcal 65+ Low/Medium Risk (1 of 2 - PCV13) 9/22/2010 FOOT EXAM Q1 6/12/2015 EYE EXAM RETINAL OR DILATED Q1 7/1/2015 FOBT Q 1 YEAR AGE 50-75 7/29/2015 GLAUCOMA SCREENING Q2Y 7/1/2016 INFLUENZA AGE 9 TO ADULT 8/1/2017 BREAST CANCER SCRN MAMMOGRAM 11/2/2017 HEMOGLOBIN A1C Q6M 1/6/2018 MICROALBUMIN Q1 7/6/2018 LIPID PANEL Q1 7/6/2018 MEDICARE YEARLY EXAM 7/7/2018 Allergies as of 9/13/2017  Review Complete On: 9/13/2017 By: Rd Reyes LPN Severity Noted Reaction Type Reactions Amoxicillin High 09/10/2010   Systemic Hives Sulfa (Sulfonamide Antibiotics) High 09/10/2010   Systemic Hives, Itching Amoxicillin Medium 04/22/2010   Systemic Hives, Itching Long time ago - patient not exactly certain what it does Mirtazapine Medium 11/20/2012   Side Effect Itching, Nausea Only Funny feeling in chest  
 Percocet [Oxycodone-acetaminophen] Medium 01/15/2014   Intolerance Nausea and Vomiting Codeine  11/26/2012    Nausea and Vomiting Prednisone  05/27/2010    Itching Sulfa (Sulfonamide Antibiotics)  04/22/2010   Systemic Hives, Itching, Palpitations Think it was increased heart rate or itching - many years ago Zithromax [Azithromycin]  11/20/2012   Side Effect Itching Not sure what it does,taken long time ago Current Immunizations  Reviewed on 4/13/2017 Name Date Pneumococcal Polysaccharide (PPSV-23)  Deferred (Patient Refused) TD Vaccine 4/29/2006 Not reviewed this visit You Were Diagnosed With   
  
 Codes Comments Dizziness    -  Primary ICD-10-CM: U18 ICD-9-CM: 780.4 Statin myopathy     ICD-10-CM: T46.6X1A, G72.0 ICD-9-CM: 359.4, E942.2 Insomnia, unspecified type     ICD-10-CM: G47.00 ICD-9-CM: 780.52 Vaginal irritation     ICD-10-CM: N89.8 ICD-9-CM: 623.9 Vaginal discharge     ICD-10-CM: N89.8 ICD-9-CM: 623.5 Blurring of vision     ICD-10-CM: H53.8 ICD-9-CM: 368.8 Non-seasonal allergic rhinitis, unspecified allergic rhinitis trigger     ICD-10-CM: J30.89 ICD-9-CM: 477.8 Rash and other nonspecific skin eruption     ICD-10-CM: R21 
ICD-9-CM: 782.1 Vitals BP Pulse Temp Resp Height(growth percentile) Weight(growth percentile) 149/75 (BP 1 Location: Left arm, BP Patient Position: Sitting) 86 98.2 °F (36.8 °C) (Oral) 18 5' 8\" (1.727 m) 171 lb (77.6 kg) SpO2 BMI OB Status Smoking Status 98% 26 kg/m2 Hysterectomy Never Smoker Vitals History BMI and BSA Data Body Mass Index Body Surface Area  
 26 kg/m 2 1.93 m 2 Preferred Pharmacy Pharmacy Name Phone [facility] #[id number]- [place], [place] - [address] [phone] Your Updated Medication List  
  
   
This list is accurate as of: 9/13/17  3:40 PM.  Always use your most recent med list.  
  
  
  
  
 aspirin delayed-release 81 mg tablet Take 1 Tab by mouth daily. ciprofloxacin HCl 500 mg tablet Commonly known as:  CIPRO coenzyme Q-10 200 mg capsule Commonly known as:  CO Q-10 Take 1 cap for TWO WEEKS then continue taking WITH Crestor. * dilTIAZem  mg ER capsule Commonly known as:  CARDIZEM CD Take 1 Cap by mouth daily. * dilTIAZem  mg ER capsule Commonly known as:  CARDIZEM CD  
TAKE 1 CAP BY MOUTH DAILY. docusate sodium 100 mg capsule Commonly known as:  COLACE  
TAKE 1 CAP BY MOUTH DAILY AS NEEDED FOR CONSTIPATION FOR UP TO 90 DAYS. estradiol 0.01 % (0.1 mg/gram) vaginal cream  
Commonly known as:  ESTRACE  
1/2 applicator in vagina twice a week  
  
 fluticasone 50 mcg/actuation nasal spray Commonly known as:  Leafy Few 2 Sprays by Both Nostrils route daily. hydrocortisone 1 % ointment Commonly known as:  HYCORT Apply  to affected area two (2) times a day. use thin layer  
  
 lidocaine 2 % solution Commonly known as:  XYLOCAINE Take 15 mL by mouth as needed for Pain. Indications: vaginal irritation  
  
 meclizine 12.5 mg tablet Commonly known as:  ANTIVERT Take 1 Tab by mouth three (3) times daily as needed. Indications: VERTIGO  
  
 nystatin topical cream  
Commonly known as:  MYCOSTATIN Apply  to affected area two (2) times a day. polyethylene glycol 17 gram/dose powder Commonly known as:  Emmer Pierre Part TAKE 1 CAPFUL IN 8 OUNCES OF WATER EVERY DAY  
  
 rosuvastatin 5 mg tablet Commonly known as:  CRESTOR Take 1 Tab by mouth nightly. valsartan 80 mg tablet Commonly known as:  DIOVAN Take 1 Tab by mouth daily. * Notice: This list has 2 medication(s) that are the same as other medications prescribed for you. Read the directions carefully, and ask your doctor or other care provider to review them with you. Prescriptions Sent to Pharmacy Refills  
 coenzyme Q-10 (CO Q-10) 200 mg capsule 2 Sig: Take 1 cap for TWO WEEKS then continue taking WITH Crestor. Class: Normal  
 Pharmacy: [facility]/pharmacy [address] [facility] [address] Ph #: [phone]  
 meclizine (ANTIVERT) 12.5 mg tablet 0  
 Sig: Take 1 Tab by mouth three (3) times daily as needed. Indications: VERTIGO Class: Normal  
 Pharmacy: [facility]/pharmacy [address] Ph #: [phone] Route: Oral  
 fluticasone (FLONASE) 50 mcg/actuation nasal spray 0 Si Sprays by Both Nostrils route daily. Class: Normal  
 Pharmacy: [facility]/pharmacy [address] Ph #: [phone] Route: Both Nostrils  
 lidocaine (XYLOCAINE) 2 % solution 2 Sig: Take 15 mL by mouth as needed for Pain. Indications: vaginal irritation Class: Normal  
 Pharmacy: [facility]/pharmacy [address] Ph #: [phone] Route: Oral  
  
We Performed the Following AMB POC EKG ROUTINE W/ 12 LEADS, INTER & REP [27284 CPT(R)] [address] [id number] Custom] REFERRAL TO OPHTHALMOLOGY [REF57 Custom] Follow-up Instructions Return in about 4 weeks (around 10/11/2017) for dizziness, vaginal irratition, knee pain f/u, insomnia. Referral Information Referral ID Referred By Referred To  
  
 [id number] MILLIE Burrell [address] [place] [address], [address] Visits Status Start Date End Date 1 New Request 17 If your referral has a status of pending review or denied, additional information will be sent to support the outcome of this decision. Patient Instructions You would benefit from more adequate rest. Please be sure to remove yourself from stimulating activities, like using your cellphone, tablet, or watching T.V. About 2 hours before bedtime. Instead engage in relaxing activities, like reading, yoga, or listening to calming music.  Also try to refrain from using these devices in your sleep space or bed, as this trains your brain to stay awake in the bed versus sleeping. Avoid caffeine containing products like coffee, tea, chocolate, and soda within 2 hours of bedtime and try over the counter meds as needed to help like ZZZquil, benadryl, UNISOM or melatonin (they are non-habit forming). Learning About Sleeping Well What does sleeping well mean? Sleeping well means getting enough sleep. How much sleep is enough varies among people. The number of hours you sleep is not as important as how you feel when you wake up. If you do not feel refreshed, you probably need more sleep. Another sign of not getting enough sleep is feeling tired during the day. The average total nightly sleep time is 7½ to 8 hours. Healthy adults may need a little more or a little less than this. Why is getting enough sleep important? Getting enough quality sleep is a basic part of good health. When your sleep suffers, your mood and your thoughts can suffer too. You may find yourself feeling more grumpy or stressed. Not getting enough sleep also can lead to serious problems, including injury, accidents, anxiety, and depression. What might cause poor sleeping? Many things can cause sleep problems, including: · Stress. Stress can be caused by fear about a single event, such as giving a speech. Or you may have ongoing stress, such as worry about work or school. · Depression, anxiety, and other mental or emotional conditions. · Changes in your sleep habits or surroundings. This includes changes that happen where you sleep, such as noise, light, or sleeping in a different bed. It also includes changes in your sleep pattern, such as having jet lag or working a late shift. · Health problems, such as pain, breathing problems, and restless legs syndrome. · Lack of regular exercise. How can you help yourself? Here are some tips that may help you sleep more soundly and wake up feeling more refreshed. Your sleeping area · Use your bedroom only for sleeping and sex. A bit of light reading may help you fall asleep. But if it doesn't, do your reading elsewhere in the house. Don't watch TV in bed. · Be sure your bed is big enough to stretch out comfortably, especially if you have a sleep partner. · Keep your bedroom quiet, dark, and cool. Use curtains, blinds, or a sleep mask to block out light. To block out noise, use earplugs, soothing music, or a \"white noise\" machine. Your evening and bedtime routine · Create a relaxing bedtime routine. You might want to take a warm shower or bath, listen to soothing music, or drink a cup of noncaffeinated tea. · Go to bed at the same time every night. And get up at the same time every morning, even if you feel tired. What to avoid · Limit caffeine (coffee, tea, caffeinated sodas) during the day, and don't have any for at least 4 to 6 hours before bedtime. · Don't drink alcohol before bedtime. Alcohol can cause you to wake up more often during the night. · Don't smoke or use tobacco, especially in the evening. Nicotine can keep you awake. · Don't take naps during the day, especially close to bedtime. · Don't lie in bed awake for too long. If you can't fall asleep, or if you wake up in the middle of the night and can't get back to sleep within 15 minutes or so, get out of bed and go to another room until you feel sleepy. · Don't take medicine right before bed that may keep you awake or make you feel hyper or energized. Your doctor can tell you if your medicine may do this and if you can take it earlier in the day. If you can't sleep · Imagine yourself in a peaceful, pleasant scene. Focus on the details and feelings of being in a place that is relaxing. · Get up and do a quiet or boring activity until you feel sleepy. · Don't drink any liquids after 6 p.m. if you wake up often because you have to go to the bathroom. Where can you learn more? Go to http://lobo-michel.info/. Enter B825 in the search box to learn more about \"Learning About Sleeping Well. \" Current as of: July 26, 2016 Content Version: 11.3 © 9454-2088 [employer]. Care instructions adapted under license by [employer] (which disclaims liability or warranty for this information). If you have questions about a medical condition or this instruction, always ask your healthcare professional. [address] any warranty or liability for your use of this information. Insomnia: Care Instructions Your Care Instructions Insomnia is the inability to sleep well. It is a common problem for most people at some time. Insomnia may make it hard for you to get to sleep, stay asleep, or sleep as long as you need to. This can make you tired and grouchy during the day. It can also make you forgetful, less effective at work, and unhappy. Insomnia can be caused by conditions such as depression or anxiety. Pain can also affect your ability to sleep. When these problems are solved, the insomnia usually clears up. But sometimes bad sleep habits can cause insomnia. If insomnia is affecting your work or your enjoyment of life, you can take steps to improve your sleep. Follow-up care is a key part of your treatment and safety. Be sure to make and go to all appointments, and call your doctor if you are having problems. It's also a good idea to know your test results and keep a list of the medicines you take. How can you care for yourself at home? What to avoid · Do not have drinks with caffeine, such as coffee or black tea, for 8 hours before bed. · Do not smoke or use other types of tobacco near bedtime. Nicotine is a stimulant and can keep you awake. · Avoid drinking alcohol late in the evening, because it can cause you to wake in the middle of the night. · Do not eat a big meal close to bedtime. If you are hungry, eat a light snack. · Do not drink a lot of water close to bedtime, because the need to urinate may wake you up during the night. · Do not read or watch TV in bed. Use the bed only for sleeping and sexual activity. What to try · Go to bed at the same time every night, and wake up at the same time every morning. Do not take naps during the day. · Keep your bedroom quiet, dark, and cool. · Sleep on a comfortable pillow and mattress. · If watching the clock makes you anxious, turn it facing away from you so you cannot see the time. · If you worry when you lie down, start a worry book. Well before bedtime, write down your worries, and then set the book and your concerns aside. · Try meditation or other relaxation techniques before you go to bed. · If you cannot fall asleep, get up and go to another room until you feel sleepy. Do something relaxing. Repeat your bedtime routine before you go to bed again. · Make your house quiet and calm about an hour before bedtime. Turn down the lights, turn off the TV, log off the computer, and turn down the volume on music. This can help you relax after a busy day. When should you call for help? Watch closely for changes in your health, and be sure to contact your doctor if: 
· Your efforts to improve your sleep do not work. · Your insomnia gets worse. · You have been feeling down, depressed, or hopeless or have lost interest in things that you usually enjoy. Where can you learn more? Go to http://lobo-michel.info/. Enter P513 in the search box to learn more about \"Insomnia: Care Instructions. \" Current as of: July 26, 2016 Content Version: 11.3 © 6766-1433 [employer]. Care instructions adapted under license by [employer] (which disclaims liability or warranty for this information).  If you have questions about a medical condition or this instruction, always ask your healthcare professional. [employer] disclaims any warranty or liability for your use of this information. Introducing [facility]! [facility] introduces Musicshake patient portal. Now you can access parts of your medical record, email your doctor's office, and request medication refills online. 1. In your internet browser, go to [email or web address] 2. Click on the First Time User? Click Here link in the Sign In box. You will see the New Member Sign Up page. 3. Enter your Musicshake Access Code exactly as it appears below. You will not need to use this code after youve completed the sign-up process. If you do not sign up before the expiration date, you must request a new code. · Musicshake Access Code: [email or web address] Expires: 12/12/2017  2:55 PM 
 
4. Enter the last four digits of your Social Security Number (xxxx) and Date of Birth (mm/dd/yyyy) as indicated and click Submit. You will be taken to the next sign-up page. 5. Create a Musicshake ID. This will be your Musicshake login ID and cannot be changed, so think of one that is secure and easy to remember. 6. Create a Musicshake password. You can change your password at any time. 7. Enter your Password Reset Question and Answer. This can be used at a later time if you forget your password. 8. Enter your e-mail address. You will receive e-mail notification when new information is available in [address] 9. Click Sign Up. You can now view and download portions of your medical record. 10. Click the Download Summary menu link to download a portable copy of your medical information. If you have questions, please visit the Frequently Asked Questions section of the Musicshake website. Remember, Musicshake is NOT to be used for urgent needs. For medical emergencies, dial 911. Now available from your iPhone and Android! Please provide this summary of care documentation to your next provider. Your primary care clinician is listed as Rohini Worthington. If you have any questions after today's visit, please call [phone].

## 2017-09-13 NOTE — PROGRESS NOTES
Pt is here for   Chief Complaint   Patient presents with    Medication Evaluation     pt would like to discuss her Blood pressure medication    Vaginal Itching     with burning and pain, pt states x 1 month. . unable to afford the medication estrace    Sleep Problem     pt states she has trouble sleeping, pt states that she gets only 1 hour of sleep a night      Pt denies pain at this time     1. Have you been to the ER, urgent care clinic since your last visit? Hospitalized since your last visit? No    2. Have you seen or consulted any other health care providers outside of the [address] [place] [address] since your last visit? Include any pap smears or colon screening. No    Pt states she's having trouble with her eyes, the right eye hurts but both are blurred,  Pt states she's having pains under her arms around the breast  Pt also states that she needs something for anxiety. Verbal order given for EKG per BRADLEY Blount due to patient stating she's experiencing dizziness.

## 2017-09-13 NOTE — Clinical Note
Hi, our patient was seen today. You last saw her in June. She had dizziness today, so an EKG was done. I just would like you to review it also. Her lead II appears with flattened, which is a slight change. No chest pain reported today otherwise or dyspnea. It seems you planned to see her back in Dec, was unsure if you felt she needed to be seen sooner or not.   Thanks for your time, Gwen Salazar [place]

## 2017-09-14 RX ORDER — NYSTATIN 100000 U/G
CREAM TOPICAL
Qty: 15 G | Refills: 0 | Status: SHIPPED | OUTPATIENT
Start: 2017-09-14 | End: 2017-11-14 | Stop reason: ALTCHOICE

## 2017-09-19 LAB
A VAGINAE DNA VAG QL NAA+PROBE: ABNORMAL SCORE
BVAB2 DNA VAG QL NAA+PROBE: ABNORMAL SCORE
C ALBICANS DNA VAG QL NAA+PROBE: NEGATIVE
C GLABRATA DNA VAG QL NAA+PROBE: NEGATIVE
C TRACH RRNA SPEC QL NAA+PROBE: NEGATIVE
MEGA1 DNA VAG QL NAA+PROBE: ABNORMAL SCORE
N GONORRHOEA RRNA SPEC QL NAA+PROBE: NEGATIVE
T VAGINALIS RRNA SPEC QL NAA+PROBE: NEGATIVE

## 2017-09-26 DIAGNOSIS — N76.0 BV (BACTERIAL VAGINOSIS): Primary | ICD-10-CM

## 2017-09-26 DIAGNOSIS — B96.89 BV (BACTERIAL VAGINOSIS): Primary | ICD-10-CM

## 2017-09-26 RX ORDER — METRONIDAZOLE 7.5 MG/G
1 GEL VAGINAL
Qty: 187.5 MG | Refills: 0 | Status: SHIPPED | OUTPATIENT
Start: 2017-09-26 | End: 2017-10-01

## 2017-10-09 ENCOUNTER — OFFICE VISIT (OUTPATIENT)
Dept: INTERNAL MEDICINE CLINIC | Age: 72
End: 2017-10-09

## 2017-10-09 VITALS
SYSTOLIC BLOOD PRESSURE: 162 MMHG | WEIGHT: 167.1 LBS | RESPIRATION RATE: 18 BRPM | DIASTOLIC BLOOD PRESSURE: 86 MMHG | OXYGEN SATURATION: 99 % | TEMPERATURE: 98.6 F | HEIGHT: 68 IN | BODY MASS INDEX: 25.33 KG/M2 | HEART RATE: 69 BPM

## 2017-10-09 DIAGNOSIS — R35.0 URINARY FREQUENCY: Primary | ICD-10-CM

## 2017-10-09 DIAGNOSIS — B96.89 BV (BACTERIAL VAGINOSIS): ICD-10-CM

## 2017-10-09 DIAGNOSIS — N76.0 BV (BACTERIAL VAGINOSIS): ICD-10-CM

## 2017-10-09 RX ORDER — METRONIDAZOLE 7.5 MG/G
1 GEL VAGINAL
Qty: 187.5 MG | Refills: 0 | Status: SHIPPED | OUTPATIENT
Start: 2017-10-09 | End: 2017-10-14

## 2017-10-09 RX ORDER — POLYETHYLENE GLYCOL 3350 17 G/17G
POWDER, FOR SOLUTION ORAL
Refills: 3 | COMMUNITY
Start: 2017-08-21 | End: 2017-12-07 | Stop reason: SDUPTHER

## 2017-10-09 NOTE — MR AVS SNAPSHOT
Visit Information Date & Time Provider Department Dept. Phone Encounter #  
 10/9/2017  3:00 PM Emmanuelle Flores, [address] [id number] Follow-up Instructions Return for as scheduled with usual provider. Your Appointments 10/11/2017  2:00 PM  
ROUTINE CARE with Lorna Ramos NP  
[address] [facility] [address] [place] [address]) Appt Note: dizziness, vaginal irritation, knee pain and insomnia [address] [address] [address] [place]  
  
    
 12/7/2017 10:45 AM  
6 MONTH with Oren Stack MD  
[facility] [address] [place] [address]) Appt Note: $0cp  ekr [address]  
[phone] [address] Upcoming Health Maintenance Date Due DTaP/Tdap/Td series (1 - Tdap) 4/30/2006 Pneumococcal 65+ Low/Medium Risk (1 of 2 - PCV13) 9/22/2010 FOOT EXAM Q1 6/12/2015 EYE EXAM RETINAL OR DILATED Q1 7/1/2015 FOBT Q 1 YEAR AGE 50-75 7/29/2015 GLAUCOMA SCREENING Q2Y 7/1/2016 BREAST CANCER SCRN MAMMOGRAM 11/2/2017 HEMOGLOBIN A1C Q6M 1/6/2018 MICROALBUMIN Q1 7/6/2018 LIPID PANEL Q1 7/6/2018 MEDICARE YEARLY EXAM 7/7/2018 Allergies as of 10/9/2017  Review Complete On: 10/9/2017 By: Maureen Guan LPN Severity Noted Reaction Type Reactions Amoxicillin High 09/10/2010   Systemic Hives Sulfa (Sulfonamide Antibiotics) High 09/10/2010   Systemic Hives, Itching Amoxicillin Medium 04/22/2010   Systemic Hives, Itching Long time ago - patient not exactly certain what it does Mirtazapine Medium 11/20/2012   Side Effect Itching, Nausea Only Funny feeling in chest  
 Percocet [Oxycodone-acetaminophen] Medium 01/15/2014   Intolerance Nausea and Vomiting Codeine  11/26/2012    Nausea and Vomiting Prednisone  05/27/2010    Itching Sulfa (Sulfonamide Antibiotics)  04/22/2010   Systemic Hives, Itching, Palpitations Think it was increased heart rate or itching - many years ago Zithromax [Azithromycin]  11/20/2012   Side Effect Itching Not sure what it does,taken long time ago Current Immunizations  Reviewed on 4/13/2017 Name Date Pneumococcal Polysaccharide (PPSV-23)  Deferred (Patient Refused) TD Vaccine 4/29/2006 Not reviewed this visit You Were Diagnosed With   
  
 Codes Comments Urinary frequency    -  Primary ICD-10-CM: R35.0 ICD-9-CM: 788.41   
 BV (bacterial vaginosis)     ICD-10-CM: N76.0, B96.89 
ICD-9-CM: 616.10, 041.9 Colon cancer screening     ICD-10-CM: Z12.11 ICD-9-CM: V76.51 Vitals BP Pulse Temp Resp Height(growth percentile) Weight(growth percentile) 162/86 (BP 1 Location: Right arm, BP Patient Position: Sitting) 69 98.6 °F (37 °C) (Oral) 18 5' 8\" (1.727 m) 167 lb 1.6 oz (75.8 kg) SpO2 BMI OB Status Smoking Status 99% 25.41 kg/m2 Hysterectomy Never Smoker Vitals History BMI and BSA Data Body Mass Index Body Surface Area  
 25.41 kg/m 2 1.91 m 2 Preferred Pharmacy Pharmacy Name Phone [id number], [place] - [address] [phone] Your Updated Medication List  
  
   
This list is accurate as of: 10/9/17  4:03 PM.  Always use your most recent med list.  
  
  
  
  
 aspirin delayed-release 81 mg tablet Take 1 Tab by mouth daily. coenzyme Q-10 200 mg capsule Commonly known as:  CO Q-10 Take 1 cap for TWO WEEKS then continue taking WITH Crestor. dilTIAZem  mg ER capsule Commonly known as:  CARDIZEM CD Take 1 Cap by mouth daily. docusate sodium 100 mg capsule Commonly known as:  COLACE  
TAKE 1 CAP BY MOUTH DAILY AS NEEDED FOR CONSTIPATION FOR UP TO 90 DAYS. fluticasone 50 mcg/actuation nasal spray Commonly known as:  Lella Solum 2 Sprays by Both Nostrils route daily. lidocaine 2 % solution Commonly known as:  XYLOCAINE Take 15 mL by mouth as needed for Pain. Indications: vaginal irritation  
  
 meclizine 12.5 mg tablet Commonly known as:  ANTIVERT Take 1 Tab by mouth three (3) times daily as needed. Indications: VERTIGO  
  
 metroNIDAZOLE 0.75 % vaginal gel Commonly known as:  Echeverria Mylar Insert 1 Applicator into vagina nightly for 5 days. nystatin topical cream  
Commonly known as:  MYCOSTATIN  
APPLY TO AFFECTED AREA TWO (2) TIMES A DAY. polyethylene glycol 17 gram/dose powder Commonly known as:  Annabelle Adrián TAKE 1 CAPFUL IN 8 OUNCES OF WATER EVERY DAY  
  
 rosuvastatin 5 mg tablet Commonly known as:  CRESTOR Take 1 Tab by mouth nightly. valsartan 80 mg tablet Commonly known as:  DIOVAN Take 1 Tab by mouth daily. Prescriptions Sent to Pharmacy Refills  
 metroNIDAZOLE (METROGEL) 0.75 % vaginal gel 0 Sig: Insert 1 Applicator into vagina nightly for 5 days. Class: Normal  
 Pharmacy: [facility]/pharmacy [address] [place] [address] [address] Ph #: [phone] Route: Vaginal  
  
We Performed the Following AMB POC URINALYSIS DIP STICK AUTO W/O MICRO [28737 CPT(R)] Follow-up Instructions Return for as scheduled with usual provider. Patient Instructions 1. A medication called metro gel has been sent to your pharmacy: you insert one applicator into your 
 
 vagina each night for 5 nights: this is to treat bacterial vaginosis. Follow up with your OBGYN  
 
Dr. Jesse Young if your symptoms do not get better 2. Introducing [facility]! Mildred Kumar introduces [employer] patient portal. Now you can access parts of your medical record, email your doctor's office, and request medication refills online. 1. In your internet browser, go to [email or web address]. [email or web address] 2. Click on the First Time User? Click Here link in the Sign In box. You will see the New Member Sign Up page. 3. Enter your [employer] Access Code exactly as it appears below. You will not need to use this code after youve completed the sign-up process. If you do not sign up before the expiration date, you must request a new code. · [employer] Access Code: [email or web address] Expires: 12/12/2017  2:55 PM 
 
4. Enter the last four digits of your Social Security Number (xxxx) and Date of Birth (mm/dd/yyyy) as indicated and click Submit. You will be taken to the next sign-up page. 5. Create a [employer] ID. This will be your [employer] login ID and cannot be changed, so think of one that is secure and easy to remember. 6. Create a [employer] password. You can change your password at any time. 7. Enter your Password Reset Question and Answer. This can be used at a later time if you forget your password. 8. Enter your e-mail address. You will receive e-mail notification when new information is available in [address] 9. Click Sign Up. You can now view and download portions of your medical record. 10. Click the Download Summary menu link to download a portable copy of your medical information. If you have questions, please visit the Frequently Asked Questions section of the [employer] website. Remember, [employer] is NOT to be used for urgent needs. For medical emergencies, dial 911. Now available from your iPhone and Android! Please provide this summary of care documentation to your next provider. Your primary care clinician is listed as AMIE Magallon. If you have any questions after today's visit, please call [phone].

## 2017-10-09 NOTE — PROGRESS NOTES
Subjective: (As above and below)     Chief Complaint   Patient presents with    Vaginal Pain    Headache    Blurred Vision     Dacia FieldDestinee Vicente is a 67y.o. year old female who presents for c/o vaginal /vulvular pain. Per recent notes she was recently dx with BV - but she states that she did not  metrogel and knows nothing about this. Notes also reveal that she was seen by OBGYN and was recc estrace cream which she could not get d/t cost. She also reports urinary frequency. No dysuria. No constipation. No abd pain. She states she is sexually active but \"not much\"    She mentions persistent blurred vision, she sees ophtho at [facility] - do not see last office note. Reviewed PmHx, RxHx, FmHx, SocHx, AllgHx and updated in chart.   Family History   Problem Relation Age of Onset    Stroke Mother     Heart Disease Mother     Cancer Father     Heart Disease Son     Liver Disease Son     Heart Disease Daughter     Malignant Hyperthermia Neg Hx     Pseudocholinesterase Deficiency Neg Hx     Delayed Awakening Neg Hx     Post-op Nausea/Vomiting Neg Hx     Emergence Delirium Neg Hx     Post-op Cognitive Dysfunction Neg Hx     Other Neg Hx        Past Medical History:   Diagnosis Date    Agoraphobia without mention of panic attacks 2/17/2014    Anxiety disorder 8/18/2013    Arthritis     osteo    Breast pain, left 4/7/2015    CAD (coronary artery disease), native coronary artery 12/1/2015    Chronic chest pain 1/13/2014    Chronic pain associated with significant psychosocial dysfunction 2/17/2014    Depression 8/18/2013    Diabetes ([facility] [address]     type II    Duplicated right renal collecting system 3/13/2014    GERD (gastroesophageal reflux disease)     Gout, joint     Hypercholesteremia     hyercholesterolemia    Hypertension     Nephrolithiasis 3/13/2014    Personal history of noncompliance with medical treatment, presenting hazards to health 5/30/2014    Psychotic disorder     Vaginal pain 7/30/2014      Social History     Social History    Marital status:      Spouse name: N/A    Number of children: N/A    Years of education: N/A     Social History Main Topics    Smoking status: Never Smoker    Smokeless tobacco: Never Used    Alcohol use No    Drug use: No    Sexual activity: No     Other Topics Concern    None     Social History Narrative    ** Merged History Encounter **     , lives with her son and Friend. Current Outpatient Prescriptions   Medication Sig    polyethylene glycol (MIRALAX) 17 gram/dose powder TAKE 1 CAPFUL IN 8 OUNCES OF WATER EVERY DAY    metroNIDAZOLE (METROGEL) 0.75 % vaginal gel Insert 1 Applicator into vagina nightly for 5 days.  nystatin (MYCOSTATIN) topical cream APPLY TO AFFECTED AREA TWO (2) TIMES A DAY.  meclizine (ANTIVERT) 12.5 mg tablet Take 1 Tab by mouth three (3) times daily as needed. Indications: VERTIGO    fluticasone (FLONASE) 50 mcg/actuation nasal spray 2 Sprays by Both Nostrils route daily.  docusate sodium (COLACE) 100 mg capsule TAKE 1 CAP BY MOUTH DAILY AS NEEDED FOR CONSTIPATION FOR UP TO 90 DAYS.  rosuvastatin (CRESTOR) 5 mg tablet Take 1 Tab by mouth nightly.  valsartan (DIOVAN) 80 mg tablet Take 1 Tab by mouth daily.  dilTIAZem CD (CARDIZEM CD) 120 mg ER capsule Take 1 Cap by mouth daily.  aspirin delayed-release 81 mg tablet Take 1 Tab by mouth daily.  coenzyme Q-10 (CO Q-10) 200 mg capsule Take 1 cap for TWO WEEKS then continue taking WITH Crestor.  lidocaine (XYLOCAINE) 2 % solution Take 15 mL by mouth as needed for Pain. Indications: vaginal irritation     No current facility-administered medications for this visit. Review of Systems:   Constitutional:    Negative for fever and chills, negative diaphoresis. HEENT:              Negative for neck pain and stiffness. Eyes:                  Negative for itching, redness or discharge.    Respiratory:        Negative for cough and shortness of breath. Cardiovascular:  Negative for chest pain and palpitations. Gastrointestinal: Negative for nausea, vomiting, abdominal pain, diarrhea or constipation. Genitourinary:     Negative for dysuria and + frequency. Musculoskeletal: Negative for falls, tenderness and swelling. Skin:                    Negative for rash, masses or lesions. Neurological:       Negative for dizzyness, seizure, loss of consciousness, weakness and numbness. Objective:     Vitals:    10/09/17 1508 10/09/17 1512   BP: 166/84 162/86   Pulse: 71 69   Resp: 18    Temp: 98.6 °F (37 °C)    TempSrc: Oral    SpO2: 99%    Weight: 167 lb 1.6 oz (75.8 kg)    Height: 5' 8\" (1.727 m)      Results for orders placed or performed in visit on 10/09/17   AMB POC URINALYSIS DIP STICK AUTO W/O MICRO   Result Value Ref Range    Color (UA POC) Yellow     Clarity (UA POC) Clear     Glucose (UA POC) Negative Negative    Bilirubin (UA POC) Negative Negative    Ketones (UA POC) Negative Negative    Specific gravity (UA POC) 1.020 1.001 - 1.035    Blood (UA POC) Negative Negative    pH (UA POC) 6.0 4.6 - 8.0    Protein (UA POC) Negative Negative mg/dL    Urobilinogen (UA POC) 1 mg/dL 0.2 - 1    Nitrites (UA POC) Negative Negative    Leukocyte esterase (UA POC) 1+ Negative           Physical Examination: General appearance - oriented to person, place, and time and anxious  Chest - clear to auscultation, no wheezes, rales or rhonchi, symmetric air entry  Heart - normal rate, regular rhythm, normal S1, S2, no murmurs, rubs, clicks or gallops  Pelvic - VULVA: normal appearing vulva with no masses, tenderness or lesions, VAGINA: normal appearing vagina with normal color and discharge, no lesions      Assessment/ Plan:   Follow-up Disposition:  Return for as scheduled with usual provider. After pt left room, she asked from desk to come back into room to discuss insomnia, pt advised to f/u with usual provider.      1. Urinary frequency    - AMB POC URINALYSIS DIP STICK AUTO W/O MICRO  - CULTURE, URINE    2. BV (bacterial vaginosis)  Resent med, explained reason for use. Advised f/u with OBGYN if needed  - metroNIDAZOLE (METROGEL) 0.75 % vaginal gel; Insert 1 Applicator into vagina nightly for 5 days. Dispense: 187.5 mg; Refill: 0          I have discussed the diagnosis with the patient and the intended plan as seen in the above orders. The patient has received an after-visit summary and questions were answered concerning future plans. Pt conveyed understanding of plan. Medication Side Effects and Warnings were discussed with patient: yes  Patient Labs were reviewed: yes  Patient Past Records were reviewed:  yes    Farhat Al.  Norris Bruce NP

## 2017-10-09 NOTE — PATIENT INSTRUCTIONS
1. A medication called metro gel has been sent to your pharmacy: you insert one applicator into your     vagina each night for 5 nights: this is to treat bacterial vaginosis. Follow up with your OBGYN     Dr. Regine Sherman if your symptoms do not get better    2.

## 2017-10-09 NOTE — PROGRESS NOTES
Pt here for   Chief Complaint   Patient presents with    Vaginal Pain    Headache    Blurred Vision     1. Have you been to the ER, urgent care clinic since your last visit? Hospitalized since your last visit? No    2. Have you seen or consulted any other health care providers outside of the [address] [place] [address] since your last visit? Include any pap smears or colon screening.  No     Pt c/o pain 7 of 10, Pt denies taking anything for pain today    PHQ over the last two weeks 10/9/2017   Little interest or pleasure in doing things Several days   Feeling down, depressed or hopeless Several days   Total Score PHQ 2 2

## 2017-10-10 LAB
BILIRUB UR QL STRIP: NEGATIVE
GLUCOSE UR-MCNC: NEGATIVE MG/DL
KETONES P FAST UR STRIP-MCNC: NEGATIVE MG/DL
PH UR STRIP: 6 [PH] (ref 4.6–8)
PROT UR QL STRIP: NEGATIVE MG/DL
SP GR UR STRIP: 1.02 (ref 1–1.03)
UA UROBILINOGEN AMB POC: NORMAL (ref 0.2–1)
URINALYSIS CLARITY POC: CLEAR
URINALYSIS COLOR POC: YELLOW
URINE BLOOD POC: NEGATIVE
URINE LEUKOCYTES POC: NORMAL
URINE NITRITES POC: NEGATIVE

## 2017-10-11 ENCOUNTER — OFFICE VISIT (OUTPATIENT)
Dept: INTERNAL MEDICINE CLINIC | Age: 72
End: 2017-10-11

## 2017-10-11 VITALS
HEIGHT: 68 IN | OXYGEN SATURATION: 100 % | TEMPERATURE: 98 F | HEART RATE: 66 BPM | RESPIRATION RATE: 18 BRPM | WEIGHT: 168.5 LBS | BODY MASS INDEX: 25.54 KG/M2 | SYSTOLIC BLOOD PRESSURE: 159 MMHG | DIASTOLIC BLOOD PRESSURE: 86 MMHG

## 2017-10-11 DIAGNOSIS — F45.0 SOMATIZATION DISORDER: ICD-10-CM

## 2017-10-11 DIAGNOSIS — I10 HTN, GOAL BELOW 130/80: Chronic | ICD-10-CM

## 2017-10-11 DIAGNOSIS — N89.8 VAGINAL IRRITATION: ICD-10-CM

## 2017-10-11 DIAGNOSIS — G47.00 INSOMNIA, UNSPECIFIED TYPE: Primary | ICD-10-CM

## 2017-10-11 DIAGNOSIS — Z91.199 MEDICALLY NONCOMPLIANT: ICD-10-CM

## 2017-10-11 DIAGNOSIS — K59.00 CONSTIPATION, UNSPECIFIED CONSTIPATION TYPE: ICD-10-CM

## 2017-10-11 DIAGNOSIS — R51.9 CHRONIC NONINTRACTABLE HEADACHE, UNSPECIFIED HEADACHE TYPE: Chronic | ICD-10-CM

## 2017-10-11 DIAGNOSIS — H53.8 BLURRING OF VISION: ICD-10-CM

## 2017-10-11 DIAGNOSIS — G89.29 CHRONIC NONINTRACTABLE HEADACHE, UNSPECIFIED HEADACHE TYPE: Chronic | ICD-10-CM

## 2017-10-11 RX ORDER — LIDOCAINE HYDROCHLORIDE 20 MG/ML
SOLUTION OROPHARYNGEAL
Qty: 100 ML | Refills: 2
Start: 2017-10-11 | End: 2017-12-07

## 2017-10-11 NOTE — PATIENT INSTRUCTIONS
Insomnia: Care Instructions  Your Care Instructions  Insomnia is the inability to sleep well. It is a common problem for most people at some time. Insomnia may make it hard for you to get to sleep, stay asleep, or sleep as long as you need to. This can make you tired and grouchy during the day. It can also make you forgetful, less effective at work, and unhappy. Insomnia can be caused by conditions such as depression or anxiety. Pain can also affect your ability to sleep. When these problems are solved, the insomnia usually clears up. But sometimes bad sleep habits can cause insomnia. If insomnia is affecting your work or your enjoyment of life, you can take steps to improve your sleep. Follow-up care is a key part of your treatment and safety. Be sure to make and go to all appointments, and call your doctor if you are having problems. It's also a good idea to know your test results and keep a list of the medicines you take. How can you care for yourself at home? What to avoid  · Do not have drinks with caffeine, such as coffee or black tea, for 8 hours before bed. · Do not smoke or use other types of tobacco near bedtime. Nicotine is a stimulant and can keep you awake. · Avoid drinking alcohol late in the evening, because it can cause you to wake in the middle of the night. · Do not eat a big meal close to bedtime. If you are hungry, eat a light snack. · Do not drink a lot of water close to bedtime, because the need to urinate may wake you up during the night. · Do not read or watch TV in bed. Use the bed only for sleeping and sexual activity. What to try  · Go to bed at the same time every night, and wake up at the same time every morning. Do not take naps during the day. · Keep your bedroom quiet, dark, and cool. · Sleep on a comfortable pillow and mattress. · If watching the clock makes you anxious, turn it facing away from you so you cannot see the time.   · If you worry when you lie down, start a worry book. Well before bedtime, write down your worries, and then set the book and your concerns aside. · Try meditation or other relaxation techniques before you go to bed. · If you cannot fall asleep, get up and go to another room until you feel sleepy. Do something relaxing. Repeat your bedtime routine before you go to bed again. · Make your house quiet and calm about an hour before bedtime. Turn down the lights, turn off the TV, log off the computer, and turn down the volume on music. This can help you relax after a busy day. When should you call for help? Watch closely for changes in your health, and be sure to contact your doctor if:  · Your efforts to improve your sleep do not work. · Your insomnia gets worse. · You have been feeling down, depressed, or hopeless or have lost interest in things that you usually enjoy. Where can you learn more? Go to http://loboservtagmichel.info/. Enter P513 in the search box to learn more about \"Insomnia: Care Instructions. \"  Current as of: July 26, 2016  Content Version: 11.3  © 5661-8020 [employer]. Care instructions adapted under license by [employer] (which disclaims liability or warranty for this information). If you have questions about a medical condition or this instruction, always ask your healthcare professional. [address] any warranty or liability for your use of this information. Learning About Sleeping Well  What does sleeping well mean? Sleeping well means getting enough sleep. How much sleep is enough varies among people. The number of hours you sleep is not as important as how you feel when you wake up. If you do not feel refreshed, you probably need more sleep. Another sign of not getting enough sleep is feeling tired during the day. The average total nightly sleep time is 7½ to 8 hours. Healthy adults may need a little more or a little less than this.   Why is getting enough sleep important? Getting enough quality sleep is a basic part of good health. When your sleep suffers, your mood and your thoughts can suffer too. You may find yourself feeling more grumpy or stressed. Not getting enough sleep also can lead to serious problems, including injury, accidents, anxiety, and depression. What might cause poor sleeping? Many things can cause sleep problems, including:  · Stress. Stress can be caused by fear about a single event, such as giving a speech. Or you may have ongoing stress, such as worry about work or school. · Depression, anxiety, and other mental or emotional conditions. · Changes in your sleep habits or surroundings. This includes changes that happen where you sleep, such as noise, light, or sleeping in a different bed. It also includes changes in your sleep pattern, such as having jet lag or working a late shift. · Health problems, such as pain, breathing problems, and restless legs syndrome. · Lack of regular exercise. How can you help yourself? Here are some tips that may help you sleep more soundly and wake up feeling more refreshed. Your sleeping area  · Use your bedroom only for sleeping and sex. A bit of light reading may help you fall asleep. But if it doesn't, do your reading elsewhere in the house. Don't watch TV in bed. · Be sure your bed is big enough to stretch out comfortably, especially if you have a sleep partner. · Keep your bedroom quiet, dark, and cool. Use curtains, blinds, or a sleep mask to block out light. To block out noise, use earplugs, soothing music, or a \"white noise\" machine. Your evening and bedtime routine  · Create a relaxing bedtime routine. You might want to take a warm shower or bath, listen to soothing music, or drink a cup of noncaffeinated tea. · Go to bed at the same time every night. And get up at the same time every morning, even if you feel tired.   What to avoid  · Limit caffeine (coffee, tea, caffeinated sodas) during the day, and don't have any for at least 4 to 6 hours before bedtime. · Don't drink alcohol before bedtime. Alcohol can cause you to wake up more often during the night. · Don't smoke or use tobacco, especially in the evening. Nicotine can keep you awake. · Don't take naps during the day, especially close to bedtime. · Don't lie in bed awake for too long. If you can't fall asleep, or if you wake up in the middle of the night and can't get back to sleep within 15 minutes or so, get out of bed and go to another room until you feel sleepy. · Don't take medicine right before bed that may keep you awake or make you feel hyper or energized. Your doctor can tell you if your medicine may do this and if you can take it earlier in the day. If you can't sleep  · Imagine yourself in a peaceful, pleasant scene. Focus on the details and feelings of being in a place that is relaxing. · Get up and do a quiet or boring activity until you feel sleepy. · Don't drink any liquids after 6 p.m. if you wake up often because you have to go to the bathroom. Where can you learn more? Go to http://lobo-michel.info/. Enter S112 in the search box to learn more about \"Learning About Sleeping Well. \"  Current as of: July 26, 2016  Content Version: 11.3  © 6882-9085 [employer]. Care instructions adapted under license by [employer] (which disclaims liability or warranty for this information). If you have questions about a medical condition or this instruction, always ask your healthcare professional. [place] [address] any warranty or liability for your use of this information. Suvorexant (By mouth)   Suvorexant (oxe-oju-EBV-ant)  Treats insomnia. Brand Name(s): Belsomra   There may be other brand names for this medicine. When This Medicine Should Not Be Used: This medicine is not right for everyone. Do not use it if you have narcolepsy.   How to Use This Medicine: Tablet  · Your doctor will tell you how much medicine to use. Do not use more than directed. · You should not take this medicine if you are not able to sleep or rest for at least 7 hours before you need to be active again. · This medicine works better if you do not take it with food or right after a meal.  · This medicine should come with a Medication Guide. Ask your pharmacist for a copy if you do not have one. · Missed dose: This medicine is not taken on a regular schedule. Use it only when you cannot sleep. · Store the medicine in a closed container at room temperature, away from heat, moisture, and direct light. Do not remove the tablets from the blister pack until you are ready to use them. Drugs and Foods to Avoid:   Ask your doctor or pharmacist before using any other medicine, including over-the-counter medicines, vitamins, and herbal products. · Some foods and medicines can affect how suvorexant works. Tell your doctor if you are using any of the following:  ¨ Aprepitant, boceprevir, carbamazepine, ciprofloxacin, clarithromycin, conivaptan, digoxin, diltiazem, erythromycin, fluconazole, imatinib, nefazodone, phenytoin, rifampin, telaprevir, telithromycin, verapamil  ¨ Medicine to treat HIV (such as amprenavir, atazanavir, fosamprenavir, indinavir, nelfinavir, ritonavir, saquinavir) or medicine to treat a fungal infection (such as itraconazole, ketoconazole, posaconazole)  · Tell your doctor if you use anything else that makes you sleepy. Some examples are allergy medicine, narcotic pain medicine, and alcohol. · Do not eat grapefruit or drink grapefruit juice while you are using this medicine. Warnings While Using This Medicine:   · Tell your doctor if you are pregnant or breastfeeding, or if you have liver disease, breathing or lung problems (such as COPD, sleep apnea), or muscle problems or weakness.  Tell your doctor if you have a history of alcohol or drug addiction, depression, or mental illness. · This medicine may cause the following problems:  ¨ Unusual changes in mood or behavior  ¨ Sleep paralysis (while you are going to sleep or waking up)  · This medicine may make you drowsy the next morning. Do not drive or do anything else that could be dangerous until you know how this medicine affects you. · This medicine may cause you to do things while you are still asleep, such as driving or eating. You may not remember doing these things the next morning. Tell your doctor right away if you learn that this has happened. · Call your doctor if you still have trouble sleeping after you take this medicine for 7 to 10 days. · This medicine can be habit-forming. Do not use more than your prescribed dose. Call your doctor if you think your medicine is not working. · Keep all medicine out of the reach of children. Never share your medicine with anyone. Possible Side Effects While Using This Medicine:   Call your doctor right away if you notice any of these side effects:  · Allergic reaction: Itching or hives, swelling in your face or hands, swelling or tingling in your mouth or throat, chest tightness, trouble breathing  · Anxiety, depression, nervousness, unusual behavior, or thoughts of hurting yourself  · Memory loss  · Seeing, hearing, or feeling things that are not there  · Severe confusion, drowsiness, muscle weakness  · Temporary inability to move or talk while you are going to sleep or waking up  If you notice these less serious side effects, talk with your doctor:   · Daytime drowsiness  If you notice other side effects that you think are caused by this medicine, tell your doctor. Call your doctor for medical advice about side effects. You may report side effects to FDA at 1-441-FDA-6574  © 2017 [address] Information is for End User's use only and may not be sold, redistributed or otherwise used for commercial purposes. The above information is an  only.  It is not intended as medical advice for individual conditions or treatments. Talk to your doctor, nurse or pharmacist before following any medical regimen to see if it is safe and effective for you. Linaclotide (By mouth)   Linaclotide (ufi-KV-afx-tide)  Treats irritable bowel syndrome with constipation and chronic idiopathic constipation. Brand Name(s): Linzess   There may be other brand names for this medicine. When This Medicine Should Not Be Used: This medicine is not right for everyone. Do not use it if you had an allergic reaction to linaclotide, or if you have a bowel or stomach blockage. How to Use This Medicine:   Capsule  · Your doctor will tell you how much medicine to use. Do not use more than directed. · Take this medicine on an empty stomach, at least 30 minutes before breakfast or your first meal of the day. · Swallow the capsule whole. Do not crush, break, or chew it. · If you have trouble swallowing the capsule, you may mix the contents with applesauce or water. ¨ To mix with applesauce: Open the capsule and sprinkle the beads on 1 teaspoonful of applesauce. Swallow the mixture immediately without chewing. Do not store it for later use. ¨ To mix with water: Open the capsule and sprinkle the beads into a cup with 30 mL (1 ounce) of water. Gently swirl for at least 10 seconds. Swallow the entire mixture immediately. Add another 30 mL (1 ounce) of water to the cup and swirl. Drink the water right away to make sure all of the medicine is taken. Do not store it for later use. ¨ The water mixture may also be used with a nasogastric or gastric feeding tube. After the mixture is given, flush the tube with an additional 10 mL (2 teaspoons) of water. · This medicine should come with a Medication Guide. Ask your pharmacist for a copy if you do not have one. · Missed dose: Skip the missed dose and go back to your regular dosing schedule. Do not double doses.   · Store the medicine in a closed container at room temperature, away from heat, moisture, and direct light. Keep the medicine in the original bottle until you are ready to use it. The bottle contains a desiccant packet that helps protect the capsules from moisture. Do not remove the packet from the bottle. Drugs and Foods to Avoid:      Ask your doctor or pharmacist before using any other medicine, including over-the-counter medicines, vitamins, and herbal products. Warnings While Using This Medicine:   · Tell your doctor if you are pregnant or breastfeeding, or if you have other stomach or bowel problems. · Your doctor will check your progress and the effects of this medicine at regular visits. Keep all appointments. · Keep all medicine out of the reach of children. Never share your medicine with anyone. Possible Side Effects While Using This Medicine:   Call your doctor right away if you notice any of these side effects:  · Allergic reaction: Itching or hives, swelling in your face or hands, swelling or tingling in your mouth or throat, chest tightness, trouble breathing  · Bloody or black, tarry stools  · Lightheadedness, dizziness, fainting  · Severe diarrhea or stomach pain  If you notice these less serious side effects, talk with your doctor:   · Mild diarrhea  If you notice other side effects that you think are caused by this medicine, tell your doctor. Call your doctor for medical advice about side effects. You may report side effects to FDA at 6-769-FDA-8295  © 2017 [address] [employer] [employer] Information is for End User's use only and may not be sold, redistributed or otherwise used for commercial purposes. The above information is an  only. It is not intended as medical advice for individual conditions or treatments. Talk to your doctor, nurse or pharmacist before following any medical regimen to see if it is safe and effective for you.

## 2017-10-11 NOTE — MR AVS SNAPSHOT
Visit Information Date & Time Provider Department Dept. Phone Encounter #  
 10/11/2017  2:00 PM Ariadne Orr NP Kristina  10. [id number] Follow-up Instructions Return in about 4 weeks (around 11/8/2017) for belsomra and linzess start, psych ref f/u. Your Appointments 12/7/2017 10:45 AM  
6 MONTH with Milad Wheeler MD  
[facility]) Appt Note: $0cp  ekr [id number] [facility]  
[phone] [id number] [facility] Upcoming Health Maintenance Date Due DTaP/Tdap/Td series (1 - Tdap) 4/30/2006 Pneumococcal 65+ Low/Medium Risk (1 of 2 - PCV13) 9/22/2010 FOOT EXAM Q1 6/12/2015 EYE EXAM RETINAL OR DILATED Q1 7/1/2015 FOBT Q 1 YEAR AGE 50-75 7/29/2015 GLAUCOMA SCREENING Q2Y 7/1/2016 BREAST CANCER SCRN MAMMOGRAM 11/2/2017 HEMOGLOBIN A1C Q6M 1/6/2018 MICROALBUMIN Q1 7/6/2018 LIPID PANEL Q1 7/6/2018 MEDICARE YEARLY EXAM 7/7/2018 Allergies as of 10/11/2017  Review Complete On: 10/11/2017 By: Loni Marroquin. ADEEL Reyes Severity Noted Reaction Type Reactions Amoxicillin High 09/10/2010   Systemic Hives Sulfa (Sulfonamide Antibiotics) High 09/10/2010   Systemic Hives, Itching Amoxicillin Medium 04/22/2010   Systemic Hives, Itching Long time ago - patient not exactly certain what it does Mirtazapine Medium 11/20/2012   Side Effect Itching, Nausea Only Funny feeling in chest  
 Percocet [Oxycodone-acetaminophen] Medium 01/15/2014   Intolerance Nausea and Vomiting Codeine  11/26/2012    Nausea and Vomiting Prednisone  05/27/2010    Itching Sulfa (Sulfonamide Antibiotics)  04/22/2010   Systemic Hives, Itching, Palpitations Think it was increased heart rate or itching - many years ago Zithromax [Azithromycin]  11/20/2012   Side Effect Itching Not sure what it does,taken long time ago Current Immunizations  Reviewed on 4/13/2017 Name Date Pneumococcal Polysaccharide (PPSV-23)  Deferred (Patient Refused) TD Vaccine 4/29/2006 Not reviewed this visit You Were Diagnosed With   
  
 Codes Comments Insomnia, unspecified type    -  Primary ICD-10-CM: G47.00 ICD-9-CM: 780.52 Vaginal irritation     ICD-10-CM: N89.8 ICD-9-CM: 623.9 Constipation, unspecified constipation type     ICD-10-CM: K59.00 ICD-9-CM: 564.00 Somatization disorder     ICD-10-CM: F45.0 ICD-9-CM: 300.81 Vitals BP Pulse Temp Resp Height(growth percentile) Weight(growth percentile) 159/86 (BP 1 Location: Right arm, BP Patient Position: Sitting) 66 98 °F (36.7 °C) (Oral) 18 5' 8\" (1.727 m) 168 lb 8 oz (76.4 kg) SpO2 BMI OB Status Smoking Status 100% 25.62 kg/m2 Hysterectomy Never Smoker Vitals History BMI and BSA Data Body Mass Index Body Surface Area  
 25.62 kg/m 2 1.91 m 2 Preferred Pharmacy Pharmacy Name Phone [facility]/PHARMACY [facility][id number], [place] - [address] [phone] Your Updated Medication List  
  
   
This list is accurate as of: 10/11/17  3:43 PM.  Always use your most recent med list.  
  
  
  
  
 aspirin delayed-release 81 mg tablet Take 1 Tab by mouth daily. coenzyme Q-10 200 mg capsule Commonly known as:  CO Q-10 Take 1 cap for TWO WEEKS then continue taking WITH Crestor. dilTIAZem  mg ER capsule Commonly known as:  CARDIZEM CD Take 1 Cap by mouth daily. docusate sodium 100 mg capsule Commonly known as:  COLACE  
TAKE 1 CAP BY MOUTH DAILY AS NEEDED FOR CONSTIPATION FOR UP TO 90 DAYS. fluticasone 50 mcg/actuation nasal spray Commonly known as:  Doniphan Peel 2 Sprays by Both Nostrils route daily. lidocaine 2 % solution Commonly known as:  XYLOCAINE  
 Apply to affected area 2-3 times daily, as needed for vaginal pain and irritation. Indications: vaginal irritation  
  
 linaclotide 145 mcg Cap capsule Commonly known as:  House Lakia Take 1 Cap by mouth Daily (before breakfast). May take 1-2 caps once daily for CONSTIPATION  Indications: chronic idiopathic constipation  
  
 meclizine 12.5 mg tablet Commonly known as:  ANTIVERT Take 1 Tab by mouth three (3) times daily as needed. Indications: VERTIGO  
  
 metroNIDAZOLE 0.75 % vaginal gel Commonly known as:  Carmen Moros Insert 1 Applicator into vagina nightly for 5 days. nystatin topical cream  
Commonly known as:  MYCOSTATIN  
APPLY TO AFFECTED AREA TWO (2) TIMES A DAY. polyethylene glycol 17 gram/dose powder Commonly known as:  Glenford Hallmark TAKE 1 CAPFUL IN 8 OUNCES OF WATER EVERY DAY  
  
 rosuvastatin 5 mg tablet Commonly known as:  CRESTOR Take 1 Tab by mouth nightly. suvorexant 10 mg tablet Commonly known as:  Meriel Mealwilton Take 1 Tab by mouth nightly as needed for Insomnia. Max Daily Amount: 10 mg.  
  
 valsartan 80 mg tablet Commonly known as:  DIOVAN Take 1 Tab by mouth daily. Prescriptions Printed Refills  
 suvorexant (BELSOMRA) 10 mg tablet 5 Sig: Take 1 Tab by mouth nightly as needed for Insomnia. Max Daily Amount: 10 mg.  
 Class: Print Route: Oral  
  
Prescriptions Sent to Pharmacy Refills  
 linaclotide (LINZESS) 145 mcg cap capsule 0 Sig: Take 1 Cap by mouth Daily (before breakfast). May take 1-2 caps once daily for CONSTIPATION  Indications: chronic idiopathic constipation Class: Normal  
 Pharmacy: [facility]/pharmacy [id number] [address] [facility] [address] Ph #: [phone] Route: Oral  
  
We Performed the Following REFERRAL TO NEUROPSYCHOLOGY [WSD39 Custom] Comments:  
 Please evaluate patient for possible dementia/impaired memory/altered behavior. REFERRAL TO PSYCHIATRY [REF91 Custom] Comments:  
 Please evaluate patient for somatization. REFERRAL TO PSYCHIATRY [REF91 Custom] Follow-up Instructions Return in about 4 weeks (around 11/8/2017) for belsomra and linzess start, psych ref f/u. Referral Information Referral ID Referred By Referred To  
  
 [id number] Yoni Hdez MD   
   [address] [address] [address] [facility] Phone: [phone] Fax: [phone] Visits Status Start Date End Date 1 New Request 10/11/17 10/11/18 If your referral has a status of pending review or denied, additional information will be sent to support the outcome of this decision. Referral ID Referred By Referred To  
 [id number] [facility]aley [facility] [address] [facility], [address] [facility] Phone: [phone] Fax: [phone] Visits Status Start Date End Date 1 New Request 10/11/17 10/11/18 If your referral has a status of pending review or denied, additional information will be sent to support the outcome of this decision. Referral ID Referred By Referred To  
 [id number] AMIE Hdez [address] [place] [address],   
   [address] Phone: [phone] Fax: [phone] Visits Status Start Date End Date 1 New Request 10/11/17 10/11/18 If your referral has a status of pending review or denied, additional information will be sent to support the outcome of this decision. Patient Instructions Insomnia: Care Instructions Your Care Instructions Insomnia is the inability to sleep well. It is a common problem for most people at some time. Insomnia may make it hard for you to get to sleep, stay asleep, or sleep as long as you need to.  This can make you tired and grouchy during the day. It can also make you forgetful, less effective at work, and unhappy. Insomnia can be caused by conditions such as depression or anxiety. Pain can also affect your ability to sleep. When these problems are solved, the insomnia usually clears up. But sometimes bad sleep habits can cause insomnia. If insomnia is affecting your work or your enjoyment of life, you can take steps to improve your sleep. Follow-up care is a key part of your treatment and safety. Be sure to make and go to all appointments, and call your doctor if you are having problems. It's also a good idea to know your test results and keep a list of the medicines you take. How can you care for yourself at home? What to avoid · Do not have drinks with caffeine, such as coffee or black tea, for 8 hours before bed. · Do not smoke or use other types of tobacco near bedtime. Nicotine is a stimulant and can keep you awake. · Avoid drinking alcohol late in the evening, because it can cause you to wake in the middle of the night. · Do not eat a big meal close to bedtime. If you are hungry, eat a light snack. · Do not drink a lot of water close to bedtime, because the need to urinate may wake you up during the night. · Do not read or watch TV in bed. Use the bed only for sleeping and sexual activity. What to try · Go to bed at the same time every night, and wake up at the same time every morning. Do not take naps during the day. · Keep your bedroom quiet, dark, and cool. · Sleep on a comfortable pillow and mattress. · If watching the clock makes you anxious, turn it facing away from you so you cannot see the time. · If you worry when you lie down, start a worry book. Well before bedtime, write down your worries, and then set the book and your concerns aside. · Try meditation or other relaxation techniques before you go to bed.  
· If you cannot fall asleep, get up and go to another room until you feel sleepy. Do something relaxing. Repeat your bedtime routine before you go to bed again. · Make your house quiet and calm about an hour before bedtime. Turn down the lights, turn off the TV, log off the computer, and turn down the volume on music. This can help you relax after a busy day. When should you call for help? Watch closely for changes in your health, and be sure to contact your doctor if: 
· Your efforts to improve your sleep do not work. · Your insomnia gets worse. · You have been feeling down, depressed, or hopeless or have lost interest in things that you usually enjoy. Where can you learn more? Go to http://loboPrismic Pharmaceuticalsmichel.info/. Enter P513 in the search box to learn more about \"Insomnia: Care Instructions. \" Current as of: July 26, 2016 Content Version: 11.3 © 2916-8750 [employer]. Care instructions adapted under license by [employer] (which disclaims liability or warranty for this information). If you have questions about a medical condition or this instruction, always ask your healthcare professional. [address] any warranty or liability for your use of this information. Learning About Sleeping Well What does sleeping well mean? Sleeping well means getting enough sleep. How much sleep is enough varies among people. The number of hours you sleep is not as important as how you feel when you wake up. If you do not feel refreshed, you probably need more sleep. Another sign of not getting enough sleep is feeling tired during the day. The average total nightly sleep time is 7½ to 8 hours. Healthy adults may need a little more or a little less than this. Why is getting enough sleep important? Getting enough quality sleep is a basic part of good health. When your sleep suffers, your mood and your thoughts can suffer too. You may find yourself feeling more grumpy or stressed.  Not getting enough sleep also can lead to serious problems, including injury, accidents, anxiety, and depression. What might cause poor sleeping? Many things can cause sleep problems, including: · Stress. Stress can be caused by fear about a single event, such as giving a speech. Or you may have ongoing stress, such as worry about work or school. · Depression, anxiety, and other mental or emotional conditions. · Changes in your sleep habits or surroundings. This includes changes that happen where you sleep, such as noise, light, or sleeping in a different bed. It also includes changes in your sleep pattern, such as having jet lag or working a late shift. · Health problems, such as pain, breathing problems, and restless legs syndrome. · Lack of regular exercise. How can you help yourself? Here are some tips that may help you sleep more soundly and wake up feeling more refreshed. Your sleeping area · Use your bedroom only for sleeping and sex. A bit of light reading may help you fall asleep. But if it doesn't, do your reading elsewhere in the house. Don't watch TV in bed. · Be sure your bed is big enough to stretch out comfortably, especially if you have a sleep partner. · Keep your bedroom quiet, dark, and cool. Use curtains, blinds, or a sleep mask to block out light. To block out noise, use earplugs, soothing music, or a \"white noise\" machine. Your evening and bedtime routine · Create a relaxing bedtime routine. You might want to take a warm shower or bath, listen to soothing music, or drink a cup of noncaffeinated tea. · Go to bed at the same time every night. And get up at the same time every morning, even if you feel tired. What to avoid · Limit caffeine (coffee, tea, caffeinated sodas) during the day, and don't have any for at least 4 to 6 hours before bedtime. · Don't drink alcohol before bedtime. Alcohol can cause you to wake up more often during the night. · Don't smoke or use tobacco, especially in the evening. Nicotine can keep you awake. · Don't take naps during the day, especially close to bedtime. · Don't lie in bed awake for too long. If you can't fall asleep, or if you wake up in the middle of the night and can't get back to sleep within 15 minutes or so, get out of bed and go to another room until you feel sleepy. · Don't take medicine right before bed that may keep you awake or make you feel hyper or energized. Your doctor can tell you if your medicine may do this and if you can take it earlier in the day. If you can't sleep · Imagine yourself in a peaceful, pleasant scene. Focus on the details and feelings of being in a place that is relaxing. · Get up and do a quiet or boring activity until you feel sleepy. · Don't drink any liquids after 6 p.m. if you wake up often because you have to go to the bathroom. Where can you learn more? Go to http://lobo-michel.info/. Enter A320 in the search box to learn more about \"Learning About Sleeping Well. \" Current as of: July 26, 2016 Content Version: 11.3 © 3993-6719 [employer]. Care instructions adapted under license by [employer] (which disclaims liability or warranty for this information). If you have questions about a medical condition or this instruction, always ask your healthcare professional. [place] [address] any warranty or liability for your use of this information. Suvorexant (By mouth) Suvorexant (vrz-jye-OIZ-ant) Treats insomnia. Brand Name(s): Belsomra There may be other brand names for this medicine. When This Medicine Should Not Be Used: This medicine is not right for everyone. Do not use it if you have narcolepsy. How to Use This Medicine:  
Tablet · Your doctor will tell you how much medicine to use. Do not use more than directed.  
· You should not take this medicine if you are not able to sleep or rest for at least 7 hours before you need to be active again. · This medicine works better if you do not take it with food or right after a meal. 
· This medicine should come with a Medication Guide. Ask your pharmacist for a copy if you do not have one. · Missed dose: This medicine is not taken on a regular schedule. Use it only when you cannot sleep. · Store the medicine in a closed container at room temperature, away from heat, moisture, and direct light. Do not remove the tablets from the blister pack until you are ready to use them. Drugs and Foods to Avoid: Ask your doctor or pharmacist before using any other medicine, including over-the-counter medicines, vitamins, and herbal products. · Some foods and medicines can affect how suvorexant works. Tell your doctor if you are using any of the following: 
¨ Aprepitant, boceprevir, carbamazepine, ciprofloxacin, clarithromycin, conivaptan, digoxin, diltiazem, erythromycin, fluconazole, imatinib, nefazodone, phenytoin, rifampin, telaprevir, telithromycin, verapamil ¨ Medicine to treat HIV (such as amprenavir, atazanavir, fosamprenavir, indinavir, nelfinavir, ritonavir, saquinavir) or medicine to treat a fungal infection (such as itraconazole, ketoconazole, posaconazole) · Tell your doctor if you use anything else that makes you sleepy. Some examples are allergy medicine, narcotic pain medicine, and alcohol. · Do not eat grapefruit or drink grapefruit juice while you are using this medicine. Warnings While Using This Medicine: · Tell your doctor if you are pregnant or breastfeeding, or if you have liver disease, breathing or lung problems (such as COPD, sleep apnea), or muscle problems or weakness. Tell your doctor if you have a history of alcohol or drug addiction, depression, or mental illness. · This medicine may cause the following problems: ¨ Unusual changes in mood or behavior ¨ Sleep paralysis (while you are going to sleep or waking up) · This medicine may make you drowsy the next morning. Do not drive or do anything else that could be dangerous until you know how this medicine affects you. · This medicine may cause you to do things while you are still asleep, such as driving or eating. You may not remember doing these things the next morning. Tell your doctor right away if you learn that this has happened. · Call your doctor if you still have trouble sleeping after you take this medicine for 7 to 10 days. · This medicine can be habit-forming. Do not use more than your prescribed dose. Call your doctor if you think your medicine is not working. · Keep all medicine out of the reach of children. Never share your medicine with anyone. Possible Side Effects While Using This Medicine:  
Call your doctor right away if you notice any of these side effects: · Allergic reaction: Itching or hives, swelling in your face or hands, swelling or tingling in your mouth or throat, chest tightness, trouble breathing · Anxiety, depression, nervousness, unusual behavior, or thoughts of hurting yourself · Memory loss · Seeing, hearing, or feeling things that are not there · Severe confusion, drowsiness, muscle weakness · Temporary inability to move or talk while you are going to sleep or waking up If you notice these less serious side effects, talk with your doctor: · Daytime drowsiness If you notice other side effects that you think are caused by this medicine, tell your doctor. Call your doctor for medical advice about side effects. You may report side effects to FDA at 2-218-FDA-3320 © 2017 [facility] Information is for End User's use only and may not be sold, redistributed or otherwise used for commercial purposes. The above information is an  only. It is not intended as medical advice for individual conditions or treatments.  Talk to your doctor, nurse or pharmacist before following any medical regimen to see if it is safe and effective for you. Linaclotide (By mouth) Linaclotide (sos-GL-bsx-tide) Treats irritable bowel syndrome with constipation and chronic idiopathic constipation. Brand Name(s): Linzess There may be other brand names for this medicine. When This Medicine Should Not Be Used: This medicine is not right for everyone. Do not use it if you had an allergic reaction to linaclotide, or if you have a bowel or stomach blockage. How to Use This Medicine:  
Capsule · Your doctor will tell you how much medicine to use. Do not use more than directed. · Take this medicine on an empty stomach, at least 30 minutes before breakfast or your first meal of the day. · Swallow the capsule whole. Do not crush, break, or chew it. · If you have trouble swallowing the capsule, you may mix the contents with applesauce or water. ¨ To mix with applesauce: Open the capsule and sprinkle the beads on 1 teaspoonful of applesauce. Swallow the mixture immediately without chewing. Do not store it for later use. ¨ To mix with water: Open the capsule and sprinkle the beads into a cup with 30 mL (1 ounce) of water. Gently swirl for at least 10 seconds. Swallow the entire mixture immediately. Add another 30 mL (1 ounce) of water to the cup and swirl. Drink the water right away to make sure all of the medicine is taken. Do not store it for later use. ¨ The water mixture may also be used with a nasogastric or gastric feeding tube. After the mixture is given, flush the tube with an additional 10 mL (2 teaspoons) of water. · This medicine should come with a Medication Guide. Ask your pharmacist for a copy if you do not have one. · Missed dose: Skip the missed dose and go back to your regular dosing schedule. Do not double doses. · Store the medicine in a closed container at room temperature, away from heat, moisture, and direct light.  Keep the medicine in the original bottle until you are ready to use it. The bottle contains a desiccant packet that helps protect the capsules from moisture. Do not remove the packet from the bottle. Drugs and Foods to Avoid: Ask your doctor or pharmacist before using any other medicine, including over-the-counter medicines, vitamins, and herbal products. Warnings While Using This Medicine: · Tell your doctor if you are pregnant or breastfeeding, or if you have other stomach or bowel problems. · Your doctor will check your progress and the effects of this medicine at regular visits. Keep all appointments. · Keep all medicine out of the reach of children. Never share your medicine with anyone. Possible Side Effects While Using This Medicine:  
Call your doctor right away if you notice any of these side effects: · Allergic reaction: Itching or hives, swelling in your face or hands, swelling or tingling in your mouth or throat, chest tightness, trouble breathing · Bloody or black, tarry stools · Lightheadedness, dizziness, fainting · Severe diarrhea or stomach pain If you notice these less serious side effects, talk with your doctor: · Mild diarrhea If you notice other side effects that you think are caused by this medicine, tell your doctor. Call your doctor for medical advice about side effects. You may report side effects to FDA at 7-068-FDA-7603 © 2017 [address] Information is for End User's use only and may not be sold, redistributed or otherwise used for commercial purposes. The above information is an  only. It is not intended as medical advice for individual conditions or treatments. Talk to your doctor, nurse or pharmacist before following any medical regimen to see if it is safe and effective for you. Introducing [facility]!    
 [place] introduces [employer] patient portal. Now you can access parts of your medical record, email your doctor's office, and request medication refills online. 1. In your internet browser, go to [email or web address] 2. Click on the First Time User? Click Here link in the Sign In box. You will see the New Member Sign Up page. 3. Enter your onkea Access Code exactly as it appears below. You will not need to use this code after youve completed the sign-up process. If you do not sign up before the expiration date, you must request a new code. · onkea Access Code: [email or web address] Expires: 12/12/2017  2:55 PM 
 
4. Enter the last four digits of your Social Security Number (xxxx) and Date of Birth (mm/dd/yyyy) as indicated and click Submit. You will be taken to the next sign-up page. 5. Create a onkea ID. This will be your onkea login ID and cannot be changed, so think of one that is secure and easy to remember. 6. Create a onkea password. You can change your password at any time. 7. Enter your Password Reset Question and Answer. This can be used at a later time if you forget your password. 8. Enter your e-mail address. You will receive e-mail notification when new information is available in [facility]. 9. Click Sign Up. You can now view and download portions of your medical record. 10. Click the Download Summary menu link to download a portable copy of your medical information. If you have questions, please visit the Frequently Asked Questions section of the onkea website. Remember, onkea is NOT to be used for urgent needs. For medical emergencies, dial 911. Now available from your iPhone and Android! Please provide this summary of care documentation to your next provider. Your primary care clinician is listed as AMIE Dunn. If you have any questions after today's visit, please call [phone].

## 2017-10-11 NOTE — PROGRESS NOTES
Malina Acharya is a 67 y.o. female and presents with Vaginal Itching (pt saw NP Marchaarti Brannon on 10/9/17 for this. . pt states that she will start using it today.); Knee Pain (left ); Blurred Vision (pt states she's been experiencing blurred vision); and Insomnia (pt states she's not sleeping at night. .. )    Subjective:  Pt here with multiple complaints, but felt those addressed below were most significant and will return at a later time to address other concerns. Pt continues to have vaginal irritation, in middle of Metrogel dosing now. Has NOT started Lidocaine yet, as order was for oral administration versus vaginal. Recalls using ointment in past, years ago with great relief, but unsure of name. Also concerned for pressure on head and blurring vision, but followed by neuro and ophthalmology for this. Neuro planned for pt to f/u with psych and neuropscyh for somatization disorder. Pt without change to symptoms since last OV in March 2017 to neuro. Reportedly terminated by Dr. Avendano Notch office, but no term letter or visits on file. Constipation Review:  History of chronic constipation. Onset years ago, unchanged. Associated with bloating, decreased appetite, and nausea. Denies rectal bleeding, laxative overuse, and decreased appetite. Tried amitiza and miralax in past without relief. Currently does NOT take opiates or iron supplements. Insomnia Review:  Pt presents for insomnia, reporting difficulty falling and staying asleep. Started years ago, unchanged. Aggravated by nothing. Alleviated by nothing and has tried trazodone, ambien, sensipar, remeron, clonazepam, ativan, zoloft, lunesta and amitriptyline without. Related symptoms, pt unable to articulate. Denies h/o substance abuse and alcoholism. Review of Systems  Constitutional: + right axillary/breast irritation.  negative for fevers, chills, anorexia and weight loss  Eyes:   + blurring vision, seen by 2 providers and advised NO PROBLEM with vision however. Has appt for TOMORROW to return to [facility] ophthalmology clinic however. negative for visual disturbance, drainage, and irritation  ENT:   negative for sore throat,nasal congestion,ear pain,and hoarseness  Respiratory:  negative for cough, hemoptysis, dyspnea, and wheezing  CV:   negative for chest pain, palpitations, and lower extremity edema  GI:   negative for nausea, vomiting, diarrhea, abdominal pain, and melena  Endo:               negative for polyuria,polydipsia,polyphagia, and heat intolerance  Genitourinary: negative for frequency, urgency, dysuria, retention, and hematuria  Integument:  negative for rash, ulcerations, and pruritus  Hematologic:  negative for easy bruising and bleeding  Musculoskel: + knee pain/swelling. 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 Has NOT resumed Crestor yet at this time, confused about directions. negative for  muscle weakness,and joint pain/swelling  Neurological:  negative for headaches, vertigo,and memory/gait problems  Behavl/Psych: + anxiety.  negative for feelings of depression, suicide, and mood changes    Past Medical History:   Diagnosis Date    Agoraphobia without mention of panic attacks 2/17/2014    Anxiety disorder 8/18/2013    Arthritis     osteo    Breast pain, left 4/7/2015    CAD (coronary artery disease), native coronary artery 12/1/2015    Chronic chest pain 1/13/2014    Chronic pain associated with significant psychosocial dysfunction 2/17/2014    Depression 8/18/2013    Diabetes ([facility] [address]     type II    Duplicated right renal collecting system 3/13/2014    GERD (gastroesophageal reflux disease)     Gout, joint     Hypercholesteremia     hyercholesterolemia    Hypertension     Nephrolithiasis 3/13/2014    Personal history of noncompliance with medical treatment, presenting hazards to health 5/30/2014    Psychotic disorder     Vaginal pain 7/30/2014     Past Surgical History:   Procedure Laterality Date    EGD  4/23/2010  HX CYST REMOVAL      cyst removed from left wrist    HX HYSTERECTOMY      partial    HX OTHER SURGICAL      bladder dilitation    HX TUBAL LIGATION      HX UROLOGICAL      kidney stones     Social History     Social History    Marital status:      Spouse name: N/A    Number of children: N/A    Years of education: N/A     Social History Main Topics    Smoking status: Never Smoker    Smokeless tobacco: Never Used    Alcohol use No    Drug use: No    Sexual activity: No     Other Topics Concern    None     Social History Narrative    ** Merged History Encounter **     , lives with her son and Friend. Family History   Problem Relation Age of Onset    Stroke Mother     Heart Disease Mother     Cancer Father     Heart Disease Son     Liver Disease Son     Heart Disease Daughter     Malignant Hyperthermia Neg Hx     Pseudocholinesterase Deficiency Neg Hx     Delayed Awakening Neg Hx     Post-op Nausea/Vomiting Neg Hx     Emergence Delirium Neg Hx     Post-op Cognitive Dysfunction Neg Hx     Other Neg Hx      Current Outpatient Prescriptions   Medication Sig Dispense Refill    lidocaine (XYLOCAINE) 2 % solution Apply to affected area 2-3 times daily, as needed for vaginal pain and irritation. Indications: vaginal irritation 100 mL 2    suvorexant (BELSOMRA) 10 mg tablet Take 1 Tab by mouth nightly as needed for Insomnia. Max Daily Amount: 10 mg. 30 Tab 5    linaclotide (LINZESS) 145 mcg cap capsule Take 1 Cap by mouth Daily (before breakfast). May take 1-2 caps once daily for CONSTIPATION  Indications: chronic idiopathic constipation 45 Cap 0    polyethylene glycol (MIRALAX) 17 gram/dose powder TAKE 1 CAPFUL IN 8 OUNCES OF WATER EVERY DAY  3    metroNIDAZOLE (METROGEL) 0.75 % vaginal gel Insert 1 Applicator into vagina nightly for 5 days. 187.5 mg 0    nystatin (MYCOSTATIN) topical cream APPLY TO AFFECTED AREA TWO (2) TIMES A DAY.  15 g 0    meclizine (ANTIVERT) 12.5 mg tablet Take 1 Tab by mouth three (3) times daily as needed. Indications: VERTIGO 30 Tab 0    fluticasone (FLONASE) 50 mcg/actuation nasal spray 2 Sprays by Both Nostrils route daily. 1 Bottle 0    docusate sodium (COLACE) 100 mg capsule TAKE 1 CAP BY MOUTH DAILY AS NEEDED FOR CONSTIPATION FOR UP TO 90 DAYS. 90 Cap 0    valsartan (DIOVAN) 80 mg tablet Take 1 Tab by mouth daily. 90 Tab 4    dilTIAZem CD (CARDIZEM CD) 120 mg ER capsule Take 1 Cap by mouth daily. 90 Cap 4    aspirin delayed-release 81 mg tablet Take 1 Tab by mouth daily. 30 Tab 11    coenzyme Q-10 (CO Q-10) 200 mg capsule Take 1 cap for TWO WEEKS then continue taking WITH Crestor. 30 Cap 2    rosuvastatin (CRESTOR) 5 mg tablet Take 1 Tab by mouth nightly. 30 Tab 3     Allergies   Allergen Reactions    Amoxicillin Hives    Sulfa (Sulfonamide Antibiotics) Hives and Itching    Amoxicillin Hives and Itching     Long time ago - patient not exactly certain what it does    Mirtazapine Itching and Nausea Only     Funny feeling in chest    Percocet [Oxycodone-Acetaminophen] Nausea and Vomiting    Codeine Nausea and Vomiting    Prednisone Itching    Sulfa (Sulfonamide Antibiotics) Hives, Itching and Palpitations     Think it was increased heart rate or itching - many years ago    Zithromax [Azithromycin] Itching     Not sure what it does,taken long time ago       Objective:  Visit Vitals    /86 (BP 1 Location: Right arm, BP Patient Position: Sitting)    Pulse 66    Temp 98 °F (36.7 °C) (Oral)    Resp 18    Ht 5' 8\" (1.727 m)    Wt 168 lb 8 oz (76.4 kg)    SpO2 100%    BMI 25.62 kg/m2     Wt Readings from Last 3 Encounters:   10/11/17 168 lb 8 oz (76.4 kg)   10/09/17 167 lb 1.6 oz (75.8 kg)   09/13/17 171 lb (77.6 kg)     Physical Exam:   General appearance - alert, well appearing, and in no distress. Mental status - A/O x 4, anxious mood and affect. +repetitive questions and statements. Neck -Supple ,normal CSP. No JVD.   Chest - CTA. Symmetric chest rise. No wheezing, rales or rhonchi. Heart - Normal rate, regular rhythm. Normal S1, S2. No MGR or clicks. Abdomen - Soft,non-distended. Normoactive BS in all quadrants. NT, no mass or HSM. Ext- Radial, DP pulses, 2+ bilaterally. No pedal edema, clubbing, or cyanosis. Skin-Warm and dry. No hyperpigmentation, ulcerations, or suspicious lesions. Neuro - Normal speech, no focal findings or movement disorder. Normal strength, gait, and muscle tone. Assessment/Plan:  Pt is stil fixated on pelvic symptoms, but seemingly does NOT understand instructions. Commonly asking for them to be written or called in to daughter, who does NOT present with pt, nor does she live with. Unsure how complaint pt is at home, as after extensive review of meds, pt still seems confused. Also unsure if she was actually seen by Dr. Win Newton or not, as not documentation of visits are noted in record. Pt is reluctant to f/u with GYN also, although advised both this visit and last if her symptoms persist, that is her best course of action. Belsomra initiated for insomnia, but pt given TWO referrals to psych today and advised to f/u with neuropsych as advised by NEURO. I spent greater than 50% of 40 minute visit counseling patient about above mentioned topics. Medication Side Effects and Warnings were discussed with patient: yes   Patient Labs were reviewed: yes  Patient Past Records were reviewed: yes    See below for other orders   Follow-up Disposition:  Return in about 4 weeks (around 11/8/2017) for belsomra and linzess start, psych ref f/u. ICD-10-CM ICD-9-CM    1. Insomnia, unspecified type G47.00 780.52 suvorexant (BELSOMRA) 10 mg tablet   2. Vaginal irritation N89.8 623.9 lidocaine (XYLOCAINE) 2 % solution   3. Constipation, unspecified constipation type K59.00 564.00 linaclotide (LINZESS) 145 mcg cap capsule   4.  Somatization disorder F45.0 300.81 REFERRAL TO PSYCHIATRY      REFERRAL TO NEUROPSYCHOLOGY REFERRAL TO PSYCHIATRY   5. Medically noncompliant Z91.19 V15.81    6. HTN, goal below 130/80 I10 401.9    7. Chronic nonintractable headache, unspecified headache type R51 784.0    8. Blurring of vision H53.8 368.8      Orders Placed This Encounter    REFERRAL TO PSYCHIATRY     Referral Priority:   Routine     Referral Type:   Behavioral Health     Referral Reason:   Specialty Services Required     Referred to Provider:   Gus John MD    REFERRAL TO NEUROPSYCHOLOGY     Referral Priority:   Routine     Referral Type:   Consultation     Referral Reason:   Specialty Services Required     Referred to Provider:   Jorge Wilhelm PsyD    REFERRAL TO PSYCHIATRY     Referral Priority:   Routine     Referral Type:   Behavioral Health     Referral Reason:   Specialty Services Required     Referred to Provider:   Jacki Bernal NP    lidocaine (XYLOCAINE) 2 % solution     Sig: Apply to affected area 2-3 times daily, as needed for vaginal pain and irritation. Indications: vaginal irritation     Dispense:  100 mL     Refill:  2    suvorexant (BELSOMRA) 10 mg tablet     Sig: Take 1 Tab by mouth nightly as needed for Insomnia. Max Daily Amount: 10 mg. Dispense:  30 Tab     Refill:  5    linaclotide (LINZESS) 145 mcg cap capsule     Sig: Take 1 Cap by mouth Daily (before breakfast). May take 1-2 caps once daily for CONSTIPATION  Indications: chronic idiopathic constipation     Dispense:  45 Cap     Refill:  0       Evangelina Mike Logan expressed understanding of plan. An After Visit Summary was offered/printed and given to the patient.

## 2017-10-11 NOTE — PROGRESS NOTES
Pt is here for   Chief Complaint   Patient presents with    Vaginal Itching     pt saw NP Deysi Yung on 10/9/17 for this. . pt states that she will start using it today.  Knee Pain     left     Blurred Vision     pt states she's been experiencing blurred vision    Insomnia     pt states she's not sleeping at night. .. Pt states pain level is a 8 vagina    1. Have you been to the ER, urgent care clinic since your last visit? Hospitalized since your last visit? No    2. Have you seen or consulted any other health care providers outside of the [address] [place] [address] since your last visit? Include any pap smears or colon screening.  No

## 2017-10-12 LAB — BACTERIA UR CULT: NO GROWTH

## 2017-11-04 RX ORDER — DILTIAZEM HYDROCHLORIDE 120 MG/1
CAPSULE, COATED, EXTENDED RELEASE ORAL
Qty: 30 CAP | Refills: 1 | Status: SHIPPED | OUTPATIENT
Start: 2017-11-04 | End: 2017-11-14 | Stop reason: SDUPTHER

## 2017-11-06 ENCOUNTER — TELEPHONE (OUTPATIENT)
Dept: INTERNAL MEDICINE CLINIC | Age: 72
End: 2017-11-06

## 2017-11-06 DIAGNOSIS — F41.1 GAD (GENERALIZED ANXIETY DISORDER): Primary | ICD-10-CM

## 2017-11-06 RX ORDER — HYDROXYZINE 25 MG/1
25 TABLET, FILM COATED ORAL
Qty: 30 TAB | Refills: 5 | Status: SHIPPED | OUTPATIENT
Start: 2017-11-06 | End: 2017-11-14

## 2017-11-06 NOTE — TELEPHONE ENCOUNTER
I'm sorry to hear that. I have sent for Hydroxyzine, she may take this to help deal with her overwhelming anxiety or panic attacks. She also needs to be sure to take Belsomra.

## 2017-11-06 NOTE — TELEPHONE ENCOUNTER
Patient wants to know if she can get something for her nerves she lost her daughter yesterday, unable to sleep

## 2017-11-08 ENCOUNTER — TELEPHONE (OUTPATIENT)
Dept: INTERNAL MEDICINE CLINIC | Age: 72
End: 2017-11-08

## 2017-11-08 NOTE — TELEPHONE ENCOUNTER
Lorena @ [address] [place] [address] called   Stating she needs update order for this patient. She states an order was placed in May but it was just placed on her desk today. Can she get update order or does the patient still need home health.  Lorena's # [phone]

## 2017-11-09 NOTE — TELEPHONE ENCOUNTER
Jean Giles was called today 11/9/17 and informed home health was no longer needed on this pt.   She understood

## 2017-11-10 NOTE — TELEPHONE ENCOUNTER
Left voice message for a return call to office about anxiety medication sent to the pharmacy and to find out if patient is taking belsomra to help with sleep.

## 2017-11-14 ENCOUNTER — OFFICE VISIT (OUTPATIENT)
Dept: INTERNAL MEDICINE CLINIC | Age: 72
End: 2017-11-14

## 2017-11-14 VITALS
SYSTOLIC BLOOD PRESSURE: 143 MMHG | OXYGEN SATURATION: 95 % | DIASTOLIC BLOOD PRESSURE: 82 MMHG | HEIGHT: 68 IN | RESPIRATION RATE: 18 BRPM | WEIGHT: 170 LBS | HEART RATE: 75 BPM | BODY MASS INDEX: 25.76 KG/M2 | TEMPERATURE: 96.9 F

## 2017-11-14 DIAGNOSIS — F41.1 GAD (GENERALIZED ANXIETY DISORDER): ICD-10-CM

## 2017-11-14 DIAGNOSIS — L30.9 DERMATITIS: Primary | ICD-10-CM

## 2017-11-14 DIAGNOSIS — K59.00 CONSTIPATION, UNSPECIFIED CONSTIPATION TYPE: Chronic | ICD-10-CM

## 2017-11-14 DIAGNOSIS — G47.00 INSOMNIA, UNSPECIFIED TYPE: Chronic | ICD-10-CM

## 2017-11-14 DIAGNOSIS — F43.21 GRIEVING: ICD-10-CM

## 2017-11-14 DIAGNOSIS — F45.8 OTHER SOMATOFORM DISORDERS: Chronic | ICD-10-CM

## 2017-11-14 DIAGNOSIS — N89.8 VAGINAL IRRITATION: ICD-10-CM

## 2017-11-14 RX ORDER — TRIAMCINOLONE ACETONIDE 1 MG/G
CREAM TOPICAL 2 TIMES DAILY
Qty: 15 G | Refills: 1 | Status: SHIPPED | OUTPATIENT
Start: 2017-11-14 | End: 2018-04-19

## 2017-11-14 RX ORDER — AMITRIPTYLINE HYDROCHLORIDE 25 MG/1
25 TABLET, FILM COATED ORAL
Qty: 30 TAB | Refills: 11 | Status: SHIPPED | OUTPATIENT
Start: 2017-11-14 | End: 2018-04-19

## 2017-11-14 NOTE — PROGRESS NOTES
Pt is here for   Chief Complaint   Patient presents with    Sleep Problem     pt states that she hasnt been taking the medication,,     Constipation     Pt states pain level is a 6 head. 1. Have you been to the ER, urgent care clinic since your last visit? Hospitalized since your last visit? No    2. Have you seen or consulted any other health care providers outside of the [address] [place] [address] since your last visit? Include any pap smears or colon screening.  No

## 2017-11-14 NOTE — PATIENT INSTRUCTIONS
Continue drinking plenty of fluids and eat small, frequent meals. At least 4 bottles of water daily. Try MAG CITRATE: drink 1/2 bottle and wait 30 min-1 hr, if still without bowel movement, drink other half. Try taking Constipation Cocktail: Go Puddy  1 cup applesauce  1 cup prune juice  1 cup bran  Take 1/4 cup one to two times daily           Constipation: Care Instructions  Your Care Instructions    Constipation means that you have a hard time passing stools (bowel movements). People pass stools from 3 times a day to once every 3 days. What is normal for you may be different. Constipation may occur with pain in the rectum and cramping. The pain may get worse when you try to pass stools. Sometimes there are small amounts of bright red blood on toilet paper or the surface of stools. This is because of enlarged veins near the rectum (hemorrhoids). A few changes in your diet and lifestyle may help you avoid ongoing constipation. Your doctor may also prescribe medicine to help loosen your stool. Some medicines can cause constipation. These include pain medicines and antidepressants. Tell your doctor about all the medicines you take. Your doctor may want to make a medicine change to ease your symptoms. Follow-up care is a key part of your treatment and safety. Be sure to make and go to all appointments, and call your doctor if you are having problems. It's also a good idea to know your test results and keep a list of the medicines you take. How can you care for yourself at home? · Drink plenty of fluids, enough so that your urine is light yellow or clear like water. If you have kidney, heart, or liver disease and have to limit fluids, talk with your doctor before you increase the amount of fluids you drink. · Include high-fiber foods in your diet each day. These include fruits, vegetables, beans, and whole grains. · Get at least 30 minutes of exercise on most days of the week. Walking is a good choice.  You also may want to do other activities, such as running, swimming, cycling, or playing tennis or team sports. · Take a fiber supplement, such as Citrucel or Metamucil, every day. Read and follow all instructions on the label. · Schedule time each day for a bowel movement. A daily routine may help. Take your time having your bowel movement. · Support your feet with a small step stool when you sit on the toilet. This helps flex your hips and places your pelvis in a squatting position. · Your doctor may recommend an over-the-counter laxative to relieve your constipation. Examples are Milk of Magnesia and MiraLax. Read and follow all instructions on the label. Do not use laxatives on a long-term basis. When should you call for help? Call your doctor now or seek immediate medical care if:  ? · You have new or worse belly pain. ? · You have new or worse nausea or vomiting. ? · You have blood in your stools. ? Watch closely for changes in your health, and be sure to contact your doctor if:  ? · Your constipation is getting worse. ? · You do not get better as expected. Where can you learn more? Go to http://loboHackermetermichel.info/. Enter 21  in the search box to learn more about \"Constipation: Care Instructions. \"  Current as of: March 20, 2017  Content Version: 11.4  © 1520-0174 [employer]. Care instructions adapted under license by [employer] (which disclaims liability or warranty for this information). If you have questions about a medical condition or this instruction, always ask your healthcare professional. [place] [address] any warranty or liability for your use of this information. Grief (Actual/Anticipated): Care Instructions  Your Care Instructions    Grief is your emotional reaction to a major loss. The words \"sorrow\" and \"heartache\" often are used to describe feelings of grief.  You feel grief when you lose a beloved person, pet, place, or thing. It is also natural to feel grief when you lose a valued way of life, such as a job, marriage, or good health. You may begin to grieve before a loss occurs. You may grieve for a loved one who is sick and dying. Children and adults often feel the pain of loss before a big move or divorce. This type of grief helps you get ready for a loss. Grief is different for each person. There is no \"normal\" or \"expected\" period of time for grieving. Some people adjust to their loss within a couple of months. Others may take 2 years or longer, especially if their lives were changed a lot or if the loss was sudden and shocking. Grieving can cause problems such as headaches, loss of appetite, and trouble with thinking or sleeping. You may withdraw from friends and family and behave in ways that are unusual for you. Grief may cause you to question your beliefs and views about life. Grief is natural and does not require medical treatment. But if you have trouble sleeping, it may help to take sleeping pills for a short time. It may help to talk with people who have been through or are going through similar losses. You may also want to talk to a counselor about your feelings. Talking about your loss, sharing your cares and concerns, and getting support from others are important parts of healthy grieving. Follow-up care is a key part of your treatment and safety. Be sure to make and go to all appointments, and call your doctor if you are having problems. It's also a good idea to know your test results and keep a list of the medicines you take. How can you care for yourself at home? · Get enough sleep. Your mind helps make sense of your life while you sleep. Missing sleep can lead to illness and make it harder for you to deal with your grief. · Eat healthy foods. Try to avoid eating only foods that give you comfort. Ask someone to join you for a meal if you do not like eating alone.  Consider taking a multivitamin every day.  · Get some exercise every day. Even a walk can help you deal with your grief. Other exercises, such as yoga, can also help you manage stress. · Comfort yourself. Take time to look at photos or use special items that make you feel better. · Stay involved in your life. Do not withdraw from the activities you enjoy. People you know at work, Yaz[employer], clubs, or other groups can help you get through your period of grief. · Think about joining a support group to help you deal with your grief. There are many support groups to help people recover from grief. When should you call for help? Call 911 anytime you think you may need emergency care. For example, call if:  ? · You feel you cannot stop from hurting yourself or someone else. ? Watch closely for changes in your health, and be sure to contact your doctor if:  ? · You think you may be depressed. ? · You do not get better as expected. Where can you learn more? Go to http://lobo-michel.info/. Enter H249 in the search box to learn more about \"Grief (Actual/Anticipated): Care Instructions. \"  Current as of: September 24, 2016  Content Version: 11.4  © 2193-3494 [employer]. Care instructions adapted under license by [employer] (which disclaims liability or warranty for this information). If you have questions about a medical condition or this instruction, always ask your healthcare professional. [address] any warranty or liability for your use of this information.

## 2017-11-14 NOTE — Clinical Note
She needs an appt with PSYCH NOW (please), she has tried several SSRIs and sleep agents without relief. Plus she has  I feel somatization disorder with persistent vaginal discomfort.  I have referred her to [facility] and [facility], but she reports she is not able to see either due missed appt in past.

## 2017-11-14 NOTE — MR AVS SNAPSHOT
Visit Information Date & Time Provider Department Dept. Phone Encounter #  
 11/14/2017  3:00 PM Sherry Espinoza, NP [address] [facility] [phone] [id number] Follow-up Instructions Return in about 6 weeks (around 12/26/2017) for constipation, amitriptyline/MDD f/u,DM. Routing History Your Appointments 12/7/2017 10:45 AM  
6 MONTH with Jose Alfredo Smalls MD  
[facility] [address] [place] [address]) Appt Note: $0cp  [address]r [address]  
[phone] [address] Upcoming Health Maintenance Date Due DTaP/Tdap/Td series (1 - Tdap) 4/30/2006 Pneumococcal 65+ Low/Medium Risk (1 of 2 - PCV13) 9/22/2010 FOOT EXAM Q1 6/12/2015 EYE EXAM RETINAL OR DILATED Q1 7/1/2015 FOBT Q 1 YEAR AGE 50-75 7/29/2015 GLAUCOMA SCREENING Q2Y 7/1/2016 HEMOGLOBIN A1C Q6M 1/6/2018 MICROALBUMIN Q1 7/6/2018 LIPID PANEL Q1 7/6/2018 MEDICARE YEARLY EXAM 7/7/2018 BREAST CANCER SCRN MAMMOGRAM 8/29/2019 Allergies as of 11/14/2017  Review Complete On: 11/14/2017 By: José Fox. ADEEL Reyes Severity Noted Reaction Type Reactions Amoxicillin High 09/10/2010   Systemic Hives Sulfa (Sulfonamide Antibiotics) High 09/10/2010   Systemic Hives, Itching Amoxicillin Medium 04/22/2010   Systemic Hives, Itching Long time ago - patient not exactly certain what it does Mirtazapine Medium 11/20/2012   Side Effect Itching, Nausea Only Funny feeling in chest  
 Percocet [Oxycodone-acetaminophen] Medium 01/15/2014   Intolerance Nausea and Vomiting Codeine  11/26/2012    Nausea and Vomiting Prednisone  05/27/2010    Itching Sulfa (Sulfonamide Antibiotics)  04/22/2010   Systemic Hives, Itching, Palpitations Think it was increased heart rate or itching - many years ago Zithromax [Azithromycin]  11/20/2012   Side Effect Itching Not sure what it does,taken long time ago Current Immunizations  Reviewed on 4/13/2017 Name Date Pneumococcal Polysaccharide (PPSV-23)  Deferred (Patient Refused) TD Vaccine 4/29/2006 Not reviewed this visit You Were Diagnosed With   
  
 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  
 25.85 kg/m 2 1.92 m 2 Preferred Pharmacy Pharmacy Name Phone [facility]Y [facility][id number]- [place], [place] - [address] [phone] Your Updated Medication List  
  
   
This list is accurate as of: 11/14/17  4:08 PM.  Always use your most recent med list.  
  
  
  
  
 amitriptyline 25 mg tablet Commonly known as:  ELAVIL Take 1 Tab by mouth nightly. Indications: insomnia/depression  
  
 aspirin delayed-release 81 mg tablet Take 1 Tab by mouth daily. coenzyme Q-10 200 mg capsule Commonly known as:  CO Q-10 Take 1 cap for TWO WEEKS then continue taking WITH Crestor. * dilTIAZem  mg ER capsule Commonly known as:  CARDIZEM CD Take 1 Cap by mouth daily. * dilTIAZem  mg ER capsule Commonly known as:  CARDIZEM CD  
TAKE 1 CAP BY MOUTH DAILY. docusate sodium 100 mg capsule Commonly known as:  COLACE  
TAKE 1 CAP BY MOUTH DAILY AS NEEDED FOR CONSTIPATION FOR UP TO 90 DAYS. fluticasone 50 mcg/actuation nasal spray Commonly known as:  Desyi Jumper 2 Sprays by Both Nostrils route daily. hydrOXYzine HCl 25 mg tablet Commonly known as:  ATARAX Take 1 Tab by mouth three (3) times daily as needed for Anxiety. Do NOT Drive, may cause drowsiness  
  
 lidocaine 2 % solution Commonly known as:  XYLOCAINE Apply to affected area 2-3 times daily, as needed for vaginal pain and irritation. Indications: vaginal irritation  
  
 linaclotide 145 mcg Cap capsule Commonly known as:  Nadia Kingston Take 1 Cap by mouth Daily (before breakfast). May take 1-2 caps once daily for CONSTIPATION  Indications: chronic idiopathic constipation  
  
 meclizine 12.5 mg tablet Commonly known as:  ANTIVERT Take 1 Tab by mouth three (3) times daily as needed. Indications: VERTIGO  
  
 nystatin topical cream  
Commonly known as:  MYCOSTATIN  
APPLY TO AFFECTED AREA TWO (2) TIMES A DAY. polyethylene glycol 17 gram/dose powder Commonly known as:  Sheria Providence TAKE 1 CAPFUL IN 8 OUNCES OF WATER EVERY DAY  
  
 rosuvastatin 5 mg tablet Commonly known as:  CRESTOR Take 1 Tab by mouth nightly. triamcinolone acetonide 0.1 % topical cream  
Commonly known as:  KENALOG Apply  to affected area two (2) times a day. use thin layer  
  
 valsartan 80 mg tablet Commonly known as:  DIOVAN Take 1 Tab by mouth daily. * Notice: This list has 2 medication(s) that are the same as other medications prescribed for you. Read the directions carefully, and ask your doctor or other care provider to review them with you. Prescriptions Sent to Pharmacy  Refills  
 triamcinolone acetonide (KENALOG) 0.1 % topical cream 1  
 Sig: Apply  to affected area two (2) times a day. use thin layer Class: Normal  
 Pharmacy: [facility]/pharmacy [id number] Ph #: [phone] Route: Topical  
 amitriptyline (ELAVIL) 25 mg tablet 11 Sig: Take 1 Tab by mouth nightly. Indications: insomnia/depression Class: Normal  
 Pharmacy: [facility]/pharmacy [id number] [facility] [address] Ph #: [phone] Route: Oral  
  
Follow-up Instructions Return in about 6 weeks (around 12/26/2017) for constipation, amitriptyline/MDD f/u,DM. Patient Instructions Continue drinking plenty of fluids and eat small, frequent meals. At least 4 bottles of water daily. Try MAG CITRATE: drink 1/2 bottle and wait 30 min-1 hr, if still without bowel movement, drink other half. Try taking Constipation Cocktail: Go Puddy 1 cup applesauce 1 cup prune juice 1 cup bran Take 1/4 cup one to two times daily Constipation: Care Instructions Your Care Instructions Constipation means that you have a hard time passing stools (bowel movements). People pass stools from 3 times a day to once every 3 days. What is normal for you may be different. Constipation may occur with pain in the rectum and cramping. The pain may get worse when you try to pass stools. Sometimes there are small amounts of bright red blood on toilet paper or the surface of stools. This is because of enlarged veins near the rectum (hemorrhoids). A few changes in your diet and lifestyle may help you avoid ongoing constipation. Your doctor may also prescribe medicine to help loosen your stool. Some medicines can cause constipation. These include pain medicines and antidepressants. Tell your doctor about all the medicines you take. Your doctor may want to make a medicine change to ease your symptoms. Follow-up care is a key part of your treatment and safety.  Be sure to make and go to all appointments, and call your doctor if you are having problems. It's also a good idea to know your test results and keep a list of the medicines you take. How can you care for yourself at home? · Drink plenty of fluids, enough so that your urine is light yellow or clear like water. If you have kidney, heart, or liver disease and have to limit fluids, talk with your doctor before you increase the amount of fluids you drink. · Include high-fiber foods in your diet each day. These include fruits, vegetables, beans, and whole grains. · Get at least 30 minutes of exercise on most days of the week. Walking is a good choice. You also may want to do other activities, such as running, swimming, cycling, or playing tennis or team sports. · Take a fiber supplement, such as Citrucel or Metamucil, every day. Read and follow all instructions on the label. · Schedule time each day for a bowel movement. A daily routine may help. Take your time having your bowel movement. · Support your feet with a small step stool when you sit on the toilet. This helps flex your hips and places your pelvis in a squatting position. · Your doctor may recommend an over-the-counter laxative to relieve your constipation. Examples are Milk of Magnesia and MiraLax. Read and follow all instructions on the label. Do not use laxatives on a long-term basis. When should you call for help? Call your doctor now or seek immediate medical care if: 
? · You have new or worse belly pain. ? · You have new or worse nausea or vomiting. ? · You have blood in your stools. ? Watch closely for changes in your health, and be sure to contact your doctor if: 
? · Your constipation is getting worse. ? · You do not get better as expected. Where can you learn more? Go to http://lobo-michel.info/. Enter 21 [phone] in the search box to learn more about \"Constipation: Care Instructions. \" Current as of: March 20, 2017 Content Version: 11.4 © 6802-0006 [employer]. Care instructions adapted under license by [employer] (which disclaims liability or warranty for this information). If you have questions about a medical condition or this instruction, always ask your healthcare professional. [address] any warranty or liability for your use of this information. Grief (Actual/Anticipated): Care Instructions Your Care Instructions Grief is your emotional reaction to a major loss. The words \"sorrow\" and \"heartache\" often are used to describe feelings of grief. You feel grief when you lose a beloved person, pet, place, or thing. It is also natural to feel grief when you lose a valued way of life, such as a job, marriage, or good health. You may begin to grieve before a loss occurs. You may grieve for a loved one who is sick and dying. Children and adults often feel the pain of loss before a big move or divorce. This type of grief helps you get ready for a loss. Grief is different for each person. There is no \"normal\" or \"expected\" period of time for grieving. Some people adjust to their loss within a couple of months. Others may take 2 years or longer, especially if their lives were changed a lot or if the loss was sudden and shocking. Grieving can cause problems such as headaches, loss of appetite, and trouble with thinking or sleeping. You may withdraw from friends and family and behave in ways that are unusual for you. Grief may cause you to question your beliefs and views about life. Grief is natural and does not require medical treatment. But if you have trouble sleeping, it may help to take sleeping pills for a short time. It may help to talk with people who have been through or are going through similar losses. You may also want to talk to a counselor about your feelings.  Talking about your loss, sharing your cares and concerns, and getting support from others are important parts of healthy grieving. Follow-up care is a key part of your treatment and safety. Be sure to make and go to all appointments, and call your doctor if you are having problems. It's also a good idea to know your test results and keep a list of the medicines you take. How can you care for yourself at home? · Get enough sleep. Your mind helps make sense of your life while you sleep. Missing sleep can lead to illness and make it harder for you to deal with your grief. · Eat healthy foods. Try to avoid eating only foods that give you comfort. Ask someone to join you for a meal if you do not like eating alone. Consider taking a multivitamin every day. · Get some exercise every day. Even a walk can help you deal with your grief. Other exercises, such as yoga, can also help you manage stress. · Comfort yourself. Take time to look at photos or use special items that make you feel better. · Stay involved in your life. Do not withdraw from the activities you enjoy. People you know at work, Jehovah's witness, clubs, or other groups can help you get through your period of grief. · Think about joining a support group to help you deal with your grief. There are many support groups to help people recover from grief. When should you call for help? Call 911 anytime you think you may need emergency care. For example, call if: 
? · You feel you cannot stop from hurting yourself or someone else. ? Watch closely for changes in your health, and be sure to contact your doctor if: 
? · You think you may be depressed. ? · You do not get better as expected. Where can you learn more? Go to http://lobo-michel.info/. Enter H249 in the search box to learn more about \"Grief (Actual/Anticipated): Care Instructions. \" Current as of: September 24, 2016 Content Version: 11.4 © 9881-0482 [employer].  Care instructions adapted under license by [address] (which disclaims liability or warranty for this information). If you have questions about a medical condition or this instruction, always ask your healthcare professional. [address] any warranty or liability for your use of this information. Introducing [facility]! [employer] introduces [employer] patient portal. Now you can access parts of your medical record, email your doctor's office, and request medication refills online. 1. In your internet browser, go to [email or web address] 2. Click on the First Time User? Click Here link in the Sign In box. You will see the New Member Sign Up page. 3. Enter your [employer] Access Code exactly as it appears below. You will not need to use this code after youve completed the sign-up process. If you do not sign up before the expiration date, you must request a new code. · [employer] Access Code: [email or web address] Expires: 12/12/2017  1:55 PM 
 
4. Enter the last four digits of your Social Security Number (xxxx) and Date of Birth (mm/dd/yyyy) as indicated and click Submit. You will be taken to the next sign-up page. 5. Create a [employer] ID. This will be your [employer] login ID and cannot be changed, so think of one that is secure and easy to remember. 6. Create a [employer] password. You can change your password at any time. 7. Enter your Password Reset Question and Answer. This can be used at a later time if you forget your password. 8. Enter your e-mail address. You will receive e-mail notification when new information is available in [address] 9. Click Sign Up. You can now view and download portions of your medical record. 10. Click the Download Summary menu link to download a portable copy of your medical information. If you have questions, please visit the Frequently Asked Questions section of the [employer] website.  Remember, [employer] is NOT to be used for urgent needs. For medical emergencies, dial 911. Now available from your iPhone and Android! Please provide this summary of care documentation to your next provider. Your primary care clinician is listed as AMIE Pleitez. If you have any questions after today's visit, please call [phone].

## 2017-11-14 NOTE — PROGRESS NOTES
Corey Delacruz is a 67 y.o. female and presents with Sleep Problem (pt states that she hasnt been taking the medication,, ) and Constipation (pt states she hasn't had a BM in 2 weeks)    Subjective:  Pt here with multiple complaints again today, but only able to address some listed below. As they were most significant and concerning to pt. Pt continues to have vaginal irritation,finished nystatin and metrogel. Reportedly with rash and itching for past few weeks now. Using \"that cream you gave me\", but without relief. Also reports recent loss of youngest daughter to health just over one week ago, now she has 3  children out of 7. Having worse trouble sleeping, bouts of crying, and chest pain with dizziness. Unable to start Belsomra due to cost and did NOT start hydroxyzine either. Would like medicine she had before to help, feels it was called \"amitrip\", but unsure. Plans to review with daughter first. Jim Rebollar to call to make appt with psych, but reportedly told she can not be seen due to frequent no -shows. Pt cites transportation as reason for missed visits. Constipation Review:  History of chronic constipation. Onset years ago, unchanged. Reportedly without bowel movement in 2 weeks, but then report having dark pellets 2 days ago. Associated with bloating, decreased appetite, abdominal pain, and nausea. Denies rectal bleeding, laxative overuse, and decreased appetite. Taking docusate once daily and miralax once daily in past without relief. Unable to get linzess due to cost. Currently does NOT take opiates or iron supplements. Pt also mentions \"it feels like something is crawling in my booty\". Review of Systems  Constitutional: negative for fevers, chills, anorexia and weight loss  Eyes:   + blurring vision, followed by [facility] ophthalmology.  negative for  drainage, and irritation  ENT:   negative for sore throat,nasal congestion,ear pain,and hoarseness  Respiratory:  negative for cough, hemoptysis, dyspnea, and wheezing  CV:   negative for chest pain, palpitations, and lower extremity edema  GI:   negative for nausea, vomiting, diarrhea, abdominal pain, and melena  Endo:               negative for polyuria,polydipsia,polyphagia, and heat intolerance  Genitourinary: negative for frequency, urgency, dysuria, retention, and hematuria  Integument:  negative for rash, ulcerations, and pruritus  Hematologic:  negative for easy bruising and bleeding  Musculoskel: + knee pain/swelling. 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 Has NOT resumed Crestor yet at this time, confused about directions. negative for  muscle weakness,and joint pain/swelling  Neurological:  + dizziness. negative for headaches, vertigo,and memory/gait problems  Behavl/Psych: + anxiety, grieving, somataform.  negative for feelings of  suicide, and mood changes    Past Medical History:   Diagnosis Date    Agoraphobia without mention of panic attacks 2/17/2014    Anxiety disorder 8/18/2013    Arthritis     osteo    Breast pain, left 4/7/2015    CAD (coronary artery disease), native coronary artery 12/1/2015    Chronic chest pain 1/13/2014    Chronic pain associated with significant psychosocial dysfunction 2/17/2014    Depression 8/18/2013    Diabetes ([facility] [address]     type II    Duplicated right renal collecting system 3/13/2014    GERD (gastroesophageal reflux disease)     Gout, joint     Hypercholesteremia     hyercholesterolemia    Hypertension     Nephrolithiasis 3/13/2014    Personal history of noncompliance with medical treatment, presenting hazards to health 5/30/2014    Psychotic disorder     Vaginal pain 7/30/2014     Past Surgical History:   Procedure Laterality Date    EGD  4/23/2010         HX CYST REMOVAL      cyst removed from left wrist    HX HYSTERECTOMY      partial    HX OTHER SURGICAL      bladder dilitation    HX TUBAL LIGATION      HX UROLOGICAL      kidney stones     Social History     Social History    Marital status:      Spouse name: N/A    Number of children: N/A    Years of education: N/A     Social History Main Topics    Smoking status: Never Smoker    Smokeless tobacco: Never Used    Alcohol use No    Drug use: No    Sexual activity: No     Other Topics Concern    None     Social History Narrative    ** Merged History Encounter **     , lives with her son and Friend. Family History   Problem Relation Age of Onset    Stroke Mother     Heart Disease Mother     Cancer Father     Heart Disease Son     Liver Disease Son     Heart Disease Daughter     Malignant Hyperthermia Neg Hx     Pseudocholinesterase Deficiency Neg Hx     Delayed Awakening Neg Hx     Post-op Nausea/Vomiting Neg Hx     Emergence Delirium Neg Hx     Post-op Cognitive Dysfunction Neg Hx     Other Neg Hx      Current Outpatient Prescriptions   Medication Sig Dispense Refill    triamcinolone acetonide (KENALOG) 0.1 % topical cream Apply  to affected area two (2) times a day. use thin layer 15 g 1    amitriptyline (ELAVIL) 25 mg tablet Take 1 Tab by mouth nightly. Indications: insomnia/depression 30 Tab 11    lidocaine (XYLOCAINE) 2 % solution Apply to affected area 2-3 times daily, as needed for vaginal pain and irritation. Indications: vaginal irritation 100 mL 2    polyethylene glycol (MIRALAX) 17 gram/dose powder TAKE 1 CAPFUL IN 8 OUNCES OF WATER EVERY DAY  3    coenzyme Q-10 (CO Q-10) 200 mg capsule Take 1 cap for TWO WEEKS then continue taking WITH Crestor. 30 Cap 2    meclizine (ANTIVERT) 12.5 mg tablet Take 1 Tab by mouth three (3) times daily as needed. Indications: VERTIGO 30 Tab 0    fluticasone (FLONASE) 50 mcg/actuation nasal spray 2 Sprays by Both Nostrils route daily.  1 Bottle 0    docusate sodium (COLACE) 100 mg capsule TAKE 1 CAP BY MOUTH DAILY AS NEEDED FOR CONSTIPATION FOR UP TO 90 DAYS. (Patient taking differently: take 1 cap TWICE DAILY as needed for constipation) 90 Cap 0    rosuvastatin (CRESTOR) 5 mg tablet Take 1 Tab by mouth nightly. 30 Tab 3    valsartan (DIOVAN) 80 mg tablet Take 1 Tab by mouth daily. 90 Tab 4    dilTIAZem CD (CARDIZEM CD) 120 mg ER capsule Take 1 Cap by mouth daily. 90 Cap 4    aspirin delayed-release 81 mg tablet Take 1 Tab by mouth daily. 30 Tab 11     Allergies   Allergen Reactions    Amoxicillin Hives    Sulfa (Sulfonamide Antibiotics) Hives and Itching    Amoxicillin Hives and Itching     Long time ago - patient not exactly certain what it does    Mirtazapine Itching and Nausea Only     Funny feeling in chest    Percocet [Oxycodone-Acetaminophen] Nausea and Vomiting    Codeine Nausea and Vomiting    Prednisone Itching    Sulfa (Sulfonamide Antibiotics) Hives, Itching and Palpitations     Think it was increased heart rate or itching - many years ago    Zithromax [Azithromycin] Itching     Not sure what it does,taken long time ago       Objective:  Visit Vitals    /82 (BP 1 Location: Left arm, BP Patient Position: Sitting)    Pulse 75    Temp 96.9 °F (36.1 °C) (Oral)    Resp 18    Ht 5' 8\" (1.727 m)    Wt 170 lb (77.1 kg)    SpO2 95%    BMI 25.85 kg/m2     Wt Readings from Last 3 Encounters:   11/14/17 170 lb (77.1 kg)   10/11/17 168 lb 8 oz (76.4 kg)   10/09/17 167 lb 1.6 oz (75.8 kg)     Physical Exam:   General appearance - alert, well appearing, and in mild distress. Mental status - A/O x 4, labile mood and affect. +hyperverbal with consecutive alternating bodily complaints. Neck -Supple ,normal CSP. No JVD. Chest - CTA. Symmetric chest rise. No wheezing, rales or rhonchi. NT. Heart - Normal rate, regular rhythm. Normal S1, S2. No MGR or clicks. Abdomen - Soft,non-distended. Normoactive BS in all quadrants. NT, no mass or HSM. VAGINA- hyperpigmentation to right inner thigh near perineum with xerotic papular rash noted and excoriation. No herpetic lesions or vaginal discharge noted otherwise.   Ext- Radial, DP pulses, 2+ bilaterally. No pedal edema, clubbing, or cyanosis. Skin-Warm and dry. No hyperpigmentation, ulcerations, or suspicious lesions. Neuro - Normal speech, no focal findings or movement disorder. Normal strength, gait, and muscle tone. Assessment/Plan:  Pt is STILL fixated on pelvic symptoms, assessed prescribed kenalog ointment for dermatitis. Still unsure if she was actually seen by Dr. Leslie Lopez or not, as not documentation of visits are noted in record. Sent noted to [facility] to help schedule pt for psychiatric care, very needed as this pt has had multiple interactions to other meds prescribed with persistent MH symptoms. farrah to call pharmacy to help clarify cost and what medications are actually being taken at this time. Daughter did NOT present with pt today either. Trial of elavil sent since mentioned and only taken briefly in past while awaiting appt to see psych. I spent greater than 50% of 40 minute visit counseling patient about above mentioned topics. Increased water intake and trial of Mag Citrate advised. Also instructed pt to take DOCUSATE BID versus once daily for now. Medication Side Effects and Warnings were discussed with patient: yes   Patient Labs were reviewed: yes  Patient Past Records were reviewed: yes    See below for other orders   Follow-up Disposition:  Return in about 6 weeks (around 12/26/2017) for constipation, amitriptyline/MDD f/u,DM. ICD-10-CM ICD-9-CM    1. Dermatitis L30.9 692.9 triamcinolone acetonide (KENALOG) 0.1 % topical cream   2. Constipation, unspecified constipation type K59.00 564.00    3. Grieving F43.20 309.0    4. Insomnia, unspecified type G47.00 780.52 amitriptyline (ELAVIL) 25 mg tablet   5. 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 Vaginal irritation N89.8 623.9    7.  NGUYỄN (generalized anxiety disorder) F41.1 300.02      Orders Placed This Encounter    triamcinolone acetonide (KENALOG) 0.1 % topical cream     Sig: Apply  to affected area two (2) times a day. use thin layer     Dispense:  15 g     Refill:  1    amitriptyline (ELAVIL) 25 mg tablet     Sig: Take 1 Tab by mouth nightly. Indications: insomnia/depression     Dispense:  30 Tab     Refill:  Daniel expressed understanding of plan. An After Visit Summary was offered/printed and given to the patient.

## 2017-11-20 ENCOUNTER — TELEPHONE (OUTPATIENT)
Dept: INTERNAL MEDICINE CLINIC | Age: 72
End: 2017-11-20

## 2017-11-20 NOTE — TELEPHONE ENCOUNTER
I presume this includes taking the Mag Citrate also?  She may try taking OTC enema, but drinking lots of water is best with Maylin Donnelly (May try 2 packets daily also until bowel movement, then resume taking 1 packet daily)

## 2017-11-20 NOTE — TELEPHONE ENCOUNTER
Patient states that she has tried everything that she was told to take and is still not having bowel movement wants to know if there anything else she can take

## 2017-11-21 ENCOUNTER — TELEPHONE (OUTPATIENT)
Dept: INTERNAL MEDICINE CLINIC | Age: 72
End: 2017-11-21

## 2017-11-21 NOTE — TELEPHONE ENCOUNTER
Dean called wanting to get clinical info the Ms Fernando Roberts, can be reach at [phone], no ref #

## 2017-12-07 ENCOUNTER — TELEPHONE (OUTPATIENT)
Dept: CARDIOLOGY CLINIC | Age: 72
End: 2017-12-07

## 2017-12-07 ENCOUNTER — OFFICE VISIT (OUTPATIENT)
Dept: CARDIOLOGY CLINIC | Age: 72
End: 2017-12-07

## 2017-12-07 ENCOUNTER — DOCUMENTATION ONLY (OUTPATIENT)
Dept: CARDIOLOGY CLINIC | Age: 72
End: 2017-12-07

## 2017-12-07 VITALS
BODY MASS INDEX: 26.07 KG/M2 | HEART RATE: 86 BPM | RESPIRATION RATE: 16 BRPM | WEIGHT: 172 LBS | DIASTOLIC BLOOD PRESSURE: 90 MMHG | SYSTOLIC BLOOD PRESSURE: 122 MMHG | HEIGHT: 68 IN | OXYGEN SATURATION: 97 %

## 2017-12-07 DIAGNOSIS — G89.29 CHRONIC NONINTRACTABLE HEADACHE, UNSPECIFIED HEADACHE TYPE: Chronic | ICD-10-CM

## 2017-12-07 DIAGNOSIS — I25.10 CORONARY ARTERY DISEASE INVOLVING NATIVE CORONARY ARTERY OF NATIVE HEART WITHOUT ANGINA PECTORIS: ICD-10-CM

## 2017-12-07 DIAGNOSIS — E78.5 HYPERLIPIDEMIA, UNSPECIFIED HYPERLIPIDEMIA TYPE: Chronic | ICD-10-CM

## 2017-12-07 DIAGNOSIS — F45.8 OTHER SOMATOFORM DISORDERS: Chronic | ICD-10-CM

## 2017-12-07 DIAGNOSIS — G89.4 CHRONIC PAIN ASSOCIATED WITH SIGNIFICANT PSYCHOSOCIAL DYSFUNCTION: Primary | Chronic | ICD-10-CM

## 2017-12-07 DIAGNOSIS — R51.9 CHRONIC NONINTRACTABLE HEADACHE, UNSPECIFIED HEADACHE TYPE: Chronic | ICD-10-CM

## 2017-12-07 DIAGNOSIS — R07.9 CHRONIC CHEST PAIN: Chronic | ICD-10-CM

## 2017-12-07 DIAGNOSIS — G89.29 CHRONIC CHEST PAIN: Chronic | ICD-10-CM

## 2017-12-07 DIAGNOSIS — F41.8 DEPRESSION WITH ANXIETY: Chronic | ICD-10-CM

## 2017-12-07 DIAGNOSIS — I10 HTN, GOAL BELOW 130/80: Chronic | ICD-10-CM

## 2017-12-07 DIAGNOSIS — Z91.199 PERSONAL HISTORY OF NONCOMPLIANCE WITH MEDICAL TREATMENT, PRESENTING HAZARDS TO HEALTH: Chronic | ICD-10-CM

## 2017-12-07 RX ORDER — PRAVASTATIN SODIUM 10 MG/1
20 TABLET ORAL
Qty: 90 TAB | Refills: 4 | Status: SHIPPED | OUTPATIENT
Start: 2017-12-07 | End: 2018-09-19

## 2017-12-07 RX ORDER — NYSTATIN 100000 U/G
CREAM TOPICAL
Refills: 0 | COMMUNITY
Start: 2017-09-14 | End: 2018-09-19 | Stop reason: DRUGHIGH

## 2017-12-07 RX ORDER — POLYETHYLENE GLYCOL 3350 17 G/17G
POWDER, FOR SOLUTION ORAL DAILY
COMMUNITY
Start: 2017-11-27 | End: 2018-07-20

## 2017-12-07 RX ORDER — LINACLOTIDE 145 UG/1
CAPSULE, GELATIN COATED ORAL
COMMUNITY
Start: 2017-10-11 | End: 2017-12-07

## 2017-12-07 NOTE — MR AVS SNAPSHOT
Visit Information Date & Time Provider Department Dept. Phone Encounter #  
 12/7/2017 10:45 AM Milad Wheeler, [address] [facility] [phone] [id number] Your Appointments 12/26/2017  2:20 PM  
ROUTINE CARE with Ariadne Orr NP  
[address] [facility] Appt Note: constipation, and amitriptyline/MDD fu  
 [address] [facility] [address]  
[phone] [address]  
  
    
 5/9/2018 11:20 AM  
New Patient with Julianne Adams PsyD [address] [facility] ([facility]) Appt Note: new patient consult altered behavior/ [address] [place] [address] [facility] [id number] [phone]  
  
   
 [place] [address] [id number] [address] Upcoming Health Maintenance Date Due DTaP/Tdap/Td series (1 - Tdap) 4/30/2006 Pneumococcal 65+ Low/Medium Risk (1 of 2 - PCV13) 9/22/2010 FOOT EXAM Q1 6/12/2015 EYE EXAM RETINAL OR DILATED Q1 7/1/2015 FOBT Q 1 YEAR AGE 50-75 7/29/2015 GLAUCOMA SCREENING Q2Y 7/1/2016 HEMOGLOBIN A1C Q6M 1/6/2018 MICROALBUMIN Q1 7/6/2018 LIPID PANEL Q1 7/6/2018 MEDICARE YEARLY EXAM 7/7/2018 BREAST CANCER SCRN MAMMOGRAM 8/29/2019 Allergies as of 12/7/2017  Review Complete On: 12/7/2017 By: Milad Wheeler MD  
  
 Severity Noted Reaction Type Reactions Amoxicillin High 09/10/2010   Systemic Hives Sulfa (Sulfonamide Antibiotics) High 09/10/2010   Systemic Hives, Itching Amoxicillin Medium 04/22/2010   Systemic Hives, Itching Long time ago - patient not exactly certain what it does Mirtazapine Medium 11/20/2012   Side Effect Itching, Nausea Only Funny feeling in chest  
 Percocet [Oxycodone-acetaminophen] Medium 01/15/2014   Intolerance Nausea and Vomiting Codeine  11/26/2012    Nausea and Vomiting Crestor [Rosuvastatin]  12/07/2017    Other (comments)  
 myalgias Prednisone  05/27/2010    Itching Sulfa (Sulfonamide Antibiotics)  04/22/2010   Systemic Hives, Itching, Palpitations Think it was increased heart rate or itching - many years ago Zithromax [Azithromycin]  11/20/2012   Side Effect Itching Not sure what it does,taken long time ago Current Immunizations  Reviewed on 4/13/2017 Name Date Pneumococcal Polysaccharide (PPSV-23)  Deferred (Patient Refused) TD Vaccine 4/29/2006 Not reviewed this visit You Were Diagnosed With   
  
 Codes Comments Hyperlipidemia, unspecified hyperlipidemia type    -  Primary ICD-10-CM: E78.5 ICD-9-CM: 272.4 HTN, goal below 130/80     ICD-10-CM: I10 
ICD-9-CM: 401.9 Coronary artery disease involving native coronary artery of native heart without angina pectoris     ICD-10-CM: I25.10 ICD-9-CM: 414.01 Personal history of noncompliance with medical treatment, presenting hazards to health     ICD-10-CM: Z91.19 ICD-9-CM: V15.81 Chronic nonintractable headache, unspecified headache type     ICD-10-CM: R51 ICD-9-CM: 444. 0 Chronic chest pain     ICD-10-CM: R07.9, G89.29 ICD-9-CM: 786.50, 338.29 Vitals BP Pulse Resp Height(growth percentile) Weight(growth percentile) SpO2  
 122/90 (BP 1 Location: Left arm, BP Patient Position: Sitting) 86 16 5' 8\" (1.727 m) 172 lb (78 kg) 97% BMI OB Status Smoking Status 26.15 kg/m2 Hysterectomy Never Smoker Vitals History BMI and BSA Data Body Mass Index Body Surface Area  
 26.15 kg/m 2 1.93 m 2 Preferred Pharmacy Pharmacy Name Phone [id number]- [place], [place] - [address] [phone] Your Updated Medication List  
  
   
This list is accurate as of: 12/7/17 11:59 AM.  Always use your most recent med list.  
  
  
  
  
 amitriptyline 25 mg tablet Commonly known as:  ELAVIL Take 1 Tab by mouth nightly. Indications: insomnia/depression  
  
 aspirin delayed-release 81 mg tablet Take 1 Tab by mouth daily. coenzyme Q-10 200 mg capsule Commonly known as:  CO Q-10 Take 1 cap for TWO WEEKS then continue taking WITH Crestor. dilTIAZem  mg ER capsule Commonly known as:  CARDIZEM CD Take 1 Cap by mouth daily. docusate sodium 100 mg capsule Commonly known as:  COLACE  
TAKE 1 CAP BY MOUTH DAILY AS NEEDED FOR CONSTIPATION FOR UP TO 90 DAYS. fluticasone 50 mcg/actuation nasal spray Commonly known as:  [place] 2 Sprays by Both Nostrils route daily. nystatin topical cream  
Commonly known as:  MYCOSTATIN  
APPLY TO AFFECTED AREA TWO (2) TIMES A DAY. polyethylene glycol 17 gram packet Commonly known as:  MIRALAX  
  
 pravastatin 10 mg tablet Commonly known as:  PRAVACHOL Take 2 Tabs by mouth nightly. triamcinolone acetonide 0.1 % topical cream  
Commonly known as:  KENALOG Apply  to affected area two (2) times a day. use thin layer  
  
 valsartan 80 mg tablet Commonly known as:  DIOVAN Take 1 Tab by mouth daily. Prescriptions Sent to Pharmacy Refills  
 pravastatin (PRAVACHOL) 10 mg tablet 4 Sig: Take 2 Tabs by mouth nightly. Class: Normal  
 Pharmacy: [facility]/pharmacy [address] [place] [address] [address] [facility] #: [phone] Route: Oral  
  
We Performed the Following AMB POC EKG ROUTINE W/ 12 LEADS, INTER & REP [13168 CPT(R)] To-Do List   
 03/07/2018 Lab:  CK   
  
 03/07/2018 Lab:  LIPID PANEL   
  
 03/07/2018 Lab:  METABOLIC PANEL, COMPREHENSIVE Introducing [facility]! [facility] introduces [employer] patient portal. Now you can access parts of your medical record, email your doctor's office, and request medication refills online.    
 
1. In your internet browser, go to [email or web address] 2. Click on the First Time User? Click Here link in the Sign In box. You will see the New Member Sign Up page. 3. Enter your [employer] Access Code exactly as it appears below. You will not need to use this code after youve completed the sign-up process. If you do not sign up before the expiration date, you must request a new code. · [employer] Access Code: [email or web address] Expires: 12/12/2017  1:55 PM 
 
4. Enter the last four digits of your Social Security Number (xxxx) and Date of Birth (mm/dd/yyyy) as indicated and click Submit. You will be taken to the next sign-up page. 5. Create a GeoPollt ID. This will be your [employer] login ID and cannot be changed, so think of one that is secure and easy to remember. 6. Create a [employer] password. You can change your password at any time. 7. Enter your Password Reset Question and Answer. This can be used at a later time if you forget your password. 8. Enter your e-mail address. You will receive e-mail notification when new information is available in [address] 9. Click Sign Up. You can now view and download portions of your medical record. 10. Click the Download Summary menu link to download a portable copy of your medical information. If you have questions, please visit the Frequently Asked Questions section of the [employer] website. Remember, [employer] is NOT to be used for urgent needs. For medical emergencies, dial 911. Now available from your iPhone and Android! Please provide this summary of care documentation to your next provider. Your primary care clinician is listed as AMIE Infante. If you have any questions after today's visit, please call [phone].

## 2017-12-07 NOTE — PROGRESS NOTES
Chief Complaint   Patient presents with    Hypertension     6 MONTH. pt c/o pounding heart and chest pain time to time, dizziness. 1. Have you been to the ER, urgent care clinic since your last visit? Hospitalized since your last visit? No    2. Have you seen or consulted any other health care providers outside of the [address] [place] [address] since your last visit? Include any pap smears or colon screening.  No

## 2017-12-07 NOTE — TELEPHONE ENCOUNTER
Incoming pt call . Verified with 2 identifiers. Pt states is she to continue taking b/p medication and that she didn't ask md at her office visit today. Per office visit plan today documented by Fariba Garcia NP , I told patient b/p had not been discontinued. Pt verbalize understanding.

## 2017-12-07 NOTE — TELEPHONE ENCOUNTER
Late entry and revision to previous call : Incoming pt call . Verified with 2 identifiers. Pt states is she to continue taking b/p medication and that she didn't ask md at her office visit today. Per office visit plan today documented by Jovani Souza NP , I told patient b/p ( medications)had not been discontinued. Pt verbalize understanding.

## 2017-12-07 NOTE — PROGRESS NOTES
NAME:  Lakshmi Spann   :   1945   MRN:   [id number]   PCP:  Adan Lieberman NP           Subjective: The patient is a 67y.o. year old female  who returns for a routine follow-up on CAD and hyperlipidemia. Since the last visit, patient reports her youngest daughter passed away 2-3 weeks ago from sarcoidosis. She has felt pounding and discomfort mostly when she lays down at night. She reports blurred vision despite seeing opthalmology a month ago. They noted it was not \"sugar\" but she has mild cataract they are not going to treat yet. She hears her heartbeat in her ear and complains of a headache in the front but then she notes it is in the back of her head. (All symptoms she reported to PCP in August). Ms Richard Aguillon reports her am blood sugar is in the 80s. Is off her anxiety medications but has tried to schedule to see Dr Tyshawn Isabel  however they won't see her due to hx of missed appointments. She is not taking a statin.      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    Onycholysis of toenail X19.0    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    Nephrolithiasis N20.0    Personal history of noncompliance with medical treatment, presenting hazards to health Z91.19    Breast pain, left N64.4    Pseudobulbar affect F48.2    CAD (coronary artery disease), native coronary artery I25.10    SOB (shortness of breath) R06.02    Broken heart syndrome I51.81    Death of child Z63.4    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    Diabetes mellitus type 2, diet-controlled (HCC) E11.9    Somatization disorder F45.0    Death of parent Z63.4    Tightness sensation-head Z78.9    Generalized OA M15.9    Noncompliance with medication regimen-chol medication  Z91.14    Orthostatic hypotension I95.1    Hydronephrosis of left kidney N13.30    Left-sided chest wall pain R07.89    Weakness R53.1    Assistance needed with transportation Z74.8    Gait instability R26.81    Advance directive discussed with patient Z71.89    Vaginal irritation N89.8    Blurring of vision H53.8       Past Medical History:   Diagnosis Date    Agoraphobia without mention of panic attacks 2/17/2014    Anxiety disorder 8/18/2013    Arthritis     osteo    Breast pain, left 4/7/2015    CAD (coronary artery disease), native coronary artery 12/1/2015    Chronic chest pain 1/13/2014    Chronic pain associated with significant psychosocial dysfunction 2/17/2014    Depression 8/18/2013    Diabetes ([address]     type II    Duplicated right renal collecting system 3/13/2014    GERD (gastroesophageal reflux disease)     Gout, joint     Hypercholesteremia     hyercholesterolemia    Hypertension     Nephrolithiasis 3/13/2014    Personal history of noncompliance with medical treatment, presenting hazards to health 5/30/2014    Psychotic disorder     Vaginal pain 7/30/2014       Social History   Substance Use Topics    Smoking status: Never Smoker    Smokeless tobacco: Never Used    Alcohol use No      Family History   Problem Relation Age of Onset    Stroke Mother     Heart Disease Mother     Cancer Father     Heart Disease Son     Liver Disease Son     Heart Disease Daughter     Malignant Hyperthermia Neg Hx     Pseudocholinesterase Deficiency Neg Hx     Delayed Awakening Neg Hx     Post-op Nausea/Vomiting Neg Hx     Emergence Delirium Neg Hx     Post-op Cognitive Dysfunction Neg Hx     Other Neg Hx         Review of Systems  Constitutional: Negative for fever, chills, and diaphoresis. Respiratory: Negative for cough, hemoptysis, sputum production, shortness of breath and wheezing. Cardiovascular: Negative for chest pain, palpitations, orthopnea, claudication, leg swelling and PND. Gastrointestinal: Negative for heartburn, nausea, vomiting, blood in stool and melena. Genitourinary: Negative for dysuria and flank pain. Musculoskeletal: Negative for joint pain and back pain. Skin: Negative for rash. Neurological: Negative for focal weakness, seizures, loss of consciousness, weakness and headaches. Endo/Heme/Allergies: Does not bruise/bleed easily. Psychiatric/Behavioral: Negative for memory loss. The patient does not have insomnia. Objective:       Vitals:    12/07/17 1113 12/07/17 1114 12/07/17 1115   BP: 122/90 122/80 122/90   Pulse: 86 86 86   Resp: 16     SpO2: 97%     Weight: 172 lb (78 kg)     Height: 5' 8\" (1.727 m)      Body mass index is 26.15 kg/(m^2). General PE    Gen: NAD     Mental Status - Alert. General Appearance - Not in acute distress. Neck - no JVD     Chest and Lung Exam     Inspection: Accessory muscles - No use of accessory muscles in breathing. Auscultation:   Breath sounds: - Normal.     Cardiovascular   Inspection: Jugular vein - Bilateral - Inspection Normal.   Palpation/Percussion:   Apical Impulse: - Normal.   Auscultation: Rhythm - Regular. Heart Sounds - S1 WNL and S2 WNL. No S3 or S4. Murmurs & Other Heart Sounds: Auscultation of the heart reveals - No Murmurs. Peripheral Vascular   Upper Extremity: Inspection - Bilateral - No Cyanotic nailbeds or Digital clubbing. Lower Extremity:   Palpation: Edema - Bilateral - No edema. Abdomen: Soft, non-tender, bowel sounds are active.      Neuro: A&O times 3, CN and motor grossly WNL      Data Review:     EKG -  Sinus  Rhythm   WITHIN NORMAL LIMITS    Labs-  Lab Results   Component Value Date/Time    Cholesterol, total 258 07/06/2017 12:22 PM    HDL Cholesterol 64 07/06/2017 12:22 PM    LDL,Direct 209 01/26/2017 02:06 PM    LDL, calculated 175 07/06/2017 12:22 PM    VLDL, calculated 19 07/06/2017 12:22 PM    Triglyceride 95 07/06/2017 12:22 PM         Allergies reviewed  Allergies   Allergen Reactions    Amoxicillin Hives    Sulfa (Sulfonamide Antibiotics) Hives and Itching    Amoxicillin Hives and Itching     Long time ago - patient not exactly certain what it does    Mirtazapine Itching and Nausea Only     Funny feeling in chest    Percocet [Oxycodone-Acetaminophen] Nausea and Vomiting    Codeine Nausea and Vomiting    Crestor [Rosuvastatin] Other (comments)     myalgias    Prednisone Itching    Sulfa (Sulfonamide Antibiotics) Hives, Itching and Palpitations     Think it was increased heart rate or itching - many years ago    Zithromax [Azithromycin] Itching     Not sure what it does,taken long time ago       Medications reviewed  Current Outpatient Prescriptions   Medication Sig    nystatin (MYCOSTATIN) topical cream APPLY TO AFFECTED AREA TWO (2) TIMES A DAY.  polyethylene glycol (MIRALAX) 17 gram packet     pravastatin (PRAVACHOL) 10 mg tablet Take 2 Tabs by mouth nightly.  triamcinolone acetonide (KENALOG) 0.1 % topical cream Apply  to affected area two (2) times a day. use thin layer    amitriptyline (ELAVIL) 25 mg tablet Take 1 Tab by mouth nightly. Indications: insomnia/depression    coenzyme Q-10 (CO Q-10) 200 mg capsule Take 1 cap for TWO WEEKS then continue taking WITH Crestor.  fluticasone (FLONASE) 50 mcg/actuation nasal spray 2 Sprays by Both Nostrils route daily.  docusate sodium (COLACE) 100 mg capsule TAKE 1 CAP BY MOUTH DAILY AS NEEDED FOR CONSTIPATION FOR UP TO 90 DAYS. (Patient taking differently: take 1 cap TWICE DAILY as needed for constipation)    valsartan (DIOVAN) 80 mg tablet Take 1 Tab by mouth daily.  dilTIAZem CD (CARDIZEM CD) 120 mg ER capsule Take 1 Cap by mouth daily.     aspirin delayed-release 81 mg tablet Take 1 Tab by mouth daily. No current facility-administered medications for this visit. Assessment:       ICD-10-CM ICD-9-CM    1. Chronic pain associated with significant psychosocial dysfunction G89.4 338.4    2. Hyperlipidemia, unspecified hyperlipidemia type E78.5 272.4 AMB POC EKG ROUTINE W/ 12 LEADS, INTER & REP      CK      LIPID PANEL      METABOLIC PANEL, COMPREHENSIVE   3. HTN, goal below 130/80 I10 401.9 CK      LIPID PANEL      METABOLIC PANEL, COMPREHENSIVE   4. Coronary artery disease involving native coronary artery of native heart without angina pectoris I25.10 414.01 CK      LIPID PANEL      METABOLIC PANEL, COMPREHENSIVE   5. Personal history of noncompliance with medical treatment, presenting hazards to health Z91.19 V15.81 CK      LIPID PANEL      METABOLIC PANEL, COMPREHENSIVE   6. Chronic nonintractable headache, unspecified headache type R51 784.0 CK      LIPID PANEL      METABOLIC PANEL, COMPREHENSIVE   7. Chronic chest pain R07.9 786.50 CK    G89.29 338.29 LIPID PANEL      METABOLIC PANEL, COMPREHENSIVE   8. 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 300.89    9. Depression with anxiety F41.8 300.4         Orders Placed This Encounter    CK     Standing Status:   Future     Standing Expiration Date:   12/7/2018    LIPID PANEL     Standing Status:   Future     Standing Expiration Date:   46/1/3750    METABOLIC PANEL, COMPREHENSIVE     Standing Status:   Future     Standing Expiration Date:   12/7/2018    AMB POC EKG ROUTINE W/ 12 LEADS, INTER & REP     Order Specific Question:   Reason for Exam:     Answer:   routine    DISCONTD: LINZESS 145 mcg cap capsule    nystatin (MYCOSTATIN) topical cream     Sig: APPLY TO AFFECTED AREA TWO (2) TIMES A DAY. Refill:  0    polyethylene glycol (MIRALAX) 17 gram packet    pravastatin (PRAVACHOL) 10 mg tablet     Sig: Take 2 Tabs by mouth nightly.      Dispense:  90 Tab     Refill:  4           Plan:     Patient presents for follow up. Multiple non-cardiac issues. CAD/atypical chest discomfort for years: Noncardiac chest pain for years, reproducible with movement and palpation or with anxiety. History of mild nonobstructive CAD in the past.     Hypertension:  Controlled. On Diovan and diltiazem. Hyperlipidemia:  Not on statin despite multiple attempts to start one. Now willing to try again and check labs in 3 months.     Follow up in 1 year, sooner as needed.      Angel Stanford MD

## 2018-01-08 RX ORDER — DILTIAZEM HYDROCHLORIDE 120 MG/1
CAPSULE, COATED, EXTENDED RELEASE ORAL
Qty: 30 CAP | Refills: 1 | Status: SHIPPED | OUTPATIENT
Start: 2018-01-08 | End: 2018-01-12 | Stop reason: SDUPTHER

## 2018-01-12 ENCOUNTER — OFFICE VISIT (OUTPATIENT)
Dept: INTERNAL MEDICINE CLINIC | Age: 73
End: 2018-01-12

## 2018-01-12 VITALS
BODY MASS INDEX: 26.39 KG/M2 | OXYGEN SATURATION: 100 % | HEIGHT: 68 IN | SYSTOLIC BLOOD PRESSURE: 153 MMHG | TEMPERATURE: 98.4 F | RESPIRATION RATE: 18 BRPM | DIASTOLIC BLOOD PRESSURE: 87 MMHG | WEIGHT: 174.1 LBS | HEART RATE: 76 BPM

## 2018-01-12 DIAGNOSIS — H54.7 VISION PROBLEM: Primary | ICD-10-CM

## 2018-01-12 DIAGNOSIS — M54.50 CHRONIC MIDLINE LOW BACK PAIN WITHOUT SCIATICA: ICD-10-CM

## 2018-01-12 DIAGNOSIS — G89.29 CHRONIC MIDLINE LOW BACK PAIN WITHOUT SCIATICA: ICD-10-CM

## 2018-01-12 RX ORDER — ROSUVASTATIN CALCIUM 5 MG/1
TABLET, COATED ORAL
COMMUNITY
Start: 2017-11-25 | End: 2018-09-19

## 2018-01-12 RX ORDER — NABUMETONE 500 MG/1
TABLET, FILM COATED ORAL
Refills: 99 | COMMUNITY
Start: 2017-12-19 | End: 2018-04-19

## 2018-01-12 NOTE — PROGRESS NOTES
Subjective: (As above and below)     Chief Complaint   Patient presents with    Follow-up    Hip Pain    Headache     Bernette Kos. Dave Russo is a 67y.o. year old female who presents for bilateral hip pain and blurred vision. She is a very poor historian. She states that she has low back pain that travels to both hips. No inciting injury, however, she then states that she fell 6 weeks ago. Lavinia Tang Denies saddle numbness, leg weakness, falls or bowel/bladder dysfunction. She has tried tylenol which helps a little    Blurred vision: ongoing problem - she states that she saw eye dr at [facility] a few months ago and was told everything was fine apart from mild cataracts. She states that it is not worsening. No double vision, blurred with near and distance. .. She asks for a second opinion referral... She denies headache today - she says \"no the problem is here\" and points to her forehead - she says there is a rash that has been present which is bothering her. .. Reviewed PmHx, RxHx, FmHx, SocHx, AllgHx and updated in chart.   Family History   Problem Relation Age of Onset    Stroke Mother     Heart Disease Mother     Cancer Father     Heart Disease Son     Liver Disease Son     Heart Disease Daughter     Malignant Hyperthermia Neg Hx     Pseudocholinesterase Deficiency Neg Hx     Delayed Awakening Neg Hx     Post-op Nausea/Vomiting Neg Hx     Emergence Delirium Neg Hx     Post-op Cognitive Dysfunction Neg Hx     Other Neg Hx        Past Medical History:   Diagnosis Date    Agoraphobia without mention of panic attacks 2/17/2014    Anxiety disorder 8/18/2013    Arthritis     osteo    Breast pain, left 4/7/2015    CAD (coronary artery disease), native coronary artery 12/1/2015    Chronic chest pain 1/13/2014    Chronic pain associated with significant psychosocial dysfunction 2/17/2014    Depression 8/18/2013    Diabetes ([facility] [address]     type II    Duplicated right renal collecting system 3/13/2014    GERD (gastroesophageal reflux disease)     Gout, joint     Hypercholesteremia     hyercholesterolemia    Hypertension     Nephrolithiasis 3/13/2014    Personal history of noncompliance with medical treatment, presenting hazards to health 5/30/2014    Psychotic disorder     Vaginal pain 7/30/2014      Social History     Social History    Marital status:      Spouse name: N/A    Number of children: N/A    Years of education: N/A     Social History Main Topics    Smoking status: Never Smoker    Smokeless tobacco: Never Used    Alcohol use No    Drug use: No    Sexual activity: No     Other Topics Concern    None     Social History Narrative    ** Merged History Encounter **     , lives with her son and Friend. Current Outpatient Prescriptions   Medication Sig    nabumetone (RELAFEN) 500 mg tablet TAKE 1 TABLET BY MOUTH TWICE A DAY    rosuvastatin (CRESTOR) 5 mg tablet     trolamine salicylate-aloe vera 62% (ASPERCREME) topical cream Apply  to affected area as needed for Pain.  pravastatin (PRAVACHOL) 10 mg tablet Take 2 Tabs by mouth nightly.  amitriptyline (ELAVIL) 25 mg tablet Take 1 Tab by mouth nightly. Indications: insomnia/depression    coenzyme Q-10 (CO Q-10) 200 mg capsule Take 1 cap for TWO WEEKS then continue taking WITH Crestor.  docusate sodium (COLACE) 100 mg capsule TAKE 1 CAP BY MOUTH DAILY AS NEEDED FOR CONSTIPATION FOR UP TO 90 DAYS. (Patient taking differently: take 1 cap TWICE DAILY as needed for constipation)    valsartan (DIOVAN) 80 mg tablet Take 1 Tab by mouth daily.  dilTIAZem CD (CARDIZEM CD) 120 mg ER capsule Take 1 Cap by mouth daily.  aspirin delayed-release 81 mg tablet Take 1 Tab by mouth daily.  nystatin (MYCOSTATIN) topical cream APPLY TO AFFECTED AREA TWO (2) TIMES A DAY.  polyethylene glycol (MIRALAX) 17 gram packet     triamcinolone acetonide (KENALOG) 0.1 % topical cream Apply  to affected area two (2) times a day.  use thin layer    fluticasone (FLONASE) 50 mcg/actuation nasal spray 2 Sprays by Both Nostrils route daily. No current facility-administered medications for this visit. Review of Systems:   Constitutional:    Negative for fever and chills, negative diaphoresis. HEENT:              Negative for neck pain and stiffness. Eyes:                  Negative for visual disturbance, itching, redness or discharge. Respiratory:        Negative for cough and shortness of breath. Cardiovascular:  Negative for chest pain and palpitations. Gastrointestinal: Negative for nausea, vomiting, abdominal pain, diarrhea or constipation. Genitourinary:     Negative for dysuria and frequency. Musculoskeletal: Negative for falls, tenderness and swelling. Skin:                    Negative for rash, masses or lesions. Neurological:       Negative for dizzyness, seizure, loss of consciousness, weakness and numbness.      Objective:     Vitals:    01/12/18 1323 01/12/18 1326   BP: 165/85 153/87   Pulse: 81 76   Resp: 18    Temp: 98.4 °F (36.9 °C)    TempSrc: Oral    SpO2: 100%    Weight: 174 lb 1.6 oz (79 kg)    Height: 5' 8\" (1.727 m)        Results for orders placed or performed in visit on 10/09/17   CULTURE, URINE   Result Value Ref Range    Urine Culture, Routine No growth    AMB POC URINALYSIS DIP STICK AUTO W/O MICRO   Result Value Ref Range    Color (UA POC) Yellow     Clarity (UA POC) Clear     Glucose (UA POC) Negative Negative    Bilirubin (UA POC) Negative Negative    Ketones (UA POC) Negative Negative    Specific gravity (UA POC) 1.020 1.001 - 1.035    Blood (UA POC) Negative Negative    pH (UA POC) 6.0 4.6 - 8.0    Protein (UA POC) Negative Negative mg/dL    Urobilinogen (UA POC) 1 mg/dL 0.2 - 1    Nitrites (UA POC) Negative Negative    Leukocyte esterase (UA POC) 1+ Negative         Physical Examination: General appearance - alert, well appearing, and in no distress  Chest - clear to auscultation, no wheezes, rales or rhonchi, symmetric air entry  Heart - normal rate, regular rhythm, normal S1, S2, no murmurs, rubs, clicks or gallops  Back exam - ttp to bilateral lumbar paraspinals, lower ext strength 5/5 to flex/ext, neg slr bilat, gait normal  Skin - do not appreciate any rash to her face, skin is dry      Assessment/ Plan:   Follow-up Disposition: Not on File     Noted that pt has been dismissed from this practice from other provider due to non-adherence with medical advise. She was unaware of this. Advised urgent care if back worsens. Encouraged f/u with neuropsych in May, eye     1. Vision problem    - REFERRAL TO OPHTHALMOLOGY    2. Chronic midline low back pain without sciatica  recc heat, reviewed red flag signs  - trolamine salicylate-aloe vera 50% (ASPERCREME) topical cream; Apply  to affected area as needed for Pain. Dispense: 141 g; Refill: 0        I have discussed the diagnosis with the patient and the intended plan as seen in the above orders. The patient has received an after-visit summary and questions were answered concerning future plans. Pt conveyed understanding of plan. Medication Side Effects and Warnings were discussed with patient: yes  Patient Labs were reviewed: yes  Patient Past Records were reviewed:  yes    Lazara Martinez NP

## 2018-01-12 NOTE — PATIENT INSTRUCTIONS
1. You are asking for a second opinion regarding your vision, please call to set up with this doctor    2. For you back: try heat for 15 minutes 4 times daily followed by topical cream like bengay or asper cream. Occasional tylenol is okay. 3. If you develop severe worsening of back pain you may present to urgent care          Back Pain: Care Instructions  Your Care Instructions    Back pain has many possible causes. It is often related to problems with muscles and ligaments of the back. It may also be related to problems with the nerves, discs, or bones of the back. Moving, lifting, standing, sitting, or sleeping in an awkward way can strain the back. Sometimes you don't notice the injury until later. Arthritis is another common cause of back pain. Although it may hurt a lot, back pain usually improves on its own within several weeks. Most people recover in 12 weeks or less. Using good home treatment and being careful not to stress your back can help you feel better sooner. Follow-up care is a key part of your treatment and safety. Be sure to make and go to all appointments, and call your doctor if you are having problems. It's also a good idea to know your test results and keep a list of the medicines you take. How can you care for yourself at home? · Sit or lie in positions that are most comfortable and reduce your pain. Try one of these positions when you lie down:  ¨ Lie on your back with your knees bent and supported by large pillows. ¨ Lie on the floor with your legs on the seat of a sofa or chair. Donnel Seller on your side with your knees and hips bent and a pillow between your legs. ¨ Lie on your stomach if it does not make pain worse. · Do not sit up in bed, and avoid soft couches and twisted positions. Bed rest can help relieve pain at first, but it delays healing. Avoid bed rest after the first day of back pain. · Change positions every 30 minutes.  If you must sit for long periods of time, take breaks from sitting. Get up and walk around, or lie in a comfortable position. · Try using a heating pad on a low or medium setting for 15 to 20 minutes every 2 or 3 hours. Try a warm shower in place of one session with the heating pad. · You can also try an ice pack for 10 to 15 minutes every 2 to 3 hours. Put a thin cloth between the ice pack and your skin. · Take pain medicines exactly as directed. ¨ If the doctor gave you a prescription medicine for pain, take it as prescribed. ¨ If you are not taking a prescription pain medicine, ask your doctor if you can take an over-the-counter medicine. · Take short walks several times a day. You can start with 5 to 10 minutes, 3 or 4 times a day, and work up to longer walks. Walk on level surfaces and avoid hills and stairs until your back is better. · Return to work and other activities as soon as you can. Continued rest without activity is usually not good for your back. · To prevent future back pain, do exercises to stretch and strengthen your back and stomach. Learn how to use good posture, safe lifting techniques, and proper body mechanics. When should you call for help? Call your doctor now or seek immediate medical care if:  ? · You have new or worsening numbness in your legs. ? · You have new or worsening weakness in your legs. (This could make it hard to stand up.)   ? · You lose control of your bladder or bowels. ? Watch closely for changes in your health, and be sure to contact your doctor if:  ? · Your pain gets worse. ? · You are not getting better after 2 weeks. Where can you learn more? Go to http://lobo-michel.info/. Enter F510 in the search box to learn more about \"Back Pain: Care Instructions. \"  Current as of: March 21, 2017  Content Version: 11.4  © 6884-2016 [employer]. Care instructions adapted under license by [employer] (which disclaims liability or warranty for this information).  If you have questions about a medical condition or this instruction, always ask your healthcare professional. [place] [address] any warranty or liability for your use of this information.

## 2018-01-12 NOTE — Clinical Note
Pt is confused about termination, can you please call her on Monday to instruct her on how to find alternate pcp. ..  Thank you Ines Alvarado

## 2018-01-12 NOTE — PROGRESS NOTES
Pt here for   Chief Complaint   Patient presents with    Follow-up    Hip Pain    Headache     1. Have you been to the ER, urgent care clinic since your last visit? Hospitalized since your last visit? No    2. Have you seen or consulted any other health care providers outside of the [address] [place] [address] since your last visit? Include any pap smears or colon screening.  No      Pt c/o pain 6 of 10, Pt denies taking anything for pain today    PHQ over the last two weeks 1/12/2018   PHQ Not Done Active Diagnosis of Depression or Bipolar Disorder   Little interest or pleasure in doing things -   Feeling down, depressed or hopeless -   Total Score PHQ 2 -

## 2018-01-12 NOTE — MR AVS SNAPSHOT
Visit Information Date & Time Provider Department Dept. Phone Encounter #  
 1/12/2018  1:30 PM Emmanuelle Dailey, [address] [id number] Your Appointments 5/9/2018 11:20 AM  
New Patient with Emily Carmichael PsyD [address] ([address]) Appt Note: new patient consult altered behavior/ Ankit [place] [address] [facility] 99 [id number] [phone]  
  
   
 [place] [address] [id number] [address] [place] Upcoming Health Maintenance Date Due DTaP/Tdap/Td series (1 - Tdap) 4/30/2006 Pneumococcal 65+ Low/Medium Risk (1 of 2 - PCV13) 9/22/2010 FOOT EXAM Q1 6/12/2015 EYE EXAM RETINAL OR DILATED Q1 7/1/2015 FOBT Q 1 YEAR AGE 50-75 7/29/2015 GLAUCOMA SCREENING Q2Y 7/1/2016 HEMOGLOBIN A1C Q6M 1/6/2018 MICROALBUMIN Q1 7/6/2018 LIPID PANEL Q1 7/6/2018 MEDICARE YEARLY EXAM 7/7/2018 BREAST CANCER SCRN MAMMOGRAM 8/29/2019 Allergies as of 1/12/2018  Review Complete On: 1/12/2018 By: Maryann Dailey, NICK Severity Noted Reaction Type Reactions Amoxicillin High 09/10/2010   Systemic Hives Sulfa (Sulfonamide Antibiotics) High 09/10/2010   Systemic Hives, Itching Amoxicillin Medium 04/22/2010   Systemic Hives, Itching Long time ago - patient not exactly certain what it does Mirtazapine Medium 11/20/2012   Side Effect Itching, Nausea Only Funny feeling in chest  
 Percocet [Oxycodone-acetaminophen] Medium 01/15/2014   Intolerance Nausea and Vomiting Codeine  11/26/2012    Nausea and Vomiting Crestor [Rosuvastatin]  12/07/2017    Other (comments)  
 myalgias Prednisone  05/27/2010    Itching Sulfa (Sulfonamide Antibiotics)  04/22/2010   Systemic Hives, Itching, Palpitations Think it was increased heart rate or itching - many years ago Zithromax [Azithromycin]  11/20/2012   Side Effect Itching Not sure what it does,taken long time ago Current Immunizations  Reviewed on 4/13/2017 Name Date Pneumococcal Polysaccharide (PPSV-23)  Deferred (Patient Refused) TD Vaccine 4/29/2006 Not reviewed this visit You Were Diagnosed With   
  
 Codes Comments Vision problem    -  Primary ICD-10-CM: H54.7 ICD-9-CM: V41.0 Chronic midline low back pain without sciatica     ICD-10-CM: M54.5, G89.29 ICD-9-CM: 724.2, 338.29 Vitals BP Pulse Temp Resp Height(growth percentile) Weight(growth percentile) 153/87 (BP 1 Location: Right arm, BP Patient Position: Sitting) 76 98.4 °F (36.9 °C) (Oral) 18 5' 8\" (1.727 m) 174 lb 1.6 oz (79 kg) SpO2 BMI OB Status Smoking Status 100% 26.47 kg/m2 Hysterectomy Never Smoker Vitals History BMI and BSA Data Body Mass Index Body Surface Area  
 26.47 kg/m 2 1.95 m 2 Preferred Pharmacy Pharmacy Name Phone [facility]/PHARMACY #[id number], [place] - [address] [phone] Your Updated Medication List  
  
   
This list is accurate as of: 1/12/18  1:45 PM.  Always use your most recent med list.  
  
  
  
  
 amitriptyline 25 mg tablet Commonly known as:  ELAVIL Take 1 Tab by mouth nightly. Indications: insomnia/depression  
  
 aspirin delayed-release 81 mg tablet Take 1 Tab by mouth daily. coenzyme Q-10 200 mg capsule Commonly known as:  CO Q-10 Take 1 cap for TWO WEEKS then continue taking WITH Crestor. dilTIAZem  mg ER capsule Commonly known as:  CARDIZEM CD Take 1 Cap by mouth daily. docusate sodium 100 mg capsule Commonly known as:  COLACE  
TAKE 1 CAP BY MOUTH DAILY AS NEEDED FOR CONSTIPATION FOR UP TO 90 DAYS. fluticasone 50 mcg/actuation nasal spray Commonly known as:  Vicky Golds 2 Sprays by Both Nostrils route daily. nabumetone 500 mg tablet Commonly known as:  RELAFEN  
TAKE 1 TABLET BY MOUTH TWICE A DAY  
  
 nystatin topical cream  
Commonly known as:  MYCOSTATIN  
APPLY TO AFFECTED AREA TWO (2) TIMES A DAY. polyethylene glycol 17 gram packet Commonly known as:  MIRALAX  
  
 pravastatin 10 mg tablet Commonly known as:  PRAVACHOL Take 2 Tabs by mouth nightly. rosuvastatin 5 mg tablet Commonly known as:  CRESTOR  
  
 triamcinolone acetonide 0.1 % topical cream  
Commonly known as:  KENALOG Apply  to affected area two (2) times a day. use thin layer  
  
 trolamine salicylate-aloe vera 95% topical cream  
Commonly known as:  ASPERCREME Apply  to affected area as needed for Pain.  
  
 valsartan 80 mg tablet Commonly known as:  DIOVAN Take 1 Tab by mouth daily. Prescriptions Sent to Pharmacy Refills  
 trolamine salicylate-aloe vera 55% (ASPERCREME) topical cream 0 Sig: Apply  to affected area as needed for Pain. Class: Normal  
 Pharmacy: [facility]/pharmacy [address]  #: [phone] Route: Topical  
  
We Performed the Following REFERRAL TO OPHTHALMOLOGY [REF57 Custom] Comments:  
 Dr. Margurette Boast Mittlerer [facility] 30 
[phone] Referral Information Referral ID Referred By Referred To  
  
 [id number] Robyn JAMA Not Available Visits Status Start Date End Date 1 New Request 1/12/18 1/12/19 If your referral has a status of pending review or denied, additional information will be sent to support the outcome of this decision. Patient Instructions 1. You are asking for a second opinion regarding your vision, please call to set up with this doctor 2. For you back: try heat for 15 minutes 4 times daily followed by topical cream like bengay or asper cream. Occasional tylenol is okay. 3. If you develop severe worsening of back pain you may present to urgent care Back Pain: Care Instructions Your Care Instructions Back pain has many possible causes. It is often related to problems with muscles and ligaments of the back. It may also be related to problems with the nerves, discs, or bones of the back. Moving, lifting, standing, sitting, or sleeping in an awkward way can strain the back. Sometimes you don't notice the injury until later. Arthritis is another common cause of back pain. Although it may hurt a lot, back pain usually improves on its own within several weeks. Most people recover in 12 weeks or less. Using good home treatment and being careful not to stress your back can help you feel better sooner. Follow-up care is a key part of your treatment and safety. Be sure to make and go to all appointments, and call your doctor if you are having problems. It's also a good idea to know your test results and keep a list of the medicines you take. How can you care for yourself at home? · Sit or lie in positions that are most comfortable and reduce your pain. Try one of these positions when you lie down: ¨ Lie on your back with your knees bent and supported by large pillows. ¨ Lie on the floor with your legs on the seat of a sofa or chair. Reina Corti on your side with your knees and hips bent and a pillow between your legs. ¨ Lie on your stomach if it does not make pain worse. · Do not sit up in bed, and avoid soft couches and twisted positions. Bed rest can help relieve pain at first, but it delays healing. Avoid bed rest after the first day of back pain. · Change positions every 30 minutes. If you must sit for long periods of time, take breaks from sitting. Get up and walk around, or lie in a comfortable position. · Try using a heating pad on a low or medium setting for 15 to 20 minutes every 2 or 3 hours. Try a warm shower in place of one session with the heating pad. · You can also try an ice pack for 10 to 15 minutes every 2 to 3 hours. Put a thin cloth between the ice pack and your skin. · Take pain medicines exactly as directed. ¨ If the doctor gave you a prescription medicine for pain, take it as prescribed. ¨ If you are not taking a prescription pain medicine, ask your doctor if you can take an over-the-counter medicine. · Take short walks several times a day. You can start with 5 to 10 minutes, 3 or 4 times a day, and work up to longer walks. Walk on level surfaces and avoid hills and stairs until your back is better. · Return to work and other activities as soon as you can. Continued rest without activity is usually not good for your back. · To prevent future back pain, do exercises to stretch and strengthen your back and stomach. Learn how to use good posture, safe lifting techniques, and proper body mechanics. When should you call for help? Call your doctor now or seek immediate medical care if: 
? · You have new or worsening numbness in your legs. ? · You have new or worsening weakness in your legs. (This could make it hard to stand up.) ? · You lose control of your bladder or bowels. ? Watch closely for changes in your health, and be sure to contact your doctor if: 
? · Your pain gets worse. ? · You are not getting better after 2 weeks. Where can you learn more? Go to http://lobo-michel.info/. Enter O670 in the search box to learn more about \"Back Pain: Care Instructions. \" Current as of: March 21, 2017 Content Version: 11.4 © 1625-0890 [employer]. Care instructions adapted under license by [employer] (which disclaims liability or warranty for this information). If you have questions about a medical condition or this instruction, always ask your healthcare professional. [address] any warranty or liability for your use of this information. Introducing [facility]!    
 Lawrence Avery introduces [employer]u patient portal. Now you can access parts of your medical record, email your doctor's office, and request medication refills online. 1. In your internet browser, go to [email or web address] 2. Click on the First Time User? Click Here link in the Sign In box. You will see the New Member Sign Up page. 3. Enter your [email or web address] Access Code exactly as it appears below. You will not need to use this code after youve completed the sign-up process. If you do not sign up before the expiration date, you must request a new code. · [email or web address] Access Code: [email or web address] Expires: 3/26/2018  3:28 PM 
 
4. Enter the last four digits of your Social Security Number (xxxx) and Date of Birth (mm/dd/yyyy) as indicated and click Submit. You will be taken to the next sign-up page. 5. Create a [email or web address] ID. This will be your [email or web address] login ID and cannot be changed, so think of one that is secure and easy to remember. 6. Create a [email or web address] password. You can change your password at any time. 7. Enter your Password Reset Question and Answer. This can be used at a later time if you forget your password. 8. Enter your e-mail address. You will receive e-mail notification when new information is available in [address] 9. Click Sign Up. You can now view and download portions of your medical record. 10. Click the Download Summary menu link to download a portable copy of your medical information. If you have questions, please visit the Frequently Asked Questions section of the [email or web address] website. Remember, [email or web address] is NOT to be used for urgent needs. For medical emergencies, dial 911. Now available from your iPhone and Android! Please provide this summary of care documentation to your next provider. Your primary care clinician is listed as Richie Blanc. If you have any questions after today's visit, please call [phone].

## 2018-01-16 NOTE — PROGRESS NOTES
Patient did call back. I advise patient of termination. Patient states she did not receive termination letter. I will re mail letter. I also gave patient a phone number for Joby Aguila. Patient has to 01/28/18 to receive medical care from our practice.

## 2018-01-16 NOTE — PROGRESS NOTES
I tried calling patient today. I'm not sure if we correct phone numbers. I was not able to leave a message.

## 2018-01-23 ENCOUNTER — TELEPHONE (OUTPATIENT)
Dept: CARDIOLOGY CLINIC | Age: 73
End: 2018-01-23

## 2018-01-25 NOTE — TELEPHONE ENCOUNTER
Spoke with patient she is out of the Valsartan 80 mg and getting low on the Diltiazem called [facility] to renew this medication for 90 day supply X 2 notified patient this was completed she has several refills of the pravastatin left per pharmist.

## 2018-01-25 NOTE — TELEPHONE ENCOUNTER
Patient calling back again did not want to speak with the nurse-  Wants the nurse to know  she has pressure in head-

## 2018-02-26 RX ORDER — DILTIAZEM HYDROCHLORIDE 120 MG/1
CAPSULE, COATED, EXTENDED RELEASE ORAL
Qty: 30 CAP | Refills: 1 | Status: SHIPPED | OUTPATIENT
Start: 2018-02-26 | End: 2018-04-11 | Stop reason: SDUPTHER

## 2018-03-07 DIAGNOSIS — Z91.199 PERSONAL HISTORY OF NONCOMPLIANCE WITH MEDICAL TREATMENT, PRESENTING HAZARDS TO HEALTH: Chronic | ICD-10-CM

## 2018-03-07 DIAGNOSIS — E78.5 HYPERLIPIDEMIA, UNSPECIFIED HYPERLIPIDEMIA TYPE: Chronic | ICD-10-CM

## 2018-03-07 DIAGNOSIS — R07.9 CHRONIC CHEST PAIN: Chronic | ICD-10-CM

## 2018-03-07 DIAGNOSIS — G89.29 CHRONIC NONINTRACTABLE HEADACHE, UNSPECIFIED HEADACHE TYPE: Chronic | ICD-10-CM

## 2018-03-07 DIAGNOSIS — I10 HTN, GOAL BELOW 130/80: Chronic | ICD-10-CM

## 2018-03-07 DIAGNOSIS — R51.9 CHRONIC NONINTRACTABLE HEADACHE, UNSPECIFIED HEADACHE TYPE: Chronic | ICD-10-CM

## 2018-03-07 DIAGNOSIS — G89.29 CHRONIC CHEST PAIN: Chronic | ICD-10-CM

## 2018-03-07 DIAGNOSIS — I25.10 CORONARY ARTERY DISEASE INVOLVING NATIVE CORONARY ARTERY OF NATIVE HEART WITHOUT ANGINA PECTORIS: ICD-10-CM

## 2018-04-11 ENCOUNTER — OFFICE VISIT (OUTPATIENT)
Dept: CARDIOLOGY CLINIC | Age: 73
End: 2018-04-11

## 2018-04-11 VITALS
BODY MASS INDEX: 26.64 KG/M2 | HEART RATE: 84 BPM | WEIGHT: 175.8 LBS | HEIGHT: 68 IN | SYSTOLIC BLOOD PRESSURE: 130 MMHG | OXYGEN SATURATION: 98 % | DIASTOLIC BLOOD PRESSURE: 86 MMHG

## 2018-04-11 DIAGNOSIS — F41.8 DEPRESSION WITH ANXIETY: Chronic | ICD-10-CM

## 2018-04-11 DIAGNOSIS — R07.89 NON-CARDIAC CHEST PAIN: ICD-10-CM

## 2018-04-11 DIAGNOSIS — I35.0 AORTIC VALVE STENOSIS, ETIOLOGY OF CARDIAC VALVE DISEASE UNSPECIFIED: Primary | ICD-10-CM

## 2018-04-11 DIAGNOSIS — F45.8 OTHER SOMATOFORM DISORDERS: Chronic | ICD-10-CM

## 2018-04-11 DIAGNOSIS — R07.9 CHRONIC CHEST PAIN: Chronic | ICD-10-CM

## 2018-04-11 DIAGNOSIS — I10 HTN, GOAL BELOW 130/80: Chronic | ICD-10-CM

## 2018-04-11 DIAGNOSIS — G89.29 CHRONIC CHEST PAIN: Chronic | ICD-10-CM

## 2018-04-11 RX ORDER — ERYTHROMYCIN 5 MG/G
OINTMENT OPHTHALMIC
Refills: 11 | COMMUNITY
Start: 2018-03-13 | End: 2018-12-12

## 2018-04-11 RX ORDER — DILTIAZEM HYDROCHLORIDE 120 MG/1
120 CAPSULE, COATED, EXTENDED RELEASE ORAL DAILY
Qty: 90 CAP | Refills: 4 | Status: SHIPPED | OUTPATIENT
Start: 2018-04-11 | End: 2018-09-19 | Stop reason: DRUGHIGH

## 2018-04-11 RX ORDER — VALSARTAN 80 MG/1
80 TABLET ORAL DAILY
Qty: 90 TAB | Refills: 4 | Status: SHIPPED | OUTPATIENT
Start: 2018-04-11 | End: 2018-09-19

## 2018-04-11 NOTE — MR AVS SNAPSHOT
[address] [facility] [address] 
[phone] Patient: Tresa Nassar MRN: [id number] ZUF:[birth year] Visit Information Date & Time Provider Department Dept. Phone Encounter #  
 4/11/2018  2:30 PM Angela Suarez NP [facility] [id number] Follow-up Instructions Return in about 1 year (around 4/11/2019). Routing History Follow-up and Disposition History Your Appointments 5/9/2018 11:20 AM  
New Patient with Nolvia Munoz, PsyD [address] ([address]) Appt Note: new patient consult altered behavior/ Ankit [place] [address] [facility] 99 [id number] [phone]  
  
   
 [place] [address] [address] [address] [place] Upcoming Health Maintenance Date Due DTaP/Tdap/Td series (1 - Tdap) 4/30/2006 Pneumococcal 65+ Low/Medium Risk (1 of 2 - PCV13) 9/22/2010 FOOT EXAM Q1 6/12/2015 EYE EXAM RETINAL OR DILATED Q1 7/1/2015 FOBT Q 1 YEAR AGE 50-75 7/29/2015 GLAUCOMA SCREENING Q2Y 7/1/2016 HEMOGLOBIN A1C Q6M 1/6/2018 MICROALBUMIN Q1 7/6/2018 LIPID PANEL Q1 7/6/2018 MEDICARE YEARLY EXAM 7/7/2018 BREAST CANCER SCRN MAMMOGRAM 8/29/2019 Allergies as of 4/11/2018  Review Complete On: 4/11/2018 By: Angela Suarez NP Severity Noted Reaction Type Reactions Amoxicillin High 09/10/2010   Systemic Hives Sulfa (Sulfonamide Antibiotics) High 09/10/2010   Systemic Hives, Itching Amoxicillin Medium 04/22/2010   Systemic Hives, Itching Long time ago - patient not exactly certain what it does Mirtazapine Medium 11/20/2012   Side Effect Itching, Nausea Only Funny feeling in chest  
 Percocet [Oxycodone-acetaminophen] Medium 01/15/2014   Intolerance Nausea and Vomiting Codeine  11/26/2012    Nausea and Vomiting Crestor [Rosuvastatin]  12/07/2017    Other (comments)  
 myalgias Prednisone  05/27/2010    Itching Sulfa (Sulfonamide Antibiotics)  04/22/2010   Systemic Hives, Itching, Palpitations Think it was increased heart rate or itching - many years ago Zithromax [Azithromycin]  11/20/2012   Side Effect Itching Not sure what it does,taken long time ago Current Immunizations  Reviewed on 4/13/2017 Name Date Pneumococcal Polysaccharide (PPSV-23)  Deferred (Patient Refused) TD Vaccine 4/29/2006 Not reviewed this visit You Were Diagnosed With   
  
 Codes Comments Aortic valve stenosis, etiology of cardiac valve disease unspecified    -  Primary ICD-10-CM: I35.0 ICD-9-CM: 424.1   
 HTN, goal below 130/80     ICD-10-CM: I10 
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  
 130/86 (BP 1 Location: Left arm, BP Patient Position: Standing) 84 5' 8\" (1.727 m) 175 lb 12.8 oz (79.7 kg) 98% 26.73 kg/m2 OB Status Smoking Status Hysterectomy Never Smoker Vitals History BMI and BSA Data Body Mass Index Body Surface Area  
 26.73 kg/m 2 1.96 m 2 Preferred Pharmacy Pharmacy Name Phone [facility]/PHARMACY #[id number]- [place], [place] - [address] [phone] Your Updated Medication List  
  
   
This list is accurate as of 4/11/18  2:51 PM.  Always use your most recent med list.  
  
  
  
  
 amitriptyline 25 mg tablet Commonly known as:  ELAVIL Take 1 Tab by mouth nightly. Indications: insomnia/depression  
  
 aspirin delayed-release 81 mg tablet Take 1 Tab by mouth daily. coenzyme Q-10 200 mg capsule Commonly known as:  CO Q-10 Take 1 cap for TWO WEEKS then continue taking WITH Crestor. dilTIAZem  mg ER capsule Commonly known as:  CARDIZEM CD Take 1 Cap by mouth daily. docusate sodium 100 mg capsule Commonly known as:  COLACE  
TAKE 1 CAP BY MOUTH DAILY AS NEEDED FOR CONSTIPATION FOR UP TO 90 DAYS. erythromycin ophthalmic ointment Commonly known as:  ILOTYCIN  
APPLY 1 APPLICATION TO BOTH EYELIDS TWICE DAILY  
  
 fluticasone 50 mcg/actuation nasal spray Commonly known as:  Marine Knack 2 Sprays by Both Nostrils route daily. nabumetone 500 mg tablet Commonly known as:  RELAFEN  
TAKE 1 TABLET BY MOUTH TWICE A DAY  
  
 nystatin topical cream  
Commonly known as:  MYCOSTATIN  
APPLY TO AFFECTED AREA TWO (2) TIMES A DAY. polyethylene glycol 17 gram packet Commonly known as:  MIRALAX  
daily. pravastatin 10 mg tablet Commonly known as:  PRAVACHOL Take 2 Tabs by mouth nightly. rosuvastatin 5 mg tablet Commonly known as:  CRESTOR  
  
 triamcinolone acetonide 0.1 % topical cream  
Commonly known as:  KENALOG Apply  to affected area two (2) times a day. use thin layer  
  
 trolamine salicylate-aloe vera 53% topical cream  
Commonly known as:  ASPERCREME Apply  to affected area as needed for Pain.  
  
 valsartan 80 mg tablet Commonly known as:  DIOVAN Take 1 Tab by mouth daily. Prescriptions Sent to Pharmacy Refills  
 valsartan (DIOVAN) 80 mg tablet 4 Sig: Take 1 Tab by mouth daily. Class: Normal  
 Pharmacy: [facility]/pharmacy [address] [place] [address] [address] Ph #: [phone] Route: Oral  
 dilTIAZem CD (CARDIZEM CD) 120 mg ER capsule 4 Sig: Take 1 Cap by mouth daily. Class: Normal  
 Pharmacy: [facility]/pharmacy [address] Ph #: [phone]  Route: Oral  
  
 We Performed the Following AMB POC EKG ROUTINE W/ 12 LEADS, INTER & REP [12014 CPT(R)] CK O293128 CPT(R)] LIPID PANEL [78685 CPT(R)] METABOLIC PANEL, COMPREHENSIVE [97488 CPT(R)] REFERRAL TO PSYCHIATRY [REF91 Custom] Follow-up Instructions Return in about 1 year (around 4/11/2019). To-Do List   
 04/12/2018 ECHO:  2D ECHO COMPLETE ADULT (TTE) W OR WO CONTR Referral Information Referral ID Referred By Referred To  
  
 [id number] EMPERATRIZ [facility] [address], [place] [facility] [address] [facility] Phone: [phone] Fax: [phone] Visits Status Start Date End Date 1 New Request 4/11/18 4/11/19 If your referral has a status of pending review or denied, additional information will be sent to support the outcome of this decision. Introducing [facility]! Tequila Fox introduces Kodkod patient portal. Now you can access parts of your medical record, email your doctor's office, and request medication refills online. 1. In your internet browser, go to [email or web address] 2. Click on the First Time User? Click Here link in the Sign In box. You will see the New Member Sign Up page. 3. Enter your Kodkod Access Code exactly as it appears below. You will not need to use this code after youve completed the sign-up process. If you do not sign up before the expiration date, you must request a new code. · Kodkod Access Code: [email or web address] Expires: 7/10/2018  1:48 PM 
 
4. Enter the last four digits of your Social Security Number (xxxx) and Date of Birth (mm/dd/yyyy) as indicated and click Submit. You will be taken to the next sign-up page. 5. Create a Kodkod ID. This will be your Kodkod login ID and cannot be changed, so think of one that is secure and easy to remember. 6. Create a MedPassaget password. You can change your password at any time. 7. Enter your Password Reset Question and Answer. This can be used at a later time if you forget your password. 8. Enter your e-mail address. You will receive e-mail notification when new information is available in [address] 9. Click Sign Up. You can now view and download portions of your medical record. 10. Click the Download Summary menu link to download a portable copy of your medical information. If you have questions, please visit the Frequently Asked Questions section of the [employer] website. Remember, [employer] is NOT to be used for urgent needs. For medical emergencies, dial 911. Now available from your iPhone and Android! Please provide this summary of care documentation to your next provider. If you have any questions after today's visit, please call [phone].

## 2018-04-11 NOTE — PROGRESS NOTES
Rosie Diaz DNP, ANP-BC  Subjective/HPI:     Ehsan Funk is a 67 y.o. female is here for symptom based appointment. Patient reports for several months she wakes up with a bitter taste in her mouth from time to time, takes her medications at 9 PM with water and crackers and then notices around 12:00 in the morning she will have a bitter taste in her mouth. There is no associated nausea vomiting or diarrhea with this  Patient reports she is having pain in her arms specifically in the axilla regions with or without any particular activities, certain range of motion movements of her arms will trigger the pain. Patient also reports she is constipated and does not think she has had a bowel movement in 2 weeks, she reports eating and drinking normally without any nausea or vomiting. 2015 Cath    SUMMARY:    --  CARDIAC STRUCTURES:  --  EF calculated by contrast ventriculography was 60 %. --  CORONARY CIRCULATION:  --  Coronary angiography demonstrated minor luminal irregularities. --  Proximal LAD: There was a 30 % stenosis. --  Distal LAD: There was a 50 % stenosis. This lesion is in a small and  tortuous portion of the distal LAD. PCP Provider  Lorna Dugan NP  No primary care provider on file.   Past Medical History:   Diagnosis Date    Agoraphobia without mention of panic attacks 2/17/2014    Anxiety disorder 8/18/2013    Arthritis     osteo    Breast pain, left 4/7/2015    CAD (coronary artery disease), native coronary artery 12/1/2015    Chronic chest pain 1/13/2014    Chronic pain associated with significant psychosocial dysfunction 2/17/2014    Depression 8/18/2013    Diabetes ([facility] [address]     type II    Duplicated right renal collecting system 3/13/2014    GERD (gastroesophageal reflux disease)     Gout, joint     Hypercholesteremia     hyercholesterolemia    Hypertension     Nephrolithiasis 3/13/2014    Personal history of noncompliance with medical treatment, presenting hazards to health 5/30/2014    Psychotic disorder     Vaginal pain 7/30/2014      Past Surgical History:   Procedure Laterality Date    EGD  4/23/2010         HX CYST REMOVAL      cyst removed from left wrist    HX HYSTERECTOMY      partial    HX OTHER SURGICAL      bladder dilitation    HX TUBAL LIGATION      HX UROLOGICAL      kidney stones     Allergies   Allergen Reactions    Amoxicillin Hives    Sulfa (Sulfonamide Antibiotics) Hives and Itching    Amoxicillin Hives and Itching     Long time ago - patient not exactly certain what it does    Mirtazapine Itching and Nausea Only     Funny feeling in chest    Percocet [Oxycodone-Acetaminophen] Nausea and Vomiting    Codeine Nausea and Vomiting    Crestor [Rosuvastatin] Other (comments)     myalgias    Prednisone Itching    Sulfa (Sulfonamide Antibiotics) Hives, Itching and Palpitations     Think it was increased heart rate or itching - many years ago    Zithromax [Azithromycin] Itching     Not sure what it does,taken long time ago      Family History   Problem Relation Age of Onset    Stroke Mother     Heart Disease Mother     Cancer Father     Heart Disease Son     Liver Disease Son     Heart Disease Daughter     Malignant Hyperthermia Neg Hx     Pseudocholinesterase Deficiency Neg Hx     Delayed Awakening Neg Hx     Post-op Nausea/Vomiting Neg Hx     Emergence Delirium Neg Hx     Post-op Cognitive Dysfunction Neg Hx     Other Neg Hx       Current Outpatient Prescriptions   Medication Sig    erythromycin (ILOTYCIN) ophthalmic ointment APPLY 1 APPLICATION TO BOTH EYELIDS TWICE DAILY    nystatin (MYCOSTATIN) topical cream APPLY TO AFFECTED AREA TWO (2) TIMES A DAY.  polyethylene glycol (MIRALAX) 17 gram packet daily.  pravastatin (PRAVACHOL) 10 mg tablet Take 2 Tabs by mouth nightly.  fluticasone (FLONASE) 50 mcg/actuation nasal spray 2 Sprays by Both Nostrils route daily.     docusate sodium (COLACE) 100 mg capsule TAKE 1 CAP BY MOUTH DAILY AS NEEDED FOR CONSTIPATION FOR UP TO 90 DAYS. (Patient taking differently: take 1 cap TWICE DAILY as needed for constipation)    valsartan (DIOVAN) 80 mg tablet Take 1 Tab by mouth daily.  dilTIAZem CD (CARDIZEM CD) 120 mg ER capsule Take 1 Cap by mouth daily.  aspirin delayed-release 81 mg tablet Take 1 Tab by mouth daily.  nabumetone (RELAFEN) 500 mg tablet TAKE 1 TABLET BY MOUTH TWICE A DAY    rosuvastatin (CRESTOR) 5 mg tablet     trolamine salicylate-aloe vera 85% (ASPERCREME) topical cream Apply  to affected area as needed for Pain.  triamcinolone acetonide (KENALOG) 0.1 % topical cream Apply  to affected area two (2) times a day. use thin layer    amitriptyline (ELAVIL) 25 mg tablet Take 1 Tab by mouth nightly. Indications: insomnia/depression    coenzyme Q-10 (CO Q-10) 200 mg capsule Take 1 cap for TWO WEEKS then continue taking WITH Crestor. No current facility-administered medications for this visit. Vitals:    04/11/18 1350 04/11/18 1419 04/11/18 1420   BP: (!) 140/96 (!) 136/94 130/86   Pulse: 84     SpO2: 98%     Weight: 175 lb 12.8 oz (79.7 kg)     Height: 5' 8\" (1.727 m)       Social History     Social History    Marital status:      Spouse name: N/A    Number of children: N/A    Years of education: N/A     Occupational History    Not on file. Social History Main Topics    Smoking status: Never Smoker    Smokeless tobacco: Never Used    Alcohol use No    Drug use: No    Sexual activity: No     Other Topics Concern    Not on file     Social History Narrative    ** Merged History Encounter **     , lives with her son and Friend. I have reviewed the nurses notes, vitals, problem list, allergy list, medical history, family, social history and medications. Review of Symptoms:    General: Pt denies excessive weight gain or loss. HEENT: + blurred vision,+ headaches, no epistaxis and difficulty swallowing.   Respiratory: Denies shortness of breath, MOORE, wheezing or stridor. Cardiovascular: Denies exertional chest pain, palpitations, edema or PND  Gastrointestinal: Denies poor appetite, indigestion, abdominal pain or blood in stool patient reporting constipation  Musculoskeletal: Denies pain or swelling from muscles or joints  Neurologic: Denies tremor, paresthesias. Skin: Denies rash, itching or texture change. Physical Exam:      General: Well developed, in no acute distress, cooperative and alert  HEENT: No carotid bruits, no JVD, trach is midline. Neck Supple, PEERL, EOM intact. Heart:  Normal S1/S2 negative S3 or S4. Regular, 2/6 systolic murmur, no gallop or rub. Patient tender bilateral axillas with palpation, no lymphadenopathy appreciated on exam  Respiratory: Clear bilaterally x 4, no wheezing or rales  Abdomen:   Soft, non-tender, no masses, bowel sounds are active. No findings for acute abdomen on exam  Extremities:  No edema, normal cap refill, no cyanosis, atraumatic. Neuro: A&Ox3, speech clear, gait stable. Skin: Skin color is normal. No rashes or lesions.  Non diaphoretic  Vascular: 2+ pulses symmetric in all extremities    Cardiographics    ECG: Sinus rhythm unchanged from previous tracings  Results for orders placed or performed during the hospital encounter of 04/04/17   EKG, 12 LEAD, INITIAL   Result Value Ref Range    Ventricular Rate 76 BPM    Atrial Rate 76 BPM    P-R Interval 168 ms    QRS Duration 94 ms    Q-T Interval 380 ms    QTC Calculation (Bezet) 427 ms    Calculated P Axis 51 degrees    Calculated R Axis 27 degrees    Calculated T Axis 20 degrees    Diagnosis       Normal sinus rhythm  Anterior infarct , age undetermined  Abnormal ECG  When compared with ECG of 04-APR-2017 06:03,  Minimal criteria for Inferior infarct are no longer present  ST no longer elevated in Anterior leads  Confirmed by Rosanna Gunter ([id number]) on 4/10/2017 5:15:35 PM           Cardiology Labs:  Lab Results   Component Value Date/Time    Cholesterol, total 258 (H) 07/06/2017 12:22 PM    HDL Cholesterol 64 07/06/2017 12:22 PM    LDL,Direct 209 (H) 01/26/2017 02:06 PM    LDL, calculated 175 (H) 07/06/2017 12:22 PM    Triglyceride 95 07/06/2017 12:22 PM       Lab Results   Component Value Date/Time    Sodium 145 (H) 08/15/2017 02:37 PM    Potassium 4.0 08/15/2017 02:37 PM    Chloride 104 08/15/2017 02:37 PM    CO2 26 08/15/2017 02:37 PM    Anion gap 8 04/06/2017 03:24 AM    Glucose 134 (H) 08/15/2017 02:37 PM    BUN 16 08/15/2017 02:37 PM    Creatinine 0.91 08/15/2017 02:37 PM    BUN/Creatinine ratio 18 08/15/2017 02:37 PM    GFR est AA 73 08/15/2017 02:37 PM    GFR est non-AA 64 08/15/2017 02:37 PM    Calcium 9.0 08/15/2017 02:37 PM    Bilirubin, total 0.2 08/15/2017 02:37 PM    AST (SGOT) 11 08/15/2017 02:37 PM    Alk. phosphatase 65 08/15/2017 02:37 PM    Protein, total 6.4 08/15/2017 02:37 PM    Albumin 4.0 08/15/2017 02:37 PM    Globulin 4.4 (H) 04/04/2017 04:51 AM    A-G Ratio 1.7 08/15/2017 02:37 PM    ALT (SGPT) 8 08/15/2017 02:37 PM           Assessment:     Assessment:     Diagnoses and all orders for this visit:    1. Aortic valve stenosis, etiology of cardiac valve disease unspecified  -     [facility]  -     2D ECHO COMPLETE ADULT (TTE) W OR WO CONTR; Future    2. HTN, goal below 130/80  -     AMB POC EKG ROUTINE W/ 12 LEADS, INTER & REP  -     [facility]  -     2D ECHO COMPLETE ADULT (TTE) W OR WO CONTR; Future    3. Depression with anxiety  -     [facility]  -     2D ECHO COMPLETE ADULT (TTE) W OR WO CONTR; Future    4. Other somatoform disorders  -     [facility] [address]  -     2D ECHO COMPLETE ADULT (TTE) W OR WO CONTR; Future    5. Chronic chest pain  -     [facility]  -     2D ECHO COMPLETE ADULT (TTE) W OR WO CONTR; Future    6.  Non-cardiac chest pain  -     [facility]  -     2D ECHO COMPLETE ADULT (TTE) W OR WO CONTR; Future ICD-10-CM ICD-9-CM    1. Aortic valve stenosis, etiology of cardiac valve disease unspecified I35.0 424.1 REFERRAL TO PSYCHIATRY      2D ECHO COMPLETE ADULT (TTE) W OR WO CONTR   2. HTN, goal below 130/80 I10 401.9 AMB POC EKG ROUTINE W/ 12 LEADS, INTER & REP      REFERRAL TO PSYCHIATRY      2D ECHO COMPLETE ADULT (TTE) W OR WO CONTR   3. Depression with anxiety F41.8 300.4 REFERRAL TO PSYCHIATRY      2D ECHO COMPLETE ADULT (TTE) W OR WO CONTR   4. 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 300.89 REFERRAL TO PSYCHIATRY      2D ECHO COMPLETE ADULT (TTE) W OR WO CONTR   5. Chronic chest pain R07.9 786.50 REFERRAL TO PSYCHIATRY    G89.29 338.29 2D ECHO COMPLETE ADULT (TTE) W OR WO CONTR   6. Non-cardiac chest pain R07.89 786.59 REFERRAL TO PSYCHIATRY      2D ECHO COMPLETE ADULT (TTE) W OR WO CONTR     Orders Placed This Encounter    [facility] [facility]     Referral Priority:   Routine     Referral Type:   Behavioral Health     Referral Reason:   Specialty Services Required     Referral Location:   [address] [place] [address]     Referred to Provider:   Ros Cabral MD     Requested Specialty:   Psychiatry    AMB POC EKG ROUTINE W/ 12 LEADS, INTER & REP     Order Specific Question:   Reason for Exam:     Answer:   ROUTINE    2D ECHO COMPLETE ADULT (TTE) W OR WO CONTR     Standing Status:   Future     Standing Expiration Date:   10/11/2018     Order Specific Question:   Reason for Exam:     Answer:   AORTIC STENOSIS     Order Specific Question:   Contrast Enhancement (Bubble Study, Definity, Optison) may be used if criteria listed in established evidence-based protocol has been identified. Answer: Yes    erythromycin (ILOTYCIN) ophthalmic ointment     Sig: APPLY 1 APPLICATION TO BOTH EYELIDS TWICE DAILY     Refill:  11        Plan:     Patient presents with multiple concerns and symptoms today. Her chest pain does not appear to be cardiac in nature as reproducible with palpation in the axilla region. On exam she has a 2/6 systolic murmur with history of aortic stenosis will evaluate structure with 2D echocardiogram.  History of hyperlipidemia medical administration record shows she is on pravastatin will repeat labs to further address lipids. Will renew her hypertensive medications. Discussed with patient her multiple symptoms history of depression and anxiety, grief over loss of parents and children, she denies any suicidal or homicidal ideations, agrees to take information for referral to mental health at [facility] [address] In regards to not having a bowel movement for 2 weeks, reviewed with patient her abdomen is soft and she is not vomiting unlikely to have paralytic ileus. Patient advised to use an over-the-counter laxative if no success to follow-up with primary care if she develops any abdominal pain to follow-up with the emergency room. Proceed with echocardiogram and labs. If workup normal she can follow-up with Dr. Yogi Shepherd as planned. Emilee Dawn NP    This note was created using voice recognition software. Despite editing, there may be syntax errors.

## 2018-04-11 NOTE — PROGRESS NOTES
.  Chief Complaint   Patient presents with    Chest Pain    Arm Pain     PAIN UNDER LEFT ARM    Headache     HEAD TIGHTNESS    Constipation     PATIENT STATES \"SHE HAS NOT HAD A BOWEL MOVEMENT IN TWO WEEKS. \"    Loss of Vision    Dizziness     1. Have you been to the ER, urgent care clinic since your last visit? Hospitalized since your last visit? NO    2. Have you seen or consulted any other health care providers outside of the [address] [place] [address] since your last visit? Include any pap smears or colon screening.  NO

## 2018-04-19 ENCOUNTER — CLINICAL SUPPORT (OUTPATIENT)
Dept: CARDIOLOGY CLINIC | Age: 73
End: 2018-04-19

## 2018-04-19 ENCOUNTER — APPOINTMENT (OUTPATIENT)
Dept: GENERAL RADIOLOGY | Age: 73
End: 2018-04-19
Attending: EMERGENCY MEDICINE
Payer: MEDICARE

## 2018-04-19 ENCOUNTER — APPOINTMENT (OUTPATIENT)
Dept: CT IMAGING | Age: 73
End: 2018-04-19
Attending: EMERGENCY MEDICINE
Payer: MEDICARE

## 2018-04-19 ENCOUNTER — HOSPITAL ENCOUNTER (EMERGENCY)
Age: 73
Discharge: HOME | End: 2018-04-20
Attending: EMERGENCY MEDICINE
Payer: MEDICARE

## 2018-04-19 VITALS
WEIGHT: 178.35 LBS | BODY MASS INDEX: 27.99 KG/M2 | SYSTOLIC BLOOD PRESSURE: 187 MMHG | RESPIRATION RATE: 15 BRPM | TEMPERATURE: 98.1 F | DIASTOLIC BLOOD PRESSURE: 86 MMHG | HEIGHT: 67 IN | OXYGEN SATURATION: 99 % | HEART RATE: 72 BPM

## 2018-04-19 DIAGNOSIS — F45.8 OTHER SOMATOFORM DISORDERS: Chronic | ICD-10-CM

## 2018-04-19 DIAGNOSIS — R07.9 CHRONIC CHEST PAIN: Chronic | ICD-10-CM

## 2018-04-19 DIAGNOSIS — R07.89 NON-CARDIAC CHEST PAIN: ICD-10-CM

## 2018-04-19 DIAGNOSIS — R51.9 CHRONIC NONINTRACTABLE HEADACHE, UNSPECIFIED HEADACHE TYPE: ICD-10-CM

## 2018-04-19 DIAGNOSIS — I10 HTN, GOAL BELOW 130/80: Chronic | ICD-10-CM

## 2018-04-19 DIAGNOSIS — K59.09 OTHER CONSTIPATION: Primary | ICD-10-CM

## 2018-04-19 DIAGNOSIS — G89.29 CHRONIC NONINTRACTABLE HEADACHE, UNSPECIFIED HEADACHE TYPE: ICD-10-CM

## 2018-04-19 DIAGNOSIS — N30.00 ACUTE CYSTITIS WITHOUT HEMATURIA: ICD-10-CM

## 2018-04-19 DIAGNOSIS — G89.29 CHRONIC CHEST PAIN: Chronic | ICD-10-CM

## 2018-04-19 DIAGNOSIS — I35.0 AORTIC VALVE STENOSIS, ETIOLOGY OF CARDIAC VALVE DISEASE UNSPECIFIED: ICD-10-CM

## 2018-04-19 DIAGNOSIS — R03.0 ELEVATED BLOOD PRESSURE READING: ICD-10-CM

## 2018-04-19 DIAGNOSIS — F41.8 DEPRESSION WITH ANXIETY: Chronic | ICD-10-CM

## 2018-04-19 LAB
ALBUMIN SERPL-MCNC: 3.5 G/DL (ref 3.5–5)
ALBUMIN/GLOB SERPL: 0.9 {RATIO} (ref 1.1–2.2)
ALP SERPL-CCNC: 83 U/L (ref 45–117)
ALT SERPL-CCNC: 15 U/L (ref 12–78)
ANION GAP SERPL CALC-SCNC: 6 MMOL/L (ref 5–15)
APPEARANCE UR: CLEAR
AST SERPL-CCNC: 9 U/L (ref 15–37)
BACTERIA URNS QL MICRO: NEGATIVE /HPF
BASOPHILS # BLD: 0 K/UL (ref 0–0.1)
BASOPHILS NFR BLD: 1 % (ref 0–1)
BILIRUB SERPL-MCNC: 0.3 MG/DL (ref 0.2–1)
BILIRUB UR QL: NEGATIVE
BUN SERPL-MCNC: 18 MG/DL (ref 6–20)
BUN/CREAT SERPL: 16 (ref 12–20)
CALCIUM SERPL-MCNC: 8.9 MG/DL (ref 8.5–10.1)
CHLORIDE SERPL-SCNC: 106 MMOL/L (ref 97–108)
CO2 SERPL-SCNC: 30 MMOL/L (ref 21–32)
COLOR UR: ABNORMAL
CREAT SERPL-MCNC: 1.1 MG/DL (ref 0.55–1.02)
DIFFERENTIAL METHOD BLD: ABNORMAL
EOSINOPHIL # BLD: 0.1 K/UL (ref 0–0.4)
EOSINOPHIL NFR BLD: 2 % (ref 0–7)
EPITH CASTS URNS QL MICRO: ABNORMAL /LPF
ERYTHROCYTE [DISTWIDTH] IN BLOOD BY AUTOMATED COUNT: 13.2 % (ref 11.5–14.5)
ERYTHROCYTE [SEDIMENTATION RATE] IN BLOOD: 8 MM/HR (ref 0–30)
GLOBULIN SER CALC-MCNC: 3.7 G/DL (ref 2–4)
GLUCOSE SERPL-MCNC: 92 MG/DL (ref 65–100)
GLUCOSE UR STRIP.AUTO-MCNC: NEGATIVE MG/DL
HCT VFR BLD AUTO: 39.8 % (ref 35–47)
HGB BLD-MCNC: 12.5 G/DL (ref 11.5–16)
HGB UR QL STRIP: NEGATIVE
IMM GRANULOCYTES # BLD: 0 K/UL (ref 0–0.04)
IMM GRANULOCYTES NFR BLD AUTO: 0 % (ref 0–0.5)
KETONES UR QL STRIP.AUTO: NEGATIVE MG/DL
LEUKOCYTE ESTERASE UR QL STRIP.AUTO: ABNORMAL
LIPASE SERPL-CCNC: 115 U/L (ref 73–393)
LYMPHOCYTES # BLD: 1.5 K/UL (ref 0.8–3.5)
LYMPHOCYTES NFR BLD: 43 % (ref 12–49)
MCH RBC QN AUTO: 26.9 PG (ref 26–34)
MCHC RBC AUTO-ENTMCNC: 31.4 G/DL (ref 30–36.5)
MCV RBC AUTO: 85.8 FL (ref 80–99)
MONOCYTES # BLD: 0.3 K/UL (ref 0–1)
MONOCYTES NFR BLD: 9 % (ref 5–13)
MUCOUS THREADS URNS QL MICRO: ABNORMAL /LPF
NEUTS SEG # BLD: 1.6 K/UL (ref 1.8–8)
NEUTS SEG NFR BLD: 46 % (ref 32–75)
NITRITE UR QL STRIP.AUTO: NEGATIVE
NRBC # BLD: 0 K/UL (ref 0–0.01)
NRBC BLD-RTO: 0 PER 100 WBC
PH UR STRIP: 7 [PH] (ref 5–8)
PLATELET # BLD AUTO: 237 K/UL (ref 150–400)
PMV BLD AUTO: 10.4 FL (ref 8.9–12.9)
POTASSIUM SERPL-SCNC: 4.1 MMOL/L (ref 3.5–5.1)
PROT SERPL-MCNC: 7.2 G/DL (ref 6.4–8.2)
PROT UR STRIP-MCNC: NEGATIVE MG/DL
RBC # BLD AUTO: 4.64 M/UL (ref 3.8–5.2)
RBC #/AREA URNS HPF: ABNORMAL /HPF (ref 0–5)
SODIUM SERPL-SCNC: 142 MMOL/L (ref 136–145)
SP GR UR REFRACTOMETRY: 1.01 (ref 1–1.03)
UA: UC IF INDICATED,UAUC: ABNORMAL
UROBILINOGEN UR QL STRIP.AUTO: 1 EU/DL (ref 0.2–1)
WBC # BLD AUTO: 3.4 K/UL (ref 3.6–11)
WBC URNS QL MICRO: ABNORMAL /HPF (ref 0–4)

## 2018-04-19 PROCEDURE — 96361 HYDRATE IV INFUSION ADD-ON: CPT

## 2018-04-19 PROCEDURE — 87086 URINE CULTURE/COLONY COUNT: CPT | Performed by: EMERGENCY MEDICINE

## 2018-04-19 PROCEDURE — 85652 RBC SED RATE AUTOMATED: CPT | Performed by: EMERGENCY MEDICINE

## 2018-04-19 PROCEDURE — 85025 COMPLETE CBC W/AUTO DIFF WBC: CPT | Performed by: EMERGENCY MEDICINE

## 2018-04-19 PROCEDURE — 74011250636 HC RX REV CODE- 250/636: Performed by: EMERGENCY MEDICINE

## 2018-04-19 PROCEDURE — 83690 ASSAY OF LIPASE: CPT | Performed by: EMERGENCY MEDICINE

## 2018-04-19 PROCEDURE — 74019 RADEX ABDOMEN 2 VIEWS: CPT

## 2018-04-19 PROCEDURE — 36415 COLL VENOUS BLD VENIPUNCTURE: CPT | Performed by: EMERGENCY MEDICINE

## 2018-04-19 PROCEDURE — 74011250637 HC RX REV CODE- 250/637: Performed by: EMERGENCY MEDICINE

## 2018-04-19 PROCEDURE — 80053 COMPREHEN METABOLIC PANEL: CPT | Performed by: EMERGENCY MEDICINE

## 2018-04-19 PROCEDURE — 99285 EMERGENCY DEPT VISIT HI MDM: CPT

## 2018-04-19 PROCEDURE — 70450 CT HEAD/BRAIN W/O DYE: CPT

## 2018-04-19 PROCEDURE — 96360 HYDRATION IV INFUSION INIT: CPT

## 2018-04-19 PROCEDURE — 81001 URINALYSIS AUTO W/SCOPE: CPT | Performed by: EMERGENCY MEDICINE

## 2018-04-19 RX ORDER — ACETAMINOPHEN 325 MG/1
975 TABLET ORAL
Status: COMPLETED | OUTPATIENT
Start: 2018-04-19 | End: 2018-04-19

## 2018-04-19 RX ORDER — LACTULOSE 10 G/15ML
20 SOLUTION ORAL; RECTAL 2 TIMES DAILY
Qty: 480 ML | Refills: 0 | Status: SHIPPED | OUTPATIENT
Start: 2018-04-19 | End: 2018-04-27

## 2018-04-19 RX ORDER — NITROFURANTOIN 25; 75 MG/1; MG/1
100 CAPSULE ORAL 2 TIMES DAILY
Qty: 14 CAP | Refills: 0 | Status: SHIPPED | OUTPATIENT
Start: 2018-04-19 | End: 2018-04-26

## 2018-04-19 RX ORDER — SODIUM CHLORIDE 9 MG/ML
1000 INJECTION, SOLUTION INTRAVENOUS ONCE
Status: COMPLETED | OUTPATIENT
Start: 2018-04-19 | End: 2018-04-19

## 2018-04-19 RX ORDER — NITROFURANTOIN 25; 75 MG/1; MG/1
100 CAPSULE ORAL
Status: COMPLETED | OUTPATIENT
Start: 2018-04-19 | End: 2018-04-19

## 2018-04-19 RX ORDER — GLYCERIN ADULT
1 SUPPOSITORY, RECTAL RECTAL
Status: COMPLETED | OUTPATIENT
Start: 2018-04-19 | End: 2018-04-19

## 2018-04-19 RX ORDER — LORAZEPAM 0.5 MG/1
0.5 TABLET ORAL
Status: COMPLETED | OUTPATIENT
Start: 2018-04-19 | End: 2018-04-19

## 2018-04-19 RX ADMIN — LORAZEPAM 0.5 MG: 0.5 TABLET ORAL at 23:26

## 2018-04-19 RX ADMIN — GLYCERIN 1 SUPPOSITORY: 2.1 SUPPOSITORY RECTAL at 20:56

## 2018-04-19 RX ADMIN — ACETAMINOPHEN 975 MG: 325 TABLET ORAL at 20:54

## 2018-04-19 RX ADMIN — NITROFURANTOIN MONOHYDRATE/MACROCRYSTALLINE 100 MG: 25; 75 CAPSULE ORAL at 23:26

## 2018-04-19 RX ADMIN — SODIUM CHLORIDE 1000 ML: 900 INJECTION, SOLUTION INTRAVENOUS at 20:56

## 2018-04-19 NOTE — MR AVS SNAPSHOT
[address] 
[phone] Patient: Josh Merino MRN: [id number] APW:[id number] Visit Information Date & Time Provider Department Dept. Phone Encounter #  
 4/19/2018  2:30 PM [facility] [phone] [id number] Your Appointments 5/9/2018 11:20 AM  
New Patient with Mars Yates PsyD 1991 [facility] Appt Note: new patient consult altered behavior/ [address] [place] [address] [facility] [id number] [phone]  
  
   
 [place] [address] [id number] [address] Upcoming Health Maintenance Date Due DTaP/Tdap/Td series (1 - Tdap) 4/30/2006 Pneumococcal 65+ Low/Medium Risk (1 of 2 - PCV13) 9/22/2010 FOOT EXAM Q1 6/12/2015 EYE EXAM RETINAL OR DILATED Q1 7/1/2015 FOBT Q 1 YEAR AGE 50-75 7/29/2015 GLAUCOMA SCREENING Q2Y 7/1/2016 HEMOGLOBIN A1C Q6M 1/6/2018 MICROALBUMIN Q1 7/6/2018 LIPID PANEL Q1 7/6/2018 MEDICARE YEARLY EXAM 7/7/2018 BREAST CANCER SCRN MAMMOGRAM 8/29/2019 Allergies as of 4/19/2018  Review Complete On: 4/11/2018 By: Julee Peabody, NP Severity Noted Reaction Type Reactions Amoxicillin High 09/10/2010   Systemic Hives Sulfa (Sulfonamide Antibiotics) High 09/10/2010   Systemic Hives, Itching Amoxicillin Medium 04/22/2010   Systemic Hives, Itching Long time ago - patient not exactly certain what it does Mirtazapine Medium 11/20/2012   Side Effect Itching, Nausea Only Funny feeling in chest  
 Percocet [Oxycodone-acetaminophen] Medium 01/15/2014   Intolerance Nausea and Vomiting Codeine  11/26/2012    Nausea and Vomiting Crestor [Rosuvastatin]  12/07/2017    Other (comments)  
 myalgias Prednisone  05/27/2010    Itching  Sulfa (Sulfonamide Antibiotics)  04/22/2010   Systemic Hives, Itching, Palpitations Think it was increased heart rate or itching - many years ago Zithromax [Azithromycin]  11/20/2012   Side Effect Itching Not sure what it does,taken long time ago Current Immunizations  Reviewed on 4/13/2017 Name Date Pneumococcal Polysaccharide (PPSV-23)  Deferred (Patient Refused) TD Vaccine 4/29/2006 Not reviewed this visit You Were Diagnosed With   
  
 Codes Comments HTN, goal below 130/80     ICD-10-CM: I10 
ICD-9-CM: 401.9 Depression with anxiety     ICD-10-CM: F41.8 ICD-9-CM: 300.4 Other somatoform disorders     ICD-10-CM: F45.8 ICD-9-CM: 300.89 Chronic chest pain     ICD-10-CM: R07.9, G89.29 ICD-9-CM: 786.50, 338.29 Aortic valve stenosis, etiology of cardiac valve disease unspecified     ICD-10-CM: I35.0 ICD-9-CM: 424.1 Non-cardiac chest pain     ICD-10-CM: R07.89 ICD-9-CM: 786.59 Vitals OB Status Smoking Status Hysterectomy Never Smoker Preferred Pharmacy Pharmacy Name Phone [id number]- [place], [place] - [address] [phone] Your Updated Medication List  
  
   
This list is accurate as of 4/19/18  3:08 PM.  Always use your most recent med list.  
  
  
  
  
 amitriptyline 25 mg tablet Commonly known as:  ELAVIL Take 1 Tab by mouth nightly. Indications: insomnia/depression  
  
 aspirin delayed-release 81 mg tablet Take 1 Tab by mouth daily. coenzyme Q-10 200 mg capsule Commonly known as:  CO Q-10 Take 1 cap for TWO WEEKS then continue taking WITH Crestor. dilTIAZem  mg ER capsule Commonly known as:  CARDIZEM CD Take 1 Cap by mouth daily. docusate sodium 100 mg capsule Commonly known as:  COLACE  
TAKE 1 CAP BY MOUTH DAILY AS NEEDED FOR CONSTIPATION FOR UP TO 90 DAYS. erythromycin ophthalmic ointment Commonly known as:  ILOTYCIN  
APPLY 1 APPLICATION TO BOTH EYELIDS TWICE DAILY fluticasone 50 mcg/actuation nasal spray Commonly known as:  [place] Top[place] 2 Sprays by Both Nostrils route daily. nabumetone 500 mg tablet Commonly known as:  RELAFEN  
TAKE 1 TABLET BY MOUTH TWICE A DAY  
  
 nystatin topical cream  
Commonly known as:  MYCOSTATIN  
APPLY TO AFFECTED AREA TWO (2) TIMES A DAY. polyethylene glycol 17 gram packet Commonly known as:  MIRALAX  
daily. pravastatin 10 mg tablet Commonly known as:  PRAVACHOL Take 2 Tabs by mouth nightly. rosuvastatin 5 mg tablet Commonly known as:  CRESTOR  
  
 triamcinolone acetonide 0.1 % topical cream  
Commonly known as:  KENALOG Apply  to affected area two (2) times a day. use thin layer  
  
 trolamine salicylate-aloe vera 71% topical cream  
Commonly known as:  ASPERCREME Apply  to affected area as needed for Pain.  
  
 valsartan 80 mg tablet Commonly known as:  DIOVAN Take 1 Tab by mouth daily. We Performed the Following 2D ECHO COMPLETE ADULT (TTE) W OR WO CONTR [26658 CPT(R)] Introducing [facility]! [facility] introduces [employer] patient portal. Now you can access parts of your medical record, email your doctor's office, and request medication refills online. 1. In your internet browser, go to [email or web address] 2. Click on the First Time User? Click Here link in the Sign In box. You will see the New Member Sign Up page. 3. Enter your [employer] Access Code exactly as it appears below. You will not need to use this code after youve completed the sign-up process. If you do not sign up before the expiration date, you must request a new code. · [employer] Access Code: [email or web address] Expires: 7/10/2018  1:48 PM 
 
4. Enter the last four digits of your Social Security Number (xxxx) and Date of Birth (mm/dd/yyyy) as indicated and click Submit. You will be taken to the next sign-up page. 5. Create a [employer] ID. This will be your [employer] login ID and cannot be changed, so think of one that is secure and easy to remember. 6. Create a [employer] password. You can change your password at any time. 7. Enter your Password Reset Question and Answer. This can be used at a later time if you forget your password. 8. Enter your e-mail address. You will receive e-mail notification when new information is available in [address] 9. Click Sign Up. You can now view and download portions of your medical record. 10. Click the Download Summary menu link to download a portable copy of your medical information. If you have questions, please visit the Frequently Asked Questions section of the [employer] website. Remember, [employer] is NOT to be used for urgent needs. For medical emergencies, dial 911. Now available from your iPhone and Android! Please provide this summary of care documentation to your next provider. If you have any questions after today's visit, please call [phone].

## 2018-04-19 NOTE — ED TRIAGE NOTES
Pt arrives to ED ambulatory with c/o abdominal pain [id number] on and off since inability to pass stool. Pt states it has been 3 weeks since she had a BM but has been taking magnesium supplements and prune juice to induce BM at home. Pt also c/o bile taste in mouth at night, dark painful bump on posterior left thigh, and some dizziness when standing.   Pt in position of comfort with call bell in reach

## 2018-04-19 NOTE — ED PROVIDER NOTES
EMERGENCY DEPARTMENT HISTORY AND PHYSICAL EXAM      Date: 4/19/2018  Patient Name: Aden Mcnamara    History of Presenting Illness     Chief Complaint   Patient presents with    Constipation     \"my bowels haven't moved for two weeks\"     Headache     \"I've been here two or three times for my head. something's wrong with it. \"  chronic       History Provided By: Patient    HPI: Aden Mcnamara, 67 y.o. female with PMHx significant for HTN, GERD, DM, OA, gout, anxiety, noncompliance with medical treatment, CAD and hypercholesterolemia , presents ambulatory to the ED with multiple chief complaints. Pt reports constipation with associated mild pelvic pain and states last BM was 2 weeks ago. She states she has been taking stool softeners every day without relief. She notes she is still passing flatulence. She denies any vomiting or appetite changes. Pt also presents with a constant HA across the top of head with associated blurry vision. She denies any recent changes in vision and states she is followed by an eye doctor w/ multiple examinations w/o significant findings. Denies focal weakness, numbness, speech changes. She states she lives at home with her son and friend. Otherwise without complaints. PCP: Shun Sevilla MD    There are no other complaints, changes, or physical findings at this time. Current Facility-Administered Medications   Medication Dose Route Frequency Provider Last Rate Last Dose    nitrofurantoin (macrocrystal-monohydrate) (MACROBID) capsule 100 mg  100 mg Oral NOW [facility]. Eli Perez MD        LORazepam (ATIVAN) tablet 0.5 mg  0.5 mg Oral NOW [facility]. Eli Perez MD         Current Outpatient Prescriptions   Medication Sig Dispense Refill    lactulose (CHRONULAC) 10 gram/15 mL solution Take 30 mL by mouth two (2) times a day for 8 days.  Indications: constipation 480 mL 0    nitrofurantoin, macrocrystal-monohydrate, (MACROBID) 100 mg capsule Take 1 Cap by mouth two (2) times a day for 7 days. Indications: BACTERIAL URINARY TRACT INFECTION 14 Cap 0    erythromycin (ILOTYCIN) ophthalmic ointment APPLY 1 APPLICATION TO BOTH EYELIDS TWICE DAILY  11    valsartan (DIOVAN) 80 mg tablet Take 1 Tab by mouth daily. 90 Tab 4    dilTIAZem CD (CARDIZEM CD) 120 mg ER capsule Take 1 Cap by mouth daily. 90 Cap 4    rosuvastatin (CRESTOR) 5 mg tablet       nystatin (MYCOSTATIN) topical cream APPLY TO AFFECTED AREA TWO (2) TIMES A DAY. 0    polyethylene glycol (MIRALAX) 17 gram packet daily.  docusate sodium (COLACE) 100 mg capsule TAKE 1 CAP BY MOUTH DAILY AS NEEDED FOR CONSTIPATION FOR UP TO 90 DAYS. (Patient taking differently: take 1 cap TWICE DAILY as needed for constipation) 90 Cap 0    aspirin delayed-release 81 mg tablet Take 1 Tab by mouth daily. 30 Tab 11    pravastatin (PRAVACHOL) 10 mg tablet Take 2 Tabs by mouth nightly.  80 Tab 4       Past History     Past Medical History:  Past Medical History:   Diagnosis Date    Agoraphobia without mention of panic attacks 2/17/2014    Anxiety disorder 8/18/2013    Arthritis     osteo    Breast pain, left 4/7/2015    CAD (coronary artery disease), native coronary artery 12/1/2015    Chronic chest pain 1/13/2014    Chronic pain associated with significant psychosocial dysfunction 2/17/2014    Depression 8/18/2013    Diabetes ([facility] [address]     type II    Duplicated right renal collecting system 3/13/2014    GERD (gastroesophageal reflux disease)     Gout, joint     Hypercholesteremia     hyercholesterolemia    Hypertension     Nephrolithiasis 3/13/2014    Personal history of noncompliance with medical treatment, presenting hazards to health 5/30/2014    Psychotic disorder     Vaginal pain 7/30/2014       Past Surgical History:  Past Surgical History:   Procedure Laterality Date    EGD  4/23/2010         HX CYST REMOVAL      cyst removed from left wrist    HX HYSTERECTOMY      partial    HX OTHER SURGICAL      bladder dilitation    HX TUBAL LIGATION      HX UROLOGICAL      kidney stones       Family History:  Family History   Problem Relation Age of Onset    Stroke Mother     Heart Disease Mother     Cancer Father     Heart Disease Son     Liver Disease Son     Heart Disease Daughter     Malignant Hyperthermia Neg Hx     Pseudocholinesterase Deficiency Neg Hx     Delayed Awakening Neg Hx     Post-op Nausea/Vomiting Neg Hx     Emergence Delirium Neg Hx     Post-op Cognitive Dysfunction Neg Hx     Other Neg Hx        Social History:  Social History   Substance Use Topics    Smoking status: Never Smoker    Smokeless tobacco: Never Used    Alcohol use No       Allergies: Allergies   Allergen Reactions    Amoxicillin Hives    Sulfa (Sulfonamide Antibiotics) Hives and Itching    Amoxicillin Hives and Itching     Long time ago - patient not exactly certain what it does    Mirtazapine Itching and Nausea Only     Funny feeling in chest    Percocet [Oxycodone-Acetaminophen] Nausea and Vomiting    Codeine Nausea and Vomiting    Crestor [Rosuvastatin] Other (comments)     myalgias    Prednisone Itching    Sulfa (Sulfonamide Antibiotics) Hives, Itching and Palpitations     Think it was increased heart rate or itching - many years ago    Zithromax [Azithromycin] Itching     Not sure what it does,taken long time ago         Review of Systems   Review of Systems   Constitutional: Negative for chills and fever. HENT: Negative for congestion. Eyes: Positive for visual disturbance. Respiratory: Negative for chest tightness. Cardiovascular: Negative for chest pain and leg swelling. Gastrointestinal: Positive for constipation. Negative for vomiting. Endocrine: Negative for polyuria. Genitourinary: Positive for pelvic pain. Negative for dysuria and frequency. Musculoskeletal: Negative for myalgias. Skin: Negative for color change. Allergic/Immunologic: Negative for immunocompromised state.    Neurological: Positive for headaches. Negative for numbness. Psychiatric/Behavioral: The patient is nervous/anxious. Physical Exam   Physical Exam  Nursing note and vitals reviewed. General appearance: Alert, non-toxic, no acute distress. Eyes: Pupils equal, round and reactive to light, conjunctiva normal, anicteric sclera, EOMI, R optic disc appears sharp, no papilledema, L optic disc difficult to visualize  HEENT: Mucous membranes tacky, oropharynx is benign, TM normal, dentures in place, no beading of temporal artery  Pulmonary: Clear to auscultation bilaterally. Cardiac: RRR, 2/6 systolic murmur, no gallops, or rubs. 2+ radial pulses and distal pulses. Abdomen: Soft, nontender, nondistended, bowel sounds present. MSK: No pretibial edema, minimal TTP paraspinal c spine, no CVA tenderness  Skin: Capillary refill < 3 seconds, normothermic   Neuro: Alert, answers questions appropriately,  symmetric, strength intact x 4, CN II-XII intact except slight loss of acuity R eye  Written by Vince Brooks, ED Scribe, as dictated by Lara Frederick MD.    Diagnostic Study Results     Labs -     Recent Results (from the past 12 hour(s))   CBC WITH AUTOMATED DIFF    Collection Time: 04/19/18  8:14 PM   Result Value Ref Range    WBC 3.4 (L) 3.6 - 11.0 K/uL    RBC 4.64 3.80 - 5.20 M/uL    HGB 12.5 11.5 - 16.0 g/dL    HCT 39.8 35.0 - 47.0 %    MCV 85.8 80.0 - 99.0 FL    MCH 26.9 26.0 - 34.0 PG    MCHC 31.4 30.0 - 36.5 g/dL    RDW 13.2 11.5 - 14.5 %    PLATELET 508 882 - 787 K/uL    MPV 10.4 8.9 - 12.9 FL    NRBC 0.0 0  WBC    ABSOLUTE NRBC 0.00 0.00 - 0.01 K/uL    NEUTROPHILS 46 32 - 75 %    LYMPHOCYTES 43 12 - 49 %    MONOCYTES 9 5 - 13 %    EOSINOPHILS 2 0 - 7 %    BASOPHILS 1 0 - 1 %    IMMATURE GRANULOCYTES 0 0.0 - 0.5 %    ABS. NEUTROPHILS 1.6 (L) 1.8 - 8.0 K/UL    ABS. LYMPHOCYTES 1.5 0.8 - 3.5 K/UL    ABS. MONOCYTES 0.3 0.0 - 1.0 K/UL    ABS. EOSINOPHILS 0.1 0.0 - 0.4 K/UL    ABS. BASOPHILS 0.0 0.0 - 0.1 K/UL    ABS. IMM. GRANS. 0.0 0.00 - 0.04 K/UL    DF AUTOMATED     METABOLIC PANEL, COMPREHENSIVE    Collection Time: 04/19/18  8:14 PM   Result Value Ref Range    Sodium 142 136 - 145 mmol/L    Potassium 4.1 3.5 - 5.1 mmol/L    Chloride 106 97 - 108 mmol/L    CO2 30 21 - 32 mmol/L    Anion gap 6 5 - 15 mmol/L    Glucose 92 65 - 100 mg/dL    BUN 18 6 - 20 MG/DL    Creatinine 1.10 (H) 0.55 - 1.02 MG/DL    BUN/Creatinine ratio 16 12 - 20      GFR est AA 59 (L) >60 ml/min/1.73m2    GFR est non-AA 49 (L) >60 ml/min/1.73m2    Calcium 8.9 8.5 - 10.1 MG/DL    Bilirubin, total 0.3 0.2 - 1.0 MG/DL    ALT (SGPT) 15 12 - 78 U/L    AST (SGOT) 9 (L) 15 - 37 U/L    Alk. phosphatase 83 45 - 117 U/L    Protein, total 7.2 6.4 - 8.2 g/dL    Albumin 3.5 3.5 - 5.0 g/dL    Globulin 3.7 2.0 - 4.0 g/dL    A-G Ratio 0.9 (L) 1.1 - 2.2     LIPASE    Collection Time: 04/19/18  8:14 PM   Result Value Ref Range    Lipase 115 73 - 393 U/L   SED RATE (ESR)    Collection Time: 04/19/18  8:14 PM   Result Value Ref Range    Sed rate, automated 8 0 - 30 mm/hr   URINALYSIS W/ REFLEX CULTURE    Collection Time: 04/19/18  9:36 PM   Result Value Ref Range    Color YELLOW/STRAW      Appearance CLEAR CLEAR      Specific gravity 1.008 1.003 - 1.030      pH (UA) 7.0 5.0 - 8.0      Protein NEGATIVE  NEG mg/dL    Glucose NEGATIVE  NEG mg/dL    Ketone NEGATIVE  NEG mg/dL    Bilirubin NEGATIVE  NEG      Blood NEGATIVE  NEG      Urobilinogen 1.0 0.2 - 1.0 EU/dL    Nitrites NEGATIVE  NEG      Leukocyte Esterase LARGE (A) NEG      WBC 10-20 0 - 4 /hpf    RBC 0-5 0 - 5 /hpf    Epithelial cells FEW FEW /lpf    Bacteria NEGATIVE  NEG /hpf    UA:UC IF INDICATED URINE CULTURE ORDERED (A) CNI      Mucus TRACE (A) NEG /lpf       Radiologic Studies -   XR ABD FLAT/ ERECT   Final Result      CT HEAD WO CONT   Final Result        CT Results  (Last 48 hours)               04/19/18 2000  CT HEAD WO CONT Final result    Impression:  IMPRESSION:    There is no acute intracranial abnormality. Narrative:  EXAM:  CT head without contrast       INDICATION: Headache. COMPARISON: CT head 4/4/2017       TECHNIQUE: Axial noncontrast head CT from foramen magnum to vertex. Coronal and   sagittal reformatted images were obtained. CT dose reduction was achieved   through use of a standardized protocol tailored for this examination and   automatic exposure control for dose modulation. FINDINGS:  There is diffuse age-related parenchymal volume loss. The ventricles   and sulci are age-appropriate without hydrocephalus. There is no mass effect or   midline shift. There is no intracranial hemorrhage or extra-axial fluid   collection. There is no significant white matter disease. The gray-white matter   differentiation is maintained. The basal cisterns are patent. The osseous structures are intact. The visualized paranasal sinuses and mastoid   air cells are clear. CXR Results  (Last 48 hours)    None        EXAM:  XR ABD FLAT/ ERECT  INDICATION:   constipation, no bm x3 weeks  COMPARISON: None. Tom Saunas FINDINGS:   Supine and upright views of the abdomen demonstrate large stool volume. There is  rectal distention measuring greater than 11 cm. No visible pneumoperitoneum. The  bones and soft tissues are within normal limits. Calcifications in the aorta. Degenerative changes in the spine  . IMPRESSION  IMPRESSION:  1. Constipation. Rectal distention of greater than 11 cm. Medical Decision Making   I am the first provider for this patient. I reviewed the vital signs, available nursing notes, past medical history, past surgical history, family history and social history. Vital Signs-Reviewed the patient's vital signs.   Patient Vitals for the past 12 hrs:   Temp Pulse Resp BP SpO2   04/19/18 2300 - 63 13 187/86 100 %   04/19/18 2246 - 68 17 189/85 100 %   04/19/18 2115 - 65 15 172/87 -   04/19/18 2114 - 67 13 - 100 %   04/19/18 2100 - - - (!) 158/104 -   04/19/18 2059 - 72 17 - 100 %   04/19/18 2017 - 62 16 186/85 100 %   04/1945 - 70 14 179/86 100 %   04/19/18 1931 - 70 18 - 100 %   04/19/18 1930 - 73 21 164/89 -   04/19/18 1830 - 69 18 162/77 -   04/19/18 1746 98.1 °F (36.7 °C) 77 16 177/89 100 %       Pulse Oximetry Analysis - 100% on RA    Cardiac Monitor:   Rate: 72 bpm    Records Reviewed: Nursing Notes, Old Medical Records, Previous Radiology Studies and Previous Laboratory Studies    Provider Notes (Medical Decision Making):   DDx: Ileus, constipation, fecal impaction, doubt bowel obstruction; cephalagia, tension type HA; low suspicion for CNS infection, SAH, or GCA. ESR negative, no temporal artery beading, visible optic disc appears sharp. Pt requested \"nerve pill\" multiple times, but advised need for pcp f/u for any anxiolysis treatment. Start on lactulose daily in addition to miralax. ED Course:   Initial assessment performed. The patients presenting problems have been discussed, and they are in agreement with the care plan formulated and outlined with them. I have encouraged them to ask questions as they arise throughout their visit. Procedure Note - Rectal Exam:   7:24 PM  Performed by: Lion Zapata. Ruth Hawkins MD  Chaperoned by: RN  Rectal exam performed. Soft, brown stool in rectal vault. No blood or melena. Small external hemorrhoids, no signs of thrombosis. The procedure took 1-15 minutes, and pt tolerated well. CONSULT NOTE:   9:11 PM  Lion Zapata. Ruth Hawkins MD spoke with Dr. Bennet Lanes,   Specialty: Hospitalist  Discussed pt's hx, disposition, and available diagnostic and imaging results. Reviewed care plans. Consultant states pt does not meet admission criteria at this time. Recommends soap suds enema and if successful BM then pt can be discharged. No other criteria for admission, and does not feel there is concern for the diameter of rectal distention. This should improve if BM occurs after enema.    Written by Indy Davis, ED Scribe, as dictated by Lion Zapata. Yohannes Green MD.    10:30 PM  Notified by RN that pt had medium sized BM. Given pt had a BM will likely discharge with aggressive bowel regime. Written by Sierra Borden, ED Scribe, as dictated by Veronica Green MD.    Critical Care Time:   0    Disposition:  DISCHARGE NOTE  11:24 PM  The patient has been re-evaluated and is ready for discharge. Reviewed available results with patient. Counseled pt on diagnosis and care plan. Pt has expressed understanding, and all questions have been answered. Pt agrees with plan and agrees to follow up as recommended, or return to the ED if their symptoms worsen. Discharge instructions have been provided and explained to the pt, along with reasons to return to the ED. PLAN:  1. Discharge with:   lactulose (CHRONULAC) 10 gram/15 mL solution 2 TIMES DAILY      nitrofurantoin, macrocrystal-monohydrate, (MACROBID) 100 mg capsule 2 TIMES DAILY      2. Follow up with GI and PCP as needed. Return to ED if worse     Diagnosis     Clinical Impression:   1. Other constipation    2. Elevated blood pressure reading    3. Acute cystitis without hematuria    4. Chronic nonintractable headache, unspecified headache type        Attestations: This note is prepared by Sierra Borden, acting as Scribe for Veronica Green MD.    Veronica Green MD: The scribe's documentation has been prepared under my direction and personally reviewed by me in its entirety. I confirm that the note above accurately reflects all work, treatment, procedures, and medical decision making performed by me.

## 2018-04-20 NOTE — ED NOTES
Bedside and Verbal shift change report given to Oscar Posada RN (oncoming nurse) by Taylor Funk RN (offgoing nurse). Report included the following information SBAR, Kardex, ED Summary, [facility] and Recent Results.

## 2018-04-20 NOTE — ED NOTES
Bedside and Verbal shift change report given to Raquel Horton RN (oncoming nurse) by Johnny Pennington RN (offgoing nurse). Report included the following information SBAR, Kardex and ED Summary. Patient resting comfortably, side rails up, call bell w/in reach, no further needs expressed at this time, aware of POC.

## 2018-04-20 NOTE — ED NOTES
Dr. Stephen Quinones reviewed discharge instructions with the patient. The patient verbalized understanding. All questions and concerns were addressed. The patient in a wheelchair and is discharged ambulatory in the care of family members with instructions and prescriptions in hand. Pt is alert and oriented x 4. Respirations are clear and unlabored.

## 2018-04-20 NOTE — ED NOTES
Patient resting comfortably, side rails up, call bell w/in reach, no further needs expressed at this time, aware of POC. Son at the bedside.

## 2018-04-20 NOTE — DISCHARGE INSTRUCTIONS
Elevated Blood Pressure: Care Instructions  Your Care Instructions    Blood pressure is a measure of how hard the blood pushes against the walls of your arteries. It's normal for blood pressure to go up and down throughout the day. But if it stays up over time, you have high blood pressure. Two numbers tell you your blood pressure. The first number is the systolic pressure. It shows how hard the blood pushes when your heart is pumping. The second number is the diastolic pressure. It shows how hard the blood pushes between heartbeats, when your heart is relaxed and filling with blood. An ideal blood pressure in adults is less than 120/80 (say \"120 over 80\"). High blood pressure is 140/90 or higher. You have high blood pressure if your top number is 140 or higher or your bottom number is 90 or higher, or both. The main test for high blood pressure is simple, fast, and painless. To diagnose high blood pressure, your doctor will test your blood pressure at different times. After testing your blood pressure, your doctor may ask you to test it again when you are home. If you are diagnosed with high blood pressure, you can work with your doctor to make a long-term plan to manage it. Follow-up care is a key part of your treatment and safety. Be sure to make and go to all appointments, and call your doctor if you are having problems. It's also a good idea to know your test results and keep a list of the medicines you take. How can you care for yourself at home? · Do not smoke. Smoking increases your risk for heart attack and stroke. If you need help quitting, talk to your doctor about stop-smoking programs and medicines. These can increase your chances of quitting for good. · Stay at a healthy weight. · Try to limit how much sodium you eat to less than 2,300 milligrams (mg) a day. Your doctor may ask you to try to eat less than 1,500 mg a day. · Be physically active.  Get at least 30 minutes of exercise on most days of the week. Walking is a good choice. You also may want to do other activities, such as running, swimming, cycling, or playing tennis or team sports. · Avoid or limit alcohol. Talk to your doctor about whether you can drink any alcohol. · Eat plenty of fruits, vegetables, and low-fat dairy products. Eat less saturated and total fats. · Learn how to check your blood pressure at home. When should you call for help? Call your doctor now or seek immediate medical care if:  ? · Your blood pressure is much higher than normal (such as 180/110 or higher). ? · You think high blood pressure is causing symptoms such as:  ¨ Severe headache. ¨ Blurry vision. ? Watch closely for changes in your health, and be sure to contact your doctor if:  ? · You do not get better as expected. Where can you learn more? Go to http://loboSpringestmichel.info/. Enter N590 in the search box to learn more about \"Elevated Blood Pressure: Care Instructions. \"  Current as of: September 21, 2016  Content Version: 11.4  © 2381-4404 [employer]. Care instructions adapted under license by [employer] (which disclaims liability or warranty for this information). If you have questions about a medical condition or this instruction, always ask your healthcare professional. [address] any warranty or liability for your use of this information. Constipation: Care Instructions  Your Care Instructions    Constipation means that you have a hard time passing stools (bowel movements). People pass stools from 3 times a day to once every 3 days. What is normal for you may be different. Constipation may occur with pain in the rectum and cramping. The pain may get worse when you try to pass stools. Sometimes there are small amounts of bright red blood on toilet paper or the surface of stools. This is because of enlarged veins near the rectum (hemorrhoids).   A few changes in your diet and lifestyle may help you avoid ongoing constipation. Your doctor may also prescribe medicine to help loosen your stool. Some medicines can cause constipation. These include pain medicines and antidepressants. Tell your doctor about all the medicines you take. Your doctor may want to make a medicine change to ease your symptoms. Follow-up care is a key part of your treatment and safety. Be sure to make and go to all appointments, and call your doctor if you are having problems. It's also a good idea to know your test results and keep a list of the medicines you take. How can you care for yourself at home? · Drink plenty of fluids, enough so that your urine is light yellow or clear like water. If you have kidney, heart, or liver disease and have to limit fluids, talk with your doctor before you increase the amount of fluids you drink. · Include high-fiber foods in your diet each day. These include fruits, vegetables, beans, and whole grains. · Get at least 30 minutes of exercise on most days of the week. Walking is a good choice. You also may want to do other activities, such as running, swimming, cycling, or playing tennis or team sports. · Take a fiber supplement, such as Citrucel or Metamucil, every day. Read and follow all instructions on the label. · Schedule time each day for a bowel movement. A daily routine may help. Take your time having your bowel movement. · Support your feet with a small step stool when you sit on the toilet. This helps flex your hips and places your pelvis in a squatting position. · Your doctor may recommend an over-the-counter laxative to relieve your constipation. Examples are Milk of Magnesia and MiraLax. Read and follow all instructions on the label. Do not use laxatives on a long-term basis. When should you call for help? Call your doctor now or seek immediate medical care if:  ? · You have new or worse belly pain. ? · You have new or worse nausea or vomiting.    ? · You have blood in your stools. ? Watch closely for changes in your health, and be sure to contact your doctor if:  ? · Your constipation is getting worse. ? · You do not get better as expected. Where can you learn more? Go to http://jodi.info/. Enter 21  in the search box to learn more about \"Constipation: Care Instructions. \"  Current as of: March 20, 2017  Content Version: 11.4  © 1743-7185 "[employer]. Care instructions adapted under license by [employer] (which disclaims liability or warranty for this information). If you have questions about a medical condition or this instruction, always ask your healthcare professional. [employer] any warranty or liability for your use of this information. Female Urinary Tract: Anatomy Sketch    Current as of: October 13, 2016  Content Version: 11.4  © 6462-9980 "[employer]. Care instructions adapted under license by [employer] (which disclaims liability or warranty for this information). If you have questions about a medical condition or this instruction, always ask your healthcare professional. [employer] any warranty or liability for your use of this information. Headache: Care Instructions  Your Care Instructions    Headaches have many possible causes. Most headaches aren't a sign of a more serious problem, and they will get better on their own. Home treatment may help you feel better faster. The doctor has checked you carefully, but problems can develop later. If you notice any problems or new symptoms, get medical treatment right away. Follow-up care is a key part of your treatment and safety. Be sure to make and go to all appointments, and call your doctor if you are having problems. It's also a good idea to know your test results and keep a list of the medicines you take. How can you care for yourself at home?   · Do not drive if you have taken a prescription pain medicine. · Rest in a quiet, dark room until your headache is gone. Close your eyes and try to relax or go to sleep. Don't watch TV or read. · Put a cold, moist cloth or cold pack on the painful area for 10 to 20 minutes at a time. Put a thin cloth between the cold pack and your skin. · Use a warm, moist towel or a heating pad set on low to relax tight shoulder and neck muscles. · Have someone gently massage your neck and shoulders. · Take pain medicines exactly as directed. ¨ If the doctor gave you a prescription medicine for pain, take it as prescribed. ¨ If you are not taking a prescription pain medicine, ask your doctor if you can take an over-the-counter medicine. · Be careful not to take pain medicine more often than the instructions allow, because you may get worse or more frequent headaches when the medicine wears off. · Do not ignore new symptoms that occur with a headache, such as a fever, weakness or numbness, vision changes, or confusion. These may be signs of a more serious problem. To prevent headaches  · Keep a headache diary so you can figure out what triggers your headaches. Avoiding triggers may help you prevent headaches. Record when each headache began, how long it lasted, and what the pain was like (throbbing, aching, stabbing, or dull). Write down any other symptoms you had with the headache, such as nausea, flashing lights or dark spots, or sensitivity to bright light or loud noise. Note if the headache occurred near your period. List anything that might have triggered the headache, such as certain foods (chocolate, cheese, wine) or odors, smoke, bright light, stress, or lack of sleep. · Find healthy ways to deal with stress. Headaches are most common during or right after stressful times.  Take time to relax before and after you do something that has caused a headache in the past.  · Try to keep your muscles relaxed by keeping good posture. Check your jaw, face, neck, and shoulder muscles for tension, and try relaxing them. When sitting at a desk, change positions often, and stretch for 30 seconds each hour. · Get plenty of sleep and exercise. · Eat regularly and well. Long periods without food can trigger a headache. · Treat yourself to a massage. Some people find that regular massages are very helpful in relieving tension. · Limit caffeine by not drinking too much coffee, tea, or soda. But don't quit caffeine suddenly, because that can also give you headaches. · Reduce eyestrain from computers by blinking frequently and looking away from the computer screen every so often. Make sure you have proper eyewear and that your monitor is set up properly, about an arm's length away. · Seek help if you have depression or anxiety. Your headaches may be linked to these conditions. Treatment can both prevent headaches and help with symptoms of anxiety or depression. When should you call for help? Call 911 anytime you think you may need emergency care. For example, call if:  ? · You have signs of a stroke. These may include:  ¨ Sudden numbness, paralysis, or weakness in your face, arm, or leg, especially on only one side of your body. ¨ Sudden vision changes. ¨ Sudden trouble speaking. ¨ Sudden confusion or trouble understanding simple statements. ¨ Sudden problems with walking or balance. ¨ A sudden, severe headache that is different from past headaches. ?Call your doctor now or seek immediate medical care if:  ? · You have a new or worse headache. ? · Your headache gets much worse. Where can you learn more? Go to http://lobo-michel.info/. Enter M271 in the search box to learn more about \"Headache: Care Instructions. \"  Current as of: October 14, 2016  Content Version: 11.4  © 7498-2721 [employer].  Care instructions adapted under license by [employer] (which disclaims liability or warranty for this information). If you have questions about a medical condition or this instruction, always ask your healthcare professional. [place] [address] any warranty or liability for your use of this information.

## 2018-04-21 LAB
BACTERIA SPEC CULT: NORMAL
CC UR VC: NORMAL
SERVICE CMNT-IMP: NORMAL

## 2018-04-25 ENCOUNTER — TELEPHONE (OUTPATIENT)
Dept: CARDIOLOGY CLINIC | Age: 73
End: 2018-04-25

## 2018-04-27 NOTE — TELEPHONE ENCOUNTER
Patient daughter informed on Hippa Verified patient with two identifiers. Recommend follow up in 6 months.

## 2018-06-07 ENCOUNTER — HOSPITAL ENCOUNTER (EMERGENCY)
Age: 73
Discharge: HOME | End: 2018-06-07
Attending: EMERGENCY MEDICINE | Admitting: EMERGENCY MEDICINE
Payer: MEDICARE

## 2018-06-07 ENCOUNTER — APPOINTMENT (OUTPATIENT)
Dept: GENERAL RADIOLOGY | Age: 73
End: 2018-06-07
Attending: EMERGENCY MEDICINE
Payer: MEDICARE

## 2018-06-07 VITALS
TEMPERATURE: 97.9 F | OXYGEN SATURATION: 100 % | SYSTOLIC BLOOD PRESSURE: 159 MMHG | DIASTOLIC BLOOD PRESSURE: 76 MMHG | HEART RATE: 59 BPM | RESPIRATION RATE: 17 BRPM

## 2018-06-07 DIAGNOSIS — R07.9 CHEST PAIN, UNSPECIFIED TYPE: Primary | ICD-10-CM

## 2018-06-07 DIAGNOSIS — R10.84 ABDOMINAL PAIN, GENERALIZED: ICD-10-CM

## 2018-06-07 DIAGNOSIS — K59.00 CONSTIPATION, UNSPECIFIED CONSTIPATION TYPE: ICD-10-CM

## 2018-06-07 LAB
ALBUMIN SERPL-MCNC: 2.7 G/DL (ref 3.5–5)
ALBUMIN/GLOB SERPL: 0.9 {RATIO} (ref 1.1–2.2)
ALP SERPL-CCNC: 61 U/L (ref 45–117)
ALT SERPL-CCNC: 10 U/L (ref 12–78)
ANION GAP SERPL CALC-SCNC: 7 MMOL/L (ref 5–15)
APPEARANCE UR: CLEAR
AST SERPL-CCNC: 7 U/L (ref 15–37)
BACTERIA URNS QL MICRO: NEGATIVE /HPF
BASOPHILS # BLD: 0 K/UL (ref 0–0.1)
BASOPHILS NFR BLD: 1 % (ref 0–1)
BILIRUB SERPL-MCNC: 0.4 MG/DL (ref 0.2–1)
BILIRUB UR QL: NEGATIVE
BNP SERPL-MCNC: 391 PG/ML (ref 0–125)
BUN SERPL-MCNC: 12 MG/DL (ref 6–20)
BUN/CREAT SERPL: 18 (ref 12–20)
CALCIUM SERPL-MCNC: 7 MG/DL (ref 8.5–10.1)
CHLORIDE SERPL-SCNC: 115 MMOL/L (ref 97–108)
CO2 SERPL-SCNC: 24 MMOL/L (ref 21–32)
COLOR UR: ABNORMAL
CREAT SERPL-MCNC: 0.68 MG/DL (ref 0.55–1.02)
DIFFERENTIAL METHOD BLD: ABNORMAL
EOSINOPHIL # BLD: 0 K/UL (ref 0–0.4)
EOSINOPHIL NFR BLD: 1 % (ref 0–7)
EPITH CASTS URNS QL MICRO: ABNORMAL /LPF
ERYTHROCYTE [DISTWIDTH] IN BLOOD BY AUTOMATED COUNT: 13.1 % (ref 11.5–14.5)
GLOBULIN SER CALC-MCNC: 3 G/DL (ref 2–4)
GLUCOSE SERPL-MCNC: 81 MG/DL (ref 65–100)
GLUCOSE UR STRIP.AUTO-MCNC: NEGATIVE MG/DL
HCT VFR BLD AUTO: 39 % (ref 35–47)
HGB BLD-MCNC: 12.6 G/DL (ref 11.5–16)
HGB UR QL STRIP: NEGATIVE
HYALINE CASTS URNS QL MICRO: ABNORMAL /LPF (ref 0–5)
IMM GRANULOCYTES # BLD: 0 K/UL (ref 0–0.04)
IMM GRANULOCYTES NFR BLD AUTO: 0 % (ref 0–0.5)
KETONES UR QL STRIP.AUTO: NEGATIVE MG/DL
LEUKOCYTE ESTERASE UR QL STRIP.AUTO: ABNORMAL
LYMPHOCYTES # BLD: 1.1 K/UL (ref 0.8–3.5)
LYMPHOCYTES NFR BLD: 34 % (ref 12–49)
MAGNESIUM SERPL-MCNC: 1.6 MG/DL (ref 1.6–2.4)
MCH RBC QN AUTO: 27.2 PG (ref 26–34)
MCHC RBC AUTO-ENTMCNC: 32.3 G/DL (ref 30–36.5)
MCV RBC AUTO: 84.1 FL (ref 80–99)
MONOCYTES # BLD: 0.3 K/UL (ref 0–1)
MONOCYTES NFR BLD: 9 % (ref 5–13)
NEUTS SEG # BLD: 1.8 K/UL (ref 1.8–8)
NEUTS SEG NFR BLD: 55 % (ref 32–75)
NITRITE UR QL STRIP.AUTO: NEGATIVE
NRBC # BLD: 0 K/UL (ref 0–0.01)
NRBC BLD-RTO: 0 PER 100 WBC
PH UR STRIP: 7 [PH] (ref 5–8)
PLATELET # BLD AUTO: 199 K/UL (ref 150–400)
PMV BLD AUTO: 10.5 FL (ref 8.9–12.9)
POTASSIUM SERPL-SCNC: 3.3 MMOL/L (ref 3.5–5.1)
PROT SERPL-MCNC: 5.7 G/DL (ref 6.4–8.2)
PROT UR STRIP-MCNC: NEGATIVE MG/DL
RBC # BLD AUTO: 4.64 M/UL (ref 3.8–5.2)
RBC #/AREA URNS HPF: ABNORMAL /HPF (ref 0–5)
SODIUM SERPL-SCNC: 146 MMOL/L (ref 136–145)
SP GR UR REFRACTOMETRY: 1.01 (ref 1–1.03)
TROPONIN I SERPL-MCNC: <0.04 NG/ML
UROBILINOGEN UR QL STRIP.AUTO: 1 EU/DL (ref 0.2–1)
WBC # BLD AUTO: 3.2 K/UL (ref 3.6–11)
WBC URNS QL MICRO: ABNORMAL /HPF (ref 0–4)

## 2018-06-07 PROCEDURE — 74011250637 HC RX REV CODE- 250/637: Performed by: EMERGENCY MEDICINE

## 2018-06-07 PROCEDURE — 84484 ASSAY OF TROPONIN QUANT: CPT | Performed by: EMERGENCY MEDICINE

## 2018-06-07 PROCEDURE — 36415 COLL VENOUS BLD VENIPUNCTURE: CPT | Performed by: EMERGENCY MEDICINE

## 2018-06-07 PROCEDURE — 83880 ASSAY OF NATRIURETIC PEPTIDE: CPT | Performed by: EMERGENCY MEDICINE

## 2018-06-07 PROCEDURE — 71045 X-RAY EXAM CHEST 1 VIEW: CPT

## 2018-06-07 PROCEDURE — 85025 COMPLETE CBC W/AUTO DIFF WBC: CPT | Performed by: EMERGENCY MEDICINE

## 2018-06-07 PROCEDURE — 81001 URINALYSIS AUTO W/SCOPE: CPT | Performed by: EMERGENCY MEDICINE

## 2018-06-07 PROCEDURE — 99285 EMERGENCY DEPT VISIT HI MDM: CPT

## 2018-06-07 PROCEDURE — 93005 ELECTROCARDIOGRAM TRACING: CPT

## 2018-06-07 PROCEDURE — 83735 ASSAY OF MAGNESIUM: CPT | Performed by: EMERGENCY MEDICINE

## 2018-06-07 PROCEDURE — 80053 COMPREHEN METABOLIC PANEL: CPT | Performed by: EMERGENCY MEDICINE

## 2018-06-07 RX ORDER — CIPROFLOXACIN 500 MG/1
500 TABLET ORAL
Status: DISCONTINUED | OUTPATIENT
Start: 2018-06-07 | End: 2018-06-07

## 2018-06-07 RX ORDER — SODIUM CHLORIDE 0.9 % (FLUSH) 0.9 %
5-10 SYRINGE (ML) INJECTION AS NEEDED
Status: DISCONTINUED | OUTPATIENT
Start: 2018-06-07 | End: 2018-06-07 | Stop reason: HOSPADM

## 2018-06-07 RX ORDER — DEXTROMETHORPHAN POLISTIREX 30 MG/5 ML
SUSPENSION, EXTENDED RELEASE 12 HR ORAL
Status: COMPLETED | OUTPATIENT
Start: 2018-06-07 | End: 2018-06-07

## 2018-06-07 RX ORDER — SODIUM CHLORIDE 0.9 % (FLUSH) 0.9 %
5-10 SYRINGE (ML) INJECTION EVERY 8 HOURS
Status: DISCONTINUED | OUTPATIENT
Start: 2018-06-07 | End: 2018-06-07 | Stop reason: HOSPADM

## 2018-06-07 RX ORDER — DEXTROMETHORPHAN POLISTIREX 30 MG/5 ML
SUSPENSION, EXTENDED RELEASE 12 HR ORAL AS NEEDED
Status: DISCONTINUED | OUTPATIENT
Start: 2018-06-07 | End: 2018-06-07

## 2018-06-07 RX ORDER — POTASSIUM CHLORIDE 20 MEQ/1
40 TABLET, EXTENDED RELEASE ORAL
Status: COMPLETED | OUTPATIENT
Start: 2018-06-07 | End: 2018-06-07

## 2018-06-07 RX ADMIN — MINERAL OIL: 100 ENEMA RECTAL at 17:35

## 2018-06-07 RX ADMIN — POTASSIUM CHLORIDE 40 MEQ: 20 TABLET, EXTENDED RELEASE ORAL at 16:02

## 2018-06-07 NOTE — ED NOTES
Bedside shift change report given to Tom Ludwig (oncoming nurse) by Neena Ferreira (offgoing nurse). Report included the following information ED Summary.

## 2018-06-07 NOTE — PROGRESS NOTES
CM consulted re: PCP appointment. CM met with pt, introduced self, role. Pt verbalized understanding. Pt verified demographic information on chart. Pt stated she does not have a PCP at this time. Pt requested this CM speak with pt's daughter re: PCP options. CM offered information re: [facility]. Pt verbalized understanding and accepted information for services. CM contacted pt's previous PCP practice, [facility], and confirmed that pt has been terminated from the practice. CM called pt's daughter per pt request. CM introduced self, role. Daughter verbalized understanding. CM and daughter discussed need of PCP. Daughter agreed and stated \"where ever you can get her in for a doctor, that would be great. \" CM offered information re: [facility]. Daughter verbalized understanding. Daughter stated she would come  pt from the ED. CM called [facility] re: appointments. CM made soonest available appointment with NP Louis Spaulding for July 10 at 9:30 a.m. AVS updated with information. CM notified attending. CM to continue to remain available for support, discharge planning as needed. Care Management Interventions  PCP Verified by CM: Yes  Mode of Transport at Discharge:  Other (see comment) (Family to transport)  Transition of Care Consult (CM Consult): Discharge Planning  Discharge Durable Medical Equipment: No  Physical Therapy Consult: No  Occupational Therapy Consult: No  Current Support Network: Own Home  Confirm Follow Up Transport: Family  Plan discussed with Pt/Family/Caregiver: Yes  Discharge Location  Discharge Placement: Home    FERNANDO Landrum  [address]  [phone]

## 2018-06-07 NOTE — DISCHARGE INSTRUCTIONS
[facility] SYSTEM[facility] Departments     For adult and child immunizations, family planning, TB screening, STD testing and women's health services. [facility] [place] [phone]      [facility] 25   [address]   [address]   170 [facility]: Julienne Blevins [address] [phone]      [address] [facility]          [address] [address]     For primary care services, woman and child wellness, and some clinics providing specialty care. [facility] [facility]5 [facility] [phone]   [address] [facility] 200 [facility] 3614 [facility] [phone]   339 [facility] [address] [phone]   [address]  [place] [address] [place] [address] 5850  Community  [phone]   [address] [address] [address] [place] [phone]   [facility] 81 [facility] [phone]   [facility] [address] [place] [address] [phone]   [facility] [address], [address] [place] [address], [address] [phone]     [facility] [address] [facility] [address] [phone]   [facility] 5850  Community  [phone]   [employer]  [address], [address] ([email or web address]) [phone]         Sexual Health/Woman Wellness Clinics    For STD/HIV testing and treatment, pregnancy testing and services, men's health, birth control services, LGBT services, and hepatitis/HPV vaccine services. [facility] [place] [place] 201 [address]. [facility] [address] [facility] [address] [facility] 1579 [address] Francisco Javier Abreu [phone]   [facility] [address], 5th floor [phone]   Pregnancy [address] [facility] [address] Rodarte Bloch [phone]         Specialty Service 1702 [facility]   [phone]   [place]   [phone]   Women, Infant and Children's Services: [address] [phone]       600 [facility]   [phone]   [address]   2725 [facility]     [phone]   [address] Crisis   [address] [phone]     Local Primary Care Physicians  [facility] [phone]  MD Jennifer Connell MD Delynn Binet, MD [facility] [phone]  [facility]  Brandy Fields, MD Aida Westfall MD [address] [place] [address] [phone]  [place] [address] [facility] [phone]  Shweta Tang, MD Rob Solis, MD Bronwyn Mazariegos, MD Sam Carrera MD   [facility] [phone]  [facility] JUHI FRANCO, MD Nedra Montgomery, MD Faiza Shane, NP [address] [phone]  [facility], MD Madalyn Stock, MD Jef Fuentes, MD Enrique Duke MD Wilkie Dinning, MD Abdirahman Franco MD   33 57 [facility]  Laura Merida MD [address] [phone]  MD Samira Scott, NP  Gabby Morley, MD Tori Tamayo MD Lurline Mile, MD Sherrie Gully, MD   5484 [facility] [phone]  Shahnaz Millard, MD Maria Victoria Zapata, [facility]  [place], NICK Yates, MD Ramón Guzmán, MD Peter Srivastava, MD LUISA Con[facility]ly [facility] [phone]  Jazmine Cantu, MD Jewel Roper, MD Brenna Jeffries, MD Yahaira Bolanos, MD Stormy Hatfield MD   [address] [phone]  MD Natalie Layton MD [place] [phone]  MD Marcie Valenzuela MD [address] [place] [place], [address] [facility] [phone]  MD Emily Ty MD Nathaneil Hartmann, MD [place], MD Ovid Poplin, MD Lucrecia Packer, NICK Ramachandran MD 1619  66   [phone]  MD Lynette Sin MD Kathlee Major, MD   2104 [facility] [phone]  [address] [place] [address] MD Casi Wisdom, [facility]  Vickie Castleman, PA-C Vickie Castleman, [facility]  Ramya Finn, PA-C Serene Friedlander, MD Romana Croak, NP   Kris Bess DO Miscellaneous:  Marshall Fung -[phone]          Chest Pain: Care Instructions  Your Care Instructions    There are many things that can cause chest pain. Some are not serious and will get better on their own in a few days. But some kinds of chest pain need more testing and treatment. Your doctor may have recommended a follow-up visit in the next 8 to 12 hours. If you are not getting better, you may need more tests or treatment. Even though your doctor has released you, you still need to watch for any problems. The doctor carefully checked you, but sometimes problems can develop later. If you have new symptoms or if your symptoms do not get better, get medical care right away. If you have worse or different chest pain or pressure that lasts more than 5 minutes or you passed out (lost consciousness), call 911 or seek other emergency help right away. A medical visit is only one step in your treatment. Even if you feel better, you still need to do what your doctor recommends, such as going to all suggested follow-up appointments and taking medicines exactly as directed. This will help you recover and help prevent future problems. How can you care for yourself at home? · Rest until you feel better. · Take your medicine exactly as prescribed. Call your doctor if you think you are having a problem with your medicine.   · Do not drive after taking a prescription pain medicine. When should you call for help? Call 911 if:  ? · You passed out (lost consciousness). ? · You have severe difficulty breathing. ? · You have symptoms of a heart attack. These may include:  ¨ Chest pain or pressure, or a strange feeling in your chest.  ¨ Sweating. ¨ Shortness of breath. ¨ Nausea or vomiting. ¨ Pain, pressure, or a strange feeling in your back, neck, jaw, or upper belly or in one or both shoulders or arms. ¨ Lightheadedness or sudden weakness. ¨ A fast or irregular heartbeat. After you call 911, the  may tell you to chew 1 adult-strength or 2 to 4 low-dose aspirin. Wait for an ambulance. Do not try to drive yourself. ?Call your doctor today if:  ? · You have any trouble breathing. ? · Your chest pain gets worse. ? · You are dizzy or lightheaded, or you feel like you may faint. ? · You are not getting better as expected. ? · You are having new or different chest pain. Where can you learn more? Go to http://loboFish Naturemichel.info/. Enter A120 in the search box to learn more about \"Chest Pain: Care Instructions. \"  Current as of: March 20, 2017  Content Version: 11.4  © 1076-3423 [employer]. Care instructions adapted under license by [employer] (which disclaims liability or warranty for this information). If you have questions about a medical condition or this instruction, always ask your healthcare professional. [place] [address] any warranty or liability for your use of this information. Constipation: Care Instructions  Your Care Instructions    Constipation means that you have a hard time passing stools (bowel movements). People pass stools from 3 times a day to once every 3 days. What is normal for you may be different. Constipation may occur with pain in the rectum and cramping. The pain may get worse when you try to pass stools.  Sometimes there are small amounts of bright red blood on toilet paper or the surface of stools. This is because of enlarged veins near the rectum (hemorrhoids). A few changes in your diet and lifestyle may help you avoid ongoing constipation. Your doctor may also prescribe medicine to help loosen your stool. Some medicines can cause constipation. These include pain medicines and antidepressants. Tell your doctor about all the medicines you take. Your doctor may want to make a medicine change to ease your symptoms. Follow-up care is a key part of your treatment and safety. Be sure to make and go to all appointments, and call your doctor if you are having problems. It's also a good idea to know your test results and keep a list of the medicines you take. How can you care for yourself at home? · Drink plenty of fluids, enough so that your urine is light yellow or clear like water. If you have kidney, heart, or liver disease and have to limit fluids, talk with your doctor before you increase the amount of fluids you drink. · Include high-fiber foods in your diet each day. These include fruits, vegetables, beans, and whole grains. · Get at least 30 minutes of exercise on most days of the week. Walking is a good choice. You also may want to do other activities, such as running, swimming, cycling, or playing tennis or team sports. · Take a fiber supplement, such as Citrucel or Metamucil, every day. Read and follow all instructions on the label. · Schedule time each day for a bowel movement. A daily routine may help. Take your time having your bowel movement. · Support your feet with a small step stool when you sit on the toilet. This helps flex your hips and places your pelvis in a squatting position. · Your doctor may recommend an over-the-counter laxative to relieve your constipation. Examples are Milk of Magnesia and MiraLax. Read and follow all instructions on the label. Do not use laxatives on a long-term basis. When should you call for help?   Call your doctor now or seek immediate medical care if:  ? · You have new or worse belly pain. ? · You have new or worse nausea or vomiting. ? · You have blood in your stools. ? Watch closely for changes in your health, and be sure to contact your doctor if:  ? · Your constipation is getting worse. ? · You do not get better as expected. Where can you learn more? Go to http://lobo-michel.info/. Enter 21 [phone] in the search box to learn more about \"Constipation: Care Instructions. \"  Current as of: March 20, 2017  Content Version: 11.4  © 2099-2708 [employer]. Care instructions adapted under license by [employer] (which disclaims liability or warranty for this information). If you have questions about a medical condition or this instruction, always ask your healthcare professional. [address] any warranty or liability for your use of this information.

## 2018-06-07 NOTE — ED PROVIDER NOTES
EMERGENCY DEPARTMENT HISTORY AND PHYSICAL EXAM      Date: 6/7/2018  Patient Name: Diana Babcock    History of Presenting Illness     Chief Complaint   Patient presents with    Chest Pain     for 2 weeks, EMS reports elevated BP, EKG shows NSR. Pt reports pain across both sides of chest    Constipation     for 2 weeks    Headache     for 2 weeks       History Provided By: Patient    HPI: Diana Babcock, 67 y.o. female with PMHx significant for HTN and GERD, presents herself to the ED with cc of intermittent, aching chest pain x 2 weeks. Pt reports associated upper abdominal pain, nausea, vision disturbances, dizziness, productive cough w/ phlegm, constipation, fever, rectal pain, and vaginal pain since onset of chest pain. She notes no improvement of constipation upon taking increased fluids and OTC constipation medicine. Pt notes that she lives with her family and has no hx of cardiac issues. Pt specifically denies vomiting. There are no other complaints, changes, or physical findings at this time. PCP: Shun Sevilla MD    Current Facility-Administered Medications   Medication Dose Route Frequency Provider Last Rate Last Dose    sodium chloride (NS) flush 5-10 mL  5-10 mL IntraVENous Q8H Naresh Guthrie MD        sodium chloride (NS) flush 5-10 mL  5-10 mL IntraVENous PRN Naresh Guthrie MD         Current Outpatient Prescriptions   Medication Sig Dispense Refill    erythromycin (ILOTYCIN) ophthalmic ointment APPLY 1 APPLICATION TO BOTH EYELIDS TWICE DAILY  11    valsartan (DIOVAN) 80 mg tablet Take 1 Tab by mouth daily. 90 Tab 4    dilTIAZem CD (CARDIZEM CD) 120 mg ER capsule Take 1 Cap by mouth daily. 90 Cap 4    rosuvastatin (CRESTOR) 5 mg tablet       nystatin (MYCOSTATIN) topical cream APPLY TO AFFECTED AREA TWO (2) TIMES A DAY. 0    polyethylene glycol (MIRALAX) 17 gram packet daily.  pravastatin (PRAVACHOL) 10 mg tablet Take 2 Tabs by mouth nightly.  90 Tab 4    docusate sodium (COLACE) 100 mg capsule TAKE 1 CAP BY MOUTH DAILY AS NEEDED FOR CONSTIPATION FOR UP TO 90 DAYS. (Patient taking differently: take 1 cap TWICE DAILY as needed for constipation) 90 Cap 0    aspirin delayed-release 81 mg tablet Take 1 Tab by mouth daily.  27 Tab 11       Past History     Past Medical History:  Past Medical History:   Diagnosis Date    Agoraphobia without mention of panic attacks 2/17/2014    Anxiety disorder 8/18/2013    Arthritis     osteo    Breast pain, left 4/7/2015    CAD (coronary artery disease), native coronary artery 12/1/2015    Chronic chest pain 1/13/2014    Chronic pain associated with significant psychosocial dysfunction 2/17/2014    Depression 8/18/2013    Diabetes ([facility] [address]     type II    Duplicated right renal collecting system 3/13/2014    GERD (gastroesophageal reflux disease)     Gout, joint     Hypercholesteremia     hyercholesterolemia    Hypertension     Nephrolithiasis 3/13/2014    Personal history of noncompliance with medical treatment, presenting hazards to health 5/30/2014    Psychotic disorder     Vaginal pain 7/30/2014       Past Surgical History:  Past Surgical History:   Procedure Laterality Date    EGD  4/23/2010         HX CYST REMOVAL      cyst removed from left wrist    HX HYSTERECTOMY      partial    HX OTHER SURGICAL      bladder dilitation    HX TUBAL LIGATION      HX UROLOGICAL      kidney stones       Family History:  Family History   Problem Relation Age of Onset    Stroke Mother     Heart Disease Mother     Cancer Father     Heart Disease Son     Liver Disease Son     Heart Disease Daughter     Malignant Hyperthermia Neg Hx     Pseudocholinesterase Deficiency Neg Hx     Delayed Awakening Neg Hx     Post-op Nausea/Vomiting Neg Hx     Emergence Delirium Neg Hx     Post-op Cognitive Dysfunction Neg Hx     Other Neg Hx        Social History:  Social History   Substance Use Topics    Smoking status: Never Smoker    Smokeless tobacco: Never Used    Alcohol use No       Allergies: Allergies   Allergen Reactions    Amoxicillin Hives    Sulfa (Sulfonamide Antibiotics) Hives and Itching    Amoxicillin Hives and Itching     Long time ago - patient not exactly certain what it does    Mirtazapine Itching and Nausea Only     Funny feeling in chest    Percocet [Oxycodone-Acetaminophen] Nausea and Vomiting    Codeine Nausea and Vomiting    Crestor [Rosuvastatin] Other (comments)     myalgias    Prednisone Itching    Sulfa (Sulfonamide Antibiotics) Hives, Itching and Palpitations     Think it was increased heart rate or itching - many years ago    Zithromax [Azithromycin] Itching     Not sure what it does,taken long time ago         Review of Systems   Review of Systems   Constitutional: Positive for fever. Negative for chills. HENT: Negative for congestion and sore throat. Eyes: Positive for visual disturbance. Respiratory: Positive for cough (productive w/ phelgm). Negative for shortness of breath. Cardiovascular: Positive for chest pain. Negative for leg swelling. Gastrointestinal: Positive for abdominal pain (upper), constipation and nausea. Negative for blood in stool and diarrhea.        +rectal pain   Endocrine: Negative for polyuria. Genitourinary: Positive for vaginal pain. Negative for dysuria, flank pain, vaginal bleeding and vaginal discharge. Musculoskeletal: Negative for myalgias. Skin: Negative for rash. Allergic/Immunologic: Negative for immunocompromised state. Neurological: Positive for dizziness. Negative for weakness and headaches. Psychiatric/Behavioral: Negative for confusion. Physical Exam   Physical Exam   Constitutional: She is oriented to person, place, and time. She appears well-developed and well-nourished. HENT:   Head: Normocephalic and atraumatic. Moist mucous membranes   Eyes: Conjunctivae are normal. Pupils are equal, round, and reactive to light.  Right eye exhibits no discharge. Left eye exhibits no discharge. Neck: Normal range of motion. Neck supple. No tracheal deviation present. Cardiovascular: Normal rate and regular rhythm. Murmur heard. Systolic murmur is present with a grade of 3/6   Pulmonary/Chest: Effort normal and breath sounds normal. No respiratory distress. She has no wheezes. She has no rales. Abdominal: Soft. Bowel sounds are normal. There is no tenderness. There is no rebound and no guarding. Genitourinary:   Genitourinary Comments: Normal external and internal genitalia    Musculoskeletal: Normal range of motion. She exhibits no edema, tenderness or deformity. Neurological: She is alert and oriented to person, place, and time. Skin: Skin is warm and dry. No rash noted. No erythema. Psychiatric: Her behavior is normal.   Nursing note and vitals reviewed.       Diagnostic Study Results     Labs -  Recent Results (from the past 12 hour(s))   EKG, 12 LEAD, INITIAL    Collection Time: 06/07/18  2:18 PM   Result Value Ref Range    Ventricular Rate 69 BPM    Atrial Rate 69 BPM    P-R Interval 152 ms    QRS Duration 96 ms    Q-T Interval 408 ms    QTC Calculation (Bezet) 437 ms    Calculated P Axis 50 degrees    Calculated R Axis -6 degrees    Calculated T Axis 11 degrees    Diagnosis       Normal sinus rhythm  Inferior infarct , age undetermined  When compared with ECG of 09-APR-2017 08:56,  Criteria for Anterior infarct are no longer present  Inferior infarct is now present     CBC WITH AUTOMATED DIFF    Collection Time: 06/07/18  2:45 PM   Result Value Ref Range    WBC 3.2 (L) 3.6 - 11.0 K/uL    RBC 4.64 3.80 - 5.20 M/uL    HGB 12.6 11.5 - 16.0 g/dL    HCT 39.0 35.0 - 47.0 %    MCV 84.1 80.0 - 99.0 FL    MCH 27.2 26.0 - 34.0 PG    MCHC 32.3 30.0 - 36.5 g/dL    RDW 13.1 11.5 - 14.5 %    PLATELET 762 910 - 541 K/uL    MPV 10.5 8.9 - 12.9 FL    NRBC 0.0 0  WBC    ABSOLUTE NRBC 0.00 0.00 - 0.01 K/uL    NEUTROPHILS 55 32 - 75 % LYMPHOCYTES 34 12 - 49 %    MONOCYTES 9 5 - 13 %    EOSINOPHILS 1 0 - 7 %    BASOPHILS 1 0 - 1 %    IMMATURE GRANULOCYTES 0 0.0 - 0.5 %    ABS. NEUTROPHILS 1.8 1.8 - 8.0 K/UL    ABS. LYMPHOCYTES 1.1 0.8 - 3.5 K/UL    ABS. MONOCYTES 0.3 0.0 - 1.0 K/UL    ABS. EOSINOPHILS 0.0 0.0 - 0.4 K/UL    ABS. BASOPHILS 0.0 0.0 - 0.1 K/UL    ABS. IMM. GRANS. 0.0 0.00 - 0.04 K/UL    DF AUTOMATED     METABOLIC PANEL, COMPREHENSIVE    Collection Time: 06/07/18  2:45 PM   Result Value Ref Range    Sodium 146 (H) 136 - 145 mmol/L    Potassium 3.3 (L) 3.5 - 5.1 mmol/L    Chloride 115 (H) 97 - 108 mmol/L    CO2 24 21 - 32 mmol/L    Anion gap 7 5 - 15 mmol/L    Glucose 81 65 - 100 mg/dL    BUN 12 6 - 20 MG/DL    Creatinine 0.68 0.55 - 1.02 MG/DL    BUN/Creatinine ratio 18 12 - 20      GFR est AA >60 >60 ml/min/1.73m2    GFR est non-AA >60 >60 ml/min/1.73m2    Calcium 7.0 (L) 8.5 - 10.1 MG/DL    Bilirubin, total 0.4 0.2 - 1.0 MG/DL    ALT (SGPT) 10 (L) 12 - 78 U/L    AST (SGOT) 7 (L) 15 - 37 U/L    Alk.  phosphatase 61 45 - 117 U/L    Protein, total 5.7 (L) 6.4 - 8.2 g/dL    Albumin 2.7 (L) 3.5 - 5.0 g/dL    Globulin 3.0 2.0 - 4.0 g/dL    A-G Ratio 0.9 (L) 1.1 - 2.2     MAGNESIUM    Collection Time: 06/07/18  2:45 PM   Result Value Ref Range    Magnesium 1.6 1.6 - 2.4 mg/dL   TROPONIN I    Collection Time: 06/07/18  2:45 PM   Result Value Ref Range    Troponin-I, Qt. <0.04 <0.05 ng/mL   NT-PRO BNP    Collection Time: 06/07/18  2:45 PM   Result Value Ref Range    NT pro- (H) 0 - 125 PG/ML   URINALYSIS W/MICROSCOPIC    Collection Time: 06/07/18  2:50 PM   Result Value Ref Range    Color YELLOW/STRAW      Appearance CLEAR CLEAR      Specific gravity 1.007 1.003 - 1.030      pH (UA) 7.0 5.0 - 8.0      Protein NEGATIVE  NEG mg/dL    Glucose NEGATIVE  NEG mg/dL    Ketone NEGATIVE  NEG mg/dL    Bilirubin NEGATIVE  NEG      Blood NEGATIVE  NEG      Urobilinogen 1.0 0.2 - 1.0 EU/dL    Nitrites NEGATIVE  NEG      Leukocyte Esterase TRACE (A) NEG      WBC 0-4 0 - 4 /hpf    RBC 0-5 0 - 5 /hpf    Epithelial cells FEW FEW /lpf    Bacteria NEGATIVE  NEG /hpf    Hyaline cast 0-2 0 - 5 /lpf       Radiologic Studies -   CXR Results  (Last 48 hours)               06/07/18 1521  XR CHEST PORT Final result    Impression:  IMPRESSION:    Clear lungs. Narrative:  PORTABLE CHEST RADIOGRAPH/S: 6/7/2018 3:21 PM       INDICATION: Chest pain for 2 weeks. History of hypertension, GERD, diabetes,   coronary disease, hypercholesterolemia. COMPARISON: 2/23/2017, 12/23/2016. TECHNIQUE: Portable frontal upright radiograph/s of the chest.       FINDINGS:    The lungs are clear. The central airways are patent. No pneumothorax or pleural   effusion. Medical Decision Making   I am the first provider for this patient. I reviewed the vital signs, available nursing notes, past medical history, past surgical history, family history and social history. Vital Signs-Reviewed the patient's vital signs. Patient Vitals for the past 12 hrs:   Temp Pulse Resp BP SpO2   06/07/18 1530 - 69 15 139/74 100 %   06/07/18 1457 97.9 °F (36.6 °C) - - - -   06/07/18 1430 - 68 16 150/77 99 %     EKG interpretation: (Preliminary) 14:18  Rhythm: normal sinus rhythm; and regular . Rate (approx.): 69; Axis: normal; IL interval: normal; QRS interval: normal ; ST/T wave: normal; Other findings: no acute ischemic changes. QRS: 96 ms; QT/QTc: 408/437 ms  Written by Jesse Barreto, as dictated by, [employer], DO. Records Reviewed: Nursing Notes and Old Medical Records    Provider Notes (Medical Decision Making):   Pt here with pan positive ROS, multiple complaints. Will try to focus on most acute processes such as cardio endemiology. Doubt acute intra-abdominal process based off of physical exam. Pt will need to f/u with PCP. ED Course:   Initial assessment performed.  The patients presenting problems have been discussed, and they are in agreement with the care plan formulated and outlined with them. I have encouraged them to ask questions as they arise throughout their visit. Procedure Note - Rectal Exam:   2:46 PM  Performed by: Filipe Lacey DO. Chaperoned by: Hiram Forman RN  Rectal exam performed. No stool was collected. Other findings: hemorrhoids noted   The procedure took 1-15 minutes, and pt tolerated well. PROGRESS NOTE:   4:10 PM  Pt has been seen by care management. She has been set up an appointment with a PCP scheduled in July 2018; however, [facility] has been set up as well. Pt has been made aware of these changes by care management and is in agreement. Discharge Note:  4:15 PM  The pt is ready for discharge. The pt's signs, symptoms, diagnosis, and discharge instructions have been discussed and pt has conveyed their understanding. The pt is to follow up as recommended or return to ER should their symptoms worsen. Plan has been discussed and pt is in agreement. PLAN:  1. Current Discharge Medication List        2. Follow-up Information     Follow up With Details Comments Contact Info    [facility] EMERGENCY DEPT   [address] [place] [address]  [phone]    Melody Hankinsd Go on 7/10/2018 Appointment at 9:30. Please arrive 30 minutes early with a list of medication, discharge paperwork and insurance information. [address] [place] [id number] [facility]   [facility]  [phone]        Return to ED if worse     Diagnosis     Clinical Impression:   1. Chest pain, unspecified type    2. Abdominal pain, generalized    3. Constipation, unspecified constipation type        Attestations: This note is prepared by Malina Han, acting as Scribe for Filipe Lacey DO. Filipe Lacey DO: The scribe's documentation has been prepared under my direction and personally reviewed by me in its entirety.  I confirm that the note above accurately reflects all work, treatment, procedures, and medical decision making performed by me.

## 2018-06-07 NOTE — ED NOTES
Pt discharged by MD Soni Segura. Pt provided with discharge instructions Rx and instructions on follow up care. Pt out of ED in stable condition accompanied by daughter.

## 2018-06-08 LAB
ATRIAL RATE: 69 BPM
CALCULATED P AXIS, ECG09: 50 DEGREES
CALCULATED R AXIS, ECG10: -6 DEGREES
CALCULATED T AXIS, ECG11: 11 DEGREES
DIAGNOSIS, 93000: NORMAL
P-R INTERVAL, ECG05: 152 MS
Q-T INTERVAL, ECG07: 408 MS
QRS DURATION, ECG06: 96 MS
QTC CALCULATION (BEZET), ECG08: 437 MS
VENTRICULAR RATE, ECG03: 69 BPM

## 2018-06-19 ENCOUNTER — APPOINTMENT (OUTPATIENT)
Dept: CT IMAGING | Age: 73
End: 2018-06-19
Attending: EMERGENCY MEDICINE
Payer: MEDICARE

## 2018-06-19 ENCOUNTER — APPOINTMENT (OUTPATIENT)
Dept: GENERAL RADIOLOGY | Age: 73
End: 2018-06-19
Attending: PHYSICIAN ASSISTANT
Payer: MEDICARE

## 2018-06-19 ENCOUNTER — HOSPITAL ENCOUNTER (EMERGENCY)
Age: 73
Discharge: HOME | End: 2018-06-19
Attending: EMERGENCY MEDICINE
Payer: MEDICARE

## 2018-06-19 VITALS
HEIGHT: 68 IN | TEMPERATURE: 98.4 F | BODY MASS INDEX: 27.36 KG/M2 | OXYGEN SATURATION: 97 % | HEART RATE: 66 BPM | SYSTOLIC BLOOD PRESSURE: 177 MMHG | WEIGHT: 180.56 LBS | RESPIRATION RATE: 16 BRPM | DIASTOLIC BLOOD PRESSURE: 88 MMHG

## 2018-06-19 DIAGNOSIS — R51.9 NONINTRACTABLE HEADACHE, UNSPECIFIED CHRONICITY PATTERN, UNSPECIFIED HEADACHE TYPE: Primary | ICD-10-CM

## 2018-06-19 LAB
ALBUMIN SERPL-MCNC: 3.6 G/DL (ref 3.5–5)
ALBUMIN/GLOB SERPL: 1 {RATIO} (ref 1.1–2.2)
ALP SERPL-CCNC: 69 U/L (ref 45–117)
ALT SERPL-CCNC: 15 U/L (ref 12–78)
ANION GAP SERPL CALC-SCNC: 4 MMOL/L (ref 5–15)
AST SERPL-CCNC: 9 U/L (ref 15–37)
ATRIAL RATE: 73 BPM
BASOPHILS # BLD: 0 K/UL (ref 0–0.1)
BASOPHILS NFR BLD: 1 % (ref 0–1)
BILIRUB SERPL-MCNC: 0.5 MG/DL (ref 0.2–1)
BUN SERPL-MCNC: 18 MG/DL (ref 6–20)
BUN/CREAT SERPL: 16 (ref 12–20)
CALCIUM SERPL-MCNC: 8.8 MG/DL (ref 8.5–10.1)
CALCULATED P AXIS, ECG09: 40 DEGREES
CALCULATED R AXIS, ECG10: 7 DEGREES
CALCULATED T AXIS, ECG11: 27 DEGREES
CHLORIDE SERPL-SCNC: 106 MMOL/L (ref 97–108)
CK MB CFR SERPL CALC: NORMAL % (ref 0–2.5)
CK MB SERPL-MCNC: <1 NG/ML (ref 5–25)
CK SERPL-CCNC: 90 U/L (ref 26–192)
CO2 SERPL-SCNC: 31 MMOL/L (ref 21–32)
CREAT SERPL-MCNC: 1.12 MG/DL (ref 0.55–1.02)
DIAGNOSIS, 93000: NORMAL
DIFFERENTIAL METHOD BLD: ABNORMAL
EOSINOPHIL # BLD: 0 K/UL (ref 0–0.4)
EOSINOPHIL NFR BLD: 1 % (ref 0–7)
ERYTHROCYTE [DISTWIDTH] IN BLOOD BY AUTOMATED COUNT: 13 % (ref 11.5–14.5)
GLOBULIN SER CALC-MCNC: 3.6 G/DL (ref 2–4)
GLUCOSE SERPL-MCNC: 106 MG/DL (ref 65–100)
HCT VFR BLD AUTO: 39.2 % (ref 35–47)
HGB BLD-MCNC: 12.3 G/DL (ref 11.5–16)
IMM GRANULOCYTES # BLD: 0 K/UL (ref 0–0.04)
IMM GRANULOCYTES NFR BLD AUTO: 0 % (ref 0–0.5)
LYMPHOCYTES # BLD: 1.2 K/UL (ref 0.8–3.5)
LYMPHOCYTES NFR BLD: 33 % (ref 12–49)
MCH RBC QN AUTO: 27 PG (ref 26–34)
MCHC RBC AUTO-ENTMCNC: 31.4 G/DL (ref 30–36.5)
MCV RBC AUTO: 86.2 FL (ref 80–99)
MONOCYTES # BLD: 0.3 K/UL (ref 0–1)
MONOCYTES NFR BLD: 8 % (ref 5–13)
NEUTS SEG # BLD: 2.1 K/UL (ref 1.8–8)
NEUTS SEG NFR BLD: 58 % (ref 32–75)
NRBC # BLD: 0 K/UL (ref 0–0.01)
NRBC BLD-RTO: 0 PER 100 WBC
P-R INTERVAL, ECG05: 150 MS
PLATELET # BLD AUTO: 212 K/UL (ref 150–400)
PMV BLD AUTO: 10.6 FL (ref 8.9–12.9)
POTASSIUM SERPL-SCNC: 4 MMOL/L (ref 3.5–5.1)
PROT SERPL-MCNC: 7.2 G/DL (ref 6.4–8.2)
Q-T INTERVAL, ECG07: 382 MS
QRS DURATION, ECG06: 90 MS
QTC CALCULATION (BEZET), ECG08: 420 MS
RBC # BLD AUTO: 4.55 M/UL (ref 3.8–5.2)
SODIUM SERPL-SCNC: 141 MMOL/L (ref 136–145)
TROPONIN I SERPL-MCNC: <0.05 NG/ML
VENTRICULAR RATE, ECG03: 73 BPM
WBC # BLD AUTO: 3.5 K/UL (ref 3.6–11)

## 2018-06-19 PROCEDURE — 36415 COLL VENOUS BLD VENIPUNCTURE: CPT | Performed by: PHYSICIAN ASSISTANT

## 2018-06-19 PROCEDURE — 80053 COMPREHEN METABOLIC PANEL: CPT | Performed by: PHYSICIAN ASSISTANT

## 2018-06-19 PROCEDURE — 84484 ASSAY OF TROPONIN QUANT: CPT | Performed by: PHYSICIAN ASSISTANT

## 2018-06-19 PROCEDURE — 70450 CT HEAD/BRAIN W/O DYE: CPT

## 2018-06-19 PROCEDURE — 93005 ELECTROCARDIOGRAM TRACING: CPT

## 2018-06-19 PROCEDURE — 85025 COMPLETE CBC W/AUTO DIFF WBC: CPT | Performed by: PHYSICIAN ASSISTANT

## 2018-06-19 PROCEDURE — 99284 EMERGENCY DEPT VISIT MOD MDM: CPT

## 2018-06-19 PROCEDURE — 82550 ASSAY OF CK (CPK): CPT | Performed by: PHYSICIAN ASSISTANT

## 2018-06-19 PROCEDURE — 71046 X-RAY EXAM CHEST 2 VIEWS: CPT

## 2018-06-19 RX ORDER — BUTALBITAL, ACETAMINOPHEN AND CAFFEINE 300; 40; 50 MG/1; MG/1; MG/1
1 CAPSULE ORAL
Qty: 6 CAP | Refills: 0 | Status: SHIPPED | OUTPATIENT
Start: 2018-06-19 | End: 2018-08-17

## 2018-06-19 NOTE — ED NOTES
TRANSFER - IN REPORT:    Verbal report received from Clark Schmitt on [address]. Report consisted of patients Situation, Background, Assessment and   Recommendations(SBAR). Information from the following report(s) SBAR and ED Summary was reviewed with the receiving nurse. Opportunity for questions and clarification was provided. Pt resting on stretcher in POC with call bell in reach. Pt remains on monitor x 3. VSS at this time.

## 2018-06-19 NOTE — ED NOTES
Pt discharged by Dr. Azar Arreola. Pt provided with discharge instructions Rx and instructions on follow up care. Dr. Azar Arreola instructed pt to take magnesium citrate OTC at home.

## 2018-06-19 NOTE — ED NOTES
Assumed care of patient. Patient states she has been having chest pain for the past few weeks. Patient states sometimes she has SOB as well, patient currently describes chest pain as under her right breast, and left underarm. Patient is ANO x 4. Placed on monitor x 3. Call bell in reach.

## 2018-06-19 NOTE — ED PROVIDER NOTES
EMERGENCY DEPARTMENT HISTORY AND PHYSICAL EXAM      Date: 6/19/2018  Patient Name: Ryan Aleman    History of Presenting Illness     Chief Complaint   Patient presents with    Chest Pain     Pain at right and left chest for 1 month, worse with movement, EKG shows NSR per EMS. Reports dizziness when walking. Stress in life with death of 3 children in 1 year. History Provided By: Patient    HPI: Ryan Aleman, 67 y.o. female with PMHx significant for HTN, GERD, DM, depression, and CAD, presents via EMS to the ED with cc of B/L CP x ~1 month. Pt reports her pain is slightly worse today, and states her pain is exacerbated by movment. She also reports mild right sided ABD wall pain, as well as mild HA, consistent with HA she has been having intermittently x \"months. \" Pt states she has hx of similar symptoms. She also notes she has not had BM in \"3 months. \" She denies taking any medications for her symptoms. Pt notes she is currently being followed for L>R blurred vision by her eye doctor. She specifically denies any fevers, chills, nausea, vomiting, shortness of breath, headache, rash, diarrhea, sweating or weight loss. There are no other complaints, changes, or physical findings at this time. PCP: Shun Sevilla MD    Current Outpatient Prescriptions   Medication Sig Dispense Refill    valsartan (DIOVAN) 80 mg tablet Take 1 Tab by mouth daily. 90 Tab 4    dilTIAZem CD (CARDIZEM CD) 120 mg ER capsule Take 1 Cap by mouth daily. 90 Cap 4    pravastatin (PRAVACHOL) 10 mg tablet Take 2 Tabs by mouth nightly. 90 Tab 4    docusate sodium (COLACE) 100 mg capsule TAKE 1 CAP BY MOUTH DAILY AS NEEDED FOR CONSTIPATION FOR UP TO 90 DAYS. (Patient taking differently: take 1 cap TWICE DAILY as needed for constipation) 90 Cap 0    aspirin delayed-release 81 mg tablet Take 1 Tab by mouth daily.  30 Tab 11    erythromycin (ILOTYCIN) ophthalmic ointment APPLY 1 APPLICATION TO BOTH EYELIDS TWICE DAILY  11    rosuvastatin (CRESTOR) 5 mg tablet       nystatin (MYCOSTATIN) topical cream APPLY TO AFFECTED AREA TWO (2) TIMES A DAY. 0    polyethylene glycol (MIRALAX) 17 gram packet daily. Past History     Past Medical History:  Past Medical History:   Diagnosis Date    Agoraphobia without mention of panic attacks 2/17/2014    Anxiety disorder 8/18/2013    Arthritis     osteo    Breast pain, left 4/7/2015    CAD (coronary artery disease), native coronary artery 12/1/2015    Chronic chest pain 1/13/2014    Chronic pain associated with significant psychosocial dysfunction 2/17/2014    Depression 8/18/2013    Diabetes ([facility] [address]     type II    Duplicated right renal collecting system 3/13/2014    GERD (gastroesophageal reflux disease)     Gout, joint     Hypercholesteremia     hyercholesterolemia    Hypertension     Nephrolithiasis 3/13/2014    Personal history of noncompliance with medical treatment, presenting hazards to health 5/30/2014    Psychotic disorder     Vaginal pain 7/30/2014       Past Surgical History:  Past Surgical History:   Procedure Laterality Date    EGD  4/23/2010         HX CYST REMOVAL      cyst removed from left wrist    HX HYSTERECTOMY      partial    HX OTHER SURGICAL      bladder dilitation    HX TUBAL LIGATION      HX UROLOGICAL      kidney stones       Family History:  Family History   Problem Relation Age of Onset    Stroke Mother     Heart Disease Mother     Cancer Father     Heart Disease Son     Liver Disease Son     Heart Disease Daughter     Malignant Hyperthermia Neg Hx     Pseudocholinesterase Deficiency Neg Hx     Delayed Awakening Neg Hx     Post-op Nausea/Vomiting Neg Hx     Emergence Delirium Neg Hx     Post-op Cognitive Dysfunction Neg Hx     Other Neg Hx        Social History:  Social History   Substance Use Topics    Smoking status: Never Smoker    Smokeless tobacco: Never Used    Alcohol use No       Allergies:   Allergies   Allergen Reactions    Amoxicillin Hives    Sulfa (Sulfonamide Antibiotics) Hives and Itching    Amoxicillin Hives and Itching     Long time ago - patient not exactly certain what it does    Mirtazapine Itching and Nausea Only     Funny feeling in chest    Percocet [Oxycodone-Acetaminophen] Nausea and Vomiting    Codeine Nausea and Vomiting    Crestor [Rosuvastatin] Other (comments)     myalgias    Prednisone Itching    Sulfa (Sulfonamide Antibiotics) Hives, Itching and Palpitations     Think it was increased heart rate or itching - many years ago    Zithromax [Azithromycin] Itching     Not sure what it does,taken long time ago         Review of Systems   Review of Systems   Constitutional: Negative. Negative for activity change, appetite change, chills, fatigue, fever and unexpected weight change. HENT: Negative. Negative for congestion, hearing loss, rhinorrhea, sneezing and voice change. Eyes: Negative. Negative for pain and visual disturbance. Respiratory: Negative. Negative for apnea, cough, choking, chest tightness and shortness of breath. Cardiovascular: Positive for chest pain (L>R). Negative for palpitations. Gastrointestinal: Positive for constipation. Negative for abdominal distention, abdominal pain, blood in stool, diarrhea, nausea and vomiting. Genitourinary: Negative. Negative for difficulty urinating, flank pain, frequency and urgency. No discharge   Musculoskeletal: Positive for myalgias (right ABD wall). Negative for arthralgias, back pain and neck stiffness. Skin: Negative. Negative for color change and rash. Neurological: Positive for headaches. Negative for dizziness, seizures, syncope, speech difficulty, weakness and numbness. Hematological: Negative for adenopathy. Psychiatric/Behavioral: Negative. Negative for agitation, behavioral problems, dysphoric mood and suicidal ideas. The patient is not nervous/anxious.         Physical Exam   Physical Exam Constitutional: She is oriented to person, place, and time. She appears well-developed and well-nourished. No distress. HENT:   Head: Normocephalic and atraumatic. Mouth/Throat: Oropharynx is clear and moist. No oropharyngeal exudate. Eyes: Conjunctivae and EOM are normal. Pupils are equal, round, and reactive to light. Right eye exhibits no discharge. Left eye exhibits no discharge. Neck: Normal range of motion. Neck supple. Cardiovascular: Normal rate, regular rhythm and intact distal pulses. Exam reveals no gallop and no friction rub. No murmur heard. Pulmonary/Chest: Effort normal and breath sounds normal. No respiratory distress. She has no wheezes. She has no rales. She exhibits no tenderness. Abdominal: Soft. Bowel sounds are normal. She exhibits no distension and no mass. There is no tenderness. There is no rebound and no guarding. Musculoskeletal: Normal range of motion. She exhibits no edema. Lymphadenopathy:     She has no cervical adenopathy. Neurological: She is alert and oriented to person, place, and time. No cranial nerve deficit. Coordination normal.   Skin: Skin is warm and dry. No rash noted. No erythema. Psychiatric: She has a normal mood and affect. Nursing note and vitals reviewed.       Diagnostic Study Results     Labs -     Recent Results (from the past 12 hour(s))   EKG, 12 LEAD, INITIAL    Collection Time: 06/19/18 12:59 PM   Result Value Ref Range    Ventricular Rate 73 BPM    Atrial Rate 73 BPM    P-R Interval 150 ms    QRS Duration 90 ms    Q-T Interval 382 ms    QTC Calculation (Bezet) 420 ms    Calculated P Axis 40 degrees    Calculated R Axis 7 degrees    Calculated T Axis 27 degrees    Diagnosis       Normal sinus rhythm  Poor R-wave Progression (consider lead placement or loss of anterior forces)    Confirmed by Caroline Flores MD, Maurisio Lilly ([id number]) on 6/19/2018 4:06:06 PM     CBC WITH AUTOMATED DIFF    Collection Time: 06/19/18  1:38 PM   Result Value Ref Range WBC 3.5 (L) 3.6 - 11.0 K/uL    RBC 4.55 3.80 - 5.20 M/uL    HGB 12.3 11.5 - 16.0 g/dL    HCT 39.2 35.0 - 47.0 %    MCV 86.2 80.0 - 99.0 FL    MCH 27.0 26.0 - 34.0 PG    MCHC 31.4 30.0 - 36.5 g/dL    RDW 13.0 11.5 - 14.5 %    PLATELET 014 987 - 584 K/uL    MPV 10.6 8.9 - 12.9 FL    NRBC 0.0 0  WBC    ABSOLUTE NRBC 0.00 0.00 - 0.01 K/uL    NEUTROPHILS 58 32 - 75 %    LYMPHOCYTES 33 12 - 49 %    MONOCYTES 8 5 - 13 %    EOSINOPHILS 1 0 - 7 %    BASOPHILS 1 0 - 1 %    IMMATURE GRANULOCYTES 0 0.0 - 0.5 %    ABS. NEUTROPHILS 2.1 1.8 - 8.0 K/UL    ABS. LYMPHOCYTES 1.2 0.8 - 3.5 K/UL    ABS. MONOCYTES 0.3 0.0 - 1.0 K/UL    ABS. EOSINOPHILS 0.0 0.0 - 0.4 K/UL    ABS. BASOPHILS 0.0 0.0 - 0.1 K/UL    ABS. IMM. GRANS. 0.0 0.00 - 0.04 K/UL    DF AUTOMATED     METABOLIC PANEL, COMPREHENSIVE    Collection Time: 06/19/18  1:38 PM   Result Value Ref Range    Sodium 141 136 - 145 mmol/L    Potassium 4.0 3.5 - 5.1 mmol/L    Chloride 106 97 - 108 mmol/L    CO2 31 21 - 32 mmol/L    Anion gap 4 (L) 5 - 15 mmol/L    Glucose 106 (H) 65 - 100 mg/dL    BUN 18 6 - 20 MG/DL    Creatinine 1.12 (H) 0.55 - 1.02 MG/DL    BUN/Creatinine ratio 16 12 - 20      GFR est AA 58 (L) >60 ml/min/1.73m2    GFR est non-AA 48 (L) >60 ml/min/1.73m2    Calcium 8.8 8.5 - 10.1 MG/DL    Bilirubin, total 0.5 0.2 - 1.0 MG/DL    ALT (SGPT) 15 12 - 78 U/L    AST (SGOT) 9 (L) 15 - 37 U/L    Alk.  phosphatase 69 45 - 117 U/L    Protein, total 7.2 6.4 - 8.2 g/dL    Albumin 3.6 3.5 - 5.0 g/dL    Globulin 3.6 2.0 - 4.0 g/dL    A-G Ratio 1.0 (L) 1.1 - 2.2     TROPONIN I    Collection Time: 06/19/18  1:38 PM   Result Value Ref Range    Troponin-I, Qt. <0.05 <0.05 ng/mL   CK W/ CKMB & INDEX    Collection Time: 06/19/18  1:38 PM   Result Value Ref Range    CK 90 26 - 192 U/L    CK - MB <1.0 <3.6 NG/ML    CK-MB Index Cannot be calculated 0 - 2.5         Radiologic Studies -   CT Results  (Last 48 hours)               06/19/18 1658  CT HEAD WO CONT Final result Impression:  IMPRESSION: No acute intracranial process seen               Narrative:  EXAM:  CT HEAD WO CONT       INDICATION:   Headache       COMPARISON: 4/19/2018. CONTRAST:  None. TECHNIQUE: Unenhanced CT of the head was performed using 5 mm images. Brain and   bone windows were generated. CT dose reduction was achieved through use of a   standardized protocol tailored for this examination and automatic exposure   control for dose modulation. FINDINGS:   The ventricles and sulci are normal in size, shape and configuration and   midline. Note is made of a conjoined cavum placement cavum vergae. There is no   significant white matter disease. There is no intracranial hemorrhage,   extra-axial collection, mass, mass effect or midline shift. The basilar   cisterns are open. No acute infarct is identified. The bone windows demonstrate   no abnormalities. The visualized portions of the paranasal sinuses and mastoid   air cells are clear. CXR Results  (Last 48 hours)               06/19/18 1421  XR CHEST PA LAT Final result    Impression:  IMPRESSION:        No acute cardiopulmonary process seen. Stable T11 compression deformity. Recommend DXA. Narrative:  EXAM:  XR CHEST PA LAT       INDICATION:   chest pain       COMPARISON: 6/7/2018. 2/23/2017       FINDINGS: PA and lateral radiographs of the chest demonstrate clear lungs. The   cardiac and mediastinal contours and pulmonary vascularity are remarkable for   mild tortuosity of the descending aorta. There is a moderate T11 compression   deformity. Heavy costochondral calcification is seen. Medical Decision Making   I am the first provider for this patient. I reviewed the vital signs, available nursing notes, past medical history, past surgical history, family history and social history. Vital Signs-Reviewed the patient's vital signs.   Patient Vitals for the past 12 hrs:   Temp Pulse Resp BP SpO2   06/19/18 1630 - 70 15 175/83 98 %   06/19/18 1251 98.4 °F (36.9 °C) 78 18 154/85 100 %       Pulse Oximetry Analysis - 100% on RA    Cardiac Monitor:   Rate: 73 bpm  Rhythm: Normal Sinus Rhythm      EKG interpretation: 12:59  Rhythm: normal sinus rhythm; and regular . Rate (approx.): 73; Axis: normal; VT interval: normal; QRS interval: normal ; ST/T wave: normal; Other findings: normal.    Records Reviewed: Nursing Notes, Old Medical Records, Previous Radiology Studies and Previous Laboratory Studies    Provider Notes (Medical Decision Making):     DDx: ACS, arrhythmia, electrolyte abnormality, MSK, dehydration    ED Course:   Initial assessment performed. The patients presenting problems have been discussed, and they are in agreement with the care plan formulated and outlined with them. I have encouraged them to ask questions as they arise throughout their visit. 3:50 PM  I have just reevaluated the patient. I have reviewed Her vital signs and determined there is currently no worsening in their condition or physical exam.  Results have been reviewed with them and their questions have been answered. We will continue to review further results as they come available. 5:13 PM  The patient states that their symptoms have resolved and they feel much better. There are no other new complaints at this time. Her questions have been answered. We are awaiting final results and those will be reviewed with them when they become available. Pt advised to take magnesium citrate at time of discharge. Disposition:  5:13 PM  Merlynn Mater  results have been reviewed with her. She has been counseled regarding her diagnosis. She verbally conveys understanding and agreement of the signs, symptoms, diagnosis, treatment and prognosis and additionally agrees to follow up as recommended with Dr. Ana Sevilla MD in 24 - 48 hours.   She also agrees with the care-plan and conveys that all of her questions have been answered. I have also put together some discharge instructions for her that include: 1) educational information regarding their diagnosis, 2) how to care for their diagnosis at home, as well a 3) list of reasons why they would want to return to the ED prior to their follow-up appointment, should their condition change. PLAN:  1. Current Discharge Medication List      START taking these medications    Details   butalbital-acetaminophen-caff (FIORICET) -40 mg per capsule Take 1 Cap by mouth every four (4) hours as needed for Pain. Qty: 6 Cap, Refills: 0           2. Follow-up Information     Follow up With Details Comments Contact Info    Phys MD Roel   Patient can only remember the practice name and not the physician      [facility] EMERGENCY DEPT Call in 2 days As needed, If symptoms worsen [address] [place] [address]  [phone]        Return to ED if worse     Diagnosis     Clinical Impression:   1. Nonintractable headache, unspecified chronicity pattern, unspecified headache type        Attestations: This note is prepared by Anival Barragan, acting as Scribe for Liam Sena MD.    Liam Sena MD: The scribe's documentation has been prepared under my direction and personally reviewed by me in its entirety. I confirm that the note above accurately reflects all work, treatment, procedures, and medical decision making performed by me.

## 2018-06-19 NOTE — DISCHARGE INSTRUCTIONS
Head or Face Pain: Care Instructions  Your Care Instructions    Common causes of head or face pain are allergies, stress, and injuries. Other causes include tooth problems and sinus infections. Eating certain foods, such as chocolate or cheese, or drinking certain liquids, such as coffee or cola, can cause head pain for some people. If you have mild head pain, you may not need treatment. It is important to watch your symptoms and talk to your doctor if your pain continues or gets worse. Follow-up care is a key part of your treatment and safety. Be sure to make and go to all appointments, and call your doctor if you are having problems. It's also a good idea to know your test results and keep a list of the medicines you take. How can you care for yourself at home? · Take pain medicines exactly as directed. ¨ If the doctor gave you a prescription medicine for pain, take it as prescribed. ¨ If you are not taking a prescription pain medicine, ask your doctor if you can take an over-the-counter pain medicine. · Take it easy for the next few days or longer if you are not feeling well. · Use a warm, moist towel or heating pad set on low to relax tight muscles in your shoulder and neck. Have someone gently massage your neck and shoulders. · Put ice or a cold pack on the area for 10 to 20 minutes at a time. Put a thin cloth between the ice and your skin. When should you call for help? Call 911 anytime you think you may need emergency care. For example, call if:  ? · You have twitching, jerking, or a seizure. ? · You passed out (lost consciousness). ? · You have symptoms of a stroke. These may include:  ¨ Sudden numbness, tingling, weakness, or loss of movement in your face, arm, or leg, especially on only one side of your body. ¨ Sudden vision changes. ¨ Sudden trouble speaking. ¨ Sudden confusion or trouble understanding simple statements. ¨ Sudden problems with walking or balance.   ¨ A sudden, severe headache that is different from past headaches. ? · You have jaw pain and pain in your chest, shoulder, neck, or arm. ?Call your doctor now or seek immediate medical care if:  ? · You have a fever with a stiff neck or a severe headache. ? · You have nausea and vomiting, or you cannot keep food or liquids down. ? Watch closely for changes in your health, and be sure to contact your doctor if:  ? · Your head or face pain does not get better as expected. Where can you learn more? Go to http://lobo-michel.info/. Enter P568 in the search box to learn more about \"Head or Face Pain: Care Instructions. \"  Current as of: March 20, 2017  Content Version: 11.4  © 2003-6763 [employer]. Care instructions adapted under license by [employer] (which disclaims liability or warranty for this information). If you have questions about a medical condition or this instruction, always ask your healthcare professional. [place] [address] any warranty or liability for your use of this information.

## 2018-06-19 NOTE — ED NOTES
Bedside shift change report given to [address]  (oncoming nurse) by Rani Ruiz RN (offgoing nurse). Report included the following information SBAR.

## 2018-06-20 NOTE — ED NOTES
____sStadler____________________ in to talk with patient and explain plan of care with  understanding and  written & verbal instructions.

## 2018-06-23 ENCOUNTER — APPOINTMENT (OUTPATIENT)
Dept: GENERAL RADIOLOGY | Age: 73
End: 2018-06-23
Attending: EMERGENCY MEDICINE
Payer: MEDICARE

## 2018-06-23 ENCOUNTER — HOSPITAL ENCOUNTER (EMERGENCY)
Age: 73
Discharge: HOME | End: 2018-06-23
Attending: EMERGENCY MEDICINE | Admitting: EMERGENCY MEDICINE
Payer: MEDICARE

## 2018-06-23 VITALS
RESPIRATION RATE: 16 BRPM | SYSTOLIC BLOOD PRESSURE: 142 MMHG | HEART RATE: 79 BPM | TEMPERATURE: 97.6 F | BODY MASS INDEX: 26.98 KG/M2 | HEIGHT: 68 IN | WEIGHT: 178 LBS | DIASTOLIC BLOOD PRESSURE: 73 MMHG | OXYGEN SATURATION: 100 %

## 2018-06-23 DIAGNOSIS — K59.00 CONSTIPATION, UNSPECIFIED CONSTIPATION TYPE: Primary | ICD-10-CM

## 2018-06-23 PROCEDURE — 74022 RADEX COMPL AQT ABD SERIES: CPT

## 2018-06-23 PROCEDURE — 74011250637 HC RX REV CODE- 250/637: Performed by: EMERGENCY MEDICINE

## 2018-06-23 PROCEDURE — 99283 EMERGENCY DEPT VISIT LOW MDM: CPT

## 2018-06-23 RX ADMIN — SODIUM PHOSPHATE, DIBASIC AND SODIUM PHOSPHATE, MONOBASIC 118 ML: 7; 19 ENEMA RECTAL at 12:52

## 2018-06-23 NOTE — ED PROVIDER NOTES
EMERGENCY DEPARTMENT HISTORY AND PHYSICAL EXAM      Date: 6/23/2018  Patient Name: Sammy Martinez    History of Presenting Illness     Chief Complaint   Patient presents with    Constipation     Pt c/o constipation for three weeks. According to pt she went this am very little. History Provided By: Patient    HPI: Sammy Martinez, 67 y.o. female with PMHx significant for HTN, GERD, DM, anxiety, CAD, hypercholesterolemia presents ambulatory to the ED with cc of constipation for 3 weeks. Pt states that she had a very small bowel movement this morning. She has taken Murelax, enema, and magnesium citrate with no alleviation. There are no associated symptoms, severity, quality, or location of symptoms. There are no other complaints, changes, or physical findings at this time. PCP: hSun Sevilla MD    Current Outpatient Prescriptions   Medication Sig Dispense Refill    butalbital-acetaminophen-caff (FIORICET) -40 mg per capsule Take 1 Cap by mouth every four (4) hours as needed for Pain. 6 Cap 0    erythromycin (ILOTYCIN) ophthalmic ointment APPLY 1 APPLICATION TO BOTH EYELIDS TWICE DAILY  11    valsartan (DIOVAN) 80 mg tablet Take 1 Tab by mouth daily. 90 Tab 4    dilTIAZem CD (CARDIZEM CD) 120 mg ER capsule Take 1 Cap by mouth daily. 90 Cap 4    rosuvastatin (CRESTOR) 5 mg tablet       nystatin (MYCOSTATIN) topical cream APPLY TO AFFECTED AREA TWO (2) TIMES A DAY. 0    polyethylene glycol (MIRALAX) 17 gram packet daily.  pravastatin (PRAVACHOL) 10 mg tablet Take 2 Tabs by mouth nightly. 90 Tab 4    docusate sodium (COLACE) 100 mg capsule TAKE 1 CAP BY MOUTH DAILY AS NEEDED FOR CONSTIPATION FOR UP TO 90 DAYS. (Patient taking differently: take 1 cap TWICE DAILY as needed for constipation) 90 Cap 0    aspirin delayed-release 81 mg tablet Take 1 Tab by mouth daily.  30 Tab 11       Past History     Past Medical History:  Past Medical History:   Diagnosis Date    Agoraphobia without mention of panic attacks 2/17/2014    Anxiety disorder 8/18/2013    Arthritis     osteo    Breast pain, left 4/7/2015    CAD (coronary artery disease), native coronary artery 12/1/2015    Chronic chest pain 1/13/2014    Chronic pain associated with significant psychosocial dysfunction 2/17/2014    Depression 8/18/2013    Diabetes ([facility] [address]     type II    Duplicated right renal collecting system 3/13/2014    GERD (gastroesophageal reflux disease)     Gout, joint     Hypercholesteremia     hyercholesterolemia    Hypertension     Nephrolithiasis 3/13/2014    Personal history of noncompliance with medical treatment, presenting hazards to health 5/30/2014    Psychotic disorder     Vaginal pain 7/30/2014       Past Surgical History:  Past Surgical History:   Procedure Laterality Date    EGD  4/23/2010         HX CYST REMOVAL      cyst removed from left wrist    HX HYSTERECTOMY      partial    HX OTHER SURGICAL      bladder dilitation    HX TUBAL LIGATION      HX UROLOGICAL      kidney stones       Family History:  Family History   Problem Relation Age of Onset    Stroke Mother     Heart Disease Mother     Cancer Father     Heart Disease Son     Liver Disease Son     Heart Disease Daughter     Malignant Hyperthermia Neg Hx     Pseudocholinesterase Deficiency Neg Hx     Delayed Awakening Neg Hx     Post-op Nausea/Vomiting Neg Hx     Emergence Delirium Neg Hx     Post-op Cognitive Dysfunction Neg Hx     Other Neg Hx        Social History:  Social History   Substance Use Topics    Smoking status: Never Smoker    Smokeless tobacco: Never Used    Alcohol use No       Allergies:   Allergies   Allergen Reactions    Amoxicillin Hives    Sulfa (Sulfonamide Antibiotics) Hives and Itching    Amoxicillin Hives and Itching     Long time ago - patient not exactly certain what it does    Mirtazapine Itching and Nausea Only     Funny feeling in chest    Percocet [Oxycodone-Acetaminophen] Nausea and Vomiting  Codeine Nausea and Vomiting    Crestor [Rosuvastatin] Other (comments)     myalgias    Prednisone Itching    Sulfa (Sulfonamide Antibiotics) Hives, Itching and Palpitations     Think it was increased heart rate or itching - many years ago    Zithromax [Azithromycin] Itching     Not sure what it does,taken long time ago         Review of Systems   Review of Systems   Constitutional: Negative. Negative for activity change, chills and diaphoresis. HENT: Negative. Negative for ear pain, trouble swallowing and voice change. Eyes: Negative. Respiratory: Negative for chest tightness and shortness of breath. Cardiovascular: Negative for chest pain, palpitations and leg swelling. Gastrointestinal: Positive for constipation. Negative for nausea and vomiting. Endocrine: Negative. Genitourinary: Negative. Negative for flank pain, frequency and hematuria. Musculoskeletal: Negative. Skin: Negative. Allergic/Immunologic: Negative. Neurological: Negative. Hematological: Negative. Psychiatric/Behavioral: Negative. All other systems reviewed and are negative. Physical Exam   Physical Exam   Constitutional: She is oriented to person, place, and time. She appears well-developed and well-nourished. HENT:   Head: Normocephalic. Mouth/Throat: Oropharynx is clear and moist.   Eyes: Conjunctivae and EOM are normal. Pupils are equal, round, and reactive to light. Neck: Normal range of motion. Neck supple. Cardiovascular: Normal rate, regular rhythm, normal heart sounds and intact distal pulses. Pulmonary/Chest: Effort normal and breath sounds normal.   Abdominal: Soft. Bowel sounds are normal. She exhibits no distension. There is no rebound. Musculoskeletal: Normal range of motion. She exhibits no edema or deformity. Neurological: She is alert and oriented to person, place, and time. Skin: Skin is warm and dry. Psychiatric: She has a normal mood and affect.  Her behavior is normal. Judgment and thought content normal.       Diagnostic Study Results     Radiologic Studies -     CXR Results  (Last 48 hours)               06/23/18 1208  XR ABD ACUTE W 1 V CHEST Final result    Impression:  IMPRESSION:   Severe constipation with rectal impaction/distention. Narrative:  INDICATION:   constipation        EXAM:  ACUTE ABDOMINAL SERIES        COMPARISON:  None       FINDINGS:   Single view of the chest demonstrates a normal cardiomediastinal silhouette. The lungs are well expanded and clear. There is no free intraperitoneal air. Supine and upright views of the abdomen demonstrate evidence of severe   constipation, including a large volume of stool in the rectum, which is   distended, up to 13.5 cm. There are no abnormal calcifications. The osseous   structures are unremarkable. Medical Decision Making   I am the first provider for this patient. I reviewed the vital signs, available nursing notes, past medical history, past surgical history, family history and social history. Vital Signs-Reviewed the patient's vital signs. Patient Vitals for the past 12 hrs:   Temp Pulse Resp BP SpO2   06/23/18 1130 97.6 °F (36.4 °C) 79 16 142/73 100 %       Pulse Oximetry Analysis - 100% on room air    Cardiac Monitor:   Rate: 79 bpm  Rhythm: Normal Sinus Rhythm 142/73     Records Reviewed: Nursing Notes, Old Medical Records and Previous Radiology Studies    Provider Notes (Medical Decision Making):   DDx: constipation, abdominal wall pain    ED Course:   Initial assessment performed. The patients presenting problems have been discussed, and they are in agreement with the care plan formulated and outlined with them. I have encouraged them to ask questions as they arise throughout their visit. Procedure Note - Manual Disimpaction:   2:22 PM  Performed by: Edie Cervantes RN  Patient was manually disimpacted.   A large amount of light brown stool was successfully removed. The procedure took 1-15 minutes, and pt tolerated well. Disposition:  DISCHARGE NOTE  2:26 PM  The patient has been re-evaluated and is ready for discharge. Reviewed available results with patient. Counseled patient on diagnosis and care plan. Patient has expressed understanding, and all questions have been answered. Patient agrees with plan and agrees to follow up as recommended, or return to the ED if their symptoms worsen. Discharge instructions have been provided and explained to the patient, along with reasons to return to the ED. PLAN:  1. Current Discharge Medication List        2. Follow-up Information     Follow up With Details Comments Contact Info    Phys Roel, MD Call  Patient can only remember the practice name and not the physician      [facility] [place] EMERGENCY DEPT  As needed, If symptoms worsen [address] [facility]  [phone]        Return to ED if worse     Diagnosis     Clinical Impression:   1. Constipation, unspecified constipation type        Attestations: This note is prepared by Crissy Yancey, acting as Scribe for Margarito Ramos MD.    Margarito Ramos MD: The scribe's documentation has been prepared under my direction and personally reviewed by me in its entirety. I confirm that the note above accurately reflects all work, treatment, procedures, and medical decision making performed by me.

## 2018-06-23 NOTE — DISCHARGE INSTRUCTIONS
Constipation: Care Instructions  Your Care Instructions    Constipation means that you have a hard time passing stools (bowel movements). People pass stools from 3 times a day to once every 3 days. What is normal for you may be different. Constipation may occur with pain in the rectum and cramping. The pain may get worse when you try to pass stools. Sometimes there are small amounts of bright red blood on toilet paper or the surface of stools. This is because of enlarged veins near the rectum (hemorrhoids). A few changes in your diet and lifestyle may help you avoid ongoing constipation. Your doctor may also prescribe medicine to help loosen your stool. Some medicines can cause constipation. These include pain medicines and antidepressants. Tell your doctor about all the medicines you take. Your doctor may want to make a medicine change to ease your symptoms. Follow-up care is a key part of your treatment and safety. Be sure to make and go to all appointments, and call your doctor if you are having problems. It's also a good idea to know your test results and keep a list of the medicines you take. How can you care for yourself at home? · Drink plenty of fluids, enough so that your urine is light yellow or clear like water. If you have kidney, heart, or liver disease and have to limit fluids, talk with your doctor before you increase the amount of fluids you drink. · Include high-fiber foods in your diet each day. These include fruits, vegetables, beans, and whole grains. · Get at least 30 minutes of exercise on most days of the week. Walking is a good choice. You also may want to do other activities, such as running, swimming, cycling, or playing tennis or team sports. · Take a fiber supplement, such as Citrucel or Metamucil, every day. Read and follow all instructions on the label. · Schedule time each day for a bowel movement. A daily routine may help.  Take your time having your bowel movement. · Support your feet with a small step stool when you sit on the toilet. This helps flex your hips and places your pelvis in a squatting position. · Your doctor may recommend an over-the-counter laxative to relieve your constipation. Examples are Milk of Magnesia and MiraLax. Read and follow all instructions on the label. Do not use laxatives on a long-term basis. When should you call for help? Call your doctor now or seek immediate medical care if:  ? · You have new or worse belly pain. ? · You have new or worse nausea or vomiting. ? · You have blood in your stools. ? Watch closely for changes in your health, and be sure to contact your doctor if:  ? · Your constipation is getting worse. ? · You do not get better as expected. Where can you learn more? Go to http://lobo-michel.info/. Enter 21 [phone] in the search box to learn more about \"Constipation: Care Instructions. \"  Current as of: March 20, 2017  Content Version: 11.4  © 7696-3956 [employer]. Care instructions adapted under license by [employer] (which disclaims liability or warranty for this information). If you have questions about a medical condition or this instruction, always ask your healthcare professional. [address] any warranty or liability for your use of this information.

## 2018-06-23 NOTE — ED NOTES
Pt presents ambulatory to ED complaining of constipation x3 weeks. Pt reports she  Pt is alert and oriented x 4, RR even and unlabored, skin is warm and dry. Assesment completed and pt updated on plan of care. Emergency Department Nursing Plan of Care       The Nursing Plan of Care is developed from the Nursing assessment and Emergency Department Attending provider initial evaluation. The plan of care may be reviewed in the ED Provider note.     The Plan of Care was developed with the following considerations:   Patient / Family readiness to learn indicated by:verbalized understanding  Persons(s) to be included in education: patient  Barriers to Learning/Limitations:No    Signed     Bisi Almeida RN    6/23/2018   11:49 AM

## 2018-07-20 ENCOUNTER — APPOINTMENT (OUTPATIENT)
Dept: GENERAL RADIOLOGY | Age: 73
End: 2018-07-20
Attending: EMERGENCY MEDICINE
Payer: MEDICARE

## 2018-07-20 ENCOUNTER — HOSPITAL ENCOUNTER (EMERGENCY)
Age: 73
Discharge: HOME | End: 2018-07-20
Attending: EMERGENCY MEDICINE
Payer: MEDICARE

## 2018-07-20 VITALS
WEIGHT: 175 LBS | HEART RATE: 64 BPM | RESPIRATION RATE: 14 BRPM | SYSTOLIC BLOOD PRESSURE: 199 MMHG | TEMPERATURE: 98.2 F | BODY MASS INDEX: 26.61 KG/M2 | DIASTOLIC BLOOD PRESSURE: 90 MMHG | OXYGEN SATURATION: 100 %

## 2018-07-20 DIAGNOSIS — R42 DIZZINESS: Primary | ICD-10-CM

## 2018-07-20 DIAGNOSIS — I95.1 ORTHOSTATIC HYPOTENSION: ICD-10-CM

## 2018-07-20 DIAGNOSIS — K59.00 CONSTIPATION, UNSPECIFIED CONSTIPATION TYPE: ICD-10-CM

## 2018-07-20 LAB
ALBUMIN SERPL-MCNC: 3.6 G/DL (ref 3.5–5)
ALBUMIN/GLOB SERPL: 1 {RATIO} (ref 1.1–2.2)
ALP SERPL-CCNC: 83 U/L (ref 45–117)
ALT SERPL-CCNC: 17 U/L (ref 12–78)
ANION GAP SERPL CALC-SCNC: 7 MMOL/L (ref 5–15)
AST SERPL-CCNC: 12 U/L (ref 15–37)
ATRIAL RATE: 68 BPM
BASOPHILS # BLD: 0 K/UL (ref 0–0.1)
BASOPHILS NFR BLD: 1 % (ref 0–1)
BILIRUB SERPL-MCNC: 0.5 MG/DL (ref 0.2–1)
BUN SERPL-MCNC: 15 MG/DL (ref 6–20)
BUN/CREAT SERPL: 13 (ref 12–20)
CALCIUM SERPL-MCNC: 9.1 MG/DL (ref 8.5–10.1)
CALCULATED P AXIS, ECG09: 73 DEGREES
CALCULATED R AXIS, ECG10: 10 DEGREES
CALCULATED T AXIS, ECG11: 21 DEGREES
CHLORIDE SERPL-SCNC: 105 MMOL/L (ref 97–108)
CK MB CFR SERPL CALC: NORMAL % (ref 0–2.5)
CK MB SERPL-MCNC: <1 NG/ML (ref 5–25)
CK SERPL-CCNC: 69 U/L (ref 26–192)
CO2 SERPL-SCNC: 30 MMOL/L (ref 21–32)
CREAT SERPL-MCNC: 1.12 MG/DL (ref 0.55–1.02)
DIAGNOSIS, 93000: NORMAL
DIFFERENTIAL METHOD BLD: NORMAL
EOSINOPHIL # BLD: 0 K/UL (ref 0–0.4)
EOSINOPHIL NFR BLD: 1 % (ref 0–7)
ERYTHROCYTE [DISTWIDTH] IN BLOOD BY AUTOMATED COUNT: 13.1 % (ref 11.5–14.5)
GLOBULIN SER CALC-MCNC: 3.7 G/DL (ref 2–4)
GLUCOSE SERPL-MCNC: 114 MG/DL (ref 65–100)
HCT VFR BLD AUTO: 41.1 % (ref 35–47)
HGB BLD-MCNC: 13.1 G/DL (ref 11.5–16)
IMM GRANULOCYTES # BLD: 0 K/UL (ref 0–0.04)
IMM GRANULOCYTES NFR BLD AUTO: 0 % (ref 0–0.5)
LYMPHOCYTES # BLD: 1.1 K/UL (ref 0.8–3.5)
LYMPHOCYTES NFR BLD: 28 % (ref 12–49)
MCH RBC QN AUTO: 27.2 PG (ref 26–34)
MCHC RBC AUTO-ENTMCNC: 31.9 G/DL (ref 30–36.5)
MCV RBC AUTO: 85.4 FL (ref 80–99)
MONOCYTES # BLD: 0.3 K/UL (ref 0–1)
MONOCYTES NFR BLD: 7 % (ref 5–13)
NEUTS SEG # BLD: 2.4 K/UL (ref 1.8–8)
NEUTS SEG NFR BLD: 63 % (ref 32–75)
NRBC # BLD: 0 K/UL (ref 0–0.01)
NRBC BLD-RTO: 0 PER 100 WBC
P-R INTERVAL, ECG05: 168 MS
PLATELET # BLD AUTO: 204 K/UL (ref 150–400)
PMV BLD AUTO: 10.3 FL (ref 8.9–12.9)
POTASSIUM SERPL-SCNC: 4.2 MMOL/L (ref 3.5–5.1)
PROT SERPL-MCNC: 7.3 G/DL (ref 6.4–8.2)
Q-T INTERVAL, ECG07: 388 MS
QRS DURATION, ECG06: 102 MS
QTC CALCULATION (BEZET), ECG08: 412 MS
RBC # BLD AUTO: 4.81 M/UL (ref 3.8–5.2)
SODIUM SERPL-SCNC: 142 MMOL/L (ref 136–145)
TROPONIN I SERPL-MCNC: <0.05 NG/ML
VENTRICULAR RATE, ECG03: 68 BPM
WBC # BLD AUTO: 3.8 K/UL (ref 3.6–11)

## 2018-07-20 PROCEDURE — 85025 COMPLETE CBC W/AUTO DIFF WBC: CPT | Performed by: EMERGENCY MEDICINE

## 2018-07-20 PROCEDURE — 84484 ASSAY OF TROPONIN QUANT: CPT | Performed by: EMERGENCY MEDICINE

## 2018-07-20 PROCEDURE — 96360 HYDRATION IV INFUSION INIT: CPT

## 2018-07-20 PROCEDURE — 74011250636 HC RX REV CODE- 250/636: Performed by: EMERGENCY MEDICINE

## 2018-07-20 PROCEDURE — 99285 EMERGENCY DEPT VISIT HI MDM: CPT

## 2018-07-20 PROCEDURE — 96361 HYDRATE IV INFUSION ADD-ON: CPT

## 2018-07-20 PROCEDURE — 71045 X-RAY EXAM CHEST 1 VIEW: CPT

## 2018-07-20 PROCEDURE — 36415 COLL VENOUS BLD VENIPUNCTURE: CPT | Performed by: EMERGENCY MEDICINE

## 2018-07-20 PROCEDURE — 93005 ELECTROCARDIOGRAM TRACING: CPT

## 2018-07-20 PROCEDURE — 74018 RADEX ABDOMEN 1 VIEW: CPT

## 2018-07-20 PROCEDURE — 82550 ASSAY OF CK (CPK): CPT | Performed by: EMERGENCY MEDICINE

## 2018-07-20 PROCEDURE — 80053 COMPREHEN METABOLIC PANEL: CPT | Performed by: EMERGENCY MEDICINE

## 2018-07-20 PROCEDURE — 74011250637 HC RX REV CODE- 250/637: Performed by: EMERGENCY MEDICINE

## 2018-07-20 RX ORDER — SODIUM CHLORIDE 0.9 % (FLUSH) 0.9 %
5-10 SYRINGE (ML) INJECTION EVERY 8 HOURS
Status: DISCONTINUED | OUTPATIENT
Start: 2018-07-20 | End: 2018-07-20 | Stop reason: HOSPADM

## 2018-07-20 RX ORDER — ACETAMINOPHEN 500 MG
1000 TABLET ORAL ONCE
Status: COMPLETED | OUTPATIENT
Start: 2018-07-20 | End: 2018-07-20

## 2018-07-20 RX ORDER — POLYETHYLENE GLYCOL 3350 17 G/17G
17 POWDER, FOR SOLUTION ORAL DAILY
Qty: 255 G | Refills: 0 | Status: SHIPPED | OUTPATIENT
Start: 2018-07-20 | End: 2018-08-17

## 2018-07-20 RX ORDER — MECLIZINE HCL 12.5 MG 12.5 MG/1
25 TABLET ORAL
Status: COMPLETED | OUTPATIENT
Start: 2018-07-20 | End: 2018-07-20

## 2018-07-20 RX ORDER — SODIUM CHLORIDE 0.9 % (FLUSH) 0.9 %
5-10 SYRINGE (ML) INJECTION AS NEEDED
Status: DISCONTINUED | OUTPATIENT
Start: 2018-07-20 | End: 2018-07-20 | Stop reason: HOSPADM

## 2018-07-20 RX ORDER — CLONIDINE HYDROCHLORIDE 0.1 MG/1
0.2 TABLET ORAL
Status: COMPLETED | OUTPATIENT
Start: 2018-07-20 | End: 2018-07-20

## 2018-07-20 RX ORDER — FAMOTIDINE 20 MG/1
20 TABLET, FILM COATED ORAL 2 TIMES DAILY
Qty: 60 TAB | Refills: 0 | Status: SHIPPED | OUTPATIENT
Start: 2018-07-20 | End: 2018-09-19

## 2018-07-20 RX ORDER — LORAZEPAM 1 MG/1
1 TABLET ORAL
Status: COMPLETED | OUTPATIENT
Start: 2018-07-20 | End: 2018-07-20

## 2018-07-20 RX ADMIN — MECLIZINE 25 MG: 12.5 TABLET ORAL at 14:00

## 2018-07-20 RX ADMIN — LORAZEPAM 1 MG: 1 TABLET ORAL at 17:25

## 2018-07-20 RX ADMIN — ACETAMINOPHEN 1000 MG: 500 TABLET, FILM COATED ORAL at 17:25

## 2018-07-20 RX ADMIN — SODIUM CHLORIDE 1000 ML: 900 INJECTION, SOLUTION INTRAVENOUS at 14:00

## 2018-07-20 RX ADMIN — CLONIDINE HYDROCHLORIDE 0.2 MG: 0.1 TABLET ORAL at 17:25

## 2018-07-20 NOTE — DISCHARGE INSTRUCTIONS
Constipation: Care Instructions  Your Care Instructions    Constipation means that you have a hard time passing stools (bowel movements). People pass stools from 3 times a day to once every 3 days. What is normal for you may be different. Constipation may occur with pain in the rectum and cramping. The pain may get worse when you try to pass stools. Sometimes there are small amounts of bright red blood on toilet paper or the surface of stools. This is because of enlarged veins near the rectum (hemorrhoids). A few changes in your diet and lifestyle may help you avoid ongoing constipation. Your doctor may also prescribe medicine to help loosen your stool. Some medicines can cause constipation. These include pain medicines and antidepressants. Tell your doctor about all the medicines you take. Your doctor may want to make a medicine change to ease your symptoms. Follow-up care is a key part of your treatment and safety. Be sure to make and go to all appointments, and call your doctor if you are having problems. It's also a good idea to know your test results and keep a list of the medicines you take. How can you care for yourself at home? · Drink plenty of fluids, enough so that your urine is light yellow or clear like water. If you have kidney, heart, or liver disease and have to limit fluids, talk with your doctor before you increase the amount of fluids you drink. · Include high-fiber foods in your diet each day. These include fruits, vegetables, beans, and whole grains. · Get at least 30 minutes of exercise on most days of the week. Walking is a good choice. You also may want to do other activities, such as running, swimming, cycling, or playing tennis or team sports. · Take a fiber supplement, such as Citrucel or Metamucil, every day. Read and follow all instructions on the label. · Schedule time each day for a bowel movement. A daily routine may help.  Take your time having your bowel movement. · Support your feet with a small step stool when you sit on the toilet. This helps flex your hips and places your pelvis in a squatting position. · Your doctor may recommend an over-the-counter laxative to relieve your constipation. Examples are Milk of Magnesia and MiraLax. Read and follow all instructions on the label. Do not use laxatives on a long-term basis. When should you call for help? Call your doctor now or seek immediate medical care if:    · You have new or worse belly pain.     · You have new or worse nausea or vomiting.     · You have blood in your stools.    Watch closely for changes in your health, and be sure to contact your doctor if:    · Your constipation is getting worse.     · You do not get better as expected. Where can you learn more? Go to http://lobo-michel.info/. Enter 21 [phone] in the search box to learn more about \"Constipation: Care Instructions. \"  Current as of: November 20, 2017  Content Version: 11.7  © 6070-5684 [employer]. Care instructions adapted under license by [employer] (which disclaims liability or warranty for this information). If you have questions about a medical condition or this instruction, always ask your healthcare professional. [address] any warranty or liability for your use of this information. Dizziness: Care Instructions  Your Care Instructions  Dizziness is the feeling of unsteadiness or fuzziness in your head. It is different than having vertigo, which is a feeling that the room is spinning or that you are moving or falling. It is also different from lightheadedness, which is the feeling that you are about to faint. It can be hard to know what causes dizziness. Some people feel dizzy when they have migraine headaches. Sometimes bouts of flu can make you feel dizzy. Some medical conditions, such as heart problems or high blood pressure, can make you feel dizzy.  Many medicines can cause dizziness, including medicines for high blood pressure, pain, or anxiety. If a medicine causes your symptoms, your doctor may recommend that you stop or change the medicine. If it is a problem with your heart, you may need medicine to help your heart work better. If there is no clear reason for your symptoms, your doctor may suggest watching and waiting for a while to see if the dizziness goes away on its own. Follow-up care is a key part of your treatment and safety. Be sure to make and go to all appointments, and call your doctor if you are having problems. It's also a good idea to know your test results and keep a list of the medicines you take. How can you care for yourself at home? · If your doctor recommends or prescribes medicine, take it exactly as directed. Call your doctor if you think you are having a problem with your medicine. · Do not drive while you feel dizzy. · Try to prevent falls. Steps you can take include:  ¨ Using nonskid mats, adding grab bars near the tub, and using night-lights. ¨ Clearing your home so that walkways are free of anything you might trip on. ¨ Letting family and friends know that you have been feeling dizzy. This will help them know how to help you. When should you call for help? Call 911 anytime you think you may need emergency care. For example, call if:    · You passed out (lost consciousness).     · You have dizziness along with symptoms of a heart attack. These may include:  ¨ Chest pain or pressure, or a strange feeling in the chest.  ¨ Sweating. ¨ Shortness of breath. ¨ Nausea or vomiting. ¨ Pain, pressure, or a strange feeling in the back, neck, jaw, or upper belly or in one or both shoulders or arms. ¨ Lightheadedness or sudden weakness. ¨ A fast or irregular heartbeat.     · You have symptoms of a stroke.  These may include:  ¨ Sudden numbness, tingling, weakness, or loss of movement in your face, arm, or leg, especially on only one side of your body. ¨ Sudden vision changes. ¨ Sudden trouble speaking. ¨ Sudden confusion or trouble understanding simple statements. ¨ Sudden problems with walking or balance. ¨ A sudden, severe headache that is different from past headaches.    Call your doctor now or seek immediate medical care if:    · You feel dizzy and have a fever, headache, or ringing in your ears.     · You have new or increased nausea and vomiting.     · Your dizziness does not go away or comes back.    Watch closely for changes in your health, and be sure to contact your doctor if:    · You do not get better as expected. Where can you learn more? Go to http://lobo-michel.info/. Enter V642 in the search box to learn more about \"Dizziness: Care Instructions. \"  Current as of: November 20, 2017  Content Version: 11.7  © 0544-2897 [employer]. Care instructions adapted under license by [employer] (which disclaims liability or warranty for this information). If you have questions about a medical condition or this instruction, always ask your healthcare professional. [place] [address] any warranty or liability for your use of this information. Orthostatic Hypotension: Care Instructions  Your Care Instructions    Orthostatic hypotension is a quick drop in blood pressure. It happens when you get up from sitting or lying down. You may feel faint, lightheaded, or dizzy. When a person sits up or stands up, the body changes the way it pumps blood. This can slow the flow of blood to the brain for a very short time. And that can make you feel lightheaded. Many medicines can cause this problem, especially in older people. Lack of fluids (dehydration) or illnesses such as diabetes or heart disease also can cause it. Follow-up care is a key part of your treatment and safety. Be sure to make and go to all appointments, and call your doctor if you are having problems.  It's also a good idea to know your test results and keep a list of the medicines you take. How can you care for yourself at home? · Tell your doctor about any problems you have with your medicines. · If your doctor prescribes medicine to help prevent a low blood pressure problem, take it exactly as prescribed. Call your doctor if you think you are having a problem with your medicine. · Drink plenty of fluids, enough so that your urine is light yellow or clear like water. Choose water and other caffeine-free clear liquids. If you have kidney, heart, or liver disease and have to limit fluids, talk with your doctor before you increase the amount of fluids you drink. · Limit or avoid alcohol and caffeine. · Get up slowly from bed or after sitting for a long time. If you are in bed, roll to your side and swing your legs over the edge of the bed and onto the floor. Push your body up to a sitting position. Wait for a while before you slowly stand up. If you are dizzy or lightheaded, sit or lie down. When should you call for help? Call 911 anytime you think you may need emergency care. For example, call if:    · You passed out (lost consciousness).    Watch closely for changes in your health, and be sure to contact your doctor if:    · You do not get better as expected. Where can you learn more? Go to http://lobo-michel.info/. Enter Y564 in the search box to learn more about \"Orthostatic Hypotension: Care Instructions. \"  Current as of: May 10, 2017  Content Version: 11.7  © 8079-7822 [employer]. Care instructions adapted under license by [employer] (which disclaims liability or warranty for this information). If you have questions about a medical condition or this instruction, always ask your healthcare professional. [address] any warranty or liability for your use of this information.

## 2018-07-20 NOTE — ED PROVIDER NOTES
EMERGENCY DEPARTMENT HISTORY AND PHYSICAL EXAM      Date: 7/20/2018  Patient Name: Madi Greco    History of Presenting Illness     Chief Complaint   Patient presents with    Dizziness     arrived via [facility] with c/o dizziness, denies chest pain, shortness of breath    Skin Problem     reported bed bugs at home, pt survey did not reveal bed bugs, pt c/o itching back of neck       History Provided By: Patient    HPI: Madi Greco, 67 y.o. female with PMHx significant for HTN, DM, arthritis, CAD, hypercholesteremia, GERD, presents via EMS to the ED with c/o persistent constipation for over 3 weeks with associated intermittent generalized abdominal. She states her last normal BM was when she was evaluated on [facility] on 6/23 and had a manual disimpaction. Pt notes she did have a few small BMs following the disimpaction. Additionally, pt c/o chronic anterior chest discomfort and bilateral axilla pain. She states pain is worse with movement. She further reports intermittent blurry vision with positional changes. Pt describes her vision going \"dim\" with standing up. She specifically denies any recent fevers, chills, or any other complaints. There are no other complaints, changes, or physical findings at this time. Current Facility-Administered Medications   Medication Dose Route Frequency Provider Last Rate Last Dose    sodium chloride (NS) flush 5-10 mL  5-10 mL IntraVENous Q8H Cherylene Dandy, DO        sodium chloride (NS) flush 5-10 mL  5-10 mL IntraVENous PRN Cherylene Dandy, DO        acetaminophen (TYLENOL) tablet 1,000 mg  1,000 mg Oral ONCE Tatum Arndt MD        cloNIDine HCl (CATAPRES) tablet 0.2 mg  0.2 mg Oral NOW Tatum Arndt MD         Current Outpatient Prescriptions   Medication Sig Dispense Refill    famotidine (PEPCID) 20 mg tablet Take 1 Tab by mouth two (2) times a day. 60 Tab 0    polyethylene glycol (MIRALAX) 17 gram/dose powder Take 17 g by mouth daily.  1 tablespoon with 8 oz of water daily 255 g 0    butalbital-acetaminophen-caff (FIORICET) -40 mg per capsule Take 1 Cap by mouth every four (4) hours as needed for Pain. 6 Cap 0    erythromycin (ILOTYCIN) ophthalmic ointment APPLY 1 APPLICATION TO BOTH EYELIDS TWICE DAILY  11    valsartan (DIOVAN) 80 mg tablet Take 1 Tab by mouth daily. 90 Tab 4    dilTIAZem CD (CARDIZEM CD) 120 mg ER capsule Take 1 Cap by mouth daily. 90 Cap 4    rosuvastatin (CRESTOR) 5 mg tablet       nystatin (MYCOSTATIN) topical cream APPLY TO AFFECTED AREA TWO (2) TIMES A DAY. 0    pravastatin (PRAVACHOL) 10 mg tablet Take 2 Tabs by mouth nightly. 90 Tab 4    docusate sodium (COLACE) 100 mg capsule TAKE 1 CAP BY MOUTH DAILY AS NEEDED FOR CONSTIPATION FOR UP TO 90 DAYS. (Patient taking differently: take 1 cap TWICE DAILY as needed for constipation) 90 Cap 0    aspirin delayed-release 81 mg tablet Take 1 Tab by mouth daily.  27 Tab 11       Past History     Past Medical History:  Past Medical History:   Diagnosis Date    Agoraphobia without mention of panic attacks 2/17/2014    Anxiety disorder 8/18/2013    Arthritis     osteo    Breast pain, left 4/7/2015    CAD (coronary artery disease), native coronary artery 12/1/2015    Chronic chest pain 1/13/2014    Chronic pain associated with significant psychosocial dysfunction 2/17/2014    Depression 8/18/2013    Diabetes ([facility] [address]     type II    Duplicated right renal collecting system 3/13/2014    GERD (gastroesophageal reflux disease)     Gout, joint     Hypercholesteremia     hyercholesterolemia    Hypertension     Nephrolithiasis 3/13/2014    Personal history of noncompliance with medical treatment, presenting hazards to health 5/30/2014    Psychotic disorder     Vaginal pain 7/30/2014       Past Surgical History:  Past Surgical History:   Procedure Laterality Date    EGD  4/23/2010         HX CYST REMOVAL      cyst removed from left wrist    HX HYSTERECTOMY      partial    HX OTHER SURGICAL      bladder dilitation    HX TUBAL LIGATION      HX UROLOGICAL      kidney stones       Family History:  Family History   Problem Relation Age of Onset    Stroke Mother     Heart Disease Mother     Cancer Father     Heart Disease Son     Liver Disease Son     Heart Disease Daughter     Malignant Hyperthermia Neg Hx     Pseudocholinesterase Deficiency Neg Hx     Delayed Awakening Neg Hx     Post-op Nausea/Vomiting Neg Hx     Emergence Delirium Neg Hx     Post-op Cognitive Dysfunction Neg Hx     Other Neg Hx        Social History:  Social History   Substance Use Topics    Smoking status: Never Smoker    Smokeless tobacco: Never Used    Alcohol use No       Allergies: Allergies   Allergen Reactions    Amoxicillin Hives    Sulfa (Sulfonamide Antibiotics) Hives and Itching    Amoxicillin Hives and Itching     Long time ago - patient not exactly certain what it does    Mirtazapine Itching and Nausea Only     Funny feeling in chest    Percocet [Oxycodone-Acetaminophen] Nausea and Vomiting    Codeine Nausea and Vomiting    Crestor [Rosuvastatin] Other (comments)     myalgias    Prednisone Itching    Sulfa (Sulfonamide Antibiotics) Hives, Itching and Palpitations     Think it was increased heart rate or itching - many years ago    Zithromax [Azithromycin] Itching     Not sure what it does,taken long time ago         Review of Systems   Review of Systems   Constitutional: Negative. Negative for appetite change, chills, fatigue and fever. HENT: Negative. Negative for congestion, rhinorrhea, sinus pressure and sore throat. Eyes: Positive for visual disturbance. Respiratory: Negative. Negative for cough, choking, chest tightness, shortness of breath and wheezing. Cardiovascular: Positive for chest pain (chronic). Negative for palpitations and leg swelling. Gastrointestinal: Positive for abdominal pain and constipation. Negative for diarrhea, nausea and vomiting.    Endocrine: Negative. Genitourinary: Negative. Negative for difficulty urinating, dysuria, flank pain and urgency. Musculoskeletal:        +chronic b/l axilla pain   Skin: Negative. Neurological: Negative. Negative for dizziness, speech difficulty, weakness, light-headedness, numbness and headaches. Psychiatric/Behavioral: Negative. All other systems reviewed and are negative. Physical Exam   Physical Exam   Constitutional: She is oriented to person, place, and time. She appears well-developed and well-nourished. No distress. HENT:   Head: Normocephalic and atraumatic. Mouth/Throat: Oropharynx is clear and moist. No oropharyngeal exudate. edentulous   Eyes: Conjunctivae and EOM are normal. Pupils are equal, round, and reactive to light. Neck: Normal range of motion. Neck supple. No JVD present. No tracheal deviation present. Cardiovascular: Normal rate, regular rhythm, normal heart sounds and intact distal pulses. No murmur heard. Pulmonary/Chest: Effort normal and breath sounds normal. No stridor. No respiratory distress. She has no wheezes. She has no rales. She exhibits no tenderness. Abdominal: Soft. She exhibits distension. There is tenderness (Diffuse). There is no rebound and no guarding. Hypoactive bowel sounds   Musculoskeletal: Normal range of motion. She exhibits no edema or tenderness. Neurological: She is alert and oriented to person, place, and time. No cranial nerve deficit. No gross motor or sensory deficits    Skin: Skin is warm and dry. She is not diaphoretic. No appearance of skin bites   Psychiatric: She has a normal mood and affect. Her behavior is normal.   Nursing note and vitals reviewed.       Diagnostic Study Results   Labs -   Recent Results (from the past 12 hour(s))   EKG, 12 LEAD, INITIAL    Collection Time: 07/20/18 12:33 PM   Result Value Ref Range    Ventricular Rate 68 BPM    Atrial Rate 68 BPM    P-R Interval 168 ms    QRS Duration 102 ms    Q-T Interval 388 ms    QTC Calculation (Bezet) 412 ms    Calculated P Axis 73 degrees    Calculated R Axis 10 degrees    Calculated T Axis 21 degrees    Diagnosis       ** Poor data quality, interpretation may be adversely affected  Normal sinus rhythm    Confirmed by Angel Milian MD, Bianka Petersen ([id number]) on 7/20/2018 2:30:18 PM     CBC WITH AUTOMATED DIFF    Collection Time: 07/20/18 12:34 PM   Result Value Ref Range    WBC 3.8 3.6 - 11.0 K/uL    RBC 4.81 3.80 - 5.20 M/uL    HGB 13.1 11.5 - 16.0 g/dL    HCT 41.1 35.0 - 47.0 %    MCV 85.4 80.0 - 99.0 FL    MCH 27.2 26.0 - 34.0 PG    MCHC 31.9 30.0 - 36.5 g/dL    RDW 13.1 11.5 - 14.5 %    PLATELET 694 639 - 200 K/uL    MPV 10.3 8.9 - 12.9 FL    NRBC 0.0 0  WBC    ABSOLUTE NRBC 0.00 0.00 - 0.01 K/uL    NEUTROPHILS 63 32 - 75 %    LYMPHOCYTES 28 12 - 49 %    MONOCYTES 7 5 - 13 %    EOSINOPHILS 1 0 - 7 %    BASOPHILS 1 0 - 1 %    IMMATURE GRANULOCYTES 0 0.0 - 0.5 %    ABS. NEUTROPHILS 2.4 1.8 - 8.0 K/UL    ABS. LYMPHOCYTES 1.1 0.8 - 3.5 K/UL    ABS. MONOCYTES 0.3 0.0 - 1.0 K/UL    ABS. EOSINOPHILS 0.0 0.0 - 0.4 K/UL    ABS. BASOPHILS 0.0 0.0 - 0.1 K/UL    ABS. IMM. GRANS. 0.0 0.00 - 0.04 K/UL    DF AUTOMATED     METABOLIC PANEL, COMPREHENSIVE    Collection Time: 07/20/18 12:34 PM   Result Value Ref Range    Sodium 142 136 - 145 mmol/L    Potassium 4.2 3.5 - 5.1 mmol/L    Chloride 105 97 - 108 mmol/L    CO2 30 21 - 32 mmol/L    Anion gap 7 5 - 15 mmol/L    Glucose 114 (H) 65 - 100 mg/dL    BUN 15 6 - 20 MG/DL    Creatinine 1.12 (H) 0.55 - 1.02 MG/DL    BUN/Creatinine ratio 13 12 - 20      GFR est AA 58 (L) >60 ml/min/1.73m2    GFR est non-AA 48 (L) >60 ml/min/1.73m2    Calcium 9.1 8.5 - 10.1 MG/DL    Bilirubin, total 0.5 0.2 - 1.0 MG/DL    ALT (SGPT) 17 12 - 78 U/L    AST (SGOT) 12 (L) 15 - 37 U/L    Alk.  phosphatase 83 45 - 117 U/L    Protein, total 7.3 6.4 - 8.2 g/dL    Albumin 3.6 3.5 - 5.0 g/dL    Globulin 3.7 2.0 - 4.0 g/dL    A-G Ratio 1.0 (L) 1.1 - 2.2     CK W/ CKMB & INDEX    Collection Time: 07/20/18 12:34 PM   Result Value Ref Range    CK 69 26 - 192 U/L    CK - MB <1.0 <3.6 NG/ML    CK-MB Index Cannot be calculated 0 - 2.5     TROPONIN I    Collection Time: 07/20/18 12:34 PM   Result Value Ref Range    Troponin-I, Qt. <0.05 <0.05 ng/mL       Radiologic Studies -   XR ABD (KUB)   Final Result   INDICATION:  Abdominal Pain      EXAM: Single view of the abdomen . Comparison June 23, 2018      FINDINGS: There is significant retained stool but no dilated loops of bowel or  air-fluid levels. No free air. No pathologic calcifications.      IMPRESSION  IMPRESSION:  1. Significant stool throughout the colon. Distention of the rectum persists  measuring 11.2 cm. Slightly improved since the prior study. No evidence of  obstruction. CXR Results  (Last 48 hours)               07/20/18 1251  XR CHEST PORT Final result    Impression:  Impression:   Lung underexpansion. No acute process. Narrative:  Chest portable AP       History: Chest pain       Comparison: 6/23/2018       Findings: The lungs are underexpanded. No focal consolidation, pleural   effusion, or pneumothorax. Underexpansion of the lungs exaggerates the cardiac   silhouette. The visualized osseous structures are unremarkable. Medical Decision Making   I am the first provider for this patient. I reviewed the vital signs, available nursing notes, past medical history, past surgical history, family history and social history. Vital Signs-Reviewed the patient's vital signs.   Patient Vitals for the past 12 hrs:   Temp Pulse Resp BP SpO2   07/20/18 1400 - 64 14 199/90 100 %   07/20/18 1330 - 64 16 (!) 199/98 100 %   07/20/18 1300 - 76 15 (!) 151/99 -   07/20/18 1245 - 74 19 102/71 -   07/20/18 1230 - 74 16 167/82 100 %   07/20/18 1215 98.2 °F (36.8 °C) 75 12 175/85 100 %       Pulse Oximetry Analysis - 100% on RA    EKG interpretation: (Preliminary)  NSR, rate 68, normal axis/pr/qrs, no acute ST changes. Records Reviewed: Nursing Notes, Old Medical Records, Previous Radiology Studies and Previous Laboratory Studies    Provider Notes (Medical Decision Making):   DDx: constipation, dehydration, orthostatic hypotension, ACS    ED Course:   Initial assessment performed. The patients presenting problems have been discussed, and they are in agreement with the care plan formulated and outlined with them. I have encouraged them to ask questions as they arise throughout their visit. Pt orthostatic with a drop in her systolic pressure from 505 to 150 with associated dizziness. After fluids orthostasis has improved. Pt lives at home with family reading through prior ED visits, pt not likely compliant after las ED visit ~3 weeks ago pt disimpacted at that time. She has not been using stool softners daily, she has not been taking other medications. Pt states she needs something for sleep as her sleep has been poor. I have encouraged her to make an appt to see her PCP to discuss this as well as all of her other daily meds. SIGN OUT:  3:03 PM  Patient's presentation, labs/imaging and plan of care was reviewed with Queta Figueroa MD as part of sign out. They will wait for enema and dispo as part of the plan discussed with the patient. Queta Figueroa MD's assistance in completion of this plan is greatly appreciated but it should be noted that I will be the provider of record for this patient. Maral Dyer DO      Discharge Note:  5:10 PM  The patient has been re-evaluated and is ready for discharge. Reviewed available results with patient. Counseled patient on diagnosis and care plan. Patient has expressed understanding, and all questions have been answered. Patient agrees with plan and agrees to follow up as recommended, or to return to the ED if their symptoms worsen. Discharge instructions have been provided and explained to the patient, along with reasons to return to the ED. PLAN:  1. Current Discharge Medication List      START taking these medications    Details   famotidine (PEPCID) 20 mg tablet Take 1 Tab by mouth two (2) times a day. Qty: 60 Tab, Refills: 0      polyethylene glycol (MIRALAX) 17 gram/dose powder Take 17 g by mouth daily. 1 tablespoon with 8 oz of water daily  Qty: 255 g, Refills: 0         STOP taking these medications       polyethylene glycol (MIRALAX) 17 gram packet Comments:   Reason for Stoppin.   Follow-up Information     Follow up With Details Comments Contact Info    Phys Other, MD   Patient can only remember the practice name and not the physician          Return to ED if worse     Diagnosis     Clinical Impression:   1. Dizziness    2. Orthostatic hypotension    3. Constipation, unspecified constipation type        Attestations: This note is prepared by Marilou Lobo, acting as Scribe for Shyanne Ramachandran DO. The scribe's documentation has been prepared under my direction and personally reviewed by me in its entirety. I confirm that the note above accurately reflects all work, treatment, procedures, and medical decision making performed by me.   Shyanne Ramachandran DO

## 2018-07-20 NOTE — ED NOTES
Discharge instructions reviewed w/ pt and copy given by Dr. Rock Ferguson. Pt discharged via wheelchair to waiting room where she will wait for family friend.

## 2018-07-20 NOTE — ED NOTES
Received pt to exam room via EMS for c/o dizziness, chest pains, vaginal pain and itchy skin. EMS reported bed bugs in home, none have been found on pt.

## 2018-08-17 ENCOUNTER — HOSPITAL ENCOUNTER (EMERGENCY)
Age: 73
Discharge: HOME | End: 2018-08-17
Attending: EMERGENCY MEDICINE
Payer: MEDICARE

## 2018-08-17 ENCOUNTER — APPOINTMENT (OUTPATIENT)
Dept: GENERAL RADIOLOGY | Age: 73
End: 2018-08-17
Attending: EMERGENCY MEDICINE
Payer: MEDICARE

## 2018-08-17 VITALS
RESPIRATION RATE: 19 BRPM | HEART RATE: 75 BPM | TEMPERATURE: 98.4 F | OXYGEN SATURATION: 100 % | SYSTOLIC BLOOD PRESSURE: 157 MMHG | DIASTOLIC BLOOD PRESSURE: 93 MMHG

## 2018-08-17 DIAGNOSIS — R10.2 VAGINAL PAIN: ICD-10-CM

## 2018-08-17 DIAGNOSIS — G44.209 TENSION HEADACHE: ICD-10-CM

## 2018-08-17 DIAGNOSIS — K59.00 CONSTIPATION, UNSPECIFIED CONSTIPATION TYPE: ICD-10-CM

## 2018-08-17 DIAGNOSIS — R07.9 ACUTE CHEST PAIN: Primary | ICD-10-CM

## 2018-08-17 LAB
ALBUMIN SERPL-MCNC: 3.4 G/DL (ref 3.5–5)
ALBUMIN/GLOB SERPL: 1 {RATIO} (ref 1.1–2.2)
ALP SERPL-CCNC: 69 U/L (ref 45–117)
ALT SERPL-CCNC: 12 U/L (ref 12–78)
AMORPH CRY URNS QL MICRO: ABNORMAL
ANION GAP SERPL CALC-SCNC: 6 MMOL/L (ref 5–15)
APPEARANCE UR: ABNORMAL
AST SERPL-CCNC: 8 U/L (ref 15–37)
ATRIAL RATE: 73 BPM
BACTERIA URNS QL MICRO: ABNORMAL /HPF
BASOPHILS # BLD: 0 K/UL (ref 0–0.1)
BASOPHILS NFR BLD: 1 % (ref 0–1)
BILIRUB SERPL-MCNC: 0.5 MG/DL (ref 0.2–1)
BILIRUB UR QL CFM: NEGATIVE
BUN SERPL-MCNC: 17 MG/DL (ref 6–20)
BUN/CREAT SERPL: 17 (ref 12–20)
CALCIUM SERPL-MCNC: 8.7 MG/DL (ref 8.5–10.1)
CALCULATED P AXIS, ECG09: 9 DEGREES
CALCULATED R AXIS, ECG10: 14 DEGREES
CALCULATED T AXIS, ECG11: 18 DEGREES
CAOX CRY URNS QL MICRO: ABNORMAL
CHLORIDE SERPL-SCNC: 108 MMOL/L (ref 97–108)
CLUE CELLS VAG QL WET PREP: NORMAL
CO2 SERPL-SCNC: 27 MMOL/L (ref 21–32)
COLOR UR: ABNORMAL
CREAT SERPL-MCNC: 0.98 MG/DL (ref 0.55–1.02)
DIAGNOSIS, 93000: NORMAL
DIFFERENTIAL METHOD BLD: ABNORMAL
EOSINOPHIL # BLD: 0 K/UL (ref 0–0.4)
EOSINOPHIL NFR BLD: 1 % (ref 0–7)
EPITH CASTS URNS QL MICRO: ABNORMAL /LPF
ERYTHROCYTE [DISTWIDTH] IN BLOOD BY AUTOMATED COUNT: 13.2 % (ref 11.5–14.5)
GLOBULIN SER CALC-MCNC: 3.4 G/DL (ref 2–4)
GLUCOSE SERPL-MCNC: 91 MG/DL (ref 65–100)
GLUCOSE UR STRIP.AUTO-MCNC: NEGATIVE MG/DL
HCT VFR BLD AUTO: 37.7 % (ref 35–47)
HGB BLD-MCNC: 12 G/DL (ref 11.5–16)
HGB UR QL STRIP: ABNORMAL
IMM GRANULOCYTES # BLD: 0 K/UL (ref 0–0.04)
IMM GRANULOCYTES NFR BLD AUTO: 0 % (ref 0–0.5)
KETONES UR QL STRIP.AUTO: ABNORMAL MG/DL
KOH PREP SPEC: NORMAL
LEUKOCYTE ESTERASE UR QL STRIP.AUTO: ABNORMAL
LIPASE SERPL-CCNC: 70 U/L (ref 73–393)
LYMPHOCYTES # BLD: 1.3 K/UL (ref 0.8–3.5)
LYMPHOCYTES NFR BLD: 39 % (ref 12–49)
MCH RBC QN AUTO: 27.5 PG (ref 26–34)
MCHC RBC AUTO-ENTMCNC: 31.8 G/DL (ref 30–36.5)
MCV RBC AUTO: 86.3 FL (ref 80–99)
MONOCYTES # BLD: 0.3 K/UL (ref 0–1)
MONOCYTES NFR BLD: 9 % (ref 5–13)
NEUTS SEG # BLD: 1.6 K/UL (ref 1.8–8)
NEUTS SEG NFR BLD: 50 % (ref 32–75)
NITRITE UR QL STRIP.AUTO: NEGATIVE
NRBC # BLD: 0 K/UL (ref 0–0.01)
NRBC BLD-RTO: 0 PER 100 WBC
P-R INTERVAL, ECG05: 122 MS
PH UR STRIP: 5.5 [PH] (ref 5–8)
PLATELET # BLD AUTO: 205 K/UL (ref 150–400)
PMV BLD AUTO: 10.6 FL (ref 8.9–12.9)
POTASSIUM SERPL-SCNC: 3.8 MMOL/L (ref 3.5–5.1)
PROT SERPL-MCNC: 6.8 G/DL (ref 6.4–8.2)
PROT UR STRIP-MCNC: ABNORMAL MG/DL
Q-T INTERVAL, ECG07: 388 MS
QRS DURATION, ECG06: 98 MS
QTC CALCULATION (BEZET), ECG08: 427 MS
RBC # BLD AUTO: 4.37 M/UL (ref 3.8–5.2)
RBC #/AREA URNS HPF: ABNORMAL /HPF (ref 0–5)
SERVICE CMNT-IMP: NORMAL
SODIUM SERPL-SCNC: 141 MMOL/L (ref 136–145)
SP GR UR REFRACTOMETRY: 1.02 (ref 1–1.03)
T VAGINALIS VAG QL WET PREP: NORMAL
TROPONIN I SERPL-MCNC: <0.05 NG/ML
UA: UC IF INDICATED,UAUC: ABNORMAL
UROBILINOGEN UR QL STRIP.AUTO: 1 EU/DL (ref 0.2–1)
VENTRICULAR RATE, ECG03: 73 BPM
WBC # BLD AUTO: 3.2 K/UL (ref 3.6–11)
WBC URNS QL MICRO: >100 /HPF (ref 0–4)

## 2018-08-17 PROCEDURE — 36415 COLL VENOUS BLD VENIPUNCTURE: CPT | Performed by: EMERGENCY MEDICINE

## 2018-08-17 PROCEDURE — 81001 URINALYSIS AUTO W/SCOPE: CPT | Performed by: EMERGENCY MEDICINE

## 2018-08-17 PROCEDURE — 87086 URINE CULTURE/COLONY COUNT: CPT | Performed by: EMERGENCY MEDICINE

## 2018-08-17 PROCEDURE — 93005 ELECTROCARDIOGRAM TRACING: CPT

## 2018-08-17 PROCEDURE — 87210 SMEAR WET MOUNT SALINE/INK: CPT | Performed by: EMERGENCY MEDICINE

## 2018-08-17 PROCEDURE — 96374 THER/PROPH/DIAG INJ IV PUSH: CPT

## 2018-08-17 PROCEDURE — 74011250637 HC RX REV CODE- 250/637: Performed by: EMERGENCY MEDICINE

## 2018-08-17 PROCEDURE — 85025 COMPLETE CBC W/AUTO DIFF WBC: CPT | Performed by: EMERGENCY MEDICINE

## 2018-08-17 PROCEDURE — 99285 EMERGENCY DEPT VISIT HI MDM: CPT

## 2018-08-17 PROCEDURE — 83690 ASSAY OF LIPASE: CPT | Performed by: EMERGENCY MEDICINE

## 2018-08-17 PROCEDURE — 80053 COMPREHEN METABOLIC PANEL: CPT | Performed by: EMERGENCY MEDICINE

## 2018-08-17 PROCEDURE — 74011250636 HC RX REV CODE- 250/636: Performed by: EMERGENCY MEDICINE

## 2018-08-17 PROCEDURE — 74022 RADEX COMPL AQT ABD SERIES: CPT

## 2018-08-17 PROCEDURE — 84484 ASSAY OF TROPONIN QUANT: CPT | Performed by: EMERGENCY MEDICINE

## 2018-08-17 PROCEDURE — 87491 CHLMYD TRACH DNA AMP PROBE: CPT | Performed by: EMERGENCY MEDICINE

## 2018-08-17 PROCEDURE — 96375 TX/PRO/DX INJ NEW DRUG ADDON: CPT

## 2018-08-17 RX ORDER — LORAZEPAM 2 MG/ML
1 INJECTION INTRAMUSCULAR
Status: COMPLETED | OUTPATIENT
Start: 2018-08-17 | End: 2018-08-17

## 2018-08-17 RX ORDER — BUTALBITAL, ACETAMINOPHEN AND CAFFEINE 300; 40; 50 MG/1; MG/1; MG/1
1 CAPSULE ORAL
Qty: 20 CAP | Refills: 0 | Status: SHIPPED | OUTPATIENT
Start: 2018-08-17 | End: 2018-09-19

## 2018-08-17 RX ORDER — MAGNESIUM CITRATE
296 SOLUTION, ORAL ORAL
Status: COMPLETED | OUTPATIENT
Start: 2018-08-17 | End: 2018-08-17

## 2018-08-17 RX ORDER — NITROFURANTOIN 25; 75 MG/1; MG/1
100 CAPSULE ORAL 2 TIMES DAILY
Qty: 6 CAP | Refills: 0 | Status: SHIPPED | OUTPATIENT
Start: 2018-08-17 | End: 2018-08-20

## 2018-08-17 RX ORDER — FACIAL-BODY WIPES
10 EACH TOPICAL
Qty: 12 SUPPOSITORY | Refills: 0 | Status: SHIPPED | OUTPATIENT
Start: 2018-08-17 | End: 2018-08-30

## 2018-08-17 RX ORDER — ONDANSETRON 2 MG/ML
4 INJECTION INTRAMUSCULAR; INTRAVENOUS
Status: COMPLETED | OUTPATIENT
Start: 2018-08-17 | End: 2018-08-17

## 2018-08-17 RX ORDER — BUTALBITAL, ACETAMINOPHEN AND CAFFEINE 50; 325; 40 MG/1; MG/1; MG/1
1 TABLET ORAL
Status: COMPLETED | OUTPATIENT
Start: 2018-08-17 | End: 2018-08-17

## 2018-08-17 RX ORDER — POLYETHYLENE GLYCOL 3350 17 G/17G
17 POWDER, FOR SOLUTION ORAL
Qty: 119 G | Refills: 0 | Status: SHIPPED | OUTPATIENT
Start: 2018-08-17 | End: 2018-08-30

## 2018-08-17 RX ADMIN — BUTALBITAL, ACETAMINOPHEN AND CAFFEINE 1 TABLET: 50; 325; 40 TABLET ORAL at 13:49

## 2018-08-17 RX ADMIN — MAGESIUM CITRATE 296 ML: 1.75 LIQUID ORAL at 15:44

## 2018-08-17 RX ADMIN — ONDANSETRON 4 MG: 2 INJECTION, SOLUTION INTRAMUSCULAR; INTRAVENOUS at 13:48

## 2018-08-17 RX ADMIN — LORAZEPAM 1 MG: 2 INJECTION INTRAMUSCULAR; INTRAVENOUS at 15:41

## 2018-08-17 NOTE — ED PROVIDER NOTES
EMERGENCY DEPARTMENT HISTORY AND PHYSICAL EXAM      Date: 8/17/2018  Patient Name: Suma Stone    History of Presenting Illness     Chief Complaint   Patient presents with    Chest Pain     x2 months    Vaginal Pain    Headache     x2-3 months     History Provided By: Patient    HPI: Suma Stone, 67 y.o. female with PMHx significant for HTN, GERD, depression, anxiety, panic attacks, CAD. Hypercholesteremia, presents via EMS to the ED with cc of intermittent moderate R and L flank pain, ongoing for 2 weeks. Pt reports intermittent CP and HA ongoing for 2-3 months, L side vaginal pain, R side neck pain, BL leg pain, chills, nausea, constipation and blurry vision alongside cc. She states that her BL leg pain is exacerbated when standing. Of note, pt received a head CT on 4/19/18 and 6/19/18, both of which were negative. Per pt, it feels as if \"something is covering\" her pupils making her vision blurry. Additionally, she notes that she has not had a BM in 3 weeks. Pt denies any trauma prior to onset of HA. She specifically denies any SOB, fevers, vomiting or diarrhea. Chief Complaint: flank pain  Duration: 2 Weeks  Timing:  Intermittent  Location: R and L flank  Quality: N/A  Severity: Moderate  Modifying Factors: denies any modifying factors  Associated Symptoms: CP, HA, L side vaginal pain, neck pain, BL leg pain, chills, nausea, constipation, blurry vision    There are no other complaints, changes, or physical findings at this time. PCP: Shun Sevilla MD    Current Outpatient Prescriptions   Medication Sig Dispense Refill    polyethylene glycol (MIRALAX) 17 gram/dose powder Take 17 g by mouth daily as needed. 1 tablespoon with 8 oz of water daily 119 g 0    nitrofurantoin, macrocrystal-monohydrate, (MACROBID) 100 mg capsule Take 1 Cap by mouth two (2) times a day for 3 days. 6 Cap 0    bisacodyl (DULCOLAX, BISACODYL,) 10 mg suppository Insert 10 mg into rectum daily as needed.  12 Suppository 0    butalbital-acetaminophen-caff (FIORICET) -40 mg per capsule Take 1 Cap by mouth every four (4) hours as needed for Pain. 20 Cap 0    famotidine (PEPCID) 20 mg tablet Take 1 Tab by mouth two (2) times a day. 60 Tab 0    erythromycin (ILOTYCIN) ophthalmic ointment APPLY 1 APPLICATION TO BOTH EYELIDS TWICE DAILY  11    valsartan (DIOVAN) 80 mg tablet Take 1 Tab by mouth daily. 90 Tab 4    dilTIAZem CD (CARDIZEM CD) 120 mg ER capsule Take 1 Cap by mouth daily. 90 Cap 4    rosuvastatin (CRESTOR) 5 mg tablet       nystatin (MYCOSTATIN) topical cream APPLY TO AFFECTED AREA TWO (2) TIMES A DAY. 0    pravastatin (PRAVACHOL) 10 mg tablet Take 2 Tabs by mouth nightly. 90 Tab 4    docusate sodium (COLACE) 100 mg capsule TAKE 1 CAP BY MOUTH DAILY AS NEEDED FOR CONSTIPATION FOR UP TO 90 DAYS. (Patient taking differently: take 1 cap TWICE DAILY as needed for constipation) 90 Cap 0    aspirin delayed-release 81 mg tablet Take 1 Tab by mouth daily.  27 Tab 11     Past History     Past Medical History:  Past Medical History:   Diagnosis Date    Agoraphobia without mention of panic attacks 2/17/2014    Anxiety disorder 8/18/2013    Arthritis     osteo    Breast pain, left 4/7/2015    CAD (coronary artery disease), native coronary artery 12/1/2015    Chronic chest pain 1/13/2014    Chronic pain associated with significant psychosocial dysfunction 2/17/2014    Depression 8/18/2013    Diabetes ([facility] [address]     type II    Duplicated right renal collecting system 3/13/2014    GERD (gastroesophageal reflux disease)     Gout, joint     Hypercholesteremia     hyercholesterolemia    Hypertension     Nephrolithiasis 3/13/2014    Personal history of noncompliance with medical treatment, presenting hazards to health 5/30/2014    Psychotic disorder     Vaginal pain 7/30/2014       Past Surgical History:  Past Surgical History:   Procedure Laterality Date    EGD  4/23/2010         HX CYST REMOVAL      cyst removed from left wrist    HX HYSTERECTOMY      partial    HX OTHER SURGICAL      bladder dilitation    HX TUBAL LIGATION      HX UROLOGICAL      kidney stones       Family History:  Family History   Problem Relation Age of Onset    Stroke Mother     Heart Disease Mother     Cancer Father     Heart Disease Son     Liver Disease Son     Heart Disease Daughter     Malignant Hyperthermia Neg Hx     Pseudocholinesterase Deficiency Neg Hx     Delayed Awakening Neg Hx     Post-op Nausea/Vomiting Neg Hx     Emergence Delirium Neg Hx     Post-op Cognitive Dysfunction Neg Hx     Other Neg Hx        Social History:  Social History   Substance Use Topics    Smoking status: Never Smoker    Smokeless tobacco: Never Used    Alcohol use No       Allergies: Allergies   Allergen Reactions    Amoxicillin Hives    Sulfa (Sulfonamide Antibiotics) Hives and Itching    Amoxicillin Hives and Itching     Long time ago - patient not exactly certain what it does    Mirtazapine Itching and Nausea Only     Funny feeling in chest    Percocet [Oxycodone-Acetaminophen] Nausea and Vomiting    Codeine Nausea and Vomiting    Crestor [Rosuvastatin] Other (comments)     myalgias    Prednisone Itching    Sulfa (Sulfonamide Antibiotics) Hives, Itching and Palpitations     Think it was increased heart rate or itching - many years ago    Zithromax [Azithromycin] Itching     Not sure what it does,taken long time ago     Review of Systems   Review of Systems   Constitutional: Positive for chills. Negative for fever. HENT: Negative for congestion. Eyes: Positive for visual disturbance (blurry). Respiratory: Negative for cough and shortness of breath. Cardiovascular: Positive for chest pain. Gastrointestinal: Positive for constipation and nausea. Negative for abdominal pain, diarrhea and vomiting. Endocrine: Negative for heat intolerance. Genitourinary: Positive for flank pain (BL) and vaginal pain (L side). Musculoskeletal: Positive for arthralgias (BL legs), back pain and neck pain. Skin: Negative for rash. Allergic/Immunologic: Negative for immunocompromised state. Neurological: Positive for headaches. Negative for weakness and light-headedness. Hematological: Does not bruise/bleed easily. Psychiatric/Behavioral: Negative. All other systems reviewed and are negative. Physical Exam   Physical Exam   Constitutional: She is oriented to person, place, and time. She appears well-developed and well-nourished. No distress. HENT:   Head: Normocephalic. Eyes: EOM are normal. Pupils are equal, round, and reactive to light. Neck: Normal range of motion. Neck supple. R neck tenderness   Cardiovascular: Normal rate, regular rhythm, normal heart sounds and intact distal pulses. Pulmonary/Chest: Effort normal and breath sounds normal.   R chest wall tenderness   Abdominal: Soft. Bowel sounds are normal. There is tenderness in the right upper quadrant. Genitourinary:   Genitourinary Comments: No cervical tenderness  Pelvic: beige discharge, no rashes on external exam   Musculoskeletal: Normal range of motion. BL calf tenderness  No edema  BL flank tenderness   Neurological: She is alert and oriented to person, place, and time. Skin: Skin is warm and dry. Psychiatric: She has a normal mood and affect. Her behavior is normal.   Nursing note and vitals reviewed.     Diagnostic Study Results     Labs -     Recent Results (from the past 12 hour(s))   EKG, 12 LEAD, INITIAL    Collection Time: 08/17/18 11:26 AM   Result Value Ref Range    Ventricular Rate 73 BPM    Atrial Rate 73 BPM    P-R Interval 122 ms    QRS Duration 98 ms    Q-T Interval 388 ms    QTC Calculation (Bezet) 427 ms    Calculated P Axis 9 degrees    Calculated R Axis 14 degrees    Calculated T Axis 18 degrees    Diagnosis       Normal sinus rhythm  Cannot rule out Anterior infarct , age undetermined  When compared with ECG of 20-JUL-2018 12:33,  Minimal criteria for Anterior infarct are now present  Confirmed by Roxanne Valverde ([id number]) on 8/17/2018 3:17:27 PM     CBC WITH AUTOMATED DIFF    Collection Time: 08/17/18 12:03 PM   Result Value Ref Range    WBC 3.2 (L) 3.6 - 11.0 K/uL    RBC 4.37 3.80 - 5.20 M/uL    HGB 12.0 11.5 - 16.0 g/dL    HCT 37.7 35.0 - 47.0 %    MCV 86.3 80.0 - 99.0 FL    MCH 27.5 26.0 - 34.0 PG    MCHC 31.8 30.0 - 36.5 g/dL    RDW 13.2 11.5 - 14.5 %    PLATELET 514 517 - 331 K/uL    MPV 10.6 8.9 - 12.9 FL    NRBC 0.0 0  WBC    ABSOLUTE NRBC 0.00 0.00 - 0.01 K/uL    NEUTROPHILS 50 32 - 75 %    LYMPHOCYTES 39 12 - 49 %    MONOCYTES 9 5 - 13 %    EOSINOPHILS 1 0 - 7 %    BASOPHILS 1 0 - 1 %    IMMATURE GRANULOCYTES 0 0.0 - 0.5 %    ABS. NEUTROPHILS 1.6 (L) 1.8 - 8.0 K/UL    ABS. LYMPHOCYTES 1.3 0.8 - 3.5 K/UL    ABS. MONOCYTES 0.3 0.0 - 1.0 K/UL    ABS. EOSINOPHILS 0.0 0.0 - 0.4 K/UL    ABS. BASOPHILS 0.0 0.0 - 0.1 K/UL    ABS. IMM. GRANS. 0.0 0.00 - 0.04 K/UL    DF AUTOMATED     METABOLIC PANEL, COMPREHENSIVE    Collection Time: 08/17/18 12:03 PM   Result Value Ref Range    Sodium 141 136 - 145 mmol/L    Potassium 3.8 3.5 - 5.1 mmol/L    Chloride 108 97 - 108 mmol/L    CO2 27 21 - 32 mmol/L    Anion gap 6 5 - 15 mmol/L    Glucose 91 65 - 100 mg/dL    BUN 17 6 - 20 MG/DL    Creatinine 0.98 0.55 - 1.02 MG/DL    BUN/Creatinine ratio 17 12 - 20      GFR est AA >60 >60 ml/min/1.73m2    GFR est non-AA 56 (L) >60 ml/min/1.73m2    Calcium 8.7 8.5 - 10.1 MG/DL    Bilirubin, total 0.5 0.2 - 1.0 MG/DL    ALT (SGPT) 12 12 - 78 U/L    AST (SGOT) 8 (L) 15 - 37 U/L    Alk.  phosphatase 69 45 - 117 U/L    Protein, total 6.8 6.4 - 8.2 g/dL    Albumin 3.4 (L) 3.5 - 5.0 g/dL    Globulin 3.4 2.0 - 4.0 g/dL    A-G Ratio 1.0 (L) 1.1 - 2.2     TROPONIN I    Collection Time: 08/17/18 12:03 PM   Result Value Ref Range    Troponin-I, Qt. <0.05 <0.05 ng/mL   LIPASE    Collection Time: 08/17/18 12:03 PM   Result Value Ref Range    Lipase 70 (L) 73 - 393 U/L   URINALYSIS W/ REFLEX CULTURE    Collection Time: 08/17/18 12:46 PM   Result Value Ref Range    Color DARK YELLOW      Appearance TURBID (A) CLEAR      Specific gravity 1.024 1.003 - 1.030      pH (UA) 5.5 5.0 - 8.0      Protein TRACE (A) NEG mg/dL    Glucose NEGATIVE  NEG mg/dL    Ketone TRACE (A) NEG mg/dL    Blood MODERATE (A) NEG      Urobilinogen 1.0 0.2 - 1.0 EU/dL    Nitrites NEGATIVE  NEG      Leukocyte Esterase LARGE (A) NEG      WBC >100 (H) 0 - 4 /hpf    RBC 10-20 0 - 5 /hpf    Epithelial cells MODERATE (A) FEW /lpf    Bacteria 3+ (A) NEG /hpf    UA:UC IF INDICATED URINE CULTURE ORDERED (A) CNI      Amorphous Crystals 1+ (A) NEG    CA Oxalate crystals 1+ (A) NEG   BILIRUBIN, CONFIRM    Collection Time: 08/17/18 12:46 PM   Result Value Ref Range    Bilirubin UA, confirm NEGATIVE  NEG     WET PREP    Collection Time: 08/17/18  5:26 PM   Result Value Ref Range    Clue cells CLUE CELLS ABSENT      Wet prep NO TRICHOMONAS SEEN     KOH, OTHER SOURCES    Collection Time: 08/17/18  5:26 PM   Result Value Ref Range    Special Requests: NO SPECIAL REQUESTS      KOH NO YEAST SEEN         Radiologic Studies -   CXR Results  (Last 48 hours)               08/17/18 1325  XR ABD ACUTE W 1 V CHEST Final result    Impression:  IMPRESSION: Gas and stool throughout the colon with large amount of stool   distending the rectum as on the prior examination. Narrative:  EXAM:  XR ABD ACUTE W 1 V CHEST. INDICATION:  Flank pain and constipation. COMPARISON: 7/20/2018. FINDINGS:    The upright chest radiograph demonstrates clear lungs and normal cardiac and   mediastinal contours. There is no pleural effusion or free air under the   diaphragm. Supine and upright views of the abdomen demonstrate gas and stool throughout the   colon with a large amount of stool in the rectum. There is no evidence of bowel   obstruction. There is no free intraperitoneal air.   No soft tissue masses or pathologic calcifications are identified. The bones are within normal limits. Medical Decision Making   I am the first provider for this patient. I reviewed the vital signs, available nursing notes, past medical history, past surgical history, family history and social history. Vital Signs-Reviewed the patient's vital signs. Patient Vitals for the past 12 hrs:   Temp Pulse Resp BP SpO2   08/17/18 1600 - 65 18 (!) 162/99 100 %   08/17/18 1530 - 61 17 180/76 100 %   08/17/18 1500 - 62 16 - 100 %   08/17/18 1430 - (!) 57 16 182/57 100 %   08/17/18 1400 - 66 16 184/82 100 %   08/17/18 1300 - 63 18 154/81 100 %   08/17/18 1230 - 64 17 147/74 100 %   08/17/18 1200 - 69 17 140/87 100 %   08/17/18 1125 98.4 °F (36.9 °C) 75 18 157/83 100 %     Pulse Oximetry Analysis - 100% on RA    Cardiac Monitor:   Rate: 73 bpm  Rhythm: Normal Sinus Rhythm     EKG interpretation: (Preliminary) 11:26  Rhythm: normal sinus rhythm; and regular . Rate (approx.): 73 bpm; Axis: normal; NM interval: 122 ms; QRS interval: 98 ms; ST/T wave: normal.  Written by Lupis Thompson ED Scribe, as dictated by Conor Alvarez MD.    Records Reviewed: Nursing Notes, Old Medical Records and Ambulance Run Sheet    Provider Notes (Medical Decision Making):   DDx: chronic pain, tension headache, constipation, costochondritis, CAD    ED Course:   Initial assessment performed. The patients presenting problems have been discussed, and they are in agreement with the care plan formulated and outlined with them. I have encouraged them to ask questions as they arise throughout their visit. 3:07 PM  Pt is requesting lorazepam for nose. Procedure Note - Pelvic Exam:    4:27 PM  Performed by: Conor Alvarez MD  Chaperoned by: Humberto Jurado RN  Pelvic exam was performed using bimanual and speculum. Further findings noted in physical exam.   The procedure took 1-15 minutes, and pt tolerated well.     Critical Care Time: 0    Disposition:  Discharge Note:  6:20 PM  The pt is ready for discharge. The pt's signs, symptoms, diagnosis, and discharge instructions have been discussed and pt has conveyed their understanding. The pt is to follow up as recommended or return to ER should their symptoms worsen. Plan has been discussed and pt is in agreement. PLAN:  1. Current Discharge Medication List      START taking these medications    Details   nitrofurantoin, macrocrystal-monohydrate, (MACROBID) 100 mg capsule Take 1 Cap by mouth two (2) times a day for 3 days. Qty: 6 Cap, Refills: 0      bisacodyl (DULCOLAX, BISACODYL,) 10 mg suppository Insert 10 mg into rectum daily as needed. Qty: 12 Suppository, Refills: 0         CONTINUE these medications which have CHANGED    Details   polyethylene glycol (MIRALAX) 17 gram/dose powder Take 17 g by mouth daily as needed. 1 tablespoon with 8 oz of water daily  Qty: 119 g, Refills: 0      butalbital-acetaminophen-caff (FIORICET) -40 mg per capsule Take 1 Cap by mouth every four (4) hours as needed for Pain. Qty: 20 Cap, Refills: 0           2. Follow-up Information     Follow up With Details Comments Contact Info    see your Dr. In 3 days      Sierra Ross MD  As needed [address]  [address]  [address]      Zoya Mart MD  As needed [address]  [phone]      [facility] EMER[facility]Y [facility]PT  If symptoms worsen [address]  [address] [facility]  [phone]        Return to ED if worse   Diagnosis     Clinical Impression:   1. Acute chest pain    2. Tension headache    3. Constipation, unspecified constipation type    4. Vaginal pain        Attestations: This note is prepared by Iain Siddiqui, acting as a Scribe for Edy Mariscal MD.    Edy Mariscal MD: The scribe's documentation has been prepared under my direction and personally reviewed by me in its entirety.  I confirm that the notes above accurately reflects all work, treatment, procedures, and medical decision making performed by me.

## 2018-08-17 NOTE — ED NOTES
Patient calling for a ride at this time. States that she isn't sure if she has anyone that can pick her up.

## 2018-08-17 NOTE — ED TRIAGE NOTES
Triage Note: Per [address] [facility], patient called for a c/o no bowel movement x3 weeks, chest pain x2 months and headache x2-3 months. EMS gave patient 324 mg of ASA. Patient is A&Ox4.

## 2018-08-17 NOTE — ED NOTES
Patient had large amount of BM, post soap suds enema. Patient cleaned off, room cleaned, and patient placed in back in ED bed safely.

## 2018-08-17 NOTE — DISCHARGE INSTRUCTIONS
Chest Pain: Care Instructions  Your Care Instructions    There are many things that can cause chest pain. Some are not serious and will get better on their own in a few days. But some kinds of chest pain need more testing and treatment. Your doctor may have recommended a follow-up visit in the next 8 to 12 hours. If you are not getting better, you may need more tests or treatment. Even though your doctor has released you, you still need to watch for any problems. The doctor carefully checked you, but sometimes problems can develop later. If you have new symptoms or if your symptoms do not get better, get medical care right away. If you have worse or different chest pain or pressure that lasts more than 5 minutes or you passed out (lost consciousness), call 911 or seek other emergency help right away. A medical visit is only one step in your treatment. Even if you feel better, you still need to do what your doctor recommends, such as going to all suggested follow-up appointments and taking medicines exactly as directed. This will help you recover and help prevent future problems. How can you care for yourself at home? · Rest until you feel better. · Take your medicine exactly as prescribed. Call your doctor if you think you are having a problem with your medicine. · Do not drive after taking a prescription pain medicine. When should you call for help? Call 911 if:    · You passed out (lost consciousness).     · You have severe difficulty breathing.     · You have symptoms of a heart attack. These may include:  ¨ Chest pain or pressure, or a strange feeling in your chest.  ¨ Sweating. ¨ Shortness of breath. ¨ Nausea or vomiting. ¨ Pain, pressure, or a strange feeling in your back, neck, jaw, or upper belly or in one or both shoulders or arms. ¨ Lightheadedness or sudden weakness. ¨ A fast or irregular heartbeat.   After you call 911, the  may tell you to chew 1 adult-strength or 2 to 4 low-dose aspirin. Wait for an ambulance. Do not try to drive yourself.    Call your doctor today if:    · You have any trouble breathing.     · Your chest pain gets worse.     · You are dizzy or lightheaded, or you feel like you may faint.     · You are not getting better as expected.     · You are having new or different chest pain. Where can you learn more? Go to http://lobo-michel.info/. Enter A120 in the search box to learn more about \"Chest Pain: Care Instructions. \"  Current as of: November 20, 2017  Content Version: 11.7  © 0774-5596 [employer]. Care instructions adapted under license by [employer] (which disclaims liability or warranty for this information). If you have questions about a medical condition or this instruction, always ask your healthcare professional. [address] any warranty or liability for your use of this information. Constipation: Care Instructions  Your Care Instructions    Constipation means that you have a hard time passing stools (bowel movements). People pass stools from 3 times a day to once every 3 days. What is normal for you may be different. Constipation may occur with pain in the rectum and cramping. The pain may get worse when you try to pass stools. Sometimes there are small amounts of bright red blood on toilet paper or the surface of stools. This is because of enlarged veins near the rectum (hemorrhoids). A few changes in your diet and lifestyle may help you avoid ongoing constipation. Your doctor may also prescribe medicine to help loosen your stool. Some medicines can cause constipation. These include pain medicines and antidepressants. Tell your doctor about all the medicines you take. Your doctor may want to make a medicine change to ease your symptoms. Follow-up care is a key part of your treatment and safety.  Be sure to make and go to all appointments, and call your doctor if you are having problems. It's also a good idea to know your test results and keep a list of the medicines you take. How can you care for yourself at home? · Drink plenty of fluids, enough so that your urine is light yellow or clear like water. If you have kidney, heart, or liver disease and have to limit fluids, talk with your doctor before you increase the amount of fluids you drink. · Include high-fiber foods in your diet each day. These include fruits, vegetables, beans, and whole grains. · Get at least 30 minutes of exercise on most days of the week. Walking is a good choice. You also may want to do other activities, such as running, swimming, cycling, or playing tennis or team sports. · Take a fiber supplement, such as Citrucel or Metamucil, every day. Read and follow all instructions on the label. · Schedule time each day for a bowel movement. A daily routine may help. Take your time having your bowel movement. · Support your feet with a small step stool when you sit on the toilet. This helps flex your hips and places your pelvis in a squatting position. · Your doctor may recommend an over-the-counter laxative to relieve your constipation. Examples are Milk of Magnesia and MiraLax. Read and follow all instructions on the label. Do not use laxatives on a long-term basis. When should you call for help? Call your doctor now or seek immediate medical care if:    · You have new or worse belly pain.     · You have new or worse nausea or vomiting.     · You have blood in your stools.    Watch closely for changes in your health, and be sure to contact your doctor if:    · Your constipation is getting worse.     · You do not get better as expected. Where can you learn more? Go to http://lobo-michel.info/. Enter 21  in the search box to learn more about \"Constipation: Care Instructions. \"  Current as of: November 20, 2017  Content Version: 11.7  © 2327-5271 [email or web address], [employer].  Care instructions adapted under license by [employer] (which disclaims liability or warranty for this information). If you have questions about a medical condition or this instruction, always ask your healthcare professional. [address] any warranty or liability for your use of this information. Tension Headache: Care Instructions  Your Care Instructions  Most headaches are tension headaches. These headaches tend to happen again, especially if you are under stress. A tension headache may cause pain or a feeling of pressure all over your head. You probably can't pinpoint the center of the pain. If you keep getting tension headaches, the best thing you can do to limit them is to find out what is causing them and then make changes in those areas. Follow-up care is a key part of your treatment and safety. Be sure to make and go to all appointments, and call your doctor if you are having problems. It's also a good idea to know your test results and keep a list of the medicines you take. How can you care for yourself at home? · Rest in a quiet, dark room with a cool cloth on your forehead until your headache is gone. Close your eyes, and try to relax or go to sleep. Don't watch TV or read. Avoid using the computer. · Use a warm, moist towel or a heating pad set on low to relax tight shoulder and neck muscles. · Have someone gently massage your neck and shoulders. · Take pain medicines exactly as directed. ¨ If the doctor gave you a prescription medicine for pain, take it as prescribed. ¨ If you are not taking a prescription pain medicine, ask your doctor if you can take an over-the-counter medicine. · Be careful not to take pain medicine more often than the instructions allow, because you may get worse or more frequent headaches when the medicine wears off. · If you get another tension headache, stop what you are doing and sit quietly for a moment.  Close your eyes and breathe slowly. Try to relax your head and neck muscles. · Do not ignore new symptoms that occur with a headache, such as fever, weakness or numbness, vision changes, or confusion. These may be signs of a more serious problem. To help prevent headaches  · Keep a headache diary so you can figure out what triggers your headaches. Avoiding triggers may help you prevent headaches. Record when each headache began, how long it lasted, and what the pain was like (throbbing, aching, stabbing, or dull). List anything that may have triggered the headache, such as being physically or emotionally stressed or being anxious or depressed. Other possible triggers are hunger, anger, fatigue, poor posture, and muscle strain. · Find healthy ways to deal with stress. Headaches are most common during or right after stressful times. Take time to relax before and after you do something that has caused a headache in the past.  · Exercise daily to relieve stress. Relaxation exercises may help reduce tension. · Get plenty of sleep. · Eat regularly and well. Long periods without food can trigger a headache. · Treat yourself to a massage. Some people find that massages are very helpful in relieving tension. · Try to keep your muscles relaxed by keeping good posture. Check your jaw, face, neck, and shoulder muscles for tension, and try to relax them. When sitting at a desk, change positions often, and stretch for 30 seconds each hour. · Reduce eyestrain from computers by blinking frequently and looking away from the computer screen every so often. Make sure you have proper eyewear and that your monitor is set up properly, about an arm's length away. When should you call for help? Call 911 anytime you think you may need emergency care. For example, call if:    · You have signs of a stroke. These may include:  ¨ Sudden numbness, paralysis, or weakness in your face, arm, or leg, especially on only one side of your body.   ¨ Sudden vision changes. ¨ Sudden trouble speaking. ¨ Sudden confusion or trouble understanding simple statements. ¨ Sudden problems with walking or balance. ¨ A sudden, severe headache that is different from past headaches.    Call your doctor now or seek immediate medical care if:    · You have new or worse nausea and vomiting.     · You have a new or higher fever.     · Your headache gets much worse.    Watch closely for changes in your health, and be sure to contact your doctor if:    · You are not getting better after 2 days (48 hours). Where can you learn more? Go to http://lobo-michel.info/. Enter 84 17 85 in the search box to learn more about \"Tension Headache: Care Instructions. \"  Current as of: October 9, 2017  Content Version: 11.7  © 4052-0611 [employer]. Care instructions adapted under license by [employer] (which disclaims liability or warranty for this information). If you have questions about a medical condition or this instruction, always ask your healthcare professional. [place] [address] any warranty or liability for your use of this information. Thank you! Thank you for allowing us to provide you with excellent care today. We hope we addressed all of your concerns and needs. We strive to provide excellent quality care in the Emergency Department. You may receive a survey after your visit to evaluate the care you were provided. Should you receive a survey from us, we invite you to share your experience and tell us what made it excellent. It was a pleasure serving you, we invite you to share your experience with us, in our pursuit for excellence, should you be selected to receive a survey. If you feel that you have not received excellent quality care or timely care, please ask to speak to the nurse manager. Please choose us in the future for your continued health care needs. ------------------------------------------------------------------------------------------------------------  The exam and treatment you received in the Emergency Department were for an urgent problem and are not intended as complete care. It is important that you follow up with a doctor, nurse practitioner, or physician assistant for ongoing care. If your symptoms become worse or you do not improve as expected and you are unable to reach your usual health care provider, you should return to the Emergency Department. We are available 24 hours a day. Please take your discharge instructions with you when you go to your follow-up appointment. If you have any problem arranging a follow-up appointment, contact the Emergency Department immediately. If a prescription has been provided, please have it filled as soon as possible to prevent a delay in treatment. Read the entire medication instruction sheet provided to you by the pharmacy. If you have any questions or reservations about taking the medication due to side effects or interactions with other medications, please call your primary care physician or contact the ER to speak with the charge nurse. Make an appointment with your family doctor or the physician you were referred to for follow-up of this visit as instructed on your discharge paperwork, as this is mandatory follow-up. Return to the ER if you are unable to be seen or if you are unable to be seen in a timely manner. If you have any problem arranging the follow-up visit, contact the Emergency Department immediately.

## 2018-08-18 LAB
BACTERIA SPEC CULT: NORMAL
CC UR VC: NORMAL
SERVICE CMNT-IMP: NORMAL

## 2018-08-20 LAB
C TRACH DNA SPEC QL NAA+PROBE: NEGATIVE
N GONORRHOEA DNA SPEC QL NAA+PROBE: NEGATIVE
SAMPLE TYPE: NORMAL
SERVICE CMNT-IMP: NORMAL
SPECIMEN SOURCE: NORMAL

## 2018-08-30 ENCOUNTER — APPOINTMENT (OUTPATIENT)
Dept: GENERAL RADIOLOGY | Age: 73
End: 2018-08-30
Attending: EMERGENCY MEDICINE
Payer: MEDICARE

## 2018-08-30 ENCOUNTER — APPOINTMENT (OUTPATIENT)
Dept: CT IMAGING | Age: 73
End: 2018-08-30
Attending: EMERGENCY MEDICINE
Payer: MEDICARE

## 2018-08-30 ENCOUNTER — HOSPITAL ENCOUNTER (EMERGENCY)
Age: 73
Discharge: HOME | End: 2018-08-30
Attending: EMERGENCY MEDICINE
Payer: MEDICARE

## 2018-08-30 VITALS
HEIGHT: 68 IN | BODY MASS INDEX: 26.83 KG/M2 | DIASTOLIC BLOOD PRESSURE: 70 MMHG | HEART RATE: 70 BPM | OXYGEN SATURATION: 97 % | SYSTOLIC BLOOD PRESSURE: 143 MMHG | TEMPERATURE: 97.8 F | WEIGHT: 177 LBS | RESPIRATION RATE: 19 BRPM

## 2018-08-30 DIAGNOSIS — K59.01 SLOW TRANSIT CONSTIPATION: Primary | ICD-10-CM

## 2018-08-30 DIAGNOSIS — N30.01 ACUTE CYSTITIS WITH HEMATURIA: ICD-10-CM

## 2018-08-30 DIAGNOSIS — K52.9 NONINFECTIOUS GASTROENTERITIS, UNSPECIFIED TYPE: ICD-10-CM

## 2018-08-30 LAB
ALBUMIN SERPL-MCNC: 3.2 G/DL (ref 3.5–5)
ALBUMIN/GLOB SERPL: 0.9 {RATIO} (ref 1.1–2.2)
ALP SERPL-CCNC: 71 U/L (ref 45–117)
ALT SERPL-CCNC: 12 U/L (ref 12–78)
ANION GAP SERPL CALC-SCNC: 9 MMOL/L (ref 5–15)
APPEARANCE UR: ABNORMAL
AST SERPL-CCNC: 8 U/L (ref 15–37)
ATRIAL RATE: 73 BPM
BACTERIA URNS QL MICRO: ABNORMAL /HPF
BILIRUB SERPL-MCNC: 0.4 MG/DL (ref 0.2–1)
BILIRUB UR QL: NEGATIVE
BUN SERPL-MCNC: 18 MG/DL (ref 6–20)
BUN/CREAT SERPL: 20 (ref 12–20)
CALCIUM SERPL-MCNC: 8.1 MG/DL (ref 8.5–10.1)
CALCULATED P AXIS, ECG09: 45 DEGREES
CALCULATED R AXIS, ECG10: 51 DEGREES
CALCULATED T AXIS, ECG11: 25 DEGREES
CHLORIDE SERPL-SCNC: 107 MMOL/L (ref 97–108)
CO2 SERPL-SCNC: 27 MMOL/L (ref 21–32)
COLOR UR: ABNORMAL
COMMENT, HOLDF: NORMAL
CREAT SERPL-MCNC: 0.91 MG/DL (ref 0.55–1.02)
DIAGNOSIS, 93000: NORMAL
EPITH CASTS URNS QL MICRO: ABNORMAL /LPF
ERYTHROCYTE [DISTWIDTH] IN BLOOD BY AUTOMATED COUNT: 13.1 % (ref 11.5–14.5)
GLOBULIN SER CALC-MCNC: 3.6 G/DL (ref 2–4)
GLUCOSE SERPL-MCNC: 96 MG/DL (ref 65–100)
GLUCOSE UR STRIP.AUTO-MCNC: NEGATIVE MG/DL
HCT VFR BLD AUTO: 36.3 % (ref 35–47)
HEMOCCULT STL QL: POSITIVE
HGB BLD-MCNC: 11.6 G/DL (ref 11.5–16)
HGB UR QL STRIP: ABNORMAL
INR PPP: 1.1 (ref 0.9–1.1)
KETONES UR QL STRIP.AUTO: NEGATIVE MG/DL
LEUKOCYTE ESTERASE UR QL STRIP.AUTO: ABNORMAL
MCH RBC QN AUTO: 27.4 PG (ref 26–34)
MCHC RBC AUTO-ENTMCNC: 32 G/DL (ref 30–36.5)
MCV RBC AUTO: 85.8 FL (ref 80–99)
NITRITE UR QL STRIP.AUTO: NEGATIVE
NRBC # BLD: 0 K/UL (ref 0–0.01)
NRBC BLD-RTO: 0 PER 100 WBC
P-R INTERVAL, ECG05: 150 MS
PH UR STRIP: 5.5 [PH] (ref 5–8)
PLATELET # BLD AUTO: 215 K/UL (ref 150–400)
PMV BLD AUTO: 10.6 FL (ref 8.9–12.9)
POTASSIUM SERPL-SCNC: 3.8 MMOL/L (ref 3.5–5.1)
PROT SERPL-MCNC: 6.8 G/DL (ref 6.4–8.2)
PROT UR STRIP-MCNC: NEGATIVE MG/DL
PROTHROMBIN TIME: 10.9 SEC (ref 9–11.1)
Q-T INTERVAL, ECG07: 390 MS
QRS DURATION, ECG06: 96 MS
QTC CALCULATION (BEZET), ECG08: 429 MS
RBC # BLD AUTO: 4.23 M/UL (ref 3.8–5.2)
RBC #/AREA URNS HPF: ABNORMAL /HPF (ref 0–5)
SAMPLES BEING HELD,HOLD: NORMAL
SODIUM SERPL-SCNC: 143 MMOL/L (ref 136–145)
SP GR UR REFRACTOMETRY: 1.01 (ref 1–1.03)
UA: UC IF INDICATED,UAUC: ABNORMAL
UROBILINOGEN UR QL STRIP.AUTO: 1 EU/DL (ref 0.2–1)
VENTRICULAR RATE, ECG03: 73 BPM
WBC # BLD AUTO: 3.4 K/UL (ref 3.6–11)
WBC URNS QL MICRO: ABNORMAL /HPF (ref 0–4)

## 2018-08-30 PROCEDURE — 87086 URINE CULTURE/COLONY COUNT: CPT | Performed by: EMERGENCY MEDICINE

## 2018-08-30 PROCEDURE — 96374 THER/PROPH/DIAG INJ IV PUSH: CPT

## 2018-08-30 PROCEDURE — 85610 PROTHROMBIN TIME: CPT | Performed by: EMERGENCY MEDICINE

## 2018-08-30 PROCEDURE — 80053 COMPREHEN METABOLIC PANEL: CPT | Performed by: EMERGENCY MEDICINE

## 2018-08-30 PROCEDURE — 93005 ELECTROCARDIOGRAM TRACING: CPT

## 2018-08-30 PROCEDURE — 74011250636 HC RX REV CODE- 250/636: Performed by: EMERGENCY MEDICINE

## 2018-08-30 PROCEDURE — 36415 COLL VENOUS BLD VENIPUNCTURE: CPT | Performed by: EMERGENCY MEDICINE

## 2018-08-30 PROCEDURE — 74011636320 HC RX REV CODE- 636/320: Performed by: EMERGENCY MEDICINE

## 2018-08-30 PROCEDURE — 74019 RADEX ABDOMEN 2 VIEWS: CPT

## 2018-08-30 PROCEDURE — 82272 OCCULT BLD FECES 1-3 TESTS: CPT | Performed by: EMERGENCY MEDICINE

## 2018-08-30 PROCEDURE — 96361 HYDRATE IV INFUSION ADD-ON: CPT

## 2018-08-30 PROCEDURE — 74011250637 HC RX REV CODE- 250/637: Performed by: EMERGENCY MEDICINE

## 2018-08-30 PROCEDURE — 99285 EMERGENCY DEPT VISIT HI MDM: CPT

## 2018-08-30 PROCEDURE — 85027 COMPLETE CBC AUTOMATED: CPT | Performed by: EMERGENCY MEDICINE

## 2018-08-30 PROCEDURE — 81001 URINALYSIS AUTO W/SCOPE: CPT | Performed by: EMERGENCY MEDICINE

## 2018-08-30 PROCEDURE — 74178 CT ABD&PLV WO CNTR FLWD CNTR: CPT

## 2018-08-30 RX ORDER — SODIUM CHLORIDE 0.9 % (FLUSH) 0.9 %
10 SYRINGE (ML) INJECTION
Status: COMPLETED | OUTPATIENT
Start: 2018-08-30 | End: 2018-08-30

## 2018-08-30 RX ORDER — LACTULOSE 10 G/15ML
30 SOLUTION ORAL; RECTAL 2 TIMES DAILY
Qty: 90 ML | Refills: 0 | Status: SHIPPED | OUTPATIENT
Start: 2018-08-30 | End: 2018-08-30

## 2018-08-30 RX ORDER — SODIUM CHLORIDE 9 MG/ML
50 INJECTION, SOLUTION INTRAVENOUS
Status: COMPLETED | OUTPATIENT
Start: 2018-08-30 | End: 2018-08-30

## 2018-08-30 RX ORDER — MAGNESIUM CITRATE
296 SOLUTION, ORAL ORAL
Qty: 1 BOTTLE | Refills: 0 | Status: SHIPPED | OUTPATIENT
Start: 2018-08-30 | End: 2018-08-30

## 2018-08-30 RX ORDER — POLYETHYLENE GLYCOL 3350 17 G/17G
17 POWDER, FOR SOLUTION ORAL
Qty: 119 G | Refills: 0 | Status: SHIPPED | OUTPATIENT
Start: 2018-08-30 | End: 2019-03-14

## 2018-08-30 RX ORDER — ACETAMINOPHEN 325 MG/1
650 TABLET ORAL
Qty: 20 TAB | Refills: 0 | Status: SHIPPED | OUTPATIENT
Start: 2018-08-30 | End: 2018-09-19 | Stop reason: DRUGHIGH

## 2018-08-30 RX ORDER — DIPHENHYDRAMINE HYDROCHLORIDE 50 MG/ML
25 INJECTION, SOLUTION INTRAMUSCULAR; INTRAVENOUS
Status: COMPLETED | OUTPATIENT
Start: 2018-08-30 | End: 2018-08-30

## 2018-08-30 RX ORDER — CEPHALEXIN 500 MG/1
500 CAPSULE ORAL 4 TIMES DAILY
Qty: 28 CAP | Refills: 0 | Status: SHIPPED | OUTPATIENT
Start: 2018-08-30 | End: 2018-09-06

## 2018-08-30 RX ORDER — DOCUSATE SODIUM 100 MG/1
CAPSULE, LIQUID FILLED ORAL
Qty: 90 CAP | Refills: 0 | Status: SHIPPED | OUTPATIENT
Start: 2018-08-30 | End: 2019-03-14

## 2018-08-30 RX ORDER — MAGNESIUM CITRATE
296 SOLUTION, ORAL ORAL
Status: COMPLETED | OUTPATIENT
Start: 2018-08-30 | End: 2018-08-30

## 2018-08-30 RX ORDER — FACIAL-BODY WIPES
10 EACH TOPICAL
Qty: 12 SUPPOSITORY | Refills: 0 | Status: SHIPPED | OUTPATIENT
Start: 2018-08-30 | End: 2019-09-03

## 2018-08-30 RX ADMIN — DIPHENHYDRAMINE HYDROCHLORIDE 25 MG: 50 INJECTION, SOLUTION INTRAMUSCULAR; INTRAVENOUS at 13:38

## 2018-08-30 RX ADMIN — SODIUM CHLORIDE 50 ML/HR: 900 INJECTION, SOLUTION INTRAVENOUS at 14:01

## 2018-08-30 RX ADMIN — Medication 10 ML: at 14:01

## 2018-08-30 RX ADMIN — SODIUM CHLORIDE 1000 ML: 900 INJECTION, SOLUTION INTRAVENOUS at 12:06

## 2018-08-30 RX ADMIN — IOPAMIDOL 100 ML: 755 INJECTION, SOLUTION INTRAVENOUS at 14:01

## 2018-08-30 RX ADMIN — Medication 1 ENEMA: at 14:47

## 2018-08-30 RX ADMIN — MAGESIUM CITRATE 296 ML: 1.75 LIQUID ORAL at 15:04

## 2018-08-30 NOTE — ED NOTES
Received call from lab. Patient needs repeat PT INR lab as the sample is hemolyzed. Lab ordered and primary RN Cinthya hodges.
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
## 2018-08-30 NOTE — ED TRIAGE NOTES
Pt complains of rectal pain. States, \"I see something that might be blood when I wipe. \" Reports hard chest pain at times.

## 2018-08-30 NOTE — DISCHARGE INSTRUCTIONS
You were seen for decreased bowel movements and abdominal pain, most likely due to constipation. For the constipation, drink plenty of fluids, use Colace stool softener with Dulcolax or Miralax twice a day laxative until bowel movement occurs. If still no bowel movement, can buy Magnesium Citrate over the counter for a whole bowel cleanse. Maintain a high fiber diet by eating green vegetables and fruit and drink 6-8 large glasses of water daily to avoid constipation in the future. Metamucil, 1 tablespoon once or twice daily can be used to keep bowels regular if needed. Timing elimination to occur after meals, or after a hot drink, can also improve the situation long term. Call if symptoms persist or worsen. Constipation: Care Instructions  Your Care Instructions    Constipation means that you have a hard time passing stools (bowel movements). People pass stools from 3 times a day to once every 3 days. What is normal for you may be different. Constipation may occur with pain in the rectum and cramping. The pain may get worse when you try to pass stools. Sometimes there are small amounts of bright red blood on toilet paper or the surface of stools. This is because of enlarged veins near the rectum (hemorrhoids). A few changes in your diet and lifestyle may help you avoid ongoing constipation. Your doctor may also prescribe medicine to help loosen your stool. Some medicines can cause constipation. These include pain medicines and antidepressants. Tell your doctor about all the medicines you take. Your doctor may want to make a medicine change to ease your symptoms. Follow-up care is a key part of your treatment and safety. Be sure to make and go to all appointments, and call your doctor if you are having problems. It's also a good idea to know your test results and keep a list of the medicines you take. How can you care for yourself at home?   · Drink plenty of fluids, enough so that your urine is light yellow or clear like water. If you have kidney, heart, or liver disease and have to limit fluids, talk with your doctor before you increase the amount of fluids you drink. · Include high-fiber foods in your diet each day. These include fruits, vegetables, beans, and whole grains. · Get at least 30 minutes of exercise on most days of the week. Walking is a good choice. You also may want to do other activities, such as running, swimming, cycling, or playing tennis or team sports. · Take a fiber supplement, such as Citrucel or Metamucil, every day. Read and follow all instructions on the label. · Schedule time each day for a bowel movement. A daily routine may help. Take your time having your bowel movement. · Support your feet with a small step stool when you sit on the toilet. This helps flex your hips and places your pelvis in a squatting position. · Your doctor may recommend an over-the-counter laxative to relieve your constipation. Examples are Milk of Magnesia and MiraLax. Read and follow all instructions on the label. Do not use laxatives on a long-term basis. When should you call for help? Call your doctor now or seek immediate medical care if:    · You have new or worse belly pain.     · You have new or worse nausea or vomiting.     · You have blood in your stools.    Watch closely for changes in your health, and be sure to contact your doctor if:    · Your constipation is getting worse.     · You do not get better as expected. Where can you learn more? Go to http://lobo-michel.info/. Enter 21 [phone] in the search box to learn more about \"Constipation: Care Instructions. \"  Current as of: November 20, 2017  Content Version: 11.7  © 1395-9393 [employer]. Care instructions adapted under license by [employer] (which disclaims liability or warranty for this information).  If you have questions about a medical condition or this instruction, always ask your healthcare professional. [address] any warranty or liability for your use of this information.

## 2018-09-01 LAB
BACTERIA SPEC CULT: NORMAL
CC UR VC: NORMAL
SERVICE CMNT-IMP: NORMAL

## 2018-09-19 ENCOUNTER — APPOINTMENT (OUTPATIENT)
Dept: MRI IMAGING | Age: 73
DRG: 418 | End: 2018-09-19
Attending: INTERNAL MEDICINE
Payer: MEDICARE

## 2018-09-19 ENCOUNTER — APPOINTMENT (OUTPATIENT)
Dept: ULTRASOUND IMAGING | Age: 73
DRG: 418 | End: 2018-09-19
Attending: EMERGENCY MEDICINE
Payer: MEDICARE

## 2018-09-19 ENCOUNTER — ANESTHESIA EVENT (OUTPATIENT)
Dept: SURGERY | Age: 73
DRG: 418 | End: 2018-09-19
Payer: MEDICARE

## 2018-09-19 ENCOUNTER — APPOINTMENT (OUTPATIENT)
Dept: NUCLEAR MEDICINE | Age: 73
DRG: 418 | End: 2018-09-19
Attending: EMERGENCY MEDICINE
Payer: MEDICARE

## 2018-09-19 ENCOUNTER — HOSPITAL ENCOUNTER (INPATIENT)
Age: 73
LOS: 17 days | Discharge: HOME HEALTH | DRG: 418 | End: 2018-10-06
Attending: EMERGENCY MEDICINE | Admitting: INTERNAL MEDICINE
Payer: MEDICARE

## 2018-09-19 DIAGNOSIS — K81.0 ACUTE CHOLECYSTITIS: Primary | ICD-10-CM

## 2018-09-19 DIAGNOSIS — K80.42 CHOLEDOCHOLITHIASIS WITH ACUTE CHOLECYSTITIS: ICD-10-CM

## 2018-09-19 DIAGNOSIS — R41.0 CONFUSION: ICD-10-CM

## 2018-09-19 LAB
ALBUMIN SERPL-MCNC: 4 G/DL (ref 3.5–5)
ALBUMIN/GLOB SERPL: 0.9 {RATIO} (ref 1.1–2.2)
ALP SERPL-CCNC: 146 U/L (ref 45–117)
ALT SERPL-CCNC: 847 U/L (ref 12–78)
ANION GAP SERPL CALC-SCNC: 12 MMOL/L (ref 5–15)
APAP SERPL-MCNC: <2 UG/ML (ref 10–30)
AST SERPL-CCNC: 1426 U/L (ref 15–37)
ATRIAL RATE: 73 BPM
BASOPHILS # BLD: 0 K/UL (ref 0–0.1)
BASOPHILS NFR BLD: 0 % (ref 0–1)
BILIRUB SERPL-MCNC: 2.8 MG/DL (ref 0.2–1)
BUN SERPL-MCNC: 15 MG/DL (ref 6–20)
BUN/CREAT SERPL: 16 (ref 12–20)
CALCIUM SERPL-MCNC: 8.7 MG/DL (ref 8.5–10.1)
CALCULATED P AXIS, ECG09: 55 DEGREES
CALCULATED R AXIS, ECG10: 46 DEGREES
CALCULATED T AXIS, ECG11: 40 DEGREES
CHLORIDE SERPL-SCNC: 100 MMOL/L (ref 97–108)
CK SERPL-CCNC: 109 U/L (ref 26–192)
CO2 SERPL-SCNC: 26 MMOL/L (ref 21–32)
CREAT SERPL-MCNC: 0.95 MG/DL (ref 0.55–1.02)
DIAGNOSIS, 93000: NORMAL
DIFFERENTIAL METHOD BLD: ABNORMAL
EOSINOPHIL # BLD: 0 K/UL (ref 0–0.4)
EOSINOPHIL NFR BLD: 0 % (ref 0–7)
ERYTHROCYTE [DISTWIDTH] IN BLOOD BY AUTOMATED COUNT: 13.2 % (ref 11.5–14.5)
GLOBULIN SER CALC-MCNC: 4.3 G/DL (ref 2–4)
GLUCOSE SERPL-MCNC: 155 MG/DL (ref 65–100)
HCT VFR BLD AUTO: 40 % (ref 35–47)
HGB BLD-MCNC: 12.7 G/DL (ref 11.5–16)
IMM GRANULOCYTES # BLD: 0 K/UL (ref 0–0.04)
IMM GRANULOCYTES NFR BLD AUTO: 0 % (ref 0–0.5)
INR PPP: 1.1 (ref 0.9–1.1)
LIPASE SERPL-CCNC: 61 U/L (ref 73–393)
LYMPHOCYTES # BLD: 0.3 K/UL (ref 0.8–3.5)
LYMPHOCYTES NFR BLD: 5 % (ref 12–49)
MCH RBC QN AUTO: 26.8 PG (ref 26–34)
MCHC RBC AUTO-ENTMCNC: 31.8 G/DL (ref 30–36.5)
MCV RBC AUTO: 84.4 FL (ref 80–99)
MONOCYTES # BLD: 0.2 K/UL (ref 0–1)
MONOCYTES NFR BLD: 3 % (ref 5–13)
NEUTS SEG # BLD: 6.2 K/UL (ref 1.8–8)
NEUTS SEG NFR BLD: 92 % (ref 32–75)
NRBC # BLD: 0 K/UL (ref 0–0.01)
NRBC BLD-RTO: 0 PER 100 WBC
P-R INTERVAL, ECG05: 152 MS
PLATELET # BLD AUTO: 209 K/UL (ref 150–400)
PMV BLD AUTO: 11.6 FL (ref 8.9–12.9)
POTASSIUM SERPL-SCNC: 4.4 MMOL/L (ref 3.5–5.1)
PROT SERPL-MCNC: 8.3 G/DL (ref 6.4–8.2)
PROTHROMBIN TIME: 10.8 SEC (ref 9–11.1)
Q-T INTERVAL, ECG07: 400 MS
QRS DURATION, ECG06: 96 MS
QTC CALCULATION (BEZET), ECG08: 440 MS
RBC # BLD AUTO: 4.74 M/UL (ref 3.8–5.2)
RBC MORPH BLD: ABNORMAL
SODIUM SERPL-SCNC: 138 MMOL/L (ref 136–145)
TROPONIN I SERPL-MCNC: <0.05 NG/ML
VENTRICULAR RATE, ECG03: 73 BPM
WBC # BLD AUTO: 6.7 K/UL (ref 3.6–11)

## 2018-09-19 PROCEDURE — 83690 ASSAY OF LIPASE: CPT | Performed by: EMERGENCY MEDICINE

## 2018-09-19 PROCEDURE — 84484 ASSAY OF TROPONIN QUANT: CPT | Performed by: EMERGENCY MEDICINE

## 2018-09-19 PROCEDURE — A9537 TC99M MEBROFENIN: HCPCS

## 2018-09-19 PROCEDURE — 74011250636 HC RX REV CODE- 250/636: Performed by: EMERGENCY MEDICINE

## 2018-09-19 PROCEDURE — 74011000258 HC RX REV CODE- 258: Performed by: EMERGENCY MEDICINE

## 2018-09-19 PROCEDURE — 96361 HYDRATE IV INFUSION ADD-ON: CPT

## 2018-09-19 PROCEDURE — 74011250636 HC RX REV CODE- 250/636: Performed by: INTERNAL MEDICINE

## 2018-09-19 PROCEDURE — 94762 N-INVAS EAR/PLS OXIMTRY CONT: CPT

## 2018-09-19 PROCEDURE — 36415 COLL VENOUS BLD VENIPUNCTURE: CPT | Performed by: EMERGENCY MEDICINE

## 2018-09-19 PROCEDURE — 76705 ECHO EXAM OF ABDOMEN: CPT

## 2018-09-19 PROCEDURE — 93005 ELECTROCARDIOGRAM TRACING: CPT

## 2018-09-19 PROCEDURE — 65270000029 HC RM PRIVATE

## 2018-09-19 PROCEDURE — 82550 ASSAY OF CK (CPK): CPT | Performed by: EMERGENCY MEDICINE

## 2018-09-19 PROCEDURE — 74181 MRI ABDOMEN W/O CONTRAST: CPT

## 2018-09-19 PROCEDURE — 74011250637 HC RX REV CODE- 250/637: Performed by: INTERNAL MEDICINE

## 2018-09-19 PROCEDURE — 85025 COMPLETE CBC W/AUTO DIFF WBC: CPT | Performed by: EMERGENCY MEDICINE

## 2018-09-19 PROCEDURE — 80053 COMPREHEN METABOLIC PANEL: CPT | Performed by: EMERGENCY MEDICINE

## 2018-09-19 PROCEDURE — 99285 EMERGENCY DEPT VISIT HI MDM: CPT

## 2018-09-19 PROCEDURE — 96375 TX/PRO/DX INJ NEW DRUG ADDON: CPT

## 2018-09-19 PROCEDURE — 96365 THER/PROPH/DIAG IV INF INIT: CPT

## 2018-09-19 PROCEDURE — 80307 DRUG TEST PRSMV CHEM ANLYZR: CPT | Performed by: INTERNAL MEDICINE

## 2018-09-19 PROCEDURE — 85610 PROTHROMBIN TIME: CPT | Performed by: EMERGENCY MEDICINE

## 2018-09-19 RX ORDER — METRONIDAZOLE 500 MG/100ML
500 INJECTION, SOLUTION INTRAVENOUS EVERY 12 HOURS
Status: DISCONTINUED | OUTPATIENT
Start: 2018-09-19 | End: 2018-09-25

## 2018-09-19 RX ORDER — ASPIRIN 81 MG/1
81 TABLET ORAL DAILY
Status: DISCONTINUED | OUTPATIENT
Start: 2018-09-20 | End: 2018-09-20

## 2018-09-19 RX ORDER — LABETALOL HYDROCHLORIDE 5 MG/ML
20 INJECTION, SOLUTION INTRAVENOUS
Status: DISCONTINUED | OUTPATIENT
Start: 2018-09-19 | End: 2018-09-30

## 2018-09-19 RX ORDER — ENOXAPARIN SODIUM 100 MG/ML
40 INJECTION SUBCUTANEOUS EVERY 24 HOURS
Status: DISCONTINUED | OUTPATIENT
Start: 2018-09-20 | End: 2018-09-20

## 2018-09-19 RX ORDER — SODIUM CHLORIDE 0.9 % (FLUSH) 0.9 %
5-10 SYRINGE (ML) INJECTION EVERY 8 HOURS
Status: DISCONTINUED | OUTPATIENT
Start: 2018-09-19 | End: 2018-10-06 | Stop reason: HOSPADM

## 2018-09-19 RX ORDER — LOSARTAN POTASSIUM 25 MG/1
25 TABLET ORAL DAILY
Status: ON HOLD | COMMUNITY
End: 2018-12-19 | Stop reason: CLARIF

## 2018-09-19 RX ORDER — ONDANSETRON 2 MG/ML
8 INJECTION INTRAMUSCULAR; INTRAVENOUS
Status: COMPLETED | OUTPATIENT
Start: 2018-09-19 | End: 2018-09-19

## 2018-09-19 RX ORDER — METOCLOPRAMIDE HYDROCHLORIDE 5 MG/ML
10 INJECTION INTRAMUSCULAR; INTRAVENOUS
Status: COMPLETED | OUTPATIENT
Start: 2018-09-19 | End: 2018-09-19

## 2018-09-19 RX ORDER — ACETAMINOPHEN 500 MG
1000 TABLET ORAL
COMMUNITY
End: 2018-10-06

## 2018-09-19 RX ORDER — LOSARTAN POTASSIUM 50 MG/1
50 TABLET ORAL DAILY
Status: DISCONTINUED | OUTPATIENT
Start: 2018-09-20 | End: 2018-09-19

## 2018-09-19 RX ORDER — SODIUM CHLORIDE AND POTASSIUM CHLORIDE .9; .15 G/100ML; G/100ML
SOLUTION INTRAVENOUS CONTINUOUS
Status: DISCONTINUED | OUTPATIENT
Start: 2018-09-19 | End: 2018-09-21

## 2018-09-19 RX ORDER — MORPHINE SULFATE 2 MG/ML
2 INJECTION, SOLUTION INTRAMUSCULAR; INTRAVENOUS ONCE
Status: COMPLETED | OUTPATIENT
Start: 2018-09-19 | End: 2018-09-19

## 2018-09-19 RX ORDER — LORAZEPAM 2 MG/ML
1 INJECTION INTRAMUSCULAR ONCE
Status: COMPLETED | OUTPATIENT
Start: 2018-09-19 | End: 2018-09-19

## 2018-09-19 RX ORDER — FACIAL-BODY WIPES
10 EACH TOPICAL DAILY PRN
Status: DISCONTINUED | OUTPATIENT
Start: 2018-09-19 | End: 2018-10-06 | Stop reason: HOSPADM

## 2018-09-19 RX ORDER — ONDANSETRON 2 MG/ML
4 INJECTION INTRAMUSCULAR; INTRAVENOUS
Status: DISCONTINUED | OUTPATIENT
Start: 2018-09-19 | End: 2018-10-06 | Stop reason: HOSPADM

## 2018-09-19 RX ORDER — LEVOFLOXACIN 5 MG/ML
750 INJECTION, SOLUTION INTRAVENOUS EVERY 24 HOURS
Status: COMPLETED | OUTPATIENT
Start: 2018-09-19 | End: 2018-09-29

## 2018-09-19 RX ORDER — DILTIAZEM HYDROCHLORIDE 120 MG/1
120 CAPSULE, COATED, EXTENDED RELEASE ORAL DAILY
Status: DISCONTINUED | OUTPATIENT
Start: 2018-09-20 | End: 2018-10-06 | Stop reason: HOSPADM

## 2018-09-19 RX ORDER — DILTIAZEM HYDROCHLORIDE 120 MG/1
120 CAPSULE, COATED, EXTENDED RELEASE ORAL DAILY
COMMUNITY
End: 2019-06-30 | Stop reason: SDUPTHER

## 2018-09-19 RX ORDER — FAMOTIDINE 20 MG/1
20 TABLET, FILM COATED ORAL 2 TIMES DAILY
Status: DISCONTINUED | OUTPATIENT
Start: 2018-09-19 | End: 2018-09-30

## 2018-09-19 RX ORDER — LOSARTAN POTASSIUM 25 MG/1
25 TABLET ORAL
Status: DISCONTINUED | OUTPATIENT
Start: 2018-09-19 | End: 2018-10-06 | Stop reason: HOSPADM

## 2018-09-19 RX ORDER — LANOLIN ALCOHOL/MO/W.PET/CERES
6 CREAM (GRAM) TOPICAL
Status: DISCONTINUED | OUTPATIENT
Start: 2018-09-19 | End: 2018-10-06 | Stop reason: HOSPADM

## 2018-09-19 RX ORDER — METRONIDAZOLE 500 MG/100ML
500 INJECTION, SOLUTION INTRAVENOUS
Status: DISCONTINUED | OUTPATIENT
Start: 2018-09-19 | End: 2018-09-19

## 2018-09-19 RX ORDER — ATORVASTATIN CALCIUM 10 MG/1
10 TABLET, FILM COATED ORAL
Status: DISCONTINUED | OUTPATIENT
Start: 2018-09-19 | End: 2018-09-21

## 2018-09-19 RX ORDER — POLYETHYLENE GLYCOL 3350 17 G/17G
17 POWDER, FOR SOLUTION ORAL DAILY
Status: DISCONTINUED | OUTPATIENT
Start: 2018-09-20 | End: 2018-09-21

## 2018-09-19 RX ORDER — NYSTATIN 100000 U/G
CREAM TOPICAL
COMMUNITY
End: 2019-09-03

## 2018-09-19 RX ORDER — METRONIDAZOLE 500 MG/100ML
500 INJECTION, SOLUTION INTRAVENOUS EVERY 8 HOURS
Status: DISCONTINUED | OUTPATIENT
Start: 2018-09-19 | End: 2018-09-19 | Stop reason: DRUGHIGH

## 2018-09-19 RX ORDER — SODIUM CHLORIDE 0.9 % (FLUSH) 0.9 %
5-10 SYRINGE (ML) INJECTION AS NEEDED
Status: DISCONTINUED | OUTPATIENT
Start: 2018-09-19 | End: 2018-10-06 | Stop reason: HOSPADM

## 2018-09-19 RX ORDER — NALOXONE HYDROCHLORIDE 0.4 MG/ML
0.4 INJECTION, SOLUTION INTRAMUSCULAR; INTRAVENOUS; SUBCUTANEOUS AS NEEDED
Status: DISCONTINUED | OUTPATIENT
Start: 2018-09-19 | End: 2018-10-06 | Stop reason: HOSPADM

## 2018-09-19 RX ORDER — DIPHENHYDRAMINE HYDROCHLORIDE 50 MG/ML
25 INJECTION, SOLUTION INTRAMUSCULAR; INTRAVENOUS
Status: COMPLETED | OUTPATIENT
Start: 2018-09-19 | End: 2018-09-19

## 2018-09-19 RX ORDER — MORPHINE SULFATE 2 MG/ML
2 INJECTION, SOLUTION INTRAMUSCULAR; INTRAVENOUS
Status: DISCONTINUED | OUTPATIENT
Start: 2018-09-19 | End: 2018-09-29

## 2018-09-19 RX ADMIN — SODIUM CHLORIDE 1000 ML: 900 INJECTION, SOLUTION INTRAVENOUS at 09:46

## 2018-09-19 RX ADMIN — Medication 10 ML: at 21:35

## 2018-09-19 RX ADMIN — FAMOTIDINE 20 MG: 20 TABLET ORAL at 19:48

## 2018-09-19 RX ADMIN — MELATONIN TAB 3 MG 6 MG: 3 TAB at 21:34

## 2018-09-19 RX ADMIN — SODIUM CHLORIDE AND POTASSIUM CHLORIDE: 9; 1.49 INJECTION, SOLUTION INTRAVENOUS at 21:34

## 2018-09-19 RX ADMIN — MORPHINE SULFATE 2 MG: 2 INJECTION, SOLUTION INTRAMUSCULAR; INTRAVENOUS at 12:48

## 2018-09-19 RX ADMIN — LEVOFLOXACIN 750 MG: 5 INJECTION, SOLUTION INTRAVENOUS at 19:48

## 2018-09-19 RX ADMIN — ATORVASTATIN CALCIUM 10 MG: 10 TABLET, FILM COATED ORAL at 21:34

## 2018-09-19 RX ADMIN — ONDANSETRON 8 MG: 2 INJECTION INTRAMUSCULAR; INTRAVENOUS at 12:48

## 2018-09-19 RX ADMIN — METRONIDAZOLE 500 MG: 500 INJECTION, SOLUTION INTRAVENOUS at 17:16

## 2018-09-19 RX ADMIN — LOSARTAN POTASSIUM 25 MG: 25 TABLET ORAL at 21:34

## 2018-09-19 RX ADMIN — DIPHENHYDRAMINE HYDROCHLORIDE 25 MG: 50 INJECTION, SOLUTION INTRAMUSCULAR; INTRAVENOUS at 09:46

## 2018-09-19 RX ADMIN — LORAZEPAM 1 MG: 2 INJECTION INTRAMUSCULAR; INTRAVENOUS at 17:40

## 2018-09-19 RX ADMIN — METOCLOPRAMIDE 10 MG: 5 INJECTION, SOLUTION INTRAMUSCULAR; INTRAVENOUS at 09:46

## 2018-09-19 RX ADMIN — CEFOXITIN 2 G: 2 INJECTION, POWDER, FOR SOLUTION INTRAVENOUS at 12:47

## 2018-09-19 NOTE — ED PROVIDER NOTES
EMERGENCY DEPARTMENT HISTORY AND PHYSICAL EXAM      Date: 9/19/2018  Patient Name: Ashlie Moore    History of Presenting Illness     Chief Complaint   Patient presents with    Chest Pain    Vomiting     x1, EMS reports patient is dry heaving though       History Provided By: Patient    HPI: Ashlie Moore, 67 y.o. female with PMHx significant for HTN DM, anxiety, GERD, CAD, presents via EMS to the ED with cc of intermittent CP x 1 month that worsened to 8/10 and became constant last night, radiating down to epigastrium. Pt reports nausea and one episode of vomiting last night. She reports productive cough with white phlegm and associated SOB. Pt notes she was unable to sleep last night due to sxs. She denies diarrhea. There are no other complaints, changes, or physical findings at this time.     PCP: Shun Sevilla MD    Current Facility-Administered Medications   Medication Dose Route Frequency Provider Last Rate Last Dose    sodium chloride (NS) flush 5-10 mL  5-10 mL IntraVENous Q8H Luh Nazario MD        sodium chloride (NS) flush 5-10 mL  5-10 mL IntraVENous PRN Luh Nazario MD        [facility]) injection 0.4 mg  0.4 mg IntraVENous PRN Luh Nazario MD        ondansetron [facility]) injection 4 mg  4 mg IntraVENous Q4H PRN Luh Nazario MD        bisacodyl (DULCOLAX) suppository 10 mg  10 mg Rectal DAILY PRN Luh Nazario MD        [START ON 9/20/2018] enoxaparin (LOVENOX) injection 40 mg  40 mg SubCUTAneous Q24H Luh Nazario MD        levoFLOXacin (LEVAQUIN) 750 mg in D5W IVPB  750 mg IntraVENous Q24H Luh Nazario MD        metroNIDAZOLE (FLAGYL) IVPB premix 500 mg  500 mg IntraVENous MD Ailyn Morrow ON 9/20/2018] aspirin delayed-release tablet 81 mg  81 mg Oral DAILY Luh Nazario MD        atorvastatin (LIPITOR) tablet 10 mg  10 mg Oral QHS Luh Nazario MD        [START ON 9/20/2018] losartan (COZAAR) tablet 50 mg  50 mg Oral DAILY Josef Rodas MD        [START ON 9/20/2018] dilTIAZem CD (CARDIZEM CD) capsule 120 mg  120 mg Oral DAILY Josef Rodas MD        famotidine (PEPCID) tablet 20 mg  20 mg Oral BID Josef Rodas MD        [START ON 9/20/2018] polyethylene glycol (MIRALAX) packet 17 g  17 g Oral Joshua Mcdaniel MD         Current Outpatient Prescriptions   Medication Sig Dispense Refill    polyethylene glycol (MIRALAX) 17 gram/dose powder Take 17 g by mouth daily as needed. 1 tablespoon with 8 oz of water daily 119 g 0    bisacodyl (DULCOLAX, BISACODYL,) 10 mg suppository Insert 10 mg into rectum daily as needed. 12 Suppository 0    docusate sodium (COLACE) 100 mg capsule TAKE 1 CAP BY MOUTH DAILY AS NEEDED FOR CONSTIPATION FOR UP TO 90 DAYS. 90 Cap 0    acetaminophen (TYLENOL) 325 mg tablet Take 2 Tabs by mouth every four (4) hours as needed for Pain. 20 Tab 0    butalbital-acetaminophen-caff (FIORICET) -40 mg per capsule Take 1 Cap by mouth every four (4) hours as needed for Pain. 20 Cap 0    famotidine (PEPCID) 20 mg tablet Take 1 Tab by mouth two (2) times a day. 60 Tab 0    erythromycin (ILOTYCIN) ophthalmic ointment APPLY 1 APPLICATION TO BOTH EYELIDS TWICE DAILY  11    valsartan (DIOVAN) 80 mg tablet Take 1 Tab by mouth daily. 90 Tab 4    dilTIAZem CD (CARDIZEM CD) 120 mg ER capsule Take 1 Cap by mouth daily. 90 Cap 4    rosuvastatin (CRESTOR) 5 mg tablet       nystatin (MYCOSTATIN) topical cream APPLY TO AFFECTED AREA TWO (2) TIMES A DAY. 0    pravastatin (PRAVACHOL) 10 mg tablet Take 2 Tabs by mouth nightly. 90 Tab 4    aspirin delayed-release 81 mg tablet Take 1 Tab by mouth daily.  27 Tab 11       Past History     Past Medical History:  Past Medical History:   Diagnosis Date    Agoraphobia without mention of panic attacks 2/17/2014    Anxiety disorder 8/18/2013    Arthritis     osteo    Breast pain, left 4/7/2015    CAD (coronary artery disease), native coronary artery 12/1/2015    Chronic chest pain 1/13/2014    Chronic pain associated with significant psychosocial dysfunction 2/17/2014    Depression 8/18/2013    Diabetes ([facility] [address]     type II    Duplicated right renal collecting system 3/13/2014    GERD (gastroesophageal reflux disease)     Gout, joint     Hypercholesteremia     hyercholesterolemia    Hypertension     Nephrolithiasis 3/13/2014    Personal history of noncompliance with medical treatment, presenting hazards to health 5/30/2014    Psychotic disorder     Vaginal pain 7/30/2014       Past Surgical History:  Past Surgical History:   Procedure Laterality Date    EGD  4/23/2010         HX CYST REMOVAL      cyst removed from left wrist    HX HYSTERECTOMY      partial    HX OTHER SURGICAL      bladder dilitation    HX TUBAL LIGATION      HX UROLOGICAL      kidney stones       Family History:  Family History   Problem Relation Age of Onset    Stroke Mother     Heart Disease Mother     Cancer Father     Heart Disease Son     Liver Disease Son     Heart Disease Daughter     Malignant Hyperthermia Neg Hx     Pseudocholinesterase Deficiency Neg Hx     Delayed Awakening Neg Hx     Post-op Nausea/Vomiting Neg Hx     Emergence Delirium Neg Hx     Post-op Cognitive Dysfunction Neg Hx     Other Neg Hx        Social History:  Social History   Substance Use Topics    Smoking status: Never Smoker    Smokeless tobacco: Never Used    Alcohol use No       Allergies:   Allergies   Allergen Reactions    Amoxicillin Hives    Sulfa (Sulfonamide Antibiotics) Hives and Itching    Amoxicillin Hives and Itching     Long time ago - patient not exactly certain what it does    Mirtazapine Itching and Nausea Only     Funny feeling in chest    Percocet [Oxycodone-Acetaminophen] Nausea and Vomiting    Codeine Nausea and Vomiting    Crestor [Rosuvastatin] Other (comments)     myalgias    Nitroglycerin Unknown (comments)     Patient cannot remember why she is allergic to it      Prednisone Itching    Sulfa (Sulfonamide Antibiotics) Hives, Itching and Palpitations     Think it was increased heart rate or itching - many years ago    Zithromax [Azithromycin] Itching     Not sure what it does,taken long time ago         Review of Systems   Review of Systems   Constitutional: Negative for activity change, appetite change, chills, fever and unexpected weight change. HENT: Negative for congestion. Eyes: Negative for pain and visual disturbance. Respiratory: Positive for cough and shortness of breath. Cardiovascular: Positive for chest pain. Gastrointestinal: Positive for abdominal pain (epigastric), nausea and vomiting. Negative for diarrhea. Genitourinary: Negative for dysuria. Musculoskeletal: Negative for back pain. Skin: Negative for rash. Neurological: Negative for headaches. Physical Exam   Physical Exam   Constitutional: She is oriented to person, place, and time. She appears well-developed and well-nourished. Elderly female in mild to moderate distress due to sxs. HENT:   Head: Normocephalic and atraumatic. Mouth/Throat: Oropharynx is clear and moist.   Eyes: Conjunctivae and EOM are normal. Pupils are equal, round, and reactive to light. Right eye exhibits no discharge. Left eye exhibits no discharge. Neck: Normal range of motion. Neck supple. Cardiovascular: Normal rate, regular rhythm and normal heart sounds. No murmur heard. Pulmonary/Chest: Effort normal and breath sounds normal. No respiratory distress. She has no wheezes. She has no rales. Abdominal: Soft. Bowel sounds are normal. She exhibits no distension. Diffuse abd tenderness not specifically worse in one area. Musculoskeletal: Normal range of motion. She exhibits no edema. Neurological: She is alert and oriented to person, place, and time. No cranial nerve deficit. She exhibits normal muscle tone.    Skin: Skin is warm and dry. No rash noted. She is not diaphoretic. Psychiatric:   Appears anxious   Nursing note and vitals reviewed. Diagnostic Study Results     Labs -     Recent Results (from the past 12 hour(s))   EKG, 12 LEAD, INITIAL    Collection Time: 09/19/18  9:38 AM   Result Value Ref Range    Ventricular Rate 73 BPM    Atrial Rate 73 BPM    P-R Interval 152 ms    QRS Duration 96 ms    Q-T Interval 400 ms    QTC Calculation (Bezet) 440 ms    Calculated P Axis 55 degrees    Calculated R Axis 46 degrees    Calculated T Axis 40 degrees    Diagnosis       Normal sinus rhythm  Normal ECG  When compared with ECG of 30-AUG-2018 11:45,  No significant change was found     CBC WITH AUTOMATED DIFF    Collection Time: 09/19/18  9:48 AM   Result Value Ref Range    WBC 6.7 3.6 - 11.0 K/uL    RBC 4.74 3.80 - 5.20 M/uL    HGB 12.7 11.5 - 16.0 g/dL    HCT 40.0 35.0 - 47.0 %    MCV 84.4 80.0 - 99.0 FL    MCH 26.8 26.0 - 34.0 PG    MCHC 31.8 30.0 - 36.5 g/dL    RDW 13.2 11.5 - 14.5 %    PLATELET 129 276 - 827 K/uL    MPV 11.6 8.9 - 12.9 FL    NRBC 0.0 0  WBC    ABSOLUTE NRBC 0.00 0.00 - 0.01 K/uL    NEUTROPHILS 92 (H) 32 - 75 %    LYMPHOCYTES 5 (L) 12 - 49 %    MONOCYTES 3 (L) 5 - 13 %    EOSINOPHILS 0 0 - 7 %    BASOPHILS 0 0 - 1 %    IMMATURE GRANULOCYTES 0 0.0 - 0.5 %    ABS. NEUTROPHILS 6.2 1.8 - 8.0 K/UL    ABS. LYMPHOCYTES 0.3 (L) 0.8 - 3.5 K/UL    ABS. MONOCYTES 0.2 0.0 - 1.0 K/UL    ABS. EOSINOPHILS 0.0 0.0 - 0.4 K/UL    ABS. BASOPHILS 0.0 0.0 - 0.1 K/UL    ABS. IMM.  GRANS. 0.0 0.00 - 0.04 K/UL    DF AUTOMATED      RBC COMMENTS NORMOCYTIC, NORMOCHROMIC     METABOLIC PANEL, COMPREHENSIVE    Collection Time: 09/19/18  9:48 AM   Result Value Ref Range    Sodium 138 136 - 145 mmol/L    Potassium 4.4 3.5 - 5.1 mmol/L    Chloride 100 97 - 108 mmol/L    CO2 26 21 - 32 mmol/L    Anion gap 12 5 - 15 mmol/L    Glucose 155 (H) 65 - 100 mg/dL    BUN 15 6 - 20 MG/DL    Creatinine 0.95 0.55 - 1.02 MG/DL    BUN/Creatinine ratio 16 12 - 20      GFR est AA >60 >60 ml/min/1.73m2    GFR est non-AA 58 (L) >60 ml/min/1.73m2    Calcium 8.7 8.5 - 10.1 MG/DL    Bilirubin, total 2.8 (H) 0.2 - 1.0 MG/DL    ALT (SGPT) 847 (H) 12 - 78 U/L    AST (SGOT) 1426 (H) 15 - 37 U/L    Alk. phosphatase 146 (H) 45 - 117 U/L    Protein, total 8.3 (H) 6.4 - 8.2 g/dL    Albumin 4.0 3.5 - 5.0 g/dL    Globulin 4.3 (H) 2.0 - 4.0 g/dL    A-G Ratio 0.9 (L) 1.1 - 2.2     CK W/ REFLX CKMB    Collection Time: 09/19/18  9:48 AM   Result Value Ref Range     26 - 192 U/L   TROPONIN I    Collection Time: 09/19/18  9:48 AM   Result Value Ref Range    Troponin-I, Qt. <0.05 <0.05 ng/mL   PROTHROMBIN TIME + INR    Collection Time: 09/19/18  9:48 AM   Result Value Ref Range    INR 1.1 0.9 - 1.1      Prothrombin time 10.8 9.0 - 11.1 sec   LIPASE    Collection Time: 09/19/18  9:48 AM   Result Value Ref Range    Lipase 61 (L) 73 - 393 U/L       Radiologic Studies -   NM HEPATOBILIARY DUCT SCAN   Final Result      US ABD LTD   Final Result      MRI ABD WO CONT    (Results Pending)     NM HEPATOBILIARY DUCT SCAN (Final result) Result time: 09/19/18 16:15:40     Final result by Keshawn Garcia Results In (09/19/18 16:13:57)     Initial Result:     Impression:     IMPRESSION: Tracer accumulation in the liver without evidence of activity in the  small bowel, common bile duct, and gallbladder. Given the biliary dilatation as  may be related to distal obstruction, however correlation with LFTs is  recommended as this could also be related to cholestasis. Additional image at 24  hours may be helpful for further evaluation.        Narrative:     EXAM:  NM HEPATOBILIARY DUCT SCAN    Indication: Cholecystitis, follow-up abnormal ultrasound    Comparison ultrasound performed earlier the same day    HIDA scan was performed per protocol utilizing 5. 0mCi Tc-99 M Choletec  intravenously. Of note, the patient received 2 mg of morphine at 12:48 PM today.   There is normal tracer distribution throughout the liver. However, activity is  not noted in the gallbladder, common bile duct, or small bowel despite imaging  to 3 1/2 hours.                 US ABD LTD (Final result) Result time: 09/19/18 12:20:39     Final result by Jose, Rad Results In (09/19/18 12:19:00)     Initial Result:     Impression:     Impression: Gallstones in a distended gallbladder with pericholecystic fluid,  and tenderness concerning for acute cholecystitis. Biliary dilatation and may be  related to common bile duct stone. Nonobstructing right renal stones.     Narrative:     Indication: Abdominal pain, elevated LFTs    Comparison to 4/11/2017    Real-time sonographic imaging of the right upper quadrant was performed. Intrahepatic biliary dilatation is noted. No hepatic mass is identified. Gallstones are noted in a distended gallbladder. The gallbladder wall is  thickened measuring 4 mm. Pericholecystic fluid is noted. Sonographic Aranda's  sign is evident. Common bile duct is mildly dilated measuring 7 mm. Cortical  thinning is noted bilaterally. Nonobstructing right renal stones are noted, the  largest of which measures 4 mm. The pancreas is not well-visualized due to bowel  gas. No free fluid. Medical Decision Making   I am the first provider for this patient. I reviewed the vital signs, available nursing notes, past medical history, past surgical history, family history and social history. Vital Signs-Reviewed the patient's vital signs. Patient Vitals for the past 12 hrs:   Temp Pulse Resp BP SpO2   09/19/18 1215 - 84 - 176/77 99 %   09/19/18 1214 - - - 164/75 100 %   09/19/18 1200 - 69 10 173/72 100 %   09/19/18 1153 - - - 163/75 -   09/19/18 1100 - 71 15 183/76 99 %   09/19/18 1030 - 78 19 196/78 100 %   09/19/18 1000 98.1 °F (36.7 °C) 80 18 181/83 98 %       EKG interpretation: (Preliminary) 9:38  Rhythm: normal sinus rhythm; and regular . Rate (approx.): 73;  Axis: normal; PA interval: normal; QRS interval: normal ; ST/T wave: normal; Other findings: normal.  Written by Andrea Cobb, TATO Scribe, as dictated by Jeanine Roberto MD.    Records Reviewed: Nursing Notes and Old Medical Records    Provider Notes (Medical Decision Making):   Elderly female with nonspecific abd pain. Pt has been evaluated for similar sxs in the past. Chart review notable for multiple CTs without concerning pathology. EKG without evidence of STEMI. Vital signs without concern for acute sepsis. ED Course:   Initial assessment performed. The patients presenting problems have been discussed, and they are in agreement with the care plan formulated and outlined with them. I have encouraged them to ask questions as they arise throughout their visit. 9:44 AM  Per chart review, pt has had 15 CTs over last 8 years, will initiate evaluation with lab analysis and possible ultrasound. 12:30 Patient continues with moderate pain and states she feels nauseated but denies any vomiting since arrival. VS remain stable. Discussed available results thus far and plan for admission and further testing. Will discuss case with General Surgery for consultation, recommendations and possible admission. 13:00 Delay for GS consult as he is currently in surgery, await recommendations. Hyda scan pending. Consult Note:  2:43 PM  Jeanine Roberto MD spoke with Joya Mendoza MD,  Specialty: General Surgery  Discussed pt's hx, disposition, and available diagnostic and imaging results. Dr. Elaine Zarate requested evaluation by GI and hospitalist for admission. Consult Note:  2:49 PM  Jeanine Roberto MD spoke with Bronson Sheffield  Specialty: Hopitalist  Discussed pt's hx, disposition, and available diagnostic and imaging results. Dr. Chacha Mcnally feels as though this is surgical issue. Will evaluate but requests GI evaluation. Consult Note:  16:30 Multiple pages placed to GI throughout the afternoon, however, they are not calling back to the ED. Inpatient admit order placed by IM. Critical Care Time:   CRITICAL CARE NOTE :  9:45 AM  IMPENDING DETERIORATION -Airway, Respiratory, Cardiovascular, CNS, Metabolic and Hepatic-high morbidity  ASSOCIATED RISK FACTORS - Hypotension, Shock, Dysrhythmia and Metabolic changes  MANAGEMENT- Bedside Assessment and Supervision of Care  INTERPRETATION -  Xrays, ECG and Blood Pressure  INTERVENTIONS - hemodynamic mngmt and Metobolic interventions  CASE REVIEW - Hospitalist, Medical Sub-Specialist, Nursing and Family  TREATMENT RESPONSE -Stable  PERFORMED BY - Self    NOTES   :  I have spent 45 minutes of critical care time involved in lab review, consultations with specialist, family decision- making, bedside attention and documentation. During this entire length of time I was immediately available to the patient . Gian Carmichael. MD Cesario      Disposition:  Admit Note:  4:29 PM  Pt is being admitted by IM. The results of their tests and reason(s) for their admission have been discussed with pt and/or available family. They convey agreement and understanding for the need to be admitted and for admission diagnosis. PLAN:  1. Current Discharge Medication List        2. Follow-up Information     None        Return to ED if worse     Diagnosis     Clinical Impression:   1. Acute cholecystitis        Attestations: This note is prepared by Ana Evans, acting as a Scribe for MD Harris Fuller MD: The scribe's documentation has been prepared under my direction and personally reviewed by me in its entirety. I confirm that the notes above accurately reflects all work, treatment, procedures, and medical decision making performed by me.

## 2018-09-19 NOTE — PROGRESS NOTES

## 2018-09-19 NOTE — IP AVS SNAPSHOT
Summary of Care Report The Summary of Care report has been created to help improve care coordination. Users with access to [facility] or [address] (Web-based application) may access additional patient information including the Discharge Summary. If you are not currently a [address] user and need more information, please call the number listed below in the [place] [address] section and ask to be connected with Medical Records. Facility Information Name Address Phone [address] [address] [phone] Patient Information Patient Name Sex  Ana Robins ([id number]) Female 1945 Discharge Information Admitting Provider Service Area Unit Beverly Welch MD / [facility] Preop [facility] / [phone] Discharge Provider Discharge Date/Time Discharge Disposition Destination (none) (none) (none) (none) Patient Language Language ENGLISH [13] Hospital Problems as of 2018  Reviewed: 2018 12:17 PM by Rocio Pineda MD  
  
  
  
 Class Noted - Resolved Last Modified POA Active Problems Choledocholithiasis with acute cholecystitis  2018 - Present 2018 by Beverly Welch MD Unknown Entered by Beverly Welch MD  
  
Non-Hospital Problems as of 2018  Reviewed: 2018 12:17 PM by Rocio Pineda MD  
  
  
  
 Class Noted - Resolved Last Modified Active Problems HTN, goal below 130/80 (Chronic) Chronic 2012 - Present 2017 by Sharon Bryant MD  
  Entered by Shante Ocampo MD  
  Overview Signed 2014 12:19 AM by Yola Molina Goal BP: < 130/80 Goal Progress: Had been at goal and is now not at goal. 
BP Readings from Last 3 Encounters:  
14 165/91  
14 130/90  
14 124/80 Chronic headache (Chronic) Chronic 9/7/2012 - Present 3/4/2015 by Maico Russ Entered by Yanira Coker MD  
  Acid reflux (Chronic)  9/7/2012 - Present 1/9/2014 by Maico Russ Entered by Yanira Coker MD  
  Dizziness of unknown cause  8/13/2013 - Present 8/19/2013 Entered by Laina Damon MD  
  Neutropenia ([address]  8/13/2013 - Present 8/19/2013 Entered by Laina Damon MD  
  Abdominal pain  8/16/2013 - Present 4/14/2017 by Laina Damon MD  
  Entered by Laina Damon MD  
  Constipation (Chronic) Chronic 8/22/2013 - Present 4/14/2017 by Laina Damon MD  
  Entered by Rodríguez Dotson MD  
  Insomnia (Chronic)  11/13/2013 - Present 4/13/2017 by Joseph Miller MD  
  Entered by Isaías Zhaoous Hyperlipidemia (Chronic) Chronic 12/9/2013 - Present 7/30/2014 by Maico Russ Entered by Jordy Ivory MD  
  UTI (urinary tract infection)  12/9/2013 - Present 12/9/2013 by Jordy Ivory MD  
  Entered by Jordy Ivory MD  
  Chronic chest pain (Chronic) Chronic 1/13/2014 - Present 4/14/2017 by Laina Damon MD  
  Entered by Maico Russ Chronic pain associated with significant psychosocial dysfunction (Chronic)  2/17/2014 - Present 4/14/2017 by Laina Damon MD  
  Entered by Maico Russ Depression with anxiety (Chronic) Chronic 2/17/2014 - Present 4/14/2017 by Laina Damon MD  
  Entered by Teodora Drew MD  
  Other somatoform disorders (Chronic) Chronic 2/17/2014 - Present 4/7/2017 by Laina Damon MD  
  Entered by Teodora Drew MD  
  Onycholysis of toenail (Chronic) Chronic 2/27/2014 - Present 2/27/2014 by Maico Russ Entered by Maico Russ Overview Signed 2/27/2014  7:54 AM by Maico Russ 2/27/2014: Left 2nd toe   Duplicated right renal collecting system (Chronic)  3/13/2014 - Present 3/13/2014 by Ramesh Evans Entered by Ramesh Evans Nephrolithiasis (Chronic)  3/13/2014 - Present 3/13/2014 by Ramesh Evans Entered by Ramesh Evans Personal history of noncompliance with medical treatment, presenting hazards to health (Chronic) Chronic 5/30/2014 - Present 7/30/2014 by Ramesh Evans Entered by Ramesh Evans Overview Addendum 7/30/2014 12:24 AM by Ramesh Evans 07/30/2014: Challenging to determine medication compliance, often starts and independently stops prescribed medications, some splitting behaviors frequently asks providers about other providers especially when she is unhappy about their lack of responsiveness to her somatic complaints. Breast pain, left (Chronic)  4/7/2015 - Present 4/7/2015 by Ramesh Evans Entered by Ramesh Evans Pseudobulbar affect  10/16/2015 - Present 10/16/2015 by Sharif Dia MD  
  Entered by Sharif Dia MD  
  CAD (coronary artery disease), native coronary artery  12/1/2015 - Present 12/1/2015 by Alena Rubi MD  
  Entered by Alena Rubi MD  
  Overview Signed 12/1/2015  1:24 PM by Alena Rubi MD  
   Mild, nonobstructive by cardiac cath 12/1/15 SOB (shortness of breath)  12/22/2015 - Present 12/22/2015 by Jordi Lowe LPN Entered by Jordi Lowe LPN   Broken heart syndrome  1/18/2016 - Present 1/18/2016 by Sharif Dia MD  
  Entered by Sharif Dia MD  
  Death of child  2/17/2016 - Present 2/17/2016 by Sharif Dia MD  
  Entered by Sharif Dia MD  
  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  
  Entered by Sharif Dia MD  
  Diabetes mellitus type 2, diet-controlled ([address]  5/26/2016 - Present 4/7/2017 by Sharif Dia MD  
  Entered by Sharif Dia MD  
 Somatization disorder  5/26/2016 - Present 5/26/2016 by Yaima Sinha MD  
  Entered by Yaima Sinha MD  
  Death of parent  5/26/2016 - Present 5/26/2016 by Yaima Sinha MD  
  Entered by Yaima Sinha MD  
  Tightness sensation-head  9/20/2016 - Present 9/20/2016 by Yaima Sinha MD  
  Entered by Yaima Sinha MD  
  Generalized OA  1/5/2017 - Present 1/5/2017 by Yaima Sinha MD  
  Entered by Yaima Sinha MD  
  Noncompliance with medication regimen-chol medication   1/5/2017 - Present 1/5/2017 by Yaima Sinha MD  
  Entered by Yaima Sinha MD  
  Orthostatic hypotension  4/4/2017 - Present 4/4/2017 by Neo Patel MD  
  Entered by Neo Patel MD  
  Hydronephrosis of left kidney  4/7/2017 - Present 4/7/2017 by Yaima Sinha MD  
  Entered by Yaima Sinha MD  
  Left-sided chest wall pain  4/11/2017 - Present 4/11/2017 by Katie Contreras NP Entered by Katie Contreras NP Weakness  4/13/2017 - Present 4/13/2017 by Corrie Caputo MD  
  Entered by Corrie Caputo MD  
  Assistance needed with transportation  4/13/2017 - Present 4/14/2017 by Yaima Sinha MD  
  Entered by Corrie Caputo MD  
  Gait instability  4/14/2017 - Present 4/14/2017 by Yaima Sinha MD  
  Entered by Yaima Sinha MD  
  Advance directive discussed with patient  7/6/2017 - Present 7/6/2017 by Yaima Sinha MD  
  Entered by Yaima Sinha MD  
  Vaginal irritation  10/11/2017 - Present 10/11/2017 by Adrian Bhat NP Entered by Adrian Bhat NP Blurring of vision  10/11/2017 - Present 10/11/2017 by Adrian Bhat NP Entered by Adrian Bhat NP You are allergic to the following Allergen Reactions Amoxicillin Hives Sulfa (Sulfonamide Antibiotics) Hives Itching Amoxicillin Hives Itching Long time ago - patient not exactly certain what it does Mirtazapine Itching Nausea Only Funny feeling in chest  
    
 Percocet (Oxycodone-Acetaminophen) Nausea and Vomiting Codeine Nausea and Vomiting Crestor (Rosuvastatin) Other (comments)  
 myalgias Nitroglycerin Unknown (comments) Patient cannot remember why she is allergic to it Prednisone Itching Sulfa (Sulfonamide Antibiotics) Hives Itching Palpitations Think it was increased heart rate or itching - many years ago Zithromax (Azithromycin) Itching Not sure what it does,taken long time ago Current Discharge Medication List  
  
ASK your doctor about these medications Dose & Instructions Dispensing Information Comments  
 acetaminophen 500 mg tablet Commonly known as:  TYLENOL What changed:  Another medication with the same name was removed. Continue taking this medication, and follow the directions you see here. Dose:  1000 mg Take 1,000 mg by mouth every six (6) hours as needed for Pain. Refills:  0  
   
 aspirin delayed-release 81 mg tablet Dose:  81 mg Take 1 Tab by mouth daily. Quantity:  30 Tab Refills:  11  
   
 bisacodyl 10 mg suppository Commonly known as:  DULCOLAX (BISACODYL) Dose:  10 mg Insert 10 mg into rectum daily as needed. Quantity:  12 Suppository Refills:  0  
   
 dilTIAZem  mg ER capsule Commonly known as:  CARDIZEM CD What changed:  Another medication with the same name was removed. Continue taking this medication, and follow the directions you see here. Dose:  120 mg Take 120 mg by mouth nightly. Refills:  0  
   
 docusate sodium 100 mg capsule Commonly known as:  COLACE  
 TAKE 1 CAP BY MOUTH DAILY AS NEEDED FOR CONSTIPATION FOR UP TO 90 DAYS. Quantity:  90 Cap Refills:  0  
   
 erythromycin ophthalmic ointment Commonly known as:  ILOTYCIN  
 APPLY 1 APPLICATION TO BOTH EYELIDS TWICE DAILY Refills:  11  
   
 losartan 25 mg tablet Commonly known as:  COZAAR Dose:  25 mg Take 25 mg by mouth daily. Refills:  0  
   
 nystatin topical cream  
Commonly known as:  MYCOSTATIN What changed:  Another medication with the same name was removed. Continue taking this medication, and follow the directions you see here. Apply  to affected area two (2) times daily as needed for Skin Irritation. Refills:  0  
   
 polyethylene glycol 17 gram/dose powder Commonly known as:  Jodine Rust Dose:  17 g Take 17 g by mouth daily as needed. 1 tablespoon with 8 oz of water daily Quantity:  119 g Refills:  0 Current Immunizations Name Date Pneumococcal Polysaccharide (PPSV-23)  Deferred (Patient Refused) TD Vaccine 4/29/2006 Surgery Information ID Date/Time Status Primary Surgeon All Procedures Location [id number] 9/20/2018 [facility] Keyonna Garcia MD ENDOSCOPIC RETROGRADE CHOLANGIOPANCREATOGRAPHY (ERCP) [facility]    
 [id number] 9/20/2018 [facility] Lux Alvarez MD CHOLECYSTECTOMY LAPAROSCOPIC WITH GRAMS [facility] MAIN OR    
 [id number] 9/21/2018 1215 Unposted Wolfgang Garza MD ENDOSCOPIC RETROGRADE CHOLANGIOPANCREATOGRAPHY (ERCP) [facility] Follow-up Information None Discharge Instructions Endoscopic Retrograde Cholangiopancreatogram (ERCP): Before Your Procedure What is an endoscopic retrograde cholangiopancreatogram? 
Endoscopic retrograde cholangiopancreatogram (ERCP) is a test to look at the tubes that carry fluids from the liver, gallbladder, and pancreas. These tubes are called ducts. For this test, the doctor will use a tool called an endoscope, or scope. It is a thin, lighted tube that bends. It has a camera on it that lets the doctor use pictures on a screen to guide it. This test is used for different reasons.  It can help find out the cause of your symptoms. If the test shows gallstones or a narrow spot in a bile duct, the doctor can use the scope to remove the stones or widen the duct. He or she may also put a metal or plastic tube in the duct. This can help open it. Before the test, you may get medicine to numb the back of your throat. You also will get medicine to help you relax. During the test, you will lie on your left side or on your stomach. The doctor puts the scope in your mouth and then gently moves it toward the back of your throat. The doctor will tell you when to swallow. This helps the scope move down your throat. You will be able to breathe normally. After that, the doctor moves the scope through the tube (esophagus) that leads to your stomach, through your stomach, and into the first part of your small intestine. When the scope reaches the place where the bile ducts and the pancreas meet the small intestine, you may turn and lie on your stomach. The doctor then puts a small plastic tube into the scope to inject dye into the ducts. The dye helps the ducts show up on X-rays. Then the doctor takes X-ray pictures to help find any problems. The test takes 30 minutes to 2 hours. You may go home the same day. But if you have treatment during the test, you may need to stay in the hospital overnight. Follow-up care is a key part of your treatment and safety. Be sure to make and go to all appointments, and call your doctor if you are having problems. It's also a good idea to know your test results and keep a list of the medicines you take. What happens before the procedure? 
 Preparing for the procedure 
  · Understand exactly what procedure is planned, along with the risks, benefits, and other options. · Tell your doctors ALL the medicines, vitamins, supplements, and herbal remedies you take. Some of these can increase the risk of bleeding or interact with anesthesia.   · If you take blood thinners, such as warfarin (Coumadin), clopidogrel (Plavix), or aspirin, be sure to talk to your doctor. He or she will tell you if you should stop taking these medicines before your procedure. Make sure that you understand exactly what your doctor wants you to do.  
  · Your doctor will tell you which medicines to take or stop before your procedure. You may need to stop taking certain medicines a week or more before the procedure. So talk to your doctor as soon as you can.  
  · If you have an advance directive, let your doctor know. It may include a living will and a durable power of  for health care. Bring a copy to the hospital. If you don't have one, you may want to prepare one. It lets your doctor and loved ones know your health care wishes. Doctors advise that everyone prepare these papers before any type of surgery or procedure. Procedures can be stressful. This information will help you understand what you can expect. And it will help you safely prepare for your procedure. What happens on the day of the procedure? · Follow the instructions exactly about when to stop eating and drinking. If you don't, your procedure may be canceled. If your doctor told you to take your medicines on the day of the procedure, take them with only a sip of water.  
  · Take a bath or shower before you come in for your procedure. Do not apply lotions, perfumes, deodorants, or nail polish.  
  · Take off all jewelry and piercings. And take out contact lenses, if you wear them.  
 At the hospital or surgery center · Bring a picture ID.  
  · You may get medicine that relaxes you or puts you in a light sleep. The area being worked on will be numb.  
  · Tell your doctor if you are allergic to iodine. It is in the dye that the doctor puts into the bile ducts.  
  · The doctor may send puffs of air through the tube to see better.  This may make you feel bloated. You may also feel cramps. This feeling does not last long.  
  · You may feel some bloating or cramping as the tube is moved. If you are very uncomfortable, you can let the doctor know with a signal or a tap on the arm. You and your doctor can agree on this signal before the test.  
  · After, you will stay at the hospital or clinic for 1 to 2 hours until the medicine wears off. Going home · Be sure you have someone to drive you home. Anesthesia and pain medicine make it unsafe for you to drive.  
  · You will be given more specific instructions about recovering from your procedure. They will cover things like diet, wound care, follow-up care, driving, and getting back to your normal routine. When should you call your doctor? · You have questions or concerns.  
  · You don't understand how to prepare for your procedure.  
  · You become ill before the procedure (such as fever, flu, or a cold).  
  · You need to reschedule or have changed your mind about having the procedure. Where can you learn more? Go to http://lobo-michel.info/. Enter F420 in the search box to learn more about \"Endoscopic Retrograde Cholangiopancreatogram (ERCP): Before Your Procedure. \" Current as of: May 12, 2017 Content Version: 11.7 © 9456-0605 [employer]. Care instructions adapted under license by [employer] (which disclaims liability or warranty for this information). If you have questions about a medical condition or this instruction, always ask your healthcare professional. [place] [address] any warranty or liability for your use of this information. Chart Review Routing History Recipient Method Report Sent By Amalia Jo MD  
Phone: [phone] In Basket IP Auto Routed Trans MD Anu Benjamin 8/15/2013 11:49 AM 08/15/2013 Jewels Mcbride MD  
Fax: [phone] Phone: [phone] Fax IP Auto Routed MD Maureen Junior 8/15/2013 11:49 AM 08/15/2013 Vilma White MD  
Phone: [phone] In Basket IP Auto Routed Trans MD Maureen Kraft 8/15/2013 11:49 AM 08/15/2013 Carlie Mcnamara MD  
Phone: [phone] In Basket IP Auto Routed Trans MD Maureen Kraft 8/15/2013 11:49 AM 08/15/2013 Den Estrada MD  
Phone: [phone] [employer] Routed Notes Anthony Hill MD [[id number]] 4/4/2017  5:39 PM 04/04/2017 Den Estrada MD  
Phone: [phone] In Basket Notes Report MD Yareli Nagel 4/7/2017  4:21 PM 4/7/2017 Jaden Cruz MD  
Fax: [phone] Phone: [phone] Fax Notes Report MD Yareli Nagel 4/7/2017  4:21 PM 4/7/2017

## 2018-09-19 NOTE — CONSULTS
Surgery Consult  Consulted by: Dr. Adriana Riley  PCP: Shun Sevilla MD    Thank you for allowing me to participate in your patient's care. Please call me with any questions. Assessment:   Cholecystitis. Elevated bilirubin -- possibly obstructive. Comorbid CAD (EF 55-60% this past April), HTN, psych disorder. Plan:   Could do MCRP to eval for CBD stone. The other option would be to take her for cholecystectomy and do intraoperative cholangiogram, then ERCP if necessary. Will d/w the other providers. Discussed the risk of surgery including bleeding, infection, injury to adjacent structures, and the risks of general anesthetic. The patient understands the risks; any and all questions were answered to the patient's satisfaction. Subjective:      Erin Antonio is a 67 y.o. female who is seen in consultation at the request of Dr. Karl Pitts  for  RUQ pain. Notes chest pain as well. Has been intermittent for a month or so, no constant. She has had some n/v as well. No f/c. No juan urine, acholic stools or h/o jaundice. Objective:   Blood pressure 134/75, pulse 75, temperature 98.4 °F (36.9 °C), resp. rate 13, SpO2 99 %. Temp (24hrs), Av.3 °F (36.8 °C), Min:98.1 °F (36.7 °C), Max:98.4 °F (36.9 °C)      Physical Exam:  PHYSICAL EXAM:  Gen:  [x]     A&O     [x]      No acute distress    [x]     non-toxic     []     ill apearing     []     Critical        HEENT:   [x]     anicteric    []      scleral icterus    [x]     moist mucosa     []     dry mucosa    RESP:   [x]     CTA bilaterally, no wheezing/rhonchi/rales/crackles    []     wheezing     []     rhonchi     []     crackles     []     use of accessory muscles    CARD:  [x]     regular rate and rhythm     [x]     No murmurs/rubs/gallops    []     irregular rhythm     []     Murmur     []     Rubs     []     Gallops    ABD:     TTP RUQ. Not elsewhere.   Soft, ND.      SKIN:   [x]     normal      []Rashes      []Ulcers     EXT:  [x] No CCE     []2+ pulses throughout    []      Clubbing     []Cyanosis     []Edema     []diminished pulses    NEUR:  [x]     Strength normal     []weakness   []LUE     []RUE     []LLE     []RLE    [x]     follows commands          PSYCH:   insight []Poor   [x]good                      []     depressed     []     anxious     []     agitated    Past Medical History:   Diagnosis Date    Agoraphobia without mention of panic attacks 2/17/2014    Anxiety disorder 8/18/2013    Arthritis     osteo    Breast pain, left 4/7/2015    CAD (coronary artery disease), native coronary artery 12/1/2015    Chronic chest pain 1/13/2014    Chronic pain associated with significant psychosocial dysfunction 2/17/2014    Depression 8/18/2013    Diabetes ([facility] [address]     type II    Duplicated right renal collecting system 3/13/2014    GERD (gastroesophageal reflux disease)     Gout, joint     Hypercholesteremia     hyercholesterolemia    Hypertension     Nephrolithiasis 3/13/2014    Personal history of noncompliance with medical treatment, presenting hazards to health 5/30/2014    Psychotic disorder     Vaginal pain 7/30/2014     Past Surgical History:   Procedure Laterality Date    EGD  4/23/2010         HX CYST REMOVAL      cyst removed from left wrist    HX HYSTERECTOMY      partial    HX OTHER SURGICAL      bladder dilitation    HX TUBAL LIGATION      HX UROLOGICAL      kidney stones      Family History   Problem Relation Age of Onset    Stroke Mother     Heart Disease Mother     Cancer Father     Heart Disease Son     Liver Disease Son     Heart Disease Daughter     Malignant Hyperthermia Neg Hx     Pseudocholinesterase Deficiency Neg Hx     Delayed Awakening Neg Hx     Post-op Nausea/Vomiting Neg Hx     Emergence Delirium Neg Hx     Post-op Cognitive Dysfunction Neg Hx     Other Neg Hx      Social History     Social History    Marital status:      Spouse name: N/A    Number of children: N/A    Years of education: N/A     Social History Main Topics    Smoking status: Never Smoker    Smokeless tobacco: Never Used    Alcohol use No    Drug use: No    Sexual activity: No     Other Topics Concern    Not on file     Social History Narrative    ** Merged History Encounter **     , lives with her son and Friend. Prior to Admission medications    Medication Sig Start Date End Date Taking? Authorizing Provider   acetaminophen (TYLENOL) 500 mg tablet Take 1,000 mg by mouth every six (6) hours as needed for Pain. Yes Shun Sevilla MD   dilTIAZem CD (CARDIZEM CD) 120 mg ER capsule Take 120 mg by mouth nightly. Yes Shun Sevilla MD   nystatin (MYCOSTATIN) topical cream Apply  to affected area two (2) times daily as needed for Skin Irritation. Yes Shun Sevilla MD   losartan (COZAAR) 25 mg tablet Take 25 mg by mouth daily. Yes Shun Sevilla MD   polyethylene glycol (MIRALAX) 17 gram/dose powder Take 17 g by mouth daily as needed. 1 tablespoon with 8 oz of water daily 8/30/18  Yes Cherelle Mills MD   docusate sodium (COLACE) 100 mg capsule TAKE 1 CAP BY MOUTH DAILY AS NEEDED FOR CONSTIPATION FOR UP TO 90 DAYS. 8/30/18  Yes Cherelle Mills MD   erythromycin (ILOTYCIN) ophthalmic ointment APPLY 1 APPLICATION TO BOTH EYELIDS TWICE DAILY 3/13/18  Yes Historical Provider   aspirin delayed-release 81 mg tablet Take 1 Tab by mouth daily. 9/10/13  Yes Naomi Garcia MD   bisacodyl (DULCOLAX, BISACODYL,) 10 mg suppository Insert 10 mg into rectum daily as needed.  8/30/18   Cherelle Mills MD     ALLERGIES:    Allergies   Allergen Reactions    Amoxicillin Hives    Sulfa (Sulfonamide Antibiotics) Hives and Itching    Amoxicillin Hives and Itching     Long time ago - patient not exactly certain what it does    Mirtazapine Itching and Nausea Only     Funny feeling in chest    Percocet [Oxycodone-Acetaminophen] Nausea and Vomiting    Codeine Nausea and Vomiting    Crestor [Rosuvastatin] Other (comments) myalgias    Nitroglycerin Unknown (comments)     Patient cannot remember why she is allergic to it      Prednisone Itching    Sulfa (Sulfonamide Antibiotics) Hives, Itching and Palpitations     Think it was increased heart rate or itching - many years ago    Zithromax [Azithromycin] Itching     Not sure what it does,taken long time ago       Review of Systems:  (unchecked were asked but negative)  Constitutional: [] Fever/chills   [] Sweats   [] Loss of appetite   [] Fatigue/weakness   [] Weight loss  Head & Neck:   [] Headaches   [] Visual loss   [] Hearing loss   [] Thyroid problems  Cardiovascular:   [] Stroke   [] Calf pain when walking   [x] Chest pain   [] Rapid heart beat       [] Heart attack   [] Stents   [] Congestive heart failure   [] Leg swelling   [] Murmur  Respiratory:   [] Sleep apnea   [] Cough/congestion   [x] Shortness of breath   [] Wheezing/asthma      [] COPD/emphysema  Gastrointestinal:   [] Frequent indigestion   [] Trouble swallowing   [x] Nausea   [x] Vomiting   [] Bloating   [x] Abd pain       [] Diarrhea   [] Constipation   [] Blood in stool   [] Ulcers   [] Intestinal disease   [] Hepatitis    Genitourinary:   [] Painful urination   [] Difficulty urinating   [] Frequent urination       [] Enlarged prostate   [] Vasectomy   [] Blood in urine   [] Dialysis  Musculoskeletal:  [] Muscle aches   [] Back pain   [] Joint pain  Neurologic:    [] Seizures   [] Dizziness   [] Numbness  Hematologic:   [] Nosebleeds   [] Easy bruising   [] Anemia   [] Easy bleeding  Psychiatric:    [] Depression   [] Anxiety   [] Bipolar disorder   [] Schizophrenia                                  []     Unable to obtain  ROS due to  []     mental status change  []     sedated   []     intubated    LABS:  Recent Labs      09/19/18   0948   WBC  6.7   HGB  12.7   HCT  40.0   PLT  209     Recent Labs      09/19/18   0948   NA  138   K  4.4   CL  100   CO2  26   BUN  15   CREA  0.95   GLU  155*   CA  8.7 Recent Labs      09/19/18 0948   SGOT  1426*   AP  146*   TP  8.3*   ALB  4.0   GLOB  4.3*   LPSE  61*     Recent Labs      09/19/18 0948   INR  1.1   PTP  10.8         Signed By: Arabella Zamora MD     September 19, 2018

## 2018-09-19 NOTE — H&P
Hospitalist Admission Note    NAME:  Lilly Salguero   :   1945   MRN:   [id number]     Date of admit: 2018    PCP: Shun Sevilla MD    Assessment/Plan:     Acute choledocholithiasis with acute cholecystitis POA  One day severe upper quadrant abdominal pain with nausea and vomiting. Abnormal LFTs with AST 1426,  , Alk Phos 146, total Bilirubin 2.8  RUQ Ultrasound consistent with gallstones with acute cholecystitis changes, dilated CBD  HIDA scan with tracer in the liver but no activity in gallbladder, common bile duct, small bowel after 3 hours          Suggesting common bile duct obstruction. N.P.O.  IV fluids  IV Levaquin and Flagyl empirically  Dr. Lucretia Rodríguez consulted I spoke with him in the emergency room  MRCP abdomen if she will tolerate, will premedicate with Ativan  Suspect will need ERCP in morning  Ultimately needs gallbladder out, I consulted general surgery    Acute right upper quadrant abdominal pain POA due to cholecystitis and choledocholithiasis management as noted above  IV morphine as needed for pain control, keep NPO    Abnormal LFTs POA due to choledocholithiasis , ALT 47, alkaline phosphatase 146, T bili 2.8  Suspect due to choledocholithiasis  Taken only 1 dose Tylenol  in the past week  Check Tylenol level but will hold an ACE for now  Serial labs    Type 2 diabetes mellitus POA currently off medications per her report   will check blood sugars and cover with sliding scale insulin  Check hemoglobin A1c     Essential hypertension POA  continue ARB and Cardizem CD  Will use as needed labetalol    Coronary disease POA  Will continue aspirin and blood pressure medication control   Currently off of cholesterol medications     Insomnia POA melatonin at bedtime    Overweight  Morbid Obesity POA    Given the patient's current clinical presentation, I have a high level of concern for decompensation if discharged from the emergency department.   My assessment of this patient's clinical condition and my plan of care is as noted above. DVT prophylaxis with lovenox    Code status: full code  NOK: daughter    History     CHIEF COMPLAINT: I had terrible pain in my stomach last night    HISTORY OF PRESENT ILLNESS:  70-year-old -American female with known diabetes type 2 off treatment, essential hypertension, coronary artery disease, nephrolithiasis.  She denies prior history of cholecystitis or cholelithiasis.  She notes over the past several weeks, she has had intermittent bouts of upper quadrant abdominal pain that would usually go away after short period of time. Nichole Koch was otherwise well until last night at bedtime, sudden onset of severe upper quadrant abdominal pain that radiated to her back pain, \"could not sleep all night due to the pain\". The pain persisted the entire night and through today, was only relieved by IV morphine in the emergency room.  When I saw her she remains with mild abdominal pain. She had associated with nausea vomiting ×1 and chills. She has not had any aye colored stools, no dark urine. Mild pressure-like headache, no cough or shortness of breath, no substernal chest pain, no melena or hematemesis or bright red blood per rectum. She does complain of intermittent dysuria    In the emergency santos,  the patient was tender in the right upper quadrant. Ultrasound showed evidence of acute cholecystitis and a dilated common bile duct. HIDA scan showed no evidence of tracer in the gallbladder, bile duct or small bowel after 3 hours consistent with common bile duct obstruction and she had abnormal LFTs.  we will called to admit the patient    Past Medical History:   Diagnosis Date    Agoraphobia without mention of panic attacks 2/17/2014    Anxiety disorder 8/18/2013    Arthritis     osteo    Breast pain, left 4/7/2015    CAD (coronary artery disease), native coronary artery 12/1/2015    Chronic chest pain 1/13/2014    Chronic pain associated with significant psychosocial dysfunction 2/17/2014    Depression 8/18/2013    Diabetes ([facility] [address]     type II    Duplicated right renal collecting system 3/13/2014    GERD (gastroesophageal reflux disease)     Gout, joint     Hypercholesteremia     hyercholesterolemia    Hypertension     Nephrolithiasis 3/13/2014    Personal history of noncompliance with medical treatment, presenting hazards to health 5/30/2014    Psychotic disorder     Vaginal pain 7/30/2014        Past Surgical History:   Procedure Laterality Date    EGD  4/23/2010         HX CYST REMOVAL      cyst removed from left wrist    HX HYSTERECTOMY      partial    HX OTHER SURGICAL      bladder dilitation    HX TUBAL LIGATION      HX UROLOGICAL      kidney stones       Social History   Substance Use Topics    Smoking status: Never Smoker    Smokeless tobacco: Never Used    Alcohol use No        Family History   Problem Relation Age of Onset    Stroke Mother     Heart Disease Mother     Cancer Father     Heart Disease Son     Liver Disease Son     Heart Disease Daughter     Malignant Hyperthermia Neg Hx     Pseudocholinesterase Deficiency Neg Hx     Delayed Awakening Neg Hx     Post-op Nausea/Vomiting Neg Hx     Emergence Delirium Neg Hx     Post-op Cognitive Dysfunction Neg Hx     Other Neg Hx         Allergies   Allergen Reactions    Amoxicillin Hives    Sulfa (Sulfonamide Antibiotics) Hives and Itching    Amoxicillin Hives and Itching     Long time ago - patient not exactly certain what it does    Mirtazapine Itching and Nausea Only     Funny feeling in chest    Percocet [Oxycodone-Acetaminophen] Nausea and Vomiting    Codeine Nausea and Vomiting    Crestor [Rosuvastatin] Other (comments)     myalgias    Nitroglycerin Unknown (comments)     Patient cannot remember why she is allergic to it      Prednisone Itching    Sulfa (Sulfonamide Antibiotics) Hives, Itching and Palpitations     Think it was increased heart rate or itching - many years ago    Zithromax [Azithromycin] Itching     Not sure what it does,taken long time ago        Prior to Admission medications    Medication Sig Start Date End Date Taking? Authorizing Provider   polyethylene glycol (MIRALAX) 17 gram/dose powder Take 17 g by mouth daily as needed. 1 tablespoon with 8 oz of water daily 8/30/18   Ghassan Vallejo MD   bisacodyl (DULCOLAX, BISACODYL,) 10 mg suppository Insert 10 mg into rectum daily as needed. 8/30/18   Ghassan Vallejo MD   docusate sodium (COLACE) 100 mg capsule TAKE 1 CAP BY MOUTH DAILY AS NEEDED FOR CONSTIPATION FOR UP TO 90 DAYS. 8/30/18   Ghassan Vallejo MD   acetaminophen (TYLENOL) 325 mg tablet Take 2 Tabs by mouth every four (4) hours as needed for Pain. 8/30/18   Ghassan Vallejo MD   butalbital-acetaminophen-caff (FIORICET) -40 mg per capsule Take 1 Cap by mouth every four (4) hours as needed for Pain. 8/17/18   Saba Johnson MD   famotidine (PEPCID) 20 mg tablet Take 1 Tab by mouth two (2) times a day. 7/20/18   Josue Mejia,    erythromycin (ILOTYCIN) ophthalmic ointment APPLY 1 APPLICATION TO BOTH EYELIDS TWICE DAILY 3/13/18   Historical Provider   valsartan (DIOVAN) 80 mg tablet Take 1 Tab by mouth daily. 4/11/18   Charlotte York NP   dilTIAZem CD (CARDIZEM CD) 120 mg ER capsule Take 1 Cap by mouth daily. 4/11/18   Charlotte York NP   rosuvastatin (CRESTOR) 5 mg tablet  11/25/17   Historical Provider   nystatin (MYCOSTATIN) topical cream APPLY TO AFFECTED AREA TWO (2) TIMES A DAY. 9/14/17   Historical Provider   pravastatin (PRAVACHOL) 10 mg tablet Take 2 Tabs by mouth nightly. 12/7/17   Kellie Piedra MD   aspirin delayed-release 81 mg tablet Take 1 Tab by mouth daily.  9/10/13   Kellie Piedra MD       Review of symptoms:     POSITIVE= Bold  Negative = not bold  General:  fever, chills, sweats, generalized weakness, weight loss     loss of appetite   Eyes:    blurred vision, eye pain, double vision  ENT:    Coryza, sore throat, trouble swallowing  Respiratory:   cough, sputum, SOB  Cardiology:   chest pain, orthopnea, PND, edema  Gastrointestinal:  abdominal pain , N/V, diarrhea, constipation, melena or BRBPR  Genitourinary:  Urgency, dysuria, hematuria  Muskuloskeletal :  Joint redness, swelling or acute joint pain, myalgias  Hematology:  easy bruising, nose or gum bleeding  Dermatological: rash, ulceration  Endocrine:   Polyuria or polydipsia, heat or hold intolerance  Neurological:  Headache, focal motor or sensory changes     Speech difficulties, memory loss  Psychological: depression, agitation      Objective:   VITALS:    Patient Vitals for the past 24 hrs:   Temp Pulse Resp BP SpO2   18 1633 - 82 19 166/83 99 %   18 1215 - 84 - 176/77 99 %   18 1214 - - - 164/75 100 %   18 1200 - 69 10 173/72 100 %   18 1153 - - - 163/75 -   18 1100 - 71 15 183/76 99 %   18 1030 - 78 19 196/78 100 %   18 1000 98.1 °F (36.7 °C) 80 18 181/83 98 %     Temp (24hrs), Av.1 °F (36.7 °C), Min:98.1 °F (36.7 °C), Max:98.1 °F (36.7 °C)           Wt Readings from Last 12 Encounters:   18 80.3 kg (177 lb)   18 79.4 kg (175 lb)   18 80.7 kg (178 lb)   18 81.9 kg (180 lb 8.9 oz)   18 80.9 kg (178 lb 5.6 oz)   18 79.7 kg (175 lb 12.8 oz)   18 79 kg (174 lb 1.6 oz)   17 78 kg (172 lb)   17 77.1 kg (170 lb)   10/11/17 76.4 kg (168 lb 8 oz)   10/09/17 75.8 kg (167 lb 1.6 oz)   17 77.6 kg (171 lb)         PHYSICAL EXAM:   General:    Alert, cooperative in no distress, not vomiting     HEENT: Normocephalic, atraumatic    PERRL, EOMI  Sclera mild icterus    Nasal mucosa without masses or discharge  Hearing intact to voice    Oropharynx without erythema or exudate  No thrush  Pink MM  Neck:  No meningismus, trachea midline, no carotid bruits     Thyroid not enlarged, no nodules or tenderness  Lungs:   Clear to auscultation bilaterally.  No wheezing or rales    No accessory muscle use or retractions. Heart:   Regular rate and rhythm,  no murmur or gallop. No LE edema     Dorsal pedis, Posterior tibial and radial pulses 2+ and symmetric  Abdomen:   Soft, mildly tender RUQ. Not distended. Bowel sounds normal.     No masses, No Hepatosplenomegaly, No Rebound or guarding  Lymph nodes: No cervical or inguinal TYRONE  Musculoskeletal:  No Joint swelling, erythema, warmth. No Cyanosis or clubbing  Skin:      No rashes      Not Jaundiced   No nodules or thickening    Capillary refill normal  Neurologic: Alert and oriented X 3, follows commands     Cranial nerves 2 to 12 intact    Symmetric motor strength bilaterally       LAB DATA REVIEWED:    Recent Results (from the past 12 hour(s))   EKG, 12 LEAD, INITIAL    Collection Time: 09/19/18  9:38 AM   Result Value Ref Range    Ventricular Rate 73 BPM    Atrial Rate 73 BPM    P-R Interval 152 ms    QRS Duration 96 ms    Q-T Interval 400 ms    QTC Calculation (Bezet) 440 ms    Calculated P Axis 55 degrees    Calculated R Axis 46 degrees    Calculated T Axis 40 degrees    Diagnosis       Normal sinus rhythm  Normal ECG  When compared with ECG of 30-AUG-2018 11:45,  No significant change was found     CBC WITH AUTOMATED DIFF    Collection Time: 09/19/18  9:48 AM   Result Value Ref Range    WBC 6.7 3.6 - 11.0 K/uL    RBC 4.74 3.80 - 5.20 M/uL    HGB 12.7 11.5 - 16.0 g/dL    HCT 40.0 35.0 - 47.0 %    MCV 84.4 80.0 - 99.0 FL    MCH 26.8 26.0 - 34.0 PG    MCHC 31.8 30.0 - 36.5 g/dL    RDW 13.2 11.5 - 14.5 %    PLATELET 321 231 - 565 K/uL    MPV 11.6 8.9 - 12.9 FL    NRBC 0.0 0  WBC    ABSOLUTE NRBC 0.00 0.00 - 0.01 K/uL    NEUTROPHILS 92 (H) 32 - 75 %    LYMPHOCYTES 5 (L) 12 - 49 %    MONOCYTES 3 (L) 5 - 13 %    EOSINOPHILS 0 0 - 7 %    BASOPHILS 0 0 - 1 %    IMMATURE GRANULOCYTES 0 0.0 - 0.5 %    ABS. NEUTROPHILS 6.2 1.8 - 8.0 K/UL    ABS. LYMPHOCYTES 0.3 (L) 0.8 - 3.5 K/UL    ABS. MONOCYTES 0.2 0.0 - 1.0 K/UL    ABS. EOSINOPHILS 0.0 0.0 - 0.4 K/UL    ABS. BASOPHILS 0.0 0.0 - 0.1 K/UL    ABS. IMM. GRANS. 0.0 0.00 - 0.04 K/UL    DF AUTOMATED      RBC COMMENTS NORMOCYTIC, NORMOCHROMIC     METABOLIC PANEL, COMPREHENSIVE    Collection Time: 09/19/18  9:48 AM   Result Value Ref Range    Sodium 138 136 - 145 mmol/L    Potassium 4.4 3.5 - 5.1 mmol/L    Chloride 100 97 - 108 mmol/L    CO2 26 21 - 32 mmol/L    Anion gap 12 5 - 15 mmol/L    Glucose 155 (H) 65 - 100 mg/dL    BUN 15 6 - 20 MG/DL    Creatinine 0.95 0.55 - 1.02 MG/DL    BUN/Creatinine ratio 16 12 - 20      GFR est AA >60 >60 ml/min/1.73m2    GFR est non-AA 58 (L) >60 ml/min/1.73m2    Calcium 8.7 8.5 - 10.1 MG/DL    Bilirubin, total 2.8 (H) 0.2 - 1.0 MG/DL    ALT (SGPT) 847 (H) 12 - 78 U/L    AST (SGOT) 1426 (H) 15 - 37 U/L    Alk. phosphatase 146 (H) 45 - 117 U/L    Protein, total 8.3 (H) 6.4 - 8.2 g/dL    Albumin 4.0 3.5 - 5.0 g/dL    Globulin 4.3 (H) 2.0 - 4.0 g/dL    A-G Ratio 0.9 (L) 1.1 - 2.2     CK W/ REFLX CKMB    Collection Time: 09/19/18  9:48 AM   Result Value Ref Range     26 - 192 U/L   TROPONIN I    Collection Time: 09/19/18  9:48 AM   Result Value Ref Range    Troponin-I, Qt. <0.05 <0.05 ng/mL   PROTHROMBIN TIME + INR    Collection Time: 09/19/18  9:48 AM   Result Value Ref Range    INR 1.1 0.9 - 1.1      Prothrombin time 10.8 9.0 - 11.1 sec   LIPASE    Collection Time: 09/19/18  9:48 AM   Result Value Ref Range    Lipase 61 (L) 73 - 393 U/L       EKG as read by me shows NSR rate 73, normal axis, no LVH  Normal intervals, no ischemic ST-T changes    Abdominal US results:  Real-time sonographic imaging of the right upper quadrant was performed. Intrahepatic biliary dilatation is noted. No hepatic mass is identified. Gallstones are noted in a distended gallbladder. The gallbladder wall is  thickened measuring 4 mm. Pericholecystic fluid is noted. Sonographic Aranda's  sign is evident. Common bile duct is mildly dilated measuring 7 mm.  Cortical  thinning is noted bilaterally. Nonobstructing right renal stones are noted, the  largest of which measures 4 mm. The pancreas is not well-visualized due to bowel  gas. No free fluid. Impression: Gallstones in a distended gallbladder with pericholecystic fluid,  and tenderness concerning for acute cholecystitis. Biliary dilatation and may be  related to common bile duct stone. Nonobstructing right renal stones. HIDA scan results:  HIDA scan was performed per protocol utilizing 5. 0mCi Tc-99 M Choletec  intravenously. Of note, the patient received 2 mg of morphine at 12:48 PM today. There is normal tracer distribution throughout the liver. However, activity is  not noted in the gallbladder, common bile duct, or small bowel despite imaging  to 3 1/2 hours. IMPRESSION: Tracer accumulation in the liver without evidence of activity in the  small bowel, common bile duct, and gallbladder. Given the biliary dilatation as  may be related to distal obstruction, however correlation with LFTs is  recommended as this could also be related to cholestasis. Additional image at 24  hours may be helpful for further evaluation. I saw the patient personally, took a history and did a complete physical exam at the bedside. I performed complex decision making in coming up with a diagnostic and treatment plan for the patient. I reviewed the patient's past medical records, current laboratory and radiology results, and actual Xray films/EKG. I have also discussed this case with the involved ED physician.     Care Plan discussed with:    Patient,daughter, ED Doc    Risk of deterioration:  High    Total Time Coordinating Admission:  65   minutes    Total Critical Care Time:         Ted Geller MD

## 2018-09-19 NOTE — ED TRIAGE NOTES
Patient arrives to the ED via EMS. Patient has c/o bilateral chest pain since last night. Patient reports one episode of vomiting last night and EMS reports she dry heaved in route. Patient is A&O x4.   Patient reports an allergy to nitro but cannot recall why she is allergic to it, this is a change in her allergy list.

## 2018-09-19 NOTE — PROGRESS NOTES
Pharmacy Clarification of the Prior to Admission Medication Regimen Retrospective to the Admission Medication Reconciliation    The patient was interviewed regarding clarification of the prior to admission medication regimen and was questioned regarding use of any other inhalers, topical products, over the counter medications, herbal medications, vitamin products or ophthalmic/nasal/otic medication use. Information Obtained From: Patient, prescription bottles, RX Query    Recommendations/Findings: The following amendments were made to the patient's active medication list on file at [address]:     1) Additions:    losartan (COZAAR) 25 mg tablet    2) Removals:    fioricet   famotidine   pravastatin   rosuvastatin   valsartan    3) Changes:   acetaminophen (TYLENOL) (Old regimen: (strength 325 mg) 650 mg Q4H PRN /New regimen: (strength 500 mg) 1000 mg Q6H PRN)   nystatin (MYCOSTATIN) topical cream (Old regimen: BID /New regimen: BID PRN)    4) Pertinent Pharmacy Findings:   bisacodyl (DULCOLAX, BISACODYL,) 10 mg suppository: Patient stated she has this agent at home, but has not used it 'in a while'. PTA medication list was corrected to the following:     Prior to Admission Medications   Prescriptions Last Dose Informant Patient Reported? Taking?   acetaminophen (TYLENOL) 500 mg tablet 9/15/2018 at Unknown time Self Yes Yes   Sig: Take 1,000 mg by mouth every six (6) hours as needed for Pain. aspirin delayed-release 81 mg tablet 9/18/2018 at Unknown time Other No Yes   Sig: Take 1 Tab by mouth daily. bisacodyl (DULCOLAX, BISACODYL,) 10 mg suppository Not Taking at Unknown time Self No No   Sig: Insert 10 mg into rectum daily as needed. dilTIAZem CD (CARDIZEM CD) 120 mg ER capsule 9/18/2018 at Unknown time Other Yes Yes   Sig: Take 120 mg by mouth nightly.    docusate sodium (COLACE) 100 mg capsule 9/16/2018 at Unknown time Other No Yes   Sig: TAKE 1 CAP BY MOUTH DAILY AS NEEDED FOR CONSTIPATION FOR UP TO 90 DAYS. erythromycin (ILOTYCIN) ophthalmic ointment 9/15/2018 at Unknown time Self Yes Yes   Sig: APPLY 1 APPLICATION TO BOTH EYELIDS TWICE DAILY   losartan (COZAAR) 25 mg tablet 9/18/2018 at Unknown time Other Yes Yes   Sig: Take 25 mg by mouth daily. nystatin (MYCOSTATIN) topical cream 9/12/2018 at Unknown time Self Yes Yes   Sig: Apply  to affected area two (2) times daily as needed for Skin Irritation. polyethylene glycol (MIRALAX) 17 gram/dose powder 9/12/2018 at Unknown time Self No Yes   Sig: Take 17 g by mouth daily as needed.  1 tablespoon with 8 oz of water daily      Facility-Administered Medications: None          Thank you,  Shellie Jackson Lindy  Medication History Pharmacy Technician

## 2018-09-19 NOTE — CONSULTS
GI Consultation Note Raghu Cruz)    NAME: Radha Couch : 1945 MRN: [id number]   PCP: Lorena Sanon MD  Date/Time:  2018 4:42 PM  Subjective:   REASON FOR CONSULT:    Cholecystitis/? CBD obstruction    Isaías Trjeo is a 67 y.o.  female who I was asked to see for above. Pt is very poor historian but says she was brought to ED because she felt nervous. Did have AP and nausea w/o emesis earlier but denies this currently. Denies f/c.       Past Medical History:   Diagnosis Date    Agoraphobia without mention of panic attacks 2014    Anxiety disorder 2013    Arthritis     osteo    Breast pain, left 2015    CAD (coronary artery disease), native coronary artery 2015    Chronic chest pain 2014    Chronic pain associated with significant psychosocial dysfunction 2014    Depression 2013    Diabetes ([facility] [address]     type II    Duplicated right renal collecting system 3/13/2014    GERD (gastroesophageal reflux disease)     Gout, joint     Hypercholesteremia     hyercholesterolemia    Hypertension     Nephrolithiasis 3/13/2014    Personal history of noncompliance with medical treatment, presenting hazards to health 2014    Psychotic disorder     Vaginal pain 2014      Past Surgical History:   Procedure Laterality Date    EGD  2010         HX CYST REMOVAL      cyst removed from left wrist    HX HYSTERECTOMY      partial    HX OTHER SURGICAL      bladder dilitation    HX TUBAL LIGATION      HX UROLOGICAL      kidney stones     Social History   Substance Use Topics    Smoking status: Never Smoker    Smokeless tobacco: Never Used    Alcohol use No      Family History   Problem Relation Age of Onset    Stroke Mother     Heart Disease Mother     Cancer Father     Heart Disease Son     Liver Disease Son     Heart Disease Daughter     Malignant Hyperthermia Neg Hx     Pseudocholinesterase Deficiency Neg Hx     Delayed Awakening Neg Hx     Post-op Nausea/Vomiting Neg Hx     Emergence Delirium Neg Hx     Post-op Cognitive Dysfunction Neg Hx     Other Neg Hx       Allergies   Allergen Reactions    Amoxicillin Hives    Sulfa (Sulfonamide Antibiotics) Hives and Itching    Amoxicillin Hives and Itching     Long time ago - patient not exactly certain what it does    Mirtazapine Itching and Nausea Only     Funny feeling in chest    Percocet [Oxycodone-Acetaminophen] Nausea and Vomiting    Codeine Nausea and Vomiting    Crestor [Rosuvastatin] Other (comments)     myalgias    Nitroglycerin Unknown (comments)     Patient cannot remember why she is allergic to it      Prednisone Itching    Sulfa (Sulfonamide Antibiotics) Hives, Itching and Palpitations     Think it was increased heart rate or itching - many years ago    Zithromax [Azithromycin] Itching     Not sure what it does,taken long time ago      Home Medications:  Prior to Admission Medications   Prescriptions Last Dose Informant Patient Reported? Taking?   acetaminophen (TYLENOL) 325 mg tablet   No No   Sig: Take 2 Tabs by mouth every four (4) hours as needed for Pain. aspirin delayed-release 81 mg tablet   No No   Sig: Take 1 Tab by mouth daily. bisacodyl (DULCOLAX, BISACODYL,) 10 mg suppository   No No   Sig: Insert 10 mg into rectum daily as needed. butalbital-acetaminophen-caff (FIORICET) -40 mg per capsule   No No   Sig: Take 1 Cap by mouth every four (4) hours as needed for Pain.   dilTIAZem CD (CARDIZEM CD) 120 mg ER capsule   No No   Sig: Take 1 Cap by mouth daily. docusate sodium (COLACE) 100 mg capsule   No No   Sig: TAKE 1 CAP BY MOUTH DAILY AS NEEDED FOR CONSTIPATION FOR UP TO 90 DAYS. erythromycin (ILOTYCIN) ophthalmic ointment   Yes No   Sig: APPLY 1 APPLICATION TO BOTH EYELIDS TWICE DAILY   famotidine (PEPCID) 20 mg tablet   No No   Sig: Take 1 Tab by mouth two (2) times a day.    nystatin (MYCOSTATIN) topical cream   Yes No   Sig: APPLY TO AFFECTED AREA TWO (2) TIMES A DAY.   polyethylene glycol (MIRALAX) 17 gram/dose powder   No No   Sig: Take 17 g by mouth daily as needed. 1 tablespoon with 8 oz of water daily   pravastatin (PRAVACHOL) 10 mg tablet   No No   Sig: Take 2 Tabs by mouth nightly. rosuvastatin (CRESTOR) 5 mg tablet   Yes No   valsartan (DIOVAN) 80 mg tablet   No No   Sig: Take 1 Tab by mouth daily. Facility-Administered Medications: None     Hospital medications:  Current Facility-Administered Medications   Medication Dose Route Frequency    sodium chloride (NS) flush 5-10 mL  5-10 mL IntraVENous Q8H    sodium chloride (NS) flush 5-10 mL  5-10 mL IntraVENous PRN    naloxone (NARCAN) injection 0.4 mg  0.4 mg IntraVENous PRN    ondansetron (ZOFRAN) injection 4 mg  4 mg IntraVENous Q4H PRN    bisacodyl (DULCOLAX) suppository 10 mg  10 mg Rectal DAILY PRN    [START ON 9/20/2018] enoxaparin (LOVENOX) injection 40 mg  40 mg SubCUTAneous Q24H    levoFLOXacin (LEVAQUIN) 750 mg in D5W IVPB  750 mg IntraVENous Q24H    [START ON 9/20/2018] aspirin delayed-release tablet 81 mg  81 mg Oral DAILY    atorvastatin (LIPITOR) tablet 10 mg  10 mg Oral QHS    [START ON 9/20/2018] losartan (COZAAR) tablet 50 mg  50 mg Oral DAILY    [START ON 9/20/2018] dilTIAZem CD (CARDIZEM CD) capsule 120 mg  120 mg Oral DAILY    famotidine (PEPCID) tablet 20 mg  20 mg Oral BID    [START ON 9/20/2018] polyethylene glycol (MIRALAX) packet 17 g  17 g Oral DAILY    metroNIDAZOLE (FLAGYL) IVPB premix 500 mg  500 mg IntraVENous Q12H     Current Outpatient Prescriptions   Medication Sig    polyethylene glycol (MIRALAX) 17 gram/dose powder Take 17 g by mouth daily as needed. 1 tablespoon with 8 oz of water daily    bisacodyl (DULCOLAX, BISACODYL,) 10 mg suppository Insert 10 mg into rectum daily as needed.  docusate sodium (COLACE) 100 mg capsule TAKE 1 CAP BY MOUTH DAILY AS NEEDED FOR CONSTIPATION FOR UP TO 90 DAYS.     acetaminophen (TYLENOL) 325 mg tablet Take 2 Tabs by mouth every four (4) hours as needed for Pain.  butalbital-acetaminophen-caff (FIORICET) -40 mg per capsule Take 1 Cap by mouth every four (4) hours as needed for Pain.  famotidine (PEPCID) 20 mg tablet Take 1 Tab by mouth two (2) times a day.  erythromycin (ILOTYCIN) ophthalmic ointment APPLY 1 APPLICATION TO BOTH EYELIDS TWICE DAILY    valsartan (DIOVAN) 80 mg tablet Take 1 Tab by mouth daily.  dilTIAZem CD (CARDIZEM CD) 120 mg ER capsule Take 1 Cap by mouth daily.  rosuvastatin (CRESTOR) 5 mg tablet     nystatin (MYCOSTATIN) topical cream APPLY TO AFFECTED AREA TWO (2) TIMES A DAY.  pravastatin (PRAVACHOL) 10 mg tablet Take 2 Tabs by mouth nightly.  aspirin delayed-release 81 mg tablet Take 1 Tab by mouth daily. REVIEW OF SYSTEMS:     []     Unable to obtain  ROS due to  []    mental status change  []    sedated   []    intubated   [x]    Total of 11 systems reviewed as follows:  Const:  negative fever, negative chills, negative weight loss  Eyes:   negative diplopia or visual changes, negative eye pain  ENT:   negative coryza, negative sore throat  Resp:   negative cough, hemoptysis, dyspnea  Cards:  negative for chest pain, palpitations, lower extremity edema  :  negative for frequency, dysuria and hematuria  Skin:   negative for rash and pruritus  Heme:  negative for easy bruising and gum/nose bleeding  MS:  negative for myalgias, arthralgias, back pain and muscle weakness  Neurolo:  negative for headaches, dizziness, vertigo, memory problems   Psych:  negative for feelings of anxiety, depression     Pertinent Positives include :    Objective:   VITALS:    Visit Vitals    /77    Pulse 84    Temp 98.1 °F (36.7 °C)    Resp 10    SpO2 99%     Temp (24hrs), Av.1 °F (36.7 °C), Min:98.1 °F (36.7 °C), Max:98.1 °F (36.7 °C)    PHYSICAL EXAM:   General:    Alert, cooperative, no distress, appears stated age.      Head:   Normocephalic, without obvious abnormality, atraumatic. Eyes:   Conjunctivae clear, anicteric sclerae. Pupils are equal  Nose:  Nares normal. No drainage or sinus tenderness. Throat:    Lips, mucosa, and tongue normal.  No Thrush  Neck:  Supple, symmetrical,  no adenopathy, thyroid: non tender  Back:    Symmetric,  No CVA tenderness. Lungs:   CTA bilaterally. No wheezing/rhonchi/rales. Chest wall:  No tenderness or deformity. No Accessory muscle use. Heart:   Regular rate and rhythm,  no murmur, rub or gallop. Abdomen:   Soft, non-tender. Not distended. Bowel sounds normal. No masses  Extremities: Atraumatic, No cyanosis. No edema. No clubbing  Skin:     Texture, turgor normal. No rashes/lesions/jaundice  Lymph: Cervical, supraclavicular normal.    LAB DATA REVIEWED:    Recent Results (from the past 48 hour(s))   EKG, 12 LEAD, INITIAL    Collection Time: 09/19/18  9:38 AM   Result Value Ref Range    Ventricular Rate 73 BPM    Atrial Rate 73 BPM    P-R Interval 152 ms    QRS Duration 96 ms    Q-T Interval 400 ms    QTC Calculation (Bezet) 440 ms    Calculated P Axis 55 degrees    Calculated R Axis 46 degrees    Calculated T Axis 40 degrees    Diagnosis       Normal sinus rhythm  Normal ECG  When compared with ECG of 30-AUG-2018 11:45,  No significant change was found     CBC WITH AUTOMATED DIFF    Collection Time: 09/19/18  9:48 AM   Result Value Ref Range    WBC 6.7 3.6 - 11.0 K/uL    RBC 4.74 3.80 - 5.20 M/uL    HGB 12.7 11.5 - 16.0 g/dL    HCT 40.0 35.0 - 47.0 %    MCV 84.4 80.0 - 99.0 FL    MCH 26.8 26.0 - 34.0 PG    MCHC 31.8 30.0 - 36.5 g/dL    RDW 13.2 11.5 - 14.5 %    PLATELET 074 645 - 022 K/uL    MPV 11.6 8.9 - 12.9 FL    NRBC 0.0 0  WBC    ABSOLUTE NRBC 0.00 0.00 - 0.01 K/uL    NEUTROPHILS 92 (H) 32 - 75 %    LYMPHOCYTES 5 (L) 12 - 49 %    MONOCYTES 3 (L) 5 - 13 %    EOSINOPHILS 0 0 - 7 %    BASOPHILS 0 0 - 1 %    IMMATURE GRANULOCYTES 0 0.0 - 0.5 %    ABS. NEUTROPHILS 6.2 1.8 - 8.0 K/UL    ABS.  LYMPHOCYTES 0.3 (L) 0.8 - 3.5 K/UL ABS. MONOCYTES 0.2 0.0 - 1.0 K/UL    ABS. EOSINOPHILS 0.0 0.0 - 0.4 K/UL    ABS. BASOPHILS 0.0 0.0 - 0.1 K/UL    ABS. IMM. GRANS. 0.0 0.00 - 0.04 K/UL    DF AUTOMATED      RBC COMMENTS NORMOCYTIC, NORMOCHROMIC     METABOLIC PANEL, COMPREHENSIVE    Collection Time: 09/19/18  9:48 AM   Result Value Ref Range    Sodium 138 136 - 145 mmol/L    Potassium 4.4 3.5 - 5.1 mmol/L    Chloride 100 97 - 108 mmol/L    CO2 26 21 - 32 mmol/L    Anion gap 12 5 - 15 mmol/L    Glucose 155 (H) 65 - 100 mg/dL    BUN 15 6 - 20 MG/DL    Creatinine 0.95 0.55 - 1.02 MG/DL    BUN/Creatinine ratio 16 12 - 20      GFR est AA >60 >60 ml/min/1.73m2    GFR est non-AA 58 (L) >60 ml/min/1.73m2    Calcium 8.7 8.5 - 10.1 MG/DL    Bilirubin, total 2.8 (H) 0.2 - 1.0 MG/DL    ALT (SGPT) 847 (H) 12 - 78 U/L    AST (SGOT) 1426 (H) 15 - 37 U/L    Alk. phosphatase 146 (H) 45 - 117 U/L    Protein, total 8.3 (H) 6.4 - 8.2 g/dL    Albumin 4.0 3.5 - 5.0 g/dL    Globulin 4.3 (H) 2.0 - 4.0 g/dL    A-G Ratio 0.9 (L) 1.1 - 2.2     CK W/ REFLX CKMB    Collection Time: 09/19/18  9:48 AM   Result Value Ref Range     26 - 192 U/L   TROPONIN I    Collection Time: 09/19/18  9:48 AM   Result Value Ref Range    Troponin-I, Qt. <0.05 <0.05 ng/mL   PROTHROMBIN TIME + INR    Collection Time: 09/19/18  9:48 AM   Result Value Ref Range    INR 1.1 0.9 - 1.1      Prothrombin time 10.8 9.0 - 11.1 sec   LIPASE    Collection Time: 09/19/18  9:48 AM   Result Value Ref Range    Lipase 61 (L) 73 - 393 U/L     IMAGING RESULTS:   [x]      I have personally reviewed the actual   []    CXR  []    CT  [x]     US  And HIDA    Assessment/Plan:      Active Problems:    Choledocholithiasis with acute cholecystitis (9/19/2018)    1. Cholecystitis based on imaging (pt non-tender on exam currently)  2. CBD dilation/non visualization of CBD on HIDA suggestive of CBD obstruction  3.  Elevated LFT's  ___________________________________________________  RECOMMENDATIONS:    - IV Abx  - CLD today, NPO after midnight  - IVF  - Gen Surgery consult  - MRCP ordered; if + for choledocholithiasis will need ERCP followed by cholecystectomy, if negative would recommend proceeding with cholecystectomy    I attempted to call pt's daughter multiple times but phone was busy.  Will try again in AM  ___________________________________________________  Care Plan discussed with:    [x]    Patient   []    Family   []    Nursing   [x]    Attending     ___________________________________________________  GI: Elsie Bravo MD

## 2018-09-19 NOTE — ED NOTES
TRANSFER - OUT REPORT:    Verbal report given to Colton Keita (name) on Jason Oliva  being transferred to [facility](unit) for routine progression of care       Report consisted of patients Situation, Background, Assessment and   Recommendations(SBAR). Information from the following report(s) SBAR, ED Summary, [facility] and Recent Results was reviewed with the receiving nurse. Lines:   Peripheral IV 09/19/18 Left Forearm (Active)   Site Assessment Clean, dry, & intact 9/19/2018  9:39 AM   Phlebitis Assessment 0 9/19/2018  9:39 AM   Infiltration Assessment 0 9/19/2018  9:39 AM   Dressing Status Clean, dry, & intact 9/19/2018  9:39 AM   Dressing Type Transparent 9/19/2018  9:39 AM   Hub Color/Line Status Blue;Flushed 9/19/2018  9:39 AM   Action Taken Blood drawn 9/19/2018  9:39 AM       Peripheral IV 09/19/18 Right Antecubital (Active)   Site Assessment Clean, dry, & intact 9/19/2018  4:57 PM   Phlebitis Assessment 0 9/19/2018  4:57 PM   Infiltration Assessment 0 9/19/2018  4:57 PM   Dressing Status Clean, dry, & intact 9/19/2018  4:57 PM   Dressing Type Transparent 9/19/2018  4:57 PM   Hub Color/Line Status Pink;Flushed 9/19/2018  4:57 PM   Action Taken Blood drawn 9/19/2018  4:57 PM        Opportunity for questions and clarification was provided.       Patient transported with:  IV meds

## 2018-09-19 NOTE — ED NOTES
Called [facility] med and left message, attempting to locate patients clipboard and documents on clipboard

## 2018-09-19 NOTE — IP AVS SNAPSHOT
[address] [facility] 
[phone] Patient: Becca Noguera MRN: [id number] HIE:[id number] About your hospitalization You were admitted on:  September 19, 2018 You last received care in the:  [facility] 2 GENERAL SURGERY You were discharged on:  October 6, 2018 Why you were hospitalized Your primary diagnosis was:  Not on File Your diagnoses also included:  Choledocholithiasis With Acute Cholecystitis Follow-up Information Follow up With Details Comments Contact Info Solitario Pimentel Schedule an appointment as soon as possible for a visit in 5 days with PCP for post hospital follow up appointment. Phone: [phone] Jefe Perdue MD Schedule an appointment as soon as possible for a visit in 1 week Urology follow-up for martinez catheter 1500 [facility] 202 [facility] 
[phone] [address] [place] [address]  This is your post-acute home healthcare provider. If you do not hear from them within 24-48 hours, please give them a call. [address] [place] [address] Jose Galileajosey [address] 
[phone] Discharge Orders None A check juan indicates which time of day the medication should be taken. My Medications CHANGE how you take these medications Instructions Each Dose to Equal  
 Morning Noon Evening Bedtime  
 dilTIAZem  mg ER capsule Commonly known as:  CARDIZEM CD What changed:  Another medication with the same name was removed. Continue taking this medication, and follow the directions you see here. Your last dose was: Your next dose is: Take 120 mg by mouth nightly. 120 mg  
    
   
   
   
  
 nystatin topical cream  
Commonly known as:  MYCOSTATIN What changed:  Another medication with the same name was removed. Continue taking this medication, and follow the directions you see here. Your last dose was: Your next dose is:    
   
   
 Apply  to affected area two (2) times daily as needed for Skin Irritation. CONTINUE taking these medications Instructions Each Dose to Equal  
 Morning Noon Evening Bedtime  
 aspirin delayed-release 81 mg tablet Your last dose was: Your next dose is: Take 1 Tab by mouth daily. 81 mg  
    
   
   
   
  
 bisacodyl 10 mg suppository Commonly known as:  DULCOLAX (BISACODYL) Your last dose was: Your next dose is: Insert 10 mg into rectum daily as needed. 10 mg  
    
   
   
   
  
 docusate sodium 100 mg capsule Commonly known as:  Manan Lui Your last dose was: Your next dose is: TAKE 1 CAP BY MOUTH DAILY AS NEEDED FOR CONSTIPATION FOR UP TO 90 DAYS. erythromycin ophthalmic ointment Commonly known as:  ILOTYCIN Your last dose was: Your next dose is:    
   
   
 APPLY 1 APPLICATION TO BOTH EYELIDS TWICE DAILY  
     
   
   
   
  
 famotidine 20 mg tablet Commonly known as:  PEPCID Your last dose was: Your next dose is: Take 1 Tab by mouth two (2) times a day. 20 mg  
    
   
   
   
  
 losartan 25 mg tablet Commonly known as:  COZAAR Your last dose was: Your next dose is: Take 25 mg by mouth daily. 25 mg  
    
   
   
   
  
 polyethylene glycol 17 gram/dose powder Commonly known as:  Alessandra Pendleton Your last dose was: Your next dose is: Take 17 g by mouth daily as needed. 1 tablespoon with 8 oz of water daily 17 g STOP taking these medications   
 acetaminophen 500 mg tablet Commonly known as:  TYLENOL  
   
  
  
ASK your doctor about these medications Instructions Each Dose to Equal  
 Morning Noon Evening Bedtime  
 acetaminophen 325 mg tablet Commonly known as:  TYLENOL Your last dose was: Your next dose is: Take 2 Tabs by mouth every four (4) hours as needed for Pain. 650 mg Where to Get Your Medications These medications were sent to [id number] [place] [address] [address]  [address] [facility], [address] [place] [id number] Hours:  24-hours Phone:  [phone]  
  famotidine 20 mg tablet Discharge Instructions Endoscopic Retrograde Cholangiopancreatogram (ERCP): Before Your Procedure What is an endoscopic retrograde cholangiopancreatogram? 
Endoscopic retrograde cholangiopancreatogram (ERCP) is a test to look at the tubes that carry fluids from the liver, gallbladder, and pancreas. These tubes are called ducts. For this test, the doctor will use a tool called an endoscope, or scope. It is a thin, lighted tube that bends. It has a camera on it that lets the doctor use pictures on a screen to guide it. This test is used for different reasons. It can help find out the cause of your symptoms. If the test shows gallstones or a narrow spot in a bile duct, the doctor can use the scope to remove the stones or widen the duct. He or she may also put a metal or plastic tube in the duct. This can help open it. Before the test, you may get medicine to numb the back of your throat. You also will get medicine to help you relax. During the test, you will lie on your left side or on your stomach. The doctor puts the scope in your mouth and then gently moves it toward the back of your throat. The doctor will tell you when to swallow. This helps the scope move down your throat. You will be able to breathe normally.  
After that, the doctor moves the scope through the tube (esophagus) that leads to your stomach, through your stomach, and into the first part of your small intestine. When the scope reaches the place where the bile ducts and the pancreas meet the small intestine, you may turn and lie on your stomach. The doctor then puts a small plastic tube into the scope to inject dye into the ducts. The dye helps the ducts show up on X-rays. Then the doctor takes X-ray pictures to help find any problems. The test takes 30 minutes to 2 hours. You may go home the same day. But if you have treatment during the test, you may need to stay in the hospital overnight. Follow-up care is a key part of your treatment and safety. Be sure to make and go to all appointments, and call your doctor if you are having problems. It's also a good idea to know your test results and keep a list of the medicines you take. What happens before the procedure? 
 Preparing for the procedure 
  · Understand exactly what procedure is planned, along with the risks, benefits, and other options. · Tell your doctors ALL the medicines, vitamins, supplements, and herbal remedies you take. Some of these can increase the risk of bleeding or interact with anesthesia.  
  · If you take blood thinners, such as warfarin (Coumadin), clopidogrel (Plavix), or aspirin, be sure to talk to your doctor. He or she will tell you if you should stop taking these medicines before your procedure. Make sure that you understand exactly what your doctor wants you to do.  
  · Your doctor will tell you which medicines to take or stop before your procedure. You may need to stop taking certain medicines a week or more before the procedure. So talk to your doctor as soon as you can.  
  · If you have an advance directive, let your doctor know. It may include a living will and a durable power of  for health care. Bring a copy to the hospital. If you don't have one, you may want to prepare one. It lets your doctor and loved ones know your health care wishes.  Doctors advise that everyone prepare these papers before any type of surgery or procedure. Procedures can be stressful. This information will help you understand what you can expect. And it will help you safely prepare for your procedure. What happens on the day of the procedure? · Follow the instructions exactly about when to stop eating and drinking. If you don't, your procedure may be canceled. If your doctor told you to take your medicines on the day of the procedure, take them with only a sip of water.  
  · Take a bath or shower before you come in for your procedure. Do not apply lotions, perfumes, deodorants, or nail polish.  
  · Take off all jewelry and piercings. And take out contact lenses, if you wear them.  
 At the hospital or surgery center · Bring a picture ID.  
  · You may get medicine that relaxes you or puts you in a light sleep. The area being worked on will be numb.  
  · Tell your doctor if you are allergic to iodine. It is in the dye that the doctor puts into the bile ducts.  
  · The doctor may send puffs of air through the tube to see better. This may make you feel bloated. You may also feel cramps. This feeling does not last long.  
  · You may feel some bloating or cramping as the tube is moved. If you are very uncomfortable, you can let the doctor know with a signal or a tap on the arm. You and your doctor can agree on this signal before the test.  
  · After, you will stay at the hospital or clinic for 1 to 2 hours until the medicine wears off. Going home · Be sure you have someone to drive you home. Anesthesia and pain medicine make it unsafe for you to drive.  
  · You will be given more specific instructions about recovering from your procedure. They will cover things like diet, wound care, follow-up care, driving, and getting back to your normal routine. When should you call your doctor? · You have questions or concerns.   · You don't understand how to prepare for your procedure.  
  · You become ill before the procedure (such as fever, flu, or a cold).  
  · You need to reschedule or have changed your mind about having the procedure. Where can you learn more? Go to http://lobo-michel.info/. Enter F420 in the search box to learn more about \"Endoscopic Retrograde Cholangiopancreatogram (ERCP): Before Your Procedure. \" Current as of: May 12, 2017 Content Version: 11.7 © 8211-2598 Modify. Care instructions adapted under license by [employer] (which disclaims liability or warranty for this information). If you have questions about a medical condition or this instruction, always ask your healthcare professional. [address] any warranty or liability for your use of this information. ACO Transitions of Care Introducing [employer] [address] Jackie Humphrey offers a voluntary care coordination program to provide high quality service and care to [place] fee-for-service beneficiaries. Moni Haskins was designed to help you enhance your health and well-being through the following services: ? Transitions of Care  support for individuals who are transitioning from one care setting to another (example: Hospital to home). ? Chronic and Complex Care Coordination  support for individuals and caregivers of those with serious or chronic illnesses or with more than one chronic (ongoing) condition and those who take a number of different medications. If you meet specific medical criteria, a [address] [place] [address] Rd may call you directly to coordinate your care with your primary care physician and your other care providers. For questions about the [facility] programs, please, contact your physicians office. For general questions or additional information about Accountable Care Organizations: Please visit www.medicare.gov/acos. html or call 1-800-MEDICARE (6-627.747.5448) TTY users should call 5-889.765.1599. [employer] Announcement We are excited to announce that we are making your provider's discharge notes available to you in [employer]. You will see these notes when they are completed and signed by the physician that discharged you from your recent hospital stay. If you have any questions or concerns about any information you see in [employer], please call the Health Information Department where you were seen or reach out to your Primary Care Provider for more information about your plan of care. Introducing [facility]! Aldair Hernandez introduces [employer] patient portal. Now you can access parts of your medical record, email your doctor's office, and request medication refills online. 1. In your internet browser, go to [email or web address] 2. Click on the First Time User? Click Here link in the Sign In box. You will see the New Member Sign Up page. 3. Enter your [employer] Access Code exactly as it appears below. You will not need to use this code after youve completed the sign-up process. If you do not sign up before the expiration date, you must request a new code. · [employer] Access Code: [email or web address] Expires: 10/13/2018  5:20 AM 
 
4. Enter the last four digits of your Social Security Number (xxxx) and Date of Birth (mm/dd/yyyy) as indicated and click Submit. You will be taken to the next sign-up page. 5. Create a Azendoot ID. This will be your [employer] login ID and cannot be changed, so think of one that is secure and easy to remember. 6. Create a [employer] password. You can change your password at any time. 7. Enter your Password Reset Question and Answer. This can be used at a later time if you forget your password. 8. Enter your e-mail address. You will receive e-mail notification when new information is available in [address] 9. Click Sign Up. You can now view and download portions of your medical record. 10. Click the Download Summary menu link to download a portable copy of your medical information. If you have questions, please visit the Frequently Asked Questions section of the [email or web address].comt website. Remember, [employer] is NOT to be used for urgent needs. For medical emergencies, dial 911. Now available from your iPhone and Android! Introducing Cameron Branch As a [facility] patient, I wanted to make you aware of our electronic visit tool called Cameron Branch. Quintesocial 24/7 allows you to connect within minutes with a medical provider 24 hours a day, seven days a week via a mobile device or tablet or logging into a secure website from your computer. You can access Cameron Branch from anywhere in the [place]. A virtual visit might be right for you when you have a simple condition and feel like you just dont want to get out of bed, or cant get away from work for an appointment, when your regular [facility] provider is not available (evenings, weekends or holidays), or when youre out of town and need minor care. Electronic visits cost only $49 and if the [employer] UKkarla Luxola/Familink provider determines a prescription is needed to treat your condition, one can be electronically transmitted to a nearby pharmacy*. Please take a moment to enroll today if you have not already done so. The enrollment process is free and takes just a few minutes. To enroll, please download the OnAir3G/Familink dereje to your tablet or phone, or visit [email or web address] to enroll on your computer. And, as an [address] [place] [address] patient with a BuzzVote account, the results of your visits will be scanned into your electronic medical record and your primary care provider will be able to view the scanned results.    
We urge you to continue to see your regular [facility] provider for your ongoing medical care. And while your primary care provider may not be the one available when you seek a E-Generatorakuafin virtual visit, the peace of mind you get from getting a real diagnosis real time can be priceless. For more information on Cameron OurStageakuafin, view our Frequently Asked Questions (FAQs) at [email or web address]. Sincerely, 
 
Dontae Mahan MD 
Chief Medical Officer Lily Humphrey *:  certain medications cannot be prescribed via Assembly Providers Seen During Your Hospitalization Provider Specialty Primary office phone Bianka Smoke. Deya Ludwig MD Emergency Medicine [phone] Iris Chin MD Internal Medicine [phone] Ayden Antonio MD Internal Medicine [phone] José Bo MD Internal Medicine [phone] Claudette Rickers, MD Internal Medicine [phone] Your Primary Care Physician (PCP) Primary Care Physician Office Phone Office Fax [address], [address] [phone] You are allergic to the following Allergen Reactions Amoxicillin Hives Sulfa (Sulfonamide Antibiotics) Hives Itching Amoxicillin Hives Itching Long time ago - patient not exactly certain what it does Mirtazapine Itching Nausea Only Funny feeling in chest  
    
 Percocet (Oxycodone-Acetaminophen) Nausea and Vomiting Codeine Nausea and Vomiting Crestor (Rosuvastatin) Other (comments)  
 myalgias Nitroglycerin Unknown (comments) Patient cannot remember why she is allergic to it Prednisone Itching Sulfa (Sulfonamide Antibiotics) Hives Itching Palpitations Think it was increased heart rate or itching - many years ago Zithromax (Azithromycin) Itching Not sure what it does,taken long time ago Recent Documentation Height Weight BMI OB Status Smoking Status 1.702 m 78.5 kg 27.1 kg/m2 Hysterectomy Never Smoker Emergency Contacts Name Discharge Info Relation Home Work Mobile Maral Duncan DISCHARGE CAREGIVER [3] Child [2] [phone] Patient Belongings The following personal items are in your possession at time of discharge: 
  Dental Appliances: None  Visual Aid: Glasses, At home   Hearing Aids/Status: Does not own  Home Medications: None   Jewelry: None  Clothing: None    Other Valuables: None  Personal Items Sent to Safe: na 
 
  
  
 Please provide this summary of care documentation to your next provider. Signatures-by signing, you are acknowledging that this After Visit Summary has been reviewed with you and you have received a copy. Patient Signature:  ____________________________________________________________ Date:  ____________________________________________________________  
  
Willow Lightning Provider Signature:  ____________________________________________________________ Date:  ____________________________________________________________

## 2018-09-19 NOTE — PROGRESS NOTES
Pharmacy Automatic Renal Dosing Protocol - Antimicrobials    Indication for Antimicrobials: Intra-abdominal infection     Current Regimen of Each Antimicrobial:  Levofloxacin 750 mg IV q24hr (Start Date ; Day # 1)  Metronidazole 500 mg IV q8hr (Start Date ; Day # 1)    Previous Antimicrobial Therapy:  Cefoxitin 2 gm IV  x 1 dose (Start date ; Day #1)    Goal Level:    Date Dose & Interval Measured (mcg/mL) Extrapolated (mcg/mL)                       Significant Cultures:     Radiology / Imaging results: (X-ray, CT scan or MRI):    Paralysis, amputations, malnutrition:     Labs:  Recent Labs      18   0948   CREA  0.95   BUN  15   WBC  6.7     Temp (24hrs), Av.1 °F (36.7 °C), Min:98.1 °F (36.7 °C), Max:98.1 °F (36.7 °C)    Creatinine Clearance (mL/min) or Dialysis: 54 ml/min    Impression/Plan:   · Levofloxacin dose appropriate for indication and patient's current renal function. · Will change metronidazole dose to 500 mg IV q12hr per protocol  · Antimicrobial stop date: 7 days for levofloxacin, to be determined for metronidazole     Pharmacy will follow daily and adjust medications as appropriate for renal function and/or serum levels.     Thank you,  TRACY Valentin

## 2018-09-20 ENCOUNTER — ANESTHESIA (OUTPATIENT)
Dept: SURGERY | Age: 73
DRG: 418 | End: 2018-09-20
Payer: MEDICARE

## 2018-09-20 ENCOUNTER — APPOINTMENT (OUTPATIENT)
Dept: GENERAL RADIOLOGY | Age: 73
DRG: 418 | End: 2018-09-20
Attending: SURGERY
Payer: MEDICARE

## 2018-09-20 ENCOUNTER — ANESTHESIA EVENT (OUTPATIENT)
Dept: SURGERY | Age: 73
DRG: 418 | End: 2018-09-20
Payer: MEDICARE

## 2018-09-20 LAB
ALBUMIN SERPL-MCNC: 2.9 G/DL (ref 3.5–5)
ALBUMIN/GLOB SERPL: 0.8 {RATIO} (ref 1.1–2.2)
ALP SERPL-CCNC: 131 U/L (ref 45–117)
ALT SERPL-CCNC: 666 U/L (ref 12–78)
ANION GAP SERPL CALC-SCNC: 8 MMOL/L (ref 5–15)
AST SERPL-CCNC: 400 U/L (ref 15–37)
ATRIAL RATE: 63 BPM
BASOPHILS # BLD: 0 K/UL (ref 0–0.1)
BASOPHILS NFR BLD: 0 % (ref 0–1)
BILIRUB SERPL-MCNC: 6 MG/DL (ref 0.2–1)
BUN SERPL-MCNC: 18 MG/DL (ref 6–20)
BUN/CREAT SERPL: 18 (ref 12–20)
CALCIUM SERPL-MCNC: 8 MG/DL (ref 8.5–10.1)
CALCULATED P AXIS, ECG09: 43 DEGREES
CALCULATED R AXIS, ECG10: 36 DEGREES
CALCULATED T AXIS, ECG11: 25 DEGREES
CHLORIDE SERPL-SCNC: 105 MMOL/L (ref 97–108)
CO2 SERPL-SCNC: 24 MMOL/L (ref 21–32)
CREAT SERPL-MCNC: 1.02 MG/DL (ref 0.55–1.02)
DIAGNOSIS, 93000: NORMAL
DIFFERENTIAL METHOD BLD: ABNORMAL
EOSINOPHIL # BLD: 0 K/UL (ref 0–0.4)
EOSINOPHIL NFR BLD: 0 % (ref 0–7)
ERYTHROCYTE [DISTWIDTH] IN BLOOD BY AUTOMATED COUNT: 13.4 % (ref 11.5–14.5)
EST. AVERAGE GLUCOSE BLD GHB EST-MCNC: 111 MG/DL
GLOBULIN SER CALC-MCNC: 3.5 G/DL (ref 2–4)
GLUCOSE BLD STRIP.AUTO-MCNC: 114 MG/DL (ref 65–100)
GLUCOSE BLD STRIP.AUTO-MCNC: 97 MG/DL (ref 65–100)
GLUCOSE SERPL-MCNC: 95 MG/DL (ref 65–100)
HBA1C MFR BLD: 5.5 % (ref 4.2–6.3)
HCT VFR BLD AUTO: 34.9 % (ref 35–47)
HGB BLD-MCNC: 11.2 G/DL (ref 11.5–16)
IMM GRANULOCYTES # BLD: 0 K/UL (ref 0–0.04)
IMM GRANULOCYTES NFR BLD AUTO: 0 % (ref 0–0.5)
LIPASE SERPL-CCNC: 46 U/L (ref 73–393)
LYMPHOCYTES # BLD: 0.4 K/UL (ref 0.8–3.5)
LYMPHOCYTES NFR BLD: 8 % (ref 12–49)
MCH RBC QN AUTO: 27.1 PG (ref 26–34)
MCHC RBC AUTO-ENTMCNC: 32.1 G/DL (ref 30–36.5)
MCV RBC AUTO: 84.5 FL (ref 80–99)
MONOCYTES # BLD: 0.4 K/UL (ref 0–1)
MONOCYTES NFR BLD: 7 % (ref 5–13)
NEUTS SEG # BLD: 4.6 K/UL (ref 1.8–8)
NEUTS SEG NFR BLD: 85 % (ref 32–75)
NRBC # BLD: 0 K/UL (ref 0–0.01)
NRBC BLD-RTO: 0 PER 100 WBC
P-R INTERVAL, ECG05: 148 MS
PLATELET # BLD AUTO: 195 K/UL (ref 150–400)
PMV BLD AUTO: 10.8 FL (ref 8.9–12.9)
POTASSIUM SERPL-SCNC: 4.4 MMOL/L (ref 3.5–5.1)
PROT SERPL-MCNC: 6.4 G/DL (ref 6.4–8.2)
Q-T INTERVAL, ECG07: 426 MS
QRS DURATION, ECG06: 96 MS
QTC CALCULATION (BEZET), ECG08: 435 MS
RBC # BLD AUTO: 4.13 M/UL (ref 3.8–5.2)
SERVICE CMNT-IMP: ABNORMAL
SERVICE CMNT-IMP: NORMAL
SODIUM SERPL-SCNC: 137 MMOL/L (ref 136–145)
TROPONIN I SERPL-MCNC: <0.05 NG/ML
TROPONIN I SERPL-MCNC: <0.05 NG/ML
VENTRICULAR RATE, ECG03: 63 BPM
WBC # BLD AUTO: 5.5 K/UL (ref 3.6–11)

## 2018-09-20 PROCEDURE — 74011636320 HC RX REV CODE- 636/320: Performed by: SURGERY

## 2018-09-20 PROCEDURE — 76060000034 HC ANESTHESIA 1.5 TO 2 HR: Performed by: SURGERY

## 2018-09-20 PROCEDURE — 83036 HEMOGLOBIN GLYCOSYLATED A1C: CPT | Performed by: INTERNAL MEDICINE

## 2018-09-20 PROCEDURE — 77030012407 HC DRN WND BARD -B: Performed by: SURGERY

## 2018-09-20 PROCEDURE — 0FT44ZZ RESECTION OF GALLBLADDER, PERCUTANEOUS ENDOSCOPIC APPROACH: ICD-10-PCS | Performed by: SURGERY

## 2018-09-20 PROCEDURE — 76210000016 HC OR PH I REC 1 TO 1.5 HR: Performed by: SURGERY

## 2018-09-20 PROCEDURE — 77030004813 HC CATH CHOLGM TELE -B: Performed by: SURGERY

## 2018-09-20 PROCEDURE — 77030037892: Performed by: SURGERY

## 2018-09-20 PROCEDURE — 74011250636 HC RX REV CODE- 250/636: Performed by: ANESTHESIOLOGY

## 2018-09-20 PROCEDURE — 77030008684 HC TU ET CUF COVD -B: Performed by: NURSE ANESTHETIST, CERTIFIED REGISTERED

## 2018-09-20 PROCEDURE — 83690 ASSAY OF LIPASE: CPT | Performed by: INTERNAL MEDICINE

## 2018-09-20 PROCEDURE — 77030026438 HC STYL ET INTUB CARD -A: Performed by: NURSE ANESTHETIST, CERTIFIED REGISTERED

## 2018-09-20 PROCEDURE — 77030039266 HC ADH SKN EXOFIN S2SG -A: Performed by: SURGERY

## 2018-09-20 PROCEDURE — 74011250636 HC RX REV CODE- 250/636: Performed by: INTERNAL MEDICINE

## 2018-09-20 PROCEDURE — 77030032490 HC SLV COMPR SCD KNE COVD -B: Performed by: SURGERY

## 2018-09-20 PROCEDURE — 65270000029 HC RM PRIVATE

## 2018-09-20 PROCEDURE — 77030020053 HC ELECTRD LAPSCP COVD -B: Performed by: SURGERY

## 2018-09-20 PROCEDURE — 77030018390 HC SPNG HEMSTAT2 J&J -B: Performed by: SURGERY

## 2018-09-20 PROCEDURE — 77030011640 HC PAD GRND REM COVD -A: Performed by: SURGERY

## 2018-09-20 PROCEDURE — 74011250637 HC RX REV CODE- 250/637: Performed by: INTERNAL MEDICINE

## 2018-09-20 PROCEDURE — 77030008756 HC TU IRR SUC STRY -B: Performed by: SURGERY

## 2018-09-20 PROCEDURE — BF131ZZ FLUOROSCOPY OF GALLBLADDER AND BILE DUCTS USING LOW OSMOLAR CONTRAST: ICD-10-PCS | Performed by: SURGERY

## 2018-09-20 PROCEDURE — 74011250637 HC RX REV CODE- 250/637: Performed by: SURGERY

## 2018-09-20 PROCEDURE — 74011250636 HC RX REV CODE- 250/636

## 2018-09-20 PROCEDURE — 77030031139 HC SUT VCRL2 J&J -A: Performed by: SURGERY

## 2018-09-20 PROCEDURE — 77030035048 HC TRCR ENDOSC OPTCL COVD -B: Performed by: SURGERY

## 2018-09-20 PROCEDURE — 77030019908 HC STETH ESOPH SIMS -A: Performed by: NURSE ANESTHETIST, CERTIFIED REGISTERED

## 2018-09-20 PROCEDURE — 77030018875 HC APPL CLP LIG4 J&J -B: Performed by: SURGERY

## 2018-09-20 PROCEDURE — 77030035051: Performed by: SURGERY

## 2018-09-20 PROCEDURE — 77030003503 HC NDL BIOP TISS BD -B: Performed by: SURGERY

## 2018-09-20 PROCEDURE — 77030002933 HC SUT MCRYL J&J -A: Performed by: SURGERY

## 2018-09-20 PROCEDURE — 85025 COMPLETE CBC W/AUTO DIFF WBC: CPT | Performed by: INTERNAL MEDICINE

## 2018-09-20 PROCEDURE — 77030008771 HC TU NG SALEM SUMP -A: Performed by: NURSE ANESTHETIST, CERTIFIED REGISTERED

## 2018-09-20 PROCEDURE — 77030018836 HC SOL IRR NACL ICUM -A: Performed by: SURGERY

## 2018-09-20 PROCEDURE — 82962 GLUCOSE BLOOD TEST: CPT

## 2018-09-20 PROCEDURE — 77030018719 HC DRSG PTCH ANTIMIC J&J -A: Performed by: SURGERY

## 2018-09-20 PROCEDURE — 77030020256 HC SOL INJ NACL 0.9%  500ML: Performed by: SURGERY

## 2018-09-20 PROCEDURE — 80053 COMPREHEN METABOLIC PANEL: CPT | Performed by: INTERNAL MEDICINE

## 2018-09-20 PROCEDURE — 88304 TISSUE EXAM BY PATHOLOGIST: CPT | Performed by: SURGERY

## 2018-09-20 PROCEDURE — 77030002916 HC SUT ETHLN J&J -A: Performed by: SURGERY

## 2018-09-20 PROCEDURE — 77030035045 HC TRCR ENDOSC VRSPRT BLDLSS COVD -B: Performed by: SURGERY

## 2018-09-20 PROCEDURE — 74011000250 HC RX REV CODE- 250

## 2018-09-20 PROCEDURE — 74300 X-RAY BILE DUCTS/PANCREAS: CPT

## 2018-09-20 PROCEDURE — 74011000250 HC RX REV CODE- 250: Performed by: SURGERY

## 2018-09-20 PROCEDURE — 84484 ASSAY OF TROPONIN QUANT: CPT | Performed by: INTERNAL MEDICINE

## 2018-09-20 PROCEDURE — 36415 COLL VENOUS BLD VENIPUNCTURE: CPT | Performed by: INTERNAL MEDICINE

## 2018-09-20 PROCEDURE — 76010000153 HC OR TIME 1.5 TO 2 HR: Performed by: SURGERY

## 2018-09-20 PROCEDURE — 93005 ELECTROCARDIOGRAM TRACING: CPT

## 2018-09-20 RX ORDER — SODIUM CHLORIDE 0.9 % (FLUSH) 0.9 %
5-10 SYRINGE (ML) INJECTION AS NEEDED
Status: DISCONTINUED | OUTPATIENT
Start: 2018-09-20 | End: 2018-09-20 | Stop reason: HOSPADM

## 2018-09-20 RX ORDER — LIDOCAINE HYDROCHLORIDE 20 MG/ML
INJECTION, SOLUTION EPIDURAL; INFILTRATION; INTRACAUDAL; PERINEURAL AS NEEDED
Status: DISCONTINUED | OUTPATIENT
Start: 2018-09-20 | End: 2018-09-20 | Stop reason: HOSPADM

## 2018-09-20 RX ORDER — MIDAZOLAM HYDROCHLORIDE 1 MG/ML
INJECTION, SOLUTION INTRAMUSCULAR; INTRAVENOUS AS NEEDED
Status: DISCONTINUED | OUTPATIENT
Start: 2018-09-20 | End: 2018-09-20 | Stop reason: HOSPADM

## 2018-09-20 RX ORDER — SODIUM CHLORIDE, SODIUM LACTATE, POTASSIUM CHLORIDE, CALCIUM CHLORIDE 600; 310; 30; 20 MG/100ML; MG/100ML; MG/100ML; MG/100ML
75 INJECTION, SOLUTION INTRAVENOUS CONTINUOUS
Status: DISCONTINUED | OUTPATIENT
Start: 2018-09-20 | End: 2018-09-20 | Stop reason: HOSPADM

## 2018-09-20 RX ORDER — SODIUM CHLORIDE 9 MG/ML
50 INJECTION, SOLUTION INTRAVENOUS CONTINUOUS
Status: DISCONTINUED | OUTPATIENT
Start: 2018-09-20 | End: 2018-09-20 | Stop reason: HOSPADM

## 2018-09-20 RX ORDER — MIDAZOLAM HYDROCHLORIDE 1 MG/ML
1 INJECTION, SOLUTION INTRAMUSCULAR; INTRAVENOUS AS NEEDED
Status: DISCONTINUED | OUTPATIENT
Start: 2018-09-20 | End: 2018-09-20 | Stop reason: HOSPADM

## 2018-09-20 RX ORDER — HYDROMORPHONE HYDROCHLORIDE 1 MG/ML
0.2 INJECTION, SOLUTION INTRAMUSCULAR; INTRAVENOUS; SUBCUTANEOUS
Status: DISCONTINUED | OUTPATIENT
Start: 2018-09-20 | End: 2018-09-20 | Stop reason: HOSPADM

## 2018-09-20 RX ORDER — DIPHENHYDRAMINE HYDROCHLORIDE 50 MG/ML
12.5 INJECTION, SOLUTION INTRAMUSCULAR; INTRAVENOUS AS NEEDED
Status: DISCONTINUED | OUTPATIENT
Start: 2018-09-20 | End: 2018-09-20 | Stop reason: HOSPADM

## 2018-09-20 RX ORDER — MORPHINE SULFATE 10 MG/ML
2 INJECTION, SOLUTION INTRAMUSCULAR; INTRAVENOUS
Status: DISCONTINUED | OUTPATIENT
Start: 2018-09-20 | End: 2018-09-20 | Stop reason: HOSPADM

## 2018-09-20 RX ORDER — ATROPINE SULFATE 0.1 MG/ML
0.5 INJECTION INTRAVENOUS
Status: DISCONTINUED | OUTPATIENT
Start: 2018-09-20 | End: 2018-09-20

## 2018-09-20 RX ORDER — SODIUM CHLORIDE 0.9 % (FLUSH) 0.9 %
5-10 SYRINGE (ML) INJECTION EVERY 8 HOURS
Status: COMPLETED | OUTPATIENT
Start: 2018-09-20 | End: 2018-09-20

## 2018-09-20 RX ORDER — INSULIN LISPRO 100 [IU]/ML
INJECTION, SOLUTION INTRAVENOUS; SUBCUTANEOUS EVERY 6 HOURS
Status: DISCONTINUED | OUTPATIENT
Start: 2018-09-20 | End: 2018-09-21

## 2018-09-20 RX ORDER — SODIUM CHLORIDE 9 MG/ML
75 INJECTION, SOLUTION INTRAVENOUS CONTINUOUS
Status: DISCONTINUED | OUTPATIENT
Start: 2018-09-20 | End: 2018-09-20

## 2018-09-20 RX ORDER — SODIUM CHLORIDE 0.9 % (FLUSH) 0.9 %
5-10 SYRINGE (ML) INJECTION EVERY 8 HOURS
Status: DISCONTINUED | OUTPATIENT
Start: 2018-09-20 | End: 2018-09-20 | Stop reason: HOSPADM

## 2018-09-20 RX ORDER — PHENYLEPHRINE HCL IN 0.9% NACL 0.4MG/10ML
SYRINGE (ML) INTRAVENOUS AS NEEDED
Status: DISCONTINUED | OUTPATIENT
Start: 2018-09-20 | End: 2018-09-20 | Stop reason: HOSPADM

## 2018-09-20 RX ORDER — MIDAZOLAM HYDROCHLORIDE 1 MG/ML
0.5 INJECTION, SOLUTION INTRAMUSCULAR; INTRAVENOUS
Status: DISCONTINUED | OUTPATIENT
Start: 2018-09-20 | End: 2018-09-20 | Stop reason: HOSPADM

## 2018-09-20 RX ORDER — NEOSTIGMINE METHYLSULFATE 1 MG/ML
INJECTION INTRAVENOUS AS NEEDED
Status: DISCONTINUED | OUTPATIENT
Start: 2018-09-20 | End: 2018-09-20 | Stop reason: HOSPADM

## 2018-09-20 RX ORDER — FLUMAZENIL 0.1 MG/ML
0.2 INJECTION INTRAVENOUS
Status: DISCONTINUED | OUTPATIENT
Start: 2018-09-20 | End: 2018-09-20

## 2018-09-20 RX ORDER — SUCCINYLCHOLINE CHLORIDE 20 MG/ML
INJECTION INTRAMUSCULAR; INTRAVENOUS AS NEEDED
Status: DISCONTINUED | OUTPATIENT
Start: 2018-09-20 | End: 2018-09-20 | Stop reason: HOSPADM

## 2018-09-20 RX ORDER — OXYCODONE AND ACETAMINOPHEN 5; 325 MG/1; MG/1
1 TABLET ORAL AS NEEDED
Status: DISCONTINUED | OUTPATIENT
Start: 2018-09-20 | End: 2018-09-20 | Stop reason: HOSPADM

## 2018-09-20 RX ORDER — DEXTROSE 50 % IN WATER (D50W) INTRAVENOUS SYRINGE
12.5-25 AS NEEDED
Status: DISCONTINUED | OUTPATIENT
Start: 2018-09-20 | End: 2018-10-06 | Stop reason: HOSPADM

## 2018-09-20 RX ORDER — FENTANYL CITRATE 50 UG/ML
25 INJECTION, SOLUTION INTRAMUSCULAR; INTRAVENOUS
Status: DISCONTINUED | OUTPATIENT
Start: 2018-09-20 | End: 2018-09-20 | Stop reason: HOSPADM

## 2018-09-20 RX ORDER — PROPOFOL 10 MG/ML
INJECTION, EMULSION INTRAVENOUS AS NEEDED
Status: DISCONTINUED | OUTPATIENT
Start: 2018-09-20 | End: 2018-09-20 | Stop reason: HOSPADM

## 2018-09-20 RX ORDER — EPHEDRINE SULFATE 50 MG/ML
INJECTION, SOLUTION INTRAVENOUS AS NEEDED
Status: DISCONTINUED | OUTPATIENT
Start: 2018-09-20 | End: 2018-09-20 | Stop reason: HOSPADM

## 2018-09-20 RX ORDER — HYDROCODONE BITARTRATE AND ACETAMINOPHEN 5; 325 MG/1; MG/1
1-2 TABLET ORAL
Status: DISCONTINUED | OUTPATIENT
Start: 2018-09-20 | End: 2018-09-29

## 2018-09-20 RX ORDER — HYDROMORPHONE HYDROCHLORIDE 2 MG/ML
0.5 INJECTION, SOLUTION INTRAMUSCULAR; INTRAVENOUS; SUBCUTANEOUS
Status: DISCONTINUED | OUTPATIENT
Start: 2018-09-20 | End: 2018-09-21

## 2018-09-20 RX ORDER — ROCURONIUM BROMIDE 10 MG/ML
INJECTION, SOLUTION INTRAVENOUS AS NEEDED
Status: DISCONTINUED | OUTPATIENT
Start: 2018-09-20 | End: 2018-09-20 | Stop reason: HOSPADM

## 2018-09-20 RX ORDER — FENTANYL CITRATE 50 UG/ML
50 INJECTION, SOLUTION INTRAMUSCULAR; INTRAVENOUS AS NEEDED
Status: DISCONTINUED | OUTPATIENT
Start: 2018-09-20 | End: 2018-09-20 | Stop reason: HOSPADM

## 2018-09-20 RX ORDER — DEXTROMETHORPHAN/PSEUDOEPHED 2.5-7.5/.8
1.2 DROPS ORAL
Status: DISCONTINUED | OUTPATIENT
Start: 2018-09-20 | End: 2018-09-20

## 2018-09-20 RX ORDER — FENTANYL CITRATE 50 UG/ML
INJECTION, SOLUTION INTRAMUSCULAR; INTRAVENOUS AS NEEDED
Status: DISCONTINUED | OUTPATIENT
Start: 2018-09-20 | End: 2018-09-20 | Stop reason: HOSPADM

## 2018-09-20 RX ORDER — EPINEPHRINE 0.1 MG/ML
1 INJECTION INTRACARDIAC; INTRAVENOUS
Status: DISCONTINUED | OUTPATIENT
Start: 2018-09-20 | End: 2018-09-20

## 2018-09-20 RX ORDER — ONDANSETRON 2 MG/ML
INJECTION INTRAMUSCULAR; INTRAVENOUS AS NEEDED
Status: DISCONTINUED | OUTPATIENT
Start: 2018-09-20 | End: 2018-09-20 | Stop reason: HOSPADM

## 2018-09-20 RX ORDER — GLYCOPYRROLATE 0.2 MG/ML
INJECTION INTRAMUSCULAR; INTRAVENOUS AS NEEDED
Status: DISCONTINUED | OUTPATIENT
Start: 2018-09-20 | End: 2018-09-20 | Stop reason: HOSPADM

## 2018-09-20 RX ORDER — ONDANSETRON 2 MG/ML
4 INJECTION INTRAMUSCULAR; INTRAVENOUS AS NEEDED
Status: DISCONTINUED | OUTPATIENT
Start: 2018-09-20 | End: 2018-09-20 | Stop reason: HOSPADM

## 2018-09-20 RX ORDER — LIDOCAINE HYDROCHLORIDE 10 MG/ML
0.1 INJECTION, SOLUTION EPIDURAL; INFILTRATION; INTRACAUDAL; PERINEURAL AS NEEDED
Status: DISCONTINUED | OUTPATIENT
Start: 2018-09-20 | End: 2018-09-20 | Stop reason: HOSPADM

## 2018-09-20 RX ORDER — NALOXONE HYDROCHLORIDE 0.4 MG/ML
0.4 INJECTION, SOLUTION INTRAMUSCULAR; INTRAVENOUS; SUBCUTANEOUS
Status: ACTIVE | OUTPATIENT
Start: 2018-09-20 | End: 2018-09-20

## 2018-09-20 RX ORDER — SODIUM CHLORIDE 0.9 % (FLUSH) 0.9 %
5-10 SYRINGE (ML) INJECTION AS NEEDED
Status: ACTIVE | OUTPATIENT
Start: 2018-09-20 | End: 2018-09-20

## 2018-09-20 RX ORDER — MAGNESIUM SULFATE 100 %
4 CRYSTALS MISCELLANEOUS AS NEEDED
Status: DISCONTINUED | OUTPATIENT
Start: 2018-09-20 | End: 2018-10-06 | Stop reason: HOSPADM

## 2018-09-20 RX ORDER — BUPIVACAINE HYDROCHLORIDE AND EPINEPHRINE 5; 5 MG/ML; UG/ML
INJECTION, SOLUTION EPIDURAL; INTRACAUDAL; PERINEURAL AS NEEDED
Status: DISCONTINUED | OUTPATIENT
Start: 2018-09-20 | End: 2018-09-20 | Stop reason: HOSPADM

## 2018-09-20 RX ADMIN — Medication 10 ML: at 21:30

## 2018-09-20 RX ADMIN — ONDANSETRON 4 MG: 2 INJECTION INTRAMUSCULAR; INTRAVENOUS at 13:17

## 2018-09-20 RX ADMIN — EPHEDRINE SULFATE 10 MG: 50 INJECTION, SOLUTION INTRAVENOUS at 13:02

## 2018-09-20 RX ADMIN — METRONIDAZOLE 500 MG: 500 INJECTION, SOLUTION INTRAVENOUS at 19:54

## 2018-09-20 RX ADMIN — ATORVASTATIN CALCIUM 10 MG: 10 TABLET, FILM COATED ORAL at 21:30

## 2018-09-20 RX ADMIN — PROPOFOL 70 MG: 10 INJECTION, EMULSION INTRAVENOUS at 12:04

## 2018-09-20 RX ADMIN — SODIUM CHLORIDE AND POTASSIUM CHLORIDE: 9; 1.49 INJECTION, SOLUTION INTRAVENOUS at 18:32

## 2018-09-20 RX ADMIN — LEVOFLOXACIN 750 MG: 5 INJECTION, SOLUTION INTRAVENOUS at 12:00

## 2018-09-20 RX ADMIN — LOSARTAN POTASSIUM 25 MG: 25 TABLET ORAL at 21:30

## 2018-09-20 RX ADMIN — MORPHINE SULFATE 2 MG: 2 INJECTION, SOLUTION INTRAMUSCULAR; INTRAVENOUS at 08:59

## 2018-09-20 RX ADMIN — ONDANSETRON 4 MG: 2 INJECTION INTRAMUSCULAR; INTRAVENOUS at 08:59

## 2018-09-20 RX ADMIN — SUCCINYLCHOLINE CHLORIDE 100 MG: 20 INJECTION INTRAMUSCULAR; INTRAVENOUS at 12:04

## 2018-09-20 RX ADMIN — Medication 80 MCG: at 12:27

## 2018-09-20 RX ADMIN — Medication 80 MCG: at 12:28

## 2018-09-20 RX ADMIN — FENTANYL CITRATE 50 MCG: 50 INJECTION, SOLUTION INTRAMUSCULAR; INTRAVENOUS at 12:04

## 2018-09-20 RX ADMIN — ROCURONIUM BROMIDE 5 MG: 10 INJECTION, SOLUTION INTRAVENOUS at 12:04

## 2018-09-20 RX ADMIN — METRONIDAZOLE 500 MG: 500 INJECTION, SOLUTION INTRAVENOUS at 05:34

## 2018-09-20 RX ADMIN — MELATONIN TAB 3 MG 6 MG: 3 TAB at 21:30

## 2018-09-20 RX ADMIN — ROCURONIUM BROMIDE 25 MG: 10 INJECTION, SOLUTION INTRAVENOUS at 12:17

## 2018-09-20 RX ADMIN — ONDANSETRON 4 MG: 2 INJECTION INTRAMUSCULAR; INTRAVENOUS at 14:30

## 2018-09-20 RX ADMIN — GLYCOPYRROLATE 0.4 MG: 0.2 INJECTION INTRAMUSCULAR; INTRAVENOUS at 13:23

## 2018-09-20 RX ADMIN — Medication 10 ML: at 17:29

## 2018-09-20 RX ADMIN — HYDROCODONE BITARTRATE AND ACETAMINOPHEN 2 TABLET: 5; 325 TABLET ORAL at 20:30

## 2018-09-20 RX ADMIN — EPHEDRINE SULFATE 5 MG: 50 INJECTION, SOLUTION INTRAVENOUS at 12:28

## 2018-09-20 RX ADMIN — FAMOTIDINE 20 MG: 20 TABLET ORAL at 19:54

## 2018-09-20 RX ADMIN — MIDAZOLAM HYDROCHLORIDE 1 MG: 1 INJECTION, SOLUTION INTRAMUSCULAR; INTRAVENOUS at 11:56

## 2018-09-20 RX ADMIN — EPHEDRINE SULFATE 5 MG: 50 INJECTION, SOLUTION INTRAVENOUS at 12:59

## 2018-09-20 RX ADMIN — LEVOFLOXACIN 750 MG: 5 INJECTION, SOLUTION INTRAVENOUS at 19:54

## 2018-09-20 RX ADMIN — ONDANSETRON 4 MG: 2 INJECTION INTRAMUSCULAR; INTRAVENOUS at 21:35

## 2018-09-20 RX ADMIN — Medication 10 ML: at 05:34

## 2018-09-20 RX ADMIN — NEOSTIGMINE METHYLSULFATE 3 MG: 1 INJECTION INTRAVENOUS at 13:23

## 2018-09-20 RX ADMIN — LIDOCAINE HYDROCHLORIDE 40 MG: 20 INJECTION, SOLUTION EPIDURAL; INFILTRATION; INTRACAUDAL; PERINEURAL at 12:04

## 2018-09-20 RX ADMIN — SODIUM CHLORIDE, POTASSIUM CHLORIDE, SODIUM LACTATE AND CALCIUM CHLORIDE: 600; 310; 30; 20 INJECTION, SOLUTION INTRAVENOUS at 11:56

## 2018-09-20 NOTE — OP NOTES
DATE OF PROCEDURE:  9/20/2018     PREOPERATIVE DIAGNOSIS:   Cholecystitis, hyperbilirubinemia    POSTOPERATIVE DIAGNOSIS:   Same    OPERATIVE PROCEDURE:  Laparoscopic cholecystectomy with cholangiogram, interpretation of cholangiogram (CPT 93001 50846)    SURGEON: Lily Carbajal MD    ANESTHESIA:  General endotracheal.    EBL: minimal    DRAINS: 19 Fr. Round Hemoduct drain    SPECIMENS:   ID Type Source Tests Collected by Time Destination   1 : Guadalupe Rubinstein, MD 9/20/2018 1248 Pathology        FINDINGS:  Jaundice, acute inflammation of the gallbladder, small gallstones in the gallbladder and cystic duct. No obvious CBD filling defect on cholangiogram (the defect on the second image is the cholangiogram catheter balloon), but no passage of contrast into the duodenum, consistent with obstruction. INDICATIONS:  RUQ and epigastric pain. Jaundice. Gallstones on ultrasound. MRCP that did not show CBD stone. DESCRIPTION OF PROCEDURE:  After obtaining informed consent, the patient was taken to the operating room and placed supine on the operating table. An operative time-out was performed, and general endotracheal anesthesia was induced.  Preoperative antibiotics were administered, and the abdomen was prepped and draped in the usual sterile fashion.  The abdomen was then entered just above the umbilicus using a 5-mm optical trocar.  The abdomen was the insufflated without incident and was visually explored. Edward Estrin was no other pathology noted.  Two right upper quadrant 5-mm ports were placed under direct vision, followed by a 12-mm port in the subxiphoid position.  30 mL of Marcaine 0.5% with epinephrine was infiltrated at the port sites prior to placement.     The gallbladder fundus was then grasped and retracted cephalad over the liver.  The triangle of Calot was exposed, and the cystic duct and artery were skeletonized using a combination of blunt dissection with a Maryland dissector and hook electrocautery.  The cystic artery was then divided between clips with 2 clips left on the patient side. The cystic duct was then traced from the gallbladder infundibulum into its insertion into the portal triad.  The cystic duct was swept with a grasper up into the gallbladder. A clip was placed on the gallbladder infundibulum and an incision in the cystic duct adjacent to the gallbladder infundibulum was made with bashir and the cholangiogram catheter was inserted and the cholangiogram was per performed with the above noted findings. The cystic duct was then divided between clips directly adjacent to the gallbladder infundibulum, with 3 clips left on the patient's side. The gallbladder was then removed from the liver bed using hook electrocautery.  It was removed from the abdominal cavity using a bag through the subxiphoid incision.  The liver bed was inspected for hemostasis, and excellent hemostasis was achieved with minimal electrocautery.  The clips on the cystic duct and artery were again visualized and were intact.  The area below and lateral to the liver was suction irrigated until clear. A 19 Fr round Hemoduct drain was left below the liver and made to leave the peritoneum via the lateral-most insicion. It was sutured to the skin with a Nylon suture.     The right upper quadrant ports were removed under direct vision and both were hemostatic.  The abdomen was then desufflated through the subxiphoid port under direct vision via the umbilical port.  These ports were subsequently removed and the fascial defect at the 12-mm incision was closed with an interrupted 0 Vicryl suture.  The incisions were irrigated with sterile saline and made hemostatic with minimal electrocautery. [place] were closed with buried interrupted 4-0 Monocryl sutures, and dressed with sterile dressing.  The patient was recovered from general anesthesia and taken to the recovery area in satisfactory condition.   All instrument, sponge, and needle counts were reported as correct.     Yina Thurston MD

## 2018-09-20 NOTE — ANESTHESIA PREPROCEDURE EVALUATION
Anesthetic History   No history of anesthetic complications            Review of Systems / Medical History  Patient summary reviewed, nursing notes reviewed and pertinent labs reviewed    Pulmonary      Recent URI    Shortness of breath         Neuro/Psych         Headaches and psychiatric history     Cardiovascular    Hypertension    Angina      Hyperlipidemia    Exercise tolerance: <4 METS  Comments: Chronic chest pain  Mild AS   GI/Hepatic/Renal     GERD: well controlled    Renal disease: stones       Endo/Other    Diabetes: type 2    Arthritis     Other Findings              Physical Exam    Airway  Mallampati: II  TM Distance: 4 - 6 cm  Neck ROM: normal range of motion   Mouth opening: Normal     Cardiovascular    Rhythm: regular  Rate: normal         Dental    Dentition: Edentulous     Pulmonary  Breath sounds clear to auscultation               Abdominal  GI exam deferred       Other Findings            Anesthetic Plan    ASA: 3  Anesthesia type: general          Induction: Intravenous  Anesthetic plan and risks discussed with: Patient

## 2018-09-20 NOTE — PROGRESS NOTES
Pt with c/o HA 6/10 and abd pain 8/10 stomach pain and dry heaves. Pt medicated with morphine and zofran. Pt with pain that started in her throat, moved to epigastric area and now is under her left breast/side which feels like \"someone is mashing on me\". When RN pressed on the area under the left breast/side the pain was worse and then better when RN stopped. /75 HR 64 99% RA. Call to Dr Arin Serna to inform. Order for EKG and troponin, Dr Arin Serna will see pt.

## 2018-09-20 NOTE — ROUTINE PROCESS
Pt arrived to unit at end of shift. Vitals taken and pt made comfortable. Family at bedside. Bedside shift change report given to Everett Graves (oncoming nurse) by Esperanza Banks (offgoing nurse). Report included the following information SBAR, Kardex, ED Summary and Recent Results.

## 2018-09-20 NOTE — PROGRESS NOTES
Possible ERCP tomorrow. Do not give aspirin or Lovenox. Wrote for [employer]. Clears today. NPO at midnight. Continue abx as written -- no change. Risk for cholangitis.

## 2018-09-20 NOTE — PROGRESS NOTES
Hospitalist Progress Note    NAME: Radha Couch   :  1945   MRN:  [id number]       Assessment / Plan:  Acute choledocholithiasis with acute cholecystitis POA  Acute RUQ abd pain, POA  Abnormal LFT, POA  Abnormal LFTs with AST 1426,  , Alk Phos 146, total Bilirubin 2.8  RUQ Ultrasound consistent with gallstones with acute cholecystitis changes, dilated CBD  HIDA scan with tracer in the liver but no activity in gallbladder, common bile duct, small bowel after 3 hours suggesting common bile duct obstruction. MRCP showed cholelithiasis. Pericholecystic edema suspicious for hcolecystitis. Common duct is normal  NPO, cont' IVF  IV Levaquin and Flagyl empirically  For cholecystectomy today   IV morphine prn  Appreciate GI/gensurg consultation    Abnormal LFTs POA due to choledocholithiasis , ALT 47, alkaline phosphatase 146, T bili 2.8  Suspect due to choledocholithiasis  Taken only 1 dose Tylenol  in the past week  Check Tylenol level but will hold an ACE for now  Serial labs    Type 2 diabetes mellitus POA currently off medications per her report   A1C 5.5  Add SSI  Essential hypertension POA  continue ARB and Cardizem CD  Will use as needed labetalol    Coronary disease POA  continue aspirin and blood pressure medication control   Currently off of cholesterol medications     Insomnia POA melatonin at bedtime     Overweight  Morbid Obesity POA     Given the patient's current clinical presentation, I have a high level of concern for decompensation if discharged from the emergency department. My assessment of this patient's clinical condition and my plan of care is as noted above.     DVT prophylaxis with lovenox     Code status: full code  NOK: daughter     Subjective:     Chief Complaint / Reason for Physician Visit  Pt seen at bedside. Still has severe abd pain, noted to have L breast pain with palpation.   Pt states this L side breast pain has been ongoing for months and has been told by her doctor that it is MSK related. Has nausea/dry heaving. Discussed with RN events overnight. Review of Systems:  Symptom Y/N Comments  Symptom Y/N Comments   Fever/Chills n   Chest Pain     Poor Appetite    Edema     Cough    Abdominal Pain y    Sputum    Joint Pain     SOB/MOORE    Pruritis/Rash     Nausea/vomit y   Tolerating PT/OT     Diarrhea    Tolerating Diet     Constipation    Other       Could NOT obtain due to:      Objective:     VITALS:   Last 24hrs VS reviewed since prior progress note. Most recent are:  Patient Vitals for the past 24 hrs:   Temp Pulse Resp BP SpO2   09/20/18 0934 - 64 16 150/75 99 %   09/20/18 0824 98.1 °F (36.7 °C) 76 17 130/73 100 %   09/20/18 0258 98.2 °F (36.8 °C) 62 16 95/52 95 %   09/19/18 2319 98.2 °F (36.8 °C) 76 16 109/63 96 %   09/19/18 1910 98.3 °F (36.8 °C) 75 18 169/66 100 %   09/19/18 1645 98.4 °F (36.9 °C) 75 13 134/75 99 %   09/19/18 1633 - 82 19 166/83 99 %   09/19/18 1215 - 84 - 176/77 99 %   09/19/18 1214 - - - 164/75 100 %   09/19/18 1200 - 69 10 173/72 100 %   09/19/18 1153 - - - 163/75 -   09/19/18 1100 - 71 15 183/76 99 %   09/19/18 1030 - 78 19 196/78 100 %       Intake/Output Summary (Last 24 hours) at 09/20/18 1010  Last data filed at 09/20/18 0546   Gross per 24 hour   Intake           788.75 ml   Output             1000 ml   Net          -211.25 ml        PHYSICAL EXAM:  General: WD, WN. Alert, cooperative, no acute distress    EENT:  EOMI. Anicteric sclerae. MMM  Resp:  CTA bilaterally, no wheezing or rales. No accessory muscle use  CV:  Regular  rhythm,  No edema  GI:  Soft, Non distended, Non tender.  +Bowel sounds  Neurologic:  Alert and oriented X 3, normal speech  Psych:   Not anxious nor agitated  Skin:  No rashes.   No jaundice    Reviewed most current lab test results and cultures  YES  Reviewed most current radiology test results   YES  Review and summation of old records today    NO  Reviewed patient's current orders and [facility] YES  PMH/SH reviewed - no change compared to H&P  ________________________________________________________________________  Care Plan discussed with:    Comments   Patient x    Family  x Pt's    RN x    Care Manager     Consultant                        Multidiciplinary team rounds were held today with , nursing, pharmacist and clinical coordinator. Patient's plan of care was discussed; medications were reviewed and discharge planning was addressed. ________________________________________________________________________  Total NON critical care TIME:  35   Minutes    Total CRITICAL CARE TIME Spent:   Minutes non procedure based      Comments   >50% of visit spent in counseling and coordination of care     ________________________________________________________________________  Berry Allen MD     Procedures: see electronic medical records for all procedures/Xrays and details which were not copied into this note but were reviewed prior to creation of Plan. LABS:  I reviewed today's most current labs and imaging studies.   Pertinent labs include:  Recent Labs      09/20/18   0307 09/19/18 0948   WBC  5.5  6.7   HGB  11.2*  12.7   HCT  34.9*  40.0   PLT  195  209     Recent Labs      09/20/18   0307 09/19/18   0948   NA  137  138   K  4.4  4.4   CL  105  100   CO2  24  26   GLU  95  155*   BUN  18  15   CREA  1.02  0.95   CA  8.0*  8.7   ALB  2.9*  4.0   TBILI  6.0*  2.8*   SGOT  400*  1426*   ALT  666*  847*   INR   --   1.1       Signed: Berry Allen MD

## 2018-09-20 NOTE — PERIOP NOTES
Handoff Report from Operating Room to PACU    Report received from TY Valles RN and Gayathri Webster CRNA regarding Tara Youngblood. Surgeon(s):  Jordi Luevano MD  And Procedure(s) (LRB):  CHOLECYSTECTOMY LAPAROSCOPIC WITH GRAMS (N/A)  confirmed   with allergies, drains and dressings discussed. Anesthesia type, drugs, patient history, complications, estimated blood loss, vital signs, intake and output, and last pain medication, lines, reversal medications and temperature were reviewed.

## 2018-09-20 NOTE — PROGRESS NOTES
GI Progress Note El Alanis Sensor :1945 [id number]:[id number]   PCP: Shun Sevilla MD  Date/Time:  2018 1:10 PM   Assessment:   · Cholecystitis  · Elevated LFT's but MRCP negative for choledocholithisis     Plan:   · Cholecystectomy with IOC planned for today. If IOC is positive will need ERCP. Risks/benefits of ERCP explained to patient and daughter in detail     Subjective:   Pt seen in pre-op for cholecystectomy. Feels nauseas. Complaint Y/N Description   Abdominal Pain     Hematemesis     Hematochezia     Melena     Constipation     Diarrhea     Dyspepsia     Dysphagia     Jaundiced     Nausea/vomiting       Review of Systems:  Symptom Y/N Comments  Symptom Y/N Comments   Fever/Chills    Chest Pain     Cough    Headaches     Sputum    Joint Pain     SOB/MOORE    Pruritis/Rash     Tolerating Diet    Other       Could NOT obtain due to:      Objective:   VITALS:   Last 24hrs VS reviewed since prior progress note. Most recent are:  Visit Vitals    /61 (BP 1 Location: Left arm, BP Patient Position: At rest)    Pulse 64    Temp 98.2 °F (36.8 °C)    Resp 14    SpO2 99%       Intake/Output Summary (Last 24 hours) at 18 1310  Last data filed at 18 0546   Gross per 24 hour   Intake           788.75 ml   Output             1000 ml   Net          -211.25 ml     PHYSICAL EXAM:  General: WD, WN. Alert, cooperative, no acute distress    HEENT: NC, Atraumatic. PERRLA, EOMI. Anicteric sclerae. Lungs:  CTA Bilaterally. No Wheezing/Rhonchi/Rales.   Heart:  Regular  rhythm,  No murmur (), No Rubs, No Gallops  Abdomen: Soft, Non distended,+RUQ TTP  +Bowel sounds, no HSM  Extremities: No c/c/e  Neurologic:  No acute neurological distress       Lab and Radiology Data Reviewed: (see below)    Medications Reviewed: (see below)  PMH/SH reviewed - no change compared to H&P  ________________________________________________________________________    Care Plan discussed with:  Patient x Family  x   RN               Consultant:       Harry Serrano MD     Procedures: see electronic medical records for all procedures/Xrays and details which were not copied into this note but were reviewed prior to creation of Plan. LABS:  Recent Labs      09/20/18 0307 09/19/18   0948   WBC  5.5  6.7   HGB  11.2*  12.7   HCT  34.9*  40.0   PLT  195  209     Recent Labs      09/20/18 0307 09/19/18   0948   NA  137  138   K  4.4  4.4   CL  105  100   CO2  24  26   BUN  18  15   CREA  1.02  0.95   GLU  95  155*   CA  8.0*  8.7     Recent Labs      09/20/18   0307 09/19/18   0948   SGOT  400*  1426*   AP  131*  146*   TP  6.4  8.3*   ALB  2.9*  4.0   GLOB  3.5  4.3*   LPSE  46*  61*     Recent Labs      09/19/18   0948   INR  1.1   PTP  10.8      No results for input(s): FE, TIBC, PSAT, FERR in the last 72 hours. Lab Results   Component Value Date/Time    Folate >19.9 03/04/2015 12:32 PM     No results for input(s): PH, PCO2, PO2 in the last 72 hours. No results for input(s): CPK, CKMB in the last 72 hours.     No lab exists for component: TROPONINI  Lab Results   Component Value Date/Time    Color YELLOW/STRAW 08/30/2018 02:57 PM    Appearance CLOUDY (A) 08/30/2018 02:57 PM    Specific gravity 1.015 08/30/2018 02:57 PM    Specific gravity 1.015 05/04/2015 02:59 PM    pH (UA) 5.5 08/30/2018 02:57 PM    Protein NEGATIVE  08/30/2018 02:57 PM    Glucose NEGATIVE  08/30/2018 02:57 PM    Ketone NEGATIVE  08/30/2018 02:57 PM    Bilirubin NEGATIVE  08/30/2018 02:57 PM    Urobilinogen 1.0 08/30/2018 02:57 PM    Nitrites NEGATIVE  08/30/2018 02:57 PM    Leukocyte Esterase MODERATE (A) 08/30/2018 02:57 PM    Epithelial cells FEW 08/30/2018 02:57 PM    Bacteria 3+ (A) 08/30/2018 02:57 PM    WBC 5-10 08/30/2018 02:57 PM    RBC 0-5 08/30/2018 02:57 PM       MEDICATIONS:  Current Facility-Administered Medications   Medication Dose Route Frequency    insulin lispro (HUMALOG) injection   SubCUTAneous Q6H    glucose chewable tablet 16 g  4 Tab Oral PRN    dextrose (D50W) injection syrg 12.5-25 g  12.5-25 g IntraVENous PRN    glucagon (GLUCAGEN) injection 1 mg  1 mg IntraMUSCular PRN    lactated Ringers infusion  75 mL/hr IntraVENous CONTINUOUS    0.9% sodium chloride infusion  50 mL/hr IntraVENous CONTINUOUS    sodium chloride (NS) flush 5-10 mL  5-10 mL IntraVENous Q8H    sodium chloride (NS) flush 5-10 mL  5-10 mL IntraVENous PRN    lidocaine (PF) (XYLOCAINE) 10 mg/mL (1 %) injection 0.1 mL  0.1 mL SubCUTAneous PRN    fentaNYL citrate (PF) injection 50 mcg  50 mcg IntraVENous PRN    midazolam (VERSED) injection 1 mg  1 mg IntraVENous PRN    midazolam (VERSED) injection 1 mg  1 mg IntraVENous PRN    bupivacaine-EPINEPHrine (PF) (SENSORCAINE PF) 0.5 %-1:200,000 injection    PRN    sodium chloride (NS) flush 5-10 mL  5-10 mL IntraVENous Q8H    sodium chloride (NS) flush 5-10 mL  5-10 mL IntraVENous PRN    naloxone (NARCAN) injection 0.4 mg  0.4 mg IntraVENous PRN    ondansetron (ZOFRAN) injection 4 mg  4 mg IntraVENous Q4H PRN    bisacodyl (DULCOLAX) suppository 10 mg  10 mg Rectal DAILY PRN    enoxaparin (LOVENOX) injection 40 mg  40 mg SubCUTAneous Q24H    levoFLOXacin (LEVAQUIN) 750 mg in D5W IVPB  750 mg IntraVENous Q24H    aspirin delayed-release tablet 81 mg  81 mg Oral DAILY    atorvastatin (LIPITOR) tablet 10 mg  10 mg Oral QHS    dilTIAZem CD (CARDIZEM CD) capsule 120 mg  120 mg Oral DAILY    famotidine (PEPCID) tablet 20 mg  20 mg Oral BID    polyethylene glycol (MIRALAX) packet 17 g  17 g Oral DAILY    metroNIDAZOLE (FLAGYL) IVPB premix 500 mg  500 mg IntraVENous Q12H    0.9% sodium chloride with KCl 20 mEq/L infusion   IntraVENous CONTINUOUS    losartan (COZAAR) tablet 25 mg  25 mg Oral QHS    morphine injection 2 mg  2 mg IntraVENous Q3H PRN    melatonin tablet 6 mg  6 mg Oral QHS    labetalol (NORMODYNE;TRANDATE) injection 20 mg  20 mg IntraVENous Q3H PRN     Facility-Administered Medications Ordered in Other Encounters   Medication Dose Route Frequency    midazolam (VERSED) injection   IntraVENous PRN    lidocaine (PF) (XYLOCAINE) 20 mg/mL (2 %) injection   IntraVENous PRN    fentaNYL citrate (PF) injection   IntraVENous PRN    rocuronium (ZEMURON) injection   IntraVENous PRN    succinylcholine (ANECTINE) injection   IntraVENous PRN    propofol (DIPRIVAN) 10 mg/mL injection   IntraVENous PRN    PHENYLephrine (NEOSYNEPHRINE) in NS syringe   IntraVENous PRN    ePHEDrine (MISTOLE) 50 mg/mL injection   IntraVENous PRN    glucagon (GLUCAGEN) injection    PRN

## 2018-09-20 NOTE — PROGRESS NOTES
Orders received, chart reviewed. Pt currently off the floor for surgical procedure. Will defer however continue to follow.  Thank you    Clay Gonsalves, PT, DPT

## 2018-09-20 NOTE — PERIOP NOTES
TRANSFER - IN REPORT:    Verbal report received from [facility] on Lilly Salguero  being received from 2143 for ordered procedure    Report consisted of patients Situation, Background, Assessment and   Recommendations(SBAR). Information from the following report(s) SBAR, ED Summary, Procedure Summary, Intake/Output and MAR was reviewed with the receiving nurse. Opportunity for questions and clarification was provided. Assessment completed upon patients arrival to unit and care assumed.

## 2018-09-20 NOTE — PERIOP NOTES
TRANSFER - OUT REPORT:    Verbal report given to Luis Bone RN (name) on Noah Reasoner  being transferred to [facility] (unit) for routine progression of care       Report consisted of patients Situation, Background, Assessment and   Recommendations(SBAR). Information from the following report(s) SBAR, OR Summary, MAR and Cardiac Rhythm NSR was reviewed with the receiving nurse. Opportunity for questions and clarification was provided.       Patient transported with:   O2 @ 2 liters  Registered Nurse  [facility]

## 2018-09-20 NOTE — ANESTHESIA POSTPROCEDURE EVALUATION
Post-Anesthesia Evaluation and Assessment    Patient: Adryan Peters MRN: [id number]  SSN: [id number]    YOB: 1945  Age: 67 y.o. Sex: female       Cardiovascular Function/Vital Signs  Visit Vitals    /66 (BP 1 Location: Left arm, BP Patient Position: At rest)    Pulse 62    Temp 36.5 °C (97.7 °F)    Resp 15    SpO2 100%       Patient is status post general anesthesia for Procedure(s):  [address]. Nausea/Vomiting: None    Postoperative hydration reviewed and adequate. Pain:  Pain Scale 1: FLACC (09/20/18 1500)  Pain Intensity 1: 7 (09/20/18 1118)   Managed    Neurological Status:   Neuro (WDL): Exceptions to WDL (09/20/18 1500)  Neuro  Neurologic State: Drowsy (09/20/18 1500)  Orientation Level: Oriented to person;Oriented to situation;Oriented to place (09/20/18 1500)  Cognition: Follows commands (09/20/18 1500)  Speech: Clear (09/20/18 1500)  LUE Motor Response: Purposeful (09/20/18 1500)  LLE Motor Response: Purposeful (09/20/18 1500)  RUE Motor Response: Purposeful (09/20/18 1500)  RLE Motor Response: Purposeful (09/20/18 1500)   At baseline    Mental Status and Level of Consciousness: Arousable    Pulmonary Status:   O2 Device: Nasal cannula (09/20/18 1500)   Adequate oxygenation and airway patent    Complications related to anesthesia: None    Post-anesthesia assessment completed.  No concerns    Signed By: Sky Morelos MD     September 20, 2018

## 2018-09-21 ENCOUNTER — APPOINTMENT (OUTPATIENT)
Dept: GENERAL RADIOLOGY | Age: 73
DRG: 418 | End: 2018-09-21
Attending: INTERNAL MEDICINE
Payer: MEDICARE

## 2018-09-21 LAB
ALBUMIN SERPL-MCNC: 2.7 G/DL (ref 3.5–5)
ALBUMIN/GLOB SERPL: 0.8 {RATIO} (ref 1.1–2.2)
ALP SERPL-CCNC: 118 U/L (ref 45–117)
ALT SERPL-CCNC: 487 U/L (ref 12–78)
ANION GAP SERPL CALC-SCNC: 7 MMOL/L (ref 5–15)
AST SERPL-CCNC: 233 U/L (ref 15–37)
BASOPHILS # BLD: 0 K/UL (ref 0–0.1)
BASOPHILS NFR BLD: 0 % (ref 0–1)
BILIRUB DIRECT SERPL-MCNC: 4 MG/DL (ref 0–0.2)
BILIRUB SERPL-MCNC: 5 MG/DL (ref 0.2–1)
BUN SERPL-MCNC: 17 MG/DL (ref 6–20)
BUN/CREAT SERPL: 20 (ref 12–20)
CALCIUM SERPL-MCNC: 8.1 MG/DL (ref 8.5–10.1)
CHLORIDE SERPL-SCNC: 106 MMOL/L (ref 97–108)
CO2 SERPL-SCNC: 25 MMOL/L (ref 21–32)
CREAT SERPL-MCNC: 0.87 MG/DL (ref 0.55–1.02)
DIFFERENTIAL METHOD BLD: ABNORMAL
EOSINOPHIL # BLD: 0 K/UL (ref 0–0.4)
EOSINOPHIL NFR BLD: 0 % (ref 0–7)
ERYTHROCYTE [DISTWIDTH] IN BLOOD BY AUTOMATED COUNT: 13.4 % (ref 11.5–14.5)
GLOBULIN SER CALC-MCNC: 3.3 G/DL (ref 2–4)
GLUCOSE BLD STRIP.AUTO-MCNC: 107 MG/DL (ref 65–100)
GLUCOSE BLD STRIP.AUTO-MCNC: 112 MG/DL (ref 65–100)
GLUCOSE BLD STRIP.AUTO-MCNC: 116 MG/DL (ref 65–100)
GLUCOSE BLD STRIP.AUTO-MCNC: 78 MG/DL (ref 65–100)
GLUCOSE BLD STRIP.AUTO-MCNC: 83 MG/DL (ref 65–100)
GLUCOSE BLD STRIP.AUTO-MCNC: 93 MG/DL (ref 65–100)
GLUCOSE SERPL-MCNC: 104 MG/DL (ref 65–100)
HCT VFR BLD AUTO: 32.5 % (ref 35–47)
HGB BLD-MCNC: 10.4 G/DL (ref 11.5–16)
IMM GRANULOCYTES # BLD: 0 K/UL (ref 0–0.04)
IMM GRANULOCYTES NFR BLD AUTO: 0 % (ref 0–0.5)
LYMPHOCYTES # BLD: 0.5 K/UL (ref 0.8–3.5)
LYMPHOCYTES NFR BLD: 8 % (ref 12–49)
MCH RBC QN AUTO: 27.4 PG (ref 26–34)
MCHC RBC AUTO-ENTMCNC: 32 G/DL (ref 30–36.5)
MCV RBC AUTO: 85.5 FL (ref 80–99)
MONOCYTES # BLD: 0.4 K/UL (ref 0–1)
MONOCYTES NFR BLD: 7 % (ref 5–13)
NEUTS SEG # BLD: 4.6 K/UL (ref 1.8–8)
NEUTS SEG NFR BLD: 84 % (ref 32–75)
NRBC # BLD: 0 K/UL (ref 0–0.01)
NRBC BLD-RTO: 0 PER 100 WBC
PLATELET # BLD AUTO: 157 K/UL (ref 150–400)
PMV BLD AUTO: 11 FL (ref 8.9–12.9)
POTASSIUM SERPL-SCNC: 4.5 MMOL/L (ref 3.5–5.1)
PROT SERPL-MCNC: 6 G/DL (ref 6.4–8.2)
RBC # BLD AUTO: 3.8 M/UL (ref 3.8–5.2)
SERVICE CMNT-IMP: ABNORMAL
SERVICE CMNT-IMP: NORMAL
SODIUM SERPL-SCNC: 138 MMOL/L (ref 136–145)
WBC # BLD AUTO: 5.4 K/UL (ref 3.6–11)

## 2018-09-21 PROCEDURE — 77030007288 HC DEV LOK BILI BSC -A: Performed by: INTERNAL MEDICINE

## 2018-09-21 PROCEDURE — 74330 X-RAY BILE/PANC ENDOSCOPY: CPT

## 2018-09-21 PROCEDURE — 74011250637 HC RX REV CODE- 250/637: Performed by: INTERNAL MEDICINE

## 2018-09-21 PROCEDURE — 74011250636 HC RX REV CODE- 250/636: Performed by: INTERNAL MEDICINE

## 2018-09-21 PROCEDURE — 76060000033 HC ANESTHESIA 1 TO 1.5 HR: Performed by: INTERNAL MEDICINE

## 2018-09-21 PROCEDURE — 74011250637 HC RX REV CODE- 250/637: Performed by: SURGERY

## 2018-09-21 PROCEDURE — 74011000250 HC RX REV CODE- 250: Performed by: INTERNAL MEDICINE

## 2018-09-21 PROCEDURE — 80048 BASIC METABOLIC PNL TOTAL CA: CPT | Performed by: SURGERY

## 2018-09-21 PROCEDURE — 97116 GAIT TRAINING THERAPY: CPT

## 2018-09-21 PROCEDURE — 77030013079 HC BLNKT BAIR HGGR 3M -A: Performed by: ANESTHESIOLOGY

## 2018-09-21 PROCEDURE — 74011250636 HC RX REV CODE- 250/636

## 2018-09-21 PROCEDURE — 65660000000 HC RM CCU STEPDOWN

## 2018-09-21 PROCEDURE — C2625 STENT, NON-COR, TEM W/DEL SY: HCPCS | Performed by: INTERNAL MEDICINE

## 2018-09-21 PROCEDURE — 74011250636 HC RX REV CODE- 250/636: Performed by: SURGERY

## 2018-09-21 PROCEDURE — 76210000016 HC OR PH I REC 1 TO 1.5 HR: Performed by: INTERNAL MEDICINE

## 2018-09-21 PROCEDURE — 77030008684 HC TU ET CUF COVD -B: Performed by: ANESTHESIOLOGY

## 2018-09-21 PROCEDURE — 97161 PT EVAL LOW COMPLEX 20 MIN: CPT

## 2018-09-21 PROCEDURE — 94760 N-INVAS EAR/PLS OXIMETRY 1: CPT

## 2018-09-21 PROCEDURE — C1769 GUIDE WIRE: HCPCS | Performed by: INTERNAL MEDICINE

## 2018-09-21 PROCEDURE — 36415 COLL VENOUS BLD VENIPUNCTURE: CPT | Performed by: SURGERY

## 2018-09-21 PROCEDURE — 74011636320 HC RX REV CODE- 636/320: Performed by: INTERNAL MEDICINE

## 2018-09-21 PROCEDURE — 74011000250 HC RX REV CODE- 250: Performed by: SURGERY

## 2018-09-21 PROCEDURE — 77030010104 HC SEAL PRT ENDOSC BYRN -B: Performed by: INTERNAL MEDICINE

## 2018-09-21 PROCEDURE — 80076 HEPATIC FUNCTION PANEL: CPT | Performed by: INTERNAL MEDICINE

## 2018-09-21 PROCEDURE — G8978 MOBILITY CURRENT STATUS: HCPCS

## 2018-09-21 PROCEDURE — 77030018836 HC SOL IRR NACL ICUM -A: Performed by: INTERNAL MEDICINE

## 2018-09-21 PROCEDURE — 82962 GLUCOSE BLOOD TEST: CPT

## 2018-09-21 PROCEDURE — G8979 MOBILITY GOAL STATUS: HCPCS

## 2018-09-21 PROCEDURE — 85025 COMPLETE CBC W/AUTO DIFF WBC: CPT | Performed by: SURGERY

## 2018-09-21 PROCEDURE — 77030009038 HC CATH BILI STN RTVR BSC -C: Performed by: INTERNAL MEDICINE

## 2018-09-21 PROCEDURE — 74011000250 HC RX REV CODE- 250

## 2018-09-21 PROCEDURE — 76010000149 HC OR TIME 1 TO 1.5 HR: Performed by: INTERNAL MEDICINE

## 2018-09-21 PROCEDURE — BF101ZZ FLUOROSCOPY OF BILE DUCTS USING LOW OSMOLAR CONTRAST: ICD-10-PCS | Performed by: INTERNAL MEDICINE

## 2018-09-21 PROCEDURE — 0F798DZ DILATION OF COMMON BILE DUCT WITH INTRALUMINAL DEVICE, VIA NATURAL OR ARTIFICIAL OPENING ENDOSCOPIC: ICD-10-PCS | Performed by: INTERNAL MEDICINE

## 2018-09-21 PROCEDURE — 77030026438 HC STYL ET INTUB CARD -A: Performed by: ANESTHESIOLOGY

## 2018-09-21 DEVICE — BILIARY STENT WITH NAVIFLEXTM RX DELIVERY SYSTEM
Type: IMPLANTABLE DEVICE | Site: BILE DUCT | Status: NON-FUNCTIONAL
Brand: ADVANIX™ BILIARY
Removed: 2018-12-19

## 2018-09-21 RX ORDER — HYDRALAZINE HYDROCHLORIDE 20 MG/ML
10 INJECTION INTRAMUSCULAR; INTRAVENOUS
Status: DISCONTINUED | OUTPATIENT
Start: 2018-09-21 | End: 2018-10-06 | Stop reason: HOSPADM

## 2018-09-21 RX ORDER — SODIUM CHLORIDE 0.9 % (FLUSH) 0.9 %
5-10 SYRINGE (ML) INJECTION AS NEEDED
Status: DISCONTINUED | OUTPATIENT
Start: 2018-09-21 | End: 2018-09-21 | Stop reason: HOSPADM

## 2018-09-21 RX ORDER — FENTANYL CITRATE 50 UG/ML
25 INJECTION, SOLUTION INTRAMUSCULAR; INTRAVENOUS
Status: DISCONTINUED | OUTPATIENT
Start: 2018-09-21 | End: 2018-09-21 | Stop reason: HOSPADM

## 2018-09-21 RX ORDER — DIPHENHYDRAMINE HYDROCHLORIDE 50 MG/ML
12.5 INJECTION, SOLUTION INTRAMUSCULAR; INTRAVENOUS AS NEEDED
Status: DISCONTINUED | OUTPATIENT
Start: 2018-09-21 | End: 2018-09-21 | Stop reason: HOSPADM

## 2018-09-21 RX ORDER — SODIUM CHLORIDE 0.9 % (FLUSH) 0.9 %
5-10 SYRINGE (ML) INJECTION EVERY 8 HOURS
Status: DISCONTINUED | OUTPATIENT
Start: 2018-09-21 | End: 2018-09-21 | Stop reason: HOSPADM

## 2018-09-21 RX ORDER — SODIUM CHLORIDE, SODIUM LACTATE, POTASSIUM CHLORIDE, CALCIUM CHLORIDE 600; 310; 30; 20 MG/100ML; MG/100ML; MG/100ML; MG/100ML
25 INJECTION, SOLUTION INTRAVENOUS CONTINUOUS
Status: DISCONTINUED | OUTPATIENT
Start: 2018-09-21 | End: 2018-09-21 | Stop reason: HOSPADM

## 2018-09-21 RX ORDER — LIDOCAINE HYDROCHLORIDE 20 MG/ML
INJECTION, SOLUTION EPIDURAL; INFILTRATION; INTRACAUDAL; PERINEURAL AS NEEDED
Status: DISCONTINUED | OUTPATIENT
Start: 2018-09-21 | End: 2018-09-21 | Stop reason: HOSPADM

## 2018-09-21 RX ORDER — HYDROMORPHONE HYDROCHLORIDE 1 MG/ML
0.2 INJECTION, SOLUTION INTRAMUSCULAR; INTRAVENOUS; SUBCUTANEOUS
Status: DISCONTINUED | OUTPATIENT
Start: 2018-09-21 | End: 2018-09-21 | Stop reason: HOSPADM

## 2018-09-21 RX ORDER — PHENYLEPHRINE HCL IN 0.9% NACL 0.4MG/10ML
SYRINGE (ML) INTRAVENOUS AS NEEDED
Status: DISCONTINUED | OUTPATIENT
Start: 2018-09-21 | End: 2018-09-21 | Stop reason: HOSPADM

## 2018-09-21 RX ORDER — SODIUM CHLORIDE, SODIUM LACTATE, POTASSIUM CHLORIDE, CALCIUM CHLORIDE 600; 310; 30; 20 MG/100ML; MG/100ML; MG/100ML; MG/100ML
25 INJECTION, SOLUTION INTRAVENOUS CONTINUOUS
Status: DISCONTINUED | OUTPATIENT
Start: 2018-09-21 | End: 2018-09-21

## 2018-09-21 RX ORDER — METOPROLOL TARTRATE 25 MG/1
25 TABLET, FILM COATED ORAL EVERY 12 HOURS
Status: DISCONTINUED | OUTPATIENT
Start: 2018-09-21 | End: 2018-09-30

## 2018-09-21 RX ORDER — ROCURONIUM BROMIDE 10 MG/ML
INJECTION, SOLUTION INTRAVENOUS AS NEEDED
Status: DISCONTINUED | OUTPATIENT
Start: 2018-09-21 | End: 2018-09-21 | Stop reason: HOSPADM

## 2018-09-21 RX ORDER — INSULIN LISPRO 100 [IU]/ML
INJECTION, SOLUTION INTRAVENOUS; SUBCUTANEOUS
Status: DISCONTINUED | OUTPATIENT
Start: 2018-09-21 | End: 2018-10-06 | Stop reason: HOSPADM

## 2018-09-21 RX ORDER — EPINEPHRINE 0.1 MG/ML
1 INJECTION INTRACARDIAC; INTRAVENOUS
Status: ACTIVE | OUTPATIENT
Start: 2018-09-21 | End: 2018-09-22

## 2018-09-21 RX ORDER — EPHEDRINE SULFATE 50 MG/ML
INJECTION, SOLUTION INTRAVENOUS AS NEEDED
Status: DISCONTINUED | OUTPATIENT
Start: 2018-09-21 | End: 2018-09-21 | Stop reason: HOSPADM

## 2018-09-21 RX ORDER — ATROPINE SULFATE 0.1 MG/ML
0.5 INJECTION INTRAVENOUS
Status: ACTIVE | OUTPATIENT
Start: 2018-09-21 | End: 2018-09-22

## 2018-09-21 RX ORDER — DEXTROMETHORPHAN/PSEUDOEPHED 2.5-7.5/.8
1.2 DROPS ORAL
Status: DISCONTINUED | OUTPATIENT
Start: 2018-09-21 | End: 2018-09-21 | Stop reason: ALTCHOICE

## 2018-09-21 RX ORDER — SODIUM CHLORIDE, SODIUM LACTATE, POTASSIUM CHLORIDE, CALCIUM CHLORIDE 600; 310; 30; 20 MG/100ML; MG/100ML; MG/100ML; MG/100ML
50 INJECTION, SOLUTION INTRAVENOUS CONTINUOUS
Status: DISCONTINUED | OUTPATIENT
Start: 2018-09-21 | End: 2018-09-22

## 2018-09-21 RX ORDER — SUCCINYLCHOLINE CHLORIDE 20 MG/ML
INJECTION INTRAMUSCULAR; INTRAVENOUS AS NEEDED
Status: DISCONTINUED | OUTPATIENT
Start: 2018-09-21 | End: 2018-09-21 | Stop reason: HOSPADM

## 2018-09-21 RX ORDER — DIPHENHYDRAMINE HYDROCHLORIDE 50 MG/ML
25 INJECTION, SOLUTION INTRAMUSCULAR; INTRAVENOUS
Status: DISCONTINUED | OUTPATIENT
Start: 2018-09-21 | End: 2018-10-06 | Stop reason: HOSPADM

## 2018-09-21 RX ORDER — POLYETHYLENE GLYCOL 3350 17 G/17G
17 POWDER, FOR SOLUTION ORAL 2 TIMES DAILY
Status: DISCONTINUED | OUTPATIENT
Start: 2018-09-21 | End: 2018-09-23

## 2018-09-21 RX ORDER — SODIUM CHLORIDE 9 MG/ML
75 INJECTION, SOLUTION INTRAVENOUS CONTINUOUS
Status: DISCONTINUED | OUTPATIENT
Start: 2018-09-21 | End: 2018-09-21

## 2018-09-21 RX ORDER — LIDOCAINE HYDROCHLORIDE 10 MG/ML
0.1 INJECTION, SOLUTION EPIDURAL; INFILTRATION; INTRACAUDAL; PERINEURAL AS NEEDED
Status: DISCONTINUED | OUTPATIENT
Start: 2018-09-21 | End: 2018-09-21 | Stop reason: HOSPADM

## 2018-09-21 RX ORDER — ONDANSETRON 2 MG/ML
INJECTION INTRAMUSCULAR; INTRAVENOUS AS NEEDED
Status: DISCONTINUED | OUTPATIENT
Start: 2018-09-21 | End: 2018-09-21 | Stop reason: HOSPADM

## 2018-09-21 RX ORDER — SODIUM CHLORIDE, SODIUM LACTATE, POTASSIUM CHLORIDE, CALCIUM CHLORIDE 600; 310; 30; 20 MG/100ML; MG/100ML; MG/100ML; MG/100ML
INJECTION, SOLUTION INTRAVENOUS
Status: DISCONTINUED | OUTPATIENT
Start: 2018-09-21 | End: 2018-09-21 | Stop reason: HOSPADM

## 2018-09-21 RX ORDER — DEXTROSE MONOHYDRATE AND SODIUM CHLORIDE 5; .9 G/100ML; G/100ML
75 INJECTION, SOLUTION INTRAVENOUS CONTINUOUS
Status: DISCONTINUED | OUTPATIENT
Start: 2018-09-21 | End: 2018-09-21

## 2018-09-21 RX ORDER — PROPOFOL 10 MG/ML
INJECTION, EMULSION INTRAVENOUS AS NEEDED
Status: DISCONTINUED | OUTPATIENT
Start: 2018-09-21 | End: 2018-09-21 | Stop reason: HOSPADM

## 2018-09-21 RX ORDER — FENTANYL CITRATE 50 UG/ML
INJECTION, SOLUTION INTRAMUSCULAR; INTRAVENOUS AS NEEDED
Status: DISCONTINUED | OUTPATIENT
Start: 2018-09-21 | End: 2018-09-21 | Stop reason: HOSPADM

## 2018-09-21 RX ORDER — GLYCOPYRROLATE 0.2 MG/ML
INJECTION INTRAMUSCULAR; INTRAVENOUS AS NEEDED
Status: DISCONTINUED | OUTPATIENT
Start: 2018-09-21 | End: 2018-09-21 | Stop reason: HOSPADM

## 2018-09-21 RX ORDER — FLUMAZENIL 0.1 MG/ML
0.2 INJECTION INTRAVENOUS
Status: ACTIVE | OUTPATIENT
Start: 2018-09-21 | End: 2018-09-21

## 2018-09-21 RX ORDER — AMOXICILLIN 250 MG
1 CAPSULE ORAL DAILY
Status: DISCONTINUED | OUTPATIENT
Start: 2018-09-21 | End: 2018-10-06 | Stop reason: HOSPADM

## 2018-09-21 RX ADMIN — METRONIDAZOLE 500 MG: 500 INJECTION, SOLUTION INTRAVENOUS at 16:55

## 2018-09-21 RX ADMIN — LOSARTAN POTASSIUM 25 MG: 25 TABLET ORAL at 16:32

## 2018-09-21 RX ADMIN — Medication 10 ML: at 21:05

## 2018-09-21 RX ADMIN — SODIUM CHLORIDE, SODIUM LACTATE, POTASSIUM CHLORIDE, CALCIUM CHLORIDE: 600; 310; 30; 20 INJECTION, SOLUTION INTRAVENOUS at 12:39

## 2018-09-21 RX ADMIN — SODIUM CHLORIDE, SODIUM LACTATE, POTASSIUM CHLORIDE, AND CALCIUM CHLORIDE 250 ML/HR: 600; 310; 30; 20 INJECTION, SOLUTION INTRAVENOUS at 15:26

## 2018-09-21 RX ADMIN — LEVOFLOXACIN 750 MG: 5 INJECTION, SOLUTION INTRAVENOUS at 16:56

## 2018-09-21 RX ADMIN — SODIUM CHLORIDE, SODIUM LACTATE, POTASSIUM CHLORIDE, CALCIUM CHLORIDE: 600; 310; 30; 20 INJECTION, SOLUTION INTRAVENOUS at 13:49

## 2018-09-21 RX ADMIN — ROCURONIUM BROMIDE 5 MG: 10 INJECTION, SOLUTION INTRAVENOUS at 12:53

## 2018-09-21 RX ADMIN — HYDROCODONE BITARTRATE AND ACETAMINOPHEN 2 TABLET: 5; 325 TABLET ORAL at 16:47

## 2018-09-21 RX ADMIN — DIPHENHYDRAMINE HYDROCHLORIDE 25 MG: 50 INJECTION, SOLUTION INTRAMUSCULAR; INTRAVENOUS at 18:32

## 2018-09-21 RX ADMIN — SODIUM CHLORIDE AND POTASSIUM CHLORIDE: 9; 1.49 INJECTION, SOLUTION INTRAVENOUS at 05:35

## 2018-09-21 RX ADMIN — LABETALOL HYDROCHLORIDE 20 MG: 5 INJECTION INTRAVENOUS at 17:12

## 2018-09-21 RX ADMIN — Medication 80 MCG: at 13:06

## 2018-09-21 RX ADMIN — NITROGLYCERIN 1 INCH: 20 OINTMENT TOPICAL at 16:53

## 2018-09-21 RX ADMIN — LABETALOL HYDROCHLORIDE 20 MG: 5 INJECTION INTRAVENOUS at 08:52

## 2018-09-21 RX ADMIN — SUCCINYLCHOLINE CHLORIDE 100 MG: 20 INJECTION INTRAMUSCULAR; INTRAVENOUS at 12:53

## 2018-09-21 RX ADMIN — LIDOCAINE HYDROCHLORIDE 40 MG: 20 INJECTION, SOLUTION EPIDURAL; INFILTRATION; INTRACAUDAL; PERINEURAL at 12:55

## 2018-09-21 RX ADMIN — Medication 10 ML: at 15:35

## 2018-09-21 RX ADMIN — HYDROCODONE BITARTRATE AND ACETAMINOPHEN 2 TABLET: 5; 325 TABLET ORAL at 20:57

## 2018-09-21 RX ADMIN — FENTANYL CITRATE 50 MCG: 50 INJECTION, SOLUTION INTRAMUSCULAR; INTRAVENOUS at 12:55

## 2018-09-21 RX ADMIN — EPHEDRINE SULFATE 5 MG: 50 INJECTION, SOLUTION INTRAVENOUS at 13:07

## 2018-09-21 RX ADMIN — HYDROCODONE BITARTRATE AND ACETAMINOPHEN 2 TABLET: 5; 325 TABLET ORAL at 00:52

## 2018-09-21 RX ADMIN — DILTIAZEM HYDROCHLORIDE 120 MG: 120 CAPSULE, COATED, EXTENDED RELEASE ORAL at 08:29

## 2018-09-21 RX ADMIN — PROPOFOL 150 MG: 10 INJECTION, EMULSION INTRAVENOUS at 12:53

## 2018-09-21 RX ADMIN — MORPHINE SULFATE 2 MG: 2 INJECTION, SOLUTION INTRAMUSCULAR; INTRAVENOUS at 15:25

## 2018-09-21 RX ADMIN — GLYCOPYRROLATE 0.2 MG: 0.2 INJECTION INTRAMUSCULAR; INTRAVENOUS at 13:01

## 2018-09-21 RX ADMIN — HYDROMORPHONE HYDROCHLORIDE 0.5 MG: 2 INJECTION, SOLUTION INTRAMUSCULAR; INTRAVENOUS; SUBCUTANEOUS at 08:29

## 2018-09-21 RX ADMIN — ONDANSETRON 4 MG: 2 INJECTION INTRAMUSCULAR; INTRAVENOUS at 15:25

## 2018-09-21 RX ADMIN — FAMOTIDINE 20 MG: 20 TABLET ORAL at 18:00

## 2018-09-21 RX ADMIN — Medication 80 MCG: at 13:13

## 2018-09-21 RX ADMIN — FAMOTIDINE 20 MG: 20 TABLET ORAL at 08:29

## 2018-09-21 RX ADMIN — Medication 80 MCG: at 13:27

## 2018-09-21 RX ADMIN — MELATONIN TAB 3 MG 6 MG: 3 TAB at 20:58

## 2018-09-21 RX ADMIN — Medication 80 MCG: at 13:22

## 2018-09-21 RX ADMIN — ONDANSETRON 4 MG: 2 INJECTION INTRAMUSCULAR; INTRAVENOUS at 11:24

## 2018-09-21 RX ADMIN — Medication 80 MCG: at 13:10

## 2018-09-21 RX ADMIN — POLYETHYLENE GLYCOL 3350 17 G: 17 POWDER, FOR SOLUTION ORAL at 18:00

## 2018-09-21 RX ADMIN — SODIUM CHLORIDE, SODIUM LACTATE, POTASSIUM CHLORIDE, AND CALCIUM CHLORIDE 250 ML/HR: 600; 310; 30; 20 INJECTION, SOLUTION INTRAVENOUS at 21:14

## 2018-09-21 RX ADMIN — METOPROLOL TARTRATE 25 MG: 25 TABLET ORAL at 20:58

## 2018-09-21 RX ADMIN — METRONIDAZOLE 500 MG: 500 INJECTION, SOLUTION INTRAVENOUS at 05:35

## 2018-09-21 RX ADMIN — ONDANSETRON 4 MG: 2 INJECTION INTRAMUSCULAR; INTRAVENOUS at 12:51

## 2018-09-21 RX ADMIN — HYDRALAZINE HYDROCHLORIDE 10 MG: 20 INJECTION INTRAMUSCULAR; INTRAVENOUS at 15:26

## 2018-09-21 RX ADMIN — LOSARTAN POTASSIUM 25 MG: 25 TABLET ORAL at 20:58

## 2018-09-21 RX ADMIN — DEXTROSE MONOHYDRATE 12.5 G: 25 INJECTION, SOLUTION INTRAVENOUS at 08:27

## 2018-09-21 RX ADMIN — Medication 10 ML: at 05:35

## 2018-09-21 RX ADMIN — FENTANYL CITRATE 50 MCG: 50 INJECTION, SOLUTION INTRAMUSCULAR; INTRAVENOUS at 13:14

## 2018-09-21 NOTE — PROGRESS NOTES
Subjective:    Patient seen and examined by me today. Feels ok. Sore. Say she has not had a BM in 8 days. Objective:    Blood pressure 156/78, pulse 68, temperature 98.7 °F (37.1 °C), resp. rate 16, SpO2 100 %. Drains - bile tinged --- but her tissues were very jaundiced. Exam - abd soft, approp TTP, CDI. Assessment:  Procedure(s):  CHOLECYSTECTOMY LAPAROSCOPIC WITH GRAMS 9/20/2018    Active Hospital Problems    Diagnosis Date Noted    Choledocholithiasis with acute cholecystitis 09/19/2018     - distal CBD obstruction. - constipation. Plan:  - ERCP today if deemed appropriate by GI.  - boubled up her Miralax. Will defer to GI on adding more. - can d/c home with AIRAM and have her see me in a week in the office.    - we'll follow along. Thanks.        Maicol Pate MD

## 2018-09-21 NOTE — PROGRESS NOTES
Spiritual Care Partner Volunteer visited patient in 9/21/18 on Gen Surg. Documented by:  Mary Cano M.Div.    Paging Service [phone])

## 2018-09-21 NOTE — PROGRESS NOTES
This nurse was asked to gain consent for this patient's ERCP. This nurse talked with patient who has questions and \"doesn't understand the pain\" related to this procedure.      This nurse will send consent with patient to procedure

## 2018-09-21 NOTE — PERIOP NOTES
Patient: Mirna Jaime MRN: [id number]  SSN: [id number]   YOB: 1945  Age: 67 y.o. Sex: female     Patient is status post Procedure(s):  ENDOSCOPIC RETROGRADE CHOLANGIOPANCREATOGRAPHY (ERCP). Surgeon(s) and Role:     * Yvette Llamas MD - Primary    Local/Dose/Irrigation:  SEE MAR                  Peripheral IV 09/19/18 Left Forearm (Active)   Site Assessment Clean, dry, & intact 9/20/2018 10:47 PM   Phlebitis Assessment 0 9/20/2018 10:47 PM   Infiltration Assessment 0 9/20/2018 10:47 PM   Dressing Status Clean, dry, & intact 9/20/2018 10:47 PM   Dressing Type Tape;Transparent 9/20/2018 10:47 PM   Hub Color/Line Status Blue; Infusing 9/20/2018 10:47 PM   Action Taken Blood drawn 9/19/2018  9:39 AM       Peripheral IV 09/19/18 Right Antecubital (Active)   Site Assessment Clean, dry, & intact 9/20/2018 10:47 PM   Phlebitis Assessment 0 9/20/2018 10:47 PM   Infiltration Assessment 0 9/20/2018 10:47 PM   Dressing Status Clean, dry, & intact 9/20/2018 10:47 PM   Dressing Type Tape;Transparent 9/20/2018 10:47 PM   Hub Color/Line Status Pink;Flushed;Patent 9/20/2018  5:29 PM   Action Taken Blood drawn 9/19/2018  4:57 PM   Alcohol Cap Used Yes 9/20/2018  8:55 AM          [place] Drain 09/20/18 Right Abdomen (Active)   Site Assessment Clean, dry, & intact 9/21/2018 12:46 PM   Dressing Status Clean, dry, & intact 9/21/2018 12:46 PM   Status Patent;Draining 9/21/2018 12:46 PM   Drainage Color Brown;Yellow 9/21/2018 12:46 PM   Output (ml) 50 ml 9/21/2018  1:52 AM      Airway - Endotracheal Tube 09/21/18 Oral (Active)   Line Sushil Lips 9/20/2018 12:00 AM                   Dressing/Packing: NOTED FROM PREVIOUS PROCEDURE- Wound Abdomen-DRESSING TYPE: Topical skin adhesive/glue (09/21/18 5905)  Wound Abdomen-DRESSING TYPE: Transparent film (09/20/18 5356)    Other:  NO DRESSING, ERCP.

## 2018-09-21 NOTE — PROGRESS NOTES
Problem: Mobility Impaired (Adult and Pediatric)  Goal: *Acute Goals and Plan of Care (Insert Text)  Physical Therapy Goals  Initiated 9/21/2018  1. Patient will move from supine to sit and sit to supine , scoot up and down and roll side to side in bed with independence within 7 day(s). 2.  Patient will transfer from bed to chair and chair to bed with independence using the least restrictive device within 7 day(s). 3.  Patient will perform sit to stand with independence within 7 day(s). 4.  Patient will ambulate with independence for 200 feet with the least restrictive device within 7 day(s). physical Therapy EVALUATION  Patient: Radha Couch (88 y.o. female)  Date: 9/21/2018  Primary Diagnosis: Choledocholithiasis with acute cholecystitis  CBD stone  CHOLELITHIASIS  CBD Stone  Procedure(s) (LRB):  ENDOSCOPIC RETROGRADE CHOLANGIOPANCREATOGRAPHY (ERCP) (N/A) Day of Surgery   Precautions:        ASSESSMENT :  Based on the objective data described below, the patient presents with increased pain levels, decreased activity tolerance, generalized weakness, and overall impaired functional mobility. Pt received supine in bed, agreeable to limited participation in PT evaluation. Pt assumed seated position EOB independently, intact sitting balance. Pt sit>>stand w/ standby assist and ambulated 10ft w/ CGAx1. Gait steady over limited distance however pt only able to tolerate ambulate trial of ~10ft before fatigue and requesting return to supine position in bed. Pt repeatedly stated how weak she felt throughout therapy session. Pending further progress with therapy and hospital course, discharge recommendations TBD however pt likely appropriate to return home w/ assist of family. .    Patient will benefit from skilled intervention to address the above impairments.   Patients rehabilitation potential is considered to be Excellent  Factors which may influence rehabilitation potential include:   []         None noted  []         Mental ability/status  []         Medical condition  []         Home/family situation and support systems  []         Safety awareness  []         Pain tolerance/management  []         Other:      PLAN :  Recommendations and Planned Interventions:  [x]           Bed Mobility Training             []    Neuromuscular Re-Education  [x]           Transfer Training                   []    Orthotic/Prosthetic Training  [x]           Gait Training                         []    Modalities  [x]           Therapeutic Exercises           []    Edema Management/Control  [x]           Therapeutic Activities            [x]    Patient and Family Training/Education  []           Other (comment):    Frequency/Duration: Patient will be followed by physical therapy  3 times a week to address goals. Discharge Recommendations: To Be Determined  Further Equipment Recommendations for Discharge: TBD by facility     SUBJECTIVE:   Patient stated I feel awful.     OBJECTIVE DATA SUMMARY:   HISTORY:    Past Medical History:   Diagnosis Date    Agoraphobia without mention of panic attacks 2/17/2014    Anxiety disorder 8/18/2013    Arthritis     osteo    Breast pain, left 4/7/2015    CAD (coronary artery disease), native coronary artery 12/1/2015    Chronic chest pain 1/13/2014    Chronic pain associated with significant psychosocial dysfunction 2/17/2014    Depression 8/18/2013    Diabetes ([facility] [address]     type II    Duplicated right renal collecting system 3/13/2014    GERD (gastroesophageal reflux disease)     Gout, joint     Hypercholesteremia     hyercholesterolemia    Hypertension     Nephrolithiasis 3/13/2014    Personal history of noncompliance with medical treatment, presenting hazards to health 5/30/2014    Psychotic disorder     Vaginal pain 7/30/2014     Past Surgical History:   Procedure Laterality Date    EGD  4/23/2010         HX CYST REMOVAL      cyst removed from left wrist    HX HYSTERECTOMY partial    HX OTHER SURGICAL      bladder dilitation    HX TUBAL LIGATION      HX UROLOGICAL      kidney stones     Prior Level of Function/Home Situation: Pt lives at home w/ family however is home alone during the day. Pt reports independence w/ ambulation and ADLs. Denies history of falls  Personal factors and/or comorbidities impacting plan of care:     Home Situation  Home Environment: Private residence  Living Alone: No  Support Systems: Child(jim), Family member(s)  Patient Expects to be Discharged to[de-identified] Private residence  Current DME Used/Available at Home: None    EXAMINATION/PRESENTATION/DECISION MAKING:   Critical Behavior:  Neurologic State: Alert, Appropriate for age  Orientation Level: Appropriate for age, Oriented to situation, Oriented to person, Oriented to place  Cognition: Appropriate decision making, Appropriate for age attention/concentration, Appropriate safety awareness, Follows commands     Hearing: Auditory  Auditory Impairment: None  Hearing Aids/Status: Does not own  Skin:  intact  Edema: none noted   Range Of Motion:  AROM: Within functional limits                       Strength:    Strength: Generally decreased, functional                    Tone & Sensation:   Tone: Normal              Sensation: Intact               Coordination:  Coordination: Within functional limits  Vision:      Functional Mobility:  Bed Mobility:  Rolling: Independent  Supine to Sit: Independent  Sit to Supine: Independent  Scooting: Independent  Transfers:  Sit to Stand: Supervision  Stand to Sit: Supervision                       Balance:   Sitting: Intact  Standing: Impaired  Standing - Static: Good  Standing - Dynamic : Good  Ambulation/Gait Training:  Distance (ft): 10 Feet (ft)  Assistive Device: Gait belt  Ambulation - Level of Assistance: Stand-by assistance;Contact guard assistance        Gait Abnormalities: Decreased step clearance;Trunk sway increased; Shuffling gait        Base of Support: Widened     Speed/Gale: Shuffled; Slow  Step Length: Left shortened;Right shortened                  Functional Measure:  Barthel Index:    Bathin  Bladder: 10  Bowels: 10  Groomin  Dressin  Feeding: 10  Mobility: 0  Stairs: 0  Toilet Use: 10  Transfer (Bed to Chair and Back): 10  Total: 60       Barthel and G-code impairment scale:  Percentage of impairment CH  0% CI  1-19% CJ  20-39% CK  40-59% CL  60-79% CM  80-99% CN  100%   Barthel Score 0-100 100 99-80 79-60 59-40 20-39 1-19   0   Barthel Score 0-20 20 17-19 13-16 9-12 5-8 1-4 0      The Barthel ADL Index: Guidelines  1. The index should be used as a record of what a patient does, not as a record of what a patient could do. 2. The main aim is to establish degree of independence from any help, physical or verbal, however minor and for whatever reason. 3. The need for supervision renders the patient not independent. 4. A patient's performance should be established using the best available evidence. Asking the patient, friends/relatives and nurses are the usual sources, but direct observation and common sense are also important. However direct testing is not needed. 5. Usually the patient's performance over the preceding 24-48 hours is important, but occasionally longer periods will be relevant. 6. Middle categories imply that the patient supplies over 50 per cent of the effort. 7. Use of aids to be independent is allowed. Freddie Carnes., Barthel, D.W. ([id number]). Functional evaluation: the Barthel Index. [address] [facility] (14)2. Pushpa Ulloa, SUMEETJDestineeMALEXANDER, Eliot Ortiz.Samantha., [place], [address] [place] [address] (). Measuring the change indisability after inpatient rehabilitation; comparison of the responsiveness of the Barthel Index and Functional Minetto Measure. Journal of Neurology, Neurosurgery, and Psychiatry, 66(4), 257-777.   DOUGLAS Mueller, Fang Constantino  WDestineeJ.NILSON, & Jluius Melchor MDestineeA. (2004.) Assessment of post-stroke quality of life in cost-effectiveness studies: The usefulness of the Barthel Index and the EuroQoL-5D. Quality of Life Research, 13, 121-80         G codes: In compliance with CMSs Claims Based Outcome Reporting, the following G-code set was chosen for this patient based on their primary functional limitation being treated: The outcome measure chosen to determine the severity of the functional limitation was the Barthel Index with a score of 60/100 which was correlated with the impairment scale. ? Mobility - Walking and Moving Around:     - CURRENT STATUS: CJ - 20%-39% impaired, limited or restricted    - GOAL STATUS: CI - 1%-19% impaired, limited or restricted    - D/C STATUS:  ---------------To be determined---------------      Physical Therapy Evaluation Charge Determination   History Examination Presentation Decision-Making   MEDIUM  Complexity : 1-2 comorbidities / personal factors will impact the outcome/ POC  MEDIUM Complexity : 3 Standardized tests and measures addressing body structure, function, activity limitation and / or participation in recreation  MEDIUM Complexity : Evolving with changing characteristics  MEDIUM Complexity : FOTO score of 26-74      Based on the above components, the patient evaluation is determined to be of the following complexity level: MEDIUM    Pain:  Pain Scale 1: Numeric (0 - 10)  Pain Intensity 1: 7  Pain Location 1: Back;Rib cage  Pain Orientation 1: Left  Pain Description 1: Tightness; Aching  Pain Intervention(s) 1: Medication (see MAR)  Activity Tolerance:   VSS  Please refer to the flowsheet for vital signs taken during this treatment.   After treatment:   []         Patient left in no apparent distress sitting up in chair  [x]         Patient left in no apparent distress in bed  [x]         Call bell left within reach  [x]         Nursing notified  [x]         Caregiver present  []         Bed alarm activated    COMMUNICATION/EDUCATION:   The patients plan of care was discussed with: Registered Nurse. [x]         Fall prevention education was provided and the patient/caregiver indicated understanding. [x]         Patient/family have participated as able in goal setting and plan of care. [x]         Patient/family agree to work toward stated goals and plan of care. []         Patient understands intent and goals of therapy, but is neutral about his/her participation. []         Patient is unable to participate in goal setting and plan of care.     Thank you for this referral.  Debbie Zamarripa, PT, DPT   Time Calculation: 15 mins

## 2018-09-21 NOTE — PERIOP NOTES
Handoff Report from Operating Room to PACU    Report received from [address] [place] [address] [facility] regarding Blenda Reasoner. Surgeon(s):  Brandee Singer MD  And Procedure(s) (LRB):  ENDOSCOPIC RETROGRADE CHOLANGIOPANCREATOGRAPHY (ERCP) (N/A)  confirmed   with drains and dressings discussed. Anesthesia type, drugs, patient history, complications, estimated blood loss, vital signs, intake and output, and temperature were reviewed.

## 2018-09-21 NOTE — DISCHARGE INSTRUCTIONS
Endoscopic Retrograde Cholangiopancreatogram (ERCP): Before Your Procedure  What is an endoscopic retrograde cholangiopancreatogram?  Endoscopic retrograde cholangiopancreatogram (ERCP) is a test to look at the tubes that carry fluids from the liver, gallbladder, and pancreas. These tubes are called ducts. For this test, the doctor will use a tool called an endoscope, or scope. It is a thin, lighted tube that bends. It has a camera on it that lets the doctor use pictures on a screen to guide it. This test is used for different reasons. It can help find out the cause of your symptoms. If the test shows gallstones or a narrow spot in a bile duct, the doctor can use the scope to remove the stones or widen the duct. He or she may also put a metal or plastic tube in the duct. This can help open it. Before the test, you may get medicine to numb the back of your throat. You also will get medicine to help you relax. During the test, you will lie on your left side or on your stomach. The doctor puts the scope in your mouth and then gently moves it toward the back of your throat. The doctor will tell you when to swallow. This helps the scope move down your throat. You will be able to breathe normally. After that, the doctor moves the scope through the tube (esophagus) that leads to your stomach, through your stomach, and into the first part of your small intestine. When the scope reaches the place where the bile ducts and the pancreas meet the small intestine, you may turn and lie on your stomach. The doctor then puts a small plastic tube into the scope to inject dye into the ducts. The dye helps the ducts show up on X-rays. Then the doctor takes X-ray pictures to help find any problems. The test takes 30 minutes to 2 hours. You may go home the same day. But if you have treatment during the test, you may need to stay in the hospital overnight. Follow-up care is a key part of your treatment and safety.  Be sure to make and go to all appointments, and call your doctor if you are having problems. It's also a good idea to know your test results and keep a list of the medicines you take. What happens before the procedure?   Preparing for the procedure    · Understand exactly what procedure is planned, along with the risks, benefits, and other options. · Tell your doctors ALL the medicines, vitamins, supplements, and herbal remedies you take. Some of these can increase the risk of bleeding or interact with anesthesia.     · If you take blood thinners, such as warfarin (Coumadin), clopidogrel (Plavix), or aspirin, be sure to talk to your doctor. He or she will tell you if you should stop taking these medicines before your procedure. Make sure that you understand exactly what your doctor wants you to do.     · Your doctor will tell you which medicines to take or stop before your procedure. You may need to stop taking certain medicines a week or more before the procedure. So talk to your doctor as soon as you can.     · If you have an advance directive, let your doctor know. It may include a living will and a durable power of  for health care. Bring a copy to the hospital. If you don't have one, you may want to prepare one. It lets your doctor and loved ones know your health care wishes. Doctors advise that everyone prepare these papers before any type of surgery or procedure. Procedures can be stressful. This information will help you understand what you can expect. And it will help you safely prepare for your procedure. What happens on the day of the procedure? · Follow the instructions exactly about when to stop eating and drinking. If you don't, your procedure may be canceled. If your doctor told you to take your medicines on the day of the procedure, take them with only a sip of water.     · Take a bath or shower before you come in for your procedure. Do not apply lotions, perfumes, deodorants, or nail polish.   · Take off all jewelry and piercings. And take out contact lenses, if you wear them.    At the hospital or surgery center   · Bring a picture ID.     · You may get medicine that relaxes you or puts you in a light sleep. The area being worked on will be numb.     · Tell your doctor if you are allergic to iodine. It is in the dye that the doctor puts into the bile ducts.     · The doctor may send puffs of air through the tube to see better. This may make you feel bloated. You may also feel cramps. This feeling does not last long.     · You may feel some bloating or cramping as the tube is moved. If you are very uncomfortable, you can let the doctor know with a signal or a tap on the arm. You and your doctor can agree on this signal before the test.     · After, you will stay at the hospital or clinic for 1 to 2 hours until the medicine wears off. Going home   · Be sure you have someone to drive you home. Anesthesia and pain medicine make it unsafe for you to drive.     · You will be given more specific instructions about recovering from your procedure. They will cover things like diet, wound care, follow-up care, driving, and getting back to your normal routine. When should you call your doctor? · You have questions or concerns.     · You don't understand how to prepare for your procedure.     · You become ill before the procedure (such as fever, flu, or a cold).     · You need to reschedule or have changed your mind about having the procedure. Where can you learn more? Go to http://lobo-michel.info/. Enter F420 in the search box to learn more about \"Endoscopic Retrograde Cholangiopancreatogram (ERCP): Before Your Procedure. \"  Current as of: May 12, 2017  Content Version: 11.7  © 6422-3853 Healthwise, [employer]orporated. Care instructions adapted under license by Original (which disclaims liability or warranty for this information).  If you have questions about a medical condition or this instruction, always ask your healthcare professional. [place] [address] any warranty or liability for your use of this information.

## 2018-09-21 NOTE — ROUTINE PROCESS
Bedside shift change report given to Yareli Corbett (oncoming nurse) by Luis Bone RN (offgoing nurse). Report included the following information SBAR, Kardex and Intake/Output.

## 2018-09-21 NOTE — PERIOP NOTES
Patient appears very anxious stating \"I can't breath\" % on room air, patient encouraged to relax and take deep, nurse at bedside helping calm patient, patient repeatedly ask \"am I going to be okay?\", patient assured that she is doing well, patient repeatedly states \"I'm going to throw up\" followed by saying \"it won't come up:\", Patient does not vomit and nurse remains at bedside calming patient, patient is reassured that she is progressing as expected and nurse sits at bedside holding patients hand, she settles down and closes her eyes and appears more relaxed    1500- Dr Sumit Thompson at bedside, made aware of BP, no orders received and states that patient is clear to return back to room

## 2018-09-21 NOTE — PERIOP NOTES
TRANSFER - IN REPORT:    Verbal report received from [facility] on Rachelle Jackson  being received from [phone] for ordered procedure      Report consisted of patients Situation, Background, Assessment and   Recommendations(SBAR). Information from the following report(s) SBAR, ED Summary, OR Summary, Procedure Summary, Intake/Output and MAR was reviewed with the receiving nurse. Opportunity for questions and clarification was provided. Assessment completed upon patients arrival to unit and care assumed.

## 2018-09-21 NOTE — ANESTHESIA POSTPROCEDURE EVALUATION
Post-Anesthesia Evaluation and Assessment    Patient: Megan Aviles MRN: [id number]  SSN: [id number]    YOB: 1945  Age: 67 y.o. Sex: female       Cardiovascular Function/Vital Signs  Visit Vitals    /82    Pulse 80    Temp 36.9 °C (98.5 °F)    Resp 21    Ht 5' 7\" (1.702 m)    Wt 78.5 kg (173 lb)    SpO2 99%    BMI 27.1 kg/m2       Patient is status post general anesthesia for Procedure(s):  ENDOSCOPIC RETROGRADE CHOLANGIOPANCREATOGRAPHY (ERCP). Nausea/Vomiting: None    Postoperative hydration reviewed and adequate. Pain:  Pain Scale 1: Numeric (0 - 10) (09/21/18 1445)  Pain Intensity 1: 0 (09/21/18 1445)   Managed    Neurological Status:   Neuro (WDL): Exceptions to WDL (09/21/18 1401)  Neuro  Neurologic State: Alert; Other (Comment) (anxious) (09/21/18 1401)  Orientation Level: Oriented to person;Oriented to place;Oriented to situation (09/21/18 1401)  Cognition: Decreased attention/concentration (09/21/18 1401)  Speech: Clear (09/21/18 1401)  LUE Motor Response: Purposeful;Weak (09/21/18 1401)  LLE Motor Response: Purposeful;Weak (09/21/18 1401)  RUE Motor Response: Purposeful;Weak (09/21/18 1401)  RLE Motor Response: Purposeful;Weak (09/21/18 1401)   At baseline    Mental Status and Level of Consciousness: Arousable    Pulmonary Status:   O2 Device: Room air (09/21/18 1415)   Adequate oxygenation and airway patent    Complications related to anesthesia: None    Post-anesthesia assessment completed.  No concerns    Signed By: Corine Stewart MD     September 21, 2018

## 2018-09-21 NOTE — PROGRESS NOTES
Spiritual Care Assessment/Progress Note  [facility]      NAME: Apollo Key      MRN: [id number]  AGE: 67 y.o.  SEX: female  Bahai Affiliation: Judaism   Language: English     9/21/2018     Total Time (in minutes): 12     Spiritual Assessment begun in [facility] through conversation with:         [x]Patient        [] Family    [] Friend(s)        Reason for Consult: Request by staff     Spiritual beliefs: (Please include comment if needed)     [x] Identifies with a hossein tradition:         [x] Supported by a hossein community:            [] Claims no spiritual orientation:           [] Seeking spiritual identity:                [] Adheres to an individual form of spirituality:           [] Not able to assess:                           Identified resources for coping:      [x] Prayer                               [] Music                  [] Guided Imagery     [x] Family/friends                 [] Pet visits     [] Devotional reading                         [] Unknown     [] Other:                                               Interventions offered during this visit: (See comments for more details)    Patient Interventions: Affirmation of emotions/emotional suffering, Catharsis/review of pertinent events in supportive environment, Coping skills reviewed/reinforced, Iconic (affirming the presence of God/Higher Power), Initial/Spiritual assessment, patient floor, Normalization of emotional/spiritual concerns           Plan of Care:     [] Support spiritual and/or cultural needs    [] Support AMD and/or advance care planning process      [] Support grieving process   [] Coordinate Rites and/or Rituals    [] Coordination with community clergy   [] No spiritual needs identified at this time   [] Detailed Plan of Care below (See Comments)  [] Make referral to Music Therapy  [] Make referral to Pet Therapy     [] Make referral to Addiction services  [] Make referral to [facility] Passages  [] Make referral to Spiritual Care Partner  [] No future visits requested        [x] Follow up visits as needed     Comments: Responded to request from SANTOSH Carrillo to provide support to patient feeling fearful. Introduced self to patient and explained role of chaplains in the hospital. Patient repeatedly stated that she does not feel well and expressed concerns about not making it to her [age] birthday, tomorrow. Offered calming presence and empathetic listening as we explored why she feels this way. Patient could not identify what is specifically bothering her, only re-stating her concerns. Provided reassurance that patient is in a safe place and being cared for by staff. Patient's daughter is on her way to the hospital and patient is also hoping to call her son, demonstrating that she finds family presence to be the most comforting. Assured patient of ongoing support as needed and desired. Chaplain Todd Reid M.Div.    Paging Service [phone])

## 2018-09-21 NOTE — PROGRESS NOTES
Hospitalist Progress Note    NAME: Laurita Del Valle   :  1945   MRN:  [id number]       Assessment / Plan:  Acute choledocholithiasis with acute cholecystitis POA  Acute RUQ abd pain, POA  Abnormal LFT, POA  -LFTs with AST 1426,  , Alk Phos 146, total Bilirubin 2.8  RUQ Ultrasound consistent with gallstones with acute cholecystitis changes, dilated CBD  -HIDA scan with tracer in the liver but no activity in gallbladder, common bile duct, small bowel after 3 hours suggesting common bile duct obstruction. -MRCP showed cholelithiasis. Pericholecystic edema suspicious for hcolecystitis. Common duct is normal  -s/p lap jesus with cholangiogram showed Jaundice, acute inflammation of the gallbladder, small gallstones in the gallbladder and cystic duct. No obvious CBD filling defect on cholangiogram (the defect on the second image is the cholangiogram catheter balloon), but no passage of contrast into the duodenum, consistent with obstruction.  -Cont' IV Levaquin and Flagyl empirically  -IV morphine prn  -for ERCP today  -change IVF to D5 1/2NS   -can d/c home with AIRAM and follow up with ERCP in 1 week  -appreciate gen surg and GI consultation    Constipation  -start miralax, docusate after ERCP  -can try lactulose prn    Type 2 diabetes mellitus POA currently off medications per her report   -A1C 5.5  -SSI    Essential hypertension POA  -elevated BP likely due to component of pain  -continue ARB and Cardizem CD  -prn hydralazine/ labetalol prn    Coronary disease POA  -continue aspirin and blood pressure medication control   -currently off of cholesterol medications     Insomnia POA melatonin at bedtime     Overweight  Morbid Obesity POA   Code status: full code  NOK: daughter  dvt prophylaxis: SCDs for now. Hold lovenox for ERCP     Subjective:     Chief Complaint / Reason for Physician Visit  Pt seen at bedside. Very anxious. BP elevated. Still complaining of significant abd pain.  Discussed with RN events overnight. Review of Systems:  Symptom Y/N Comments  Symptom Y/N Comments   Fever/Chills n   Chest Pain     Poor Appetite    Edema     Cough    Abdominal Pain y    Sputum    Joint Pain     SOB/MOORE    Pruritis/Rash     Nausea/vomit y   Tolerating PT/OT     Diarrhea    Tolerating Diet     Constipation    Other       Could NOT obtain due to:      Objective:     VITALS:   Last 24hrs VS reviewed since prior progress note. Most recent are:  Patient Vitals for the past 24 hrs:   Temp Pulse Resp BP SpO2   09/21/18 0802 - - - 190/84 -   09/21/18 0750 98.4 °F (36.9 °C) 71 12 (!) 200/102 -   09/21/18 0305 98.7 °F (37.1 °C) 68 16 156/78 100 %   09/21/18 0011 98.2 °F (36.8 °C) 74 18 153/79 100 %   09/20/18 1843 - 68 14 (!) 153/102 100 %   09/20/18 1800 - 67 12 (!) 153/102 100 %   09/20/18 1723 - 66 - 152/75 100 %   09/20/18 1612 - 64 12 159/70 100 %   09/20/18 1535 97.4 °F (36.3 °C) 64 15 147/77 100 %   09/20/18 1500 - 62 15 153/66 100 %   09/20/18 1445 97.7 °F (36.5 °C) 62 17 152/69 100 %   09/20/18 1430 - 73 17 146/70 100 %   09/20/18 1415 - 62 15 152/66 100 %   09/20/18 1410 - 63 15 152/65 100 %   09/20/18 1405 - 63 16 152/64 100 %   09/20/18 1400 - 61 14 149/65 100 %   09/20/18 1355 - 65 14 149/61 100 %   09/20/18 1352 97.9 °F (36.6 °C) 66 14 157/67 100 %   09/20/18 1118 98.2 °F (36.8 °C) 64 14 142/61 99 %   09/20/18 0934 - 64 16 150/75 99 %       Intake/Output Summary (Last 24 hours) at 09/21/18 0841  Last data filed at 09/21/18 0152   Gross per 24 hour   Intake              700 ml   Output              140 ml   Net              560 ml        PHYSICAL EXAM:  General: WD, WN. Alert, cooperative, no acute distress    EENT:  EOMI. Anicteric sclerae. MMM  Resp:  CTA bilaterally, no wheezing or rales. No accessory muscle use  CV:  Regular  rhythm,  No edema  GI:  Soft, Non distended, Non tender.  +BS  Neurologic:  Alert and oriented X 3, normal speech  Psych:   Not anxious nor agitated  Skin:  No rashes.   No jaundice    Reviewed most current lab test results and cultures  YES  Reviewed most current radiology test results   YES  Review and summation of old records today    NO  Reviewed patient's current orders and MAR    YES  PMH/SH reviewed - no change compared to H&P  ________________________________________________________________________  Care Plan discussed with:    Comments   Patient x    Family      RN x    Care Manager     Consultant                        Multidiciplinary team rounds were held today with , nursing, pharmacist and clinical coordinator. Patient's plan of care was discussed; medications were reviewed and discharge planning was addressed. ________________________________________________________________________  Total NON critical care TIME:  35   Minutes    Total CRITICAL CARE TIME Spent:   Minutes non procedure based      Comments   >50% of visit spent in counseling and coordination of care     ________________________________________________________________________  Virginie Tai MD     Procedures: see electronic medical records for all procedures/Xrays and details which were not copied into this note but were reviewed prior to creation of Plan. LABS:  I reviewed today's most current labs and imaging studies.   Pertinent labs include:  Recent Labs      09/21/18 0256 09/20/18 0307 09/19/18   0948   WBC  5.4  5.5  6.7   HGB  10.4*  11.2*  12.7   HCT  32.5*  34.9*  40.0   PLT  157  195  209     Recent Labs      09/21/18 0256 09/20/18   0307  09/19/18   0948   NA  138  137  138   K  4.5  4.4  4.4   CL  106  105  100   CO2  25  24  26   GLU  104*  95  155*   BUN  17  18  15   CREA  0.87  1.02  0.95   CA  8.1*  8.0*  8.7   ALB   --   2.9*  4.0   TBILI   --   6.0*  2.8*   SGOT   --   400*  1426*   ALT   --   666*  847*   INR   --    --   1.1       Signed: Virginie Tai MD

## 2018-09-21 NOTE — PROCEDURES
NAME:  Rachelle Jackson   :   1945   MRN:   [id number]     Kassie Sorto [facility]  Date/Time:  2018 2:24 PM    Procedure Type:   ERCP with biliary sphincterotomy, biliary stone removal, biliary stent placement     Indications: jaundice, abnormal cholangiogram  Pre-operative Diagnosis: see indication above  Post-operative Diagnosis:  See findings below  : Karlos Espinoza MD  Referring Provider:    Hang Billy MD, Shun Sevilla MD    Exam:  Airway: clear, no airway problems anticipated  Heart: RRR, without gallops or rubs  Lungs: clear bilaterally without wheezes, crackles, or rhonchi  Abdomen: soft, nontender, nondistended, bowel sounds present  Mental Status: awake, alert and oriented to person, place and time    Sedation:  General anesthesia   Medication: Indomethacin 100 mg WI, On antibiotics  Procedure Details:  After informed consent was obtained with all risks and benefits of procedure explained, the patient was taken to the fluoroscopy suite and placed in the prone position. Upon sequential sedation as per above, the Olympus duodenoscope [id number]   was inserted via the mouthpeice and carefully advanced to the second portion of the duodenum. The quality of visualization was good. The duodenoscope was withdrawn into a short position. Findings:   Endoscopic: Grossly normal esophagus, stomach, and duodenum when viewed with a duodenoscope  Ampulla: There was a very long intraduodenal segment of the distal bile duct    Initial attempts at biliary cannulation resulted in inadvertent cannulation of the pancreatic duct. No contrast was injected. The wire was left in place to attempt biliary cannulation by dual guidewire technique. Wire guided biliary cannulation was achieved through the Dreamtome. Proper location was confirmed by fluoroscopy. Cholangiogram:   1. Evidence of prior cholecystectomy  2. Small amount of sludge in the terminal CBD  3. Ampullary stenosis  4.  No bile leak was seen even with balloon occlusion cholangiogram.    Complications: None. EBL:  None. Interventions:    Biliary:   1. Biliary sphincterotomy performed  2. Balloon sweep performed with removal of sludge  3. Given bile in AIRAM drain (though again no bile leak demonstrated), jaundice and ampullary stenosis (with very poor drainage of bile despite sphincterotomy) I placed a 10 Fr by 7 cm biliary stent    Impression:    1. Evidence of prior cholecystectomy  2. Small amount of sludge in the terminal CBD  3. Ampullary stenosis  4. No bile leak was seen even with balloon occlusion cholangiogram.  5. Biliary sphincterotomy performed  6. Balloon sweep performed with removal of sludge  7. Given bile in AIRAM drain (though again no bile leak demonstrated), jaundice and ampullary stenosis (with very poor drainage of bile despite sphincterotomy) I placed a 10 Fr by 7 cm biliary stent    Recommendations:   1. IVF hydration with LR @250cc/hr for next 12 hours  2. NPO for now, can have CLD if no AP  3.  Will need repeat ERCP in 1-2 months for removal of biliary stent      Discharge Disposition:  Back to floor following recovery in PACU    Ramesh Figueroa MD

## 2018-09-21 NOTE — ANESTHESIA PREPROCEDURE EVALUATION
Anesthetic History   No history of anesthetic complications            Review of Systems / Medical History  Patient summary reviewed, nursing notes reviewed and pertinent labs reviewed    Pulmonary      Recent URI    Shortness of breath         Neuro/Psych         Headaches and psychiatric history     Cardiovascular    Hypertension    Angina      CAD and hyperlipidemia    Exercise tolerance: <4 METS  Comments: Chronic chest pain  Mild AS   GI/Hepatic/Renal     GERD: well controlled    Renal disease: stones       Endo/Other    Diabetes: type 2    Arthritis     Other Findings   Comments: CBD stone           Physical Exam    Airway  Mallampati: II  TM Distance: 4 - 6 cm  Neck ROM: normal range of motion   Mouth opening: Normal     Cardiovascular    Rhythm: regular  Rate: normal         Dental    Dentition: Edentulous     Pulmonary  Breath sounds clear to auscultation               Abdominal  GI exam deferred       Other Findings            Anesthetic Plan    ASA: 3  Anesthesia type: general          Induction: Intravenous  Anesthetic plan and risks discussed with: Patient

## 2018-09-21 NOTE — PERIOP NOTES
1124:  Patient to OR Holding. She c/o nausea but reports \"nothing will come up\". Medicated per [facility] with zofran IVP. Patient c/o 7/10 left rib & left upper back. AIRAM draining is draining brownish-yellow colored fluid. Not emptied at this time for  to see. She is very anxious about the procedure. Patient has been oriented and appropriate in OR Holding. Chart states periods of confusion    1205:  Dr. Meri Rader at bedside and is aware of AIRAM drainage and Left sided pain. He discussed procedure with patient & her daughter    [phone]:  Asad Can discussing ERCP procedure from [address] [facility] on what to expect / what is an ERCP was given to the patient's daughter-Maral at her request.    0397:  Patient assisted OOB to [facility] to void with one person assist.  She voided 150 ml of very dark juan urine. She reports she has burning after urinating. CRNA & OR Nurses made aware.     1245:  Patient to OR

## 2018-09-21 NOTE — PROGRESS NOTES
Nurse spoke with Dr. Reginaldo Mcmahan regarding patients BP and RRT staff member assessed patient due to high BP, informed of PRN meds given and last reading was 160s/90s. Per Dr. Reginaldo Mcmahan continue to monitor and do not continue to treat with that reading.  Order for IV benadryl given for itching

## 2018-09-21 NOTE — PROGRESS NOTES
Paged Dr. Rondel Sandifer concerning patient's HTN. Patient is also complaining of occassional chest pain and being \"scared\". Patient has been medicated with Apresoline IV , Zofran as well as Morphine for pain.

## 2018-09-21 NOTE — PERIOP NOTES
TRANSFER - OUT REPORT:    Verbal report given to SANTOSH Green(name) on Ordaz Muster  being transferred to [facility](unit) for routine post - op       Report consisted of patients Situation, Background, Assessment and   Recommendations(SBAR). Information from the following report(s) SBAR, Procedure Summary and MAR was reviewed with the receiving nurse. Lines:   Peripheral IV 09/19/18 Left Forearm (Active)   Site Assessment Clean, dry, & intact 9/21/2018  2:01 PM   Phlebitis Assessment 0 9/21/2018  2:01 PM   Infiltration Assessment 0 9/21/2018  2:01 PM   Dressing Status Clean, dry, & intact 9/21/2018  2:01 PM   Dressing Type Transparent 9/21/2018  2:01 PM   Hub Color/Line Status Blue; Infusing 9/21/2018  2:01 PM   Action Taken Blood drawn 9/19/2018  9:39 AM       Peripheral IV 09/19/18 Right Antecubital (Active)   Site Assessment Clean, dry, & intact 9/21/2018  2:01 PM   Phlebitis Assessment 0 9/21/2018  2:01 PM   Infiltration Assessment 0 9/21/2018  2:01 PM   Dressing Status Clean, dry, & intact 9/21/2018  2:01 PM   Dressing Type Transparent 9/21/2018  2:01 PM   Hub Color/Line Status Pink;Capped 9/21/2018  2:01 PM   Action Taken Blood drawn 9/19/2018  4:57 PM   Alcohol Cap Used Yes 9/20/2018  8:55 AM        Opportunity for questions and clarification was provided.       Patient transported with:   Ketto

## 2018-09-21 NOTE — PERIOP NOTES
Clarified IV infusion orders. Dr Lissette Ramos only wants continuous Lactated ringers at 250ml/hour infusing. Per him other continuous IVF can be discontinued. Primary RN Lina Mckeon made aware.

## 2018-09-22 LAB
GLUCOSE BLD STRIP.AUTO-MCNC: 81 MG/DL (ref 65–100)
GLUCOSE BLD STRIP.AUTO-MCNC: 84 MG/DL (ref 65–100)
GLUCOSE BLD STRIP.AUTO-MCNC: 89 MG/DL (ref 65–100)
SERVICE CMNT-IMP: NORMAL

## 2018-09-22 PROCEDURE — 74011250637 HC RX REV CODE- 250/637: Performed by: SURGERY

## 2018-09-22 PROCEDURE — 74011250636 HC RX REV CODE- 250/636: Performed by: INTERNAL MEDICINE

## 2018-09-22 PROCEDURE — 82962 GLUCOSE BLOOD TEST: CPT

## 2018-09-22 PROCEDURE — 74011000250 HC RX REV CODE- 250: Performed by: SURGERY

## 2018-09-22 PROCEDURE — 74011250637 HC RX REV CODE- 250/637: Performed by: INTERNAL MEDICINE

## 2018-09-22 PROCEDURE — 65660000000 HC RM CCU STEPDOWN

## 2018-09-22 RX ORDER — HALOPERIDOL 5 MG/ML
2 INJECTION INTRAMUSCULAR ONCE
Status: COMPLETED | OUTPATIENT
Start: 2018-09-22 | End: 2018-09-22

## 2018-09-22 RX ORDER — SODIUM CHLORIDE, SODIUM LACTATE, POTASSIUM CHLORIDE, CALCIUM CHLORIDE 600; 310; 30; 20 MG/100ML; MG/100ML; MG/100ML; MG/100ML
50 INJECTION, SOLUTION INTRAVENOUS CONTINUOUS
Status: DISCONTINUED | OUTPATIENT
Start: 2018-09-22 | End: 2018-09-22

## 2018-09-22 RX ORDER — LORAZEPAM 2 MG/ML
1 INJECTION INTRAMUSCULAR ONCE
Status: COMPLETED | OUTPATIENT
Start: 2018-09-22 | End: 2018-09-22

## 2018-09-22 RX ORDER — DEXTROSE MONOHYDRATE 50 MG/ML
100 INJECTION, SOLUTION INTRAVENOUS CONTINUOUS
Status: DISCONTINUED | OUTPATIENT
Start: 2018-09-22 | End: 2018-10-06

## 2018-09-22 RX ORDER — ENOXAPARIN SODIUM 100 MG/ML
40 INJECTION SUBCUTANEOUS EVERY 24 HOURS
Status: DISCONTINUED | OUTPATIENT
Start: 2018-09-22 | End: 2018-10-06 | Stop reason: HOSPADM

## 2018-09-22 RX ADMIN — LOSARTAN POTASSIUM 25 MG: 25 TABLET ORAL at 22:11

## 2018-09-22 RX ADMIN — Medication 10 ML: at 22:12

## 2018-09-22 RX ADMIN — FAMOTIDINE 20 MG: 20 TABLET ORAL at 12:54

## 2018-09-22 RX ADMIN — NITROGLYCERIN 1 INCH: 20 OINTMENT TOPICAL at 18:38

## 2018-09-22 RX ADMIN — DEXTROSE MONOHYDRATE 50 ML/HR: 5 INJECTION, SOLUTION INTRAVENOUS at 10:08

## 2018-09-22 RX ADMIN — Medication 10 ML: at 16:44

## 2018-09-22 RX ADMIN — MELATONIN TAB 3 MG 6 MG: 3 TAB at 22:11

## 2018-09-22 RX ADMIN — DIPHENHYDRAMINE HYDROCHLORIDE 25 MG: 50 INJECTION, SOLUTION INTRAMUSCULAR; INTRAVENOUS at 07:48

## 2018-09-22 RX ADMIN — ENOXAPARIN SODIUM 40 MG: 100 INJECTION SUBCUTANEOUS at 12:53

## 2018-09-22 RX ADMIN — Medication 10 ML: at 05:33

## 2018-09-22 RX ADMIN — METOPROLOL TARTRATE 25 MG: 25 TABLET ORAL at 22:11

## 2018-09-22 RX ADMIN — LEVOFLOXACIN 750 MG: 5 INJECTION, SOLUTION INTRAVENOUS at 16:47

## 2018-09-22 RX ADMIN — METOPROLOL TARTRATE 25 MG: 25 TABLET ORAL at 12:54

## 2018-09-22 RX ADMIN — MORPHINE SULFATE 2 MG: 2 INJECTION, SOLUTION INTRAMUSCULAR; INTRAVENOUS at 04:35

## 2018-09-22 RX ADMIN — FAMOTIDINE 20 MG: 20 TABLET ORAL at 18:38

## 2018-09-22 RX ADMIN — SENNOSIDES AND DOCUSATE SODIUM 1 TABLET: 8.6; 5 TABLET ORAL at 12:52

## 2018-09-22 RX ADMIN — LORAZEPAM 1 MG: 2 INJECTION INTRAMUSCULAR; INTRAVENOUS at 05:33

## 2018-09-22 RX ADMIN — METRONIDAZOLE 500 MG: 500 INJECTION, SOLUTION INTRAVENOUS at 16:47

## 2018-09-22 RX ADMIN — LACTULOSE 30 G: 20 SOLUTION ORAL at 12:53

## 2018-09-22 RX ADMIN — METRONIDAZOLE 500 MG: 500 INJECTION, SOLUTION INTRAVENOUS at 04:57

## 2018-09-22 RX ADMIN — NITROGLYCERIN 1 INCH: 20 OINTMENT TOPICAL at 05:34

## 2018-09-22 RX ADMIN — SODIUM CHLORIDE, SODIUM LACTATE, POTASSIUM CHLORIDE, AND CALCIUM CHLORIDE 250 ML/HR: 600; 310; 30; 20 INJECTION, SOLUTION INTRAVENOUS at 01:38

## 2018-09-22 RX ADMIN — Medication 10 ML: at 04:35

## 2018-09-22 RX ADMIN — DILTIAZEM HYDROCHLORIDE 120 MG: 120 CAPSULE, COATED, EXTENDED RELEASE ORAL at 12:54

## 2018-09-22 RX ADMIN — HALOPERIDOL LACTATE 2 MG: 5 INJECTION, SOLUTION INTRAMUSCULAR at 04:42

## 2018-09-22 NOTE — PROGRESS NOTES
John Paredes called overnight due to patient becoming confused, agitated, and combative with staff; Patient is high fall risk and attempting to remove IV lines and telemetry leads; Patient complained of a headache and primary RN, Nestor Centeno attempted to medicate patient with Percocet, but patient refused; Charge RN, Jesse Abdalla, gave patient 2mg IV Morphine per order; Patient assisted back to bed by 3 RNs; Patient continued to be agitated and combative with staff and attempting to remove IV and interfere with antibiotic administration; In house hospitalist paged by Nursing Supervisor, Vicente Denson, and orders for 2mg IV Haldol ordered and given; Patient remained agitated and combative; In house hospitalist paged again and orders received for 1mg IV Ativan and to place patient in soft wrist restraints; Ativan was given per order and soft restraints were initiated; Patients daughter, Regan Oliva, is aware that patient is in restraints and she will be here later this morning; At present the patient is still awake and alert, but confused and is attempting to get out of bed; Please refer to eMAR for record of times medications were administered and the restraint doc flowsheet for time soft restraints were initiated; Day Shift Charge RN, Geralynn Hatchet, made aware that patient is in restraints.     Josh Woodall RN  09/22/18  7:46 AM

## 2018-09-22 NOTE — ROUTINE PROCESS
Bedside shift change report given to Rebecca Pelayo RN (oncoming nurse) by Valdemar Barros RN (offgoing nurse).  Report included the following information SBAR, Procedure Summary, Intake/Output, Accordion and Recent Results.

## 2018-09-22 NOTE — ROUTINE PROCESS
This nurse attempted several times to get patient to take her morning medications as wellaseat and drink. Patient got verbally abusive and physically combative after each attempt.

## 2018-09-22 NOTE — PROGRESS NOTES
Bedside shift change report given to Mike Clark RN (oncoming nurse) by Ashley Sterling RN (offgoing nurse). Report included the following information SBAR, Kardex, Intake/Output, MAR, Accordion and Recent Results.

## 2018-09-22 NOTE — PROGRESS NOTES
General Surgery End of Shift Nursing Note    Bedside shift change report given to Meredith Palacios RN (oncoming nurse) by Meredith Palacios RN (offgoing nurse). Report included the following information SBAR, Kardex, OR Summary, Intake/Output, MAR, Recent Results and Cardiac Rhythm SR. Shift worked:   7p-7a   Summary of shift:    Pt became confused & agitated overnight, attempted to make a run for the door when nurses assisted to [facility], Morphine, haldol, ativan given per MD order with limited results, pt placed in soft wrist restraints per MD order unable to obtain labwork d/t combative behavior. Issues for physician to address:   none     Number times ambulated in hallway past shift: 0    Number of times OOB to chair past shift: 0    Pain Management:  Current medication: morphine IVP  Patient states pain is manageable on current pain medication:    GI:    Current diet:  DIET CLEAR LIQUID    Tolerating current diet: pt would not accept any liquids overnight  Passing flatus: YES  Last Bowel Movement: preop   Appearance:     Respiratory:    Incentive Spirometer at bedside: NO  Patient instructed on use: NO    Patient Safety:    Falls Score: 3  Bed Alarm On? No  Sitter?  No    Edil Romero, SANTOSH

## 2018-09-22 NOTE — PROGRESS NOTES
Admit Date: 2018    POD 1 Day Post-Op    Procedure:  Procedure(s):  ENDOSCOPIC RETROGRADE CHOLANGIOPANCREATOGRAPHY (ERCP)    Subjective:     Patient has complaints of right sided pain. Objective:     Blood pressure 174/84, pulse 90, temperature 98.6 °F (37 °C), resp. rate 16, height 5' 7\" (1.702 m), weight 173 lb (78.5 kg), SpO2 96 %. Temp (24hrs), Av.4 °F (36.9 °C), Min:97.3 °F (36.3 °C), Max:99 °F (37.2 °C)      Physical Exam:  GENERAL: alert, cooperative, no distress, appears stated age, LUNG: clear to auscultation bilaterally, HEART: regular rate and rhythm, ABDOMEN: soft, non-tender. Bowel sounds normal. No masses,  no organomegaly, wounds c/d/i, AIRAM o/p serosanguinous, not bilious, EXTREMITIES:  extremities normal, atraumatic, no cyanosis or edema    Labs:   Recent Results (from the past 24 hour(s))   GLUCOSE, POC    Collection Time: 18 12:42 PM   Result Value Ref Range    Glucose (POC) 93 65 - 100 mg/dL    Performed by [address] [place] [address], POC    Collection Time: 18  2:04 PM   Result Value Ref Range    Glucose (POC) 83 65 - 100 mg/dL    Performed by [address], POC    Collection Time: 18  5:04 PM   Result Value Ref Range    Glucose (POC) 116 (H) 65 - 100 mg/dL    Performed by Dawson Garcia (PCT)    GLUCOSE, POC    Collection Time: 18  9:52 PM   Result Value Ref Range    Glucose (POC) 107 (H) 65 - 100 mg/dL    Performed by Dawson Garcia (PCT)    GLUCOSE, POC    Collection Time: 18  7:23 AM   Result Value Ref Range    Glucose (POC) 81 65 - 100 mg/dL    Performed by Dinora Conner*        Data Review images and reports reviewed    Assessment:     Active Problems:    Choledocholithiasis with acute cholecystitis (2018)        Plan/Recommendations/Medical Decision Making:     Continue present treatment   AIRAM to remain at discharge, f/u with [facility] Thursday. Okay for d/c from surgical standpoint at any time. Marie Leonard.  Luci Sheppard MD, [facility]

## 2018-09-22 NOTE — PROGRESS NOTES
Hospitalist Progress Note    NAME: Ryanne Tejeda   :  1945   MRN:  [id number]       Assessment / Plan:  Acute choledocholithiasis with acute cholecystitis POA  Acute RUQ abd pain, POA  Abnormal LFT, POA  -LFTs with AST 1426,  , Alk Phos 146, total Bilirubin 2.8  RUQ Ultrasound consistent with gallstones with acute cholecystitis changes, dilated CBD  -HIDA scan with tracer in the liver but no activity in gallbladder, common bile duct, small bowel after 3 hours suggesting common bile duct obstruction. -MRCP showed cholelithiasis. Pericholecystic edema suspicious for hcolecystitis. Common duct is normal  -s/p lap jesus with cholangiogram showed Jaundice, acute inflammation of the gallbladder, small gallstones in the gallbladder and cystic duct. No obvious CBD filling defect on cholangiogram (the defect on the second image is the cholangiogram catheter balloon), but no passage of contrast into the duodenum, consistent with obstruction.  -Cont' IV Levaquin and Flagyl empirically  -IV morphine prn  -s/p ERCP o . Completed LR at 250cc/hr x12 hrs. Will change to D5 at 50cc/hr  -unable to obtain labs this morning to due confusion  -can d/c home with AIRAM and follow up with ERCP in 1 week  -need to repeat ERCP in 1-2 months.  -appreciate gen surg and GI consultation    Acute encephalopathy  -likely component of sundowning vs post op confusion from sedating meds  -symptoms worsened with overnight haldol and ativan  -pt is awake this morning, sleeping in bed after benadryl given. Easily awaken, less confused compared to last night  -avoid sedating meds  -sitter prn    Constipation  -still no BM yet. Will add lactulose to regimen. Can hold for BM  -cont' miralax, docusate    Type 2 diabetes mellitus POA currently off medications per her report   -BG 81. A1C 5.5  -on d5    Essential hypertension POA  -BP elevated.   Component of agitation and IVF at high rate  --continue ARB and Cardizem CD, metoprolol  -prn hydralazine/ nitrobid prn    Coronary disease POA  -off aspirin and blood pressure medication control   -currently off of cholesterol medications     Insomnia POA melatonin at bedtime     Overweight  Morbid Obesity POA   Code status: full code  NOK: daughter  dvt prophylaxis: lovenox     Subjective:     Chief Complaint / Reason for Physician Visit  Pt seen at bedside. Confused and agitated overnight. Symptoms worst after haldol and ativan per nursing. Pt is currently in wrist restraint. . Discussed with RN events overnight. Review of Systems:  Symptom Y/N Comments  Symptom Y/N Comments   Fever/Chills n   Chest Pain     Poor Appetite    Edema     Cough    Abdominal Pain y    Sputum    Joint Pain     SOB/MOORE    Pruritis/Rash     Nausea/vomit y   Tolerating PT/OT     Diarrhea    Tolerating Diet     Constipation    Other       Could NOT obtain due to:      Objective:     VITALS:   Last 24hrs VS reviewed since prior progress note.  Most recent are:  Patient Vitals for the past 24 hrs:   Temp Pulse Resp BP SpO2   09/22/18 0715 98.6 °F (37 °C) 90 16 174/84 96 %   09/22/18 0557 99 °F (37.2 °C) 90 16 (!) 185/97 -   09/22/18 0534 - 90 - (!) 204/95 -   09/21/18 2346 97.3 °F (36.3 °C) 78 16 112/73 93 %   09/21/18 2045 - - - (!) 203/118 -   09/21/18 1953 98.6 °F (37 °C) 97 16 (!) 184/95 100 %   09/21/18 1756 - - - (!) 178/93 -   09/21/18 1716 98.6 °F (37 °C) 83 16 (!) 167/99 100 %   09/21/18 1711 - - - (!) 206/105 -   09/21/18 1657 - 85 16 (!) 208/102 99 %   09/21/18 1656 - - - (!) 213/115 -   09/21/18 1652 - 83 - (!) 213/115 100 %   09/21/18 1632 - 87 - (!) 212/113 100 %   09/21/18 1614 - 84 - (!) 182/96 100 %   09/21/18 1537 - - - (!) 188/95 -   09/21/18 1529 - - - 192/62 98 %   09/21/18 1527 98.7 °F (37.1 °C) 81 18 (!) 208/102 100 %   09/21/18 1510 98.3 °F (36.8 °C) 76 16 174/80 100 %   09/21/18 1500 - 78 15 175/82 98 %   09/21/18 1445 - 80 21 174/82 99 %   09/21/18 1430 - 80 15 162/73 100 %   09/21/18 1415 - 84 19 159/78 98 %   09/21/18 1410 - 91 16 157/66 99 %   09/21/18 1405 - 83 16 159/84 100 %   09/21/18 1401 98.5 °F (36.9 °C) 84 15 157/82 99 %   09/21/18 1400 - 83 12 157/82 -   09/21/18 1135 98.2 °F (36.8 °C) 67 18 188/66 99 %       Intake/Output Summary (Last 24 hours) at 09/22/18 0853  Last data filed at 09/21/18 2347   Gross per 24 hour   Intake           2249.1 ml   Output              885 ml   Net           1364.1 ml        PHYSICAL EXAM:  General: WD, WN. Alert, cooperative, no acute distress    EENT:  EOMI. Anicteric sclerae. MMM  Resp:  CTA bilaterally, no wheezing or rales. No accessory muscle use  CV:  Regular  rhythm,  No edema  GI:  Soft, Non distended, Non tender.  +BS  Neurologic:  Alert and oriented X 1, normal speech  Psych:   Not anxious nor agitated  Skin:  No rashes. No jaundice    Reviewed most current lab test results and cultures  YES  Reviewed most current radiology test results   YES  Review and summation of old records today    NO  Reviewed patient's current orders and MAR    YES  PMH/SH reviewed - no change compared to H&P  ________________________________________________________________________  Care Plan discussed with:    Comments   Patient x    Family      RN x    Care Manager     Consultant                        Multidiciplinary team rounds were held today with , nursing, pharmacist and clinical coordinator. Patient's plan of care was discussed; medications were reviewed and discharge planning was addressed.      ________________________________________________________________________  Total NON critical care TIME:  35   Minutes    Total CRITICAL CARE TIME Spent:   Minutes non procedure based      Comments   >50% of visit spent in counseling and coordination of care     ________________________________________________________________________  Christopher Howard MD     Procedures: see electronic medical records for all procedures/Xrays and details which were not copied into this note but were reviewed prior to creation of Plan. LABS:  I reviewed today's most current labs and imaging studies.   Pertinent labs include:  Recent Labs      09/21/18   0256  09/20/18   0307  09/19/18   0948   WBC  5.4  5.5  6.7   HGB  10.4*  11.2*  12.7   HCT  32.5*  34.9*  40.0   PLT  157  195  209     Recent Labs      09/21/18   0256  09/20/18   0307  09/19/18   0948   NA  138  137  138   K  4.5  4.4  4.4   CL  106  105  100   CO2  25  24  26   GLU  104*  95  155*   BUN  17  18  15   CREA  0.87  1.02  0.95   CA  8.1*  8.0*  8.7   ALB  2.7*  2.9*  4.0   TBILI  5.0*  6.0*  2.8*   SGOT  233*  400*  1426*   ALT  487*  666*  847*   INR   --    --   1.1       Signed: Nick Barksdale MD

## 2018-09-22 NOTE — PROGRESS NOTES
Patients family will be in ater this morning and reported they were going to a  today, sitter ordered per Dr. Aliza Blair. Soft wrist restraints removed per primary nurse jocelin Carrillo at bedside now, patient refusing meds, and AM care at this time, Refusing breakfast tray. Paged Dr Aliza Blair to made aware and see if medications need to be adjusted, awaiting return call.

## 2018-09-22 NOTE — PROGRESS NOTES
[address]  [address] [facility] [address] [place], [address]  [phone]                GI PROGRESS NOTE      NAME:   Jad Rebolledo   :    1945   MRN:    [id number]     Assessment/Plan   Abnormal liver enzymes and bilirubin- monitor levels post ERCP  Bile duct stone- ERCP done  Bile leak-monitor output.       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    Onycholysis of toenail J79.9    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    Nephrolithiasis N20.0    Personal history of noncompliance with medical treatment, presenting hazards to health Z91.19    Breast pain, left N64.4    Pseudobulbar affect F48.2    CAD (coronary artery disease), native coronary artery I25.10    SOB (shortness of breath) R06.02    Broken heart syndrome I51.81    Death of child Z63.4    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    Diabetes mellitus type 2, diet-controlled ([address] E11.9    Somatization disorder F45.0    Death of parent Z63.4    Tightness sensation-head Z78.9    Generalized OA M15.9    Noncompliance with medication regimen-chol medication  Z91.14    Orthostatic hypotension I95.1    Hydronephrosis of left kidney N13.30    Left-sided chest wall pain R07.89    Weakness R53.1    Assistance needed with transportation Z74.8    Gait instability R26.81    Advance directive discussed with patient Z71.89    Vaginal irritation N89.8    Blurring of vision H53.8    Choledocholithiasis with acute cholecystitis K80.42       Subjective: Lilly Salguero is a 68 y.o.  female who had some pain RUQ. Does not have much of appetite     Review of Systems    Constitutional: negative fever, negative chills, negative weight loss  Eyes:   negative visual changes  ENT:   negative sore throat, tongue or lip swelling  Respiratory:  negative cough, negative dyspnea  Cards:  negative for chest pain, palpitations, lower extremity edema  GI:   See HPI  :  negative for frequency, dysuria  Integument:  negative for rash and pruritus  Heme:  negative for easy bruising and gum/nose bleeding  Musculoskel: negative for myalgias,  back pain and muscle weakness  Neuro: negative for headaches, dizziness, vertigo  Psych:  negative for feelings of anxiety, depression           Objective:     VITALS:   Last 24hrs VS reviewed since prior hospitalist progress note. Most recent are:  Visit Vitals    /83    Pulse 72    Temp 98.6 °F (37 °C)    Resp 18    Ht 5' 7\" (1.702 m)    Wt 78.5 kg (173 lb)    SpO2 96%    BMI 27.1 kg/m2       Intake/Output Summary (Last 24 hours) at 09/22/18 1827  Last data filed at 09/22/18 1732   Gross per 24 hour   Intake          3060.23 ml   Output              435 ml   Net          2625.23 ml        PHYSICAL EXAM:  General   well developed, well nourished, appears stated age, in no acute distress  EENT  Normocephalic, Atraumatic, PERRLA, EOMI, sclera clear, nares clear, pharynx normal  Neck   Supple without nodes or mass. No thyromegaly or bruit  Respiratory   Clear To Auscultation bilaterally - no wheezes, rales, rhonchi, or crackles  Cardiology  Regular Rate and Rythmn  - no murmurs, rubs or gallops  Abdominal  Soft, non-tender, non-distended, positive bowel sounds, no hepatosplenomegaly, no palpable mass  Extremities  No clubbing, cyanosis, or edema. Pulses intact. Back  No spinal or muscle pain. No CVAT. Skin  Normal skin turgor.   No rashes or skin ulcers noted  Neurological  No focal neurological deficits noted  Psychological Oriented x 3. Normal affect.        Lab Data   Recent Results (from the past 12 hour(s))   GLUCOSE, POC    Collection Time: 09/22/18  7:23 AM   Result Value Ref Range    Glucose (POC) 81 65 - 100 mg/dL    Performed by Mason Castillo*    GLUCOSE, POC    Collection Time: 09/22/18  4:47 PM   Result Value Ref Range    Glucose (POC) 89 65 - 100 mg/dL    Performed by Anna Soler (PCT)          Medications: Reviewed    PMH/ reviewed - no change compared to H&P  Attending Physician: Sudarshan Beck MD   Date/Time:  9/22/2018

## 2018-09-23 LAB
ALBUMIN SERPL-MCNC: 2.6 G/DL (ref 3.5–5)
ALBUMIN/GLOB SERPL: 0.8 {RATIO} (ref 1.1–2.2)
ALP SERPL-CCNC: 98 U/L (ref 45–117)
ALT SERPL-CCNC: 228 U/L (ref 12–78)
ANION GAP SERPL CALC-SCNC: 8 MMOL/L (ref 5–15)
APPEARANCE UR: CLEAR
AST SERPL-CCNC: 70 U/L (ref 15–37)
BACTERIA URNS QL MICRO: NEGATIVE /HPF
BASOPHILS # BLD: 0 K/UL (ref 0–0.1)
BASOPHILS NFR BLD: 0 % (ref 0–1)
BILIRUB SERPL-MCNC: 1.3 MG/DL (ref 0.2–1)
BILIRUB UR QL: NEGATIVE
BUN SERPL-MCNC: 15 MG/DL (ref 6–20)
BUN/CREAT SERPL: 17 (ref 12–20)
CALCIUM SERPL-MCNC: 8 MG/DL (ref 8.5–10.1)
CHLORIDE SERPL-SCNC: 101 MMOL/L (ref 97–108)
CO2 SERPL-SCNC: 25 MMOL/L (ref 21–32)
COLOR UR: ABNORMAL
CREAT SERPL-MCNC: 0.89 MG/DL (ref 0.55–1.02)
DIFFERENTIAL METHOD BLD: ABNORMAL
EOSINOPHIL # BLD: 0 K/UL (ref 0–0.4)
EOSINOPHIL NFR BLD: 0 % (ref 0–7)
EPITH CASTS URNS QL MICRO: ABNORMAL /LPF
ERYTHROCYTE [DISTWIDTH] IN BLOOD BY AUTOMATED COUNT: 13.4 % (ref 11.5–14.5)
GLOBULIN SER CALC-MCNC: 3.3 G/DL (ref 2–4)
GLUCOSE BLD STRIP.AUTO-MCNC: 108 MG/DL (ref 65–100)
GLUCOSE BLD STRIP.AUTO-MCNC: 82 MG/DL (ref 65–100)
GLUCOSE BLD STRIP.AUTO-MCNC: 86 MG/DL (ref 65–100)
GLUCOSE BLD STRIP.AUTO-MCNC: 90 MG/DL (ref 65–100)
GLUCOSE SERPL-MCNC: 90 MG/DL (ref 65–100)
GLUCOSE UR STRIP.AUTO-MCNC: 100 MG/DL
HCT VFR BLD AUTO: 32.8 % (ref 35–47)
HGB BLD-MCNC: 10.6 G/DL (ref 11.5–16)
HGB UR QL STRIP: NEGATIVE
HYALINE CASTS URNS QL MICRO: ABNORMAL /LPF (ref 0–5)
IMM GRANULOCYTES # BLD: 0 K/UL (ref 0–0.04)
IMM GRANULOCYTES NFR BLD AUTO: 0 % (ref 0–0.5)
KETONES UR QL STRIP.AUTO: ABNORMAL MG/DL
LEUKOCYTE ESTERASE UR QL STRIP.AUTO: NEGATIVE
LYMPHOCYTES # BLD: 0.7 K/UL (ref 0.8–3.5)
LYMPHOCYTES NFR BLD: 16 % (ref 12–49)
MCH RBC QN AUTO: 27.2 PG (ref 26–34)
MCHC RBC AUTO-ENTMCNC: 32.3 G/DL (ref 30–36.5)
MCV RBC AUTO: 84.3 FL (ref 80–99)
MONOCYTES # BLD: 0.4 K/UL (ref 0–1)
MONOCYTES NFR BLD: 9 % (ref 5–13)
NEUTS SEG # BLD: 3.5 K/UL (ref 1.8–8)
NEUTS SEG NFR BLD: 74 % (ref 32–75)
NITRITE UR QL STRIP.AUTO: NEGATIVE
NRBC # BLD: 0 K/UL (ref 0–0.01)
NRBC BLD-RTO: 0 PER 100 WBC
PH UR STRIP: 6 [PH] (ref 5–8)
PLATELET # BLD AUTO: 202 K/UL (ref 150–400)
PMV BLD AUTO: 10.9 FL (ref 8.9–12.9)
POTASSIUM SERPL-SCNC: 3.3 MMOL/L (ref 3.5–5.1)
PROT SERPL-MCNC: 5.9 G/DL (ref 6.4–8.2)
PROT UR STRIP-MCNC: NEGATIVE MG/DL
RBC # BLD AUTO: 3.89 M/UL (ref 3.8–5.2)
RBC #/AREA URNS HPF: ABNORMAL /HPF (ref 0–5)
SERVICE CMNT-IMP: ABNORMAL
SERVICE CMNT-IMP: NORMAL
SODIUM SERPL-SCNC: 134 MMOL/L (ref 136–145)
SP GR UR REFRACTOMETRY: 1.01 (ref 1–1.03)
UA: UC IF INDICATED,UAUC: ABNORMAL
UROBILINOGEN UR QL STRIP.AUTO: 1 EU/DL (ref 0.2–1)
WBC # BLD AUTO: 4.7 K/UL (ref 3.6–11)
WBC URNS QL MICRO: ABNORMAL /HPF (ref 0–4)

## 2018-09-23 PROCEDURE — G8987 SELF CARE CURRENT STATUS: HCPCS | Performed by: OCCUPATIONAL THERAPIST

## 2018-09-23 PROCEDURE — 80053 COMPREHEN METABOLIC PANEL: CPT | Performed by: INTERNAL MEDICINE

## 2018-09-23 PROCEDURE — 94760 N-INVAS EAR/PLS OXIMETRY 1: CPT

## 2018-09-23 PROCEDURE — 74011250636 HC RX REV CODE- 250/636: Performed by: INTERNAL MEDICINE

## 2018-09-23 PROCEDURE — 81001 URINALYSIS AUTO W/SCOPE: CPT | Performed by: INTERNAL MEDICINE

## 2018-09-23 PROCEDURE — 97165 OT EVAL LOW COMPLEX 30 MIN: CPT | Performed by: OCCUPATIONAL THERAPIST

## 2018-09-23 PROCEDURE — 85025 COMPLETE CBC W/AUTO DIFF WBC: CPT | Performed by: INTERNAL MEDICINE

## 2018-09-23 PROCEDURE — 82962 GLUCOSE BLOOD TEST: CPT

## 2018-09-23 PROCEDURE — 36415 COLL VENOUS BLD VENIPUNCTURE: CPT | Performed by: INTERNAL MEDICINE

## 2018-09-23 PROCEDURE — 74011250637 HC RX REV CODE- 250/637: Performed by: SURGERY

## 2018-09-23 PROCEDURE — 65660000000 HC RM CCU STEPDOWN

## 2018-09-23 PROCEDURE — 74011250636 HC RX REV CODE- 250/636: Performed by: EMERGENCY MEDICINE

## 2018-09-23 PROCEDURE — 51798 US URINE CAPACITY MEASURE: CPT

## 2018-09-23 PROCEDURE — 74011250637 HC RX REV CODE- 250/637: Performed by: INTERNAL MEDICINE

## 2018-09-23 PROCEDURE — G8988 SELF CARE GOAL STATUS: HCPCS | Performed by: OCCUPATIONAL THERAPIST

## 2018-09-23 PROCEDURE — 97535 SELF CARE MNGMENT TRAINING: CPT | Performed by: OCCUPATIONAL THERAPIST

## 2018-09-23 PROCEDURE — 77030034849

## 2018-09-23 RX ORDER — HALOPERIDOL 5 MG/ML
2 INJECTION INTRAMUSCULAR
Status: DISCONTINUED | OUTPATIENT
Start: 2018-09-23 | End: 2018-09-26

## 2018-09-23 RX ORDER — POTASSIUM CHLORIDE 20 MEQ/1
40 TABLET, EXTENDED RELEASE ORAL
Status: COMPLETED | OUTPATIENT
Start: 2018-09-23 | End: 2018-09-23

## 2018-09-23 RX ADMIN — MELATONIN TAB 3 MG 6 MG: 3 TAB at 20:31

## 2018-09-23 RX ADMIN — Medication 10 ML: at 04:06

## 2018-09-23 RX ADMIN — METOPROLOL TARTRATE 25 MG: 25 TABLET ORAL at 20:31

## 2018-09-23 RX ADMIN — POTASSIUM CHLORIDE 40 MEQ: 1500 TABLET, EXTENDED RELEASE ORAL at 11:16

## 2018-09-23 RX ADMIN — MELATONIN TAB 3 MG 6 MG: 3 TAB at 22:00

## 2018-09-23 RX ADMIN — HYDROCODONE BITARTRATE AND ACETAMINOPHEN 1 TABLET: 5; 325 TABLET ORAL at 07:57

## 2018-09-23 RX ADMIN — LEVOFLOXACIN 750 MG: 5 INJECTION, SOLUTION INTRAVENOUS at 16:52

## 2018-09-23 RX ADMIN — LOSARTAN POTASSIUM 25 MG: 25 TABLET ORAL at 20:31

## 2018-09-23 RX ADMIN — DEXTROSE MONOHYDRATE 50 ML/HR: 5 INJECTION, SOLUTION INTRAVENOUS at 20:04

## 2018-09-23 RX ADMIN — NITROGLYCERIN 1 INCH: 20 OINTMENT TOPICAL at 11:16

## 2018-09-23 RX ADMIN — HYDROCODONE BITARTRATE AND ACETAMINOPHEN 2 TABLET: 5; 325 TABLET ORAL at 16:52

## 2018-09-23 RX ADMIN — DIPHENHYDRAMINE HYDROCHLORIDE 25 MG: 50 INJECTION, SOLUTION INTRAMUSCULAR; INTRAVENOUS at 20:32

## 2018-09-23 RX ADMIN — ENOXAPARIN SODIUM 40 MG: 100 INJECTION SUBCUTANEOUS at 09:10

## 2018-09-23 RX ADMIN — Medication 10 ML: at 23:04

## 2018-09-23 RX ADMIN — HALOPERIDOL LACTATE 2 MG: 5 INJECTION, SOLUTION INTRAMUSCULAR at 23:04

## 2018-09-23 RX ADMIN — SENNOSIDES AND DOCUSATE SODIUM 1 TABLET: 8.6; 5 TABLET ORAL at 09:10

## 2018-09-23 RX ADMIN — FAMOTIDINE 20 MG: 20 TABLET ORAL at 09:10

## 2018-09-23 RX ADMIN — METRONIDAZOLE 500 MG: 500 INJECTION, SOLUTION INTRAVENOUS at 04:03

## 2018-09-23 RX ADMIN — METOPROLOL TARTRATE 25 MG: 25 TABLET ORAL at 09:10

## 2018-09-23 RX ADMIN — DILTIAZEM HYDROCHLORIDE 120 MG: 120 CAPSULE, COATED, EXTENDED RELEASE ORAL at 09:10

## 2018-09-23 RX ADMIN — METRONIDAZOLE 500 MG: 500 INJECTION, SOLUTION INTRAVENOUS at 16:52

## 2018-09-23 RX ADMIN — Medication 10 ML: at 16:53

## 2018-09-23 RX ADMIN — NITROGLYCERIN 1 INCH: 20 OINTMENT TOPICAL at 01:13

## 2018-09-23 RX ADMIN — NITROGLYCERIN 1 INCH: 20 OINTMENT TOPICAL at 06:50

## 2018-09-23 NOTE — PROGRESS NOTES
Problem: Self Care Deficits Care Plan (Adult)  Goal: *Acute Goals and Plan of Care (Insert Text)  Occupational Therapy Goals  Initiated 9/23/2018  1. Patient will perform grooming standing at the sink with modified independence within 7 day(s). 2.  Patient will perform bathing with supervision and cues using AE as needed within 7 day(s). 3.  Patient will perform lower body dressing with supervision and cues using AE as needed within 7 day(s). 4.  Patient will perform toilet transfers with modified independence within 7 day(s). 5.  Patient will perform all aspects of toileting with modified independence within 7 day(s). Occupational Therapy EVALUATION  Patient: Kaitlynn Weeks (75 y.o. female)  Date: 9/23/2018  Primary Diagnosis: Choledocholithiasis with acute cholecystitis  CBD stone  CHOLELITHIASIS  CBD Stone  Procedure(s) (LRB):  ENDOSCOPIC RETROGRADE CHOLANGIOPANCREATOGRAPHY (ERCP) (N/A) 2 Days Post-Op   Precautions:        ASSESSMENT :  Based on the objective data described below, the patient presents with deficits in self-care, primarily due to decreased activity tolerance, post-op pain in her abdomen (limiting her from reaching her feet), general weakness, decreased dynamic balance (mild), decreased safety, and decreased initiation. She presents with a flat affect and requires cues for encouragement throughout. She will benefit from skilled OT treatment to maximize independence and safety in ADL. Patient will benefit from skilled intervention to address the above impairments.   Patients rehabilitation potential is considered to be Good  Factors which may influence rehabilitation potential include:   []             None noted  []             Mental ability/status  []             Medical condition  []             Home/family situation and support systems  []             Safety awareness  []             Pain tolerance/management  []             Other:      PLAN :  Recommendations and Planned Interventions:  [x]               Self Care Training                  [x]        Therapeutic Activities  [x]               Functional Mobility Training    [x]        Cognitive Retraining  [x]               Therapeutic Exercises           [x]        Endurance Activities  [x]               Balance Training                   []        Neuromuscular Re-Education  []               Visual/Perceptual Training     [x]   Home Safety Training  [x]               Patient Education                 [x]        Family Training/Education  []               Other (comment):    Frequency/Duration: Patient will be followed by occupational therapy 3 times a week to address goals. Discharge Recommendations: Home Health vs None  Further Equipment Recommendations for Discharge: TBD     SUBJECTIVE:   Patient stated I guess so.     OBJECTIVE DATA SUMMARY:   HISTORY:   Past Medical History:   Diagnosis Date    Agoraphobia without mention of panic attacks 2/17/2014    Anxiety disorder 8/18/2013    Arthritis     osteo    Breast pain, left 4/7/2015    CAD (coronary artery disease), native coronary artery 12/1/2015    Chronic chest pain 1/13/2014    Chronic pain associated with significant psychosocial dysfunction 2/17/2014    Depression 8/18/2013    Diabetes ([facility] [address]     type II    Duplicated right renal collecting system 3/13/2014    GERD (gastroesophageal reflux disease)     Gout, joint     Hypercholesteremia     hyercholesterolemia    Hypertension     Nephrolithiasis 3/13/2014    Personal history of noncompliance with medical treatment, presenting hazards to health 5/30/2014    Psychotic disorder     Vaginal pain 7/30/2014     Past Surgical History:   Procedure Laterality Date    EGD  4/23/2010         HX CYST REMOVAL      cyst removed from left wrist    HX HYSTERECTOMY      partial    HX OTHER SURGICAL      bladder dilitation    HX TUBAL LIGATION      HX UROLOGICAL      kidney stones       Prior Level of Function/Environment/Context: Lives with her boyfriend; daughter lives nearby and is very supportive/able to assist if needed  Expanded or extensive additional review of patient history:     Home Situation  Home Environment: Private residence  # Steps to Enter: 1  Rails to Enter: No  One/Two Story Residence: One story  Living Alone: No  Support Systems: Child(jim), Family member(s)  Patient Expects to be Discharged to[de-identified] Private residence  Current DME Used/Available at Home: None  Tub or Shower Type: Tub/Shower combination    Hand dominance: Right    EXAMINATION OF PERFORMANCE DEFICITS:  Cognitive/Behavioral Status:  Neurologic State: Alert  Orientation Level: Disoriented to time;Disoriented to situation;Oriented to person;Oriented to place  Cognition: Appropriate for age attention/concentration; Follows commands  Perception: Appears intact  Perseveration: No perseveration noted  Safety/Judgement: Awareness of environment; Insight into deficits; Decreased awareness of need for safety    Hearing: Auditory  Auditory Impairment: None  Hearing Aids/Status: Does not own    Vision/Perceptual:    Not formally assessed. Patient and daughter indicate she has a cataract on her right eye. She complains of eye pain but no functional deficits noted. Will assess as needed. Range of Motion:  AROM: Within functional limits                         Strength:  Strength: Generally decreased, functional                Coordination:     Fine Motor Skills-Upper: Left Intact; Right Intact    Gross Motor Skills-Upper: Left Intact; Right Intact    Tone & Sensation:  Tone: Normal  Sensation: Intact                      Balance:  Sitting: Intact  Standing: Impaired  Standing - Static: Good  Standing - Dynamic : Good (Fair/Good)    Functional Mobility and Transfers for ADLs:  Bed Mobility:       Transfers:  Sit to Stand: Supervision  Stand to Sit: Supervision  Toilet Transfer : Supervision    ADL Assessment:  Feeding: Setup    Oral Facial Hygiene/Grooming: Contact guard assistance    Bathing: Minimum assistance    Upper Body Dressing: Supervision    Lower Body Dressing: Moderate assistance    Toileting: Contact guard assistance                ADL Intervention and task modifications:  Discussed safety and fall prevention. Encouraged her to be out of bed as much as possible. Initiated education of ADL strategies, including adaptive equipment that may help her with lower body ADL (due to abdominal pain). Cognitive Retraining  Safety/Judgement: Awareness of environment; Insight into deficits; Decreased awareness of need for safety    Functional Measure:  Barthel Index:    Bathin  Bladder: 10  Bowels: 10  Groomin  Dressin  Feeding: 10  Mobility: 0  Stairs: 0  Toilet Use: 5  Transfer (Bed to Chair and Back): 10  Total: 55       Barthel and G-code impairment scale:  Percentage of impairment CH  0% CI  1-19% CJ  20-39% CK  40-59% CL  60-79% CM  80-99% CN  100%   Barthel Score 0-100 100 99-80 79-60 59-40 20-39 1-19   0   Barthel Score 0-20 20 17-19 13-16 9-12 5-8 1-4 0      The Barthel ADL Index: Guidelines  1. The index should be used as a record of what a patient does, not as a record of what a patient could do. 2. The main aim is to establish degree of independence from any help, physical or verbal, however minor and for whatever reason. 3. The need for supervision renders the patient not independent. 4. A patient's performance should be established using the best available evidence. Asking the patient, friends/relatives and nurses are the usual sources, but direct observation and common sense are also important. However direct testing is not needed. 5. Usually the patient's performance over the preceding 24-48 hours is important, but occasionally longer periods will be relevant. 6. Middle categories imply that the patient supplies over 50 per cent of the effort.   7. Use of aids to be independent is allowed. Kwesi Burgos., Barthel, D.W. ([id number]). Functional evaluation: the Barthel Index. [facility] (14)2. SILVER Farrar, Ines Garcia.Betzaida, Anny, [address] (1999). Measuring the change indisability after inpatient rehabilitation; comparison of the responsiveness of the Barthel Index and Functional [place] Measure. Journal of Neurology, Neurosurgery, and Psychiatry, 66(4), 994-711. DOUGLAS Nix, SUMAN Florian, & Lizzie Garcia M.A. (2004.) Assessment of post-stroke quality of life in cost-effectiveness studies: The usefulness of the Barthel Index and the EuroQoL-5D. Quality of Life Research, 13, 408-66       G codes: In compliance with CMSs Claims Based Outcome Reporting, the following G-code set was chosen for this patient based on their primary functional limitation being treated: The outcome measure chosen to determine the severity of the functional limitation was the Barthel Index with a score of 55/100 which was correlated with the impairment scale. ?  Self Care:     - CURRENT STATUS: CK - 40%-59% impaired, limited or restricted    - GOAL STATUS: CI - 1%-19% impaired, limited or restricted    - D/C STATUS:  ---------------To be determined---------------     Occupational Therapy Evaluation Charge Determination   History Examination Decision-Making   LOW Complexity : Brief history review  MEDIUM Complexity : 3-5 performance deficits relating to physical, cognitive , or psychosocial skils that result in activity limitations and / or participation restrictions LOW Complexity : No comorbidities that affect functional and no verbal or physical assistance needed to complete eval tasks       Based on the above components, the patient evaluation is determined to be of the following complexity level: LOW   Pain:  Pain Scale 1: Numeric (0 - 10)  Pain Intensity 1: 6  Pain Location 1: Abdomen  Pain Description 1: Aching;Pressure    Activity Tolerance: Fair  After treatment:   [x] Patient left in no apparent distress sitting up in chair  [] Patient left in no apparent distress in bed  [x] Call bell left within reach  [x] Nursing notified  [x] Caregiver present  [] Bed alarm activated    COMMUNICATION/EDUCATION:   The patients plan of care was discussed with: Physical Therapist and Registered Nurse. [x] Home safety education was provided and the patient/caregiver indicated understanding. [] Patient/family have participated as able in goal setting and plan of care. [x] Patient/family agree to work toward stated goals and plan of care. [] Patient understands intent and goals of therapy, but is neutral about his/her participation. [] Patient is unable to participate in goal setting and plan of care. This patients plan of care is appropriate for delegation to [facility].     Thank you for this referral.  Justice Gan OTR/ALE  Time Calculation: 29 mins

## 2018-09-23 NOTE — PROGRESS NOTES
Admit Date: 2018    POD 2 Day Post-Op    Procedure:  Procedure(s):  ENDOSCOPIC RETROGRADE CHOLANGIOPANCREATOGRAPHY (ERCP)    Subjective:     Patient without complaint. Objective:     Blood pressure 161/87, pulse 70, temperature 98.2 °F (36.8 °C), resp. rate 19, height 5' 7\" (1.702 m), weight 173 lb (78.5 kg), SpO2 98 %. Temp (24hrs), Av.3 °F (36.8 °C), Min:97.7 °F (36.5 °C), Max:98.9 °F (37.2 °C)      Physical Exam:  GENERAL: alert, cooperative, no distress, appears stated age, LUNG: clear to auscultation bilaterally, HEART: regular rate and rhythm, ABDOMEN: soft, non-tender. Bowel sounds normal. No masses,  no organomegaly, wounds c/d/i, AIRAM o/p serosanguinous, not bilious, EXTREMITIES:  extremities normal, atraumatic, no cyanosis or edema    Labs:   Recent Results (from the past 24 hour(s))   GLUCOSE, POC    Collection Time: 18  4:47 PM   Result Value Ref Range    Glucose (POC) 89 65 - 100 mg/dL    Performed by Anna Soler (PCT)    GLUCOSE, POC    Collection Time: 18  8:40 PM   Result Value Ref Range    Glucose (POC) 84 65 - 100 mg/dL    Performed by Unkown     METABOLIC PANEL, COMPREHENSIVE    Collection Time: 18  4:08 AM   Result Value Ref Range    Sodium 134 (L) 136 - 145 mmol/L    Potassium 3.3 (L) 3.5 - 5.1 mmol/L    Chloride 101 97 - 108 mmol/L    CO2 25 21 - 32 mmol/L    Anion gap 8 5 - 15 mmol/L    Glucose 90 65 - 100 mg/dL    BUN 15 6 - 20 MG/DL    Creatinine 0.89 0.55 - 1.02 MG/DL    BUN/Creatinine ratio 17 12 - 20      GFR est AA >60 >60 ml/min/1.73m2    GFR est non-AA >60 >60 ml/min/1.73m2    Calcium 8.0 (L) 8.5 - 10.1 MG/DL    Bilirubin, total 1.3 (H) 0.2 - 1.0 MG/DL    ALT (SGPT) 228 (H) 12 - 78 U/L    AST (SGOT) 70 (H) 15 - 37 U/L    Alk.  phosphatase 98 45 - 117 U/L    Protein, total 5.9 (L) 6.4 - 8.2 g/dL    Albumin 2.6 (L) 3.5 - 5.0 g/dL    Globulin 3.3 2.0 - 4.0 g/dL    A-G Ratio 0.8 (L) 1.1 - 2.2     CBC WITH AUTOMATED DIFF    Collection Time: 09/23/18  4:08 AM   Result Value Ref Range    WBC 4.7 3.6 - 11.0 K/uL    RBC 3.89 3.80 - 5.20 M/uL    HGB 10.6 (L) 11.5 - 16.0 g/dL    HCT 32.8 (L) 35.0 - 47.0 %    MCV 84.3 80.0 - 99.0 FL    MCH 27.2 26.0 - 34.0 PG    MCHC 32.3 30.0 - 36.5 g/dL    RDW 13.4 11.5 - 14.5 %    PLATELET 676 829 - 457 K/uL    MPV 10.9 8.9 - 12.9 FL    NRBC 0.0 0  WBC    ABSOLUTE NRBC 0.00 0.00 - 0.01 K/uL    NEUTROPHILS 74 32 - 75 %    LYMPHOCYTES 16 12 - 49 %    MONOCYTES 9 5 - 13 %    EOSINOPHILS 0 0 - 7 %    BASOPHILS 0 0 - 1 %    IMMATURE GRANULOCYTES 0 0.0 - 0.5 %    ABS. NEUTROPHILS 3.5 1.8 - 8.0 K/UL    ABS. LYMPHOCYTES 0.7 (L) 0.8 - 3.5 K/UL    ABS. MONOCYTES 0.4 0.0 - 1.0 K/UL    ABS. EOSINOPHILS 0.0 0.0 - 0.4 K/UL    ABS. BASOPHILS 0.0 0.0 - 0.1 K/UL    ABS. IMM. GRANS. 0.0 0.00 - 0.04 K/UL    DF AUTOMATED     GLUCOSE, POC    Collection Time: 09/23/18  8:04 AM   Result Value Ref Range    Glucose (POC) 86 65 - 100 mg/dL    Performed by Zana Edwards Review images and reports reviewed    Assessment:     Active Problems:    Choledocholithiasis with acute cholecystitis (9/19/2018)        Plan/Recommendations/Medical Decision Making:     Continue present treatment   AIRAM to remain at discharge, f/u with [facility] Thursday. Okay for d/c from surgical standpoint at any time. Poonam Redmond.  Kerry Alamo MD, [facility]

## 2018-09-23 NOTE — PROGRESS NOTES
[address]  [address]  [place], [address]  [phone]                GI PROGRESS NOTE      NAME:   Reginaldo Nash   :    1945   MRN:    [id number]     Assessment/Plan   Abnormal liver enzymes and bilirubin- improving post ERCP  Bile duct stone- ERCP done  Bile leak-monitor output.    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    Onycholysis of toenail U92.3    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    Nephrolithiasis N20.0    Personal history of noncompliance with medical treatment, presenting hazards to health Z91.19    Breast pain, left N64.4    Pseudobulbar affect F48.2    CAD (coronary artery disease), native coronary artery I25.10    SOB (shortness of breath) R06.02    Broken heart syndrome I51.81    Death of child Z63.4    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    Diabetes mellitus type 2, diet-controlled ([facility] [address] E11.9    Somatization disorder F45.0    Death of parent Z63.4    Tightness sensation-head Z78.9    Generalized OA M15.9    Noncompliance with medication regimen-chol medication  Z91.14    Orthostatic hypotension I95.1    Hydronephrosis of left kidney N13.30    Left-sided chest wall pain R07.89    Weakness R53.1    Assistance needed with transportation Z74.8    Gait instability R26.81    Advance directive discussed with patient Z71.89    Vaginal irritation N89.8    Blurring of vision H53.8    Choledocholithiasis with acute cholecystitis S04.31       Subjective:     Tara Youngblood is a 68 y.o.  female who has pain and found to have urinary retention. Straight cath revealed over a litre of urine. Feels little better. Drain still putting out about 100 ml bilious fluid     Review of Systems    Constitutional: negative fever, negative chills, negative weight loss  Eyes:   negative visual changes  ENT:   negative sore throat, tongue or lip swelling  Respiratory:  negative cough, negative dyspnea  Cards:  negative for chest pain, palpitations, lower extremity edema  GI:   See HPI  :  negative for frequency, dysuria  Integument:  negative for rash and pruritus  Heme:  negative for easy bruising and gum/nose bleeding  Musculoskel: negative for myalgias,  back pain and muscle weakness  Neuro: negative for headaches, dizziness, vertigo  Psych:  negative for feelings of anxiety, depression           Objective:     VITALS:   Last 24hrs VS reviewed since prior hospitalist progress note. Most recent are:  Visit Vitals    /80    Pulse 62    Temp 98.2 °F (36.8 °C)    Resp 18    Ht 5' 7\" (1.702 m)    Wt 78.5 kg (173 lb)    SpO2 98%    BMI 27.1 kg/m2       Intake/Output Summary (Last 24 hours) at 09/23/18 1319  Last data filed at 09/23/18 1040   Gross per 24 hour   Intake              490 ml   Output             1400 ml   Net             -910 ml        PHYSICAL EXAM:  General   well developed, well nourished, appears stated age, in no acute distress  EENT  Normocephalic, Atraumatic, PERRLA, EOMI, sclera clear, nares clear, pharynx normal  Neck   Supple without nodes or mass. No thyromegaly or bruit  Respiratory   Clear To Auscultation bilaterally - no wheezes, rales, rhonchi, or crackles  Cardiology  Regular Rate and Rythmn  - no murmurs, rubs or gallops  Abdominal  Soft, non-tender, non-distended, positive bowel sounds, no hepatosplenomegaly, no palpable mass  Extremities  No clubbing, cyanosis, or edema. Pulses intact.   Back  No spinal or muscle pain.  No CVAT. Skin  Normal skin turgor. No rashes or skin ulcers noted  Neurological  No focal neurological deficits noted  Psychological  Oriented x 3. Normal affect. Lab Data   Recent Results (from the past 12 hour(s))   METABOLIC PANEL, COMPREHENSIVE    Collection Time: 09/23/18  4:08 AM   Result Value Ref Range    Sodium 134 (L) 136 - 145 mmol/L    Potassium 3.3 (L) 3.5 - 5.1 mmol/L    Chloride 101 97 - 108 mmol/L    CO2 25 21 - 32 mmol/L    Anion gap 8 5 - 15 mmol/L    Glucose 90 65 - 100 mg/dL    BUN 15 6 - 20 MG/DL    Creatinine 0.89 0.55 - 1.02 MG/DL    BUN/Creatinine ratio 17 12 - 20      GFR est AA >60 >60 ml/min/1.73m2    GFR est non-AA >60 >60 ml/min/1.73m2    Calcium 8.0 (L) 8.5 - 10.1 MG/DL    Bilirubin, total 1.3 (H) 0.2 - 1.0 MG/DL    ALT (SGPT) 228 (H) 12 - 78 U/L    AST (SGOT) 70 (H) 15 - 37 U/L    Alk. phosphatase 98 45 - 117 U/L    Protein, total 5.9 (L) 6.4 - 8.2 g/dL    Albumin 2.6 (L) 3.5 - 5.0 g/dL    Globulin 3.3 2.0 - 4.0 g/dL    A-G Ratio 0.8 (L) 1.1 - 2.2     CBC WITH AUTOMATED DIFF    Collection Time: 09/23/18  4:08 AM   Result Value Ref Range    WBC 4.7 3.6 - 11.0 K/uL    RBC 3.89 3.80 - 5.20 M/uL    HGB 10.6 (L) 11.5 - 16.0 g/dL    HCT 32.8 (L) 35.0 - 47.0 %    MCV 84.3 80.0 - 99.0 FL    MCH 27.2 26.0 - 34.0 PG    MCHC 32.3 30.0 - 36.5 g/dL    RDW 13.4 11.5 - 14.5 %    PLATELET 244 047 - 114 K/uL    MPV 10.9 8.9 - 12.9 FL    NRBC 0.0 0  WBC    ABSOLUTE NRBC 0.00 0.00 - 0.01 K/uL    NEUTROPHILS 74 32 - 75 %    LYMPHOCYTES 16 12 - 49 %    MONOCYTES 9 5 - 13 %    EOSINOPHILS 0 0 - 7 %    BASOPHILS 0 0 - 1 %    IMMATURE GRANULOCYTES 0 0.0 - 0.5 %    ABS. NEUTROPHILS 3.5 1.8 - 8.0 K/UL    ABS. LYMPHOCYTES 0.7 (L) 0.8 - 3.5 K/UL    ABS. MONOCYTES 0.4 0.0 - 1.0 K/UL    ABS. EOSINOPHILS 0.0 0.0 - 0.4 K/UL    ABS. BASOPHILS 0.0 0.0 - 0.1 K/UL    ABS. IMM.  GRANS. 0.0 0.00 - 0.04 K/UL    DF AUTOMATED     GLUCOSE, POC    Collection Time: 09/23/18  8:04 AM   Result Value Ref Range Glucose (POC) 86 65 - 100 mg/dL    Performed by WILLIAM CARNES    URINALYSIS W/ REFLEX CULTURE    Collection Time: 09/23/18 10:46 AM   Result Value Ref Range    Color DARK YELLOW      Appearance CLEAR CLEAR      Specific gravity 1.013 1.003 - 1.030      pH (UA) 6.0 5.0 - 8.0      Protein NEGATIVE  NEG mg/dL    Glucose 100 (A) NEG mg/dL    Ketone TRACE (A) NEG mg/dL    Bilirubin NEGATIVE  NEG      Blood NEGATIVE  NEG      Urobilinogen 1.0 0.2 - 1.0 EU/dL    Nitrites NEGATIVE  NEG      Leukocyte Esterase NEGATIVE  NEG      WBC 0-4 0 - 4 /hpf    RBC 0-5 0 - 5 /hpf    Epithelial cells FEW FEW /lpf    Bacteria NEGATIVE  NEG /hpf    UA:UC IF INDICATED CULTURE NOT INDICATED BY UA RESULT CNI      Hyaline cast 0-2 0 - 5 /lpf   GLUCOSE, POC    Collection Time: 09/23/18 11:10 AM   Result Value Ref Range    Glucose (POC) 108 (H) 65 - 100 mg/dL    Performed by Alicia Khoury*          Medications: Reviewed    PMH/ reviewed - no change compared to H&P  Attending Physician: Christos Diaz MD   Date/Time:  9/23/2018

## 2018-09-23 NOTE — ROUTINE PROCESS
Bedside shift change report given to Daniel Barton, [facility] nurse) by Claudia Oswald RN (offgoing nurse). Report included the following information SBAR, Kardex, Intake/Output, Accordion and Recent Results.

## 2018-09-23 NOTE — PROGRESS NOTES
General Surgery End of Shift Nursing Note    Bedside shift change report given to [facility] RN (oncoming nurse) by Deneen Maldonado RN (offgoing nurse). Report included the following information SBAR, Kardex, OR Summary, Intake/Output, MAR, Recent Results and Cardiac Rhythm SR. Shift worked:   7p-7a   Summary of shift:    Sitter @ bedside, pt less confused, more cooperative with care,    Issues for physician to address:   none     Number times ambulated in hallway past shift: 0    Number of times OOB to chair past shift: 0    Pain Management:  Current medication: morphine, percocet  Patient states pain is manageable on current pain medication: YES    GI:    Current diet:  DIET DIABETIC CONSISTENT CARB Regular    Tolerating current diet: YES  Passing flatus: YES  Last Bowel Movement: today   Appearance: loose, brown     Respiratory:    Incentive Spirometer at bedside: YES  Patient instructed on use: YES    Patient Safety:    Falls Score: 3  Bed Alarm On? No  Sitter?  Yes    Joan West RN

## 2018-09-23 NOTE — PROGRESS NOTES
Hospitalist Progress Note    NAME: Jad Rebolledo   :  1945   MRN:  [id number]       Assessment / Plan:  Acute choledocholithiasis with acute cholecystitis POA  Acute RUQ abd pain, POA  Abnormal LFT, POA  -LFTs with AST 1426,  , Alk Phos 146, total Bilirubin 2.8  RUQ Ultrasound consistent with gallstones with acute cholecystitis changes, dilated CBD  -HIDA scan with tracer in the liver but no activity in gallbladder, common bile duct, small bowel after 3 hours suggesting common bile duct obstruction. -MRCP showed cholelithiasis. Pericholecystic edema suspicious for hcolecystitis. Common duct is normal  -s/p lap jesus with cholangiogram showed Jaundice, acute inflammation of the gallbladder, small gallstones in the gallbladder and cystic duct. No obvious CBD filling defect on cholangiogram (the defect on the second image is the cholangiogram catheter balloon), but no passage of contrast into the duodenum, consistent with obstruction.  -Cont' IV Levaquin and Flagyl empirically. Last dose   -IV morphine prn  -s/p ERCP o . Completed LR at 250cc/hr x12 hrs.   -can d/c home with AIRAM and follow up with ERCP in 1 week  -need to repeat ERCP in 1-2 months  -appreciate gen surg and GI consultation  -needs PT/OT, possible rehab on discharge    Urinary retention  -check UA and in/out cath. Bladder scan q6h with in/out cath  -already on abx as above    Acute encephalopathy  -less confused this morning, more cooperative with taking meds  -avoid sedating meds  -sitter prn    Constipation, resolved  -cont' miralax, docusate    Type 2 diabetes mellitus POA currently off medications per her report   -BG 81. A1C 5.5  -on d5    Essential hypertension POA  -BP elevated.   Component of agitation and IVF at high rate  -continue ARB and Cardizem CD, metoprolol  -prn hydralazine/ nitrobid prn    Coronary disease POA  -off aspirin and blood pressure medication control   -currently off of cholesterol medications     Insomnia POA melatonin at bedtime     Overweight  Morbid Obesity POA   Code status: full code  NOK: daughter  dvt prophylaxis: lovenox     Subjective:     Chief Complaint / Reason for Physician Visit  Pt seen at bedside. More awake and alert. Complaining of generalzied pain. Very tender during physical exam.  Discussed with RN events overnight. Review of Systems:  Symptom Y/N Comments  Symptom Y/N Comments   Fever/Chills n   Chest Pain     Poor Appetite    Edema     Cough    Abdominal Pain y    Sputum    Joint Pain     SOB/MOORE    Pruritis/Rash     Nausea/vomit n   Tolerating PT/OT     Diarrhea    Tolerating Diet     Constipation    Other       Could NOT obtain due to:      Objective:     VITALS:   Last 24hrs VS reviewed since prior progress note. Most recent are:  Patient Vitals for the past 24 hrs:   Temp Pulse Resp BP SpO2   09/23/18 0743 98.2 °F (36.8 °C) 70 19 161/87 98 %   09/23/18 0503 97.7 °F (36.5 °C) 60 16 180/88 98 %   09/22/18 2350 97.8 °F (36.6 °C) 67 18 174/88 97 %   09/22/18 2044 98.8 °F (37.1 °C) 71 18 157/84 98 %   09/22/18 1659 98.6 °F (37 °C) 72 18 164/83 -   09/22/18 1257 98.9 °F (37.2 °C) 73 16 154/84 -       Intake/Output Summary (Last 24 hours) at 09/23/18 1005  Last data filed at 09/23/18 0949   Gross per 24 hour   Intake              490 ml   Output              100 ml   Net              390 ml        PHYSICAL EXAM:  General: WD, WN. Alert, cooperative, no acute distress    EENT:  EOMI. Anicteric sclerae. MMM  Resp:  CTA bilaterally, no wheezing or rales. No accessory muscle use  CV:  Regular  rhythm,  No edema  GI:  Soft, Non distended, Non tender.  +BS  Neurologic:  Alert and oriented X 2, normal speech  Psych:   Not anxious nor agitated  Skin:  No rashes.   No jaundice    Reviewed most current lab test results and cultures  YES  Reviewed most current radiology test results   YES  Review and summation of old records today    NO  Reviewed patient's current orders and MAR    YES  PMH/SH reviewed - no change compared to H&P  ________________________________________________________________________  Care Plan discussed with:    Comments   Patient x    Family      RN x    Care Manager     Consultant                        Multidiciplinary team rounds were held today with , nursing, pharmacist and clinical coordinator. Patient's plan of care was discussed; medications were reviewed and discharge planning was addressed. ________________________________________________________________________  Total NON critical care TIME:  35   Minutes    Total CRITICAL CARE TIME Spent:   Minutes non procedure based      Comments   >50% of visit spent in counseling and coordination of care     ________________________________________________________________________  Marium Rubio MD     Procedures: see electronic medical records for all procedures/Xrays and details which were not copied into this note but were reviewed prior to creation of Plan. LABS:  I reviewed today's most current labs and imaging studies.   Pertinent labs include:  Recent Labs      09/23/18   0408  09/21/18   0256   WBC  4.7  5.4   HGB  10.6*  10.4*   HCT  32.8*  32.5*   PLT  202  157     Recent Labs      09/23/18   0408  09/21/18   0256   NA  134*  138   K  3.3*  4.5   CL  101  106   CO2  25  25   GLU  90  104*   BUN  15  17   CREA  0.89  0.87   CA  8.0*  8.1*   ALB  2.6*  2.7*   TBILI  1.3*  5.0*   SGOT  70*  233*   ALT  228*  487*       Signed: Marium Rubio MD

## 2018-09-24 ENCOUNTER — APPOINTMENT (OUTPATIENT)
Dept: CT IMAGING | Age: 73
DRG: 418 | End: 2018-09-24
Attending: INTERNAL MEDICINE
Payer: MEDICARE

## 2018-09-24 LAB
GLUCOSE BLD STRIP.AUTO-MCNC: 118 MG/DL (ref 65–100)
GLUCOSE BLD STRIP.AUTO-MCNC: 130 MG/DL (ref 65–100)
GLUCOSE BLD STRIP.AUTO-MCNC: 141 MG/DL (ref 65–100)
GLUCOSE BLD STRIP.AUTO-MCNC: 68 MG/DL (ref 65–100)
GLUCOSE BLD STRIP.AUTO-MCNC: 74 MG/DL (ref 65–100)
GLUCOSE BLD STRIP.AUTO-MCNC: 94 MG/DL (ref 65–100)
SERVICE CMNT-IMP: ABNORMAL
SERVICE CMNT-IMP: NORMAL

## 2018-09-24 PROCEDURE — 65660000000 HC RM CCU STEPDOWN

## 2018-09-24 PROCEDURE — 82962 GLUCOSE BLOOD TEST: CPT

## 2018-09-24 PROCEDURE — 74011250637 HC RX REV CODE- 250/637: Performed by: INTERNAL MEDICINE

## 2018-09-24 PROCEDURE — 74011250636 HC RX REV CODE- 250/636: Performed by: INTERNAL MEDICINE

## 2018-09-24 PROCEDURE — 74011250636 HC RX REV CODE- 250/636: Performed by: EMERGENCY MEDICINE

## 2018-09-24 PROCEDURE — 94760 N-INVAS EAR/PLS OXIMETRY 1: CPT

## 2018-09-24 PROCEDURE — 70450 CT HEAD/BRAIN W/O DYE: CPT

## 2018-09-24 PROCEDURE — 74011250637 HC RX REV CODE- 250/637: Performed by: PSYCHIATRY & NEUROLOGY

## 2018-09-24 RX ORDER — QUETIAPINE FUMARATE 25 MG/1
25 TABLET, FILM COATED ORAL
Status: DISCONTINUED | OUTPATIENT
Start: 2018-09-24 | End: 2018-10-06

## 2018-09-24 RX ADMIN — METRONIDAZOLE 500 MG: 500 INJECTION, SOLUTION INTRAVENOUS at 16:55

## 2018-09-24 RX ADMIN — DIPHENHYDRAMINE HYDROCHLORIDE 25 MG: 50 INJECTION, SOLUTION INTRAMUSCULAR; INTRAVENOUS at 06:11

## 2018-09-24 RX ADMIN — DILTIAZEM HYDROCHLORIDE 120 MG: 120 CAPSULE, COATED, EXTENDED RELEASE ORAL at 10:09

## 2018-09-24 RX ADMIN — FAMOTIDINE 20 MG: 20 TABLET ORAL at 10:09

## 2018-09-24 RX ADMIN — METOPROLOL TARTRATE 25 MG: 25 TABLET ORAL at 10:09

## 2018-09-24 RX ADMIN — FAMOTIDINE 20 MG: 20 TABLET ORAL at 17:00

## 2018-09-24 RX ADMIN — ENOXAPARIN SODIUM 40 MG: 100 INJECTION SUBCUTANEOUS at 10:09

## 2018-09-24 RX ADMIN — NITROGLYCERIN 1 INCH: 20 OINTMENT TOPICAL at 14:28

## 2018-09-24 RX ADMIN — MELATONIN TAB 3 MG 6 MG: 3 TAB at 23:18

## 2018-09-24 RX ADMIN — LOSARTAN POTASSIUM 25 MG: 25 TABLET ORAL at 23:19

## 2018-09-24 RX ADMIN — LEVOFLOXACIN 750 MG: 5 INJECTION, SOLUTION INTRAVENOUS at 18:12

## 2018-09-24 RX ADMIN — SENNOSIDES AND DOCUSATE SODIUM 1 TABLET: 8.6; 5 TABLET ORAL at 10:09

## 2018-09-24 RX ADMIN — HALOPERIDOL LACTATE 2 MG: 5 INJECTION, SOLUTION INTRAMUSCULAR at 06:11

## 2018-09-24 RX ADMIN — QUETIAPINE FUMARATE 25 MG: 25 TABLET ORAL at 23:18

## 2018-09-24 RX ADMIN — METRONIDAZOLE 500 MG: 500 INJECTION, SOLUTION INTRAVENOUS at 06:12

## 2018-09-24 RX ADMIN — Medication 10 ML: at 23:19

## 2018-09-24 RX ADMIN — ONDANSETRON 4 MG: 2 INJECTION INTRAMUSCULAR; INTRAVENOUS at 20:33

## 2018-09-24 RX ADMIN — Medication 10 ML: at 14:30

## 2018-09-24 RX ADMIN — MORPHINE SULFATE 2 MG: 2 INJECTION, SOLUTION INTRAMUSCULAR; INTRAVENOUS at 20:34

## 2018-09-24 RX ADMIN — METOPROLOL TARTRATE 25 MG: 25 TABLET ORAL at 20:34

## 2018-09-24 NOTE — PROGRESS NOTES
Hospitalist Progress Note    NAME: Tracy Mix   :  1945   MRN:  [id number]       Assessment / Plan:  Acute choledocholithiasis with acute cholecystitis POA  Acute RUQ abd pain, POA  Abnormal LFT, POA  -LFTs with AST 1426,  , Alk Phos 146, total Bilirubin 2.8, labs improving  RUQ Ultrasound consistent with gallstones with acute cholecystitis changes, dilated CBD  -HIDA scan with tracer in the liver but no activity in gallbladder, common bile duct, small bowel after 3 hours suggesting common bile duct obstruction. -MRCP showed cholelithiasis. Pericholecystic edema suspicious for hcolecystitis. Common duct is normal  -s/p lap jesus with cholangiogram showed Jaundice, acute inflammation of the gallbladder, small gallstones in the gallbladder and cystic duct. No obvious CBD filling defect on cholangiogram (the defect on the second image is the cholangiogram catheter balloon), but no passage of contrast into the duodenum, consistent with obstruction.  -Cont' IV Levaquin and Flagyl empirically. Last dose   -IV morphine prn  -s/p ERCP o . Completed LR at 250cc/hr x12 hrs.   -labs improving, can d/c home with AIRAM and follow up with ERCP in 1 week  -need to repeat ERCP in 1-2 months  -appreciate gen surg and GI consultation  -needs PT/OT, possible rehab on discharge    Urinary retention  -UA neg for abnormalities. S/p martinez insertion. Acute encephalopathy, ? Underlining dementia  -nursing reports confusion appears to be in the PM.    -UA neg.  Pt currently on abx as above  -check head CT  -will consult neurology  -avoid sedating meds  -sitter prn    Constipation, resolved  -cont' miralax, docusate    Type 2 diabetes mellitus POA currently off medications per her report   - A1C 5.5, poor appetite  -on d5    Essential hypertension POA  -BP improved  -continue ARB and Cardizem CD, metoprolol  -prn hydralazine/ nitrobid prn    Coronary disease POA  -off aspirin and blood pressure medication control   -currently off of cholesterol medications     Insomnia POA melatonin at bedtime     Attempted to call pt's daughter and updated her on pt's current condition and treatment plans. Calls went to , will try again later. Overweight  Morbid Obesity POA   Code status: full code  NOK: daughter  dvt prophylaxis: lovenox     Subjective:     Chief Complaint / Reason for Physician Visit  Pt seen at bedside. Awake but confused. Nursing report confusion is worst at night. Required martinez placement overnight due to urinary retention. Discussed with RN events overnight. Review of Systems:  Symptom Y/N Comments  Symptom Y/N Comments   Fever/Chills n   Chest Pain     Poor Appetite    Edema     Cough    Abdominal Pain y    Sputum    Joint Pain     SOB/MOORE    Pruritis/Rash     Nausea/vomit n   Tolerating PT/OT     Diarrhea    Tolerating Diet     Constipation    Other       Could NOT obtain due to:      Objective:     VITALS:   Last 24hrs VS reviewed since prior progress note. Most recent are:  Patient Vitals for the past 24 hrs:   Temp Pulse Resp BP SpO2   09/24/18 0756 98 °F (36.7 °C) 98 18 (!) 159/98 99 %   09/23/18 2252 98 °F (36.7 °C) 70 18 146/67 97 %   09/23/18 1938 98.1 °F (36.7 °C) 67 20 152/72 98 %   09/23/18 1639 98.3 °F (36.8 °C) 63 20 146/76 97 %   09/23/18 1111 98.2 °F (36.8 °C) 62 18 142/80 98 %       Intake/Output Summary (Last 24 hours) at 09/24/18 0914  Last data filed at 09/24/18 0857   Gross per 24 hour   Intake          2827.56 ml   Output             2365 ml   Net           462.56 ml        PHYSICAL EXAM:  General: WD, WN. Alert, cooperative, no acute distress    EENT:  EOMI. Anicteric sclerae. MMM  Resp:  CTA bilaterally, no wheezing or rales. No accessory muscle use  CV:  Regular  rhythm,  No edema  GI:  Soft, Non distended, Non tender.  +BS  Neurologic:  Alert and oriented X 1, normal speech  Psych:   Not anxious nor agitated  Skin:  No rashes.   No jaundice    Reviewed most current lab test results and cultures  YES  Reviewed most current radiology test results   YES  Review and summation of old records today    NO  Reviewed patient's current orders and MAR    YES  PMH/SH reviewed - no change compared to H&P  ________________________________________________________________________  Care Plan discussed with:    Comments   Patient x    Family      RN x    Care Manager     Consultant                        Multidiciplinary team rounds were held today with , nursing, pharmacist and clinical coordinator. Patient's plan of care was discussed; medications were reviewed and discharge planning was addressed. ________________________________________________________________________  Total NON critical care TIME:  35   Minutes    Total CRITICAL CARE TIME Spent:   Minutes non procedure based      Comments   >50% of visit spent in counseling and coordination of care     ________________________________________________________________________  Benedict Moody MD     Procedures: see electronic medical records for all procedures/Xrays and details which were not copied into this note but were reviewed prior to creation of Plan. LABS:  I reviewed today's most current labs and imaging studies.   Pertinent labs include:  Recent Labs      09/23/18   0408   WBC  4.7   HGB  10.6*   HCT  32.8*   PLT  202     Recent Labs      09/23/18   0408   NA  134*   K  3.3*   CL  101   CO2  25   GLU  90   BUN  15   CREA  0.89   CA  8.0*   ALB  2.6*   TBILI  1.3*   SGOT  70*   ALT  228*       Signed: Benedict Moody MD

## 2018-09-24 NOTE — PROGRESS NOTES
Pt attempted to urinate, voided only a few drops in bedside commode, bladder scanner showed large amt urine retained, martinez catheter used in place of  straight cath for pt safety d/t hx of combative behavior, large amt urine drained immediately, order obtained to keep martinez in place per Dr. Manjula Evans d/t pt's confused/combative state. Agitation continued post procedure, pt throwing her legs over siderails trying to get OOB. Dr. Manjula Evans notified, Haldol 2mg IVP given with limited results, order obtained for soft wrist restraints, which were placed @ approximately [phone] [id number].

## 2018-09-24 NOTE — PROGRESS NOTES
GI Progress Note Jaelyn Hooper  Diann Pyle :1945 [place]   ATTG: [unfilled]  PCP: Vaibhav Joens MD  Date/Time:  2018 11:47 AM   Assessment:   · Cholecystitis s/p cholecystectomy 18  · ? Choledocholithiasis s/p ERCP on 18:  1. Evidence of prior cholecystectomy  2. Small amount of sludge in the terminal CBD  3. Ampullary stenosis  4. No bile leak was seen even with balloon occlusion cholangiogram.  5. Biliary sphincterotomy performed  6. Balloon sweep performed with removal of sludge  7. Given bile in AIRAM drain (though again no bile leak demonstrated), jaundice and ampullary stenosis (with very poor drainage of bile despite sphincterotomy) I placed a 10 Fr by 7 cm biliary stent     Plan:   · ADAT  · F/u ERCP in 1-2 months for stent removal    GI will sign off. Feel free to page w/ any questions     Subjective:   No GI complaints. Complaint Y/N Description   Abdominal Pain     Hematemesis     Hematochezia     Melena     Constipation     Diarrhea     Dyspepsia     Dysphagia     Jaundiced     Nausea/vomiting       Review of Systems:  Symptom Y/N Comments  Symptom Y/N Comments   Fever/Chills    Chest Pain     Cough    Headaches     Sputum    Joint Pain     SOB/MOORE    Pruritis/Rash     Tolerating Diet    Other       Could NOT obtain due to:      Objective:   VITALS:   Last 24hrs VS reviewed since prior progress note. Most recent are:  Visit Vitals    BP (!) 159/98    Pulse 98    Temp 98 °F (36.7 °C)    Resp 18    Ht 5' 7\" (1.702 m)    Wt 78.5 kg (173 lb)    SpO2 99%    BMI 27.1 kg/m2       Intake/Output Summary (Last 24 hours) at 18 1147  Last data filed at 18 1006   Gross per 24 hour   Intake          2647.56 ml   Output             1065 ml   Net          1582.56 ml     PHYSICAL EXAM:  General: WD, WN. Alert, cooperative, no acute distress    HEENT: NC, Atraumatic. PERRLA, EOMI. Anicteric sclerae. Lungs:  CTA Bilaterally. No Wheezing/Rhonchi/Rales.   Heart:  Regular rhythm,  No murmur (), No Rubs, No Gallops  Abdomen: Soft, Non distended, Non tender.  +Bowel sounds, no HSM  Extremities: No c/c/e      Lab and Radiology Data Reviewed: (see below)    Medications Reviewed: (see below)  PMH/SH reviewed - no change compared to H&P  ________________________________________________________________________  Care Plan discussed with:  Patient x   Family     RN x              Consultant:       Ronan Maza MD     Procedures: see electronic medical records for all procedures/Xrays and details which were not copied into this note but were reviewed prior to creation of Plan. LABS:  Recent Labs      09/23/18   0408   WBC  4.7   HGB  10.6*   HCT  32.8*   PLT  202     Recent Labs      09/23/18   0408   NA  134*   K  3.3*   CL  101   CO2  25   BUN  15   CREA  0.89   GLU  90   CA  8.0*     Recent Labs      09/23/18   0408   SGOT  70*   AP  98   TP  5.9*   ALB  2.6*   GLOB  3.3     No results for input(s): INR, PTP, APTT in the last 72 hours. No lab exists for component: INREXT   No results for input(s): FE, TIBC, PSAT, FERR in the last 72 hours. Lab Results   Component Value Date/Time    Folate >19.9 03/04/2015 12:32 PM     No results for input(s): PH, PCO2, PO2 in the last 72 hours. No results for input(s): CPK, CKMB in the last 72 hours.     No lab exists for component: TROPONINI  Lab Results   Component Value Date/Time    Color DARK YELLOW 09/23/2018 10:46 AM    Appearance CLEAR 09/23/2018 10:46 AM    Specific gravity 1.013 09/23/2018 10:46 AM    Specific gravity 1.015 05/04/2015 02:59 PM    pH (UA) 6.0 09/23/2018 10:46 AM    Protein NEGATIVE  09/23/2018 10:46 AM    Glucose 100 (A) 09/23/2018 10:46 AM    Ketone TRACE (A) 09/23/2018 10:46 AM    Bilirubin NEGATIVE  09/23/2018 10:46 AM    Urobilinogen 1.0 09/23/2018 10:46 AM    Nitrites NEGATIVE  09/23/2018 10:46 AM    Leukocyte Esterase NEGATIVE  09/23/2018 10:46 AM    Epithelial cells FEW 09/23/2018 10:46 AM    Bacteria NEGATIVE  09/23/2018 10:46 AM    WBC 0-4 09/23/2018 10:46 AM    RBC 0-5 09/23/2018 10:46 AM       MEDICATIONS:  Current Facility-Administered Medications   Medication Dose Route Frequency    haloperidol lactate (HALDOL) injection 2 mg  2 mg IntraVENous Q6H PRN    dextrose 5% infusion  50 mL/hr IntraVENous CONTINUOUS    enoxaparin (LOVENOX) injection 40 mg  40 mg SubCUTAneous Q24H    senna-docusate (PERICOLACE) 8.6-50 mg per tablet 1 Tab  1 Tab Oral DAILY    hydrALAZINE (APRESOLINE) 20 mg/mL injection 10 mg  10 mg IntraVENous Q6H PRN    nitroglycerin (NITROBID) 2 % ointment 1 Inch  1 Inch Topical Q6H    diphenhydrAMINE (BENADRYL) injection 25 mg  25 mg IntraVENous Q6H PRN    metoprolol tartrate (LOPRESSOR) tablet 25 mg  25 mg Oral Q12H    insulin lispro (HUMALOG) injection   SubCUTAneous AC&HS    glucose chewable tablet 16 g  4 Tab Oral PRN    dextrose (D50W) injection syrg 12.5-25 g  12.5-25 g IntraVENous PRN    glucagon (GLUCAGEN) injection 1 mg  1 mg IntraMUSCular PRN    HYDROcodone-acetaminophen (NORCO) 5-325 mg per tablet 1-2 Tab  1-2 Tab Oral Q4H PRN    sodium chloride (NS) flush 5-10 mL  5-10 mL IntraVENous Q8H    sodium chloride (NS) flush 5-10 mL  5-10 mL IntraVENous PRN    naloxone (NARCAN) injection 0.4 mg  0.4 mg IntraVENous PRN    ondansetron (ZOFRAN) injection 4 mg  4 mg IntraVENous Q4H PRN    bisacodyl (DULCOLAX) suppository 10 mg  10 mg Rectal DAILY PRN    levoFLOXacin (LEVAQUIN) 750 mg in D5W IVPB  750 mg IntraVENous Q24H    dilTIAZem CD (CARDIZEM CD) capsule 120 mg  120 mg Oral DAILY    famotidine (PEPCID) tablet 20 mg  20 mg Oral BID    metroNIDAZOLE (FLAGYL) IVPB premix 500 mg  500 mg IntraVENous Q12H    losartan (COZAAR) tablet 25 mg  25 mg Oral QHS    morphine injection 2 mg  2 mg IntraVENous Q3H PRN    melatonin tablet 6 mg  6 mg Oral QHS    labetalol (NORMODYNE;TRANDATE) injection 20 mg  20 mg IntraVENous Q3H PRN

## 2018-09-24 NOTE — CONSULTS
NEUROLOGY NOTE         DATE OF CONSULTATION: 9/24/2018    CONSULTED BY: Peyton Weeks MD    Chief Complaint   Patient presents with    Chest Pain    Vomiting     x1, EMS reports patient is dry heaving though       Reason for Consult  I have been asked to see the patient in neurological consultation to render advice and opinion regarding confusion    HISTORY OF PRESENT ILLNESS  Marty Sood is a 68 y.o. female who presents to the hospital because of Acute choledocholithiasis with acute cholecystitis POA, she is now confused, worse at night. She has been getting minimal amounts of pain meds and no benzodiazepines. She does have a history of a psychotic disorder but I know nothing else about that. ROS  POSITIVES ARE RED  Review of Systems   Constitutional: chills and fever. HENT:  ear pain. Eyes:  pain and discharge. Respiratory: cough and hemoptysis. Cardiovascular:chest pain and claudication. Gastrointestinal:  constipation and diarrhea. Genitourinary:  flank pain and hematuria. Musculoskeletal:falls and myalgias. Negative for back pain. Skin:  itching and rash. Neurological:  sensory change and focal weakness. Negative for headaches. Endo/Heme/Allergies:  environmental allergies. Does not bruise/bleed easily.    Psychiatric/Behavioral:  depression and hallucinations.           PMH  Past Medical History:   Diagnosis Date    Agoraphobia without mention of panic attacks 2/17/2014    Anxiety disorder 8/18/2013    Arthritis     osteo    Breast pain, left 4/7/2015    CAD (coronary artery disease), native coronary artery 12/1/2015    Chronic chest pain 1/13/2014    Chronic pain associated with significant psychosocial dysfunction 2/17/2014    Depression 8/18/2013    Diabetes ([facility] [address]     type II    Duplicated right renal collecting system 3/13/2014    GERD (gastroesophageal reflux disease)     Gout, joint     Hypercholesteremia     hyercholesterolemia    Hypertension     Nephrolithiasis 3/13/2014    Personal history of noncompliance with medical treatment, presenting hazards to health 5/30/2014    Psychotic disorder     Vaginal pain 7/30/2014       FH  Family History   Problem Relation Age of Onset    Stroke Mother     Heart Disease Mother     Cancer Father     Heart Disease Son     Liver Disease Son     Heart Disease Daughter     Malignant Hyperthermia Neg Hx     Pseudocholinesterase Deficiency Neg Hx     Delayed Awakening Neg Hx     Post-op Nausea/Vomiting Neg Hx     Emergence Delirium Neg Hx     Post-op Cognitive Dysfunction Neg Hx     Other Neg Hx        SH  Social History     Social History    Marital status:      Spouse name: N/A    Number of children: N/A    Years of education: N/A     Social History Main Topics    Smoking status: Never Smoker    Smokeless tobacco: Never Used    Alcohol use No    Drug use: No    Sexual activity: No     Other Topics Concern    None     Social History Narrative    ** Merged History Encounter **     , lives with her son and Friend.         ALLERGIES  Allergies   Allergen Reactions    Amoxicillin Hives    Sulfa (Sulfonamide Antibiotics) Hives and Itching    Amoxicillin Hives and Itching     Long time ago - patient not exactly certain what it does    Mirtazapine Itching and Nausea Only     Funny feeling in chest    Percocet [Oxycodone-Acetaminophen] Nausea and Vomiting    Codeine Nausea and Vomiting    Crestor [Rosuvastatin] Other (comments)     myalgias    Nitroglycerin Unknown (comments)     Patient cannot remember why she is allergic to it      Prednisone Itching    Sulfa (Sulfonamide Antibiotics) Hives, Itching and Palpitations     Think it was increased heart rate or itching - many years ago    Zithromax [Azithromycin] Itching     Not sure what it does,taken long time ago       PHYSICAL EXAM  EXAMINATION:   Patient Vitals for the past 24 hrs:   Temp Pulse Resp BP SpO2   09/24/18 1155 97.8 °F (36.6 °C) 84 17 146/76 93 %   09/24/18 0756 98 °F (36.7 °C) 98 18 (!) 159/98 99 %   09/23/18 2252 98 °F (36.7 °C) 70 18 146/67 97 %   09/23/18 1938 98.1 °F (36.7 °C) 67 20 152/72 98 %   09/23/18 1639 98.3 °F (36.8 °C) 63 20 146/76 97 %        General:   Physical Exam   CONSTITUTIONAL: Oriented to person, place, and time,  No distress. Neurological Examination:   Mental Status:  AAO x3. Speech is fluent. Follows commands, she is slightly paranoid    Cranial Nerves: Visual fields are full. PERRL, Extraocular movements are full. Facial sensation intact V1- V3. Facial movement intact, symmetric. Hearing intact to conversation. Palate elevates symmetrically. Shoulder shrug symmetric. Tongue midline. Motor: Strength is 5/5 in all 4 ext. Normal tone. No atrophy. Sensation: Normal to light touch    Reflexes: DTRs 2+ throughout. Plantar responses downgoing. Coordination/Cerebellar: Intact to finger-nose-finger     Gait: NT    LAB DATA REVIEWED:    Results for orders placed or performed during the hospital encounter of 09/19/18   CBC WITH AUTOMATED DIFF   Result Value Ref Range    WBC 6.7 3.6 - 11.0 K/uL    RBC 4.74 3.80 - 5.20 M/uL    HGB 12.7 11.5 - 16.0 g/dL    HCT 40.0 35.0 - 47.0 %    MCV 84.4 80.0 - 99.0 FL    MCH 26.8 26.0 - 34.0 PG    MCHC 31.8 30.0 - 36.5 g/dL    RDW 13.2 11.5 - 14.5 %    PLATELET 183 449 - 102 K/uL    MPV 11.6 8.9 - 12.9 FL    NRBC 0.0 0  WBC    ABSOLUTE NRBC 0.00 0.00 - 0.01 K/uL    NEUTROPHILS 92 (H) 32 - 75 %    LYMPHOCYTES 5 (L) 12 - 49 %    MONOCYTES 3 (L) 5 - 13 %    EOSINOPHILS 0 0 - 7 %    BASOPHILS 0 0 - 1 %    IMMATURE GRANULOCYTES 0 0.0 - 0.5 %    ABS. NEUTROPHILS 6.2 1.8 - 8.0 K/UL    ABS. LYMPHOCYTES 0.3 (L) 0.8 - 3.5 K/UL    ABS. MONOCYTES 0.2 0.0 - 1.0 K/UL    ABS. EOSINOPHILS 0.0 0.0 - 0.4 K/UL    ABS. BASOPHILS 0.0 0.0 - 0.1 K/UL    ABS. IMM.  GRANS. 0.0 0.00 - 0.04 K/UL    DF AUTOMATED      RBC COMMENTS NORMOCYTIC, NORMOCHROMIC     METABOLIC PANEL, COMPREHENSIVE   Result Value Ref Range    Sodium 138 136 - 145 mmol/L    Potassium 4.4 3.5 - 5.1 mmol/L    Chloride 100 97 - 108 mmol/L    CO2 26 21 - 32 mmol/L    Anion gap 12 5 - 15 mmol/L    Glucose 155 (H) 65 - 100 mg/dL    BUN 15 6 - 20 MG/DL    Creatinine 0.95 0.55 - 1.02 MG/DL    BUN/Creatinine ratio 16 12 - 20      GFR est AA >60 >60 ml/min/1.73m2    GFR est non-AA 58 (L) >60 ml/min/1.73m2    Calcium 8.7 8.5 - 10.1 MG/DL    Bilirubin, total 2.8 (H) 0.2 - 1.0 MG/DL    ALT (SGPT) 847 (H) 12 - 78 U/L    AST (SGOT) 1426 (H) 15 - 37 U/L    Alk. phosphatase 146 (H) 45 - 117 U/L    Protein, total 8.3 (H) 6.4 - 8.2 g/dL    Albumin 4.0 3.5 - 5.0 g/dL    Globulin 4.3 (H) 2.0 - 4.0 g/dL    A-G Ratio 0.9 (L) 1.1 - 2.2     CK W/ REFLX CKMB   Result Value Ref Range     26 - 192 U/L   TROPONIN I   Result Value Ref Range    Troponin-I, Qt. <0.05 <0.05 ng/mL   PROTHROMBIN TIME + INR   Result Value Ref Range    INR 1.1 0.9 - 1.1      Prothrombin time 10.8 9.0 - 11.1 sec   LIPASE   Result Value Ref Range    Lipase 61 (L) 73 - 393 U/L   ACETAMINOPHEN   Result Value Ref Range    Acetaminophen level <2 (L) 10 - 30 ug/mL   METABOLIC PANEL, COMPREHENSIVE   Result Value Ref Range    Sodium 137 136 - 145 mmol/L    Potassium 4.4 3.5 - 5.1 mmol/L    Chloride 105 97 - 108 mmol/L    CO2 24 21 - 32 mmol/L    Anion gap 8 5 - 15 mmol/L    Glucose 95 65 - 100 mg/dL    BUN 18 6 - 20 MG/DL    Creatinine 1.02 0.55 - 1.02 MG/DL    BUN/Creatinine ratio 18 12 - 20      GFR est AA >60 >60 ml/min/1.73m2    GFR est non-AA 53 (L) >60 ml/min/1.73m2    Calcium 8.0 (L) 8.5 - 10.1 MG/DL    Bilirubin, total 6.0 (H) 0.2 - 1.0 MG/DL    ALT (SGPT) 666 (H) 12 - 78 U/L    AST (SGOT) 400 (H) 15 - 37 U/L    Alk.  phosphatase 131 (H) 45 - 117 U/L    Protein, total 6.4 6.4 - 8.2 g/dL    Albumin 2.9 (L) 3.5 - 5.0 g/dL    Globulin 3.5 2.0 - 4.0 g/dL    A-G Ratio 0.8 (L) 1.1 - 2.2     CBC WITH AUTOMATED DIFF   Result Value Ref Range    WBC 5.5 3.6 - 11.0 K/uL RBC 4.13 3.80 - 5.20 M/uL    HGB 11.2 (L) 11.5 - 16.0 g/dL    HCT 34.9 (L) 35.0 - 47.0 %    MCV 84.5 80.0 - 99.0 FL    MCH 27.1 26.0 - 34.0 PG    MCHC 32.1 30.0 - 36.5 g/dL    RDW 13.4 11.5 - 14.5 %    PLATELET 077 513 - 569 K/uL    MPV 10.8 8.9 - 12.9 FL    NRBC 0.0 0  WBC    ABSOLUTE NRBC 0.00 0.00 - 0.01 K/uL    NEUTROPHILS 85 (H) 32 - 75 %    LYMPHOCYTES 8 (L) 12 - 49 %    MONOCYTES 7 5 - 13 %    EOSINOPHILS 0 0 - 7 %    BASOPHILS 0 0 - 1 %    IMMATURE GRANULOCYTES 0 0.0 - 0.5 %    ABS. NEUTROPHILS 4.6 1.8 - 8.0 K/UL    ABS. LYMPHOCYTES 0.4 (L) 0.8 - 3.5 K/UL    ABS. MONOCYTES 0.4 0.0 - 1.0 K/UL    ABS. EOSINOPHILS 0.0 0.0 - 0.4 K/UL    ABS. BASOPHILS 0.0 0.0 - 0.1 K/UL    ABS. IMM.  GRANS. 0.0 0.00 - 0.04 K/UL    DF AUTOMATED     LIPASE   Result Value Ref Range    Lipase 46 (L) 73 - 393 U/L   HEMOGLOBIN A1C WITH EAG   Result Value Ref Range    Hemoglobin A1c 5.5 4.2 - 6.3 %    Est. average glucose 111 mg/dL   TROPONIN I   Result Value Ref Range    Troponin-I, Qt. <0.05 <0.05 ng/mL   TROPONIN I   Result Value Ref Range    Troponin-I, Qt. <0.05 <9.92 ng/mL   METABOLIC PANEL, BASIC   Result Value Ref Range    Sodium 138 136 - 145 mmol/L    Potassium 4.5 3.5 - 5.1 mmol/L    Chloride 106 97 - 108 mmol/L    CO2 25 21 - 32 mmol/L    Anion gap 7 5 - 15 mmol/L    Glucose 104 (H) 65 - 100 mg/dL    BUN 17 6 - 20 MG/DL    Creatinine 0.87 0.55 - 1.02 MG/DL    BUN/Creatinine ratio 20 12 - 20      GFR est AA >60 >60 ml/min/1.73m2    GFR est non-AA >60 >60 ml/min/1.73m2    Calcium 8.1 (L) 8.5 - 10.1 MG/DL   CBC WITH AUTOMATED DIFF   Result Value Ref Range    WBC 5.4 3.6 - 11.0 K/uL    RBC 3.80 3.80 - 5.20 M/uL    HGB 10.4 (L) 11.5 - 16.0 g/dL    HCT 32.5 (L) 35.0 - 47.0 %    MCV 85.5 80.0 - 99.0 FL    MCH 27.4 26.0 - 34.0 PG    MCHC 32.0 30.0 - 36.5 g/dL    RDW 13.4 11.5 - 14.5 %    PLATELET 694 782 - 569 K/uL    MPV 11.0 8.9 - 12.9 FL    NRBC 0.0 0  WBC    ABSOLUTE NRBC 0.00 0.00 - 0.01 K/uL    NEUTROPHILS 84 (H) 32 - 75 %    LYMPHOCYTES 8 (L) 12 - 49 %    MONOCYTES 7 5 - 13 %    EOSINOPHILS 0 0 - 7 %    BASOPHILS 0 0 - 1 %    IMMATURE GRANULOCYTES 0 0.0 - 0.5 %    ABS. NEUTROPHILS 4.6 1.8 - 8.0 K/UL    ABS. LYMPHOCYTES 0.5 (L) 0.8 - 3.5 K/UL    ABS. MONOCYTES 0.4 0.0 - 1.0 K/UL    ABS. EOSINOPHILS 0.0 0.0 - 0.4 K/UL    ABS. BASOPHILS 0.0 0.0 - 0.1 K/UL    ABS. IMM. GRANS. 0.0 0.00 - 0.04 K/UL    DF AUTOMATED     HEPATIC FUNCTION PANEL   Result Value Ref Range    Protein, total 6.0 (L) 6.4 - 8.2 g/dL    Albumin 2.7 (L) 3.5 - 5.0 g/dL    Globulin 3.3 2.0 - 4.0 g/dL    A-G Ratio 0.8 (L) 1.1 - 2.2      Bilirubin, total 5.0 (H) 0.2 - 1.0 MG/DL    Bilirubin, direct 4.0 (H) 0.0 - 0.2 MG/DL    Alk. phosphatase 118 (H) 45 - 117 U/L    AST (SGOT) 233 (H) 15 - 37 U/L    ALT (SGPT) 487 (H) 12 - 78 U/L   METABOLIC PANEL, COMPREHENSIVE   Result Value Ref Range    Sodium 134 (L) 136 - 145 mmol/L    Potassium 3.3 (L) 3.5 - 5.1 mmol/L    Chloride 101 97 - 108 mmol/L    CO2 25 21 - 32 mmol/L    Anion gap 8 5 - 15 mmol/L    Glucose 90 65 - 100 mg/dL    BUN 15 6 - 20 MG/DL    Creatinine 0.89 0.55 - 1.02 MG/DL    BUN/Creatinine ratio 17 12 - 20      GFR est AA >60 >60 ml/min/1.73m2    GFR est non-AA >60 >60 ml/min/1.73m2    Calcium 8.0 (L) 8.5 - 10.1 MG/DL    Bilirubin, total 1.3 (H) 0.2 - 1.0 MG/DL    ALT (SGPT) 228 (H) 12 - 78 U/L    AST (SGOT) 70 (H) 15 - 37 U/L    Alk.  phosphatase 98 45 - 117 U/L    Protein, total 5.9 (L) 6.4 - 8.2 g/dL    Albumin 2.6 (L) 3.5 - 5.0 g/dL    Globulin 3.3 2.0 - 4.0 g/dL    A-G Ratio 0.8 (L) 1.1 - 2.2     CBC WITH AUTOMATED DIFF   Result Value Ref Range    WBC 4.7 3.6 - 11.0 K/uL    RBC 3.89 3.80 - 5.20 M/uL    HGB 10.6 (L) 11.5 - 16.0 g/dL    HCT 32.8 (L) 35.0 - 47.0 %    MCV 84.3 80.0 - 99.0 FL    MCH 27.2 26.0 - 34.0 PG    MCHC 32.3 30.0 - 36.5 g/dL    RDW 13.4 11.5 - 14.5 %    PLATELET 659 006 - 324 K/uL    MPV 10.9 8.9 - 12.9 FL    NRBC 0.0 0  WBC    ABSOLUTE NRBC 0.00 0.00 - 0.01 K/uL NEUTROPHILS 74 32 - 75 %    LYMPHOCYTES 16 12 - 49 %    MONOCYTES 9 5 - 13 %    EOSINOPHILS 0 0 - 7 %    BASOPHILS 0 0 - 1 %    IMMATURE GRANULOCYTES 0 0.0 - 0.5 %    ABS. NEUTROPHILS 3.5 1.8 - 8.0 K/UL    ABS. LYMPHOCYTES 0.7 (L) 0.8 - 3.5 K/UL    ABS. MONOCYTES 0.4 0.0 - 1.0 K/UL    ABS. EOSINOPHILS 0.0 0.0 - 0.4 K/UL    ABS. BASOPHILS 0.0 0.0 - 0.1 K/UL    ABS. IMM.  GRANS. 0.0 0.00 - 0.04 K/UL    DF AUTOMATED     URINALYSIS W/ REFLEX CULTURE   Result Value Ref Range    Color DARK YELLOW      Appearance CLEAR CLEAR      Specific gravity 1.013 1.003 - 1.030      pH (UA) 6.0 5.0 - 8.0      Protein NEGATIVE  NEG mg/dL    Glucose 100 (A) NEG mg/dL    Ketone TRACE (A) NEG mg/dL    Bilirubin NEGATIVE  NEG      Blood NEGATIVE  NEG      Urobilinogen 1.0 0.2 - 1.0 EU/dL    Nitrites NEGATIVE  NEG      Leukocyte Esterase NEGATIVE  NEG      WBC 0-4 0 - 4 /hpf    RBC 0-5 0 - 5 /hpf    Epithelial cells FEW FEW /lpf    Bacteria NEGATIVE  NEG /hpf    UA:UC IF INDICATED CULTURE NOT INDICATED BY UA RESULT CNI      Hyaline cast 0-2 0 - 5 /lpf   GLUCOSE, POC   Result Value Ref Range    Glucose (POC) 114 (H) 65 - 100 mg/dL    Performed by Mariana Villegas    GLUCOSE, POC   Result Value Ref Range    Glucose (POC) 97 65 - 100 mg/dL    Performed by [id number], POC   Result Value Ref Range    Glucose (POC) 78 65 - 100 mg/dL    Performed by Bernard Silvia    GLUCOSE, POC   Result Value Ref Range    Glucose (POC) 112 (H) 65 - 100 mg/dL    Performed by Bernard Silvia    GLUCOSE, POC   Result Value Ref Range    Glucose (POC) 93 65 - 100 mg/dL    Performed by George Dos Santos    GLUCOSE, POC   Result Value Ref Range    Glucose (POC) 83 65 - 100 mg/dL    Performed by [address], POC   Result Value Ref Range    Glucose (POC) 116 (H) 65 - 100 mg/dL    Performed by Khadra Setting (PCT)    GLUCOSE, POC   Result Value Ref Range    Glucose (POC) 107 (H) 65 - 100 mg/dL    Performed by Khadra Setting (PCT)    GLUCOSE, POC   Result Value Ref Range    Glucose (POC) 81 65 - 100 mg/dL    Performed by [place] [facility]*    GLUCOSE, POC   Result Value Ref Range    Glucose (POC) 89 65 - 100 mg/dL    Performed by Bolt HR (PCT)    GLUCOSE, POC   Result Value Ref Range    Glucose (POC) 84 65 - 100 mg/dL    Performed by Vi oHward    GLUCOSE, POC   Result Value Ref Range    Glucose (POC) 86 65 - 100 mg/dL    Performed by WILLIAM CARNES    GLUCOSE, POC   Result Value Ref Range    Glucose (POC) 108 (H) 65 - 100 mg/dL    Performed by Moreno Anderson*    GLUCOSE, POC   Result Value Ref Range    Glucose (POC) 90 65 - 100 mg/dL    Performed by Nettie Joya    GLUCOSE, POC   Result Value Ref Range    Glucose (POC) 82 65 - 100 mg/dL    Performed by Angelo Riddle (PCT)    GLUCOSE, POC   Result Value Ref Range    Glucose (POC) 94 65 - 100 mg/dL    Performed by Nhan Cotton (PCT)    GLUCOSE, POC   Result Value Ref Range    Glucose (POC) 118 (H) 65 - 100 mg/dL    Performed by Nhan Cotton (PCT)    EKG, 12 LEAD, INITIAL   Result Value Ref Range    Ventricular Rate 73 BPM    Atrial Rate 73 BPM    P-R Interval 152 ms    QRS Duration 96 ms    Q-T Interval 400 ms    QTC Calculation (Bezet) 440 ms    Calculated P Axis 55 degrees    Calculated R Axis 46 degrees    Calculated T Axis 40 degrees    Diagnosis       Normal sinus rhythm  When compared with ECG of 30-AUG-2018 11:45,  No significant change was found  Confirmed by Jhonatan Arriola MD, Ryan Pro ([id number]) on 9/19/2018 8:48:24 PM     EKG, 12 LEAD, INITIAL   Result Value Ref Range    Ventricular Rate 63 BPM    Atrial Rate 63 BPM    P-R Interval 148 ms    QRS Duration 96 ms    Q-T Interval 426 ms    QTC Calculation (Bezet) 435 ms    Calculated P Axis 43 degrees    Calculated R Axis 36 degrees    Calculated T Axis 25 degrees    Diagnosis       Normal sinus rhythm  When compared with ECG of 19-SEP-2018 09:38,  No significant change was found  Confirmed by Kris Hawthorne ([id number]) on 9/20/2018 12:46:02 PM Imaging review:  CT HEAD W/O 9/24/18    FINDINGS:  There is no extra-axial fluid collection, hemorrhage or shift. Intraconal size  is stable. No masses.     IMPRESSION   impression: No acute findings, no change. HOME MEDS  Prior to Admission Medications   Prescriptions Last Dose Informant Patient Reported? Taking?   acetaminophen (TYLENOL) 500 mg tablet 9/15/2018 at Unknown time Self Yes Yes   Sig: Take 1,000 mg by mouth every six (6) hours as needed for Pain. aspirin delayed-release 81 mg tablet 9/18/2018 at Unknown time Other No Yes   Sig: Take 1 Tab by mouth daily. bisacodyl (DULCOLAX, BISACODYL,) 10 mg suppository Not Taking at Unknown time Self No No   Sig: Insert 10 mg into rectum daily as needed. dilTIAZem CD (CARDIZEM CD) 120 mg ER capsule 9/18/2018 at Unknown time Other Yes Yes   Sig: Take 120 mg by mouth nightly. docusate sodium (COLACE) 100 mg capsule 9/16/2018 at Unknown time Other No Yes   Sig: TAKE 1 CAP BY MOUTH DAILY AS NEEDED FOR CONSTIPATION FOR UP TO 90 DAYS. erythromycin (ILOTYCIN) ophthalmic ointment 9/15/2018 at Unknown time Self Yes Yes   Sig: APPLY 1 APPLICATION TO BOTH EYELIDS TWICE DAILY   losartan (COZAAR) 25 mg tablet 9/18/2018 at Unknown time Other Yes Yes   Sig: Take 25 mg by mouth daily. nystatin (MYCOSTATIN) topical cream 9/12/2018 at Unknown time Self Yes Yes   Sig: Apply  to affected area two (2) times daily as needed for Skin Irritation. polyethylene glycol (MIRALAX) 17 gram/dose powder 9/12/2018 at Unknown time Self No Yes   Sig: Take 17 g by mouth daily as needed.  1 tablespoon with 8 oz of water daily      Facility-Administered Medications: None       CURRENT MEDS  Current Facility-Administered Medications   Medication Dose Route Frequency    dextrose 5% infusion  50 mL/hr IntraVENous CONTINUOUS    enoxaparin (LOVENOX) injection 40 mg  40 mg SubCUTAneous Q24H    senna-docusate (PERICOLACE) 8.6-50 mg per tablet 1 Tab  1 Tab Oral DAILY    nitroglycerin (NITROBID) 2 % ointment 1 Inch  1 Inch Topical Q6H    metoprolol tartrate (LOPRESSOR) tablet 25 mg  25 mg Oral Q12H    insulin lispro (HUMALOG) injection   SubCUTAneous AC&HS    sodium chloride (NS) flush 5-10 mL  5-10 mL IntraVENous Q8H    levoFLOXacin (LEVAQUIN) 750 mg in D5W IVPB  750 mg IntraVENous Q24H    dilTIAZem CD (CARDIZEM CD) capsule 120 mg  120 mg Oral DAILY    famotidine (PEPCID) tablet 20 mg  20 mg Oral BID    metroNIDAZOLE (FLAGYL) IVPB premix 500 mg  500 mg IntraVENous Q12H    losartan (COZAAR) tablet 25 mg  25 mg Oral QHS    melatonin tablet 6 mg  6 mg Oral QHS       IMPRESSION:RECOMMENDATIONS:  Kaitlynn Weeks is a 68 y.o. female who presents with mild encephalopathy, this could be related to early dementia, exacerbation of psychiatric disease or Levaquin encephalopathy. I do not think MRI necessary with non focal neuro exam, lets wait and see what she looks like when she is off levaquin, in the mean time lets use PRN Seroquel for nigh time agitation. Thank you very much for this consultation. We will follow up on the above studies and give further recommendations as indicated. [place].  Nik Yen MD  Neurologist

## 2018-09-24 NOTE — PROGRESS NOTES
Subjective:    Patient seen and examined by me today. Confusion/sundowning over weekend. Denies pain presently. Objective:    Blood pressure 146/76, pulse 84, temperature 97.8 °F (36.6 °C), resp. rate 17, height 5' 7\" (1.702 m), weight 78.5 kg (173 lb), SpO2 93 %. Drains - bile tinged. Exam - alert to person and place, not time. abd soft, approp TTP, CDI. Assessment:  Procedure(s):  Lap jesus 9/19/18. ENDOSCOPIC RETROGRADE CHOLANGIOPANCREATOGRAPHY (ERCP) 9/20/2018    Active Hospital Problems    Diagnosis Date Noted    Choledocholithiasis with acute cholecystitis 09/19/2018     - sundowning/confusion.  - urinary retention.    - LFT's normalizing. Plan:  - increase activity. - can d/c home with AIRAM and have her see me in a week in the office.    - will follow while here. Thanks.        Gabe Ulloa MD

## 2018-09-25 LAB
ALBUMIN SERPL-MCNC: 2.6 G/DL (ref 3.5–5)
ALBUMIN/GLOB SERPL: 0.9 {RATIO} (ref 1.1–2.2)
ALP SERPL-CCNC: 79 U/L (ref 45–117)
ALT SERPL-CCNC: 127 U/L (ref 12–78)
ANION GAP SERPL CALC-SCNC: 7 MMOL/L (ref 5–15)
AST SERPL-CCNC: 47 U/L (ref 15–37)
BASOPHILS # BLD: 0 K/UL (ref 0–0.1)
BASOPHILS NFR BLD: 0 % (ref 0–1)
BILIRUB SERPL-MCNC: 0.9 MG/DL (ref 0.2–1)
BUN SERPL-MCNC: 20 MG/DL (ref 6–20)
BUN/CREAT SERPL: 19 (ref 12–20)
CALCIUM SERPL-MCNC: 8 MG/DL (ref 8.5–10.1)
CHLORIDE SERPL-SCNC: 102 MMOL/L (ref 97–108)
CO2 SERPL-SCNC: 28 MMOL/L (ref 21–32)
COMMENT, HOLDF: NORMAL
CREAT SERPL-MCNC: 1.04 MG/DL (ref 0.55–1.02)
DIFFERENTIAL METHOD BLD: ABNORMAL
EOSINOPHIL # BLD: 0 K/UL (ref 0–0.4)
EOSINOPHIL NFR BLD: 0 % (ref 0–7)
ERYTHROCYTE [DISTWIDTH] IN BLOOD BY AUTOMATED COUNT: 13.5 % (ref 11.5–14.5)
GLOBULIN SER CALC-MCNC: 2.9 G/DL (ref 2–4)
GLUCOSE BLD STRIP.AUTO-MCNC: 126 MG/DL (ref 65–100)
GLUCOSE BLD STRIP.AUTO-MCNC: 137 MG/DL (ref 65–100)
GLUCOSE BLD STRIP.AUTO-MCNC: 73 MG/DL (ref 65–100)
GLUCOSE BLD STRIP.AUTO-MCNC: 77 MG/DL (ref 65–100)
GLUCOSE BLD STRIP.AUTO-MCNC: 93 MG/DL (ref 65–100)
GLUCOSE BLD STRIP.AUTO-MCNC: 99 MG/DL (ref 65–100)
GLUCOSE SERPL-MCNC: 95 MG/DL (ref 65–100)
HCT VFR BLD AUTO: 32.3 % (ref 35–47)
HGB BLD-MCNC: 10.6 G/DL (ref 11.5–16)
IMM GRANULOCYTES # BLD: 0 K/UL (ref 0–0.04)
IMM GRANULOCYTES NFR BLD AUTO: 1 % (ref 0–0.5)
LYMPHOCYTES # BLD: 1.2 K/UL (ref 0.8–3.5)
LYMPHOCYTES NFR BLD: 22 % (ref 12–49)
MCH RBC QN AUTO: 27.2 PG (ref 26–34)
MCHC RBC AUTO-ENTMCNC: 32.8 G/DL (ref 30–36.5)
MCV RBC AUTO: 82.8 FL (ref 80–99)
MONOCYTES # BLD: 0.6 K/UL (ref 0–1)
MONOCYTES NFR BLD: 12 % (ref 5–13)
NEUTS SEG # BLD: 3.4 K/UL (ref 1.8–8)
NEUTS SEG NFR BLD: 65 % (ref 32–75)
NRBC # BLD: 0 K/UL (ref 0–0.01)
NRBC BLD-RTO: 0 PER 100 WBC
PLATELET # BLD AUTO: 189 K/UL (ref 150–400)
PMV BLD AUTO: 10.2 FL (ref 8.9–12.9)
POTASSIUM SERPL-SCNC: 2.9 MMOL/L (ref 3.5–5.1)
PROT SERPL-MCNC: 5.5 G/DL (ref 6.4–8.2)
RBC # BLD AUTO: 3.9 M/UL (ref 3.8–5.2)
SAMPLES BEING HELD,HOLD: NORMAL
SERVICE CMNT-IMP: ABNORMAL
SERVICE CMNT-IMP: ABNORMAL
SERVICE CMNT-IMP: NORMAL
SODIUM SERPL-SCNC: 137 MMOL/L (ref 136–145)
WBC # BLD AUTO: 5.3 K/UL (ref 3.6–11)

## 2018-09-25 PROCEDURE — 85025 COMPLETE CBC W/AUTO DIFF WBC: CPT | Performed by: INTERNAL MEDICINE

## 2018-09-25 PROCEDURE — 80053 COMPREHEN METABOLIC PANEL: CPT | Performed by: INTERNAL MEDICINE

## 2018-09-25 PROCEDURE — 74011250637 HC RX REV CODE- 250/637: Performed by: INTERNAL MEDICINE

## 2018-09-25 PROCEDURE — 36415 COLL VENOUS BLD VENIPUNCTURE: CPT | Performed by: INTERNAL MEDICINE

## 2018-09-25 PROCEDURE — 74011250636 HC RX REV CODE- 250/636: Performed by: INTERNAL MEDICINE

## 2018-09-25 PROCEDURE — 97116 GAIT TRAINING THERAPY: CPT

## 2018-09-25 PROCEDURE — 74011250637 HC RX REV CODE- 250/637: Performed by: PSYCHIATRY & NEUROLOGY

## 2018-09-25 PROCEDURE — 97535 SELF CARE MNGMENT TRAINING: CPT

## 2018-09-25 PROCEDURE — 82962 GLUCOSE BLOOD TEST: CPT

## 2018-09-25 PROCEDURE — 65660000000 HC RM CCU STEPDOWN

## 2018-09-25 RX ORDER — POTASSIUM CHLORIDE 1.5 G/1.77G
40 POWDER, FOR SOLUTION ORAL 2 TIMES DAILY WITH MEALS
Status: COMPLETED | OUTPATIENT
Start: 2018-09-25 | End: 2018-09-25

## 2018-09-25 RX ADMIN — DEXTROSE MONOHYDRATE 50 ML/HR: 5 INJECTION, SOLUTION INTRAVENOUS at 05:52

## 2018-09-25 RX ADMIN — METOPROLOL TARTRATE 25 MG: 25 TABLET ORAL at 20:28

## 2018-09-25 RX ADMIN — Medication 10 ML: at 17:29

## 2018-09-25 RX ADMIN — FAMOTIDINE 20 MG: 20 TABLET ORAL at 17:27

## 2018-09-25 RX ADMIN — METOPROLOL TARTRATE 25 MG: 25 TABLET ORAL at 11:39

## 2018-09-25 RX ADMIN — MELATONIN TAB 3 MG 6 MG: 3 TAB at 21:32

## 2018-09-25 RX ADMIN — DILTIAZEM HYDROCHLORIDE 120 MG: 120 CAPSULE, COATED, EXTENDED RELEASE ORAL at 11:39

## 2018-09-25 RX ADMIN — ENOXAPARIN SODIUM 40 MG: 100 INJECTION SUBCUTANEOUS at 11:40

## 2018-09-25 RX ADMIN — METRONIDAZOLE 500 MG: 500 INJECTION, SOLUTION INTRAVENOUS at 17:27

## 2018-09-25 RX ADMIN — QUETIAPINE FUMARATE 25 MG: 25 TABLET ORAL at 21:33

## 2018-09-25 RX ADMIN — FAMOTIDINE 20 MG: 20 TABLET ORAL at 11:39

## 2018-09-25 RX ADMIN — POTASSIUM CHLORIDE 40 MEQ: 1.5 POWDER, FOR SOLUTION ORAL at 17:27

## 2018-09-25 RX ADMIN — Medication 10 ML: at 05:54

## 2018-09-25 RX ADMIN — LOSARTAN POTASSIUM 25 MG: 25 TABLET ORAL at 21:32

## 2018-09-25 RX ADMIN — POTASSIUM CHLORIDE 40 MEQ: 1.5 POWDER, FOR SOLUTION ORAL at 11:39

## 2018-09-25 RX ADMIN — SENNOSIDES AND DOCUSATE SODIUM 1 TABLET: 8.6; 5 TABLET ORAL at 11:39

## 2018-09-25 RX ADMIN — METRONIDAZOLE 500 MG: 500 INJECTION, SOLUTION INTRAVENOUS at 05:52

## 2018-09-25 NOTE — PROGRESS NOTES
Problem: Mobility Impaired (Adult and Pediatric)  Goal: *Acute Goals and Plan of Care (Insert Text)  Physical Therapy Goals  Initiated 9/21/2018  1. Patient will move from supine to sit and sit to supine , scoot up and down and roll side to side in bed with independence within 7 day(s). 2.  Patient will transfer from bed to chair and chair to bed with independence using the least restrictive device within 7 day(s). 3.  Patient will perform sit to stand with independence within 7 day(s). 4.  Patient will ambulate with independence for 200 feet with the least restrictive device within 7 day(s). physical Therapy TREATMENT  Patient: Becca Noguera (76 y.o. female)  Date: 9/25/2018  Diagnosis: Choledocholithiasis with acute cholecystitis  CBD stone  CHOLELITHIASIS  CBD Stone <principal problem not specified>  Procedure(s) (LRB):  ENDOSCOPIC RETROGRADE CHOLANGIOPANCREATOGRAPHY (ERCP) (N/A) 4 Days Post-Op  Precautions:    Chart, physical therapy assessment, plan of care and goals were reviewed. ASSESSMENT:  Pt received supine in bed, initially declining OOB mobility/participation with therapy however eventually agreeable with increased encouragement from therapist and family members present. Pt assumed seated position EOB at supervision level with intact sitting balance while clean gown donned. Pt with multiple complaints/excuses as to why she could not participate with therapy, stating her RLE was \"paralyzed and numb. \" RLE AROM/sensation intact. Pt sit>>stand w/ CGAx1 and began ambulating with CGAx2 and HHAx1. After ~10-12ft of ambulation, pt stopped in her path, stating that she needed to \"sit on the pot. \" Encouraged pt to ambulate to bathroom to utilize commode to promote OOB mobility, progress ambulation, and mimic home environment. Pt then experienced severe buckling of BLEs, requiring maxAx2 to control descent to bedside commode (3rd person present in room and pulled up bedside commode emergently).  Pt then stopped responding to this author, staring to L. Pt with increased trunk extension/full body hyperextension, noted to be actively sliding herself off the front of the bedside commode. Pt never lost pulse/breathing/conciousness, maintaining eyes opened the entire time. RN called into room. Pt t-transferred back to bed with vitals assessed and as follows: 168/91, 95% O2, 64 BPM and pt responding to family members. Pt denied complaints and was in NAD at completion of therapy session. Discharge recommendations TBD pending hospital course/further progress with therapy. Progression toward goals:  []    Improving appropriately and progressing toward goals  []    Improving slowly and progressing toward goals  []    Not making progress toward goals and plan of care will be adjusted     PLAN:  Patient continues to benefit from skilled intervention to address the above impairments. Continue treatment per established plan of care. Discharge Recommendations: To Be Determined  Further Equipment Recommendations for Discharge:  TBD      SUBJECTIVE:   Patient stated I don't feel like walking.     OBJECTIVE DATA SUMMARY:   Critical Behavior:  Neurologic State: Alert, Appropriate for age  Orientation Level: Oriented X4  Cognition: Decreased command following  Safety/Judgement: Awareness of environment, Insight into deficits, Decreased awareness of need for safety  Functional Mobility Training:  Bed Mobility:  Rolling: Independent  Supine to Sit: Independent     Scooting: Independent        Transfers:  Sit to Stand: Contact guard assistance  Stand to Sit: Contact guard assistance                             Balance:  Sitting: Intact  Standing: Impaired; With support  Standing - Static: Good;Constant support  Standing - Dynamic : Fair  Ambulation/Gait Training:  Distance (ft): 12 Feet (ft)  Assistive Device: Gait belt (HHAx2)  Ambulation - Level of Assistance: Contact guard assistance;Assist x2 (CGAx2 initially, progressing to minAx2)        Gait Abnormalities: Decreased step clearance;Shuffling gait        Base of Support: Widened     Speed/Gale: Pace decreased (<100 feet/min); Shuffled  Step Length: Left shortened;Right shortened                    Pain:  Pain Scale 1: Numeric (0 - 10)  Pain Intensity 1: 3              Activity Tolerance:   Fair  Please refer to the flowsheet for vital signs taken during this treatment.   After treatment:   []    Patient left in no apparent distress sitting up in chair  [x]    Patient left in no apparent distress in bed  [x]    Call bell left within reach  [x]    Nursing notified  [x]    Caregiver present  [x]    Bed alarm activated    COMMUNICATION/COLLABORATION:   The patients plan of care was discussed with: Registered Nurse    Meghna Bates PT, DPT   Time Calculation: 19 mins

## 2018-09-25 NOTE — PROGRESS NOTES
Pt with c/o of pain to the sternum area which is worse when palpated and better when left alone. OT also reports that lying BP was 102/48 HR 71, sitting BP 83/49 HR 82 and earlier today when pt worked with PT pt buckled when standing. Concern for orthostatic hypotension. Call to Dr Allen Spann who wants nitro paste removed from pt and if pts SBP is <100 hold the next dose of nitro paste. Pts primary RN Antoinette Holman notified.

## 2018-09-25 NOTE — PROGRESS NOTES
Patient restraints were discontinued    Patient now has tele sitter    Patient is periodically confused. She can tell you where she is, birthday, she knows she's in the hospital but sometimes thinks she is at home.      She has had a BM today

## 2018-09-25 NOTE — PROGRESS NOTES
Subjective:    Patient seen and examined by me today. Wondering about the martinez. Objective:    Blood pressure 137/67, pulse 76, temperature 97.3 °F (36.3 °C), resp. rate 16, height 5' 7\" (1.702 m), weight 78.5 kg (173 lb), SpO2 100 %. Drains - less bile tinged. Exam - alert to person and place, abd soft, approp TTP, CDI. Assessment:  Procedure(s):  Lap jesus 9/19/18. ENDOSCOPIC RETROGRADE CHOLANGIOPANCREATOGRAPHY (ERCP) 9/20/2018    Active Hospital Problems    Diagnosis Date Noted    Choledocholithiasis with acute cholecystitis 09/19/2018     - sundowning/confusion. Seems better today. - urinary retention. Sees Dr. Liz Yuan outpatient. - bilirubin now normal.      Plan:  - increase activity. Needs PT. - will likely remove AIRAM later today. - will follow while here. Thanks.        Keisha Hernandez MD

## 2018-09-25 NOTE — PROGRESS NOTES
[facility] Nursing Supervisor attempting to help patients nurse with charting on restraints at time 9/24 @ 10:35.

## 2018-09-25 NOTE — PROGRESS NOTES
Spoke with Zion Morgan Nurse Manager about Ms. Dietrich Restraints. She informed me that Ms. Dietrich restrain order was not started in the flow sheets. She walked me through the steps of starting the restraints on the 23rd and discontinuing them on the 24th within the flow sheets. Patient stated did not want to work with PT today. When working with them they were standing her up when they said her legs went limp. I took her vitals they were all in normal range except her BP was 168/91. OT came to work with her and they took BP when sitting up it was 83/49. Patient was much calmer today. No attempts to get out of bed on her own.

## 2018-09-25 NOTE — PROGRESS NOTES
Hospitalist Progress Note    NAME: Regla York   :  1945   MRN:  [id number]       Assessment / Plan:  Acute choledocholithiasis with acute cholecystitis POA  Acute RUQ abd pain, POA  Abnormal LFT, POA  -LFTs with AST 1426,  , Alk Phos 146, total Bilirubin 2.8, labs improving  RUQ Ultrasound consistent with gallstones with acute cholecystitis changes, dilated CBD  -HIDA scan with tracer in the liver but no activity in gallbladder, common bile duct, small bowel after 3 hours suggesting common bile duct obstruction. -MRCP showed cholelithiasis. Pericholecystic edema suspicious for hcolecystitis. Common duct is normal  -s/p lap jesus with cholangiogram showed Jaundice, acute inflammation of the gallbladder, small gallstones in the gallbladder and cystic duct. No obvious CBD filling defect on cholangiogram (the defect on the second image is the cholangiogram catheter balloon), but no passage of contrast into the duodenum, consistent with obstruction.  -Cont' IV Levaquin and Flagyl empirically. Last dose   -IV morphine prn  -s/p ERCP o   -labs improving, can d/c home with AIRAM and follow up with ERCP in 1 week  -need to repeat ERCP in 1-2 months  -appreciate gen surg and GI consultation  -needs PT/OT, possible rehab on discharge    Urinary retention  -UA neg for abnormalities. S/p martinez insertion. Can do voiding trial once pt is able to participate with PT/OT     Acute encephalopathy, ? Underlining dementia  -UA neg. Pt currently on abx as above.  Monitor mentation once off levaquin.  -head CT neg for acute process  -avoid sedating meds  -low dose seroquel started, appreciate neurology's consultation    Constipation, resolved  -cont' miralax, docusate    Type 2 diabetes mellitus POA currently off medications per her report   - A1C 5.5, poor appetite  -on d5    Essential hypertension POA  -BP improved  -continue ARB and Cardizem CD, metoprolol  -prn hydralazine/ nitrobid prn    Coronary disease POA  -off aspirin and blood pressure medication control   -currently off of cholesterol medications     Insomnia POA melatonin at bedtime     Called family again at the number listed in [address]. Phone was busy after 3 attempts. Asked nurse to page me if family visit pt today. Overweight  Morbid Obesity POA   Code status: full code  NOK: daughter  dvt prophylaxis: lovenox     Subjective:     Chief Complaint / Reason for Physician Visit  Pt seen at bedside. Pt in bed, remains confused, but nursing reports she was less confused overnight. Still complaining of generalized pain, \" I dont' feel good\". Discussed with RN events overnight. Review of Systems:  Symptom Y/N Comments  Symptom Y/N Comments   Fever/Chills n   Chest Pain     Poor Appetite    Edema     Cough    Abdominal Pain n    Sputum    Joint Pain     SOB/MOORE    Pruritis/Rash     Nausea/vomit n   Tolerating PT/OT     Diarrhea    Tolerating Diet     Constipation    Other       Could NOT obtain due to:      Objective:     VITALS:   Last 24hrs VS reviewed since prior progress note. Most recent are:  Patient Vitals for the past 24 hrs:   Temp Pulse Resp BP SpO2   09/25/18 0737 97.3 °F (36.3 °C) 76 16 137/67 100 %   09/25/18 0510 98.8 °F (37.1 °C) 71 16 135/64 100 %   09/24/18 2327 97.8 °F (36.6 °C) 72 16 143/71 97 %   09/24/18 2319 - 72 - 143/71 -   09/24/18 2038 97.6 °F (36.4 °C) 80 18 146/73 99 %   09/24/18 2034 - 80 - 146/73 -   09/24/18 1514 97.5 °F (36.4 °C) 86 18 164/80 98 %   09/24/18 1155 97.8 °F (36.6 °C) 84 17 146/76 93 %       Intake/Output Summary (Last 24 hours) at 09/25/18 0818  Last data filed at 09/25/18 0645   Gross per 24 hour   Intake           1822.5 ml   Output              770 ml   Net           1052.5 ml        PHYSICAL EXAM:  General: WD, WN. Alert, cooperative, no acute distress    EENT:  EOMI. Anicteric sclerae. MMM  Resp:  CTA bilaterally, no wheezing or rales.   No accessory muscle use  CV:  Regular  rhythm,  No edema  GI:  Soft, Non distended, Non tender.  +BS  Neurologic:  Alert and oriented X 1, normal speech  Psych:   Not anxious nor agitated  Skin:  No rashes. No jaundice    Reviewed most current lab test results and cultures  YES  Reviewed most current radiology test results   YES  Review and summation of old records today    NO  Reviewed patient's current orders and MAR    YES  PMH/SH reviewed - no change compared to H&P  ________________________________________________________________________  Care Plan discussed with:    Comments   Patient x    Family      RN x    Care Manager     Consultant                        Multidiciplinary team rounds were held today with , nursing, pharmacist and clinical coordinator. Patient's plan of care was discussed; medications were reviewed and discharge planning was addressed. ________________________________________________________________________  Total NON critical care TIME:  35   Minutes    Total CRITICAL CARE TIME Spent:   Minutes non procedure based      Comments   >50% of visit spent in counseling and coordination of care     ________________________________________________________________________  Marium Rubio MD     Procedures: see electronic medical records for all procedures/Xrays and details which were not copied into this note but were reviewed prior to creation of Plan. LABS:  I reviewed today's most current labs and imaging studies.   Pertinent labs include:  Recent Labs      09/25/18 0453 09/23/18 0408   WBC  5.3  4.7   HGB  10.6*  10.6*   HCT  32.3*  32.8*   PLT  189  202     Recent Labs      09/25/18 0453 09/23/18   0408   NA  137  134*   K  2.9*  3.3*   CL  102  101   CO2  28  25   GLU  95  90   BUN  20  15   CREA  1.04*  0.89   CA  8.0*  8.0*   ALB  2.6*  2.6*   TBILI  0.9  1.3*   SGOT  47*  70*   ALT  127*  228*       Signed: Marium Rubio MD

## 2018-09-25 NOTE — DIABETES MGMT
DTC Progress Note    Recommendations/ Comments: Chart reviewed for hypoglycemia, likely due to poor PO intake    If appropriate, please consider:  -Changing current diet to regular     Current hospital DM medication: Humalog for correction, high sensitivity scale     Chart reviewed on [facility]. Patient is a 68 y.o. female with no hx of diabetes noted at home. A1c:   Lab Results   Component Value Date/Time    Hemoglobin A1c 5.5 09/20/2018 03:07 AM    Hemoglobin A1c 5.7 (H) 07/06/2017 12:22 PM       Recent Glucose Results: Lab Results   Component Value Date/Time    GLU 95 09/25/2018 04:53 AM    GLUCPOC 126 (H) 09/25/2018 01:04 PM    GLUCPOC 99 09/25/2018 10:59 AM    GLUCPOC 93 09/25/2018 07:54 AM        Lab Results   Component Value Date/Time    Creatinine 1.04 (H) 09/25/2018 04:53 AM     Estimated Creatinine Clearance: 52 mL/min (based on Cr of 1.04). Active Orders   Diet    DIET DIABETIC CONSISTENT CARB Regular        PO intake: Patient Vitals for the past 72 hrs:   % Diet Eaten   09/24/18 2000 50 %   09/24/18 1429 50 %   09/24/18 0857 25 %   09/23/18 0949 50 %   09/22/18 1732 0 %       Will continue to follow as needed.     Thank you  Hammad Brown RD, CDE

## 2018-09-25 NOTE — PROGRESS NOTES
Problem: Self Care Deficits Care Plan (Adult)  Goal: *Acute Goals and Plan of Care (Insert Text)  Occupational Therapy Goals  Initiated 9/23/2018  1. Patient will perform grooming standing at the sink with modified independence within 7 day(s). 2.  Patient will perform bathing with supervision and cues using AE as needed within 7 day(s). 3.  Patient will perform lower body dressing with supervision and cues using AE as needed within 7 day(s). 4.  Patient will perform toilet transfers with modified independence within 7 day(s). 5.  Patient will perform all aspects of toileting with modified independence within 7 day(s). Occupational Therapy TREATMENT  Patient: Ashlie Moore (45 y.o. female)  Date: 9/25/2018  Diagnosis: Choledocholithiasis with acute cholecystitis  CBD stone  CHOLELITHIASIS  CBD Stone <principal problem not specified>  Procedure(s) (LRB):  ENDOSCOPIC RETROGRADE CHOLANGIOPANCREATOGRAPHY (ERCP) (N/A) 4 Days Post-Op  Precautions: Fall  Chart, occupational therapy assessment, plan of care, and goals were reviewed. ASSESSMENT:  Patient received sitting up in bed, c/o feeling terrible but willing to try to work. VS taken and BP sitting was 83/49; when laid supine/HOB elevated 20 degrees, BP improved to 102/48. Note patient also had \"B LE buckle event\" in PT with need for 3 person assist: may have been orthostatic at that time. Patient encouraged to contact nurse again if pain worsens; MD has been notified of patient status. Will continue tomorrow if patient feels better. Progression toward goals:  []       Improving appropriately and progressing toward goals  [x]       Improving slowly and progressing toward goals  []       Not making progress toward goals and plan of care will be adjusted     PLAN:  Patient continues to benefit from skilled intervention to address the above impairments. Continue treatment per established plan of care.   Discharge Recommendations:  Home Health, Skilled Nursing Facility and To Be Determined  Further Equipment Recommendations for Discharge:  TBA     SUBJECTIVE:   Patient stated I feel terrible.     OBJECTIVE DATA SUMMARY:   Cognitive/Behavioral Status:  Neurologic State: Alert  Orientation Level: Oriented X4  Cognition: Follows commands; Impulsive  Perception: Appears intact  Perseveration: Perseverates during conversation (patient c/o 7/10 chest pain upon arrival notified nsg)  Safety/Judgement: Awareness of environment    Functional Mobility and Transfers for ADLs:  Bed Mobility:  Rolling: Independent  Supine to Sit: Independent  Scooting: Independent    Transfers:  Sit to Stand: Contact guard assistance          Balance:  Sitting: Intact  Standing: Impaired; With support  Standing - Static: Good;Constant support  Standing - Dynamic : Fair    ADL Intervention:     Introduced PLB as nsg reports patient has been having musculo skeletal pain on R ribs; patient says todays pain is mid chest; nsg aware and MD called                                Cognitive Retraining  Safety/Judgement: Awareness of environment      Pain:  Pain Scale 1: Numeric (0 - 10)  Pain Intensity 1: 3              Activity Tolerance:   poor  Please refer to the flowsheet for vital signs taken during this treatment.   After treatment:   [] Patient left in no apparent distress sitting up in chair  [x] Patient left in no apparent distress in bed  [x] Call bell left within reach  [x] Nursing notified  [] Caregiver present  [] Bed alarm activated    COMMUNICATION/COLLABORATION:   The patients plan of care was discussed with: Registered Nurse    Charan Knapp OTR/L  Time Calculation: 11 mins

## 2018-09-25 NOTE — PROGRESS NOTES
Accessed pt chart per supervisor request to attempt to determine status of restraints and orders. No orders found at this time.

## 2018-09-26 LAB
GLUCOSE BLD STRIP.AUTO-MCNC: 103 MG/DL (ref 65–100)
GLUCOSE BLD STRIP.AUTO-MCNC: 112 MG/DL (ref 65–100)
GLUCOSE BLD STRIP.AUTO-MCNC: 154 MG/DL (ref 65–100)
GLUCOSE BLD STRIP.AUTO-MCNC: 159 MG/DL (ref 65–100)
SERVICE CMNT-IMP: ABNORMAL

## 2018-09-26 PROCEDURE — 74011250637 HC RX REV CODE- 250/637: Performed by: PSYCHIATRY & NEUROLOGY

## 2018-09-26 PROCEDURE — 74011250637 HC RX REV CODE- 250/637: Performed by: INTERNAL MEDICINE

## 2018-09-26 PROCEDURE — 74011250637 HC RX REV CODE- 250/637: Performed by: SURGERY

## 2018-09-26 PROCEDURE — 74011250636 HC RX REV CODE- 250/636: Performed by: INTERNAL MEDICINE

## 2018-09-26 PROCEDURE — 65660000000 HC RM CCU STEPDOWN

## 2018-09-26 PROCEDURE — 97110 THERAPEUTIC EXERCISES: CPT

## 2018-09-26 PROCEDURE — 97116 GAIT TRAINING THERAPY: CPT

## 2018-09-26 PROCEDURE — 82962 GLUCOSE BLOOD TEST: CPT

## 2018-09-26 RX ORDER — HALOPERIDOL 5 MG/ML
1 INJECTION INTRAMUSCULAR
Status: DISCONTINUED | OUTPATIENT
Start: 2018-09-26 | End: 2018-10-06 | Stop reason: HOSPADM

## 2018-09-26 RX ADMIN — FAMOTIDINE 20 MG: 20 TABLET ORAL at 08:40

## 2018-09-26 RX ADMIN — ONDANSETRON 4 MG: 2 INJECTION INTRAMUSCULAR; INTRAVENOUS at 08:40

## 2018-09-26 RX ADMIN — QUETIAPINE FUMARATE 25 MG: 25 TABLET ORAL at 22:08

## 2018-09-26 RX ADMIN — LOSARTAN POTASSIUM 25 MG: 25 TABLET ORAL at 22:08

## 2018-09-26 RX ADMIN — HYDROCODONE BITARTRATE AND ACETAMINOPHEN 1 TABLET: 5; 325 TABLET ORAL at 22:08

## 2018-09-26 RX ADMIN — METOPROLOL TARTRATE 25 MG: 25 TABLET ORAL at 08:40

## 2018-09-26 RX ADMIN — MELATONIN TAB 3 MG 6 MG: 3 TAB at 22:08

## 2018-09-26 RX ADMIN — BISACODYL 10 MG: 10 SUPPOSITORY RECTAL at 17:37

## 2018-09-26 RX ADMIN — Medication 10 ML: at 08:41

## 2018-09-26 RX ADMIN — SENNOSIDES AND DOCUSATE SODIUM 1 TABLET: 8.6; 5 TABLET ORAL at 08:40

## 2018-09-26 RX ADMIN — Medication 10 ML: at 22:17

## 2018-09-26 RX ADMIN — FAMOTIDINE 20 MG: 20 TABLET ORAL at 17:23

## 2018-09-26 RX ADMIN — METOPROLOL TARTRATE 25 MG: 25 TABLET ORAL at 22:08

## 2018-09-26 RX ADMIN — DEXTROSE MONOHYDRATE 50 ML/HR: 5 INJECTION, SOLUTION INTRAVENOUS at 11:36

## 2018-09-26 RX ADMIN — DILTIAZEM HYDROCHLORIDE 120 MG: 120 CAPSULE, COATED, EXTENDED RELEASE ORAL at 08:40

## 2018-09-26 RX ADMIN — ENOXAPARIN SODIUM 40 MG: 100 INJECTION SUBCUTANEOUS at 08:40

## 2018-09-26 NOTE — PROGRESS NOTES
Nutrition Assessment:    RECOMMENDATIONS:   Continue Diabetic diet    DIETITIANS INTERVENTIONS/PLAN:   Continue diet as tolerated  Monitor appetite/PO intake    ASSESSMENT:   Pt admitted with cholecystitis. PMH: DM, HTN, CAD, GERD. Chart reviewed for LOS. MST negative for previous nutritional risk factors. Pt is s/p lap jesus. She is confused at times, ?underlying dementia hx? Appetite has been fair per RN flowsheet's. Her LFT's are improving. K+ 2.9, in need of repletion. BM noted on 9/24. Will monitor pt's appetite and need for PO supplements upon F/U.     SUBJECTIVE/OBJECTIVE:     Diet Order: Consistent carb  % Eaten:  Patient Vitals for the past 72 hrs:   % Diet Eaten   09/24/18 2000 50 %   09/24/18 1429 50 %   09/24/18 0857 25 %     Pertinent Medications:pepcid, humalog, pericolace; [email or web address])    Chemistries:  Lab Results   Component Value Date/Time    Sodium 137 09/25/2018 04:53 AM    Potassium 2.9 (L) 09/25/2018 04:53 AM    Chloride 102 09/25/2018 04:53 AM    CO2 28 09/25/2018 04:53 AM    Anion gap 7 09/25/2018 04:53 AM    Glucose 95 09/25/2018 04:53 AM    BUN 20 09/25/2018 04:53 AM    Creatinine 1.04 (H) 09/25/2018 04:53 AM    BUN/Creatinine ratio 19 09/25/2018 04:53 AM    GFR est AA >60 09/25/2018 04:53 AM    GFR est non-AA 52 (L) 09/25/2018 04:53 AM    Calcium 8.0 (L) 09/25/2018 04:53 AM    AST (SGOT) 47 (H) 09/25/2018 04:53 AM    Alk. phosphatase 79 09/25/2018 04:53 AM    Protein, total 5.5 (L) 09/25/2018 04:53 AM    Albumin 2.6 (L) 09/25/2018 04:53 AM    Globulin 2.9 09/25/2018 04:53 AM    A-G Ratio 0.9 (L) 09/25/2018 04:53 AM    ALT (SGPT) 127 (H) 09/25/2018 04:53 AM      Anthropometrics: Height: 5' 7\" (170.2 cm) Weight: 78.5 kg (173 lb)  []bed scale   [x]stated(9/21)   []unknown    IBW (%IBW):   ( ) UBW (%UBW):   (  %)    BMI: Body mass index is 27.1 kg/(m^2).     This BMI is indicative of:  []Underweight   []Normal   [x]Overweight   [] Obesity   [] Extreme Obesity (BMI>40)  Estimated Nutrition Needs (Based on): 1720 Kcals/day (MSJ 1323 x 1.3) , 63 g (0.8gPro/kg) Protein  Carbohydrate: At Least 130 g/day  Fluids: 1700 mL/day    Last BM: 9/24   [x]Active     []Hyperactive  []Hypoactive       [] Absent   BS  Skin:    [] Intact   [x] Incision  [] Breakdown   [] DTI   [] Tears/Excoriation/Abrasion  []Edema [] Other: Wt Readings from Last 30 Encounters:   09/23/18 78.5 kg (173 lb)   08/30/18 80.3 kg (177 lb)   07/20/18 79.4 kg (175 lb)   06/23/18 80.7 kg (178 lb)   06/19/18 81.9 kg (180 lb 8.9 oz)   04/19/18 80.9 kg (178 lb 5.6 oz)   04/11/18 79.7 kg (175 lb 12.8 oz)   01/12/18 79 kg (174 lb 1.6 oz)   12/07/17 78 kg (172 lb)   11/14/17 77.1 kg (170 lb)   10/11/17 76.4 kg (168 lb 8 oz)   10/09/17 75.8 kg (167 lb 1.6 oz)   09/13/17 77.6 kg (171 lb)   08/15/17 77.1 kg (169 lb 14.4 oz)   07/28/17 76.2 kg (168 lb 1.6 oz)   07/06/17 76.2 kg (168 lb 1.6 oz)   06/05/17 74.6 kg (164 lb 8 oz)   05/10/17 75.2 kg (165 lb 12.8 oz)   04/26/17 75.5 kg (166 lb 6.4 oz)   04/17/17 76.2 kg (168 lb)   04/04/17 77.1 kg (170 lb)   03/08/17 73.5 kg (162 lb)   02/27/17 73.5 kg (162 lb 1.6 oz)   02/23/17 74 kg (163 lb 2.3 oz)   01/26/17 76.5 kg (168 lb 11.2 oz)   01/05/17 75.8 kg (167 lb)   12/23/16 77.1 kg (170 lb)   12/15/16 75.5 kg (166 lb 6.4 oz)   11/17/16 75.8 kg (167 lb)   11/04/16 77.3 kg (170 lb 6.4 oz)      NUTRITION DIAGNOSES:   Problem:  No nutritional diagnosis at this time        NUTRITION INTERVENTIONS:  Meals/Snacks: General/healthful diet                  GOAL:   Pt will consume >50% of meals in 3-5 days.      Cultural, Taoism, or Ethnic Dietary Needs: None     LEARNING NEEDS (Diet, Food/Nutrient-Drug Interaction):    [x] None Identified   [] Identified and Education Provided/Documented   [] Identified and Pt declined/was not appropriate      [x] Interdisciplinary Care Plan Reviewed/Documented    [x] Participated in Discharge Planning: Diabetic diet    [] Interdisciplinary Rounds     NUTRITION RISK:    [] High              [] Moderate           [x]  Low  []  Minimal/Uncompromised    PT SEEN FOR:    []  MD Consult: []Calorie Count      []Diabetic Diet Education        []Diet Education     []Electrolyte Management     []General Nutrition Management and Supplements     []Management of Tube Feeding     []TPN Recommendations    []  RN Referral:  []MST score >=2     []Enteral/Parenteral Nutrition PTA     []Pregnant: Gestational DM or Multigestation   []  Low BMI  []  Re-Screen   [x]  LOS   []  NPO/clears x 5 days   []  New TF/TPN    Marcio Villa RD, [address]   Pager [phone]  Weekend Pager [phone]

## 2018-09-26 NOTE — PROGRESS NOTES
Subjective:    Patient seen and examined by me today. She's still wondering about the martinez. C/o blurry vision in right eye which she says was a problem before hospital.      Objective:    Blood pressure 126/64, pulse 69, temperature 97.9 °F (36.6 °C), resp. rate 17, height 5' 7\" (1.702 m), weight 78.5 kg (173 lb), SpO2 100 %. Drains - serous  Exam - alert and oriented, abd soft, approp TTP, CDI. Assessment:  Procedure(s):  Lap jesus 9/19/18. ENDOSCOPIC RETROGRADE CHOLANGIOPANCREATOGRAPHY (ERCP) 9/20/2018    Active Hospital Problems    Diagnosis Date Noted    Choledocholithiasis with acute cholecystitis 09/19/2018     - urinary retention. Sees Dr. Edd Cordova outpatient. Plan:  - would d/c martinez. - increase activity. PT. May need home health. - d/c AIRAM. - will sign off. Please call for any new questions or concerns. I'll stop by socially while she's here. Thanks.        Joaquina Webber MD

## 2018-09-26 NOTE — PROGRESS NOTES
Occupational Therapy  Medical record reviewed and attempted to see pt for OT treatment session. Pt was in bed and declined OT despite encouragement and several activity suggestions. Pt stated, \"Come back at 5 ... I'll have to pee then\". Pt was encouraged to call for her nurse. Pt declined OOB activities at this time. Will defer and continue to follow. 6 minutes.

## 2018-09-26 NOTE — PROGRESS NOTES
Reason for Admission:  Acute                    RRAT Score:     16             Do you (patient/family) have any concerns for transition/discharge? CM contacted pt's daughter: Diandra Royal ([phone]), via telephone, regarding pt's care. Diandra Royal will like pt to return home with [facility] care              Plan for utilizing home health: Pt will have [facility] at d/c. Likelihood of readmission? MODERATE            Transition of Care Plan:          Pt lives with grandson and boyfriend at: [address] [facility], in their 2 story home ( 1 step into main entrance). Pt was known to be independent with ADls, and she does limited driving. Pt is active with PCP: [facility] Kyler Lucas. Pt was last [facility]. [facility]. Pt has no DME, SNF, but she has had HH in the past.    Diandra Royal will like pt to return home with [facility] services. CM will inform MD of the following. Diandra Royal aware of [address] document (signed) placed in chart. Care Management Interventions  PCP Verified by CM: Yes  Mode of Transport at Discharge: Other (see comment)  Discharge Durable Medical Equipment: No  Physical Therapy Consult: Yes  Occupational Therapy Consult: Yes  Speech Therapy Consult: No  Current Support Network:  Other, Own Home  Confirm Follow Up Transport: Family  Plan discussed with Pt/Family/Caregiver: Yes  Freedom of Choice Offered: Yes  Discharge Location  Discharge Placement: AGATHA Jeter, MSW   [phone]

## 2018-09-26 NOTE — PROGRESS NOTES
Hospitalist Progress Note    NAME: Svetlana Krishna   :  1945   MRN:  [id number]       Assessment / Plan:  Acute choledocholithiasis with acute cholecystitis POA  Acute RUQ abd pain, POA  Abnormal LFT, POA  -LFTs with AST 1426,  , Alk Phos 146, total Bilirubin 2.8, labs improving  RUQ Ultrasound consistent with gallstones with acute cholecystitis changes, dilated CBD  -HIDA scan with tracer in the liver but no activity in gallbladder, common bile duct, small bowel after 3 hours suggesting common bile duct obstruction. -MRCP showed cholelithiasis. Pericholecystic edema suspicious for hcolecystitis. Common duct is normal  -s/p lap jesus with cholangiogram showed Jaundice, acute inflammation of the gallbladder, small gallstones in the gallbladder and cystic duct. No obvious CBD filling defect on cholangiogram (the defect on the second image is the cholangiogram catheter balloon), but no passage of contrast into the duodenum, consistent with obstruction.  -Cont' IV Levaquin and Flagyl empirically. Last dose   -IV morphine prn  -s/p ERCP o   -labs improving, can d/c home with AIRAM and follow up with ERCP in 1 week vs removing it if still npt  -need to repeat ERCP in 1-2 months  -appreciate gen surg and GI consultation  -needs PT/OT, possible rehab on discharge    Urinary retention  -UA neg for abnormalities. S/p martinez insertion. Voiding trial to be performed today. if she fails voiding trial, will need to reinsert martinez and have her follow upw ith her urologist Dr Ribera Doctor    Acute encephalopathy, ? Underlining dementia  -UA neg. Pt currently on abx as above.  Monitor mentation once off levaquin.  -head CT neg for acute process  -mentation much improved today.  -low dose seroquel started, appreciate neurology's consultation    Constipation, resolved  -cont' miralax, docusate    Type 2 diabetes mellitus POA currently off medications per her report   - A1C 5.5, poor appetite  -on d5, BG slowly improving as pt is eating more, consider discontinue tomorrow if BG and appetite improves. Essential hypertension POA  Orthostatic hypotension  -initially has hypertensive urgency on previous days, now with orthostatic hypotension  -  nitrobid removed. Recheck orthostatic vital   -continue ARB and Cardizem CD, metoprolol with holding parameters  -apply compression stocking  -prn hydralazine    Coronary disease POA  -off aspirin and blood pressure medication control   -currently off of cholesterol medications     Insomnia POA melatonin at bedtime     Met with daughter at bedside on 9/25. Pt has underlining dementia but no formal diagnosed. She was supposed to see neuro psyche a few years ago per PCP's rec but never got around to it. Overweight  Morbid Obesity POA   Code status: full code  NOK: daughter  dvt prophylaxis: lovenox     Subjective:     Chief Complaint / Reason for Physician Visit  Pt seen at bedside. Pt up in chair, has the same complaint that \"everything hurts\" and \"I dont' feel good\". She knows she is at [facility] and her name. Overall, much more alert compared to previous days. Discussed with RN events overnight. Review of Systems:  Symptom Y/N Comments  Symptom Y/N Comments   Fever/Chills n   Chest Pain     Poor Appetite    Edema     Cough    Abdominal Pain n    Sputum    Joint Pain     SOB/MOORE    Pruritis/Rash     Nausea/vomit y   Tolerating PT/OT     Diarrhea    Tolerating Diet     Constipation    Other       Could NOT obtain due to:      Objective:     VITALS:   Last 24hrs VS reviewed since prior progress note.  Most recent are:  Patient Vitals for the past 24 hrs:   Temp Pulse Resp BP SpO2   09/26/18 0621 97.9 °F (36.6 °C) 69 17 126/64 -   09/26/18 0054 97.3 °F (36.3 °C) 66 18 109/64 100 %   09/25/18 2017 98 °F (36.7 °C) 75 18 115/65 97 %   09/25/18 1542 - 71 - 102/48 97 %   09/25/18 1529 - 82 - (!) 83/49 97 %   09/25/18 1504 98.5 °F (36.9 °C) 69 16 98/57 100 %   09/25/18 1248 98.2 °F (36.8 °C) (!) 59 16 (!) 168/91 95 %   09/25/18 1056 97.6 °F (36.4 °C) 82 16 123/71 98 %       Intake/Output Summary (Last 24 hours) at 09/26/18 0840  Last data filed at 09/26/18 4847   Gross per 24 hour   Intake           776.66 ml   Output              870 ml   Net           -93.34 ml        PHYSICAL EXAM:  General: WD, WN. Alert, cooperative, no acute distress    EENT:  EOMI. Anicteric sclerae. MMM  Resp:  CTA bilaterally, no wheezing or rales. No accessory muscle use  CV:  Regular  rhythm,  No edema  GI:  Soft, Non distended, Non tender.  +BS  Neurologic:  Alert and oriented X 2, normal speech  Psych:   +anxous  Skin:  No rashes. No jaundice    Reviewed most current lab test results and cultures  YES  Reviewed most current radiology test results   YES  Review and summation of old records today    NO  Reviewed patient's current orders and MAR    YES  PMH/ reviewed - no change compared to H&P  ________________________________________________________________________  Care Plan discussed with:    Comments   Patient x    Family      RN x    Care Manager     Consultant                        Multidiciplinary team rounds were held today with , nursing, pharmacist and clinical coordinator. Patient's plan of care was discussed; medications were reviewed and discharge planning was addressed. ________________________________________________________________________  Total NON critical care TIME:  35   Minutes    Total CRITICAL CARE TIME Spent:   Minutes non procedure based      Comments   >50% of visit spent in counseling and coordination of care     ________________________________________________________________________  David Estrella MD     Procedures: see electronic medical records for all procedures/Xrays and details which were not copied into this note but were reviewed prior to creation of Plan. LABS:  I reviewed today's most current labs and imaging studies.   Pertinent labs include:  Recent Labs      09/25/18   0453   WBC  5.3   HGB  10.6*   HCT  32.3*   PLT  189     Recent Labs      09/25/18   0453   NA  137   K  2.9*   CL  102   CO2  28   GLU  95   BUN  20   CREA  1.04*   CA  8.0*   ALB  2.6*   TBILI  0.9   SGOT  47*   ALT  127*       Signed: German Helm MD

## 2018-09-26 NOTE — PROGRESS NOTES
Problem: Mobility Impaired (Adult and Pediatric)  Goal: *Acute Goals and Plan of Care (Insert Text)  Physical Therapy Goals  Initiated 9/21/2018  1. Patient will move from supine to sit and sit to supine , scoot up and down and roll side to side in bed with independence within 7 day(s). 2.  Patient will transfer from bed to chair and chair to bed with independence using the least restrictive device within 7 day(s). 3.  Patient will perform sit to stand with independence within 7 day(s). 4.  Patient will ambulate with independence for 200 feet with the least restrictive device within 7 day(s). physical Therapy TREATMENT  Patient: Caron Mayers (31 y.o. female)  Date: 9/26/2018  Diagnosis: Choledocholithiasis with acute cholecystitis, CBD stone, CHOLELITHIASIS    Procedure(s) (LRB):  ENDOSCOPIC RETROGRADE CHOLANGIOPANCREATOGRAPHY (ERCP) (N/A) 5 Days Post-Op     Precautions: Fall  Chart, physical therapy assessment, plan of care and goals were reviewed. ASSESSMENT: pt continues to c/o pain and just not feeling well but did better with therapy today, no gross LOB or SOB, did fair with bed mob and transfers needs max encouragement for increased activity, did fair with ther-ex, vc's for safety and proper RW use. Progression toward goals:  []    Improving appropriately and progressing toward goals  [x]    Improving very slowly and progressing toward goals  []    Not making progress toward goals and plan of care will be adjusted     PLAN:  Patient continues to benefit from skilled intervention to address the above impairments. Continue treatment per established plan of care.   Discharge Recommendations:  Joshua Carter  Further Equipment Recommendations for Discharge:  rolling walker     OBJECTIVE DATA SUMMARY:     Critical Behavior:  Neurologic State: Alert  Orientation Level: Oriented X4  Cognition: Follows commands  Safety/Judgement: Awareness of environment    Functional Mobility Training:  Bed Mobility:  Sit to Supine: Minimum assistance;Assist x1;Additional time  Scooting: Minimum assistance;Assist x1;Additional time  Level of Assistance: Minimum assistance  Interventions: Tactile cues; Verbal cues     Transfers:  Sit to Stand: Minimum assistance;Assist x1;Additional time  Stand to Sit: Contact guard assistance  Bed to Chair: Minimum assistance;Assist x1;Additional time  Interventions: Tactile cues; Verbal cues  Level of Assistance: Minimum assistance     Balance:  Sitting: Intact; Without support  Standing: Intact; With support  Standing - Static: Good;Constant support  Standing - Dynamic : Fair     Ambulation/Gait Training:  Distance (ft): 12 Feet (ft)  Assistive Device: Gait belt;Walker, rolling  Ambulation - Level of Assistance: Minimal assistance;Assist x1;Additional time  Gait Abnormalities: Decreased step clearance;Shuffling gait  Right Side Weight Bearing: Full  Left Side Weight Bearing: Full  Base of Support: Widened  Speed/Gale: Pace decreased (<100 feet/min)  Step Length: Left shortened;Right shortened    Therapeutic Exercises:  sitting  EXERCISE   Sets   Reps   Active Active Assist   Passive   Comments   Ankle pumps 1 10 [x] [] [] bilat   Heel raises 1 10 [x] [] [] \"   Toe tap 1 10 [x] [] [] \"   Knee ext 1 10 [x] [] [] \"   Hip flex 1 10 [x] [] [] \"     Pain:  Pain Scale 1: Numeric (0 - 10)  Pain Intensity 1:  (c/o pain in her side but did not rate)  Pain Location 1: Rib cage  Pain Orientation 1: Left  Pain Description 1: Aching  Pain Intervention(s) 1: Nurse notified     Activity Tolerance: poor    After treatment:   []    Patient left in no apparent distress sitting up in chair  [x]    Patient left in no apparent distress in bed  [x]    Call bell left within reach  [x]    Nursing notified  []    Caregiver present  [x]    Bed alarm activated    COMMUNICATION/COLLABORATION:   The patients plan of care was discussed with: Registered Nurse    Charmaine Sauer, BECK   Time Calculation: 25 mins

## 2018-09-26 NOTE — PROGRESS NOTES
*JOYCELYN acknowledged consult*    CM sent referral to [facility] is currently waiting for FERNANDO Blood CM  [phone]

## 2018-09-27 ENCOUNTER — HOME HEALTH ADMISSION (OUTPATIENT)
Dept: HOME HEALTH SERVICES | Facility: HOME HEALTH | Age: 73
End: 2018-09-27
Payer: MEDICARE

## 2018-09-27 LAB
GLUCOSE BLD STRIP.AUTO-MCNC: 115 MG/DL (ref 65–100)
GLUCOSE BLD STRIP.AUTO-MCNC: 132 MG/DL (ref 65–100)
GLUCOSE BLD STRIP.AUTO-MCNC: 88 MG/DL (ref 65–100)
SERVICE CMNT-IMP: ABNORMAL
SERVICE CMNT-IMP: ABNORMAL
SERVICE CMNT-IMP: NORMAL

## 2018-09-27 PROCEDURE — 51798 US URINE CAPACITY MEASURE: CPT

## 2018-09-27 PROCEDURE — 65660000000 HC RM CCU STEPDOWN

## 2018-09-27 PROCEDURE — 74011250636 HC RX REV CODE- 250/636: Performed by: INTERNAL MEDICINE

## 2018-09-27 PROCEDURE — 74011250637 HC RX REV CODE- 250/637: Performed by: INTERNAL MEDICINE

## 2018-09-27 PROCEDURE — 74011250637 HC RX REV CODE- 250/637: Performed by: PSYCHIATRY & NEUROLOGY

## 2018-09-27 PROCEDURE — 74011250636 HC RX REV CODE- 250/636: Performed by: HOSPITALIST

## 2018-09-27 PROCEDURE — 82962 GLUCOSE BLOOD TEST: CPT

## 2018-09-27 PROCEDURE — 74011250637 HC RX REV CODE- 250/637: Performed by: SURGERY

## 2018-09-27 RX ADMIN — DEXTROSE MONOHYDRATE 75 ML/HR: 5 INJECTION, SOLUTION INTRAVENOUS at 03:28

## 2018-09-27 RX ADMIN — FAMOTIDINE 20 MG: 20 TABLET ORAL at 09:46

## 2018-09-27 RX ADMIN — ENOXAPARIN SODIUM 40 MG: 100 INJECTION SUBCUTANEOUS at 09:46

## 2018-09-27 RX ADMIN — Medication 10 ML: at 17:08

## 2018-09-27 RX ADMIN — QUETIAPINE FUMARATE 25 MG: 25 TABLET ORAL at 21:53

## 2018-09-27 RX ADMIN — FAMOTIDINE 20 MG: 20 TABLET ORAL at 17:07

## 2018-09-27 RX ADMIN — ONDANSETRON 4 MG: 2 INJECTION INTRAMUSCULAR; INTRAVENOUS at 12:38

## 2018-09-27 RX ADMIN — MELATONIN TAB 3 MG 6 MG: 3 TAB at 21:53

## 2018-09-27 RX ADMIN — Medication 10 ML: at 06:25

## 2018-09-27 RX ADMIN — METOPROLOL TARTRATE 25 MG: 25 TABLET ORAL at 09:46

## 2018-09-27 RX ADMIN — METOPROLOL TARTRATE 25 MG: 25 TABLET ORAL at 21:48

## 2018-09-27 RX ADMIN — Medication 10 ML: at 21:59

## 2018-09-27 RX ADMIN — LOSARTAN POTASSIUM 25 MG: 25 TABLET ORAL at 21:53

## 2018-09-27 RX ADMIN — SENNOSIDES AND DOCUSATE SODIUM 1 TABLET: 8.6; 5 TABLET ORAL at 09:46

## 2018-09-27 RX ADMIN — DILTIAZEM HYDROCHLORIDE 120 MG: 120 CAPSULE, COATED, EXTENDED RELEASE ORAL at 09:46

## 2018-09-27 RX ADMIN — HYDROCODONE BITARTRATE AND ACETAMINOPHEN 1 TABLET: 5; 325 TABLET ORAL at 13:28

## 2018-09-27 RX ADMIN — DEXTROSE MONOHYDRATE 100 ML/HR: 5 INJECTION, SOLUTION INTRAVENOUS at 18:46

## 2018-09-27 NOTE — PROGRESS NOTES
Occupational Therapy  Medical record. Pt declines therapy reporting that her left side is hurting and she has been throwing up. \"I can't do it today. \"  Pt declined despite encouragement to try sitting up. Will continue to follow.

## 2018-09-27 NOTE — PROGRESS NOTES
Attempted to see pt this pm and pt declined 2/2 to continued pain in her side and some vomiting. Will continue to follow and tx when pt is able.

## 2018-09-27 NOTE — PROGRESS NOTES
Martinez was inserted by Josey Gonzalez RN this morning. Patient was bladder scanned and it showed above 500 in bladder. Martinez was to be inserted for retention over 300.  Manager Zaki Zavala) asked that I put the order in because there was now order for the martinez

## 2018-09-27 NOTE — PROGRESS NOTES
Hospitalist Progress Note  Bertha Serna MD      NAME:  Arnulfo Snellen  :  1945  MRN:  [id number]  PCP:   Gely Suero MD  Date of Service:  2018    Assessment & Plan:     Acute choledocholithiasis with acute cholecystitis and abnormal elevated LFTs POA  Acute RUQ abd pain, POA  Intractable N/V  - LFTs with RUJ 7339,  , Alk Phos 146, total Bilirubin 2.8, labs improving  RUQ Ultrasound consistent with gallstones with acute cholecystitis changes, dilated CBD  - HIDA scan with tracer in the liver but no activity in gallbladder, common bile duct, small bowel after 3 hours suggesting common bile duct obstruction.  - MRCP showed cholelithiasis. Pericholecystic edema suspicious for hcolecystitis. Common duct is normal  - s/p ERCP on   - s/p lap jesus with cholangiogram showed Jaundice, acute inflammation of the gallbladder, small gallstones in the gallbladder and cystic duct.  No obvious CBD filling defect on cholangiogram (the defect on the second image is the cholangiogram catheter balloon), but no passage of contrast into the duodenum, consistent with obstruction. - Completed IV Levaquin and Flagyl empirically on   - Continue morphine IV as outlined for pain control.  - Continue norco as outlined for pain control.  - Monitor labs  - DC home with AIRAM when ready. Follow up with ERCP in 1 week vs removing it if still inpt  - need to repeat ERCP in 1-2 months  - increase IVF  - continue zofran prn    Urinary retention  - UA neg for abnormalities. - S/p martinez insertion.     - consider follow up with her urologist Dr aJmey Addison     Acute encephalopathy: resolved  - continue low dose seroquel  - neuro input appreciated     Constipation, resolved  - cont' miralax, docusate    Type 2 diabetes mellitus POA currently off medications per her report   - A1C 5.5, poor appetite  - D5W will be increased due to vomiting and low oral intake.       Essential hypertension POA  Orthostatic hypotension  - continue ARB and Cardizem CD, metoprolol with holding parameters  - prn hydralazine    Coronary disease POA: stable  - restart ASA  - currently off of cholesterol medications because of LFTs    Insomnia POA  - continue melatonin at bedtime    Body mass index is 27.1 kg/(m^2).: 25.0 - 29.9 Overweight    Code status: Full Code  DVT prophylaxis:  [x]Enoxaparin  []Warfarin  []Hep SQ  []No AC d/t risk of bleeding  []SCDs  Care Plan discussed with: Patient/Family  Disposition: TBD needs pt ot on DC with possible rehab       Reason for visit:   Recheck abd pain, n/v, LFTs    Admission Summary:   77-year-old -American female with known diabetes type 2 off treatment, essential hypertension, coronary artery disease, nephrolithiasis.  She denies prior history of cholecystitis or cholelithiasis.  She notes over the past several weeks, she has had intermittent bouts of upper quadrant abdominal pain that would usually go away after short period of time. Nicole Verdin was otherwise well until last night at bedtime, sudden onset of severe upper quadrant abdominal pain that radiated to her back pain, \"could not sleep all night due to the pain\". The pain persisted the entire night and through today, was only relieved by IV morphine in the emergency room.  When I saw her she remains with mild abdominal pain. She had associated with nausea vomiting ×1 and chills. She has not had any aye colored stools, no dark urine. Mild pressure-like headache, no cough or shortness of breath, no substernal chest pain, no melena or hematemesis or bright red blood per rectum. She does complain of intermittent dysuria. In the emergency santos,  the patient was tender in the right upper quadrant. Ultrasound showed evidence of acute cholecystitis and a dilated common bile duct.  HIDA scan showed no evidence of tracer in the gallbladder, bile duct or small bowel after 3 hours consistent with common bile duct obstruction and she had abnormal LFTs. we will called to admit the patient    Interval history / Subjective:   Feeling better overall but c/o n/v and abd pain. Review of Systems:     Symptom Y/N Comments   Symptom Y/N Comments   Fever/Chills n     Chest Pain n     Poor Appetite       Edema       Cough       Abdominal Pain y     Sputum       Joint Pain       SOB/MOORE n     Pruritis/Rash       Nausea/vomit  y     Tolerating PT/OT       Diarrhea      Tolerating Diet       Constipation       Other          NOT obtained      due to     Physical Examination:   Last 24hrs VS reviewed since prior progress note. Most recent are:  Patient Vitals for the past 12 hrs:   Temp Pulse Resp BP SpO2   09/27/18 1037 97.5 °F (36.4 °C) 76 16 106/63 100 %   09/27/18 0625 98.2 °F (36.8 °C) 66 18 121/72 97 %       Intake/Output Summary (Last 24 hours) at 09/27/18 1641  Last data filed at 09/27/18 1314   Gross per 24 hour   Intake           1602.5 ml   Output             1275 ml   Net            327.5 ml      General appearance: alert, cooperative, NAD  Head: Normocephalic, atraumatic  Eyes: PERRL and EOMI sclera clear  Throat: Lips, mucosa, and tongue normal.   Neck: supple, no tenderness  Lungs: CTA with good BS and normal effort  Heart: S1-S2, RRR, No MGR  Abdomen: soft, bowel sounds normal. No masses,  no organomegaly, abnormal findings:  tenderness moderate and generalized no guarding or rebound. Extremities: extremities normal, atraumatic, no cyanosis, no edema  Skin: Skin color, texture, turgor normal. No rashes or lesions  Neurologic: Alert and oriented X 3  Psychiatric: Not anxious, cooperative, normal affect    Data Review:   Review and/or order of clinical lab test    No results found.   Echo Results  (Last 48 hours)    None        Labs:     Recent Labs      09/25/18   0453   WBC  5.3   HGB  10.6*   HCT  32.3*   PLT  189     Recent Labs      09/25/18   0453   NA  137   K  2.9*   CL  102   CO2  28   BUN  20   CREA  1.04*   GLU  95   CA  8.0*     Recent Labs 09/25/18   0453   SGOT  47*   ALT  127*   AP  79   TBILI  0.9   TP  5.5*   ALB  2.6*   GLOB  2.9     No results for input(s): INR, PTP, APTT in the last 72 hours. No lab exists for component: INREXT   No results for input(s): PH, PCO2, PO2 in the last 72 hours.   Lab Results   Component Value Date/Time    Cholesterol, total 258 (H) 07/06/2017 12:22 PM    HDL Cholesterol 64 07/06/2017 12:22 PM    LDL,Direct 209 (H) 01/26/2017 02:06 PM    LDL, calculated 175 (H) 07/06/2017 12:22 PM    Triglyceride 95 07/06/2017 12:22 PM     Lab Results   Component Value Date/Time    Glucose (POC) 115 (H) 09/27/2018 11:22 AM    Glucose (POC) 103 (H) 09/26/2018 10:02 PM    Glucose (POC) 159 (H) 09/26/2018 04:25 PM    Glucose (POC) 154 (H) 09/26/2018 11:24 AM    Glucose (POC) 112 (H) 09/26/2018 07:20 AM     Lab Results   Component Value Date/Time    Color DARK YELLOW 09/23/2018 10:46 AM    Appearance CLEAR 09/23/2018 10:46 AM    Specific gravity 1.013 09/23/2018 10:46 AM    Specific gravity 1.015 05/04/2015 02:59 PM    pH (UA) 6.0 09/23/2018 10:46 AM    Protein NEGATIVE  09/23/2018 10:46 AM    Glucose 100 (A) 09/23/2018 10:46 AM    Ketone TRACE (A) 09/23/2018 10:46 AM    Bilirubin NEGATIVE  09/23/2018 10:46 AM    Urobilinogen 1.0 09/23/2018 10:46 AM    Nitrites NEGATIVE  09/23/2018 10:46 AM    Leukocyte Esterase NEGATIVE  09/23/2018 10:46 AM    Epithelial cells FEW 09/23/2018 10:46 AM    Bacteria NEGATIVE  09/23/2018 10:46 AM    WBC 0-4 09/23/2018 10:46 AM    RBC 0-5 09/23/2018 10:46 AM     All Micro Results     None        Medications Reviewed:     Current Facility-Administered Medications:     haloperidol lactate (HALDOL) injection 1 mg, 1 mg, IntraVENous, Q6H PRN, Howard Michele MD    QUEtiapine (SEROquel) tablet 25 mg, 25 mg, Oral, QHS, Meghna Casiano MD, 25 mg at 09/26/18 2208    dextrose 5% infusion, 75 mL/hr, IntraVENous, CONTINUOUS, Nusrat PINEDA MD, Last Rate: 75 mL/hr at 09/27/18 0328, 75 mL/hr at 09/27/18 0328   enoxaparin (LOVENOX) injection 40 mg, 40 mg, SubCUTAneous, Q24H, Cathryn PINEDA MD, 40 mg at 09/27/18 0946    senna-docusate (PERICOLACE) 8.6-50 mg per tablet 1 Tab, 1 Tab, Oral, DAILY, Marium Rubio MD, 1 Tab at 09/27/18 0946    hydrALAZINE (APRESOLINE) 20 mg/mL injection 10 mg, 10 mg, IntraVENous, Q6H PRN, Cathryn PINEDA MD, 10 mg at 09/21/18 1526    diphenhydrAMINE (BENADRYL) injection 25 mg, 25 mg, IntraVENous, Q6H PRN, Cathryn PINEDA MD, 25 mg at 09/24/18 3441    metoprolol tartrate (LOPRESSOR) tablet 25 mg, 25 mg, Oral, Q12H, Cathryn PINEDA MD, 25 mg at 09/27/18 0946    insulin lispro (HUMALOG) injection, , SubCUTAneous, AC&HS, Cathryn PINEDA MD, Stopped at 09/21/18 2200    glucose chewable tablet 16 g, 4 Tab, Oral, PRN, Marium Rubio MD    dextrose (D50W) injection syrg 12.5-25 g, 12.5-25 g, IntraVENous, PRN, Cathryn PINEDA MD, 12.5 g at 09/21/18 0827    glucagon (GLUCAGEN) injection 1 mg, 1 mg, IntraMUSCular, PRN, Marium Rubio MD    HYDROcodone-acetaminophen (NORCO) 5-325 mg per tablet 1-2 Tab, 1-2 Tab, Oral, Q4H PRN, Carlos Sandhu MD, 1 Tab at 09/27/18 1328    sodium chloride (NS) flush 5-10 mL, 5-10 mL, IntraVENous, Q8H, Elda Paez MD, 10 mL at 09/27/18 1436    sodium chloride (NS) flush 5-10 mL, 5-10 mL, IntraVENous, PRN, Elda Paez MD, 10 mL at 09/22/18 0435    naloxone [facility]) injection 0.4 mg, 0.4 mg, IntraVENous, PRN, Elda Paez MD    ondansetron [facility]) injection 4 mg, 4 mg, IntraVENous, Q4H PRN, Elda Paez MD, 4 mg at 09/27/18 1238    bisacodyl (DULCOLAX) suppository 10 mg, 10 mg, Rectal, DAILY PRN, Elda Paez MD, 10 mg at 09/26/18 1737    dilTIAZem CD (CARDIZEM CD) capsule 120 mg, 120 mg, Oral, DAILY, Elda Paez MD, 120 mg at 09/27/18 0946    famotidine (PEPCID) tablet 20 mg, 20 mg, Oral, BID, Elda Paez MD, 20 mg at 09/27/18 0946    losartan (COZAAR) tablet 25 mg, 25 mg, Oral, QHS, Cathryn PINEDA MD, 25 mg at 09/26/18 2208    morphine injection 2 mg, 2 mg, IntraVENous, Q3H PRN, Ted Geller MD, 2 mg at 09/24/18 2034    melatonin tablet 6 mg, 6 mg, Oral, QHS, Ted Geller MD, 6 mg at 09/26/18 2208    labetalol (NORMODYNE;TRANDATE) injection 20 mg, 20 mg, IntraVENous, Q3H PRN, Ted Geller MD, 20 mg at 09/21/18 1712    ______________________________________________________________________  EXPECTED LENGTH OF STAY: 3d 19h  ACTUAL LENGTH OF STAY:          8                 Arvind Nath MD

## 2018-09-27 NOTE — PROGRESS NOTES
7am-7pm 
 
Patient was resting quietly this morning Complaining of headache Very calm 1 episode of emesis. ( foamy)

## 2018-09-28 ENCOUNTER — APPOINTMENT (OUTPATIENT)
Dept: GENERAL RADIOLOGY | Age: 73
DRG: 418 | End: 2018-09-28
Attending: HOSPITALIST
Payer: MEDICARE

## 2018-09-28 LAB
ALBUMIN SERPL-MCNC: 2.7 G/DL (ref 3.5–5)
ALBUMIN/GLOB SERPL: 0.9 {RATIO} (ref 1.1–2.2)
ALP SERPL-CCNC: 70 U/L (ref 45–117)
ALT SERPL-CCNC: 72 U/L (ref 12–78)
ANION GAP SERPL CALC-SCNC: 8 MMOL/L (ref 5–15)
AST SERPL-CCNC: 25 U/L (ref 15–37)
BASOPHILS # BLD: 0 K/UL (ref 0–0.1)
BASOPHILS NFR BLD: 0 % (ref 0–1)
BILIRUB SERPL-MCNC: 0.7 MG/DL (ref 0.2–1)
BUN SERPL-MCNC: 13 MG/DL (ref 6–20)
BUN/CREAT SERPL: 12 (ref 12–20)
CALCIUM SERPL-MCNC: 8 MG/DL (ref 8.5–10.1)
CHLORIDE SERPL-SCNC: 103 MMOL/L (ref 97–108)
CO2 SERPL-SCNC: 27 MMOL/L (ref 21–32)
COMMENT, HOLDF: NORMAL
CREAT SERPL-MCNC: 1.08 MG/DL (ref 0.55–1.02)
DIFFERENTIAL METHOD BLD: ABNORMAL
EOSINOPHIL # BLD: 0 K/UL (ref 0–0.4)
EOSINOPHIL NFR BLD: 1 % (ref 0–7)
ERYTHROCYTE [DISTWIDTH] IN BLOOD BY AUTOMATED COUNT: 14 % (ref 11.5–14.5)
GLOBULIN SER CALC-MCNC: 3 G/DL (ref 2–4)
GLUCOSE BLD STRIP.AUTO-MCNC: 111 MG/DL (ref 65–100)
GLUCOSE BLD STRIP.AUTO-MCNC: 114 MG/DL (ref 65–100)
GLUCOSE BLD STRIP.AUTO-MCNC: 115 MG/DL (ref 65–100)
GLUCOSE BLD STRIP.AUTO-MCNC: 134 MG/DL (ref 65–100)
GLUCOSE BLD STRIP.AUTO-MCNC: 78 MG/DL (ref 65–100)
GLUCOSE SERPL-MCNC: 152 MG/DL (ref 65–100)
HCT VFR BLD AUTO: 31.9 % (ref 35–47)
HGB BLD-MCNC: 10.3 G/DL (ref 11.5–16)
IMM GRANULOCYTES # BLD: 0.1 K/UL (ref 0–0.04)
IMM GRANULOCYTES NFR BLD AUTO: 1 % (ref 0–0.5)
LYMPHOCYTES # BLD: 1.8 K/UL (ref 0.8–3.5)
LYMPHOCYTES NFR BLD: 35 % (ref 12–49)
MCH RBC QN AUTO: 27.3 PG (ref 26–34)
MCHC RBC AUTO-ENTMCNC: 32.3 G/DL (ref 30–36.5)
MCV RBC AUTO: 84.6 FL (ref 80–99)
MONOCYTES # BLD: 0.5 K/UL (ref 0–1)
MONOCYTES NFR BLD: 9 % (ref 5–13)
NEUTS SEG # BLD: 2.9 K/UL (ref 1.8–8)
NEUTS SEG NFR BLD: 54 % (ref 32–75)
NRBC # BLD: 0 K/UL (ref 0–0.01)
NRBC BLD-RTO: 0 PER 100 WBC
PLATELET # BLD AUTO: 207 K/UL (ref 150–400)
PMV BLD AUTO: 10.1 FL (ref 8.9–12.9)
POTASSIUM SERPL-SCNC: 2.6 MMOL/L (ref 3.5–5.1)
PROT SERPL-MCNC: 5.7 G/DL (ref 6.4–8.2)
RBC # BLD AUTO: 3.77 M/UL (ref 3.8–5.2)
SAMPLES BEING HELD,HOLD: NORMAL
SERVICE CMNT-IMP: ABNORMAL
SERVICE CMNT-IMP: NORMAL
SODIUM SERPL-SCNC: 138 MMOL/L (ref 136–145)
WBC # BLD AUTO: 5.3 K/UL (ref 3.6–11)

## 2018-09-28 PROCEDURE — 85025 COMPLETE CBC W/AUTO DIFF WBC: CPT

## 2018-09-28 PROCEDURE — 82962 GLUCOSE BLOOD TEST: CPT

## 2018-09-28 PROCEDURE — 74011250637 HC RX REV CODE- 250/637: Performed by: INTERNAL MEDICINE

## 2018-09-28 PROCEDURE — 97116 GAIT TRAINING THERAPY: CPT

## 2018-09-28 PROCEDURE — 74018 RADEX ABDOMEN 1 VIEW: CPT

## 2018-09-28 PROCEDURE — 74011250637 HC RX REV CODE- 250/637: Performed by: HOSPITALIST

## 2018-09-28 PROCEDURE — 65660000000 HC RM CCU STEPDOWN

## 2018-09-28 PROCEDURE — 74011250636 HC RX REV CODE- 250/636: Performed by: HOSPITALIST

## 2018-09-28 PROCEDURE — 80053 COMPREHEN METABOLIC PANEL: CPT

## 2018-09-28 PROCEDURE — 74011250636 HC RX REV CODE- 250/636: Performed by: INTERNAL MEDICINE

## 2018-09-28 PROCEDURE — 97530 THERAPEUTIC ACTIVITIES: CPT | Performed by: OCCUPATIONAL THERAPIST

## 2018-09-28 PROCEDURE — 36415 COLL VENOUS BLD VENIPUNCTURE: CPT

## 2018-09-28 PROCEDURE — 74011250637 HC RX REV CODE- 250/637: Performed by: PSYCHIATRY & NEUROLOGY

## 2018-09-28 RX ORDER — LORAZEPAM 1 MG/1
1 TABLET ORAL ONCE
Status: COMPLETED | OUTPATIENT
Start: 2018-09-28 | End: 2018-09-28

## 2018-09-28 RX ORDER — POTASSIUM CHLORIDE 20 MEQ/1
40 TABLET, EXTENDED RELEASE ORAL
Status: COMPLETED | OUTPATIENT
Start: 2018-09-28 | End: 2018-09-28

## 2018-09-28 RX ADMIN — MELATONIN TAB 3 MG 6 MG: 3 TAB at 21:41

## 2018-09-28 RX ADMIN — DILTIAZEM HYDROCHLORIDE 120 MG: 120 CAPSULE, COATED, EXTENDED RELEASE ORAL at 09:57

## 2018-09-28 RX ADMIN — SENNOSIDES AND DOCUSATE SODIUM 1 TABLET: 8.6; 5 TABLET ORAL at 09:57

## 2018-09-28 RX ADMIN — Medication 10 ML: at 05:28

## 2018-09-28 RX ADMIN — LOSARTAN POTASSIUM 25 MG: 25 TABLET ORAL at 21:41

## 2018-09-28 RX ADMIN — DEXTROSE MONOHYDRATE 100 ML/HR: 5 INJECTION, SOLUTION INTRAVENOUS at 05:36

## 2018-09-28 RX ADMIN — FAMOTIDINE 20 MG: 20 TABLET ORAL at 09:58

## 2018-09-28 RX ADMIN — ENOXAPARIN SODIUM 40 MG: 100 INJECTION SUBCUTANEOUS at 09:56

## 2018-09-28 RX ADMIN — METOPROLOL TARTRATE 25 MG: 25 TABLET ORAL at 21:41

## 2018-09-28 RX ADMIN — QUETIAPINE FUMARATE 25 MG: 25 TABLET ORAL at 21:41

## 2018-09-28 RX ADMIN — LORAZEPAM 1 MG: 1 TABLET ORAL at 12:48

## 2018-09-28 RX ADMIN — METOPROLOL TARTRATE 25 MG: 25 TABLET ORAL at 09:58

## 2018-09-28 RX ADMIN — POTASSIUM CHLORIDE 40 MEQ: 20 TABLET, EXTENDED RELEASE ORAL at 13:40

## 2018-09-28 RX ADMIN — Medication 5 ML: at 22:00

## 2018-09-28 RX ADMIN — FAMOTIDINE 20 MG: 20 TABLET ORAL at 17:44

## 2018-09-28 NOTE — PROGRESS NOTES
General Surgery CM completed chart review. Noted that [facility] did accept the case when Pt is medically stable. CM will continue to monitor discharge plan.       [facility]din [facility]   Ext [phone]

## 2018-09-28 NOTE — PROGRESS NOTES
Occupational Therapy Goals  Initiated 9/23/2018  1. Patient will perform grooming standing at the sink with modified independence within 7 day(s). 2.  Patient will perform bathing with supervision and cues using AE as needed within 7 day(s). 3.  Patient will perform lower body dressing with supervision and cues using AE as needed within 7 day(s). 4.  Patient will perform toilet transfers with modified independence within 7 day(s). 5.  Patient will perform all aspects of toileting with modified independence within 7 day(s). Occupational Therapy TREATMENT  Patient: Caron Mayers (89 y.o. female)  Date: 9/28/2018  Diagnosis: Choledocholithiasis with acute cholecystitis  CBD stone  CHOLELITHIASIS  CBD Stone <principal problem not specified>  Procedure(s) (LRB):  ENDOSCOPIC RETROGRADE CHOLANGIOPANCREATOGRAPHY (ERCP) (N/A) 7 Days Post-Op  Precautions: Fall  Chart, occupational therapy assessment, plan of care, and goals were reviewed. ASSESSMENT:  Pt was seated upon arrival with low BP. She was eating her lunch and complaining of feeling dizzy and her R eye hurting. Pt's daughter was present. Pt is quite messy with her meal, chewed food was  on her tray and on clothing. Pt assisted from chair to bed with Iban X2. Once supine, Pt vomited a small amount and was turned to her R side. Pt needed assistance for bed mobiltiy-Iban and maximal assistance to move up to [facility]. Pt left in upright position in bed. Her BP  increased to 90/57 (post activity at bed level)  and nursing made aware. Pt demonstrates minimal progress towards OT goals. She needs much encouragement during tx session and perseveratively asks if she will be alright. Nursing to provide medication for anxiety. Recommend SNF rehab at discharge.     Progression toward goals:  []       Improving appropriately and progressing toward goals  []       Improving slowly and progressing toward goals  [x]       Not making progress toward goals and plan of care will be continued. PLAN:  Patient continues to benefit from skilled intervention to address the above impairments. Continue treatment per established plan of care. Discharge Recommendations:  Rehab - [place]  Further Equipment Recommendations for Discharge:  tbd     SUBJECTIVE:   Patient stated am I going to be alright?.    (perseveratively asking throughout tx session)    OBJECTIVE DATA SUMMARY:   Cognitive/Behavioral Status:  Neurologic State: Alert  Orientation Level: Oriented to person;Oriented to place  Cognition: Follows commands  Needs encouragement  Perception: Appears intact  Perseveration: Perseverates during conversation (am I going to be alright? \"  asked > 3X during tx session)  Safety/Judgement: Decreased awareness of need for assistance;Decreased awareness of need for safety;Decreased insight into deficits    Functional Mobility and Transfers for ADLs:  Bed Mobility:   Sit to supine : Minimum assistance  Scooting: Maximum assistance (to [facility]) while in supine    Transfers:  Sit to Stand: Minimum assistance;Assist x2          Balance:  Sitting: Intact  Standing: Impaired  Standing - Static: Constant support;Good  Standing - Dynamic : Fair (provided HHA to take steps to chair)    ADL Intervention:  Feeding  Feeding Assistance: Supervision/set-up (pt is very messy with chewed food on tray)  Utensil Management: Independent (for fork)  Food to Mouth: Independent  Drink to Mouth: Supervision/set-up    Grooming  Washing Hands:  (used sani wipes with set up)                        Toileting  Toileting Assistance: Total assistance(dependent) (catheter)    Cognitive Retraining  Following Commands:  Follows one step commands/directions  Safety/Judgement: Decreased awareness of need for assistance;Decreased awareness of need for safety;Decreased insight into deficits  Cues: Verbal cues provided (much reassurance needed)             Therapeutic Exercises:   Encouraged B ankle exercises for increasing circulation  Pain:  Pain Scale 1: Numeric (0 - 10)  Pain Intensity 1: 0              Activity Tolerance:   BP low upon arrrival 79/48 in sitting, post transfer to bed in supine BP: 90/57   Nursing informed  Please refer to the flowsheet for vital signs taken during this treatment.   After treatment:   [] Patient left in no apparent distress sitting up in chair  [x] Patient left in no apparent distress in bed  [x] Call bell left within reach  [x] Nursing notified  [x] Caregiver present  [] Bed alarm activated--nursing advised and will address    COMMUNICATION/COLLABORATION:   The patients plan of care was discussed with: Physical Therapist and Registered Nurse    HEATH Quiñonez/L  Time Calculation: 18 mins

## 2018-09-28 NOTE — PROGRESS NOTES
Hospitalist Progress Note  Christopher Rodriguez MD      NAME:  An Ramirez  :  1945  MRN:  [id number]  PCP:   Dunia Montgomery MD  Date of Service:  2018    Assessment & Plan:     Acute choledocholithiasis with acute cholecystitis and abnormal elevated LFTs POA  Acute RUQ abd pain, POA  Intractable N/V  - LFTs with SXB 0598,  , Alk Phos 146, total Bilirubin 2.8, labs improving  RUQ Ultrasound consistent with gallstones with acute cholecystitis changes, dilated CBD  - HIDA scan with tracer in the liver but no activity in gallbladder, common bile duct, small bowel after 3 hours suggesting common bile duct obstruction.  - MRCP showed cholelithiasis. Pericholecystic edema suspicious for hcolecystitis. Common duct is normal  - s/p ERCP on   - s/p lap jesus with cholangiogram showed Jaundice, acute inflammation of the gallbladder, small gallstones in the gallbladder and cystic duct.  No obvious CBD filling defect on cholangiogram (the defect on the second image is the cholangiogram catheter balloon), but no passage of contrast into the duodenum, consistent with obstruction. - Completed IV Levaquin and Flagyl empirically on   - Continue morphine IV as outlined for pain control.  - Continue norco as outlined for pain control.  - Monitor labs  - Follow up with ERCP in 1 week vs removing it if still inpt  - need to repeat ERCP in 1-2 months  - continue zofran prn  - KUB ordered to evaluate abd pain  - Consider scopolamine if N/V continues  - Mopnitor CMP for lytes renal, and LFTs    Urinary retention  - UA neg for abnormalities. - S/p martinez insertion. Will remove martinez and do voiding trials today.   - consider follow up with her urologist Dr Ella Crook     Acute encephalopathy: resolved  - continue low dose seroquel  - neuro input appreciated     Constipation: resolved?  - cont' miralax, docusate  - KUB pending    Type 2 diabetes mellitus POA currently off medications per her report   - A1C 5.5, poor appetite     Essential hypertension POA  Orthostatic hypotension  - continue ARB and Cardizem CD, metoprolol with holding parameters  - prn hydralazine    Coronary disease POA: stable  - Continue ASA  - currently off of cholesterol medications because of LFTs    Insomnia POA  - continue melatonin at bedtime    Body mass index is 27.1 kg/(m^2).: 25.0 - 29.9 Overweight    Code status: Full Code  DVT prophylaxis:  [x]Enoxaparin  []Warfarin  []Hep SQ  []No AC d/t risk of bleeding  []SCDs  Care Plan discussed with: Patient/Family  Disposition: TBD needs pt ot on DC with possible rehab       Reason for visit:   Recheck abd pain, n/v, LFTs    Admission Summary:   66-year-old -American female with known diabetes type 2 off treatment, essential hypertension, coronary artery disease, nephrolithiasis.  She denies prior history of cholecystitis or cholelithiasis.  She notes over the past several weeks, she has had intermittent bouts of upper quadrant abdominal pain that would usually go away after short period of time. Dante Almazan was otherwise well until last night at bedtime, sudden onset of severe upper quadrant abdominal pain that radiated to her back pain, \"could not sleep all night due to the pain\". The pain persisted the entire night and through today, was only relieved by IV morphine in the emergency room.  When I saw her she remains with mild abdominal pain. She had associated with nausea vomiting ×1 and chills. She has not had any aye colored stools, no dark urine. Mild pressure-like headache, no cough or shortness of breath, no substernal chest pain, no melena or hematemesis or bright red blood per rectum. She does complain of intermittent dysuria. In the emergency santos,  the patient was tender in the right upper quadrant. Ultrasound showed evidence of acute cholecystitis and a dilated common bile duct.  HIDA scan showed no evidence of tracer in the gallbladder, bile duct or small bowel after 3 hours consistent with common bile duct obstruction and she had abnormal LFTs. we will called to admit the patient    Interval history / Subjective:   Continues with abd pain generalized, has not vomited since yesterday but hasn't eaten today and usually happens with eating. Reports very little BM and very little to no flatulence. Review of Systems:     Symptom Y/N Comments   Symptom Y/N Comments   Fever/Chills n     Chest Pain n     Poor Appetite  y     Edema       Cough       Abdominal Pain y     Sputum       Joint Pain       SOB/MOORE n     Pruritis/Rash       Nausea/vomit  y     Tolerating PT/OT       Diarrhea      Tolerating Diet       Constipation       Other          NOT obtained      due to     Physical Examination:   Last 24hrs VS reviewed since prior progress note. Most recent are:  Patient Vitals for the past 12 hrs:   Temp Pulse Resp BP SpO2   09/28/18 0815 98.4 °F (36.9 °C) 60 18 143/69 100 %   09/28/18 0616 98.2 °F (36.8 °C) (!) 59 16 137/64 99 %   09/27/18 2244 98.5 °F (36.9 °C) 64 16 110/61 100 %   09/27/18 2153 98.4 °F (36.9 °C) 65 18 123/52 100 %   09/27/18 2148 - 65 - 123/52 -       Intake/Output Summary (Last 24 hours) at 09/28/18 0838  Last data filed at 09/28/18 0645   Gross per 24 hour   Intake          2384.17 ml   Output             2050 ml   Net           334.17 ml      General appearance: alert, cooperative, NAD  Head: Normocephalic, atraumatic  Lungs: CTA with good BS and normal effort  Heart: S1-S2, RRR, No MGR  Abdomen: soft, bowel sounds normal.  tenderness moderate and generalized no guarding or rebound. Extremities: extremities normal, atraumatic, no cyanosis, no edema  Skin: Skin color, texture, turgor normal. No rashes or lesions  Neurologic: Alert and oriented X 3  Psychiatric: Not anxious, cooperative, normal affect    Data Review:   Review and/or order of clinical lab test    No results found.   Echo Results  (Last 48 hours)    None        Labs:     Recent Labs      09/28/18 0351   WBC  5.3   HGB  10.3*   HCT  31.9*   PLT  207     No results for input(s): NA, K, CL, CO2, BUN, CREA, GLU, CA, MG, PHOS, URICA in the last 72 hours. No results for input(s): SGOT, GPT, ALT, AP, TBIL, TBILI, TP, ALB, GLOB, GGT, AML, LPSE in the last 72 hours. No lab exists for component: AMYP, HLPSE  No results for input(s): INR, PTP, APTT in the last 72 hours. No lab exists for component: INREXT, INREXT   No results for input(s): PH, PCO2, PO2 in the last 72 hours.   Lab Results   Component Value Date/Time    Cholesterol, total 258 (H) 07/06/2017 12:22 PM    HDL Cholesterol 64 07/06/2017 12:22 PM    LDL,Direct 209 (H) 01/26/2017 02:06 PM    LDL, calculated 175 (H) 07/06/2017 12:22 PM    Triglyceride 95 07/06/2017 12:22 PM     Lab Results   Component Value Date/Time    Glucose (POC) 114 (H) 09/28/2018 08:31 AM    Glucose (POC) 132 (H) 09/27/2018 08:51 PM    Glucose (POC) 88 09/27/2018 04:59 PM    Glucose (POC) 115 (H) 09/27/2018 11:22 AM    Glucose (POC) 103 (H) 09/26/2018 10:02 PM     Lab Results   Component Value Date/Time    Color DARK YELLOW 09/23/2018 10:46 AM    Appearance CLEAR 09/23/2018 10:46 AM    Specific gravity 1.013 09/23/2018 10:46 AM    Specific gravity 1.015 05/04/2015 02:59 PM    pH (UA) 6.0 09/23/2018 10:46 AM    Protein NEGATIVE  09/23/2018 10:46 AM    Glucose 100 (A) 09/23/2018 10:46 AM    Ketone TRACE (A) 09/23/2018 10:46 AM    Bilirubin NEGATIVE  09/23/2018 10:46 AM    Urobilinogen 1.0 09/23/2018 10:46 AM    Nitrites NEGATIVE  09/23/2018 10:46 AM    Leukocyte Esterase NEGATIVE  09/23/2018 10:46 AM    Epithelial cells FEW 09/23/2018 10:46 AM    Bacteria NEGATIVE  09/23/2018 10:46 AM    WBC 0-4 09/23/2018 10:46 AM    RBC 0-5 09/23/2018 10:46 AM     All Micro Results     None        Medications Reviewed:     Current Facility-Administered Medications:     haloperidol lactate (HALDOL) injection 1 mg, 1 mg, IntraVENous, Q6H PRN, Rich Hurley MD    QUEtiapine (SEROquel) tablet 25 mg, 25 mg, Oral, QHS, Volodymyr Lipscomb MD, 25 mg at 09/27/18 2153    dextrose 5% infusion, 100 mL/hr, IntraVENous, CONTINUOUS, Yolande Oswald MD, Last Rate: 100 mL/hr at 09/28/18 0536, 100 mL/hr at 09/28/18 0536    enoxaparin (LOVENOX) injection 40 mg, 40 mg, SubCUTAneous, Q24H, Jeny PINEDA MD, 40 mg at 09/27/18 0946    senna-docusate (PERICOLACE) 8.6-50 mg per tablet 1 Tab, 1 Tab, Oral, DAILY, Jeny PINEDA MD, 1 Tab at 09/27/18 0946    hydrALAZINE (APRESOLINE) 20 mg/mL injection 10 mg, 10 mg, IntraVENous, Q6H PRN, Jeny PINEDA MD, 10 mg at 09/21/18 1526    diphenhydrAMINE (BENADRYL) injection 25 mg, 25 mg, IntraVENous, Q6H PRN, Jeny PINEDA MD, 25 mg at 09/24/18 1838    metoprolol tartrate (LOPRESSOR) tablet 25 mg, 25 mg, Oral, Q12H, Jeny PINEDA MD, 25 mg at 09/27/18 2148    insulin lispro (HUMALOG) injection, , SubCUTAneous, AC&HS, Jeny PINEDA MD, Stopped at 09/21/18 2200    glucose chewable tablet 16 g, 4 Tab, Oral, PRN, Elsy Smith MD    dextrose (D50W) injection syrg 12.5-25 g, 12.5-25 g, IntraVENous, PRN, Jeny PINEDA MD, 12.5 g at 09/21/18 0827    glucagon (GLUCAGEN) injection 1 mg, 1 mg, IntraMUSCular, PRN, Elsy Smith MD    HYDROcodone-acetaminophen (NORCO) 5-325 mg per tablet 1-2 Tab, 1-2 Tab, Oral, Q4H PRN, Kody Montes MD, 1 Tab at 09/27/18 1328    sodium chloride (NS) flush 5-10 mL, 5-10 mL, IntraVENous, Q8H, Mayela Louis MD, 10 mL at 09/28/18 0528    sodium chloride (NS) flush 5-10 mL, 5-10 mL, IntraVENous, PRN, Mayela Louis MD, 10 mL at 09/22/18 0435    naloxone [facility]) injection 0.4 mg, 0.4 mg, IntraVENous, PRN, Mayela Louis MD    ondansetron [facility]) injection 4 mg, 4 mg, IntraVENous, Q4H PRN, Mayela Louis MD, 4 mg at 09/27/18 1238    bisacodyl (DULCOLAX) suppository 10 mg, 10 mg, Rectal, DAILY PRN, Mayela Louis MD, 10 mg at 09/26/18 1737    dilTIAZem CD (CARDIZEM CD) capsule 120 mg, 120 mg, Oral, DAILY, Mayela Louis MD, 120 mg at 09/27/18 8891    famotidine (PEPCID) tablet 20 mg, 20 mg, Oral, BID, Lynette Moser MD, 20 mg at 09/27/18 1707    losartan (COZAAR) tablet 25 mg, 25 mg, Oral, QHS, Erik PINEDA MD, 25 mg at 09/27/18 2153    morphine injection 2 mg, 2 mg, IntraVENous, Q3H PRN, Lynette Moser MD, 2 mg at 09/24/18 2034    melatonin tablet 6 mg, 6 mg, Oral, QHS, Lynette Moser MD, 6 mg at 09/27/18 2153    labetalol (NORMODYNE;TRANDATE) injection 20 mg, 20 mg, IntraVENous, Q3H PRN, Lynette Moser MD, 20 mg at 09/21/18 1712    ______________________________________________________________________  EXPECTED LENGTH OF STAY: 3d 19h  ACTUAL LENGTH OF STAY:          9                 Yolanda Batista MD

## 2018-09-28 NOTE — PROGRESS NOTES
Problem: Mobility Impaired (Adult and Pediatric)  Goal: *Acute Goals and Plan of Care (Insert Text)  Physical Therapy Goals  Revised 9/28/2018  1. Patient will move from supine to sit and sit to supine , scoot up and down and roll side to side in bed with supervision/set-up within 7 day(s). 2.  Patient will transfer from bed to chair and chair to bed with supervision/set-up using the least restrictive device within 7 day(s). 3.  Patient will perform sit to stand with supervision/set-up within 7 day(s). 4.  Patient will ambulate with supervision/set-up for 50 feet with the least restrictive device within 7 day(s). Initiated 9/21/2018  1. Patient will move from supine to sit and sit to supine , scoot up and down and roll side to side in bed with independence within 7 day(s). 2.  Patient will transfer from bed to chair and chair to bed with independence using the least restrictive device within 7 day(s). 3.  Patient will perform sit to stand with independence within 7 day(s). 4.  Patient will ambulate with independence for 200 feet with the least restrictive device within 7 day(s). physical Therapy TREATMENT: WEEKLY REASSESSMENT  Patient: Regla York (49 y.o. female)  Date: 9/28/2018  Diagnosis: Choledocholithiasis with acute cholecystitis  CBD stone  CHOLELITHIASIS  CBD Stone <principal problem not specified>  Procedure(s) (LRB):  ENDOSCOPIC RETROGRADE CHOLANGIOPANCREATOGRAPHY (ERCP) (N/A) 7 Days Post-Op  Precautions: Fall  Chart, physical therapy assessment, plan of care and goals were reviewed. ASSESSMENT:  Pt continues to present with hypotension with associated dizziness/lightheadedness which impairs participation with therapy. Pt received seated in bedside chair upon arrival, complaining of generally feeling poorly as well as dizziness. Vitals assessed with BP found to be 79/48. Pt sit>>stand w/ minAx2 and ambulated from bedside chair to EOB w/ minAx2 and HHAx2.  Gait unsteady overall with pt exhibiting shuffled gait pattern with increased trunk sway. Pt returned to supine position in bed w/ BP stabilizing to 90/57. Pt continues to present with impaired functional mobility secondary to increased weakness, impaired static and dynamic standing balance, decreased endurance/activity tolerance, orthostatic hypotension, confusion/baseline dementia??, and increased pain levels. Pt is NOT SAFE to return hoem alone and would benefit from SNF at discharge. Patient's progression toward goals since last assessment: Pt is making slow gains, limited largely due to willingness to participate and orthostatic hypotension     PLAN:  Goals have been updated based on progression since last assessment. Patient continues to benefit from skilled intervention to address the above impairments. Continue to follow the patient 3 times a week to address goals. Planned Interventions:  []              Bed Mobility Training             []       Neuromuscular Re-Education  []              Transfer Training                   []       Orthotic/Prosthetic Training  []              Gait Training                         []       Modalities  []              Therapeutic Exercises           []       Edema Management/Control  []              Therapeutic Activities            []       Patient and Family Training/Education  []              Other (comment):  Discharge Recommendations: [facility]  Further Equipment Recommendations for Discharge: TBD by facility     SUBJECTIVE:   Patient stated Am I going to be okay? ??.    OBJECTIVE DATA SUMMARY:   Critical Behavior:  Neurologic State: Alert  Orientation Level: Oriented to person, Oriented to place  Cognition: Follows commands  Safety/Judgement: Decreased awareness of need for assistance, Decreased awareness of need for safety, Decreased insight into deficits    Strength:                          Functional Mobility Training:  Bed Mobility:     Supine to Sit: Minimum assistance  Sit to Supine: Minimum assistance  Scooting: Minimum assistance        Transfers:  Sit to Stand: Minimum assistance;Assist x2  Stand to Sit: Minimum assistance;Assist x2        Bed to Chair: Minimum assistance;Assist x2                    Balance:  Sitting: Intact  Standing: Impaired  Standing - Static: Fair  Standing - Dynamic : Fair  Ambulation/Gait Training:  Distance (ft): 2 Feet (ft)  Assistive Device: Gait belt  Ambulation - Level of Assistance: Minimal assistance;Assist x2        Gait Abnormalities: Decreased step clearance;Trunk sway increased        Base of Support: Widened     Speed/Gale: Shuffled; Slow  Step Length: Left shortened;Right shortened                    Pain:  Pain Scale 1: Numeric (0 - 10)  Pain Intensity 1: 0              Activity Tolerance:   Hypotensive however resolved once returned to supine position in bed  Please refer to the flowsheet for vital signs taken during this treatment.   After treatment:   []  Patient left in no apparent distress sitting up in chair  [x]  Patient left in no apparent distress in bed  [x]  Call bell left within reach  [x]  Nursing notified  [x]  Caregiver present  []  Bed alarm activated    COMMUNICATION/COLLABORATION:   The patients plan of care was discussed with: Occupational Therapist and Registered Nurse    Kayla Brito PT, DPT   Time Calculation: 15 mins

## 2018-09-29 LAB
ALBUMIN SERPL-MCNC: 2.2 G/DL (ref 3.5–5)
ALBUMIN/GLOB SERPL: 0.6 {RATIO} (ref 1.1–2.2)
ALP SERPL-CCNC: 70 U/L (ref 45–117)
ALT SERPL-CCNC: 63 U/L (ref 12–78)
ANION GAP SERPL CALC-SCNC: 6 MMOL/L (ref 5–15)
APPEARANCE UR: CLEAR
AST SERPL-CCNC: 31 U/L (ref 15–37)
BACTERIA URNS QL MICRO: NEGATIVE /HPF
BILIRUB SERPL-MCNC: 0.7 MG/DL (ref 0.2–1)
BILIRUB UR QL: NEGATIVE
BUN SERPL-MCNC: 13 MG/DL (ref 6–20)
BUN/CREAT SERPL: 11 (ref 12–20)
CALCIUM SERPL-MCNC: 8.1 MG/DL (ref 8.5–10.1)
CHLORIDE SERPL-SCNC: 107 MMOL/L (ref 97–108)
CO2 SERPL-SCNC: 23 MMOL/L (ref 21–32)
COLOR UR: ABNORMAL
CREAT SERPL-MCNC: 1.2 MG/DL (ref 0.55–1.02)
EPITH CASTS URNS QL MICRO: ABNORMAL /LPF
GLOBULIN SER CALC-MCNC: 3.6 G/DL (ref 2–4)
GLUCOSE BLD STRIP.AUTO-MCNC: 114 MG/DL (ref 65–100)
GLUCOSE BLD STRIP.AUTO-MCNC: 125 MG/DL (ref 65–100)
GLUCOSE BLD STRIP.AUTO-MCNC: 125 MG/DL (ref 65–100)
GLUCOSE BLD STRIP.AUTO-MCNC: 140 MG/DL (ref 65–100)
GLUCOSE SERPL-MCNC: 92 MG/DL (ref 65–100)
GLUCOSE UR STRIP.AUTO-MCNC: NEGATIVE MG/DL
HGB UR QL STRIP: NEGATIVE
HYALINE CASTS URNS QL MICRO: ABNORMAL /LPF (ref 0–5)
KETONES UR QL STRIP.AUTO: NEGATIVE MG/DL
LEUKOCYTE ESTERASE UR QL STRIP.AUTO: ABNORMAL
NITRITE UR QL STRIP.AUTO: NEGATIVE
PH UR STRIP: 6.5 [PH] (ref 5–8)
POTASSIUM SERPL-SCNC: 4 MMOL/L (ref 3.5–5.1)
PROT SERPL-MCNC: 5.8 G/DL (ref 6.4–8.2)
PROT UR STRIP-MCNC: NEGATIVE MG/DL
RBC #/AREA URNS HPF: ABNORMAL /HPF (ref 0–5)
SERVICE CMNT-IMP: ABNORMAL
SODIUM SERPL-SCNC: 136 MMOL/L (ref 136–145)
SP GR UR REFRACTOMETRY: 1.01 (ref 1–1.03)
UA: UC IF INDICATED,UAUC: ABNORMAL
UROBILINOGEN UR QL STRIP.AUTO: 0.2 EU/DL (ref 0.2–1)
WBC URNS QL MICRO: ABNORMAL /HPF (ref 0–4)

## 2018-09-29 PROCEDURE — 74011250636 HC RX REV CODE- 250/636: Performed by: HOSPITALIST

## 2018-09-29 PROCEDURE — 94760 N-INVAS EAR/PLS OXIMETRY 1: CPT

## 2018-09-29 PROCEDURE — 74011250637 HC RX REV CODE- 250/637: Performed by: HOSPITALIST

## 2018-09-29 PROCEDURE — 74011250636 HC RX REV CODE- 250/636: Performed by: INTERNAL MEDICINE

## 2018-09-29 PROCEDURE — 81001 URINALYSIS AUTO W/SCOPE: CPT

## 2018-09-29 PROCEDURE — 80053 COMPREHEN METABOLIC PANEL: CPT

## 2018-09-29 PROCEDURE — 74011250637 HC RX REV CODE- 250/637: Performed by: INTERNAL MEDICINE

## 2018-09-29 PROCEDURE — 65660000000 HC RM CCU STEPDOWN

## 2018-09-29 PROCEDURE — 74011250637 HC RX REV CODE- 250/637: Performed by: PSYCHIATRY & NEUROLOGY

## 2018-09-29 PROCEDURE — 87086 URINE CULTURE/COLONY COUNT: CPT

## 2018-09-29 PROCEDURE — 36415 COLL VENOUS BLD VENIPUNCTURE: CPT

## 2018-09-29 PROCEDURE — 82962 GLUCOSE BLOOD TEST: CPT

## 2018-09-29 RX ORDER — SIMETHICONE 80 MG
80 TABLET,CHEWABLE ORAL
Status: COMPLETED | OUTPATIENT
Start: 2018-09-29 | End: 2018-09-29

## 2018-09-29 RX ORDER — ACETAMINOPHEN 325 MG/1
650 TABLET ORAL
Status: DISCONTINUED | OUTPATIENT
Start: 2018-09-29 | End: 2018-10-06 | Stop reason: HOSPADM

## 2018-09-29 RX ADMIN — LOSARTAN POTASSIUM 25 MG: 25 TABLET ORAL at 21:34

## 2018-09-29 RX ADMIN — ONDANSETRON 4 MG: 2 INJECTION INTRAMUSCULAR; INTRAVENOUS at 13:00

## 2018-09-29 RX ADMIN — FAMOTIDINE 20 MG: 20 TABLET ORAL at 17:19

## 2018-09-29 RX ADMIN — MELATONIN TAB 3 MG 6 MG: 3 TAB at 21:33

## 2018-09-29 RX ADMIN — Medication 10 ML: at 21:33

## 2018-09-29 RX ADMIN — SIMETHICONE 80 MG: 80 TABLET, CHEWABLE ORAL at 12:53

## 2018-09-29 RX ADMIN — DILTIAZEM HYDROCHLORIDE 120 MG: 120 CAPSULE, COATED, EXTENDED RELEASE ORAL at 09:03

## 2018-09-29 RX ADMIN — ACETAMINOPHEN 650 MG: 325 TABLET ORAL at 12:52

## 2018-09-29 RX ADMIN — Medication 10 ML: at 14:36

## 2018-09-29 RX ADMIN — Medication 10 ML: at 06:00

## 2018-09-29 RX ADMIN — DEXTROSE MONOHYDRATE 100 ML/HR: 5 INJECTION, SOLUTION INTRAVENOUS at 14:35

## 2018-09-29 RX ADMIN — ENOXAPARIN SODIUM 40 MG: 100 INJECTION SUBCUTANEOUS at 09:01

## 2018-09-29 RX ADMIN — DEXTROSE MONOHYDRATE 100 ML/HR: 5 INJECTION, SOLUTION INTRAVENOUS at 09:00

## 2018-09-29 RX ADMIN — QUETIAPINE FUMARATE 25 MG: 25 TABLET ORAL at 21:33

## 2018-09-29 RX ADMIN — FAMOTIDINE 20 MG: 20 TABLET ORAL at 09:03

## 2018-09-29 RX ADMIN — SENNOSIDES AND DOCUSATE SODIUM 1 TABLET: 8.6; 5 TABLET ORAL at 09:03

## 2018-09-29 RX ADMIN — METOPROLOL TARTRATE 25 MG: 25 TABLET ORAL at 21:34

## 2018-09-29 RX ADMIN — METOPROLOL TARTRATE 25 MG: 25 TABLET ORAL at 09:04

## 2018-09-29 NOTE — PROGRESS NOTES
Hospitalist Progress Note  Yolanda Batista MD      NAME:  Jason Oliva  :  1945  MRN:  [id number]  PCP:   Ruiz Gamboa MD  Date of Service:  2018    Assessment & Plan:     Acute choledocholithiasis with acute cholecystitis and abnormal elevated LFTs POA  Acute RUQ abd pain, POA: slightly better  Increased nonspecific bowel gas: Intractable N/V: improving  - LFTs with LFX 4926,  , Alk Phos 146, total Bilirubin 2.8, labs improving  RUQ Ultrasound consistent with gallstones with acute cholecystitis changes, dilated CBD  - HIDA scan with tracer in the liver but no activity in gallbladder, common bile duct, small bowel after 3 hours suggesting common bile duct obstruction.  - MRCP showed cholelithiasis. Pericholecystic edema suspicious for hcolecystitis. Common duct is normal  - s/p ERCP on   - s/p lap jesus with cholangiogram showed Jaundice, acute inflammation of the gallbladder, small gallstones in the gallbladder and cystic duct.  No obvious CBD filling defect on cholangiogram (the defect on the second image is the cholangiogram catheter balloon), but no passage of contrast into the duodenum, consistent with obstruction. - Completed IV Levaquin and Flagyl empirically on   - Tylenol will be added for HA.    - DC norco and morphine IV due to constipation, hypoactive bowels and n/v  - Simethicone added  - Monitor labs  - Follow up with ERCP in 1 week vs removing it if still inpt  - need to repeat ERCP in 1-2 months  - continue zofran prn  - Monitor labs    Urinary retention and dysuria  - Previous UA WNL but will repeat UA reflex today  - S/p martinez insertion. Will remove martinez and do voiding trials today.   - consider follow up with her urologist Dr Liz Yuan     Acute encephalopathy: resolved  - continue low dose seroquel     Constipation: resolved?  - cont' miralax, docusate  - stop narcotis  - encourage ambulation    Type 2 diabetes mellitus POA currently off medications per her report   - A1C 5.5, poor appetite     Essential hypertension POA  Orthostatic hypotension  - continue ARB and Cardizem CD, metoprolol with holding parameters  - prn hydralazine    Coronary disease POA: stable  - Continue ASA  - currently off of cholesterol medications because of LFTs    Insomnia POA  - continue melatonin at bedtime    Body mass index is 27.1 kg/(m^2).: 25.0 - 29.9 Overweight    Code status: Full Code  DVT prophylaxis:  [x]Enoxaparin  []Warfarin  []Hep SQ  []No AC d/t risk of bleeding  []SCDs  Care Plan discussed with: Patient/Family  Disposition: TBD needs pt ot on DC with possible rehab       Reason for visit:   Recheck abd pain, n/v, LFTs    Admission Summary:   71-year-old -American female with known diabetes type 2 off treatment, essential hypertension, coronary artery disease, nephrolithiasis.  She denies prior history of cholecystitis or cholelithiasis.  She notes over the past several weeks, she has had intermittent bouts of upper quadrant abdominal pain that would usually go away after short period of time. Irina Lopez was otherwise well until last night at bedtime, sudden onset of severe upper quadrant abdominal pain that radiated to her back pain, \"could not sleep all night due to the pain\". The pain persisted the entire night and through today, was only relieved by IV morphine in the emergency room.  When I saw her she remains with mild abdominal pain. She had associated with nausea vomiting ×1 and chills. She has not had any aye colored stools, no dark urine. Mild pressure-like headache, no cough or shortness of breath, no substernal chest pain, no melena or hematemesis or bright red blood per rectum. She does complain of intermittent dysuria. In the emergency santos,  the patient was tender in the right upper quadrant. Ultrasound showed evidence of acute cholecystitis and a dilated common bile duct.  HIDA scan showed no evidence of tracer in the gallbladder, bile duct or small bowel after 3 hours consistent with common bile duct obstruction and she had abnormal LFTs. we will called to admit the patient    Interval history / Subjective:   Abdominal pain slightly improved and N/V also improved. C/O HA and dysuria. KUB showed significant bowel gas but nonspecific. Review of Systems:     Symptom Y/N Comments   Symptom Y/N Comments   Fever/Chills n     Chest Pain n     Poor Appetite  y     Edema       Cough n      Abdominal Pain y     Sputum       Joint Pain       SOB/MOORE n     Pruritis/Rash       Nausea/vomit  y     Tolerating PT/OT       Diarrhea      Tolerating Diet       Constipation       Other  y  HA & dysuria      NOT obtained      due to     Physical Examination:   Last 24hrs VS reviewed since prior progress note. Most recent are:  Patient Vitals for the past 12 hrs:   Temp Pulse Resp BP SpO2   09/29/18 1127 97.8 °F (36.6 °C) 65 16 118/73 98 %   09/29/18 0739 97.8 °F (36.6 °C) 66 16 130/72 98 %   09/29/18 0431 97.4 °F (36.3 °C) (!) 59 16 122/76 96 %       Intake/Output Summary (Last 24 hours) at 09/29/18 1234  Last data filed at 09/29/18 0659   Gross per 24 hour   Intake          2723.33 ml   Output             1000 ml   Net          1723.33 ml      General appearance: alert, cooperative, NAD  Head: Normocephalic, atraumatic  Lungs: CTA with good BS and normal effort  Heart: S1-S2, RRR, No MGR  Abdomen: soft, bowel sounds normal.  tenderness moderate and generalized no guarding or rebound. Extremities: extremities normal, atraumatic, no cyanosis, no edema  Skin: Skin color, texture, turgor normal. No rashes or lesions  Neurologic: Alert and oriented X 3  Psychiatric: Not anxious, cooperative, normal affect    Data Review:   Review and/or order of clinical lab test    No results found.   Echo Results  (Last 48 hours)    None        Labs:     Recent Labs      09/28/18   0351   WBC  5.3   HGB  10.3*   HCT  31.9*   PLT  207     Recent Labs      09/29/18   0424  09/28/18   1054   NA  136 138   K  4.0  2.6*   CL  107  103   CO2  23  27   BUN  13  13   CREA  1.20*  1.08*   GLU  92  152*   CA  8.1*  8.0*     Recent Labs      09/29/18   0424  09/28/18   1054   SGOT  31  25   ALT  63  72   AP  70  70   TBILI  0.7  0.7   TP  5.8*  5.7*   ALB  2.2*  2.7*   GLOB  3.6  3.0     No results for input(s): INR, PTP, APTT in the last 72 hours. No lab exists for component: INREXT, INREXT   No results for input(s): PH, PCO2, PO2 in the last 72 hours.   Lab Results   Component Value Date/Time    Cholesterol, total 258 (H) 07/06/2017 12:22 PM    HDL Cholesterol 64 07/06/2017 12:22 PM    LDL,Direct 209 (H) 01/26/2017 02:06 PM    LDL, calculated 175 (H) 07/06/2017 12:22 PM    Triglyceride 95 07/06/2017 12:22 PM     Lab Results   Component Value Date/Time    Glucose (POC) 125 (H) 09/29/2018 11:54 AM    Glucose (POC) 114 (H) 09/29/2018 07:16 AM    Glucose (POC) 134 (H) 09/28/2018 09:15 PM    Glucose (POC) 111 (H) 09/28/2018 05:31 PM    Glucose (POC) 115 (H) 09/28/2018 01:30 PM     Lab Results   Component Value Date/Time    Color DARK YELLOW 09/23/2018 10:46 AM    Appearance CLEAR 09/23/2018 10:46 AM    Specific gravity 1.013 09/23/2018 10:46 AM    Specific gravity 1.015 05/04/2015 02:59 PM    pH (UA) 6.0 09/23/2018 10:46 AM    Protein NEGATIVE  09/23/2018 10:46 AM    Glucose 100 (A) 09/23/2018 10:46 AM    Ketone TRACE (A) 09/23/2018 10:46 AM    Bilirubin NEGATIVE  09/23/2018 10:46 AM    Urobilinogen 1.0 09/23/2018 10:46 AM    Nitrites NEGATIVE  09/23/2018 10:46 AM    Leukocyte Esterase NEGATIVE  09/23/2018 10:46 AM    Epithelial cells FEW 09/23/2018 10:46 AM    Bacteria NEGATIVE  09/23/2018 10:46 AM    WBC 0-4 09/23/2018 10:46 AM    RBC 0-5 09/23/2018 10:46 AM     All Micro Results     None        Medications Reviewed:     Current Facility-Administered Medications:     haloperidol lactate (HALDOL) injection 1 mg, 1 mg, IntraVENous, Q6H PRN, Lisa Knox MD    QUEtiapine (SEROquel) tablet 25 mg, 25 mg, Oral, QHS, Cordelia Lopez Dania Higgins MD, 25 mg at 09/28/18 2141    dextrose 5% infusion, 100 mL/hr, IntraVENous, CONTINUOUS, Forest Roldan MD, Last Rate: 100 mL/hr at 09/29/18 0900, 100 mL/hr at 09/29/18 0900    enoxaparin (LOVENOX) injection 40 mg, 40 mg, SubCUTAneous, Q24H, Rabia PINEDA MD, 40 mg at 09/29/18 0901    senna-docusate (PERICOLACE) 8.6-50 mg per tablet 1 Tab, 1 Tab, Oral, DAILY, Rabia PINEDA MD, 1 Tab at 09/29/18 0903    hydrALAZINE (APRESOLINE) 20 mg/mL injection 10 mg, 10 mg, IntraVENous, Q6H PRN, Rabia PINEDA MD, 10 mg at 09/21/18 1526    diphenhydrAMINE (BENADRYL) injection 25 mg, 25 mg, IntraVENous, Q6H PRN, Rabia PINEDA MD, 25 mg at 09/24/18 2216    metoprolol tartrate (LOPRESSOR) tablet 25 mg, 25 mg, Oral, Q12H, Rabia PINEDA MD, 25 mg at 09/29/18 0904    insulin lispro (HUMALOG) injection, , SubCUTAneous, AC&HS, Rabia PINEDA MD, Stopped at 09/21/18 2200    glucose chewable tablet 16 g, 4 Tab, Oral, PRN, Lilia Garsia MD    dextrose (D50W) injection syrg 12.5-25 g, 12.5-25 g, IntraVENous, PRN, Rabia PINEDA MD, 12.5 g at 09/21/18 0827    glucagon (GLUCAGEN) injection 1 mg, 1 mg, IntraMUSCular, PRN, Lilia Garsia MD    HYDROcodone-acetaminophen (NORCO) 5-325 mg per tablet 1-2 Tab, 1-2 Tab, Oral, Q4H PRN, Arabella Zamora MD, 1 Tab at 09/27/18 1328    sodium chloride (NS) flush 5-10 mL, 5-10 mL, IntraVENous, Q8H, Pablo Riggins MD, 10 mL at 09/29/18 0600    sodium chloride (NS) flush 5-10 mL, 5-10 mL, IntraVENous, PRN, Pablo Riggins MD, 10 mL at 09/22/18 0435    naloxone [facility]) injection 0.4 mg, 0.4 mg, IntraVENous, PRN, Pablo Riggins MD    ondansetron [facility]) injection 4 mg, 4 mg, IntraVENous, Q4H PRN, Pablo Riggins MD, 4 mg at 09/27/18 1238    bisacodyl (DULCOLAX) suppository 10 mg, 10 mg, Rectal, DAILY PRN, Pablo Riggins MD, 10 mg at 09/26/18 1737    dilTIAZem CD (CARDIZEM CD) capsule 120 mg, 120 mg, Oral, DAILY, Pablo Riggins MD, 120 mg at 09/29/18 0903    famotidine (PEPCID) tablet 20 mg, 20 mg, Oral, BID, Brennan Navarro MD, 20 mg at 09/29/18 6516    losartan (COZAAR) tablet 25 mg, 25 mg, Oral, QHS, Kaylyn PINEDA MD, 25 mg at 09/28/18 2141    morphine injection 2 mg, 2 mg, IntraVENous, Q3H PRN, Brennan Navarro MD, 2 mg at 09/24/18 2034    melatonin tablet 6 mg, 6 mg, Oral, QHS, Brennan Navarro MD, 6 mg at 09/28/18 2141    labetalol (NORMODYNE;TRANDATE) injection 20 mg, 20 mg, IntraVENous, Q3H PRN, Brennan Navarro MD, 20 mg at 09/21/18 1712    ______________________________________________________________________  EXPECTED LENGTH OF STAY: 3d 19h  ACTUAL LENGTH OF STAY:          Wolfgang Noel MD

## 2018-09-30 ENCOUNTER — APPOINTMENT (OUTPATIENT)
Dept: GENERAL RADIOLOGY | Age: 73
DRG: 418 | End: 2018-09-30
Attending: HOSPITALIST
Payer: MEDICARE

## 2018-09-30 LAB
GLUCOSE BLD STRIP.AUTO-MCNC: 108 MG/DL (ref 65–100)
GLUCOSE BLD STRIP.AUTO-MCNC: 119 MG/DL (ref 65–100)
GLUCOSE BLD STRIP.AUTO-MCNC: 121 MG/DL (ref 65–100)
GLUCOSE BLD STRIP.AUTO-MCNC: 98 MG/DL (ref 65–100)
SERVICE CMNT-IMP: ABNORMAL
SERVICE CMNT-IMP: NORMAL

## 2018-09-30 PROCEDURE — 74011250637 HC RX REV CODE- 250/637: Performed by: INTERNAL MEDICINE

## 2018-09-30 PROCEDURE — 74011250637 HC RX REV CODE- 250/637: Performed by: HOSPITALIST

## 2018-09-30 PROCEDURE — 65660000000 HC RM CCU STEPDOWN

## 2018-09-30 PROCEDURE — 74011250636 HC RX REV CODE- 250/636: Performed by: INTERNAL MEDICINE

## 2018-09-30 PROCEDURE — 82962 GLUCOSE BLOOD TEST: CPT

## 2018-09-30 PROCEDURE — 74011250637 HC RX REV CODE- 250/637: Performed by: PSYCHIATRY & NEUROLOGY

## 2018-09-30 PROCEDURE — 74011250636 HC RX REV CODE- 250/636: Performed by: HOSPITALIST

## 2018-09-30 PROCEDURE — 51798 US URINE CAPACITY MEASURE: CPT

## 2018-09-30 PROCEDURE — 74018 RADEX ABDOMEN 1 VIEW: CPT

## 2018-09-30 PROCEDURE — 94760 N-INVAS EAR/PLS OXIMETRY 1: CPT

## 2018-09-30 RX ORDER — FAMOTIDINE 20 MG/1
20 TABLET, FILM COATED ORAL DAILY
Status: DISCONTINUED | OUTPATIENT
Start: 2018-10-01 | End: 2018-10-01

## 2018-09-30 RX ORDER — METOPROLOL TARTRATE 25 MG/1
12.5 TABLET, FILM COATED ORAL EVERY 12 HOURS
Status: DISCONTINUED | OUTPATIENT
Start: 2018-09-30 | End: 2018-10-02

## 2018-09-30 RX ADMIN — DILTIAZEM HYDROCHLORIDE 120 MG: 120 CAPSULE, COATED, EXTENDED RELEASE ORAL at 09:48

## 2018-09-30 RX ADMIN — Medication 10 ML: at 09:48

## 2018-09-30 RX ADMIN — ENOXAPARIN SODIUM 40 MG: 100 INJECTION SUBCUTANEOUS at 09:47

## 2018-09-30 RX ADMIN — DEXTROSE MONOHYDRATE 100 ML/HR: 5 INJECTION, SOLUTION INTRAVENOUS at 13:40

## 2018-09-30 RX ADMIN — Medication 10 ML: at 22:13

## 2018-09-30 RX ADMIN — FAMOTIDINE 20 MG: 20 TABLET ORAL at 09:48

## 2018-09-30 RX ADMIN — MELATONIN TAB 3 MG 6 MG: 3 TAB at 22:09

## 2018-09-30 RX ADMIN — QUETIAPINE FUMARATE 25 MG: 25 TABLET ORAL at 22:09

## 2018-09-30 RX ADMIN — DEXTROSE MONOHYDRATE 100 ML/HR: 5 INJECTION, SOLUTION INTRAVENOUS at 00:44

## 2018-09-30 RX ADMIN — METOPROLOL TARTRATE 25 MG: 25 TABLET ORAL at 09:48

## 2018-09-30 RX ADMIN — DEXTROSE MONOHYDRATE 100 ML/HR: 5 INJECTION, SOLUTION INTRAVENOUS at 22:53

## 2018-09-30 RX ADMIN — ACETAMINOPHEN 650 MG: 325 TABLET ORAL at 16:30

## 2018-09-30 RX ADMIN — METOPROLOL TARTRATE 12.5 MG: 25 TABLET ORAL at 22:09

## 2018-09-30 RX ADMIN — SENNOSIDES AND DOCUSATE SODIUM 1 TABLET: 8.6; 5 TABLET ORAL at 09:48

## 2018-09-30 RX ADMIN — LOSARTAN POTASSIUM 25 MG: 25 TABLET ORAL at 22:09

## 2018-09-30 NOTE — PROGRESS NOTES
Problem: Falls - Risk of  Goal: *Absence of Falls  Document Romi Fall Risk and appropriate interventions in the flowsheet. Fall Risk Interventions:  Mobility Interventions: OT consult for ADLs, Patient to call before getting OOB, PT Consult for mobility concerns, PT Consult for assist device competence    Mentation Interventions: Door open when patient unattended    Medication Interventions: Patient to call before getting OOB, Teach patient to arise slowly    Elimination Interventions: Call light in reach, Patient to call for help with toileting needs             Problem: Patient Education: Go to Patient Education Activity  Goal: Patient/Family Education  Outcome: Progressing Towards Goal  Call bell within reach, siderails upx3.

## 2018-09-30 NOTE — PROGRESS NOTES
Re: Famotidine ([facility] approved dose change has been made on this patient)    Pharmacy ordered dose change:   Reduce dose to 40mg po Daily from q 12h for CrCl less than 50ml/min    Recent Labs      09/29/18   0424  09/28/18   1054   CREA  1.20*  1.08*       Thanks,  Quan Leonard, [facility]

## 2018-09-30 NOTE — PROGRESS NOTES
General Surgery End of Shift Nursing Note    Bedside shift change report given to Liz Correa RN (oncoming nurse) by Cathryn Plummer RN (offgoing nurse). Report included the following information SBAR, Kardex, OR Summary, Procedure Summary, Intake/Output, MAR, Recent Results and Cardiac Rhythm SR. Shift worked:   7p-7a   Summary of shift:    Pt up to [facility], focusing on her independence, but making attempt to get OOB with staff standing by. Voiding clear urine without difficulty, amt is sufficient   Issues for physician to address:   none     Number times ambulated in hallway past shift: 0    Number of times OOB to chair past shift: 1    Pain Management:  Current medication: none requested  Patient states pain is manageable on current pain medication: YES    GI:    Current diet:  DIET DIABETIC CONSISTENT CARB Regular    Tolerating current diet: YES  Passing flatus: YES  Last Bowel Movement: yesterday   Appearance: loose    Respiratory:    Incentive Spirometer at bedside: YES  Patient instructed on use: YES    Patient Safety:    Falls Score: 4  Bed Alarm On? No  Sitter?  No    Claudio Garza RN

## 2018-09-30 NOTE — PROGRESS NOTES
General Surgery End of Shift Nursing Note    Bedside shift change report given to Sona Lujan (oncoming nurse) by Ludy Christy (offgoing nurse). Report included the following information SBAR, Kardex, Intake/Output, MAR and Recent Results. Shift worked:   C   Summary of shift:    0   Issues for physician to address:   0     Number times ambulated in hallway past shift: 0    Number of times OOB to chair past shift: 1    Pain Management:  Current medication: 0  Patient states pain is manageable on current pain medication: YES    GI:    Current diet:  DIET DIABETIC CONSISTENT CARB Regular    Tolerating current diet: YES  Passing flatus: YES  Last Bowel Movement: yesterday   Appearance: 0    Respiratory:    Incentive Spirometer at bedside: YES  Patient instructed on use: YES    Patient Safety:    Falls Score: 1  Bed Alarm On? No  Sitter?  No    Jasper Tomas RN

## 2018-09-30 NOTE — PROGRESS NOTES
Hospitalist Progress Note  Sofia Castillo MD      NAME:  Vee Woodward  :  1945  MRN:  [id number]  PCP:   Amanda Ruiz MD  Date of Service:  2018    Assessment & Plan:     Acute choledocholithiasis with acute cholecystitis and abnormal elevated LFTs POA  Acute RUQ abd pain, POA: slightly better  Increased nonspecific bowel gas: Intractable N/V: improving  - LFTs with YNV 1603,  , Alk Phos 146, total Bilirubin 2.8, labs improving  RUQ Ultrasound consistent with gallstones with acute cholecystitis changes, dilated CBD  - HIDA scan with tracer in the liver but no activity in gallbladder, common bile duct, small bowel after 3 hours suggesting common bile duct obstruction.  - MRCP showed cholelithiasis. Pericholecystic edema suspicious for hcolecystitis. Common duct is normal  - s/p ERCP on   - s/p lap jesus with cholangiogram showed Jaundice, acute inflammation of the gallbladder, small gallstones in the gallbladder and cystic duct.  No obvious CBD filling defect on cholangiogram (the defect on the second image is the cholangiogram catheter balloon), but no passage of contrast into the duodenum, consistent with obstruction. - Completed IV Levaquin and Flagyl empirically on   - Tylenol will be added for HA.    - DC norco and morphine IV due to constipation, hypoactive bowels and n/v  - Simethicone added  - Monitor labs  - Follow up with ERCP in 1 week vs removing it if still inpt  - need to repeat ERCP in 1-2 months  - continue zofran prn  - Monitor labs    Urinary retention and dysuria  - Previous UA WNL but will repeat UA reflex today  - Check bladder scan, may need re-insertion of martinez.   - Consider follow up with her urologist Dr Zhou Moe     Acute encephalopathy: resolved  - continue low dose seroquel     Constipation:  - cont' miralax, docusate  - stopped narcotics  - encourage ambulation  - recheck KUB     Type 2 diabetes mellitus POA currently off medications per her report   - A1C 5.5, poor appetite     Essential hypertension POA  Orthostatic hypotension  - continue ARB and Cardizem CD, metoprolol with holding parameters. However, I will decrease the metoprolol to 12.5 mg given BP and occasional orthostatic hypotension.  - prn hydralazine    Coronary disease POA: stable  - Continue ASA  - currently off of cholesterol medications because of LFTs    Insomnia POA  - continue melatonin at bedtime    Body mass index is 27.1 kg/(m^2).: 25.0 - 29.9 Overweight    Code status: Full Code  DVT prophylaxis:  [x]Enoxaparin  []Warfarin  []Hep SQ  []No AC d/t risk of bleeding  []SCDs  Care Plan discussed with: patient, nurse, and daughter. Disposition: TBD needs pt ot on DC with possible rehab       Reason for visit:   Recheck abd pain, n/v, LFTs    Admission Summary:   25-year-old -American female with known diabetes type 2 off treatment, essential hypertension, coronary artery disease, nephrolithiasis.  She denies prior history of cholecystitis or cholelithiasis.  She notes over the past several weeks, she has had intermittent bouts of upper quadrant abdominal pain that would usually go away after short period of time. Larisa Hopper was otherwise well until last night at bedtime, sudden onset of severe upper quadrant abdominal pain that radiated to her back pain, \"could not sleep all night due to the pain\". The pain persisted the entire night and through today, was only relieved by IV morphine in the emergency room.  When I saw her she remains with mild abdominal pain. She had associated with nausea vomiting ×1 and chills. She has not had any aye colored stools, no dark urine. Mild pressure-like headache, no cough or shortness of breath, no substernal chest pain, no melena or hematemesis or bright red blood per rectum. She does complain of intermittent dysuria. In the emergency santos,  the patient was tender in the right upper quadrant.  Ultrasound showed evidence of acute cholecystitis and a dilated common bile duct. HIDA scan showed no evidence of tracer in the gallbladder, bile duct or small bowel after 3 hours consistent with common bile duct obstruction and she had abnormal LFTs. we will called to admit the patient    Interval history / Subjective:   Abdominal pain slightly improved and N/V also improved. C/O HA and dysuria. KUB showed significant bowel gas but nonspecific. Review of Systems:     Symptom Y/N Comments   Symptom Y/N Comments   Fever/Chills n     Chest Pain n     Poor Appetite  y     Edema       Cough n      Abdominal Pain y     Sputum       Joint Pain       SOB/MOORE n     Pruritis/Rash       Nausea/vomit  y     Tolerating PT/OT       Diarrhea      Tolerating Diet       Constipation  y     Other  y  HA & dysuria  Dry eyes      NOT obtained      due to     Physical Examination:   Last 24hrs VS reviewed since prior progress note. Most recent are:  Patient Vitals for the past 12 hrs:   Temp Pulse Resp BP SpO2   09/30/18 0948 - 70 - 128/72 -   09/30/18 0944 - 70 - 128/72 -   09/30/18 0820 98.4 °F (36.9 °C) 76 18 120/59 98 %   09/30/18 0330 98.1 °F (36.7 °C) 66 18 113/56 -       Intake/Output Summary (Last 24 hours) at 09/30/18 1205  Last data filed at 09/30/18 0641   Gross per 24 hour   Intake             1785 ml   Output             1050 ml   Net              735 ml      General appearance: alert, cooperative, NAD  Head: Normocephalic, atraumatic  Lungs: CTA with good BS and normal effort  Heart: S1-S2, RRR, No MGR  Abdomen: soft, bowel sounds normal.  Mild suprapubic tenderness no guard or rebound. Extremities: extremities normal, atraumatic, no cyanosis, no edema  Neurologic: Alert and oriented   Psychiatric: Not anxious, cooperative, normal affect    Data Review:   Review and/or order of clinical lab test    No results found.   Echo Results  (Last 48 hours)    None        Labs:     Recent Labs      09/28/18   0351   WBC  5.3   HGB  10.3*   HCT  31.9*   PLT  207     Recent Labs 09/29/18   0424  09/28/18   1054   NA  136  138   K  4.0  2.6*   CL  107  103   CO2  23  27   BUN  13  13   CREA  1.20*  1.08*   GLU  92  152*   CA  8.1*  8.0*     Recent Labs      09/29/18   0424  09/28/18   1054   SGOT  31  25   ALT  63  72   AP  70  70   TBILI  0.7  0.7   TP  5.8*  5.7*   ALB  2.2*  2.7*   GLOB  3.6  3.0     No results for input(s): INR, PTP, APTT in the last 72 hours. No lab exists for component: INREXT, INREXT   No results for input(s): PH, PCO2, PO2 in the last 72 hours.   Lab Results   Component Value Date/Time    Cholesterol, total 258 (H) 07/06/2017 12:22 PM    HDL Cholesterol 64 07/06/2017 12:22 PM    LDL,Direct 209 (H) 01/26/2017 02:06 PM    LDL, calculated 175 (H) 07/06/2017 12:22 PM    Triglyceride 95 07/06/2017 12:22 PM     Lab Results   Component Value Date/Time    Glucose (POC) 98 09/30/2018 11:01 AM    Glucose (POC) 119 (H) 09/30/2018 07:59 AM    Glucose (POC) 140 (H) 09/29/2018 09:20 PM    Glucose (POC) 125 (H) 09/29/2018 04:40 PM    Glucose (POC) 125 (H) 09/29/2018 11:54 AM     Lab Results   Component Value Date/Time    Color YELLOW/STRAW 09/29/2018 01:06 PM    Appearance CLEAR 09/29/2018 01:06 PM    Specific gravity 1.008 09/29/2018 01:06 PM    Specific gravity 1.015 05/04/2015 02:59 PM    pH (UA) 6.5 09/29/2018 01:06 PM    Protein NEGATIVE  09/29/2018 01:06 PM    Glucose NEGATIVE  09/29/2018 01:06 PM    Ketone NEGATIVE  09/29/2018 01:06 PM    Bilirubin NEGATIVE  09/29/2018 01:06 PM    Urobilinogen 0.2 09/29/2018 01:06 PM    Nitrites NEGATIVE  09/29/2018 01:06 PM    Leukocyte Esterase SMALL (A) 09/29/2018 01:06 PM    Epithelial cells FEW 09/29/2018 01:06 PM    Bacteria NEGATIVE  09/29/2018 01:06 PM    WBC 5-10 09/29/2018 01:06 PM    RBC 0-5 09/29/2018 01:06 PM     All Micro Results     Procedure Component Value Units Date/Time    CULTURE, URINE [[id number]] Collected:  09/29/18 1306    Order Status:  Completed Updated:  09/29/18 1554        Medications Reviewed:     Current Facility-Administered Medications:     acetaminophen (TYLENOL) tablet 650 mg, 650 mg, Oral, Q4H PRN, Beatriz Palmer MD, 650 mg at 09/29/18 1252    haloperidol lactate (HALDOL) injection 1 mg, 1 mg, IntraVENous, Q6H PRN, Heidy Hollins MD    QUEtiapine (SEROquel) tablet 25 mg, 25 mg, Oral, QHS, Geetha Hernández MD, 25 mg at 09/29/18 2133    dextrose 5% infusion, 100 mL/hr, IntraVENous, CONTINUOUS, Beatriz Palmer MD, Last Rate: 100 mL/hr at 09/30/18 0044, 100 mL/hr at 09/30/18 0044    enoxaparin (LOVENOX) injection 40 mg, 40 mg, SubCUTAneous, Q24H, Kaylyn PINEDA MD, 40 mg at 09/30/18 0947    senna-docusate (PERICOLACE) 8.6-50 mg per tablet 1 Tab, 1 Tab, Oral, DAILY, Kaylyn PINEDA MD, 1 Tab at 09/30/18 0948    hydrALAZINE (APRESOLINE) 20 mg/mL injection 10 mg, 10 mg, IntraVENous, Q6H PRN, Kaylyn PINEDA MD, 10 mg at 09/21/18 1526    diphenhydrAMINE (BENADRYL) injection 25 mg, 25 mg, IntraVENous, Q6H PRN, Kaylyn PINEDA MD, 25 mg at 09/24/18 3294    metoprolol tartrate (LOPRESSOR) tablet 25 mg, 25 mg, Oral, Q12H, Kaylyn PINEDA MD, 25 mg at 09/30/18 0948    insulin lispro (HUMALOG) injection, , SubCUTAneous, AC&HS, Kaylyn PINEDA MD, Stopped at 09/21/18 2200    glucose chewable tablet 16 g, 4 Tab, Oral, PRN, Heidy Hollins MD    dextrose (D50W) injection syrg 12.5-25 g, 12.5-25 g, IntraVENous, PRN, Kaylyn PINEDA MD, 12.5 g at 09/21/18 0827    glucagon (GLUCAGEN) injection 1 mg, 1 mg, IntraMUSCular, PRN, Heidy Hollins MD    sodium chloride (NS) flush 5-10 mL, 5-10 mL, IntraVENous, Q8H, Brennan Navarro MD, 10 mL at 09/30/18 0948    sodium chloride (NS) flush 5-10 mL, 5-10 mL, IntraVENous, PRN, Brennan Navarro MD, 10 mL at 09/22/18 0435    naloxone [facility]) injection 0.4 mg, 0.4 mg, IntraVENous, PRN, Brennan Navarro MD    ondansetron [facility]) injection 4 mg, 4 mg, IntraVENous, Q4H PRN, Brennan Navarro MD, 4 mg at 09/29/18 1300    bisacodyl (DULCOLAX) suppository 10 mg, 10 mg, Rectal, DAILY PRN, Shira SAEZ Suzan Lazo MD, 10 mg at 09/26/18 1737    dilTIAZem CD (CARDIZEM CD) capsule 120 mg, 120 mg, Oral, DAILY, Josef Rodas MD, 120 mg at 09/30/18 0948    famotidine (PEPCID) tablet 20 mg, 20 mg, Oral, BID, Josef Rodas MD, 20 mg at 09/30/18 0948    losartan (COZAAR) tablet 25 mg, 25 mg, Oral, QHS, Freda PINEDA MD, 25 mg at 09/29/18 2134    melatonin tablet 6 mg, 6 mg, Oral, QHS, Josef Rodas MD, 6 mg at 09/29/18 2133    labetalol (NORMODYNE;TRANDATE) injection 20 mg, 20 mg, IntraVENous, Q3H PRN, Josef Rodas MD, 20 mg at 09/21/18 1712    ______________________________________________________________________  EXPECTED LENGTH OF STAY: 3d 19h  ACTUAL LENGTH OF STAY:          Wilber Jacques MD

## 2018-10-01 LAB
ANION GAP SERPL CALC-SCNC: 4 MMOL/L (ref 5–15)
BACTERIA SPEC CULT: NORMAL
BUN SERPL-MCNC: 8 MG/DL (ref 6–20)
BUN/CREAT SERPL: 9 (ref 12–20)
CALCIUM SERPL-MCNC: 7.8 MG/DL (ref 8.5–10.1)
CC UR VC: NORMAL
CHLORIDE SERPL-SCNC: 107 MMOL/L (ref 97–108)
CO2 SERPL-SCNC: 31 MMOL/L (ref 21–32)
CREAT SERPL-MCNC: 0.89 MG/DL (ref 0.55–1.02)
ERYTHROCYTE [DISTWIDTH] IN BLOOD BY AUTOMATED COUNT: 14.6 % (ref 11.5–14.5)
GLUCOSE BLD STRIP.AUTO-MCNC: 104 MG/DL (ref 65–100)
GLUCOSE BLD STRIP.AUTO-MCNC: 133 MG/DL (ref 65–100)
GLUCOSE BLD STRIP.AUTO-MCNC: 162 MG/DL (ref 65–100)
GLUCOSE SERPL-MCNC: 124 MG/DL (ref 65–100)
HCT VFR BLD AUTO: 31.9 % (ref 35–47)
HGB BLD-MCNC: 10.3 G/DL (ref 11.5–16)
MCH RBC QN AUTO: 27.8 PG (ref 26–34)
MCHC RBC AUTO-ENTMCNC: 32.3 G/DL (ref 30–36.5)
MCV RBC AUTO: 86.2 FL (ref 80–99)
NRBC # BLD: 0 K/UL (ref 0–0.01)
NRBC BLD-RTO: 0 PER 100 WBC
PLATELET # BLD AUTO: 246 K/UL (ref 150–400)
PMV BLD AUTO: 9.8 FL (ref 8.9–12.9)
POTASSIUM SERPL-SCNC: 3 MMOL/L (ref 3.5–5.1)
RBC # BLD AUTO: 3.7 M/UL (ref 3.8–5.2)
SERVICE CMNT-IMP: ABNORMAL
SERVICE CMNT-IMP: NORMAL
SODIUM SERPL-SCNC: 142 MMOL/L (ref 136–145)
WBC # BLD AUTO: 5.1 K/UL (ref 3.6–11)

## 2018-10-01 PROCEDURE — 74011250637 HC RX REV CODE- 250/637: Performed by: HOSPITALIST

## 2018-10-01 PROCEDURE — 74011250637 HC RX REV CODE- 250/637: Performed by: INTERNAL MEDICINE

## 2018-10-01 PROCEDURE — 85027 COMPLETE CBC AUTOMATED: CPT | Performed by: INTERNAL MEDICINE

## 2018-10-01 PROCEDURE — 36415 COLL VENOUS BLD VENIPUNCTURE: CPT | Performed by: INTERNAL MEDICINE

## 2018-10-01 PROCEDURE — 80048 BASIC METABOLIC PNL TOTAL CA: CPT | Performed by: INTERNAL MEDICINE

## 2018-10-01 PROCEDURE — 74011250636 HC RX REV CODE- 250/636: Performed by: HOSPITALIST

## 2018-10-01 PROCEDURE — 74011250636 HC RX REV CODE- 250/636: Performed by: INTERNAL MEDICINE

## 2018-10-01 PROCEDURE — 82962 GLUCOSE BLOOD TEST: CPT

## 2018-10-01 PROCEDURE — 97116 GAIT TRAINING THERAPY: CPT

## 2018-10-01 PROCEDURE — 97530 THERAPEUTIC ACTIVITIES: CPT

## 2018-10-01 PROCEDURE — 74011250637 HC RX REV CODE- 250/637: Performed by: PSYCHIATRY & NEUROLOGY

## 2018-10-01 PROCEDURE — 65660000000 HC RM CCU STEPDOWN

## 2018-10-01 RX ORDER — SODIUM CHLORIDE 0.9 % (FLUSH) 0.9 %
SYRINGE (ML) INJECTION
Status: DISPENSED
Start: 2018-10-01 | End: 2018-10-02

## 2018-10-01 RX ORDER — LORAZEPAM 0.5 MG/1
0.5 TABLET ORAL
Status: DISCONTINUED | OUTPATIENT
Start: 2018-10-01 | End: 2018-10-06 | Stop reason: HOSPADM

## 2018-10-01 RX ORDER — FAMOTIDINE 20 MG/1
20 TABLET, FILM COATED ORAL 2 TIMES DAILY
Status: DISCONTINUED | OUTPATIENT
Start: 2018-10-01 | End: 2018-10-06 | Stop reason: HOSPADM

## 2018-10-01 RX ADMIN — SENNOSIDES AND DOCUSATE SODIUM 1 TABLET: 8.6; 5 TABLET ORAL at 09:39

## 2018-10-01 RX ADMIN — FAMOTIDINE 20 MG: 20 TABLET ORAL at 18:33

## 2018-10-01 RX ADMIN — QUETIAPINE FUMARATE 25 MG: 25 TABLET ORAL at 21:52

## 2018-10-01 RX ADMIN — ENOXAPARIN SODIUM 40 MG: 100 INJECTION SUBCUTANEOUS at 09:39

## 2018-10-01 RX ADMIN — DILTIAZEM HYDROCHLORIDE 120 MG: 120 CAPSULE, COATED, EXTENDED RELEASE ORAL at 09:39

## 2018-10-01 RX ADMIN — ACETAMINOPHEN 650 MG: 325 TABLET ORAL at 20:11

## 2018-10-01 RX ADMIN — METOPROLOL TARTRATE 12.5 MG: 25 TABLET ORAL at 09:39

## 2018-10-01 RX ADMIN — DEXTROSE MONOHYDRATE 100 ML/HR: 5 INJECTION, SOLUTION INTRAVENOUS at 09:33

## 2018-10-01 RX ADMIN — LOSARTAN POTASSIUM 25 MG: 25 TABLET ORAL at 21:53

## 2018-10-01 RX ADMIN — Medication 10 ML: at 21:58

## 2018-10-01 RX ADMIN — FAMOTIDINE 20 MG: 20 TABLET ORAL at 09:39

## 2018-10-01 RX ADMIN — METOPROLOL TARTRATE 12.5 MG: 25 TABLET ORAL at 21:53

## 2018-10-01 RX ADMIN — MELATONIN TAB 3 MG 6 MG: 3 TAB at 21:52

## 2018-10-01 RX ADMIN — LORAZEPAM 0.5 MG: 0.5 TABLET ORAL at 12:55

## 2018-10-01 NOTE — PROGRESS NOTES
Problem: Mobility Impaired (Adult and Pediatric)  Goal: *Acute Goals and Plan of Care (Insert Text)  Physical Therapy Goals  Revised 9/28/2018  1. Patient will move from supine to sit and sit to supine , scoot up and down and roll side to side in bed with supervision/set-up within 7 day(s). 2.  Patient will transfer from bed to chair and chair to bed with supervision/set-up using the least restrictive device within 7 day(s). 3.  Patient will perform sit to stand with supervision/set-up within 7 day(s). 4.  Patient will ambulate with supervision/set-up for 50 feet with the least restrictive device within 7 day(s). Initiated 9/21/2018  1. Patient will move from supine to sit and sit to supine , scoot up and down and roll side to side in bed with independence within 7 day(s). 2.  Patient will transfer from bed to chair and chair to bed with independence using the least restrictive device within 7 day(s). 3.  Patient will perform sit to stand with independence within 7 day(s). 4.  Patient will ambulate with independence for 200 feet with the least restrictive device within 7 day(s). physical Therapy TREATMENT  Patient: Shannan Miguel (87 y.o. female)  Date: 10/1/2018  Diagnosis: Choledocholithiasis with acute cholecystitis, CBD stone, CHOLELITHIASIS    Procedure(s) (LRB):  ENDOSCOPIC RETROGRADE CHOLANGIOPANCREATOGRAPHY (ERCP) (N/A) 10 Days Post-Op     Precautions: Fall  Chart, physical therapy assessment, plan of care and goals were reviewed. ASSESSMENT: pt tolerated tx well today, no c/o dizziness, no LOB or SOB, very cooperative, did well with bed mob and transfers, not to confused today, vc's for safety and proper RW use.     Progression toward goals:  []    Improving appropriately and progressing toward goals  [x]    Improving slowly and progressing toward goals  []    Not making progress toward goals and plan of care will be adjusted     PLAN:  Patient continues to benefit from skilled intervention to address the above impairments. Continue treatment per established plan of care. Discharge Recommendations:  Joshua Carter  Further Equipment Recommendations for Discharge:  rolling walker     OBJECTIVE DATA SUMMARY:     Critical Behavior:  Neurologic State: Confused (at times)  Orientation Level: Disoriented to situation, Disoriented to time, Oriented to person  Cognition: Decreased attention/concentration, Decreased command following, Impaired decision making, Poor safety awareness  Safety/Judgement: Decreased awareness of need for assistance, Decreased awareness of need for safety, Decreased insight into deficits     Functional Mobility Training:  Bed Mobility:  Rolling: Independent  Supine to Sit: Minimum assistance;Assist x1  Sit to Supine: Minimum assistance;Assist x1  Scooting: Minimum assistance;Assist x1  Level of Assistance: Minimum assistance  Interventions: Tactile cues; Verbal cues     Transfers:  Sit to Stand: Contact guard assistance;Assist x1  Stand to Sit: Contact guard assistance;Assist x1  Interventions: Tactile cues; Verbal cues  Level of Assistance: Contact guard assistance     Balance:  Sitting: Intact; Without support  Standing: Impaired; With support  Standing - Static: Fair;Constant support  Standing - Dynamic : Fair     Ambulation/Gait Training:  Distance (ft): 80 Feet (ft)  Assistive Device: Gait belt;Walker, rolling  Ambulation - Level of Assistance: Minimal assistance;Contact guard assistance;Assist x1  Gait Abnormalities: Decreased step clearance  Right Side Weight Bearing: Full  Left Side Weight Bearing: Full  Base of Support: Widened  Speed/Gale: Slow  Step Length: Left shortened;Right shortened    Pain:  Pain Scale 1: Numeric (0 - 10)  Pain Intensity 1: 0    Activity Tolerance: fair    After treatment:   []    Patient left in no apparent distress sitting up in chair  [x]    Patient left in no apparent distress in bed  [x]    Call bell left within reach  [x] Nursing notified  []    Caregiver present  []    Bed alarm activated    COMMUNICATION/COLLABORATION:   The patients plan of care was discussed with: Registered Nurse    Gabino Del Valle PTA   Time Calculation: 25 mins

## 2018-10-01 NOTE — PROGRESS NOTES
PT to work with patient. Will attempt IV after therapy    1624 (est.) - attempted IV x 2. Unsuccessful. Aranza Newsome to attempt IV placement    1250 Newton Roberto RN attempted IV x 2.  Called PICC Vance Ortega, reports she will come to place IV

## 2018-10-01 NOTE — PROGRESS NOTES
Problem: Falls - Risk of  Goal: *Absence of Falls  Document Romi Fall Risk and appropriate interventions in the flowsheet.    Outcome: Progressing Towards Goal  Fall Risk Interventions:  Mobility Interventions: OT consult for ADLs, Patient to call before getting OOB, PT Consult for mobility concerns, PT Consult for assist device competence    Mentation Interventions: More frequent rounding, Increase mobility    Medication Interventions: Patient to call before getting OOB, Teach patient to arise slowly    Elimination Interventions: Patient to call for help with toileting needs, Call light in reach             Comments: Confused at times/ dementia per family history    9/30 liq    Crowder retention    x1 assist    Lap sites/ no drain    Urology - Discharge with catheter    Home with

## 2018-10-01 NOTE — PROGRESS NOTES
General Surgery End of Shift Nursing Note    Bedside shift change report given to Charley Pastrana (oncoming nurse) by Deneen Maldonado RN (offgoing nurse). Report included the following information SBAR, Kardex, OR Summary, Intake/Output, MAR, Recent Results and Cardiac Rhythm SR with PACs. Shift worked:   7p-7a   Summary of shift:    Pt calmer, cooperative, confused @ times. Seemed to be sleeping more than usual   Issues for physician to address:        Number times ambulated in hallway past shift: 0    Number of times OOB to chair past shift: 1    Pain Management:  Current medication: pt declined offer of pain medication  Patient states pain is manageable on current pain medication: YES    GI:    Current diet:  DIET DIABETIC CONSISTENT CARB Regular    Tolerating current diet: YES  Passing flatus: YES  Last Bowel Movement: yesterday   Appearance: watery    Respiratory:    Incentive Spirometer at bedside: YES  Patient instructed on use: YES    Patient Safety:    Falls Score: 4  Bed Alarm On? No  Sitter?  No    Joan West RN

## 2018-10-01 NOTE — PROGRESS NOTES
Awaiting return call from Dr. Luz Maria Milian regarding pt request for pill for anxiety    [phone] - Reported request to Dr. Luz Maria Milian.  Reports Christo Newberry will take a look\"

## 2018-10-01 NOTE — PROGRESS NOTES
Problem: Pressure Injury - Risk of  Goal: *Prevention of pressure injury  Document Freddy Scale and appropriate interventions in the flowsheet. Outcome: Progressing Towards Goal  Pressure Injury Interventions:  Sensory Interventions: Pressure redistribution bed/mattress (bed type)    Moisture Interventions: Absorbent underpads    Activity Interventions: Increase time out of bed, Pressure redistribution bed/mattress(bed type)    Mobility Interventions: HOB 30 degrees or less, Pressure redistribution bed/mattress (bed type)    Nutrition Interventions: Document food/fluid/supplement intake    Friction and Shear Interventions: Lift sheet               Problem: Falls - Risk of  Goal: *Absence of Falls  Document Romi Fall Risk and appropriate interventions in the flowsheet.    Outcome: Progressing Towards Goal  Fall Risk Interventions:  Mobility Interventions: OT consult for ADLs, Patient to call before getting OOB, PT Consult for mobility concerns, PT Consult for assist device competence    Mentation Interventions: More frequent rounding, Increase mobility    Medication Interventions: Patient to call before getting OOB, Teach patient to arise slowly    Elimination Interventions: Patient to call for help with toileting needs, Call light in reach             Comments: Maya Galavize    Confused at times/ family reports dementia    9/30 liq    Crowder rention    Urology - Discharge patient with catheter

## 2018-10-01 NOTE — PROGRESS NOTES
Nutrition Assessment:    RECOMMENDATIONS:   Continue Diabetic diet  RD to add Glucerna BID    ASSESSMENT:   Chart reviewed, pt remains confused. Unable to work with therapy. PO intake is varied, RN reports poor appetite per flowsheet's. Will add PO supplements to boost kcal and protein intake. K+ 3.0, needs repletion. BM noted yesterday. Dietitians Intervention(s)/Plan(s): Continue diet, add PO supplements, monitor appetite  SUBJECTIVE/OBJECTIVE:   Pt confused   Diet Order: Consistent carb  % Eaten:  Patient Vitals for the past 72 hrs:   % Diet Eaten   09/29/18 1542 65 %   09/28/18 1748 0 %     Pertinent Medications:pepcid, humalog, pericolace; [email or web address]). Chemistries:  Lab Results   Component Value Date/Time    Sodium 142 10/01/2018 11:03 AM    Potassium 3.0 (L) 10/01/2018 11:03 AM    Chloride 107 10/01/2018 11:03 AM    CO2 31 10/01/2018 11:03 AM    Anion gap 4 (L) 10/01/2018 11:03 AM    Glucose 124 (H) 10/01/2018 11:03 AM    BUN 8 10/01/2018 11:03 AM    Creatinine 0.89 10/01/2018 11:03 AM    BUN/Creatinine ratio 9 (L) 10/01/2018 11:03 AM    GFR est AA >60 10/01/2018 11:03 AM    GFR est non-AA >60 10/01/2018 11:03 AM    Calcium 7.8 (L) 10/01/2018 11:03 AM    Albumin 2.2 (L) 09/29/2018 04:24 AM      Anthropometrics: Height: 5' 7\" (170.2 cm) Weight: 78.5 kg (173 lb)   []bed scale    []stated   []unknown     IBW (%IBW):   ( ) UBW (%UBW):   (  %)    BMI: Body mass index is 27.1 kg/(m^2). This BMI is indicative of:  []Underweight   [x]Normal   []Overweight   [] Obesity   [] Extreme Obesity (BMI>40)  Estimated Nutrition Needs (Based on): 1720 Kcals/day (MSJ 1323 x 1.3) , 63 g (0.8gPro/kg) Protein  Carbohydrate: At Least 130 g/day  Fluids: 1800 mL/day    Last BM: 9/30   [x]Active     []Hyperactive  []Hypoactive       [] Absent   BS  Skin:    [] Intact   [x] Incision  [] Breakdown   [] DTI   [] Tears/Excoriation/Abrasion  []Edema [] Other:    Wt Readings from Last 30 Encounters:   09/23/18 78.5 kg (173 lb)   08/30/18 80.3 kg (177 lb)   07/20/18 79.4 kg (175 lb)   06/23/18 80.7 kg (178 lb)   06/19/18 81.9 kg (180 lb 8.9 oz)   04/19/18 80.9 kg (178 lb 5.6 oz)   04/11/18 79.7 kg (175 lb 12.8 oz)   01/12/18 79 kg (174 lb 1.6 oz)   12/07/17 78 kg (172 lb)   11/14/17 77.1 kg (170 lb)   10/11/17 76.4 kg (168 lb 8 oz)   10/09/17 75.8 kg (167 lb 1.6 oz)   09/13/17 77.6 kg (171 lb)   08/15/17 77.1 kg (169 lb 14.4 oz)   07/28/17 76.2 kg (168 lb 1.6 oz)   07/06/17 76.2 kg (168 lb 1.6 oz)   06/05/17 74.6 kg (164 lb 8 oz)   05/10/17 75.2 kg (165 lb 12.8 oz)   04/26/17 75.5 kg (166 lb 6.4 oz)   04/17/17 76.2 kg (168 lb)   04/04/17 77.1 kg (170 lb)   03/08/17 73.5 kg (162 lb)   02/27/17 73.5 kg (162 lb 1.6 oz)   02/23/17 74 kg (163 lb 2.3 oz)   01/26/17 76.5 kg (168 lb 11.2 oz)   01/05/17 75.8 kg (167 lb)   12/23/16 77.1 kg (170 lb)   12/15/16 75.5 kg (166 lb 6.4 oz)   11/17/16 75.8 kg (167 lb)   11/04/16 77.3 kg (170 lb 6.4 oz)      NUTRITION DIAGNOSES:   Problem:  No nutritional diagnosis at this time        NUTRITION INTERVENTIONS:  Meals/Snacks: General/healthful diet   Supplements: Commercial supplement              GOAL:   Pt will consume >50% of meals/supplements in 3-5 days.      NUTRITION MONITORING AND EVALUATION   Previous Goal: Pt will consume >50% of meals in 3-5 days   Previous Goal Met: Progressing   Previous Recommendations Implemented: Yes   Cultural, Taoism, or Ethnic Dietary Needs: None   LEARNING NEEDS (Diet, Food/Nutrient-Drug Interaction):    [x] None Identified   [] Identified and Education Provided/Documented   [] Identified and Pt declined/was not appropriate      [x] Interdisciplinary Care Plan Reviewed/Documented    [x] Participated in Discharge Planning: Diabetic diet    [] Interdisciplinary Rounds     NUTRITION RISK:    [] High              [x] Moderate           []  Low  []  Minimal/Uncompromised      Taj Macias RD, [address]  Pager [phone]  Weekend Pager [phone]

## 2018-10-01 NOTE — PROGRESS NOTES
Occupational Therapy Note 1420    Chart reviewed. Attempted to meet with patient at the bedside, patient perseverating on incoherent situations asking therapist if she could \"carry me to my appointment tomorrow, or give me some gas money? \" Naomi Spannt you going to come to my house and take care of me when I get home? \" \"I took a pain pill around one o'clock\". Patient declining participation in ADLs or upper body exercises despite education. Unable to attend to session. Session aborted. Spoke with PT. Will continue to follow. Thank you.     Warren Allen OT  Time spent with patient: 6 minutes

## 2018-10-01 NOTE — PROGRESS NOTES
Re: Famotidine ([facility] approved dose change has been made on this patient)    Pharmacy ordered dose change:   Adjust dose back to 40mg po BID for CrCl greater than 50ml/min    Recent Labs      10/01/18   1103  09/29/18   0424   CREA  0.89  1.20*       Thanks,  Cassidy Del Cid, PHARMD

## 2018-10-01 NOTE — PROGRESS NOTES
Hospitalist Progress Note    NAME: Erin Antonio   :  1945   MRN:  [id number]       Assessment / Plan:  Acute choledocholithiasis with acute cholecystitis and abnormal elevated LFTs; POA. S/p ERCP and subsequent lap jesus  Intractable N/V: improved. - LFTs with GLV 2856,  , Alk Phos 146, total Bilirubin 2.8; labs improving  RUQ Ultrasound consistent with gallstones with acute cholecystitis changes, dilated CBD  - HIDA scan with tracer in the liver but no activity in gallbladder, common bile duct, small bowel after 3 hours suggesting common bile duct obstruction.  - MRCP showed cholelithiasis.  Pericholecystic edema suspicious for cholecystitis.  Common duct is normal  - s/p ERCP on   - s/p lap jesus with cholangiogram showed Jaundice, acute inflammation of the gallbladder, small gallstones in the gallbladder and cystic duct.  No obvious CBD filling defect on cholangiogram (the defect on the second image is the cholangiogram catheter balloon), but no passage of contrast into the duodenum, consistent with obstruction. - Completed IV Levaquin and Flagyl empirically on   - D/Rex norco and morphine IV due to constipation, hypoactive bowels and n/v  - On Simethicone   - Trend labs  - need to repeat ERCP in 1-2 months  - continue zofran prn  - Monitor labs     Urinary retention and dysuria  - Repeat UA WNL  - Follow up with her urologist Dr Fely Butler  Acute encephalopathy: now resolved  - continue low dose seroquel      Constipation:  -KUB with decreased colonic gas; moderate stool  - cont miralax, docusate  - stopped narcotics  - encourage ambulation    Type 2 diabetes mellitus POA currently off medications per her report   - A1C 5.5, poor appetite      Essential hypertension POA  Orthostatic hypotension  - continue ARB and Cardizem CD with holding parameters.    - metoprolol decreased  - prn hydralazine    Coronary disease POA: stable  - Continue ASA  - currently off of cholesterol medications because of LFTs    Insomnia, POA  - continue melatonin at bedtime     Body mass index is 27.1 kg/(m^2).: 25.0 - 29.9 Overweight    25.0 - 29.9 Overweight / Body mass index is 27.1 kg/(m^2). Code status: Full  Prophylaxis: Lovenox  Recommended Disposition: TBD     Subjective:     Chief Complaint / Reason for Physician Visit: Abd pain, n/v    Pt with moderate headache and insomnia. Some R sided abdominal pain but improving. Review of Systems: 14 pt ROS unremarkable except as stated above. Objective:     VITALS:   Last 24hrs VS reviewed since prior progress note. Most recent are:  Patient Vitals for the past 24 hrs:   Temp Pulse Resp BP SpO2   10/01/18 0724 97.3 °F (36.3 °C) 65 17 145/61 100 %   10/01/18 0249 98.3 °F (36.8 °C) (!) 57 17 112/47 100 %   09/30/18 2253 99 °F (37.2 °C) 60 18 135/58 97 %   09/30/18 1936 99.1 °F (37.3 °C) 70 18 156/77 100 %   09/30/18 1553 - (!) 52 - 106/46 -   09/30/18 1548 - 61 18 131/40 98 %   09/30/18 1427 98.1 °F (36.7 °C) 61 18 117/88 98 %       Intake/Output Summary (Last 24 hours) at 10/01/18 1334  Last data filed at 10/01/18 0659   Gross per 24 hour   Intake             1620 ml   Output             2100 ml   Net             -480 ml        PHYSICAL EXAM:  General: Alert, cooperative, no acute distress, frail  EENT:  EOMI. Anicteric sclerae. MMM  Resp:  CTA bilaterally, no wheezing or rales. No accessory muscle use  CV:  Regular  rhythm,  Normal S1 and S2, No edema  GI:  Soft, Non distended, Non tender.  +Bowel sounds.   Dressing over RUQ  Neurologic:  Alert and oriented X 3, normal speech, no gross neuro deficits  Skin:  Warm and dry    Reviewed most current lab test results and cultures  YES  Reviewed most current radiology test results   YES  Review and summation of old records today    NO  Reviewed patient's current orders and MAR    YES  PMH/SH reviewed - no change compared to H&P    ________________________________________________________________________  Princess Rangel MD     Procedures: see electronic medical records for all procedures/Xrays and details which were not copied into this note but were reviewed prior to creation of Plan. LABS:  I reviewed today's most current labs and imaging studies.   Pertinent labs include:  Recent Labs      10/01/18   1103   WBC  5.1   HGB  10.3*   HCT  31.9*   PLT  246     Recent Labs      10/01/18   1103  09/29/18   0424   NA  142  136   K  3.0*  4.0   CL  107  107   CO2  31  23   GLU  124*  92   BUN  8  13   CREA  0.89  1.20*   CA  7.8*  8.1*   ALB   --   2.2*   TBILI   --   0.7   SGOT   --   31   ALT   --   63       Signed: Princess Rangel MD

## 2018-10-02 ENCOUNTER — APPOINTMENT (OUTPATIENT)
Dept: GENERAL RADIOLOGY | Age: 73
DRG: 418 | End: 2018-10-02
Attending: INTERNAL MEDICINE
Payer: MEDICARE

## 2018-10-02 LAB
ANION GAP SERPL CALC-SCNC: 6 MMOL/L (ref 5–15)
ATRIAL RATE: 416 BPM
BUN SERPL-MCNC: 10 MG/DL (ref 6–20)
BUN/CREAT SERPL: 12 (ref 12–20)
CALCIUM SERPL-MCNC: 7.8 MG/DL (ref 8.5–10.1)
CALCULATED P AXIS, ECG09: 40 DEGREES
CALCULATED R AXIS, ECG10: 46 DEGREES
CALCULATED T AXIS, ECG11: 10 DEGREES
CHLORIDE SERPL-SCNC: 109 MMOL/L (ref 97–108)
CO2 SERPL-SCNC: 28 MMOL/L (ref 21–32)
CREAT SERPL-MCNC: 0.82 MG/DL (ref 0.55–1.02)
DIAGNOSIS, 93000: NORMAL
ERYTHROCYTE [DISTWIDTH] IN BLOOD BY AUTOMATED COUNT: 14.4 % (ref 11.5–14.5)
GLUCOSE BLD STRIP.AUTO-MCNC: 116 MG/DL (ref 65–100)
GLUCOSE BLD STRIP.AUTO-MCNC: 121 MG/DL (ref 65–100)
GLUCOSE BLD STRIP.AUTO-MCNC: 121 MG/DL (ref 65–100)
GLUCOSE BLD STRIP.AUTO-MCNC: 152 MG/DL (ref 65–100)
GLUCOSE SERPL-MCNC: 90 MG/DL (ref 65–100)
HCT VFR BLD AUTO: 30.9 % (ref 35–47)
HGB BLD-MCNC: 9.8 G/DL (ref 11.5–16)
MCH RBC QN AUTO: 27.6 PG (ref 26–34)
MCHC RBC AUTO-ENTMCNC: 31.7 G/DL (ref 30–36.5)
MCV RBC AUTO: 87 FL (ref 80–99)
NRBC # BLD: 0 K/UL (ref 0–0.01)
NRBC BLD-RTO: 0 PER 100 WBC
PLATELET # BLD AUTO: 230 K/UL (ref 150–400)
PMV BLD AUTO: 10.1 FL (ref 8.9–12.9)
POTASSIUM SERPL-SCNC: 3 MMOL/L (ref 3.5–5.1)
Q-T INTERVAL, ECG07: 376 MS
QRS DURATION, ECG06: 84 MS
QTC CALCULATION (BEZET), ECG08: 472 MS
RBC # BLD AUTO: 3.55 M/UL (ref 3.8–5.2)
SERVICE CMNT-IMP: ABNORMAL
SODIUM SERPL-SCNC: 143 MMOL/L (ref 136–145)
TROPONIN I SERPL-MCNC: <0.05 NG/ML
VENTRICULAR RATE, ECG03: 95 BPM
WBC # BLD AUTO: 5.3 K/UL (ref 3.6–11)

## 2018-10-02 PROCEDURE — 85027 COMPLETE CBC AUTOMATED: CPT | Performed by: INTERNAL MEDICINE

## 2018-10-02 PROCEDURE — 71045 X-RAY EXAM CHEST 1 VIEW: CPT

## 2018-10-02 PROCEDURE — 93005 ELECTROCARDIOGRAM TRACING: CPT

## 2018-10-02 PROCEDURE — 74011250637 HC RX REV CODE- 250/637: Performed by: INTERNAL MEDICINE

## 2018-10-02 PROCEDURE — 65660000000 HC RM CCU STEPDOWN

## 2018-10-02 PROCEDURE — 74011250636 HC RX REV CODE- 250/636: Performed by: INTERNAL MEDICINE

## 2018-10-02 PROCEDURE — 97530 THERAPEUTIC ACTIVITIES: CPT

## 2018-10-02 PROCEDURE — 97110 THERAPEUTIC EXERCISES: CPT

## 2018-10-02 PROCEDURE — 36415 COLL VENOUS BLD VENIPUNCTURE: CPT | Performed by: INTERNAL MEDICINE

## 2018-10-02 PROCEDURE — 74011250637 HC RX REV CODE- 250/637: Performed by: PSYCHIATRY & NEUROLOGY

## 2018-10-02 PROCEDURE — 80048 BASIC METABOLIC PNL TOTAL CA: CPT | Performed by: INTERNAL MEDICINE

## 2018-10-02 PROCEDURE — 84484 ASSAY OF TROPONIN QUANT: CPT | Performed by: INTERNAL MEDICINE

## 2018-10-02 PROCEDURE — 74011250637 HC RX REV CODE- 250/637: Performed by: HOSPITALIST

## 2018-10-02 PROCEDURE — 74011250636 HC RX REV CODE- 250/636: Performed by: HOSPITALIST

## 2018-10-02 PROCEDURE — 94760 N-INVAS EAR/PLS OXIMETRY 1: CPT

## 2018-10-02 PROCEDURE — 82962 GLUCOSE BLOOD TEST: CPT

## 2018-10-02 RX ORDER — POTASSIUM CHLORIDE 20 MEQ/1
40 TABLET, EXTENDED RELEASE ORAL 2 TIMES DAILY
Status: COMPLETED | OUTPATIENT
Start: 2018-10-02 | End: 2018-10-02

## 2018-10-02 RX ORDER — METOPROLOL TARTRATE 25 MG/1
25 TABLET, FILM COATED ORAL EVERY 12 HOURS
Status: DISCONTINUED | OUTPATIENT
Start: 2018-10-02 | End: 2018-10-06

## 2018-10-02 RX ADMIN — DEXTROSE MONOHYDRATE 100 ML/HR: 5 INJECTION, SOLUTION INTRAVENOUS at 19:35

## 2018-10-02 RX ADMIN — DILTIAZEM HYDROCHLORIDE 120 MG: 120 CAPSULE, COATED, EXTENDED RELEASE ORAL at 10:10

## 2018-10-02 RX ADMIN — POTASSIUM CHLORIDE 20 MEQ: 20 TABLET, EXTENDED RELEASE ORAL at 19:28

## 2018-10-02 RX ADMIN — Medication 10 ML: at 22:12

## 2018-10-02 RX ADMIN — ENOXAPARIN SODIUM 40 MG: 100 INJECTION SUBCUTANEOUS at 10:10

## 2018-10-02 RX ADMIN — POTASSIUM CHLORIDE 40 MEQ: 20 TABLET, EXTENDED RELEASE ORAL at 10:10

## 2018-10-02 RX ADMIN — LOSARTAN POTASSIUM 25 MG: 25 TABLET ORAL at 22:10

## 2018-10-02 RX ADMIN — METOPROLOL TARTRATE 12.5 MG: 25 TABLET ORAL at 10:11

## 2018-10-02 RX ADMIN — FAMOTIDINE 20 MG: 20 TABLET ORAL at 10:10

## 2018-10-02 RX ADMIN — DEXTROSE MONOHYDRATE 100 ML/HR: 5 INJECTION, SOLUTION INTRAVENOUS at 13:47

## 2018-10-02 RX ADMIN — QUETIAPINE FUMARATE 25 MG: 25 TABLET ORAL at 22:10

## 2018-10-02 RX ADMIN — LORAZEPAM 0.5 MG: 0.5 TABLET ORAL at 22:10

## 2018-10-02 RX ADMIN — METOPROLOL TARTRATE 25 MG: 25 TABLET ORAL at 22:10

## 2018-10-02 RX ADMIN — FAMOTIDINE 20 MG: 20 TABLET ORAL at 19:28

## 2018-10-02 RX ADMIN — MELATONIN TAB 3 MG 6 MG: 3 TAB at 22:09

## 2018-10-02 RX ADMIN — SENNOSIDES AND DOCUSATE SODIUM 1 TABLET: 8.6; 5 TABLET ORAL at 10:10

## 2018-10-02 NOTE — PROGRESS NOTES
Problem: Pressure Injury - Risk of  Goal: *Prevention of pressure injury  Document Freddy Scale and appropriate interventions in the flowsheet. Outcome: Progressing Towards Goal  Pressure Injury Interventions:  Sensory Interventions: Check visual cues for pain, Maintain/enhance activity level, Pressure redistribution bed/mattress (bed type)    Moisture Interventions: Absorbent underpads    Activity Interventions: Increase time out of bed, Pressure redistribution bed/mattress(bed type)    Mobility Interventions: HOB 30 degrees or less, Pressure redistribution bed/mattress (bed type)    Nutrition Interventions: Document food/fluid/supplement intake    Friction and Shear Interventions: HOB 30 degrees or less, Lift sheet               Problem: Falls - Risk of  Goal: *Absence of Falls  Document Romi Fall Risk and appropriate interventions in the flowsheet. Outcome: Progressing Towards Goal  Fall Risk Interventions:  Mobility Interventions: Patient to call before getting OOB    Mentation Interventions: Increase mobility, More frequent rounding    Medication Interventions: Patient to call before getting OOB, Teach patient to arise slowly    Elimination Interventions: Patient to call for help with toileting needs, Call light in reach             Comments: Card. reg    10/1  Diarrhea    Voids    ABD US

## 2018-10-02 NOTE — PROGRESS NOTES
Occupational Therapy  Attempted to see patient for OT treatment, but per nursing, she is getting ready to have an EKG and a few other tests. Will defer for now but continue to follow.

## 2018-10-02 NOTE — PROGRESS NOTES
Problem: Mobility Impaired (Adult and Pediatric)  Goal: *Acute Goals and Plan of Care (Insert Text)  Physical Therapy Goals  Revised 9/28/2018  1. Patient will move from supine to sit and sit to supine , scoot up and down and roll side to side in bed with supervision/set-up within 7 day(s). 2.  Patient will transfer from bed to chair and chair to bed with supervision/set-up using the least restrictive device within 7 day(s). 3.  Patient will perform sit to stand with supervision/set-up within 7 day(s). 4.  Patient will ambulate with supervision/set-up for 50 feet with the least restrictive device within 7 day(s). Initiated 9/21/2018  1. Patient will move from supine to sit and sit to supine , scoot up and down and roll side to side in bed with independence within 7 day(s). 2.  Patient will transfer from bed to chair and chair to bed with independence using the least restrictive device within 7 day(s). 3.  Patient will perform sit to stand with independence within 7 day(s). 4.  Patient will ambulate with independence for 200 feet with the least restrictive device within 7 day(s). physical Therapy TREATMENT  Patient: Kaitlynn Weeks (88 y.o. female)  Date: 10/2/2018  Diagnosis: Choledocholithiasis with acute cholecystitis  CBD stone  CHOLELITHIASIS  CBD Stone <principal problem not specified>  Procedure(s) (LRB):  ENDOSCOPIC RETROGRADE CHOLANGIOPANCREATOGRAPHY (ERCP) (N/A) 11 Days Post-Op  Precautions: Fall  Chart, physical therapy assessment, plan of care and goals were reviewed. ASSESSMENT:  Pt received supine in bed and agreeable to working with therapy. Pt reports she wants to go home and has lots of help when she gets there. Pt also c/o chest discomfort which she had told the RN prior to session. Overall, pt much more limited compared to yesterdays session. Orthostatic hypotension limiting OOB mobility. Pt did tolerate supine and sitting exercises in bed prior to coming to stand.  Stood ~35 seconds to obtain standing BP prior to returning to supine. Pt was Mod A to stand initially and pulling up on RW handles. Pt then stood a second time with Min A and took small shuffling steps at EOB to reposition higher. BP stabilizing in supine. Left with all needs within reach and RN notified. Continue to recommend SNF at discharge. Vitals:    10/02/18 0900 10/02/18 0905 10/02/18 0908 10/02/18 0912   BP: 118/61 98/58 (!) 71/42 107/57   BP 1 Location: Left arm Left arm Left arm Left arm   BP Patient Position: Supine Sitting Standing Supine   Pulse: 75 79 81 80   Resp:       Temp:       SpO2:       Weight:       Height:         Progression toward goals:  []    Improving appropriately and progressing toward goals  [x]    Improving slowly and progressing toward goals  []    Not making progress toward goals and plan of care will be adjusted     PLAN:  Patient continues to benefit from skilled intervention to address the above impairments. Continue treatment per established plan of care. Discharge Recommendations:  Skilled Nursing Facility  Further Equipment Recommendations for Discharge:  TBD at rehab     SUBJECTIVE:   Patient stated [facility] chest feels funny and so do my eyes.     OBJECTIVE DATA SUMMARY:   Critical Behavior:  Neurologic State: Alert, Eyes open spontaneously  Orientation Level: Oriented to person, Oriented to place  Cognition: Follows commands  Safety/Judgement: Decreased awareness of need for assistance, Decreased awareness of need for safety, Decreased insight into deficits  Functional Mobility Training:  Bed Mobility:  Rolling: Contact guard assistance  Supine to Sit: Contact guard assistance  Sit to Supine: Contact guard assistance           Transfers:  Sit to Stand: Minimum assistance;Assist x1;Additional time (pulling up on RW)  Stand to Sit: Contact guard assistance                             Balance:  Sitting: Intact  Standing: Impaired  Standing - Static: Fair;Constant support  Standing - Dynamic : Fair  Ambulation/Gait Training:  Distance (ft): 4 Feet (ft) (sidesteps up in bed)  Assistive Device: Gait belt;Walker, rolling  Ambulation - Level of Assistance: Minimal assistance        Gait Abnormalities: Decreased step clearance;Shuffling gait        Base of Support: Narrowed     Speed/Gale: Pace decreased (<100 feet/min)  Step Length: Right shortened;Left shortened                        Therapeutic Exercises: Ankle pumps, quad sets, heel slides, seated marching, LAQ, bilateral shoulder flexion x 10 reps bilaterally  Pain:        Pain Location 1: Head     Pain Description 1: Aching  Pain Intervention(s) 1: Relaxation technique;Repositioned; Rest  Activity Tolerance:   Fair-orthostatic  Please refer to the flowsheet for vital signs taken during this treatment.   After treatment:   []    Patient left in no apparent distress sitting up in chair  [x]    Patient left in no apparent distress in bed  [x]    Call bell left within reach  [x]    Nursing notified  []    Caregiver present  []    Bed alarm activated    COMMUNICATION/COLLABORATION:   The patients plan of care was discussed with: Registered Nurse    Claire Griffin PT   Time Calculation: 25 mins

## 2018-10-02 NOTE — PROGRESS NOTES
Hospitalist Progress Note    NAME: Luis Ceballos   :  1945   MRN:  [id number]       Assessment / Plan:  Acute choledocholithiasis with acute cholecystitis and abnormal elevated LFTs; POA. S/p ERCP and subsequent lap jesus  Intractable N/V: improved. - LFTs with MGK 2931,  , Alk Phos 146, total Bilirubin 2.8; labs improving  RUQ Ultrasound consistent with gallstones with acute cholecystitis changes, dilated CBD  - HIDA scan with tracer in the liver but no activity in gallbladder, common bile duct, small bowel after 3 hours suggesting common bile duct obstruction.  - MRCP showed cholelithiasis.  Pericholecystic edema suspicious for cholecystitis.  Common duct is normal  - s/p ERCP on   - s/p lap jesus with cholangiogram showed Jaundice, acute inflammation of the gallbladder, small gallstones in the gallbladder and cystic duct.  No obvious CBD filling defect on cholangiogram (the defect on the second image is the cholangiogram catheter balloon), but no passage of contrast into the duodenum, consistent with obstruction. - Completed IV Levaquin and Flagyl empirically on   - D/Rex norco and morphine IV due to constipation, hypoactive bowels and n/v  - On Simethicone   - Trend labs  - need to repeat ERCP in 1-2 months  - continue zofran prn  - Monitor labs    Atypical chest pain. EKG with AF; rate controlled. - serial trops   - CXR  - titrate BB  - ECHO     Urinary retention and dysuria  - Repeat UA WNL  - Follow up with her urologist Dr Dorcas Mejia  Acute encephalopathy: now resolved  - continue low dose seroquel      Constipation: improved  - KUB with decreased colonic gas; moderate stool  - cont miralax, docusate  - stopped narcotics  - encourage ambulation    Type 2 diabetes mellitus POA currently off medications per her report   - A1C 5.5, poor appetite      Essential hypertension POA  Orthostatic hypotension  - continue ARB and Cardizem CD with holding parameters.    - metoprolol decreased  - prn hydralazine    Coronary disease POA: stable  - Continue ASA  - currently off of cholesterol medications because of LFTs    Insomnia, POA  - continue melatonin at bedtime     Body mass index is 27.1 kg/(m^2).: 25.0 - 29.9 Overweight    25.0 - 29.9 Overweight / Body mass index is 27.1 kg/(m^2). Code status: Full  Prophylaxis: Lovenox  Recommended Disposition: TBD     Subjective:     Chief Complaint / Reason for Physician Visit: Abd pain, n/v    Intermittent chest pain. Denies dyspnea, calf pain, palpitations. Continued moderate headache. Review of Systems: 14 pt ROS unremarkable except as stated above. Objective:     VITALS:   Last 24hrs VS reviewed since prior progress note. Most recent are:  Patient Vitals for the past 24 hrs:   Temp Pulse Resp BP SpO2   10/02/18 1140 97.7 °F (36.5 °C) 64 16 113/58 100 %   10/02/18 0912 - 80 - 107/57 -   10/02/18 0908 - 81 - (!) 71/42 -   10/02/18 0905 - 79 - 98/58 -   10/02/18 0900 - 75 - 118/61 -   10/02/18 0818 98.1 °F (36.7 °C) 67 16 148/65 100 %   10/02/18 0305 97.5 °F (36.4 °C) 64 16 119/57 100 %   10/01/18 2251 97.7 °F (36.5 °C) 78 17 107/52 98 %   10/01/18 1651 99 °F (37.2 °C) 66 16 126/67 97 %       Intake/Output Summary (Last 24 hours) at 10/02/18 1420  Last data filed at 10/02/18 1022   Gross per 24 hour   Intake             3370 ml   Output             1625 ml   Net             1745 ml        PHYSICAL EXAM:  General: Alert, cooperative, no acute distress, frail  EENT:  EOMI. Anicteric sclerae. MMM  Resp:  CTA bilaterally, no wheezing or rales. No accessory muscle use  CV:  Regular  rhythm,  Normal S1 and S2, No edema  GI:  Soft, Non distended, Non tender.  +Bowel sounds.   Dressing over RUQ  Neurologic:  Alert and oriented X 3, normal speech, no gross neuro deficits  Skin:  Warm and dry    Reviewed most current lab test results and cultures  YES  Reviewed most current radiology test results   YES  Review and summation of old records today NO  Reviewed patient's current orders and MAR    YES  PMH/SH reviewed - no change compared to H&P    ________________________________________________________________________  Victor Manuel Martino MD     Procedures: see electronic medical records for all procedures/Xrays and details which were not copied into this note but were reviewed prior to creation of Plan. LABS:  I reviewed today's most current labs and imaging studies.   Pertinent labs include:  Recent Labs      10/02/18   0415  10/01/18   1103   WBC  5.3  5.1   HGB  9.8*  10.3*   HCT  30.9*  31.9*   PLT  230  246     Recent Labs      10/02/18   0415  10/01/18   1103   NA  143  142   K  3.0*  3.0*   CL  109*  107   CO2  28  31   GLU  90  124*   BUN  10  8   CREA  0.82  0.89   CA  7.8*  7.8*       Signed: Victor Manuel Martino MD

## 2018-10-02 NOTE — PROGRESS NOTES
Called answering service [address] for Dr. Dejah Ibarra.  To notify about physical therapy's recorded orthostatics this AM    1133 - Dr. Dejah Ibarra

## 2018-10-03 LAB
ANION GAP SERPL CALC-SCNC: 7 MMOL/L (ref 5–15)
BUN SERPL-MCNC: 9 MG/DL (ref 6–20)
BUN/CREAT SERPL: 11 (ref 12–20)
CALCIUM SERPL-MCNC: 7.5 MG/DL (ref 8.5–10.1)
CHLORIDE SERPL-SCNC: 109 MMOL/L (ref 97–108)
CO2 SERPL-SCNC: 27 MMOL/L (ref 21–32)
COMMENT, HOLDF: NORMAL
CREAT SERPL-MCNC: 0.8 MG/DL (ref 0.55–1.02)
ERYTHROCYTE [DISTWIDTH] IN BLOOD BY AUTOMATED COUNT: 14.6 % (ref 11.5–14.5)
GLUCOSE BLD STRIP.AUTO-MCNC: 125 MG/DL (ref 65–100)
GLUCOSE BLD STRIP.AUTO-MCNC: 130 MG/DL (ref 65–100)
GLUCOSE BLD STRIP.AUTO-MCNC: 154 MG/DL (ref 65–100)
GLUCOSE BLD STRIP.AUTO-MCNC: 93 MG/DL (ref 65–100)
GLUCOSE SERPL-MCNC: 102 MG/DL (ref 65–100)
HCT VFR BLD AUTO: 27.4 % (ref 35–47)
HGB BLD-MCNC: 8.7 G/DL (ref 11.5–16)
MCH RBC QN AUTO: 27.3 PG (ref 26–34)
MCHC RBC AUTO-ENTMCNC: 31.8 G/DL (ref 30–36.5)
MCV RBC AUTO: 85.9 FL (ref 80–99)
NRBC # BLD: 0 K/UL (ref 0–0.01)
NRBC BLD-RTO: 0 PER 100 WBC
PLATELET # BLD AUTO: 222 K/UL (ref 150–400)
PMV BLD AUTO: 10 FL (ref 8.9–12.9)
POTASSIUM SERPL-SCNC: 3.3 MMOL/L (ref 3.5–5.1)
RBC # BLD AUTO: 3.19 M/UL (ref 3.8–5.2)
SAMPLES BEING HELD,HOLD: NORMAL
SERVICE CMNT-IMP: ABNORMAL
SERVICE CMNT-IMP: NORMAL
SODIUM SERPL-SCNC: 143 MMOL/L (ref 136–145)
TROPONIN I SERPL-MCNC: <0.05 NG/ML
WBC # BLD AUTO: 5.2 K/UL (ref 3.6–11)

## 2018-10-03 PROCEDURE — 74011250636 HC RX REV CODE- 250/636: Performed by: HOSPITALIST

## 2018-10-03 PROCEDURE — 94760 N-INVAS EAR/PLS OXIMETRY 1: CPT

## 2018-10-03 PROCEDURE — 84484 ASSAY OF TROPONIN QUANT: CPT | Performed by: INTERNAL MEDICINE

## 2018-10-03 PROCEDURE — 74011250637 HC RX REV CODE- 250/637: Performed by: PSYCHIATRY & NEUROLOGY

## 2018-10-03 PROCEDURE — 74011250637 HC RX REV CODE- 250/637: Performed by: INTERNAL MEDICINE

## 2018-10-03 PROCEDURE — 80048 BASIC METABOLIC PNL TOTAL CA: CPT | Performed by: INTERNAL MEDICINE

## 2018-10-03 PROCEDURE — 82962 GLUCOSE BLOOD TEST: CPT

## 2018-10-03 PROCEDURE — 36415 COLL VENOUS BLD VENIPUNCTURE: CPT | Performed by: INTERNAL MEDICINE

## 2018-10-03 PROCEDURE — 93306 TTE W/DOPPLER COMPLETE: CPT

## 2018-10-03 PROCEDURE — 74011250636 HC RX REV CODE- 250/636: Performed by: INTERNAL MEDICINE

## 2018-10-03 PROCEDURE — 85027 COMPLETE CBC AUTOMATED: CPT | Performed by: INTERNAL MEDICINE

## 2018-10-03 PROCEDURE — 65660000000 HC RM CCU STEPDOWN

## 2018-10-03 RX ORDER — POTASSIUM CHLORIDE 20 MEQ/1
40 TABLET, EXTENDED RELEASE ORAL
Status: COMPLETED | OUTPATIENT
Start: 2018-10-03 | End: 2018-10-03

## 2018-10-03 RX ADMIN — METOPROLOL TARTRATE 25 MG: 25 TABLET ORAL at 22:41

## 2018-10-03 RX ADMIN — DILTIAZEM HYDROCHLORIDE 120 MG: 120 CAPSULE, COATED, EXTENDED RELEASE ORAL at 08:27

## 2018-10-03 RX ADMIN — MELATONIN TAB 3 MG 6 MG: 3 TAB at 22:41

## 2018-10-03 RX ADMIN — DEXTROSE MONOHYDRATE 100 ML/HR: 5 INJECTION, SOLUTION INTRAVENOUS at 10:57

## 2018-10-03 RX ADMIN — FAMOTIDINE 20 MG: 20 TABLET ORAL at 08:27

## 2018-10-03 RX ADMIN — DEXTROSE MONOHYDRATE 100 ML/HR: 5 INJECTION, SOLUTION INTRAVENOUS at 22:50

## 2018-10-03 RX ADMIN — QUETIAPINE FUMARATE 25 MG: 25 TABLET ORAL at 22:40

## 2018-10-03 RX ADMIN — ENOXAPARIN SODIUM 40 MG: 100 INJECTION SUBCUTANEOUS at 08:27

## 2018-10-03 RX ADMIN — SENNOSIDES AND DOCUSATE SODIUM 1 TABLET: 8.6; 5 TABLET ORAL at 08:27

## 2018-10-03 RX ADMIN — LORAZEPAM 0.5 MG: 0.5 TABLET ORAL at 22:41

## 2018-10-03 RX ADMIN — POTASSIUM CHLORIDE 40 MEQ: 20 TABLET, EXTENDED RELEASE ORAL at 11:17

## 2018-10-03 RX ADMIN — FAMOTIDINE 20 MG: 20 TABLET ORAL at 17:56

## 2018-10-03 RX ADMIN — Medication 10 ML: at 05:16

## 2018-10-03 RX ADMIN — DEXTROSE MONOHYDRATE 100 ML/HR: 5 INJECTION, SOLUTION INTRAVENOUS at 02:00

## 2018-10-03 RX ADMIN — LOSARTAN POTASSIUM 25 MG: 25 TABLET ORAL at 22:40

## 2018-10-03 RX ADMIN — METOPROLOL TARTRATE 25 MG: 25 TABLET ORAL at 08:27

## 2018-10-03 RX ADMIN — Medication 10 ML: at 23:43

## 2018-10-03 NOTE — PROGRESS NOTES
Called in to urology office to inquire about consult. Rep stated that the consult was called on Monday; pt is a a pt of MD Pushpa Florez.

## 2018-10-03 NOTE — PROGRESS NOTES
Problem: Pressure Injury - Risk of  Goal: *Prevention of pressure injury  Document Freddy Scale and appropriate interventions in the flowsheet. Outcome: Progressing Towards Goal  Pressure Injury Interventions:  Sensory Interventions: Pressure redistribution bed/mattress (bed type)    Moisture Interventions: Absorbent underpads    Activity Interventions: Increase time out of bed, Pressure redistribution bed/mattress(bed type)    Mobility Interventions: HOB 30 degrees or less, Pressure redistribution bed/mattress (bed type)    Nutrition Interventions: Document food/fluid/supplement intake    Friction and Shear Interventions: HOB 30 degrees or less, Lift sheet               Problem: Falls - Risk of  Goal: *Absence of Falls  Document Romi Fall Risk and appropriate interventions in the flowsheet. Outcome: Progressing Towards Goal  Fall Risk Interventions:  Mobility Interventions: Patient to call before getting OOB, Bed/chair exit alarm    Mentation Interventions: Increase mobility, More frequent rounding, Evaluate medications/consider consulting pharmacy    Medication Interventions: Patient to call before getting OOB, Teach patient to arise slowly    Elimination Interventions:  Toileting schedule/hourly rounds, Toilet paper/wipes in reach, Patient to call for help with toileting needs, Call light in reach, Bed/chair exit alarm             Comments: Lap jesus    Afib-> SB     9/30    Martinez - retention    -------------------    Trop x 2 (-)    2Decho _    EKG - afib yest, now Sb    CXR done yest.    Discharge with martinez

## 2018-10-03 NOTE — PROGRESS NOTES
Occupational Therapy  Patient is currently off the floor and unavailable for OT treatment. Will defer for now but continue to follow.

## 2018-10-03 NOTE — PROGRESS NOTES
Chart reviewed. Attempted to see pt this AM for PT intervention however pt currently off the floor. Will continue to follow.     Brett Mancini, PT, DPT

## 2018-10-03 NOTE — PROGRESS NOTES
Hospitalist Progress Note    NAME: Johnny Rm   :  1945   MRN:  [id number]       Assessment / Plan:  Acute choledocholithiasis with acute cholecystitis and abnormal elevated LFTs; POA. S/p ERCP and subsequent lap jesus  Intractable N/V: improved. - LFTs with NFM 6038,  , Alk Phos 146, total Bilirubin 2.8; labs improving  RUQ Ultrasound consistent with gallstones with acute cholecystitis changes, dilated CBD  - HIDA scan with tracer in the liver but no activity in gallbladder, common bile duct, small bowel after 3 hours suggesting common bile duct obstruction.  - MRCP showed cholelithiasis.  Pericholecystic edema suspicious for cholecystitis.  Common duct is normal  - s/p ERCP on   - s/p lap jesus with cholangiogram showed Jaundice, acute inflammation of the gallbladder, small gallstones in the gallbladder and cystic duct.  No obvious CBD filling defect on cholangiogram (the defect on the second image is the cholangiogram catheter balloon), but no passage of contrast into the duodenum, consistent with obstruction. - Completed IV Levaquin and Flagyl empirically on   - D/Rex norco and morphine IV due to constipation, hypoactive bowels and n/v  - On Simethicone   - Trend labs  - need to repeat ERCP in 1-2 months  - continue zofran prn  - Monitor labs    Atypical chest pain. EKG with AF 10/2. Now appears to be back in NSR  - BB titrated  - ECHO pending  - monitor and replace lytes     Urinary retention and dysuria  - Repeat UA WNL  - Follow up with her urologist Dr Koehler Shoulder  Acute encephalopathy: now resolved  - continue low dose seroquel      Constipation: improved  - cont miralax, docusate  - stopped narcotics  - encourage ambulation    Type 2 diabetes mellitus POA currently off medications per her report   - A1C 5.5, poor appetite      Essential hypertension POA  Orthostatic hypotension  - continue ARB and Cardizem CD with holding parameters.    - metoprolol decreased  - prn hydralazine    Coronary disease POA: stable  - Continue ASA  - currently off of cholesterol medications because of LFTs    Insomnia, POA  - continue melatonin at bedtime     Body mass index is 27.1 kg/(m^2).: 25.0 - 29.9 Overweight    25.0 - 29.9 Overweight / Body mass index is 27.1 kg/(m^2). Code status: Full  Prophylaxis: Lovenox  Recommended Disposition: TBD     Subjective:     Chief Complaint / Reason for Physician Visit: Abd pain, n/v    Chest pain is resolved. Appears to have spontaneously converted to NSR. Mild headache. Review of Systems: 14 pt ROS unremarkable except as stated above. Objective:     VITALS:   Last 24hrs VS reviewed since prior progress note. Most recent are:  Patient Vitals for the past 24 hrs:   Temp Pulse Resp BP SpO2   10/03/18 1156 98.6 °F (37 °C) 63 18 118/81 100 %   10/03/18 0729 99.3 °F (37.4 °C) 69 16 132/59 98 %   10/03/18 0400 98.5 °F (36.9 °C) (!) 58 16 99/56 100 %   10/02/18 2345 98.7 °F (37.1 °C) 60 16 115/50 99 %   10/02/18 1932 98.9 °F (37.2 °C) 71 17 118/57 100 %   10/02/18 1753 98.8 °F (37.1 °C) 66 16 114/52 100 %       Intake/Output Summary (Last 24 hours) at 10/03/18 1413  Last data filed at 10/03/18 1100   Gross per 24 hour   Intake             1598 ml   Output             2050 ml   Net             -452 ml        PHYSICAL EXAM:  General: Alert, cooperative, no acute distress, frail  EENT:  EOMI. Anicteric sclerae. MMM  Resp:  CTA bilaterally, no wheezing or rales. No accessory muscle use  CV:  Regular  rhythm,  Normal S1 and S2, No edema  GI:  Soft, Non distended, Non tender.  +Bowel sounds.   Dressing over RUQ  Neurologic:  Alert and oriented X 3, normal speech, no gross neuro deficits  Skin:  Warm and dry    Reviewed most current lab test results and cultures  YES  Reviewed most current radiology test results   YES  Review and summation of old records today    NO  Reviewed patient's current orders and MAR    YES  PMH/SH reviewed - no change compared to H&P    ________________________________________________________________________  Clarice Cameron MD     Procedures: see electronic medical records for all procedures/Xrays and details which were not copied into this note but were reviewed prior to creation of Plan. LABS:  I reviewed today's most current labs and imaging studies.   Pertinent labs include:  Recent Labs      10/03/18   0350  10/02/18   0415  10/01/18   1103   WBC  5.2  5.3  5.1   HGB  8.7*  9.8*  10.3*   HCT  27.4*  30.9*  31.9*   PLT  222  230  246     Recent Labs      10/03/18   0350  10/02/18   0415  10/01/18   1103   NA  143  143  142   K  3.3*  3.0*  3.0*   CL  109*  109*  107   CO2  27  28  31   GLU  102*  90  124*   BUN  9  10  8   CREA  0.80  0.82  0.89   CA  7.5*  7.8*  7.8*       Signed: Clarice Cameron MD

## 2018-10-04 LAB
ANION GAP SERPL CALC-SCNC: 6 MMOL/L (ref 5–15)
BUN SERPL-MCNC: 9 MG/DL (ref 6–20)
BUN/CREAT SERPL: 12 (ref 12–20)
CALCIUM SERPL-MCNC: 7.5 MG/DL (ref 8.5–10.1)
CHLORIDE SERPL-SCNC: 111 MMOL/L (ref 97–108)
CO2 SERPL-SCNC: 27 MMOL/L (ref 21–32)
CREAT SERPL-MCNC: 0.75 MG/DL (ref 0.55–1.02)
ERYTHROCYTE [DISTWIDTH] IN BLOOD BY AUTOMATED COUNT: 14.5 % (ref 11.5–14.5)
GLUCOSE BLD STRIP.AUTO-MCNC: 115 MG/DL (ref 65–100)
GLUCOSE BLD STRIP.AUTO-MCNC: 116 MG/DL (ref 65–100)
GLUCOSE BLD STRIP.AUTO-MCNC: 118 MG/DL (ref 65–100)
GLUCOSE BLD STRIP.AUTO-MCNC: 139 MG/DL (ref 65–100)
GLUCOSE SERPL-MCNC: 93 MG/DL (ref 65–100)
HCT VFR BLD AUTO: 27.7 % (ref 35–47)
HGB BLD-MCNC: 8.8 G/DL (ref 11.5–16)
MCH RBC QN AUTO: 27.4 PG (ref 26–34)
MCHC RBC AUTO-ENTMCNC: 31.8 G/DL (ref 30–36.5)
MCV RBC AUTO: 86.3 FL (ref 80–99)
NRBC # BLD: 0 K/UL (ref 0–0.01)
NRBC BLD-RTO: 0 PER 100 WBC
PLATELET # BLD AUTO: 225 K/UL (ref 150–400)
PMV BLD AUTO: 10 FL (ref 8.9–12.9)
POTASSIUM SERPL-SCNC: 3.7 MMOL/L (ref 3.5–5.1)
RBC # BLD AUTO: 3.21 M/UL (ref 3.8–5.2)
SERVICE CMNT-IMP: ABNORMAL
SODIUM SERPL-SCNC: 144 MMOL/L (ref 136–145)
WBC # BLD AUTO: 4.8 K/UL (ref 3.6–11)

## 2018-10-04 PROCEDURE — 97116 GAIT TRAINING THERAPY: CPT

## 2018-10-04 PROCEDURE — 80048 BASIC METABOLIC PNL TOTAL CA: CPT | Performed by: INTERNAL MEDICINE

## 2018-10-04 PROCEDURE — 65660000000 HC RM CCU STEPDOWN

## 2018-10-04 PROCEDURE — 51798 US URINE CAPACITY MEASURE: CPT

## 2018-10-04 PROCEDURE — 74011250636 HC RX REV CODE- 250/636: Performed by: INTERNAL MEDICINE

## 2018-10-04 PROCEDURE — 85027 COMPLETE CBC AUTOMATED: CPT | Performed by: INTERNAL MEDICINE

## 2018-10-04 PROCEDURE — 82962 GLUCOSE BLOOD TEST: CPT

## 2018-10-04 PROCEDURE — 74011250636 HC RX REV CODE- 250/636: Performed by: HOSPITALIST

## 2018-10-04 PROCEDURE — 74011250637 HC RX REV CODE- 250/637: Performed by: INTERNAL MEDICINE

## 2018-10-04 PROCEDURE — 74011250637 HC RX REV CODE- 250/637: Performed by: HOSPITALIST

## 2018-10-04 PROCEDURE — 97535 SELF CARE MNGMENT TRAINING: CPT | Performed by: OCCUPATIONAL THERAPIST

## 2018-10-04 PROCEDURE — 36415 COLL VENOUS BLD VENIPUNCTURE: CPT | Performed by: INTERNAL MEDICINE

## 2018-10-04 PROCEDURE — 74011250637 HC RX REV CODE- 250/637: Performed by: PSYCHIATRY & NEUROLOGY

## 2018-10-04 RX ADMIN — Medication 10 ML: at 21:35

## 2018-10-04 RX ADMIN — BISACODYL 10 MG: 10 SUPPOSITORY RECTAL at 05:51

## 2018-10-04 RX ADMIN — FAMOTIDINE 20 MG: 20 TABLET ORAL at 09:53

## 2018-10-04 RX ADMIN — ENOXAPARIN SODIUM 40 MG: 100 INJECTION SUBCUTANEOUS at 09:54

## 2018-10-04 RX ADMIN — LOSARTAN POTASSIUM 25 MG: 25 TABLET ORAL at 21:34

## 2018-10-04 RX ADMIN — METOPROLOL TARTRATE 25 MG: 25 TABLET ORAL at 21:34

## 2018-10-04 RX ADMIN — DEXTROSE MONOHYDRATE 100 ML/HR: 5 INJECTION, SOLUTION INTRAVENOUS at 23:11

## 2018-10-04 RX ADMIN — Medication 10 ML: at 05:50

## 2018-10-04 RX ADMIN — Medication 10 ML: at 09:55

## 2018-10-04 RX ADMIN — METOPROLOL TARTRATE 25 MG: 25 TABLET ORAL at 09:53

## 2018-10-04 RX ADMIN — MELATONIN TAB 3 MG 6 MG: 3 TAB at 21:34

## 2018-10-04 RX ADMIN — ACETAMINOPHEN 650 MG: 325 TABLET ORAL at 17:32

## 2018-10-04 RX ADMIN — FAMOTIDINE 20 MG: 20 TABLET ORAL at 17:33

## 2018-10-04 RX ADMIN — LORAZEPAM 0.5 MG: 0.5 TABLET ORAL at 21:42

## 2018-10-04 RX ADMIN — DEXTROSE MONOHYDRATE 100 ML/HR: 5 INJECTION, SOLUTION INTRAVENOUS at 10:04

## 2018-10-04 RX ADMIN — DILTIAZEM HYDROCHLORIDE 120 MG: 120 CAPSULE, COATED, EXTENDED RELEASE ORAL at 09:54

## 2018-10-04 RX ADMIN — QUETIAPINE FUMARATE 25 MG: 25 TABLET ORAL at 21:34

## 2018-10-04 NOTE — PROGRESS NOTES
Hospitalist Progress Note    NAME: Marybeth Slater   :  1945   MRN:  [id number]       Assessment / Plan:  Acute choledocholithiasis with acute cholecystitis and abnormal elevated LFTs; POA. S/p ERCP and subsequent lap jesus  Intractable N/V: improved. - LFTs with JVK 2309,  , Alk Phos 146, total Bilirubin 2.8; labs improving  RUQ Ultrasound consistent with gallstones with acute cholecystitis changes, dilated CBD  - HIDA scan with tracer in the liver but no activity in gallbladder, common bile duct, small bowel after 3 hours suggesting common bile duct obstruction.  - MRCP showed cholelithiasis.  Pericholecystic edema suspicious for cholecystitis.  Common duct is normal  - s/p ERCP on   - s/p lap jesus with cholangiogram showed Jaundice, acute inflammation of the gallbladder, small gallstones in the gallbladder and cystic duct.  No obvious CBD filling defect on cholangiogram (the defect on the second image is the cholangiogram catheter balloon), but no passage of contrast into the duodenum, consistent with obstruction. - Completed IV Levaquin and Flagyl empirically on   - D/Rex norco and morphine IV due to constipation, hypoactive bowels and n/v  - On Simethicone   - Trend labs  - need to repeat ERCP in 1-2 months  - continue zofran prn  - Monitor labs    Atypical chest pain. EKG with AF 10/2. Now appears to be back in NSR  - BB titrated  - ECHO with preserved EF  - monitor and replace lytes     Urinary retention and dysuria  - Repeat UA WNL  - Follow up with her urologist Dr Iris Hathaway  Acute encephalopathy: now resolved  - continue low dose seroquel      Constipation: improved  - cont miralax, docusate  - stopped narcotics  - encourage ambulation    Type 2 diabetes mellitus POA currently off medications per her report   - A1C 5.5, poor appetite      Essential hypertension POA  Orthostatic hypotension  - continue ARB and Cardizem CD with holding parameters.    - metoprolol decreased  - prn hydralazine    Coronary disease POA: stable  - Continue ASA  - currently off of cholesterol medications because of LFTs    Insomnia, POA  - continue melatonin at bedtime     CM for SNF per PT recs    25.0 - 29.9 Overweight / Body mass index is 27.1 kg/(m^2). Code status: Full  Prophylaxis: Lovenox  Recommended Disposition: TBD     Subjective:     Chief Complaint / Reason for Physician Visit: Abd pain, n/v    Chest pain is resolved. Continues to be in NSR. Persistent mild headache. Review of Systems: 14 pt ROS unremarkable except as stated above. Objective:     VITALS:   Last 24hrs VS reviewed since prior progress note. Most recent are:  Patient Vitals for the past 24 hrs:   Temp Pulse Resp BP SpO2   10/04/18 1142 - 68 - 115/68 -   10/04/18 1045 97.4 °F (36.3 °C) 74 18 136/58 100 %   10/04/18 0954 - 80 - 145/84 -   10/04/18 0953 - 80 - 145/84 -   10/04/18 0951 - 80 - 145/84 100 %   10/04/18 0749 98.7 °F (37.1 °C) 72 18 138/65 98 %   10/04/18 0430 98.1 °F (36.7 °C) 61 18 119/65 100 %   10/04/18 0001 98.5 °F (36.9 °C) 72 18 124/60 100 %   10/03/18 1923 99.1 °F (37.3 °C) 68 16 140/61 100 %   10/03/18 1619 98.3 °F (36.8 °C) 70 18 125/59 97 %       Intake/Output Summary (Last 24 hours) at 10/04/18 1337  Last data filed at 10/04/18 1303   Gross per 24 hour   Intake          6431.67 ml   Output             1951 ml   Net          4480.67 ml        PHYSICAL EXAM:  General: Alert, cooperative, no acute distress, frail  EENT:  EOMI. Anicteric sclerae. MMM  Resp:  CTA bilaterally, no wheezing or rales. No accessory muscle use  CV:  Regular  rhythm,  Normal S1 and S2, No edema  GI:  Soft, Non distended, Non tender.  +Bowel sounds.   Dressing over RUQ  Neurologic:  Alert and oriented X 3, normal speech, no gross neuro deficits  Skin:  Warm and dry    Reviewed most current lab test results and cultures  YES  Reviewed most current radiology test results   YES  Review and summation of old records today NO  Reviewed patient's current orders and MAR    YES  PMH/SH reviewed - no change compared to H&P    ________________________________________________________________________  Kristen Castillo MD     Procedures: see electronic medical records for all procedures/Xrays and details which were not copied into this note but were reviewed prior to creation of Plan. LABS:  I reviewed today's most current labs and imaging studies.   Pertinent labs include:  Recent Labs      10/04/18   0358  10/03/18   0350  10/02/18   0415   WBC  4.8  5.2  5.3   HGB  8.8*  8.7*  9.8*   HCT  27.7*  27.4*  30.9*   PLT  225  222  230     Recent Labs      10/04/18   0358  10/03/18   0350  10/02/18   0415   NA  144  143  143   K  3.7  3.3*  3.0*   CL  111*  109*  109*   CO2  27  27  28   GLU  93  102*  90   BUN  9  9  10   CREA  0.75  0.80  0.82   CA  7.5*  7.5*  7.8*       Signed: Kristen Castillo MD

## 2018-10-04 NOTE — PROGRESS NOTES
Problem: Self Care Deficits Care Plan (Adult)  Goal: *Acute Goals and Plan of Care (Insert Text)  Occupational Therapy Goals  10/4/2018  All goals were reviewed and remain appropriate to continue for 7 days. Initiated 9/23/2018  1. Patient will perform grooming standing at the sink with modified independence within 7 day(s). 2.  Patient will perform bathing with supervision and cues using AE as needed within 7 day(s). 3.  Patient will perform lower body dressing with supervision and cues using AE as needed within 7 day(s). 4.  Patient will perform toilet transfers with modified independence within 7 day(s). 5.  Patient will perform all aspects of toileting with modified independence within 7 day(s). Occupational Therapy TREATMENT: WEEKLY REASSESSMENT  Patient: Vee Woodward (30 y.o. female)  Date: 10/4/2018  Diagnosis: Choledocholithiasis with acute cholecystitis  CBD stone  CHOLELITHIASIS  CBD Stone <principal problem not specified>  Procedure(s) (LRB):  ENDOSCOPIC RETROGRADE CHOLANGIOPANCREATOGRAPHY (ERCP) (N/A) 13 Days Post-Op  Precautions: Fall  Chart, occupational therapy assessment, plan of care, and goals were reviewed. ASSESSMENT:  Pt was eating fast food upon arrival, her daughter present. She was agreeable to OOB activities and ambulated in room using RW and Iban/CGA--BP was stable. Pt ambulated into the bathroom using RW with Iban and Iban for toilet transfer. Pt unable to tolerate standing at sink for hand washing and was seated and provided saniwipes. Pt continues to need encouragement to perform adls. She is making progress towards goals, but they are appropriate to continue. She will likely need SNF rehab at discharge.     Progression toward goals:  []            Improving appropriately and progressing toward goals  [x]            Improving slowly and progressing toward goals  []            Not making progress toward goals and plan of care will be adjusted     PLAN:  Goals have been updated based on progression since last assessment. Patient continues to benefit from skilled intervention to address the above impairments. Continue to follow patient 3 times a week to address goals. Planned Interventions:  [x]                    Self Care Training                  [x]             Therapeutic Activities  [x]                    Functional Mobility Training    [x]             Cognitive Retraining/assessment  [x]                    Therapeutic Exercises           [x]             Endurance Activities  [x]                    Balance Training                   [x]             Neuromuscular Re-Education  []                    Visual/Perceptual Training     [x]        Home Safety Training  [x]                    Patient Education                 [x]             Family Training/Education  []                    Other (comment):  Discharge Recommendations: Rehab  Further Equipment Recommendations for Discharge: tbd     SUBJECTIVE:   Patient stated my head feels tight back here.     OBJECTIVE DATA SUMMARY:   Cognitive/Behavioral Status:  Neurologic State: Alert;Confused  Orientation Level: Disoriented to place; Disoriented to situation;Disoriented to time  Cognition: Decreased attention/concentration;Decreased command following; Impaired decision making             Functional Mobility and Transfers for ADLs:  Bed Mobility:       Transfers:  Sit to Stand: Minimum assistance;Assist x1  Functional Transfers  Bathroom Mobility: Minimum assistance  Toilet Transfer : Minimum assistance        Balance:  Sitting: Intact  Standing: Impaired; With support  Standing - Static: Fair;Constant support  Standing - Dynamic : Fair    ADL Intervention:  Feeding  Feeding Assistance: Supervision/set-up  Food to Mouth: Independent  Drink to Mouth:  Independent    Grooming  Washing Hands: Supervision/set-up (seated in chair-poor tolerance for standing grooming at sink)  Adaptive Equipment:  (rw)                   Lower Body Dressing Assistance  Socks: Supervision/set-up  Leg Crossed Method Used: Yes    Toileting  Toileting Assistance: Total assistance(dependent) -catheter               A  Barthel Index:    Bathin  Bladder: 0  Bowels: 10  Groomin  Dressin  Feeding: 10  Mobility: 0  Stairs: 0  Toilet Use: 5  Transfer (Bed to Chair and Back): 10  Total: 45       Barthel and G-code impairment scale:  Percentage of impairment CH  0% CI  1-19% CJ  20-39% CK  40-59% CL  60-79% CM  80-99% CN  100%   Barthel Score 0-100 100 99-80 79-60 59-40 20-39 1-19   0   Barthel Score 0-20 20 17-19 13-16 9-12 5-8 1-4 0      The Barthel ADL Index: Guidelines  1. The index should be used as a record of what a patient does, not as a record of what a patient could do. 2. The main aim is to establish degree of independence from any help, physical or verbal, however minor and for whatever reason. 3. The need for supervision renders the patient not independent. 4. A patient's performance should be established using the best available evidence. Asking the patient, friends/relatives and nurses are the usual sources, but direct observation and common sense are also important. However direct testing is not needed. 5. Usually the patient's performance over the preceding 24-48 hours is important, but occasionally longer periods will be relevant. 6. Middle categories imply that the patient supplies over 50 per cent of the effort. 7. Use of aids to be independent is allowed. Kwesi Burgos., Barthel, D.W. (2660). Functional evaluation: the Barthel Index. [address] [facility] (14)2. SILVER Farrar, Ines Garcia., Betzaida Manzanares [place], [address] [facility] (). Measuring the change indisability after inpatient rehabilitation; comparison of the responsiveness of the Barthel Index and Functional [place] Measure. Journal of Neurology, Neurosurgery, and Psychiatry, 66(4), 409-140.   DOUGLAS Nix, SUMAN Florian, Felix Aburto M.A. (2004.) Assessment of post-stroke quality of life in cost-effectiveness studies: The usefulness of the Barthel Index and the EuroQoL-5D. Quality of Life Research, 13, 311-27             Therapeutic Exercises:   Encouraged OOB and sitting up in chair--calling for nursing assistance. Pt demonstrates poor safety awareness  Pain:  Pain Scale 1: Numeric (0 - 10)  Pain Intensity 1: did not rate  Pain Location 1: Head  Pain Orientation 1: posterior  Pain Description 1: reports that her head feels tight and she has had several tests in the past re: this   Pain Intervention(s) 1: Medication (see MAR)  Activity Tolerance:   BP stable, but tolerance for adl mobiltiy decreased  Please refer to the flowsheet for vital signs taken during this treatment.   After treatment:   [x] Patient left in no apparent distress sitting up in chair  [] Patient left in no apparent distress in bed  [x] Call bell left within reach  [x] Nursing notified  [x] Caregiver present  [x] Bed alarm activated    COMMUNICATION/COLLABORATION:   The patients plan of care was discussed with: Physical Therapist and Registered Nurse    HEATH Archer/L  Time Calculation: 27 mins                              ,

## 2018-10-04 NOTE — PROGRESS NOTES
*CM acknowledged consult*    CM contacted pt's daughter, via telephone regarding pt's updated d/c needs and plans. However, the attempt was unsuccessful and CM left a voicemail requesting a return call. UPDATE: 12:27PM    CM met with pt and pt's daughter: Wilner Andrade while in pt's room. CM informed pt and daughter that recommendations for snf is being suggested. Wilner Andrade is on board for snf placement, however, pt is denying snf, but is uncertain if she wants to go. CM has connected pt with representative from [facility]. Pt and Wilner Andrade is waiting for info session for snf.     FERNANDO Baker   [phone]

## 2018-10-04 NOTE — PROGRESS NOTES
General Surgery End of Shift Nursing Note    Bedside shift change report given to Karli (oncoming nurse) by Ludy Christy (offgoing nurse). Report included the following information SBAR, Kardex, Intake/Output, MAR and Recent Results. Shift worked:   C   Summary of shift:    0   Issues for physician to address:   0     Number times ambulated in hallway past shift: 0    Number of times OOB to chair past shift: 2    Pain Management:  Current medication: Tylenol  Patient states pain is manageable on current pain medication: YES    GI:    Current diet:  DIET DIABETIC CONSISTENT CARB Regular  DIET NUTRITIONAL SUPPLEMENTS No; Breakfast, Lunch; Glucerna Shake    Tolerating current diet: YES  Passing flatus: YES  Last Bowel Movement: today   Appearance: 0    Respiratory:    Incentive Spirometer at bedside: YES  Patient instructed on use: YES    Patient Safety:    Falls Score: 2  Bed Alarm On? Yes  Sitter?  No    Jsaper Tomas RN

## 2018-10-04 NOTE — PROGRESS NOTES
Problem: Mobility Impaired (Adult and Pediatric)  Goal: *Acute Goals and Plan of Care (Insert Text)  Physical Therapy Goals  Revised 9/28/2018 - remain appropriate 10/4/18  1. Patient will move from supine to sit and sit to supine , scoot up and down and roll side to side in bed with supervision/set-up within 7 day(s). 2.  Patient will transfer from bed to chair and chair to bed with supervision/set-up using the least restrictive device within 7 day(s). 3.  Patient will perform sit to stand with supervision/set-up within 7 day(s). 4.  Patient will ambulate with supervision/set-up for 50 feet with the least restrictive device within 7 day(s). Initiated 9/21/2018  1. Patient will move from supine to sit and sit to supine , scoot up and down and roll side to side in bed with independence within 7 day(s). 2.  Patient will transfer from bed to chair and chair to bed with independence using the least restrictive device within 7 day(s). 3.  Patient will perform sit to stand with independence within 7 day(s). 4.  Patient will ambulate with independence for 200 feet with the least restrictive device within 7 day(s). physical Therapy TREATMENT: WEEKLY REASSESSMENT  Patient: Radha Couch (6 y.o. female)  Date: 10/4/2018  Diagnosis: Choledocholithiasis with acute cholecystitis  CBD stone  CHOLELITHIASIS  CBD Stone <principal problem not specified>  Procedure(s) (LRB):  ENDOSCOPIC RETROGRADE CHOLANGIOPANCREATOGRAPHY (ERCP) (N/A) 13 Days Post-Op  Precautions: Fall  Chart, physical therapy assessment, plan of care and goals were reviewed. ASSESSMENT:  Pt continues to present with impaired functional mobility secondary to impulsiveness, decreased insight, poor safety awareness, impaired balance, increased weakness, decreased endurance/activity tolerance, and increased pain levels. Pt received seated in bedside chair, agreeable to participation with therapy.  Vital signs assessed throughout therapy session and remained stable on RA. Pt required CG/minAx1 throughout all aspects of mobility including sit<>stand transfers and ambulation with RW support. Pt ambulated 45ft (30ft, 15ft w/ seated rest break b/t) w/ RW and CG/minAx1 before fatigue, exhibiting fair gait stability secondary to increased trunk sway, shuffled gait pattern, and decreased gait speed. Pt required MAX verbal cueing for participation with therapy and progression of functional mobility. Pt remains a large falls risk and is NOT SAFE to return home. Highly recommend SNF at discharge. Patient's progression toward goals since last assessment: Pt is making slow gains, limited largely due to orthostatic hypotension and willingness to participate with therapy     PLAN:  Goals have been updated based on progression since last assessment. Patient continues to benefit from skilled intervention to address the above impairments. Continue to follow the patient 3 times a week to address goals. Planned Interventions:  [x]              Bed Mobility Training             []       Neuromuscular Re-Education  [x]              Transfer Training                   []       Orthotic/Prosthetic Training  [x]              Gait Training                         []       Modalities  [x]              Therapeutic Exercises           []       Edema Management/Control  [x]              Therapeutic Activities            [x]       Patient and Family Training/Education  []              Other (comment):  Discharge Recommendations: Skilled Nursing Facility  Further Equipment Recommendations for Discharge: TBD by facility     SUBJECTIVE:   Patient stated Oh, I can't do this, no no, I can't.     OBJECTIVE DATA SUMMARY:   Critical Behavior:  Neurologic State: Alert, Confused  Orientation Level: Disoriented to place, Disoriented to situation, Disoriented to time  Cognition: Decreased attention/concentration, Decreased command following, Impaired decision making  Safety/Judgement: Decreased awareness of need for assistance, Decreased awareness of need for safety, Decreased insight into deficits    Strength:                          Functional Mobility Training:  Bed Mobility:                    Transfers:  Sit to Stand: Minimum assistance;Assist x1  Stand to Sit: Contact guard assistance                             Balance:  Sitting: Intact  Standing: Impaired; With support  Standing - Static: Fair;Constant support  Standing - Dynamic : Fair  Ambulation/Gait Training:  Distance (ft): 45 Feet (ft)  Assistive Device: Walker, rolling;Gait belt  Ambulation - Level of Assistance: Minimal assistance;Assist x1        Gait Abnormalities: Decreased step clearance;Shuffling gait        Base of Support: Narrowed     Speed/Gale: Pace decreased (<100 feet/min); Shuffled  Step Length: Left shortened;Right shortened                 Pain:  Pain Scale 1: Numeric (0 - 10)  Pain Intensity 1: 0              Activity Tolerance:   VSS throughout on RA  Please refer to the flowsheet for vital signs taken during this treatment.   After treatment:   [x]  Patient left in no apparent distress sitting up in chair  []  Patient left in no apparent distress in bed  [x]  Call bell left within reach  [x]  Nursing notified  [x]  Caregiver present  [x]  Bed alarm activated    COMMUNICATION/COLLABORATION:   The patients plan of care was discussed with: Occupational Therapist, Registered Nurse and     Tor Stroud, PT, DPT   Time Calculation: 17 mins

## 2018-10-05 LAB
ANION GAP SERPL CALC-SCNC: 5 MMOL/L (ref 5–15)
BUN SERPL-MCNC: 9 MG/DL (ref 6–20)
BUN/CREAT SERPL: 11 (ref 12–20)
CALCIUM SERPL-MCNC: 7.8 MG/DL (ref 8.5–10.1)
CHLORIDE SERPL-SCNC: 108 MMOL/L (ref 97–108)
CO2 SERPL-SCNC: 29 MMOL/L (ref 21–32)
CREAT SERPL-MCNC: 0.82 MG/DL (ref 0.55–1.02)
ERYTHROCYTE [DISTWIDTH] IN BLOOD BY AUTOMATED COUNT: 14.5 % (ref 11.5–14.5)
GLUCOSE BLD STRIP.AUTO-MCNC: 102 MG/DL (ref 65–100)
GLUCOSE BLD STRIP.AUTO-MCNC: 112 MG/DL (ref 65–100)
GLUCOSE BLD STRIP.AUTO-MCNC: 127 MG/DL (ref 65–100)
GLUCOSE BLD STRIP.AUTO-MCNC: 134 MG/DL (ref 65–100)
GLUCOSE SERPL-MCNC: 100 MG/DL (ref 65–100)
HCT VFR BLD AUTO: 28.9 % (ref 35–47)
HGB BLD-MCNC: 9.1 G/DL (ref 11.5–16)
MAGNESIUM SERPL-MCNC: 1.9 MG/DL (ref 1.6–2.4)
MCH RBC QN AUTO: 27.6 PG (ref 26–34)
MCHC RBC AUTO-ENTMCNC: 31.5 G/DL (ref 30–36.5)
MCV RBC AUTO: 87.6 FL (ref 80–99)
NRBC # BLD: 0 K/UL (ref 0–0.01)
NRBC BLD-RTO: 0 PER 100 WBC
PLATELET # BLD AUTO: 178 K/UL (ref 150–400)
PMV BLD AUTO: 11.5 FL (ref 8.9–12.9)
POTASSIUM SERPL-SCNC: 3.3 MMOL/L (ref 3.5–5.1)
RBC # BLD AUTO: 3.3 M/UL (ref 3.8–5.2)
SERVICE CMNT-IMP: ABNORMAL
SODIUM SERPL-SCNC: 142 MMOL/L (ref 136–145)
WBC # BLD AUTO: 4.9 K/UL (ref 3.6–11)

## 2018-10-05 PROCEDURE — 80048 BASIC METABOLIC PNL TOTAL CA: CPT | Performed by: INTERNAL MEDICINE

## 2018-10-05 PROCEDURE — 36415 COLL VENOUS BLD VENIPUNCTURE: CPT | Performed by: INTERNAL MEDICINE

## 2018-10-05 PROCEDURE — 74011250637 HC RX REV CODE- 250/637: Performed by: INTERNAL MEDICINE

## 2018-10-05 PROCEDURE — 74011250636 HC RX REV CODE- 250/636: Performed by: INTERNAL MEDICINE

## 2018-10-05 PROCEDURE — 65660000000 HC RM CCU STEPDOWN

## 2018-10-05 PROCEDURE — 74011250637 HC RX REV CODE- 250/637: Performed by: PSYCHIATRY & NEUROLOGY

## 2018-10-05 PROCEDURE — 94760 N-INVAS EAR/PLS OXIMETRY 1: CPT

## 2018-10-05 PROCEDURE — 74011250636 HC RX REV CODE- 250/636: Performed by: HOSPITALIST

## 2018-10-05 PROCEDURE — 85027 COMPLETE CBC AUTOMATED: CPT | Performed by: INTERNAL MEDICINE

## 2018-10-05 PROCEDURE — 74011250637 HC RX REV CODE- 250/637: Performed by: HOSPITALIST

## 2018-10-05 PROCEDURE — 82962 GLUCOSE BLOOD TEST: CPT

## 2018-10-05 PROCEDURE — 83735 ASSAY OF MAGNESIUM: CPT | Performed by: INTERNAL MEDICINE

## 2018-10-05 RX ORDER — POTASSIUM CHLORIDE 20 MEQ/1
40 TABLET, EXTENDED RELEASE ORAL
Status: COMPLETED | OUTPATIENT
Start: 2018-10-05 | End: 2018-10-05

## 2018-10-05 RX ADMIN — ENOXAPARIN SODIUM 40 MG: 100 INJECTION SUBCUTANEOUS at 10:35

## 2018-10-05 RX ADMIN — LORAZEPAM 0.5 MG: 0.5 TABLET ORAL at 20:37

## 2018-10-05 RX ADMIN — ACETAMINOPHEN 650 MG: 325 TABLET ORAL at 10:48

## 2018-10-05 RX ADMIN — QUETIAPINE FUMARATE 25 MG: 25 TABLET ORAL at 20:38

## 2018-10-05 RX ADMIN — MELATONIN TAB 3 MG 6 MG: 3 TAB at 20:38

## 2018-10-05 RX ADMIN — Medication 10 ML: at 20:39

## 2018-10-05 RX ADMIN — DILTIAZEM HYDROCHLORIDE 120 MG: 120 CAPSULE, COATED, EXTENDED RELEASE ORAL at 10:35

## 2018-10-05 RX ADMIN — METOPROLOL TARTRATE 25 MG: 25 TABLET ORAL at 21:00

## 2018-10-05 RX ADMIN — FAMOTIDINE 20 MG: 20 TABLET ORAL at 18:00

## 2018-10-05 RX ADMIN — FAMOTIDINE 20 MG: 20 TABLET ORAL at 10:35

## 2018-10-05 RX ADMIN — DEXTROSE MONOHYDRATE 100 ML/HR: 5 INJECTION, SOLUTION INTRAVENOUS at 18:27

## 2018-10-05 RX ADMIN — SENNOSIDES AND DOCUSATE SODIUM 1 TABLET: 8.6; 5 TABLET ORAL at 10:35

## 2018-10-05 RX ADMIN — METOPROLOL TARTRATE 25 MG: 25 TABLET ORAL at 10:35

## 2018-10-05 RX ADMIN — LOSARTAN POTASSIUM 25 MG: 25 TABLET ORAL at 20:37

## 2018-10-05 RX ADMIN — POTASSIUM CHLORIDE 40 MEQ: 20 TABLET, EXTENDED RELEASE ORAL at 10:51

## 2018-10-05 NOTE — PROGRESS NOTES
Nutrition Assessment:    RECOMMENDATIONS:   Continue Diabetic diet and Glucerna BID    ASSESSMENT:   Chart reviewed, pt remains disoriented. Discharge planning noted. Her appetite has improved, it is now described as 'good' in RN flowsheet's. BM noted today. K+ 3.3, needs repletion. BG well controlled (102-139). Current intake is likely adequate to meet est needs. Dietitians Intervention(s)/Plan(s): Continue diet and supplements, monitor PO intake  SUBJECTIVE/OBJECTIVE:   Pt disoriented  Diet Order: Consistent carb, Other (comment) (Glucerna BID)  % Eaten:  Patient Vitals for the past 72 hrs:   % Diet Eaten   10/04/18 1303 75 %   10/03/18 1824 25 %     Pertinent Medications: pepcid, humalog, pericolace; [email or web address]). Chemistries:  Lab Results   Component Value Date/Time    Sodium 142 10/05/2018 02:58 AM    Potassium 3.3 (L) 10/05/2018 02:58 AM    Chloride 108 10/05/2018 02:58 AM    CO2 29 10/05/2018 02:58 AM    Anion gap 5 10/05/2018 02:58 AM    Glucose 100 10/05/2018 02:58 AM    BUN 9 10/05/2018 02:58 AM    Creatinine 0.82 10/05/2018 02:58 AM    BUN/Creatinine ratio 11 (L) 10/05/2018 02:58 AM    GFR est AA >60 10/05/2018 02:58 AM    GFR est non-AA >60 10/05/2018 02:58 AM    Calcium 7.8 (L) 10/05/2018 02:58 AM    Albumin 2.2 (L) 09/29/2018 04:24 AM      Anthropometrics: Height: 5' 7\" (170.2 cm) Weight: 78.5 kg (173 lb)   []bed scale    []stated   []unknown     IBW (%IBW):   ( ) UBW (%UBW):   (  %)    BMI: Body mass index is 27.1 kg/(m^2). This BMI is indicative of:  []Underweight   [x]Normal   []Overweight   [] Obesity   [] Extreme Obesity (BMI>40)  Estimated Nutrition Needs (Based on): 1720 Kcals/day (MSJ 1323 x 1.3) , 63 g (0.8gPro/kg) Protein  Carbohydrate:  At Least 130 g/day  Fluids: 1800 mL/day    Last BM: 10/5   [x]Active     []Hyperactive  []Hypoactive       [] Absent   BS  Skin:    [] Intact   [x] Incision  [] Breakdown   [] DTI   [] Tears/Excoriation/Abrasion  [x]Edema(trace-BLE)  [] Other: Wt Readings from Last 30 Encounters:   09/23/18 78.5 kg (173 lb)   08/30/18 80.3 kg (177 lb)   07/20/18 79.4 kg (175 lb)   06/23/18 80.7 kg (178 lb)   06/19/18 81.9 kg (180 lb 8.9 oz)   04/19/18 80.9 kg (178 lb 5.6 oz)   04/11/18 79.7 kg (175 lb 12.8 oz)   01/12/18 79 kg (174 lb 1.6 oz)   12/07/17 78 kg (172 lb)   11/14/17 77.1 kg (170 lb)   10/11/17 76.4 kg (168 lb 8 oz)   10/09/17 75.8 kg (167 lb 1.6 oz)   09/13/17 77.6 kg (171 lb)   08/15/17 77.1 kg (169 lb 14.4 oz)   07/28/17 76.2 kg (168 lb 1.6 oz)   07/06/17 76.2 kg (168 lb 1.6 oz)   06/05/17 74.6 kg (164 lb 8 oz)   05/10/17 75.2 kg (165 lb 12.8 oz)   04/26/17 75.5 kg (166 lb 6.4 oz)   04/17/17 76.2 kg (168 lb)   04/04/17 77.1 kg (170 lb)   03/08/17 73.5 kg (162 lb)   02/27/17 73.5 kg (162 lb 1.6 oz)   02/23/17 74 kg (163 lb 2.3 oz)   01/26/17 76.5 kg (168 lb 11.2 oz)   01/05/17 75.8 kg (167 lb)   12/23/16 77.1 kg (170 lb)   12/15/16 75.5 kg (166 lb 6.4 oz)   11/17/16 75.8 kg (167 lb)   11/04/16 77.3 kg (170 lb 6.4 oz)      NUTRITION DIAGNOSES:   Problem:  No nutritional diagnosis at this time        NUTRITION INTERVENTIONS:  Meals/Snacks: General/healthful diet   Supplements: Commercial supplement              GOAL:   Pt will consume >50% of meals/supplements in 4-6 days.      NUTRITION MONITORING AND EVALUATION   Previous Goal: Pt will consume >50% of meals/supplements in 3-5 days   Previous Goal Met: Yes   Previous Recommendations Implemented: Yes   Cultural, Moravian, or Ethnic Dietary Needs: None   LEARNING NEEDS (Diet, Food/Nutrient-Drug Interaction):    [x] None Identified   [] Identified and Education Provided/Documented   [] Identified and Pt declined/was not appropriate      [x] Interdisciplinary Care Plan Reviewed/Documented    [x] Participated in Discharge Planning: Diabetic diet    [] Interdisciplinary Rounds     NUTRITION RISK:    [] High              [] Moderate           [x]  Low  []  Minimal/Uncompromised      Arjun Roberto RD, [address]   Pager [phone]  Weekend Pager [phone]

## 2018-10-05 NOTE — PROGRESS NOTES
Per Urology rec's patient has long standing history of urinary retention and is known to Dr. Maggie Davies service. Xuan Morrison of [facility] states to discharge patient home with martinez catheter in place and she will follow up with Urologist 1 week after discharge.

## 2018-10-05 NOTE — PROGRESS NOTES
CM received a call from rep from SNF. It was reported that he spoke with both pt and pt's daughter and pt decided that she was going to return home with home health. CM was informed by [facility] rep that they are willing to accept when returning home, and if she needs additional support services.   SNF rep reported that he will follow up with pt in a week    FERNANDO Mann CM

## 2018-10-05 NOTE — PROGRESS NOTES
Hospitalist Progress Note    NAME: Shannan Miguel   :  1945   MRN:  [id number]       Assessment / Plan:  Acute choledocholithiasis with acute cholecystitis and abnormal elevated LFTs; POA. S/p ERCP and subsequent lap jesus  Intractable N/V: improved. - LFTs with FLL 9281,  , Alk Phos 146, total Bilirubin 2.8; labs improving  RUQ Ultrasound consistent with gallstones with acute cholecystitis changes, dilated CBD  - HIDA scan with tracer in the liver but no activity in gallbladder, common bile duct, small bowel after 3 hours suggesting common bile duct obstruction.  - MRCP showed cholelithiasis.  Pericholecystic edema suspicious for cholecystitis.  Common duct is normal  - s/p ERCP on   - s/p lap jesus with cholangiogram showed Jaundice, acute inflammation of the gallbladder, small gallstones in the gallbladder and cystic duct.  No obvious CBD filling defect on cholangiogram (the defect on the second image is the cholangiogram catheter balloon), but no passage of contrast into the duodenum, consistent with obstruction. - Completed IV Levaquin and Flagyl empirically on   - D/Rex norco and morphine IV due to constipation, hypoactive bowels and n/v  - On Simethicone   - Trend labs  - need to repeat ERCP in 1-2 months upon discharge  - continue zofran prn  - Monitor labs    Atypical chest pain. EKG with AF 10/2. Now appears to be back in NSR  - BB titrated  - ECHO with preserved EF  - monitor and replace lytes     Urinary retention and dysuria  - Repeat UA WNL  - Follow up with her urologist Dr Carmen Kebede  Acute encephalopathy: now resolved  - continue low dose seroquel      Constipation: improved  - cont miralax, docusate  - stopped narcotics  - encourage ambulation    Type 2 diabetes mellitus POA currently off medications per her report   - A1C 5.5, poor appetite      Essential hypertension POA  Orthostatic hypotension  - continue ARB and Cardizem CD with holding parameters.    - metoprolol decreased  - prn hydralazine    Coronary disease POA: stable  - Continue ASA  - currently off of cholesterol medications because of LFTs    Insomnia, POA  - continue melatonin at bedtime     Pt and family initially declines SNF and planned for home with [facility], but now after discussion would like SNF. CM notified. Hopefully weekend d/c.    25.0 - 29.9 Overweight / Body mass index is 27.1 kg/(m^2). Code status: Full  Prophylaxis: Lovenox  Recommended Disposition: TBD     Subjective:     Chief Complaint / Reason for Physician Visit: Abd pain, n/v    Denies CP or palpitations. Dyspnea improved  Continues to be in NSR. Persistent mild headache but slowly getting better. Discussion with pt and family at bedside; now want SNF. Review of Systems: 14 pt ROS unremarkable except as stated above. Objective:     VITALS:   Last 24hrs VS reviewed since prior progress note. Most recent are:  Patient Vitals for the past 24 hrs:   Temp Pulse Resp BP SpO2   10/05/18 1205 98.4 °F (36.9 °C) 62 16 116/64 100 %   10/05/18 0745 98.5 °F (36.9 °C) 60 18 108/62 98 %   10/05/18 0354 97.9 °F (36.6 °C) (!) 56 18 114/52 100 %   10/05/18 0058 98.4 °F (36.9 °C) 60 17 110/58 100 %   10/04/18 2044 98.6 °F (37 °C) 80 18 142/75 100 %   10/04/18 1513 98.7 °F (37.1 °C) 80 18 135/59 100 %       Intake/Output Summary (Last 24 hours) at 10/05/18 1434  Last data filed at 10/05/18 0930   Gross per 24 hour   Intake             3890 ml   Output             1380 ml   Net             2510 ml        PHYSICAL EXAM:  General: Alert, cooperative, no acute distress, frail  EENT:  EOMI. Anicteric sclerae. MMM  Resp:  CTA bilaterally, no wheezing or rales. No accessory muscle use  CV:  Regular  rhythm,  Normal S1 and S2, No edema  GI:  Soft, Non distended, Non tender.  +Bowel sounds.   Dressing over RUQ  Neurologic:  Alert and oriented X 3, normal speech, no gross neuro deficits  Skin:  Warm and dry    Reviewed most current lab test results and cultures  YES  Reviewed most current radiology test results   YES  Review and summation of old records today    NO  Reviewed patient's current orders and MAR    YES  PMH/SH reviewed - no change compared to H&P    ________________________________________________________________________  Shaun Maldonado MD     Procedures: see electronic medical records for all procedures/Xrays and details which were not copied into this note but were reviewed prior to creation of Plan. LABS:  I reviewed today's most current labs and imaging studies.   Pertinent labs include:  Recent Labs      10/05/18   0258  10/04/18   0358  10/03/18   0350   WBC  4.9  4.8  5.2   HGB  9.1*  8.8*  8.7*   HCT  28.9*  27.7*  27.4*   PLT  178  225  222     Recent Labs      10/05/18   0258  10/04/18   0358  10/03/18   0350   NA  142  144  143   K  3.3*  3.7  3.3*   CL  108  111*  109*   CO2  29  27  27   GLU  100  93  102*   BUN  9  9  9   CREA  0.82  0.75  0.80   CA  7.8*  7.5*  7.5*   MG  1.9   --    --        Signed: Shaun Maldonado MD

## 2018-10-06 VITALS
WEIGHT: 173 LBS | DIASTOLIC BLOOD PRESSURE: 67 MMHG | HEART RATE: 61 BPM | HEIGHT: 67 IN | OXYGEN SATURATION: 100 % | RESPIRATION RATE: 16 BRPM | BODY MASS INDEX: 27.15 KG/M2 | TEMPERATURE: 98.7 F | SYSTOLIC BLOOD PRESSURE: 138 MMHG

## 2018-10-06 LAB
ANION GAP SERPL CALC-SCNC: 6 MMOL/L (ref 5–15)
BUN SERPL-MCNC: 9 MG/DL (ref 6–20)
BUN/CREAT SERPL: 11 (ref 12–20)
CALCIUM SERPL-MCNC: 7.7 MG/DL (ref 8.5–10.1)
CHLORIDE SERPL-SCNC: 109 MMOL/L (ref 97–108)
CO2 SERPL-SCNC: 29 MMOL/L (ref 21–32)
CREAT SERPL-MCNC: 0.8 MG/DL (ref 0.55–1.02)
ERYTHROCYTE [DISTWIDTH] IN BLOOD BY AUTOMATED COUNT: 14.5 % (ref 11.5–14.5)
GLUCOSE BLD STRIP.AUTO-MCNC: 107 MG/DL (ref 65–100)
GLUCOSE BLD STRIP.AUTO-MCNC: 121 MG/DL (ref 65–100)
GLUCOSE BLD STRIP.AUTO-MCNC: 135 MG/DL (ref 65–100)
GLUCOSE SERPL-MCNC: 99 MG/DL (ref 65–100)
HCT VFR BLD AUTO: 28.5 % (ref 35–47)
HGB BLD-MCNC: 8.9 G/DL (ref 11.5–16)
MCH RBC QN AUTO: 27.5 PG (ref 26–34)
MCHC RBC AUTO-ENTMCNC: 31.2 G/DL (ref 30–36.5)
MCV RBC AUTO: 88 FL (ref 80–99)
NRBC # BLD: 0 K/UL (ref 0–0.01)
NRBC BLD-RTO: 0 PER 100 WBC
PLATELET # BLD AUTO: 230 K/UL (ref 150–400)
PMV BLD AUTO: 10.2 FL (ref 8.9–12.9)
POTASSIUM SERPL-SCNC: 3.6 MMOL/L (ref 3.5–5.1)
RBC # BLD AUTO: 3.24 M/UL (ref 3.8–5.2)
SERVICE CMNT-IMP: ABNORMAL
SODIUM SERPL-SCNC: 144 MMOL/L (ref 136–145)
WBC # BLD AUTO: 4.5 K/UL (ref 3.6–11)

## 2018-10-06 PROCEDURE — 82962 GLUCOSE BLOOD TEST: CPT

## 2018-10-06 PROCEDURE — 74011250637 HC RX REV CODE- 250/637: Performed by: INTERNAL MEDICINE

## 2018-10-06 PROCEDURE — 85027 COMPLETE CBC AUTOMATED: CPT | Performed by: INTERNAL MEDICINE

## 2018-10-06 PROCEDURE — 94760 N-INVAS EAR/PLS OXIMETRY 1: CPT

## 2018-10-06 PROCEDURE — 36415 COLL VENOUS BLD VENIPUNCTURE: CPT | Performed by: INTERNAL MEDICINE

## 2018-10-06 PROCEDURE — 80048 BASIC METABOLIC PNL TOTAL CA: CPT | Performed by: INTERNAL MEDICINE

## 2018-10-06 PROCEDURE — 74011250636 HC RX REV CODE- 250/636: Performed by: INTERNAL MEDICINE

## 2018-10-06 PROCEDURE — 74011250636 HC RX REV CODE- 250/636: Performed by: HOSPITALIST

## 2018-10-06 RX ORDER — QUETIAPINE FUMARATE 25 MG/1
25 TABLET, FILM COATED ORAL
Qty: 15 TAB | Refills: 0 | Status: SHIPPED | OUTPATIENT
Start: 2018-10-06 | End: 2018-10-06

## 2018-10-06 RX ORDER — METOPROLOL TARTRATE 25 MG/1
25 TABLET, FILM COATED ORAL EVERY 12 HOURS
Qty: 60 TAB | Refills: 0 | Status: SHIPPED | OUTPATIENT
Start: 2018-10-06 | End: 2018-10-06

## 2018-10-06 RX ORDER — FAMOTIDINE 20 MG/1
20 TABLET, FILM COATED ORAL 2 TIMES DAILY
Qty: 60 TAB | Refills: 0 | Status: SHIPPED | OUTPATIENT
Start: 2018-10-06 | End: 2019-09-03

## 2018-10-06 RX ADMIN — DILTIAZEM HYDROCHLORIDE 120 MG: 120 CAPSULE, COATED, EXTENDED RELEASE ORAL at 10:18

## 2018-10-06 RX ADMIN — DEXTROSE MONOHYDRATE 100 ML/HR: 5 INJECTION, SOLUTION INTRAVENOUS at 02:31

## 2018-10-06 RX ADMIN — ENOXAPARIN SODIUM 40 MG: 100 INJECTION SUBCUTANEOUS at 10:18

## 2018-10-06 RX ADMIN — SENNOSIDES AND DOCUSATE SODIUM 1 TABLET: 8.6; 5 TABLET ORAL at 10:18

## 2018-10-06 RX ADMIN — METOPROLOL TARTRATE 25 MG: 25 TABLET ORAL at 10:18

## 2018-10-06 RX ADMIN — LORAZEPAM 0.5 MG: 0.5 TABLET ORAL at 02:30

## 2018-10-06 RX ADMIN — FAMOTIDINE 20 MG: 20 TABLET ORAL at 10:18

## 2018-10-06 NOTE — DISCHARGE SUMMARY
Hospitalist Discharge Summary     Patient ID:  Jordon Whipple  [id number]  89 y.o.  1945    PCP on record: Srinath Welch MD    Admit date: 9/19/2018  Discharge date and time: 10/6/2018      DISCHARGE DIAGNOSIS:    Acute choledocholithiasis  Urinary retention  constipation    CONSULTATIONS:  IP CONSULT TO GASTROENTEROLOGY  IP CONSULT TO NEUROLOGY  IP CONSULT TO UROLOGY  IP CONSULT TO GENERAL SURGERY    Excerpted HPI from H&P of Jayro Quiros MD:  71-year-old -American female with known diabetes type 2 off treatment, essential hypertension, coronary artery disease, nephrolithiasis.  She denies prior history of cholecystitis or cholelithiasis.  She notes over the past several weeks, she has had intermittent bouts of upper quadrant abdominal pain that would usually go away after short period of time. Stacy Clarke was otherwise well until last night at bedtime, sudden onset of severe upper quadrant abdominal pain that radiated to her back pain, \"could not sleep all night due to the pain\". The pain persisted the entire night and through today, was only relieved by IV morphine in the emergency room.  When I saw her she remains with mild abdominal pain. She had associated with nausea vomiting ×1 and chills. She has not had any aye colored stools, no dark urine. Mild pressure-like headache, no cough or shortness of breath, no substernal chest pain, no melena or hematemesis or bright red blood per rectum. She does complain of intermittent dysuria    In the emergency santos,  the patient was tender in the right upper quadrant. Ultrasound showed evidence of acute cholecystitis and a dilated common bile duct. HIDA scan showed no evidence of tracer in the gallbladder, bile duct or small bowel after 3 hours consistent with common bile duct obstruction and she had abnormal LFTs.  we will called to admit the patient       ______________________________________________________________________  DISCHARGE SUMMARY/HOSPITAL COURSE:  for full details see H&P, daily progress notes, labs, consult notes. Seen on the day of discharge      Acute choledocholithiasis with acute cholecystitis and abnormal elevated LFTs; POA. S/p ERCP and subsequent lap jesus  Nausea and vomitting resolved  - LFTs with OYZ 3055,  , Alk Phos 146, total Bilirubin 2.8;on admission last lft on 9/29 with in normal limits  RUQ Ultrasound consistent with gallstones with acute cholecystitis changes, dilated CBD  - HIDA scan with tracer in the liver but no activity in gallbladder, common bile duct, small bowel after 3 hours suggesting common bile duct obstruction.  - MRCP showed cholelithiasis.  Pericholecystic edema suspicious for cholecystitis.  Common duct is normal  - s/p ERCP on 9/22  - s/p lap jesus with cholangiogram showed Jaundice, acute inflammation of the gallbladder, small gallstones in the gallbladder and cystic duct.  No obvious CBD filling defect on cholangiogram (the defect on the second image is the cholangiogram catheter balloon), but no passage of contrast into the duodenum, consistent with obstruction.   - Completed IV Levaquin and Flagyl empirically on 9/25  - need to repeat ERCP in 1-2 months upon discharge         Urinary retention and dysuria  - Repeat UA WNL  - Follow up with her urologist Dr Emir Lenz as out patient , keep the martinez in for now          Constipation: improved  - cont miralax, docusate  - stopped narcotics  - encourage ambulation    Type 2 diabetes mellitus POA currently off medications per her report   - A1C 5.5, poor appetite      Essential hypertension POA  - continue home meds  - ECHO with preserved EF    Coronary disease POA: stable  - Continue ASA  - resume statin    Insomnia, POA  - continue melatonin at bedtime    Pt was started on seroquel during this admission with her agitation behaviours, feel could be delirium now improved to baseline, no need for seroquel      Pt and family declines SNF and planned for home with [facility],     25.0 - 29.9 Overweight / Body mass index is 27.1 kg/(m^2). Reviewed the hospital course , discharge meds with pt daughter, per the daughter pt is at baseline  And pt pcp referred to  neuropsychological testing for dementia work up    _______________________________________________________________________  Patient seen and examined by me on discharge day. Pertinent Findings:  Gen:    Not in distress  Chest: Clear lungs  CVS:   Regular rhythm. No edema  Abd:  Soft, not distended, not tender  Neuro:  Alert, and oriented  _______________________________________________________________________  DISCHARGE MEDICATIONS:   Current Discharge Medication List          CONTINUE these medications which have CHANGED    Details   famotidine (PEPCID) 20 mg tablet Take 1 Tab by mouth two (2) times a day. Qty: 60 Tab, Refills: 0         CONTINUE these medications which have NOT CHANGED    Details   dilTIAZem CD (CARDIZEM CD) 120 mg ER capsule Take 120 mg by mouth nightly. nystatin (MYCOSTATIN) topical cream Apply  to affected area two (2) times daily as needed for Skin Irritation. losartan (COZAAR) 25 mg tablet Take 25 mg by mouth daily. polyethylene glycol (MIRALAX) 17 gram/dose powder Take 17 g by mouth daily as needed. 1 tablespoon with 8 oz of water daily  Qty: 119 g, Refills: 0      docusate sodium (COLACE) 100 mg capsule TAKE 1 CAP BY MOUTH DAILY AS NEEDED FOR CONSTIPATION FOR UP TO 90 DAYS. Qty: 90 Cap, Refills: 0      erythromycin (ILOTYCIN) ophthalmic ointment APPLY 1 APPLICATION TO BOTH EYELIDS TWICE DAILY  Refills: 11      aspirin delayed-release 81 mg tablet Take 1 Tab by mouth daily. Qty: 30 Tab, Refills: 11      bisacodyl (DULCOLAX, BISACODYL,) 10 mg suppository Insert 10 mg into rectum daily as needed.   Qty: 12 Suppository, Refills: 0         STOP taking these medications       acetaminophen (TYLENOL) 500 mg tablet Comments:   Reason for Stopping:         valsartan (DIOVAN) 80 mg tablet Comments:   Reason for Stopping:               My Recommended Diet, Activity, Wound Care, and follow-up labs are listed in the patient's Discharge Insturctions which I have personally completed and reviewed. ______________________________________________________________________    Risk of deterioration: high    Condition at Discharge:  Stable  ______________________________________________________________________    Disposition  Home with [facility]  ______________________________________________________________________    Care Plan discussed with:   patient    ______________________________________________________________________    Code Status: full  ______________________________________________________________________      Follow up with:   PCP : Steffanie Naidu MD  Follow-up Information     Follow up With Details Comments Contact Info    Alessandra Marquez Schedule an appointment as soon as possible for a visit in 5 days with PCP for post hospital follow up appointment. Phone: Gualberto Delarosa MD Schedule an appointment as soon as possible for a visit in 1 week Urology follow-up for martinez catheter [address] [facility]  [address]  [address] [phone]      [address] [place] [address]  This is your post-acute home healthcare provider. If you do not hear from them within 24-48 hours, please give them a call.   [address] [facility]  [phone]              Total time in minutes spent coordinating this discharge (includes going over instructions, follow-up, prescriptions, and preparing report for sign off to her PCP) : 35 minutes    Signed:  Boneta Fothergill, MD

## 2018-10-06 NOTE — PROGRESS NOTES
Per chart review, pt and family declining SNF at this time and requesting to discharge home with [facility] services. Referral previously sent/accepted by [address] on 9/26. CM contacted [address] and left message for on-call staff confirming pt's discharge for today. Awaiting call back.      Margarito Sharp MSW [employer], [address] [place] [address]  [phone]

## 2018-10-09 ENCOUNTER — HOME CARE VISIT (OUTPATIENT)
Dept: SCHEDULING | Facility: HOME HEALTH | Age: 73
End: 2018-10-09
Payer: MEDICARE

## 2018-10-09 PROCEDURE — 3331090002 HH PPS REVENUE DEBIT

## 2018-10-09 PROCEDURE — 400013 HH SOC

## 2018-10-09 PROCEDURE — 3331090001 HH PPS REVENUE CREDIT

## 2018-10-09 PROCEDURE — G0299 HHS/HOSPICE OF RN EA 15 MIN: HCPCS

## 2018-10-10 ENCOUNTER — HOME CARE VISIT (OUTPATIENT)
Dept: SCHEDULING | Facility: HOME HEALTH | Age: 73
End: 2018-10-10
Payer: MEDICARE

## 2018-10-10 VITALS
HEIGHT: 67 IN | DIASTOLIC BLOOD PRESSURE: 78 MMHG | TEMPERATURE: 97.1 F | HEART RATE: 86 BPM | SYSTOLIC BLOOD PRESSURE: 124 MMHG | RESPIRATION RATE: 18 BRPM | BODY MASS INDEX: 27.78 KG/M2 | WEIGHT: 177 LBS | OXYGEN SATURATION: 99 %

## 2018-10-10 PROCEDURE — G0151 HHCP-SERV OF PT,EA 15 MIN: HCPCS

## 2018-10-10 PROCEDURE — 3331090001 HH PPS REVENUE CREDIT

## 2018-10-10 PROCEDURE — 3331090002 HH PPS REVENUE DEBIT

## 2018-10-11 PROCEDURE — 3331090002 HH PPS REVENUE DEBIT

## 2018-10-11 PROCEDURE — 3331090001 HH PPS REVENUE CREDIT

## 2018-10-12 ENCOUNTER — HOME CARE VISIT (OUTPATIENT)
Dept: SCHEDULING | Facility: HOME HEALTH | Age: 73
End: 2018-10-12
Payer: MEDICARE

## 2018-10-12 ENCOUNTER — HOME CARE VISIT (OUTPATIENT)
Dept: HOME HEALTH SERVICES | Facility: HOME HEALTH | Age: 73
End: 2018-10-12
Payer: MEDICARE

## 2018-10-12 VITALS
HEART RATE: 101 BPM | TEMPERATURE: 97.4 F | SYSTOLIC BLOOD PRESSURE: 105 MMHG | OXYGEN SATURATION: 99 % | RESPIRATION RATE: 18 BRPM | DIASTOLIC BLOOD PRESSURE: 60 MMHG

## 2018-10-12 PROCEDURE — G0151 HHCP-SERV OF PT,EA 15 MIN: HCPCS

## 2018-10-12 PROCEDURE — 3331090001 HH PPS REVENUE CREDIT

## 2018-10-12 PROCEDURE — G0152 HHCP-SERV OF OT,EA 15 MIN: HCPCS

## 2018-10-12 PROCEDURE — 3331090002 HH PPS REVENUE DEBIT

## 2018-10-13 ENCOUNTER — HOME CARE VISIT (OUTPATIENT)
Dept: SCHEDULING | Facility: HOME HEALTH | Age: 73
End: 2018-10-13
Payer: MEDICARE

## 2018-10-13 VITALS — DIASTOLIC BLOOD PRESSURE: 60 MMHG | SYSTOLIC BLOOD PRESSURE: 105 MMHG

## 2018-10-13 PROCEDURE — 3331090001 HH PPS REVENUE CREDIT

## 2018-10-13 PROCEDURE — 3331090002 HH PPS REVENUE DEBIT

## 2018-10-14 PROCEDURE — 3331090001 HH PPS REVENUE CREDIT

## 2018-10-14 PROCEDURE — 3331090002 HH PPS REVENUE DEBIT

## 2018-10-15 PROCEDURE — 3331090002 HH PPS REVENUE DEBIT

## 2018-10-15 PROCEDURE — 3331090001 HH PPS REVENUE CREDIT

## 2018-10-16 ENCOUNTER — HOME CARE VISIT (OUTPATIENT)
Dept: SCHEDULING | Facility: HOME HEALTH | Age: 73
End: 2018-10-16
Payer: MEDICARE

## 2018-10-16 VITALS
RESPIRATION RATE: 18 BRPM | OXYGEN SATURATION: 95 % | HEART RATE: 90 BPM | TEMPERATURE: 98.6 F | DIASTOLIC BLOOD PRESSURE: 68 MMHG | SYSTOLIC BLOOD PRESSURE: 138 MMHG

## 2018-10-16 VITALS
TEMPERATURE: 98.6 F | RESPIRATION RATE: 16 BRPM | SYSTOLIC BLOOD PRESSURE: 128 MMHG | DIASTOLIC BLOOD PRESSURE: 80 MMHG | HEART RATE: 75 BPM | OXYGEN SATURATION: 96 %

## 2018-10-16 PROCEDURE — 3331090002 HH PPS REVENUE DEBIT

## 2018-10-16 PROCEDURE — G0151 HHCP-SERV OF PT,EA 15 MIN: HCPCS

## 2018-10-16 PROCEDURE — 3331090001 HH PPS REVENUE CREDIT

## 2018-10-17 ENCOUNTER — HOME CARE VISIT (OUTPATIENT)
Dept: SCHEDULING | Facility: HOME HEALTH | Age: 73
End: 2018-10-17
Payer: MEDICARE

## 2018-10-17 PROCEDURE — 3331090001 HH PPS REVENUE CREDIT

## 2018-10-17 PROCEDURE — 3331090002 HH PPS REVENUE DEBIT

## 2018-10-18 PROCEDURE — 3331090002 HH PPS REVENUE DEBIT

## 2018-10-18 PROCEDURE — 3331090001 HH PPS REVENUE CREDIT

## 2018-10-19 ENCOUNTER — HOME CARE VISIT (OUTPATIENT)
Dept: HOME HEALTH SERVICES | Facility: HOME HEALTH | Age: 73
End: 2018-10-19
Payer: MEDICARE

## 2018-10-19 PROCEDURE — 3331090001 HH PPS REVENUE CREDIT

## 2018-10-19 PROCEDURE — 3331090002 HH PPS REVENUE DEBIT

## 2018-10-20 PROCEDURE — 3331090002 HH PPS REVENUE DEBIT

## 2018-10-20 PROCEDURE — 3331090001 HH PPS REVENUE CREDIT

## 2018-10-21 PROCEDURE — 3331090002 HH PPS REVENUE DEBIT

## 2018-10-21 PROCEDURE — 3331090001 HH PPS REVENUE CREDIT

## 2018-10-22 PROCEDURE — 3331090001 HH PPS REVENUE CREDIT

## 2018-10-22 PROCEDURE — 3331090002 HH PPS REVENUE DEBIT

## 2018-10-23 PROCEDURE — 3331090002 HH PPS REVENUE DEBIT

## 2018-10-23 PROCEDURE — 3331090001 HH PPS REVENUE CREDIT

## 2018-10-24 ENCOUNTER — HOME CARE VISIT (OUTPATIENT)
Dept: HOME HEALTH SERVICES | Facility: HOME HEALTH | Age: 73
End: 2018-10-24
Payer: MEDICARE

## 2018-10-24 PROCEDURE — 3331090002 HH PPS REVENUE DEBIT

## 2018-10-24 PROCEDURE — 3331090001 HH PPS REVENUE CREDIT

## 2018-10-25 ENCOUNTER — HOME CARE VISIT (OUTPATIENT)
Dept: HOME HEALTH SERVICES | Facility: HOME HEALTH | Age: 73
End: 2018-10-25
Payer: MEDICARE

## 2018-10-25 PROCEDURE — 3331090002 HH PPS REVENUE DEBIT

## 2018-10-25 PROCEDURE — 3331090001 HH PPS REVENUE CREDIT

## 2018-10-26 PROCEDURE — 3331090001 HH PPS REVENUE CREDIT

## 2018-10-26 PROCEDURE — 3331090002 HH PPS REVENUE DEBIT

## 2018-10-27 PROCEDURE — 3331090001 HH PPS REVENUE CREDIT

## 2018-10-27 PROCEDURE — 3331090002 HH PPS REVENUE DEBIT

## 2018-10-28 PROCEDURE — 3331090002 HH PPS REVENUE DEBIT

## 2018-10-28 PROCEDURE — 3331090001 HH PPS REVENUE CREDIT

## 2018-10-29 ENCOUNTER — OFFICE VISIT (OUTPATIENT)
Dept: SURGERY | Age: 73
End: 2018-10-29

## 2018-10-29 ENCOUNTER — HOME CARE VISIT (OUTPATIENT)
Dept: SCHEDULING | Facility: HOME HEALTH | Age: 73
End: 2018-10-29
Payer: MEDICARE

## 2018-10-29 VITALS
RESPIRATION RATE: 14 BRPM | SYSTOLIC BLOOD PRESSURE: 120 MMHG | BODY MASS INDEX: 26.34 KG/M2 | TEMPERATURE: 97.9 F | WEIGHT: 167.8 LBS | DIASTOLIC BLOOD PRESSURE: 78 MMHG | OXYGEN SATURATION: 99 % | HEART RATE: 86 BPM | HEIGHT: 67 IN

## 2018-10-29 DIAGNOSIS — Z09 POSTOPERATIVE EXAMINATION: Primary | ICD-10-CM

## 2018-10-29 PROCEDURE — 3331090001 HH PPS REVENUE CREDIT

## 2018-10-29 PROCEDURE — 3331090002 HH PPS REVENUE DEBIT

## 2018-10-29 NOTE — PROGRESS NOTES
Marty Sood is a 68 y.o. female     Chief Complaint   Patient presents with    Surgical Follow-up     p/o lap jesus 9/20/18       Visit Vitals  /78 (BP 1 Location: Left arm, BP Patient Position: Sitting)   Pulse 86   Temp 97.9 °F (36.6 °C) (Oral)   Resp 14   Ht 5' 7\" (1.702 m)   Wt 167 lb 12.8 oz (76.1 kg)   SpO2 99%   BMI 26.28 kg/m²       Health Maintenance Due   Topic Date Due    Shingrix Vaccine Age 49> (1 of 2) 09/22/1995    DTaP/Tdap/Td series (1 - Tdap) 04/30/2006    Pneumococcal 65+ Low/Medium Risk (1 of 2 - PCV13) 09/22/2010    FOOT EXAM Q1  06/12/2015    EYE EXAM RETINAL OR DILATED Q1  07/01/2015    GLAUCOMA SCREENING Q2Y  07/01/2016    MICROALBUMIN Q1  07/06/2018    LIPID PANEL Q1  07/06/2018    MEDICARE YEARLY EXAM  07/07/2018    Influenza Age 9 to Adult  08/01/2018       1. Have you been to the ER, urgent care clinic since your last visit? Hospitalized since your last visit? No    2. Have you seen or consulted any other health care providers outside of the [facility] since your last visit? Include any pap smears or colon screening.  No

## 2018-10-29 NOTE — PROGRESS NOTES
To: Say Bates MD , Ambika Whitfield MD  From: Carlos Sandhu MD      Thanks. Encounter Date: 10/29/2018    Subjective:      Vee Woodward is a 68 y.o. female presents for postop care. C/o back pain, constipation. Daughter says she refused to do PT and is not taking the Miralax as prescribed. Eating a regular diet without difficulty. Objective:     General:  alert, cooperative, no distress, appears stated age   Abdomen: soft, bowel sounds active, non-tender   Incision(s):  healing well, no drainage, no erythema, no hernia, no seroma, no swelling, no dehiscence, incision well approximated. Assessment:     S/p laparoscopic cholecystectomy. Pathology revealed gallstones and chronic cholecystitis. Had ERCP with stent post-op due to no distal duct obstruction. Stent left in place. Doing well postoperatively. Plan:     Rec moist heat for the back. Also told her PT would help with this. Rec Miralax every day as prescribed. Gave them Dr. Salbador Lesches number because she needs repeat ERCP to remove stent 1-2 months. Patient understands no further follow-up here required. Told to call for any concerns.       Carlos Sandhu MD

## 2018-10-30 ENCOUNTER — HOME CARE VISIT (OUTPATIENT)
Dept: SCHEDULING | Facility: HOME HEALTH | Age: 73
End: 2018-10-30
Payer: MEDICARE

## 2018-10-30 PROCEDURE — 3331090001 HH PPS REVENUE CREDIT

## 2018-10-30 PROCEDURE — 3331090003 HH PPS REVENUE ADJ

## 2018-10-30 PROCEDURE — 3331090002 HH PPS REVENUE DEBIT

## 2018-12-12 ENCOUNTER — HOSPITAL ENCOUNTER (OUTPATIENT)
Dept: PREADMISSION TESTING | Age: 73
Discharge: HOME | End: 2018-12-12
Attending: INTERNAL MEDICINE
Payer: MEDICARE

## 2018-12-12 VITALS
HEART RATE: 77 BPM | HEIGHT: 67 IN | SYSTOLIC BLOOD PRESSURE: 154 MMHG | WEIGHT: 171.08 LBS | DIASTOLIC BLOOD PRESSURE: 79 MMHG | TEMPERATURE: 98.5 F | RESPIRATION RATE: 16 BRPM | OXYGEN SATURATION: 100 % | BODY MASS INDEX: 26.85 KG/M2

## 2018-12-12 LAB
ANION GAP SERPL CALC-SCNC: 5 MMOL/L (ref 5–15)
BUN SERPL-MCNC: 21 MG/DL (ref 6–20)
BUN/CREAT SERPL: 21 (ref 12–20)
CALCIUM SERPL-MCNC: 8.8 MG/DL (ref 8.5–10.1)
CHLORIDE SERPL-SCNC: 110 MMOL/L (ref 97–108)
CO2 SERPL-SCNC: 28 MMOL/L (ref 21–32)
CREAT SERPL-MCNC: 0.98 MG/DL (ref 0.55–1.02)
ERYTHROCYTE [DISTWIDTH] IN BLOOD BY AUTOMATED COUNT: 13.1 % (ref 11.5–14.5)
GLUCOSE SERPL-MCNC: 98 MG/DL (ref 65–100)
HCT VFR BLD AUTO: 38.2 % (ref 35–47)
HGB BLD-MCNC: 12 G/DL (ref 11.5–16)
MCH RBC QN AUTO: 27.3 PG (ref 26–34)
MCHC RBC AUTO-ENTMCNC: 31.4 G/DL (ref 30–36.5)
MCV RBC AUTO: 87 FL (ref 80–99)
NRBC # BLD: 0 K/UL (ref 0–0.01)
NRBC BLD-RTO: 0 PER 100 WBC
PLATELET # BLD AUTO: 223 K/UL (ref 150–400)
PMV BLD AUTO: 10.9 FL (ref 8.9–12.9)
POTASSIUM SERPL-SCNC: 4.1 MMOL/L (ref 3.5–5.1)
RBC # BLD AUTO: 4.39 M/UL (ref 3.8–5.2)
SODIUM SERPL-SCNC: 143 MMOL/L (ref 136–145)
WBC # BLD AUTO: 4.5 K/UL (ref 3.6–11)

## 2018-12-12 PROCEDURE — 85027 COMPLETE CBC AUTOMATED: CPT

## 2018-12-12 PROCEDURE — 80048 BASIC METABOLIC PNL TOTAL CA: CPT

## 2018-12-12 PROCEDURE — 36415 COLL VENOUS BLD VENIPUNCTURE: CPT

## 2018-12-12 PROCEDURE — 93005 ELECTROCARDIOGRAM TRACING: CPT

## 2018-12-12 RX ORDER — METOPROLOL TARTRATE 25 MG/1
25 TABLET, FILM COATED ORAL 2 TIMES DAILY
Status: ON HOLD | COMMUNITY
End: 2018-12-19 | Stop reason: CLARIF

## 2018-12-12 RX ORDER — SODIUM CHLORIDE, SODIUM LACTATE, POTASSIUM CHLORIDE, CALCIUM CHLORIDE 600; 310; 30; 20 MG/100ML; MG/100ML; MG/100ML; MG/100ML
25 INJECTION, SOLUTION INTRAVENOUS CONTINUOUS
Status: CANCELLED | OUTPATIENT
Start: 2018-12-19

## 2018-12-12 NOTE — PERIOP NOTES
[facility]  Preoperative Instructions        Surgery Date: Wednesday 12/19/18          Time of Arrival: 1:30 p.m.    1. On the day of your surgery, please report to the Surgical Services Registration Desk and sign in at your designated time. The Surgery Center is located to the right of the Emergency Room. 2. You must have someone with you to drive you home. You should not drive a car for 24 hours following surgery. Please make arrangements for a friend or family member to stay with you for the first 24 hours after your surgery. 3. Do not have anything to eat or drink (including water, gum, mints, coffee, juice) after midnight 12/18/18  . ? This may not apply to medications prescribed by your physician. ?(Please note below the special instructions with medications to take the morning of your procedure.)    4. We recommend you do not drink any alcoholic beverages for 24 hours before and after your surgery. 5. Contact your surgeons office for instructions on the following medications: non-steroidal anti-inflammatory drugs (i.e. Advil, Aleve), vitamins, and supplements. (Some surgeons will want you to stop these medications prior to surgery and others may allow you to take them)  **If you are currently taking Plavix, Coumadin, Aspirin and/or other blood-thinning agents, contact your surgeon for instructions. ** Your surgeon will partner with the physician prescribing these medications to determine if it is safe to stop or if you need to continue taking. Please do not stop taking these medications without instructions from your surgeon    6. Wear comfortable clothes. Wear glasses instead of contacts. Do not bring any money or jewelry. Please bring picture ID, insurance card, and any prearranged co-payment or hospital payment. Do not wear make-up, particularly mascara the morning of your surgery. Do not wear nail polish, particularly if you are having foot /hand surgery.   Wear your hair loose or down, no ponytails, buns, clarence pins or clips. All body piercings must be removed. Please shower with antibacterial soap for three consecutive days before and on the morning of surgery, but do not apply any lotions, powders or deodorants after the shower on the day of surgery. Please use a fresh towels after each shower. Please sleep in clean clothes and change bed linens the night before surgery. Please do not shave for 48 hours prior to surgery. Shaving of the face is acceptable. 7. You should understand that if you do not follow these instructions your surgery may be cancelled. If your physical condition changes (I.e. fever, cold or flu) please contact your surgeon as soon as possible. 8. It is important that you be on time. If a situation occurs where you may be late, please call [phone] (OR Holding Area). 9. If you have any questions and or problems, please call [phone] (Pre-admission Testing). 10. Your surgery time may be subject to change. You will receive a phone call the evening prior if your time changes. 11.  If having outpatient surgery, you must have someone to drive you here, stay with you during the duration of your stay, and to drive you home at time of discharge. 12.   In an effort to improve the efficiency, privacy, and safety for all of our Pre-op patients visitors are not allowed in the Holding area. Once you arrive and are registered your family/visitors will be asked to remain in the waiting room. The Pre-op staff will get you from the Surgical Waiting Area and will explain to you and your family/visitors that the Pre-op phase is beginning. The staff will answer any questions and provide instructions for tracking of the patient, by use of the existing tracking number and color-coded status board in the waiting room.   At this time the staff will also ask for your designated spokesperson information in the event that the physician or staff need to provide an update or obtain any pertinent information. The designated spokesperson will be notified if the physician needs to speak to family during the pre-operative phase. If at any time your family/visitors has questions or concerns they may approach the volunteer desk in the waiting area for assistance. Special Instructions: None. MEDICATIONS TO TAKE THE MORNING OF SURGERY WITH A SIP OF WATER:  Diltiazem, famotidine and metoprolol. I understand a pre-operative phone call will be made to verify my surgery time. In the event that I am not available, I give permission for a message to be left on my answering service and/or with another person?   Yes  [phone] - daughter Chanelle Mayes         ___________________      __________   _________    Lamona Payor of Patient)             (Witness)                (Date and Time)

## 2018-12-13 ENCOUNTER — OFFICE VISIT (OUTPATIENT)
Dept: CARDIOLOGY CLINIC | Age: 73
End: 2018-12-13

## 2018-12-13 VITALS
BODY MASS INDEX: 26.85 KG/M2 | OXYGEN SATURATION: 98 % | SYSTOLIC BLOOD PRESSURE: 110 MMHG | RESPIRATION RATE: 20 BRPM | HEART RATE: 80 BPM | WEIGHT: 171.1 LBS | DIASTOLIC BLOOD PRESSURE: 70 MMHG | HEIGHT: 67 IN

## 2018-12-13 DIAGNOSIS — E78.5 HYPERLIPIDEMIA, UNSPECIFIED HYPERLIPIDEMIA TYPE: ICD-10-CM

## 2018-12-13 DIAGNOSIS — I35.0 AORTIC VALVE STENOSIS, ETIOLOGY OF CARDIAC VALVE DISEASE UNSPECIFIED: ICD-10-CM

## 2018-12-13 DIAGNOSIS — I25.10 CORONARY ARTERY DISEASE INVOLVING NATIVE CORONARY ARTERY OF NATIVE HEART WITHOUT ANGINA PECTORIS: ICD-10-CM

## 2018-12-13 DIAGNOSIS — F45.0 SOMATIZATION DISORDER: ICD-10-CM

## 2018-12-13 DIAGNOSIS — I10 HTN, GOAL BELOW 130/80: ICD-10-CM

## 2018-12-13 DIAGNOSIS — G89.29 CHRONIC CHEST PAIN: Primary | ICD-10-CM

## 2018-12-13 DIAGNOSIS — R07.9 CHRONIC CHEST PAIN: Primary | ICD-10-CM

## 2018-12-13 LAB
ATRIAL RATE: 75 BPM
CALCULATED P AXIS, ECG09: 34 DEGREES
CALCULATED R AXIS, ECG10: 40 DEGREES
CALCULATED T AXIS, ECG11: 0 DEGREES
DIAGNOSIS, 93000: NORMAL
P-R INTERVAL, ECG05: 160 MS
Q-T INTERVAL, ECG07: 398 MS
QRS DURATION, ECG06: 92 MS
QTC CALCULATION (BEZET), ECG08: 444 MS
VENTRICULAR RATE, ECG03: 75 BPM

## 2018-12-13 RX ORDER — SODIUM CHLORIDE 0.9 % (FLUSH) 0.9 %
5-10 SYRINGE (ML) INJECTION EVERY 8 HOURS
Status: CANCELLED | OUTPATIENT
Start: 2018-12-13

## 2018-12-13 RX ORDER — SODIUM CHLORIDE 0.9 % (FLUSH) 0.9 %
5-10 SYRINGE (ML) INJECTION AS NEEDED
Status: CANCELLED | OUTPATIENT
Start: 2018-12-13

## 2018-12-13 RX ORDER — ATROPINE SULFATE 0.1 MG/ML
0.5 INJECTION INTRAVENOUS
Status: CANCELLED | OUTPATIENT
Start: 2018-12-13 | End: 2018-12-14

## 2018-12-13 RX ORDER — FLUMAZENIL 0.1 MG/ML
0.2 INJECTION INTRAVENOUS
Status: CANCELLED | OUTPATIENT
Start: 2018-12-13 | End: 2018-12-13

## 2018-12-13 RX ORDER — NALOXONE HYDROCHLORIDE 0.4 MG/ML
0.4 INJECTION, SOLUTION INTRAMUSCULAR; INTRAVENOUS; SUBCUTANEOUS
Status: CANCELLED | OUTPATIENT
Start: 2018-12-13 | End: 2018-12-13

## 2018-12-13 RX ORDER — PRAVASTATIN SODIUM 40 MG/1
40 TABLET ORAL
Qty: 90 TAB | Refills: 3 | Status: SHIPPED | OUTPATIENT
Start: 2018-12-13 | End: 2019-09-03

## 2018-12-13 RX ORDER — EPINEPHRINE 0.1 MG/ML
1 INJECTION INTRACARDIAC; INTRAVENOUS
Status: CANCELLED | OUTPATIENT
Start: 2018-12-13 | End: 2018-12-14

## 2018-12-13 RX ORDER — DEXTROMETHORPHAN/PSEUDOEPHED 2.5-7.5/.8
1.2 DROPS ORAL
Status: CANCELLED | OUTPATIENT
Start: 2018-12-13

## 2018-12-13 NOTE — PROGRESS NOTES
1. Have you been to the ER, urgent care clinic since your last visit? Hospitalized since your last visit? Yes When: 9/2018 [address] Cholecytitis    2. Have you seen or consulted any other health care providers outside of the [address] [place] [address] since your last visit? Include any pap smears or colon screening. Yes When: Yes GI     Patient scheduled for endoscopy 12/19/18 she C/O chest pressure.

## 2018-12-13 NOTE — PROGRESS NOTES
[address] [place] [address] [address] [facility]  [phone]     Subjective:      Sushma Delgado is a 68 y.o. female is here for routine f/u. The patient denies chest pain/ shortness of breath, orthopnea, PND, LE edema, palpitations, syncope, or presyncope. Needs an upcoming GI procedure. Chronic mild atypical chest discomfort worse with palpation and movement is unchanged. She gets mammography regularly through her PCP.     Patient Active Problem List    Diagnosis Date Noted    Choledocholithiasis with acute cholecystitis 09/19/2018    Vaginal irritation 10/11/2017    Blurring of vision 10/11/2017    Advance directive discussed with patient 07/06/2017    Gait instability 04/14/2017    Weakness 04/13/2017    Assistance needed with transportation 04/13/2017    Left-sided chest wall pain 04/11/2017    Hydronephrosis of left kidney 04/07/2017    Orthostatic hypotension 04/04/2017    Generalized OA 01/05/2017    Noncompliance with medication regimen-chol medication  01/05/2017    Tightness sensation-head 09/20/2016    Diabetes mellitus type 2, diet-controlled ([facility] [address] 05/26/2016    Somatization disorder 05/26/2016    Death of parent 05/26/2016    Somatic delusion disorder ([facility] [address] 03/22/2016    Death of child 02/17/2016    Broken heart syndrome 01/18/2016    SOB (shortness of breath) 12/22/2015    CAD (coronary artery disease), native coronary artery 12/01/2015    Pseudobulbar affect 10/16/2015    Breast pain, left 04/07/2015    Personal history of noncompliance with medical treatment, presenting hazards to health 05/30/2014     Class: Chronic    Duplicated right renal collecting system 03/13/2014    Nephrolithiasis 03/13/2014    Onycholysis of toenail 02/27/2014     Class: Chronic    Chronic pain associated with significant psychosocial dysfunction 02/17/2014    Depression with anxiety 02/17/2014     Class: Chronic    Other somatoform disorders 02/17/2014     Class: Chronic    Chronic chest pain 01/13/2014     Class: Chronic    Hyperlipidemia 12/09/2013     Class: Chronic    UTI (urinary tract infection) 12/09/2013    Insomnia 11/13/2013    Constipation 08/22/2013     Class: Chronic    Abdominal pain 08/16/2013    Dizziness of unknown cause 08/13/2013    Neutropenia ([facility] [address] 08/13/2013    HTN, goal below 130/80 09/07/2012     Class: Chronic    Chronic headache 09/07/2012     Class: Chronic    Acid reflux 09/07/2012      Andrea Hernandez MD  Past Medical History:   Diagnosis Date    Agoraphobia without mention of panic attacks 2/17/2014    Anxiety disorder 8/18/2013    Arthritis     osteo    Breast pain, left 4/7/2015    CAD (coronary artery disease), native coronary artery 12/1/2015    no stents    Chronic chest pain 1/13/2014    Chronic pain associated with significant psychosocial dysfunction 2/17/2014    Depression 8/18/2013    Diabetes ([facility] [address]     type II    Duplicated right renal collecting system 3/13/2014    GERD (gastroesophageal reflux disease)     Gout, joint     Hypercholesteremia     hyercholesterolemia    Hypertension     Nephrolithiasis 3/13/2014    Personal history of noncompliance with medical treatment, presenting hazards to health 5/30/2014    Psychotic disorder ([facility] [address]     Vaginal pain 7/30/2014      Past Surgical History:   Procedure Laterality Date    EGD  4/23/2010         HX CHOLECYSTECTOMY  09/20/2018    lap jesus    HX CYST REMOVAL      cyst removed from left wrist    HX HYSTERECTOMY      partial    HX OTHER SURGICAL      bladder dilitation    HX TUBAL LIGATION      HX UROLOGICAL      kidney stones     Allergies   Allergen Reactions    Amoxicillin Hives    Sulfa (Sulfonamide Antibiotics) Hives and Itching    Mirtazapine Itching and Nausea Only     Funny feeling in chest    Percocet [Oxycodone-Acetaminophen] Nausea and Vomiting    Codeine Nausea and Vomiting    Crestor [Rosuvastatin] Other (comments)     myalgias    Nitroglycerin Unknown (comments)     Patient cannot remember why she is allergic to it      Prednisone Itching    Zithromax [Azithromycin] Itching     Not sure what it does,taken long time ago      Family History   Problem Relation Age of Onset    Stroke Mother     Heart Disease Mother     Cancer Father         type unknown    Heart Disease Son     Liver Disease Son     Heart Disease Daughter     Malignant Hyperthermia Neg Hx     Pseudocholinesterase Deficiency Neg Hx     Delayed Awakening Neg Hx     Post-op Nausea/Vomiting Neg Hx     Emergence Delirium Neg Hx     Post-op Cognitive Dysfunction Neg Hx     Other Neg Hx       Social History     Socioeconomic History    Marital status:      Spouse name: Not on file    Number of children: Not on file    Years of education: Not on file    Highest education level: Not on file   Social Needs    Financial resource strain: Not on file    Food insecurity - worry: Not on file    Food insecurity - inability: Not on file   [employer] needs - medical: Not on file   [employer] needs - non-medical: Not on file   Occupational History    Not on file   Tobacco Use    Smoking status: Never Smoker    Smokeless tobacco: Never Used   Substance and Sexual Activity    Alcohol use: No    Drug use: No    Sexual activity: No   Other Topics Concern    Not on file   Social History Narrative    ** Merged History Encounter **     , lives with her son and Friend. Current Outpatient Medications   Medication Sig    pravastatin (PRAVACHOL) 40 mg tablet Take 1 Tab by mouth nightly.  dilTIAZem CD (CARDIZEM CD) 120 mg ER capsule Take 120 mg by mouth daily.  nystatin (MYCOSTATIN) topical cream Apply  to affected area two (2) times daily as needed for Skin Irritation.  losartan (COZAAR) 25 mg tablet Take 25 mg by mouth daily.  polyethylene glycol (MIRALAX) 17 gram/dose powder Take 17 g by mouth daily as needed.  1 tablespoon with 8 oz of water daily    bisacodyl (DULCOLAX, BISACODYL,) 10 mg suppository Insert 10 mg into rectum daily as needed.  docusate sodium (COLACE) 100 mg capsule TAKE 1 CAP BY MOUTH DAILY AS NEEDED FOR CONSTIPATION FOR UP TO 90 DAYS.  metoprolol tartrate (LOPRESSOR) 25 mg tablet Take 25 mg by mouth two (2) times a day.  famotidine (PEPCID) 20 mg tablet Take 1 Tab by mouth two (2) times a day. (Patient not taking: Reported on 12/13/2018)    aspirin delayed-release 81 mg tablet Take 1 Tab by mouth daily. (Patient not taking: Reported on 12/13/2018)     No current facility-administered medications for this visit. Review of Symptoms:  11 systems reviewed, negative other than as stated in the HPI    Physical ExamPhysical Exam:    Vitals:    12/13/18 0941 12/13/18 0942   BP: 100/60 110/70   Pulse: 80    Resp: 20    SpO2: 98%    Weight: 171 lb 1.6 oz (77.6 kg)    Height: 5' 7\" (1.702 m)      Body mass index is 26.8 kg/m². General PE   Gen:  NAD  Mental Status - Alert. General Appearance - Not in acute distress. Chest and Lung Exam   Inspection: Accessory muscles - No use of accessory muscles in breathing. Auscultation:   Breath sounds: - Normal.   Cardiovascular   Inspection: Jugular vein - Bilateral - Inspection Normal.   Palpation/Percussion:   Apical Impulse: - Normal.   Auscultation: Rhythm - Regular. Heart Sounds - S1 WNL and S2 WNL. No S3 or S4. Murmurs & Other Heart Sounds: Auscultation of the heart reveals - No Murmurs. Peripheral Vascular   Upper Extremity: Inspection - Bilateral - No Cyanotic nailbeds or Digital clubbing. Lower Extremity:   Palpation: Edema - Bilateral - No edema. Abdomen:   Soft, non-tender, bowel sounds are active.   Neuro: A&O times 3, CN and motor grossly WNL    Labs:   Lab Results   Component Value Date/Time    Cholesterol, total 258 (H) 07/06/2017 12:22 PM    Cholesterol, total 305 (H) 12/15/2016 02:37 PM    Cholesterol, total 308 (H) 09/20/2016 09:53 AM    Cholesterol, total 234 (H) 07/31/2015 01:27 PM    Cholesterol, total 213 (H) 12/04/2014 05:32 PM    HDL Cholesterol 64 07/06/2017 12:22 PM    HDL Cholesterol 57 12/15/2016 02:37 PM    HDL Cholesterol 61 09/20/2016 09:53 AM    HDL Cholesterol 72 07/31/2015 01:27 PM    HDL Cholesterol 70 12/04/2014 05:32 PM    LDL,Direct 209 (H) 01/26/2017 02:06 PM    LDL,Direct 228 (H) 10/16/2015 10:32 AM    LDL, calculated 175 (H) 07/06/2017 12:22 PM    LDL, calculated 218 (H) 12/15/2016 02:37 PM    LDL, calculated 223 (H) 09/20/2016 09:53 AM    LDL, calculated 143 (H) 07/31/2015 01:27 PM    LDL, calculated 122 (H) 12/04/2014 05:32 PM    Triglyceride 95 07/06/2017 12:22 PM    Triglyceride 149 12/15/2016 02:37 PM    Triglyceride 118 09/20/2016 09:53 AM    Triglyceride 94 07/31/2015 01:27 PM    Triglyceride 107 12/04/2014 05:32 PM     Lab Results   Component Value Date/Time    CK 69 07/20/2018 12:34 PM     Lab Results   Component Value Date/Time    Sodium 143 12/12/2018 02:21 PM    Potassium 4.1 12/12/2018 02:21 PM    Chloride 110 (H) 12/12/2018 02:21 PM    CO2 28 12/12/2018 02:21 PM    Anion gap 5 12/12/2018 02:21 PM    Glucose 98 12/12/2018 02:21 PM    BUN 21 (H) 12/12/2018 02:21 PM    Creatinine 0.98 12/12/2018 02:21 PM    BUN/Creatinine ratio 21 (H) 12/12/2018 02:21 PM    GFR est AA >60 12/12/2018 02:21 PM    GFR est non-AA 56 (L) 12/12/2018 02:21 PM    Calcium 8.8 12/12/2018 02:21 PM    Bilirubin, total 0.7 09/29/2018 04:24 AM    AST (SGOT) 31 09/29/2018 04:24 AM    Alk. phosphatase 70 09/29/2018 04:24 AM    Protein, total 5.8 (L) 09/29/2018 04:24 AM    Albumin 2.2 (L) 09/29/2018 04:24 AM    Globulin 3.6 09/29/2018 04:24 AM    A-G Ratio 0.6 (L) 09/29/2018 04:24 AM    ALT (SGPT) 63 09/29/2018 04:24 AM       EKG:  NSR     Assessment:        1. Chronic chest pain    2. HTN, goal below 130/80    3. Coronary artery disease involving native coronary artery of native heart without angina pectoris    4.  Hyperlipidemia, unspecified hyperlipidemia type 5. Aortic valve stenosis, etiology of cardiac valve disease unspecified    6. Somatization disorder        Orders Placed This Encounter    LIPID PANEL    CK     Standing Status:   Future     Standing Expiration Date:   6/13/2019    LIPID PANEL     Standing Status:   Future     Standing Expiration Date:   7/46/0239    METABOLIC PANEL, COMPREHENSIVE     Standing Status:   Future     Standing Expiration Date:   6/13/2019    AMB POC EKG ROUTINE W/ 12 LEADS, INTER & REP     Order Specific Question:   Reason for Exam:     Answer:   routine    pravastatin (PRAVACHOL) 40 mg tablet     Sig: Take 1 Tab by mouth nightly. Dispense:  90 Tab     Refill:  3        Plan:     Patient presents for follow up.      CAD/atypical chest discomfort for years: Noncardiac chest pain for years, reproducible with movement and palpation or with anxiety.  History of mild nonobstructive CAD December 2015. Echo 10/2018 with normal LVEF and mild AS. Preoperative cardiovascular risk assessment:  Low cardiac risk for GI procedure (biliary stent removal) ivette the near future based on the above.     Hypertension:  Controlled.  Not taking toprol and losartan currently because of low BP.      Hyperlipidemia:  Not on statin despite multiple attempts to start one. LDL was 175 in 2017. This was the case at last office visit and again statin is not on her list for unclear reasons.   Now willing to try again and check labs today and in 3 months.     Follow up in 1 year, sooner as needed.            Lisa Thrasher MD

## 2018-12-19 ENCOUNTER — ANESTHESIA EVENT (OUTPATIENT)
Dept: SURGERY | Age: 73
End: 2018-12-19
Payer: MEDICARE

## 2018-12-19 ENCOUNTER — HOSPITAL ENCOUNTER (OUTPATIENT)
Age: 73
Setting detail: OUTPATIENT SURGERY
Discharge: HOME | End: 2018-12-19
Attending: INTERNAL MEDICINE | Admitting: INTERNAL MEDICINE
Payer: MEDICARE

## 2018-12-19 ENCOUNTER — HOSPITAL ENCOUNTER (OUTPATIENT)
Dept: GENERAL RADIOLOGY | Age: 73
Discharge: HOME | End: 2018-12-19
Attending: INTERNAL MEDICINE
Payer: MEDICARE

## 2018-12-19 ENCOUNTER — ANESTHESIA (OUTPATIENT)
Dept: SURGERY | Age: 73
End: 2018-12-19
Payer: MEDICARE

## 2018-12-19 VITALS
HEART RATE: 92 BPM | RESPIRATION RATE: 20 BRPM | DIASTOLIC BLOOD PRESSURE: 84 MMHG | HEIGHT: 67 IN | TEMPERATURE: 97.3 F | OXYGEN SATURATION: 100 % | WEIGHT: 170.64 LBS | SYSTOLIC BLOOD PRESSURE: 155 MMHG | BODY MASS INDEX: 26.78 KG/M2

## 2018-12-19 LAB
GLUCOSE BLD STRIP.AUTO-MCNC: 100 MG/DL (ref 65–100)
GLUCOSE BLD STRIP.AUTO-MCNC: 92 MG/DL (ref 65–100)
SERVICE CMNT-IMP: NORMAL
SERVICE CMNT-IMP: NORMAL

## 2018-12-19 PROCEDURE — 82962 GLUCOSE BLOOD TEST: CPT

## 2018-12-19 PROCEDURE — C1769 GUIDE WIRE: HCPCS | Performed by: INTERNAL MEDICINE

## 2018-12-19 PROCEDURE — 76060000032 HC ANESTHESIA 0.5 TO 1 HR: Performed by: INTERNAL MEDICINE

## 2018-12-19 PROCEDURE — 77030018846 HC SOL IRR STRL H20 ICUM -A: Performed by: INTERNAL MEDICINE

## 2018-12-19 PROCEDURE — 77030018836 HC SOL IRR NACL ICUM -A: Performed by: INTERNAL MEDICINE

## 2018-12-19 PROCEDURE — 74011250636 HC RX REV CODE- 250/636

## 2018-12-19 PROCEDURE — 77030007288 HC DEV LOK BILI BSC -A: Performed by: INTERNAL MEDICINE

## 2018-12-19 PROCEDURE — 77030032490 HC SLV COMPR SCD KNE COVD -B: Performed by: INTERNAL MEDICINE

## 2018-12-19 PROCEDURE — 77030009038 HC CATH BILI STN RTVR BSC -C: Performed by: INTERNAL MEDICINE

## 2018-12-19 PROCEDURE — 76210000020 HC REC RM PH II FIRST 0.5 HR: Performed by: INTERNAL MEDICINE

## 2018-12-19 PROCEDURE — 77030010104 HC SEAL PRT ENDOSC BYRN -B: Performed by: INTERNAL MEDICINE

## 2018-12-19 PROCEDURE — 74011250636 HC RX REV CODE- 250/636: Performed by: ANESTHESIOLOGY

## 2018-12-19 PROCEDURE — 74011000250 HC RX REV CODE- 250

## 2018-12-19 PROCEDURE — 74330 X-RAY BILE/PANC ENDOSCOPY: CPT

## 2018-12-19 PROCEDURE — 76010000138 HC OR TIME 0.5 TO 1 HR: Performed by: INTERNAL MEDICINE

## 2018-12-19 PROCEDURE — 77030008684 HC TU ET CUF COVD -B: Performed by: ANESTHESIOLOGY

## 2018-12-19 PROCEDURE — 76210000006 HC OR PH I REC 0.5 TO 1 HR: Performed by: INTERNAL MEDICINE

## 2018-12-19 PROCEDURE — 77030026438 HC STYL ET INTUB CARD -A: Performed by: ANESTHESIOLOGY

## 2018-12-19 PROCEDURE — 74011250637 HC RX REV CODE- 250/637: Performed by: INTERNAL MEDICINE

## 2018-12-19 PROCEDURE — 74011250636 HC RX REV CODE- 250/636: Performed by: INTERNAL MEDICINE

## 2018-12-19 RX ORDER — ONDANSETRON 2 MG/ML
INJECTION INTRAMUSCULAR; INTRAVENOUS AS NEEDED
Status: DISCONTINUED | OUTPATIENT
Start: 2018-12-19 | End: 2018-12-19 | Stop reason: HOSPADM

## 2018-12-19 RX ORDER — PROPOFOL 10 MG/ML
INJECTION, EMULSION INTRAVENOUS AS NEEDED
Status: DISCONTINUED | OUTPATIENT
Start: 2018-12-19 | End: 2018-12-19 | Stop reason: HOSPADM

## 2018-12-19 RX ORDER — LEVOFLOXACIN 5 MG/ML
500 INJECTION, SOLUTION INTRAVENOUS ONCE
Status: COMPLETED | OUTPATIENT
Start: 2018-12-19 | End: 2018-12-19

## 2018-12-19 RX ORDER — DIPHENHYDRAMINE HYDROCHLORIDE 50 MG/ML
12.5 INJECTION, SOLUTION INTRAMUSCULAR; INTRAVENOUS AS NEEDED
Status: DISCONTINUED | OUTPATIENT
Start: 2018-12-19 | End: 2018-12-19 | Stop reason: HOSPADM

## 2018-12-19 RX ORDER — SODIUM CHLORIDE 0.9 % (FLUSH) 0.9 %
5-10 SYRINGE (ML) INJECTION EVERY 8 HOURS
Status: DISCONTINUED | OUTPATIENT
Start: 2018-12-19 | End: 2018-12-19 | Stop reason: HOSPADM

## 2018-12-19 RX ORDER — MORPHINE SULFATE 10 MG/ML
2 INJECTION, SOLUTION INTRAMUSCULAR; INTRAVENOUS
Status: DISCONTINUED | OUTPATIENT
Start: 2018-12-19 | End: 2018-12-19 | Stop reason: HOSPADM

## 2018-12-19 RX ORDER — FENTANYL CITRATE 50 UG/ML
INJECTION, SOLUTION INTRAMUSCULAR; INTRAVENOUS AS NEEDED
Status: DISCONTINUED | OUTPATIENT
Start: 2018-12-19 | End: 2018-12-19 | Stop reason: HOSPADM

## 2018-12-19 RX ORDER — SODIUM CHLORIDE 0.9 % (FLUSH) 0.9 %
5-10 SYRINGE (ML) INJECTION AS NEEDED
Status: DISCONTINUED | OUTPATIENT
Start: 2018-12-19 | End: 2018-12-19 | Stop reason: HOSPADM

## 2018-12-19 RX ORDER — SODIUM CHLORIDE, SODIUM LACTATE, POTASSIUM CHLORIDE, CALCIUM CHLORIDE 600; 310; 30; 20 MG/100ML; MG/100ML; MG/100ML; MG/100ML
75 INJECTION, SOLUTION INTRAVENOUS CONTINUOUS
Status: DISCONTINUED | OUTPATIENT
Start: 2018-12-19 | End: 2018-12-19 | Stop reason: HOSPADM

## 2018-12-19 RX ORDER — LIDOCAINE HYDROCHLORIDE 20 MG/ML
INJECTION, SOLUTION EPIDURAL; INFILTRATION; INTRACAUDAL; PERINEURAL AS NEEDED
Status: DISCONTINUED | OUTPATIENT
Start: 2018-12-19 | End: 2018-12-19 | Stop reason: HOSPADM

## 2018-12-19 RX ORDER — GLYCOPYRROLATE 0.2 MG/ML
INJECTION INTRAMUSCULAR; INTRAVENOUS AS NEEDED
Status: DISCONTINUED | OUTPATIENT
Start: 2018-12-19 | End: 2018-12-19 | Stop reason: HOSPADM

## 2018-12-19 RX ORDER — SUCCINYLCHOLINE CHLORIDE 20 MG/ML
INJECTION INTRAMUSCULAR; INTRAVENOUS AS NEEDED
Status: DISCONTINUED | OUTPATIENT
Start: 2018-12-19 | End: 2018-12-19 | Stop reason: HOSPADM

## 2018-12-19 RX ORDER — EPHEDRINE SULFATE 50 MG/ML
INJECTION, SOLUTION INTRAVENOUS AS NEEDED
Status: DISCONTINUED | OUTPATIENT
Start: 2018-12-19 | End: 2018-12-19 | Stop reason: HOSPADM

## 2018-12-19 RX ORDER — SODIUM CHLORIDE, SODIUM LACTATE, POTASSIUM CHLORIDE, CALCIUM CHLORIDE 600; 310; 30; 20 MG/100ML; MG/100ML; MG/100ML; MG/100ML
25 INJECTION, SOLUTION INTRAVENOUS CONTINUOUS
Status: DISCONTINUED | OUTPATIENT
Start: 2018-12-19 | End: 2018-12-19 | Stop reason: HOSPADM

## 2018-12-19 RX ORDER — ROCURONIUM BROMIDE 10 MG/ML
INJECTION, SOLUTION INTRAVENOUS AS NEEDED
Status: DISCONTINUED | OUTPATIENT
Start: 2018-12-19 | End: 2018-12-19 | Stop reason: HOSPADM

## 2018-12-19 RX ORDER — FENTANYL CITRATE 50 UG/ML
25 INJECTION, SOLUTION INTRAMUSCULAR; INTRAVENOUS
Status: DISCONTINUED | OUTPATIENT
Start: 2018-12-19 | End: 2018-12-19 | Stop reason: HOSPADM

## 2018-12-19 RX ORDER — HYDROMORPHONE HYDROCHLORIDE 1 MG/ML
.2-.5 INJECTION, SOLUTION INTRAMUSCULAR; INTRAVENOUS; SUBCUTANEOUS
Status: DISCONTINUED | OUTPATIENT
Start: 2018-12-19 | End: 2018-12-19 | Stop reason: HOSPADM

## 2018-12-19 RX ORDER — ONDANSETRON 2 MG/ML
4 INJECTION INTRAMUSCULAR; INTRAVENOUS AS NEEDED
Status: DISCONTINUED | OUTPATIENT
Start: 2018-12-19 | End: 2018-12-19 | Stop reason: HOSPADM

## 2018-12-19 RX ADMIN — PROPOFOL 30 MG: 10 INJECTION, EMULSION INTRAVENOUS at 15:53

## 2018-12-19 RX ADMIN — LIDOCAINE HYDROCHLORIDE 100 MG: 20 INJECTION, SOLUTION EPIDURAL; INFILTRATION; INTRACAUDAL; PERINEURAL at 15:45

## 2018-12-19 RX ADMIN — FENTANYL CITRATE 25 MCG: 50 INJECTION, SOLUTION INTRAMUSCULAR; INTRAVENOUS at 15:36

## 2018-12-19 RX ADMIN — EPHEDRINE SULFATE 5 MG: 50 INJECTION, SOLUTION INTRAVENOUS at 16:13

## 2018-12-19 RX ADMIN — GLYCOPYRROLATE 0.2 MG: 0.2 INJECTION INTRAMUSCULAR; INTRAVENOUS at 16:00

## 2018-12-19 RX ADMIN — PROPOFOL 50 MG: 10 INJECTION, EMULSION INTRAVENOUS at 15:45

## 2018-12-19 RX ADMIN — EPHEDRINE SULFATE 5 MG: 50 INJECTION, SOLUTION INTRAVENOUS at 16:01

## 2018-12-19 RX ADMIN — SODIUM CHLORIDE, SODIUM LACTATE, POTASSIUM CHLORIDE, AND CALCIUM CHLORIDE 25 ML/HR: 600; 310; 30; 20 INJECTION, SOLUTION INTRAVENOUS at 14:35

## 2018-12-19 RX ADMIN — LEVOFLOXACIN 500 MG: 5 INJECTION, SOLUTION INTRAVENOUS at 15:43

## 2018-12-19 RX ADMIN — ONDANSETRON 4 MG: 2 INJECTION INTRAMUSCULAR; INTRAVENOUS at 16:00

## 2018-12-19 RX ADMIN — SUCCINYLCHOLINE CHLORIDE 120 MG: 20 INJECTION INTRAMUSCULAR; INTRAVENOUS at 15:45

## 2018-12-19 RX ADMIN — ROCURONIUM BROMIDE 5 MG: 10 INJECTION, SOLUTION INTRAVENOUS at 15:45

## 2018-12-19 RX ADMIN — FENTANYL CITRATE 50 MCG: 50 INJECTION, SOLUTION INTRAMUSCULAR; INTRAVENOUS at 16:04

## 2018-12-19 RX ADMIN — ONDANSETRON 4 MG: 2 INJECTION INTRAMUSCULAR; INTRAVENOUS at 17:28

## 2018-12-19 RX ADMIN — FENTANYL CITRATE 25 MCG: 50 INJECTION, SOLUTION INTRAMUSCULAR; INTRAVENOUS at 16:02

## 2018-12-19 NOTE — ANESTHESIA PREPROCEDURE EVALUATION
Anesthetic History   No history of anesthetic complications            Review of Systems / Medical History  Patient summary reviewed, nursing notes reviewed and pertinent labs reviewed    Pulmonary  Within defined limits                 Neuro/Psych         Headaches and psychiatric history     Cardiovascular    Hypertension          CAD    Exercise tolerance: >4 METS     GI/Hepatic/Renal     GERD           Endo/Other    Diabetes    Arthritis     Other Findings   Comments: Chronic pain-Chronic pain associated with significant psychosocial dysfunction     Personal history of noncompliance with medical treatment, presenting hazards to health    Pseudobulbar affect    Somatization disorder    Gout                Anesthetic Plan    ASA: 3  Anesthesia type: general            Anesthetic plan and risks discussed with: Patient and Son / Daughter      Pt poor historian and has numerous psyc issues and I  talked to daughter and explained everything to both of them and answered  Their questions.

## 2018-12-19 NOTE — PERIOP NOTES
Handoff Report from Operating Room to PACU    Report received from Farhad Molina RN and Mendel Emmer, CRNA regarding Lilly Salguero. Surgeon(s):  Tamar Cee MD  And Procedure(s) (LRB):  ENDOSCOPIC RETROGRADE CHOLANGIOPANCREATOGRAPHY (ERCP) WITH STENT AND STONE REMOVAL (N/A)  confirmed   with allergies discussed. Anesthesia type, drugs, patient history, complications, estimated blood loss, vital signs, intake and output, and last pain medication, lines, reversal medications and temperature were reviewed.

## 2018-12-19 NOTE — PROCEDURES
NAME:  Shannan Miguel   :   1945   MRN:   [id number]     Madhuri Chapa [facility]  Date/Time:  2018 4:22 PM    Procedure Type:   ERCP with biliary stone removal, pancreatic stone removal     Indications: jaundice  Pre-operative Diagnosis: see indication above  Post-operative Diagnosis:  See findings below  : Maury Burciaga MD  Referring Provider:    , Kenzie Pond MD    Exam:  Airway: clear, no airway problems anticipated  Heart: RRR, without gallops or rubs  Lungs: clear bilaterally without wheezes, crackles, or rhonchi  Abdomen: soft, nontender, nondistended, bowel sounds present  Mental Status: awake, alert and oriented to person, place and time    Sedation:  General anesthesia   Medication: Levofloxacin 500mg IV, Indomethacin 100 mg RI  Procedure Details:  After informed consent was obtained with all risks and benefits of procedure explained, the patient was taken to the fluoroscopy suite and placed in the prone position. Upon sequential sedation as per above, the Olympus duodenoscope [id number]   was inserted via the mouthpeice and carefully advanced to the second portion of the duodenum. The quality of visualization was good. The duodenoscope was withdrawn into a short position. Findings:   Endoscopic: The esophagus, stomach, and duodenum were grossly normal when viewed with a duodenoscope  Ampulla:  1. Evidence of prior sphincterotomy which was patent  2. Plastic biliary stent seen    Immediate wire guided biliary cannulation was achieved with the Dreamwire through the Dreamtome    The pancreatic duct was cannulated or opacified    Cholangiogram:   1. Evidence of prior cholecystectomy  2. The bile duct was moderately and diffusely dilated  3. No evidence of a bile leak or stricture    Interventions:    Biliary:   1. Biliary stent removed with a snare  2. A balloon sweep was performed and a stone was removed.  The CBD was confirmed clear on further balloon sweeps. Complications: None. EBL:  None. Impression:    1. Evidence of prior sphincterotomy which was patent  2. Plastic biliary stent seen  3. Biliary stent removed with a snare  4. Evidence of prior cholecystectomy  5. The bile duct was moderately and diffusely dilated  6. No evidence of a bile leak or stricture  7. Biliary stent removed with a snare  8. A balloon sweep was performed and a stone was removed. The CBD was confirmed clear on further balloon sweeps. 9. No stent was replaced given excellent drainage of contrast      Recommendations:    1. Clear liquid diet today and advance to low fat diet tomorrow  2.  F/u PRN with GI      Discharge Disposition:  Home following recovery in PACU    Pavan Swartz MD

## 2018-12-19 NOTE — PERIOP NOTES
Discharge instructions reviewed with patient and caregiver. Both parties were given opportunities to ask questions and voice concerns. Both parties denied and further questions or concerns at the time of discharge. Patient A&O x4. Respirations even, unlabored. Skin warm, dry. Patient dressed, given belongings and escorted to car via wheelchair.

## 2018-12-19 NOTE — ANESTHESIA POSTPROCEDURE EVALUATION
Procedure(s):  ENDOSCOPIC RETROGRADE CHOLANGIOPANCREATOGRAPHY (ERCP) WITH STENT AND STONE REMOVAL. Anesthesia Post Evaluation        Patient location during evaluation: PACU  Note status: Adequate. Level of consciousness: responsive to verbal stimuli and sleepy but conscious  Pain management: satisfactory to patient  Airway patency: patent  Anesthetic complications: no  Cardiovascular status: acceptable  Respiratory status: acceptable  Hydration status: acceptable  Comments: +Post-Anesthesia Evaluation and Assessment    Patient: Stefano Dwyer MRN: [id number]  SSN: [id number]   YOB: 1945  Age: 68 y.o. Sex: female      Cardiovascular Function/Vital Signs    /87   Pulse 90   Temp 36.3 °C (97.3 °F)   Resp 18   Ht 5' 7\" (1.702 m)   Wt 77.4 kg (170 lb 10.2 oz)   SpO2 99%   BMI 26.73 kg/m²     Patient is status post Procedure(s):  ENDOSCOPIC RETROGRADE CHOLANGIOPANCREATOGRAPHY (ERCP) WITH STENT AND STONE REMOVAL. Nausea/Vomiting: Controlled. Postoperative hydration reviewed and adequate. Pain:  Pain Scale 1: Numeric (0 - 10) (12/19/18 1700)  Pain Intensity 1: 1 (12/19/18 1700)   Managed. Neurological Status:   Neuro (WDL): Exceptions to WDL (12/19/18 1634)   At baseline. Mental Status and Level of Consciousness: Arousable. Pulmonary Status:   O2 Device: Room air (12/19/18 1700)   Adequate oxygenation and airway patent. Complications related to anesthesia: None    Post-anesthesia assessment completed. No concerns.     Signed By: Moe Dewitt MD    12/19/2018  Post anesthesia nausea and vomiting:  controlled      Visit Vitals  /87   Pulse 90   Temp 36.3 °C (97.3 °F)   Resp 18   Ht 5' 7\" (1.702 m)   Wt 77.4 kg (170 lb 10.2 oz)   SpO2 99%   BMI 26.73 kg/m²

## 2018-12-19 NOTE — H&P
Gastroenterology Outpatient History and Physical    Patient: Johnny Rm    Physician: Sheree Trimble MD    Chief Complaint: H/o biliary obstruction s/p ERCP w/ stent  History of Present Illness: No GI complaints    History:  Past Medical History:   Diagnosis Date    Agoraphobia without mention of panic attacks 2/17/2014    Anxiety disorder 8/18/2013    Arthritis     osteo    Breast pain, left 4/7/2015    CAD (coronary artery disease), native coronary artery 12/1/2015    no stents    Chronic chest pain 1/13/2014    Chronic pain associated with significant psychosocial dysfunction 2/17/2014    Depression 8/18/2013    Diabetes ([facility] [address]     type II    Duplicated right renal collecting system 3/13/2014    GERD (gastroesophageal reflux disease)     Gout, joint     Hypercholesteremia     hyercholesterolemia    Hypertension     Nephrolithiasis 3/13/2014    Personal history of noncompliance with medical treatment, presenting hazards to health 5/30/2014    Psychotic disorder ([facility] [address]     Vaginal pain 7/30/2014      Past Surgical History:   Procedure Laterality Date    EGD  4/23/2010         HX CHOLECYSTECTOMY  09/20/2018    lap jesus    HX CYST REMOVAL      cyst removed from left wrist    HX HYSTERECTOMY      partial    HX OTHER SURGICAL      bladder dilitation    HX TUBAL LIGATION      HX UROLOGICAL      kidney stones      Social History     Socioeconomic History    Marital status:      Spouse name: Not on file    Number of children: Not on file    Years of education: Not on file    Highest education level: Not on file   Tobacco Use    Smoking status: Never Smoker    Smokeless tobacco: Never Used   Substance and Sexual Activity    Alcohol use: No    Drug use: No    Sexual activity: No   Social History Narrative    ** Merged History Encounter **     , lives with her son and Friend.        Family History   Problem Relation Age of Onset    Stroke Mother     Heart Disease Mother  Cancer Father         type unknown    Heart Disease Son     Liver Disease Son     Heart Disease Daughter     Malignant Hyperthermia Neg Hx     Pseudocholinesterase Deficiency Neg Hx     Delayed Awakening Neg Hx     Post-op Nausea/Vomiting Neg Hx     Emergence Delirium Neg Hx     Post-op Cognitive Dysfunction Neg Hx     Other Neg Hx       Patient Active Problem List   Diagnosis Code    HTN, goal below 130/80 I10    Chronic headache R51    Acid reflux K21.9    Dizziness of unknown cause R42    Neutropenia (HCC) D70.9    Abdominal pain R10.9    Constipation K59.00    Insomnia G47.00    Hyperlipidemia E78.5    UTI (urinary tract infection) N39.0    Chronic chest pain R07.9, G89.29    Chronic pain associated with significant psychosocial dysfunction G89.4    Depression with anxiety F41.8    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    Onycholysis of toenail K95.3    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    Nephrolithiasis N20.0    Personal history of noncompliance with medical treatment, presenting hazards to health Z91.19    Breast pain, left N64.4    Pseudobulbar affect F48.2    CAD (coronary artery disease), native coronary artery I25.10    SOB (shortness of breath) R06.02    Broken heart syndrome I51.81    Death of child Z63.4    Somatic delusion disorder ([facility] [address] F22    Diabetes mellitus type 2, diet-controlled ([facility] [address] E11.9    Somatization disorder F45.0    Death of parent Z63.4    Tightness sensation-head Z78.9    Generalized OA M15.9    Noncompliance with medication regimen-chol medication  Z91.14    Orthostatic hypotension I95.1    Hydronephrosis of left kidney N13.30    Left-sided chest wall pain R07.89    Weakness R53.1    Assistance needed with transportation Z74.8    Gait instability R26.81    Advance directive discussed with patient Z71.89    Vaginal irritation N89.8    Blurring of vision H53.8    Choledocholithiasis with acute cholecystitis K80.42 Allergies: Allergies   Allergen Reactions    Amoxicillin Hives    Sulfa (Sulfonamide Antibiotics) Hives and Itching    Mirtazapine Itching and Nausea Only     Funny feeling in chest    Percocet [Oxycodone-Acetaminophen] Nausea and Vomiting    Codeine Nausea and Vomiting    Crestor [Rosuvastatin] Other (comments)     myalgias    Nitroglycerin Unknown (comments)     Patient cannot remember why she is allergic to it      Prednisone Itching    Zithromax [Azithromycin] Itching     Not sure what it does,taken long time ago     Medications:   Prior to Admission medications    Medication Sig Start Date End Date Taking? Authorizing Provider   famotidine (PEPCID) 20 mg tablet Take 1 Tab by mouth two (2) times a day. 10/6/18  Yes Drew Harding MD   dilTIAZem CD (CARDIZEM CD) 120 mg ER capsule Take 120 mg by mouth daily. Yes Other, MD Shun   nystatin (MYCOSTATIN) topical cream Apply  to affected area two (2) times daily as needed for Skin Irritation. Yes Other, MD Shun   polyethylene glycol (MIRALAX) 17 gram/dose powder Take 17 g by mouth daily as needed. 1 tablespoon with 8 oz of water daily 8/30/18  Yes Vivek Constantino MD   bisacodyl (DULCOLAX, BISACODYL,) 10 mg suppository Insert 10 mg into rectum daily as needed. 8/30/18  Yes Vivek Constantino MD   docusate sodium (COLACE) 100 mg capsule TAKE 1 CAP BY MOUTH DAILY AS NEEDED FOR CONSTIPATION FOR UP TO 90 DAYS. 8/30/18  Yes Vivek Constantino MD   aspirin delayed-release 81 mg tablet Take 1 Tab by mouth daily. 9/10/13  Yes Blas Giraldo MD   pravastatin (PRAVACHOL) 40 mg tablet Take 1 Tab by mouth nightly. 12/13/18   Blas Giraldo MD     Physical Exam:   Vital Signs: Blood pressure (!) 187/96, pulse 69, temperature 98.7 °F (37.1 °C), resp. rate 16, height 5' 7\" (1.702 m), weight 77.4 kg (170 lb 10.2 oz), SpO2 100 %.   General: well developed, well nourished   HEENT: unremarkable   Heart: regular rhythm no mumur    Lungs: clear Abdominal:  benign   Neurological: unremarkable   Extremities: no edema     Findings/Diagnosis: ERCP s/p stent  Plan of Care/Planned Procedure: ERCP w/ stent removal with conscious/deep sedation    Signed:  Ronan Maza MD 12/19/2018

## 2018-12-19 NOTE — DISCHARGE INSTRUCTIONS
Fernando Sheth  [id number]  1945    ERCP DISCHARGE INSTRUCTIONS  Discomfort:  Sore throat- throat lozenges or warm salt water gargle  redness at IV site- apply warm compress to area; if redness or soreness persist- contact your physician  Gaseous discomfort- walking, belching will help relieve any discomfort  You may not operate a vehicle for 12 hours  You may not engage in an occupation involving machinery or appliances for rest of today  You may not drink alcoholic beverages for at least 12 hours  Avoid making any critical decisions for at least 24 hour  DIET  Clear liquid diet today, advance to low fat tomorrow    MEDICATIONS:  Per Medication Reconciliation      ACTIVITY  You may resume your normal daily activities until tomorrow AM;  Spend the remainder of the day resting -  avoid any strenuous activity. CALL M.D. ANY SIGN OF   Increasing pain, nausea, vomiting  Abdominal distension (swelling)  New increased bleeding (oral or rectal)  Fever (chills)  Pain in chest area  Bloody discharge from nose or mouth  Shortness of breath    You may not take any Aspirin, Ibuprofen, Motrin, Aleve, or Goodys for 10 days, ONLY  Tylenol as needed for pain. IMPRESSION:  Impression:    1. Evidence of prior sphincterotomy which was patent  2. Plastic biliary stent seen  3. Biliary stent removed with a snare  4. Evidence of prior cholecystectomy  5. The bile duct was moderately and diffusely dilated  6. No evidence of a bile leak or stricture  7. Biliary stent removed with a snare  8. A balloon sweep was performed and a stone was removed. The CBD was confirmed clear on further balloon sweeps. 9. No stent was replaced given excellent drainage of contrast      Recommendations:    1. Clear liquid diet today and advance to low fat diet tomorrow  2. F/u PRN with GI    Follow-up Instructions:   Telephone # [phone]    Maral Quiñones MD     A common side effect of anesthesia following surgery is nausea and/or vomiting.  In order to decrease symptoms, it is wise to avoid foods that are high in fat, greasy foods, milk products, and spicy foods for the first 24 hours. Acceptable foods for the first 24 hours following surgery include but are not limited to:     soup   broth    toast    crackers    applesauce    bananas    mashed potatoes,   soft or scrambled eggs   oatmeal    jello    It is important to eat when taking your pain medication. This will help to prevent nausea. If possible, please try to time your meals with your medications. It is very important to stay hydrated following surgery. Sip fluids frequently while awake. Avoid acidic drinks such as citrus juices and soda for 24 hours. Carbonated beverages may cause bloating and gas. Acceptable fluids include:    - water (flavor packets may add variety)  - coffee or tea (in moderation)  - Gatorade  - José Miguel-aid  - apple juice  - cranberry juice    You are encouraged to cough and deep breathe every hour when awake. This will help to prevent respiratory complications following anesthesia. You may want to hug a pillow when coughing and sneezing to add additional support to the surgical area and to decrease discomfort if you had abdominal or chest surgery. If you are discharged home with support stockings, you may remove them after 24 hours. Support stockings are used to help prevent blood clots in the legs following surgery. Please take time to review all of your Home Care Instructions and Medication Information sheets provided in your discharge packet. If you have any questions, please contact your surgeons office. Thank you.

## 2019-02-19 RX ORDER — POLYETHYLENE GLYCOL 3350 17 G/17G
POWDER, FOR SOLUTION ORAL
Qty: 255 G | Refills: 3 | Status: SHIPPED | OUTPATIENT
Start: 2019-02-19 | End: 2019-03-14

## 2019-03-13 DIAGNOSIS — G89.29 CHRONIC CHEST PAIN: ICD-10-CM

## 2019-03-13 DIAGNOSIS — R07.9 CHRONIC CHEST PAIN: ICD-10-CM

## 2019-03-13 DIAGNOSIS — E78.5 HYPERLIPIDEMIA, UNSPECIFIED HYPERLIPIDEMIA TYPE: ICD-10-CM

## 2019-03-13 DIAGNOSIS — I10 HTN, GOAL BELOW 130/80: ICD-10-CM

## 2019-03-13 DIAGNOSIS — I25.10 CORONARY ARTERY DISEASE INVOLVING NATIVE CORONARY ARTERY OF NATIVE HEART WITHOUT ANGINA PECTORIS: ICD-10-CM

## 2019-03-13 DIAGNOSIS — I35.0 AORTIC VALVE STENOSIS, ETIOLOGY OF CARDIAC VALVE DISEASE UNSPECIFIED: ICD-10-CM

## 2019-03-13 DIAGNOSIS — F45.0 SOMATIZATION DISORDER: ICD-10-CM

## 2019-03-14 ENCOUNTER — HOSPITAL ENCOUNTER (EMERGENCY)
Age: 74
Discharge: HOME | End: 2019-03-14
Attending: EMERGENCY MEDICINE
Payer: MEDICARE

## 2019-03-14 ENCOUNTER — APPOINTMENT (OUTPATIENT)
Dept: CT IMAGING | Age: 74
End: 2019-03-14
Attending: EMERGENCY MEDICINE
Payer: MEDICARE

## 2019-03-14 VITALS
TEMPERATURE: 98 F | BODY MASS INDEX: 26.84 KG/M2 | HEART RATE: 72 BPM | HEIGHT: 67 IN | DIASTOLIC BLOOD PRESSURE: 68 MMHG | WEIGHT: 171 LBS | SYSTOLIC BLOOD PRESSURE: 154 MMHG | RESPIRATION RATE: 16 BRPM | OXYGEN SATURATION: 99 %

## 2019-03-14 DIAGNOSIS — K59.01 SLOW TRANSIT CONSTIPATION: Primary | ICD-10-CM

## 2019-03-14 DIAGNOSIS — N30.01 ACUTE CYSTITIS WITH HEMATURIA: ICD-10-CM

## 2019-03-14 LAB
ALBUMIN SERPL-MCNC: 3.2 G/DL (ref 3.5–5)
ALBUMIN/GLOB SERPL: 0.9 {RATIO} (ref 1.1–2.2)
ALP SERPL-CCNC: 68 U/L (ref 45–117)
ALT SERPL-CCNC: 13 U/L (ref 12–78)
ANION GAP SERPL CALC-SCNC: 11 MMOL/L (ref 5–15)
APPEARANCE UR: ABNORMAL
AST SERPL-CCNC: 16 U/L (ref 15–37)
ATRIAL RATE: 79 BPM
BACTERIA URNS QL MICRO: ABNORMAL /HPF
BASOPHILS # BLD: 0 K/UL (ref 0–0.1)
BASOPHILS NFR BLD: 0 % (ref 0–1)
BILIRUB SERPL-MCNC: 1 MG/DL (ref 0.2–1)
BILIRUB UR QL CFM: NEGATIVE
BUN SERPL-MCNC: 30 MG/DL (ref 6–20)
BUN/CREAT SERPL: 26 (ref 12–20)
CALCIUM SERPL-MCNC: 8.3 MG/DL (ref 8.5–10.1)
CALCULATED P AXIS, ECG09: 13 DEGREES
CALCULATED R AXIS, ECG10: 23 DEGREES
CALCULATED T AXIS, ECG11: 6 DEGREES
CHLORIDE SERPL-SCNC: 108 MMOL/L (ref 97–108)
CO2 SERPL-SCNC: 21 MMOL/L (ref 21–32)
COLOR UR: ABNORMAL
CREAT SERPL-MCNC: 1.16 MG/DL (ref 0.55–1.02)
DIAGNOSIS, 93000: NORMAL
DIFFERENTIAL METHOD BLD: ABNORMAL
EOSINOPHIL # BLD: 0 K/UL (ref 0–0.4)
EOSINOPHIL NFR BLD: 0 % (ref 0–7)
EPITH CASTS URNS QL MICRO: ABNORMAL /LPF
ERYTHROCYTE [DISTWIDTH] IN BLOOD BY AUTOMATED COUNT: 14 % (ref 11.5–14.5)
GLOBULIN SER CALC-MCNC: 3.5 G/DL (ref 2–4)
GLUCOSE SERPL-MCNC: 122 MG/DL (ref 65–100)
GLUCOSE UR STRIP.AUTO-MCNC: NEGATIVE MG/DL
HCT VFR BLD AUTO: 39 % (ref 35–47)
HGB BLD-MCNC: 12.4 G/DL (ref 11.5–16)
HGB UR QL STRIP: ABNORMAL
HYALINE CASTS URNS QL MICRO: ABNORMAL /LPF (ref 0–5)
IMM GRANULOCYTES # BLD AUTO: 0 K/UL (ref 0–0.04)
IMM GRANULOCYTES NFR BLD AUTO: 0 % (ref 0–0.5)
KETONES UR QL STRIP.AUTO: 15 MG/DL
LEUKOCYTE ESTERASE UR QL STRIP.AUTO: ABNORMAL
LIPASE SERPL-CCNC: 28 U/L (ref 73–393)
LYMPHOCYTES # BLD: 0.7 K/UL (ref 0.8–3.5)
LYMPHOCYTES NFR BLD: 7 % (ref 12–49)
MAGNESIUM SERPL-MCNC: 2.5 MG/DL (ref 1.6–2.4)
MCH RBC QN AUTO: 26.9 PG (ref 26–34)
MCHC RBC AUTO-ENTMCNC: 31.8 G/DL (ref 30–36.5)
MCV RBC AUTO: 84.6 FL (ref 80–99)
MONOCYTES # BLD: 0.5 K/UL (ref 0–1)
MONOCYTES NFR BLD: 5 % (ref 5–13)
MUCOUS THREADS URNS QL MICRO: ABNORMAL /LPF
NEUTS SEG # BLD: 8.9 K/UL (ref 1.8–8)
NEUTS SEG NFR BLD: 88 % (ref 32–75)
NITRITE UR QL STRIP.AUTO: POSITIVE
NRBC # BLD: 0 K/UL (ref 0–0.01)
NRBC BLD-RTO: 0 PER 100 WBC
P-R INTERVAL, ECG05: 136 MS
PH UR STRIP: 5 [PH] (ref 5–8)
PLATELET # BLD AUTO: 203 K/UL (ref 150–400)
PMV BLD AUTO: 12 FL (ref 8.9–12.9)
POTASSIUM SERPL-SCNC: 4.3 MMOL/L (ref 3.5–5.1)
PROT SERPL-MCNC: 6.7 G/DL (ref 6.4–8.2)
PROT UR STRIP-MCNC: 30 MG/DL
Q-T INTERVAL, ECG07: 382 MS
QRS DURATION, ECG06: 94 MS
QTC CALCULATION (BEZET), ECG08: 438 MS
RBC # BLD AUTO: 4.61 M/UL (ref 3.8–5.2)
RBC #/AREA URNS HPF: >100 /HPF (ref 0–5)
RBC MORPH BLD: ABNORMAL
SODIUM SERPL-SCNC: 140 MMOL/L (ref 136–145)
SP GR UR REFRACTOMETRY: 1.03 (ref 1–1.03)
UROBILINOGEN UR QL STRIP.AUTO: 1 EU/DL (ref 0.2–1)
VENTRICULAR RATE, ECG03: 79 BPM
WBC # BLD AUTO: 10.1 K/UL (ref 3.6–11)
WBC URNS QL MICRO: ABNORMAL /HPF (ref 0–4)

## 2019-03-14 PROCEDURE — 74011000258 HC RX REV CODE- 258: Performed by: EMERGENCY MEDICINE

## 2019-03-14 PROCEDURE — 74011250636 HC RX REV CODE- 250/636: Performed by: EMERGENCY MEDICINE

## 2019-03-14 PROCEDURE — 36415 COLL VENOUS BLD VENIPUNCTURE: CPT

## 2019-03-14 PROCEDURE — 85025 COMPLETE CBC W/AUTO DIFF WBC: CPT

## 2019-03-14 PROCEDURE — 80053 COMPREHEN METABOLIC PANEL: CPT

## 2019-03-14 PROCEDURE — 93005 ELECTROCARDIOGRAM TRACING: CPT

## 2019-03-14 PROCEDURE — 96365 THER/PROPH/DIAG IV INF INIT: CPT

## 2019-03-14 PROCEDURE — 81001 URINALYSIS AUTO W/SCOPE: CPT

## 2019-03-14 PROCEDURE — 96361 HYDRATE IV INFUSION ADD-ON: CPT

## 2019-03-14 PROCEDURE — 96375 TX/PRO/DX INJ NEW DRUG ADDON: CPT

## 2019-03-14 PROCEDURE — 83735 ASSAY OF MAGNESIUM: CPT

## 2019-03-14 PROCEDURE — 83690 ASSAY OF LIPASE: CPT

## 2019-03-14 PROCEDURE — 74177 CT ABD & PELVIS W/CONTRAST: CPT

## 2019-03-14 PROCEDURE — 96366 THER/PROPH/DIAG IV INF ADDON: CPT

## 2019-03-14 PROCEDURE — 99285 EMERGENCY DEPT VISIT HI MDM: CPT

## 2019-03-14 PROCEDURE — 74011636320 HC RX REV CODE- 636/320: Performed by: EMERGENCY MEDICINE

## 2019-03-14 RX ORDER — SODIUM CHLORIDE 0.9 % (FLUSH) 0.9 %
10 SYRINGE (ML) INJECTION
Status: COMPLETED | OUTPATIENT
Start: 2019-03-14 | End: 2019-03-14

## 2019-03-14 RX ORDER — CEPHALEXIN 500 MG/1
500 CAPSULE ORAL 2 TIMES DAILY
Qty: 14 CAP | Refills: 0 | Status: SHIPPED | OUTPATIENT
Start: 2019-03-14 | End: 2019-03-21

## 2019-03-14 RX ORDER — FENTANYL CITRATE 50 UG/ML
50 INJECTION, SOLUTION INTRAMUSCULAR; INTRAVENOUS
Status: COMPLETED | OUTPATIENT
Start: 2019-03-14 | End: 2019-03-14

## 2019-03-14 RX ORDER — ONDANSETRON 2 MG/ML
4 INJECTION INTRAMUSCULAR; INTRAVENOUS
Status: COMPLETED | OUTPATIENT
Start: 2019-03-14 | End: 2019-03-14

## 2019-03-14 RX ORDER — POLYETHYLENE GLYCOL 3350 17 G/17G
17 POWDER, FOR SOLUTION ORAL 2 TIMES DAILY
Qty: 170 G | Refills: 0 | Status: SHIPPED | OUTPATIENT
Start: 2019-03-14 | End: 2019-03-19

## 2019-03-14 RX ORDER — DOCUSATE SODIUM 100 MG/1
CAPSULE, LIQUID FILLED ORAL
Qty: 90 CAP | Refills: 0 | Status: SHIPPED | OUTPATIENT
Start: 2019-03-14 | End: 2019-09-03

## 2019-03-14 RX ADMIN — SODIUM CHLORIDE 1000 ML: 900 INJECTION, SOLUTION INTRAVENOUS at 12:28

## 2019-03-14 RX ADMIN — IOPAMIDOL 100 ML: 755 INJECTION, SOLUTION INTRAVENOUS at 14:07

## 2019-03-14 RX ADMIN — FENTANYL CITRATE 50 MCG: 50 INJECTION, SOLUTION INTRAMUSCULAR; INTRAVENOUS at 12:28

## 2019-03-14 RX ADMIN — Medication 10 ML: at 14:08

## 2019-03-14 RX ADMIN — CEFTRIAXONE 1 G: 1 INJECTION, POWDER, FOR SOLUTION INTRAMUSCULAR; INTRAVENOUS at 13:50

## 2019-03-14 RX ADMIN — ONDANSETRON 4 MG: 2 INJECTION INTRAMUSCULAR; INTRAVENOUS at 12:28

## 2019-03-14 NOTE — ED NOTES
Patient in room. Call bell within reach/ Side rails up x2/ Bed in low/locked position/ Oriented to unit. Changed in gown. Blankets provided/Repositioned for Position of Comfort. Explained Plan of care with patient. Patient instructed to call when getting OOB. Will continue to monitor. Placed on continuous 02 monitoring and cycling BP.  Placed on cardiac monitor

## 2019-03-14 NOTE — ED PROVIDER NOTES
EMERGENCY DEPARTMENT HISTORY AND PHYSICAL EXAM      Date: 3/14/2019  Patient Name: Ehsan Funk    History of Presenting Illness     Chief Complaint   Patient presents with    Diarrhea     x 2 days after taking prune pills to relieve constipation    Dizziness     Onset today       History Provided By: Patient    HPI: Ehsan Funk, 68 y.o. female with PMHx significant for HTN, anxiety disorder, nephrolithiasis, GERD, DM, osteoarthritis, gout, chronic pain, presents via EMS to the ED with cc of diarrhea s/p the use of \"prune wax\" x 2 days ago. She denies any associated symptoms, but does report chronic abdominal pain s/p a cholecystectomy. Pt states she had constipation for 2 weeks and was given \"prune wax\" to which she has been experiencing diarrhea since. Pt had defecated over herself due to the diarrhea. Pt denies fevers, chills, night sweats, chest pain, pressure, SOB, MOORE, PND, orthopnea, abdominal pain, n/v, melena, hematuria, HA, dizziness, and syncope. + Dysuria       There are no other complaints, changes, or physical findings at this time. PCP: Ck Mark MD    No current facility-administered medications on file prior to encounter. Current Outpatient Medications on File Prior to Encounter   Medication Sig Dispense Refill    pravastatin (PRAVACHOL) 40 mg tablet Take 1 Tab by mouth nightly. 90 Tab 3    famotidine (PEPCID) 20 mg tablet Take 1 Tab by mouth two (2) times a day. 60 Tab 0    dilTIAZem CD (CARDIZEM CD) 120 mg ER capsule Take 120 mg by mouth daily.  nystatin (MYCOSTATIN) topical cream Apply  to affected area two (2) times daily as needed for Skin Irritation.  bisacodyl (DULCOLAX, BISACODYL,) 10 mg suppository Insert 10 mg into rectum daily as needed. 12 Suppository 0    aspirin delayed-release 81 mg tablet Take 1 Tab by mouth daily.  30 Tab 11       Past History     Past Medical History:  Past Medical History:   Diagnosis Date    Agoraphobia without mention of panic attacks 2/17/2014    Anxiety disorder 8/18/2013    Arthritis     osteo    Breast pain, left 4/7/2015    CAD (coronary artery disease), native coronary artery 12/1/2015    no stents    Chronic chest pain 1/13/2014    Chronic pain associated with significant psychosocial dysfunction 2/17/2014    Depression 8/18/2013    Diabetes ([facility] [address]     type II    Duplicated right renal collecting system 3/13/2014    GERD (gastroesophageal reflux disease)     Gout, joint     Hypercholesteremia     hyercholesterolemia    Hypertension     Nephrolithiasis 3/13/2014    Personal history of noncompliance with medical treatment, presenting hazards to health 5/30/2014    Psychotic disorder ([facility] [address]     Vaginal pain 7/30/2014       Past Surgical History:  Past Surgical History:   Procedure Laterality Date    EGD  4/23/2010         HX CHOLECYSTECTOMY  09/20/2018    lap jesus    HX CYST REMOVAL      cyst removed from left wrist    HX HYSTERECTOMY      partial    HX OTHER SURGICAL      bladder dilitation    HX TUBAL LIGATION      HX UROLOGICAL      kidney stones       Family History:  Family History   Problem Relation Age of Onset    Stroke Mother     Heart Disease Mother     Cancer Father         type unknown    Heart Disease Son     Liver Disease Son     Heart Disease Daughter     Malignant Hyperthermia Neg Hx     Pseudocholinesterase Deficiency Neg Hx     Delayed Awakening Neg Hx     Post-op Nausea/Vomiting Neg Hx     Emergence Delirium Neg Hx     Post-op Cognitive Dysfunction Neg Hx     Other Neg Hx        Social History:  Social History     Tobacco Use    Smoking status: Never Smoker    Smokeless tobacco: Never Used   Substance Use Topics    Alcohol use: No    Drug use: No       Allergies:   Allergies   Allergen Reactions    Amoxicillin Hives    Sulfa (Sulfonamide Antibiotics) Hives and Itching    Mirtazapine Itching and Nausea Only     Funny feeling in chest    Percocet [Oxycodone-Acetaminophen] Nausea and Vomiting    Codeine Nausea and Vomiting    Crestor [Rosuvastatin] Other (comments)     myalgias    Nitroglycerin Unknown (comments)     Patient cannot remember why she is allergic to it      Prednisone Itching    Zithromax [Azithromycin] Itching     Not sure what it does,taken long time ago       Review of Systems   Review of Systems   Constitutional: Negative for activity change, appetite change, chills, diaphoresis, fatigue, fever and unexpected weight change. HENT: Negative for congestion, ear pain, rhinorrhea, sinus pressure, sore throat and tinnitus. Eyes: Negative for photophobia, pain, discharge and visual disturbance. Respiratory: Negative for apnea, cough, choking, chest tightness, shortness of breath, wheezing and stridor. Cardiovascular: Negative for chest pain, palpitations and leg swelling. Gastrointestinal: Positive for abdominal pain, constipation and diarrhea. Negative for nausea and vomiting. Endocrine: Negative for polydipsia, polyphagia and polyuria. Genitourinary: Positive for dysuria. Negative for decreased urine volume, dyspareunia, enuresis, flank pain, frequency, hematuria and urgency. Musculoskeletal: Negative for arthralgias, back pain, gait problem, myalgias and neck pain. Skin: Negative for color change, pallor, rash and wound. Allergic/Immunologic: Negative for immunocompromised state. Neurological: Negative for dizziness, seizures, syncope, weakness, light-headedness and headaches. Hematological: Does not bruise/bleed easily. Psychiatric/Behavioral: Negative for agitation and confusion. The patient is not nervous/anxious. All other systems reviewed and are negative. Physical Exam   Physical Exam   Constitutional: She is oriented to person, place, and time. She appears well-developed and well-nourished. No distress. HENT:   Head: Normocephalic.    Right Ear: External ear normal.   Left Ear: External ear normal. Mouth/Throat: Oropharynx is clear and moist. No oropharyngeal exudate. Eyes: Conjunctivae and EOM are normal. Pupils are equal, round, and reactive to light. Right eye exhibits no discharge. Left eye exhibits no discharge. No scleral icterus. Neck: Normal range of motion. Neck supple. No JVD present. No tracheal deviation present. No thyromegaly present. Cardiovascular: Normal rate, regular rhythm, normal heart sounds and intact distal pulses. Exam reveals no gallop and no friction rub. No murmur heard. Pulmonary/Chest: Effort normal and breath sounds normal. No stridor. No respiratory distress. She has no wheezes. She has no rales. She exhibits no tenderness. Abdominal: Soft. Bowel sounds are normal. She exhibits no distension and no mass. There is no hepatosplenomegaly. There is generalized tenderness. There is no rigidity, no rebound, no guarding, no CVA tenderness, no tenderness at McBurney's point and negative Aranda's sign. Musculoskeletal: Normal range of motion. She exhibits no edema or tenderness. Lymphadenopathy:     She has no cervical adenopathy. Neurological: She is alert and oriented to person, place, and time. She displays normal reflexes. No cranial nerve deficit. Coordination normal.   Skin: Skin is warm and dry. No rash noted. She is not diaphoretic. No erythema. No pallor. Psychiatric: She has a normal mood and affect. Her behavior is normal. Judgment and thought content normal.   Nursing note and vitals reviewed.     Diagnostic Study Results     Labs -  Recent Results (from the past 12 hour(s))   EKG, 12 LEAD, INITIAL    Collection Time: 03/14/19 11:38 AM   Result Value Ref Range    Ventricular Rate 79 BPM    Atrial Rate 79 BPM    P-R Interval 136 ms    QRS Duration 94 ms    Q-T Interval 382 ms    QTC Calculation (Bezet) 438 ms    Calculated P Axis 13 degrees    Calculated R Axis 23 degrees    Calculated T Axis 6 degrees    Diagnosis       Normal sinus rhythm  Nonspecific ST segment changes  Confirmed by Lizz Truong ([id number]) on 3/14/2019 2:43:56 PM     CBC WITH AUTOMATED DIFF    Collection Time: 03/14/19 11:47 AM   Result Value Ref Range    WBC 10.1 3.6 - 11.0 K/uL    RBC 4.61 3.80 - 5.20 M/uL    HGB 12.4 11.5 - 16.0 g/dL    HCT 39.0 35.0 - 47.0 %    MCV 84.6 80.0 - 99.0 FL    MCH 26.9 26.0 - 34.0 PG    MCHC 31.8 30.0 - 36.5 g/dL    RDW 14.0 11.5 - 14.5 %    PLATELET 295 601 - 201 K/uL    MPV 12.0 8.9 - 12.9 FL    NRBC 0.0 0  WBC    ABSOLUTE NRBC 0.00 0.00 - 0.01 K/uL    NEUTROPHILS 88 (H) 32 - 75 %    LYMPHOCYTES 7 (L) 12 - 49 %    MONOCYTES 5 5 - 13 %    EOSINOPHILS 0 0 - 7 %    BASOPHILS 0 0 - 1 %    IMMATURE GRANULOCYTES 0 0.0 - 0.5 %    ABS. NEUTROPHILS 8.9 (H) 1.8 - 8.0 K/UL    ABS. LYMPHOCYTES 0.7 (L) 0.8 - 3.5 K/UL    ABS. MONOCYTES 0.5 0.0 - 1.0 K/UL    ABS. EOSINOPHILS 0.0 0.0 - 0.4 K/UL    ABS. BASOPHILS 0.0 0.0 - 0.1 K/UL    ABS. IMM. GRANS. 0.0 0.00 - 0.04 K/UL    DF AUTOMATED      RBC COMMENTS NORMOCYTIC, NORMOCHROMIC     METABOLIC PANEL, COMPREHENSIVE    Collection Time: 03/14/19 11:47 AM   Result Value Ref Range    Sodium 140 136 - 145 mmol/L    Potassium 4.3 3.5 - 5.1 mmol/L    Chloride 108 97 - 108 mmol/L    CO2 21 21 - 32 mmol/L    Anion gap 11 5 - 15 mmol/L    Glucose 122 (H) 65 - 100 mg/dL    BUN 30 (H) 6 - 20 MG/DL    Creatinine 1.16 (H) 0.55 - 1.02 MG/DL    BUN/Creatinine ratio 26 (H) 12 - 20      GFR est AA 56 (L) >60 ml/min/1.73m2    GFR est non-AA 46 (L) >60 ml/min/1.73m2    Calcium 8.3 (L) 8.5 - 10.1 MG/DL    Bilirubin, total 1.0 0.2 - 1.0 MG/DL    ALT (SGPT) 13 12 - 78 U/L    AST (SGOT) 16 15 - 37 U/L    Alk.  phosphatase 68 45 - 117 U/L    Protein, total 6.7 6.4 - 8.2 g/dL    Albumin 3.2 (L) 3.5 - 5.0 g/dL    Globulin 3.5 2.0 - 4.0 g/dL    A-G Ratio 0.9 (L) 1.1 - 2.2     LIPASE    Collection Time: 03/14/19 11:47 AM   Result Value Ref Range    Lipase 28 (L) 73 - 393 U/L   MAGNESIUM    Collection Time: 03/14/19 11:47 AM   Result Value Ref Range Magnesium 2.5 (H) 1.6 - 2.4 mg/dL   URINALYSIS W/ RFLX MICROSCOPIC    Collection Time: 03/14/19 12:31 PM   Result Value Ref Range    Color SILVIO      Appearance CLOUDY (A) CLEAR      Specific gravity 1.027 1.003 - 1.030      pH (UA) 5.0 5.0 - 8.0      Protein 30 (A) NEG mg/dL    Glucose NEGATIVE  NEG mg/dL    Ketone 15 (A) NEG mg/dL    Blood SMALL (A) NEG      Urobilinogen 1.0 0.2 - 1.0 EU/dL    Nitrites POSITIVE (A) NEG      Leukocyte Esterase LARGE (A) NEG      WBC 20-50 0 - 4 /hpf    RBC >100 (H) 0 - 5 /hpf    Epithelial cells FEW FEW /lpf    Bacteria 1+ (A) NEG /hpf    Mucus TRACE (A) NEG /lpf    Hyaline cast 0-2 0 - 5 /lpf   BILIRUBIN, CONFIRM    Collection Time: 03/14/19 12:31 PM   Result Value Ref Range    Bilirubin UA, confirm NEGATIVE  NEG         Radiologic Studies -   CT Results  (Last 48 hours)               03/14/19 1404  CT ABD PELV W CONT Final result    Impression:  IMPRESSION: Rectosigmoid fecal retention with mild rectosigmoid wall thickening   and adjacent mesenteric stranding. Incidental findings as above including   bilateral nonobstructing nephrolithiasis. Narrative:  INDICATION: Abdominal pain; weak, hypotension       COMPARISON: CT 8/30/2018. TECHNIQUE:  After the uneventful intravenous administration of 100 cc   Isovue-370, thin axial images were obtained through the abdomen and pelvis in   portal venous phase. Coronal and sagittal reconstructions were generated. Oral   contrast was not administered. CT dose reduction was achieved through use of a   standardized protocol tailored for this examination and automatic exposure   control for dose modulation. FINDINGS:        LUNG BASES: Dependent atelectasis. Otherwise clear. INCIDENTALLY IMAGED HEART AND MEDIASTINUM: The visualized heart is normal in   size without pericardial effusion. Coronary artery calcifications are seen. LIVER: Unremarkable. GALLBLADDER: Surgically absent.  No significant change in mild common bile duct   dilation to 11 mm. SPLEEN: Unremarkable. PANCREAS: No mass or duct dilation. ADRENALS: Unremarkable. KIDNEYS: Bilateral collecting system renal calculi. No mass or hydronephrosis. STOMACH: Unremarkable. SMALL BOWEL: No dilatation or wall thickening. COLON: Rectosigmoid colonic fecal retention with adjacent mesenteric stranding   and minimal wall thickening. Upstream: Is nondilated with no wall thickening. APPENDIX: Unremarkable. PERITONEUM: No ascites or pneumoperitoneum. RETROPERITONEUM: Atherosclerotic calcification of the aorta without aneurysm or   dissection. No enlarged lymphadenopathy. REPRODUCTIVE ORGANS: The uterus and ovaries are surgically absent. URINARY BLADDER: No mass or calculus. BONES: Degenerative spine change and osteoarthritic change of the hips with no   acute fracture or aggressive lesion. ADDITIONAL COMMENTS: N/A               Medical Decision Making   I am the first provider for this patient. I reviewed the vital signs, available nursing notes, past medical history, past surgical history, family history and social history. Vital Signs-Reviewed the patient's vital signs. Patient Vitals for the past 12 hrs:   Temp Pulse Resp BP SpO2   03/14/19 1445 -- 68 14 164/71 100 %   03/14/19 1330 -- 75 15 144/64 100 %   03/14/19 1230 -- 81 17 158/79 100 %   03/14/19 1137 97.7 °F (36.5 °C) 80 16 (!) 144/92 100 %     Pulse Oximetry Analysis - 100% on RA    Cardiac Monitor:   Rate: 77 bpm  Rhythm: Normal Sinus Rhythm     EKG interpretation: (Preliminary): 1138  Rhythm: normal sinus rhythm; and regular . Rate (approx.): 79;  Axis: normal; UT interval: normal; QRS interval: normal ; ST/T wave: normal.  Written by TATO Velasquez, as dictated by Farzad Carlos MD.    Records Reviewed: Nursing Notes, Old Medical Records, Previous electrocardiograms, Ambulance Run Sheet, Previous Radiology Studies and Previous Laboratory Studies    Provider Notes (Medical Decision Making):   Abd pain  Constipation  Diarrhea    Routine laboratory data and UA  IVF  Rocephin   CT abd/pelvis  PO challenge       ED Course:   Initial assessment performed. The patients presenting problems have been discussed, and they are in agreement with the care plan formulated and outlined with them. I have encouraged them to ask questions as they arise throughout their visit. PROGRESS NOTE:  12:20 PM  Pt was able to ambulate to the restroom without any difficulty. Critical Care Time:   none    Disposition:  DISCHARGE NOTE  3:00 PM  The patient has been re-evaluated and is ready for discharge. Reviewed available results with patient. Counseled patient on diagnosis and care plan. Patient has expressed understanding, and all questions have been answered. Patient agrees with plan and agrees to follow up as recommended, or return to the ED if their symptoms worsen. Discharge instructions have been provided and explained to the patient, along with reasons to return to the ED. PLAN:  1. Discharge  Current Discharge Medication List      START taking these medications    Details   cephALEXin (KEFLEX) 500 mg capsule Take 1 Cap by mouth two (2) times a day for 7 days. Qty: 14 Cap, Refills: 0         CONTINUE these medications which have CHANGED    Details   polyethylene glycol (MIRALAX) 17 gram/dose powder Take 17 g by mouth two (2) times a day for 5 days. 1 tablespoon with 8 oz of water daily  Qty: 170 g, Refills: 0      docusate sodium (COLACE) 100 mg capsule TAKE 1 CAP BY MOUTH DAILY  Qty: 90 Cap, Refills: 0           2.    Follow-up Information     Follow up With Specialties Details Why Contact Info    Stacey Gu MD Family Practice In 2 days  [address] [facility] [id number] [phone]      [facility] EMERGENCY DEPT Emergency Medicine  As needed, If symptoms worsen [address]  [address] [facility]  [phone]        Return to ED if worse     Diagnosis     Clinical Impression:   1. Slow transit constipation    2. Acute cystitis with hematuria        Attestations: This note is prepared by Talya Murray, acting as Scribe for JOY Cooley. Letha Hayward NP. [employer] Letha Hayward NP: The scribe's documentation has been prepared under my direction and personally reviewed by me in its entirety. I confirm that the note above accurately reflects all work, treatment, procedures, and medical decision making performed by me. This note will not be viewable in [address]     Amy Soto NP  4:02 PM

## 2019-03-14 NOTE — DISCHARGE INSTRUCTIONS
Patient Education        Constipation: Care Instructions  Your Care Instructions    Constipation means that you have a hard time passing stools (bowel movements). People pass stools from 3 times a day to once every 3 days. What is normal for you may be different. Constipation may occur with pain in the rectum and cramping. The pain may get worse when you try to pass stools. Sometimes there are small amounts of bright red blood on toilet paper or the surface of stools. This is because of enlarged veins near the rectum (hemorrhoids). A few changes in your diet and lifestyle may help you avoid ongoing constipation. Your doctor may also prescribe medicine to help loosen your stool. Some medicines can cause constipation. These include pain medicines and antidepressants. Tell your doctor about all the medicines you take. Your doctor may want to make a medicine change to ease your symptoms. Follow-up care is a key part of your treatment and safety. Be sure to make and go to all appointments, and call your doctor if you are having problems. It's also a good idea to know your test results and keep a list of the medicines you take. How can you care for yourself at home? · Drink plenty of fluids, enough so that your urine is light yellow or clear like water. If you have kidney, heart, or liver disease and have to limit fluids, talk with your doctor before you increase the amount of fluids you drink. · Include high-fiber foods in your diet each day. These include fruits, vegetables, beans, and whole grains. · Get at least 30 minutes of exercise on most days of the week. Walking is a good choice. You also may want to do other activities, such as running, swimming, cycling, or playing tennis or team sports. · Take a fiber supplement, such as Citrucel or Metamucil, every day. Read and follow all instructions on the label. · Schedule time each day for a bowel movement. A daily routine may help.  Take your time having your bowel movement. · Support your feet with a small step stool when you sit on the toilet. This helps flex your hips and places your pelvis in a squatting position. · Your doctor may recommend an over-the-counter laxative to relieve your constipation. Examples are Milk of Magnesia and MiraLax. Read and follow all instructions on the label. Do not use laxatives on a long-term basis. When should you call for help? Call your doctor now or seek immediate medical care if:    · You have new or worse belly pain.     · You have new or worse nausea or vomiting.     · You have blood in your stools.    Watch closely for changes in your health, and be sure to contact your doctor if:    · Your constipation is getting worse.     · You do not get better as expected. Where can you learn more? Go to http://lobo-michel.info/. Enter 21 [id number] in the search box to learn more about \"Constipation: Care Instructions. \"  Current as of: September 23, 2018  Content Version: 11.9  © 0514-8558 [employer]. Care instructions adapted under license by [employer] (which disclaims liability or warranty for this information). If you have questions about a medical condition or this instruction, always ask your healthcare professional. [place] [address] any warranty or liability for your use of this information. Patient Education        Urinary Tract Infection in Women: Care Instructions  Your Care Instructions    A urinary tract infection, or UTI, is a general term for an infection anywhere between the kidneys and the urethra (where urine comes out). Most UTIs are bladder infections. They often cause pain or burning when you urinate. UTIs are caused by bacteria and can be cured with antibiotics. Be sure to complete your treatment so that the infection goes away. Follow-up care is a key part of your treatment and safety.  Be sure to make and go to all appointments, and call your doctor if you are having problems. It's also a good idea to know your test results and keep a list of the medicines you take. How can you care for yourself at home? · Take your antibiotics as directed. Do not stop taking them just because you feel better. You need to take the full course of antibiotics. · Drink extra water and other fluids for the next day or two. This may help wash out the bacteria that are causing the infection. (If you have kidney, heart, or liver disease and have to limit fluids, talk with your doctor before you increase your fluid intake.)  · Avoid drinks that are carbonated or have caffeine. They can irritate the bladder. · Urinate often. Try to empty your bladder each time. · To relieve pain, take a hot bath or lay a heating pad set on low over your lower belly or genital area. Never go to sleep with a heating pad in place. To prevent UTIs  · Drink plenty of water each day. This helps you urinate often, which clears bacteria from your system. (If you have kidney, heart, or liver disease and have to limit fluids, talk with your doctor before you increase your fluid intake.)  · Urinate when you need to. · Urinate right after you have sex. · Change sanitary pads often. · Avoid douches, bubble baths, feminine hygiene sprays, and other feminine hygiene products that have deodorants. · After going to the bathroom, wipe from front to back. When should you call for help? Call your doctor now or seek immediate medical care if:    · Symptoms such as fever, chills, nausea, or vomiting get worse or appear for the first time.     · You have new pain in your back just below your rib cage.  This is called flank pain.     · There is new blood or pus in your urine.     · You have any problems with your antibiotic medicine.    Watch closely for changes in your health, and be sure to contact your doctor if:    · You are not getting better after taking an antibiotic for 2 days.     · Your symptoms go away but then come back. Where can you learn more? Go to http://lobo-michel.info/. Enter J902 in the search box to learn more about \"Urinary Tract Infection in Women: Care Instructions. \"  Current as of: March 20, 2018  Content Version: 11.9  © 1729-1592 [employer]. Care instructions adapted under license by [employer] (which disclaims liability or warranty for this information). If you have questions about a medical condition or this instruction, always ask your healthcare professional. [address] any warranty or liability for your use of this information. We hope that we have addressed all of your medical concerns. The examination and treatment you received in the Emergency Department were for an emergent problem and were not intended as complete care. It is important that you follow up with your healthcare provider(s) for ongoing care. If your symptoms worsen or do not improve as expected, and you are unable to reach your usual health care provider(s), you should return to the Emergency Department. [address] [place] [address] [facility] participate in nationally recognized quality of care measures. If your blood pressure is greater than 120/80, as reported below, we urge that you seek medical care to address the potential of high blood pressure, commonly known as hypertension. Hypertension can be hereditary or can be caused by certain medical conditions, pain, stress, or \"white coat syndrome. \"       Please make an appointment with your health care provider(s) for follow up of your Emergency Department visit. VITALS:   Patient Vitals for the past 8 hrs:   Temp Pulse Resp BP SpO2   03/14/19 1445 -- 68 14 164/71 100 %   03/14/19 1330 -- 75 15 144/64 100 %   03/14/19 1230 -- 81 17 158/79 100 %   03/14/19 1137 97.7 °F (36.5 °C) 80 16 (!) 144/92 100 %          Thank you for allowing us to provide you with medical care today.   We realize that you have many choices for your emergency care needs. Please choose us in the future for any continued health care needs. Benjamin Cee NP              Recent Results (from the past 24 hour(s))   EKG, 12 LEAD, INITIAL    Collection Time: 03/14/19 11:38 AM   Result Value Ref Range    Ventricular Rate 79 BPM    Atrial Rate 79 BPM    P-R Interval 136 ms    QRS Duration 94 ms    Q-T Interval 382 ms    QTC Calculation (Bezet) 438 ms    Calculated P Axis 13 degrees    Calculated R Axis 23 degrees    Calculated T Axis 6 degrees    Diagnosis       Normal sinus rhythm  Nonspecific ST segment changes  Confirmed by Lupis Erp ([id number]) on 3/14/2019 2:43:56 PM     CBC WITH AUTOMATED DIFF    Collection Time: 03/14/19 11:47 AM   Result Value Ref Range    WBC 10.1 3.6 - 11.0 K/uL    RBC 4.61 3.80 - 5.20 M/uL    HGB 12.4 11.5 - 16.0 g/dL    HCT 39.0 35.0 - 47.0 %    MCV 84.6 80.0 - 99.0 FL    MCH 26.9 26.0 - 34.0 PG    MCHC 31.8 30.0 - 36.5 g/dL    RDW 14.0 11.5 - 14.5 %    PLATELET 284 417 - 679 K/uL    MPV 12.0 8.9 - 12.9 FL    NRBC 0.0 0  WBC    ABSOLUTE NRBC 0.00 0.00 - 0.01 K/uL    NEUTROPHILS 88 (H) 32 - 75 %    LYMPHOCYTES 7 (L) 12 - 49 %    MONOCYTES 5 5 - 13 %    EOSINOPHILS 0 0 - 7 %    BASOPHILS 0 0 - 1 %    IMMATURE GRANULOCYTES 0 0.0 - 0.5 %    ABS. NEUTROPHILS 8.9 (H) 1.8 - 8.0 K/UL    ABS. LYMPHOCYTES 0.7 (L) 0.8 - 3.5 K/UL    ABS. MONOCYTES 0.5 0.0 - 1.0 K/UL    ABS. EOSINOPHILS 0.0 0.0 - 0.4 K/UL    ABS. BASOPHILS 0.0 0.0 - 0.1 K/UL    ABS. IMM.  GRANS. 0.0 0.00 - 0.04 K/UL    DF AUTOMATED      RBC COMMENTS NORMOCYTIC, NORMOCHROMIC     METABOLIC PANEL, COMPREHENSIVE    Collection Time: 03/14/19 11:47 AM   Result Value Ref Range    Sodium 140 136 - 145 mmol/L    Potassium 4.3 3.5 - 5.1 mmol/L    Chloride 108 97 - 108 mmol/L    CO2 21 21 - 32 mmol/L    Anion gap 11 5 - 15 mmol/L    Glucose 122 (H) 65 - 100 mg/dL    BUN 30 (H) 6 - 20 MG/DL    Creatinine 1.16 (H) 0.55 - 1.02 MG/DL    BUN/Creatinine ratio 26 (H) 12 - 20      GFR est AA 56 (L) >60 ml/min/1.73m2    GFR est non-AA 46 (L) >60 ml/min/1.73m2    Calcium 8.3 (L) 8.5 - 10.1 MG/DL    Bilirubin, total 1.0 0.2 - 1.0 MG/DL    ALT (SGPT) 13 12 - 78 U/L    AST (SGOT) 16 15 - 37 U/L    Alk. phosphatase 68 45 - 117 U/L    Protein, total 6.7 6.4 - 8.2 g/dL    Albumin 3.2 (L) 3.5 - 5.0 g/dL    Globulin 3.5 2.0 - 4.0 g/dL    A-G Ratio 0.9 (L) 1.1 - 2.2     LIPASE    Collection Time: 03/14/19 11:47 AM   Result Value Ref Range    Lipase 28 (L) 73 - 393 U/L   MAGNESIUM    Collection Time: 03/14/19 11:47 AM   Result Value Ref Range    Magnesium 2.5 (H) 1.6 - 2.4 mg/dL   URINALYSIS W/ RFLX MICROSCOPIC    Collection Time: 03/14/19 12:31 PM   Result Value Ref Range    Color SILVIO      Appearance CLOUDY (A) CLEAR      Specific gravity 1.027 1.003 - 1.030      pH (UA) 5.0 5.0 - 8.0      Protein 30 (A) NEG mg/dL    Glucose NEGATIVE  NEG mg/dL    Ketone 15 (A) NEG mg/dL    Blood SMALL (A) NEG      Urobilinogen 1.0 0.2 - 1.0 EU/dL    Nitrites POSITIVE (A) NEG      Leukocyte Esterase LARGE (A) NEG      WBC 20-50 0 - 4 /hpf    RBC >100 (H) 0 - 5 /hpf    Epithelial cells FEW FEW /lpf    Bacteria 1+ (A) NEG /hpf    Mucus TRACE (A) NEG /lpf    Hyaline cast 0-2 0 - 5 /lpf   BILIRUBIN, CONFIRM    Collection Time: 03/14/19 12:31 PM   Result Value Ref Range    Bilirubin UA, confirm NEGATIVE  NEG         Ct Abd Pelv W Cont    Result Date: 3/14/2019  INDICATION: Abdominal pain; weak, hypotension COMPARISON: CT 8/30/2018. TECHNIQUE:  After the uneventful intravenous administration of 100 cc Isovue-370, thin axial images were obtained through the abdomen and pelvis in portal venous phase. Coronal and sagittal reconstructions were generated. Oral contrast was not administered. CT dose reduction was achieved through use of a standardized protocol tailored for this examination and automatic exposure control for dose modulation.  FINDINGS: LUNG BASES: Dependent atelectasis. Otherwise clear. INCIDENTALLY IMAGED HEART AND MEDIASTINUM: The visualized heart is normal in size without pericardial effusion. Coronary artery calcifications are seen. LIVER: Unremarkable. GALLBLADDER: Surgically absent. No significant change in mild common bile duct dilation to 11 mm. SPLEEN: Unremarkable. PANCREAS: No mass or duct dilation. ADRENALS: Unremarkable. KIDNEYS: Bilateral collecting system renal calculi. No mass or hydronephrosis. STOMACH: Unremarkable. SMALL BOWEL: No dilatation or wall thickening. COLON: Rectosigmoid colonic fecal retention with adjacent mesenteric stranding and minimal wall thickening. Upstream: Is nondilated with no wall thickening. APPENDIX: Unremarkable. PERITONEUM: No ascites or pneumoperitoneum. RETROPERITONEUM: Atherosclerotic calcification of the aorta without aneurysm or dissection. No enlarged lymphadenopathy. REPRODUCTIVE ORGANS: The uterus and ovaries are surgically absent. URINARY BLADDER: No mass or calculus. BONES: Degenerative spine change and osteoarthritic change of the hips with no acute fracture or aggressive lesion. ADDITIONAL COMMENTS: N/A     IMPRESSION: Rectosigmoid fecal retention with mild rectosigmoid wall thickening and adjacent mesenteric stranding. Incidental findings as above including bilateral nonobstructing nephrolithiasis.

## 2019-03-14 NOTE — ED NOTES
NICK Kunz reviewed discharge instructions with the patient. The patient verbalized understanding. Patient dressed in paper scrubs and will wait for ride out in lobby. States her daughter is on way

## 2019-03-14 NOTE — ED NOTES
PIT note: The patient has been evaluated by me in triage, and is aware that they are waiting for a bed. Patient presents with diarrhea and weakness and is currently stable to await a bed. After taking a laxative patient has had multiple episodes of diarrhea now unable to stand and walk secondary to weakness and dizziness. Patient is also complaining of abdominal pain and rectal pain. Exam is notable for an elderly female with poor skin turgor and normal vital signs.

## 2019-04-18 RX ORDER — DILTIAZEM HYDROCHLORIDE 120 MG/1
CAPSULE, COATED, EXTENDED RELEASE ORAL
Qty: 90 CAP | Refills: 3 | Status: SHIPPED | OUTPATIENT
Start: 2019-04-18 | End: 2019-09-06

## 2019-06-30 ENCOUNTER — HOSPITAL ENCOUNTER (EMERGENCY)
Age: 74
Discharge: HOME | End: 2019-06-30
Attending: EMERGENCY MEDICINE
Payer: MEDICARE

## 2019-06-30 ENCOUNTER — APPOINTMENT (OUTPATIENT)
Dept: GENERAL RADIOLOGY | Age: 74
End: 2019-06-30
Attending: EMERGENCY MEDICINE
Payer: MEDICARE

## 2019-06-30 VITALS
DIASTOLIC BLOOD PRESSURE: 72 MMHG | TEMPERATURE: 98 F | HEART RATE: 71 BPM | RESPIRATION RATE: 16 BRPM | OXYGEN SATURATION: 100 % | SYSTOLIC BLOOD PRESSURE: 121 MMHG | BODY MASS INDEX: 26.33 KG/M2 | HEIGHT: 67 IN | WEIGHT: 167.77 LBS

## 2019-06-30 DIAGNOSIS — K56.41 FECAL IMPACTION (HCC): Primary | ICD-10-CM

## 2019-06-30 DIAGNOSIS — R10.2 PERINEAL PAIN: ICD-10-CM

## 2019-06-30 LAB
APPEARANCE UR: CLEAR
BACTERIA URNS QL MICRO: NEGATIVE /HPF
BILIRUB UR QL CFM: NEGATIVE
COLOR UR: ABNORMAL
EPITH CASTS URNS QL MICRO: ABNORMAL /LPF
GLUCOSE UR STRIP.AUTO-MCNC: NEGATIVE MG/DL
HGB UR QL STRIP: NEGATIVE
KETONES UR QL STRIP.AUTO: ABNORMAL MG/DL
LEUKOCYTE ESTERASE UR QL STRIP.AUTO: ABNORMAL
MUCOUS THREADS URNS QL MICRO: ABNORMAL /LPF
NITRITE UR QL STRIP.AUTO: NEGATIVE
PH UR STRIP: 5 [PH] (ref 5–8)
PROT UR STRIP-MCNC: ABNORMAL MG/DL
RBC #/AREA URNS HPF: ABNORMAL /HPF (ref 0–5)
SP GR UR REFRACTOMETRY: 1.03 (ref 1–1.03)
UR CULT HOLD, URHOLD: NORMAL
UROBILINOGEN UR QL STRIP.AUTO: 1 EU/DL (ref 0.2–1)
WBC URNS QL MICRO: ABNORMAL /HPF (ref 0–4)

## 2019-06-30 PROCEDURE — 81001 URINALYSIS AUTO W/SCOPE: CPT

## 2019-06-30 PROCEDURE — 99283 EMERGENCY DEPT VISIT LOW MDM: CPT

## 2019-06-30 PROCEDURE — 74022 RADEX COMPL AQT ABD SERIES: CPT

## 2019-06-30 RX ORDER — SODIUM, POTASSIUM,MAG SULFATES 17.5-3.13G
177 SOLUTION, RECONSTITUTED, ORAL ORAL SEE ADMIN INSTRUCTIONS
Qty: 1 KIT | Refills: 0 | Status: SHIPPED | OUTPATIENT
Start: 2019-06-30 | End: 2019-09-03

## 2019-06-30 NOTE — ED PROVIDER NOTES
EMERGENCY DEPARTMENT HISTORY AND PHYSICAL EXAM      Date: 6/30/2019  Patient Name: Vladimir Quinteros    History of Presenting Illness     Chief Complaint   Patient presents with    Constipation     reports no BM x 3 weeks    Bladder Infection     reports that she was dx'd with a bladder infection 2-3 weeks ago and states that sx's have not improved - urinary frequency, vaginal pain       History Provided By: Patient    HPI: Vladimir Quinteros, 68 y.o. female  presents to the ED with cc of constipation and UTI symptoms. Patient states that she has not had a bowel movement for 3 weeks. She has pain in the perineal area. She also complains of pain with urination. She was recently on nitrofurantoin for a week for UTI. No nausea vomiting. No fevers or chills. No abdominal pain. There are no other complaints, changes, or physical findings at this time. PCP: Tanisah Alberts MD    No current facility-administered medications on file prior to encounter. Current Outpatient Medications on File Prior to Encounter   Medication Sig Dispense Refill    dilTIAZem CD (CARDIZEM CD) 120 mg ER capsule TAKE ONE CAPSULE BY MOUTH EVERY DAY 90 Cap 3    docusate sodium (COLACE) 100 mg capsule TAKE 1 CAP BY MOUTH DAILY 90 Cap 0    pravastatin (PRAVACHOL) 40 mg tablet Take 1 Tab by mouth nightly. 90 Tab 3    famotidine (PEPCID) 20 mg tablet Take 1 Tab by mouth two (2) times a day. 60 Tab 0    nystatin (MYCOSTATIN) topical cream Apply  to affected area two (2) times daily as needed for Skin Irritation.  bisacodyl (DULCOLAX, BISACODYL,) 10 mg suppository Insert 10 mg into rectum daily as needed. 12 Suppository 0    aspirin delayed-release 81 mg tablet Take 1 Tab by mouth daily.  30 Tab 11       Past History     Past Medical History:  Past Medical History:   Diagnosis Date    Agoraphobia without mention of panic attacks 2/17/2014    Anxiety disorder 8/18/2013    Arthritis     osteo    Breast pain, left 4/7/2015  CAD (coronary artery disease), native coronary artery 12/1/2015    no stents    Chronic chest pain 1/13/2014    Chronic pain associated with significant psychosocial dysfunction 2/17/2014    Depression 8/18/2013    Diabetes ([facility]     type II    Duplicated right renal collecting system 3/13/2014    GERD (gastroesophageal reflux disease)     Gout, joint     Hypercholesteremia     hyercholesterolemia    Hypertension     Nephrolithiasis 3/13/2014    Personal history of noncompliance with medical treatment, presenting hazards to health 5/30/2014    Psychotic disorder ([facility]     Vaginal pain 7/30/2014       Past Surgical History:  Past Surgical History:   Procedure Laterality Date    EGD  4/23/2010         HX CHOLECYSTECTOMY  09/20/2018    lap jesus    HX CYST REMOVAL      cyst removed from left wrist    HX HYSTERECTOMY      partial    HX OTHER SURGICAL      bladder dilitation    HX TUBAL LIGATION      HX UROLOGICAL      kidney stones       Family History:  Family History   Problem Relation Age of Onset    Stroke Mother     Heart Disease Mother     Cancer Father         type unknown    Heart Disease Son     Liver Disease Son     Heart Disease Daughter     Malignant Hyperthermia Neg Hx     Pseudocholinesterase Deficiency Neg Hx     Delayed Awakening Neg Hx     Post-op Nausea/Vomiting Neg Hx     Emergence Delirium Neg Hx     Post-op Cognitive Dysfunction Neg Hx     Other Neg Hx        Social History:  Social History     Tobacco Use    Smoking status: Never Smoker    Smokeless tobacco: Never Used   Substance Use Topics    Alcohol use: No    Drug use: No       Allergies:   Allergies   Allergen Reactions    Amoxicillin Hives    Sulfa (Sulfonamide Antibiotics) Hives and Itching    Mirtazapine Itching and Nausea Only     Funny feeling in chest    Percocet [Oxycodone-Acetaminophen] Nausea and Vomiting    Codeine Nausea and Vomiting    Crestor [Rosuvastatin] Other (comments) myalgias    Nitroglycerin Unknown (comments)     Patient cannot remember why she is allergic to it      Prednisone Itching    Zithromax [Azithromycin] Itching     Not sure what it does,taken long time ago         Review of Systems   Review of Systems   Constitutional: Negative for chills and fever. HENT: Negative for congestion, ear pain, rhinorrhea, sore throat and trouble swallowing. Eyes: Negative for visual disturbance. Respiratory: Negative for cough, chest tightness and shortness of breath. Cardiovascular: Negative for chest pain and palpitations. Gastrointestinal: Positive for constipation. Negative for abdominal pain, blood in stool, diarrhea, nausea and vomiting. Genitourinary: Positive for vaginal pain. Negative for decreased urine volume, difficulty urinating, dysuria and frequency. Musculoskeletal: Negative for back pain and neck pain. Skin: Negative for color change and rash. Neurological: Negative for dizziness, weakness, light-headedness and headaches. Physical Exam   Physical Exam   Constitutional: She is oriented to person, place, and time. She appears well-developed and well-nourished. She does not appear ill. No distress. HENT:   Mouth/Throat: Oropharynx is clear and moist.   Eyes: Conjunctivae are normal.   Neck: Neck supple. Cardiovascular: Normal rate and regular rhythm. Pulmonary/Chest: Effort normal and breath sounds normal. No accessory muscle usage. No respiratory distress. Abdominal: Soft. She exhibits no distension. There is no tenderness. Lymphadenopathy:     She has no cervical adenopathy. Neurological: She is alert and oriented to person, place, and time. She has normal strength. No sensory deficit. Skin: Skin is warm and dry. Nursing note and vitals reviewed.       Diagnostic Study Results     Labs -     Recent Results (from the past 24 hour(s))   URINALYSIS W/MICROSCOPIC    Collection Time: 06/30/19  6:56 PM   Result Value Ref Range Color DARK YELLOW      Appearance CLEAR CLEAR      Specific gravity 1.027 1.003 - 1.030      pH (UA) 5.0 5.0 - 8.0      Protein TRACE (A) NEG mg/dL    Glucose NEGATIVE  NEG mg/dL    Ketone TRACE (A) NEG mg/dL    Blood NEGATIVE  NEG      Urobilinogen 1.0 0.2 - 1.0 EU/dL    Nitrites NEGATIVE  NEG      Leukocyte Esterase TRACE (A) NEG      WBC 0-4 0 - 4 /hpf    RBC 0-5 0 - 5 /hpf    Epithelial cells FEW FEW /lpf    Bacteria NEGATIVE  NEG /hpf    Mucus 1+ (A) NEG /lpf   URINE CULTURE HOLD SAMPLE    Collection Time: 06/30/19  6:56 PM   Result Value Ref Range    Urine culture hold        URINE ON HOLD IN MICROBIOLOGY DEPT FOR 3 DAYS. IF UNPRESERVED URINE IS SUBMITTED, IT CANNOT BE USED FOR ADDITIONAL TESTING AFTER 24 HRS, RECOLLECTION WILL BE REQUIRED. BILIRUBIN, CONFIRM    Collection Time: 06/30/19  6:56 PM   Result Value Ref Range    Bilirubin UA, confirm NEGATIVE  NEG         Radiologic Studies -   XR ABD ACUTE W 1 V CHEST   Final Result   impression: Findings suspect fecal impaction. CT Results  (Last 48 hours)    None        CXR Results  (Last 48 hours)               06/30/19 1927  XR ABD ACUTE W 1 V CHEST Final result    Impression:  impression: Findings suspect fecal impaction. Narrative:  Clinical indication: Abdominal pain. Frontal view of the chest, supine and upright views of the abdomen obtained. Fecal impaction with bowel distention is suspected. Medical Decision Making   I am the first provider for this patient. I reviewed the vital signs, available nursing notes, past medical history, past surgical history, family history and social history. Vital Signs-Reviewed the patient's vital signs. Patient Vitals for the past 24 hrs:   Temp Pulse Resp BP SpO2   06/30/19 1818 98 °F (36.7 °C) 71 16 121/72 100 %       Records Reviewed: Nursing Notes    Provider Notes (Medical Decision Making):   Patient presents with perineal pain and feelings that she still has a UTI. Her urinary tract infection appears to have been cleared after taking her nitrofurantoin. She does have a large fecal impaction. We did try a soapsuds enema with her but she was not able to pass it. We will try Suprep for her to take at home to help flush out her intestinal system. Discharge patient was very concerned because she still has pain and her vaginal area. I tried to explain her that the pain is due to the large stool burden that is pressing on her bladder in her vagina. She did not seem to understand this. The fecal impaction will give her the feeling that she has the urge to urinate because it is pressing on her bladder. She needs to take the bowel prep kit scribe to her and flush out the impaction. This should help relieve her symptoms. ED Course:   Initial assessment performed. The patients presenting problems have been discussed, and they are in agreement with the care plan formulated and outlined with them. I have encouraged them to ask questions as they arise throughout their visit. Orders Placed This Encounter    URINE CULTURE HOLD SAMPLE    XR ABD ACUTE W 1 V CHEST    URINALYSIS W/MICROSCOPIC    BILIRUBIN, CONFIRM    ENEMA SOAP SUDS    sodium-potassium-mag sulfate (SUPREP) 17.5-3.13-1.6 gram solr oral solution         Critical Care Time:   0    Disposition:  Discharge  10:23 PM    The patient's emergency department evaluation is now complete. I have reviewed all labs, imaging, and pertinent information. I have discussed all results with the patient and/or family. Based on our evaluation today I do believe that the patient is safe to be discharged home. The patient has been provided with at home instructions that are pertinent to their complaint today, although these may not be specific to the exact diagnosis. I have reviewed the patient's home medications and attempted to reconcile if not already done so by pharmacy or nursing staff.   I have discussed all new prescriptions with the patient. The patient has been encouraged to follow-up with primary care doctor and/or specialist, and these have been discussed with the patient. The patient has been advised that they may return to the emergency department if they have any worsening symptoms and or new symptoms that are of concern to them. Verbal discharge instructions may have also been provided to the patient that may not be specifically contained in the written discharge instructions. The patient has been given opportunity to ask questions prior to discharge. PLAN:  1. Current Discharge Medication List      START taking these medications    Details   sodium-potassium-mag sulfate (SUPREP) 17.5-3.13-1.6 gram solr oral solution Take 177 mL by mouth See Admin Instructions. Qty: 1 Kit, Refills: 0           2. Follow-up Information     Follow up With Specialties Details Why Contact Info    [facility] Schedule an appointment as soon as possible for a visit in 1 week  [address] [facility] [address] [phone]          Return to ED if worse     Diagnosis     Clinical Impression:   1. Fecal impaction ([address]    2. Perineal pain            This note will not be viewable in MyChart.

## 2019-06-30 NOTE — ED NOTES
Verbal shift change report given to Tom Soler RN (oncoming nurse) by Reine Duane, RN. (offgoing nurse). Report included the following information SBAR, ED Summary and Intake/Output.

## 2019-07-01 NOTE — DISCHARGE INSTRUCTIONS
Your urinary tract infection has cleared today. The pain in the vaginal area is likely related to the fecal impaction. He has been prescribed a bowel prep kit that should help flush you out and resolve the impaction.

## 2019-07-01 NOTE — ED NOTES
Patient tolerated soap suds enema well, however, did not have productive bowel movement. Patient passed watery stool but no formed stool. Dr. Santo Ch aware.

## 2019-07-09 ENCOUNTER — APPOINTMENT (OUTPATIENT)
Dept: GENERAL RADIOLOGY | Age: 74
End: 2019-07-09
Attending: EMERGENCY MEDICINE
Payer: MEDICARE

## 2019-07-09 ENCOUNTER — HOSPITAL ENCOUNTER (EMERGENCY)
Age: 74
Discharge: HOME | End: 2019-07-09
Attending: EMERGENCY MEDICINE
Payer: MEDICARE

## 2019-07-09 VITALS
HEART RATE: 79 BPM | OXYGEN SATURATION: 99 % | RESPIRATION RATE: 18 BRPM | DIASTOLIC BLOOD PRESSURE: 82 MMHG | SYSTOLIC BLOOD PRESSURE: 153 MMHG | HEIGHT: 68 IN | WEIGHT: 172 LBS | TEMPERATURE: 97.7 F | BODY MASS INDEX: 26.07 KG/M2

## 2019-07-09 DIAGNOSIS — N30.00 ACUTE CYSTITIS WITHOUT HEMATURIA: ICD-10-CM

## 2019-07-09 DIAGNOSIS — K59.00 CONSTIPATION, UNSPECIFIED CONSTIPATION TYPE: Primary | ICD-10-CM

## 2019-07-09 LAB
ALBUMIN SERPL-MCNC: 3.6 G/DL (ref 3.5–5)
ALBUMIN/GLOB SERPL: 1 {RATIO} (ref 1.1–2.2)
ALP SERPL-CCNC: 82 U/L (ref 45–117)
ALT SERPL-CCNC: 13 U/L (ref 12–78)
ANION GAP SERPL CALC-SCNC: 4 MMOL/L (ref 5–15)
APPEARANCE UR: ABNORMAL
AST SERPL-CCNC: 16 U/L (ref 15–37)
ATRIAL RATE: 72 BPM
BACTERIA URNS QL MICRO: ABNORMAL /HPF
BASOPHILS # BLD: 0 K/UL (ref 0–0.1)
BASOPHILS NFR BLD: 1 % (ref 0–1)
BILIRUB SERPL-MCNC: 0.4 MG/DL (ref 0.2–1)
BILIRUB UR QL CFM: NEGATIVE
BUN SERPL-MCNC: 19 MG/DL (ref 6–20)
BUN/CREAT SERPL: 17 (ref 12–20)
CALCIUM SERPL-MCNC: 8.7 MG/DL (ref 8.5–10.1)
CALCULATED P AXIS, ECG09: 63 DEGREES
CALCULATED R AXIS, ECG10: -4 DEGREES
CALCULATED T AXIS, ECG11: 9 DEGREES
CHLORIDE SERPL-SCNC: 108 MMOL/L (ref 97–108)
CK SERPL-CCNC: 102 U/L (ref 26–192)
CO2 SERPL-SCNC: 27 MMOL/L (ref 21–32)
COLOR UR: ABNORMAL
CREAT SERPL-MCNC: 1.11 MG/DL (ref 0.55–1.02)
DIAGNOSIS, 93000: NORMAL
DIFFERENTIAL METHOD BLD: ABNORMAL
EOSINOPHIL # BLD: 0 K/UL (ref 0–0.4)
EOSINOPHIL NFR BLD: 1 % (ref 0–7)
EPITH CASTS URNS QL MICRO: ABNORMAL /LPF
ERYTHROCYTE [DISTWIDTH] IN BLOOD BY AUTOMATED COUNT: 13.6 % (ref 11.5–14.5)
GLOBULIN SER CALC-MCNC: 3.7 G/DL (ref 2–4)
GLUCOSE SERPL-MCNC: 98 MG/DL (ref 65–100)
GLUCOSE UR STRIP.AUTO-MCNC: NEGATIVE MG/DL
HCT VFR BLD AUTO: 40.4 % (ref 35–47)
HGB BLD-MCNC: 13 G/DL (ref 11.5–16)
HGB UR QL STRIP: NEGATIVE
IMM GRANULOCYTES # BLD AUTO: 0 K/UL (ref 0–0.04)
IMM GRANULOCYTES NFR BLD AUTO: 0 % (ref 0–0.5)
KETONES UR QL STRIP.AUTO: NEGATIVE MG/DL
LEUKOCYTE ESTERASE UR QL STRIP.AUTO: ABNORMAL
LIPASE SERPL-CCNC: 93 U/L (ref 73–393)
LYMPHOCYTES # BLD: 1.3 K/UL (ref 0.8–3.5)
LYMPHOCYTES NFR BLD: 37 % (ref 12–49)
MCH RBC QN AUTO: 26.8 PG (ref 26–34)
MCHC RBC AUTO-ENTMCNC: 32.2 G/DL (ref 30–36.5)
MCV RBC AUTO: 83.3 FL (ref 80–99)
MONOCYTES # BLD: 0.3 K/UL (ref 0–1)
MONOCYTES NFR BLD: 8 % (ref 5–13)
MUCOUS THREADS URNS QL MICRO: ABNORMAL /LPF
NEUTS SEG # BLD: 1.9 K/UL (ref 1.8–8)
NEUTS SEG NFR BLD: 53 % (ref 32–75)
NITRITE UR QL STRIP.AUTO: NEGATIVE
NRBC # BLD: 0 K/UL (ref 0–0.01)
NRBC BLD-RTO: 0 PER 100 WBC
P-R INTERVAL, ECG05: 146 MS
PH UR STRIP: 5.5 [PH] (ref 5–8)
PLATELET # BLD AUTO: 154 K/UL (ref 150–400)
PMV BLD AUTO: 11.6 FL (ref 8.9–12.9)
POTASSIUM SERPL-SCNC: 4.7 MMOL/L (ref 3.5–5.1)
PROT SERPL-MCNC: 7.3 G/DL (ref 6.4–8.2)
PROT UR STRIP-MCNC: NEGATIVE MG/DL
Q-T INTERVAL, ECG07: 396 MS
QRS DURATION, ECG06: 94 MS
QTC CALCULATION (BEZET), ECG08: 433 MS
RBC # BLD AUTO: 4.85 M/UL (ref 3.8–5.2)
RBC #/AREA URNS HPF: ABNORMAL /HPF (ref 0–5)
SODIUM SERPL-SCNC: 139 MMOL/L (ref 136–145)
SP GR UR REFRACTOMETRY: 1.02 (ref 1–1.03)
TROPONIN I SERPL-MCNC: <0.05 NG/ML
UA: UC IF INDICATED,UAUC: ABNORMAL
UROBILINOGEN UR QL STRIP.AUTO: 1 EU/DL (ref 0.2–1)
VENTRICULAR RATE, ECG03: 72 BPM
WBC # BLD AUTO: 3.5 K/UL (ref 3.6–11)
WBC URNS QL MICRO: ABNORMAL /HPF (ref 0–4)

## 2019-07-09 PROCEDURE — 85025 COMPLETE CBC W/AUTO DIFF WBC: CPT

## 2019-07-09 PROCEDURE — 36415 COLL VENOUS BLD VENIPUNCTURE: CPT

## 2019-07-09 PROCEDURE — 99285 EMERGENCY DEPT VISIT HI MDM: CPT

## 2019-07-09 PROCEDURE — 80053 COMPREHEN METABOLIC PANEL: CPT

## 2019-07-09 PROCEDURE — 81001 URINALYSIS AUTO W/SCOPE: CPT

## 2019-07-09 PROCEDURE — 82550 ASSAY OF CK (CPK): CPT

## 2019-07-09 PROCEDURE — 83690 ASSAY OF LIPASE: CPT

## 2019-07-09 PROCEDURE — 93005 ELECTROCARDIOGRAM TRACING: CPT

## 2019-07-09 PROCEDURE — 74011250637 HC RX REV CODE- 250/637: Performed by: STUDENT IN AN ORGANIZED HEALTH CARE EDUCATION/TRAINING PROGRAM

## 2019-07-09 PROCEDURE — 87086 URINE CULTURE/COLONY COUNT: CPT

## 2019-07-09 PROCEDURE — 84484 ASSAY OF TROPONIN QUANT: CPT

## 2019-07-09 RX ORDER — ACETAMINOPHEN 325 MG/1
650 TABLET ORAL
Status: COMPLETED | OUTPATIENT
Start: 2019-07-09 | End: 2019-07-09

## 2019-07-09 RX ORDER — HYDROXYZINE 25 MG/1
25 TABLET, FILM COATED ORAL
Status: COMPLETED | OUTPATIENT
Start: 2019-07-09 | End: 2019-07-09

## 2019-07-09 RX ORDER — CEPHALEXIN 500 MG/1
500 CAPSULE ORAL 4 TIMES DAILY
Qty: 28 CAP | Refills: 0 | Status: SHIPPED | OUTPATIENT
Start: 2019-07-09 | End: 2019-07-16

## 2019-07-09 RX ADMIN — HYDROXYZINE HYDROCHLORIDE 25 MG: 25 TABLET, FILM COATED ORAL at 09:55

## 2019-07-09 RX ADMIN — ACETAMINOPHEN 650 MG: 325 TABLET ORAL at 12:10

## 2019-07-09 NOTE — DISCHARGE INSTRUCTIONS
Patient Education        Constipation: Care Instructions  Your Care Instructions    Constipation means that you have a hard time passing stools (bowel movements). People pass stools from 3 times a day to once every 3 days. What is normal for you may be different. Constipation may occur with pain in the rectum and cramping. The pain may get worse when you try to pass stools. Sometimes there are small amounts of bright red blood on toilet paper or the surface of stools. This is because of enlarged veins near the rectum (hemorrhoids). A few changes in your diet and lifestyle may help you avoid ongoing constipation. Your doctor may also prescribe medicine to help loosen your stool. Some medicines can cause constipation. These include pain medicines and antidepressants. Tell your doctor about all the medicines you take. Your doctor may want to make a medicine change to ease your symptoms. Follow-up care is a key part of your treatment and safety. Be sure to make and go to all appointments, and call your doctor if you are having problems. It's also a good idea to know your test results and keep a list of the medicines you take. How can you care for yourself at home? · Drink plenty of fluids, enough so that your urine is light yellow or clear like water. If you have kidney, heart, or liver disease and have to limit fluids, talk with your doctor before you increase the amount of fluids you drink. · Include high-fiber foods in your diet each day. These include fruits, vegetables, beans, and whole grains. · Get at least 30 minutes of exercise on most days of the week. Walking is a good choice. You also may want to do other activities, such as running, swimming, cycling, or playing tennis or team sports. · Take a fiber supplement, such as Citrucel or Metamucil, every day. Read and follow all instructions on the label. · Schedule time each day for a bowel movement. A daily routine may help.  Take your time having your bowel movement. · Support your feet with a small step stool when you sit on the toilet. This helps flex your hips and places your pelvis in a squatting position. · Your doctor may recommend an over-the-counter laxative to relieve your constipation. Examples are Milk of Magnesia and MiraLax. Read and follow all instructions on the label. Do not use laxatives on a long-term basis. When should you call for help? Call your doctor now or seek immediate medical care if:    · You have new or worse belly pain.     · You have new or worse nausea or vomiting.     · You have blood in your stools.    Watch closely for changes in your health, and be sure to contact your doctor if:    · Your constipation is getting worse.     · You do not get better as expected. Where can you learn more? Go to http://lobo-michel.info/. Enter 21 [id number] in the search box to learn more about \"Constipation: Care Instructions. \"  Current as of: September 23, 2018  Content Version: 11.9  © 5289-6433 [employer]. Care instructions adapted under license by [employer] (which disclaims liability or warranty for this information). If you have questions about a medical condition or this instruction, always ask your healthcare professional. [place] [address] any warranty or liability for your use of this information. Patient Education        Urinary Tract Infection in Women: Care Instructions  Your Care Instructions    A urinary tract infection, or UTI, is a general term for an infection anywhere between the kidneys and the urethra (where urine comes out). Most UTIs are bladder infections. They often cause pain or burning when you urinate. UTIs are caused by bacteria and can be cured with antibiotics. Be sure to complete your treatment so that the infection goes away. Follow-up care is a key part of your treatment and safety.  Be sure to make and go to all appointments, and call your doctor if you are having problems. It's also a good idea to know your test results and keep a list of the medicines you take. How can you care for yourself at home? · Take your antibiotics as directed. Do not stop taking them just because you feel better. You need to take the full course of antibiotics. · Drink extra water and other fluids for the next day or two. This may help wash out the bacteria that are causing the infection. (If you have kidney, heart, or liver disease and have to limit fluids, talk with your doctor before you increase your fluid intake.)  · Avoid drinks that are carbonated or have caffeine. They can irritate the bladder. · Urinate often. Try to empty your bladder each time. · To relieve pain, take a hot bath or lay a heating pad set on low over your lower belly or genital area. Never go to sleep with a heating pad in place. To prevent UTIs  · Drink plenty of water each day. This helps you urinate often, which clears bacteria from your system. (If you have kidney, heart, or liver disease and have to limit fluids, talk with your doctor before you increase your fluid intake.)  · Urinate when you need to. · Urinate right after you have sex. · Change sanitary pads often. · Avoid douches, bubble baths, feminine hygiene sprays, and other feminine hygiene products that have deodorants. · After going to the bathroom, wipe from front to back. When should you call for help? Call your doctor now or seek immediate medical care if:    · Symptoms such as fever, chills, nausea, or vomiting get worse or appear for the first time.     · You have new pain in your back just below your rib cage.  This is called flank pain.     · There is new blood or pus in your urine.     · You have any problems with your antibiotic medicine.    Watch closely for changes in your health, and be sure to contact your doctor if:    · You are not getting better after taking an antibiotic for 2 days.     · Your symptoms go away but then come back. Where can you learn more? Go to http://lobo-michel.info/. Enter J254 in the search box to learn more about \"Urinary Tract Infection in Women: Care Instructions. \"  Current as of: March 20, 2018  Content Version: 11.9  © 5337-6237 "[employer] Natanael Ulien. Care instructions adapted under license by [facility] (which disclaims liability or warranty for this information). If you have questions about a medical condition or this instruction, always ask your healthcare professional. [address] any warranty or liability for your use of this information.

## 2019-07-09 NOTE — ED NOTES
Patient with multiple complaints. States has a headache, is dizzy and feeling anxious. Complains of chest pain and abdominal pain. States constipated and thinks she is having trouble urinating because of the constipation.   Complains of dysuria and pain in perineal area along with vaginal pain

## 2019-07-09 NOTE — ED NOTES
Assisted patient in dressing for discharge. Given crackers and peanut butter for complaint of hunger. Family in cafeteria. Taken to lobby per wheelchair to await family return.   Discharge instructions given per MD

## 2019-07-09 NOTE — ED PROVIDER NOTES
EMERGENCY DEPARTMENT HISTORY AND PHYSICAL EXAM      Date: 7/9/2019  Patient Name: Rachelle Jackson    History of Presenting Illness     Chief Complaint   Patient presents with    Chest Pain     to triage via EMS, chest and abdominal pain \"for a while\", but hurting more today. Chest pain is on the right side and reproducable with palpation.  Abdominal Pain    Constipation     x3 week    Nausea       History Provided By: Patient    HPI: Rachelle Jackson, 68 y.o. female with PMHx significant for DM, HTN, HLD, anxiety, presents ambulatory to the ED with cc of constipation x 3 weeks. Pt states it has been 3 weeks since her last BM. She reports associated diffuse Abd pain. She was seen in the ED 2 weeks ago for similar complaints, Rx'd Suprep which has provided no relief. Pt also reports center CP \"for a while\", denies any recent changes with this. She has not taken anything at home today for her Sx's. Denies any vomiting, f/c, or urinary Sx's. She does reports significant anxiety due to her Sx's and requests a \"nerve pill\". There are no other complaints, changes, or physical findings at this time. PCP: Diane Mauro MD    No current facility-administered medications on file prior to encounter. Current Outpatient Medications on File Prior to Encounter   Medication Sig Dispense Refill    sodium-potassium-mag sulfate (SUPREP) 17.5-3.13-1.6 gram solr oral solution Take 177 mL by mouth See Admin Instructions. 1 Kit 0    dilTIAZem CD (CARDIZEM CD) 120 mg ER capsule TAKE ONE CAPSULE BY MOUTH EVERY DAY 90 Cap 3    docusate sodium (COLACE) 100 mg capsule TAKE 1 CAP BY MOUTH DAILY 90 Cap 0    pravastatin (PRAVACHOL) 40 mg tablet Take 1 Tab by mouth nightly. 90 Tab 3    famotidine (PEPCID) 20 mg tablet Take 1 Tab by mouth two (2) times a day. 60 Tab 0    nystatin (MYCOSTATIN) topical cream Apply  to affected area two (2) times daily as needed for Skin Irritation.       bisacodyl (DULCOLAX, BISACODYL,) 10 mg suppository Insert 10 mg into rectum daily as needed. 12 Suppository 0    aspirin delayed-release 81 mg tablet Take 1 Tab by mouth daily.  27 Tab 11       Past History     Past Medical History:  Past Medical History:   Diagnosis Date    Agoraphobia without mention of panic attacks 2/17/2014    Anxiety disorder 8/18/2013    Arthritis     osteo    Breast pain, left 4/7/2015    CAD (coronary artery disease), native coronary artery 12/1/2015    no stents    Chronic chest pain 1/13/2014    Chronic pain associated with significant psychosocial dysfunction 2/17/2014    Depression 8/18/2013    Diabetes ([facility] [address]     type II    Duplicated right renal collecting system 3/13/2014    GERD (gastroesophageal reflux disease)     Gout, joint     Hypercholesteremia     hyercholesterolemia    Hypertension     Nephrolithiasis 3/13/2014    Personal history of noncompliance with medical treatment, presenting hazards to health 5/30/2014    Psychotic disorder ([facility] [address]     Vaginal pain 7/30/2014       Past Surgical History:  Past Surgical History:   Procedure Laterality Date    EGD  4/23/2010         HX CHOLECYSTECTOMY  09/20/2018    lap jesus    HX CYST REMOVAL      cyst removed from left wrist    HX HYSTERECTOMY      partial    HX OTHER SURGICAL      bladder dilitation    HX TUBAL LIGATION      HX UROLOGICAL      kidney stones       Family History:  Family History   Problem Relation Age of Onset    Stroke Mother     Heart Disease Mother     Cancer Father         type unknown    Heart Disease Son     Liver Disease Son     Heart Disease Daughter     Malignant Hyperthermia Neg Hx     Pseudocholinesterase Deficiency Neg Hx     Delayed Awakening Neg Hx     Post-op Nausea/Vomiting Neg Hx     Emergence Delirium Neg Hx     Post-op Cognitive Dysfunction Neg Hx     Other Neg Hx        Social History:  Social History     Tobacco Use    Smoking status: Never Smoker    Smokeless tobacco: Never Used   Substance Use Topics    Alcohol use: No    Drug use: No       Allergies: Allergies   Allergen Reactions    Amoxicillin Hives    Sulfa (Sulfonamide Antibiotics) Hives and Itching    Mirtazapine Itching and Nausea Only     Funny feeling in chest    Percocet [Oxycodone-Acetaminophen] Nausea and Vomiting    Codeine Nausea and Vomiting    Crestor [Rosuvastatin] Other (comments)     myalgias    Nitroglycerin Unknown (comments)     Patient cannot remember why she is allergic to it      Prednisone Itching    Zithromax [Azithromycin] Itching     Not sure what it does,taken long time ago         Review of Systems   Review of Systems   Constitutional: Negative for chills and fever. HENT: Negative for congestion and rhinorrhea. Respiratory: Negative for cough and wheezing. Cardiovascular: Positive for chest pain. Negative for leg swelling. Gastrointestinal: Positive for abdominal pain, constipation and nausea. Negative for diarrhea and vomiting. Genitourinary: Negative for difficulty urinating, dysuria and hematuria. Musculoskeletal: Negative for back pain and neck pain. Skin: Negative for color change and rash. Neurological: Negative for dizziness, weakness, light-headedness, numbness and headaches. Psychiatric/Behavioral: Negative for agitation and confusion. The patient is nervous/anxious. Physical Exam   Physical Exam   Constitutional: She is oriented to person, place, and time. She appears well-developed and well-nourished. No distress. Anxious appearing   HENT:   Head: Normocephalic and atraumatic. Eyes: Pupils are equal, round, and reactive to light. Conjunctivae are normal.   Neck: Neck supple. No tracheal deviation present. Cardiovascular: Normal rate, regular rhythm and normal heart sounds. Pulmonary/Chest: Effort normal and breath sounds normal. No respiratory distress. Abdominal: Soft. Bowel sounds are normal. She exhibits no distension. There is tenderness (mild diffuse). There is no rebound and no guarding. Musculoskeletal: She exhibits no edema or deformity. Neurological: She is alert and oriented to person, place, and time. Skin: Skin is warm and dry. Psychiatric: She has a normal mood and affect. Nursing note and vitals reviewed. Diagnostic Study Results     Labs -     Recent Results (from the past 12 hour(s))   EKG, 12 LEAD, INITIAL    Collection Time: 07/09/19  8:14 AM   Result Value Ref Range    Ventricular Rate 72 BPM    Atrial Rate 72 BPM    P-R Interval 146 ms    QRS Duration 94 ms    Q-T Interval 396 ms    QTC Calculation (Bezet) 433 ms    Calculated P Axis 63 degrees    Calculated R Axis -4 degrees    Calculated T Axis 9 degrees    Diagnosis       Normal sinus rhythm  Inferior infarct , age undetermined  Possible Anterior infarct (cited on or before 09-JUL-2019)  When compared with ECG of 14-MAR-2019 11:38,  No significant change was found     METABOLIC PANEL, COMPREHENSIVE    Collection Time: 07/09/19  9:01 AM   Result Value Ref Range    Sodium 139 136 - 145 mmol/L    Potassium 4.7 3.5 - 5.1 mmol/L    Chloride 108 97 - 108 mmol/L    CO2 27 21 - 32 mmol/L    Anion gap 4 (L) 5 - 15 mmol/L    Glucose 98 65 - 100 mg/dL    BUN 19 6 - 20 MG/DL    Creatinine 1.11 (H) 0.55 - 1.02 MG/DL    BUN/Creatinine ratio 17 12 - 20      GFR est AA 58 (L) >60 ml/min/1.73m2    GFR est non-AA 48 (L) >60 ml/min/1.73m2    Calcium 8.7 8.5 - 10.1 MG/DL    Bilirubin, total 0.4 0.2 - 1.0 MG/DL    ALT (SGPT) 13 12 - 78 U/L    AST (SGOT) 16 15 - 37 U/L    Alk.  phosphatase 82 45 - 117 U/L    Protein, total 7.3 6.4 - 8.2 g/dL    Albumin 3.6 3.5 - 5.0 g/dL    Globulin 3.7 2.0 - 4.0 g/dL    A-G Ratio 1.0 (L) 1.1 - 2.2     LIPASE    Collection Time: 07/09/19  9:01 AM   Result Value Ref Range    Lipase 93 73 - 393 U/L   CBC WITH AUTOMATED DIFF    Collection Time: 07/09/19  9:01 AM   Result Value Ref Range    WBC 3.5 (L) 3.6 - 11.0 K/uL    RBC 4.85 3.80 - 5.20 M/uL    HGB 13.0 11.5 - 16.0 g/dL HCT 40.4 35.0 - 47.0 %    MCV 83.3 80.0 - 99.0 FL    MCH 26.8 26.0 - 34.0 PG    MCHC 32.2 30.0 - 36.5 g/dL    RDW 13.6 11.5 - 14.5 %    PLATELET 825 619 - 414 K/uL    MPV 11.6 8.9 - 12.9 FL    NRBC 0.0 0  WBC    ABSOLUTE NRBC 0.00 0.00 - 0.01 K/uL    NEUTROPHILS 53 32 - 75 %    LYMPHOCYTES 37 12 - 49 %    MONOCYTES 8 5 - 13 %    EOSINOPHILS 1 0 - 7 %    BASOPHILS 1 0 - 1 %    IMMATURE GRANULOCYTES 0 0.0 - 0.5 %    ABS. NEUTROPHILS 1.9 1.8 - 8.0 K/UL    ABS. LYMPHOCYTES 1.3 0.8 - 3.5 K/UL    ABS. MONOCYTES 0.3 0.0 - 1.0 K/UL    ABS. EOSINOPHILS 0.0 0.0 - 0.4 K/UL    ABS. BASOPHILS 0.0 0.0 - 0.1 K/UL    ABS. IMM. GRANS. 0.0 0.00 - 0.04 K/UL    DF AUTOMATED     TROPONIN I    Collection Time: 07/09/19  9:01 AM   Result Value Ref Range    Troponin-I, Qt. <0.05 <0.05 ng/mL   CK W/ REFLX CKMB    Collection Time: 07/09/19  9:01 AM   Result Value Ref Range     26 - 192 U/L   URINALYSIS W/ REFLEX CULTURE    Collection Time: 07/09/19  9:55 AM   Result Value Ref Range    Color YELLOW/STRAW      Appearance CLOUDY (A) CLEAR      Specific gravity 1.022 1.003 - 1.030      pH (UA) 5.5 5.0 - 8.0      Protein NEGATIVE  NEG mg/dL    Glucose NEGATIVE  NEG mg/dL    Ketone NEGATIVE  NEG mg/dL    Blood NEGATIVE  NEG      Urobilinogen 1.0 0.2 - 1.0 EU/dL    Nitrites NEGATIVE  NEG      Leukocyte Esterase LARGE (A) NEG      WBC 10-20 0 - 4 /hpf    RBC 0-5 0 - 5 /hpf    Epithelial cells FEW FEW /lpf    Bacteria 1+ (A) NEG /hpf    UA:UC IF INDICATED URINE CULTURE ORDERED (A) CNI      Mucus TRACE (A) NEG /lpf   BILIRUBIN, CONFIRM    Collection Time: 07/09/19  9:55 AM   Result Value Ref Range    Bilirubin UA, confirm NEGATIVE  NEG         Radiologic Studies -   XR CHEST PORT    (Results Pending)   XR ABD (KUB)    (Results Pending)     CT Results  (Last 48 hours)    None        CXR Results  (Last 48 hours)    None            Medical Decision Making   I am the first provider for this patient.     I reviewed the vital signs, available nursing notes, past medical history, past surgical history, family history and social history. Vital Signs-Reviewed the patient's vital signs. Patient Vitals for the past 12 hrs:   Temp Pulse Resp BP SpO2   07/09/19 0940 -- -- -- -- 99 %   07/09/19 0809 97.7 °F (36.5 °C) 79 18 153/82 99 %       Pulse Oximetry Analysis - 99% on RA    Cardiac Monitor:   Rate: 80 bpm  Rhythm: Normal Sinus Rhythm     EKG interpretation: (Preliminary)  Rhythm: normal sinus rhythm; and regular . Rate (approx.): 72; Axis: normal; CO interval: normal; QRS interval: normal ; ST/T wave: normal; Other findings: anterior Q waves. No change from recent EKG. Records Reviewed: Nursing Notes, Old Medical Records, Previous electrocardiograms, Previous Radiology Studies and Previous Laboratory Studies    Provider Notes (Medical Decision Making):   DDx: ACS, PNA, constipation, UTI, anxiety    Will obtain basic and cardiac labs, UA, CXR, and KUB. Will give hydroxyzine for anxiety and re-evaluate. ED Course:   Initial assessment performed. The patients presenting problems have been discussed, and they are in agreement with the care plan formulated and outlined with them. I have encouraged them to ask questions as they arise throughout their visit. ED Course as of Jul 09 1247   Tue Jul 09, 2019   1115 Pt re-evaluated. States anxiety has improved after hydroxyzine. Now with mild diffuse HA similar to previous tension Ha's. Will give Tylenol. Awaiting XR. [JW]   5747 Pt had large BM following enema. Feeling better. Will dc with PCP peña. [JW]      ED Course User Index  [JW] Jordy August MD         Disposition:  Discharge    PLAN:  1. Current Discharge Medication List      START taking these medications    Details   cephALEXin (KEFLEX) 500 mg capsule Take 1 Cap by mouth four (4) times daily for 7 days. Indications: urinary tract infection due to E. coli bacteria  Qty: 28 Cap, Refills: 0           2.    Follow-up Information Follow up With Specialties Details Why Contact Info    Mando Case MD Family Practice In 3 days  [address] [address] [phone]      [facility] EMERGENCY DEPT Emergency Medicine  As needed, If symptoms worsen [address] [place] [address]  [phone]        Return to ED if worse     Diagnosis     Clinical Impression:   1. Constipation, unspecified constipation type    2. Acute cystitis without hematuria    Signed by Dr. Chacha Enrique:    I personally saw and examined the patient. I have reviewed and agree with the residents findings, including all diagnostic interpretations, and plans as written. I was present during the key portions of separately billed procedures. Celi Reeder MD  HPI  Patient presents with constipation and abdominal cramping discomfort. Has history of constipation that were relieved by enemas in the ED. ROS:  Const: No fevers, chills  Pulm: No SOB or cough  Card: No chest pain or palpitations or orthopnea  Abd: cramping pain, nausea, vomiting, constipation  Gu: No dysuria, hematuria  Neuro: No Ha, dizziness, lightheadedness  Physical Exam:  Const: Well-appearing  Pulm: CTAB, normal WOB  Card: RRR, normal distal pulses x 4 extremities  Abd:Soft, NT, ND, +BS  Neuro: No focal deficits  MDM:  69 yo female presents with severe constipation, something she has suffered from chronically. Had large bm after enema administration in the ED and feels much better. Ok to dc.  UA suspicious for acute cystitis. Will send cultures, treat in meantime. Dispo:     Celi Reeder MD

## 2019-07-09 NOTE — ROUTINE PROCESS
ACI reviewed with patient by Dr. Kristopher Salinas. Patient aware of dx and treatment and need for followup. Discharged by wheelchair.

## 2019-07-10 LAB
BACTERIA SPEC CULT: NORMAL
CC UR VC: NORMAL
SERVICE CMNT-IMP: NORMAL

## 2019-08-28 ENCOUNTER — HOSPITAL ENCOUNTER (EMERGENCY)
Age: 74
Discharge: HOME | End: 2019-08-28
Attending: EMERGENCY MEDICINE
Payer: MEDICARE

## 2019-08-28 VITALS
OXYGEN SATURATION: 100 % | DIASTOLIC BLOOD PRESSURE: 87 MMHG | HEIGHT: 68 IN | SYSTOLIC BLOOD PRESSURE: 165 MMHG | RESPIRATION RATE: 18 BRPM | TEMPERATURE: 97.8 F | HEART RATE: 70 BPM | WEIGHT: 172 LBS | BODY MASS INDEX: 26.07 KG/M2

## 2019-08-28 DIAGNOSIS — F41.8 DEPRESSION WITH ANXIETY: Chronic | ICD-10-CM

## 2019-08-28 DIAGNOSIS — K59.00 CONSTIPATION, UNSPECIFIED CONSTIPATION TYPE: Primary | ICD-10-CM

## 2019-08-28 LAB
ALBUMIN SERPL-MCNC: 3.8 G/DL (ref 3.5–5)
ALBUMIN/GLOB SERPL: 1 {RATIO} (ref 1.1–2.2)
ALP SERPL-CCNC: 83 U/L (ref 45–117)
ALT SERPL-CCNC: 14 U/L (ref 12–78)
ANION GAP SERPL CALC-SCNC: 5 MMOL/L (ref 5–15)
APPEARANCE UR: ABNORMAL
AST SERPL-CCNC: 11 U/L (ref 15–37)
ATRIAL RATE: 78 BPM
BACTERIA URNS QL MICRO: ABNORMAL /HPF
BASOPHILS # BLD: 0 K/UL (ref 0–0.1)
BASOPHILS NFR BLD: 1 % (ref 0–1)
BILIRUB SERPL-MCNC: 0.5 MG/DL (ref 0.2–1)
BILIRUB UR QL: NEGATIVE
BUN SERPL-MCNC: 16 MG/DL (ref 6–20)
BUN/CREAT SERPL: 15 (ref 12–20)
CALCIUM SERPL-MCNC: 8.8 MG/DL (ref 8.5–10.1)
CALCULATED P AXIS, ECG09: 47 DEGREES
CALCULATED R AXIS, ECG10: -15 DEGREES
CALCULATED T AXIS, ECG11: 13 DEGREES
CHLORIDE SERPL-SCNC: 106 MMOL/L (ref 97–108)
CO2 SERPL-SCNC: 30 MMOL/L (ref 21–32)
COLOR UR: ABNORMAL
CREAT SERPL-MCNC: 1.07 MG/DL (ref 0.55–1.02)
DIAGNOSIS, 93000: NORMAL
DIFFERENTIAL METHOD BLD: ABNORMAL
EOSINOPHIL # BLD: 0 K/UL (ref 0–0.4)
EOSINOPHIL NFR BLD: 1 % (ref 0–7)
EPITH CASTS URNS QL MICRO: ABNORMAL /LPF
ERYTHROCYTE [DISTWIDTH] IN BLOOD BY AUTOMATED COUNT: 13.3 % (ref 11.5–14.5)
GLOBULIN SER CALC-MCNC: 3.9 G/DL (ref 2–4)
GLUCOSE SERPL-MCNC: 104 MG/DL (ref 65–100)
GLUCOSE UR STRIP.AUTO-MCNC: NEGATIVE MG/DL
HCT VFR BLD AUTO: 42.5 % (ref 35–47)
HGB BLD-MCNC: 13.9 G/DL (ref 11.5–16)
HGB UR QL STRIP: NEGATIVE
IMM GRANULOCYTES # BLD AUTO: 0 K/UL (ref 0–0.04)
IMM GRANULOCYTES NFR BLD AUTO: 0 % (ref 0–0.5)
KETONES UR QL STRIP.AUTO: ABNORMAL MG/DL
LEUKOCYTE ESTERASE UR QL STRIP.AUTO: NEGATIVE
LYMPHOCYTES # BLD: 1.1 K/UL (ref 0.8–3.5)
LYMPHOCYTES NFR BLD: 39 % (ref 12–49)
MCH RBC QN AUTO: 27.5 PG (ref 26–34)
MCHC RBC AUTO-ENTMCNC: 32.7 G/DL (ref 30–36.5)
MCV RBC AUTO: 84 FL (ref 80–99)
MONOCYTES # BLD: 0.2 K/UL (ref 0–1)
MONOCYTES NFR BLD: 6 % (ref 5–13)
MUCOUS THREADS URNS QL MICRO: ABNORMAL /LPF
NEUTS SEG # BLD: 1.5 K/UL (ref 1.8–8)
NEUTS SEG NFR BLD: 53 % (ref 32–75)
NITRITE UR QL STRIP.AUTO: NEGATIVE
NRBC # BLD: 0 K/UL (ref 0–0.01)
NRBC BLD-RTO: 0 PER 100 WBC
P-R INTERVAL, ECG05: 144 MS
PH UR STRIP: 5.5 [PH] (ref 5–8)
PLATELET # BLD AUTO: 226 K/UL (ref 150–400)
PMV BLD AUTO: 10.9 FL (ref 8.9–12.9)
POTASSIUM SERPL-SCNC: 4.3 MMOL/L (ref 3.5–5.1)
PROT SERPL-MCNC: 7.7 G/DL (ref 6.4–8.2)
PROT UR STRIP-MCNC: NEGATIVE MG/DL
Q-T INTERVAL, ECG07: 382 MS
QRS DURATION, ECG06: 88 MS
QTC CALCULATION (BEZET), ECG08: 435 MS
RBC # BLD AUTO: 5.06 M/UL (ref 3.8–5.2)
RBC #/AREA URNS HPF: ABNORMAL /HPF (ref 0–5)
SODIUM SERPL-SCNC: 141 MMOL/L (ref 136–145)
SP GR UR REFRACTOMETRY: 1.02 (ref 1–1.03)
TROPONIN I SERPL-MCNC: <0.05 NG/ML
UA: UC IF INDICATED,UAUC: ABNORMAL
UROBILINOGEN UR QL STRIP.AUTO: 1 EU/DL (ref 0.2–1)
VENTRICULAR RATE, ECG03: 78 BPM
WBC # BLD AUTO: 2.9 K/UL (ref 3.6–11)
WBC URNS QL MICRO: ABNORMAL /HPF (ref 0–4)

## 2019-08-28 PROCEDURE — 80053 COMPREHEN METABOLIC PANEL: CPT

## 2019-08-28 PROCEDURE — 99285 EMERGENCY DEPT VISIT HI MDM: CPT

## 2019-08-28 PROCEDURE — 81001 URINALYSIS AUTO W/SCOPE: CPT

## 2019-08-28 PROCEDURE — 85025 COMPLETE CBC W/AUTO DIFF WBC: CPT

## 2019-08-28 PROCEDURE — 87086 URINE CULTURE/COLONY COUNT: CPT

## 2019-08-28 PROCEDURE — 93005 ELECTROCARDIOGRAM TRACING: CPT

## 2019-08-28 PROCEDURE — 36415 COLL VENOUS BLD VENIPUNCTURE: CPT

## 2019-08-28 PROCEDURE — 96374 THER/PROPH/DIAG INJ IV PUSH: CPT

## 2019-08-28 PROCEDURE — 74011000250 HC RX REV CODE- 250: Performed by: EMERGENCY MEDICINE

## 2019-08-28 PROCEDURE — 74011250637 HC RX REV CODE- 250/637: Performed by: EMERGENCY MEDICINE

## 2019-08-28 PROCEDURE — 84484 ASSAY OF TROPONIN QUANT: CPT

## 2019-08-28 RX ORDER — LORAZEPAM 0.5 MG/1
0.5 TABLET ORAL
Qty: 20 TAB | Refills: 0 | Status: SHIPPED | OUTPATIENT
Start: 2019-08-28 | End: 2021-02-17

## 2019-08-28 RX ORDER — ACETAMINOPHEN 500 MG
1000 TABLET ORAL ONCE
Status: COMPLETED | OUTPATIENT
Start: 2019-08-28 | End: 2019-08-28

## 2019-08-28 RX ORDER — MAGNESIUM CITRATE
148 SOLUTION, ORAL ORAL
Status: COMPLETED | OUTPATIENT
Start: 2019-08-28 | End: 2019-08-28

## 2019-08-28 RX ORDER — MAGNESIUM CITRATE
296 SOLUTION, ORAL ORAL
Qty: 1 BOTTLE | Refills: 0 | Status: SHIPPED | OUTPATIENT
Start: 2019-08-28 | End: 2019-08-28

## 2019-08-28 RX ORDER — GLYCERIN ADULT
1 SUPPOSITORY, RECTAL RECTAL
Qty: 1 SUPPOSITORY | Refills: 0 | Status: SHIPPED | OUTPATIENT
Start: 2019-08-28 | End: 2019-08-28

## 2019-08-28 RX ORDER — ACETAMINOPHEN 325 MG/1
TABLET ORAL
Status: DISCONTINUED
Start: 2019-08-28 | End: 2019-08-29 | Stop reason: HOSPADM

## 2019-08-28 RX ORDER — LABETALOL HYDROCHLORIDE 5 MG/ML
20 INJECTION, SOLUTION INTRAVENOUS ONCE
Status: COMPLETED | OUTPATIENT
Start: 2019-08-28 | End: 2019-08-28

## 2019-08-28 RX ADMIN — SODIUM PHOSPHATE, DIBASIC AND SODIUM PHOSPHATE, MONOBASIC 1 ENEMA: 7; 19 ENEMA RECTAL at 18:15

## 2019-08-28 RX ADMIN — LABETALOL HYDROCHLORIDE 20 MG: 5 INJECTION INTRAVENOUS at 19:17

## 2019-08-28 RX ADMIN — MAGNESIUM CITRATE 148 ML: 1.75 LIQUID ORAL at 18:16

## 2019-08-28 RX ADMIN — ACETAMINOPHEN 1000 MG: 500 TABLET ORAL at 18:15

## 2019-08-28 NOTE — DISCHARGE INSTRUCTIONS
Patient Education        Constipation: Care Instructions  Your Care Instructions    Constipation means that you have a hard time passing stools (bowel movements). People pass stools from 3 times a day to once every 3 days. What is normal for you may be different. Constipation may occur with pain in the rectum and cramping. The pain may get worse when you try to pass stools. Sometimes there are small amounts of bright red blood on toilet paper or the surface of stools. This is because of enlarged veins near the rectum (hemorrhoids). A few changes in your diet and lifestyle may help you avoid ongoing constipation. Your doctor may also prescribe medicine to help loosen your stool. Some medicines can cause constipation. These include pain medicines and antidepressants. Tell your doctor about all the medicines you take. Your doctor may want to make a medicine change to ease your symptoms. Follow-up care is a key part of your treatment and safety. Be sure to make and go to all appointments, and call your doctor if you are having problems. It's also a good idea to know your test results and keep a list of the medicines you take. How can you care for yourself at home? · Drink plenty of fluids, enough so that your urine is light yellow or clear like water. If you have kidney, heart, or liver disease and have to limit fluids, talk with your doctor before you increase the amount of fluids you drink. · Include high-fiber foods in your diet each day. These include fruits, vegetables, beans, and whole grains. · Get at least 30 minutes of exercise on most days of the week. Walking is a good choice. You also may want to do other activities, such as running, swimming, cycling, or playing tennis or team sports. · Take a fiber supplement, such as Citrucel or Metamucil, every day. Read and follow all instructions on the label. · Schedule time each day for a bowel movement. A daily routine may help.  Take your time having your bowel movement. · Support your feet with a small step stool when you sit on the toilet. This helps flex your hips and places your pelvis in a squatting position. · Your doctor may recommend an over-the-counter laxative to relieve your constipation. Examples are Milk of Magnesia and MiraLax. Read and follow all instructions on the label. Do not use laxatives on a long-term basis. When should you call for help? Call your doctor now or seek immediate medical care if:    · You have new or worse belly pain.     · You have new or worse nausea or vomiting.     · You have blood in your stools.    Watch closely for changes in your health, and be sure to contact your doctor if:    · Your constipation is getting worse.     · You do not get better as expected. Where can you learn more? Go to http://lobo-michel.info/. Enter 21 [phone] in the search box to learn more about \"Constipation: Care Instructions. \"  Current as of: September 23, 2018  Content Version: 12.1  © 8686-7116 [employer]. Care instructions adapted under license by [employer] (which disclaims liability or warranty for this information). If you have questions about a medical condition or this instruction, always ask your healthcare professional. [place] [address] any warranty or liability for your use of this information.

## 2019-08-29 NOTE — ED NOTES
MD aware of last vs, MD reddy for pt. Dc. Pt. Discharged to home alert and oriented x 3 no distress. Iv d/c'd.  Family to

## 2019-08-30 LAB
BACTERIA SPEC CULT: NORMAL
CC UR VC: NORMAL
SERVICE CMNT-IMP: NORMAL

## 2019-09-03 ENCOUNTER — HOSPITAL ENCOUNTER (INPATIENT)
Age: 74
LOS: 2 days | Discharge: HOME HEALTH | DRG: 389 | End: 2019-09-06
Attending: EMERGENCY MEDICINE | Admitting: INTERNAL MEDICINE
Payer: MEDICARE

## 2019-09-03 ENCOUNTER — APPOINTMENT (OUTPATIENT)
Dept: CT IMAGING | Age: 74
DRG: 389 | End: 2019-09-03
Attending: EMERGENCY MEDICINE
Payer: MEDICARE

## 2019-09-03 ENCOUNTER — HOME HEALTH ADMISSION (OUTPATIENT)
Dept: HOME HEALTH SERVICES | Facility: HOME HEALTH | Age: 74
End: 2019-09-03
Payer: MEDICARE

## 2019-09-03 DIAGNOSIS — R33.8 ACUTE URINARY RETENTION: ICD-10-CM

## 2019-09-03 DIAGNOSIS — K59.00 CONSTIPATION, UNSPECIFIED CONSTIPATION TYPE: Primary | ICD-10-CM

## 2019-09-03 PROBLEM — R33.9 URINARY RETENTION: Status: ACTIVE | Noted: 2019-09-03

## 2019-09-03 PROBLEM — K56.41 FECAL IMPACTION (HCC): Status: ACTIVE | Noted: 2019-09-03

## 2019-09-03 LAB
ALBUMIN SERPL-MCNC: 3.5 G/DL (ref 3.5–5)
ALBUMIN/GLOB SERPL: 1 {RATIO} (ref 1.1–2.2)
ALP SERPL-CCNC: 75 U/L (ref 45–117)
ALT SERPL-CCNC: 14 U/L (ref 12–78)
ANION GAP SERPL CALC-SCNC: 5 MMOL/L (ref 5–15)
APPEARANCE UR: CLEAR
AST SERPL-CCNC: 12 U/L (ref 15–37)
BACTERIA URNS QL MICRO: NEGATIVE /HPF
BASOPHILS # BLD: 0 K/UL (ref 0–0.1)
BASOPHILS NFR BLD: 1 % (ref 0–1)
BILIRUB SERPL-MCNC: 0.5 MG/DL (ref 0.2–1)
BILIRUB UR QL: NEGATIVE
BUN SERPL-MCNC: 17 MG/DL (ref 6–20)
BUN/CREAT SERPL: 15 (ref 12–20)
CALCIUM SERPL-MCNC: 8.8 MG/DL (ref 8.5–10.1)
CHLORIDE SERPL-SCNC: 108 MMOL/L (ref 97–108)
CO2 SERPL-SCNC: 29 MMOL/L (ref 21–32)
COLOR UR: NORMAL
CREAT SERPL-MCNC: 1.11 MG/DL (ref 0.55–1.02)
DIFFERENTIAL METHOD BLD: NORMAL
EOSINOPHIL # BLD: 0 K/UL (ref 0–0.4)
EOSINOPHIL NFR BLD: 1 % (ref 0–7)
EPITH CASTS URNS QL MICRO: NORMAL /LPF
ERYTHROCYTE [DISTWIDTH] IN BLOOD BY AUTOMATED COUNT: 13.2 % (ref 11.5–14.5)
GLOBULIN SER CALC-MCNC: 3.5 G/DL (ref 2–4)
GLUCOSE SERPL-MCNC: 110 MG/DL (ref 65–100)
GLUCOSE UR STRIP.AUTO-MCNC: NEGATIVE MG/DL
HCT VFR BLD AUTO: 40.4 % (ref 35–47)
HGB BLD-MCNC: 12.7 G/DL (ref 11.5–16)
HGB UR QL STRIP: NEGATIVE
HYALINE CASTS URNS QL MICRO: NORMAL /LPF (ref 0–5)
IMM GRANULOCYTES # BLD AUTO: 0 K/UL (ref 0–0.04)
IMM GRANULOCYTES NFR BLD AUTO: 0 % (ref 0–0.5)
KETONES UR QL STRIP.AUTO: NEGATIVE MG/DL
LEUKOCYTE ESTERASE UR QL STRIP.AUTO: NEGATIVE
LIPASE SERPL-CCNC: 72 U/L (ref 73–393)
LYMPHOCYTES # BLD: 0.8 K/UL (ref 0.8–3.5)
LYMPHOCYTES NFR BLD: 20 % (ref 12–49)
MCH RBC QN AUTO: 26.8 PG (ref 26–34)
MCHC RBC AUTO-ENTMCNC: 31.4 G/DL (ref 30–36.5)
MCV RBC AUTO: 85.2 FL (ref 80–99)
MONOCYTES # BLD: 0.2 K/UL (ref 0–1)
MONOCYTES NFR BLD: 5 % (ref 5–13)
NEUTS SEG # BLD: 3.1 K/UL (ref 1.8–8)
NEUTS SEG NFR BLD: 73 % (ref 32–75)
NITRITE UR QL STRIP.AUTO: NEGATIVE
NRBC # BLD: 0 K/UL (ref 0–0.01)
NRBC BLD-RTO: 0 PER 100 WBC
PH UR STRIP: 5.5 [PH] (ref 5–8)
PLATELET # BLD AUTO: 191 K/UL (ref 150–400)
PMV BLD AUTO: 10.5 FL (ref 8.9–12.9)
POTASSIUM SERPL-SCNC: 3.9 MMOL/L (ref 3.5–5.1)
PROT SERPL-MCNC: 7 G/DL (ref 6.4–8.2)
PROT UR STRIP-MCNC: NEGATIVE MG/DL
RBC # BLD AUTO: 4.74 M/UL (ref 3.8–5.2)
RBC #/AREA URNS HPF: NORMAL /HPF (ref 0–5)
SODIUM SERPL-SCNC: 142 MMOL/L (ref 136–145)
SP GR UR REFRACTOMETRY: 1.01 (ref 1–1.03)
UA: UC IF INDICATED,UAUC: NORMAL
UROBILINOGEN UR QL STRIP.AUTO: 1 EU/DL (ref 0.2–1)
WBC # BLD AUTO: 4.2 K/UL (ref 3.6–11)
WBC URNS QL MICRO: NORMAL /HPF (ref 0–4)

## 2019-09-03 PROCEDURE — 77030034850

## 2019-09-03 PROCEDURE — 74011250636 HC RX REV CODE- 250/636: Performed by: EMERGENCY MEDICINE

## 2019-09-03 PROCEDURE — 51702 INSERT TEMP BLADDER CATH: CPT

## 2019-09-03 PROCEDURE — 51798 US URINE CAPACITY MEASURE: CPT

## 2019-09-03 PROCEDURE — 74011250637 HC RX REV CODE- 250/637: Performed by: EMERGENCY MEDICINE

## 2019-09-03 PROCEDURE — 83690 ASSAY OF LIPASE: CPT

## 2019-09-03 PROCEDURE — 97161 PT EVAL LOW COMPLEX 20 MIN: CPT

## 2019-09-03 PROCEDURE — 80053 COMPREHEN METABOLIC PANEL: CPT

## 2019-09-03 PROCEDURE — 99218 HC RM OBSERVATION: CPT

## 2019-09-03 PROCEDURE — 85025 COMPLETE CBC W/AUTO DIFF WBC: CPT

## 2019-09-03 PROCEDURE — 94760 N-INVAS EAR/PLS OXIMETRY 1: CPT

## 2019-09-03 PROCEDURE — 74177 CT ABD & PELVIS W/CONTRAST: CPT

## 2019-09-03 PROCEDURE — 99285 EMERGENCY DEPT VISIT HI MDM: CPT

## 2019-09-03 PROCEDURE — 36415 COLL VENOUS BLD VENIPUNCTURE: CPT

## 2019-09-03 PROCEDURE — 74011250636 HC RX REV CODE- 250/636: Performed by: HOSPITALIST

## 2019-09-03 PROCEDURE — 74011250637 HC RX REV CODE- 250/637: Performed by: HOSPITALIST

## 2019-09-03 PROCEDURE — 81001 URINALYSIS AUTO W/SCOPE: CPT

## 2019-09-03 PROCEDURE — 74011636320 HC RX REV CODE- 636/320: Performed by: EMERGENCY MEDICINE

## 2019-09-03 RX ORDER — POLYETHYLENE GLYCOL 3350 17 G/17G
17 POWDER, FOR SOLUTION ORAL DAILY
Qty: 170 G | Refills: 0 | Status: SHIPPED | OUTPATIENT
Start: 2019-09-03 | End: 2019-10-18

## 2019-09-03 RX ORDER — ENOXAPARIN SODIUM 100 MG/ML
40 INJECTION SUBCUTANEOUS EVERY 24 HOURS
Status: DISCONTINUED | OUTPATIENT
Start: 2019-09-03 | End: 2019-09-06 | Stop reason: HOSPADM

## 2019-09-03 RX ORDER — LORAZEPAM 1 MG/1
1 TABLET ORAL
Status: COMPLETED | OUTPATIENT
Start: 2019-09-03 | End: 2019-09-03

## 2019-09-03 RX ORDER — POLYETHYLENE GLYCOL 3350 17 G/17G
17 POWDER, FOR SOLUTION ORAL DAILY
Status: DISCONTINUED | OUTPATIENT
Start: 2019-09-03 | End: 2019-09-05

## 2019-09-03 RX ORDER — SODIUM CHLORIDE 0.9 % (FLUSH) 0.9 %
5-40 SYRINGE (ML) INJECTION AS NEEDED
Status: DISCONTINUED | OUTPATIENT
Start: 2019-09-03 | End: 2019-09-05 | Stop reason: SDUPTHER

## 2019-09-03 RX ORDER — SODIUM CHLORIDE 0.9 % (FLUSH) 0.9 %
5-40 SYRINGE (ML) INJECTION AS NEEDED
Status: DISCONTINUED | OUTPATIENT
Start: 2019-09-03 | End: 2019-09-06 | Stop reason: HOSPADM

## 2019-09-03 RX ORDER — LORAZEPAM 1 MG/1
0.5 TABLET ORAL
Status: DISCONTINUED | OUTPATIENT
Start: 2019-09-03 | End: 2019-09-06 | Stop reason: HOSPADM

## 2019-09-03 RX ORDER — GLYCERIN ADULT
1 SUPPOSITORY, RECTAL RECTAL EVERY 12 HOURS
Status: DISPENSED | OUTPATIENT
Start: 2019-09-03 | End: 2019-09-05

## 2019-09-03 RX ORDER — SODIUM CHLORIDE 0.9 % (FLUSH) 0.9 %
5-40 SYRINGE (ML) INJECTION EVERY 8 HOURS
Status: DISCONTINUED | OUTPATIENT
Start: 2019-09-03 | End: 2019-09-06 | Stop reason: HOSPADM

## 2019-09-03 RX ORDER — AMOXICILLIN 250 MG
1 CAPSULE ORAL DAILY
Status: DISCONTINUED | OUTPATIENT
Start: 2019-09-04 | End: 2019-09-05

## 2019-09-03 RX ORDER — SODIUM CHLORIDE 9 MG/ML
100 INJECTION, SOLUTION INTRAVENOUS CONTINUOUS
Status: DISCONTINUED | OUTPATIENT
Start: 2019-09-03 | End: 2019-09-06 | Stop reason: HOSPADM

## 2019-09-03 RX ORDER — ACETAMINOPHEN 500 MG
1000 TABLET ORAL ONCE
Status: COMPLETED | OUTPATIENT
Start: 2019-09-03 | End: 2019-09-03

## 2019-09-03 RX ORDER — SODIUM CHLORIDE 0.9 % (FLUSH) 0.9 %
5-40 SYRINGE (ML) INJECTION EVERY 8 HOURS
Status: DISCONTINUED | OUTPATIENT
Start: 2019-09-03 | End: 2019-09-05 | Stop reason: SDUPTHER

## 2019-09-03 RX ORDER — DOCUSATE SODIUM 100 MG/1
CAPSULE, LIQUID FILLED ORAL
COMMUNITY
End: 2019-09-03

## 2019-09-03 RX ORDER — MORPHINE SULFATE 4 MG/ML
4 INJECTION INTRAVENOUS
Status: COMPLETED | OUTPATIENT
Start: 2019-09-03 | End: 2019-09-05

## 2019-09-03 RX ORDER — SODIUM CHLORIDE 0.9 % (FLUSH) 0.9 %
10 SYRINGE (ML) INJECTION
Status: COMPLETED | OUTPATIENT
Start: 2019-09-03 | End: 2019-09-03

## 2019-09-03 RX ORDER — DILTIAZEM HYDROCHLORIDE 120 MG/1
120 CAPSULE, COATED, EXTENDED RELEASE ORAL DAILY
Status: DISCONTINUED | OUTPATIENT
Start: 2019-09-03 | End: 2019-09-05

## 2019-09-03 RX ORDER — ONDANSETRON 2 MG/ML
4 INJECTION INTRAMUSCULAR; INTRAVENOUS
Status: DISCONTINUED | OUTPATIENT
Start: 2019-09-03 | End: 2019-09-04

## 2019-09-03 RX ORDER — ONDANSETRON 2 MG/ML
4 INJECTION INTRAMUSCULAR; INTRAVENOUS
Status: DISCONTINUED | OUTPATIENT
Start: 2019-09-03 | End: 2019-09-03

## 2019-09-03 RX ORDER — ACETAMINOPHEN 325 MG/1
650 TABLET ORAL
Status: DISCONTINUED | OUTPATIENT
Start: 2019-09-03 | End: 2019-09-06 | Stop reason: HOSPADM

## 2019-09-03 RX ADMIN — ENOXAPARIN SODIUM 40 MG: 40 INJECTION SUBCUTANEOUS at 21:09

## 2019-09-03 RX ADMIN — ACETAMINOPHEN 1000 MG: 500 TABLET ORAL at 09:19

## 2019-09-03 RX ADMIN — LORAZEPAM 0.5 MG: 1 TABLET ORAL at 22:19

## 2019-09-03 RX ADMIN — Medication 10 ML: at 18:25

## 2019-09-03 RX ADMIN — POLYETHYLENE GLYCOL 3350 17 G: 17 POWDER, FOR SOLUTION ORAL at 18:28

## 2019-09-03 RX ADMIN — SODIUM CHLORIDE 100 ML/HR: 900 INJECTION, SOLUTION INTRAVENOUS at 18:27

## 2019-09-03 RX ADMIN — IOPAMIDOL 100 ML: 755 INJECTION, SOLUTION INTRAVENOUS at 07:45

## 2019-09-03 RX ADMIN — Medication 10 ML: at 07:45

## 2019-09-03 RX ADMIN — SODIUM CHLORIDE 1000 ML: 900 INJECTION, SOLUTION INTRAVENOUS at 16:52

## 2019-09-03 RX ADMIN — LORAZEPAM 1 MG: 1 TABLET ORAL at 09:19

## 2019-09-03 RX ADMIN — DILTIAZEM HYDROCHLORIDE 120 MG: 120 CAPSULE, COATED, EXTENDED RELEASE ORAL at 18:28

## 2019-09-03 RX ADMIN — ACETAMINOPHEN 650 MG: 325 TABLET ORAL at 21:15

## 2019-09-03 NOTE — ED NOTES
Patient came in with vaginal, groin, and lower abdominal pain. Was seen here previously for constipation, still reports some issues passing bowels as well as urinating now.      Bladder scan performed, readings : 579, 646, 646

## 2019-09-03 NOTE — PROGRESS NOTES
Physical Therapy    PT consult for mobility. SBA for bed mobility, CGA to min A for transfers, min A of 1 HHA for a few steps at EOB, however, pt orthostatic. Vitals:    09/03/19 0930 09/03/19 1243 09/03/19 1245 09/03/19 1247   BP: 191/89 (!) 173/102 (!) 151/96 108/80   BP 1 Location:  Left arm Left arm Left arm   BP Patient Position:  Supine Sitting Standing   Pulse:  78 81 88   Resp:       Temp:       SpO2: 98%        Full report to follow.     Last Tavares, PT

## 2019-09-03 NOTE — PROGRESS NOTES
Reason for Admission:   Urinary retention, constipation                  RRAT Score:     13 moderate risk             Do you (patient/family) have any concerns for transition/discharge? No concerns at this time              Plan for utilizing home health:   Has used [facility] in the past    Current Advanced Directive/Advance Care Plan:  none            Transition of Care Plan:          1. Patient in ED bed, possible inpatient admission, CM consult for caregivers/home care, frequent ER visits. 2.  Patient will need 2nd IM letter at discharge if admitted  3. Patient and daughter prefer for patient to discharge home with family assistance and follow up appointments. Patient's daughter would like [facility] to resume services for patient at home. Patient is a 68year old female in ER 9/3 for urinary retention and constipation. Patient alert and oriented, resting in bed, no visitors present in room. Call also placed to patient's daughter Leonor Davis to verify information. Demographic information verified and correct. Insurance information verified and correct. Patient lives with her boyfriend and grandson, no home oxygen, no DME, has used [facility] in the past.  Patient uses [facility] pharmacy on [address]. Patient states she is independent with all ADLs but does not drive. Patient states she pays a friend to drive her to appointments and shopping. Patient's daughter states she also assists with transportation. Patient's daughter states she was pleased with [facility] from previous admission. She would like [facility] to resume services at discharge if possible to assist patient at home. Referral sent to [facility] via [facility]    Patient's daughter would be available to transport patient home after 3p when she gets off work if needed. 1340:  Patient assessed by therapy as needing 24/7 care related to orthostasis. Patient/family had same issue 4/2017, family unable to provide 24/7 care and patient refused SNF.   Met with patient again in room. 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 Patient refuses to go to a facility, states she wants to go home. CM explained to patient that she needs someone with her all the time, patient unable to state if her family can provide this. [facility] has accepted patient, can start services Thursday of this week if patient is discharged. Call placed to patient's daughter Shabana Kay. Message left, no answer to phonecalls. 1410:  Patient to be discharged. CM will send emails to [employer] to screen patient in community for Medicaid for LTC support. Call placed to patient's daughter Shabana Kay to inform and determine transportation home. No answer to call placed to daughter. 1600:  Patient being admitted. [facility] notified. Care Management Interventions  PCP Verified by CM: Mayelin Morrow MD)  Mode of Transport at Discharge:  Other (see comment)(pt pays a friend for transportation)  Transition of Care Consult (CM Consult): Discharge Planning  Discharge Durable Medical Equipment: No  Physical Therapy Consult: No  Occupational Therapy Consult: No  Speech Therapy Consult: No  Current Support Network: Own Home(lives with boyfriend and grandson)  Confirm Follow Up Transport: Family  Plan discussed with Pt/Family/Caregiver: Yes  Discharge Location  Discharge Placement: [address] Justino Dockery, RN, [address] [place] [address]  [phone]

## 2019-09-03 NOTE — PROGRESS NOTES
PHYSICAL THERAPY EMERGENCY DEPARTMENT EVALUATION WITH DISCHARGE  Patient: Corey Sharpe (81 y.o. female)  Date: 9/3/2019  Primary Diagnosis: No admission diagnoses are documented for this encounter. Precautions: fall, +orthostatics on eval      FUNCTIONAL STATUS PRIOR TO ADMISSION: Pt lives with grandson and his wife and 11year old child. 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 She states she was independent with ADLs and amb w/o device. HOME SUPPORT PRIOR TO ADMISSION: The patient lived with family but did not require assist (per report)    Physical Therapy Goals should pt be admitted:  Initiated 9/3/2019  1. Patient will move from supine to sit and sit to supine , scoot up and down and roll side to side in bed with independence within 7 day(s). 2.  Patient will transfer from bed to chair and chair to bed with independence using the least restrictive device within 7 day(s). 3.  Patient will perform sit to stand with independence within 7 day(s). 4.  Patient will ambulate with modified independence for 150 feet with the least restrictive device within 7 day(s). 5.  Patient will ascend/descend 2 stairs with handrail(s) with supervision/set-up within 7 day(s). ASSESSMENT  Based on the objective data described below, the patient presents with decline in mobility, gait and activity tolerance with positive orthostatics on eval.  Per chart and pt, although pt very scattered and variable in her answers, pt lives with grandson and his wife and their 12 yo. She states she is alone for a few hours during the day and per CM sometimes babysits the child. She apparently did not use a device and was independent in her ADLs. Pt currently min A with bed mobility and transfers. She only amb a few feet at bedside due to +orthostatics but required min A to do so, HHA. Pt returned to bed. Pt does c/o lightheadedness with sit and stand.  See doc flow and PT progress note for details. Functional Outcome Measure: The patient scored 25/100 on the barthel outcome measure which is indicative of mod to severe impairment of functional status. Other factors to consider for discharge: alone part of time, had taken care of the great grandchild        PLAN :  Recommendation for discharge: (in order for the patient to meet his/her long term goals)  Physical therapy at least 2 days/week in the home AND ensure assist and/or supervision for safety with functional mobility and gait. Given assist needed as noted above and +orthostatics, pt would need 24/7 S/assist at home. This discharge recommendation:  Has been made in collaboration with the attending provider and/or case management    Equipment recommendations for successful discharge: Unable to fully determine due to drop in BP; if pt d/c'd home, will need to be evaluated by HHPT     SUBJECTIVE:   Patient stated I am dizzy.     OBJECTIVE DATA SUMMARY:   HISTORY:    Past Medical History:   Diagnosis Date    Agoraphobia without mention of panic attacks 2/17/2014    Anxiety disorder 8/18/2013    Arthritis     osteo    Breast pain, left 4/7/2015    CAD (coronary artery disease), native coronary artery 12/1/2015    no stents    Chronic chest pain 1/13/2014    Chronic pain associated with significant psychosocial dysfunction 2/17/2014    Depression 8/18/2013    Diabetes ([address]     type II    Duplicated right renal collecting system 3/13/2014    GERD (gastroesophageal reflux disease)     Gout, joint     Hypercholesteremia     hyercholesterolemia    Hypertension     Nephrolithiasis 3/13/2014    Personal history of noncompliance with medical treatment, presenting hazards to health 5/30/2014    Psychotic disorder ([address]     Vaginal pain 7/30/2014     Past Surgical History:   Procedure Laterality Date    EGD  4/23/2010         HX CHOLECYSTECTOMY  09/20/2018    lap jesus    HX CYST REMOVAL      cyst removed from left wrist    HX HYSTERECTOMY      partial    HX OTHER SURGICAL      bladder dilitation    HX TUBAL LIGATION      HX UROLOGICAL      kidney stones     Prior Level of Function/Home Situation:Per chart and pt, although pt very scattered and variable in her answers, pt lives with grandson and his wife and their 10 yo. She states she is alone for a few hours during the day and per CM sometimes babysits the child. She apparently did not use a device and was independent in her ADLs. Home Situation  Home Environment: Private residence  # Steps to Enter: 2  One/Two Story Residence: Two story, live on 1st floor  Living Alone: No  Support Systems: Family member(s)  Patient Expects to be Discharged to[de-identified] Private residence  Current DME Used/Available at Home: None    EXAMINATION/PRESENTATION/DECISION MAKING:   Critical Behavior:  Neurologic State: Alert  Orientation Level: Oriented to person, Oriented to place  Cognition: Follows commands  Safety/Judgement: Decreased insight into deficits, Decreased awareness of need for safety  Hearing:   Auditory  Auditory Impairment: None    Range Of Motion:  AROM: Generally decreased, functional           PROM: Within functional limits           Strength:    Strength: Generally decreased, functional     Functional Mobility:  Bed Mobility:  Rolling: Minimum assistance  Supine to Sit: Minimum assistance  Sit to Supine: Minimum assistance     Transfers:  Sit to Stand: Minimum assistance  Stand to Sit: Minimum assistance                       Balance:   Sitting: Intact  Standing: Impaired  Standing - Static: Fair  Standing - Dynamic : Fair  Ambulation/Gait Training:  Distance (ft): 4 Feet (ft)  Assistive Device: Gait belt(HHA)  Ambulation - Level of Assistance: Minimal assistance        Gait Abnormalities: Decreased step clearance;Trunk sway increased    Special Tests:  Barthel Index:    Bathin  Bladder: 0  Bowels: 5  Groomin  Dressin  Feedin  Mobility: 0  Stairs: 0  Toilet Use: 5  Transfer (Bed to Chair and Back): 10  Total: 25/100       The Barthel ADL Index: Guidelines  1. The index should be used as a record of what a patient does, not as a record of what a patient could do. 2. The main aim is to establish degree of independence from any help, physical or verbal, however minor and for whatever reason. 3. The need for supervision renders the patient not independent. 4. A patient's performance should be established using the best available evidence. Asking the patient, friends/relatives and nurses are the usual sources, but direct observation and common sense are also important. However direct testing is not needed. 5. Usually the patient's performance over the preceding 24-48 hours is important, but occasionally longer periods will be relevant. 6. Middle categories imply that the patient supplies over 50 per cent of the effort. 7. Use of aids to be independent is allowed. Lee Ann James., Barthel, D.W. ([id number]). Functional evaluation: the Barthel Index. [address] [facility] (14)2. Jesenia Contreras yue SILVER Correa, Lydia De La Rosa., Ema Brooks., [place], [address] [place] [address] (1999). Measuring the change indisability after inpatient rehabilitation; comparison of the responsiveness of the Barthel Index and Functional [place] Measure. Journal of Neurology, Neurosurgery, and Psychiatry, 66(4), 718-082. DOUGLAS Rodriguez, SUMAN Florian, & Alma Salinas, MDestineeA. (2004.) Assessment of post-stroke quality of life in cost-effectiveness studies: The usefulness of the Barthel Index and the EuroQoL-5D.  Quality of Life Research, 15, 561-66        Physical Therapy Evaluation Charge Determination   History Examination Presentation Decision-Making   HIGH Complexity :3+ comorbidities / personal factors will impact the outcome/ POC  MEDIUM Complexity : 3 Standardized tests and measures addressing body structure, function, activity limitation and / or participation in recreation  MEDIUM Complexity : Evolving with changing characteristics  LOW Complexity : FOTO score of       Based on the above components, the patient evaluation is determined to be of the following complexity level: LOW                   Activity Tolerance:   Fair  Please refer to the flowsheet for vital signs taken during this treatment. After treatment patient left in no apparent distress:   Supine in bed, Call bell within reach and Caregiver / family present    COMMUNICATION/EDUCATION:   Communication/Collaboration:  Fall prevention education was provided and the patient/caregiver indicated understanding. and Patient understands intent and goals of therapy, but is neutral about his/her participation.     Findings and recommendations were discussed with: MD physician and Registered Nurse and CM    Thank you for this referral.  Shelbi Zuniga, PT   Time Calculation: 16 mins

## 2019-09-03 NOTE — ED PROVIDER NOTES
EMERGENCY DEPARTMENT HISTORY AND PHYSICAL EXAM      Date: 8/28/2019  Patient Name: Ricky Linder    History of Presenting Illness     Chief Complaint   Patient presents with    Chest Pain     reports it is chronic but worse today    Constipation     reports last BM x2 days    Anxiety     says \"I need a nerve pill so bad\"       History Provided By: Patient    HPI: Ricky Linder, 68 y.o. female with PMHx significant for anxiety, diabetes, CAD, hypertension, presents to the ED with cc of intermittent upper abdominal pain, constipation x2 days, and severe anxiety over the last 2 days. Patient reports frequently having issues with constipation in the past and reports taking \"over-the-counter medications\" with no relief. She reports feeling very anxious and this causes some chest heaviness. She reports taking Ativan in the past with some relief but she is out of this medication. She denies any vomiting, fevers, chills, shortness of breath, or any other associated symptoms. No other exacerbating or militating factors. There are no other complaints, changes, or physical findings at this time. PCP: Alex Wilhelm MD    No current facility-administered medications on file prior to encounter. Current Outpatient Medications on File Prior to Encounter   Medication Sig Dispense Refill    sodium-potassium-mag sulfate (SUPREP) 17.5-3.13-1.6 gram solr oral solution Take 177 mL by mouth See Admin Instructions. 1 Kit 0    dilTIAZem CD (CARDIZEM CD) 120 mg ER capsule TAKE ONE CAPSULE BY MOUTH EVERY DAY 90 Cap 3    pravastatin (PRAVACHOL) 40 mg tablet Take 1 Tab by mouth nightly. 90 Tab 3    famotidine (PEPCID) 20 mg tablet Take 1 Tab by mouth two (2) times a day. 60 Tab 0    nystatin (MYCOSTATIN) topical cream Apply  to affected area two (2) times daily as needed for Skin Irritation.  bisacodyl (DULCOLAX, BISACODYL,) 10 mg suppository Insert 10 mg into rectum daily as needed.  12 Suppository 0 Past History     Past Medical History:  Past Medical History:   Diagnosis Date    Agoraphobia without mention of panic attacks 2/17/2014    Anxiety disorder 8/18/2013    Arthritis     osteo    Breast pain, left 4/7/2015    CAD (coronary artery disease), native coronary artery 12/1/2015    no stents    Chronic chest pain 1/13/2014    Chronic pain associated with significant psychosocial dysfunction 2/17/2014    Depression 8/18/2013    Diabetes ([facility]     type II    Duplicated right renal collecting system 3/13/2014    GERD (gastroesophageal reflux disease)     Gout, joint     Hypercholesteremia     hyercholesterolemia    Hypertension     Nephrolithiasis 3/13/2014    Personal history of noncompliance with medical treatment, presenting hazards to health 5/30/2014    Psychotic disorder ([facility]     Vaginal pain 7/30/2014       Past Surgical History:  Past Surgical History:   Procedure Laterality Date    EGD  4/23/2010         HX CHOLECYSTECTOMY  09/20/2018    lap jesus    HX CYST REMOVAL      cyst removed from left wrist    HX HYSTERECTOMY      partial    HX OTHER SURGICAL      bladder dilitation    HX TUBAL LIGATION      HX UROLOGICAL      kidney stones       Family History:  Family History   Problem Relation Age of Onset    Stroke Mother     Heart Disease Mother     Cancer Father         type unknown    Heart Disease Son     Liver Disease Son     Heart Disease Daughter     Malignant Hyperthermia Neg Hx     Pseudocholinesterase Deficiency Neg Hx     Delayed Awakening Neg Hx     Post-op Nausea/Vomiting Neg Hx     Emergence Delirium Neg Hx     Post-op Cognitive Dysfunction Neg Hx     Other Neg Hx        Social History:  Social History     Tobacco Use    Smoking status: Never Smoker    Smokeless tobacco: Never Used   Substance Use Topics    Alcohol use: No    Drug use: No       Allergies:   Allergies   Allergen Reactions    Amoxicillin Hives    Sulfa (Sulfonamide Antibiotics) Hives and Itching    Mirtazapine Itching and Nausea Only     Funny feeling in chest    Percocet [Oxycodone-Acetaminophen] Nausea and Vomiting    Codeine Nausea and Vomiting    Crestor [Rosuvastatin] Other (comments)     myalgias    Nitroglycerin Unknown (comments)     Patient cannot remember why she is allergic to it      Prednisone Itching    Zithromax [Azithromycin] Itching     Not sure what it does,taken long time ago         Review of Systems   Review of Systems   Constitutional: Negative for chills, diaphoresis, fatigue and fever. HENT: Negative for ear pain and sore throat. Eyes: Negative for pain and redness. Respiratory: Negative for cough and shortness of breath. Cardiovascular: Negative for chest pain and leg swelling. Gastrointestinal: Positive for constipation. Negative for abdominal pain, diarrhea, nausea and vomiting. Endocrine: Negative for cold intolerance and heat intolerance. Genitourinary: Negative for flank pain and hematuria. Musculoskeletal: Negative for back pain and neck stiffness. Skin: Negative for rash and wound. Neurological: Negative for dizziness, syncope and headaches. Psychiatric/Behavioral: Positive for agitation. The patient is nervous/anxious. All other systems reviewed and are negative. Physical Exam   Physical Exam   Constitutional: She is oriented to person, place, and time. She appears well-developed and well-nourished. Elderly cachectic appearing female who appears in mild distress secondary to anxiety. She is alert and oriented x3. HENT:   Head: Normocephalic and atraumatic. Mouth/Throat: Oropharynx is clear and moist. No oropharyngeal exudate. Eyes: Pupils are equal, round, and reactive to light. Conjunctivae and EOM are normal.   Neck: Normal range of motion. Cardiovascular: Normal rate and regular rhythm. No murmur heard. Pulmonary/Chest: Effort normal and breath sounds normal. No respiratory distress.  She has no wheezes. Abdominal: Soft. Bowel sounds are normal. She exhibits no distension. There is no tenderness. There is no rebound and no guarding. Musculoskeletal: Normal range of motion. She exhibits no edema or deformity. Neurological: She is alert and oriented to person, place, and time. Coordination normal.   Skin: Skin is warm and dry. No rash noted. Psychiatric: She has a normal mood and affect. Her behavior is normal.   Nursing note and vitals reviewed. Diagnostic Study Results     Labs -     Recent Results (from the past 24 hour(s))   CBC WITH AUTOMATED DIFF    Collection Time: 09/03/19  8:14 AM   Result Value Ref Range    WBC 4.2 3.6 - 11.0 K/uL    RBC 4.74 3.80 - 5.20 M/uL    HGB 12.7 11.5 - 16.0 g/dL    HCT 40.4 35.0 - 47.0 %    MCV 85.2 80.0 - 99.0 FL    MCH 26.8 26.0 - 34.0 PG    MCHC 31.4 30.0 - 36.5 g/dL    RDW 13.2 11.5 - 14.5 %    PLATELET 903 794 - 110 K/uL    MPV 10.5 8.9 - 12.9 FL    NRBC 0.0 0  WBC    ABSOLUTE NRBC 0.00 0.00 - 0.01 K/uL    NEUTROPHILS 73 32 - 75 %    LYMPHOCYTES 20 12 - 49 %    MONOCYTES 5 5 - 13 %    EOSINOPHILS 1 0 - 7 %    BASOPHILS 1 0 - 1 %    IMMATURE GRANULOCYTES 0 0.0 - 0.5 %    ABS. NEUTROPHILS 3.1 1.8 - 8.0 K/UL    ABS. LYMPHOCYTES 0.8 0.8 - 3.5 K/UL    ABS. MONOCYTES 0.2 0.0 - 1.0 K/UL    ABS. EOSINOPHILS 0.0 0.0 - 0.4 K/UL    ABS. BASOPHILS 0.0 0.0 - 0.1 K/UL    ABS. IMM.  GRANS. 0.0 0.00 - 0.04 K/UL    DF AUTOMATED     URINALYSIS W/ REFLEX CULTURE    Collection Time: 09/03/19  8:14 AM   Result Value Ref Range    Color YELLOW/STRAW      Appearance CLEAR CLEAR      Specific gravity 1.009 1.003 - 1.030      pH (UA) 5.5 5.0 - 8.0      Protein NEGATIVE  NEG mg/dL    Glucose NEGATIVE  NEG mg/dL    Ketone NEGATIVE  NEG mg/dL    Bilirubin NEGATIVE  NEG      Blood NEGATIVE  NEG      Urobilinogen 1.0 0.2 - 1.0 EU/dL    Nitrites NEGATIVE  NEG      Leukocyte Esterase NEGATIVE  NEG      WBC 0-4 0 - 4 /hpf    RBC 0-5 0 - 5 /hpf    Epithelial cells FEW FEW /lpf Bacteria NEGATIVE  NEG /hpf    UA:UC IF INDICATED CULTURE NOT INDICATED BY UA RESULT CNI      Hyaline cast 2-5 0 - 5 /lpf       Radiologic Studies -   No orders to display     CT Results  (Last 48 hours)    None        CXR Results  (Last 48 hours)    None            Medical Decision Making   I am the first provider for this patient. I reviewed the vital signs, available nursing notes, past medical history, past surgical history, family history and social history. Vital Signs-Reviewed the patient's vital signs. No data found. Pulse Oximetry An  Records Reviewed: Nursing Notes and Old Medical Records    Differential Diagnosis:    Constipation versus bowel obstruction versus anxiety disorder    Provider Notes (Medical Decision Making):   Patient feeling better after treatment in the ER. I do believe there is a psychosomatic component to this secondary to anxiety. She was resistant to treatment in the ER for his constipation but would like to be discharged on medications. I see no evidence of acute coronary syndrome or other emergent condition. She is strongly encouraged to follow-up with her primary care doctor for reevaluation within the next week. ED Course:     Initial assessment performed. The patients presenting problems have been discussed, and they are in agreement with the care plan formulated and outlined with them. I have encouraged them to ask questions as they arise throughout their visit. Critical Care Time:     None    Disposition:  9:31 AM  Hanh Lofton  results have been reviewed with her. She has been counseled regarding her diagnosis. She verbally conveys understanding and agreement of the signs, symptoms, diagnosis, treatment and prognosis and additionally agrees to follow up as recommended with Dr. Sierra Nick MD in 24 - 48 hours. She also agrees with the care-plan and conveys that all of her questions have been answered.   I have also put together some discharge instructions for her that include: 1) educational information regarding their diagnosis, 2) how to care for their diagnosis at home, as well a 3) list of reasons why they would want to return to the ED prior to their follow-up appointment, should their condition change. PLAN:  1. Discharge Medication List as of 8/28/2019  6:33 PM      START taking these medications    Details   glycerin, adult, (FLEET GLYCERIN, ADULT,) suppository Insert 1 Suppository into rectum now for 1 dose., Normal, Disp-1 Suppository, R-0      magnesium citrate (CITRATE OF MAGNESIA) solution Take 296 mL by mouth now for 1 dose., Normal, Disp-1 Bottle, R-0         CONTINUE these medications which have NOT CHANGED    Details   sodium-potassium-mag sulfate (SUPREP) 17.5-3.13-1.6 gram solr oral solution Take 177 mL by mouth See Admin Instructions. , Normal, Disp-1 Kit, R-0      dilTIAZem CD (CARDIZEM CD) 120 mg ER capsule TAKE ONE CAPSULE BY MOUTH EVERY DAY, Normal, Disp-90 Cap, R-3      docusate sodium (COLACE) 100 mg capsule TAKE 1 CAP BY MOUTH DAILY, Normal, Disp-90 Cap, R-0      pravastatin (PRAVACHOL) 40 mg tablet Take 1 Tab by mouth nightly., Normal, Disp-90 Tab, R-3      famotidine (PEPCID) 20 mg tablet Take 1 Tab by mouth two (2) times a day., Normal, Disp-60 Tab, R-0      nystatin (MYCOSTATIN) topical cream Apply  to affected area two (2) times daily as needed for Skin Irritation. , Historical Med      bisacodyl (DULCOLAX, BISACODYL,) 10 mg suppository Insert 10 mg into rectum daily as needed., Normal, Disp-12 Suppository, R-0      aspirin delayed-release 81 mg tablet Take 1 Tab by mouth daily. , No Print, Disp-30 Tab, R-11           2.    Follow-up Information     Follow up With Specialties Details Why Dagoberto Box MD [address]  [phone]      Yvette Calloway MD Family Practice In 1 week  [address] [address] [phone] Return to ED if worse     Diagnosis     Clinical Impression:   1. Constipation, unspecified constipation type    2.  Depression with anxiety

## 2019-09-03 NOTE — ED NOTES
Patient still very anxious after Ativan administration. Complaining of pain in the back of her head. Provider notified. Nurse and Provider concerned about patient's mental health and home situation. Multiple visits in past month, EMS reports bed bugs at home, uncontrolled anxiety and off behaviors: patient very talkative and talking in circles. Case management consult in place, considering alternatives to sending her home.

## 2019-09-03 NOTE — DISCHARGE INSTRUCTIONS
Patient Education        Constipation: Care Instructions  Your Care Instructions    Constipation means that you have a hard time passing stools (bowel movements). People pass stools from 3 times a day to once every 3 days. What is normal for you may be different. Constipation may occur with pain in the rectum and cramping. The pain may get worse when you try to pass stools. Sometimes there are small amounts of bright red blood on toilet paper or the surface of stools. This is because of enlarged veins near the rectum (hemorrhoids). A few changes in your diet and lifestyle may help you avoid ongoing constipation. Your doctor may also prescribe medicine to help loosen your stool. Some medicines can cause constipation. These include pain medicines and antidepressants. Tell your doctor about all the medicines you take. Your doctor may want to make a medicine change to ease your symptoms. Follow-up care is a key part of your treatment and safety. Be sure to make and go to all appointments, and call your doctor if you are having problems. It's also a good idea to know your test results and keep a list of the medicines you take. How can you care for yourself at home? · Drink plenty of fluids, enough so that your urine is light yellow or clear like water. If you have kidney, heart, or liver disease and have to limit fluids, talk with your doctor before you increase the amount of fluids you drink. · Include high-fiber foods in your diet each day. These include fruits, vegetables, beans, and whole grains. · Get at least 30 minutes of exercise on most days of the week. Walking is a good choice. You also may want to do other activities, such as running, swimming, cycling, or playing tennis or team sports. · Take a fiber supplement, such as Citrucel or Metamucil, every day. Read and follow all instructions on the label. · Schedule time each day for a bowel movement. A daily routine may help.  Take your time having your bowel movement. · Support your feet with a small step stool when you sit on the toilet. This helps flex your hips and places your pelvis in a squatting position. · Your doctor may recommend an over-the-counter laxative to relieve your constipation. Examples are Milk of Magnesia and MiraLax. Read and follow all instructions on the label. Do not use laxatives on a long-term basis. When should you call for help? Call your doctor now or seek immediate medical care if:    · You have new or worse belly pain.     · You have new or worse nausea or vomiting.     · You have blood in your stools.    Watch closely for changes in your health, and be sure to contact your doctor if:    · Your constipation is getting worse.     · You do not get better as expected. Where can you learn more? Go to http://lobo-michel.info/. Enter 21 [phone] in the search box to learn more about \"Constipation: Care Instructions. \"  Current as of: September 23, 2018  Content Version: 12.1  © 7423-0499 [employer]. Care instructions adapted under license by [employer] (which disclaims liability or warranty for this information). If you have questions about a medical condition or this instruction, always ask your healthcare professional. [place] [address] any warranty or liability for your use of this information. Patient Education        Urinary Retention: Care Instructions  Your Care Instructions    Urinary retention means that you aren't able to urinate. In men, it is often caused by a blockage of the urinary tract from an enlarged prostate gland. In men and women, it can also be caused by an infection or nerve damage. Or it may be a side effect of a medicine. The doctor may have drained the urine from your bladder. If you still have problems passing urine, you may need to use a catheter at home. This is used to empty your bladder until the problem can be fixed.  Your doctor may put a catheter in your bladder before you go home. If so, he or she will tell you when to come back to have the catheter removed. The doctor has checked you closely. But problems can develop later. If you notice any problems or new symptoms, get medical treatment right away. Follow-up care is a key part of your treatment and safety. Be sure to make and go to all appointments, and call your doctor if you are having problems. It's also a good idea to know your test results and keep a list of the medicines you take. How can you care for yourself at home? · Take your medicines exactly as prescribed. Call your doctor if you think you are having a problem with your medicine. You will get more details on the specific medicines your doctor prescribes. · Check with your doctor before you use any over-the-counter medicines. Many cold and allergy medicines, for example, can make this problem worse. Make sure your doctor knows all of the medicines, vitamins, supplements, and herbal remedies you take. · Spread out through the day the amount of fluid you drink. Do not drink a lot at bedtime. · Avoid alcohol and caffeine. · If you have been given a catheter, or if one is already in place, follow the instructions you were given. Always wash your hands before and after you handle the catheter. When should you call for help? Call your doctor now or seek immediate medical care if:    · You cannot urinate at all, or it is getting harder to urinate.     · You have symptoms of a urinary tract infection. These may include:  ? Pain or burning when you urinate. ? A frequent need to urinate without being able to pass much urine. ? Pain in the flank, which is just below the rib cage and above the waist on either side of the back. ? Blood in your urine. ? A fever.    Watch closely for changes in your health, and be sure to contact your doctor if:    · You have any problems with your catheter.     · You do not get better as expected. Where can you learn more? Go to http://lobo-michel.info/. Enter M244 in the search box to learn more about \"Urinary Retention: Care Instructions. \"  Current as of: December 19, 2018  Content Version: 12.1  © 1058-3468 [employer]. Care instructions adapted under license by [employer] (which disclaims liability or warranty for this information). If you have questions about a medical condition or this instruction, always ask your healthcare professional. [place] [address] any warranty or liability for your use of this information. Patient Education        Learning About How to Care for a Person's Indwelling Urinary Catheter  Introduction    A urinary catheter is a flexible plastic tube used to drain urine from the bladder when a person cannot urinate. A doctor will place the catheter into the bladder by inserting it through the urethra. The urethra is the opening that carries urine from the bladder to the outside of the body. When the catheter is in the bladder, a small balloon is used to keep the catheter in place. The catheter lets urine drain from the bladder into a bag. The bag is usually attached to the thigh. Urinary catheters can be used in both men and women. A catheter that stays in place for a longer period of time is called an indwelling catheter. A catheter may be needed because of certain medical conditions. These include an enlarged prostate or problems controlling urine. It may be used after surgery on the pelvis or urinary tract. Urinary catheters are also used when the lower part of the body is paralyzed. When helping a loved one with a catheter, try to be relaxed. Caring for a catheter can be embarrassing for both of you. This may be especially true if you are caring for someone of the opposite sex. If you are calm and don't seem embarrassed, the person may feel more comfortable. How do you take care of the catheter?   Wear disposable gloves when handling someone's catheter. Make sure to follow all of the instructions the doctor has given. And always wash your hands before and after you're done. Here are some other things to remember when caring for someone's catheter:  · Make sure that urine is running out of the catheter into the urine collection bag. And make sure that the catheter tubing does not get twisted or bent. · Keep the urine collection bag below the level of the bladder. At night it may be helpful to hang the bag on the side of the bed. · Make sure that the urine collection bag does not drag and pull on the catheter. · It is okay to shower with a catheter and urine collection bag in place, unless the doctor says not to. · Check for swelling or signs of infection in the area around the catheter. Signs of infection include pus or irritated, swollen, red, or tender skin. · Clean the area around the catheter twice a day with soap and water. Dry with a clean towel afterward. · Do not apply powder or lotion to the skin around the catheter. · Do not tug or pull on the catheter. · Sexual intercourse may still be possible for individuals who wear a catheter. It is best to talk with a doctor about options. How do you empty the bag? The urine collection bag needs to be emptied regularly. It is best to empty the bag when it's about half full or at bedtime. If the doctor has asked you to measure the amount of urine, do that before you empty the urine into the toilet. When you are ready to empty the bag, follow these steps:  1. Put on disposable gloves. 2. Remove the drain spout from its sleeve at the bottom of the collection bag. Open the valve on the spout. 3. Let the urine flow out of the bag and into the toilet or a container. Do not let the tubing or drain spout touch anything. 4. After you empty the bag, close the valve and put the drain spout back into its sleeve.   5. Remove your gloves and throw them away.  6. Wash your hands with soap and water. How do you care for someone after the catheter is removed? After the catheter is taken out, the person may have trouble urinating. If this happens, try helping them sit in a few inches of warm water (sitz bath). If the urge to urinate comes during the sitz bath, it may be easier for them to urinate while still in the bath. Some burning may happen the first few times the person urinates. If the burning lasts longer, it may be a sign of an infection. If the catheter causes irritation or a rash, wearing loose, cotton underwear may help. Watch closely for changes in the person's health, and be sure to contact their doctor if you notice any problems. Where can you learn more? Go to http://lobo-michel.info/. Enter K571 in the search box to learn more about \"Learning About How to Care for a Person's Indwelling Urinary Catheter. \"  Current as of: April 1, 2019  Content Version: 12.1  © 2822-4959 [employer]. Care instructions adapted under license by [employer] (which disclaims liability or warranty for this information). If you have questions about a medical condition or this instruction, always ask your healthcare professional. [address] any warranty or liability for your use of this information.

## 2019-09-03 NOTE — ED PROVIDER NOTES
EMERGENCY DEPARTMENT HISTORY AND PHYSICAL EXAM      Date: 9/3/2019  Patient Name: Jesse Hendrix    History of Presenting Illness     Chief Complaint   Patient presents with    Groin Pain     Pain in vagina, groin area, and lower abdomen       History Provided By: Patient    HPI: Jesse Hendrix, 68 y.o. female with PMHx significant for diabetes, hypertension, anxiety depression, CAD, presents to the ED with cc of severe suprapubic pain that began early this morning. Patient reports she is unable to urinate since sometime yesterday. She was seen recently for constipation and has been taking prescription medications with mild relief. She denies any fevers, chills, vomiting, back pain, chest pain, shortness of breath, or any other associated symptoms. No other exacerbating or militating factors. There are no other complaints, changes, or physical findings at this time. PCP: Nerissa Gooden MD    No current facility-administered medications on file prior to encounter. Current Outpatient Medications on File Prior to Encounter   Medication Sig Dispense Refill    LORazepam (ATIVAN) 0.5 mg tablet Take 1 Tab by mouth every eight (8) hours as needed for Anxiety. Max Daily Amount: 1.5 mg. 20 Tab 0    dilTIAZem CD (CARDIZEM CD) 120 mg ER capsule TAKE ONE CAPSULE BY MOUTH EVERY DAY 90 Cap 3    docusate sodium (STOOL SOFTENER) 100 mg capsule Stool Softener      sodium-potassium-mag sulfate (SUPREP) 17.5-3.13-1.6 gram solr oral solution Take 177 mL by mouth See Admin Instructions. 1 Kit 0    pravastatin (PRAVACHOL) 40 mg tablet Take 1 Tab by mouth nightly. 90 Tab 3    famotidine (PEPCID) 20 mg tablet Take 1 Tab by mouth two (2) times a day. 60 Tab 0    nystatin (MYCOSTATIN) topical cream Apply  to affected area two (2) times daily as needed for Skin Irritation.  bisacodyl (DULCOLAX, BISACODYL,) 10 mg suppository Insert 10 mg into rectum daily as needed.  12 Suppository 0       Past History Past Medical History:  Past Medical History:   Diagnosis Date    Agoraphobia without mention of panic attacks 2/17/2014    Anxiety disorder 8/18/2013    Arthritis     osteo    Breast pain, left 4/7/2015    CAD (coronary artery disease), native coronary artery 12/1/2015    no stents    Chronic chest pain 1/13/2014    Chronic pain associated with significant psychosocial dysfunction 2/17/2014    Depression 8/18/2013    Diabetes ([facility] [address]     type II    Duplicated right renal collecting system 3/13/2014    GERD (gastroesophageal reflux disease)     Gout, joint     Hypercholesteremia     hyercholesterolemia    Hypertension     Nephrolithiasis 3/13/2014    Personal history of noncompliance with medical treatment, presenting hazards to health 5/30/2014    Psychotic disorder ([facility] [address]     Vaginal pain 7/30/2014       Past Surgical History:  Past Surgical History:   Procedure Laterality Date    EGD  4/23/2010         HX CHOLECYSTECTOMY  09/20/2018    lap jesus    HX CYST REMOVAL      cyst removed from left wrist    HX HYSTERECTOMY      partial    HX OTHER SURGICAL      bladder dilitation    HX TUBAL LIGATION      HX UROLOGICAL      kidney stones       Family History:  Family History   Problem Relation Age of Onset    Stroke Mother     Heart Disease Mother     Cancer Father         type unknown    Heart Disease Son     Liver Disease Son     Heart Disease Daughter     Malignant Hyperthermia Neg Hx     Pseudocholinesterase Deficiency Neg Hx     Delayed Awakening Neg Hx     Post-op Nausea/Vomiting Neg Hx     Emergence Delirium Neg Hx     Post-op Cognitive Dysfunction Neg Hx     Other Neg Hx        Social History:  Social History     Tobacco Use    Smoking status: Never Smoker    Smokeless tobacco: Never Used   Substance Use Topics    Alcohol use: No    Drug use: No       Allergies:   Allergies   Allergen Reactions    Amoxicillin Hives    Sulfa (Sulfonamide Antibiotics) Hives and Itching  Mirtazapine Itching and Nausea Only     Funny feeling in chest    Percocet [Oxycodone-Acetaminophen] Nausea and Vomiting    Codeine Nausea and Vomiting    Crestor [Rosuvastatin] Other (comments)     myalgias    Nitroglycerin Unknown (comments)     Patient cannot remember why she is allergic to it      Prednisone Itching    Zithromax [Azithromycin] Itching     Not sure what it does,taken long time ago         Review of Systems   Review of Systems   Constitutional: Negative for chills, diaphoresis, fatigue and fever. HENT: Negative for ear pain and sore throat. Eyes: Negative for pain and redness. Respiratory: Negative for cough and shortness of breath. Cardiovascular: Negative for chest pain and leg swelling. Gastrointestinal: Positive for abdominal pain. Negative for diarrhea, nausea and vomiting. Endocrine: Negative for cold intolerance and heat intolerance. Genitourinary: Positive for dysuria. Negative for flank pain and hematuria. Musculoskeletal: Negative for back pain and neck stiffness. Skin: Negative for rash and wound. Neurological: Negative for dizziness, syncope and headaches. All other systems reviewed and are negative. Physical Exam   Physical Exam   Constitutional: She is oriented to person, place, and time. She appears well-developed and well-nourished. Elderly female appears in moderate distress secondary to pain. HENT:   Head: Normocephalic and atraumatic. Mouth/Throat: Oropharynx is clear and moist. No oropharyngeal exudate. Eyes: Pupils are equal, round, and reactive to light. Conjunctivae and EOM are normal.   Neck: Normal range of motion. Cardiovascular: Normal rate and regular rhythm. No murmur heard. Pulmonary/Chest: Effort normal and breath sounds normal. No respiratory distress. She has no wheezes. Abdominal: Soft. Bowel sounds are normal. She exhibits no distension and no mass. There is tenderness. There is no rebound and no guarding. Severe tenderness in the suprapubic region without rebound or guarding. No abdominal distention. Musculoskeletal: Normal range of motion. She exhibits no edema or deformity. Neurological: She is alert and oriented to person, place, and time. Coordination normal.   Skin: Skin is warm and dry. No rash noted. Psychiatric: She has a normal mood and affect. Her behavior is normal.   Nursing note and vitals reviewed. Diagnostic Study Results     Labs -     Recent Results (from the past 24 hour(s))   METABOLIC PANEL, COMPREHENSIVE    Collection Time: 09/03/19  8:14 AM   Result Value Ref Range    Sodium 142 136 - 145 mmol/L    Potassium 3.9 3.5 - 5.1 mmol/L    Chloride 108 97 - 108 mmol/L    CO2 29 21 - 32 mmol/L    Anion gap 5 5 - 15 mmol/L    Glucose 110 (H) 65 - 100 mg/dL    BUN 17 6 - 20 MG/DL    Creatinine 1.11 (H) 0.55 - 1.02 MG/DL    BUN/Creatinine ratio 15 12 - 20      GFR est AA 58 (L) >60 ml/min/1.73m2    GFR est non-AA 48 (L) >60 ml/min/1.73m2    Calcium 8.8 8.5 - 10.1 MG/DL    Bilirubin, total 0.5 0.2 - 1.0 MG/DL    ALT (SGPT) 14 12 - 78 U/L    AST (SGOT) 12 (L) 15 - 37 U/L    Alk. phosphatase 75 45 - 117 U/L    Protein, total 7.0 6.4 - 8.2 g/dL    Albumin 3.5 3.5 - 5.0 g/dL    Globulin 3.5 2.0 - 4.0 g/dL    A-G Ratio 1.0 (L) 1.1 - 2.2     CBC WITH AUTOMATED DIFF    Collection Time: 09/03/19  8:14 AM   Result Value Ref Range    WBC 4.2 3.6 - 11.0 K/uL    RBC 4.74 3.80 - 5.20 M/uL    HGB 12.7 11.5 - 16.0 g/dL    HCT 40.4 35.0 - 47.0 %    MCV 85.2 80.0 - 99.0 FL    MCH 26.8 26.0 - 34.0 PG    MCHC 31.4 30.0 - 36.5 g/dL    RDW 13.2 11.5 - 14.5 %    PLATELET 658 734 - 882 K/uL    MPV 10.5 8.9 - 12.9 FL    NRBC 0.0 0  WBC    ABSOLUTE NRBC 0.00 0.00 - 0.01 K/uL    NEUTROPHILS 73 32 - 75 %    LYMPHOCYTES 20 12 - 49 %    MONOCYTES 5 5 - 13 %    EOSINOPHILS 1 0 - 7 %    BASOPHILS 1 0 - 1 %    IMMATURE GRANULOCYTES 0 0.0 - 0.5 %    ABS. NEUTROPHILS 3.1 1.8 - 8.0 K/UL    ABS.  LYMPHOCYTES 0.8 0.8 - 3.5 K/UL    ABS. MONOCYTES 0.2 0.0 - 1.0 K/UL    ABS. EOSINOPHILS 0.0 0.0 - 0.4 K/UL    ABS. BASOPHILS 0.0 0.0 - 0.1 K/UL    ABS. IMM. GRANS. 0.0 0.00 - 0.04 K/UL    DF AUTOMATED     URINALYSIS W/ REFLEX CULTURE    Collection Time: 09/03/19  8:14 AM   Result Value Ref Range    Color YELLOW/STRAW      Appearance CLEAR CLEAR      Specific gravity 1.009 1.003 - 1.030      pH (UA) 5.5 5.0 - 8.0      Protein NEGATIVE  NEG mg/dL    Glucose NEGATIVE  NEG mg/dL    Ketone NEGATIVE  NEG mg/dL    Bilirubin NEGATIVE  NEG      Blood NEGATIVE  NEG      Urobilinogen 1.0 0.2 - 1.0 EU/dL    Nitrites NEGATIVE  NEG      Leukocyte Esterase NEGATIVE  NEG      WBC 0-4 0 - 4 /hpf    RBC 0-5 0 - 5 /hpf    Epithelial cells FEW FEW /lpf    Bacteria NEGATIVE  NEG /hpf    UA:UC IF INDICATED CULTURE NOT INDICATED BY UA RESULT CNI      Hyaline cast 2-5 0 - 5 /lpf   LIPASE    Collection Time: 09/03/19  8:14 AM   Result Value Ref Range    Lipase 72 (L) 73 - 393 U/L       Radiologic Studies -   CT ABD PELV W CONT   Final Result   IMPRESSION: Moderately severe rectosigmoid fecal retention. Nonobstructing left   nephrolithiasis and additional incidental findings as above. CT Results  (Last 48 hours)               09/03/19 0855  CT ABD PELV W CONT Final result    Impression:  IMPRESSION: Moderately severe rectosigmoid fecal retention. Nonobstructing left   nephrolithiasis and additional incidental findings as above. Narrative:  EXAM: CT ABD PELV W CONT       INDICATION: severe abd pain, urinary retention, constipation       COMPARISON: CT 3/14/2019. CONTRAST: 100 mL of Isovue-370. TECHNIQUE:    Following the uneventful intravenous administration of contrast, thin axial   images were obtained through the abdomen and pelvis. Coronal and sagittal   reconstructions were generated. Oral contrast administered.  CT dose reduction   was achieved through use of a standardized protocol tailored for this   examination and automatic exposure control for dose modulation. FINDINGS:    LUNG BASES: The patient atelectasis. Otherwise clear. INCIDENTALLY IMAGED HEART AND MEDIASTINUM: The heart is normal in size without   pericardial effusion. Coronary artery calcifications are noted. LIVER: No mass or biliary dilatation. GALLBLADDER: Surgically absent. SPLEEN: No mass. PANCREAS: No mass or ductal dilatation. ADRENALS: Unremarkable. KIDNEYS AND URETERS: Left nephrolithiasis measuring up to 12 mm. No right   nephrolithiasis. No hydronephrosis or enhancing masses. Ureters are nondilated   with no ureteral stones. STOMACH: Unremarkable. SMALL BOWEL: No dilatation or wall thickening. COLON: Moderately severe rectal and sigmoid fecal retention. No dilation or wall   thickening. APPENDIX: Unremarkable. PERITONEUM: No ascites or pneumoperitoneum. RETROPERITONEUM: Atherosclerotic calcification and tortuosity without aneurysm   or dissection. No enlarged lymphadenopathy. REPRODUCTIVE ORGANS: Uterus is surgically absent. No adnexal masses. URINARY BLADDER: Collapsed around Crowder catheter balloon. No mass or calculus   evident. BONES: Degenerative spine change. No significant change in compression   deformities of T11 and L4. No acute fracture or aggressive lesion. ADDITIONAL COMMENTS: N/A               CXR Results  (Last 48 hours)    None            Medical Decision Making   I am the first provider for this patient. I reviewed the vital signs, available nursing notes, past medical history, past surgical history, family history and social history. Vital Signs-Reviewed the patient's vital signs.   Patient Vitals for the past 24 hrs:   Temp Pulse Resp BP SpO2   09/03/19 1500 -- -- -- (!) 173/97 --   09/03/19 1400 -- -- -- (!) 173/102 99 %   09/03/19 1300 -- -- -- (!) 201/99 99 %   09/03/19 1247 -- 88 -- 108/80 --   09/03/19 1245 -- 81 -- (!) 157/96 --   09/03/19 1243 -- 78 -- (!) 173/102 --   09/03/19 1230 -- -- -- (!) 168/95 --   09/03/19 1215 -- -- -- (!) 174/108 99 %   09/03/19 1200 -- -- -- (!) 176/100 --   09/03/19 1145 -- -- -- (!) 163/96 100 %   09/03/19 1130 -- -- -- (!) 176/96 --   09/03/19 1115 -- -- -- (!) 167/92 --   09/03/19 1100 -- -- -- (!) 157/92 --   09/03/19 1045 -- -- -- 159/85 --   09/03/19 1030 -- -- -- 160/83 99 %   09/03/19 1015 -- -- -- 153/88 --   09/03/19 1000 -- -- -- (!) 173/93 --   09/03/19 0945 -- -- -- 165/88 --   09/03/19 0930 -- -- -- 191/89 98 %   09/03/19 0915 -- -- -- 189/85 96 %   09/03/19 0903 -- -- -- (!) 191/91 97 %   09/03/19 0815 -- -- -- 155/84 100 %   09/03/19 0800 -- -- -- (!) 147/100 100 %   09/03/19 0758 -- -- -- -- 100 %   09/03/19 0731 -- -- -- (!) 205/105 100 %   09/03/19 0729 97.6 °F (36.4 °C) 95 16 (!) 205/105 100 %       Pulse Oximetry Analysis -100 % on room air    Cardiac Monitor:   Rate: 95 bpm  Rhythm: Normal Sinus Rhythm        Records Reviewed: Nursing Notes and Old Medical Records    Differential Diagnosis:    Pt presents with acute abdominal pain; vital signs stable with currently a non-peritoneal exam; DDx includes: Gastroenteritis, hepatitis, pancreatitis, obstruction, appendicitis, viral illness, IBD, diverticulitis, mesenteric ischemia, AAA or descending dissection, ACS, kidney stone. Will get labs, treat symptomatically and obtain serial abdominal exams to determine if a CT is warranted. Will reassess and monitor closely. Provider Notes (Medical Decision Making):   Patient unable to ambulate due to severe orthostatic hypotension. However when at rest the patient is extremely hypertensive. Given the lability of patient's blood pressures, the lack of insight into her condition, and the inability family to provide 24/7 care that is recommended by case management physical therapy, we will admit for observation and further management of her condition until a safe for discharge plan can be formulated. ED Course:     Initial assessment performed.  The patients presenting problems have been discussed, and they are in agreement with the care plan formulated and outlined with them. I have encouraged them to ask questions as they arise throughout their visit. ED Course as of Sep 03 1624   Tue Sep 03, 2019   4584 Patient found to have greater than 500 cc on bedside bladder scan. Will place Crowder catheter to treat acute urinary retention.    [CC]   1131 Patient has been seen by case management. We are giving a soapsuds enema to treat her constipation. Urinary tension is been successfully treated with Crowder catheter. .  We are trying to arrange for home health for her at this time and need physical therapy evaluation for further treatment. [CC]   1426 Patient seen by both case management and physical therapy. Patient was offered placement into inpatient rehab facility however she has declined. The daughter is declined as well and so that they can provide 24/7 care to the patient. [CC]      ED Course User Index  [CC] Keenan Hardy MD       Critical Care Time:     None    Disposition:  Admit Note:  4:24 PM  Pt is being admitted by hospitalist. The results of their tests and reason(s) for their admission have been discussed with pt and/or available family. They convey agreement and understanding for the need to be admitted and for admission diagnosis. PLAN:  1. Current Discharge Medication List      START taking these medications    Details   sodium phosphates (FLEET'S) 9.5-3.5 gram/59 mL enema Insert 66 mL into rectum now for 1 dose. Qty: 66 mL, Refills: 0      polyethylene glycol (MIRALAX) 17 gram/dose powder Take 17 g by mouth daily. 1 tablespoon with 8 oz of water daily  Qty: 170 g, Refills: 0           2. Follow-up Information     Follow up With Specialties Details Why Contact Info    Dori Rocha MD Family Practice In 2 days For a follow-up evaluation.  [address] [place] [address]  [phone]      Michelle Carroll MD Urology In 1 week For a follow-up evaluation. This is a neurology she can see for further evaluation of your urinary retention. Would like you to keep the Crowder in place until the next few days until your bladder for functioning more properly. [id number]  [phone]          Return to ED if worse     Diagnosis     Clinical Impression:   1. Constipation, unspecified constipation type    2.  Acute urinary retention

## 2019-09-03 NOTE — PROGRESS NOTES
Pharmacy Clarification of the Prior to Admission Medication Regimen Retrospective to the Admission Medication Reconciliation    The patient was interviewed regarding clarification of the prior to admission medication regimen. Patient's daughter was present in room and obtained permission from patient to discuss drug regimen with visitor(s) present. Patient was questioned regarding use of any other inhalers, topical products, over the counter medications, herbal medications, vitamin products or ophthalmic/nasal/otic medication use. Information Obtained From: RX Query, Patient    Recommendations/Findings: The following amendments were made to the patient's active medication list on file at [facility]:     1) Additions: None    2) Removals:   Dulcolax  Docusate  Famotidine  Nystatin  Pravastatin  Suprep    3) Changes: None    4) Pertinent Pharmacy Findings: None     PTA medication list was corrected to the following:     Prior to Admission Medications   Prescriptions Last Dose Informant Patient Reported? Taking? LORazepam (ATIVAN) 0.5 mg tablet 9/2/2019 at Unknown time Self No Yes   Sig: Take 1 Tab by mouth every eight (8) hours as needed for Anxiety.  Max Daily Amount: 1.5 mg.   dilTIAZem CD (CARDIZEM CD) 120 mg ER capsule 9/2/2019 at Unknown time Self No Yes   Sig: TAKE ONE CAPSULE BY MOUTH EVERY DAY      Facility-Administered Medications: None          Thank you,  Ahsan Beltran  Medication History Pharmacy Technician

## 2019-09-03 NOTE — H&P
Hospitalist Admission Note    NAME: Jm Ferguson   :  1945   MRN:  [id number]     Date/Time:  9/3/2019 4:30 PM    Patient PCP: Santos Blankenship MD   Cardiology:  Dr. Kristal Mckeon  GI:  Dr. Spenser Campos  Urologist:  Dr. Steve Salazar  ______________________________________________________________________   Assessment & Plan: Moderately severe fecal impaction, POA  Recurrent constipation x 2 months  Lower abdominal pain  Weight loss 10 lbs  --having small BMs with soap suds enema in ER but feeling she is not completely evacuating. Reports having GI appointment this Friday (does not know name). [facility] since had ERCP and EGD by Dr. Spenser Campos in past  --lactulose 30g tid x 3 doses  --miralax daily  --pericolace bid  --glycerin suppository q12h x 4 doses  --CT abdomen with moderately severe rectosigmoid fecal retention    Acute urinary retention, POA  Hx Renal stone, has 12mm stone on left but no sign of obstruction  --presumed due to fecal impaction  --bladder scan in ER consistently >500ml x 3. Martinez placed. --continue martinez. Do voiding trial once fecal impaction resolved. Dizziness with severe orthostatic hypotension in setting of  Chronic HTN  Diastolic dysfunction grade 2 without volume overload  Mild aortic stenosis  --according to patient, orthostatic hypotension is not new. BP dropping from 604 to 171 systolic from supine to standing and patient very symptomatic  --continue diltiazem CD 120mg for now. IVF and compression stockings to see if will help. Consider abdominal binder and cardiology consult if not improved  --check echo to see if aortic stenosis has worsened    Hyperlipidemia  --reports losing medication when visited relatives in [place]. Anxiety  Hx agoraphobia per chart  --continue ativan prn  --per ER documentation:  Nurse and Provider concerned about patient's mental health and home situation.  Multiple visits in past month, EMS reports bed bugs at home, uncontrolled anxiety and off behaviors: patient very talkative and talking in circles. Consider psych consult. --patient not psychotic. She is poor historian but i'm not observing what ER staff is documenting at this time. Hx DM, not on medication    There is no height or weight on file to calculate BMI. Code: discussed, wants full code, no advance directive  DVT prophylaxis: lovenox  Surrogate decision maker:  Daughter Pablo Ta and Josh Abraham \"Amy\"        Subjective:   CHIEF COMPLAINT:  Constipation, dizziness    HISTORY OF PRESENT ILLNESS:     Roshni Gann is a 68 y.o.  female with HTN, anxiety, DM not on treatment, CAD (no hx stent, hyperlipidemia presents with 3 weeks of constipation, difficulty having BM despite miralax or magnesium citrate presents with groin pain and difficulty urinating. Poor appetite, reports 10 lb weight loss, b/l lower abdominal pain, nausea. Dizzy, especially with standing. Told BP drops when she stands at [facility] in past but nothing was done. This is 4th ER visit in past 2 months and CT scan abdomen today shows moderately severe rectosigmoid fecal retention. Bladder scan with readings : 579, 646, 646. Also noted to be very orthostatic BP supine 173/102, and sitting 108/80 with complaints of dizziness. ER staff concerned about patient's home situation, that she is not able to take care of self and family not helping her. She lives with grandson and his girlfriend. Last hospitalized 2018 with acute cholecystitis and choledocholithiasis, urinary retention, constipation. Had ERCP and lap jesus. We were asked to admit for work up and evaluation of the above problems.      Past Medical History:   Diagnosis Date    Agoraphobia without mention of panic attacks 2/17/2014    Anxiety disorder 8/18/2013    Arthritis     osteo    Breast pain, left 4/7/2015    CAD (coronary artery disease), native coronary artery 12/1/2015 no stents    Chronic chest pain 1/13/2014    Chronic pain associated with significant psychosocial dysfunction 2/17/2014    Depression 8/18/2013    Diabetes ([facility] [address]     type II    Duplicated right renal collecting system 3/13/2014    GERD (gastroesophageal reflux disease)     Gout, joint     Hypercholesteremia     hyercholesterolemia    Hypertension     Nephrolithiasis 3/13/2014    Personal history of noncompliance with medical treatment, presenting hazards to health 5/30/2014    Psychotic disorder ([facility] [address]     Vaginal pain 7/30/2014      Past Surgical History:   Procedure Laterality Date    EGD  4/23/2010         HX CHOLECYSTECTOMY  09/20/2018    lap jesus    HX CYST REMOVAL      cyst removed from left wrist    HX HYSTERECTOMY      partial    HX OTHER SURGICAL      bladder dilitation    HX TUBAL LIGATION      HX UROLOGICAL      kidney stones     Social History     Tobacco Use    Smoking status: Never Smoker    Smokeless tobacco: Never Used   Substance Use Topics    Alcohol use: No    walks independently at baseline    Family History   Problem Relation Age of Onset    Stroke Mother     Heart Disease Mother     Cancer Father         type unknown    Heart Disease Son     Liver Disease Son     Heart Disease Daughter     Malignant Hyperthermia Neg Hx     Pseudocholinesterase Deficiency Neg Hx     Delayed Awakening Neg Hx     Post-op Nausea/Vomiting Neg Hx     Emergence Delirium Neg Hx     Post-op Cognitive Dysfunction Neg Hx     Other Neg Hx      Allergies   Allergen Reactions    Amoxicillin Hives    Sulfa (Sulfonamide Antibiotics) Hives and Itching    Mirtazapine Itching and Nausea Only     Funny feeling in chest    Percocet [Oxycodone-Acetaminophen] Nausea and Vomiting    Codeine Nausea and Vomiting    Crestor [Rosuvastatin] Other (comments)     myalgias    Nitroglycerin Unknown (comments)     Patient cannot remember why she is allergic to it      Prednisone Itching    Zithromax [Azithromycin] Itching     Not sure what it does,taken long time ago        Prior to Admission medications    Medication Sig Start Date End Date Taking? Authorizing Provider   sodium phosphates (FLEET'S) 9.5-3.5 gram/59 mL enema Insert 66 mL into rectum now for 1 dose. 9/3/19 9/3/19 Yes Danisha Anna MD   polyethylene glycol (MIRALAX) 17 gram/dose powder Take 17 g by mouth daily. 1 tablespoon with 8 oz of water daily 9/3/19  Yes Danisha Anna MD   LORazepam (ATIVAN) 0.5 mg tablet Take 1 Tab by mouth every eight (8) hours as needed for Anxiety. Max Daily Amount: 1.5 mg. 8/28/19  Yes Danisha Anna MD   dilTIAZem CD (CARDIZEM CD) 120 mg ER capsule TAKE ONE CAPSULE BY MOUTH EVERY DAY 4/18/19  Yes Liz Zamora, NICK   docusate sodium (STOOL SOFTENER) 100 mg capsule Stool Softener    OtherShun MD   sodium-potassium-mag sulfate (SUPREP) 17.5-3.13-1.6 gram solr oral solution Take 177 mL by mouth See Admin Instructions. 6/30/19   Dory Armenta MD   pravastatin (PRAVACHOL) 40 mg tablet Take 1 Tab by mouth nightly. 12/13/18   Lolis Lau MD   famotidine (PEPCID) 20 mg tablet Take 1 Tab by mouth two (2) times a day. 10/6/18   Fam Sauceda MD   nystatin (MYCOSTATIN) topical cream Apply  to affected area two (2) times daily as needed for Skin Irritation. Shun Sevilla MD   bisacodyl (DULCOLAX, BISACODYL,) 10 mg suppository Insert 10 mg into rectum daily as needed. 8/30/18   Harmeet Cohen MD     REVIEW OF SYSTEMS:  POSITIVE= Bold.   Negative = normal text  General:  fever, chills, sweats, generalized weakness, weight loss/gain, loss of appetite  Eyes:  blurred vision, eye pain, loss of vision, diplopia  Ear Nose and Throat:  rhinorrhea, pharyngitis  Respiratory:   cough, sputum production, SOB, wheezing, MOORE, pleuritic pain  Cardiology:  chest pain, palpitations, orthopnea, PND, edema, syncope   Gastrointestinal:  abdominal pain, Nausea/V, dysphagia, diarrhea, constipation, bleeding  Genitourinary:  Difficulty urinating, frequency, urgency, dysuria, hematuria, incontinence  Muskuloskeletal :  arthralgia, myalgia  Hematology:  easy bruising, bleeding, lymphadenopathy  Dermatological:  rash, ulceration, pruritis  Endocrine:  hot flashes or polydipsia  Neurological:  headache, dizziness, confusion, focal weakness, paresthesia, memory loss, gait disturbance  Psychological: anxiety, depression, agitation      Objective:   VITALS:    Visit Vitals  BP (!) 173/97   Pulse 88   Temp 97.6 °F (36.4 °C)   Resp 16   SpO2 99%     Temp (24hrs), Av.6 °F (36.4 °C), Min:97.6 °F (36.4 °C), Max:97.6 °F (36.4 °C)    There is no height or weight on file to calculate BMI. PHYSICAL EXAM:    General:    Alert, cooperative, no distress, appears stated age. Poor historian, anxious  HEENT: Atraumatic, anicteric sclerae, pink conjunctivae     No oral ulcers, mucosa moist, throat clear. Hearing intact. Neck:  Supple, symmetrical,  thyroid: non tender  Lungs:   Clear to auscultation bilaterally. No Wheezing or Rhonchi. No rales. Chest wall:  No tenderness  No Accessory muscle use. Heart:   Regular  rhythm,  No  murmur   No gallop. No edema. Abdomen:   Soft, mild tender hypogastrium without guarding or rebound, ill define firmness in LLQ. Bowel sounds normal. No masses  Extremities: No cyanosis. No clubbing  Skin:     Not pale Not Jaundiced  No rashes   Psych:  Poor insight. Not depressed. +anxious, asking a few times if she will be put into a psych pires  Neurologic: EOMs intact. No facial asymmetry. No aphasia or slurred speech. Symmetrical strength 5/5, Alert and oriented X self, place, time. IMAGING RESULTS:   []       I have personally reviewed the actual   []     CXR  []     CT scan  CXR:  CT head: Moderately severe rectosigmoid fecal retention. Nonobstructing left  nephrolithiasis and additional incidental findings as above. Left nephrolithiasis measuring up to 12 mm.  No right  nephrolithiasis. No hydronephrosis or enhancing masses. Ureters are nondilated  with no ureteral stones. No significant change in compression  deformities of T11 and L4. EKG:   ________________________________________________________________________  Care Plan discussed with:    Comments   Patient y    Family  y Daughter Yris Jovany bedside   RN     Care Manager                    Consultant:      ________________________________________________________________________  Prophylaxis:  GI none   DVT lovenox   ________________________________________________________________________  Recommended Disposition:   Home with Family y   HH/PT/OT/RN y   SNF/LTC    [place]    ________________________________________________________________________  Code Status:  Full Code y   DNR/DNI    ________________________________________________________________________  TOTAL TIME: 50 minutes      Comments    y Reviewed previous records   >50% of visit spent in counseling and coordination of care  Discussion with patient and/or family and questions answered         ______________________________________________________________________  Parveen Reyna MD      Procedures: see electronic medical records for all procedures/Xrays and details which were not copied into this note but were reviewed prior to creation of Plan.     LAB DATA REVIEWED:    Recent Results (from the past 24 hour(s))   METABOLIC PANEL, COMPREHENSIVE    Collection Time: 09/03/19  8:14 AM   Result Value Ref Range    Sodium 142 136 - 145 mmol/L    Potassium 3.9 3.5 - 5.1 mmol/L    Chloride 108 97 - 108 mmol/L    CO2 29 21 - 32 mmol/L    Anion gap 5 5 - 15 mmol/L    Glucose 110 (H) 65 - 100 mg/dL    BUN 17 6 - 20 MG/DL    Creatinine 1.11 (H) 0.55 - 1.02 MG/DL    BUN/Creatinine ratio 15 12 - 20      GFR est AA 58 (L) >60 ml/min/1.73m2    GFR est non-AA 48 (L) >60 ml/min/1.73m2    Calcium 8.8 8.5 - 10.1 MG/DL    Bilirubin, total 0.5 0.2 - 1.0 MG/DL    ALT (SGPT) 14 12 - 78 U/L    AST (SGOT) 12 (L) 15 - 37 U/L    Alk. phosphatase 75 45 - 117 U/L    Protein, total 7.0 6.4 - 8.2 g/dL    Albumin 3.5 3.5 - 5.0 g/dL    Globulin 3.5 2.0 - 4.0 g/dL    A-G Ratio 1.0 (L) 1.1 - 2.2     CBC WITH AUTOMATED DIFF    Collection Time: 09/03/19  8:14 AM   Result Value Ref Range    WBC 4.2 3.6 - 11.0 K/uL    RBC 4.74 3.80 - 5.20 M/uL    HGB 12.7 11.5 - 16.0 g/dL    HCT 40.4 35.0 - 47.0 %    MCV 85.2 80.0 - 99.0 FL    MCH 26.8 26.0 - 34.0 PG    MCHC 31.4 30.0 - 36.5 g/dL    RDW 13.2 11.5 - 14.5 %    PLATELET 492 558 - 716 K/uL    MPV 10.5 8.9 - 12.9 FL    NRBC 0.0 0  WBC    ABSOLUTE NRBC 0.00 0.00 - 0.01 K/uL    NEUTROPHILS 73 32 - 75 %    LYMPHOCYTES 20 12 - 49 %    MONOCYTES 5 5 - 13 %    EOSINOPHILS 1 0 - 7 %    BASOPHILS 1 0 - 1 %    IMMATURE GRANULOCYTES 0 0.0 - 0.5 %    ABS. NEUTROPHILS 3.1 1.8 - 8.0 K/UL    ABS. LYMPHOCYTES 0.8 0.8 - 3.5 K/UL    ABS. MONOCYTES 0.2 0.0 - 1.0 K/UL    ABS. EOSINOPHILS 0.0 0.0 - 0.4 K/UL    ABS. BASOPHILS 0.0 0.0 - 0.1 K/UL    ABS. IMM.  GRANS. 0.0 0.00 - 0.04 K/UL    DF AUTOMATED     URINALYSIS W/ REFLEX CULTURE    Collection Time: 09/03/19  8:14 AM   Result Value Ref Range    Color YELLOW/STRAW      Appearance CLEAR CLEAR      Specific gravity 1.009 1.003 - 1.030      pH (UA) 5.5 5.0 - 8.0      Protein NEGATIVE  NEG mg/dL    Glucose NEGATIVE  NEG mg/dL    Ketone NEGATIVE  NEG mg/dL    Bilirubin NEGATIVE  NEG      Blood NEGATIVE  NEG      Urobilinogen 1.0 0.2 - 1.0 EU/dL    Nitrites NEGATIVE  NEG      Leukocyte Esterase NEGATIVE  NEG      WBC 0-4 0 - 4 /hpf    RBC 0-5 0 - 5 /hpf    Epithelial cells FEW FEW /lpf    Bacteria NEGATIVE  NEG /hpf    UA:UC IF INDICATED CULTURE NOT INDICATED BY UA RESULT CNI      Hyaline cast 2-5 0 - 5 /lpf   LIPASE    Collection Time: 09/03/19  8:14 AM   Result Value Ref Range    Lipase 72 (L) 73 - 393 U/L

## 2019-09-03 NOTE — PROGRESS NOTES
Oncology End of Shift Note      Bedside shift change report given to Sy Joyner RN (incoming nurse) by Lida Rubinstein (outgoing nurse) on Rneea Shallow. Report included the following information SBAR, Kardex, ED Summary, Intake/Output, MAR and Accordion. Shift Summary: received pt from ED; assesment and CHG completed upon arrival; denies pain; martinez draining dark urine; daughter in to visit; Issues for Physician to Address:       Patient on Cardiac Monitoring?     [x] Yes  [] No    Rhythm: Telemetry: normal sinus rhythm         Shift Events        Lida Rubinstein

## 2019-09-04 ENCOUNTER — APPOINTMENT (OUTPATIENT)
Dept: NON INVASIVE DIAGNOSTICS | Age: 74
DRG: 389 | End: 2019-09-04
Attending: HOSPITALIST
Payer: MEDICARE

## 2019-09-04 ENCOUNTER — APPOINTMENT (OUTPATIENT)
Dept: GENERAL RADIOLOGY | Age: 74
DRG: 389 | End: 2019-09-04
Attending: NURSE PRACTITIONER
Payer: MEDICARE

## 2019-09-04 LAB
ECHO AV AREA PEAK VELOCITY: 2 CM2
ECHO AV AREA VTI: 1.1 CM2
ECHO AV AREA/BSA PEAK VELOCITY: 1 CM2/M2
ECHO AV AREA/BSA VTI: 0.6 CM2/M2
ECHO AV MEAN GRADIENT: 13.7 MMHG
ECHO AV PEAK GRADIENT: 6.3 MMHG
ECHO AV PEAK VELOCITY: 125.57 CM/S
ECHO AV VTI: 62.46 CM
ECHO EST RA PRESSURE: 10 MMHG
ECHO LA MAJOR AXIS: 3.14 CM
ECHO LV INTERNAL DIMENSION DIASTOLIC: 3.55 CM (ref 3.9–5.3)
ECHO LV INTERNAL DIMENSION SYSTOLIC: 2.04 CM
ECHO LV IVSD: 1.61 CM (ref 0.6–0.9)
ECHO LV MASS 2D: 256.8 G (ref 67–162)
ECHO LV MASS INDEX 2D: 134 G/M2 (ref 43–95)
ECHO LV POSTERIOR WALL DIASTOLIC: 1.57 CM (ref 0.6–0.9)
ECHO LVOT DIAM: 2.08 CM
ECHO LVOT PEAK GRADIENT: 2.2 MMHG
ECHO LVOT PEAK VELOCITY: 74.38 CM/S
ECHO LVOT SV: 67.7 ML
ECHO LVOT VTI: 19.97 CM
ECHO MV A VELOCITY: 99.66 CM/S
ECHO MV AREA PHT: 2.3 CM2
ECHO MV E DECELERATION TIME (DT): 328.1 MS
ECHO MV E VELOCITY: 77.65 CM/S
ECHO MV E/A RATIO: 0.78
ECHO MV PRESSURE HALF TIME (PHT): 95.1 MS
ECHO MV REGURGITANT PEAK GRADIENT: 13.1 MMHG
ECHO MV REGURGITANT PEAK VELOCITY: 180.65 CM/S
ECHO PULMONARY ARTERY SYSTOLIC PRESSURE (PASP): 18.5 MMHG
ECHO RIGHT VENTRICULAR SYSTOLIC PRESSURE (RVSP): 18.5 MMHG
ECHO TV REGURGITANT MAX VELOCITY: 146.05 CM/S
ECHO TV REGURGITANT PEAK GRADIENT: 8.5 MMHG
GLUCOSE BLD STRIP.AUTO-MCNC: 105 MG/DL (ref 65–100)
GLUCOSE BLD STRIP.AUTO-MCNC: 145 MG/DL (ref 65–100)
GLUCOSE BLD STRIP.AUTO-MCNC: 62 MG/DL (ref 65–100)
GLUCOSE BLD STRIP.AUTO-MCNC: 69 MG/DL (ref 65–100)
GLUCOSE BLD STRIP.AUTO-MCNC: 72 MG/DL (ref 65–100)
GLUCOSE BLD STRIP.AUTO-MCNC: 86 MG/DL (ref 65–100)
SERVICE CMNT-IMP: ABNORMAL
SERVICE CMNT-IMP: NORMAL

## 2019-09-04 PROCEDURE — 74011250636 HC RX REV CODE- 250/636: Performed by: HOSPITALIST

## 2019-09-04 PROCEDURE — 74018 RADEX ABDOMEN 1 VIEW: CPT

## 2019-09-04 PROCEDURE — 65270000029 HC RM PRIVATE

## 2019-09-04 PROCEDURE — 99218 HC RM OBSERVATION: CPT

## 2019-09-04 PROCEDURE — 93005 ELECTROCARDIOGRAM TRACING: CPT

## 2019-09-04 PROCEDURE — 74011250637 HC RX REV CODE- 250/637: Performed by: NURSE PRACTITIONER

## 2019-09-04 PROCEDURE — 82962 GLUCOSE BLOOD TEST: CPT

## 2019-09-04 PROCEDURE — 74011250637 HC RX REV CODE- 250/637: Performed by: EMERGENCY MEDICINE

## 2019-09-04 PROCEDURE — 77030012890

## 2019-09-04 PROCEDURE — 74011000250 HC RX REV CODE- 250

## 2019-09-04 PROCEDURE — 74011250636 HC RX REV CODE- 250/636: Performed by: INTERNAL MEDICINE

## 2019-09-04 PROCEDURE — 93306 TTE W/DOPPLER COMPLETE: CPT

## 2019-09-04 PROCEDURE — 94760 N-INVAS EAR/PLS OXIMETRY 1: CPT

## 2019-09-04 PROCEDURE — 74011250637 HC RX REV CODE- 250/637: Performed by: HOSPITALIST

## 2019-09-04 PROCEDURE — 74011250636 HC RX REV CODE- 250/636: Performed by: EMERGENCY MEDICINE

## 2019-09-04 RX ORDER — PROCHLORPERAZINE EDISYLATE 5 MG/ML
5 INJECTION INTRAMUSCULAR; INTRAVENOUS
Status: DISCONTINUED | OUTPATIENT
Start: 2019-09-04 | End: 2019-09-06 | Stop reason: HOSPADM

## 2019-09-04 RX ORDER — DEXTROSE 50 % IN WATER (D50W) INTRAVENOUS SYRINGE
Status: COMPLETED
Start: 2019-09-04 | End: 2019-09-04

## 2019-09-04 RX ORDER — BISACODYL 5 MG
10 TABLET, DELAYED RELEASE (ENTERIC COATED) ORAL
Status: COMPLETED | OUTPATIENT
Start: 2019-09-04 | End: 2019-09-04

## 2019-09-04 RX ORDER — BISACODYL 5 MG
5 TABLET, DELAYED RELEASE (ENTERIC COATED) ORAL DAILY PRN
Status: DISCONTINUED | OUTPATIENT
Start: 2019-09-04 | End: 2019-09-05

## 2019-09-04 RX ORDER — PROCHLORPERAZINE EDISYLATE 5 MG/ML
5 INJECTION INTRAMUSCULAR; INTRAVENOUS
Status: DISCONTINUED | OUTPATIENT
Start: 2019-09-04 | End: 2019-09-04

## 2019-09-04 RX ORDER — DEXTROSE 50 % IN WATER (D50W) INTRAVENOUS SYRINGE
12.5 ONCE
Status: ACTIVE | OUTPATIENT
Start: 2019-09-04 | End: 2019-09-04

## 2019-09-04 RX ADMIN — SODIUM CHLORIDE 100 ML/HR: 900 INJECTION, SOLUTION INTRAVENOUS at 14:01

## 2019-09-04 RX ADMIN — GLYCERIN 1 SUPPOSITORY: 2 SUPPOSITORY RECTAL at 09:00

## 2019-09-04 RX ADMIN — ONDANSETRON 4 MG: 2 INJECTION INTRAMUSCULAR; INTRAVENOUS at 01:04

## 2019-09-04 RX ADMIN — Medication 10 ML: at 21:28

## 2019-09-04 RX ADMIN — Medication 10 ML: at 13:59

## 2019-09-04 RX ADMIN — SENNOSIDES, DOCUSATE SODIUM 1 TABLET: 50; 8.6 TABLET, FILM COATED ORAL at 10:46

## 2019-09-04 RX ADMIN — LACTULOSE 45 ML: 20 SOLUTION ORAL at 10:47

## 2019-09-04 RX ADMIN — LORAZEPAM 0.5 MG: 1 TABLET ORAL at 14:04

## 2019-09-04 RX ADMIN — DEXTROSE MONOHYDRATE 25 G: 500 INJECTION PARENTERAL at 08:58

## 2019-09-04 RX ADMIN — MORPHINE SULFATE 4 MG: 4 INJECTION INTRAVENOUS at 00:59

## 2019-09-04 RX ADMIN — DILTIAZEM HYDROCHLORIDE 120 MG: 120 CAPSULE, COATED, EXTENDED RELEASE ORAL at 10:46

## 2019-09-04 RX ADMIN — LACTULOSE 45 ML: 20 SOLUTION ORAL at 16:57

## 2019-09-04 RX ADMIN — ONDANSETRON 4 MG: 2 INJECTION INTRAMUSCULAR; INTRAVENOUS at 09:26

## 2019-09-04 RX ADMIN — BISACODYL 10 MG: 5 TABLET, COATED ORAL at 11:49

## 2019-09-04 RX ADMIN — PROCHLORPERAZINE EDISYLATE 5 MG: 5 INJECTION INTRAMUSCULAR; INTRAVENOUS at 21:24

## 2019-09-04 RX ADMIN — POLYETHYLENE GLYCOL 3350 17 G: 17 POWDER, FOR SOLUTION ORAL at 10:46

## 2019-09-04 RX ADMIN — ENOXAPARIN SODIUM 40 MG: 40 INJECTION SUBCUTANEOUS at 21:28

## 2019-09-04 RX ADMIN — SODIUM CHLORIDE 100 ML/HR: 900 INJECTION, SOLUTION INTRAVENOUS at 03:37

## 2019-09-04 RX ADMIN — SODIUM CHLORIDE 100 ML/HR: 900 INJECTION, SOLUTION INTRAVENOUS at 23:56

## 2019-09-04 RX ADMIN — ACETAMINOPHEN 650 MG: 325 TABLET ORAL at 08:41

## 2019-09-04 NOTE — PROGRESS NOTES
OT Note:    Orders received and chart reviewed. Cleared for therapy by RN. Pt with c/o feeling very nauseas on arrival stating, \"I can't do nothing now, I am sick. . I need to lay down. \" RN present administering nausea medication. Will defer at this time and return later today to complete OT evaluation. 14:27 - Spoke with PT. Pt nauseas and vomiting during session. Will defer today and continue to follow.      Thank you,    [facility], OTR/L

## 2019-09-04 NOTE — PROGRESS NOTES
TRANSFER - OUT REPORT:    Verbal report given to Falguni Delgadillo RN (name) on Lamberto Hernández  being transferred to Surgical Telemetry (unit) for routine progression of care       Report consisted of patients Situation, Background, Assessment and   Recommendations(SBAR). Information from the following report(s) SBAR, Kardex, Intake/Output, MAR, Recent Results, Med Rec Status and Cardiac Rhythm NSR/SB was reviewed with the receiving nurse. Lines:   Peripheral IV 09/03/19 Left Forearm (Active)   Site Assessment Clean, dry, & intact 9/4/2019  7:39 AM   Phlebitis Assessment 0 9/4/2019  7:39 AM   Infiltration Assessment 0 9/4/2019  7:39 AM   Dressing Status Clean, dry, & intact 9/4/2019  7:39 AM   Dressing Type Tape;Transparent 9/4/2019  7:39 AM   Hub Color/Line Status Pink;Patent; Infusing 9/4/2019  7:39 AM        Opportunity for questions and clarification was provided.       Patient transported with:   [employer]

## 2019-09-04 NOTE — PROGRESS NOTES
7:20 AM Bedside report received from Laurita Amezcua [facility].    8:30 AM Martinez catheter removed, as ordered. 100 cc juan urine noted in martinez bag at time of removal. Will continue to monitor. 8:40 AM Patient medicated with PRN PO tylenol per MD order for c/o headache. See MAR. Will f/u and continue to monitor. 8:45 AM Patient states, \"I'm diabetic. Can you check my blood surgar? \" POC glucose 69. Patient given 4 oz juice. NP, Heidi aware. Repeat glucose 72. Patient given another 4 oz juice. Repeat glucose 62. Patient medicated with 12.5 g D50, per protocol. Repeat glucose 86. Will continue to monitor closely. 9:15 AM NICK Gordon, notified of patient's c/o \"I'm not feeling well. My head hurts and I am nauseous. I can't get up right now. \" KUB to be completed at bedside. Will attempt orthostatics once patient is able. Will continue to monitor patient closely. 9:20 AM Radiology at bedside for KUB. 9:25 AM PRN IV zofran given for patient's c/o nausea per MD order. Patient willing to take ordered meds once nausea resolves. See MAR. 10:00 AM Patient assisted to [facility] with RN x2 and voided without difficulty. Will continue to monitor. 10:55 AM NICK Gordon, aware patient refusing to work with OT at this time d/t continued c/o nausea and \"I just don't feel well. \" Will give ordered meds, See MAR. Will continue to monitor patient closely. 1:30 PM NICK Gordon, notified patient c/w complaints of nausea. Per PT, patient vomited. Heidi to place Rx for nausea. Negra Young also aware patient continues to refuse orthostatic BP. Will attempt again and continue to monitor. 2:00 PM PRN PO ativan given for patient's c/o anxiety per MD order. See MAR.    2:30 PM Dr. Garrick Lilly at bedside. MD gave orders to change compazine administration route to IV. See MAR.    2:50 PM Baseline EKG obtained and placed on chart. Cardiology consult called to Dr. Marcus Calloway.     3:00 PM Spoke with Gabby Tello NP, Dr. Marcus Calloway to see patient. 3:40 PM NICK Gordon, notified of orthostatic BP results. NP aware patient transferring to room 2176.

## 2019-09-04 NOTE — PROGRESS NOTES
Hospitalist Progress Note    NAME: Fredi Al   :  1945   MRN:  [id number]       Assessment / Plan: Moderately severe fecal impaction, POA  Recurrent constipation x 2 months  Lower abdominal pain- improving  · Reports no BM in ~3 weeks  · CT abdomen with moderately severe rectosigmoid fecal retention   · 2 BMs today  · KUB shows she remains fecal impacted s/p enemas and BM  · Continue lactulose 30 gm TID x 3 doses   · Continue Miralax daily  · Continue pericolace BID  · Appreciate GI input  · May need OP EGD and colonoscopy but unable to do so at this time d/t severe constipation  · Encourage PO intake and ambulation   · DC Zofran; medication can be constipating   · Compazine ordered PRN  · PT and OT consulted     Acute urinary retention, POA  Hx renal stone, has 12 mm stone on left but no sign of obstruction  · Likely d/t to fecal impaction  · Crowder discontinued this AM  · Voided 2 times today     Dizziness with severe orthostatic hypotension in the setting of   Chronic HTN  Mild left ventricular diastolic dysfunction grade 1 without volume overload  Moderate aortic stenosis  · Reports a hx of orthostatic hypotension  · Orthostatic in ED; refusing to allow nursing to complete orthostatics d/t feeling poorly. · Continue diltiazem  mg for now. Continue IVF and compression stockings   · Consulted cardiology   · ECHO results show Left Ventricle: Normal cavity size and systolic function (ejection fraction normal). Moderate concentric hypertrophy. Estimated left ventricular ejection fraction is 61 - 65%. Mild (grade 1) left ventricular diastolic dysfunction. Aortic Valve: Moderate aortic valve leaflet calcification present with reduced excursion. Aortic valve mean gradient is 15.1 mmHg. Aortic valve area is 1.3 cm2. Moderate aortic valve stenosis is present. Pulmonary Artery: There is no evidence of pulmonary hypertension.     Hyperlipidemia   · Not currently taking medication; lost while out of town    Anxiety  Hx of agoraphobia   · Continue Ativan PRN  · Per ER documentation:    \"Nurse and Provider concerned about patient's mental health and home situation. Multiple visits in past month, EMS reports bed bugs at home, uncontrolled anxiety and off behaviors: patient very talkative and talking in circles. Consider psych consult. \"  · Does not appear psychotic at this time. · CM consulted       Hx DM, not taking medication    · BS 62 this AM  · BS up to 105 after juice   · BS AC and HS    25.0 - 29.9 Overweight / Body mass index is 26.15 kg/m². Code status: Full  Prophylaxis: Lovenox  Recommended Disposition: TBD   Surrogate decision maker:  Daughter Tabatha and Lizzy Burt \"Amy\"      Subjective:     Chief Complaint / Reason for Physician Visit  \"I dont feel good. \"  Per nursing patient has gotten up to use BM at least 2 times today. KUB shows continued fecal impaction. She is refusing to allow nursing to complete orthostatics d/t not feeling well. Minimal participation with PT and OT. Discussed with RN events overnight. Review of Systems:  Symptom Y/N Comments  Symptom Y/N Comments   Fever/Chills n   Chest Pain n    Poor Appetite y   Edema     Cough    Abdominal Pain y    Sputum    Joint Pain     SOB/MOORE n   Pruritis/Rash     Nausea/vomit y   Tolerating PT/OT n    Diarrhea    Tolerating Diet n    Constipation y   Other       Could NOT obtain due to:      Objective:     VITALS:   Last 24hrs VS reviewed since prior progress note.  Most recent are:  Patient Vitals for the past 24 hrs:   Temp Pulse Resp BP SpO2   09/04/19 1200 -- (!) 58 -- -- --   09/04/19 1044 97.6 °F (36.4 °C) 63 16 128/69 100 %   09/04/19 1011 -- -- -- 130/74 --   09/04/19 0847 -- 60 -- 146/74 --   09/04/19 0800 -- 65 -- -- --   09/04/19 0716 97.5 °F (36.4 °C) 68 16 130/74 98 %   09/04/19 0332 97.6 °F (36.4 °C) 63 16 121/72 100 %   09/03/19 2341 97.1 °F (36.2 °C) 74 16 128/61 97 %   09/03/19 2023 97.8 °F (36.6 °C) 69 16 175/83 100 % 09/03/19 1800 98.2 °F (36.8 °C) 76 16 (!) 169/92 99 %   09/03/19 1500 -- -- -- (!) 173/97 --   09/03/19 1400 -- -- -- (!) 173/102 99 %       Intake/Output Summary (Last 24 hours) at 9/4/2019 1342  Last data filed at 9/4/2019 1100  Gross per 24 hour   Intake 1716.66 ml   Output 2100 ml   Net -383.34 ml        PHYSICAL EXAM:  General: Ill-appearing elderly AA female. NAD    EENT:  EOMI. Anicteric sclerae. MMM  Resp:  CTA bilaterally, no wheezing or rales. No accessory muscle use  CV:  Bradycardic rhythm,  No edema  GI:  Soft, Non distended, Non tender.  +Bowel sounds  Neurologic:  Alert and oriented X 3, normal speech,   Psych:   Fair insight. Not anxious nor agitated  Skin:  No rashes. No jaundice    Reviewed most current lab test results and cultures  YES  Reviewed most current radiology test results   YES  Review and summation of old records today    NO  Reviewed patient's current orders and MAR    YES  PMH/SH reviewed - no change compared to H&P  ________________________________________________________________________  Care Plan discussed with:    Comments   Patient x    Family      RN x    Care Manager     Consultant                       x Multidiciplinary team rounds were held today with , nursing, pharmacist and clinical coordinator. Patient's plan of care was discussed; medications were reviewed and discharge planning was addressed. ________________________________________________________________________  Total NON critical care TIME:  25   Minutes    Total CRITICAL CARE TIME Spent:   Minutes non procedure based      Comments   >50% of visit spent in counseling and coordination of care     ________________________________________________________________________  Karina Brand NP     Procedures: see electronic medical records for all procedures/Xrays and details which were not copied into this note but were reviewed prior to creation of Plan.       LABS:  I reviewed today's most current labs and imaging studies.   Pertinent labs include:  Recent Labs     09/03/19  0814   WBC 4.2   HGB 12.7   HCT 40.4        Recent Labs     09/03/19  0814      K 3.9      CO2 29   *   BUN 17   CREA 1.11*   CA 8.8   ALB 3.5   TBILI 0.5   SGOT 12*   ALT 14       Signed: Karina Brand, NICK

## 2019-09-04 NOTE — CONSULTS
[address] [place] [address] [address] [facility]     Date of  Admission: 9/3/2019  7:26 AM     Admission type:Emergency    Consult for: aortic stenosis, orthostatic hypotension, dizziness  Consult by: hospitalist np      Subjective:     Filipe Ozuna is a 68 y.o. female with PMH DM, CAD, HTN, atypical chest pain who was admitted for Fecal impaction ([address] [K56.41]  Urinary retention [R33.9]  Orthostatic hypotension [I95.1]  Orthostatic hypotension [I95.1]  Fecal impaction ([address] [K56.41]. Per H&P, Ms. Alvarado Pena presented to the hospital for constipation (x3 weeks), dizziness, difficulty urinating, and groin pain. She was admitted for the above conditions. Cardiology consulted for aortic stenosis, orthostatic hypotension, and dizziness. On assessment, Ms. Alvarado Pena endorses dizziness when standing and several recent pre-syncopal events PTA. She c/o rectal pain and possibly vaginal pain (unclear responses). She is not SOB. She also c/o a headache and waking up with a very dry tongue and lips in the mornings. Ms. Alvarado Pena follows with Dr. Josey Bowden for cardiology. ECHO today with EF 07-81%; grade 1 diastolic dysfunction; moderate AS (although values very similar to \"mild\" AS on prior ECHO).   Cath 12/15 with mild CAD        Patient Active Problem List    Diagnosis Date Noted    Urinary retention 09/03/2019    Fecal impaction ([address] 09/03/2019    Choledocholithiasis with acute cholecystitis 09/19/2018    Vaginal irritation 10/11/2017    Blurring of vision 10/11/2017    Advance directive discussed with patient 07/06/2017    Gait instability 04/14/2017    Weakness 04/13/2017    Assistance needed with transportation 04/13/2017    Left-sided chest wall pain 04/11/2017    Hydronephrosis of left kidney 04/07/2017    Orthostatic hypotension 04/04/2017    Generalized OA 01/05/2017    Noncompliance with medication regimen-chol medication  01/05/2017    Tightness sensation-head 09/20/2016    Diabetes mellitus type 2, diet-controlled ([address] 05/26/2016    Somatization disorder 05/26/2016    Death of parent 05/26/2016    Somatic delusion disorder ([address] 03/22/2016    Death of child 02/17/2016    Broken heart syndrome 01/18/2016    SOB (shortness of breath) 12/22/2015    CAD (coronary artery disease), native coronary artery 12/01/2015    Pseudobulbar affect 10/16/2015    Breast pain, left 04/07/2015    Personal history of noncompliance with medical treatment, presenting hazards to health 05/30/2014     Class: Chronic    Duplicated right renal collecting system 03/13/2014    Nephrolithiasis 03/13/2014    Onycholysis of toenail 02/27/2014     Class: Chronic    Chronic pain associated with significant psychosocial dysfunction 02/17/2014    Depression with anxiety 02/17/2014     Class: Chronic    Other somatoform disorders 02/17/2014     Class: Chronic    Chronic chest pain 01/13/2014     Class: Chronic    Hyperlipidemia 12/09/2013     Class: Chronic    UTI (urinary tract infection) 12/09/2013    Insomnia 11/13/2013    Constipation 08/22/2013     Class: Chronic    Abdominal pain 08/16/2013    Dizziness of unknown cause 08/13/2013    Neutropenia ([address] 08/13/2013    HTN, goal below 130/80 09/07/2012     Class: Chronic    Chronic headache 09/07/2012     Class: Chronic    Acid reflux 09/07/2012      Hemant Corcoran MD  Past Medical History:   Diagnosis Date    Agoraphobia without mention of panic attacks 2/17/2014    Anxiety disorder 8/18/2013    Arthritis     osteo    Breast pain, left 4/7/2015    CAD (coronary artery disease), native coronary artery 12/1/2015    no stents    Chronic chest pain 1/13/2014    Chronic pain associated with significant psychosocial dysfunction 2/17/2014    Depression 8/18/2013    Diabetes ([address]     type II    Duplicated right renal collecting system 3/13/2014    GERD (gastroesophageal reflux disease)     Gout, joint     Hypercholesteremia     hyercholesterolemia    Hypertension     Nephrolithiasis 3/13/2014    Personal history of noncompliance with medical treatment, presenting hazards to health 5/30/2014    [address]otic disorder ([facility]     Vaginal pain 7/30/2014      Social History     Socioeconomic History    Marital status:      Spouse name: Not on file    Number of children: Not on file    Years of education: Not on file    Highest education level: Not on file   Tobacco Use    Smoking status: Never Smoker    Smokeless tobacco: Never Used   Substance and Sexual Activity    Alcohol use: No    Drug use: No    Sexual activity: Never   Social History Narrative    ** Merged History Encounter **     , lives with her son and Friend.       Allergies   Allergen Reactions    Amoxicillin Hives    Sulfa (Sulfonamide Antibiotics) Hives and Itching    Mirtazapine Itching and Nausea Only     Funny feeling in chest    Percocet [Oxycodone-Acetaminophen] Nausea and Vomiting    Codeine Nausea and Vomiting    Crestor [Rosuvastatin] Other (comments)     myalgias    Nitroglycerin Unknown (comments)     Patient cannot remember why she is allergic to it      Prednisone Itching    Zithromax [Azithromycin] Itching     Not sure what it does,taken long time ago      Family History   Problem Relation Age of Onset    Stroke Mother     Heart Disease Mother     Cancer Father         type unknown    Heart Disease Son     Liver Disease Son     Heart Disease Daughter     Malignant Hyperthermia Neg Hx     Pseudocholinesterase Deficiency Neg Hx     Delayed Awakening Neg Hx     Post-op Nausea/Vomiting Neg Hx     Emergence Delirium Neg Hx     Post-op Cognitive Dysfunction Neg Hx     Other Neg Hx       Current Facility-Administered Medications   Medication Dose Route Frequency    dextrose (D50W) injection syrg 12.5 g  12.5 g IntraVENous ONCE    bisacodyl (DULCOLAX) tablet 5 mg  5 mg Oral DAILY PRN    prochlorperazine (COMPAZINE) injection 5 mg  5 mg IntraVENous Q6H PRN    sodium chloride (NS) flush 5-40 mL  5-40 mL IntraVENous Q8H    sodium chloride (NS) flush 5-40 mL  5-40 mL IntraVENous PRN    acetaminophen (TYLENOL) tablet 650 mg  650 mg Oral Q6H PRN    morphine injection 4 mg  4 mg IntraVENous Q4H PRN    sodium chloride (NS) flush 5-40 mL  5-40 mL IntraVENous Q8H    sodium chloride (NS) flush 5-40 mL  5-40 mL IntraVENous PRN    0.9% sodium chloride infusion  100 mL/hr IntraVENous CONTINUOUS    senna-docusate (PERICOLACE) 8.6-50 mg per tablet 1 Tab  1 Tab Oral DAILY    enoxaparin (LOVENOX) injection 40 mg  40 mg SubCUTAneous Q24H    LORazepam (ATIVAN) tablet 0.5 mg  0.5 mg Oral Q8H PRN    dilTIAZem CD (CARDIZEM CD) capsule 120 mg  120 mg Oral DAILY    lactulose (CHRONULAC) 10 gram/15 mL solution 45 mL  30 g Oral TID    polyethylene glycol (MIRALAX) packet 17 g  17 g Oral DAILY    glycerin (adult) suppository 1 Suppository  1 Suppository Rectal Q12H        Review of Symptoms:   11 systems reviewed, negative other than as stated in the HPI        Objective:      Visit Vitals  /90 (BP 1 Location: Left arm, BP Patient Position: Supine)   Pulse 65   Temp 97.4 °F (36.3 °C)   Resp 16   Ht 5' 8\" (1.727 m)   Wt 78 kg (172 lb)   SpO2 100%   BMI 26.15 kg/m²       Physical:   General: pleasant elderly AAF resting in bed in NAD  Heart: RRR, 2/6 systolic murmur  Lungs: clear   Abdomen: Soft, +BS, NTND   Extremities: LE benny +DP/PT, no edema   Neurologic: Grossly normal    Data Review:   Recent Labs     09/03/19  0814   WBC 4.2   HGB 12.7   HCT 40.4        Recent Labs     09/03/19  0814      K 3.9      CO2 29   *   BUN 17   CREA 1.11*   CA 8.8   ALB 3.5   TBILI 0.5   SGOT 12*   ALT 14       No results for input(s): TROIQ, CPK, CKMB in the last 72 hours.       Intake/Output Summary (Last 24 hours) at 9/4/2019 1636  Last data filed at 9/4/2019 1349  Gross per 24 hour   Intake 1956.66 ml   Output 2100 ml   Net -143.34 ml        Cardiographics    Telemetry: SR   ECG: NSR  Echocardiogram: EF 54-23%; grade 1 diastolic dysfunction; moderate AS (although values very similar to \"mild\" AS on prior ECHO). CXRAY: n/a       Assessment:       Active Problems:    Orthostatic hypotension (4/4/2017)      Urinary retention (9/3/2019)      Fecal impaction ([address] (9/3/2019)         Plan:     Karuna Alvarado is a 68 y.o. female who was admitted for Fecal impaction ([address] [K56.41]  Urinary retention [R33.9]  Orthostatic hypotension [I95.1]  Orthostatic hypotension [I95.1]  Fecal impaction ([facility]) [K56.41]  + severe orthostatic BP today (from  supine to 83 standing). Resting VSS. Labs grossly normal  · Repeat ECHO today listed aortic stenosis as \"moderate\" instead of prior \"mild\", however, comparison of valve measurements show no significant change in aortic stenosis. · Severe orthostatic hypotension:  Recommend IVF for suspected dehydration. Can continue cardizem for now, but may need to reconsider if orthostatic BPs due not improve with fluid resuscitation. Allow a higher resting BP for now. · Constipation and urinary retention per hospitalist/GI     No additional cardiology recommendations anticipated. Should have follow up in a few weeks at our office with Dr. Gerardo Pastrana. Thank you for consulting [facility]    Warner [place]ins Duty, NP  DNP, RN, [facility]      Patient seen and examined by me with nurse practitioner. I personally performed all components of the history, physical, and medical decision making and agree with the assessment and plan as noted. F/u with Dr. Gerardo Pastrana as out pt. Will follow as needed.      Vanna Sevilla MD

## 2019-09-04 NOTE — PROGRESS NOTES
Problem: Falls - Risk of  Goal: *Absence of Falls  Description  Document Vaibhav Morrow Fall Risk and appropriate interventions in the flowsheet.   Outcome: Progressing Towards Goal  Note:   Fall Risk Interventions:  Mobility Interventions: PT Consult for mobility concerns    Mentation Interventions: Adequate sleep, hydration, pain control    Medication Interventions: Patient to call before getting OOB    Elimination Interventions: Call light in reach    History of Falls Interventions: Door open when patient unattended

## 2019-09-04 NOTE — DIABETES MGMT
DTC Progress Note    Recommendations/ Comments: Chart reviewed for hypoglycemia, likely due to poor PO intake. Encourage patient to eat, and consider adding a consistent carbohydrate diet if patient's BG rises >180 mg/dL. Current hospital DM medication: none    Chart reviewed on [facility]. Patient is a 68 y.o. female with no hx of diabetes noted at home. A1c:   Lab Results   Component Value Date/Time    Hemoglobin A1c 5.5 09/20/2018 03:07 AM    Hemoglobin A1c 5.7 (H) 07/06/2017 12:22 PM       Recent Glucose Results:   Lab Results   Component Value Date/Time    GLUCPOC 105 (H) 09/04/2019 11:48 AM    GLUCPOC 86 09/04/2019 09:03 AM    GLUCPOC 62 (L) 09/04/2019 08:55 AM        Lab Results   Component Value Date/Time    Creatinine 1.11 (H) 09/03/2019 08:14 AM     Estimated Creatinine Clearance: 49.5 mL/min (A) (based on SCr of 1.11 mg/dL (H)). Active Orders   Diet    DIET CARDIAC Regular        PO intake:   Patient Vitals for the past 72 hrs:   % Diet Eaten   09/04/19 1349 10 %   09/04/19 1100 5 %   09/03/19 1800 100 %       Will continue to follow as needed.     Thank you  [address]      Time spent: 5 minutes

## 2019-09-04 NOTE — CONSULTS
GI CONSULTATION NOTE  Syed Castañeda NP  [phone] NP in-hospital cell phone M-F until 4:30  After 5pm or on weekends, please call  for physician on call    NAME: Kena Blanco   :  1945   MRN:  [id number]   Attending: Dr Darling Quezada  Primary GI: Dr Lucina Garza  Date/Time:  2019 10:04 AM  Assessment:   Fecal retention - severe constipation - Chronic   Abdominal pain  Nausea worsened after BM and with hypoglycemia this AM  - No BM in 3 weeks  - CT abdomen with moderately severe rectosigmoid fecal retention  - KUB this AM - fecal impaction remains  - Multiple BMs overnight, some this AM also  - Abdominal distension is improved but still mild tendnerss on exam  - Failed home miralax and mag citrate  - History of constipation 2019 and   - Last colonoscopy  with hemorrhoids only - due 2020     Acute urinary retention  Hx Renal stone, has 12mm stone on left but no sign of obstruction  - presumed due to fecal impaction - hx of same in  - now urinating without martinez     Dizziness  Diastolic dysfunction   Anxiety  Hx DM, not on medication    Plan:   1. Continue bowel regimen as lots of stool remain in colon  2. Continue lactulose 30g tid x 3 doses  3. Continue miralax daily  4. Continue pericolace bid  5. Will add doculax dose now and then PRN  6. May need outpatient EGD and colonoscopy but colonoscopy cannot be done at this time due to severe constipation - appears that transportation for appointments and procedures may be an issues  7. Encourage PO intake   8. Encourage motility  9. Decrease narcotic use as much as possible. Will continue to follow  Plan discussed with Dr Lucina Garza  Subjective:     HISTORY OF PRESENT ILLNESS:     Kena Blanco is an 68 y.o.  female who we are asked to see for complaint of constipation, fecal impaction causing urinary retention, weight loss, lower abdominal pain.  Medical history includes HTN, anxiety, DM not on treatment, CAD, and history of chronic constipation. Pt presented to ER with decreased appetite, difficulty urinating, lower abdominal pain, and some dizziness. Pt is noted to have large ball of stool in rectosigmoid on CT scan, repeat KUB remains constipated. She actually had similar large ball of stool in rectosigmoid in 6/2019 and was also seen by Dr Brandon West 2017 for constipation with inability to urinate. Previously she was placed on lactulose but stopped taking it. She was placed on an unknown pill but it made her have diarrhea, so she stopped. She reports taking one dose of miralax daily and took mag citrate 1 bottle at home without relief. Today, she is complaining of persistent nausea but blood sugar was low this AM. Not eating much due to discomfort. Not complaining of abdominal pain at rest but states it is tender with pressure. Per nursing, she has had stools all night, a large one earlier this AM, and now passing some looser stool when urinating. She has not received bowel medications this AM.     Pt has outpatient OV for constipation on 9/6/19 - when offered to cancel this appointment and follow up next week after hospitalization, she states she has no way to get to any appointments due to taxi's costing too much and family works. Last colonoscopy 2010- normal with internal hemorrhoids.      Last hospitalized 2018 with acute cholecystitis and choledocholithiasis, urinary retention, constipation. Had ERCP and lap jesus. Biliary stent was later removed.      Past Medical History:   Diagnosis Date    Agoraphobia without mention of panic attacks 2/17/2014    Anxiety disorder 8/18/2013    Arthritis     osteo    Breast pain, left 4/7/2015    CAD (coronary artery disease), native coronary artery 12/1/2015    no stents    Chronic chest pain 1/13/2014    Chronic pain associated with significant psychosocial dysfunction 2/17/2014    Depression 8/18/2013    Diabetes ([facility] [address]     type II    Duplicated right renal collecting system 3/13/2014    GERD (gastroesophageal reflux disease)     Gout, joint     Hypercholesteremia     hyercholesterolemia    Hypertension     Nephrolithiasis 3/13/2014    Personal history of noncompliance with medical treatment, presenting hazards to health 5/30/2014    Psychotic disorder ([facility] [address]     Vaginal pain 7/30/2014      Past Surgical History:   Procedure Laterality Date    EGD  4/23/2010         HX CHOLECYSTECTOMY  09/20/2018    lap jesus    HX CYST REMOVAL      cyst removed from left wrist    HX HYSTERECTOMY      partial    HX OTHER SURGICAL      bladder dilitation    HX TUBAL LIGATION      HX UROLOGICAL      kidney stones     Social History     Tobacco Use    Smoking status: Never Smoker    Smokeless tobacco: Never Used   Substance Use Topics    Alcohol use: No      Family History   Problem Relation Age of Onset    Stroke Mother     Heart Disease Mother     Cancer Father         type unknown    Heart Disease Son     Liver Disease Son     Heart Disease Daughter     Malignant Hyperthermia Neg Hx     Pseudocholinesterase Deficiency Neg Hx     Delayed Awakening Neg Hx     Post-op Nausea/Vomiting Neg Hx     Emergence Delirium Neg Hx     Post-op Cognitive Dysfunction Neg Hx     Other Neg Hx       Allergies   Allergen Reactions    Amoxicillin Hives    Sulfa (Sulfonamide Antibiotics) Hives and Itching    Mirtazapine Itching and Nausea Only     Funny feeling in chest    Percocet [Oxycodone-Acetaminophen] Nausea and Vomiting    Codeine Nausea and Vomiting    Crestor [Rosuvastatin] Other (comments)     myalgias    Nitroglycerin Unknown (comments)     Patient cannot remember why she is allergic to it      Prednisone Itching    Zithromax [Azithromycin] Itching     Not sure what it does,taken long time ago      Prior to Admission medications    Medication Sig Start Date End Date Taking?  Authorizing Provider   sodium phosphates (FLEET'S) 9.5-3.5 gram/59 mL enema Insert 66 mL into rectum now for 1 dose. 9/3/19 9/3/19 Yes Lois Hayden MD   polyethylene glycol (MIRALAX) 17 gram/dose powder Take 17 g by mouth daily. 1 tablespoon with 8 oz of water daily 9/3/19  Yes Lois Hayden MD   LORazepam (ATIVAN) 0.5 mg tablet Take 1 Tab by mouth every eight (8) hours as needed for Anxiety.  Max Daily Amount: 1.5 mg. 8/28/19  Yes Lois Hayden MD   dilTIAZem CD (CARDIZEM CD) 120 mg ER capsule TAKE ONE CAPSULE BY MOUTH EVERY DAY 4/18/19  Yes Daylin Molina NP       Patient Active Problem List   Diagnosis Code    HTN, goal below 130/80 I10    Chronic headache R51    Acid reflux K21.9    Dizziness of unknown cause R42    Neutropenia ([facility] [address] D70.9    Abdominal pain R10.9    Constipation K59.00    Insomnia G47.00    Hyperlipidemia E78.5    UTI (urinary tract infection) N39.0    Chronic chest pain R07.9, G89.29    Chronic pain associated with significant psychosocial dysfunction G89.4    Depression with anxiety F41.8    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    Onycholysis of toenail D41.3    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    Nephrolithiasis N20.0    Personal history of noncompliance with medical treatment, presenting hazards to health Z91.19    Breast pain, left N64.4    Pseudobulbar affect F48.2    CAD (coronary artery disease), native coronary artery I25.10    SOB (shortness of breath) R06.02    Broken heart syndrome I51.81    Death of child Z63.4    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    Diabetes mellitus type 2, diet-controlled ([facility] [address] E11.9    Somatization disorder F45.0    Death of parent Z63.4    Tightness sensation-head Z78.9    Generalized OA M15.9    Noncompliance with medication regimen-chol medication  Z91.14    Orthostatic hypotension I95.1    Hydronephrosis of left kidney N13.30    Left-sided chest wall pain R07.89    Weakness R53.1    Assistance needed with transportation Z74.8    Gait instability R26.81    Advance directive discussed with patient Z70.80    Vaginal irritation N89.8    Blurring of vision H53.8    Choledocholithiasis with acute cholecystitis K80.42    Urinary retention R33.9    Fecal impaction (HCC) K56.41       REVIEW OF SYSTEMS:    Constitutional: negative fever, negative chills, negative weight loss  Eyes:   negative visual changes  ENT:   negative sore throat, tongue or lip swelling   Respiratory:  negative cough, negative dyspnea  Cards:  negative for chest pain, palpitations, lower extremity edema  GI:   See HPI  :  negative for frequency, dysuria  Integument:  negative for rash and pruritus  Heme:  negative for easy bruising  Musculoskel: negative for myalgias,  back pain and muscle weakness  Neuro: negative for headaches  Psych:  negative for feelings of anxiety, depression     Pertinent Positives: dizziness, nausea, abdominal pain      Objective:   VITALS:    Visit Vitals  /74   Pulse 60   Temp 97.5 °F (36.4 °C)   Resp 16   Wt 78 kg (172 lb)   SpO2 98%   BMI 26.15 kg/m²       PHYSICAL EXAM:   General:          Alert, WD, WN, cooperative, mild acute distress, appears stated age. Head:               Normocephalic, without obvious abnormality, atraumatic. Eyes:               Conjunctivae clear and pale, anicteric sclerae. Nose:               Nares normal. No drainage or sinus tenderness. Throat:             Lips, mucosa, and tongue normal.  No Thrush  Neck:               Supple, symmetrical,  no adenopathy,   Back:               Symmetric,  No CVA tenderness. Lungs:             CTA bilaterally. No wheezing/rhonchi/rales. Chest wall:      No tenderness or deformity. No Accessory muscle use. Heart:              Regular rate and rhythm,  no murmur, rub or gallop. Abdomen:        Soft, mild diffuse tender to palpation. Not distended. Bowel sounds normal.  Extremities:     Atraumatic, No cyanosis. No edema.  No clubbing  Skin:                Texture, turgor normal. No rashes/lesions/jaundice  Lymph:            Cervical, supraclavicular normal.  Psych:             Good insight. Not depressed. Not anxious or agitated. Neurologic:      EOMs intact. No facial asymmetry. No aphasia or slurred speech. Normal                        strength, A/O X 3. LAB DATA REVIEWED:    Recent Results (from the past 24 hour(s))   GLUCOSE, POC    Collection Time: 09/04/19  8:37 AM   Result Value Ref Range    Glucose (POC) 69 65 - 100 mg/dL    Performed by [employer], POC    Collection Time: 09/04/19  8:46 AM   Result Value Ref Range    Glucose (POC) 72 65 - 100 mg/dL    Performed by [employer], POC    Collection Time: 09/04/19  8:55 AM   Result Value Ref Range    Glucose (POC) 62 (L) 65 - 100 mg/dL    Performed by [employer], POC    Collection Time: 09/04/19  9:03 AM   Result Value Ref Range    Glucose (POC) 86 65 - 100 mg/dL    Performed by Lisset Herrera        IMAGING RESULTS:  I have personally reviewed the imaging reports    DATE: 9/3/19  EXAM: CT ABD PELV W CONT     INDICATION: severe abd pain, urinary retention, constipation     COMPARISON: CT 3/14/2019.      CONTRAST: 100 mL of Isovue-370.     TECHNIQUE:   Following the uneventful intravenous administration of contrast, thin axial  images were obtained through the abdomen and pelvis. Coronal and sagittal  reconstructions were generated. Oral contrast administered. CT dose reduction  was achieved through use of a standardized protocol tailored for this  examination and automatic exposure control for dose modulation.     FINDINGS:   LUNG BASES: The patient atelectasis. Otherwise clear. INCIDENTALLY IMAGED HEART AND MEDIASTINUM: The heart is normal in size without  pericardial effusion. Coronary artery calcifications are noted. LIVER: No mass or biliary dilatation. GALLBLADDER: Surgically absent. SPLEEN: No mass.   PANCREAS: No mass or ductal dilatation. ADRENALS: Unremarkable. KIDNEYS AND URETERS: Left nephrolithiasis measuring up to 12 mm. No right  nephrolithiasis. No hydronephrosis or enhancing masses. Ureters are nondilated  with no ureteral stones. STOMACH: Unremarkable. SMALL BOWEL: No dilatation or wall thickening. COLON: Moderately severe rectal and sigmoid fecal retention. No dilation or wall  thickening. APPENDIX: Unremarkable. PERITONEUM: No ascites or pneumoperitoneum. RETROPERITONEUM: Atherosclerotic calcification and tortuosity without aneurysm  or dissection. No enlarged lymphadenopathy. REPRODUCTIVE ORGANS: Uterus is surgically absent. No adnexal masses. URINARY BLADDER: Collapsed around Crowder catheter balloon. No mass or calculus  evident. BONES: Degenerative spine change. No significant change in compression  deformities of T11 and L4. No acute fracture or aggressive lesion. ADDITIONAL COMMENTS: N/A     IMPRESSION  IMPRESSION: Moderately severe rectosigmoid fecal retention. Nonobstructing left  nephrolithiasis and additional incidental findings as above.    ----------------------------------------------------------------------------------------    DATE: 9/4/19  Clinical indication: Fecal impaction.     Supine KUB obtained. Fecal inspection infectious seen in the rectosigmoid. Moderate colon distention. No significant small bowel gas.     IMPRESSION  IMPRESSION: Fecal impaction      Total time spent with patient: 50 minutes ________________________________________________________________________  Care Plan discussed with:  Patient y   Family     RN wilton Green              Consultant:  zi Artis, hospitalist NP     CT  9/4/2019:  ________________________________________________________________________    ___________________________________________________  Consulting Provider:  Tom Contreras NP      9/4/2019  10:04 AM

## 2019-09-04 NOTE — PROGRESS NOTES
Oncology End of Shift Note      Bedside shift change report given to Kathy Hannah RN (incoming nurse) by Cornelia Peck RN (outgoing nurse) on Evelyn Kohli. Report included the following information SBAR, Kardex, Intake/Output, MAR and Recent Results. Shift Summary: Pt had large amount of loose brown stool requiring very frequent rosanne/martinez-care and changing. C/o rectal and martinez site pain during night-- tylenol, morphine, zofran administered, protective cream applied to rectal area. Pt's martinez with juan drainage. Issues for Physician to Address:  Pt states, \"I get choked at times. \"-- do we need to order SLP evaluation? Urine is juan, only 300 ml out this am.     Patient on Cardiac Monitoring?     [x] Yes  [] No    Rhythm: NSR         Shift Events        Cornelia Peck RN

## 2019-09-04 NOTE — PROGRESS NOTES
Physical Therapy    PT intervention deferred at this time due to pt with onset of vomiting while PT at bedside; declined all activity. RN informed. Will continue to follow.     Rosa Stock, PT, MPT

## 2019-09-05 LAB
GLUCOSE BLD STRIP.AUTO-MCNC: 114 MG/DL (ref 65–100)
GLUCOSE BLD STRIP.AUTO-MCNC: 121 MG/DL (ref 65–100)
GLUCOSE BLD STRIP.AUTO-MCNC: 86 MG/DL (ref 65–100)
GLUCOSE BLD STRIP.AUTO-MCNC: 92 MG/DL (ref 65–100)
SERVICE CMNT-IMP: ABNORMAL
SERVICE CMNT-IMP: ABNORMAL
SERVICE CMNT-IMP: NORMAL
SERVICE CMNT-IMP: NORMAL

## 2019-09-05 PROCEDURE — 74011250637 HC RX REV CODE- 250/637: Performed by: NURSE PRACTITIONER

## 2019-09-05 PROCEDURE — 97165 OT EVAL LOW COMPLEX 30 MIN: CPT | Performed by: OCCUPATIONAL THERAPIST

## 2019-09-05 PROCEDURE — 74011250637 HC RX REV CODE- 250/637: Performed by: INTERNAL MEDICINE

## 2019-09-05 PROCEDURE — 74011250636 HC RX REV CODE- 250/636: Performed by: INTERNAL MEDICINE

## 2019-09-05 PROCEDURE — 74011250636 HC RX REV CODE- 250/636: Performed by: EMERGENCY MEDICINE

## 2019-09-05 PROCEDURE — 82962 GLUCOSE BLOOD TEST: CPT

## 2019-09-05 PROCEDURE — 94760 N-INVAS EAR/PLS OXIMETRY 1: CPT

## 2019-09-05 PROCEDURE — 97530 THERAPEUTIC ACTIVITIES: CPT

## 2019-09-05 PROCEDURE — 65270000029 HC RM PRIVATE

## 2019-09-05 PROCEDURE — 97530 THERAPEUTIC ACTIVITIES: CPT | Performed by: OCCUPATIONAL THERAPIST

## 2019-09-05 PROCEDURE — 74011250636 HC RX REV CODE- 250/636: Performed by: HOSPITALIST

## 2019-09-05 PROCEDURE — 74011250637 HC RX REV CODE- 250/637: Performed by: HOSPITALIST

## 2019-09-05 RX ORDER — NYSTATIN 100000 [USP'U]/ML
500000 SUSPENSION ORAL 4 TIMES DAILY
Status: DISCONTINUED | OUTPATIENT
Start: 2019-09-05 | End: 2019-09-06 | Stop reason: HOSPADM

## 2019-09-05 RX ORDER — MECLIZINE HCL 12.5 MG 12.5 MG/1
12.5 TABLET ORAL 3 TIMES DAILY
Status: DISCONTINUED | OUTPATIENT
Start: 2019-09-05 | End: 2019-09-06 | Stop reason: HOSPADM

## 2019-09-05 RX ORDER — AMOXICILLIN 250 MG
1 CAPSULE ORAL
Status: DISCONTINUED | OUTPATIENT
Start: 2019-09-05 | End: 2019-09-06 | Stop reason: HOSPADM

## 2019-09-05 RX ADMIN — Medication 10 ML: at 21:30

## 2019-09-05 RX ADMIN — LACTULOSE 15 ML: 20 SOLUTION ORAL at 21:30

## 2019-09-05 RX ADMIN — PROCHLORPERAZINE EDISYLATE 5 MG: 5 INJECTION INTRAMUSCULAR; INTRAVENOUS at 21:30

## 2019-09-05 RX ADMIN — NYSTATIN 500000 UNITS: 100000 SUSPENSION ORAL at 11:49

## 2019-09-05 RX ADMIN — LORAZEPAM 0.5 MG: 1 TABLET ORAL at 20:14

## 2019-09-05 RX ADMIN — PROCHLORPERAZINE EDISYLATE 5 MG: 5 INJECTION INTRAMUSCULAR; INTRAVENOUS at 14:07

## 2019-09-05 RX ADMIN — SENNOSIDES, DOCUSATE SODIUM 1 TABLET: 50; 8.6 TABLET, FILM COATED ORAL at 09:08

## 2019-09-05 RX ADMIN — MORPHINE SULFATE 4 MG: 4 INJECTION INTRAVENOUS at 08:20

## 2019-09-05 RX ADMIN — DILTIAZEM HYDROCHLORIDE 120 MG: 120 CAPSULE, COATED, EXTENDED RELEASE ORAL at 09:08

## 2019-09-05 RX ADMIN — PROCHLORPERAZINE EDISYLATE 5 MG: 5 INJECTION INTRAMUSCULAR; INTRAVENOUS at 09:08

## 2019-09-05 RX ADMIN — LORAZEPAM 0.5 MG: 1 TABLET ORAL at 00:43

## 2019-09-05 RX ADMIN — SODIUM CHLORIDE 100 ML/HR: 900 INJECTION, SOLUTION INTRAVENOUS at 10:15

## 2019-09-05 RX ADMIN — MECLIZINE 12.5 MG: 12.5 TABLET ORAL at 21:30

## 2019-09-05 RX ADMIN — MECLIZINE 12.5 MG: 12.5 TABLET ORAL at 17:09

## 2019-09-05 RX ADMIN — SODIUM CHLORIDE 100 ML/HR: 900 INJECTION, SOLUTION INTRAVENOUS at 20:21

## 2019-09-05 RX ADMIN — Medication 10 ML: at 14:07

## 2019-09-05 RX ADMIN — LACTULOSE 15 ML: 20 SOLUTION ORAL at 11:49

## 2019-09-05 RX ADMIN — LACTULOSE 15 ML: 20 SOLUTION ORAL at 17:09

## 2019-09-05 RX ADMIN — Medication 10 ML: at 05:04

## 2019-09-05 RX ADMIN — ENOXAPARIN SODIUM 40 MG: 40 INJECTION SUBCUTANEOUS at 21:30

## 2019-09-05 RX ADMIN — POLYETHYLENE GLYCOL 3350 17 G: 17 POWDER, FOR SOLUTION ORAL at 09:08

## 2019-09-05 RX ADMIN — NYSTATIN 500000 UNITS: 100000 SUSPENSION ORAL at 21:30

## 2019-09-05 RX ADMIN — NYSTATIN 500000 UNITS: 100000 SUSPENSION ORAL at 17:09

## 2019-09-05 NOTE — PROGRESS NOTES
Bedside shift change report given to [employer] (oncoming nurse) by Luciana Villagomez RN (offgoing nurse). Report included the following information SBAR, Kardex, ED Summary, Procedure Summary, Intake/Output, MAR, Recent Results, Cardiac Rhythm NSR, Alarm Parameters  and Quality Measures.

## 2019-09-05 NOTE — ROUTINE PROCESS
Bedside and Verbal shift change report given to Mercedes Ivan RN  (oncoming nurse) by Edie Randle RN  (offgoing nurse). Report included the following information SBAR, Kardex, Intake/Output, MAR and Recent Results.

## 2019-09-05 NOTE — PROGRESS NOTES
Problem: Mobility Impaired (Adult and Pediatric)  Goal: *Acute Goals and Plan of Care (Insert Text)  Description  FUNCTIONAL STATUS PRIOR TO ADMISSION: Pt lives with grandson and his wife and 11year old child. 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 She states she was independent with ADLs and amb w/o device. HOME SUPPORT PRIOR TO ADMISSION: The patient lived with family but did not require assist (per report)    Physical Therapy Goals  Initiated 9/3/2019  1. Patient will move from supine to sit and sit to supine , scoot up and down and roll side to side in bed with independence within 7 day(s). 2.  Patient will transfer from bed to chair and chair to bed with independence using the least restrictive device within 7 day(s). 3.  Patient will perform sit to stand with independence within 7 day(s). 4.  Patient will ambulate with modified independence for 150 feet with the least restrictive device within 7 day(s). 5.  Patient will ascend/descend 2 stairs with handrail(s) with supervision/set-up within 7 day(s). Outcome: Progressing Towards Goal   PHYSICAL THERAPY TREATMENT  Patient: Karuna Alvarado (16 y.o. female)  Date: 9/5/2019  Diagnosis: Fecal impaction ([address] [K56.41]  Urinary retention [R33.9]  Orthostatic hypotension [I95.1]  Orthostatic hypotension [I95.1]  Fecal impaction ([address] [K56.41] <principal problem not specified>       Precautions: Bed Alarm  Chart, physical therapy assessment, plan of care and goals were reviewed. ASSESSMENT  Based on the objective data described below, patient continues to be limited by orthostatic hypotension. She required CGA-min A for transfers and continues to be somewhat confused. Patient with impaired balance and shuffled steps. Recommend RW use next session.      Current Level of Function Impacting Discharge (mobility/balance): CGA-min A and HHA from bed to chair    Other factors to consider for discharge: confusion, orthostatic hypotension. PLAN :  Patient continues to benefit from skilled intervention to address the above impairments. Continue treatment per established plan of care. to address goals. Recommendation for discharge: (in order for the patient to meet his/her long term goals)  Physical therapy at least 2 days/week in the home AND ensure assist and/or supervision for safety with transfers and functional mobility      This discharge recommendation:  Has not yet been discussed the attending provider and/or case management    Equipment recommendations for successful discharge (if) home: rolling walker       SUBJECTIVE:   Patient stated Am I going to get better? Am I okay? I am dizzy.     OBJECTIVE DATA SUMMARY:   Critical Behavior:  Neurologic State: Alert, Confused  Orientation Level: Disoriented to time, Oriented to person, Oriented to place, Oriented to situation  Cognition: Decreased attention/concentration, Follows commands, Impaired decision making  Safety/Judgement: Awareness of environment, Decreased insight into deficits, Decreased awareness of need for safety  Functional Mobility Training:  Bed Mobility:        Sit to Supine: Contact guard assistance  Scooting: Contact guard assistance        Transfers:  Sit to Stand: Minimum assistance;Contact guard assistance  Stand to Sit: Minimum assistance;Contact guard assistance                             Balance:  Sitting: Intact  Standing: Impaired  Standing - Static: Fair  Standing - Dynamic : Fair  Ambulation/Gait Training:  Distance (ft): 4 Feet (ft)  Assistive Device: Gait belt(HHA x 1 )  Ambulation - Level of Assistance: Minimal assistance;Assist x1;Additional time        Gait Abnormalities: Decreased step clearance;Trunk sway increased; Shuffling gait; Path deviations        Base of Support: Widened     Speed/Gale: Pace decreased (<100 feet/min); Shuffled  Step Length: Right shortened;Left shortened      Activity Tolerance:   Fair and signs and symptoms of orthostatic hypotension  Please refer to the flowsheet for vital signs taken during this treatment.     After treatment patient left in no apparent distress:   Supine in bed, Call bell within reach and Bed / chair alarm activated    COMMUNICATION/COLLABORATION:   The patients plan of care was discussed with: Occupational Therapist, Registered Nurse and     Dyana Caraballo, PT, DPT   Time Calculation: 19 mins

## 2019-09-05 NOTE — PROGRESS NOTES
Physical Therapy    Note: Pt seen for initial eval in ED 9/3, now admitted. See eval report. POC updated and goals are as noted, will be followed by PT as inpt.     Grzegorz Garrison, PT

## 2019-09-05 NOTE — PROGRESS NOTES
Problem: Self Care Deficits Care Plan (Adult)  Goal: *Acute Goals and Plan of Care (Insert Text)  Description  FUNCTIONAL STATUS PRIOR TO ADMISSION: Per chart, patient is typically independent/modified independent with basic self-care and light IADL. She sometimes takes care of her great-grandson. HOME SUPPORT: The patient lived with her grandson, his wife, and their 11year old son. Patient also has a daughter nearby. Occupational Therapy Goals  Initiated 9/5/2019  1. Patient will perform grooming standing at the sink with modified independence within 7 day(s). 2.  Patient will perform bathing with supervision/SBA within 7 day(s). 3.  Patient will perform lower body dressing with supervision/set-up within 7 day(s). 4.  Patient will perform toilet transfers with modified independence within 7 day(s). 5.  Patient will perform all aspects of toileting with modified independence within 7 day(s). 6.  Patient will independently utilize energy conservation and recommended equipment during ADL within 7 day(s). Outcome: Progressing Towards Goal    OCCUPATIONAL THERAPY EVALUATION  Patient: Fredy Combs (81 y.o. female)  Date: 9/5/2019  Primary Diagnosis: Fecal impaction ([facility] [address] [K56.41]  Urinary retention [R33.9]  Orthostatic hypotension [I95.1]  Orthostatic hypotension [I95.1]  Fecal impaction (HCC) [K56.41]        Precautions:        ASSESSMENT  Based on the objective data described below, the patient presents with deficits in self-care, primarily due to orthostatic hypotension, general weakness, decreased dynamic balance, decreased safety, and decreased activity tolerance. She was perseverating today on not feeling well, repeatedly asking if she is going to be ok. She also indicates that she can't see, but she does not have her glasses here and reports cataracts.   She is functioning below her baseline level and will benefit from skilled OT treatment to maximize independence and safety in ADL.    Current Level of Function Impacting Discharge (ADLs/self-care): Min A to S for UB ADL; Max A to [address] for LB    Functional Outcome Measure: The patient scored 35/100 on the Barthel Index outcome measure which is indicative of significant ADL deficits. Other factors to consider for discharge: Patient is alone during the day. Patient will benefit from skilled therapy intervention to address the above noted impairments. PLAN :  Recommendations and Planned Interventions: self care training, functional mobility training, therapeutic exercise, balance training, visual/perceptual training, therapeutic activities, cognitive retraining, endurance activities, patient education, home safety training and family training/education    Frequency/Duration: Patient will be followed by occupational therapy 3 times a week to address goals. Recommendation for discharge: (in order for the patient to meet his/her long term goals)  Occupational therapy at least 2 days/week in the home AND ensure assist and/or supervision for safety with ADL     This discharge recommendation:  A follow-up discussion with the attending provider and/or case management is planned    Equipment recommendations for successful discharge (if) home: TBD        SUBJECTIVE:   Patient stated Am I going to be okay?     OBJECTIVE DATA SUMMARY:   HISTORY:   Past Medical History:   Diagnosis Date    Agoraphobia without mention of panic attacks 2/17/2014    Anxiety disorder 8/18/2013    Arthritis     osteo    Breast pain, left 4/7/2015    CAD (coronary artery disease), native coronary artery 12/1/2015    no stents    Chronic chest pain 1/13/2014    Chronic pain associated with significant psychosocial dysfunction 2/17/2014    Depression 8/18/2013    Diabetes ([facility] [address]     type II    Duplicated right renal collecting system 3/13/2014    GERD (gastroesophageal reflux disease)     Gout, joint     Hypercholesteremia     hyercholesterolemia Hypertension     Nephrolithiasis 3/13/2014    Personal history of noncompliance with medical treatment, presenting hazards to health 5/30/2014    Psychotic disorder ([facility] [address]     Vaginal pain 7/30/2014     Past Surgical History:   Procedure Laterality Date    EGD  4/23/2010         HX CHOLECYSTECTOMY  09/20/2018    lap jesus    HX CYST REMOVAL      cyst removed from left wrist    HX HYSTERECTOMY      partial    HX OTHER SURGICAL      bladder dilitation    HX TUBAL LIGATION      HX UROLOGICAL      kidney stones       Expanded or extensive additional review of patient history:     Home Situation  Home Environment: Private residence  # Steps to Enter: 2  One/Two Story Residence: Two story  Living Alone: No  Support Systems: Family member(s)  Patient Expects to be Discharged to[de-identified] Private residence  Current DME Used/Available at Home: Cane, straight    Hand dominance: Right    EXAMINATION OF PERFORMANCE DEFICITS:  Cognitive/Behavioral Status:  Neurologic State: Alert;Confused  Orientation Level: Disoriented to time;Oriented to person;Oriented to place;Oriented to situation  Cognition: Decreased attention/concentration; Follows commands; Impaired decision making  Perception: Cues to maintain midline in standing  Perseveration: Perseverates during conversation  Safety/Judgement: Awareness of environment;Decreased insight into deficits; Decreased awareness of need for safety    Hearing: Auditory  Auditory Impairment: None    Vision/Perceptual:                           Acuity: Impaired near vision; Impaired far vision(cataracts)    Corrective Lenses: Glasses(At home)    Range of Motion:  AROM: Within functional limits  PROM: Within functional limits                      Strength:  Strength: Generally decreased, functional                Coordination:     Fine Motor Skills-Upper: Left Intact; Right Intact    Gross Motor Skills-Upper: Left Intact; Right Intact    Balance:  Sitting: Intact  Standing: Impaired  Standing - Static: Fair  Standing - Dynamic : Fair    Functional Mobility and Transfers for ADLs:  Bed Mobility:  Sit to Supine: Contact guard assistance  Scooting: Contact guard assistance    Transfers:  Sit to Stand: Minimum assistance;Contact guard assistance  Stand to Sit: Minimum assistance;Contact guard assistance  Toilet Transfer : Minimum assistance(B[facility] only)    ADL Assessment:  Feeding: Setup;Supervision    Oral Facial Hygiene/Grooming: Supervision(seated)    Bathing: Minimum assistance    Upper Body Dressing: Minimum assistance    Lower Body Dressing: Maximum assistance    Toileting: Moderate assistance                Functional Measure:  Barthel Index:    Bathin  Bladder: 5  Bowels: 5  Groomin  Dressin  Feedin  Mobility: 0  Stairs: 0  Toilet Use: 5  Transfer (Bed to Chair and Back): 10  Total: 35/100        The Barthel ADL Index: Guidelines  1. The index should be used as a record of what a patient does, not as a record of what a patient could do. 2. The main aim is to establish degree of independence from any help, physical or verbal, however minor and for whatever reason. 3. The need for supervision renders the patient not independent. 4. A patient's performance should be established using the best available evidence. Asking the patient, friends/relatives and nurses are the usual sources, but direct observation and common sense are also important. However direct testing is not needed. 5. Usually the patient's performance over the preceding 24-48 hours is important, but occasionally longer periods will be relevant. 6. Middle categories imply that the patient supplies over 50 per cent of the effort. 7. Use of aids to be independent is allowed. Karis Nieves., Barthel, D.W. (5810). Functional evaluation: the Barthel Index. [address] [facility] (14)2. SILVER Saenz, Elo De Los Santos., Leydi Orona., Anny, [address]  Measuring the change indisability after inpatient rehabilitation; comparison of the responsiveness of the Barthel Index and Functional [place] Measure. Journal of Neurology, Neurosurgery, and Psychiatry, 66(4), 999-810. DOUGLAS Alejandra, SUMAN Florian, & Beatrice Godfrey M.A. (2004.) Assessment of post-stroke quality of life in cost-effectiveness studies: The usefulness of the Barthel Index and the EuroQoL-5D. Quality of Life Research, 15, 282-52        Occupational Therapy Evaluation Charge Determination   History Examination Decision-Making   LOW Complexity : Brief history review  MEDIUM Complexity : 3-5 performance deficits relating to physical, cognitive , or psychosocial skils that result in activity limitations and / or participation restrictions LOW Complexity : No comorbidities that affect functional and no verbal or physical assistance needed to complete eval tasks       Based on the above components, the patient evaluation is determined to be of the following complexity level: LOW   Pain Rating:  No complaints    Activity Tolerance:   Fair  Please refer to the flowsheet for vital signs taken during this treatment. After treatment patient left in no apparent distress:    Supine in bed, Call bell within reach and Bed / chair alarm activated    COMMUNICATION/EDUCATION:   The patients plan of care was discussed with: Physical Therapist and Registered Nurse. Home safety education was provided and the patient/caregiver indicated understanding. and Patient/family agree to work toward stated goals and plan of care. This patients plan of care is appropriate for delegation to [facility].     Thank you for this referral.  Kristal Gillette OTR/L  Time Calculation: 21 mins

## 2019-09-05 NOTE — PROGRESS NOTES
Spiritual Care Partner Volunteer visited patient in Gen Surg on September 5, 2019.   Not feeling well; would like a visit tomorrow, as possible    Documented by:  EARNESTINE Buckner, [facility], Chaplain CHRISTIANA GREENE [facility] Paging Service  [phone])

## 2019-09-05 NOTE — PROGRESS NOTES
GI PROGRESS NOTE  Sharyle Seitz, NP  [phone] NP in-hospital cell phone M-F until 4:30  After 5pm or on weekends, please call  for physician on call    Rosario Riggins :1945 MSM:[id number]   ATTG: Dr Tiago Cantu  PCP: Sierra Nick MD  Date/Time:  2019 9:24 AM     Primary GI: Dr. Ning Abdi    Reason for following: Severe constipation    Assessment:   Fecal retention - severe constipation - Chronic   Abdominal pain and some vomiting after PO intake this AM  - Lots of soft stools yesterday. - 2 BMs today but having some loose stools   - CT abdomen 9/3/19 with moderately severe rectosigmoid fecal retention  - Abdominal distension is improved but still mild tendnerss on exam  - Failed home miralax and mag citrate - previously did well on lactulose but stopped  - History of severe constipation 2019 and   - Last colonoscopy  with hemorrhoids only - due     Oral thrush with pain  - thrush visible on tongue     Acute urinary retention  Hx Renal stone, has 12mm stone on left but no sign of obstruction  - presumed due to fecal impaction - hx of same in 2017 - now urinating without martinez     Dizziness  Diastolic dysfunction   Anxiety  Hx DM, not on medication        Plan:   1. Restart lactulose 15g TID - titrate to 2-3 BMs per day. 2. Stop miralax and she did not do well on this in the past  3. Continue pericolace PRN  4. May need outpatient EGD and colonoscopy  5. Encourage motility  6. Avoid narcotic use as much as possible. 7. Nystatin for oral thrush    Nothing further to add during inpatient stay. Will need follow up with us outpatient in the next month to see how she is doing with longterm constipation management. Plan discussed with Dr Ning Abdi. Subjective:   Discussed with RN events overnight. Up in chair, had some vomiting after intake this AM but states her mouth is very sore, appears to have thrush. Swallowing is okay but painful.  Lots of stools, now soft but does report some incontinence since stool is so loose now. Complaint Y/N Description   Abdominal Pain n    Hematemesis n    Hematochezia n    Melena n    Constipation n    Diarrhea n    Dyspepsia n    Dysphagia n    Jaundiced n    Nausea/vomiting y Small emesis this AM     Review of Systems:  Symptom Y/N Comments  Symptom Y/N Comments   Fever/Chills    Chest Pain     Cough    Headaches     Sputum    Joint Pain     SOB/MOORE    Pruritis/Rash     Tolerating Diet y   Other  Feels dizzy at times, eyes burning, mouth is sore     Could NOT obtain due to:      Objective:   VITALS:   Last 24hrs VS reviewed since prior progress note. Most recent are:  Visit Vitals  BP (!) 126/91   Pulse 86   Temp 98.2 °F (36.8 °C)   Resp 19   Ht 5' 8\" (1.727 m)   Wt 78 kg (172 lb)   SpO2 97%   BMI 26.15 kg/m²       Intake/Output Summary (Last 24 hours) at 9/5/2019 0924  Last data filed at 9/5/2019 0603  Gross per 24 hour   Intake 2820 ml   Output --   Net 2820 ml     PHYSICAL EXAM:  General: WD, WN. Alert, cooperative, no acute distress  HEENT: NC, Atraumatic. Anicteric sclerae. Lungs:  CTA Bilaterally. No Wheezing/Rhonchi/Rales. Heart:  Regular  rhythm,  No murmur (), No Rubs, No Gallops  Abdomen: Soft, Non distended, Non tender.  +Bowel sounds, no HSM  Extremities: No c/c/e  Neurologic:  Alert and oriented X 3. No acute neurological distress   Psych:   Okay insight. Not anxious nor agitated.     Lab and Radiology Data Reviewed: (see below)    Medications Reviewed: (see below)  PMH/SH reviewed - no change compared to H&P  ________________________________________________________________________  Total time spent with patient: 20 minutes ________________________________________________________________________  Care Plan discussed with:  Patient y   Family     RN wilton Carrillo              Consultant:  zi Biggs NP     Procedures: see electronic medical records for all procedures/Xrays and details which were not copied into this note but were reviewed prior to creation of Plan. LABS:  Recent Labs     09/03/19  0814   WBC 4.2   HGB 12.7   HCT 40.4        Recent Labs     09/03/19  0814      K 3.9      CO2 29   BUN 17   CREA 1.11*   *   CA 8.8     Recent Labs     09/03/19  0814   SGOT 12*   AP 75   TP 7.0   ALB 3.5   GLOB 3.5   LPSE 72*     No results for input(s): INR, PTP, APTT in the last 72 hours. No lab exists for component: INREXT   No results for input(s): FE, TIBC, PSAT, FERR in the last 72 hours. Lab Results   Component Value Date/Time    Folate >19.9 03/04/2015 12:32 PM     No results for input(s): PH, PCO2, PO2 in the last 72 hours. No results for input(s): CPK, CKMB in the last 72 hours.     No lab exists for component: TROPONINI  Lab Results   Component Value Date/Time    Color YELLOW/STRAW 09/03/2019 08:14 AM    Appearance CLEAR 09/03/2019 08:14 AM    Specific gravity 1.009 09/03/2019 08:14 AM    Specific gravity 1.015 05/04/2015 02:59 PM    pH (UA) 5.5 09/03/2019 08:14 AM    Protein NEGATIVE  09/03/2019 08:14 AM    Glucose NEGATIVE  09/03/2019 08:14 AM    Ketone NEGATIVE  09/03/2019 08:14 AM    Bilirubin NEGATIVE  09/03/2019 08:14 AM    Urobilinogen 1.0 09/03/2019 08:14 AM    Nitrites NEGATIVE  09/03/2019 08:14 AM    Leukocyte Esterase NEGATIVE  09/03/2019 08:14 AM    Epithelial cells FEW 09/03/2019 08:14 AM    Bacteria NEGATIVE  09/03/2019 08:14 AM    WBC 0-4 09/03/2019 08:14 AM    RBC 0-5 09/03/2019 08:14 AM       MEDICATIONS:  Current Facility-Administered Medications   Medication Dose Route Frequency    bisacodyl (DULCOLAX) tablet 5 mg  5 mg Oral DAILY PRN    prochlorperazine (COMPAZINE) injection 5 mg  5 mg IntraVENous Q6H PRN    sodium chloride (NS) flush 5-40 mL  5-40 mL IntraVENous Q8H    sodium chloride (NS) flush 5-40 mL  5-40 mL IntraVENous PRN    acetaminophen (TYLENOL) tablet 650 mg  650 mg Oral Q6H PRN    sodium chloride (NS) flush 5-40 mL  5-40 mL IntraVENous Q8H    sodium chloride (NS) flush 5-40 mL  5-40 mL IntraVENous PRN    0.9% sodium chloride infusion  100 mL/hr IntraVENous CONTINUOUS    senna-docusate (PERICOLACE) 8.6-50 mg per tablet 1 Tab  1 Tab Oral DAILY    enoxaparin (LOVENOX) injection 40 mg  40 mg SubCUTAneous Q24H    LORazepam (ATIVAN) tablet 0.5 mg  0.5 mg Oral Q8H PRN    dilTIAZem CD (CARDIZEM CD) capsule 120 mg  120 mg Oral DAILY    polyethylene glycol (MIRALAX) packet 17 g  17 g Oral DAILY    glycerin (adult) suppository 1 Suppository  1 Suppository Rectal Q12H

## 2019-09-06 VITALS
DIASTOLIC BLOOD PRESSURE: 74 MMHG | HEART RATE: 81 BPM | SYSTOLIC BLOOD PRESSURE: 147 MMHG | WEIGHT: 172 LBS | HEIGHT: 68 IN | OXYGEN SATURATION: 99 % | TEMPERATURE: 98.1 F | BODY MASS INDEX: 26.07 KG/M2 | RESPIRATION RATE: 18 BRPM

## 2019-09-06 LAB
ANION GAP SERPL CALC-SCNC: 5 MMOL/L (ref 5–15)
BUN SERPL-MCNC: 12 MG/DL (ref 6–20)
BUN/CREAT SERPL: 15 (ref 12–20)
CALCIUM SERPL-MCNC: 7.8 MG/DL (ref 8.5–10.1)
CHLORIDE SERPL-SCNC: 117 MMOL/L (ref 97–108)
CO2 SERPL-SCNC: 24 MMOL/L (ref 21–32)
CREAT SERPL-MCNC: 0.79 MG/DL (ref 0.55–1.02)
ERYTHROCYTE [DISTWIDTH] IN BLOOD BY AUTOMATED COUNT: 13.6 % (ref 11.5–14.5)
GLUCOSE BLD STRIP.AUTO-MCNC: 122 MG/DL (ref 65–100)
GLUCOSE BLD STRIP.AUTO-MCNC: 66 MG/DL (ref 65–100)
GLUCOSE BLD STRIP.AUTO-MCNC: 77 MG/DL (ref 65–100)
GLUCOSE BLD STRIP.AUTO-MCNC: 98 MG/DL (ref 65–100)
GLUCOSE SERPL-MCNC: 71 MG/DL (ref 65–100)
HCT VFR BLD AUTO: 33 % (ref 35–47)
HGB BLD-MCNC: 10.5 G/DL (ref 11.5–16)
MCH RBC QN AUTO: 27.3 PG (ref 26–34)
MCHC RBC AUTO-ENTMCNC: 31.8 G/DL (ref 30–36.5)
MCV RBC AUTO: 85.9 FL (ref 80–99)
NRBC # BLD: 0 K/UL (ref 0–0.01)
NRBC BLD-RTO: 0 PER 100 WBC
PLATELET # BLD AUTO: 163 K/UL (ref 150–400)
PMV BLD AUTO: 10.9 FL (ref 8.9–12.9)
POTASSIUM SERPL-SCNC: 3.7 MMOL/L (ref 3.5–5.1)
RBC # BLD AUTO: 3.84 M/UL (ref 3.8–5.2)
SERVICE CMNT-IMP: ABNORMAL
SERVICE CMNT-IMP: NORMAL
SODIUM SERPL-SCNC: 146 MMOL/L (ref 136–145)
WBC # BLD AUTO: 3.5 K/UL (ref 3.6–11)

## 2019-09-06 PROCEDURE — 80048 BASIC METABOLIC PNL TOTAL CA: CPT

## 2019-09-06 PROCEDURE — 82962 GLUCOSE BLOOD TEST: CPT

## 2019-09-06 PROCEDURE — 74011250636 HC RX REV CODE- 250/636: Performed by: INTERNAL MEDICINE

## 2019-09-06 PROCEDURE — 74011250637 HC RX REV CODE- 250/637: Performed by: NURSE PRACTITIONER

## 2019-09-06 PROCEDURE — 94760 N-INVAS EAR/PLS OXIMETRY 1: CPT

## 2019-09-06 PROCEDURE — 74011250637 HC RX REV CODE- 250/637: Performed by: INTERNAL MEDICINE

## 2019-09-06 PROCEDURE — 36415 COLL VENOUS BLD VENIPUNCTURE: CPT

## 2019-09-06 PROCEDURE — 74011250636 HC RX REV CODE- 250/636: Performed by: HOSPITALIST

## 2019-09-06 PROCEDURE — 85027 COMPLETE CBC AUTOMATED: CPT

## 2019-09-06 PROCEDURE — 74011250637 HC RX REV CODE- 250/637: Performed by: EMERGENCY MEDICINE

## 2019-09-06 RX ORDER — DILTIAZEM HYDROCHLORIDE 60 MG/1
60 CAPSULE, EXTENDED RELEASE ORAL EVERY 12 HOURS
Qty: 30 CAP | Refills: 3 | Status: SHIPPED | OUTPATIENT
Start: 2019-09-06 | End: 2021-02-17

## 2019-09-06 RX ORDER — DILTIAZEM HYDROCHLORIDE 60 MG/1
60 CAPSULE, EXTENDED RELEASE ORAL EVERY 12 HOURS
Status: DISCONTINUED | OUTPATIENT
Start: 2019-09-06 | End: 2019-09-06 | Stop reason: HOSPADM

## 2019-09-06 RX ORDER — MECLIZINE HCL 12.5 MG 12.5 MG/1
12.5 TABLET ORAL 3 TIMES DAILY
Qty: 30 TAB | Refills: 0 | Status: SHIPPED | OUTPATIENT
Start: 2019-09-06 | End: 2019-09-16

## 2019-09-06 RX ADMIN — MECLIZINE 12.5 MG: 12.5 TABLET ORAL at 10:04

## 2019-09-06 RX ADMIN — NYSTATIN 500000 UNITS: 100000 SUSPENSION ORAL at 12:41

## 2019-09-06 RX ADMIN — DILTIAZEM HYDROCHLORIDE 60 MG: 60 CAPSULE, EXTENDED RELEASE ORAL at 12:41

## 2019-09-06 RX ADMIN — ACETAMINOPHEN 650 MG: 325 TABLET ORAL at 10:03

## 2019-09-06 RX ADMIN — Medication 10 ML: at 06:01

## 2019-09-06 RX ADMIN — PROCHLORPERAZINE EDISYLATE 5 MG: 5 INJECTION INTRAMUSCULAR; INTRAVENOUS at 10:03

## 2019-09-06 RX ADMIN — LACTULOSE 15 ML: 20 SOLUTION ORAL at 10:04

## 2019-09-06 RX ADMIN — NYSTATIN 500000 UNITS: 100000 SUSPENSION ORAL at 10:04

## 2019-09-06 RX ADMIN — SODIUM CHLORIDE 100 ML/HR: 900 INJECTION, SOLUTION INTRAVENOUS at 06:00

## 2019-09-06 NOTE — PROGRESS NOTES
Spiritual Care Assessment/Progress Note  [facility]      NAME: Roshni Gann      MRN: [id number]  AGE: 68 y.o.  SEX: female  Muslim Affiliation: Confucianist   Language: English     9/6/2019     Total Time (in minutes): 12     Spiritual Assessment begun in [facility] GENERAL SURGERY through conversation with:         [x]Patient        [] Family    [] Friend(s)        Reason for Consult: Initial/Spiritual assessment, patient floor     Spiritual beliefs: (Please include comment if needed)     [x] Identifies with a hossein tradition:         [x] Supported by a hossein community:            [] Claims no spiritual orientation:           [] Seeking spiritual identity:                [] Adheres to an individual form of spirituality:           [] Not able to assess:                           Identified resources for coping:      [x] Prayer                               [] Music                  [] Guided Imagery     [x] Family/friends                 [] Pet visits     [] Devotional reading                         [] Unknown     [] Other:                                              Interventions offered during this visit: (See comments for more details)    Patient Interventions: Affirmation of hossein, Affirmation of emotions/emotional suffering, Initial visit, Initial/Spiritual assessment, patient floor, Prayer (assurance of)           Plan of Care:     [x] Support spiritual and/or cultural needs    [] Support AMD and/or advance care planning process      [] Support grieving process   [] Coordinate Rites and/or Rituals    [] Coordination with community clergy   [] No spiritual needs identified at this time   [] Detailed Plan of Care below (See Comments)  [] Make referral to Music Therapy  [] Make referral to Pet Therapy     [] Make referral to Addiction services  [] Make referral to [facility]  [] Make referral to Spiritual Care Partner  [] No future visits requested        [x] Follow up visits as needed Comments:  made initial visit to patients room on Gen Surg for spiritual assessment on referral from SCP. Patient was in room with PCT getting cleaned up.  waited a minute before entering room. Patient said she was feeling better today and that she was being discharged this afternoon. Patient lives with her daughter who will be picking her up. Family is important to the patient. Patient identifies as Lalit Bowie provided pastoral care and support during visit. Patient was thankful for the visit. Spiritual Care will follow up as needed.     Elia Andrade MDiv  Pager: [phone]

## 2019-09-06 NOTE — DIABETES MGMT
DTC Progress Note    Recommendations/ Comments: Chart reviewed for hypoglycemia, likely due to poor PO intake. Intake remains minimal. If medically able, please consider running D5 to help support BG > 80 mg/dL. .       Current hospital DM medication: none    Chart reviewed on [facility]. Patient is a 68 y.o. female with no hx of diabetes noted at home. A1c:   Lab Results   Component Value Date/Time    Hemoglobin A1c 5.5 09/20/2018 03:07 AM    Hemoglobin A1c 5.7 (H) 07/06/2017 12:22 PM       Recent Glucose Results:   Lab Results   Component Value Date/Time    GLU 71 09/06/2019 04:30 AM    GLUCPOC 98 09/06/2019 08:51 AM    GLUCPOC 77 09/06/2019 08:31 AM    GLUCPOC 66 09/06/2019 08:16 AM        Lab Results   Component Value Date/Time    Creatinine 0.79 09/06/2019 04:30 AM     Estimated Creatinine Clearance: 69.6 mL/min (based on SCr of 0.79 mg/dL). Active Orders   Diet    DIET DIABETIC CONSISTENT CARB Regular        PO intake:   Patient Vitals for the past 72 hrs:   % Diet Eaten   09/05/19 1212 15 %   09/05/19 0905 25 %   09/04/19 1349 10 %   09/04/19 1100 5 %   09/03/19 1800 100 %       Will continue to follow as needed.     Thank you  Moe Licea      Time spent: 5 minutes

## 2019-09-06 NOTE — PROGRESS NOTES
Bedside shift change report given to [employer] (oncoming nurse) by Seldon Bamberger RN (offgoing nurse). Report included the following information SBAR, Kardex, ED Summary, Procedure Summary, Intake/Output, MAR, Recent Results, Cardiac Rhythm NSR, Alarm Parameters  and Quality Measures.

## 2019-09-06 NOTE — DISCHARGE SUMMARY
Hospitalist Discharge Summary     Patient ID:  José Silva  [id number]  67 y.o.  1945  9/3/2019    PCP on record: Jada Kenyon MD    Admit date: 9/3/2019  Discharge date and time: 9/6/2019    DISCHARGE DIAGNOSIS:  Clinical infection, resolved  Constipation, resolved    CONSULTATIONS:  IP CONSULT TO HOSPITALIST  IP CONSULT TO GASTROENTEROLOGY  IP CONSULT TO CARDIOLOGY        ______________________________________________________________________  DISCHARGE SUMMARY/HOSPITAL COURSE:      Patient is 68years old woman with past medical history of diastolic heart failure, aortic stenosis, chronic constipation presented to the emergency department because of constipation for few days before admission. She was found to have severe fecal impaction. Laxatives were started and GI was consulted. Patient was supposed to be on lactulose however she was not taking it. Lactulose was resumed by GI and patient after that had regular bowel movements. During her stay she developed hypotension hypotension for which we need to give her IV fluids and stop Cardizem extended release. Her orthostatic hypotension was improved after doing the previous measurements and adding meclizine low-dose. We resumed her Cardizem at 60 mg short release twice daily instead of Cardizem extended release 120 mg for management of her blood pressure. Patient tolerates the new dose of Cardizem without any orthostatic hypotension and she was stable to be discharged on September 6, 2019.        _______________________________________________________________________  Patient seen and examined by me on discharge day. Pertinent Findings:  Gen:    Not in distress  Chest: Clear lungs  CVS:   Regular rhythm.   No edema  Abd:  Soft, not distended, not tender  Neuro:  Alert  _______________________________________________________________________  DISCHARGE MEDICATIONS:   Current Discharge Medication List      START taking these medications    Details   dilTIAZem SR (CARDIZEM SR) 60 mg SR capsule Take 1 Cap by mouth every twelve (12) hours. Qty: 30 Cap, Refills: 3      lactulose (CHRONULAC) 10 gram/15 mL solution Take 15 mL by mouth three (3) times daily. Qty: 3 Bottle, Refills: 0      meclizine (ANTIVERT) 12.5 mg tablet Take 1 Tab by mouth three (3) times daily for 10 days. Qty: 30 Tab, Refills: 0         CONTINUE these medications which have NOT CHANGED    Details   polyethylene glycol (MIRALAX) 17 gram/dose powder Take 17 g by mouth daily. 1 tablespoon with 8 oz of water daily  Qty: 170 g, Refills: 0      LORazepam (ATIVAN) 0.5 mg tablet Take 1 Tab by mouth every eight (8) hours as needed for Anxiety. Max Daily Amount: 1.5 mg.  Qty: 20 Tab, Refills: 0    Associated Diagnoses: Depression with anxiety         STOP taking these medications       dilTIAZem CD (CARDIZEM CD) 120 mg ER capsule Comments:   Reason for Stopping:                 Patient Follow Up Instructions: Activity: Activity as tolerated  Diet: Regular Diet and Encourage fluids  Wound Care: None needed      Follow-up Information     Follow up With Specialties Details Why Contact Info    Adrian Conner MD Family Practice Go on 9/10/2019 Hospital follow-up scheduled at 3:00pm ( If you have questions or need to reschedule please call [phone] [address] [place] [address]  [phone]      Viviane Rivera MD Urology In 1 week For a follow-up evaluation. This is a neurology she can see for further evaluation of your urinary retention. Would like you to keep the Crowder in place until the next few days until your bladder for functioning more properly. [address]  [address]  This is the provider of your home health services.   [address] [facility] [address]        ________________________________________________________________    Risk of deterioration: Low    Condition at Discharge:  Stable  __________________________________________________________________    Disposition  Home with family, no needs    ____________________________________________________________________    Code Status: Full Code  ___________________________________________________________________      Total time in minutes spent coordinating this discharge (includes going over instructions, follow-up, prescriptions, and preparing report for sign off to her PCP) :  35 minutes    Signed:  Viral Berumen MD

## 2019-09-06 NOTE — PROGRESS NOTES
Plan:  [facility] SN/PT/OT  -PCP f/u appt   -RW  -Dtr to transport       11:47AM  CM in to speak with pt regarding d/c plan. Pt lives with family but they work during the day, although pt reports that her DIL doesn't work every day. Discssused safety goals, weakness, and differences between [place] and SNF. Pt willing to consider but wants her family involved in conversation. Dtr to come to hospital later today. Pt concerned that she doesn't feel well and isn't ready to be d/c.     12:43PM  CM PC to pt's dtr, Solomonwilton Hutchins ([phone]), to discuss d/c plan. Dtr stated they have attempted to get pt to rehab in the past and she will be cooperative but then refuses at the last minute. Dtr states there are people with pt every day. Her DIL works part time and her best friend's son (in his 45s) is at home during the day. Pt has been independent at baseline and dtr states they can provide supervision pt needs to be safe at home. Dtr able to  pt after 3:00PM.    2:07   CM confirmed d/c with attending. D/c order entered. CM left VM for pt's dtr at her request to notify of d/c so she can come after work to pick pt up. Pt confirmed she does not have RW at home. Referral sent and accept. 2nd IM notice provided and explained. Pt unable to sign. 3:47PM  RW delivered to bedside. Nursing reviewing d/c paperwork with pt and dtr. Pt d/c home with dtr. CM available for any additional needs. Care Management Interventions  PCP Verified by CM: Cyndi Frankel MD)  Mode of Transport at Discharge:  Other (see comment)(Dtr)  Transition of Care Consult (CM Consult): Discharge Planning, [address]: Yes  Discharge Durable Medical Equipment: Yes(RW)  Physical Therapy Consult: No  Occupational Therapy Consult: No  Speech Therapy Consult: No  Current Support Network: Own Home(lives with boyfriend and grandson)  Confirm Follow Up Transport: Family  Plan discussed with Pt/Family/Caregiver: Yes  Freedom of Choice Offered: Yes  Discharge Location  Discharge Placement: Home with home health        Allison Khan MSW  Care Manager

## 2019-09-08 ENCOUNTER — HOME CARE VISIT (OUTPATIENT)
Dept: SCHEDULING | Facility: HOME HEALTH | Age: 74
End: 2019-09-08
Payer: MEDICARE

## 2019-09-08 LAB
ATRIAL RATE: 61 BPM
CALCULATED P AXIS, ECG09: 32 DEGREES
CALCULATED R AXIS, ECG10: 13 DEGREES
CALCULATED T AXIS, ECG11: 0 DEGREES
DIAGNOSIS, 93000: NORMAL
P-R INTERVAL, ECG05: 152 MS
Q-T INTERVAL, ECG07: 428 MS
QRS DURATION, ECG06: 102 MS
QTC CALCULATION (BEZET), ECG08: 430 MS
VENTRICULAR RATE, ECG03: 61 BPM

## 2019-09-08 PROCEDURE — G0299 HHS/HOSPICE OF RN EA 15 MIN: HCPCS

## 2019-09-08 PROCEDURE — 3331090002 HH PPS REVENUE DEBIT

## 2019-09-08 PROCEDURE — 400013 HH SOC

## 2019-09-08 PROCEDURE — 3331090001 HH PPS REVENUE CREDIT

## 2019-09-09 ENCOUNTER — HOME CARE VISIT (OUTPATIENT)
Dept: HOME HEALTH SERVICES | Facility: HOME HEALTH | Age: 74
End: 2019-09-09
Payer: MEDICARE

## 2019-09-09 VITALS
SYSTOLIC BLOOD PRESSURE: 144 MMHG | HEIGHT: 67 IN | RESPIRATION RATE: 18 BRPM | TEMPERATURE: 97.9 F | WEIGHT: 167 LBS | DIASTOLIC BLOOD PRESSURE: 86 MMHG | HEART RATE: 94 BPM | BODY MASS INDEX: 26.21 KG/M2 | OXYGEN SATURATION: 99 %

## 2019-09-09 PROCEDURE — 3331090002 HH PPS REVENUE DEBIT

## 2019-09-09 PROCEDURE — 3331090001 HH PPS REVENUE CREDIT

## 2019-09-10 PROCEDURE — 3331090002 HH PPS REVENUE DEBIT

## 2019-09-10 PROCEDURE — 3331090001 HH PPS REVENUE CREDIT

## 2019-09-11 ENCOUNTER — HOME CARE VISIT (OUTPATIENT)
Dept: SCHEDULING | Facility: HOME HEALTH | Age: 74
End: 2019-09-11
Payer: MEDICARE

## 2019-09-11 VITALS
SYSTOLIC BLOOD PRESSURE: 120 MMHG | DIASTOLIC BLOOD PRESSURE: 70 MMHG | OXYGEN SATURATION: 99 % | HEART RATE: 83 BPM | TEMPERATURE: 97.4 F | RESPIRATION RATE: 18 BRPM

## 2019-09-11 PROCEDURE — G0151 HHCP-SERV OF PT,EA 15 MIN: HCPCS

## 2019-09-11 PROCEDURE — 3331090001 HH PPS REVENUE CREDIT

## 2019-09-11 PROCEDURE — 3331090002 HH PPS REVENUE DEBIT

## 2019-09-12 ENCOUNTER — HOME CARE VISIT (OUTPATIENT)
Dept: SCHEDULING | Facility: HOME HEALTH | Age: 74
End: 2019-09-12
Payer: MEDICARE

## 2019-09-12 VITALS
TEMPERATURE: 97.8 F | DIASTOLIC BLOOD PRESSURE: 88 MMHG | SYSTOLIC BLOOD PRESSURE: 130 MMHG | RESPIRATION RATE: 18 BRPM | OXYGEN SATURATION: 98 % | HEART RATE: 94 BPM

## 2019-09-12 VITALS
RESPIRATION RATE: 18 BRPM | TEMPERATURE: 97 F | DIASTOLIC BLOOD PRESSURE: 88 MMHG | SYSTOLIC BLOOD PRESSURE: 130 MMHG | HEART RATE: 84 BPM | OXYGEN SATURATION: 98 %

## 2019-09-12 PROCEDURE — 3331090002 HH PPS REVENUE DEBIT

## 2019-09-12 PROCEDURE — 3331090001 HH PPS REVENUE CREDIT

## 2019-09-12 PROCEDURE — G0151 HHCP-SERV OF PT,EA 15 MIN: HCPCS

## 2019-09-12 PROCEDURE — G0299 HHS/HOSPICE OF RN EA 15 MIN: HCPCS

## 2019-09-13 ENCOUNTER — HOME CARE VISIT (OUTPATIENT)
Dept: SCHEDULING | Facility: HOME HEALTH | Age: 74
End: 2019-09-13
Payer: MEDICARE

## 2019-09-13 PROCEDURE — 3331090002 HH PPS REVENUE DEBIT

## 2019-09-13 PROCEDURE — 3331090001 HH PPS REVENUE CREDIT

## 2019-09-13 PROCEDURE — G0152 HHCP-SERV OF OT,EA 15 MIN: HCPCS

## 2019-09-14 PROCEDURE — 3331090001 HH PPS REVENUE CREDIT

## 2019-09-14 PROCEDURE — 3331090002 HH PPS REVENUE DEBIT

## 2019-09-15 PROCEDURE — 3331090001 HH PPS REVENUE CREDIT

## 2019-09-15 PROCEDURE — 3331090002 HH PPS REVENUE DEBIT

## 2019-09-16 VITALS
DIASTOLIC BLOOD PRESSURE: 65 MMHG | OXYGEN SATURATION: 97 % | SYSTOLIC BLOOD PRESSURE: 110 MMHG | HEART RATE: 67 BPM | TEMPERATURE: 97.3 F

## 2019-09-16 PROCEDURE — 3331090002 HH PPS REVENUE DEBIT

## 2019-09-16 PROCEDURE — 3331090001 HH PPS REVENUE CREDIT

## 2019-09-17 ENCOUNTER — HOME CARE VISIT (OUTPATIENT)
Dept: SCHEDULING | Facility: HOME HEALTH | Age: 74
End: 2019-09-17
Payer: MEDICARE

## 2019-09-17 VITALS
TEMPERATURE: 97.4 F | DIASTOLIC BLOOD PRESSURE: 88 MMHG | RESPIRATION RATE: 18 BRPM | OXYGEN SATURATION: 95 % | HEART RATE: 78 BPM | SYSTOLIC BLOOD PRESSURE: 144 MMHG

## 2019-09-17 VITALS
DIASTOLIC BLOOD PRESSURE: 84 MMHG | RESPIRATION RATE: 18 BRPM | SYSTOLIC BLOOD PRESSURE: 136 MMHG | OXYGEN SATURATION: 98 % | TEMPERATURE: 97.4 F | HEART RATE: 78 BPM

## 2019-09-17 PROCEDURE — 3331090002 HH PPS REVENUE DEBIT

## 2019-09-17 PROCEDURE — G0299 HHS/HOSPICE OF RN EA 15 MIN: HCPCS

## 2019-09-17 PROCEDURE — 3331090001 HH PPS REVENUE CREDIT

## 2019-09-17 PROCEDURE — G0151 HHCP-SERV OF PT,EA 15 MIN: HCPCS

## 2019-09-18 PROCEDURE — 3331090001 HH PPS REVENUE CREDIT

## 2019-09-18 PROCEDURE — 3331090002 HH PPS REVENUE DEBIT

## 2019-09-19 PROCEDURE — 3331090001 HH PPS REVENUE CREDIT

## 2019-09-19 PROCEDURE — 3331090002 HH PPS REVENUE DEBIT

## 2019-09-20 ENCOUNTER — HOME CARE VISIT (OUTPATIENT)
Dept: SCHEDULING | Facility: HOME HEALTH | Age: 74
End: 2019-09-20
Payer: MEDICARE

## 2019-09-20 ENCOUNTER — HOME CARE VISIT (OUTPATIENT)
Dept: HOME HEALTH SERVICES | Facility: HOME HEALTH | Age: 74
End: 2019-09-20
Payer: MEDICARE

## 2019-09-20 VITALS
RESPIRATION RATE: 18 BRPM | OXYGEN SATURATION: 100 % | DIASTOLIC BLOOD PRESSURE: 84 MMHG | SYSTOLIC BLOOD PRESSURE: 132 MMHG | HEART RATE: 78 BPM | TEMPERATURE: 97.4 F

## 2019-09-20 PROCEDURE — 3331090001 HH PPS REVENUE CREDIT

## 2019-09-20 PROCEDURE — G0299 HHS/HOSPICE OF RN EA 15 MIN: HCPCS

## 2019-09-20 PROCEDURE — 3331090002 HH PPS REVENUE DEBIT

## 2019-09-21 PROCEDURE — 3331090002 HH PPS REVENUE DEBIT

## 2019-09-21 PROCEDURE — 3331090001 HH PPS REVENUE CREDIT

## 2019-09-22 PROCEDURE — 3331090002 HH PPS REVENUE DEBIT

## 2019-09-22 PROCEDURE — 3331090001 HH PPS REVENUE CREDIT

## 2019-09-23 PROCEDURE — 3331090001 HH PPS REVENUE CREDIT

## 2019-09-23 PROCEDURE — 3331090002 HH PPS REVENUE DEBIT

## 2019-09-24 ENCOUNTER — HOME CARE VISIT (OUTPATIENT)
Dept: SCHEDULING | Facility: HOME HEALTH | Age: 74
End: 2019-09-24
Payer: MEDICARE

## 2019-09-24 VITALS
DIASTOLIC BLOOD PRESSURE: 78 MMHG | TEMPERATURE: 98.1 F | RESPIRATION RATE: 18 BRPM | HEART RATE: 78 BPM | OXYGEN SATURATION: 96 % | SYSTOLIC BLOOD PRESSURE: 136 MMHG

## 2019-09-24 VITALS
HEART RATE: 66 BPM | DIASTOLIC BLOOD PRESSURE: 80 MMHG | SYSTOLIC BLOOD PRESSURE: 136 MMHG | OXYGEN SATURATION: 100 % | TEMPERATURE: 97.4 F | RESPIRATION RATE: 18 BRPM

## 2019-09-24 PROCEDURE — G0299 HHS/HOSPICE OF RN EA 15 MIN: HCPCS

## 2019-09-24 PROCEDURE — 3331090001 HH PPS REVENUE CREDIT

## 2019-09-24 PROCEDURE — 3331090002 HH PPS REVENUE DEBIT

## 2019-09-24 PROCEDURE — G0151 HHCP-SERV OF PT,EA 15 MIN: HCPCS

## 2019-09-25 PROCEDURE — 3331090002 HH PPS REVENUE DEBIT

## 2019-09-25 PROCEDURE — 3331090001 HH PPS REVENUE CREDIT

## 2019-09-26 ENCOUNTER — HOME CARE VISIT (OUTPATIENT)
Dept: SCHEDULING | Facility: HOME HEALTH | Age: 74
End: 2019-09-26
Payer: MEDICARE

## 2019-09-26 PROCEDURE — G0152 HHCP-SERV OF OT,EA 15 MIN: HCPCS

## 2019-09-26 PROCEDURE — 3331090001 HH PPS REVENUE CREDIT

## 2019-09-26 PROCEDURE — G0155 HHCP-SVS OF CSW,EA 15 MIN: HCPCS

## 2019-09-26 PROCEDURE — 3331090002 HH PPS REVENUE DEBIT

## 2019-09-27 ENCOUNTER — HOME CARE VISIT (OUTPATIENT)
Dept: SCHEDULING | Facility: HOME HEALTH | Age: 74
End: 2019-09-27
Payer: MEDICARE

## 2019-09-27 VITALS
OXYGEN SATURATION: 97 % | TEMPERATURE: 97.7 F | DIASTOLIC BLOOD PRESSURE: 75 MMHG | HEART RATE: 79 BPM | SYSTOLIC BLOOD PRESSURE: 110 MMHG

## 2019-09-27 VITALS
TEMPERATURE: 98 F | RESPIRATION RATE: 18 BRPM | SYSTOLIC BLOOD PRESSURE: 128 MMHG | DIASTOLIC BLOOD PRESSURE: 78 MMHG | OXYGEN SATURATION: 100 % | HEART RATE: 82 BPM

## 2019-09-27 PROCEDURE — 3331090001 HH PPS REVENUE CREDIT

## 2019-09-27 PROCEDURE — 3331090002 HH PPS REVENUE DEBIT

## 2019-09-27 PROCEDURE — G0299 HHS/HOSPICE OF RN EA 15 MIN: HCPCS

## 2019-09-28 PROCEDURE — 3331090002 HH PPS REVENUE DEBIT

## 2019-09-28 PROCEDURE — 3331090001 HH PPS REVENUE CREDIT

## 2019-09-29 PROCEDURE — 3331090001 HH PPS REVENUE CREDIT

## 2019-09-29 PROCEDURE — 3331090002 HH PPS REVENUE DEBIT

## 2019-09-30 PROCEDURE — 3331090002 HH PPS REVENUE DEBIT

## 2019-09-30 PROCEDURE — 3331090001 HH PPS REVENUE CREDIT

## 2019-10-01 PROCEDURE — 3331090002 HH PPS REVENUE DEBIT

## 2019-10-01 PROCEDURE — 3331090001 HH PPS REVENUE CREDIT

## 2019-10-02 PROCEDURE — 3331090002 HH PPS REVENUE DEBIT

## 2019-10-02 PROCEDURE — 3331090001 HH PPS REVENUE CREDIT

## 2019-10-03 ENCOUNTER — HOME CARE VISIT (OUTPATIENT)
Dept: SCHEDULING | Facility: HOME HEALTH | Age: 74
End: 2019-10-03
Payer: MEDICARE

## 2019-10-03 PROCEDURE — 3331090002 HH PPS REVENUE DEBIT

## 2019-10-03 PROCEDURE — G0299 HHS/HOSPICE OF RN EA 15 MIN: HCPCS

## 2019-10-03 PROCEDURE — 3331090001 HH PPS REVENUE CREDIT

## 2019-10-04 VITALS
SYSTOLIC BLOOD PRESSURE: 130 MMHG | HEART RATE: 76 BPM | RESPIRATION RATE: 18 BRPM | DIASTOLIC BLOOD PRESSURE: 70 MMHG | TEMPERATURE: 97.8 F | OXYGEN SATURATION: 98 %

## 2019-10-04 PROCEDURE — 3331090002 HH PPS REVENUE DEBIT

## 2019-10-04 PROCEDURE — 3331090001 HH PPS REVENUE CREDIT

## 2019-10-05 PROCEDURE — 3331090001 HH PPS REVENUE CREDIT

## 2019-10-05 PROCEDURE — 3331090002 HH PPS REVENUE DEBIT

## 2019-10-06 PROCEDURE — 3331090001 HH PPS REVENUE CREDIT

## 2019-10-06 PROCEDURE — 3331090002 HH PPS REVENUE DEBIT

## 2019-10-07 PROCEDURE — 3331090002 HH PPS REVENUE DEBIT

## 2019-10-07 PROCEDURE — 3331090001 HH PPS REVENUE CREDIT

## 2019-10-08 ENCOUNTER — HOME CARE VISIT (OUTPATIENT)
Dept: SCHEDULING | Facility: HOME HEALTH | Age: 74
End: 2019-10-08
Payer: MEDICARE

## 2019-10-08 PROCEDURE — G0299 HHS/HOSPICE OF RN EA 15 MIN: HCPCS

## 2019-10-08 PROCEDURE — 3331090001 HH PPS REVENUE CREDIT

## 2019-10-08 PROCEDURE — 3331090002 HH PPS REVENUE DEBIT

## 2019-10-09 VITALS
RESPIRATION RATE: 16 BRPM | HEART RATE: 72 BPM | OXYGEN SATURATION: 98 % | DIASTOLIC BLOOD PRESSURE: 80 MMHG | TEMPERATURE: 97.7 F | SYSTOLIC BLOOD PRESSURE: 120 MMHG

## 2019-10-09 PROCEDURE — 3331090002 HH PPS REVENUE DEBIT

## 2019-10-09 PROCEDURE — 3331090001 HH PPS REVENUE CREDIT

## 2019-10-10 PROCEDURE — 3331090002 HH PPS REVENUE DEBIT

## 2019-10-10 PROCEDURE — 3331090001 HH PPS REVENUE CREDIT

## 2019-10-11 PROCEDURE — 3331090001 HH PPS REVENUE CREDIT

## 2019-10-11 PROCEDURE — 3331090002 HH PPS REVENUE DEBIT

## 2019-10-12 PROCEDURE — 3331090001 HH PPS REVENUE CREDIT

## 2019-10-12 PROCEDURE — 3331090002 HH PPS REVENUE DEBIT

## 2019-10-13 PROCEDURE — 3331090002 HH PPS REVENUE DEBIT

## 2019-10-13 PROCEDURE — 3331090001 HH PPS REVENUE CREDIT

## 2019-10-14 PROCEDURE — 3331090001 HH PPS REVENUE CREDIT

## 2019-10-14 PROCEDURE — 3331090002 HH PPS REVENUE DEBIT

## 2019-10-15 PROCEDURE — 3331090001 HH PPS REVENUE CREDIT

## 2019-10-15 PROCEDURE — 3331090002 HH PPS REVENUE DEBIT

## 2019-10-16 PROCEDURE — 3331090001 HH PPS REVENUE CREDIT

## 2019-10-16 PROCEDURE — 3331090002 HH PPS REVENUE DEBIT

## 2019-10-17 ENCOUNTER — HOME CARE VISIT (OUTPATIENT)
Dept: SCHEDULING | Facility: HOME HEALTH | Age: 74
End: 2019-10-17
Payer: MEDICARE

## 2019-10-17 VITALS
DIASTOLIC BLOOD PRESSURE: 84 MMHG | RESPIRATION RATE: 18 BRPM | HEART RATE: 58 BPM | SYSTOLIC BLOOD PRESSURE: 132 MMHG | OXYGEN SATURATION: 98 % | TEMPERATURE: 97.1 F

## 2019-10-17 PROCEDURE — 3331090001 HH PPS REVENUE CREDIT

## 2019-10-17 PROCEDURE — 3331090002 HH PPS REVENUE DEBIT

## 2019-10-17 PROCEDURE — G0299 HHS/HOSPICE OF RN EA 15 MIN: HCPCS

## 2019-10-18 ENCOUNTER — HOSPITAL ENCOUNTER (EMERGENCY)
Age: 74
Discharge: HOME | End: 2019-10-18
Attending: EMERGENCY MEDICINE
Payer: MEDICARE

## 2019-10-18 ENCOUNTER — APPOINTMENT (OUTPATIENT)
Dept: CT IMAGING | Age: 74
End: 2019-10-18
Attending: EMERGENCY MEDICINE
Payer: MEDICARE

## 2019-10-18 VITALS
WEIGHT: 186.73 LBS | SYSTOLIC BLOOD PRESSURE: 127 MMHG | HEIGHT: 67 IN | BODY MASS INDEX: 29.31 KG/M2 | HEART RATE: 64 BPM | RESPIRATION RATE: 16 BRPM | OXYGEN SATURATION: 100 % | TEMPERATURE: 97.7 F | DIASTOLIC BLOOD PRESSURE: 74 MMHG

## 2019-10-18 DIAGNOSIS — R00.2 PALPITATIONS: ICD-10-CM

## 2019-10-18 DIAGNOSIS — R10.32 ABDOMINAL PAIN, LLQ (LEFT LOWER QUADRANT): Primary | ICD-10-CM

## 2019-10-18 DIAGNOSIS — N13.4 HYDROURETER: ICD-10-CM

## 2019-10-18 DIAGNOSIS — K59.00 CONSTIPATION, UNSPECIFIED CONSTIPATION TYPE: ICD-10-CM

## 2019-10-18 LAB
ALBUMIN SERPL-MCNC: 3.7 G/DL (ref 3.5–5)
ALBUMIN/GLOB SERPL: 0.9 {RATIO} (ref 1.1–2.2)
ALP SERPL-CCNC: 84 U/L (ref 45–117)
ALT SERPL-CCNC: 14 U/L (ref 12–78)
ANION GAP SERPL CALC-SCNC: 6 MMOL/L (ref 5–15)
APPEARANCE UR: CLEAR
AST SERPL-CCNC: 10 U/L (ref 15–37)
ATRIAL RATE: 63 BPM
BACTERIA URNS QL MICRO: ABNORMAL /HPF
BASOPHILS # BLD: 0 K/UL (ref 0–0.1)
BASOPHILS NFR BLD: 1 % (ref 0–1)
BILIRUB SERPL-MCNC: 0.3 MG/DL (ref 0.2–1)
BILIRUB UR QL: NEGATIVE
BUN SERPL-MCNC: 18 MG/DL (ref 6–20)
BUN/CREAT SERPL: 15 (ref 12–20)
CALCIUM SERPL-MCNC: 9.2 MG/DL (ref 8.5–10.1)
CALCULATED P AXIS, ECG09: 77 DEGREES
CALCULATED R AXIS, ECG10: 18 DEGREES
CALCULATED T AXIS, ECG11: -3 DEGREES
CHLORIDE SERPL-SCNC: 106 MMOL/L (ref 97–108)
CO2 SERPL-SCNC: 28 MMOL/L (ref 21–32)
COLOR UR: ABNORMAL
CREAT SERPL-MCNC: 1.22 MG/DL (ref 0.55–1.02)
DIAGNOSIS, 93000: NORMAL
DIFFERENTIAL METHOD BLD: NORMAL
EOSINOPHIL # BLD: 0 K/UL (ref 0–0.4)
EOSINOPHIL NFR BLD: 0 % (ref 0–7)
EPITH CASTS URNS QL MICRO: ABNORMAL /LPF
ERYTHROCYTE [DISTWIDTH] IN BLOOD BY AUTOMATED COUNT: 13.4 % (ref 11.5–14.5)
GLOBULIN SER CALC-MCNC: 3.9 G/DL (ref 2–4)
GLUCOSE SERPL-MCNC: 126 MG/DL (ref 65–100)
GLUCOSE UR STRIP.AUTO-MCNC: NEGATIVE MG/DL
HCT VFR BLD AUTO: 41 % (ref 35–47)
HGB BLD-MCNC: 12.8 G/DL (ref 11.5–16)
HGB UR QL STRIP: NEGATIVE
IMM GRANULOCYTES # BLD AUTO: 0 K/UL (ref 0–0.04)
IMM GRANULOCYTES NFR BLD AUTO: 0 % (ref 0–0.5)
KETONES UR QL STRIP.AUTO: NEGATIVE MG/DL
LACTATE BLD-SCNC: 1.47 MMOL/L (ref 0.4–2)
LEUKOCYTE ESTERASE UR QL STRIP.AUTO: ABNORMAL
LYMPHOCYTES # BLD: 1.2 K/UL (ref 0.8–3.5)
LYMPHOCYTES NFR BLD: 24 % (ref 12–49)
MCH RBC QN AUTO: 27.2 PG (ref 26–34)
MCHC RBC AUTO-ENTMCNC: 31.2 G/DL (ref 30–36.5)
MCV RBC AUTO: 87.2 FL (ref 80–99)
MONOCYTES # BLD: 0.3 K/UL (ref 0–1)
MONOCYTES NFR BLD: 7 % (ref 5–13)
NEUTS SEG # BLD: 3.4 K/UL (ref 1.8–8)
NEUTS SEG NFR BLD: 68 % (ref 32–75)
NITRITE UR QL STRIP.AUTO: NEGATIVE
NRBC # BLD: 0 K/UL (ref 0–0.01)
NRBC BLD-RTO: 0 PER 100 WBC
P-R INTERVAL, ECG05: 154 MS
PH UR STRIP: 6 [PH] (ref 5–8)
PLATELET # BLD AUTO: 198 K/UL (ref 150–400)
PMV BLD AUTO: 11.1 FL (ref 8.9–12.9)
POTASSIUM SERPL-SCNC: 4.6 MMOL/L (ref 3.5–5.1)
PROT SERPL-MCNC: 7.6 G/DL (ref 6.4–8.2)
PROT UR STRIP-MCNC: NEGATIVE MG/DL
Q-T INTERVAL, ECG07: 410 MS
QRS DURATION, ECG06: 94 MS
QTC CALCULATION (BEZET), ECG08: 419 MS
RBC # BLD AUTO: 4.7 M/UL (ref 3.8–5.2)
RBC #/AREA URNS HPF: ABNORMAL /HPF (ref 0–5)
SODIUM SERPL-SCNC: 140 MMOL/L (ref 136–145)
SP GR UR REFRACTOMETRY: 1.01 (ref 1–1.03)
TROPONIN I SERPL-MCNC: <0.05 NG/ML
UA: UC IF INDICATED,UAUC: ABNORMAL
UROBILINOGEN UR QL STRIP.AUTO: 1 EU/DL (ref 0.2–1)
VENTRICULAR RATE, ECG03: 63 BPM
WBC # BLD AUTO: 5.1 K/UL (ref 3.6–11)
WBC URNS QL MICRO: ABNORMAL /HPF (ref 0–4)

## 2019-10-18 PROCEDURE — 96374 THER/PROPH/DIAG INJ IV PUSH: CPT

## 2019-10-18 PROCEDURE — 96375 TX/PRO/DX INJ NEW DRUG ADDON: CPT

## 2019-10-18 PROCEDURE — 99285 EMERGENCY DEPT VISIT HI MDM: CPT

## 2019-10-18 PROCEDURE — 3331090002 HH PPS REVENUE DEBIT

## 2019-10-18 PROCEDURE — 81001 URINALYSIS AUTO W/SCOPE: CPT

## 2019-10-18 PROCEDURE — 3331090001 HH PPS REVENUE CREDIT

## 2019-10-18 PROCEDURE — 87086 URINE CULTURE/COLONY COUNT: CPT

## 2019-10-18 PROCEDURE — 74011250636 HC RX REV CODE- 250/636: Performed by: EMERGENCY MEDICINE

## 2019-10-18 PROCEDURE — 93005 ELECTROCARDIOGRAM TRACING: CPT

## 2019-10-18 PROCEDURE — 74176 CT ABD & PELVIS W/O CONTRAST: CPT

## 2019-10-18 PROCEDURE — 87040 BLOOD CULTURE FOR BACTERIA: CPT

## 2019-10-18 PROCEDURE — 74011250637 HC RX REV CODE- 250/637: Performed by: EMERGENCY MEDICINE

## 2019-10-18 PROCEDURE — 83605 ASSAY OF LACTIC ACID: CPT

## 2019-10-18 PROCEDURE — 85025 COMPLETE CBC W/AUTO DIFF WBC: CPT

## 2019-10-18 PROCEDURE — 80053 COMPREHEN METABOLIC PANEL: CPT

## 2019-10-18 PROCEDURE — 36415 COLL VENOUS BLD VENIPUNCTURE: CPT

## 2019-10-18 PROCEDURE — 84484 ASSAY OF TROPONIN QUANT: CPT

## 2019-10-18 RX ORDER — POLYETHYLENE GLYCOL 3350 17 G/17G
17 POWDER, FOR SOLUTION ORAL DAILY
Qty: 170 G | Refills: 0 | Status: SHIPPED | OUTPATIENT
Start: 2019-10-18 | End: 2019-10-29

## 2019-10-18 RX ORDER — DOCUSATE SODIUM 100 MG/1
100 CAPSULE, LIQUID FILLED ORAL 2 TIMES DAILY
Qty: 60 CAP | Refills: 2 | Status: SHIPPED | OUTPATIENT
Start: 2019-10-18 | End: 2020-01-16

## 2019-10-18 RX ORDER — LORAZEPAM 2 MG/ML
0.5 INJECTION INTRAMUSCULAR
Status: COMPLETED | OUTPATIENT
Start: 2019-10-18 | End: 2019-10-18

## 2019-10-18 RX ORDER — MAGNESIUM CITRATE
296 SOLUTION, ORAL ORAL
Status: COMPLETED | OUTPATIENT
Start: 2019-10-18 | End: 2019-10-18

## 2019-10-18 RX ORDER — ONDANSETRON 2 MG/ML
4 INJECTION INTRAMUSCULAR; INTRAVENOUS
Status: COMPLETED | OUTPATIENT
Start: 2019-10-18 | End: 2019-10-18

## 2019-10-18 RX ORDER — FENTANYL CITRATE 50 UG/ML
25 INJECTION, SOLUTION INTRAMUSCULAR; INTRAVENOUS
Status: COMPLETED | OUTPATIENT
Start: 2019-10-18 | End: 2019-10-18

## 2019-10-18 RX ORDER — DICYCLOMINE HYDROCHLORIDE 20 MG/1
20 TABLET ORAL
Qty: 20 TAB | Refills: 0 | Status: SHIPPED | OUTPATIENT
Start: 2019-10-18 | End: 2021-02-17

## 2019-10-18 RX ADMIN — LORAZEPAM 0.5 MG: 2 INJECTION INTRAMUSCULAR; INTRAVENOUS at 07:04

## 2019-10-18 RX ADMIN — FENTANYL CITRATE 25 MCG: 50 INJECTION, SOLUTION INTRAMUSCULAR; INTRAVENOUS at 05:00

## 2019-10-18 RX ADMIN — SODIUM CHLORIDE 500 ML: 900 INJECTION, SOLUTION INTRAVENOUS at 05:43

## 2019-10-18 RX ADMIN — MAGNESIUM CITRATE 296 ML: 1.75 LIQUID ORAL at 07:05

## 2019-10-18 RX ADMIN — ONDANSETRON HYDROCHLORIDE 4 MG: 2 INJECTION, SOLUTION INTRAMUSCULAR; INTRAVENOUS at 05:00

## 2019-10-18 NOTE — ED NOTES
Pt is reporting \"fluttering in her chest.\" Dr. Harini Brown made aware. Verbal orders for lab troponin.

## 2019-10-18 NOTE — PROGRESS NOTES
Nursing request to assist with discharge transportation for patient. Chart review reveals patient currently seeing [facility], nursing came out yesterday  Patient has walker, lives with a \"boyfriend\" and grandson. Contact information given for mariaa Ervin [phone]. Phone number rings as busy signal, no answer. Additional contact found in CM not 2014 for mariaa Shore [phone]. Privacy appropriate message left on this number to please call CM back. Call placed to PCP office Brynn Aguilar [phone]). PCP's office only has mariaa Brand for contact information. Call placed to [facility]. Spoke to nurse Ludwig Valerio who saw patient yesterday. Ludwig Valerio confirms that there is always someone in the home when [facility] comes to see patient, patient is ambulatory and patient is very noncompliant with her meds Lactulose and Miralax. currentl groin patient is likely due to constipation. Met with patient in room. Patient drowsy but arouses to voice. Patient states she can get in and out of a car and that her daughter Adela Brand should come to get her. CM informed patient that staff are having difficulty reaching patient's family. Patient expresses understanding. 0940:  Number listed for mariaa Shore is incorrect. 1000:  Met with patient again. Patient states mariaa Shore passed away 2 years ago. Patient states she can get into her house (she knows where the key is hidden). Patient states her granddaughter is at home and her granddaughter's friend. CM informed patient that staff and CM are unable to reach any family for her, we can send her home (which she wants) if she can get into her house. CM will set up [facility] transportation home for patient. Nursing notified. 1015: [facility] stretcher transport home arranged for 11:15m ETA. PCS form completed and placed on patient's chart. Nursing informed    1210:   Attempt to reach pt' s daughter again to inform of [facility] transport home for patient (leaving now).   Phone still ringing as busy, no answer        Bao Varner RN, [address] [place] [address]  [phone]

## 2019-10-18 NOTE — ED NOTES
Bedside and Verbal shift change report given to [address] [place] [address] 9 (oncoming nurse) by Raz Mayes RN (offgoing nurse). Report included the following information SBAR, ED Summary, MAR and Recent Results.

## 2019-10-18 NOTE — ED PROVIDER NOTES
EMERGENCY DEPARTMENT HISTORY AND PHYSICAL EXAM      Date: 10/18/2019  Patient Name: Sandoval Ramos    History of Presenting Illness     Chief Complaint   Patient presents with    Vaginal Pain     pt was brought in via EMs with a cc of 10/10 vaginal pain. History Provided By: Patient and EMS    HPI: Sandoval Ramos, 76 y.o. female presents to the ED with cc of vaginal pain and rectal pain. Patient states that her pain is a 10 out of 10 in severity. She believes it is due to a urinary tract infection. She states that every time she urinates she has pain in her urethra and also her rectum. She stated that she had not had a bowel movement in 2 weeks, but she did have a bowel movement here. She denies nausea, vomiting, fever or chills. She has some mild lower abdominal pain associated with her symptoms. She denies chest pain or shortness of breath. There are no other complaints, changes, or physical findings at this time. PCP: Joni Levi MD    No current facility-administered medications on file prior to encounter. Current Outpatient Medications on File Prior to Encounter   Medication Sig Dispense Refill    dilTIAZem SR (CARDIZEM SR) 60 mg SR capsule Take 1 Cap by mouth every twelve (12) hours. 30 Cap 3    lactulose (CHRONULAC) 10 gram/15 mL solution Take 15 mL by mouth three (3) times daily. 3 Bottle 0    polyethylene glycol (MIRALAX) 17 gram/dose powder Take 17 g by mouth daily. 1 tablespoon with 8 oz of water daily 170 g 0    LORazepam (ATIVAN) 0.5 mg tablet Take 1 Tab by mouth every eight (8) hours as needed for Anxiety.  Max Daily Amount: 1.5 mg. 20 Tab 0       Past History     Past Medical History:  Past Medical History:   Diagnosis Date    Agoraphobia without mention of panic attacks 2/17/2014    Anxiety disorder 8/18/2013    Arthritis     osteo    Breast pain, left 4/7/2015    CAD (coronary artery disease), native coronary artery 12/1/2015    no stents    Chronic chest pain 1/13/2014    Chronic pain associated with significant psychosocial dysfunction 2/17/2014    Depression 8/18/2013    Diabetes ([facility]     type II    Duplicated right renal collecting system 3/13/2014    GERD (gastroesophageal reflux disease)     Gout, joint     Hypercholesteremia     hyercholesterolemia    Hypertension     Nephrolithiasis 3/13/2014    Personal history of noncompliance with medical treatment, presenting hazards to health 5/30/2014    Psychotic disorder ([facility]     Vaginal pain 7/30/2014       Past Surgical History:  Past Surgical History:   Procedure Laterality Date    EGD  4/23/2010         HX CHOLECYSTECTOMY  09/20/2018    lap jesus    HX CYST REMOVAL      cyst removed from left wrist    HX HYSTERECTOMY      partial    HX OTHER SURGICAL      bladder dilitation    HX TUBAL LIGATION      HX UROLOGICAL      kidney stones       Family History:  Family History   Problem Relation Age of Onset    Stroke Mother     Heart Disease Mother     Cancer Father         type unknown    Heart Disease Son     Liver Disease Son     Heart Disease Daughter     Malignant Hyperthermia Neg Hx     Pseudocholinesterase Deficiency Neg Hx     Delayed Awakening Neg Hx     Post-op Nausea/Vomiting Neg Hx     Emergence Delirium Neg Hx     Post-op Cognitive Dysfunction Neg Hx     Other Neg Hx        Social History:  Social History     Tobacco Use    Smoking status: Never Smoker    Smokeless tobacco: Never Used   Substance Use Topics    Alcohol use: No    Drug use: No       Allergies:   Allergies   Allergen Reactions    Amoxicillin Hives    Sulfa (Sulfonamide Antibiotics) Hives and Itching    Mirtazapine Itching and Nausea Only     Funny feeling in chest    Percocet [Oxycodone-Acetaminophen] Nausea and Vomiting    Codeine Nausea and Vomiting    Crestor [Rosuvastatin] Other (comments)     myalgias    Nitroglycerin Unknown (comments)     Patient cannot remember why she is allergic to it     [address] [place] [address] Prednisone Itching    Zithromax [Azithromycin] Itching     Not sure what it does,taken long time ago         Review of Systems   Review of Systems   Constitutional: Negative for chills and fever. HENT: Negative for congestion. Eyes: Negative. Respiratory: Negative for shortness of breath. Cardiovascular: Negative for chest pain. Gastrointestinal: Positive for abdominal pain. Negative for nausea. Endocrine: Negative for heat intolerance. Genitourinary: Positive for pelvic pain. Musculoskeletal: Negative for back pain. Skin: Negative for rash. Allergic/Immunologic: Negative for immunocompromised state. Neurological: Positive for seizures and light-headedness. Hematological: Does not bruise/bleed easily. Psychiatric/Behavioral: Negative. All other systems reviewed and are negative. Physical Exam   Physical Exam   Constitutional: She appears well-developed and well-nourished. She appears distressed. HENT:   Head: Normocephalic. Eyes: Pupils are equal, round, and reactive to light. EOM are normal.   Neck: Normal range of motion. Neck supple. Cardiovascular: Normal rate, regular rhythm, normal heart sounds and intact distal pulses. Pulmonary/Chest: Effort normal and breath sounds normal.   Abdominal: Soft. Bowel sounds are normal. There is tenderness. Bilateral lower quadrant tenderness   Genitourinary: Vagina normal.   Genitourinary Comments: External vaginal exam unremarkable   Musculoskeletal: Normal range of motion. Neurological: She is alert. Skin: Skin is warm and dry. Psychiatric: She has a normal mood and affect. Her behavior is normal.   Nursing note and vitals reviewed.       Diagnostic Study Results     Labs -     Recent Results (from the past 12 hour(s))   CBC WITH AUTOMATED DIFF    Collection Time: 10/18/19  4:42 AM   Result Value Ref Range    WBC 5.1 3.6 - 11.0 K/uL    RBC 4.70 3.80 - 5.20 M/uL    HGB 12.8 11.5 - 16.0 g/dL    HCT 41.0 35.0 - 47.0 %    MCV 87.2 80.0 - 99.0 FL    MCH 27.2 26.0 - 34.0 PG    MCHC 31.2 30.0 - 36.5 g/dL    RDW 13.4 11.5 - 14.5 %    PLATELET 777 325 - 311 K/uL    MPV 11.1 8.9 - 12.9 FL    NRBC 0.0 0  WBC    ABSOLUTE NRBC 0.00 0.00 - 0.01 K/uL    NEUTROPHILS 68 32 - 75 %    LYMPHOCYTES 24 12 - 49 %    MONOCYTES 7 5 - 13 %    EOSINOPHILS 0 0 - 7 %    BASOPHILS 1 0 - 1 %    IMMATURE GRANULOCYTES 0 0.0 - 0.5 %    ABS. NEUTROPHILS 3.4 1.8 - 8.0 K/UL    ABS. LYMPHOCYTES 1.2 0.8 - 3.5 K/UL    ABS. MONOCYTES 0.3 0.0 - 1.0 K/UL    ABS. EOSINOPHILS 0.0 0.0 - 0.4 K/UL    ABS. BASOPHILS 0.0 0.0 - 0.1 K/UL    ABS. IMM. GRANS. 0.0 0.00 - 0.04 K/UL    DF AUTOMATED     POC LACTIC ACID    Collection Time: 10/18/19  4:52 AM   Result Value Ref Range    Lactic Acid (POC) 1.47 0.40 - 2.00 mmol/L       Radiologic Studies -   CT ABD PELV WO CONT   Final Result   IMPRESSION:   Marked constipation, especially in the rectosigmoid. Nonobstructing bilateral renal calculi   Mild new right hydronephrosis and hydroureter   Stable T11 and L4 compression fractures. CT Results  (Last 48 hours)    None        CXR Results  (Last 48 hours)    None          Medical Decision Making   I am the first provider for this patient. I reviewed the vital signs, available nursing notes, past medical history, past surgical history, family history and social history. Vital Signs-Reviewed the patient's vital signs. Patient Vitals for the past 12 hrs:   Temp Pulse Resp BP SpO2   10/18/19 0412 97.4 °F (36.3 °C) 77 18 188/87 100 %       EKG interpretation: (Preliminary)  Rhythm: normal sinus rhythm; and regular . Rate (approx.): 63; Axis: normal; FL interval: normal; QRS interval: normal ; ST/T wave: non-specific changes; Other findings: unchanged from previous ekg.     Records Reviewed: Nursing Notes, Old Medical Records, Ambulance Run Sheet, Previous Radiology Studies and Previous Laboratory Studies    Provider Notes (Medical Decision Making):   UTI, pyelonephritis, cellulitis, vaginitis, diverticulitis    ED Course:   Initial assessment performed. The patients presenting problems have been discussed, and they are in agreement with the care plan formulated and outlined with them. I have encouraged them to ask questions as they arise throughout their visit. Progress note: The patient is complaining of her heart fluttering in the emergency department. She had mentioned that she usually takes something for her nerves. I have added on an EKG and a troponin to her lab work    Rectal exam:    Light brown stool no impaction. Progress note: The patient is feeling better. She is advised to follow-up and return to ER for         Critical Care Time:   0    Disposition:  home    PLAN:  1. Discharge Medication List as of 10/18/2019  7:03 AM      START taking these medications    Details   docusate sodium (DULCOLAX STOOL SOFTENER, DSS,) 100 mg capsule Take 1 Cap by mouth two (2) times a day for 90 days. , Normal, Disp-60 Cap, R-2      dicyclomine (BENTYL) 20 mg tablet Take 1 Tab by mouth every six (6) hours as needed (abdominal cramps) for up to 20 doses. , Normal, Disp-20 Tab, R-0         CONTINUE these medications which have CHANGED    Details   polyethylene glycol (MIRALAX) 17 gram/dose powder Take 17 g by mouth daily. 1 tablespoon with 8 oz of water daily, Normal, Disp-170 g, R-0         CONTINUE these medications which have NOT CHANGED    Details   dilTIAZem SR (CARDIZEM SR) 60 mg SR capsule Take 1 Cap by mouth every twelve (12) hours. , Normal, Disp-30 Cap, R-3      lactulose (CHRONULAC) 10 gram/15 mL solution Take 15 mL by mouth three (3) times daily. , Print, Disp-3 Bottle, R-0      LORazepam (ATIVAN) 0.5 mg tablet Take 1 Tab by mouth every eight (8) hours as needed for Anxiety. Max Daily Amount: 1.5 mg., Print, Disp-20 Tab, R-0           2.    Follow-up Information     Follow up With Specialties Details Why Contact Info    Deisi West MD Family Practice Call in 3 days  [address] [facility]  [phone]      [facility] EMERGENCY DEPT Emergency Medicine  If symptoms worsen [address]  [address] [facility]  [phone]        Return to ED if worse     Diagnosis     Clinical Impression:   1. Abdominal pain, LLQ (left lower quadrant)    2. Constipation, unspecified constipation type    3. Palpitations    4. Hydroureter        Attestations:    Nadine Ramey MD    Please note that this dictation was completed with [employer], the computer voice recognition software. Quite often unanticipated grammatical, syntax, homophones, and other interpretive errors are inadvertently transcribed by the computer software. Please disregard these errors. Please excuse any errors that have escaped final proofreading. Thank you.

## 2019-10-18 NOTE — ED NOTES
This rn has made 3 attempts to call pt's listed family contact. Phone goes to busy signal. Pt states that she does not have a cell phone and is unable to give more phone numbers for family. Pt also states that she would now like to have her enema. Pt given soap suds enema, tolerates well. Call light in pt's hand.

## 2019-10-18 NOTE — ED NOTES
Pt encouraged to move to bedside commode to attempt bowel movement p mag citrate. Pt declines, stating that she wants to go home and feel better.  Also requests food and states that she wants to see her er doctor but declines to states why

## 2019-10-18 NOTE — ED NOTES
Pt's er md has gone home. Pt requests food and states desire to leave. Declines to stay for soap suds enema. Food given, pt assisted w/ getting dressed by female staff.

## 2019-10-18 NOTE — ED TRIAGE NOTES
Pt si brought in via EMS. EMS reports that they found a couple of bed bugs at Pts home. Pt upon arrival was decontaminated in decon room with assistance from Tariq Schroeder RN. Pt reports vaginal pain that started yesterday, worse today. Pt is A&O x4, GCS of 15.

## 2019-10-18 NOTE — DISCHARGE INSTRUCTIONS
Patient Education        Abdominal Pain: Care Instructions  Your Care Instructions    Abdominal pain has many possible causes. Some aren't serious and get better on their own in a few days. Others need more testing and treatment. If your pain continues or gets worse, you need to be rechecked and may need more tests to find out what is wrong. You may need surgery to correct the problem. Don't ignore new symptoms, such as fever, nausea and vomiting, urination problems, pain that gets worse, and dizziness. These may be signs of a more serious problem. Your doctor may have recommended a follow-up visit in the next 8 to 12 hours. If you are not getting better, you may need more tests or treatment. The doctor has checked you carefully, but problems can develop later. If you notice any problems or new symptoms, get medical treatment right away. Follow-up care is a key part of your treatment and safety. Be sure to make and go to all appointments, and call your doctor if you are having problems. It's also a good idea to know your test results and keep a list of the medicines you take. How can you care for yourself at home? · Rest until you feel better. · To prevent dehydration, drink plenty of fluids, enough so that your urine is light yellow or clear like water. Choose water and other caffeine-free clear liquids until you feel better. If you have kidney, heart, or liver disease and have to limit fluids, talk with your doctor before you increase the amount of fluids you drink. · If your stomach is upset, eat mild foods, such as rice, dry toast or crackers, bananas, and applesauce. Try eating several small meals instead of two or three large ones. · Wait until 48 hours after all symptoms have gone away before you have spicy foods, alcohol, and drinks that contain caffeine. · Do not eat foods that are high in fat. · Avoid anti-inflammatory medicines such as aspirin, ibuprofen (Advil, Motrin), and naproxen (Aleve). These can cause stomach upset. Talk to your doctor if you take daily aspirin for another health problem. When should you call for help? Call 911 anytime you think you may need emergency care. For example, call if:    · You passed out (lost consciousness).     · You pass maroon or very bloody stools.     · You vomit blood or what looks like coffee grounds.     · You have new, severe belly pain.    Call your doctor now or seek immediate medical care if:    · Your pain gets worse, especially if it becomes focused in one area of your belly.     · You have a new or higher fever.     · Your stools are black and look like tar, or they have streaks of blood.     · You have unexpected vaginal bleeding.     · You have symptoms of a urinary tract infection. These may include:  ? Pain when you urinate. ? Urinating more often than usual.  ? Blood in your urine.     · You are dizzy or lightheaded, or you feel like you may faint.    Watch closely for changes in your health, and be sure to contact your doctor if:    · You are not getting better after 1 day (24 hours). Where can you learn more? Go to http://loboLong Tailmichel.info/. Enter W273 in the search box to learn more about \"Abdominal Pain: Care Instructions. \"  Current as of: June 26, 2019  Content Version: 12.2  © 7630-2580 [employer]. Care instructions adapted under license by [employer] (which disclaims liability or warranty for this information). If you have questions about a medical condition or this instruction, always ask your healthcare professional. [place] [address] any warranty or liability for your use of this information. Patient Education        Constipation: Care Instructions  Your Care Instructions    Constipation means that you have a hard time passing stools (bowel movements). People pass stools from 3 times a day to once every 3 days. What is normal for you may be different.  Constipation may occur with pain in the rectum and cramping. The pain may get worse when you try to pass stools. Sometimes there are small amounts of bright red blood on toilet paper or the surface of stools. This is because of enlarged veins near the rectum (hemorrhoids). A few changes in your diet and lifestyle may help you avoid ongoing constipation. Your doctor may also prescribe medicine to help loosen your stool. Some medicines can cause constipation. These include pain medicines and antidepressants. Tell your doctor about all the medicines you take. Your doctor may want to make a medicine change to ease your symptoms. Follow-up care is a key part of your treatment and safety. Be sure to make and go to all appointments, and call your doctor if you are having problems. It's also a good idea to know your test results and keep a list of the medicines you take. How can you care for yourself at home? · Drink plenty of fluids, enough so that your urine is light yellow or clear like water. If you have kidney, heart, or liver disease and have to limit fluids, talk with your doctor before you increase the amount of fluids you drink. · Include high-fiber foods in your diet each day. These include fruits, vegetables, beans, and whole grains. · Get at least 30 minutes of exercise on most days of the week. Walking is a good choice. You also may want to do other activities, such as running, swimming, cycling, or playing tennis or team sports. · Take a fiber supplement, such as Citrucel or Metamucil, every day. Read and follow all instructions on the label. · Schedule time each day for a bowel movement. A daily routine may help. Take your time having your bowel movement. · Support your feet with a small step stool when you sit on the toilet. This helps flex your hips and places your pelvis in a squatting position. · Your doctor may recommend an over-the-counter laxative to relieve your constipation.  Examples are Milk of Magnesia and MiraLax. Read and follow all instructions on the label. Do not use laxatives on a long-term basis. When should you call for help? Call your doctor now or seek immediate medical care if:    · You have new or worse belly pain.     · You have new or worse nausea or vomiting.     · You have blood in your stools.    Watch closely for changes in your health, and be sure to contact your doctor if:    · Your constipation is getting worse.     · You do not get better as expected. Where can you learn more? Go to http://lobo-michel.info/. Enter 21  in the search box to learn more about \"Constipation: Care Instructions. \"  Current as of: June 26, 2019  Content Version: 12.2  © 8973-8048 [employer]. Care instructions adapted under license by [employer] (which disclaims liability or warranty for this information). If you have questions about a medical condition or this instruction, always ask your healthcare professional. [place] [address] any warranty or liability for your use of this information. Patient Education        Palpitations: Care Instructions  Your Care Instructions    Heart palpitations are the uncomfortable sensation that your heart is beating fast or irregularly. You might feel pounding or fluttering in your chest. It might feel like your heart is skipping a beat. Although palpitations may be caused by a heart problem, they also occur because of stress, fatigue, or use of alcohol, caffeine, or nicotine. Many medicines, including diet pills, antihistamines, decongestants, and some herbal products, can cause heart palpitations. Nearly everyone has palpitations from time to time. Depending on your symptoms, your doctor may need to do more tests to try to find the cause of your palpitations. Follow-up care is a key part of your treatment and safety.  Be sure to make and go to all appointments, and call your doctor if you are having problems. It's also a good idea to know your test results and keep a list of the medicines you take. How can you care for yourself at home? · Avoid caffeine, nicotine, and excess alcohol. · Do not take illegal drugs, such as methamphetamines and cocaine. · Do not take weight loss or diet medicines unless you talk with your doctor first.  · Get plenty of sleep. · Do not overeat. · If you have palpitations again, take deep breaths and try to relax. This may slow a racing heart. · If you start to feel lightheaded, lie down to avoid injuries that might result if you pass out and fall down. · Keep a record of your palpitations and bring it to your next doctor's appointment. Write down:  ? The date and time. ? Your pulse. (If your heart is beating fast, it may be hard to count your pulse.)  ? What you were doing when the palpitations started. ? How long the palpitations lasted. ? Any other symptoms. · If an activity causes palpitations, slow down or stop. Talk to your doctor before you do that activity again. · Take your medicines exactly as prescribed. Call your doctor if you think you are having a problem with your medicine. When should you call for help? Call 911 anytime you think you may need emergency care. For example, call if:    · You passed out (lost consciousness).     · You have symptoms of a heart attack. These may include:  ? Chest pain or pressure, or a strange feeling in the chest.  ? Sweating. ? Shortness of breath. ? Pain, pressure, or a strange feeling in the back, neck, jaw, or upper belly or in one or both shoulders or arms. ? Lightheadedness or sudden weakness. ? A fast or irregular heartbeat. After you call 911, the  may tell you to chew 1 adult-strength or 2 to 4 low-dose aspirin. Wait for an ambulance. Do not try to drive yourself.     · You have symptoms of a stroke.  These may include:  ? Sudden numbness, tingling, weakness, or loss of movement in your face, arm, or leg, especially on only one side of your body. ? Sudden vision changes. ? Sudden trouble speaking. ? Sudden confusion or trouble understanding simple statements. ? Sudden problems with walking or balance. ? A sudden, severe headache that is different from past headaches.    Call your doctor now or seek immediate medical care if:    · You have heart palpitations and:  ? Are dizzy or lightheaded, or you feel like you may faint. ? Have new or increased shortness of breath.    Watch closely for changes in your health, and be sure to contact your doctor if:    · You continue to have heart palpitations. Where can you learn more? Go to http://lobo-michel.info/. Enter R508 in the search box to learn more about \"Palpitations: Care Instructions. \"  Current as of: April 9, 2019  Content Version: 12.2  © 0140-8074 [employer]. Care instructions adapted under license by [employer] (which disclaims liability or warranty for this information). If you have questions about a medical condition or this instruction, always ask your healthcare professional. [place] [address] any warranty or liability for your use of this information.

## 2019-10-19 LAB
BACTERIA SPEC CULT: NORMAL
CC UR VC: NORMAL
SERVICE CMNT-IMP: NORMAL

## 2019-10-19 PROCEDURE — 3331090002 HH PPS REVENUE DEBIT

## 2019-10-19 PROCEDURE — 3331090001 HH PPS REVENUE CREDIT

## 2019-10-20 PROCEDURE — 3331090002 HH PPS REVENUE DEBIT

## 2019-10-20 PROCEDURE — 3331090001 HH PPS REVENUE CREDIT

## 2019-10-21 PROCEDURE — 3331090001 HH PPS REVENUE CREDIT

## 2019-10-21 PROCEDURE — 3331090002 HH PPS REVENUE DEBIT

## 2019-10-22 PROCEDURE — 3331090001 HH PPS REVENUE CREDIT

## 2019-10-22 PROCEDURE — 3331090002 HH PPS REVENUE DEBIT

## 2019-10-23 ENCOUNTER — HOME CARE VISIT (OUTPATIENT)
Dept: SCHEDULING | Facility: HOME HEALTH | Age: 74
End: 2019-10-23
Payer: MEDICARE

## 2019-10-23 VITALS
TEMPERATURE: 97.8 F | DIASTOLIC BLOOD PRESSURE: 82 MMHG | OXYGEN SATURATION: 98 % | SYSTOLIC BLOOD PRESSURE: 128 MMHG | RESPIRATION RATE: 18 BRPM | HEART RATE: 56 BPM

## 2019-10-23 LAB
BACTERIA SPEC CULT: NORMAL
SERVICE CMNT-IMP: NORMAL

## 2019-10-23 PROCEDURE — 3331090001 HH PPS REVENUE CREDIT

## 2019-10-23 PROCEDURE — 3331090002 HH PPS REVENUE DEBIT

## 2019-10-23 PROCEDURE — G0299 HHS/HOSPICE OF RN EA 15 MIN: HCPCS

## 2019-10-24 PROCEDURE — 3331090002 HH PPS REVENUE DEBIT

## 2019-10-24 PROCEDURE — 3331090001 HH PPS REVENUE CREDIT

## 2019-10-25 PROCEDURE — 3331090001 HH PPS REVENUE CREDIT

## 2019-10-25 PROCEDURE — 3331090002 HH PPS REVENUE DEBIT

## 2019-10-26 PROCEDURE — 3331090001 HH PPS REVENUE CREDIT

## 2019-10-26 PROCEDURE — 3331090002 HH PPS REVENUE DEBIT

## 2019-10-27 PROCEDURE — 3331090002 HH PPS REVENUE DEBIT

## 2019-10-27 PROCEDURE — 3331090001 HH PPS REVENUE CREDIT

## 2019-10-28 PROCEDURE — 3331090001 HH PPS REVENUE CREDIT

## 2019-10-28 PROCEDURE — 3331090002 HH PPS REVENUE DEBIT

## 2019-10-29 ENCOUNTER — APPOINTMENT (OUTPATIENT)
Dept: GENERAL RADIOLOGY | Age: 74
End: 2019-10-29
Attending: PHYSICIAN ASSISTANT
Payer: MEDICARE

## 2019-10-29 ENCOUNTER — HOSPITAL ENCOUNTER (EMERGENCY)
Age: 74
Discharge: HOME | End: 2019-10-29
Attending: EMERGENCY MEDICINE
Payer: MEDICARE

## 2019-10-29 VITALS
RESPIRATION RATE: 18 BRPM | OXYGEN SATURATION: 100 % | SYSTOLIC BLOOD PRESSURE: 150 MMHG | TEMPERATURE: 97.8 F | HEART RATE: 82 BPM | DIASTOLIC BLOOD PRESSURE: 86 MMHG

## 2019-10-29 DIAGNOSIS — K59.09 OTHER CONSTIPATION: ICD-10-CM

## 2019-10-29 DIAGNOSIS — N30.00 ACUTE CYSTITIS WITHOUT HEMATURIA: ICD-10-CM

## 2019-10-29 DIAGNOSIS — M25.551 PAIN OF BOTH HIP JOINTS: Primary | ICD-10-CM

## 2019-10-29 DIAGNOSIS — M25.552 PAIN OF BOTH HIP JOINTS: Primary | ICD-10-CM

## 2019-10-29 LAB
APPEARANCE UR: CLEAR
BACTERIA URNS QL MICRO: NEGATIVE /HPF
BILIRUB UR QL CFM: NEGATIVE
COLOR UR: ABNORMAL
EPITH CASTS URNS QL MICRO: ABNORMAL /LPF
GLUCOSE UR STRIP.AUTO-MCNC: NEGATIVE MG/DL
HGB UR QL STRIP: NEGATIVE
KETONES UR QL STRIP.AUTO: NEGATIVE MG/DL
LEUKOCYTE ESTERASE UR QL STRIP.AUTO: ABNORMAL
MUCOUS THREADS URNS QL MICRO: ABNORMAL /LPF
NITRITE UR QL STRIP.AUTO: NEGATIVE
PH UR STRIP: 6.5 [PH] (ref 5–8)
PROT UR STRIP-MCNC: ABNORMAL MG/DL
RBC #/AREA URNS HPF: ABNORMAL /HPF (ref 0–5)
SP GR UR REFRACTOMETRY: 1.02 (ref 1–1.03)
UA: UC IF INDICATED,UAUC: ABNORMAL
UROBILINOGEN UR QL STRIP.AUTO: 1 EU/DL (ref 0.2–1)
WBC URNS QL MICRO: ABNORMAL /HPF (ref 0–4)

## 2019-10-29 PROCEDURE — 74011250637 HC RX REV CODE- 250/637: Performed by: PHYSICIAN ASSISTANT

## 2019-10-29 PROCEDURE — 99284 EMERGENCY DEPT VISIT MOD MDM: CPT

## 2019-10-29 PROCEDURE — 87086 URINE CULTURE/COLONY COUNT: CPT

## 2019-10-29 PROCEDURE — 72170 X-RAY EXAM OF PELVIS: CPT

## 2019-10-29 PROCEDURE — 81001 URINALYSIS AUTO W/SCOPE: CPT

## 2019-10-29 PROCEDURE — 3331090002 HH PPS REVENUE DEBIT

## 2019-10-29 PROCEDURE — 3331090001 HH PPS REVENUE CREDIT

## 2019-10-29 PROCEDURE — 71045 X-RAY EXAM CHEST 1 VIEW: CPT

## 2019-10-29 RX ORDER — ACETAMINOPHEN 325 MG/1
650 TABLET ORAL
Qty: 20 TAB | Refills: 0 | Status: SHIPPED | OUTPATIENT
Start: 2019-10-29 | End: 2021-02-16

## 2019-10-29 RX ORDER — CEPHALEXIN 500 MG/1
500 CAPSULE ORAL 2 TIMES DAILY
Qty: 14 CAP | Refills: 0 | Status: SHIPPED | OUTPATIENT
Start: 2019-10-29 | End: 2019-11-05

## 2019-10-29 RX ORDER — ACETAMINOPHEN 325 MG/1
650 TABLET ORAL
Status: COMPLETED | OUTPATIENT
Start: 2019-10-29 | End: 2019-10-29

## 2019-10-29 RX ORDER — POLYETHYLENE GLYCOL 3350 17 G/17G
17 POWDER, FOR SOLUTION ORAL DAILY
Qty: 170 G | Refills: 0 | Status: SHIPPED | OUTPATIENT
Start: 2019-10-29 | End: 2022-01-01

## 2019-10-29 RX ADMIN — ACETAMINOPHEN 650 MG: 325 TABLET ORAL at 19:16

## 2019-10-29 NOTE — ED TRIAGE NOTES
Patient here with c/o bilateral hip pain after falling 3 weeks ago. Patient also here for c/o constipation. States \" it was last week or week before when I had one\".

## 2019-10-29 NOTE — ED PROVIDER NOTES
EMERGENCY DEPARTMENT HISTORY AND PHYSICAL EXAM    Date: 10/29/2019  Patient Name: Kei Farr    History of Presenting Illness     Chief Complaint   Patient presents with    Hip Pain    Constipation         History Provided By: Patient    HPI: Kei Farr is a 76 y.o. female with a PMH of hypertension, anxiety, nephrolithiasis, hypercholesteremia, GERD, diabetes, gout, depression, CAD who presents with via EMS complaining of bilateral hip pain, pain in ribs and no BM x2 weeks. Patient states she fell about 2 to 3 weeks ago but states pain is not from the fall. Patient states when she goes to turn over in the bed she has a lot of pain so she has just been laying around in the bed. Patient also reports urinary frequency without dysuria. Patient denies any abdominal pain, chest pain, shortness of breath, fever or chills. PCP: Bridget Miller MD    Current Outpatient Medications   Medication Sig Dispense Refill    polyethylene glycol (MIRALAX) 17 gram/dose powder Take 17 g by mouth daily. 1 tablespoon with 8 oz of water daily 170 g 0    acetaminophen (TYLENOL) 325 mg tablet Take 2 Tabs by mouth every six (6) hours as needed for Pain. Indications: pain associated with arthritis 20 Tab 0    cephALEXin (KEFLEX) 500 mg capsule Take 1 Cap by mouth two (2) times a day for 7 days. 14 Cap 0    docusate sodium (DULCOLAX STOOL SOFTENER, DSS,) 100 mg capsule Take 1 Cap by mouth two (2) times a day for 90 days. 60 Cap 2    dilTIAZem SR (CARDIZEM SR) 60 mg SR capsule Take 1 Cap by mouth every twelve (12) hours. 30 Cap 3    lactulose (CHRONULAC) 10 gram/15 mL solution Take 15 mL by mouth three (3) times daily. 3 Bottle 0    LORazepam (ATIVAN) 0.5 mg tablet Take 1 Tab by mouth every eight (8) hours as needed for Anxiety. Max Daily Amount: 1.5 mg. 20 Tab 0    dicyclomine (BENTYL) 20 mg tablet Take 1 Tab by mouth every six (6) hours as needed (abdominal cramps) for up to 20 doses.  20 Tab 0       Past History Past Medical History:  Past Medical History:   Diagnosis Date    Agoraphobia without mention of panic attacks 2/17/2014    Anxiety disorder 8/18/2013    Arthritis     osteo    Breast pain, left 4/7/2015    CAD (coronary artery disease), native coronary artery 12/1/2015    no stents    Chronic chest pain 1/13/2014    Chronic pain associated with significant psychosocial dysfunction 2/17/2014    Depression 8/18/2013    Diabetes ([facility] [address]     type II    Duplicated right renal collecting system 3/13/2014    GERD (gastroesophageal reflux disease)     Gout, joint     Hypercholesteremia     hyercholesterolemia    Hypertension     Nephrolithiasis 3/13/2014    Personal history of noncompliance with medical treatment, presenting hazards to health 5/30/2014    Psychotic disorder ([facility] [address]     Vaginal pain 7/30/2014       Past Surgical History:  Past Surgical History:   Procedure Laterality Date    EGD  4/23/2010         HX CHOLECYSTECTOMY  09/20/2018    lap jesus    HX CYST REMOVAL      cyst removed from left wrist    HX HYSTERECTOMY      partial    HX OTHER SURGICAL      bladder dilitation    HX TUBAL LIGATION      HX UROLOGICAL      kidney stones       Family History:  Family History   Problem Relation Age of Onset    Stroke Mother     Heart Disease Mother     Cancer Father         type unknown    Heart Disease Son     Liver Disease Son     Heart Disease Daughter     Malignant Hyperthermia Neg Hx     Pseudocholinesterase Deficiency Neg Hx     Delayed Awakening Neg Hx     Post-op Nausea/Vomiting Neg Hx     Emergence Delirium Neg Hx     Post-op Cognitive Dysfunction Neg Hx     Other Neg Hx        Social History:  Social History     Tobacco Use    Smoking status: Never Smoker    Smokeless tobacco: Never Used   Substance Use Topics    Alcohol use: No    Drug use: No       Allergies:   Allergies   Allergen Reactions    Amoxicillin Hives    Sulfa (Sulfonamide Antibiotics) Hives and Itching  Mirtazapine Itching and Nausea Only     Funny feeling in chest    Percocet [Oxycodone-Acetaminophen] Nausea and Vomiting    Codeine Nausea and Vomiting    Crestor [Rosuvastatin] Other (comments)     myalgias    Nitroglycerin Unknown (comments)     Patient cannot remember why she is allergic to it      Prednisone Itching    Zithromax [Azithromycin] Itching     Not sure what it does,taken long time ago         Review of Systems   Review of Systems   Constitutional: Negative for chills. Respiratory: Negative for shortness of breath. Cardiovascular: Negative for chest pain. Gastrointestinal: Positive for constipation. Negative for abdominal pain. Genitourinary: Positive for frequency. Musculoskeletal: Positive for arthralgias and gait problem. Neurological: Negative for speech difficulty and weakness. Psychiatric/Behavioral: Negative for self-injury. All other systems reviewed and are negative. Physical Exam     Vitals:    10/29/19 1823   BP: 184/85   Pulse: 65   Resp: 18   Temp: 98.9 °F (37.2 °C)   SpO2: 100%     Physical Exam   Constitutional: She is oriented to person, place, and time. She appears well-developed and well-nourished. No distress. HENT:   Head: Normocephalic and atraumatic. Eyes: Conjunctivae are normal.   Cardiovascular: Normal rate, regular rhythm and normal heart sounds. Pulmonary/Chest: Effort normal and breath sounds normal. No respiratory distress. She has no wheezes. She has no rales. Musculoskeletal:        Right hip: She exhibits tenderness. She exhibits normal range of motion, no bony tenderness, no swelling, no crepitus and no deformity. Left hip: She exhibits tenderness. She exhibits normal range of motion, normal strength, no bony tenderness, no swelling, no crepitus and no deformity. Right knee: She exhibits normal range of motion, no swelling, no effusion, no ecchymosis, no deformity, no laceration and no erythema. Tenderness found. Medial joint line tenderness noted. Left knee: She exhibits normal range of motion, no swelling, no effusion, no ecchymosis, no deformity and no laceration. No tenderness found. Neurological: She is alert and oriented to person, place, and time. GCS eye subscore is 4. GCS verbal subscore is 4. GCS motor subscore is 6. Patient conversation is repetitive   Skin: Skin is warm and dry. Psychiatric: She has a normal mood and affect. Her behavior is normal. Judgment and thought content normal.   Nursing note and vitals reviewed. at 8:52 PM      Diagnostic Study Results     Labs -     Recent Results (from the past 12 hour(s))   URINALYSIS W/ REFLEX CULTURE    Collection Time: 10/29/19  6:09 PM   Result Value Ref Range    Color YELLOW/STRAW      Appearance CLEAR CLEAR      Specific gravity 1.020 1.003 - 1.030      pH (UA) 6.5 5.0 - 8.0      Protein TRACE (A) NEG mg/dL    Glucose NEGATIVE  NEG mg/dL    Ketone NEGATIVE  NEG mg/dL    Blood NEGATIVE  NEG      Urobilinogen 1.0 0.2 - 1.0 EU/dL    Nitrites NEGATIVE  NEG      Leukocyte Esterase MODERATE (A) NEG      WBC 10-20 0 - 4 /hpf    RBC 0-5 0 - 5 /hpf    Epithelial cells FEW FEW /lpf    Bacteria NEGATIVE  NEG /hpf    UA:UC IF INDICATED URINE CULTURE ORDERED (A) CNI      Mucus 1+ (A) NEG /lpf   BILIRUBIN, CONFIRM    Collection Time: 10/29/19  6:09 PM   Result Value Ref Range    Bilirubin UA, confirm NEGATIVE  NEG         Radiologic Studies -   XR CHEST PORT   Final Result   IMPRESSION:   1. No radiographic evidence of acute cardiopulmonary disease. 2. There is no definite rib lesion; however, evaluation the ribs is markedly   limited by this technique            XR PELV AP ONLY   Final Result   IMPRESSION:     1. Marked rectal distention with stool, similar to comparisons. CT Results  (Last 48 hours)    None        CXR Results  (Last 48 hours)               10/29/19 1835  XR CHEST PORT Final result    Impression:  IMPRESSION:   1.  No radiographic evidence of acute cardiopulmonary disease. 2. There is no definite rib lesion; however, evaluation the ribs is markedly   limited by this technique               Narrative:  INDICATION: . L rib pain   Additional history:   COMPARISON: Previous chest xray, 6/30/2019. LIMITATIONS: Portable technique. Keyshawn Deshpande FINDINGS: Single frontal view of the chest.    .   Lines/tubes/surgical: None. Heart/mediastinum: Unremarkable. Lungs/pleura:  No focal consolidation or mass. No visualized pleural effusion or   pneumothorax. Additional Comments: There is no visible definite rib lesion Surgical clips in   right upper quadrant suggesting previous cholecystectomy. Gas-filled large bowel   in the upper abdomen appear   . Medical Decision Making   I am the first provider for this patient. I reviewed the vital signs, available nursing notes, past medical history, past surgical history, family history and social history. Vital Signs-Reviewed the patient's vital signs. Records Reviewed: Old Medical Records and Previous Laboratory Studies    ED Course as of Oct 29 2051   Tue Oct 29, 2019   1955 RN starting soaps suds enema now    [AH]   2047 Patient had good results with enema. Per RN patient passed a large amount of stool. Patient advised of UA results and need for antibiotics. Patient states grandson will come back and pick her up. Patient was able to transfer from the bed to the bedside commode and bear weight on both legs with assistance. []      ED Course User Index  [AH] Bethanie Sherman PA-C          Disposition:  Discharged    DISCHARGE NOTE:   8:52 PM      Care plan outlined and precautions discussed. Patient has no new complaints, changes, or physical findings. Results of xrays and UA were reviewed with the patient. All medications were reviewed with the patient; will d/c home. All of pt's questions and concerns were addressed.  Patient was instructed and agrees to follow up with PCP prn, as well as to return to the ED upon further deterioration. Patient is ready to go home. Follow-up Information     Follow up With Specialties Details Why Contact Info    Tangela Mckeon MD Family Practice Schedule an appointment as soon as possible for a visit in 2 days As needed [address] [facility]  [phone]            Current Discharge Medication List      START taking these medications    Details   acetaminophen (TYLENOL) 325 mg tablet Take 2 Tabs by mouth every six (6) hours as needed for Pain. Indications: pain associated with arthritis  Qty: 20 Tab, Refills: 0      cephALEXin (KEFLEX) 500 mg capsule Take 1 Cap by mouth two (2) times a day for 7 days. Qty: 14 Cap, Refills: 0         CONTINUE these medications which have CHANGED    Details   polyethylene glycol (MIRALAX) 17 gram/dose powder Take 17 g by mouth daily. 1 tablespoon with 8 oz of water daily  Qty: 170 g, Refills: 0             Provider Notes (Medical Decision Making):   DDX: UTI, hip arthralgia, fracture, bursitis, constipation, nephrolithiasis, dementia-undiagnosed      Procedures:  Procedures    Please note that this dictation was completed with Dragon, computer voice recognition software. Quite often unanticipated grammatical, syntax, homophones, and other interpretive errors are inadvertently transcribed by the computer software. Please disregard these errors. Additionally, please excuse any errors that have escaped final proofreading. Diagnosis     Clinical Impression:   1. Pain of both hip joints    2. Other constipation    3.  Acute cystitis without hematuria

## 2019-10-29 NOTE — ED NOTES
Pt presents in ER with c/o bilateral hip pain ,per report pt fell at home x 2-3 weeks ago,pain since then. Pt alert,oriented x 4. Pt RR even and unlabored,skin dry,warm. Pt placed on bedside commode on arrival for urine specimen,its collected and sent to lab. bed in low position,callbell within reach,pt instructed call staff for help,pt verbalized understands and plan of care updated. Emergency Department Nursing Plan of Care       The Nursing Plan of Care is developed from the Nursing assessment and Emergency Department Attending provider initial evaluation. The plan of care may be reviewed in the ED Provider note.     The Plan of Care was developed with the following considerations:   Patient / Family readiness to learn indicated by:verbalized understanding  Persons(s) to be included in education: patient  Barriers to Learning/Limitations:No    Signed     Melanie Tadeo RN    10/29/2019   6:37 PM

## 2019-10-29 NOTE — ED NOTES
Bedside and Verbal shift change report given to Bridgette RN (oncoming nurse) by Clayton David RN (offgoing nurse). Report included the following information SBAR, Kardex, ED Summary, [facility] and Recent Results.

## 2019-10-30 PROCEDURE — 3331090002 HH PPS REVENUE DEBIT

## 2019-10-30 PROCEDURE — 3331090001 HH PPS REVENUE CREDIT

## 2019-10-30 NOTE — ED NOTES
Pt passed more stool on bsc. Pt anxious. Preformed rosanne care, applied brief, and put her on the phone with her .

## 2019-10-30 NOTE — ED NOTES
Pt reporting side pain still, but is transferring quicker and with less grimacing. Changed patient into her clothes and provided her with extra briefs and wipes.

## 2019-10-30 NOTE — ED NOTES
Discharge instructions were given to the patient by Jaquan Pham RN. The patient left the Emergency Department ambulatory, alert and oriented and in no acute distress with 3 prescriptions. The patient was encouraged to call or return to the ED for worsening issues or problems and was encouraged to schedule a follow up appointment for continuing care. The patient verbalized understanding of discharge instructions and prescriptions, all questions were answered. The patient has no further concerns at this time. Pt waiting for ride from [facility].

## 2019-10-31 ENCOUNTER — HOME CARE VISIT (OUTPATIENT)
Dept: SCHEDULING | Facility: HOME HEALTH | Age: 74
End: 2019-10-31
Payer: MEDICARE

## 2019-10-31 VITALS
OXYGEN SATURATION: 98 % | HEART RATE: 79 BPM | SYSTOLIC BLOOD PRESSURE: 110 MMHG | DIASTOLIC BLOOD PRESSURE: 80 MMHG | TEMPERATURE: 97.8 F

## 2019-10-31 LAB
BACTERIA SPEC CULT: NORMAL
CC UR VC: NORMAL
SERVICE CMNT-IMP: NORMAL

## 2019-10-31 PROCEDURE — 3331090002 HH PPS REVENUE DEBIT

## 2019-10-31 PROCEDURE — G0300 HHS/HOSPICE OF LPN EA 15 MIN: HCPCS

## 2019-10-31 PROCEDURE — 3331090001 HH PPS REVENUE CREDIT

## 2019-11-01 PROCEDURE — 3331090001 HH PPS REVENUE CREDIT

## 2019-11-01 PROCEDURE — 3331090002 HH PPS REVENUE DEBIT

## 2019-11-02 PROCEDURE — 3331090001 HH PPS REVENUE CREDIT

## 2019-11-02 PROCEDURE — 3331090002 HH PPS REVENUE DEBIT

## 2019-11-03 PROCEDURE — 3331090001 HH PPS REVENUE CREDIT

## 2019-11-03 PROCEDURE — 3331090002 HH PPS REVENUE DEBIT

## 2019-11-04 PROCEDURE — 3331090002 HH PPS REVENUE DEBIT

## 2019-11-04 PROCEDURE — 3331090001 HH PPS REVENUE CREDIT

## 2019-11-05 PROCEDURE — 3331090001 HH PPS REVENUE CREDIT

## 2019-11-05 PROCEDURE — 3331090002 HH PPS REVENUE DEBIT

## 2019-11-06 ENCOUNTER — HOME CARE VISIT (OUTPATIENT)
Dept: SCHEDULING | Facility: HOME HEALTH | Age: 74
End: 2019-11-06
Payer: MEDICARE

## 2019-11-06 PROCEDURE — 3331090001 HH PPS REVENUE CREDIT

## 2019-11-06 PROCEDURE — 3331090003 HH PPS REVENUE ADJ

## 2019-11-06 PROCEDURE — G0299 HHS/HOSPICE OF RN EA 15 MIN: HCPCS

## 2019-11-06 PROCEDURE — 3331090002 HH PPS REVENUE DEBIT

## 2019-11-07 VITALS
RESPIRATION RATE: 18 BRPM | HEART RATE: 78 BPM | SYSTOLIC BLOOD PRESSURE: 124 MMHG | DIASTOLIC BLOOD PRESSURE: 76 MMHG | TEMPERATURE: 97.4 F | OXYGEN SATURATION: 96 %

## 2019-11-28 ENCOUNTER — HOSPITAL ENCOUNTER (EMERGENCY)
Age: 74
Discharge: HOME | End: 2019-11-28
Attending: EMERGENCY MEDICINE
Payer: MEDICARE

## 2019-11-28 ENCOUNTER — APPOINTMENT (OUTPATIENT)
Dept: CT IMAGING | Age: 74
End: 2019-11-28
Attending: EMERGENCY MEDICINE
Payer: MEDICARE

## 2019-11-28 VITALS
RESPIRATION RATE: 16 BRPM | SYSTOLIC BLOOD PRESSURE: 135 MMHG | OXYGEN SATURATION: 83 % | DIASTOLIC BLOOD PRESSURE: 78 MMHG | HEART RATE: 91 BPM | WEIGHT: 186.73 LBS | BODY MASS INDEX: 29.31 KG/M2 | HEIGHT: 67 IN | TEMPERATURE: 97.3 F

## 2019-11-28 DIAGNOSIS — R33.9 URINARY RETENTION: ICD-10-CM

## 2019-11-28 DIAGNOSIS — K59.00 CONSTIPATION, UNSPECIFIED CONSTIPATION TYPE: Primary | ICD-10-CM

## 2019-11-28 LAB
ALBUMIN SERPL-MCNC: 3.6 G/DL (ref 3.5–5)
ALBUMIN/GLOB SERPL: 0.9 {RATIO} (ref 1.1–2.2)
ALP SERPL-CCNC: 119 U/L (ref 45–117)
ALT SERPL-CCNC: 14 U/L (ref 12–78)
ANION GAP SERPL CALC-SCNC: 6 MMOL/L (ref 5–15)
APPEARANCE UR: CLEAR
AST SERPL-CCNC: 13 U/L (ref 15–37)
BACTERIA URNS QL MICRO: NEGATIVE /HPF
BILIRUB SERPL-MCNC: 0.3 MG/DL (ref 0.2–1)
BILIRUB UR QL: NEGATIVE
BUN SERPL-MCNC: 17 MG/DL (ref 6–20)
BUN/CREAT SERPL: 15 (ref 12–20)
CALCIUM SERPL-MCNC: 9 MG/DL (ref 8.5–10.1)
CHLORIDE SERPL-SCNC: 108 MMOL/L (ref 97–108)
CO2 SERPL-SCNC: 28 MMOL/L (ref 21–32)
COLOR UR: NORMAL
CREAT SERPL-MCNC: 1.11 MG/DL (ref 0.55–1.02)
EPITH CASTS URNS QL MICRO: NORMAL /LPF
ERYTHROCYTE [DISTWIDTH] IN BLOOD BY AUTOMATED COUNT: 13.2 % (ref 11.5–14.5)
GLOBULIN SER CALC-MCNC: 3.9 G/DL (ref 2–4)
GLUCOSE SERPL-MCNC: 104 MG/DL (ref 65–100)
GLUCOSE UR STRIP.AUTO-MCNC: NEGATIVE MG/DL
HCT VFR BLD AUTO: 41.3 % (ref 35–47)
HGB BLD-MCNC: 13 G/DL (ref 11.5–16)
HGB UR QL STRIP: NEGATIVE
HYALINE CASTS URNS QL MICRO: NORMAL /LPF (ref 0–5)
KETONES UR QL STRIP.AUTO: NEGATIVE MG/DL
LEUKOCYTE ESTERASE UR QL STRIP.AUTO: NEGATIVE
MCH RBC QN AUTO: 27 PG (ref 26–34)
MCHC RBC AUTO-ENTMCNC: 31.5 G/DL (ref 30–36.5)
MCV RBC AUTO: 85.7 FL (ref 80–99)
NITRITE UR QL STRIP.AUTO: NEGATIVE
NRBC # BLD: 0 K/UL (ref 0–0.01)
NRBC BLD-RTO: 0 PER 100 WBC
PH UR STRIP: 6.5 [PH] (ref 5–8)
PLATELET # BLD AUTO: 218 K/UL (ref 150–400)
PMV BLD AUTO: 11 FL (ref 8.9–12.9)
POTASSIUM SERPL-SCNC: 3.9 MMOL/L (ref 3.5–5.1)
PROT SERPL-MCNC: 7.5 G/DL (ref 6.4–8.2)
PROT UR STRIP-MCNC: NEGATIVE MG/DL
RBC # BLD AUTO: 4.82 M/UL (ref 3.8–5.2)
RBC #/AREA URNS HPF: NORMAL /HPF (ref 0–5)
SODIUM SERPL-SCNC: 142 MMOL/L (ref 136–145)
SP GR UR REFRACTOMETRY: 1.01 (ref 1–1.03)
UA: UC IF INDICATED,UAUC: NORMAL
UROBILINOGEN UR QL STRIP.AUTO: 0.2 EU/DL (ref 0.2–1)
WBC # BLD AUTO: 5.1 K/UL (ref 3.6–11)
WBC URNS QL MICRO: NORMAL /HPF (ref 0–4)

## 2019-11-28 PROCEDURE — 74011250637 HC RX REV CODE- 250/637: Performed by: EMERGENCY MEDICINE

## 2019-11-28 PROCEDURE — 81001 URINALYSIS AUTO W/SCOPE: CPT

## 2019-11-28 PROCEDURE — 99284 EMERGENCY DEPT VISIT MOD MDM: CPT

## 2019-11-28 PROCEDURE — 80053 COMPREHEN METABOLIC PANEL: CPT

## 2019-11-28 PROCEDURE — 51701 INSERT BLADDER CATHETER: CPT

## 2019-11-28 PROCEDURE — 74176 CT ABD & PELVIS W/O CONTRAST: CPT

## 2019-11-28 PROCEDURE — 85027 COMPLETE CBC AUTOMATED: CPT

## 2019-11-28 PROCEDURE — 51702 INSERT TEMP BLADDER CATH: CPT

## 2019-11-28 PROCEDURE — 36415 COLL VENOUS BLD VENIPUNCTURE: CPT

## 2019-11-28 RX ORDER — LACTULOSE 10 G/15ML
200 SOLUTION ORAL; RECTAL
Status: COMPLETED | OUTPATIENT
Start: 2019-11-28 | End: 2019-11-28

## 2019-11-28 RX ADMIN — LACTULOSE 300 ML: 10 SOLUTION ORAL at 20:00

## 2019-11-28 RX ADMIN — LACTULOSE 45 ML: 20 SOLUTION ORAL at 17:23

## 2019-11-28 NOTE — ED PROVIDER NOTES
EMERGENCY DEPARTMENT HISTORY AND PHYSICAL EXAM      Date: 11/28/2019  Patient Name: Danielle Rollins    History of Presenting Illness     Chief Complaint   Patient presents with    Pelvic Pain     vaginal pain itching and urinary retention for a week \"my bowels don't move\"    Urinary Retention       History Provided By: Patient    HPI: Danielle Rollins, 76 y.o. female with PMHx significant for CAD, anxiety, GERD, hypertension, chronic constipation, known cataracts, who presents with a chief complaint of urinary pain and rectal pain. Patient states that she has not had a bowel movement in about a week which coincides with when she ran out of the medication she takes to help her bowels move. She also reports dysuria and pain at both her rectum and her urethra. Reports difficulty urinating today. Patient also stated that she has been having some worsening blurry vision but has not and has not yet followed up again with her eye doctor. She denies any chest pain, shortness of breath, vomiting. PCP: Abdulkadir Ferreira MD    There are no other complaints, changes, or physical findings at this time. Current Outpatient Medications   Medication Sig Dispense Refill    lactulose (CHRONULAC) 10 gram/15 mL solution Take 15 mL by mouth three (3) times daily. 3 Bottle 0    polyethylene glycol (MIRALAX) 17 gram/dose powder Take 17 g by mouth daily. 1 tablespoon with 8 oz of water daily 170 g 0    acetaminophen (TYLENOL) 325 mg tablet Take 2 Tabs by mouth every six (6) hours as needed for Pain. Indications: pain associated with arthritis 20 Tab 0    docusate sodium (DULCOLAX STOOL SOFTENER, DSS,) 100 mg capsule Take 1 Cap by mouth two (2) times a day for 90 days. 60 Cap 2    dicyclomine (BENTYL) 20 mg tablet Take 1 Tab by mouth every six (6) hours as needed (abdominal cramps) for up to 20 doses. 20 Tab 0    dilTIAZem SR (CARDIZEM SR) 60 mg SR capsule Take 1 Cap by mouth every twelve (12) hours.  30 Cap 3    LORazepam (ATIVAN) 0.5 mg tablet Take 1 Tab by mouth every eight (8) hours as needed for Anxiety. Max Daily Amount: 1.5 mg. 20 Tab 0     Past History     Past Medical History:  Past Medical History:   Diagnosis Date    Agoraphobia without mention of panic attacks 2/17/2014    Anxiety disorder 8/18/2013    Arthritis     osteo    Breast pain, left 4/7/2015    CAD (coronary artery disease), native coronary artery 12/1/2015    no stents    Chronic chest pain 1/13/2014    Chronic pain associated with significant psychosocial dysfunction 2/17/2014    Depression 8/18/2013    Diabetes ([facility] [address]     type II    Duplicated right renal collecting system 3/13/2014    GERD (gastroesophageal reflux disease)     Gout, joint     Hypercholesteremia     hyercholesterolemia    Hypertension     Nephrolithiasis 3/13/2014    Personal history of noncompliance with medical treatment, presenting hazards to health 5/30/2014    Psychotic disorder ([facility] [address]     Vaginal pain 7/30/2014     Past Surgical History:  Past Surgical History:   Procedure Laterality Date    EGD  4/23/2010         HX CHOLECYSTECTOMY  09/20/2018    lap jesus    HX CYST REMOVAL      cyst removed from left wrist    HX HYSTERECTOMY      partial    HX OTHER SURGICAL      bladder dilitation    HX TUBAL LIGATION      HX UROLOGICAL      kidney stones     Family History:  Family History   Problem Relation Age of Onset    Stroke Mother     Heart Disease Mother     Cancer Father         type unknown    Heart Disease Son     Liver Disease Son     Heart Disease Daughter     Malignant Hyperthermia Neg Hx     Pseudocholinesterase Deficiency Neg Hx     Delayed Awakening Neg Hx     Post-op Nausea/Vomiting Neg Hx     Emergence Delirium Neg Hx     Post-op Cognitive Dysfunction Neg Hx     Other Neg Hx      Social History:  Social History     Tobacco Use    Smoking status: Never Smoker    Smokeless tobacco: Never Used   Substance Use Topics    Alcohol use:  No  Drug use: No     Allergies: Allergies   Allergen Reactions    Amoxicillin Hives    Sulfa (Sulfonamide Antibiotics) Hives and Itching    Mirtazapine Itching and Nausea Only     Funny feeling in chest    Percocet [Oxycodone-Acetaminophen] Nausea and Vomiting    Codeine Nausea and Vomiting    Crestor [Rosuvastatin] Other (comments)     myalgias    Nitroglycerin Unknown (comments)     Patient cannot remember why she is allergic to it      Prednisone Itching    Zithromax [Azithromycin] Itching     Not sure what it does,taken long time ago     Review of Systems   Review of Systems   Constitutional: Negative for chills and fever. HENT: Negative for congestion, rhinorrhea and sore throat. Respiratory: Negative for cough and shortness of breath. Cardiovascular: Negative for chest pain. Gastrointestinal: Positive for constipation and rectal pain. Negative for abdominal pain, nausea and vomiting. Genitourinary: Positive for difficulty urinating and dysuria. Negative for urgency. Skin: Negative for rash. Neurological: Negative for dizziness, light-headedness and headaches. All other systems reviewed and are negative. Physical Exam   Physical Exam  Vitals signs and nursing note reviewed. Constitutional:       General: She is not in acute distress. Appearance: She is well-developed. HENT:      Head: Normocephalic and atraumatic. Eyes:      Conjunctiva/sclera: Conjunctivae normal.      Pupils: Pupils are equal, round, and reactive to light. Neck:      Musculoskeletal: Normal range of motion. Cardiovascular:      Rate and Rhythm: Normal rate and regular rhythm. Pulmonary:      Effort: Pulmonary effort is normal. No respiratory distress. Breath sounds: Normal breath sounds. No stridor. Abdominal:      General: There is distension (over lower abdomen). Palpations: Abdomen is soft. Tenderness: There is tenderness (over lower abdomen).    Musculoskeletal: Normal range of motion. Skin:     General: Skin is warm and dry. Neurological:      Mental Status: She is alert and oriented to person, place, and time. Diagnostic Study Results   Labs -     Recent Results (from the past 12 hour(s))   CBC W/O DIFF    Collection Time: 11/28/19  1:41 PM   Result Value Ref Range    WBC 5.1 3.6 - 11.0 K/uL    RBC 4.82 3.80 - 5.20 M/uL    HGB 13.0 11.5 - 16.0 g/dL    HCT 41.3 35.0 - 47.0 %    MCV 85.7 80.0 - 99.0 FL    MCH 27.0 26.0 - 34.0 PG    MCHC 31.5 30.0 - 36.5 g/dL    RDW 13.2 11.5 - 14.5 %    PLATELET 365 361 - 180 K/uL    MPV 11.0 8.9 - 12.9 FL    NRBC 0.0 0  WBC    ABSOLUTE NRBC 0.00 0.00 - 4.71 K/uL   METABOLIC PANEL, COMPREHENSIVE    Collection Time: 11/28/19  1:41 PM   Result Value Ref Range    Sodium 142 136 - 145 mmol/L    Potassium 3.9 3.5 - 5.1 mmol/L    Chloride 108 97 - 108 mmol/L    CO2 28 21 - 32 mmol/L    Anion gap 6 5 - 15 mmol/L    Glucose 104 (H) 65 - 100 mg/dL    BUN 17 6 - 20 MG/DL    Creatinine 1.11 (H) 0.55 - 1.02 MG/DL    BUN/Creatinine ratio 15 12 - 20      GFR est AA 58 (L) >60 ml/min/1.73m2    GFR est non-AA 48 (L) >60 ml/min/1.73m2    Calcium 9.0 8.5 - 10.1 MG/DL    Bilirubin, total 0.3 0.2 - 1.0 MG/DL    ALT (SGPT) 14 12 - 78 U/L    AST (SGOT) 13 (L) 15 - 37 U/L    Alk.  phosphatase 119 (H) 45 - 117 U/L    Protein, total 7.5 6.4 - 8.2 g/dL    Albumin 3.6 3.5 - 5.0 g/dL    Globulin 3.9 2.0 - 4.0 g/dL    A-G Ratio 0.9 (L) 1.1 - 2.2     URINALYSIS W/ REFLEX CULTURE    Collection Time: 11/28/19  2:03 PM   Result Value Ref Range    Color YELLOW/STRAW      Appearance CLEAR CLEAR      Specific gravity 1.006 1.003 - 1.030      pH (UA) 6.5 5.0 - 8.0      Protein NEGATIVE  NEG mg/dL    Glucose NEGATIVE  NEG mg/dL    Ketone NEGATIVE  NEG mg/dL    Bilirubin NEGATIVE  NEG      Blood NEGATIVE  NEG      Urobilinogen 0.2 0.2 - 1.0 EU/dL    Nitrites NEGATIVE  NEG      Leukocyte Esterase NEGATIVE  NEG      WBC 0-4 0 - 4 /hpf    RBC 0-5 0 - 5 /hpf    Epithelial cells FEW FEW /lpf    Bacteria NEGATIVE  NEG /hpf    UA:UC IF INDICATED CULTURE NOT INDICATED BY UA RESULT CNI      Hyaline cast 0-2 0 - 5 /lpf       Radiologic Studies -   CT ABD PELV WO CONT   Final Result   IMPRESSION:       1. Severe rectal fecal retention compatible with severe constipation. 2.  Bilateral hydroureter and hydronephrosis without ureteral obstructing stone   or mass lesion evident and with nonobstructing intrarenal stones. 3. Possible acute superior compression deformity of L3. Ct Abd Pelv Wo Cont    Result Date: 11/28/2019  IMPRESSION: 1. Severe rectal fecal retention compatible with severe constipation. 2.  Bilateral hydroureter and hydronephrosis without ureteral obstructing stone or mass lesion evident and with nonobstructing intrarenal stones. 3. Possible acute superior compression deformity of L3. Medical Decision Making   I am the first provider for this patient. I reviewed the vital signs, available nursing notes, past medical history, past surgical history, family history and social history. Vital Signs-Reviewed the patient's vital signs. Patient Vitals for the past 12 hrs:   Temp Pulse Resp BP SpO2   11/28/19 1530 -- -- -- 177/75 --   11/28/19 1515 -- -- -- 174/74 99 %   11/28/19 1445 -- -- -- 155/81 --   11/28/19 1400 -- -- -- 156/82 --   11/28/19 1339 -- -- -- 153/78 96 %   11/28/19 1243 97.3 °F (36.3 °C) 91 16 122/81 100 %       Pulse Oximetry Analysis - 99% on RA      Records Reviewed: Nursing Notes and Old Medical Records    Provider Notes (Medical Decision Making):   Ddx: constipation, urinary retention, bowel obstruction, UTI    Plan for basic labs, UA, CT abd    ED Course:   Initial assessment performed. The patients presenting problems have been discussed, and they are in agreement with the care plan formulated and outlined with them. I have encouraged them to ask questions as they arise throughout their visit.      Procedure Note - Rectal Exam:   1:49 PM  Performed by: Ryan Pillai MD  Chaperoned by: Shelby Barnes RN  Rectal exam performed. No fecal impaction. Soft brown stool high in the rectal vault  The procedure took 1-15 minutes, and pt tolerated well. Patient with urinary retention, cath for 900cc. Suspect likely related to severe constipation noted on CT. Patient given an enema, however was not well tolerated. On review of records, patient supposed to be on lactulose. Will give dose here. ED Course as of Nov 28 2150   Thu Nov 28, 2019 2056 Pt had a large BM. Discussed sending her home with martinez as she has a urologist Jacquie Cooley), however patient states she wants to take the martinez out. Will take out and ensure patient able to void    [BRENDA]   2143 Pt now states she wants to keep the catheter in and will follow up with urology    Luz Maria [id number]      ED Course User Index  Frankie Sierra MD         Critical Care:  none    Disposition:  Discharge Note:  9:50 PM  The patient has been re-evaluated and is ready for discharge. Reviewed available results with patient. Counseled patient on diagnosis and care plan. Patient has expressed understanding, and all questions have been answered. Patient agrees with plan and agrees to follow up as recommended, or to return to the ED if their symptoms worsen. Discharge instructions have been provided and explained to the patient, along with reasons to return to the ED. PLAN:  1. Current Discharge Medication List      CONTINUE these medications which have CHANGED    Details   lactulose (CHRONULAC) 10 gram/15 mL solution Take 15 mL by mouth three (3) times daily. Qty: 3 Bottle, Refills: 0           2.    Follow-up Information     Follow up With Specialties Details Why Contact Info    Isamar Nice MD [facility] Schedule an appointment as soon as possible for a visit  [address] [place] [address]  [phone]      Norma Brunson MD Urology Schedule an appointment as soon as possible for a visit  [address] [facility]  [address] [facility]  [phone]      [facility] EMERGENCY DEPT Emergency Medicine  As needed, If symptoms worsen [address]  [address] [facility]  [phone]        Return to ED if worse     Diagnosis     Clinical Impression:   1. Constipation, unspecified constipation type    2. Urinary retention        This note will not be viewable in [email or web address]. Please note that this dictation was completed with IPextreme, the computer voice recognition software. Quite often unanticipated grammatical, syntax, homophones, and other interpretive errors are inadvertently transcribed by the computer software. Please disregard these errors.   Please excuse any errors that have escaped final proofreading

## 2019-11-29 NOTE — ED NOTES
Discharge instructions given to patient. Catheter care instructions given to patient and drainage bag switched to leg bag. Pt sent home with urinal and instructions to follow up with urology. Multiple attempts made to contact family members for ride home unsuccessful. [facility]

## 2019-11-29 NOTE — DISCHARGE INSTRUCTIONS
Patient Education        Constipation: Care Instructions  Your Care Instructions    Constipation means that you have a hard time passing stools (bowel movements). People pass stools from 3 times a day to once every 3 days. What is normal for you may be different. Constipation may occur with pain in the rectum and cramping. The pain may get worse when you try to pass stools. Sometimes there are small amounts of bright red blood on toilet paper or the surface of stools. This is because of enlarged veins near the rectum (hemorrhoids). A few changes in your diet and lifestyle may help you avoid ongoing constipation. Your doctor may also prescribe medicine to help loosen your stool. Some medicines can cause constipation. These include pain medicines and antidepressants. Tell your doctor about all the medicines you take. Your doctor may want to make a medicine change to ease your symptoms. Follow-up care is a key part of your treatment and safety. Be sure to make and go to all appointments, and call your doctor if you are having problems. It's also a good idea to know your test results and keep a list of the medicines you take. How can you care for yourself at home? · Drink plenty of fluids, enough so that your urine is light yellow or clear like water. If you have kidney, heart, or liver disease and have to limit fluids, talk with your doctor before you increase the amount of fluids you drink. · Include high-fiber foods in your diet each day. These include fruits, vegetables, beans, and whole grains. · Get at least 30 minutes of exercise on most days of the week. Walking is a good choice. You also may want to do other activities, such as running, swimming, cycling, or playing tennis or team sports. · Take a fiber supplement, such as Citrucel or Metamucil, every day. Read and follow all instructions on the label. · Schedule time each day for a bowel movement. A daily routine may help.  Take your time having your bowel movement. · Support your feet with a small step stool when you sit on the toilet. This helps flex your hips and places your pelvis in a squatting position. · Your doctor may recommend an over-the-counter laxative to relieve your constipation. Examples are Milk of Magnesia and MiraLax. Read and follow all instructions on the label. Do not use laxatives on a long-term basis. When should you call for help? Call your doctor now or seek immediate medical care if:    · You have new or worse belly pain.     · You have new or worse nausea or vomiting.     · You have blood in your stools.    Watch closely for changes in your health, and be sure to contact your doctor if:    · Your constipation is getting worse.     · You do not get better as expected. Where can you learn more? Go to http://lobo-michel.info/. Enter 21 [phone] in the search box to learn more about \"Constipation: Care Instructions. \"  Current as of: June 26, 2019  Content Version: 12.2  © 0930-9344 [employer]. Care instructions adapted under license by [employer] (which disclaims liability or warranty for this information). If you have questions about a medical condition or this instruction, always ask your healthcare professional. [address] any warranty or liability for your use of this information.

## 2019-12-23 NOTE — ED NOTES
Continue: Systane Balance (propylene glycol): drops: 0.6% 1 drop four times a day into both eyes Discharge instructions were given to the patient by Shavon Story RN. The patient left the Emergency Department ambulatory, alert and oriented and in no acute distress with 0 prescriptions. The patient was encouraged to call or return to the ED for worsening issues or problems and was encouraged to schedule a follow up appointment for continuing care. The patient verbalized understanding of discharge instructions and prescriptions, all questions were answered. The patient has no further concerns at this time.

## 2020-01-03 ENCOUNTER — HOSPITAL ENCOUNTER (EMERGENCY)
Age: 75
Discharge: HOME | End: 2020-01-03
Attending: EMERGENCY MEDICINE
Payer: MEDICARE

## 2020-01-03 ENCOUNTER — APPOINTMENT (OUTPATIENT)
Dept: GENERAL RADIOLOGY | Age: 75
End: 2020-01-03
Attending: EMERGENCY MEDICINE
Payer: MEDICARE

## 2020-01-03 VITALS
BODY MASS INDEX: 26.68 KG/M2 | HEIGHT: 67 IN | OXYGEN SATURATION: 99 % | DIASTOLIC BLOOD PRESSURE: 72 MMHG | HEART RATE: 70 BPM | RESPIRATION RATE: 18 BRPM | SYSTOLIC BLOOD PRESSURE: 148 MMHG | TEMPERATURE: 98.2 F | WEIGHT: 170 LBS

## 2020-01-03 DIAGNOSIS — K56.41 IMPACTED STOOL IN RECTUM (HCC): Primary | ICD-10-CM

## 2020-01-03 DIAGNOSIS — R33.9 URINARY RETENTION: ICD-10-CM

## 2020-01-03 LAB
ANION GAP BLD CALC-SCNC: 11 MMOL/L (ref 10–20)
APPEARANCE UR: CLEAR
BACTERIA URNS QL MICRO: NEGATIVE /HPF
BILIRUB UR QL: NEGATIVE
BUN BLD-MCNC: 18 MG/DL (ref 9–20)
CA-I BLD-MCNC: 1.15 MMOL/L (ref 1.12–1.32)
CHLORIDE BLD-SCNC: 105 MMOL/L (ref 98–107)
CO2 BLD-SCNC: 30 MMOL/L (ref 21–32)
COLOR UR: NORMAL
CREAT BLD-MCNC: 1.1 MG/DL (ref 0.6–1.3)
EPITH CASTS URNS QL MICRO: NORMAL /LPF
GLUCOSE BLD-MCNC: 84 MG/DL (ref 65–100)
GLUCOSE UR STRIP.AUTO-MCNC: NEGATIVE MG/DL
HCT VFR BLD CALC: 37 % (ref 35–47)
HGB UR QL STRIP: NEGATIVE
KETONES UR QL STRIP.AUTO: NEGATIVE MG/DL
LEUKOCYTE ESTERASE UR QL STRIP.AUTO: NEGATIVE
NITRITE UR QL STRIP.AUTO: NEGATIVE
PH UR STRIP: 7.5 [PH] (ref 5–8)
POTASSIUM BLD-SCNC: 4.3 MMOL/L (ref 3.5–5.1)
PROT UR STRIP-MCNC: NEGATIVE MG/DL
RBC #/AREA URNS HPF: NORMAL /HPF (ref 0–5)
SERVICE CMNT-IMP: ABNORMAL
SODIUM BLD-SCNC: 141 MMOL/L (ref 136–145)
SP GR UR REFRACTOMETRY: 1.01 (ref 1–1.03)
UROBILINOGEN UR QL STRIP.AUTO: 1 EU/DL (ref 0.2–1)
WBC URNS QL MICRO: NORMAL /HPF (ref 0–4)

## 2020-01-03 PROCEDURE — 81001 URINALYSIS AUTO W/SCOPE: CPT

## 2020-01-03 PROCEDURE — 74018 RADEX ABDOMEN 1 VIEW: CPT

## 2020-01-03 PROCEDURE — 80047 BASIC METABLC PNL IONIZED CA: CPT

## 2020-01-03 PROCEDURE — 99285 EMERGENCY DEPT VISIT HI MDM: CPT

## 2020-01-03 PROCEDURE — 74011250637 HC RX REV CODE- 250/637: Performed by: EMERGENCY MEDICINE

## 2020-01-03 RX ORDER — LACTULOSE 10 G/15ML
10 SOLUTION ORAL; RECTAL 2 TIMES DAILY
Qty: 900 ML | Refills: 0 | Status: SHIPPED | OUTPATIENT
Start: 2020-01-03 | End: 2020-02-02

## 2020-01-03 RX ORDER — DEXTROMETHORPHAN POLISTIREX 30 MG/5 ML
SUSPENSION, EXTENDED RELEASE 12 HR ORAL
Status: DISCONTINUED | OUTPATIENT
Start: 2020-01-03 | End: 2020-01-03 | Stop reason: HOSPADM

## 2020-01-03 RX ADMIN — LACTULOSE 30 ML: 20 SOLUTION ORAL at 18:45

## 2020-01-03 NOTE — ED NOTES
Pt presents ambulatory to ED complaining of abdominal pain. Pt reports has not has BM in 2 weeks. Pt reports difficulty urinating. Pt states she is not currently taking any medication. Pt states she is taking a medicine \"that tastes real sweet for my bowels to move\". Pt is alert and oriented x 4, RR even and unlabored, skin is warm and dry. Assesment completed and pt updated on plan of care. Emergency Department Nursing Plan of Care       The Nursing Plan of Care is developed from the Nursing assessment and Emergency Department Attending provider initial evaluation. The plan of care may be reviewed in the ED Provider note.     The Plan of Care was developed with the following considerations:   Patient / Family readiness to learn indicated by:verbalized understanding  Persons(s) to be included in education: patient  Barriers to Learning/Limitations:No    [address]    1/3/2020   2:08 PM

## 2020-01-03 NOTE — PROGRESS NOTES
1615 CM review chart. Per old CM notes 10/18/2019 patient has a walker lives with a \"boyfriend\" and grandson. Contact information given for daughter Henna Frazier [phone]. Additional contact found in CM not 2014 for daughter Moisés Arias [phone].      3860 CM attempt to schedule PCP appointment number dialed [phone] rings and no answer. CM will attempt at a later time. 1643 CM went to speak with patient Nursing staff in the room CM will follow-up at a later time. 80 Per old CM notes PCP office number is [phone]. CM contact number states the office is now closed.     [address] [place] [address]  [phone]

## 2020-01-03 NOTE — DISCHARGE INSTRUCTIONS
Patient Education        Urinary Retention: Care Instructions  Your Care Instructions    Urinary retention means that you aren't able to urinate. In men, it is often caused by a blockage of the urinary tract from an enlarged prostate gland. In men and women, it can also be caused by an infection or nerve damage. Or it may be a side effect of a medicine. The doctor may have drained the urine from your bladder. If you still have problems passing urine, you may need to use a catheter at home. This is used to empty your bladder until the problem can be fixed. Your doctor may put a catheter in your bladder before you go home. If so, he or she will tell you when to come back to have the catheter removed. The doctor has checked you closely. But problems can develop later. If you notice any problems or new symptoms, get medical treatment right away. Follow-up care is a key part of your treatment and safety. Be sure to make and go to all appointments, and call your doctor if you are having problems. It's also a good idea to know your test results and keep a list of the medicines you take. How can you care for yourself at home? · Take your medicines exactly as prescribed. Call your doctor if you think you are having a problem with your medicine. You will get more details on the specific medicines your doctor prescribes. · Check with your doctor before you use any over-the-counter medicines. Many cold and allergy medicines, for example, can make this problem worse. Make sure your doctor knows all of the medicines, vitamins, supplements, and herbal remedies you take. · Spread out through the day the amount of fluid you drink. Do not drink a lot at bedtime. · Avoid alcohol and caffeine. · If you have been given a catheter, or if one is already in place, follow the instructions you were given. Always wash your hands before and after you handle the catheter. When should you call for help?   Call your doctor now or seek immediate medical care if:    · You cannot urinate at all, or it is getting harder to urinate.     · You have symptoms of a urinary tract infection. These may include:  ? Pain or burning when you urinate. ? A frequent need to urinate without being able to pass much urine. ? Pain in the flank, which is just below the rib cage and above the waist on either side of the back. ? Blood in your urine. ? A fever.    Watch closely for changes in your health, and be sure to contact your doctor if:    · You have any problems with your catheter.     · You do not get better as expected. Where can you learn more? Go to http://lobo-michel.info/. Enter M244 in the search box to learn more about \"Urinary Retention: Care Instructions. \"  Current as of: December 19, 2018  Content Version: 12.2  © 3579-0307 [employer]. Care instructions adapted under license by [employer] (which disclaims liability or warranty for this information). If you have questions about a medical condition or this instruction, always ask your healthcare professional. [place] [address] any warranty or liability for your use of this information. Patient Education        Constipation: Care Instructions  Your Care Instructions    Constipation means that you have a hard time passing stools (bowel movements). People pass stools from 3 times a day to once every 3 days. What is normal for you may be different. Constipation may occur with pain in the rectum and cramping. The pain may get worse when you try to pass stools. Sometimes there are small amounts of bright red blood on toilet paper or the surface of stools. This is because of enlarged veins near the rectum (hemorrhoids). A few changes in your diet and lifestyle may help you avoid ongoing constipation. Your doctor may also prescribe medicine to help loosen your stool. Some medicines can cause constipation.  These include pain medicines and antidepressants. Tell your doctor about all the medicines you take. Your doctor may want to make a medicine change to ease your symptoms. Follow-up care is a key part of your treatment and safety. Be sure to make and go to all appointments, and call your doctor if you are having problems. It's also a good idea to know your test results and keep a list of the medicines you take. How can you care for yourself at home? · Drink plenty of fluids, enough so that your urine is light yellow or clear like water. If you have kidney, heart, or liver disease and have to limit fluids, talk with your doctor before you increase the amount of fluids you drink. · Include high-fiber foods in your diet each day. These include fruits, vegetables, beans, and whole grains. · Get at least 30 minutes of exercise on most days of the week. Walking is a good choice. You also may want to do other activities, such as running, swimming, cycling, or playing tennis or team sports. · Take a fiber supplement, such as Citrucel or Metamucil, every day. Read and follow all instructions on the label. · Schedule time each day for a bowel movement. A daily routine may help. Take your time having your bowel movement. · Support your feet with a small step stool when you sit on the toilet. This helps flex your hips and places your pelvis in a squatting position. · Your doctor may recommend an over-the-counter laxative to relieve your constipation. Examples are Milk of Magnesia and MiraLax. Read and follow all instructions on the label. Do not use laxatives on a long-term basis. When should you call for help?   Call your doctor now or seek immediate medical care if:    · You have new or worse belly pain.     · You have new or worse nausea or vomiting.     · You have blood in your stools.    Watch closely for changes in your health, and be sure to contact your doctor if:    · Your constipation is getting worse.     · You do not get better as expected. Where can you learn more? Go to http://lobo-michel.info/. Enter 21  in the search box to learn more about \"Constipation: Care Instructions. \"  Current as of: June 26, 2019  Content Version: 12.2  © 3536-5130 [employer]. Care instructions adapted under license by [employer] (which disclaims liability or warranty for this information). If you have questions about a medical condition or this instruction, always ask your healthcare professional. [place] [address] any warranty or liability for your use of this information.

## 2020-01-03 NOTE — ED PROVIDER NOTES
EMERGENCY DEPARTMENT HISTORY AND PHYSICAL EXAM      Date: 1/3/2020  Patient Name: Margarita Salcedo    History of Presenting Illness     Chief Complaint   Patient presents with    Constipation       History Provided By: Patient    HPI: Margarita Salcedo, 76 y.o. female with PMHx as noted below presents the emergency department with chief complaint of urinary retention and constipation. Patient notes she has been unable to have a significant bowel movement in the last 2 weeks. Patient also noting difficulty with urination that is persisted over the same timeframe. She is denying any abdominal pain or dysuria at this time. Also denying any nausea, vomiting, chest pain or shortness of breath. Is having no fever or systemic symptoms. Patient notes she has not been taking her home laxative although she cannot remember the name. PCP: Oscar Cochran MD    Current Facility-Administered Medications   Medication Dose Route Frequency Provider Last Rate Last Dose    mineral oil (FLEET) enema   Rectal NOW Feroz Zuleta MD         Current Outpatient Medications   Medication Sig Dispense Refill    lactulose (CHRONULAC) 10 gram/15 mL solution Take 15 mL by mouth two (2) times a day for 30 days. 900 mL 0    polyethylene glycol (MIRALAX) 17 gram/dose powder Take 17 g by mouth daily. 1 tablespoon with 8 oz of water daily 170 g 0    acetaminophen (TYLENOL) 325 mg tablet Take 2 Tabs by mouth every six (6) hours as needed for Pain. Indications: pain associated with arthritis 20 Tab 0    docusate sodium (DULCOLAX STOOL SOFTENER, DSS,) 100 mg capsule Take 1 Cap by mouth two (2) times a day for 90 days. 60 Cap 2    dicyclomine (BENTYL) 20 mg tablet Take 1 Tab by mouth every six (6) hours as needed (abdominal cramps) for up to 20 doses. 20 Tab 0    dilTIAZem SR (CARDIZEM SR) 60 mg SR capsule Take 1 Cap by mouth every twelve (12) hours.  30 Cap 3    LORazepam (ATIVAN) 0.5 mg tablet Take 1 Tab by mouth every eight (8) hours as needed for Anxiety. Max Daily Amount: 1.5 mg. 20 Tab 0       Past History     Past Medical History:  Past Medical History:   Diagnosis Date    Agoraphobia without mention of panic attacks 2/17/2014    Anxiety disorder 8/18/2013    Arthritis     osteo    Breast pain, left 4/7/2015    CAD (coronary artery disease), native coronary artery 12/1/2015    no stents    Chronic chest pain 1/13/2014    Chronic pain associated with significant psychosocial dysfunction 2/17/2014    Depression 8/18/2013    Diabetes ([facility]     type II    Duplicated right renal collecting system 3/13/2014    GERD (gastroesophageal reflux disease)     Gout, joint     Hypercholesteremia     hyercholesterolemia    Hypertension     Nephrolithiasis 3/13/2014    Personal history of noncompliance with medical treatment, presenting hazards to health 5/30/2014    [facility] disorder [facility]     Vaginal pain 7/30/2014       Past Surgical History:  Past Surgical History:   Procedure Laterality Date    EGD  4/23/2010         HX CHOLECYSTECTOMY  09/20/2018    lap jesus    HX CYST REMOVAL      cyst removed from left wrist    HX HYSTERECTOMY      partial    HX OTHER SURGICAL      bladder dilitation    HX TUBAL LIGATION      HX UROLOGICAL      kidney stones       Family History:  Family History   Problem Relation Age of Onset    Stroke Mother     Heart Disease Mother     Cancer Father         type unknown    Heart Disease Son     Liver Disease Son     Heart Disease Daughter     Malignant Hyperthermia Neg Hx     Pseudocholinesterase Deficiency Neg Hx     Delayed Awakening Neg Hx     Post-op Nausea/Vomiting Neg Hx     Emergence Delirium Neg Hx     Post-op Cognitive Dysfunction Neg Hx     Other Neg Hx        Social History:  Social History     Tobacco Use    Smoking status: Never Smoker    Smokeless tobacco: Never Used   Substance Use Topics    Alcohol use: No    Drug use: No       Allergies:   Allergies   Allergen Reactions    Amoxicillin Hives    Sulfa (Sulfonamide Antibiotics) Hives and Itching    Mirtazapine Itching and Nausea Only     Funny feeling in chest    Percocet [Oxycodone-Acetaminophen] Nausea and Vomiting    Codeine Nausea and Vomiting    Crestor [Rosuvastatin] Other (comments)     myalgias    Nitroglycerin Unknown (comments)     Patient cannot remember why she is allergic to it      Prednisone Itching    Zithromax [Azithromycin] Itching     Not sure what it does,taken long time ago         Review of Systems   Review of Systems  Constitutional: Negative for fever, chills, and fatigue. HENT: Negative for congestion, sore throat, rhinorrhea, sneezing and neck stiffness   Eyes: Negative for discharge and redness. Respiratory: Negative for  shortness of breath, wheezing   Cardiovascular: Negative for chest pain, palpitations   Gastrointestinal: Negative for nausea, vomiting, abdominal pain, , diarrhea and blood in stool. Positive constipation  Genitourinary: Negative for dysuria, hematuria, flank pain, decreased urine volume, discharge,   Musculoskeletal: Negative for myalgias or joint pain . Skin: Negative for rash or lesions . Neurological: Negative weakness, light-headedness, numbness and headaches. Physical Exam   Physical Exam    GENERAL: alert and oriented, no acute distress  EYES: PEERL, No injection, discharge or icterus. ENT: Mucous membranes pink and moist.  NECK: Supple  LUNGS: Airway patent. Non-labored respirations. Breath sounds clear with good air entry bilaterally. HEART: Regular rate and rhythm. No peripheral edema  ABDOMEN: Non-distended and non-tender, without guarding or rebound.   SKIN:  warm, dry  MSK/EXTREMITIES: Without swelling, tenderness or deformity, symmetric with normal ROM  NEUROLOGICAL: Alert, oriented      Diagnostic Study Results     Labs -     Recent Results (from the past 12 hour(s))   POC CHEM8    Collection Time: 01/03/20  4:07 PM   Result Value Ref Range Calcium, ionized (POC) 1.15 1.12 - 1.32 mmol/L    Sodium (POC) 141 136 - 145 mmol/L    Potassium (POC) 4.3 3.5 - 5.1 mmol/L    Chloride (POC) 105 98 - 107 mmol/L    CO2 (POC) 30 21 - 32 mmol/L    Anion gap (POC) 11 10 - 20 mmol/L    Glucose (POC) 84 65 - 100 mg/dL    BUN (POC) 18 9 - 20 mg/dL    Creatinine (POC) 1.1 0.6 - 1.3 mg/dL    GFRAA, POC 59 (L) >60 ml/min/1.73m2    GFRNA, POC 49 (L) >60 ml/min/1.73m2    Hematocrit (POC) 37 35.0 - 47.0 %    Comment Comment Not Indicated. URINALYSIS W/MICROSCOPIC    Collection Time: 01/03/20  4:09 PM   Result Value Ref Range    Color YELLOW/STRAW      Appearance CLEAR CLEAR      Specific gravity 1.010 1.003 - 1.030      pH (UA) 7.5 5.0 - 8.0      Protein NEGATIVE  NEG mg/dL    Glucose NEGATIVE  NEG mg/dL    Ketone NEGATIVE  NEG mg/dL    Bilirubin NEGATIVE  NEG      Blood NEGATIVE  NEG      Urobilinogen 1.0 0.2 - 1.0 EU/dL    Nitrites NEGATIVE  NEG      Leukocyte Esterase NEGATIVE  NEG      WBC 0-4 0 - 4 /hpf    RBC 0-5 0 - 5 /hpf    Epithelial cells FEW FEW /lpf    Bacteria NEGATIVE  NEG /hpf       Radiologic Studies -   XR ABD (KUB)   Final Result   IMPRESSION: Severe rectal fecal impaction, with 13.6 cm stool ball filling the   pelvic outlet. CT Results  (Last 48 hours)    None        CXR Results  (Last 48 hours)    None            Medical Decision Making   I am the first provider for this patient. I reviewed the vital signs, available nursing notes, past medical history, past surgical history, family history and social history. Vital Signs-Reviewed the patient's vital signs. Patient Vitals for the past 12 hrs:   Temp Pulse Resp BP SpO2   01/03/20 1812 -- 95 16 134/88 98 %   01/03/20 1500 -- 97 16 138/85 98 %   01/03/20 1352 97.8 °F (36.6 °C) 96 17 (!) 139/97 98 %       Records Reviewed: Nursing Notes and Old Medical Records    Provider Notes (Medical Decision Making):    On presentation, the patient is well appearing, in no acute distress with normal vital signs. Based on my history and exam the differential diagnosis for this patient includes constipation, stool impaction, bowel obstruction, UTI, urinary retention. X-ray showed stool impaction which is likely the cause of the patient's urinary retention. Patient was given enemas and a digital disimpaction was performed with significant improvement in her symptoms. Patient has been able to urinate since the procedure. Labs showed no signs of kidney damage secondary to the retention. Patient tolerating p.o. Patient's abdomen continues to be soft, benign so would have less of a reason to suspect any acutely surgical pathology and thus would not obtain a CT at this time. Feel that she is stable for discharge home on her home lactulose regimen. ED Course:   Initial assessment performed. The patients presenting problems have been discussed, and they are in agreement with the care plan formulated and outlined with them. I have encouraged them to ask questions as they arise throughout their visit. Procedure Note - Rectal Exam/digital disimpaction:   Performed by: Ivette Paz MD  Rectal exam performed noted large stool bolus in the rectal vault. The stool bolus was broken up and large amount of stool was removed from the rectum. There was no rectal bleeding and noted. No masses palpated. The procedure took 1-15 minutes, and pt tolerated well. PROGRESS NOTE:  The patient has been re-evaluated and is ready for discharge. Reviewed available results with patient and have counseled them on diagnosis and care plan. They have expressed understanding, and all their questions have been answered. They agree with plan and agree to have pt F/U as recommended, or return to the ED if their sxs worsen. Discharge instructions have been provided and explained to them, along with reasons to have pt return to the ED.   The patient is amenable to discharge so will discharge pt at this time      Disposition:  home    PLAN:  1. Current Discharge Medication List      CONTINUE these medications which have CHANGED    Details   lactulose (CHRONULAC) 10 gram/15 mL solution Take 15 mL by mouth two (2) times a day for 30 days. Qty: 900 mL, Refills: 0           2. Follow-up Information     Follow up With Specialties Details Why Contact Info    Briana Sinclair MD Family Practice Schedule an appointment as soon as possible for a visit in 2 days Schedule appointment, please call and schedule appointment. [address] [place] [address]  [phone]      [facility] EMERGENCY DEPT Emergency Medicine  If symptoms worsen [address]    Henna Ward MD Gastroenterology Schedule an appointment as soon as possible for a visit in 2 days  [address]  [phone]          Return to ED if worse     Diagnosis     Clinical Impression:   1. Impacted stool in rectum ([address]    2. Urinary retention        Please note that this dictation was completed with Dragon, computer voice recognition software. Quite often unanticipated grammatical, syntax, homophones, and other interpretive errors are inadvertently transcribed by the computer software. Please disregard these errors. Additionally, please excuse any errors that have escaped final proofreading.

## 2020-01-04 NOTE — ED NOTES
Patient (s) grandson was given copy of dc instructions and zero paper script(s) and one electronic scripts. Patient (s) and grandson verbalized understanding of instructions and script (s). Patient given a current medication reconciliation form and verbalized understanding of their medications. Patient (s)and grandson verbalized understanding of the importance of discussing medications with  his or her physician or clinic they will be following up with. Patient alert and oriented and in no acute distress. Patient offered wheelchair from treatment area to hospital entrance, patient transported to via wheelchair. Pt had multiple large BMs prior to discharge and stated that she didn't feel that she didn't have to go anymore. Pt provided with depend to wear home with padding for the car and depends and padding to take home for pericare and accidental BM.

## 2020-01-20 ENCOUNTER — APPOINTMENT (OUTPATIENT)
Dept: GENERAL RADIOLOGY | Age: 75
End: 2020-01-20
Attending: EMERGENCY MEDICINE
Payer: MEDICARE

## 2020-01-20 ENCOUNTER — HOSPITAL ENCOUNTER (EMERGENCY)
Age: 75
Discharge: HOME | End: 2020-01-20
Attending: EMERGENCY MEDICINE
Payer: MEDICARE

## 2020-01-20 VITALS
HEIGHT: 67 IN | SYSTOLIC BLOOD PRESSURE: 104 MMHG | BODY MASS INDEX: 27.31 KG/M2 | DIASTOLIC BLOOD PRESSURE: 70 MMHG | RESPIRATION RATE: 18 BRPM | OXYGEN SATURATION: 100 % | WEIGHT: 174 LBS | HEART RATE: 93 BPM | TEMPERATURE: 97.5 F

## 2020-01-20 DIAGNOSIS — K59.00 CONSTIPATION, UNSPECIFIED CONSTIPATION TYPE: Primary | ICD-10-CM

## 2020-01-20 LAB
APPEARANCE UR: CLEAR
BACTERIA URNS QL MICRO: NEGATIVE /HPF
BILIRUB UR QL CFM: NEGATIVE
COLOR UR: NORMAL
EPITH CASTS URNS QL MICRO: NORMAL /LPF
GLUCOSE UR STRIP.AUTO-MCNC: NEGATIVE MG/DL
HGB UR QL STRIP: NEGATIVE
KETONES UR QL STRIP.AUTO: NEGATIVE MG/DL
LEUKOCYTE ESTERASE UR QL STRIP.AUTO: NEGATIVE
NITRITE UR QL STRIP.AUTO: NEGATIVE
PH UR STRIP: 5.5 [PH] (ref 5–8)
PROT UR STRIP-MCNC: NEGATIVE MG/DL
RBC #/AREA URNS HPF: NORMAL /HPF (ref 0–5)
SP GR UR REFRACTOMETRY: 1.02 (ref 1–1.03)
UA: UC IF INDICATED,UAUC: NORMAL
UROBILINOGEN UR QL STRIP.AUTO: 1 EU/DL (ref 0.2–1)
WBC URNS QL MICRO: NORMAL /HPF (ref 0–4)

## 2020-01-20 PROCEDURE — 74011250637 HC RX REV CODE- 250/637: Performed by: EMERGENCY MEDICINE

## 2020-01-20 PROCEDURE — 81001 URINALYSIS AUTO W/SCOPE: CPT

## 2020-01-20 PROCEDURE — 74018 RADEX ABDOMEN 1 VIEW: CPT

## 2020-01-20 PROCEDURE — 99284 EMERGENCY DEPT VISIT MOD MDM: CPT

## 2020-01-20 RX ORDER — DOCUSATE SODIUM 100 MG/1
100 CAPSULE, LIQUID FILLED ORAL 2 TIMES DAILY
Qty: 60 CAP | Refills: 0 | Status: SHIPPED | OUTPATIENT
Start: 2020-01-20 | End: 2020-04-19

## 2020-01-20 RX ORDER — MAGNESIUM CITRATE
296 SOLUTION, ORAL ORAL
Status: COMPLETED | OUTPATIENT
Start: 2020-01-20 | End: 2020-01-20

## 2020-01-20 RX ORDER — PEPPERMINT OIL
SPIRIT ORAL ONCE
Status: DISCONTINUED | OUTPATIENT
Start: 2020-01-20 | End: 2020-01-20 | Stop reason: HOSPADM

## 2020-01-20 RX ADMIN — MAGNESIUM CITRATE 296 ML: 1.75 LIQUID ORAL at 17:20

## 2020-01-20 NOTE — ED TRIAGE NOTES
Patient presents to the ED with c/o constipation and dizziness. Pt reports last bowel movement was two weeks ago. Pt reports a headache and sinus congestion. Pt reports intermittent dizziness x1 week.

## 2020-01-20 NOTE — DISCHARGE INSTRUCTIONS
Patient Education        Constipation: Care Instructions  Your Care Instructions    Constipation means that you have a hard time passing stools (bowel movements). People pass stools from 3 times a day to once every 3 days. What is normal for you may be different. Constipation may occur with pain in the rectum and cramping. The pain may get worse when you try to pass stools. Sometimes there are small amounts of bright red blood on toilet paper or the surface of stools. This is because of enlarged veins near the rectum (hemorrhoids). A few changes in your diet and lifestyle may help you avoid ongoing constipation. Your doctor may also prescribe medicine to help loosen your stool. Some medicines can cause constipation. These include pain medicines and antidepressants. Tell your doctor about all the medicines you take. Your doctor may want to make a medicine change to ease your symptoms. Follow-up care is a key part of your treatment and safety. Be sure to make and go to all appointments, and call your doctor if you are having problems. It's also a good idea to know your test results and keep a list of the medicines you take. How can you care for yourself at home? · Drink plenty of fluids, enough so that your urine is light yellow or clear like water. If you have kidney, heart, or liver disease and have to limit fluids, talk with your doctor before you increase the amount of fluids you drink. · Include high-fiber foods in your diet each day. These include fruits, vegetables, beans, and whole grains. · Get at least 30 minutes of exercise on most days of the week. Walking is a good choice. You also may want to do other activities, such as running, swimming, cycling, or playing tennis or team sports. · Take a fiber supplement, such as Citrucel or Metamucil, every day. Read and follow all instructions on the label. · Schedule time each day for a bowel movement. A daily routine may help.  Take your time having your bowel movement. · Support your feet with a small step stool when you sit on the toilet. This helps flex your hips and places your pelvis in a squatting position. · Your doctor may recommend an over-the-counter laxative to relieve your constipation. Examples are Milk of Magnesia and MiraLax. Read and follow all instructions on the label. Do not use laxatives on a long-term basis. When should you call for help? Call your doctor now or seek immediate medical care if:    · You have new or worse belly pain.     · You have new or worse nausea or vomiting.     · You have blood in your stools.    Watch closely for changes in your health, and be sure to contact your doctor if:    · Your constipation is getting worse.     · You do not get better as expected. Where can you learn more? Go to http://lobo-michel.info/. Enter 21 [id number] in the search box to learn more about \"Constipation: Care Instructions. \"  Current as of: June 26, 2019  Content Version: 12.2  © 2973-3026 [employer]. Care instructions adapted under license by "[employer] (which disclaims liability or warranty for this information). If you have questions about a medical condition or this instruction, always ask your healthcare professional. [address] any warranty or liability for your use of this information.

## 2020-01-20 NOTE — ED NOTES
Patient only tolerated several sips of mag citrate prior to stating that is was going to make her vomit.   Received soap suds enema

## 2020-01-20 NOTE — ED NOTES
Called multiple times to pt's son given number,pt's daughter and other numbers provide by pt but unable to reach anybody can  the pt,provider and charge nurse made aware.

## 2020-01-20 NOTE — ED PROVIDER NOTES
EMERGENCY DEPARTMENT HISTORY AND PHYSICAL EXAM      Date: 1/20/2020  Patient Name: Teddy Everett    History of Presenting Illness     Chief Complaint   Patient presents with    Constipation    Dizziness       History Provided By: Patient    HPI: Teddy Everett, 76 y.o. female with PMHx significant for HTN, anxiety, hypercholesteremia, GERD, DM, gout, depression, CAD presents ambulatory to the ED with cc of constipation x 2 weeks. Pt reports associated mild to moderate abdominal pain. Pt was evaluated in this ED on 1/3/2020 for similar, had digital disimpaction and was discharged with lactulose. Pt reports she had a small BM a few days after ED visit but has not had one since that time. Pt states she is still passing flatulence. Pt specifically denies any fever, chills, CP, SOB, NV. There are no other complaints, changes, or physical findings at this time. PCP: Shannan Marin MD    No current facility-administered medications on file prior to encounter. Current Outpatient Medications on File Prior to Encounter   Medication Sig Dispense Refill    lactulose (CHRONULAC) 10 gram/15 mL solution Take 15 mL by mouth two (2) times a day for 30 days. 900 mL 0    polyethylene glycol (MIRALAX) 17 gram/dose powder Take 17 g by mouth daily. 1 tablespoon with 8 oz of water daily 170 g 0    acetaminophen (TYLENOL) 325 mg tablet Take 2 Tabs by mouth every six (6) hours as needed for Pain. Indications: pain associated with arthritis 20 Tab 0    dicyclomine (BENTYL) 20 mg tablet Take 1 Tab by mouth every six (6) hours as needed (abdominal cramps) for up to 20 doses. 20 Tab 0    dilTIAZem SR (CARDIZEM SR) 60 mg SR capsule Take 1 Cap by mouth every twelve (12) hours. 30 Cap 3    LORazepam (ATIVAN) 0.5 mg tablet Take 1 Tab by mouth every eight (8) hours as needed for Anxiety.  Max Daily Amount: 1.5 mg. 20 Tab 0       Past History     Past Medical History:  Past Medical History:   Diagnosis Date    Agoraphobia without mention of panic attacks 2/17/2014    Anxiety disorder 8/18/2013    Arthritis     osteo    Breast pain, left 4/7/2015    CAD (coronary artery disease), native coronary artery 12/1/2015    no stents    Chronic chest pain 1/13/2014    Chronic pain associated with significant psychosocial dysfunction 2/17/2014    Depression 8/18/2013    Diabetes ([facility] [address]     type II    Duplicated right renal collecting system 3/13/2014    GERD (gastroesophageal reflux disease)     Gout, joint     Hypercholesteremia     hyercholesterolemia    Hypertension     Nephrolithiasis 3/13/2014    Personal history of noncompliance with medical treatment, presenting hazards to health 5/30/2014    Psychotic disorder ([facility] [address]     Vaginal pain 7/30/2014       Past Surgical History:  Past Surgical History:   Procedure Laterality Date    EGD  4/23/2010         HX CHOLECYSTECTOMY  09/20/2018    lap jesus    HX CYST REMOVAL      cyst removed from left wrist    HX HYSTERECTOMY      partial    HX OTHER SURGICAL      bladder dilitation    HX TUBAL LIGATION      HX UROLOGICAL      kidney stones       Family History:  Family History   Problem Relation Age of Onset    Stroke Mother     Heart Disease Mother     Cancer Father         type unknown    Heart Disease Son     Liver Disease Son     Heart Disease Daughter     Malignant Hyperthermia Neg Hx     Pseudocholinesterase Deficiency Neg Hx     Delayed Awakening Neg Hx     Post-op Nausea/Vomiting Neg Hx     Emergence Delirium Neg Hx     Post-op Cognitive Dysfunction Neg Hx     Other Neg Hx        Social History:  Social History     Tobacco Use    Smoking status: Never Smoker    Smokeless tobacco: Never Used   Substance Use Topics    Alcohol use: No    Drug use: No       Allergies:   Allergies   Allergen Reactions    Amoxicillin Hives    Sulfa (Sulfonamide Antibiotics) Hives and Itching    Mirtazapine Itching and Nausea Only     Funny feeling in chest    Percocet [Oxycodone-Acetaminophen] Nausea and Vomiting    Codeine Nausea and Vomiting    Crestor [Rosuvastatin] Other (comments)     myalgias    Nitroglycerin Unknown (comments)     Patient cannot remember why she is allergic to it      Prednisone Itching    Zithromax [Azithromycin] Itching     Not sure what it does,taken long time ago         Review of Systems   Review of Systems   Constitutional: Negative for fever. HENT: Negative for sore throat. Eyes: Negative for photophobia and redness. Respiratory: Negative for shortness of breath and wheezing. Cardiovascular: Negative for chest pain and leg swelling. Gastrointestinal: Positive for abdominal pain and constipation. Negative for blood in stool, nausea and vomiting. Genitourinary: Negative for difficulty urinating, dysuria, hematuria, menstrual problem and vaginal bleeding. Musculoskeletal: Negative for back pain and joint swelling. Neurological: Negative for dizziness, seizures, syncope, speech difficulty, weakness, numbness and headaches. Hematological: Negative for adenopathy. Psychiatric/Behavioral: Negative for agitation, confusion and suicidal ideas. The patient is not nervous/anxious. Physical Exam   Physical Exam  Vitals signs and nursing note reviewed. Constitutional:       General: She is not in acute distress. Appearance: She is well-developed. HENT:      Head: Normocephalic and atraumatic. Eyes:      Conjunctiva/sclera: Conjunctivae normal.      Pupils: Pupils are equal, round, and reactive to light. Neck:      Musculoskeletal: Normal range of motion and neck supple. Cardiovascular:      Rate and Rhythm: Normal rate and regular rhythm. Heart sounds: Normal heart sounds. Pulmonary:      Effort: Pulmonary effort is normal. No respiratory distress. Breath sounds: Normal breath sounds. No wheezing. Abdominal:      General: Bowel sounds are normal. There is no distension.       Palpations: Abdomen is soft.      Tenderness: There is no tenderness. Musculoskeletal: Normal range of motion. General: No tenderness. Skin:     General: Skin is warm and dry. Findings: No rash. Neurological:      Mental Status: She is alert and oriented to person, place, and time. Cranial Nerves: No cranial nerve deficit. Psychiatric:         Behavior: Behavior normal.         Diagnostic Study Results     Labs -     Recent Results (from the past 12 hour(s))   URINALYSIS W/ REFLEX CULTURE    Collection Time: 01/20/20  4:38 PM   Result Value Ref Range    Color YELLOW/STRAW      Appearance CLEAR CLEAR      Specific gravity 1.020 1.003 - 1.030      pH (UA) 5.5 5.0 - 8.0      Protein NEGATIVE  NEG mg/dL    Glucose NEGATIVE  NEG mg/dL    Ketone NEGATIVE  NEG mg/dL    Blood NEGATIVE  NEG      Urobilinogen 1.0 0.2 - 1.0 EU/dL    Nitrites NEGATIVE  NEG      Leukocyte Esterase NEGATIVE  NEG      WBC 0-4 0 - 4 /hpf    RBC 0-5 0 - 5 /hpf    Epithelial cells FEW FEW /lpf    Bacteria NEGATIVE  NEG /hpf    UA:UC IF INDICATED CULTURE NOT INDICATED BY UA RESULT CNI     BILIRUBIN, CONFIRM    Collection Time: 01/20/20  4:38 PM   Result Value Ref Range    Bilirubin UA, confirm NEGATIVE  NEG         Radiologic Studies -   XR ABD (KUB)   Final Result   IMPRESSION: Nonspecific intestinal gas pattern. Large amount of stool in the   rectum, suggestive of fecal impaction. CT Results  (Last 48 hours)    None        CXR Results  (Last 48 hours)    None            Medical Decision Making   I am the first provider for this patient. I reviewed the vital signs, available nursing notes, past medical history, past surgical history, family history and social history. Vital Signs-Reviewed the patient's vital signs.   Patient Vitals for the past 12 hrs:   Temp Pulse Resp BP SpO2   01/20/20 1600 97.5 °F (36.4 °C) 93 18 104/70 100 %     Records Reviewed: Nursing Notes and Old Medical Records    Provider Notes (Medical Decision Making): DDx: constipation, fecal impaction, SBO    ED Course:   Initial assessment performed. The patients presenting problems have been discussed, and they are in agreement with the care plan formulated and outlined with them. I have encouraged them to ask questions as they arise throughout their visit. 5:59 PM  After enema pt had episode of liquid stool. Critical Care Time:   none    Disposition:  DISCHARGE  The patient has been re-evaluated and is ready for discharge. Reviewed available results with patient. Counseled pt on diagnosis and care plan. Pt has expressed understanding, and all questions have been answered. Pt agrees with plan and agrees to follow up as recommended, or return to the ED if their symptoms worsen. Discharge instructions have been provided and explained to the pt, along with reasons to return to the ED. PLAN:  1. Current Discharge Medication List      START taking these medications    Details   docusate sodium (COLACE) 100 mg capsule Take 1 Cap by mouth two (2) times a day for 90 days. Qty: 60 Cap, Refills: 0           2. Follow-up Information     Follow up With Specialties Details Why Contact Info    Shannan Marin MD General acute hospital In 1 week  [address] [place] [address]  [phone]          Return to ED if worse     Diagnosis     Clinical Impression:   1. Constipation, unspecified constipation type        Attestations: This note is prepared by Brady Rush, acting as Scribe for Carl Rendon MD.    Carl Rendon MD: The scribe's documentation has been prepared under my direction and personally reviewed by me in its entirety. I confirm that the note above accurately reflects all work, treatment, procedures, and medical decision making performed by me.

## 2020-01-21 NOTE — ED NOTES
..Discharge summary and discharge medications reviewed with patient and appropriate educational materials and side effects teaching were provided. patient  Given 1 paper prescriptions and 0 electronic prescriptions sent to pt's listed pharmacy. Patient verbalized understanding of the importance of discussing medications with his or her physician or clinic they will be following up with. No si/s of acute distress prior to discharge. Patient offered wheelchair from treatment area to hospital entrance, patient declined wheelchair. Discharge instructions given to patient by day shift nurse.

## 2020-01-21 NOTE — ED NOTES
Verbal shift change report given to Carrie Nieves RN (oncoming nurse) by Rach Toney (offgoing nurse). Report included the following information SBAR.       pts son is on the way to  patient.

## 2020-03-24 ENCOUNTER — HOSPITAL ENCOUNTER (EMERGENCY)
Age: 75
Discharge: HOME | End: 2020-03-24
Attending: EMERGENCY MEDICINE
Payer: MEDICARE

## 2020-03-24 VITALS
OXYGEN SATURATION: 100 % | HEIGHT: 67 IN | SYSTOLIC BLOOD PRESSURE: 165 MMHG | HEART RATE: 72 BPM | WEIGHT: 167 LBS | BODY MASS INDEX: 26.21 KG/M2 | RESPIRATION RATE: 18 BRPM | DIASTOLIC BLOOD PRESSURE: 69 MMHG | TEMPERATURE: 97.8 F

## 2020-03-24 DIAGNOSIS — R35.0 URINARY FREQUENCY: ICD-10-CM

## 2020-03-24 DIAGNOSIS — R51.9 NONINTRACTABLE HEADACHE, UNSPECIFIED CHRONICITY PATTERN, UNSPECIFIED HEADACHE TYPE: Primary | ICD-10-CM

## 2020-03-24 DIAGNOSIS — R03.0 ELEVATED BLOOD PRESSURE READING: ICD-10-CM

## 2020-03-24 LAB
APPEARANCE UR: ABNORMAL
BACTERIA URNS QL MICRO: NEGATIVE /HPF
BILIRUB UR QL CFM: NEGATIVE
COLOR UR: ABNORMAL
EPITH CASTS URNS QL MICRO: ABNORMAL /LPF
GLUCOSE UR STRIP.AUTO-MCNC: NEGATIVE MG/DL
HGB UR QL STRIP: NEGATIVE
KETONES UR QL STRIP.AUTO: NEGATIVE MG/DL
LEUKOCYTE ESTERASE UR QL STRIP.AUTO: ABNORMAL
NITRITE UR QL STRIP.AUTO: NEGATIVE
PH UR STRIP: 5 [PH] (ref 5–8)
PROT UR STRIP-MCNC: NEGATIVE MG/DL
RBC #/AREA URNS HPF: ABNORMAL /HPF (ref 0–5)
SP GR UR REFRACTOMETRY: 1.02 (ref 1–1.03)
UA: UC IF INDICATED,UAUC: ABNORMAL
UROBILINOGEN UR QL STRIP.AUTO: 1 EU/DL (ref 0.2–1)
WBC URNS QL MICRO: ABNORMAL /HPF (ref 0–4)

## 2020-03-24 PROCEDURE — 99284 EMERGENCY DEPT VISIT MOD MDM: CPT

## 2020-03-24 PROCEDURE — 81001 URINALYSIS AUTO W/SCOPE: CPT

## 2020-03-24 RX ORDER — AMLODIPINE BESYLATE 5 MG/1
5 TABLET ORAL
Status: DISCONTINUED | OUTPATIENT
Start: 2020-03-24 | End: 2020-03-24 | Stop reason: HOSPADM

## 2020-03-24 NOTE — ED TRIAGE NOTES
Patient reports intermittent headache and chest pain for months, worsening. Pt reports urinary frequency. Pt repeatedly states, \"I feel scared, am I going to be okay, I'm nervous. \"

## 2020-03-24 NOTE — ED NOTES
Pt presents ambulatory to ED complaining of \"something is wrong with me. PT states her doctor \"took me off of high blood pills\". Pt states she is not on any medications. Pt states she has not been to the bathroom in 5 days. Pt is alert and oriented x 4, RR even and unlabored, skin is warm and dry. Assesment completed and pt updated on plan of care. Emergency Department Nursing Plan of Care       The Nursing Plan of Care is developed from the Nursing assessment and Emergency Department Attending provider initial evaluation. The plan of care may be reviewed in the ED Provider note.     The Plan of Care was developed with the following considerations:   Patient / Family readiness to learn indicated by:verbalized understanding  Persons(s) to be included in education: patient  Barriers to Learning/Limitations:Chanell Callahan 10.    3/24/2020   10:31 AM

## 2020-03-24 NOTE — PROGRESS NOTES
Reason for Admission:   Per RN note \" Patient reports intermittent headache and chest pain for months, worsening. Pt reports urinary frequency. Pt repeatedly states, \"I feel scared, am I going to be okay, I'm nervous\". RUR Score:    TBD                 Plan for utilizing home health:   TBD       PCP: First and Last name:  Dr. Brian Bang   Name of Practice:  [facility]. Are you a current patient: Yes/No:  Yes   Approximate date of last visit: unsure                  Current Advanced Directive/Advance Care Plan: Not on file                          Transition of Care Plan:   CM opened case for assessment of D/C planning needs, CM reviewed chart. PCP follow-up for 3/31/2020 @ 7841. [phone] [facility]  [phone]

## 2020-03-24 NOTE — DISCHARGE INSTRUCTIONS
Patient Education        Elevated Blood Pressure: Care Instructions  Your Care Instructions    Blood pressure is a measure of how hard the blood pushes against the walls of your arteries. It's normal for blood pressure to go up and down throughout the day. But if it stays up over time, you have high blood pressure. Two numbers tell you your blood pressure. The first number is the systolic pressure. It shows how hard the blood pushes when your heart is pumping. The second number is the diastolic pressure. It shows how hard the blood pushes between heartbeats, when your heart is relaxed and filling with blood. An ideal blood pressure in adults is less than 120/80 (say \"120 over 80\"). High blood pressure is 140/90 or higher. You have high blood pressure if your top number is 140 or higher or your bottom number is 90 or higher, or both. The main test for high blood pressure is simple, fast, and painless. To diagnose high blood pressure, your doctor will test your blood pressure at different times. After testing your blood pressure, your doctor may ask you to test it again when you are home. If you are diagnosed with high blood pressure, you can work with your doctor to make a long-term plan to manage it. Follow-up care is a key part of your treatment and safety. Be sure to make and go to all appointments, and call your doctor if you are having problems. It's also a good idea to know your test results and keep a list of the medicines you take. How can you care for yourself at home? · Do not smoke. Smoking increases your risk for heart attack and stroke. If you need help quitting, talk to your doctor about stop-smoking programs and medicines. These can increase your chances of quitting for good. · Stay at a healthy weight. · Try to limit how much sodium you eat to less than 2,300 milligrams (mg) a day. Your doctor may ask you to try to eat less than 1,500 mg a day. · Be physically active.  Get at least 30 minutes of exercise on most days of the week. Walking is a good choice. You also may want to do other activities, such as running, swimming, cycling, or playing tennis or team sports. · Avoid or limit alcohol. Talk to your doctor about whether you can drink any alcohol. · Eat plenty of fruits, vegetables, and low-fat dairy products. Eat less saturated and total fats. · Learn how to check your blood pressure at home. When should you call for help? Call your doctor now or seek immediate medical care if:  ? · Your blood pressure is much higher than normal (such as 180/110 or higher). ? · You think high blood pressure is causing symptoms such as:  ¨ Severe headache. ¨ Blurry vision. ? Watch closely for changes in your health, and be sure to contact your doctor if:  ? · You do not get better as expected. Where can you learn more? Go to http://lobo-michel.info/. Enter I981 in the search box to learn more about \"Elevated Blood Pressure: Care Instructions. \"  Current as of: September 21, 2016  Content Version: 11.4  © 6106-7653 [employer]. Care instructions adapted under license by [employer] (which disclaims liability or warranty for this information). If you have questions about a medical condition or this instruction, always ask your healthcare professional. [address] any warranty or liability for your use of this information. Patient Education        Headache: Care Instructions  Your Care Instructions    Headaches have many possible causes. Most headaches aren't a sign of a more serious problem, and they will get better on their own. Home treatment may help you feel better faster. The doctor has checked you carefully, but problems can develop later. If you notice any problems or new symptoms, get medical treatment right away. Follow-up care is a key part of your treatment and safety.  Be sure to make and go to all appointments, and call your doctor if you are having problems. It's also a good idea to know your test results and keep a list of the medicines you take. How can you care for yourself at home? · Do not drive if you have taken a prescription pain medicine. · Rest in a quiet, dark room until your headache is gone. Close your eyes and try to relax or go to sleep. Don't watch TV or read. · Put a cold, moist cloth or cold pack on the painful area for 10 to 20 minutes at a time. Put a thin cloth between the cold pack and your skin. · Use a warm, moist towel or a heating pad set on low to relax tight shoulder and neck muscles. · Have someone gently massage your neck and shoulders. · Take pain medicines exactly as directed. ? If the doctor gave you a prescription medicine for pain, take it as prescribed. ? If you are not taking a prescription pain medicine, ask your doctor if you can take an over-the-counter medicine. · Be careful not to take pain medicine more often than the instructions allow, because you may get worse or more frequent headaches when the medicine wears off. · Do not ignore new symptoms that occur with a headache, such as a fever, weakness or numbness, vision changes, or confusion. These may be signs of a more serious problem. To prevent headaches  · Keep a headache diary so you can figure out what triggers your headaches. Avoiding triggers may help you prevent headaches. Record when each headache began, how long it lasted, and what the pain was like (throbbing, aching, stabbing, or dull). Write down any other symptoms you had with the headache, such as nausea, flashing lights or dark spots, or sensitivity to bright light or loud noise. Note if the headache occurred near your period. List anything that might have triggered the headache, such as certain foods (chocolate, cheese, wine) or odors, smoke, bright light, stress, or lack of sleep. · Find healthy ways to deal with stress.  Headaches are most common during or right after stressful times. Take time to relax before and after you do something that has caused a headache in the past.  · Try to keep your muscles relaxed by keeping good posture. Check your jaw, face, neck, and shoulder muscles for tension, and try relaxing them. When sitting at a desk, change positions often, and stretch for 30 seconds each hour. · Get plenty of sleep and exercise. · Eat regularly and well. Long periods without food can trigger a headache. · Treat yourself to a massage. Some people find that regular massages are very helpful in relieving tension. · Limit caffeine by not drinking too much coffee, tea, or soda. But don't quit caffeine suddenly, because that can also give you headaches. · Reduce eyestrain from computers by blinking frequently and looking away from the computer screen every so often. Make sure you have proper eyewear and that your monitor is set up properly, about an arm's length away. · Seek help if you have depression or anxiety. Your headaches may be linked to these conditions. Treatment can both prevent headaches and help with symptoms of anxiety or depression. When should you call for help? Call 911 anytime you think you may need emergency care. For example, call if:    · You have signs of a stroke. These may include:  ? Sudden numbness, paralysis, or weakness in your face, arm, or leg, especially on only one side of your body. ? Sudden vision changes. ? Sudden trouble speaking. ? Sudden confusion or trouble understanding simple statements. ? Sudden problems with walking or balance. ? A sudden, severe headache that is different from past headaches.    Call your doctor now or seek immediate medical care if:    · You have a new or worse headache.     · Your headache gets much worse. Where can you learn more? Go to http://lobo-michel.info/  Enter M271 in the search box to learn more about \"Headache: Care Instructions. \"  Current as of: November 19, 2019Content Version: 12.4  © 8930-8231 [employer]. Care instructions adapted under license by [employer] (which disclaims liability or warranty for this information). If you have questions about a medical condition or this instruction, always ask your healthcare professional. [address] any warranty or liability for your use of this information.

## 2020-03-24 NOTE — ED PROVIDER NOTES
EMERGENCY DEPARTMENT HISTORY AND PHYSICAL EXAM      Date: 3/24/2020  Patient Name: Meta Moritz    History of Presenting Illness     Chief Complaint   Patient presents with    Headache    Urinary Pain     History Provided By: Patient    HPI: Meta Moritz, 76 y.o. female with past medical history significant for anxiety, osteoarthritis, CAD, chronic chest pain, depression, type 2 diabetes, hypertension, hyperlipidemia, and GERD who presents via private vehicle to the ED with cc of headache, intermittent chest pain, and urinary frequency. Patient states the symptoms have been on and off for the past few months. She also states that she is scared about the COVID-19 virus and thinks that is also contributing to her symptoms. She does have a history of hypertension but states she was taken off of all of her blood pressure medications by her primary care doctor because \"her blood pressure was good\". She also reports that she has not had a bowel movement in the past 4 to 5 days. PMHx: Anxiety, osteoarthritis, CAD, chronic chest pain, depression, diabetes, hypertension, hyperlipidemia, GERD  Social Hx: Denies alcohol, tobacco, or illegal drug use    PCP: Moses Hewitt MD    There are no other complaints, changes, or physical findings at this time. No current facility-administered medications on file prior to encounter. Current Outpatient Medications on File Prior to Encounter   Medication Sig Dispense Refill    docusate sodium (COLACE) 100 mg capsule Take 1 Cap by mouth two (2) times a day for 90 days. 60 Cap 0    polyethylene glycol (MIRALAX) 17 gram/dose powder Take 17 g by mouth daily. 1 tablespoon with 8 oz of water daily 170 g 0    acetaminophen (TYLENOL) 325 mg tablet Take 2 Tabs by mouth every six (6) hours as needed for Pain.  Indications: pain associated with arthritis 20 Tab 0    dicyclomine (BENTYL) 20 mg tablet Take 1 Tab by mouth every six (6) hours as needed (abdominal cramps) for up to 20 doses. 20 Tab 0    dilTIAZem SR (CARDIZEM SR) 60 mg SR capsule Take 1 Cap by mouth every twelve (12) hours. 30 Cap 3    LORazepam (ATIVAN) 0.5 mg tablet Take 1 Tab by mouth every eight (8) hours as needed for Anxiety.  Max Daily Amount: 1.5 mg. 20 Tab 0     Past History     Past Medical History:  Past Medical History:   Diagnosis Date    Agoraphobia without mention of panic attacks 2/17/2014    Anxiety disorder 8/18/2013    Arthritis     osteo    Breast pain, left 4/7/2015    CAD (coronary artery disease), native coronary artery 12/1/2015    no stents    Chronic chest pain 1/13/2014    Chronic pain associated with significant psychosocial dysfunction 2/17/2014    Depression 8/18/2013    Diabetes ([facility] [address]     type II    Duplicated right renal collecting system 3/13/2014    GERD (gastroesophageal reflux disease)     Gout, joint     Hypercholesteremia     hyercholesterolemia    Hypertension     Nephrolithiasis 3/13/2014    Personal history of noncompliance with medical treatment, presenting hazards to health 5/30/2014    Psychotic disorder ([facility] [address]     Vaginal pain 7/30/2014     Past Surgical History:  Past Surgical History:   Procedure Laterality Date    EGD  4/23/2010         HX CHOLECYSTECTOMY  09/20/2018    lap jesus    HX CYST REMOVAL      cyst removed from left wrist    HX HYSTERECTOMY      partial    HX OTHER SURGICAL      bladder dilitation    HX TUBAL LIGATION      HX UROLOGICAL      kidney stones     Family History:  Family History   Problem Relation Age of Onset    Stroke Mother     Heart Disease Mother     Cancer Father         type unknown    Heart Disease Son     Liver Disease Son     Heart Disease Daughter     Malignant Hyperthermia Neg Hx     Pseudocholinesterase Deficiency Neg Hx     Delayed Awakening Neg Hx     Post-op Nausea/Vomiting Neg Hx     Emergence Delirium Neg Hx     Post-op Cognitive Dysfunction Neg Hx     Other Neg Hx      Social History:  Social History     Tobacco Use    Smoking status: Never Smoker    Smokeless tobacco: Never Used   Substance Use Topics    Alcohol use: No    Drug use: No     Allergies: Allergies   Allergen Reactions    Amoxicillin Hives    Sulfa (Sulfonamide Antibiotics) Hives and Itching    Mirtazapine Itching and Nausea Only     Funny feeling in chest    Percocet [Oxycodone-Acetaminophen] Nausea and Vomiting    Codeine Nausea and Vomiting    Crestor [Rosuvastatin] Other (comments)     myalgias    Nitroglycerin Unknown (comments)     Patient cannot remember why she is allergic to it      Prednisone Itching    Zithromax [Azithromycin] Itching     Not sure what it does,taken long time ago     Review of Systems   Review of Systems   Constitutional: Negative for chills and fever. HENT: Negative for congestion, rhinorrhea, sneezing and sore throat. Eyes: Negative for redness and visual disturbance. Respiratory: Negative for shortness of breath. Cardiovascular: Negative for leg swelling. Gastrointestinal: Negative for abdominal pain, nausea and vomiting. Genitourinary: Positive for dysuria and frequency. Negative for difficulty urinating. Musculoskeletal: Negative for back pain, myalgias and neck stiffness. Skin: Negative for rash. Neurological: Positive for headaches. Negative for dizziness, syncope and weakness. Hematological: Negative for adenopathy. Psychiatric/Behavioral: The patient is nervous/anxious. All other systems reviewed and are negative. Physical Exam   Physical Exam  Vitals signs and nursing note reviewed. Constitutional:       Appearance: Normal appearance. She is well-developed. HENT:      Head: Normocephalic and atraumatic. Eyes:      Conjunctiva/sclera: Conjunctivae normal.   Neck:      Musculoskeletal: Full passive range of motion without pain, normal range of motion and neck supple. Cardiovascular:      Rate and Rhythm: Normal rate and regular rhythm.       Pulses: Normal pulses. Heart sounds: S1 normal and S2 normal. Murmur (3/6 best heard over the left sternal border) present. Pulmonary:      Effort: Pulmonary effort is normal. No respiratory distress. Breath sounds: Normal breath sounds. No wheezing. Abdominal:      General: Bowel sounds are normal. There is no distension. Palpations: Abdomen is soft. Tenderness: There is no abdominal tenderness. There is no rebound. Musculoskeletal: Normal range of motion. Skin:     General: Skin is warm and dry. Findings: No rash. Neurological:      Mental Status: She is alert and oriented to person, place, and time. Psychiatric:         Mood and Affect: Mood is anxious. Speech: Speech normal.         Behavior: Behavior normal.         Thought Content: Thought content normal.         Judgment: Judgment normal.       Diagnostic Study Results   Labs -     Recent Results (from the past 12 hour(s))   URINALYSIS W/ REFLEX CULTURE    Collection Time: 03/24/20 11:18 AM   Result Value Ref Range    Color YELLOW/STRAW      Appearance CLOUDY (A) CLEAR      Specific gravity 1.025 1.003 - 1.030      pH (UA) 5.0 5.0 - 8.0      Protein NEGATIVE  NEG mg/dL    Glucose NEGATIVE  NEG mg/dL    Ketone NEGATIVE  NEG mg/dL    Blood NEGATIVE  NEG      Urobilinogen 1.0 0.2 - 1.0 EU/dL    Nitrites NEGATIVE  NEG      Leukocyte Esterase SMALL (A) NEG      WBC 0-4 0 - 4 /hpf    RBC 0-5 0 - 5 /hpf    Epithelial cells MODERATE (A) FEW /lpf    Bacteria NEGATIVE  NEG /hpf    UA:UC IF INDICATED CULTURE NOT INDICATED BY UA RESULT CNI     BILIRUBIN, CONFIRM    Collection Time: 03/24/20 11:18 AM   Result Value Ref Range    Bilirubin UA, confirm NEGATIVE  NEG         Radiologic Studies -   No orders to display     No results found. Medical Decision Making   I am the first provider for this patient.     I reviewed the vital signs, available nursing notes, past medical history, past surgical history, family history and social history. Vital Signs-Reviewed the patient's vital signs. Patient Vitals for the past 12 hrs:   Temp Pulse Resp BP SpO2   03/24/20 1127 -- -- -- 165/69 100 %   03/24/20 1100 -- -- -- 157/63 100 %   03/24/20 1050 -- 72 -- 141/81 --   03/24/20 1033 -- 73 18 180/86 100 %   03/24/20 1023 97.8 °F (36.6 °C) 83 18 193/89 100 %     Pulse Oximetry Analysis - 100% on RA    Records Reviewed: Nursing Notes    Provider Notes (Medical Decision Making):   55-year-old female presents with headache, chronic chest pain, and urinary frequency/dysuria. The majority of her symptoms have been present for the past few months and gradually getting worse. Will repeat her blood pressure and speak with her primary care doctor. We will also send a UA for urinary symptoms. ED Course:   Initial assessment performed. The patients presenting problems have been discussed, and they are in agreement with the care plan formulated and outlined with them. I have encouraged them to ask questions as they arise throughout their visit. 10:58 AM  Megan Chua MD spoke with Dr. Adriel Velasquez, Consult for PCP. Discussed HPI and PE, available diagnostic tests and clinical findings. She is in agreement with care plans as outlined. She states that patient has a history of anxiety and always is nervous about her care. Last time she was seen in the office she was on losartan 25 mg but this got recalled. Her blood pressure on that visit was 110/62. She is supposed to be seen in the office sometime the end of this week or the beginning of next week. She can call for a follow-up appointment and they can work her in. She agrees with checking a UA today and no other work-up. Progress Note  11:47 AM  I have re-evaluated pt and her UA is negative. Her blood pressure is slightly elevated but I will defer management to Dr. Adriel Velasquez at her follow-up appointment. Progress Note:   Updated pt on all returned results and findings.  Discussed the importance of proper follow up as referred below along with return precautions. Pt in agreement with the care plan and expresses agreement with and understanding of all items discussed. Disposition:  Discharge Note:  The pt is ready for discharge. The pt's signs, symptoms, diagnosis, and discharge instructions have been discussed and pt has conveyed their understanding. The pt is to follow up as recommended or return to ER should their symptoms worsen. Plan has been discussed and pt is in agreement. PLAN:  1. Current Discharge Medication List        2. Follow-up Information     Follow up With Specialties Details Why Contact Info    Leonard Chicas MD Family Practice On 3/31/2020 Your appointment time is [phone], Please keep this appointment, Please arrive 15 mins early, Bring  INS card, picture ID,and discharge papers [address] [phone]      [facility] [place] EMERGENCY DEPT Emergency Medicine  As needed, If symptoms worsen Gabo Sarita  [phone]        Return to ED if worse     Diagnosis     Clinical Impression:   1. Nonintractable headache, unspecified chronicity pattern, unspecified headache type    2. Urinary frequency    3. Elevated blood pressure reading            Please note that this dictation was completed with Dragon, computer voice recognition software. Quite often unanticipated grammatical, syntax, homophones, and other interpretive errors are inadvertently transcribed by the computer software. Please disregard these errors. Additionally, please excuse any errors that have escaped final proofreading.

## 2020-03-24 NOTE — ED NOTES
Discharge instructions were given to the patient by Jerri Mulligan.     The patient left the Emergency Department ambulatory, alert and oriented and in no acute distress with 0 prescriptions. The patient was encouraged to call or return to the ED for worsening issues or problems and was encouraged to schedule a follow up appointment for continuing care. The patient verbalized understanding of discharge instructions and prescriptions, all questions were answered. The patient has no further concerns at this time.

## 2020-03-24 NOTE — ED NOTES
Per Wicho Blanton MD after speaking to pt's PCP - Losartan recalled and PCP unaware if pt picked up new medication. Pt has appt with PCP later this week or next week.

## 2020-03-24 NOTE — ED NOTES
Pt asking this nurse if I am scared about Maryann Baugh is going on out here\" regarding the COVID 19. Pt states \"can you take my BP again\". Pt states she feels scared regarding current virus  Pt states \"It hurts below, when I pee\".

## 2020-03-25 ENCOUNTER — PATIENT OUTREACH (OUTPATIENT)
Dept: INTERNAL MEDICINE CLINIC | Age: 75
End: 2020-03-25

## 2020-03-25 NOTE — PROGRESS NOTES
COVID-19 Screening Initial Follow-up Note    Patient contacted regarding COVID-19  risk. Care Transition Nurse/ Ambulatory Care Manager contacted the patient and daughter by telephone to perform post discharge assessment. Verified name and  with patient and daughter as identifiers. Provided introduction to self, and explanation of the CTN/ACM role, and reason for call due to risk factors for infection and/or exposure to COVID-19. Symptoms reviewed with patient and daughter who verbalized the following symptoms: no new/worsening symptoms       Due to no new or worsening symptoms encounter was not routed to provider for escalation. Patient has following risk factors of: immunocompromised. CTN/ACM reviewed discharge instructions, medical action plan and red flags such as increased shortness of breath, increasing fever and signs of decompensation with patient and daughter who verbalized understanding. Discussed exposure protocols and quarantine with CDC Guidelines What to do if you are sick with coronavirus disease  Patient and daughter who was given an opportunity for questions and concerns. The patient and her daughter agrees to contact the [facility] exposure line [phone], [facility] department [address]  ([phone] and PCP office for questions related to their healthcare. CTN/ACM provided contact information for future reference.     Reviewed and educated patient and daughter on any new and changed medications related to discharge diagnosis- no new medications were prescribed     Plan for follow-up call in 14 days based on severity of symptoms and risk factors

## 2020-04-08 ENCOUNTER — PATIENT OUTREACH (OUTPATIENT)
Dept: INTERNAL MEDICINE CLINIC | Age: 75
End: 2020-04-08

## 2020-04-08 NOTE — PROGRESS NOTES
Patient resolved from Transition of Care episode on 4/8/20  Patient/family has been provided the following resources and education related to COVID-19:                         Signs, symptoms and red flags related to COVID-19            CDC exposure and quarantine guidelines            [facility] exposure contact - [phone]            Contact for their local Department of Health                 Patient currently reports that the following symptoms have improved:  no new/worsening symptoms     No further outreach scheduled with this CTN/ACM. Episode of Care resolved. Patient has this CTN/ACM contact information if future needs arise.     Cori Rangel RN  Ambulatory Care Manager

## 2020-06-23 ENCOUNTER — APPOINTMENT (OUTPATIENT)
Dept: GENERAL RADIOLOGY | Age: 75
End: 2020-06-23
Attending: EMERGENCY MEDICINE
Payer: MEDICARE

## 2020-06-23 ENCOUNTER — HOSPITAL ENCOUNTER (EMERGENCY)
Age: 75
Discharge: HOME | End: 2020-06-23
Attending: EMERGENCY MEDICINE
Payer: MEDICARE

## 2020-06-23 VITALS
BODY MASS INDEX: 26.16 KG/M2 | SYSTOLIC BLOOD PRESSURE: 189 MMHG | RESPIRATION RATE: 16 BRPM | TEMPERATURE: 98.1 F | DIASTOLIC BLOOD PRESSURE: 86 MMHG | WEIGHT: 167 LBS | OXYGEN SATURATION: 100 % | HEART RATE: 58 BPM

## 2020-06-23 DIAGNOSIS — K59.01 SLOW TRANSIT CONSTIPATION: Primary | ICD-10-CM

## 2020-06-23 LAB
ALBUMIN SERPL-MCNC: 3.5 G/DL (ref 3.5–5)
ALBUMIN/GLOB SERPL: 0.9 {RATIO} (ref 1.1–2.2)
ALP SERPL-CCNC: 76 U/L (ref 45–117)
ALT SERPL-CCNC: 20 U/L (ref 12–78)
ANION GAP SERPL CALC-SCNC: 4 MMOL/L (ref 5–15)
APPEARANCE UR: CLEAR
AST SERPL-CCNC: 22 U/L (ref 15–37)
BILIRUB SERPL-MCNC: 0.7 MG/DL (ref 0.2–1)
BILIRUB UR QL: NEGATIVE
BUN SERPL-MCNC: 14 MG/DL (ref 6–20)
BUN/CREAT SERPL: 13 (ref 12–20)
CALCIUM SERPL-MCNC: 8.5 MG/DL (ref 8.5–10.1)
CHLORIDE SERPL-SCNC: 109 MMOL/L (ref 97–108)
CO2 SERPL-SCNC: 29 MMOL/L (ref 21–32)
COLOR UR: NORMAL
CREAT SERPL-MCNC: 1.05 MG/DL (ref 0.55–1.02)
ERYTHROCYTE [DISTWIDTH] IN BLOOD BY AUTOMATED COUNT: 13.4 % (ref 11.5–14.5)
GLOBULIN SER CALC-MCNC: 3.9 G/DL (ref 2–4)
GLUCOSE SERPL-MCNC: 82 MG/DL (ref 65–100)
GLUCOSE UR STRIP.AUTO-MCNC: NEGATIVE MG/DL
HCT VFR BLD AUTO: 40.3 % (ref 35–47)
HGB BLD-MCNC: 12.7 G/DL (ref 11.5–16)
HGB UR QL STRIP: NEGATIVE
KETONES UR QL STRIP.AUTO: NEGATIVE MG/DL
LEUKOCYTE ESTERASE UR QL STRIP.AUTO: NEGATIVE
MCH RBC QN AUTO: 26.6 PG (ref 26–34)
MCHC RBC AUTO-ENTMCNC: 31.5 G/DL (ref 30–36.5)
MCV RBC AUTO: 84.5 FL (ref 80–99)
NITRITE UR QL STRIP.AUTO: NEGATIVE
NRBC # BLD: 0 K/UL (ref 0–0.01)
NRBC BLD-RTO: 0 PER 100 WBC
PH UR STRIP: 7.5 [PH] (ref 5–8)
PLATELET # BLD AUTO: 188 K/UL (ref 150–400)
PMV BLD AUTO: 11.2 FL (ref 8.9–12.9)
POTASSIUM SERPL-SCNC: 4.3 MMOL/L (ref 3.5–5.1)
PROT SERPL-MCNC: 7.4 G/DL (ref 6.4–8.2)
PROT UR STRIP-MCNC: NEGATIVE MG/DL
RBC # BLD AUTO: 4.77 M/UL (ref 3.8–5.2)
SODIUM SERPL-SCNC: 142 MMOL/L (ref 136–145)
SP GR UR REFRACTOMETRY: 1.01 (ref 1–1.03)
UROBILINOGEN UR QL STRIP.AUTO: 1 EU/DL (ref 0.2–1)
WBC # BLD AUTO: 3.3 K/UL (ref 3.6–11)

## 2020-06-23 PROCEDURE — 80053 COMPREHEN METABOLIC PANEL: CPT

## 2020-06-23 PROCEDURE — 51701 INSERT BLADDER CATHETER: CPT

## 2020-06-23 PROCEDURE — 99284 EMERGENCY DEPT VISIT MOD MDM: CPT

## 2020-06-23 PROCEDURE — 74011250637 HC RX REV CODE- 250/637: Performed by: EMERGENCY MEDICINE

## 2020-06-23 PROCEDURE — 74018 RADEX ABDOMEN 1 VIEW: CPT

## 2020-06-23 PROCEDURE — 36415 COLL VENOUS BLD VENIPUNCTURE: CPT

## 2020-06-23 PROCEDURE — 81003 URINALYSIS AUTO W/O SCOPE: CPT

## 2020-06-23 PROCEDURE — 85027 COMPLETE CBC AUTOMATED: CPT

## 2020-06-23 RX ORDER — ACETAMINOPHEN 500 MG
1000 TABLET ORAL ONCE
Status: COMPLETED | OUTPATIENT
Start: 2020-06-23 | End: 2020-06-23

## 2020-06-23 RX ORDER — SODIUM, POTASSIUM,MAG SULFATES 17.5-3.13G
177 SOLUTION, RECONSTITUTED, ORAL ORAL SEE ADMIN INSTRUCTIONS
Qty: 1 BOTTLE | Refills: 0 | Status: SHIPPED | OUTPATIENT
Start: 2020-06-23 | End: 2021-02-17

## 2020-06-23 RX ADMIN — ACETAMINOPHEN 1000 MG: 500 TABLET ORAL at 13:34

## 2020-06-23 NOTE — DISCHARGE INSTRUCTIONS
Thank you for visiting our emergency department today. We all do hope that we were able to assist you in your emergent needs today. Please read over your discharge instructions as these contain pertinent information to help you in the healing process. These instructions include a list of prescriptions you were given today. Follow-up information is also noted on your discharge papers. There are attached instructions and information pertaining to the reason why you were seen in the emergency department today. These discharge instructions may not be for exactly why you were here, but may be the closest available instructions that we have. These include important advice for things that you can do at home to feel better, and reasons to return to the emergency department. The evaluation and treatment you received in the emergency department is not always definitive care. If follow-up with your primary care doctor or specialist was recommended, it is important that you make these appointments for follow-up care. You may need further testing, procedures, and/or medications to help you feel better. Further tests may be required that are not available in the emergency department. Failure to make these follow-up appointments may jeopardize your health. The emergency department is here for emergent stabilization and evaluation of life and limb threatening illness and/or injuries. Further care through a specialist or primary care doctor may be required to assist in your healing.     Recent Results (from the past 8 hour(s))   CBC W/O DIFF    Collection Time: 06/23/20  1:54 PM   Result Value Ref Range    WBC 3.3 (L) 3.6 - 11.0 K/uL    RBC 4.77 3.80 - 5.20 M/uL    HGB 12.7 11.5 - 16.0 g/dL    HCT 40.3 35.0 - 47.0 %    MCV 84.5 80.0 - 99.0 FL    MCH 26.6 26.0 - 34.0 PG    MCHC 31.5 30.0 - 36.5 g/dL    RDW 13.4 11.5 - 14.5 %    PLATELET 224 590 - 973 K/uL    MPV 11.2 8.9 - 12.9 FL    NRBC 0.0 0  WBC    ABSOLUTE NRBC 0.00 0.00 - 8.36 K/uL   METABOLIC PANEL, COMPREHENSIVE    Collection Time: 06/23/20  1:54 PM   Result Value Ref Range    Sodium 142 136 - 145 mmol/L    Potassium 4.3 3.5 - 5.1 mmol/L    Chloride 109 (H) 97 - 108 mmol/L    CO2 29 21 - 32 mmol/L    Anion gap 4 (L) 5 - 15 mmol/L    Glucose 82 65 - 100 mg/dL    BUN 14 6 - 20 MG/DL    Creatinine 1.05 (H) 0.55 - 1.02 MG/DL    BUN/Creatinine ratio 13 12 - 20      GFR est AA >60 >60 ml/min/1.73m2    GFR est non-AA 51 (L) >60 ml/min/1.73m2    Calcium 8.5 8.5 - 10.1 MG/DL    Bilirubin, total 0.7 0.2 - 1.0 MG/DL    ALT (SGPT) 20 12 - 78 U/L    AST (SGOT) 22 15 - 37 U/L    Alk. phosphatase 76 45 - 117 U/L    Protein, total 7.4 6.4 - 8.2 g/dL    Albumin 3.5 3.5 - 5.0 g/dL    Globulin 3.9 2.0 - 4.0 g/dL    A-G Ratio 0.9 (L) 1.1 - 2.2     URINALYSIS W/ RFLX MICROSCOPIC    Collection Time: 06/23/20  1:54 PM   Result Value Ref Range    Color YELLOW/STRAW      Appearance CLEAR CLEAR      Specific gravity 1.006 1.003 - 1.030      pH (UA) 7.5 5.0 - 8.0      Protein Negative NEG mg/dL    Glucose Negative NEG mg/dL    Ketone Negative NEG mg/dL    Bilirubin Negative NEG      Blood Negative NEG      Urobilinogen 1.0 0.2 - 1.0 EU/dL    Nitrites Negative NEG      Leukocyte Esterase Negative NEG          Xr Abd (kub)    Result Date: 6/23/2020  IMPRESSION: Moderate fecal retention in the large bowel. Distended transverse colon is seen.

## 2020-06-23 NOTE — ED PROVIDER NOTES
EMERGENCY DEPARTMENT HISTORY AND PHYSICAL EXAM      Date: 6/23/2020  Patient Name: Fan Klein    History of Presenting Illness     Chief Complaint   Patient presents with    Constipation     Presents to the ED with c/o constipation, rectal pain, and blood in her stool x several days. No distress noted.  Anal Pain       History Provided By: Patient    HPI: aFn Klein, 76 y.o. female  presents to the ED with cc of abdominal pain and constipation. Patient states she has not been able to \"doodoo\" for 2 weeks. It appears that she is able to pass some stool but still feels constipated. She states she cannot sleep at night because she has pain in her sides and in her back in her belly. She has not had any nausea or vomiting. She states she has had some decreased appetite. No chest pain or shortness of breath. Patient states she has noticed some blood when she does have a bowel movement. She also has noted difficulty urinating. She has no fevers or chills. She has not been taking any medicines to help with constipation.     Past History     Past Medical History:  Past Medical History:   Diagnosis Date    Agoraphobia without mention of panic attacks 2/17/2014    Anxiety disorder 8/18/2013    Arthritis     osteo    Breast pain, left 4/7/2015    CAD (coronary artery disease), native coronary artery 12/1/2015    no stents    Chronic chest pain 1/13/2014    Chronic pain associated with significant psychosocial dysfunction 2/17/2014    Depression 8/18/2013    Diabetes ([address]     type II    Duplicated right renal collecting system 3/13/2014    GERD (gastroesophageal reflux disease)     Gout, joint     Hypercholesteremia     hyercholesterolemia    Hypertension     Nephrolithiasis 3/13/2014    Personal history of noncompliance with medical treatment, presenting hazards to health 5/30/2014    Psychotic disorder ([address]     Vaginal pain 7/30/2014       Past Surgical History:  Past Surgical History:   Procedure Laterality Date    EGD  4/23/2010         HX CHOLECYSTECTOMY  09/20/2018    lap jesus    HX CYST REMOVAL      cyst removed from left wrist    HX HYSTERECTOMY      partial    HX OTHER SURGICAL      bladder dilitation    HX TUBAL LIGATION      HX UROLOGICAL      kidney stones       Medications:  No current facility-administered medications on file prior to encounter. Current Outpatient Medications on File Prior to Encounter   Medication Sig Dispense Refill    polyethylene glycol (MIRALAX) 17 gram/dose powder Take 17 g by mouth daily. 1 tablespoon with 8 oz of water daily 170 g 0    acetaminophen (TYLENOL) 325 mg tablet Take 2 Tabs by mouth every six (6) hours as needed for Pain. Indications: pain associated with arthritis 20 Tab 0    dicyclomine (BENTYL) 20 mg tablet Take 1 Tab by mouth every six (6) hours as needed (abdominal cramps) for up to 20 doses. 20 Tab 0    dilTIAZem SR (CARDIZEM SR) 60 mg SR capsule Take 1 Cap by mouth every twelve (12) hours. 30 Cap 3    LORazepam (ATIVAN) 0.5 mg tablet Take 1 Tab by mouth every eight (8) hours as needed for Anxiety. Max Daily Amount: 1.5 mg. 20 Tab 0       Family History:  Family History   Problem Relation Age of Onset    Stroke Mother     Heart Disease Mother     Cancer Father         type unknown    Heart Disease Son     Liver Disease Son     Heart Disease Daughter     Malignant Hyperthermia Neg Hx     Pseudocholinesterase Deficiency Neg Hx     Delayed Awakening Neg Hx     Post-op Nausea/Vomiting Neg Hx     Emergence Delirium Neg Hx     Post-op Cognitive Dysfunction Neg Hx     Other Neg Hx        Social History:  Social History     Tobacco Use    Smoking status: Never Smoker    Smokeless tobacco: Never Used   Substance Use Topics    Alcohol use: No    Drug use: No       Allergies:   Allergies   Allergen Reactions    Amoxicillin Hives    Sulfa (Sulfonamide Antibiotics) Hives and Itching    Mirtazapine Itching and Nausea Only     Funny feeling in chest    Percocet [Oxycodone-Acetaminophen] Nausea and Vomiting    Codeine Nausea and Vomiting    Crestor [Rosuvastatin] Other (comments)     myalgias    Nitroglycerin Unknown (comments)     Patient cannot remember why she is allergic to it      Prednisone Itching    Zithromax [Azithromycin] Itching     Not sure what it does,taken long time ago       All the above components of the past  history are auto-populated from the electronic record. They have been reviewed and the patient has been interviewed for any pertinent past history that pertains to the patient's chief complaint and reason for visit. Not all pre-populated components may be accurate at the time this note was generated. Review of Systems   Review of Systems   Constitutional: Negative for chills and fever. HENT: Negative for congestion, ear pain, rhinorrhea, sore throat and trouble swallowing. Eyes: Negative for visual disturbance. Respiratory: Negative for cough, chest tightness and shortness of breath. Cardiovascular: Negative for chest pain and palpitations. Gastrointestinal: Positive for abdominal pain, blood in stool and constipation. Negative for diarrhea, nausea and vomiting. Genitourinary: Positive for dysuria and flank pain. Negative for decreased urine volume, difficulty urinating and frequency. Musculoskeletal: Positive for back pain. Negative for neck pain. Skin: Negative for color change and rash. Neurological: Negative for dizziness, weakness, light-headedness and headaches. Physical Exam   Physical Exam  Vitals signs and nursing note reviewed. Constitutional:       General: She is not in acute distress. Appearance: She is well-developed. She is not ill-appearing. Eyes:      Conjunctiva/sclera: Conjunctivae normal.   Neck:      Musculoskeletal: Neck supple. Cardiovascular:      Rate and Rhythm: Normal rate and regular rhythm.    Pulmonary:      Effort: Pulmonary effort is normal. No accessory muscle usage or respiratory distress. Breath sounds: Normal breath sounds. Abdominal:      General: There is no distension. Palpations: Abdomen is soft. Tenderness: There is no abdominal tenderness. Lymphadenopathy:      Cervical: No cervical adenopathy. Skin:     General: Skin is warm and dry. Neurological:      Mental Status: She is alert and oriented to person, place, and time. Cranial Nerves: No cranial nerve deficit. Sensory: No sensory deficit. Diagnostic Study Results     Labs -     Recent Results (from the past 24 hour(s))   CBC W/O DIFF    Collection Time: 06/23/20  1:54 PM   Result Value Ref Range    WBC 3.3 (L) 3.6 - 11.0 K/uL    RBC 4.77 3.80 - 5.20 M/uL    HGB 12.7 11.5 - 16.0 g/dL    HCT 40.3 35.0 - 47.0 %    MCV 84.5 80.0 - 99.0 FL    MCH 26.6 26.0 - 34.0 PG    MCHC 31.5 30.0 - 36.5 g/dL    RDW 13.4 11.5 - 14.5 %    PLATELET 754 634 - 260 K/uL    MPV 11.2 8.9 - 12.9 FL    NRBC 0.0 0  WBC    ABSOLUTE NRBC 0.00 0.00 - 9.72 K/uL   METABOLIC PANEL, COMPREHENSIVE    Collection Time: 06/23/20  1:54 PM   Result Value Ref Range    Sodium 142 136 - 145 mmol/L    Potassium 4.3 3.5 - 5.1 mmol/L    Chloride 109 (H) 97 - 108 mmol/L    CO2 29 21 - 32 mmol/L    Anion gap 4 (L) 5 - 15 mmol/L    Glucose 82 65 - 100 mg/dL    BUN 14 6 - 20 MG/DL    Creatinine 1.05 (H) 0.55 - 1.02 MG/DL    BUN/Creatinine ratio 13 12 - 20      GFR est AA >60 >60 ml/min/1.73m2    GFR est non-AA 51 (L) >60 ml/min/1.73m2    Calcium 8.5 8.5 - 10.1 MG/DL    Bilirubin, total 0.7 0.2 - 1.0 MG/DL    ALT (SGPT) 20 12 - 78 U/L    AST (SGOT) 22 15 - 37 U/L    Alk.  phosphatase 76 45 - 117 U/L    Protein, total 7.4 6.4 - 8.2 g/dL    Albumin 3.5 3.5 - 5.0 g/dL    Globulin 3.9 2.0 - 4.0 g/dL    A-G Ratio 0.9 (L) 1.1 - 2.2     URINALYSIS W/ RFLX MICROSCOPIC    Collection Time: 06/23/20  1:54 PM   Result Value Ref Range    Color YELLOW/STRAW      Appearance CLEAR CLEAR Specific gravity 1.006 1.003 - 1.030      pH (UA) 7.5 5.0 - 8.0      Protein Negative NEG mg/dL    Glucose Negative NEG mg/dL    Ketone Negative NEG mg/dL    Bilirubin Negative NEG      Blood Negative NEG      Urobilinogen 1.0 0.2 - 1.0 EU/dL    Nitrites Negative NEG      Leukocyte Esterase Negative NEG         Radiologic Studies -   XR ABD (KUB)   Final Result   IMPRESSION:      Moderate fecal retention in the large bowel. Distended transverse colon is seen. CT Results  (Last 48 hours)    None        CXR Results  (Last 48 hours)    None            Medical Decision Making     I reviewed the vital signs, available nursing notes, past medical history, past surgical history, family history and social history. Vital Signs-Reviewed the patient's vital signs. Patient Vitals for the past 24 hrs:   Temp Pulse Resp BP SpO2   06/23/20 1413 -- (!) 56 14 (!) 195/95 100 %   06/23/20 1410 -- -- -- -- 100 %   06/23/20 1300 -- -- -- -- 100 %   06/23/20 1159 -- -- -- -- 99 %   06/23/20 1145 -- -- -- (!) 208/87 99 %   06/23/20 1131 98.1 °F (36.7 °C) 63 11 195/90 100 %         Records Reviewed: Nursing notes for today's visit have been reviewed. I have also reviewed most recent medical records pertinent to today's complaints, if available in our medical record system. I have also reviewed all labs and imaging results from previous results in comparison to results obtained today. If an EKG was obtained today, it has been compared to previous EKGs, if available. If arriving via EMS, the EMS report has been reviewed if made available to us within the patient's time in the emergency department. Provider Notes (Medical Decision Making):   Patient presents with constipation and is having discomfort in her back abdomen and flanks. She appears to be passing some stool but looking at her abdominal x-rays she is quite constipated.   It is hard to know what she is already tried at home but it sounds like she may be on MiraLAX or taking Metamucil. Her labs are unremarkable. She does not have any UTI. Likely does have some bright red blood per rectum from an anal fissure. I will place her on a bowel prep to help flush her out. ED Course:   Initial assessment performed. The patients presenting problems have been discussed, and they are in agreement with the care plan formulated and outlined with them. I have encouraged them to ask questions as they arise throughout their visit. Orders Placed This Encounter    XR ABD (KUB)    CBC W/O DIFF    COMPREHENSIVE METABOLIC PANEL    URINALYSIS W/ RFLX MICROSCOPIC    STRAIGHT CATHETER (NURSING) ONE TIME STAT    SALINE LOCK IV ONE TIME STAT    acetaminophen (TYLENOL) tablet 1,000 mg    sodium-potassium-mag sulfate (SUPREP) 17.5-3.13-1.6 gram solr oral solution       Procedures      Critical Care Time:   0    Disposition:  Discharge    The patient's emergency department evaluation is now complete. I have reviewed all labs, imaging, and pertinent information. I have discussed all results with the patient and/or family. Based on our evaluation today I do believe that the patient is safe to be discharged home. The patient has been provided with at home instructions that are pertinent to their complaint today, although these may not be specific to the exact diagnosis. I have reviewed the patient's home medications and attempted to reconcile if not already done so by pharmacy or nursing staff. I have discussed all new prescriptions with the patient. The patient has been encouraged to follow-up with primary care doctor and/or specialist, and these have been discussed with the patient. The patient has been advised that they may return to the emergency department if they have any worsening symptoms and or new symptoms that are of concern to them.   Verbal discharge instructions may have also been provided to the patient that may not be specifically contained in the written discharge instructions. The patient has been given opportunity to ask questions prior to discharge. PLAN:  1. Current Discharge Medication List      START taking these medications    Details   sodium-potassium-mag sulfate (SUPREP) 17.5-3.13-1.6 gram solr oral solution Take 177 mL by mouth See Admin Instructions. Qty: 1 Bottle, Refills: 0           2. Follow-up Information     Follow up With Specialties Details Why Contact Info    Nemesio Parks MD Family Practice Schedule an appointment as soon as possible for a visit in 1 week  [address] [place], [place] [id number] [phone]          Return to ED if worse     Diagnosis     Clinical Impression:   1. Slow transit constipation            This note will not be viewable in [email or web address].

## 2020-06-23 NOTE — ED NOTES
Skin warm and dry. Respirations even and unlabored. In no apparent distress at this time. Discharge paperwork given. Pt taken to waiting room via wheelchair, to wait for her son to come drive her home.

## 2020-06-24 ENCOUNTER — PATIENT OUTREACH (OUTPATIENT)
Dept: CARDIOLOGY CLINIC | Age: 75
End: 2020-06-24

## 2020-06-25 ENCOUNTER — PATIENT OUTREACH (OUTPATIENT)
Dept: CARDIOLOGY CLINIC | Age: 75
End: 2020-06-25

## 2020-07-16 ENCOUNTER — APPOINTMENT (OUTPATIENT)
Dept: GENERAL RADIOLOGY | Age: 75
End: 2020-07-16
Attending: EMERGENCY MEDICINE
Payer: MEDICARE

## 2020-07-16 ENCOUNTER — APPOINTMENT (OUTPATIENT)
Dept: CT IMAGING | Age: 75
End: 2020-07-16
Attending: EMERGENCY MEDICINE
Payer: MEDICARE

## 2020-07-16 ENCOUNTER — HOSPITAL ENCOUNTER (EMERGENCY)
Age: 75
Discharge: HOME | End: 2020-07-16
Attending: EMERGENCY MEDICINE
Payer: MEDICARE

## 2020-07-16 VITALS
RESPIRATION RATE: 15 BRPM | HEART RATE: 60 BPM | OXYGEN SATURATION: 100 % | TEMPERATURE: 98.4 F | BODY MASS INDEX: 28.09 KG/M2 | HEIGHT: 67 IN | WEIGHT: 179 LBS | SYSTOLIC BLOOD PRESSURE: 169 MMHG | DIASTOLIC BLOOD PRESSURE: 86 MMHG

## 2020-07-16 DIAGNOSIS — R42 ORTHOSTATIC LIGHTHEADEDNESS: Primary | ICD-10-CM

## 2020-07-16 DIAGNOSIS — K59.01 SLOW TRANSIT CONSTIPATION: ICD-10-CM

## 2020-07-16 DIAGNOSIS — N30.00 ACUTE CYSTITIS WITHOUT HEMATURIA: ICD-10-CM

## 2020-07-16 LAB
ALBUMIN SERPL-MCNC: 3.3 G/DL (ref 3.5–5)
ALBUMIN/GLOB SERPL: 0.8 {RATIO} (ref 1.1–2.2)
ALP SERPL-CCNC: 73 U/L (ref 45–117)
ALT SERPL-CCNC: 14 U/L (ref 12–78)
ANION GAP SERPL CALC-SCNC: 4 MMOL/L (ref 5–15)
APPEARANCE UR: ABNORMAL
AST SERPL-CCNC: 8 U/L (ref 15–37)
BACTERIA URNS QL MICRO: ABNORMAL /HPF
BASOPHILS # BLD: 0 K/UL (ref 0–0.1)
BASOPHILS NFR BLD: 1 % (ref 0–1)
BILIRUB SERPL-MCNC: 0.5 MG/DL (ref 0.2–1)
BILIRUB UR QL CFM: NEGATIVE
BUN SERPL-MCNC: 15 MG/DL (ref 6–20)
BUN/CREAT SERPL: 15 (ref 12–20)
CALCIUM SERPL-MCNC: 8.5 MG/DL (ref 8.5–10.1)
CHLORIDE SERPL-SCNC: 108 MMOL/L (ref 97–108)
CO2 SERPL-SCNC: 27 MMOL/L (ref 21–32)
COLOR UR: ABNORMAL
CREAT SERPL-MCNC: 0.98 MG/DL (ref 0.55–1.02)
DIFFERENTIAL METHOD BLD: NORMAL
EOSINOPHIL # BLD: 0 K/UL (ref 0–0.4)
EOSINOPHIL NFR BLD: 1 % (ref 0–7)
EPITH CASTS URNS QL MICRO: ABNORMAL /LPF
ERYTHROCYTE [DISTWIDTH] IN BLOOD BY AUTOMATED COUNT: 13.2 % (ref 11.5–14.5)
GLOBULIN SER CALC-MCNC: 4.2 G/DL (ref 2–4)
GLUCOSE SERPL-MCNC: 99 MG/DL (ref 65–100)
GLUCOSE UR STRIP.AUTO-MCNC: NEGATIVE MG/DL
HCT VFR BLD AUTO: 37.1 % (ref 35–47)
HGB BLD-MCNC: 11.9 G/DL (ref 11.5–16)
HGB UR QL STRIP: NEGATIVE
IMM GRANULOCYTES # BLD AUTO: 0 K/UL (ref 0–0.04)
IMM GRANULOCYTES NFR BLD AUTO: 0 % (ref 0–0.5)
KETONES UR QL STRIP.AUTO: NEGATIVE MG/DL
LEUKOCYTE ESTERASE UR QL STRIP.AUTO: ABNORMAL
LYMPHOCYTES # BLD: 1.4 K/UL (ref 0.8–3.5)
LYMPHOCYTES NFR BLD: 33 % (ref 12–49)
MCH RBC QN AUTO: 26.5 PG (ref 26–34)
MCHC RBC AUTO-ENTMCNC: 32.1 G/DL (ref 30–36.5)
MCV RBC AUTO: 82.6 FL (ref 80–99)
MONOCYTES # BLD: 0.3 K/UL (ref 0–1)
MONOCYTES NFR BLD: 7 % (ref 5–13)
MUCOUS THREADS URNS QL MICRO: ABNORMAL /LPF
NEUTS SEG # BLD: 2.5 K/UL (ref 1.8–8)
NEUTS SEG NFR BLD: 58 % (ref 32–75)
NITRITE UR QL STRIP.AUTO: POSITIVE
NRBC # BLD: 0 K/UL (ref 0–0.01)
NRBC BLD-RTO: 0 PER 100 WBC
PH UR STRIP: 7 [PH] (ref 5–8)
PLATELET # BLD AUTO: 275 K/UL (ref 150–400)
PMV BLD AUTO: 10.3 FL (ref 8.9–12.9)
POTASSIUM SERPL-SCNC: 4.2 MMOL/L (ref 3.5–5.1)
PROT SERPL-MCNC: 7.5 G/DL (ref 6.4–8.2)
PROT UR STRIP-MCNC: ABNORMAL MG/DL
RBC # BLD AUTO: 4.49 M/UL (ref 3.8–5.2)
RBC #/AREA URNS HPF: ABNORMAL /HPF (ref 0–5)
SODIUM SERPL-SCNC: 139 MMOL/L (ref 136–145)
SP GR UR REFRACTOMETRY: 1.02 (ref 1–1.03)
TROPONIN I SERPL-MCNC: <0.05 NG/ML
UA: UC IF INDICATED,UAUC: ABNORMAL
UROBILINOGEN UR QL STRIP.AUTO: 1 EU/DL (ref 0.2–1)
WBC # BLD AUTO: 4.3 K/UL (ref 3.6–11)
WBC URNS QL MICRO: ABNORMAL /HPF (ref 0–4)

## 2020-07-16 PROCEDURE — 96365 THER/PROPH/DIAG IV INF INIT: CPT

## 2020-07-16 PROCEDURE — 96375 TX/PRO/DX INJ NEW DRUG ADDON: CPT

## 2020-07-16 PROCEDURE — 74022 RADEX COMPL AQT ABD SERIES: CPT

## 2020-07-16 PROCEDURE — 77030038269 HC DRN EXT URIN PURWCK BARD -A

## 2020-07-16 PROCEDURE — 87086 URINE CULTURE/COLONY COUNT: CPT

## 2020-07-16 PROCEDURE — 87077 CULTURE AEROBIC IDENTIFY: CPT

## 2020-07-16 PROCEDURE — 81001 URINALYSIS AUTO W/SCOPE: CPT

## 2020-07-16 PROCEDURE — 36415 COLL VENOUS BLD VENIPUNCTURE: CPT

## 2020-07-16 PROCEDURE — 87186 SC STD MICRODIL/AGAR DIL: CPT

## 2020-07-16 PROCEDURE — 93005 ELECTROCARDIOGRAM TRACING: CPT

## 2020-07-16 PROCEDURE — 99285 EMERGENCY DEPT VISIT HI MDM: CPT

## 2020-07-16 PROCEDURE — 85025 COMPLETE CBC W/AUTO DIFF WBC: CPT

## 2020-07-16 PROCEDURE — 70450 CT HEAD/BRAIN W/O DYE: CPT

## 2020-07-16 PROCEDURE — 74011000258 HC RX REV CODE- 258: Performed by: EMERGENCY MEDICINE

## 2020-07-16 PROCEDURE — 96361 HYDRATE IV INFUSION ADD-ON: CPT

## 2020-07-16 PROCEDURE — 84484 ASSAY OF TROPONIN QUANT: CPT

## 2020-07-16 PROCEDURE — 74011250636 HC RX REV CODE- 250/636: Performed by: EMERGENCY MEDICINE

## 2020-07-16 PROCEDURE — 80053 COMPREHEN METABOLIC PANEL: CPT

## 2020-07-16 RX ORDER — MAGNESIUM CITRATE
296 SOLUTION, ORAL ORAL
Qty: 1 BOTTLE | Refills: 0 | Status: SHIPPED | OUTPATIENT
Start: 2020-07-16 | End: 2020-07-16

## 2020-07-16 RX ORDER — CEPHALEXIN 500 MG/1
500 CAPSULE ORAL 4 TIMES DAILY
Qty: 28 CAP | Refills: 0 | Status: SHIPPED | OUTPATIENT
Start: 2020-07-16 | End: 2020-07-23

## 2020-07-16 RX ORDER — KETOROLAC TROMETHAMINE 30 MG/ML
15 INJECTION, SOLUTION INTRAMUSCULAR; INTRAVENOUS
Status: COMPLETED | OUTPATIENT
Start: 2020-07-16 | End: 2020-07-16

## 2020-07-16 RX ORDER — LACTULOSE 10 G/15ML
20 SOLUTION ORAL; RECTAL 2 TIMES DAILY
Qty: 480 ML | Refills: 0 | Status: SHIPPED | OUTPATIENT
Start: 2020-07-16 | End: 2020-07-24

## 2020-07-16 RX ADMIN — SODIUM CHLORIDE 1000 ML: 900 INJECTION, SOLUTION INTRAVENOUS at 16:07

## 2020-07-16 RX ADMIN — KETOROLAC TROMETHAMINE 15 MG: 30 INJECTION, SOLUTION INTRAMUSCULAR; INTRAVENOUS at 15:42

## 2020-07-16 RX ADMIN — CEFTRIAXONE 1 G: 1 INJECTION, POWDER, FOR SOLUTION INTRAMUSCULAR; INTRAVENOUS at 17:56

## 2020-07-16 NOTE — DISCHARGE INSTRUCTIONS
Patient Education        Constipation: Care Instructions  Your Care Instructions     Constipation means that you have a hard time passing stools (bowel movements). People pass stools from 3 times a day to once every 3 days. What is normal for you may be different. Constipation may occur with pain in the rectum and cramping. The pain may get worse when you try to pass stools. Sometimes there are small amounts of bright red blood on toilet paper or the surface of stools. This is because of enlarged veins near the rectum (hemorrhoids). A few changes in your diet and lifestyle may help you avoid ongoing constipation. Your doctor may also prescribe medicine to help loosen your stool. Some medicines can cause constipation. These include pain medicines and antidepressants. Tell your doctor about all the medicines you take. Your doctor may want to make a medicine change to ease your symptoms. Follow-up care is a key part of your treatment and safety. Be sure to make and go to all appointments, and call your doctor if you are having problems. It's also a good idea to know your test results and keep a list of the medicines you take. How can you care for yourself at home? · Drink plenty of fluids, enough so that your urine is light yellow or clear like water. If you have kidney, heart, or liver disease and have to limit fluids, talk with your doctor before you increase the amount of fluids you drink. · Include high-fiber foods in your diet each day. These include fruits, vegetables, beans, and whole grains. · Get at least 30 minutes of exercise on most days of the week. Walking is a good choice. You also may want to do other activities, such as running, swimming, cycling, or playing tennis or team sports. · Take a fiber supplement, such as Citrucel or Metamucil, every day. Read and follow all instructions on the label. · Schedule time each day for a bowel movement. A daily routine may help.  Take your time having your bowel movement. · Support your feet with a small step stool when you sit on the toilet. This helps flex your hips and places your pelvis in a squatting position. · Your doctor may recommend an over-the-counter laxative to relieve your constipation. Examples are Milk of Magnesia and MiraLax. Read and follow all instructions on the label. Do not use laxatives on a long-term basis. When should you call for help? Call your doctor now or seek immediate medical care if:  · You have new or worse belly pain. · You have new or worse nausea or vomiting. · You have blood in your stools. Watch closely for changes in your health, and be sure to contact your doctor if:  · Your constipation is getting worse. · You do not get better as expected. Where can you learn more? Go to http://www.gimenez.com/  Enter P343 in the search box to learn more about \"Constipation: Care Instructions. \"  Current as of: June 26, 2019               Content Version: 12.5  © 6041-8300 [employer]. Care instructions adapted under license by [employer] (which disclaims liability or warranty for this information). If you have questions about a medical condition or this instruction, always ask your healthcare professional. [address] any warranty or liability for your use of this information.

## 2020-07-16 NOTE — ED NOTES
Liberty Hiss daughter again at [phone] who did not . Bunny Loaiza was told she would be ready for discharge around 200 (spoke with her around 1800) and she said she would come pick her up. Has not shown up yet.

## 2020-07-16 NOTE — ED PROVIDER NOTES
EMERGENCY DEPARTMENT HISTORY AND PHYSICAL EXAM      Date: 7/16/2020  Patient Name: Vicente Almendarez  Patient Age and Sex: 76 y.o. female     History of Presenting Illness     Chief Complaint   Patient presents with    Chest Pain     Pt arrived to ED via EMS for R sided CP, dizziness and headache since 8am. No BM in 2 weeks.  Headache       History Provided By: Patient    HPI: Vicente Almendarez is a 26-year-old female with a history of anxiety, hypertension, presenting for right-sided chest pain headache and constipation. Patient states that since 1 AM this morning has been having a headache as well as right-sided chest pain. States that the pain does not get worse when she takes a deep breath in. Has been associated with a slight cough. She also states that she is been having constipation for the past 2 weeks. Cannot remember the last time she had a good bowel movement. States has been drinking water and used to take a liquid medication for her constipation but it has stopped working. Denies any diarrhea, nausea, vomiting. States that she is having some lower abdominal pain. There are no other complaints, changes, or physical findings at this time. PCP: Haile Bledsoe MD    No current facility-administered medications on file prior to encounter. Current Outpatient Medications on File Prior to Encounter   Medication Sig Dispense Refill    sodium-potassium-mag sulfate (SUPREP) 17.5-3.13-1.6 gram solr oral solution Take 177 mL by mouth See Admin Instructions. 1 Bottle 0    polyethylene glycol (MIRALAX) 17 gram/dose powder Take 17 g by mouth daily. 1 tablespoon with 8 oz of water daily 170 g 0    acetaminophen (TYLENOL) 325 mg tablet Take 2 Tabs by mouth every six (6) hours as needed for Pain. Indications: pain associated with arthritis 20 Tab 0    dicyclomine (BENTYL) 20 mg tablet Take 1 Tab by mouth every six (6) hours as needed (abdominal cramps) for up to 20 doses.  20 Tab 0    dilTIAZem SR (CARDIZEM SR) 60 mg SR capsule Take 1 Cap by mouth every twelve (12) hours. 30 Cap 3    LORazepam (ATIVAN) 0.5 mg tablet Take 1 Tab by mouth every eight (8) hours as needed for Anxiety.  Max Daily Amount: 1.5 mg. 20 Tab 0       Past History     Past Medical History:  Past Medical History:   Diagnosis Date    Agoraphobia without mention of panic attacks 2/17/2014    Anxiety disorder 8/18/2013    Arthritis     osteo    Breast pain, left 4/7/2015    CAD (coronary artery disease), native coronary artery 12/1/2015    no stents    Chronic chest pain 1/13/2014    Chronic pain associated with significant psychosocial dysfunction 2/17/2014    Depression 8/18/2013    Diabetes ([facility] [address]     type II    Duplicated right renal collecting system 3/13/2014    GERD (gastroesophageal reflux disease)     Gout, joint     Hypercholesteremia     hyercholesterolemia    Hypertension     Nephrolithiasis 3/13/2014    Personal history of noncompliance with medical treatment, presenting hazards to health 5/30/2014    Psychotic disorder ([facility] [address]     Vaginal pain 7/30/2014       Past Surgical History:  Past Surgical History:   Procedure Laterality Date    EGD  4/23/2010         HX CHOLECYSTECTOMY  09/20/2018    lap jesus    HX CYST REMOVAL      cyst removed from left wrist    HX HYSTERECTOMY      partial    HX OTHER SURGICAL      bladder dilitation    HX TUBAL LIGATION      HX UROLOGICAL      kidney stones       Family History:  Family History   Problem Relation Age of Onset    Stroke Mother     Heart Disease Mother     Cancer Father         type unknown    Heart Disease Son     Liver Disease Son     Heart Disease Daughter     Malignant Hyperthermia Neg Hx     Pseudocholinesterase Deficiency Neg Hx     Delayed Awakening Neg Hx     Post-op Nausea/Vomiting Neg Hx     Emergence Delirium Neg Hx     Post-op Cognitive Dysfunction Neg Hx     Other Neg Hx        Social History:  Social History     Tobacco Use  Smoking status: Never Smoker    Smokeless tobacco: Never Used   Substance Use Topics    Alcohol use: No    Drug use: No       Allergies: Allergies   Allergen Reactions    Amoxicillin Hives    Sulfa (Sulfonamide Antibiotics) Hives and Itching    Mirtazapine Itching and Nausea Only     Funny feeling in chest    Percocet [Oxycodone-Acetaminophen] Nausea and Vomiting    Codeine Nausea and Vomiting    Crestor [Rosuvastatin] Other (comments)     myalgias    Nitroglycerin Unknown (comments)     Patient cannot remember why she is allergic to it      Prednisone Itching    Zithromax [Azithromycin] Itching     Not sure what it does,taken long time ago         Review of Systems   Review of Systems   Constitutional: Negative for chills and fever. Respiratory: Positive for cough. Negative for shortness of breath. Cardiovascular: Positive for chest pain. Gastrointestinal: Positive for abdominal pain and constipation. Negative for diarrhea, nausea and vomiting. Genitourinary: Negative for dysuria, frequency and hematuria. Neurological: Positive for headaches. Negative for weakness and numbness. All other systems reviewed and are negative. Physical Exam   Physical Exam  Vitals signs and nursing note reviewed. Constitutional:       Appearance: She is well-developed. HENT:      Head: Normocephalic and atraumatic. Eyes:      Conjunctiva/sclera: Conjunctivae normal.   Neck:      Musculoskeletal: Normal range of motion and neck supple. Cardiovascular:      Rate and Rhythm: Normal rate and regular rhythm. Pulmonary:      Effort: Pulmonary effort is normal. No respiratory distress. Breath sounds: Normal breath sounds. Comments: Patient is speaking in full sentences and does not appear in any distress. Slightly anxious  Abdominal:      General: There is abdominal bruit. There is no distension. Palpations: Abdomen is soft. Tenderness: There is no abdominal tenderness. Comments: Slightly tender in the lower quadrants. Abdomen is not distended   Musculoskeletal: Normal range of motion. Skin:     General: Skin is warm and dry. Neurological:      General: No focal deficit present. Mental Status: She is alert and oriented to person, place, and time. Mental status is at baseline. Psychiatric:         Mood and Affect: Mood normal.          Diagnostic Study Results     Labs -     Recent Results (from the past 12 hour(s))   EKG, 12 LEAD, INITIAL    Collection Time: 07/16/20  1:41 PM   Result Value Ref Range    Ventricular Rate 66 BPM    Atrial Rate 66 BPM    P-R Interval 120 ms    QRS Duration 90 ms    Q-T Interval 418 ms    QTC Calculation (Bezet) 438 ms    Calculated P Axis 36 degrees    Calculated R Axis 70 degrees    Calculated T Axis 26 degrees    Diagnosis       Normal sinus rhythm  Nonspecific T wave abnormality  When compared with ECG of 18-OCT-2019 05:13,  Criteria for Inferior infarct are no longer present  Nonspecific T wave abnormality now evident in Anterolateral leads     CBC WITH AUTOMATED DIFF    Collection Time: 07/16/20  3:07 PM   Result Value Ref Range    WBC 4.3 3.6 - 11.0 K/uL    RBC 4.49 3.80 - 5.20 M/uL    HGB 11.9 11.5 - 16.0 g/dL    HCT 37.1 35.0 - 47.0 %    MCV 82.6 80.0 - 99.0 FL    MCH 26.5 26.0 - 34.0 PG    MCHC 32.1 30.0 - 36.5 g/dL    RDW 13.2 11.5 - 14.5 %    PLATELET 109 915 - 241 K/uL    MPV 10.3 8.9 - 12.9 FL    NRBC 0.0 0  WBC    ABSOLUTE NRBC 0.00 0.00 - 0.01 K/uL    NEUTROPHILS 58 32 - 75 %    LYMPHOCYTES 33 12 - 49 %    MONOCYTES 7 5 - 13 %    EOSINOPHILS 1 0 - 7 %    BASOPHILS 1 0 - 1 %    IMMATURE GRANULOCYTES 0 0.0 - 0.5 %    ABS. NEUTROPHILS 2.5 1.8 - 8.0 K/UL    ABS. LYMPHOCYTES 1.4 0.8 - 3.5 K/UL    ABS. MONOCYTES 0.3 0.0 - 1.0 K/UL    ABS. EOSINOPHILS 0.0 0.0 - 0.4 K/UL    ABS. BASOPHILS 0.0 0.0 - 0.1 K/UL    ABS. IMM.  GRANS. 0.0 0.00 - 0.04 K/UL    DF AUTOMATED     METABOLIC PANEL, COMPREHENSIVE    Collection Time: 07/16/20 3:07 PM   Result Value Ref Range    Sodium 139 136 - 145 mmol/L    Potassium 4.2 3.5 - 5.1 mmol/L    Chloride 108 97 - 108 mmol/L    CO2 27 21 - 32 mmol/L    Anion gap 4 (L) 5 - 15 mmol/L    Glucose 99 65 - 100 mg/dL    BUN 15 6 - 20 MG/DL    Creatinine 0.98 0.55 - 1.02 MG/DL    BUN/Creatinine ratio 15 12 - 20      GFR est AA >60 >60 ml/min/1.73m2    GFR est non-AA 55 (L) >60 ml/min/1.73m2    Calcium 8.5 8.5 - 10.1 MG/DL    Bilirubin, total 0.5 0.2 - 1.0 MG/DL    ALT (SGPT) 14 12 - 78 U/L    AST (SGOT) 8 (L) 15 - 37 U/L    Alk. phosphatase 73 45 - 117 U/L    Protein, total 7.5 6.4 - 8.2 g/dL    Albumin 3.3 (L) 3.5 - 5.0 g/dL    Globulin 4.2 (H) 2.0 - 4.0 g/dL    A-G Ratio 0.8 (L) 1.1 - 2.2     TROPONIN I    Collection Time: 07/16/20  3:07 PM   Result Value Ref Range    Troponin-I, Qt. <0.05 <0.05 ng/mL   URINALYSIS W/ REFLEX CULTURE    Collection Time: 07/16/20  3:47 PM    Specimen: Urine    Urine specimen   Result Value Ref Range    Color YELLOW/STRAW      Appearance CLOUDY (A) CLEAR      Specific gravity 1.016 1.003 - 1.030      pH (UA) 7.0 5.0 - 8.0      Protein TRACE (A) NEG mg/dL    Glucose Negative NEG mg/dL    Ketone Negative NEG mg/dL    Blood Negative NEG      Urobilinogen 1.0 0.2 - 1.0 EU/dL    Nitrites Positive (A) NEG      Leukocyte Esterase MODERATE (A) NEG      WBC 10-20 0 - 4 /hpf    RBC 0-5 0 - 5 /hpf    Epithelial cells FEW FEW /lpf    Bacteria 3+ (A) NEG /hpf    UA:UC IF INDICATED URINE CULTURE ORDERED (A) CNI      Mucus 1+ (A) NEG /lpf   BILIRUBIN, CONFIRM    Collection Time: 07/16/20  3:47 PM   Result Value Ref Range    Bilirubin UA, confirm Negative NEG         Radiologic Studies -   XR ABD ACUTE W 1 V CHEST   Final Result   IMPRESSION:    Increased colonic and rectal fecal burden compatible with increased constipation   since last month. No small bowel obstruction. No pneumonia or pneumothorax. CT HEAD WO CONT   Final Result   IMPRESSION:    1.  No evidence of acute intracranial abnormality by this modality. CT Results  (Last 48 hours)               07/16/20 1625  CT HEAD WO CONT Final result    Impression:  IMPRESSION:    1. No evidence of acute intracranial abnormality by this modality. Narrative:  EXAM:  CT HEAD WO CONT   INDICATION:   HA   Additional history: Headache. Orthostatic and felt dizzy   COMPARISON: CT of the head, 9/24/2018. [facility] TECHNIQUE:    Unenhanced CT of the head was performed using 5 mm images. Coronal and sagittal   reformats were produced. Brain and bone windows were generated. CT dose reduction was achieved through use of a standardized protocol tailored   for this examination and automatic exposure control for dose modulation. [facility] FINDINGS:   Global atrophy. Cavum septum pellucidum. Periventricular white matter   hypoattenuation. There is no intracranial hemorrhage, extra-axial collection,   mass, mass effect or midline shift. The basilar cisterns are open. No acute   infarct is identified. Intracranial atherosclerosis. The bone windows demonstrate no abnormalities. The visualized portions of the   paranasal sinuses and mastoid air cells are clear. .           CXR Results  (Last 48 hours)               07/16/20 1727  XR ABD ACUTE W 1 V CHEST Final result    Impression:  IMPRESSION:    Increased colonic and rectal fecal burden compatible with increased constipation   since last month. No small bowel obstruction. No pneumonia or pneumothorax. Narrative:  EXAM:  XR ABD ACUTE W 1 V CHEST       INDICATION: Chest pain and constipation. Cholecystectomy and hysterectomy. COMPARISON: Portable [facility] on 6/23/2020       TECHNIQUE: Supine chest and abdomen and right side up lateral decubitus abdomen   radiographs (acute abdominal series). FINDINGS: The cardiomediastinal and hilar contours are within normal limits. No   pneumonia or pneumothorax. No free air under the diaphragm. No dilated small bowel loop. Moderate rectal fecal burden. Dense stool is in the   nondistended transverse colon. Moderate colonic fecal burden in the ascending   colon. Stool volume has increased since last month. No evidence of free   intraperitoneal air. Surgical clips indicate cholecystectomy. Bones are osteopenic. Medical Decision Making   I am the first provider for this patient. I reviewed the vital signs, available nursing notes, past medical history, past surgical history, family history and social history. Vital Signs-Reviewed the patient's vital signs. Patient Vitals for the past 12 hrs:   Temp Pulse Resp BP SpO2   07/16/20 1915 -- 60 15 169/86 100 %   07/16/20 1800 -- 67 16 -- 100 %   07/16/20 1730 -- -- -- (!) 195/104 --   07/16/20 1601 -- (!) 57 16 (!) 190/92 97 %   07/16/20 1600 -- (!) 55 14 180/80 --   07/16/20 1351 98.4 °F (36.9 °C) 70 16 144/79 100 %       Records Reviewed: Nursing Notes and Old Medical Records    Provider Notes (Medical Decision Making):   Patient presenting with lower abdominal pain, constipation, cough, right-sided chest pain. Differential includes constipation, less likely small bowel obstruction, UTI, pneumonia, acute bronchitis, less likely ACS given the right sidedness of it. With her headache possibly dehydration. We will give her fluids, get labs and chest x-ray and EKG. ED Course:   Initial assessment performed. The patients presenting problems have been discussed, and they are in agreement with the care plan formulated and outlined with them. I have encouraged them to ask questions as they arise throughout their visit. ED Course as of Jul 16 2232   Thu Jul 16, 2020   1552 Patient's orthostatics was positive as her systolic went from 045 down to 111. She also felt lightheaded on standing.     [JS]      ED Course User Index  [JS] César Cain MD     Critical Care Time:   0    Disposition:  Discharge Note:  The patient has been re-evaluated and is ready for discharge. Reviewed available results with patient. Counseled patient on diagnosis and care plan. Patient has expressed understanding, and all questions have been answered. Patient agrees with plan and agrees to follow up as recommended, or to return to the ED if their symptoms worsen. Discharge instructions have been provided and explained to the patient, along with reasons to return to the ED. PLAN:  Discharge Medication List as of 7/16/2020  5:57 PM      START taking these medications    Details   magnesium citrate solution Take 296 mL by mouth now for 1 dose., Normal, Disp-1 Bottle,R-0      lactulose (CHRONULAC) 10 gram/15 mL solution Take 30 mL by mouth two (2) times a day for 8 days. , Normal, Disp-480 mL,R-0      cephALEXin (Keflex) 500 mg capsule Take 1 Cap by mouth four (4) times daily for 7 days. , Normal, Disp-28 Cap,R-0         CONTINUE these medications which have NOT CHANGED    Details   sodium-potassium-mag sulfate (SUPREP) 17.5-3.13-1.6 gram solr oral solution Take 177 mL by mouth See Admin Instructions. , Normal, Disp-1 Bottle, R-0      polyethylene glycol (MIRALAX) 17 gram/dose powder Take 17 g by mouth daily. 1 tablespoon with 8 oz of water daily, Normal, Disp-170 g, R-0      acetaminophen (TYLENOL) 325 mg tablet Take 2 Tabs by mouth every six (6) hours as needed for Pain. Indications: pain associated with arthritis, Normal, Disp-20 Tab, R-0      dicyclomine (BENTYL) 20 mg tablet Take 1 Tab by mouth every six (6) hours as needed (abdominal cramps) for up to 20 doses. , Normal, Disp-20 Tab, R-0      dilTIAZem SR (CARDIZEM SR) 60 mg SR capsule Take 1 Cap by mouth every twelve (12) hours. , Normal, Disp-30 Cap, R-3      LORazepam (ATIVAN) 0.5 mg tablet Take 1 Tab by mouth every eight (8) hours as needed for Anxiety. Max Daily Amount: 1.5 mg., Print, Disp-20 Tab, R-0           2.    Follow-up Information     Follow up With Specialties Details Why Contact Info    Bryan Toussaint MD Family Practice  As [address]  [phone]          3. Return to ED if worse     Diagnosis     Clinical Impression:   1. Orthostatic lightheadedness    2. Acute cystitis without hematuria    3. Slow transit constipation        Attestations:    Yovani Lopez M.D. Please note that this dictation was completed with [employer], the computer voice recognition software. Quite often unanticipated grammatical, syntax, homophones, and other interpretive errors are inadvertently transcribed by the computer software. Please disregard these errors. Please excuse any errors that have escaped final proofreading. Thank you.

## 2020-07-16 NOTE — ED NOTES
Bedside and Verbal shift change report given to Dionte Stephens (oncoming nurse) by Keysha (offgoing nurse). Report included the following information SBAR, ED Summary, MAR and Recent Results.

## 2020-07-17 LAB
ATRIAL RATE: 66 BPM
CALCULATED P AXIS, ECG09: 36 DEGREES
CALCULATED R AXIS, ECG10: 70 DEGREES
CALCULATED T AXIS, ECG11: 26 DEGREES
DIAGNOSIS, 93000: NORMAL
P-R INTERVAL, ECG05: 120 MS
Q-T INTERVAL, ECG07: 418 MS
QRS DURATION, ECG06: 90 MS
QTC CALCULATION (BEZET), ECG08: 438 MS
VENTRICULAR RATE, ECG03: 66 BPM

## 2020-07-17 NOTE — ED NOTES
Patient called out of the room asking when her ride was coming. I advised her that I talked to her daughter 15 minutes ago.

## 2020-07-17 NOTE — ED NOTES
Patient opened door to the room and stated that she is ready to go home. I advised her that she is not able to wait in the waiting room and she asked if I could call her ride again. I called daughter Jhonny Nick at [phone], who I spoke to approximately 45 minutes ago. At that time she stated that someone was on their way to pick her mother up. Upon calling again now to check on the status of her ride and now it is going straight to voicemail. Will notify charge nurse.

## 2020-07-17 NOTE — ED NOTES
Patient ride arrived, discharge instructions were given and the patient was taken out to the car by wheelchair.

## 2020-07-19 LAB
BACTERIA SPEC CULT: ABNORMAL
CC UR VC: ABNORMAL
SERVICE CMNT-IMP: ABNORMAL

## 2020-12-10 ENCOUNTER — HOSPITAL ENCOUNTER (EMERGENCY)
Age: 75
Discharge: HOME | End: 2020-12-10
Attending: EMERGENCY MEDICINE
Payer: MEDICARE

## 2020-12-10 ENCOUNTER — APPOINTMENT (OUTPATIENT)
Dept: CT IMAGING | Age: 75
End: 2020-12-10
Attending: EMERGENCY MEDICINE
Payer: MEDICARE

## 2020-12-10 VITALS
DIASTOLIC BLOOD PRESSURE: 81 MMHG | BODY MASS INDEX: 25.65 KG/M2 | OXYGEN SATURATION: 100 % | HEART RATE: 71 BPM | WEIGHT: 163.4 LBS | TEMPERATURE: 96.7 F | RESPIRATION RATE: 17 BRPM | HEIGHT: 67 IN | SYSTOLIC BLOOD PRESSURE: 184 MMHG

## 2020-12-10 DIAGNOSIS — K59.01 SLOW TRANSIT CONSTIPATION: Primary | ICD-10-CM

## 2020-12-10 DIAGNOSIS — N39.0 ACUTE UTI: ICD-10-CM

## 2020-12-10 LAB
ABO + RH BLD: NORMAL
ALBUMIN SERPL-MCNC: 2.8 G/DL (ref 3.5–5)
ALBUMIN/GLOB SERPL: 0.8 {RATIO} (ref 1.1–2.2)
ALP SERPL-CCNC: 69 U/L (ref 45–117)
ALT SERPL-CCNC: 11 U/L (ref 12–78)
ANION GAP SERPL CALC-SCNC: 8 MMOL/L (ref 5–15)
APPEARANCE UR: ABNORMAL
AST SERPL-CCNC: 11 U/L (ref 15–37)
BACTERIA URNS QL MICRO: ABNORMAL /HPF
BASOPHILS # BLD: 0 K/UL (ref 0–0.1)
BASOPHILS NFR BLD: 1 % (ref 0–1)
BILIRUB SERPL-MCNC: 1 MG/DL (ref 0.2–1)
BILIRUB UR QL CFM: NEGATIVE
BLOOD GROUP ANTIBODIES SERPL: NORMAL
BUN SERPL-MCNC: 18 MG/DL (ref 6–20)
BUN/CREAT SERPL: 15 (ref 12–20)
CALCIUM SERPL-MCNC: 8.5 MG/DL (ref 8.5–10.1)
CHLORIDE SERPL-SCNC: 108 MMOL/L (ref 97–108)
CO2 SERPL-SCNC: 28 MMOL/L (ref 21–32)
COLOR UR: ABNORMAL
CREAT SERPL-MCNC: 1.17 MG/DL (ref 0.55–1.02)
DIFFERENTIAL METHOD BLD: ABNORMAL
EOSINOPHIL # BLD: 0 K/UL (ref 0–0.4)
EOSINOPHIL NFR BLD: 0 % (ref 0–7)
EPITH CASTS URNS QL MICRO: ABNORMAL /LPF
ERYTHROCYTE [DISTWIDTH] IN BLOOD BY AUTOMATED COUNT: 13.6 % (ref 11.5–14.5)
GLOBULIN SER CALC-MCNC: 3.7 G/DL (ref 2–4)
GLUCOSE SERPL-MCNC: 100 MG/DL (ref 65–100)
GLUCOSE UR STRIP.AUTO-MCNC: NEGATIVE MG/DL
HCT VFR BLD AUTO: 35.6 % (ref 35–47)
HGB BLD-MCNC: 11 G/DL (ref 11.5–16)
HGB UR QL STRIP: NEGATIVE
IMM GRANULOCYTES # BLD AUTO: 0 K/UL (ref 0–0.04)
IMM GRANULOCYTES NFR BLD AUTO: 0 % (ref 0–0.5)
INR PPP: 1.1 (ref 0.9–1.1)
KETONES UR QL STRIP.AUTO: ABNORMAL MG/DL
LEUKOCYTE ESTERASE UR QL STRIP.AUTO: ABNORMAL
LYMPHOCYTES # BLD: 1 K/UL (ref 0.8–3.5)
LYMPHOCYTES NFR BLD: 15 % (ref 12–49)
MCH RBC QN AUTO: 26.6 PG (ref 26–34)
MCHC RBC AUTO-ENTMCNC: 30.9 G/DL (ref 30–36.5)
MCV RBC AUTO: 86.2 FL (ref 80–99)
MONOCYTES # BLD: 0.5 K/UL (ref 0–1)
MONOCYTES NFR BLD: 7 % (ref 5–13)
NEUTS SEG # BLD: 4.7 K/UL (ref 1.8–8)
NEUTS SEG NFR BLD: 77 % (ref 32–75)
NITRITE UR QL STRIP.AUTO: POSITIVE
NRBC # BLD: 0 K/UL (ref 0–0.01)
NRBC BLD-RTO: 0 PER 100 WBC
PH UR STRIP: 5 [PH] (ref 5–8)
PLATELET # BLD AUTO: 187 K/UL (ref 150–400)
PMV BLD AUTO: 11.4 FL (ref 8.9–12.9)
POTASSIUM SERPL-SCNC: 4.1 MMOL/L (ref 3.5–5.1)
PROT SERPL-MCNC: 6.5 G/DL (ref 6.4–8.2)
PROT UR STRIP-MCNC: 30 MG/DL
PROTHROMBIN TIME: 10.7 SEC (ref 9–11.1)
RBC # BLD AUTO: 4.13 M/UL (ref 3.8–5.2)
RBC #/AREA URNS HPF: ABNORMAL /HPF (ref 0–5)
SODIUM SERPL-SCNC: 144 MMOL/L (ref 136–145)
SP GR UR REFRACTOMETRY: 1.02 (ref 1–1.03)
SPECIMEN EXP DATE BLD: NORMAL
UA: UC IF INDICATED,UAUC: ABNORMAL
UROBILINOGEN UR QL STRIP.AUTO: 1 EU/DL (ref 0.2–1)
WBC # BLD AUTO: 6.2 K/UL (ref 3.6–11)
WBC URNS QL MICRO: ABNORMAL /HPF (ref 0–4)

## 2020-12-10 PROCEDURE — 99285 EMERGENCY DEPT VISIT HI MDM: CPT

## 2020-12-10 PROCEDURE — 81001 URINALYSIS AUTO W/SCOPE: CPT

## 2020-12-10 PROCEDURE — 36415 COLL VENOUS BLD VENIPUNCTURE: CPT

## 2020-12-10 PROCEDURE — 86900 BLOOD TYPING SEROLOGIC ABO: CPT

## 2020-12-10 PROCEDURE — 74011250637 HC RX REV CODE- 250/637: Performed by: EMERGENCY MEDICINE

## 2020-12-10 PROCEDURE — 85025 COMPLETE CBC W/AUTO DIFF WBC: CPT

## 2020-12-10 PROCEDURE — 87186 SC STD MICRODIL/AGAR DIL: CPT

## 2020-12-10 PROCEDURE — 87086 URINE CULTURE/COLONY COUNT: CPT

## 2020-12-10 PROCEDURE — 85610 PROTHROMBIN TIME: CPT

## 2020-12-10 PROCEDURE — 87077 CULTURE AEROBIC IDENTIFY: CPT

## 2020-12-10 PROCEDURE — 51703 INSERT BLADDER CATH COMPLEX: CPT

## 2020-12-10 PROCEDURE — 80053 COMPREHEN METABOLIC PANEL: CPT

## 2020-12-10 PROCEDURE — 74011000636 HC RX REV CODE- 636: Performed by: EMERGENCY MEDICINE

## 2020-12-10 PROCEDURE — 74177 CT ABD & PELVIS W/CONTRAST: CPT

## 2020-12-10 RX ORDER — LACTULOSE 10 G/15ML
10 SOLUTION ORAL; RECTAL 3 TIMES DAILY
Qty: 946 ML | Refills: 0 | Status: SHIPPED | OUTPATIENT
Start: 2020-12-10 | End: 2021-02-17

## 2020-12-10 RX ORDER — SODIUM CHLORIDE 0.9 % (FLUSH) 0.9 %
5-10 SYRINGE (ML) INJECTION
Status: COMPLETED | OUTPATIENT
Start: 2020-12-10 | End: 2020-12-10

## 2020-12-10 RX ORDER — LACTULOSE 10 G/15ML
200 SOLUTION ORAL; RECTAL
Status: COMPLETED | OUTPATIENT
Start: 2020-12-10 | End: 2020-12-10

## 2020-12-10 RX ORDER — CEPHALEXIN 500 MG/1
500 CAPSULE ORAL 4 TIMES DAILY
Qty: 28 CAP | Refills: 0 | Status: SHIPPED | OUTPATIENT
Start: 2020-12-10 | End: 2020-12-17

## 2020-12-10 RX ADMIN — LACTULOSE 30 ML: 20 SOLUTION ORAL at 12:35

## 2020-12-10 RX ADMIN — IOPAMIDOL 100 ML: 755 INJECTION, SOLUTION INTRAVENOUS at 09:49

## 2020-12-10 RX ADMIN — LACTULOSE 300 ML: 10 SOLUTION ORAL at 12:58

## 2020-12-10 RX ADMIN — Medication 10 ML: at 09:50

## 2020-12-10 NOTE — ED PROVIDER NOTES
EMERGENCY DEPARTMENT HISTORY AND PHYSICAL EXAM      Date: 12/10/2020  Patient Name: Cheyenne Moritz    History of Presenting Illness     Chief Complaint   Patient presents with    Abdominal Pain    Rectal Bleeding       History Provided By: Patient    HPI: Cheyenne Moritz, 76 y.o. female with PMHx significant for CAD, diabetes, depression, anxiety, chronic constipation, who presents with a chief complaint of dark stools and \"my tail hole hurts. \"  Patient states that she has been having dark stools for the last couple of days but prior to that had not had a bowel movement in \"a while. \"  She also tells me that she is feeling very anxious because her doctor took her off of her nerve pills. She also states that she has not been taking any of her medication for\" a while. \"  She endorses diffuse abdominal pain that she can only describe as \"it hurts. \"  Also reports dysuria. No chest pain, shortness of breath, fever. No anticoagulants. PCP: Shandra Park MD    There are no other complaints, changes, or physical findings at this time. Current Outpatient Medications   Medication Sig Dispense Refill    lactulose (CHRONULAC) 10 gram/15 mL solution Take 15 mL by mouth three (3) times daily. 946 mL 0    cephALEXin (Keflex) 500 mg capsule Take 1 Cap by mouth four (4) times daily for 7 days. 28 Cap 0    sodium-potassium-mag sulfate (SUPREP) 17.5-3.13-1.6 gram solr oral solution Take 177 mL by mouth See Admin Instructions. 1 Bottle 0    polyethylene glycol (MIRALAX) 17 gram/dose powder Take 17 g by mouth daily. 1 tablespoon with 8 oz of water daily 170 g 0    acetaminophen (TYLENOL) 325 mg tablet Take 2 Tabs by mouth every six (6) hours as needed for Pain. Indications: pain associated with arthritis 20 Tab 0    dicyclomine (BENTYL) 20 mg tablet Take 1 Tab by mouth every six (6) hours as needed (abdominal cramps) for up to 20 doses.  20 Tab 0    dilTIAZem SR (CARDIZEM SR) 60 mg SR capsule Take 1 Cap by mouth every twelve (12) hours. 30 Cap 3    LORazepam (ATIVAN) 0.5 mg tablet Take 1 Tab by mouth every eight (8) hours as needed for Anxiety.  Max Daily Amount: 1.5 mg. 20 Tab 0     Past History     Past Medical History:  Past Medical History:   Diagnosis Date    Agoraphobia without mention of panic attacks 2/17/2014    Anxiety disorder 8/18/2013    Arthritis     osteo    Breast pain, left 4/7/2015    CAD (coronary artery disease), native coronary artery 12/1/2015    no stents    Chronic chest pain 1/13/2014    Chronic pain associated with significant psychosocial dysfunction 2/17/2014    Depression 8/18/2013    Diabetes ([facility]     type II    Duplicated right renal collecting system 3/13/2014    GERD (gastroesophageal reflux disease)     Gout, joint     Hypercholesteremia     hyercholesterolemia    Hypertension     Nephrolithiasis 3/13/2014    Personal history of noncompliance with medical treatment, presenting hazards to health 5/30/2014    Psychotic disorder ([facility]     Vaginal pain 7/30/2014     Past Surgical History:  Past Surgical History:   Procedure Laterality Date    EGD  4/23/2010         HX CHOLECYSTECTOMY  09/20/2018    lap jesus    HX CYST REMOVAL      cyst removed from left wrist    HX HYSTERECTOMY      partial    HX OTHER SURGICAL      bladder dilitation    HX TUBAL LIGATION      HX UROLOGICAL      kidney stones     Family History:  Family History   Problem Relation Age of Onset    Stroke Mother     Heart Disease Mother     Cancer Father         type unknown    Heart Disease Son     Liver Disease Son     Heart Disease Daughter     Malignant Hyperthermia Neg Hx     Pseudocholinesterase Deficiency Neg Hx     Delayed Awakening Neg Hx     Post-op Nausea/Vomiting Neg Hx     Emergence Delirium Neg Hx     Post-op Cognitive Dysfunction Neg Hx     Other Neg Hx      Social History:  Social History     Tobacco Use    Smoking status: Never Smoker    Smokeless tobacco: Never Used Substance Use Topics    Alcohol use: No    Drug use: No     Allergies: Allergies   Allergen Reactions    Amoxicillin Hives    Sulfa (Sulfonamide Antibiotics) Hives and Itching    Mirtazapine Itching and Nausea Only     Funny feeling in chest    Percocet [Oxycodone-Acetaminophen] Nausea and Vomiting    Codeine Nausea and Vomiting    Crestor [Rosuvastatin] Other (comments)     myalgias    Nitroglycerin Unknown (comments)     Patient cannot remember why she is allergic to it      Prednisone Itching    Zithromax [Azithromycin] Itching     Not sure what it does,taken long time ago     Review of Systems   Review of Systems   Constitutional: Negative for chills and fever. HENT: Negative for congestion, rhinorrhea and sore throat. Respiratory: Negative for cough and shortness of breath. Cardiovascular: Negative for chest pain. Gastrointestinal: Positive for abdominal pain and constipation. Negative for nausea and vomiting. Genitourinary: Negative for dysuria and urgency. Skin: Negative for rash. Neurological: Negative for dizziness, light-headedness and headaches. All other systems reviewed and are negative. Physical Exam   Physical Exam  Vitals signs and nursing note reviewed. Constitutional:       General: She is not in acute distress. Appearance: She is well-developed. Comments: Chronically ill-appearing   HENT:      Head: Normocephalic and atraumatic. Eyes:      Conjunctiva/sclera: Conjunctivae normal.      Pupils: Pupils are equal, round, and reactive to light. Neck:      Musculoskeletal: Normal range of motion. Cardiovascular:      Rate and Rhythm: Normal rate and regular rhythm. Pulmonary:      Effort: Pulmonary effort is normal. No respiratory distress. Breath sounds: Normal breath sounds. No stridor. Abdominal:      General: There is no distension. Palpations: Abdomen is soft. Tenderness: There is generalized abdominal tenderness. Musculoskeletal: Normal range of motion. Skin:     General: Skin is warm and dry. Neurological:      Mental Status: She is alert and oriented to person, place, and time. Diagnostic Study Results   Labs -     Recent Results (from the past 12 hour(s))   CBC WITH AUTOMATED DIFF    Collection Time: 12/10/20  8:48 AM   Result Value Ref Range    WBC 6.2 3.6 - 11.0 K/uL    RBC 4.13 3.80 - 5.20 M/uL    HGB 11.0 (L) 11.5 - 16.0 g/dL    HCT 35.6 35.0 - 47.0 %    MCV 86.2 80.0 - 99.0 FL    MCH 26.6 26.0 - 34.0 PG    MCHC 30.9 30.0 - 36.5 g/dL    RDW 13.6 11.5 - 14.5 %    PLATELET 398 089 - 791 K/uL    MPV 11.4 8.9 - 12.9 FL    NRBC 0.0 0  WBC    ABSOLUTE NRBC 0.00 0.00 - 0.01 K/uL    NEUTROPHILS 77 (H) 32 - 75 %    LYMPHOCYTES 15 12 - 49 %    MONOCYTES 7 5 - 13 %    EOSINOPHILS 0 0 - 7 %    BASOPHILS 1 0 - 1 %    IMMATURE GRANULOCYTES 0 0.0 - 0.5 %    ABS. NEUTROPHILS 4.7 1.8 - 8.0 K/UL    ABS. LYMPHOCYTES 1.0 0.8 - 3.5 K/UL    ABS. MONOCYTES 0.5 0.0 - 1.0 K/UL    ABS. EOSINOPHILS 0.0 0.0 - 0.4 K/UL    ABS. BASOPHILS 0.0 0.0 - 0.1 K/UL    ABS. IMM.  GRANS. 0.0 0.00 - 0.04 K/UL    DF AUTOMATED     TYPE & SCREEN    Collection Time: 12/10/20  8:48 AM   Result Value Ref Range    Crossmatch Expiration 12/13/2020,2359     ABO/Rh(D) A NEGATIVE     Antibody screen NEG    PROTHROMBIN TIME + INR    Collection Time: 12/10/20  8:48 AM   Result Value Ref Range    INR 1.1 0.9 - 1.1      Prothrombin time 10.7 9.0 - 27.3 sec   METABOLIC PANEL, COMPREHENSIVE    Collection Time: 12/10/20  8:48 AM   Result Value Ref Range    Sodium 144 136 - 145 mmol/L    Potassium 4.1 3.5 - 5.1 mmol/L    Chloride 108 97 - 108 mmol/L    CO2 28 21 - 32 mmol/L    Anion gap 8 5 - 15 mmol/L    Glucose 100 65 - 100 mg/dL    BUN 18 6 - 20 MG/DL    Creatinine 1.17 (H) 0.55 - 1.02 MG/DL    BUN/Creatinine ratio 15 12 - 20      GFR est AA 55 (L) >60 ml/min/1.73m2    GFR est non-AA 45 (L) >60 ml/min/1.73m2    Calcium 8.5 8.5 - 10.1 MG/DL    Bilirubin, total 1.0 0.2 - 1.0 MG/DL    ALT (SGPT) 11 (L) 12 - 78 U/L    AST (SGOT) 11 (L) 15 - 37 U/L    Alk. phosphatase 69 45 - 117 U/L    Protein, total 6.5 6.4 - 8.2 g/dL    Albumin 2.8 (L) 3.5 - 5.0 g/dL    Globulin 3.7 2.0 - 4.0 g/dL    A-G Ratio 0.8 (L) 1.1 - 2.2     URINALYSIS W/ REFLEX CULTURE    Collection Time: 12/10/20  3:56 PM    Specimen: Miscellaneous sample; Urine    Urine specimen   Result Value Ref Range    Color DARK YELLOW      Appearance CLOUDY (A) CLEAR      Specific gravity 1.020 1.003 - 1.030      pH (UA) 5.0 5.0 - 8.0      Protein 30 (A) NEG mg/dL    Glucose Negative NEG mg/dL    Ketone TRACE (A) NEG mg/dL    Blood Negative NEG      Urobilinogen 1.0 0.2 - 1.0 EU/dL    Nitrites Positive (A) NEG      Leukocyte Esterase SMALL (A) NEG      WBC 10-20 0 - 4 /hpf    RBC 0-5 0 - 5 /hpf    Epithelial cells FEW FEW /lpf    Bacteria 3+ (A) NEG /hpf    UA:UC IF INDICATED URINE CULTURE ORDERED (A) CNI     BILIRUBIN, CONFIRM    Collection Time: 12/10/20  3:56 PM   Result Value Ref Range    Bilirubin UA, confirm Negative NEG         Radiologic Studies -   CT ABD PELV W CONT   Final Result   IMPRESSION:    1. Massive, chronic or recurrent, rectosigmoid fecal impaction. This has been   present in varying degree on every study dating back to 2015.   2. Secondary, severe, acute on chronic, stercoral proctocolitis. This has   progressed over time. 3. Mass effect on the bladder, uterus, transverse colon, and stomach. 4. The rectum and sigmoid have been so distended for so long that they patient   will likely remain patulous even after complete clean out. Initiation of a   chronic bowel regimen is critical; if one already exists, it should be   supplemented/increased. 5. Left proximal ureteral calculus (8 x 12 mm). No hydronephrosis, despite large   size of the stone. The findings were called to Merlyn Drew MD on 12/10/2020 10:13 AM by Dr. Russell Farnsworth.   [address] [facility]    Result Date: 12/10/2020  IMPRESSION: 1. Massive, chronic or recurrent, rectosigmoid fecal impaction. This has been present in varying degree on every study dating back to 2015. 2. Secondary, severe, acute on chronic, stercoral proctocolitis. This has progressed over time. 3. Mass effect on the bladder, uterus, transverse colon, and stomach. 4. The rectum and sigmoid have been so distended for so long that they patient will likely remain patulous even after complete clean out. Initiation of a chronic bowel regimen is critical; if one already exists, it should be supplemented/increased. 5. Left proximal ureteral calculus (8 x 12 mm). No hydronephrosis, despite large size of the stone. The findings were called to Rosa Napoles MD on 12/10/2020 10:13 AM by Dr. Azar Buchanan. [address] [facility]   I am the first provider for this patient. I reviewed the vital signs, available nursing notes, past medical history, past surgical history, family history and social history. Vital Signs-Reviewed the patient's vital signs. Patient Vitals for the past 12 hrs:   Temp Pulse Resp BP SpO2   12/10/20 1700 -- 71 17 (!) 184/81 100 %   12/10/20 1500 -- 73 16 134/73 99 %   12/10/20 1400 -- 69 18 (!) 180/76 100 %   12/10/20 1300 -- 70 17 (!) 144/75 100 %   12/10/20 1200 -- 71 16 (!) 132/103 100 %   12/10/20 1100 -- 73 15 (!) 164/76 100 %   12/10/20 1000 -- 71 16 (!) 170/78 100 %   12/10/20 0900 -- 77 16 138/79 98 %   12/10/20 0836 (!) 96.7 °F (35.9 °C) 73 18 (!) 145/81 98 %       Pulse Oximetry Analysis - 100% on ra      Records Reviewed: Nursing Notes and Old Medical Records    Provider Notes (Medical Decision Making):   Patient presents with a chief complaint of rectal pain, abdominal pain, and black stool. On my evaluation she is chronically ill-appearing with use abdominal pain.   On review of her records she has frequent visits for significant constipation typically in the setting of not taking her bowel regimen at home. Will check basic lab work urinalysis, CT abdomen. We will also have case management speak with the patient and family members as it is unclear to me why she is not going to appointments or getting medications that she needs. ED Course:   Initial assessment performed. The patients presenting problems have been discussed, and they are in agreement with the care plan formulated and outlined with them. I have encouraged them to ask questions as they arise throughout their visit. ED Course as of Dec 10 2016   Thu Dec 10, 2020   1014 Call from radiology - entire sigmoid and rectum distended, compressing stomach. Severe constipation. Also stone in the left proximal ureter without hydronephrosis    [BRENDA]      ED Course User Index  Oneil Rojo MD       Procedure Note - Rectal Exam:   Performed by: Victor M Beth MD  Chaperoned by: Dana Goetz RN  Rectal exam performed. Light brown stool was collected. Not able to manually disimpact as stool ball was too high in the rectal vault  The procedure took 1-15 minutes, and pt tolerated well. After soapsuds and lactulose enemas, patient with multiple large bowel movements. Based on records she had been on lactulose in the past as a bowel regimen so we will represcribed. The importance of following up with primary care and GI was discussed. I emphasized the severity of her constipation on her CT scan and discussed that if it went unchecked it could cause serious problems and she can get very sick from this. Procedures:  Procedures    Critical Care:  none    Disposition:  Discharge Note:  The patient has been re-evaluated and is ready for discharge. Reviewed available results with patient. Counseled patient on diagnosis and care plan. Patient has expressed understanding, and all questions have been answered. Patient agrees with plan and agrees to follow up as recommended, or to return to the ED if their symptoms worsen.  Discharge instructions have been provided and explained to the patient, along with reasons to return to the ED. PLAN:  1. Discharge Medication List as of 12/10/2020  4:29 PM      START taking these medications    Details   lactulose (CHRONULAC) 10 gram/15 mL solution Take 15 mL by mouth three (3) times daily. , Normal, Disp-946 mL,R-0      cephALEXin (Keflex) 500 mg capsule Take 1 Cap by mouth four (4) times daily for 7 days. , Normal, Disp-28 Cap,R-0         CONTINUE these medications which have NOT CHANGED    Details   sodium-potassium-mag sulfate (SUPREP) 17.5-3.13-1.6 gram solr oral solution Take 177 mL by mouth See Admin Instructions. , Normal, Disp-1 Bottle, R-0      polyethylene glycol (MIRALAX) 17 gram/dose powder Take 17 g by mouth daily. 1 tablespoon with 8 oz of water daily, Normal, Disp-170 g, R-0      acetaminophen (TYLENOL) 325 mg tablet Take 2 Tabs by mouth every six (6) hours as needed for Pain. Indications: pain associated with arthritis, Normal, Disp-20 Tab, R-0      dicyclomine (BENTYL) 20 mg tablet Take 1 Tab by mouth every six (6) hours as needed (abdominal cramps) for up to 20 doses. , Normal, Disp-20 Tab, R-0      dilTIAZem SR (CARDIZEM SR) 60 mg SR capsule Take 1 Cap by mouth every twelve (12) hours. , Normal, Disp-30 Cap, R-3      LORazepam (ATIVAN) 0.5 mg tablet Take 1 Tab by mouth every eight (8) hours as needed for Anxiety. Max Daily Amount: 1.5 mg., Print, Disp-20 Tab, R-0           2. Follow-up Information     Follow up With Specialties Details Why Contact Info    Alexandr Shields MD Family Medicine On 12/10/2020 as discuss. please schedule follow-up with the new provided you want to go too.  [address] [facility] [address]      Bronson Varela MD Gastroenterology Schedule an appointment as soon as possible for a visit  [address]  [address] [place] [address] Drive  [address] [address] [phone]      [address] [place] [address] EMERGENCY DEPT Emergency Medicine  As needed, If symptoms worsen [address] [facility]  [phone]        Return to ED if worse     Diagnosis     Clinical Impression:   1. Slow transit constipation    2. Acute UTI            Please note that this dictation was completed with [employer], the computer voice recognition software. Quite often unanticipated grammatical, syntax, homophones, and other interpretive errors are inadvertently transcribed by the computer software. Please disregard these errors.   Please excuse any errors that have escaped final proofreading

## 2020-12-10 NOTE — ED NOTES
Patient (s)  given copy of dc instructions and 0 paper script(s) and 2 electronic scripts. Patient (s)  verbalized understanding of instructions and script (s). Patient given a current medication reconciliation form and verbalized understanding of their medications. Patient (s) verbalized understanding of the importance of discussing medications with  his or her physician or clinic they will be following up with. Patient alert and oriented and in no acute distress. Patient offered wheelchair from treatment area to hospital entrance, patient left ED via wheelchair.

## 2020-12-10 NOTE — DISCHARGE INSTRUCTIONS
Patient Education   It is extremely important to take your medications to help with your constipation. Please follow up with your PCP and gastroenterology. Constipation: Care Instructions  Your Care Instructions     Constipation means that you have a hard time passing stools (bowel movements). People pass stools from 3 times a day to once every 3 days. What is normal for you may be different. Constipation may occur with pain in the rectum and cramping. The pain may get worse when you try to pass stools. Sometimes there are small amounts of bright red blood on toilet paper or the surface of stools. This is because of enlarged veins near the rectum (hemorrhoids). A few changes in your diet and lifestyle may help you avoid ongoing constipation. Your doctor may also prescribe medicine to help loosen your stool. Some medicines can cause constipation. These include pain medicines and antidepressants. Tell your doctor about all the medicines you take. Your doctor may want to make a medicine change to ease your symptoms. Follow-up care is a key part of your treatment and safety. Be sure to make and go to all appointments, and call your doctor if you are having problems. It's also a good idea to know your test results and keep a list of the medicines you take. How can you care for yourself at home? · Drink plenty of fluids, enough so that your urine is light yellow or clear like water. If you have kidney, heart, or liver disease and have to limit fluids, talk with your doctor before you increase the amount of fluids you drink. · Include high-fiber foods in your diet each day. These include fruits, vegetables, beans, and whole grains. · Get at least 30 minutes of exercise on most days of the week. Walking is a good choice. You also may want to do other activities, such as running, swimming, cycling, or playing tennis or team sports. · Take a fiber supplement, such as Citrucel or Metamucil, every day.  Read and follow all instructions on the label. · Schedule time each day for a bowel movement. A daily routine may help. Take your time having your bowel movement. · Support your feet with a small step stool when you sit on the toilet. This helps flex your hips and places your pelvis in a squatting position. · Your doctor may recommend an over-the-counter laxative to relieve your constipation. Examples are Milk of Magnesia and MiraLax. Read and follow all instructions on the label. Do not use laxatives on a long-term basis. When should you call for help? Call your doctor now or seek immediate medical care if:    · You have new or worse belly pain.     · You have new or worse nausea or vomiting.     · You have blood in your stools. Watch closely for changes in your health, and be sure to contact your doctor if:    · Your constipation is getting worse.     · You do not get better as expected. Where can you learn more? Go to http://www.gimenez.com/  Enter P343 in the search box to learn more about \"Constipation: Care Instructions. \"  Current as of: June 26, 2019               Content Version: 12.6  © 8299-7668 [employer]. Care instructions adapted under license by [employer] (which disclaims liability or warranty for this information). If you have questions about a medical condition or this instruction, always ask your healthcare professional. [address] any warranty or liability for your use of this information.

## 2020-12-12 LAB
BACTERIA SPEC CULT: ABNORMAL
CC UR VC: ABNORMAL
SERVICE CMNT-IMP: ABNORMAL

## 2021-02-16 ENCOUNTER — HOSPITAL ENCOUNTER (INPATIENT)
Age: 76
LOS: 6 days | Discharge: HOME HEALTH | DRG: 177 | End: 2021-02-23
Attending: EMERGENCY MEDICINE | Admitting: HOSPITALIST
Payer: MEDICARE

## 2021-02-16 DIAGNOSIS — I21.4 NSTEMI (NON-ST ELEVATED MYOCARDIAL INFARCTION) (HCC): Primary | ICD-10-CM

## 2021-02-16 LAB
BASOPHILS # BLD: 0 K/UL (ref 0–0.1)
BASOPHILS NFR BLD: 0 % (ref 0–1)
DIFFERENTIAL METHOD BLD: ABNORMAL
EOSINOPHIL # BLD: 0 K/UL (ref 0–0.4)
EOSINOPHIL NFR BLD: 0 % (ref 0–7)
ERYTHROCYTE [DISTWIDTH] IN BLOOD BY AUTOMATED COUNT: 13.7 % (ref 11.5–14.5)
HCT VFR BLD AUTO: 37.3 % (ref 35–47)
HGB BLD-MCNC: 11.8 G/DL (ref 11.5–16)
IMM GRANULOCYTES # BLD AUTO: 0 K/UL (ref 0–0.04)
IMM GRANULOCYTES NFR BLD AUTO: 1 % (ref 0–0.5)
LYMPHOCYTES # BLD: 0.7 K/UL (ref 0.8–3.5)
LYMPHOCYTES NFR BLD: 15 % (ref 12–49)
MCH RBC QN AUTO: 26.4 PG (ref 26–34)
MCHC RBC AUTO-ENTMCNC: 31.6 G/DL (ref 30–36.5)
MCV RBC AUTO: 83.4 FL (ref 80–99)
MONOCYTES # BLD: 0.4 K/UL (ref 0–1)
MONOCYTES NFR BLD: 9 % (ref 5–13)
NEUTS SEG # BLD: 3.9 K/UL (ref 1.8–8)
NEUTS SEG NFR BLD: 75 % (ref 32–75)
PLATELET # BLD AUTO: 348 K/UL (ref 150–400)
PMV BLD AUTO: 9.8 FL (ref 8.9–12.9)
RBC # BLD AUTO: 4.47 M/UL (ref 3.8–5.2)
TROPONIN I SERPL-MCNC: 0.05 NG/ML
WBC # BLD AUTO: 5.1 K/UL (ref 3.6–11)

## 2021-02-16 PROCEDURE — 84484 ASSAY OF TROPONIN QUANT: CPT

## 2021-02-16 PROCEDURE — 99284 EMERGENCY DEPT VISIT MOD MDM: CPT

## 2021-02-16 PROCEDURE — 85730 THROMBOPLASTIN TIME PARTIAL: CPT

## 2021-02-16 PROCEDURE — 36415 COLL VENOUS BLD VENIPUNCTURE: CPT

## 2021-02-16 PROCEDURE — 85025 COMPLETE CBC W/AUTO DIFF WBC: CPT

## 2021-02-16 RX ORDER — VENLAFAXINE HYDROCHLORIDE 37.5 MG/1
37.5 CAPSULE, EXTENDED RELEASE ORAL DAILY
COMMUNITY
Start: 2021-01-19 | End: 2021-07-30

## 2021-02-17 ENCOUNTER — APPOINTMENT (OUTPATIENT)
Dept: NON INVASIVE DIAGNOSTICS | Age: 76
DRG: 177 | End: 2021-02-17
Attending: HOSPITALIST
Payer: MEDICARE

## 2021-02-17 PROBLEM — I24.9 ACUTE CORONARY SYNDROMES (HCC): Status: ACTIVE | Noted: 2021-02-17

## 2021-02-17 LAB
APTT PPP: 29.3 SEC (ref 23–35.7)
APTT PPP: 90.2 SEC (ref 23–35.7)
COVID-19 RAPID TEST, COVR: DETECTED
ECHO AO ROOT DIAM: 3.2 CM
ECHO AV AREA PEAK VELOCITY: 1 CM2
ECHO AV AREA VTI: 1.22 CM2
ECHO AV AREA/BSA PEAK VELOCITY: 0.6 CM2/M2
ECHO AV AREA/BSA VTI: 0.7 CM2/M2
ECHO AV MEAN GRADIENT: 15 MMHG
ECHO AV MEAN VELOCITY: 179 CM/S
ECHO AV PEAK GRADIENT: 30 MMHG
ECHO AV VTI: 54.8 CM
ECHO EST RA PRESSURE: 3 MMHG
ECHO LA AREA 4C: 15.41 CM2
ECHO LA MAJOR AXIS: 3.3 CM
ECHO LA MINOR AXIS: 1.82 CM
ECHO LV E' SEPTAL VELOCITY: 4.97 CM/S
ECHO LV EDV A2C: 63 CM3
ECHO LV EJECTION FRACTION BIPLANE: 65.8 % (ref 55–100)
ECHO LV ESV A2C: 16.8 CM3
ECHO LV INTERNAL DIMENSION DIASTOLIC: 3.98 CM (ref 3.9–5.3)
ECHO LV INTERNAL DIMENSION SYSTOLIC: 2.56 CM
ECHO LV IVSD: 1.29 CM (ref 0.6–0.9)
ECHO LV MASS 2D: 177.7 G (ref 67–162)
ECHO LV MASS INDEX 2D: 98.1 G/M2 (ref 43–95)
ECHO LV POSTERIOR WALL DIASTOLIC: 1.24 CM (ref 0.6–0.9)
ECHO LVOT DIAM: 2 CM
ECHO LVOT PEAK GRADIENT: 3 MMHG
ECHO LVOT SV: 67 CM3
ECHO LVOT VTI: 20.6 CM
ECHO LVOT VTI: 20.7 CM
ECHO LVOT VTI: 21.3 CM
ECHO LVOT VTI: 22.6 CM
ECHO MV A VELOCITY: 87.6 CM/S
ECHO MV E DECELERATION TIME (DT): 229 MS
ECHO MV E VELOCITY: 64.8 CM/S
ECHO MV E/A RATIO: 0.74
ECHO MV E/E' SEPTAL: 13.04
ECHO PV PEAK INSTANTANEOUS GRADIENT SYSTOLIC: 4 MMHG
ECHO PV REGURGITANT MAX VELOCITY: 274 CM/S
ECHO PV REGURGITANT MAX VELOCITY: 87.4 CM/S
ECHO PV REGURGITANT MAX VELOCITY: 99.5 CM/S
ECHO PVEIN A DURATION: 99 MS
ECHO PVEIN A VELOCITY: 28.1 CM/S
ECHO RA AREA 4C: 18.98 CM2
ECHO RIGHT VENTRICULAR SYSTOLIC PRESSURE (RVSP): 20 MMHG
ECHO RV INTERNAL DIMENSION: 2.72 CM
ECHO TV MAX VELOCITY: 208 CM/S
ECHO TV REGURGITANT PEAK GRADIENT: 17 MMHG
INR PPP: 1.2 (ref 0.9–1.1)
LVOT MG: 1 MMHG
PROTHROMBIN TIME: 15.3 SEC (ref 11.9–14.7)
SARS-COV-2, COV2: NORMAL
SPECIMEN SOURCE: ABNORMAL
THERAPEUTIC RANGE,PTTT: ABNORMAL SEC (ref 68–109)
THERAPEUTIC RANGE,PTTT: NORMAL SEC (ref 68–109)
TROPONIN I SERPL-MCNC: 2.11 NG/ML

## 2021-02-17 PROCEDURE — 93005 ELECTROCARDIOGRAM TRACING: CPT

## 2021-02-17 PROCEDURE — 74011250636 HC RX REV CODE- 250/636: Performed by: HOSPITALIST

## 2021-02-17 PROCEDURE — 36415 COLL VENOUS BLD VENIPUNCTURE: CPT

## 2021-02-17 PROCEDURE — 65270000029 HC RM PRIVATE

## 2021-02-17 PROCEDURE — 85730 THROMBOPLASTIN TIME PARTIAL: CPT

## 2021-02-17 PROCEDURE — 85610 PROTHROMBIN TIME: CPT

## 2021-02-17 PROCEDURE — 87635 SARS-COV-2 COVID-19 AMP PRB: CPT

## 2021-02-17 PROCEDURE — 74011250637 HC RX REV CODE- 250/637: Performed by: HOSPITALIST

## 2021-02-17 PROCEDURE — 93306 TTE W/DOPPLER COMPLETE: CPT

## 2021-02-17 PROCEDURE — 84484 ASSAY OF TROPONIN QUANT: CPT

## 2021-02-17 RX ORDER — SODIUM CHLORIDE 0.9 % (FLUSH) 0.9 %
5-40 SYRINGE (ML) INJECTION AS NEEDED
Status: DISCONTINUED | OUTPATIENT
Start: 2021-02-16 | End: 2021-02-24 | Stop reason: HOSPADM

## 2021-02-17 RX ORDER — HEPARIN SODIUM 1000 [USP'U]/ML
2000 INJECTION, SOLUTION INTRAVENOUS; SUBCUTANEOUS AS NEEDED
Status: DISCONTINUED | OUTPATIENT
Start: 2021-02-17 | End: 2021-02-19

## 2021-02-17 RX ORDER — HEPARIN SODIUM 10000 [USP'U]/100ML
12-25 INJECTION, SOLUTION INTRAVENOUS
Status: DISCONTINUED | OUTPATIENT
Start: 2021-02-17 | End: 2021-02-17

## 2021-02-17 RX ORDER — HEPARIN SODIUM 1000 [USP'U]/ML
4000 INJECTION, SOLUTION INTRAVENOUS; SUBCUTANEOUS AS NEEDED
Status: DISCONTINUED | OUTPATIENT
Start: 2021-02-17 | End: 2021-02-19

## 2021-02-17 RX ORDER — LACTULOSE 10 G/15ML
10 SOLUTION ORAL; RECTAL 3 TIMES DAILY
Status: DISCONTINUED | OUTPATIENT
Start: 2021-02-17 | End: 2021-02-17

## 2021-02-17 RX ORDER — HEPARIN SODIUM 10000 [USP'U]/100ML
12-25 INJECTION, SOLUTION INTRAVENOUS
Status: DISCONTINUED | OUTPATIENT
Start: 2021-02-17 | End: 2021-02-19

## 2021-02-17 RX ORDER — AMLODIPINE BESYLATE 2.5 MG/1
2.5 TABLET ORAL DAILY
COMMUNITY

## 2021-02-17 RX ORDER — LORAZEPAM 0.5 MG/1
0.5 TABLET ORAL
Status: DISCONTINUED | OUTPATIENT
Start: 2021-02-17 | End: 2021-02-24 | Stop reason: HOSPADM

## 2021-02-17 RX ORDER — SODIUM CHLORIDE 0.9 % (FLUSH) 0.9 %
5-40 SYRINGE (ML) INJECTION EVERY 8 HOURS
Status: DISCONTINUED | OUTPATIENT
Start: 2021-02-17 | End: 2021-02-24 | Stop reason: HOSPADM

## 2021-02-17 RX ORDER — DILTIAZEM HYDROCHLORIDE 60 MG/1
60 CAPSULE, EXTENDED RELEASE ORAL EVERY 12 HOURS
Status: DISCONTINUED | OUTPATIENT
Start: 2021-02-17 | End: 2021-02-17

## 2021-02-17 RX ORDER — POLYETHYLENE GLYCOL 3350 17 G/17G
17 POWDER, FOR SOLUTION ORAL DAILY
Status: DISCONTINUED | OUTPATIENT
Start: 2021-02-17 | End: 2021-02-24 | Stop reason: HOSPADM

## 2021-02-17 RX ORDER — SENNOSIDES 8.6 MG/1
1 TABLET ORAL 2 TIMES DAILY
Status: DISCONTINUED | OUTPATIENT
Start: 2021-02-17 | End: 2021-02-24 | Stop reason: HOSPADM

## 2021-02-17 RX ORDER — GUAIFENESIN 100 MG/5ML
81 LIQUID (ML) ORAL DAILY
Status: DISCONTINUED | OUTPATIENT
Start: 2021-02-17 | End: 2021-02-24 | Stop reason: HOSPADM

## 2021-02-17 RX ORDER — VENLAFAXINE HYDROCHLORIDE 37.5 MG/1
37.5 CAPSULE, EXTENDED RELEASE ORAL
Status: DISCONTINUED | OUTPATIENT
Start: 2021-02-17 | End: 2021-02-24 | Stop reason: HOSPADM

## 2021-02-17 RX ORDER — BISACODYL 5 MG
10 TABLET, DELAYED RELEASE (ENTERIC COATED) ORAL DAILY
Status: DISCONTINUED | OUTPATIENT
Start: 2021-02-17 | End: 2021-02-24 | Stop reason: HOSPADM

## 2021-02-17 RX ORDER — SENNOSIDES 8.6 MG/1
1 TABLET ORAL 2 TIMES DAILY
COMMUNITY
End: 2022-01-01

## 2021-02-17 RX ORDER — HEPARIN SODIUM 10000 [USP'U]/ML
4000 INJECTION, SOLUTION INTRAVENOUS; SUBCUTANEOUS ONCE
Status: DISCONTINUED | OUTPATIENT
Start: 2021-02-17 | End: 2021-02-17

## 2021-02-17 RX ORDER — BISACODYL 5 MG
10 TABLET, DELAYED RELEASE (ENTERIC COATED) ORAL DAILY
COMMUNITY
End: 2022-01-01

## 2021-02-17 RX ORDER — ENOXAPARIN SODIUM 100 MG/ML
40 INJECTION SUBCUTANEOUS EVERY 24 HOURS
Status: DISCONTINUED | OUTPATIENT
Start: 2021-02-17 | End: 2021-02-17

## 2021-02-17 RX ORDER — AMLODIPINE BESYLATE 5 MG/1
2.5 TABLET ORAL DAILY
Status: DISCONTINUED | OUTPATIENT
Start: 2021-02-17 | End: 2021-02-24 | Stop reason: HOSPADM

## 2021-02-17 RX ORDER — VENLAFAXINE HYDROCHLORIDE 37.5 MG/1
37.45 CAPSULE, EXTENDED RELEASE ORAL DAILY
Status: DISCONTINUED | OUTPATIENT
Start: 2021-02-17 | End: 2021-02-17

## 2021-02-17 RX ADMIN — AMLODIPINE BESYLATE 2.5 MG: 5 TABLET ORAL at 09:37

## 2021-02-17 RX ADMIN — ASPIRIN 81 MG: 81 TABLET, CHEWABLE ORAL at 09:37

## 2021-02-17 RX ADMIN — SODIUM CHLORIDE 1000 ML: 9 INJECTION, SOLUTION INTRAVENOUS at 05:35

## 2021-02-17 RX ADMIN — POLYETHYLENE GLYCOL 3350 17 G: 17 POWDER, FOR SOLUTION ORAL at 09:46

## 2021-02-17 RX ADMIN — Medication 10 ML: at 05:06

## 2021-02-17 RX ADMIN — ENOXAPARIN SODIUM 40 MG: 40 INJECTION SUBCUTANEOUS at 00:21

## 2021-02-17 RX ADMIN — HEPARIN SODIUM AND DEXTROSE 12 UNITS/KG/HR: 10000; 5 INJECTION INTRAVENOUS at 03:00

## 2021-02-17 RX ADMIN — Medication 10 ML: at 22:00

## 2021-02-17 RX ADMIN — BISACODYL 10 MG: 5 TABLET, COATED ORAL at 09:37

## 2021-02-17 RX ADMIN — HEPARIN SODIUM 4000 UNITS: 1000 INJECTION, SOLUTION INTRAVENOUS; SUBCUTANEOUS at 16:59

## 2021-02-17 RX ADMIN — Medication 8.6 MG: at 09:37

## 2021-02-17 RX ADMIN — VENLAFAXINE HYDROCHLORIDE 37.45 MG: 37.5 CAPSULE, EXTENDED RELEASE ORAL at 09:37

## 2021-02-17 RX ADMIN — Medication 10 ML: at 00:24

## 2021-02-17 NOTE — PROGRESS NOTES
Hospitalist Progress Note               Daily Progress Note: 2/17/2021      Subjective: The patient is seen for follow  up.   76 y.o. female with history of diabetes and hypertension presents to the referring facility emergency room complaining of generalized body aches started like 2 weeks ago along with chest pain, nausea and shaking. She is tested positive for Covid 1 week ago at the nursing home. She is found with elevated troponin suggestive of non-ST elevation MI. Medications reviewed  Current Facility-Administered Medications   Medication Dose Route Frequency    sodium chloride (NS) flush 5-40 mL  5-40 mL IntraVENous Q8H    sodium chloride (NS) flush 5-40 mL  5-40 mL IntraVENous PRN    aspirin chewable tablet 81 mg  81 mg Oral DAILY    LORazepam (ATIVAN) tablet 0.5 mg  0.5 mg Oral Q8H PRN    polyethylene glycol (MIRALAX) packet 17 g  17 g Oral DAILY    amLODIPine (NORVASC) tablet 2.5 mg  2.5 mg Oral DAILY    bisacodyL (DULCOLAX) tablet 10 mg  10 mg Oral DAILY    senna (SENOKOT) tablet 8.6 mg  1 Tab Oral BID    heparin 25,000 units in D5W 250 ml infusion  12-25 Units/kg/hr (Adjusted) IntraVENous TITRATE    heparin (porcine) 1,000 unit/mL injection 4,000 Units  4,000 Units IntraVENous PRN    Or    heparin (porcine) 1,000 unit/mL injection 2,000 Units  2,000 Units IntraVENous PRN    venlafaxine-SR (EFFEXOR-XR) capsule 37.5 mg  37.5 mg Oral DAILY WITH BREAKFAST     Current Outpatient Medications   Medication Sig    senna (Senna) 8.6 mg tablet Take 1 Tab by mouth two (2) times a day.  bisacodyL (DULCOLAX) 5 mg EC tablet Take 10 mg by mouth daily.  amLODIPine (NORVASC) 2.5 mg tablet Take 2.5 mg by mouth daily.  venlafaxine-SR (EFFEXOR-XR) 37.5 mg capsule Take 37.45 mg by mouth daily.  polyethylene glycol (MIRALAX) 17 gram/dose powder Take 17 g by mouth daily.  1 tablespoon with 8 oz of water daily       Review of Systems:   Review of systems not obtained due to patient factors. Objective:   Physical Exam:     Visit Vitals  /67 (BP 1 Location: Left upper arm, BP Patient Position: At rest)   Pulse 63   Temp 98 °F (36.7 °C)   Resp 18   Ht 5' 5\" (1.651 m)   Wt 73.9 kg (163 lb)   SpO2 93%   BMI 27.12 kg/m²      O2 Device: Room air    Temp (24hrs), Av.4 °F (36.9 °C), Min:98 °F (36.7 °C), Max:98.7 °F (37.1 °C)    No intake/output data recorded. No intake/output data recorded. PHYSICAL EXAM:  General: Alert and awake  Skin: Extremities and face reveal no rashes. HEENT: Sclerae anicteric. No oral ulcers. No ENT discharge. The neck is supple. Cardiovascular: Regular rate and rhythm. No murmurs, gallops, or rubs. PMI nondisplaced. Carotids without bruits. Respiratory: Comfortable breathing with no accessory muscle use. Clear breath sounds with no wheezes, rales, or rhonchi. GI: Abdomen nondistended, soft, and nontender. Normal active bowel sounds. Rectal: Deferred   Musculoskeletal: No pitting edema of the lower legs. Extremities have good range of motion. No costovertebral tenderness. Neurological: alert but confused  Psychiatric: calm and cooperative    Data Review:       Recent Days:  Recent Labs     21  2245   WBC 5.1   HGB 11.8   HCT 37.3        Recent Labs     21  0000   INR 1.2*     No results for input(s): PH, PCO2, PO2, HCO3, FIO2 in the last 72 hours.        Assessment/     Problem List:  Hospital Problems  Date Reviewed: 2018          Codes Class Noted POA    Acute coronary syndromes [facility]) ICD-10-CM: I24.9  ICD-9-CM: 411.1  2021 Yes        Pseudobulbar affect ICD-10-CM: F48.2  ICD-9-CM: 310.81  10/16/2015 Yes        HTN, goal below 130/80 (Chronic) ICD-10-CM: I10  ICD-9-CM: 401.9 Chronic 2012 Yes                     Plan:  Non-ST elevation MI/acute coronary syndrome: She is already started on IV heparin at the referring facility which we will continue, ordered further work-up and cardiology consultation will follow with recommendations.     COVID-19 pneumonia: No evidence of hypoxia at this time, we will monitor closely. Will recheck covid status.      Benign essential hypertension: Mild, on amlodipine 2.5 mg daily which we will continue     History of psychiatric problems, I do not see any medications except Effexor. Which we will continue     Admitted to cardiac telemetry, full CODE STATUS, home medications reviewed with MAR from the nursing home. D/w daughter Daughter Rita Fletcher @ [phone]) is pts primary HCDM.    Care Plan discussed with:     Kimberley Enriquez MD

## 2021-02-17 NOTE — PROGRESS NOTES
Reason for Admission:   ACS                   RUR Score:    11%                Plan for utilizing home health:   Yes. Choice Letter agreed to by daughter Nataly Manzanares @  [phone]). Referral sent via [employer]. PCP: First and Last name:  Linsey Esposito   Name of Practice: [facility] @ [phone]). Are you a current patient: Yes/No: Yes   Approximate date of last visit: It's been a while - pt been in a SNF [facility])   Can you participate in a virtual visit with your PCP:  Yes with daughter. Current Advanced Directive/Advance Care Plan: ACP done. Healthcare Decision Maker:   Ted Manzanares @ [phone]) is pts primary HCDM. Transition of Care Plan: D/C plan is home with daughter & home health. Pt from [facility], but daughter plans to take her home upon discharge form hospital. Pt was ambulatory with no DME - per daughter.

## 2021-02-17 NOTE — PROGRESS NOTES
2/17/21. Daughter Yvette Whipple informed [facility] accepted & given agencies # ( [phone]).

## 2021-02-17 NOTE — ED PROVIDER NOTES
EMERGENCY DEPARTMENT HISTORY AND PHYSICAL EXAM      Date: 2/16/2021  Patient Name: Juwan Watts      History of Presenting Illness     Chief Complaint   Patient presents with    Other       History Provided By: Patient and Chart review    HPI: Juwan Watts, 76 y.o. female with a past medical history significant Arthritis, diabetes, coronary artery disease presents to the ED as a transfer from [facility] for an NSTEMI. Patient was recently admitted and treated at [facility] for constipation and surgical disimpaction. While there she had a positive Covid test.  She was discharged to rehabilitation center and was seen today in [facility] ER complaining of chest pain which she described as having for some time now as well as generalized body aches nausea. She was reportedly having chest pain for several weeks. She was noted to have an elevated troponin and then repeat troponin was increasing so she was started on heparin and requested to be transferred here. No shortness of breath no vomiting or nausea. She is otherwise a poor historian perseverating on having dry lips and needing something for them. There are no other complaints, changes, or physical findings at this time. PCP: Len Mancilla MD    Current Outpatient Medications   Medication Sig Dispense Refill    lactulose (CHRONULAC) 10 gram/15 mL solution Take 15 mL by mouth three (3) times daily. 946 mL 0    sodium-potassium-mag sulfate (SUPREP) 17.5-3.13-1.6 gram solr oral solution Take 177 mL by mouth See Admin Instructions. 1 Bottle 0    polyethylene glycol (MIRALAX) 17 gram/dose powder Take 17 g by mouth daily. 1 tablespoon with 8 oz of water daily 170 g 0    acetaminophen (TYLENOL) 325 mg tablet Take 2 Tabs by mouth every six (6) hours as needed for Pain.  Indications: pain associated with arthritis 20 Tab 0    dicyclomine (BENTYL) 20 mg tablet Take 1 Tab by mouth every six (6) hours as needed (abdominal cramps) for up to 20 doses. 20 Tab 0    dilTIAZem SR (CARDIZEM SR) 60 mg SR capsule Take 1 Cap by mouth every twelve (12) hours. 30 Cap 3    LORazepam (ATIVAN) 0.5 mg tablet Take 1 Tab by mouth every eight (8) hours as needed for Anxiety.  Max Daily Amount: 1.5 mg. 20 Tab 0       Past History     Past Medical History:  Past Medical History:   Diagnosis Date    Agoraphobia without mention of panic attacks 2/17/2014    Anxiety disorder 8/18/2013    Arthritis     osteo    Breast pain, left 4/7/2015    CAD (coronary artery disease), native coronary artery 12/1/2015    no stents    Chronic chest pain 1/13/2014    Chronic pain associated with significant psychosocial dysfunction 2/17/2014    Depression 8/18/2013    Diabetes ([facility] [address]     type II    Duplicated right renal collecting system 3/13/2014    GERD (gastroesophageal reflux disease)     Gout, joint     Hypercholesteremia     hyercholesterolemia    Hypertension     Nephrolithiasis 3/13/2014    Personal history of noncompliance with medical treatment, presenting hazards to health 5/30/2014    Psychotic disorder ([facility] [address]     Vaginal pain 7/30/2014       Past Surgical History:  Past Surgical History:   Procedure Laterality Date    EGD  4/23/2010         HX CHOLECYSTECTOMY  09/20/2018    lap jesus    HX CYST REMOVAL      cyst removed from left wrist    HX HYSTERECTOMY      partial    HX OTHER SURGICAL      bladder dilitation    HX TUBAL LIGATION      HX UROLOGICAL      kidney stones       Family History:  Family History   Problem Relation Age of Onset    Stroke Mother     Heart Disease Mother     Cancer Father         type unknown    Heart Disease Son     Liver Disease Son     Heart Disease Daughter     Malignant Hyperthermia Neg Hx     Pseudocholinesterase Deficiency Neg Hx     Delayed Awakening Neg Hx     Post-op Nausea/Vomiting Neg Hx     Emergence Delirium Neg Hx     Post-op Cognitive Dysfunction Neg Hx     Other Neg Hx        Social History:  Social History     Tobacco Use    Smoking status: Never Smoker    Smokeless tobacco: Never Used   Substance Use Topics    Alcohol use: No    Drug use: No       Allergies: Allergies   Allergen Reactions    Amoxicillin Hives    Sulfa (Sulfonamide Antibiotics) Hives and Itching    Mirtazapine Itching and Nausea Only     Funny feeling in chest    Percocet [Oxycodone-Acetaminophen] Nausea and Vomiting    Codeine Nausea and Vomiting    Crestor [Rosuvastatin] Other (comments)     myalgias    Nitroglycerin Unknown (comments)     Patient cannot remember why she is allergic to it      Prednisone Itching    Zithromax [Azithromycin] Itching     Not sure what it does,taken long time ago         Review of Systems     Review of Systems   Constitutional: Positive for activity change and fatigue. Respiratory: Positive for chest tightness. Negative for shortness of breath. Cardiovascular: Positive for chest pain. Gastrointestinal: Positive for abdominal pain and nausea. Negative for vomiting. Genitourinary: Negative. Musculoskeletal: Positive for myalgias. Negative for arthralgias. Skin: Negative for color change and rash. Neurological: Negative for dizziness, speech difficulty, weakness and headaches. All other systems reviewed and are negative. Physical Exam     Physical Exam  Vitals signs and nursing note reviewed. Constitutional:       Appearance: Normal appearance. HENT:      Head: Normocephalic and atraumatic. Right Ear: External ear normal.      Left Ear: External ear normal.      Nose: Nose normal.      Mouth/Throat:      Mouth: Mucous membranes are dry. Pharynx: Oropharynx is clear. Eyes:      Extraocular Movements: Extraocular movements intact. Pupils: Pupils are equal, round, and reactive to light. Neck:      Musculoskeletal: Normal range of motion and neck supple. Cardiovascular:      Rate and Rhythm: Normal rate and regular rhythm. Pulses: Normal pulses. Heart sounds: Murmur present. Pulmonary:      Effort: Pulmonary effort is normal. No respiratory distress. Breath sounds: Normal breath sounds. Abdominal:      General: Abdomen is flat. Musculoskeletal: Normal range of motion. Skin:     General: Skin is warm and dry. Capillary Refill: Capillary refill takes less than 2 seconds. Coloration: Skin is not pale. Neurological:      General: No focal deficit present. Mental Status: She is alert. Psychiatric:         Mood and Affect: Mood normal.         Lab and Diagnostic Study Results     Labs -     Recent Results (from the past 12 hour(s))   CBC WITH AUTOMATED DIFF    Collection Time: 02/16/21 10:45 PM   Result Value Ref Range    WBC 5.1 3.6 - 11.0 K/uL    RBC 4.47 3.80 - 5.20 M/uL    HGB 11.8 11.5 - 16.0 g/dL    HCT 37.3 35.0 - 47.0 %    MCV 83.4 80.0 - 99.0 FL    MCH 26.4 26.0 - 34.0 PG    MCHC 31.6 30.0 - 36.5 g/dL    RDW 13.7 11.5 - 14.5 %    PLATELET 891 559 - 025 K/uL    MPV 9.8 8.9 - 12.9 FL    NEUTROPHILS 75 32 - 75 %    LYMPHOCYTES 15 12 - 49 %    MONOCYTES 9 5 - 13 %    EOSINOPHILS 0 0 - 7 %    BASOPHILS 0 0 - 1 %    IMMATURE GRANULOCYTES 1 (H) 0.0 - 0.5 %    ABS. NEUTROPHILS 3.9 1.8 - 8.0 K/UL    ABS. LYMPHOCYTES 0.7 (L) 0.8 - 3.5 K/UL    ABS. MONOCYTES 0.4 0.0 - 1.0 K/UL    ABS. EOSINOPHILS 0.0 0.0 - 0.4 K/UL    ABS. BASOPHILS 0.0 0.0 - 0.1 K/UL    ABS. IMM. GRANS. 0.0 0.00 - 0.04 K/UL    DF AUTOMATED         Radiologic Studies -   [unfilled]  CT Results  (Last 48 hours)    None        CXR Results  (Last 48 hours)    None          Medical Decision Making and ED Course   - I am the first and primary provider for this patient AND AM THE PRIMARY PROVIDER OF RECORD. - I reviewed the vital signs, available nursing notes, past medical history, past surgical history, family history and social history. - Initial assessment performed.  The patients presenting problems have been discussed, and the staff are in agreement with the care plan formulated and outlined with them. I have encouraged them to ask questions as they arise throughout their visit. Vital Signs-Reviewed the patient's vital signs. Patient Vitals for the past 12 hrs:   Temp Pulse Resp BP SpO2   02/16/21 2200 98.7 °F (37.1 °C) 76 18 133/79 95 %           ED Course:    Provider Notes (Medical Decision Making):   Patient presented to the emergency department with NSTEMI troponin increasing from 0.58-0.62 with a BNP of 1400 and elevated D-dimer. She is Covid positive testing +8 days ago. Heparin on arrival will continue. Admission to medicine pending. MDM           Consultations:       Consultations: Hospitalist Consultant: Dr. Dilshad Todd: We have asked for emergent assistance with regard to this patient. We have discussed the patients HPI, ROS, PE and results this far. They will come and evaluate the patient for admission. Procedures and Critical Care       Performed by: Anne-Marie Angelo MD  PROCEDURES:   Procedures               CRITICAL CARE NOTE :  11:02 PM  Amount of Critical Care Time: 30(minutes)    IMPENDING DETERIORATION -Airway and Cardiovascular  ASSOCIATED RISK FACTORS - Dysrhythmia and Metabolic changes  MANAGEMENT- Bedside Assessment and Supervision of Care  INTERPRETATION -  ECG and Cardiac Output Measures   INTERVENTIONS - hemodynamic mngmt and Metobolic interventions  CASE REVIEW - Hospitalist/Intensivist and Nursing  TREATMENT RESPONSE -Stable  PERFORMED BY - Self    NOTES   :  I have spent critical care time involved in lab review, consultations with specialist, family decision- making, bedside attention and documentation. This time excludes time spent in any separate billed procedures. During this entire length of time I was immediately available to the patient .     Anne-Marie Angelo MD        Disposition     Disposition: Admitted to Floor Stepdown Unit the case was discussed with the admitting physician Admitted      Diagnosis     Clinical Impression:   1. NSTEMI (non-ST elevated myocardial infarction) [facility])        Attestations:    Edin Garcia MD    Please note that this dictation was completed with [employer], the computer voice recognition software. Quite often unanticipated grammatical, syntax, homophones, and other interpretive errors are inadvertently transcribed by the computer software. Please disregard these errors. Please excuse any errors that have escaped final proofreading. Thank you.

## 2021-02-17 NOTE — PROGRESS NOTES
2/17/21. CM spoke with daughter Seferino Child asha @ [phone]). Per daughter discharge Plan is for pt not to return to SNF/[facility], but to come to her home & live with her with home health. Choice Form agreed verbally for any home health agency , that will come to the area. Referral to be sent via [employer]. Per daughter facility aware of pt not returning & family to  pts belongings. Per daughter pt was ambulatory with no DME. PCP is Dr. Yola Lackey with [facility].

## 2021-02-17 NOTE — H&P
History and Physical              Subjective :   Chief Complaint : Chest pain associated with generalized body aches,     Source of information : Patient and referring facility emergency room records. History of present illness:   76 y.o. female with history of diabetes and hypertension presents to the referring facility emergency room complaining of generalized body aches started like 2 weeks ago along with chest pain, nausea and shaking. She presented from the [facility] to the referring facility emergency room. Patient is not communicating and not able to provide any information. She was recently admitted to [facility] for severe fecal impaction that required the procedure for disimpaction. She does have a history of this before. She was reportedly complaining of this chest pain so they sent her to the emergency room, patient daughter works at this hospital requested to transfer the patient here. She denied any shortness of breath. But she is very poor historian and not speaking anything legible at this time. She is tested positive for Covid 1 week ago at the nursing home. She is found with elevated troponin suggestive of non-ST elevation MI.     Past Medical History:   Diagnosis Date    Agoraphobia without mention of panic attacks 2/17/2014    Anxiety disorder 8/18/2013    Arthritis     osteo    Breast pain, left 4/7/2015    CAD (coronary artery disease), native coronary artery 12/1/2015    no stents    Chronic chest pain 1/13/2014    Chronic pain associated with significant psychosocial dysfunction 2/17/2014    Depression 8/18/2013    Diabetes ([address]     type II    Duplicated right renal collecting system 3/13/2014    GERD (gastroesophageal reflux disease)     Gout, joint     Hypercholesteremia     hyercholesterolemia    Hypertension     Nephrolithiasis 3/13/2014    Personal history of noncompliance with medical treatment, presenting hazards to health 5/30/2014    Psychotic disorder ([facility] [address]     Vaginal pain 7/30/2014     Past Surgical History:   Procedure Laterality Date    EGD  4/23/2010         HX CHOLECYSTECTOMY  09/20/2018    lap jesus    HX CYST REMOVAL      cyst removed from left wrist    HX HYSTERECTOMY      partial    HX OTHER SURGICAL      bladder dilitation    HX TUBAL LIGATION      HX UROLOGICAL      kidney stones     Family History   Problem Relation Age of Onset    Stroke Mother     Heart Disease Mother     Cancer Father         type unknown    Heart Disease Son     Liver Disease Son     Heart Disease Daughter     Malignant Hyperthermia Neg Hx     Pseudocholinesterase Deficiency Neg Hx     Delayed Awakening Neg Hx     Post-op Nausea/Vomiting Neg Hx     Emergence Delirium Neg Hx     Post-op Cognitive Dysfunction Neg Hx     Other Neg Hx       Social History     Tobacco Use    Smoking status: Never Smoker    Smokeless tobacco: Never Used   Substance Use Topics    Alcohol use: No       Prior to Admission medications    Medication Sig Start Date End Date Taking? Authorizing Provider   venlafaxine-SR [facility] 37.5 mg capsule Take 37.45 mg by mouth daily. 1/19/21   Provider, Historical   lactulose (CHRONULAC) 10 gram/15 mL solution Take 15 mL by mouth three (3) times daily. 12/10/20   Minh Duong MD   sodium-potassium-mag sulfate (SUPREP) 17.5-3.13-1.6 gram solr oral solution Take 177 mL by mouth See Admin Instructions. 6/23/20   Kellie YOUSIF MD   polyethylene glycol [facility]) 17 gram/dose powder Take 17 g by mouth daily. 1 tablespoon with 8 oz of water daily 10/29/19   Reena Noriega PA-C   dicyclomine (BENTYL) 20 mg tablet Take 1 Tab by mouth every six (6) hours as needed (abdominal cramps) for up to 20 doses. 10/18/19   Norris Rodriguez MD   dilTIAZem SR (CARDIZEM SR) 60 mg SR capsule Take 1 Cap by mouth every twelve (12) hours.  9/6/19   Ivon Chin MD   LORazepam (ATIVAN) 0.5 mg tablet Take 1 Tab by mouth every eight (8) hours as needed for Anxiety. Max Daily Amount: 1.5 mg. 8/28/19   Maxwell Rosa MD     Allergies   Allergen Reactions    Amoxicillin Hives    Sulfa (Sulfonamide Antibiotics) Hives and Itching    Mirtazapine Itching and Nausea Only     Funny feeling in chest    Percocet [Oxycodone-Acetaminophen] Nausea and Vomiting    Codeine Nausea and Vomiting    Crestor [Rosuvastatin] Other (comments)     myalgias    Nitroglycerin Unknown (comments)     Patient cannot remember why she is allergic to it      Prednisone Itching    Zithromax [Azithromycin] Itching     Not sure what it does,taken long time ago             Review of Systems: Unable to get reasonable information from the patient. .    Vitals:     Patient Vitals for the past 12 hrs:   Temp Pulse Resp BP SpO2   02/16/21 2200 98.7 °F (37.1 °C) 76 18 133/79 95 %       Physical Exam:   General : Chronically ill looking, cachectic. No acute distress noted. HEENT : PERRLA, dry oral mucosa, atraumatic normocephalic, Normal ear and nose. Neck : Supple, no JVD, no masses noted, no carotid bruit. Lungs : Breath sounds with moderate air entry bilaterally, no wheezes or rales, no accessory muscle use. CVS : Rhythm rate regular, S1+, S2+, no murmur or gallop. Abdomen : Soft, nontender, bowel sounds active. Extremities : No edema noted,  pedal pulses not palpable. Musculoskeletal : Fair range of motion, no joint swelling or effusion,  generalized wasting of muscle mass. Skin : Dry, poor skin turgor. No pathological rash. Lymphatic : No cervical lymphadenopathy. Neurological : Awake, alert, not completely oriented. Psychiatric : Confused.     Data Review:   Recent Results (from the past 24 hour(s))   TROPONIN I    Collection Time: 02/16/21 10:45 PM   Result Value Ref Range    Troponin-I, Qt. 0.05 (H) <0.05 ng/mL   CBC WITH AUTOMATED DIFF    Collection Time: 02/16/21 10:45 PM   Result Value Ref Range    WBC 5.1 3.6 - 11.0 K/uL RBC 4.47 3.80 - 5.20 M/uL    HGB 11.8 11.5 - 16.0 g/dL    HCT 37.3 35.0 - 47.0 %    MCV 83.4 80.0 - 99.0 FL    MCH 26.4 26.0 - 34.0 PG    MCHC 31.6 30.0 - 36.5 g/dL    RDW 13.7 11.5 - 14.5 %    PLATELET 577 053 - 764 K/uL    MPV 9.8 8.9 - 12.9 FL    NEUTROPHILS 75 32 - 75 %    LYMPHOCYTES 15 12 - 49 %    MONOCYTES 9 5 - 13 %    EOSINOPHILS 0 0 - 7 %    BASOPHILS 0 0 - 1 %    IMMATURE GRANULOCYTES 1 (H) 0.0 - 0.5 %    ABS. NEUTROPHILS 3.9 1.8 - 8.0 K/UL    ABS. LYMPHOCYTES 0.7 (L) 0.8 - 3.5 K/UL    ABS. MONOCYTES 0.4 0.0 - 1.0 K/UL    ABS. EOSINOPHILS 0.0 0.0 - 0.4 K/UL    ABS. BASOPHILS 0.0 0.0 - 0.1 K/UL    ABS. IMM. GRANS. 0.0 0.00 - 0.04 K/UL    DF AUTOMATED         Labs from referring facility : Trop :  0.58 --> 0.062,   D dimer 1405  BNP  1506      Na 137  K 3.2  Cl 101  Co2 29  BUN 26  Cr. 0.78  Glu 129  Ca 9.0 Normal LFT. Radiologic Studies : CTA chest :   Report from referring facility :  Multicentric bilateral airspace disease, consistent with known viral pneumonia. No definite CT findings of acute pulmonary embolism. Extensive atherosclerosis. Prominently dilated thoracic esophagus. Differential diagnosis includes achalasia, scleroderma, esophageal obstruction or severe dysmotility. Assessment and Plan :     Non-ST elevation MI/acute coronary syndrome: She is already started on IV heparin at the referring facility which we will continue, ordered further work-up and cardiology consultation will follow with recommendations    COVID-19 pneumonia: No evidence of hypoxia at this time, we will monitor closely    Benign essential hypertension: Mild, on amlodipine 2.5 mg daily which we will continue    History of psychiatric problems, I do not see any medications except Effexor. Which we will continue    Admitted to cardiac telemetry, full CODE STATUS, home medications reviewed with MAR from the nursing home.     CC : Liam Candelaria MD  Signed By: Doug Santillan MD     February 17, 2021 This dictation was done by dragon, computer voice recognition software. Often unanticipated grammatical, syntax, [place] phones and other interpretive errors are inadvertently transcribed. Please excuse errors that have escaped final proofreading.

## 2021-02-17 NOTE — CONSULTS
Consult    Patient: Juwan Watts MRN: [id number]  SSN: [id number]    YOB: 1945  Age: 76 y.o. Sex: female      Subjective:      Juwan Watts is a 76 y.o. female who is being seen for chest pain. Patient with history of coronary artery disease but there is no stents. She has arthritis, anxiety, chronic chest pain, diabetes, GERD, hypercholesterolemia. She presented from [facility] emergency room. Patient cannot provide any good history. Apparently she was recently admitted to [facility] and she had a Covid positive test.  She was treated for constipation and surgical disimpaction. She presented complaining of chest pain. She has some nausea, generalized achiness. These chest pain has been happening for few weeks. Patient at this time does not have any chest pain. She is hard of hearing.     Past Medical History:   Diagnosis Date    Agoraphobia without mention of panic attacks 2/17/2014    Anxiety disorder 8/18/2013    Arthritis     osteo    CAD (coronary artery disease), native coronary artery 12/1/2015    no stents    Chronic chest pain 1/13/2014    Chronic pain associated with significant psychosocial dysfunction 2/17/2014    Depression 8/18/2013    Diabetes ([address]     type II    Duplicated right renal collecting system 3/13/2014    GERD (gastroesophageal reflux disease)     Gout, joint     Hypercholesteremia     hyercholesterolemia    Hypertension     Nephrolithiasis 3/13/2014    Personal history of noncompliance with medical treatment, presenting hazards to health 5/30/2014     Past Surgical History:   Procedure Laterality Date    EGD  4/23/2010         HX CHOLECYSTECTOMY  09/20/2018    lap jesus    HX CYST REMOVAL      cyst removed from left wrist    HX HYSTERECTOMY      partial    HX OTHER SURGICAL      bladder dilitation    HX TUBAL LIGATION      HX UROLOGICAL      kidney stones      Family History   Problem Relation Age of Onset    Stroke Mother     Heart Disease Mother     Cancer Father         type unknown    Heart Disease Son     Liver Disease Son     Heart Disease Daughter     Malignant Hyperthermia Neg Hx     Pseudocholinesterase Deficiency Neg Hx     Delayed Awakening Neg Hx     Post-op Nausea/Vomiting Neg Hx     Emergence Delirium Neg Hx     Post-op Cognitive Dysfunction Neg Hx     Other Neg Hx      Social History     Tobacco Use    Smoking status: Never Smoker    Smokeless tobacco: Never Used   Substance Use Topics    Alcohol use: No      Current Facility-Administered Medications   Medication Dose Route Frequency Provider Last Rate Last Admin    sodium chloride (NS) flush 5-40 mL  5-40 mL IntraVENous Q8H Vandaan Nunez MD   10 mL at 02/17/21 0506    sodium chloride (NS) flush 5-40 mL  5-40 mL IntraVENous PRN Vandana Nunez MD        aspirin chewable tablet 81 mg  81 mg Oral DAILY Vandana Nunez MD        LORazepam (ATIVAN) tablet 0.5 mg  0.5 mg Oral Q8H PRN Vandana Nunez MD        polyethylene glycol (MIRALAX) packet 17 g  17 g Oral DAILY Vandana Nunez MD        venlafaxine-[facility] capsule 37.45 mg  37.45 mg Oral DAILY Vandana Nunez MD        amLODIPine (NORVASC) tablet 2.5 mg  2.5 mg Oral DAILY Vandana Nunez MD        bisacodyL (DULCOLAX) tablet 10 mg  10 mg Oral DAILY Vandana Nunez MD        senna (SENOKOT) tablet 8.6 mg  1 Tab Oral BID Vandana Nunez MD        heparin 25,000 units in D5W 250 ml infusion  12-25 Units/kg/hr (Adjusted) IntraVENous TITRATE Vandana Nunez MD 7.7 mL/hr at 02/17/21 0300 12 Units/kg/hr at 02/17/21 0300    heparin (porcine) 1,000 unit/mL injection 4,000 Units  4,000 Units IntraVENous PRN Vandana Nunez MD        Or    heparin (porcine) 1,000 unit/mL injection 2,000 Units  2,000 Units IntraVENous PRN Vandana Nunez MD         Current Outpatient Medications   Medication Sig Dispense Refill    senna (Senna) 8.6 mg tablet Take 1 Tab by mouth two (2) times a day.  bisacodyL (DULCOLAX) 5 mg EC tablet Take 10 mg by mouth daily.  amLODIPine (NORVASC) 2.5 mg tablet Take 2.5 mg by mouth daily.  venlafaxine-SR (EFFEXOR-XR) 37.5 mg capsule Take 37.45 mg by mouth daily.  polyethylene glycol (MIRALAX) 17 gram/dose powder Take 17 g by mouth daily. 1 tablespoon with 8 oz of water daily 170 g 0        Allergies   Allergen Reactions    Amoxicillin Hives    Sulfa (Sulfonamide Antibiotics) Hives and Itching    Mirtazapine Itching and Nausea Only     Funny feeling in chest    Percocet [Oxycodone-Acetaminophen] Nausea and Vomiting    Codeine Nausea and Vomiting    Crestor [Rosuvastatin] Other (comments)     myalgias    Nitroglycerin Unknown (comments)     Patient cannot remember why she is allergic to it      Prednisone Itching    Zithromax [Azithromycin] Itching     Not sure what it does,taken long time ago       Review of Systems:  Poor historian    Objective:     Vitals:    02/16/21 2200 02/17/21 0419 02/17/21 0605   BP: 133/79 (P) 97/86 (!) 140/72   Pulse: 76 (!) (P) 131 70   Resp: 18 (P) 29 15   Temp: 98.7 °F (37.1 °C)     SpO2: 95% (P) 97% 93%   Weight: 73.9 kg (163 lb)     Height: 5' 5\" (1.651 m)          Physical Exam:  General:   Sleepy, lethargic. Eyes:  Conjunctivae/corneas clear. PERRL, EOMs intact. Fundi benign   Ears:  Normal TMs and external ear canals both ears. Nose: Nares normal. Septum midline. Mucosa normal. No drainage or sinus tenderness. Mouth/Throat: Lips, mucosa, and tongue normal. Teeth and gums normal.   Neck: Supple, symmetrical, trachea midline, no adenopathy, thyroid: no enlargment/tenderness/nodules, no carotid bruit and no JVD. Back:   Symmetric, no curvature. ROM normal. No CVA tenderness. Lungs:   Clear to auscultation bilaterally. Heart:  Regular rate and rhythm, S1, S2 normal, no murmur, click, rub or gallop. Abdomen:   Soft, non-tender.  Bowel sounds normal. No masses,  No organomegaly. Extremities: Extremities normal, atraumatic, no cyanosis or edema. Pulses: 2+ and symmetric all extremities. Skin: Skin color, texture, turgor normal. No rashes or lesions   Lymph nodes: Cervical, supraclavicular, and axillary nodes normal.   Neurologic: CNII-XII intact. Normal strength, sensation and reflexes throughout. Assessment:     Hospital Problems  Date Reviewed: 12/13/2018          Codes Class Noted POA    Acute coronary syndromes [facility]) ICD-10-CM: I24.9  ICD-9-CM: 411.1  2/17/2021 Yes          Patient is 75-year-old -American female with:  1. Chest pain, atypical for angina  2. Troponin in the indeterminate range  3. Reported history of coronary artery disease without stents. 4.  Recent COVID-19 positive test  5. Generalized achiness and weakness. 6.  Mild to moderate aortic stenosis  7. Nonobstructive coronary artery disease in 2015  Plan:     Reportedly that her troponin was 0.5 and then 0.6. Anyhow her troponin here is 0.05. She did not have any chest pain. Patient with chronic chest pain. We will try to obtain records. She is hemodynamically stable. We will recheck another troponin. We will wait for repeat Covid testing. We will check an echocardiogram.  INR is 1.2. She was tachycardic overnight but her heart rate is in the 60s this morning. We will check an EKG this morning. Further recommendation depending on clinical progression, echo finding  Last echo I could see from 2019 was normal ejection fraction with EF of 60 to 81% grade 1 diastolic dysfunction, there was moderate aortic calcification with aortic valve gradient of 15 and valve area of 1.3. She had a cardiac catheterization 2015 that showed LAD had minimal irregularities 30% stenosis in the proximal segment and distal LAD has 50% stenosis, RCA had minimal irregularities as well as LCx. Continue aspirin, atorvastatin. Thank you  For involving me in Mrs. Logan's care.  I will follow.   Signed By: Darrin Grace MD     February 17, 2021

## 2021-02-18 ENCOUNTER — APPOINTMENT (OUTPATIENT)
Dept: GENERAL RADIOLOGY | Age: 76
DRG: 177 | End: 2021-02-18
Attending: HOSPITALIST
Payer: MEDICARE

## 2021-02-18 LAB
ANION GAP SERPL CALC-SCNC: 6 MMOL/L (ref 5–15)
APTT PPP: 134.3 SEC (ref 23–35.7)
APTT PPP: 52 SEC (ref 23–35.7)
APTT PPP: 79.1 SEC (ref 23–35.7)
APTT PPP: 87 SEC (ref 23–35.7)
ATRIAL RATE: 68 BPM
BASOPHILS # BLD: 0 K/UL (ref 0–0.1)
BASOPHILS NFR BLD: 0 % (ref 0–1)
BUN SERPL-MCNC: 21 MG/DL (ref 6–20)
BUN/CREAT SERPL: 30 (ref 12–20)
CA-I BLD-MCNC: 8.8 MG/DL (ref 8.5–10.1)
CALCULATED P AXIS, ECG09: 13 DEGREES
CALCULATED R AXIS, ECG10: 20 DEGREES
CALCULATED T AXIS, ECG11: 42 DEGREES
CHLORIDE SERPL-SCNC: 103 MMOL/L (ref 97–108)
CO2 SERPL-SCNC: 31 MMOL/L (ref 21–32)
CREAT SERPL-MCNC: 0.69 MG/DL (ref 0.55–1.02)
DIAGNOSIS, 93000: NORMAL
DIFFERENTIAL METHOD BLD: ABNORMAL
EOSINOPHIL # BLD: 0 K/UL (ref 0–0.4)
EOSINOPHIL NFR BLD: 0 % (ref 0–7)
ERYTHROCYTE [DISTWIDTH] IN BLOOD BY AUTOMATED COUNT: 13.9 % (ref 11.5–14.5)
GLUCOSE SERPL-MCNC: 83 MG/DL (ref 65–100)
HCT VFR BLD AUTO: 38.1 % (ref 35–47)
HGB BLD-MCNC: 12 G/DL (ref 11.5–16)
IMM GRANULOCYTES # BLD AUTO: 0 K/UL (ref 0–0.04)
IMM GRANULOCYTES NFR BLD AUTO: 0 % (ref 0–0.5)
LYMPHOCYTES # BLD: 0.9 K/UL (ref 0.8–3.5)
LYMPHOCYTES NFR BLD: 14 % (ref 12–49)
MAGNESIUM SERPL-MCNC: 2 MG/DL (ref 1.6–2.4)
MCH RBC QN AUTO: 26.7 PG (ref 26–34)
MCHC RBC AUTO-ENTMCNC: 31.5 G/DL (ref 30–36.5)
MCV RBC AUTO: 84.7 FL (ref 80–99)
MONOCYTES # BLD: 0.5 K/UL (ref 0–1)
MONOCYTES NFR BLD: 8 % (ref 5–13)
NEUTS SEG # BLD: 4.7 K/UL (ref 1.8–8)
NEUTS SEG NFR BLD: 78 % (ref 32–75)
P-R INTERVAL, ECG05: 134 MS
PLATELET # BLD AUTO: 379 K/UL (ref 150–400)
PMV BLD AUTO: 10.7 FL (ref 8.9–12.9)
POTASSIUM SERPL-SCNC: 3.4 MMOL/L (ref 3.5–5.1)
Q-T INTERVAL, ECG07: 408 MS
QRS DURATION, ECG06: 80 MS
QTC CALCULATION (BEZET), ECG08: 433 MS
RBC # BLD AUTO: 4.5 M/UL (ref 3.8–5.2)
SODIUM SERPL-SCNC: 140 MMOL/L (ref 136–145)
THERAPEUTIC RANGE,PTTT: ABNORMAL SEC (ref 68–109)
TROPONIN I SERPL-MCNC: 1.6 NG/ML
VENTRICULAR RATE, ECG03: 68 BPM
WBC # BLD AUTO: 6 K/UL (ref 3.6–11)

## 2021-02-18 PROCEDURE — 71045 X-RAY EXAM CHEST 1 VIEW: CPT

## 2021-02-18 PROCEDURE — 83735 ASSAY OF MAGNESIUM: CPT

## 2021-02-18 PROCEDURE — 74011250637 HC RX REV CODE- 250/637: Performed by: INTERNAL MEDICINE

## 2021-02-18 PROCEDURE — 85025 COMPLETE CBC W/AUTO DIFF WBC: CPT

## 2021-02-18 PROCEDURE — 80048 BASIC METABOLIC PNL TOTAL CA: CPT

## 2021-02-18 PROCEDURE — 74011250637 HC RX REV CODE- 250/637: Performed by: HOSPITALIST

## 2021-02-18 PROCEDURE — 74011250636 HC RX REV CODE- 250/636: Performed by: HOSPITALIST

## 2021-02-18 PROCEDURE — 36415 COLL VENOUS BLD VENIPUNCTURE: CPT

## 2021-02-18 PROCEDURE — 84484 ASSAY OF TROPONIN QUANT: CPT

## 2021-02-18 PROCEDURE — 65270000029 HC RM PRIVATE

## 2021-02-18 PROCEDURE — 92610 EVALUATE SWALLOWING FUNCTION: CPT

## 2021-02-18 PROCEDURE — 85730 THROMBOPLASTIN TIME PARTIAL: CPT

## 2021-02-18 PROCEDURE — 74011250636 HC RX REV CODE- 250/636: Performed by: INTERNAL MEDICINE

## 2021-02-18 RX ORDER — CLOPIDOGREL BISULFATE 75 MG/1
75 TABLET ORAL DAILY
Status: DISCONTINUED | OUTPATIENT
Start: 2021-02-19 | End: 2021-02-19

## 2021-02-18 RX ORDER — CLOPIDOGREL BISULFATE 75 MG/1
300 TABLET ORAL DAILY
Status: DISCONTINUED | OUTPATIENT
Start: 2021-02-19 | End: 2021-02-23

## 2021-02-18 RX ORDER — FUROSEMIDE 10 MG/ML
40 INJECTION INTRAMUSCULAR; INTRAVENOUS 2 TIMES DAILY
Status: DISCONTINUED | OUTPATIENT
Start: 2021-02-18 | End: 2021-02-20

## 2021-02-18 RX ORDER — METOPROLOL SUCCINATE 25 MG/1
25 TABLET, EXTENDED RELEASE ORAL EVERY 6 HOURS
Status: DISCONTINUED | OUTPATIENT
Start: 2021-02-18 | End: 2021-02-24 | Stop reason: HOSPADM

## 2021-02-18 RX ORDER — EZETIMIBE 10 MG/1
10 TABLET ORAL DAILY
Status: DISCONTINUED | OUTPATIENT
Start: 2021-02-19 | End: 2021-02-24 | Stop reason: HOSPADM

## 2021-02-18 RX ORDER — POTASSIUM CHLORIDE 1.5 G/1.77G
20 POWDER, FOR SOLUTION ORAL 2 TIMES DAILY WITH MEALS
Status: DISCONTINUED | OUTPATIENT
Start: 2021-02-18 | End: 2021-02-24 | Stop reason: HOSPADM

## 2021-02-18 RX ORDER — HEPARIN SODIUM 10000 [USP'U]/100ML
INJECTION, SOLUTION INTRAVENOUS
Status: DISCONTINUED
Start: 2021-02-18 | End: 2021-02-18

## 2021-02-18 RX ADMIN — Medication 10 ML: at 13:46

## 2021-02-18 RX ADMIN — POLYETHYLENE GLYCOL 3350 17 G: 17 POWDER, FOR SOLUTION ORAL at 09:33

## 2021-02-18 RX ADMIN — HEPARIN SODIUM 2000 UNITS: 1000 INJECTION, SOLUTION INTRAVENOUS; SUBCUTANEOUS at 23:09

## 2021-02-18 RX ADMIN — Medication 8.6 MG: at 21:31

## 2021-02-18 RX ADMIN — BISACODYL 10 MG: 5 TABLET, COATED ORAL at 09:31

## 2021-02-18 RX ADMIN — ASPIRIN 81 MG: 81 TABLET, CHEWABLE ORAL at 09:31

## 2021-02-18 RX ADMIN — VENLAFAXINE HYDROCHLORIDE 37.5 MG: 37.5 CAPSULE, EXTENDED RELEASE ORAL at 11:04

## 2021-02-18 RX ADMIN — Medication 10 ML: at 21:38

## 2021-02-18 RX ADMIN — HEPARIN SODIUM AND DEXTROSE 15 UNITS/KG/HR: 10000; 5 INJECTION INTRAVENOUS at 09:51

## 2021-02-18 RX ADMIN — POTASSIUM CHLORIDE 20 MEQ: 1.5 FOR SOLUTION ORAL at 15:53

## 2021-02-18 RX ADMIN — AMLODIPINE BESYLATE 2.5 MG: 5 TABLET ORAL at 09:32

## 2021-02-18 RX ADMIN — FUROSEMIDE 40 MG: 10 INJECTION, SOLUTION INTRAVENOUS at 21:35

## 2021-02-18 RX ADMIN — LORAZEPAM 0.5 MG: 0.5 TABLET ORAL at 17:36

## 2021-02-18 RX ADMIN — METOPROLOL SUCCINATE 25 MG: 25 TABLET, EXTENDED RELEASE ORAL at 16:27

## 2021-02-18 NOTE — ROUTINE PROCESS
TRANSFER - OUT REPORT:    Verbal report given to [facility], RN(name) on Tati Hair  being transferred to [facility](unit) for routine progression of care       Report consisted of patients Situation, Background, Assessment and   Recommendations(SBAR). Information from the following report(s) ED Summary was reviewed with the receiving nurse. Lines:   Peripheral IV 02/16/21 Left Forearm (Active)   Site Assessment Clean, dry, & intact 02/17/21 0403   Phlebitis Assessment 0 02/17/21 0403       Peripheral IV 02/16/21 Right Forearm (Active)   Site Assessment Clean, dry, & intact 02/17/21 0508   Phlebitis Assessment 0 02/17/21 0508   Infiltration Assessment 0 02/17/21 1329        Opportunity for questions and clarification was provided.       Patient transported with:   Registered Nurse

## 2021-02-18 NOTE — PROGRESS NOTES
Patient is becoming anxious and attempting to get out of bed and states she wants to go to her room. Patient reoriented to surroundings.   Patient has dinner tray at this time which is thickened according to orders

## 2021-02-18 NOTE — PROGRESS NOTES
Progress Note    Patient: Rik Gutierrez MRN: [id number]  SSN: [id number]    YOB: 1945  Age: 76 y.o. Sex: female      Admit Date: 2/16/2021    LOS: 1 day     Subjective:     No acute events overnight. Objective:     Vitals:    02/17/21 1900 02/17/21 2300 02/18/21 0300 02/18/21 0931   BP: (!) 173/81 (!) 136/100 (!) 160/92 (!) 185/93   Pulse: 80 68 75 74   Resp: 18 15 17    Temp: 98.4 °F (36.9 °C)  97.3 °F (36.3 °C)    SpO2: 93% 95% 95%    Weight:       Height:            Intake and Output:  Current Shift: No intake/output data recorded. Last three shifts: No intake/output data recorded. Physical Exam:   General:  Alert, cooperative, no distress, appears stated age. Eyes:  Conjunctivae/corneas clear. PERRL, EOMs intact. Fundi benign   Ears:  Normal TMs and external ear canals both ears. Nose: Nares normal. Septum midline. Mucosa normal. No drainage or sinus tenderness. Mouth/Throat: Lips, mucosa, and tongue normal. Teeth and gums normal.   Neck: Supple, symmetrical, trachea midline, no adenopathy, thyroid: no enlargment/tenderness/nodules, no carotid bruit and no JVD. Back:   Symmetric, no curvature. ROM normal. No CVA tenderness. Lungs:   Clear to auscultation bilaterally. Heart:  Regular rate and rhythm, S1, S2 normal, no murmur, click, rub or gallop. Abdomen:   Soft, non-tender. Bowel sounds normal. No masses,  No organomegaly. Extremities: Extremities normal, atraumatic, no cyanosis or edema. Pulses: 2+ and symmetric all extremities. Skin: Skin color, texture, turgor normal. No rashes or lesions   Lymph nodes: Cervical, supraclavicular, and axillary nodes normal.   Neurologic: CNII-XII intact. Normal strength, sensation and reflexes throughout. Lab/Data Review: All lab results for the last 24 hours reviewed.          Assessment:     Active Problems:    HTN, goal below 130/80 (9/7/2012)      Pseudobulbar affect (10/16/2015)      Acute coronary syndromes [facility]) (2/17/2021)    Patient is 77-year-old -American female with:  1. Chest pain, atypical for angina  2. Troponin in the indeterminate range  3. Reported history of coronary artery disease without stents. 4.  Recent COVID-19 positive test  5. Generalized achiness and weakness. 6.  Mild to moderate aortic stenosis  7. Nonobstructive coronary artery disease in 2015    Plan:     Patient was complaining of some headaches this morning and she is complaining of coughing spells and it is productive. Troponin peaked at 2 and trending down. Probably this is related to underlying Covid pneumonitis. We will give a dose of Lasix. We will continue heparin for another 24 hours. Her ejection fraction was normal.  RV function was okay. Replete potassium. We will give Plavix 300 mg followed by 75 mg. Currently on amlodipine.     Signed By: Marcos Hidalgo MD     February 18, 2021

## 2021-02-18 NOTE — ED NOTES
Heparin drip increased to 15uits/kg/hr as Heparin protocol for ptt 52.0   IV rate change witnessed by charge nurse Rita Hale RN

## 2021-02-18 NOTE — PROGRESS NOTES
Problem: Falls - Risk of  Goal: *Absence of Falls  Description: Document Lolis Bender Fall Risk and appropriate interventions in the flowsheet. Outcome: Not Progressing Towards Goal  Note: Fall Risk Interventions:  Mobility Interventions: Patient to call before getting OOB, Strengthening exercises (ROM-active/passive), Utilize gait belt for transfers/ambulation    Mentation Interventions: Increase mobility, Room close to nurse's station         Elimination Interventions: Call light in reach, Toileting schedule/hourly rounds              Problem: Patient Education: Go to Patient Education Activity  Goal: Patient/Family Education  Outcome: Not Progressing Towards Goal     Problem: Pressure Injury - Risk of  Goal: *Prevention of pressure injury  Description: Document Freddy Scale and appropriate interventions in the flowsheet. Outcome: Not Progressing Towards Goal  Note: Pressure Injury Interventions:  Sensory Interventions: Keep linens dry and wrinkle-free, Maintain/enhance activity level, Minimize linen layers, Turn and reposition approx. every two hours (pillows and wedges if needed)    Moisture Interventions: Absorbent underpads(Periwick)    Activity Interventions: Assess need for specialty bed, Pressure redistribution bed/mattress(bed type), Increase time out of bed    Mobility Interventions: HOB 30 degrees or less, Turn and reposition approx.  every two hours(pillow and wedges)    Nutrition Interventions: Document food/fluid/supplement intake, Offer support with meals,snacks and hydration    Friction and Shear Interventions: HOB 30 degrees or less                Problem: Patient Education: Go to Patient Education Activity  Goal: Patient/Family Education  Outcome: Not Progressing Towards Goal

## 2021-02-18 NOTE — INTERDISCIPLINARY ROUNDS
2 person skin assessment performed by Kalpana Jose RN and Franko mcintyre RN. Patient has no skin abnormalities on her back, skin is clear without wounds. Buttocks are clear without wounds or breakdowns. Patient legs are clear without wounds or breakdown. Patient heels are clear without breakdown or wounds but is dry skin on bilateral feet. [facility]

## 2021-02-18 NOTE — PROGRESS NOTES
Hospitalist Progress Note               Daily Progress Note: 2/18/2021      Subjective: The patient is seen for follow  up.   76 y.o. female with history of diabetes and hypertension presents to the referring facility emergency room complaining of generalized body aches started like 2 weeks ago along with chest pain, nausea and shaking. She is tested positive for Covid 1 week ago at the nursing home. She is found with elevated troponin suggestive of non-ST elevation MI. Troponin increased and now trending back. Patient complains of poor sleep    Medications reviewed  Current Facility-Administered Medications   Medication Dose Route Frequency    sodium chloride (NS) flush 5-40 mL  5-40 mL IntraVENous Q8H    sodium chloride (NS) flush 5-40 mL  5-40 mL IntraVENous PRN    aspirin chewable tablet 81 mg  81 mg Oral DAILY    LORazepam (ATIVAN) tablet 0.5 mg  0.5 mg Oral Q8H PRN    polyethylene glycol (MIRALAX) packet 17 g  17 g Oral DAILY    amLODIPine (NORVASC) tablet 2.5 mg  2.5 mg Oral DAILY    bisacodyL (DULCOLAX) tablet 10 mg  10 mg Oral DAILY    senna (SENOKOT) tablet 8.6 mg  1 Tab Oral BID    heparin 25,000 units in D5W 250 ml infusion  12-25 Units/kg/hr (Adjusted) IntraVENous TITRATE    heparin (porcine) 1,000 unit/mL injection 4,000 Units  4,000 Units IntraVENous PRN    Or    heparin (porcine) 1,000 unit/mL injection 2,000 Units  2,000 Units IntraVENous PRN    venlafaxine-SR (EFFEXOR-XR) capsule 37.5 mg  37.5 mg Oral DAILY WITH BREAKFAST     Current Outpatient Medications   Medication Sig    senna (Senna) 8.6 mg tablet Take 1 Tab by mouth two (2) times a day.  bisacodyL (DULCOLAX) 5 mg EC tablet Take 10 mg by mouth daily.  amLODIPine (NORVASC) 2.5 mg tablet Take 2.5 mg by mouth daily.  venlafaxine-SR (EFFEXOR-XR) 37.5 mg capsule Take 37.45 mg by mouth daily.  polyethylene glycol (MIRALAX) 17 gram/dose powder Take 17 g by mouth daily.  1 tablespoon with 8 oz of water daily       Review of Systems:   Review of systems not obtained due to patient factors. Objective:   Physical Exam:     Visit Vitals  BP (!) 160/92   Pulse 75   Temp 97.3 °F (36.3 °C)   Resp 17   Ht 5' 4.96\" (1.65 m)   Wt 73.9 kg (162 lb 14.7 oz)   SpO2 95%   BMI 27.14 kg/m²      O2 Device: Room air    Temp (24hrs), Av.9 °F (36.6 °C), Min:97.3 °F (36.3 °C), Max:98.4 °F (36.9 °C)    No intake/output data recorded. No intake/output data recorded. PHYSICAL EXAM:  General: Alert and awake  Skin: Extremities and face reveal no rashes. HEENT: Sclerae anicteric. No oral ulcers. No ENT discharge. The neck is supple. Cardiovascular: Regular rate and rhythm. No murmurs, gallops, or rubs. PMI nondisplaced. Carotids without bruits. Respiratory: Comfortable breathing with no accessory muscle use. Clear breath sounds with no wheezes, rales, or rhonchi. GI: Abdomen nondistended, soft, and nontender. Normal active bowel sounds. Rectal: Deferred   Musculoskeletal: No pitting edema of the lower legs. Extremities have good range of motion. No costovertebral tenderness. Neurological: alert , more oriented then yesterday  Psychiatric: calm and cooperative    Data Review:       Recent Days:  Recent Labs     21  0445 21  2245   WBC 6.0 5.1   HGB 12.0 11.8   HCT 38.1 37.3    348     Recent Labs     21  0445 21  0000     --    K 3.4*  --      --    CO2 31  --    GLU 83  --    BUN 21*  --    CREA 0.69  --    CA 8.8  --    INR  --  1.2*     No results for input(s): PH, PCO2, PO2, HCO3, FIO2 in the last 72 hours.        Assessment/     Problem List:  Hospital Problems  Date Reviewed: 2018          Codes Class Noted POA    Acute coronary syndromes [facility]) ICD-10-CM: I24.9  ICD-9-CM: 411.1  2021 Yes        Pseudobulbar affect ICD-10-CM: F48.2  ICD-9-CM: 310.81  10/16/2015 Yes        HTN, goal below 130/80 (Chronic) ICD-10-CM: I10  ICD-9-CM: 401.9 Chronic 2012 Yes Plan:  Non-ST elevation MI/acute coronary syndrome: She is already started on IV heparin at the referring facility which we will continue, ordered further work-up and cardiology consultation will follow with recommendations.     COVID-19 pneumonia: No evidence of hypoxia at this time, we will monitor closely. Repeat covid test is +ve      Benign essential hypertension: Mild, on amlodipine 2.5 mg daily which we will continue     History of psychiatric problems,Continue Effexor.     Admitted to cardiac telemetry, full CODE STATUS, home medications reviewed with MAR from the nursing home. Will D/w daughter Daughter Marcio Whyte @ [phone]) is pts primary HCDM.    Care Plan discussed with:     Sarah Bajwa MD

## 2021-02-19 LAB
ANION GAP SERPL CALC-SCNC: 5 MMOL/L (ref 5–15)
ANION GAP SERPL CALC-SCNC: 9 MMOL/L (ref 5–15)
APTT PPP: 149.4 SEC (ref 23–35.7)
BUN SERPL-MCNC: 32 MG/DL (ref 6–20)
BUN SERPL-MCNC: 32 MG/DL (ref 6–20)
BUN/CREAT SERPL: 31 (ref 12–20)
BUN/CREAT SERPL: 34 (ref 12–20)
CA-I BLD-MCNC: 8.7 MG/DL (ref 8.5–10.1)
CA-I BLD-MCNC: 9.2 MG/DL (ref 8.5–10.1)
CHLORIDE SERPL-SCNC: 104 MMOL/L (ref 97–108)
CHLORIDE SERPL-SCNC: 104 MMOL/L (ref 97–108)
CO2 SERPL-SCNC: 29 MMOL/L (ref 21–32)
CO2 SERPL-SCNC: 31 MMOL/L (ref 21–32)
CREAT SERPL-MCNC: 0.95 MG/DL (ref 0.55–1.02)
CREAT SERPL-MCNC: 1.02 MG/DL (ref 0.55–1.02)
GLUCOSE SERPL-MCNC: 91 MG/DL (ref 65–100)
GLUCOSE SERPL-MCNC: 98 MG/DL (ref 65–100)
POTASSIUM SERPL-SCNC: 3.1 MMOL/L (ref 3.5–5.1)
POTASSIUM SERPL-SCNC: 3.2 MMOL/L (ref 3.5–5.1)
SODIUM SERPL-SCNC: 140 MMOL/L (ref 136–145)
SODIUM SERPL-SCNC: 142 MMOL/L (ref 136–145)
THERAPEUTIC RANGE,PTTT: ABNORMAL SEC (ref 68–109)

## 2021-02-19 PROCEDURE — 74011250636 HC RX REV CODE- 250/636: Performed by: INTERNAL MEDICINE

## 2021-02-19 PROCEDURE — 74011250637 HC RX REV CODE- 250/637: Performed by: HOSPITALIST

## 2021-02-19 PROCEDURE — 80048 BASIC METABOLIC PNL TOTAL CA: CPT

## 2021-02-19 PROCEDURE — 74011250637 HC RX REV CODE- 250/637: Performed by: INTERNAL MEDICINE

## 2021-02-19 PROCEDURE — 65270000029 HC RM PRIVATE

## 2021-02-19 PROCEDURE — 92526 ORAL FUNCTION THERAPY: CPT

## 2021-02-19 PROCEDURE — 85730 THROMBOPLASTIN TIME PARTIAL: CPT

## 2021-02-19 PROCEDURE — 36415 COLL VENOUS BLD VENIPUNCTURE: CPT

## 2021-02-19 RX ORDER — ACETAMINOPHEN 325 MG/1
650 TABLET ORAL
Status: DISCONTINUED | OUTPATIENT
Start: 2021-02-19 | End: 2021-02-24 | Stop reason: HOSPADM

## 2021-02-19 RX ORDER — CLOPIDOGREL BISULFATE 75 MG/1
75 TABLET ORAL DAILY
Status: DISCONTINUED | OUTPATIENT
Start: 2021-02-20 | End: 2021-02-24 | Stop reason: HOSPADM

## 2021-02-19 RX ADMIN — ASPIRIN 81 MG: 81 TABLET, CHEWABLE ORAL at 09:07

## 2021-02-19 RX ADMIN — EZETIMIBE 10 MG: 10 TABLET ORAL at 09:08

## 2021-02-19 RX ADMIN — POTASSIUM CHLORIDE 20 MEQ: 1.5 FOR SOLUTION ORAL at 17:12

## 2021-02-19 RX ADMIN — CLOPIDOGREL BISULFATE 300 MG: 75 TABLET ORAL at 09:07

## 2021-02-19 RX ADMIN — POLYETHYLENE GLYCOL 3350 17 G: 17 POWDER, FOR SOLUTION ORAL at 09:08

## 2021-02-19 RX ADMIN — Medication 10 ML: at 05:45

## 2021-02-19 RX ADMIN — Medication 8.6 MG: at 21:07

## 2021-02-19 RX ADMIN — ACETAMINOPHEN 650 MG: 325 TABLET ORAL at 05:40

## 2021-02-19 RX ADMIN — Medication 10 ML: at 15:18

## 2021-02-19 RX ADMIN — METOPROLOL SUCCINATE 25 MG: 25 TABLET, EXTENDED RELEASE ORAL at 05:40

## 2021-02-19 RX ADMIN — Medication 10 ML: at 21:08

## 2021-02-19 RX ADMIN — FUROSEMIDE 40 MG: 10 INJECTION, SOLUTION INTRAVENOUS at 21:07

## 2021-02-19 RX ADMIN — METOPROLOL SUCCINATE 25 MG: 25 TABLET, EXTENDED RELEASE ORAL at 13:22

## 2021-02-19 RX ADMIN — METOPROLOL SUCCINATE 25 MG: 25 TABLET, EXTENDED RELEASE ORAL at 00:00

## 2021-02-19 RX ADMIN — BISACODYL 10 MG: 5 TABLET, COATED ORAL at 09:07

## 2021-02-19 RX ADMIN — VENLAFAXINE HYDROCHLORIDE 37.5 MG: 37.5 CAPSULE, EXTENDED RELEASE ORAL at 09:08

## 2021-02-19 RX ADMIN — FUROSEMIDE 40 MG: 10 INJECTION, SOLUTION INTRAVENOUS at 09:07

## 2021-02-19 RX ADMIN — METOPROLOL SUCCINATE 25 MG: 25 TABLET, EXTENDED RELEASE ORAL at 17:12

## 2021-02-19 RX ADMIN — POTASSIUM CHLORIDE 20 MEQ: 1.5 FOR SOLUTION ORAL at 09:08

## 2021-02-19 RX ADMIN — Medication 8.6 MG: at 09:07

## 2021-02-19 RX ADMIN — AMLODIPINE BESYLATE 2.5 MG: 5 TABLET ORAL at 09:07

## 2021-02-19 NOTE — ROUTINE PROCESS
BEDSIDE_VERBAL_shift change report given to Nanci Bellamy RN (oncoming nurse) by Jourdan Boyle RN (offgoing nurse).  Report included the following information SBAR and night assessment

## 2021-02-19 NOTE — PROGRESS NOTES
Comprehensive Nutrition Assessment    Type and Reason for Visit: Initial, Consult(SLP consult, +dysphagia)    Nutrition Recommendations/Plan:   Continue current diet  Add Magic cup 6x/day (2 on each tray) (1740kcals and 54g pro)  Allow snacks as desired    Nsg to encourage intakes of plated food prior to ONS    Consider starting MVI for micronutrient coverage  Continue heavy bowel regimen for hx constipation    Please document % of all meal/snack consumed  Weekly measured body weights    Nutrition Assessment:  Admitted for chest pain. +COVID-19. (2/18) SLP finding mild-mod oropharyngeal dysphagia, rec'd full NTL d/t pt declining solid trials, advanced to puree/NTL today. Per RN, pt only at magic cup x2 today. RN to check pt tongue for inflammation/pain as SLP stated it looks red and pt c/o \"burning sensation\", RN denied pt c/o pain on tongue. RD to increase Magic cup to 6x/day (2 at each meal), provides 1740kcals and 54g pro (meets 94% EENs and 61% protein needs-- inadequate), however this is not long-term solution and if unable to consume other PO, rec'd TF. Labs: K+ 3.2, BUN 32. Meds: Bisacodyl daily, lasix, ativan, daily miralax, KCl, senna, venlafaxine. Malnutrition Assessment:  Malnutrition Status:  Mild malnutrition    Context:  Acute illness     Findings of the 6 clinical characteristics of malnutrition:   Energy Intake:  1 - 75% or less of est energy req for 7 or more days  Weight Loss:  No significant weight loss(7.3kg x7 months (9%, not significant))     Body Fat Loss:  Unable to assess,     Muscle Mass Loss:  Unable to assess,    Fluid Accumulation:  No significant fluid accumulation,      Estimated Daily Nutrient Needs:  Energy (kcal): 1848kcals (25kcals/kg); Weight Used for Energy Requirements: Current  Protein (g): 89g (1.2g/kg);  Weight Used for Protein Requirements: Current  Fluid (ml/day): 1848ml; Method Used for Fluid Requirements: 1 ml/kcal    Nutrition Related Findings:  Unable to complete NFPE d/t COVID-19 precautions. SLP following, +red tongue, inflammation vs micronutrient deficiency. RN denies N/V, +hx chronic constipation with impactions, rec continue heavy bowel regimen. Last BM pta. No edema. Wounds:    None       Current Nutrition Therapies:  DIET NUTRITIONAL SUPPLEMENTS Breakfast, Lunch, Dinner; [employer]  DIET DYSPHAGIA PUREED (NDD1)  2 [place]/2 Mildly Thick    Anthropometric Measures:  · Height:  5' 4.96\" (165 cm)  · Current Body Wt:  73.9 kg (162 lb 14.7 oz)(2/17)  · Admission Body Wt:  162 lb 14.7 oz(2/17)    · Usual Body Wt:  (zakia)     · Ideal Body Wt:  125 lbs:  130.3 %   · BMI Category: Overweight (BMI 25.0-29. 9)     Possible wt loss of 7.3kg x7 months (9%, not significant), however concerning  Wt Readings from Last 6 Encounters:   02/17/21 73.9 kg (162 lb 14.7 oz)   12/10/20 74.1 kg (163 lb 6.4 oz)   07/16/20 81.2 kg (179 lb)   06/23/20 75.8 kg (167 lb)   03/24/20 75.8 kg (167 lb)   01/20/20 78.9 kg (174 lb)       Nutrition Diagnosis:   · Inadequate oral intake related to (Poor appetite and ?\"burning sensation\" on tongue) as evidenced by intake 0-25%      Nutrition Interventions:   Food and/or Nutrient Delivery: Continue current diet, Modify oral nutrition supplement(increase magic Cup to 6x/day)  Nutrition Education and Counseling: No recommendations at this time  Coordination of Nutrition Care: Continue to monitor while inpatient, Speech therapy, Feeding assistance/environmental change    Goals:  intakes >/=50% of EENs in >5 days, wt maintenance within +/-0.5kg in 5 days, pt acceptance of at least 50% of non-ONS foods in 5 days       Nutrition Monitoring and Evaluation:   Behavioral-Environmental Outcomes: None identified  Food/Nutrient Intake Outcomes: Diet advancement/tolerance, Food and nutrient intake, Supplement intake  Physical Signs/Symptoms Outcomes: Constipation, GI status, Meal time behavior, Weight    Discharge Planning:     Too soon to determine     Electronically signed by Rebecca Hatfield RD on 2/19/2021 at 2:08 PM    Contact: Ext [phone], or via [employer]

## 2021-02-19 NOTE — PROGRESS NOTES
Hospitalist Progress Note               Daily Progress Note: 2/19/2021      Subjective: The patient is seen for follow  up.   76 y.o. female with history of diabetes and hypertension presents to the referring facility emergency room complaining of generalized body aches started like 2 weeks ago along with chest pain, nausea and shaking. She is tested positive for Covid 1 week ago at the nursing home. She is found with elevated troponin suggestive of non-ST elevation MI. Troponin increased and now trending back. Patient complains of poor sleep. She complains of generalised aches      Medications reviewed  Current Facility-Administered Medications   Medication Dose Route Frequency    acetaminophen (TYLENOL) tablet 650 mg  650 mg Oral Q6H PRN    [START ON 2/20/2021] clopidogreL (PLAVIX) tablet 75 mg  75 mg Oral DAILY    clopidogreL (PLAVIX) 300 mg  300 mg Oral DAILY    ezetimibe (ZETIA) tablet 10 mg  10 mg Oral DAILY    furosemide (LASIX) injection 40 mg  40 mg IntraVENous BID    potassium chloride (KLOR-CON) packet for solution 20 mEq  20 mEq Oral BID WITH MEALS    metoprolol succinate (TOPROL-XL) XL tablet 25 mg  25 mg Oral Q6H    sodium chloride (NS) flush 5-40 mL  5-40 mL IntraVENous Q8H    sodium chloride (NS) flush 5-40 mL  5-40 mL IntraVENous PRN    aspirin chewable tablet 81 mg  81 mg Oral DAILY    LORazepam (ATIVAN) tablet 0.5 mg  0.5 mg Oral Q8H PRN    polyethylene glycol (MIRALAX) packet 17 g  17 g Oral DAILY    amLODIPine (NORVASC) tablet 2.5 mg  2.5 mg Oral DAILY    bisacodyL (DULCOLAX) tablet 10 mg  10 mg Oral DAILY    senna (SENOKOT) tablet 8.6 mg  1 Tab Oral BID    venlafaxine-SR (EFFEXOR-XR) capsule 37.5 mg  37.5 mg Oral DAILY WITH BREAKFAST       Review of Systems:   Review of systems not obtained due to patient factors.     Objective:   Physical Exam:     Visit Vitals  /78   Pulse 66   Temp 98.9 °F (37.2 °C)   Resp 16   Ht 5' 4.96\" (1.65 m)   Wt 73.9 kg (162 lb 14.7 oz) SpO2 94%   BMI 27.14 kg/m²      O2 Device: Room air    Temp (24hrs), Av.3 °F (36.8 °C), Min:97.5 °F (36.4 °C), Max:98.9 °F (37.2 °C)    No intake/output data recorded.  1901 -  0700  In: -   Out: 350 [Urine:350]    PHYSICAL EXAM:  General: Alert and awake  Skin: Extremities and face reveal no rashes. HEENT: Sclerae anicteric. No oral ulcers. No ENT discharge. The neck is supple. Cardiovascular: Regular rate and rhythm. PMI nondisplaced. Carotids without bruits. Mild tenderness on chest wall on left side +ve  Respiratory: Comfortable breathing with no accessory muscle use. Clear breath sounds with no wheezes, rales, or rhonchi. GI: Abdomen nondistended, soft, and nontender. Normal active bowel sounds. Rectal: Deferred   Musculoskeletal: No pitting edema of the lower legs. Extremities have good range of motion. No costovertebral tenderness. Neurological: alert , more oriented then yesterday  Psychiatric: calm and cooperative    Data Review:       Recent Days:  Recent Labs     21  0445 21  2245   WBC 6.0 5.1   HGB 12.0 11.8   HCT 38.1 37.3    348     Recent Labs     21  1100 21  0521 21  1530 21  0445 21  0000    140  --  140  --    K 3.2* 3.1*  --  3.4*  --     104  --  103  --    CO2 29 31  --  31  --    GLU 98 91  --  83  --    BUN 32* 32*  --  21*  --    CREA 1.02 0.95  --  0.69  --    CA 9.2 8.7  --  8.8  --    MG  --   --  2.0  --   --    INR  --   --   --   --  1.2*     No results for input(s): PH, PCO2, PO2, HCO3, FIO2 in the last 72 hours.        Assessment/     Problem List:  Hospital Problems  Date Reviewed: 2018          Codes Class Noted POA    Acute coronary syndromes [facility]) ICD-10-CM: I24.9  ICD-9-CM: 411.1  2021 Yes        Pseudobulbar affect ICD-10-CM: F48.2  ICD-9-CM: 310.81  10/16/2015 Yes        HTN, goal below 130/80 (Chronic) ICD-10-CM: I10  ICD-9-CM: 401.9 Chronic 2012 Yes Plan:  Non-ST elevation MI/acute coronary syndrome: Cardiology evaluation of the patient is appreciated. Heparin drip was discontinued today. Patient is started on Plavix. I discussed patient's condition with the cardiologist who recommended to monitor her for 1 more day.      COVID-19 pneumonia: No evidence of hypoxia at this time, we will monitor closely. Repeat covid test is +ve      Benign essential hypertension: Mild, on amlodipine 2.5 mg daily which we will continue     History of psychiatric problems,Continue Effexor.     Admitted to cardiac telemetry, full CODE STATUS, home medications reviewed with MAR from the nursing home. Will D/w daughter Daughter Peter Bee @ [phone]) is pts primary HCDM.    Care Plan discussed with:     Janet Breaux MD

## 2021-02-19 NOTE — PROGRESS NOTES
SAE Plan:    -d/c home when medically stable  -PCP follow up  -[facility] via [facility]        Accepted for home health services via [facility] agency. SOC to begin x 24-48 hours after discharge. Requires Medicare 2nd IM letter prior to discharge. Daughter Letty Ingram @ [phone].       FERNANDO Gallagher

## 2021-02-19 NOTE — PROGRESS NOTES
Progress Note    Patient: Stevan Beebe MRN: [id number]  SSN: [id number]    YOB: 1945  Age: 76 y.o. Sex: female      Admit Date: 2/16/2021    LOS: 2 days     Subjective:     No acute events overnight. Objective:     Vitals:    02/19/21 0145 02/19/21 0400 02/19/21 0534 02/19/21 0817   BP: 135/77  124/72 (!) 141/75   Pulse: 69 69 85 60   Resp: 18   16   Temp: 97.5 °F (36.4 °C)   98.7 °F (37.1 °C)   SpO2: 99%   93%   Weight:       Height:            Intake and Output:  Current Shift: No intake/output data recorded. Last three shifts: 02/17 1901 - 02/19 0700  In: -   Out: 350 [Urine:350]    Physical Exam:   General:  Alert, cooperative, no distress, appears stated age. Eyes:  Conjunctivae/corneas clear. PERRL, EOMs intact. Fundi benign   Ears:  Normal TMs and external ear canals both ears. Nose: Nares normal. Septum midline. Mucosa normal. No drainage or sinus tenderness. Mouth/Throat: Lips, mucosa, and tongue normal. Teeth and gums normal.   Neck: Supple, symmetrical, trachea midline, no adenopathy, thyroid: no enlargment/tenderness/nodules, no carotid bruit and no JVD. Back:   Symmetric, no curvature. ROM normal. No CVA tenderness. Lungs:   Clear to auscultation bilaterally. Heart:  Regular rate and rhythm, S1, S2 normal, no murmur, click, rub or gallop. Abdomen:   Soft, non-tender. Bowel sounds normal. No masses,  No organomegaly. Extremities: Extremities normal, atraumatic, no cyanosis or edema. Pulses: 2+ and symmetric all extremities. Skin: Skin color, texture, turgor normal. No rashes or lesions   Lymph nodes: Cervical, supraclavicular, and axillary nodes normal.   Neurologic: CNII-XII intact. Normal strength, sensation and reflexes throughout. Lab/Data Review: All lab results for the last 24 hours reviewed.          Assessment:     Active Problems:    HTN, goal below 130/80 (9/7/2012)      Pseudobulbar affect (10/16/2015)      Acute coronary syndromes ([address] (2/17/2021)    Patient is 49-year-old -American female with:  1. Chest pain, atypical for angina  2. Troponin in the indeterminate range  3. Reported history of coronary artery disease without stents. 4.  Recent COVID-19 positive test  5. Generalized achiness and weakness. 6.  Mild to moderate aortic stenosis  7. Nonobstructive coronary artery disease in 2015    Plan: We will stop heparin GTT. Rhythm was stable overnight. Kidney function stable. BUN has increased. Replete potassium. There is no accurate ins and outs. Recheck labs in a.m.     Signed By: Samuel Muniz MD     February 19, 2021

## 2021-02-19 NOTE — PROGRESS NOTES
Problem: Dysphagia (Adult)  Goal: *Acute Goals and Plan of Care (Insert Text)  Description: Speech Therapy Goals  Initiated 2/18/2021  -Patient will participate in modified barium swallow study within 7 day(s), pending pt's prgress. [x ] Not met  [ ]  MET   [ ] Progressing  [ ] Sandie Au  -Patient will tolerate puree diet with nectar thick liquids without signs/symptoms of aspiration given minimal cues within 7day(s). [ ] Not met  [ ]  MET   [x ] Progressing  [ ] Discontinue  -Patient will tolerate full liquids diet with nectar thick liquids, advance to puree/nectar diet without signs/symptoms of aspiration given minimal cues within 7day(s). [ ] Not met  [ ]  MET   [x ] Progressing  [ ] Discontinue  -Patient will demonstrate understanding of swallow safety precautions and aspiration precautions, diet recs with minimal cues within 7 day(s). [ ] Not met  [ ]  MET   [x ] Progressing  [ ] Discontinue  Outcome: Progressing Towards Goal   SPEECH [address] TREATMENT  Patient: Ina Haider (41 y.o. female)  Date: 2/19/2021  Diagnosis: Acute coronary syndromes ([facility] [address] [I24.9] <principal problem not specified>       Precautions: aspiration, Droplet plus      ASSESSMENT:  Pt seen for tx, alert, confused/anxious speech noted. Pt accepts limited trials of nectar via cup and puree via tsp only. Pt declines other trials reporting poor taste and limited appetite. Oral phase WFL for liquids and puree trials. Pharyngeal phase appears [facility] after min delay. Pt continues to report chest pain/discomfort. MD made aware of concerns. PLAN:  Recommendations and Planned Interventions: At this time, continued concerns for inadequate nutritional intake are present and RD is following pt. MD made aware of concerns. Pt's oropharyngeal sw function appears WFL to upgrade to puree/nectar; however, suspect pt will continue with poor intake.   Unable to trial further diet upgrade due to pt's refusal of further trials. Patient continues to benefit from skilled intervention to address the above impairments. Continue treatment per established plan of care. Frequency/Duration: Patient will be followed by speech-language pathology 3 times a week to address goals. Discharge Recommendations: To Be Determined     SUBJECTIVE:   Patient alert, confused, appears anxious. OBJECTIVE:     CXR Results  (Last 48 hours)                 02/18/21 1521  XR CHEST SNGL V Final result    Impression:  Bilateral airspace disease/pneumonia. Narrative:  Study: Chest radiograph(s), 1 view       Clinical Indication: Shortness of breath, COVID. Comparison: Acute series dated 7/16/2020. Findings: The cardiac silhouette is normal in size. Mild tortuosity of the thoracic aorta. Overlying monitoring leads. Patchy bilateral opacities throughout both lungs, right greater than left. No   large pleural effusion. No discernible pneumothorax. Osteopenia. Gaseous distention of the hepatic flexure and transverse colon. Cognitive and Communication Status:  Neurologic State: Alert, Confused  Orientation Level: Oriented to person  Cognition: Follows commands, Memory loss     Perseveration: Perseverates during conversation       Pain:  Pain Scale 1: Numeric (0 - 10)  Pain Intensity 1: 3       After treatment:   Patient left in no apparent distress in bed, Call bell within reach, and Nursing notified    COMMUNICATION/EDUCATION:   Patient was educated regarding her deficit(s) of dysphagia, swallow safety precautions, diet recs and POC. She demonstrated Guarded understanding as evidenced by AMS. The patient's plan of care including recommendations, planned interventions, and recommended diet changes were discussed with: Registered nurse.      Cinthya Gilbert M.S. CCC-SLP  Time Calculation: 11 mins

## 2021-02-20 LAB
ANION GAP SERPL CALC-SCNC: 5 MMOL/L (ref 5–15)
BUN SERPL-MCNC: 38 MG/DL (ref 6–20)
BUN/CREAT SERPL: 37 (ref 12–20)
CA-I BLD-MCNC: 8.9 MG/DL (ref 8.5–10.1)
CHLORIDE SERPL-SCNC: 104 MMOL/L (ref 97–108)
CO2 SERPL-SCNC: 33 MMOL/L (ref 21–32)
CREAT SERPL-MCNC: 1.03 MG/DL (ref 0.55–1.02)
GLUCOSE BLD STRIP.AUTO-MCNC: 127 MG/DL (ref 65–100)
GLUCOSE BLD STRIP.AUTO-MCNC: 140 MG/DL (ref 65–100)
GLUCOSE BLD STRIP.AUTO-MCNC: 96 MG/DL (ref 65–100)
GLUCOSE SERPL-MCNC: 90 MG/DL (ref 65–100)
PERFORMED BY, TECHID: ABNORMAL
PERFORMED BY, TECHID: ABNORMAL
PERFORMED BY, TECHID: NORMAL
POTASSIUM SERPL-SCNC: 3.1 MMOL/L (ref 3.5–5.1)
SODIUM SERPL-SCNC: 142 MMOL/L (ref 136–145)

## 2021-02-20 PROCEDURE — 74011250637 HC RX REV CODE- 250/637: Performed by: HOSPITALIST

## 2021-02-20 PROCEDURE — 65270000029 HC RM PRIVATE

## 2021-02-20 PROCEDURE — 74011250637 HC RX REV CODE- 250/637: Performed by: INTERNAL MEDICINE

## 2021-02-20 PROCEDURE — 80048 BASIC METABOLIC PNL TOTAL CA: CPT

## 2021-02-20 PROCEDURE — 74011250636 HC RX REV CODE- 250/636: Performed by: INTERNAL MEDICINE

## 2021-02-20 PROCEDURE — 82962 GLUCOSE BLOOD TEST: CPT

## 2021-02-20 PROCEDURE — 36415 COLL VENOUS BLD VENIPUNCTURE: CPT

## 2021-02-20 RX ORDER — FUROSEMIDE 40 MG/1
40 TABLET ORAL DAILY
Status: DISCONTINUED | OUTPATIENT
Start: 2021-02-21 | End: 2021-02-24 | Stop reason: HOSPADM

## 2021-02-20 RX ADMIN — BISACODYL 10 MG: 5 TABLET, COATED ORAL at 09:41

## 2021-02-20 RX ADMIN — METOPROLOL SUCCINATE 25 MG: 25 TABLET, EXTENDED RELEASE ORAL at 19:31

## 2021-02-20 RX ADMIN — POTASSIUM CHLORIDE 20 MEQ: 1.5 FOR SOLUTION ORAL at 09:40

## 2021-02-20 RX ADMIN — METOPROLOL SUCCINATE 25 MG: 25 TABLET, EXTENDED RELEASE ORAL at 13:18

## 2021-02-20 RX ADMIN — METOPROLOL SUCCINATE 25 MG: 25 TABLET, EXTENDED RELEASE ORAL at 00:05

## 2021-02-20 RX ADMIN — ASPIRIN 81 MG: 81 TABLET, CHEWABLE ORAL at 09:40

## 2021-02-20 RX ADMIN — VENLAFAXINE HYDROCHLORIDE 37.5 MG: 37.5 CAPSULE, EXTENDED RELEASE ORAL at 09:41

## 2021-02-20 RX ADMIN — CLOPIDOGREL BISULFATE 300 MG: 75 TABLET ORAL at 09:40

## 2021-02-20 RX ADMIN — Medication 10 ML: at 19:20

## 2021-02-20 RX ADMIN — CLOPIDOGREL BISULFATE 75 MG: 75 TABLET ORAL at 09:43

## 2021-02-20 RX ADMIN — Medication 10 ML: at 20:34

## 2021-02-20 RX ADMIN — Medication 8.6 MG: at 09:41

## 2021-02-20 RX ADMIN — ACETAMINOPHEN 650 MG: 325 TABLET ORAL at 05:38

## 2021-02-20 RX ADMIN — POLYETHYLENE GLYCOL 3350 17 G: 17 POWDER, FOR SOLUTION ORAL at 09:44

## 2021-02-20 RX ADMIN — METOPROLOL SUCCINATE 25 MG: 25 TABLET, EXTENDED RELEASE ORAL at 05:39

## 2021-02-20 RX ADMIN — Medication 8.6 MG: at 20:34

## 2021-02-20 RX ADMIN — Medication 10 ML: at 05:39

## 2021-02-20 RX ADMIN — FUROSEMIDE 40 MG: 10 INJECTION, SOLUTION INTRAVENOUS at 09:41

## 2021-02-20 RX ADMIN — AMLODIPINE BESYLATE 2.5 MG: 5 TABLET ORAL at 09:43

## 2021-02-20 RX ADMIN — EZETIMIBE 10 MG: 10 TABLET ORAL at 09:40

## 2021-02-20 NOTE — PROGRESS NOTES
Progress Note    Patient: Stevan Beebe MRN: [id number]  SSN: [id number]    YOB: 1945  Age: 76 y.o. Sex: female      Admit Date: 2/16/2021    LOS: 3 days     Subjective:     No acute events overnight. She is complaining of eye pain today. Objective:     Vitals:    02/20/21 0004 02/20/21 0400 02/20/21 0535 02/20/21 0806   BP: (!) 140/74  130/77 112/71   Pulse: 60 66 68 65   Resp: 16   19   Temp: 98 °F (36.7 °C)   98.7 °F (37.1 °C)   SpO2: 99%   93%   Weight:       Height:            Intake and Output:  Current Shift: No intake/output data recorded. Last three shifts: 02/18 1901 - 02/20 0700  In: -   Out: 350 [Urine:350]    Physical Exam:   General:  Alert, cooperative, no distress, appears stated age. Eyes:  Conjunctivae/corneas clear. PERRL, EOMs intact. Fundi benign   Ears:  Normal TMs and external ear canals both ears. Nose: Nares normal. Septum midline. Mucosa normal. No drainage or sinus tenderness. Mouth/Throat: Lips, mucosa, and tongue normal. Teeth and gums normal.   Neck: Supple, symmetrical, trachea midline, no adenopathy, thyroid: no enlargment/tenderness/nodules, no carotid bruit and no JVD. Back:   Symmetric, no curvature. ROM normal. No CVA tenderness. Lungs:   Clear to auscultation bilaterally. Heart:  Regular rate and rhythm, S1, S2 normal, no murmur, click, rub or gallop. Abdomen:   Soft, non-tender. Bowel sounds normal. No masses,  No organomegaly. Extremities: Extremities normal, atraumatic, no cyanosis or edema. Pulses: 2+ and symmetric all extremities. Skin: Skin color, texture, turgor normal. No rashes or lesions   Lymph nodes: Cervical, supraclavicular, and axillary nodes normal.   Neurologic: CNII-XII intact. Normal strength, sensation and reflexes throughout. Lab/Data Review: All lab results for the last 24 hours reviewed.          Assessment:     Active Problems:    HTN, goal below 130/80 (9/7/2012)      Pseudobulbar affect (10/16/2015)      Acute coronary syndromes [facility]) (2/17/2021)    Patient is 66-year-old -American female with:  1. Chest pain, atypical for angina  2. Troponin in the indeterminate range  3. Reported history of coronary artery disease without stents. 4.  Recent COVID-19 positive test  5. Generalized achiness and weakness. 6.  Mild to moderate aortic stenosis  7. Nonobstructive coronary artery disease in 2015    Plan:     Rhythm has been stable. Troponins were minimally elevated and trended down. Her ejection fraction was normal.  Blood pressure isWithin normal limits. Patient with probably anxiety. Her labs today showed creatinine of 1 and BUN of 38 and potassium 3.1. No further cardiac testing planned at this time. I will be happy to see in 2 to 4 weeks after discharge or sooner if needed and plan for an outpatient stress test.  Echo showed ejection fraction of 65% and aortic valve area of 1.2 and mean gradient of 15 mmHg. This is consistent with mild to moderate aortic stenosis. Moderate mitral annular calcification noted. Would probably continue with aspirin and Plavix upon discharge along with his MI. Patient is allergic to statin. Continue metoprolol succinate. Switch Lasix to oral Lasix.   Signed By: Olegario Morelos MD     February 20, 2021

## 2021-02-20 NOTE — PROGRESS NOTES
Hospitalist Progress Note               Daily Progress Note: 2/20/2021      Subjective: The patient is seen for follow  up.   76 y.o. female with history of diabetes and hypertension presents to the referring facility emergency room complaining of generalized body aches started like 2 weeks ago along with chest pain, nausea and shaking. She is tested positive for Covid 1 week ago at the nursing home. She is found with elevated troponin suggestive of non-ST elevation MI. Troponin increased and now trending back. Patient complains of poor sleep. She complains of generalised aches    Patient was unable to get out of bed with assistance as well. Medications reviewed  Current Facility-Administered Medications   Medication Dose Route Frequency    [START ON 2/21/2021] furosemide (LASIX) tablet 40 mg  40 mg Oral DAILY    acetaminophen (TYLENOL) tablet 650 mg  650 mg Oral Q6H PRN    clopidogreL (PLAVIX) tablet 75 mg  75 mg Oral DAILY    clopidogreL (PLAVIX) 300 mg  300 mg Oral DAILY    ezetimibe (ZETIA) tablet 10 mg  10 mg Oral DAILY    potassium chloride (KLOR-CON) packet for solution 20 mEq  20 mEq Oral BID WITH MEALS    metoprolol succinate (TOPROL-XL) XL tablet 25 mg  25 mg Oral Q6H    sodium chloride (NS) flush 5-40 mL  5-40 mL IntraVENous Q8H    sodium chloride (NS) flush 5-40 mL  5-40 mL IntraVENous PRN    aspirin chewable tablet 81 mg  81 mg Oral DAILY    LORazepam (ATIVAN) tablet 0.5 mg  0.5 mg Oral Q8H PRN    polyethylene glycol (MIRALAX) packet 17 g  17 g Oral DAILY    amLODIPine (NORVASC) tablet 2.5 mg  2.5 mg Oral DAILY    bisacodyL (DULCOLAX) tablet 10 mg  10 mg Oral DAILY    senna (SENOKOT) tablet 8.6 mg  1 Tab Oral BID    venlafaxine-SR (EFFEXOR-XR) capsule 37.5 mg  37.5 mg Oral DAILY WITH BREAKFAST       Review of Systems:   Review of systems not obtained due to patient factors.     Objective:   Physical Exam:     Visit Vitals  /71   Pulse 65   Temp 98.7 °F (37.1 °C)   Resp 19    5' 4.96\" (1.65 m)   Wt 73.9 kg (162 lb 14.7 oz)   SpO2 93%   BMI 27.14 kg/m²      O2 Device: Room air    Temp (24hrs), Av.7 °F (37.1 °C), Min:98 °F (36.7 °C), Max:99.3 °F (37.4 °C)    No intake/output data recorded.  1901 -  0700  In: -   Out: 350 [Urine:350]    PHYSICAL EXAM:  General: Alert and awake  Skin: Extremities and face reveal no rashes. HEENT: Sclerae anicteric. No oral ulcers. No ENT discharge. The neck is supple. Cardiovascular: Regular rate and rhythm. PMI nondisplaced. Carotids without bruits. Mild tenderness on chest wall on left side +ve  Respiratory: Comfortable breathing with no accessory muscle use. Clear breath sounds with no wheezes, rales, or rhonchi. GI: Abdomen nondistended, soft, and nontender. Normal active bowel sounds. Rectal: Deferred   Musculoskeletal: No pitting edema of the lower legs. Extremities have good range of motion. No costovertebral tenderness. Neurological: alert , more oriented then yesterday, Generalized weakness  Psychiatric: calm and cooperative    Data Review:       Recent Days:  Recent Labs     21  0445   WBC 6.0   HGB 12.0   HCT 38.1        Recent Labs     21  0530 21  1100 21  0521 21  1530    142 140  --    K 3.1* 3.2* 3.1*  --     104 104  --    CO2 33* 29 31  --    GLU 90 98 91  --    BUN 38* 32* 32*  --    CREA 1.03* 1.02 0.95  --    CA 8.9 9.2 8.7  --    MG  --   --   --  2.0     No results for input(s): PH, PCO2, PO2, HCO3, FIO2 in the last 72 hours.        Assessment/     Problem List:  Hospital Problems  Date Reviewed: 2018          Codes Class Noted POA    Acute coronary syndromes ([facility] ICD-10-CM: I24.9  ICD-9-CM: 411.1  2021 Yes        Pseudobulbar affect ICD-10-CM: F48.2  ICD-9-CM: 310.81  10/16/2015 Yes        HTN, goal below 130/80 (Chronic) ICD-10-CM: I10  ICD-9-CM: 401.9 Chronic 2012 Yes                     Plan:  Non-ST elevation MI/acute coronary syndrome: Cardiology evaluation of the patient is appreciated. Heparin drip was discontinued. Patient is started on Plavix.      COVID-19 pneumonia: No evidence of hypoxia at this time, we will monitor closely. Repeat covid test is +ve      Benign essential hypertension: Mild, on amlodipine 2.5 mg daily which we will continue     History of psychiatric problems,Continue Effexor. OT/PT eval.     Patient is unable to be discharged home.      Admitted to cardiac telemetry, full CODE STATUS, home medications reviewed with MAR from the nursing home. Will D/w daughter Daughter Vinnie Clarity @ [phone]) is pts primary HCDM.    Care Plan discussed with:         Kan Szymanski MD

## 2021-02-21 LAB
ANION GAP SERPL CALC-SCNC: 8 MMOL/L (ref 5–15)
BUN SERPL-MCNC: 47 MG/DL (ref 6–20)
BUN/CREAT SERPL: 43 (ref 12–20)
CA-I BLD-MCNC: 9.3 MG/DL (ref 8.5–10.1)
CHLORIDE SERPL-SCNC: 103 MMOL/L (ref 97–108)
CO2 SERPL-SCNC: 31 MMOL/L (ref 21–32)
CREAT SERPL-MCNC: 1.09 MG/DL (ref 0.55–1.02)
GLUCOSE BLD STRIP.AUTO-MCNC: 183 MG/DL (ref 65–100)
GLUCOSE SERPL-MCNC: 99 MG/DL (ref 65–100)
PERFORMED BY, TECHID: ABNORMAL
POTASSIUM SERPL-SCNC: 3.7 MMOL/L (ref 3.5–5.1)
SODIUM SERPL-SCNC: 142 MMOL/L (ref 136–145)

## 2021-02-21 PROCEDURE — 74011250637 HC RX REV CODE- 250/637: Performed by: HOSPITALIST

## 2021-02-21 PROCEDURE — 65270000029 HC RM PRIVATE

## 2021-02-21 PROCEDURE — 74011250637 HC RX REV CODE- 250/637: Performed by: INTERNAL MEDICINE

## 2021-02-21 PROCEDURE — 82962 GLUCOSE BLOOD TEST: CPT

## 2021-02-21 PROCEDURE — 36415 COLL VENOUS BLD VENIPUNCTURE: CPT

## 2021-02-21 PROCEDURE — 80048 BASIC METABOLIC PNL TOTAL CA: CPT

## 2021-02-21 RX ADMIN — METOPROLOL SUCCINATE 25 MG: 25 TABLET, EXTENDED RELEASE ORAL at 18:18

## 2021-02-21 RX ADMIN — Medication 8.6 MG: at 21:00

## 2021-02-21 RX ADMIN — Medication 8.6 MG: at 09:02

## 2021-02-21 RX ADMIN — Medication 10 ML: at 05:47

## 2021-02-21 RX ADMIN — CLOPIDOGREL BISULFATE 300 MG: 75 TABLET ORAL at 09:02

## 2021-02-21 RX ADMIN — AMLODIPINE BESYLATE 2.5 MG: 5 TABLET ORAL at 09:03

## 2021-02-21 RX ADMIN — FUROSEMIDE 40 MG: 40 TABLET ORAL at 09:03

## 2021-02-21 RX ADMIN — Medication 10 ML: at 15:25

## 2021-02-21 RX ADMIN — POLYETHYLENE GLYCOL 3350 17 G: 17 POWDER, FOR SOLUTION ORAL at 09:04

## 2021-02-21 RX ADMIN — ASPIRIN 81 MG: 81 TABLET, CHEWABLE ORAL at 09:02

## 2021-02-21 RX ADMIN — VENLAFAXINE HYDROCHLORIDE 37.5 MG: 37.5 CAPSULE, EXTENDED RELEASE ORAL at 09:03

## 2021-02-21 RX ADMIN — BISACODYL 10 MG: 5 TABLET, COATED ORAL at 09:03

## 2021-02-21 RX ADMIN — POTASSIUM CHLORIDE 20 MEQ: 1.5 FOR SOLUTION ORAL at 09:03

## 2021-02-21 RX ADMIN — CLOPIDOGREL BISULFATE 75 MG: 75 TABLET ORAL at 09:51

## 2021-02-21 RX ADMIN — Medication 10 ML: at 22:00

## 2021-02-21 RX ADMIN — METOPROLOL SUCCINATE 25 MG: 25 TABLET, EXTENDED RELEASE ORAL at 05:47

## 2021-02-21 RX ADMIN — METOPROLOL SUCCINATE 25 MG: 25 TABLET, EXTENDED RELEASE ORAL at 12:18

## 2021-02-21 RX ADMIN — EZETIMIBE 10 MG: 10 TABLET ORAL at 09:03

## 2021-02-21 NOTE — PROGRESS NOTES
13: 05PM Outbound call to daughter Chey Mccrary @ [phone]. Identified self, role, and nature of the call. SW acknowledged attending discussed SNF at time of discharge, and family to consider. Daughter requests for patient to be evaluated by PT/OT, and will wait for the results to determine if family is agreeable to SNF. Patient has been accepted for [facility] via [facility] agency. Medicare 2nd IM letter prior to discharge.        FERNANDO Altman

## 2021-02-21 NOTE — PROGRESS NOTES
Hospitalist Progress Note               Daily Progress Note: 2/21/2021      Subjective: The patient is seen for follow  up.   76 y.o. female with history of diabetes and hypertension presents to the referring facility emergency room complaining of generalized body aches started like 2 weeks ago along with chest pain, nausea and shaking. She is tested positive for Covid 1 week ago at the nursing home. She is found with elevated troponin suggestive of non-ST elevation MI. Troponin increased and now trending back. Patient complains of poor sleep. She complains of generalised aches    Patient was unable to get out of bed with assistance as well. Poor oral intake today. Medications reviewed  Current Facility-Administered Medications   Medication Dose Route Frequency    furosemide (LASIX) tablet 40 mg  40 mg Oral DAILY    acetaminophen (TYLENOL) tablet 650 mg  650 mg Oral Q6H PRN    clopidogreL (PLAVIX) tablet 75 mg  75 mg Oral DAILY    clopidogreL (PLAVIX) 300 mg  300 mg Oral DAILY    ezetimibe (ZETIA) tablet 10 mg  10 mg Oral DAILY    potassium chloride (KLOR-CON) packet for solution 20 mEq  20 mEq Oral BID WITH MEALS    metoprolol succinate (TOPROL-XL) XL tablet 25 mg  25 mg Oral Q6H    sodium chloride (NS) flush 5-40 mL  5-40 mL IntraVENous Q8H    sodium chloride (NS) flush 5-40 mL  5-40 mL IntraVENous PRN    aspirin chewable tablet 81 mg  81 mg Oral DAILY    LORazepam (ATIVAN) tablet 0.5 mg  0.5 mg Oral Q8H PRN    polyethylene glycol (MIRALAX) packet 17 g  17 g Oral DAILY    amLODIPine (NORVASC) tablet 2.5 mg  2.5 mg Oral DAILY    bisacodyL (DULCOLAX) tablet 10 mg  10 mg Oral DAILY    senna (SENOKOT) tablet 8.6 mg  1 Tab Oral BID    venlafaxine-SR (EFFEXOR-XR) capsule 37.5 mg  37.5 mg Oral DAILY WITH BREAKFAST       Review of Systems:   Review of systems not obtained due to patient factors.     Objective:   Physical Exam:     Visit Vitals  /77   Pulse 68   Temp 97.8 °F (36.6 °C)   Resp 18    5' 4.96\" (1.65 m)   Wt 73.9 kg (162 lb 14.7 oz)   SpO2 95%   BMI 27.14 kg/m²      O2 Device: Room air    Temp (24hrs), Av.1 °F (36.7 °C), Min:97.5 °F (36.4 °C), Max:98.6 °F (37 °C)    No intake/output data recorded. No intake/output data recorded. PHYSICAL EXAM:  General: Alert and awake  Skin: Extremities and face reveal no rashes. HEENT: Sclerae anicteric. No oral ulcers. No ENT discharge. The neck is supple. Cardiovascular: Regular rate and rhythm. PMI nondisplaced. Carotids without bruits. Mild tenderness on chest wall on left side +ve  Respiratory: Comfortable breathing with no accessory muscle use. Clear breath sounds with no wheezes, rales, or rhonchi. GI: Abdomen nondistended, soft, and nontender. Normal active bowel sounds. Rectal: Deferred   Musculoskeletal: No pitting edema of the lower legs. Extremities have good range of motion. No costovertebral tenderness. Neurological: alert , more oriented then yesterday, Generalized weakness  Psychiatric: calm and cooperative    Data Review:       Recent Days:  No results for input(s): WBC, HGB, HCT, PLT, HGBEXT, HCTEXT, PLTEXT, HGBEXT, HCTEXT, PLTEXT in the last 72 hours. Recent Labs     21  0753 21  0530 21  1100 21  1530 21  1530    142 142   < >  --    K 3.7 3.1* 3.2*   < >  --     104 104   < >  --    CO2 31 33* 29   < >  --    GLU 99 90 98   < >  --    BUN 47* 38* 32*   < >  --    CREA 1.09* 1.03* 1.02   < >  --    CA 9.3 8.9 9.2   < >  --    MG  --   --   --   --  2.0    < > = values in this interval not displayed. No results for input(s): PH, PCO2, PO2, HCO3, FIO2 in the last 72 hours.        Assessment/     Problem List:  Hospital Problems  Date Reviewed: 2018          Codes Class Noted POA    Acute coronary syndromes ([facility] ICD-10-CM: I24.9  ICD-9-CM: 411.1  2021 Yes        Pseudobulbar affect ICD-10-CM: F48.2  ICD-9-CM: 310.81  10/16/2015 Yes        HTN, goal below 130/80 (Chronic) ICD-10-CM: I10  ICD-9-CM: 401.9 Chronic 9/7/2012 Yes                     Plan:  Non-ST elevation MI/acute coronary syndrome: Cardiology evaluation of the patient is appreciated. Heparin drip was discontinued. Patient is started on Plavix.      COVID-19 pneumonia: No evidence of hypoxia at this time, we will monitor closely. Repeat covid test is +ve      Benign essential hypertension: Mild, on amlodipine 2.5 mg daily which we will continue     History of psychiatric problems,Continue Effexor. OT/PT eval.     Patient is unable to be discharged home.      Admitted to cardiac telemetry, full CODE STATUS, home medications reviewed with MAR from the nursing home. Will D/w daughter Daughter Liam Diaz @ [phone]) is pts primary HCDM.    Care Plan discussed with:         Jose Manuel Palacio MD

## 2021-02-21 NOTE — PROGRESS NOTES
While giving patient a bath, daughterBrett called and asked for the patient to be revaulated for speech therapy. Notified RN.

## 2021-02-21 NOTE — PROGRESS NOTES
Progress Note    Patient: Kristie Ruiz MRN: [id number]  SSN: [id number]    YOB: 1945  Age: 76 y.o. Sex: female      Admit Date: 2/16/2021    LOS: 4 days     Subjective:     No acute events overnight. She has poor oral intake, complaining of eye pain and generalized weakness and generalized weakness    Objective:     Vitals:    02/21/21 0400 02/21/21 0746 02/21/21 0800 02/21/21 0810   BP:  (!) 143/77  131/77   Pulse: 61 66 66 68   Resp:  19  18   Temp:  98.4 °F (36.9 °C)  97.8 °F (36.6 °C)   SpO2:  93%  95%   Weight:       Height:            Intake and Output:  Current Shift: No intake/output data recorded. Last three shifts: No intake/output data recorded. Physical Exam:   General:  Alert, cooperative, no distress, appears stated age. Eyes:  Conjunctivae/corneas clear. PERRL, EOMs intact. Fundi benign   Ears:  Normal TMs and external ear canals both ears. Nose: Nares normal. Septum midline. Mucosa normal. No drainage or sinus tenderness. Mouth/Throat: Lips, mucosa, and tongue normal. Teeth and gums normal.   Neck: Supple, symmetrical, trachea midline, no adenopathy, thyroid: no enlargment/tenderness/nodules, no carotid bruit and no JVD. Back:   Symmetric, no curvature. ROM normal. No CVA tenderness. Lungs:   Clear to auscultation bilaterally. Heart:  Regular rate and rhythm, S1, S2 normal, no murmur, click, rub or gallop. Abdomen:   Soft, non-tender. Bowel sounds normal. No masses,  No organomegaly. Extremities: Extremities normal, atraumatic, no cyanosis or edema. Pulses: 2+ and symmetric all extremities. Skin: Skin color, texture, turgor normal. No rashes or lesions   Lymph nodes: Cervical, supraclavicular, and axillary nodes normal.   Neurologic: CNII-XII intact. Normal strength, sensation and reflexes throughout. Lab/Data Review: All lab results for the last 24 hours reviewed.          Assessment:     Active Problems:    HTN, goal below 130/80 (9/7/2012)      Pseudobulbar affect (10/16/2015)      Acute coronary syndromes ([facility] (2/17/2021)    Patient is 42-year-old -American female with:  1. Chest pain, atypical for angina  2. Troponin in the indeterminate range  3. Reported history of coronary artery disease without stents. 4.  Recent COVID-19 positive test  5. Generalized achiness and weakness. 6.  Mild to moderate aortic stenosis  7. Nonobstructive coronary artery disease in 2015    Plan:   Patient with poor oral intake. Patient is complain of generalized weakness. Continue supportive care. Potassium 3.7. She is refusing quite a bit of her medications this morning. No further cardiac testing planned at this time. I will sign off and remain available if needed. I will be happy to see patient 2 to 4 weeks after discharge or sooner if needed.     Signed By: My Ribeiro MD     February 21, 2021

## 2021-02-22 LAB
GLUCOSE BLD STRIP.AUTO-MCNC: 197 MG/DL (ref 65–100)
PERFORMED BY, TECHID: ABNORMAL

## 2021-02-22 PROCEDURE — 97530 THERAPEUTIC ACTIVITIES: CPT

## 2021-02-22 PROCEDURE — 65270000029 HC RM PRIVATE

## 2021-02-22 PROCEDURE — 82962 GLUCOSE BLOOD TEST: CPT

## 2021-02-22 PROCEDURE — 74011250637 HC RX REV CODE- 250/637: Performed by: HOSPITALIST

## 2021-02-22 PROCEDURE — 92526 ORAL FUNCTION THERAPY: CPT

## 2021-02-22 PROCEDURE — 97166 OT EVAL MOD COMPLEX 45 MIN: CPT

## 2021-02-22 PROCEDURE — 74011250637 HC RX REV CODE- 250/637: Performed by: INTERNAL MEDICINE

## 2021-02-22 PROCEDURE — 97162 PT EVAL MOD COMPLEX 30 MIN: CPT

## 2021-02-22 RX ADMIN — POTASSIUM CHLORIDE 20 MEQ: 1.5 FOR SOLUTION ORAL at 17:28

## 2021-02-22 RX ADMIN — EZETIMIBE 10 MG: 10 TABLET ORAL at 09:16

## 2021-02-22 RX ADMIN — ACETAMINOPHEN 650 MG: 325 TABLET ORAL at 21:35

## 2021-02-22 RX ADMIN — AMLODIPINE BESYLATE 2.5 MG: 5 TABLET ORAL at 09:15

## 2021-02-22 RX ADMIN — METOPROLOL SUCCINATE 25 MG: 25 TABLET, EXTENDED RELEASE ORAL at 09:16

## 2021-02-22 RX ADMIN — Medication 10 ML: at 21:36

## 2021-02-22 RX ADMIN — Medication 8.6 MG: at 09:16

## 2021-02-22 RX ADMIN — Medication 8.6 MG: at 21:35

## 2021-02-22 RX ADMIN — CLOPIDOGREL BISULFATE 300 MG: 75 TABLET ORAL at 09:17

## 2021-02-22 RX ADMIN — POLYETHYLENE GLYCOL 3350 17 G: 17 POWDER, FOR SOLUTION ORAL at 09:15

## 2021-02-22 RX ADMIN — METOPROLOL SUCCINATE 25 MG: 25 TABLET, EXTENDED RELEASE ORAL at 17:28

## 2021-02-22 RX ADMIN — METOPROLOL SUCCINATE 25 MG: 25 TABLET, EXTENDED RELEASE ORAL at 00:14

## 2021-02-22 RX ADMIN — ASPIRIN 81 MG: 81 TABLET, CHEWABLE ORAL at 09:15

## 2021-02-22 RX ADMIN — BISACODYL 10 MG: 5 TABLET, COATED ORAL at 09:16

## 2021-02-22 RX ADMIN — POTASSIUM CHLORIDE 20 MEQ: 1.5 FOR SOLUTION ORAL at 09:16

## 2021-02-22 RX ADMIN — VENLAFAXINE HYDROCHLORIDE 37.5 MG: 37.5 CAPSULE, EXTENDED RELEASE ORAL at 09:16

## 2021-02-22 RX ADMIN — METOPROLOL SUCCINATE 25 MG: 25 TABLET, EXTENDED RELEASE ORAL at 11:45

## 2021-02-22 RX ADMIN — Medication 10 ML: at 14:00

## 2021-02-22 RX ADMIN — Medication 10 ML: at 05:18

## 2021-02-22 RX ADMIN — FUROSEMIDE 40 MG: 40 TABLET ORAL at 09:16

## 2021-02-22 RX ADMIN — CLOPIDOGREL BISULFATE 75 MG: 75 TABLET ORAL at 09:16

## 2021-02-22 NOTE — PROGRESS NOTES
SPEECH LANGUAGE PATHOLOGY DYSPHAGIA TREATMENT  Patient: Justyna Guzmán (24 y.o. female)  Date: 2/22/2021  Diagnosis: Acute coronary syndromes ([facility] [I24.9] <principal problem not specified>       Precautions:      Precautions: aspiration, Droplet plus       ASSESSMENT:  Patient seen following session w/ PT/OT. She is oriented x2, anxious and confused. Patient agreeable to thin, NTL and applesauce w/ max encouragement. Patient w/ rapid ingestion w/ atypical audible swallow w/ thin w/ immediate wet cough. No overt s/s of pen/asp w/ remaining consistencies and improved bolus control noted. PLAN:  Recommendations and Planned Interventions:  Rec cont puree/NTL w/ strict asp/GERD precautions and encourage PO intake. Concerns for adequate nutritional intake are present she appears failure to thrive. Frequency/Duration: Patient will be followed by speech-language pathology 3 times a week to address goals. Discharge Recommendations: To Be Determined     SUBJECTIVE:   Patient asking to lay down throughout session and asking if she is going to die.      OBJECTIVE:     Past Medical History:   Diagnosis Date    Agoraphobia without mention of panic attacks 2/17/2014    Anxiety disorder 8/18/2013    Arthritis     osteo    CAD (coronary artery disease), native coronary artery 12/1/2015    no stents    Chronic chest pain 1/13/2014    Chronic pain associated with significant psychosocial dysfunction 2/17/2014    Depression 8/18/2013    Diabetes ([facility]     type II    Duplicated right renal collecting system 3/13/2014    GERD (gastroesophageal reflux disease)     Gout, joint     Hypercholesteremia     hyercholesterolemia    Hypertension     Nephrolithiasis 3/13/2014    Personal history of noncompliance with medical treatment, presenting hazards to health 5/30/2014       CXR Results  (Last 48 hours)      None            Current Diet:  DIET NUTRITIONAL SUPPLEMENTS  DIET DYSPHAGIA PUREED (NDD1)      Cognitive and Communication Status:  Neurologic State: Alert, Confused, Irritable  Orientation Level: Oriented to person, Oriented to place  Cognition: Impaired decision making     Perseveration: Perseverates during conversation  Safety/Judgement: Lack of insight into deficits, Decreased awareness of need for safety      After treatment:   Patient left in no apparent distress in bed, Call bell within reach, and Nursing notified     COMMUNICATION/EDUCATION:   Patient was educated regarding her deficit(s) of dysphagia, swallow safety precautions, diet recs and POC. She demonstrated Guarded understanding as evidenced by AMS. The patient's plan of care including recommendations, planned interventions, and recommended diet changes were discussed with: Registered nurse. Thank you for this referral.  Kelsey Thomas M.S., M.Ed., CCC-SLP  Time Calculation: 17 mins    Problem: Dysphagia (Adult)  Goal: *Acute Goals and Plan of Care (Insert Text)  Description: Speech Therapy Goals  Initiated 2/18/2021  -Patient will participate in modified barium swallow study within 7 day(s), pending pt's prgress. [x ] Not met  [ ]  MET   [ ] Progressing  [ ] Meredith Gomez  -Patient will tolerate puree diet with nectar thick liquids without signs/symptoms of aspiration given minimal cues within 7day(s). [ ] Not met  [ ]  MET   [x ] Progressing  [ ] Discontinue  -Patient will tolerate full liquids diet with nectar thick liquids, advance to puree/nectar diet without signs/symptoms of aspiration given minimal cues within 7day(s). [ ] Not met  [ ]  MET   [x ] Progressing  [ ] Discontinue  -Patient will demonstrate understanding of swallow safety precautions and aspiration precautions, diet recs with minimal cues within 7 day(s).         [ ] Not met  [ ]  MET   [x ] Progressing  [ ] Discontinue      Outcome: Not Progressing Towards Goal

## 2021-02-22 NOTE — PROGRESS NOTES
PHYSICAL THERAPY EVALUATION  Patient: Daren Patton (89 y.o. female)  Date: 2/22/2021  Primary Diagnosis: Acute coronary syndromes [facility]) [I24.9]        Precautions: falls, droplet precautions       ASSESSMENT  Patient is a 76year old female, who came to the ED from a SNF with chest pain associated with generalized body aches, nausea, shaking and admitted for HTN, pseudobulbar affect, acute coronary syndrome on 2/16/2021. PMH includes: agoraphobia, anxiety disorder, arthritis, CAD, chronic chest pain, depression, diabetes, GERD, gout, hypercholesteremia, HTN, nephorlithiasis, psychotic disorder. Patient had recent admission at [address] [place] [address] for severe fecal impaction and required procedure for disimpaction, has history of this in the past.     Based on the objective data described below, the patient presents with generally anxious behaviors, decreased activity tolerance, generalized deconditioning, decreased generalized strength, impaired sitting balance and tolerance, increased need for A with self care and functional mobility/transfers. Patient semi supine in bed upon PT/OT arrival and agreeable to working with therapy. Patient A&O to person, type of place, and time initially, but confused throughout session and per pt report, pt has never been to rehab and lives with brother and daughter in a 2 story house. Patient reports ambulating with no AD. Per chart review, patient was admitted from [facility], daughter reports ambulating with no AD, unclear of LOS at SNF or PLOF while staying at [facility]. Patient requiring mod A bed mobility, max A x2 sup -> sit, mod A x2 sit -> sup, max A x2 scooting to HOB. Patient sat at EOB ~30 seconds and requesting water, after drinking thickened water with OT pt then laid herself down but did not move legs up onto bed. Patient would benefit from continued skilled PT services to address above deficits and improve safety and independence with self care and functional mobility/transfers. Recommend discharge to SNF when medically appropriate. Current Level of Function Impacting Discharge (mobility/balance): max A x2    Other factors to consider for discharge: severity of deficits, good family support      PLAN :  Recommendations and Planned Interventions: bed mobility training, transfer training, gait training, therapeutic exercises, patient and family training/education and therapeutic activities      Recommend with staff: use bedpan for BM at this time    Frequency/Duration: Patient will be followed by physical therapy:  5 times a week to address goals. Recommendation for discharge: (in order for the patient to meet his/her long term goals)  SNF    This discharge recommendation:  Has been made in collaboration with the attending provider and/or case management    IF patient discharges home will need the following DME: none         SUBJECTIVE:   Patient stated I am dying.  Pt reassured that she is in the hospital due to being sick and undergoing treatment.      OBJECTIVE DATA SUMMARY:   HISTORY:    Past Medical History:   Diagnosis Date    Agoraphobia without mention of panic attacks 2/17/2014    Anxiety disorder 8/18/2013    Arthritis     osteo    CAD (coronary artery disease), native coronary artery 12/1/2015    no stents    Chronic chest pain 1/13/2014    Chronic pain associated with significant psychosocial dysfunction 2/17/2014    Depression 8/18/2013    Diabetes ([facility] [address]     type II    Duplicated right renal collecting system 3/13/2014    GERD (gastroesophageal reflux disease)     Gout, joint     Hypercholesteremia     hyercholesterolemia    Hypertension     Nephrolithiasis 3/13/2014    Personal history of noncompliance with medical treatment, presenting hazards to health 5/30/2014     Past Surgical History:   Procedure Laterality Date    EGD  4/23/2010         HX CHOLECYSTECTOMY  09/20/2018    lap jesus    HX CYST REMOVAL      cyst removed from left wrist    HX HYSTERECTOMY      partial    HX OTHER SURGICAL      bladder dilitation    HX TUBAL LIGATION      HX UROLOGICAL      kidney stones       Home Situation  Home Environment: [address] [place] [address] Name: [facility]  One/Two Story Residence: Two story  Living Alone: No  Support Systems: Family member(s)  Patient Expects to be Discharged to[de-identified] Private residence  Current DME Used/Available at Home: None    EXAMINATION/PRESENTATION/DECISION MAKING:   Critical Behavior:  Neurologic State: Alert  Orientation Level: Oriented to time, Oriented to person, Disoriented to situation  Cognition: Impaired decision making  Safety/Judgement: Lack of insight into deficits, Decreased awareness of need for safety  Hearing: Auditory  Auditory Impairment: None  Skin:  Intact where visible  Edema: none noted   Range Of Motion:  AROM: Generally decreased, functional           PROM: Within functional limits           Strength:    Strength: Generally decreased, functional                    Tone & Sensation:   Tone: Normal                              Coordination:     Vision:      Functional Mobility:  Bed Mobility:  Rolling: Moderate assistance  Supine to Sit: Maximum assistance;Assist x2  Sit to Supine: Moderate assistance;Assist x2  Scooting: Maximum assistance;Assist x2  Transfers:                             Balance:   Sitting: Impaired; With support  Sitting - Static: Fair (occasional)  Sitting - Dynamic: Poor (constant support)  Ambulation/Gait Training:   not completed this session    Therapeutic Exercises:   Not completed this session    Functional Measure:  [facility] Mobility Inpatient Short Form  How much difficulty does the patient currently have. .. Unable A Lot A Little None   1. Turning over in bed (including adjusting bedclothes, sheets and blankets)? [] 1   [x] 2   [] 3   [] 4   2.   Sitting down on and standing up from a chair with arms ( e.g., wheelchair, bedside commode, etc.)   [x] 1   [] 2   [] 3   [] 4   3. Moving from lying on back to sitting on the side of the bed? [x] 1   [] 2   [] 3   [] 4          How much help from another person does the patient currently need. .. Total A Lot A Little None   4. Moving to and from a bed to a chair (including a wheelchair)? [x] 1   [] 2   [] 3   [] 4   5. Need to walk in hospital room? [x] 1   [] 2   [] 3   [] 4   6. Climbing 3-5 steps with a railing? [x] 1   [] 2   [] 3   [] 4   © , Trustees of [address] [place] [address] [address] [id number], under license to [facility]. All rights reserved     Score:  Initial:  Most Recent: X (Date: 2021 )   Interpretation of Tool:  Represents activities that are increasingly more difficult (i.e. Bed mobility, Transfers, Gait). Score 24 23 22-20 19-15 14-10 9-7 6   Modifier CH CI CJ CK CL CM CN         Physical Therapy Evaluation Charge Determination   History Examination Presentation Decision-Making   HIGH Complexity :3+ comorbidities / personal factors will impact the outcome/ POC  HIGH Complexity : 4+ Standardized tests and measures addressing body structure, function, activity limitation and / or participation in recreation  MEDIUM Complexity : Evolving with changing characteristics  Other outcome measures [facility] 6  high      Based on the above components, the patient evaluation is determined to be of the following complexity level: MEDIUM    Pain Ratin/10 reported     Activity Tolerance:   Fair and requires rest breaks    After treatment patient left in no apparent distress:   Supine in bed, Call bell within reach, Bed / chair alarm activated and Side rails x 3 and nsg updated. GOALS:    Problem: Mobility Impaired (Adult and Pediatric)  Goal: *Acute Goals and Plan of Care (Insert Text)  Description: Pt will be I with LE HEP in 7 days. Pt will perform bed mobility with mod I in 7 days. Pt will perform transfers with mod I in 7 days.    Pt will amb 25-50 feet with LRAD safely with mod I in 7 days. Outcome: Not Met       COMMUNICATION/EDUCATION:   The patients plan of care was discussed with: Occupational therapist and Registered nurse. Patient is unable to participate in goal setting and plan of care. PT/OT sessions occurred together for increased safety of pt and clinician.     Thank you for this referral.  Carlos Elmore, PT, DPT   Time Calculation: 19 mins

## 2021-02-22 NOTE — PROGRESS NOTES
OCCUPATIONAL THERAPY EVALUATION  Patient: Tess Prajapati (2 y.o. female)  Date: 2/22/2021  Primary Diagnosis: Acute coronary syndromes [facility]) [I24.9]        Precautions: COVID +, falls    ASSESSMENT  Patient is a 76year old female, who came to the ED from a SNF with chest pain associated with generalized body aches, nausea, shaking and admitted for HTN, pseudobulbar affect, acute coronary syndrome on 2/16/2021. PMH includes: agoraphobia, anxiety disorder, arthritis, CAD, chronic chest pain, depression, diabetes, GERD, gout, hypercholesteremia, HTN, nephorlithiasis, psychotic disorder. Patient had recent admission at [facility] for severe fecal impaction and required procedure for disimpaction, has history of this in the past.    Based on the objective data described below, the patient presents with generally anxious behaviors, decreased activity tolerance, generalized deconditioning, decreased generalized strength, impaired sitting balance and tolerance, increased need for A with self care and functional mobility/transfers. Patient semi supine in bed upon OT arrival and agreeable to working with therapy. Patient A&O to person, type of place, and time initially, but confused throughout session and per pt report, pt has never been to rehab and lives with brother and daughter in a 2 story house. Patient reports ambulating with no AD. Per chart review, patient was admitted from [facility], daughter reports ambulating with no AD, unclear of LOS at SNF or PLOF while staying at [facility]. Patient requiring mod A bed mobility, max A x2 sup -> sit, mod A x2 sit -> sup, max A x2 scooting to HOB. Patient sat at EOB ~30 seconds and requesting water, CGA to drink thickened liquids at EOB, pt declined breakfast at that time stating she wanted to lay back down. Patient total A LE dressing EOB.  Patient would benefit from continued skilled OT services to address above deficits and improve safety and independence with self care and functional mobility/transfers. Recommend discharge to SNF when medically appropriate. Current Level of Function Impacting Discharge (ADLs/self-care): total A LE dressing, CGA feeding. Other factors to consider for discharge: time since onset, PLOF, patient's family declining SNF discharge when talked to CM        PLAN :  Recommendations and Planned Interventions: self care training, functional mobility training, therapeutic exercise, balance training, therapeutic activities, endurance activities, patient education and family training/education    Frequency/Duration: Patient will be followed by occupational therapy 5 times a week to address goals. Recommendation for discharge: (in order for the patient to meet his/her long term goals)  SNF    This discharge recommendation:  Has been made in collaboration with the attending provider and/or case management    IF patient discharges home will need the following DME: hospital bed, mechanical lift, wheelchair and 24/7 assist       SUBJECTIVE:   Patient stated I am dying.  Patient reassured that she is in the hospital due to being sick and undergoing treatment.     OBJECTIVE DATA SUMMARY:   HISTORY:   Past Medical History:   Diagnosis Date    Agoraphobia without mention of panic attacks 2/17/2014    Anxiety disorder 8/18/2013    Arthritis     osteo    CAD (coronary artery disease), native coronary artery 12/1/2015    no stents    Chronic chest pain 1/13/2014    Chronic pain associated with significant psychosocial dysfunction 2/17/2014    Depression 8/18/2013    Diabetes ([facility] [address]     type II    Duplicated right renal collecting system 3/13/2014    GERD (gastroesophageal reflux disease)     Gout, joint     Hypercholesteremia     hyercholesterolemia    Hypertension     Nephrolithiasis 3/13/2014    Personal history of noncompliance with medical treatment, presenting hazards to health 5/30/2014     Past Surgical History:   Procedure Laterality Date    EGD 4/23/2010         HX CHOLECYSTECTOMY  09/20/2018    lap jesus    HX CYST REMOVAL      cyst removed from left wrist    HX HYSTERECTOMY      partial    HX OTHER SURGICAL      bladder dilitation    HX TUBAL LIGATION      HX UROLOGICAL      kidney stones       Expanded or extensive additional review of patient history:     Home Situation  Home Environment: [address] [place] [address] Name: [facility]  One/Two Story Residence: Two story  Living Alone: No  Support Systems: Family member(s)  Patient Expects to be Discharged to[de-identified] Private residence  Current DME Used/Available at Home: None    PLOF: Unknown PLOF during SNF admission, independent ambulating with no AD prior to that    EXAMINATION OF PERFORMANCE DEFICITS:  Cognitive/Behavioral Status:  Neurologic State: Alert  Orientation Level: Oriented to person;Oriented to time;Disoriented to situation(oriented to type of place)  Cognition: Decreased attention/concentration;Decreased command following;Poor safety awareness;Memory loss  Safety/Judgement: Lack of insight into deficits; Decreased awareness of need for safety    Skin: intact where visible    Edema: none noted    Hearing: Auditory  Auditory Impairment: None    Vision/Perceptual:    Unable to assess at this time    Range of Motion:  AROM: Generally decreased, functional  PROM: Within functional limits    Strength:  Strength: Generally decreased, functional     RUE Strength  Observation: grossly observed to be 3/5     LUE Strength  Observation: grossly observed to be 3/5    Coordination:  Generally impaired, not formally assessed    Tone & Sensation:  Tone: Normal    Balance:  Sitting: Impaired; With support  Sitting - Static: Fair (occasional)  Sitting - Dynamic: Poor (constant support)    Functional Mobility and Transfers for ADLs:  Bed Mobility:  Rolling: Moderate assistance  Supine to Sit: Maximum assistance;Assist x2  Sit to Supine:  Moderate assistance;Assist x2  Scooting: Maximum assistance;Assist x2    Transfers:       ADL Assessment:  Feeding: Contact guard assistance    Lower Body Dressing: Total assistance    ADL Intervention and task modifications:  Feeding  Feeding Assistance: Contact guard assistance  Container Management: Contact guard assistance  Drink to Mouth: Contact guard assistance    Lower Body Dressing Assistance  Dressing Assistance: Total assistance(dependent)  Socks: Total assistance (dependent)  Leg Crossed Method Used: No  Position Performed: Seated edge of bed    Cognitive Retraining  Safety/Judgement: Lack of insight into deficits; Decreased awareness of need for safety    Therapeutic Exercise:  Patient may benefit from UE HEP, initiate at next session as able     Functional Measure:    [facility] AM-PACTM \"6 Clicks\"                                                       Daily Activity Inpatient Short Form  How much help from another person does the patient currently need. .. Total; A Lot A Little None   1. Putting on and taking off regular lower body clothing? [x]  1 []  2 []  3 []  4   2. Bathing (including washing, rinsing, drying)? []  1 [x]  2 []  3 []  4   3. Toileting, which includes using toilet, bedpan or urinal? [x] 1 []  2 []  3 []  4   4. Putting on and taking off regular upper body clothing? []  1 []  2 [x]  3 []  4   5. Taking care of personal grooming such as brushing teeth? []  1 []  2 [x]  3 []  4   6. Eating meals? []  1 []  2 [x]  3 []  4   © 2007, Trustees of [facility], under license to [employer]. All rights reserved     Score: 13/24     Interpretation of Tool:  Represents clinically-significant functional categories (i.e. Activities of daily living).   Percentage of Impairment CH    0%   CI    1-19% CJ    20-39% CK    40-59% CL    60-79% CM    80-99% CN     100%   AMPA  Score 6-24 24 23 20-22 15-19 10-14 7-9 6        Occupational Therapy Evaluation Charge Determination   History Examination Decision-Making   MEDIUM Complexity : Expanded review of history including physical, cognitive and psychosocial  history  MEDIUM Complexity : 3-5 performance deficits relating to physical, cognitive , or psychosocial skils that result in activity limitations and / or participation restrictions MEDIUM Complexity : Patient may present with comorbidities that affect occupational performnce. Miniml to moderate modification of tasks or assistance (eg, physical or verbal ) with assesment(s) is necessary to enable patient to complete evaluation       Based on the above components, the patient evaluation is determined to be of the following complexity level: MEDIUM    Pain Ratin/10    Activity Tolerance:   Poor and requires frequent rest breaks    After treatment patient left in no apparent distress:    Supine in bed, Call bell within reach, Bed / chair alarm activated, Side rails x 3 and speech therapist present in room    COMMUNICATION/EDUCATION:   The patients plan of care was discussed with: Physical therapist, Registered nurse and Case management. Patient/family have participated as able in goal setting and plan of care. and Patient/family agree to work toward stated goals and plan of care. This patients plan of care is appropriate for delegation to [facility]. OT/PT sessions occurred together for increased patient and clinician safety as pt requires A of 2 for mobility at this time.     Problem: Self Care Deficits Care Plan (Adult)  Goal: *Acute Goals and Plan of Care (Insert Text)  Description: Pt will be mod I sup <> sit in prep for EOB ADLs  Pt will be mod I grooming sitting EOB  Pt will be min A LE dressing sitting EOB/long sit  Pt will be min A sit <>  prep for toileting LRAD  Pt will be mod A toileting/toilet transfer/cloth mgmt LRAD  Pt will be I following UE HEP in prep for self care tasks     Outcome: Not Met     Thank you for this referral.  Sen Camp, OTR/L  Time Calculation: 19 mins

## 2021-02-22 NOTE — PROGRESS NOTES
Chart reviewed. PT and OT have recommended SNF for this patient. CM reached out to patient's daughter, Shruthi Tobin, at [phone] and notified her of the above information. CM notified her of how much assistance patient required with therapy. Ms. Justin Napoles stated that she still wants the patient to come home with her and have home health. She stated \"I know what she is able to do and what she can't, she will do better at home. \" Pts daughter confirmed someone will be with her 24/7 at home. She was e-mailed the IMM and CM explained it verbally. Notified Dr. Vane Cam of the above information. DC plan: home with [facility].

## 2021-02-23 VITALS
HEIGHT: 65 IN | TEMPERATURE: 97.9 F | SYSTOLIC BLOOD PRESSURE: 153 MMHG | OXYGEN SATURATION: 99 % | HEART RATE: 69 BPM | WEIGHT: 162.92 LBS | BODY MASS INDEX: 27.14 KG/M2 | DIASTOLIC BLOOD PRESSURE: 81 MMHG | RESPIRATION RATE: 18 BRPM

## 2021-02-23 LAB
GLUCOSE BLD STRIP.AUTO-MCNC: 112 MG/DL (ref 65–100)
GLUCOSE BLD STRIP.AUTO-MCNC: 130 MG/DL (ref 65–100)
GLUCOSE BLD STRIP.AUTO-MCNC: 151 MG/DL (ref 65–100)
PERFORMED BY, TECHID: ABNORMAL

## 2021-02-23 PROCEDURE — 74011000250 HC RX REV CODE- 250: Performed by: HOSPITALIST

## 2021-02-23 PROCEDURE — 82962 GLUCOSE BLOOD TEST: CPT

## 2021-02-23 PROCEDURE — 74011250637 HC RX REV CODE- 250/637: Performed by: INTERNAL MEDICINE

## 2021-02-23 PROCEDURE — 74011250637 HC RX REV CODE- 250/637: Performed by: HOSPITALIST

## 2021-02-23 RX ORDER — IBUPROFEN 400 MG/1
400 TABLET ORAL
Status: DISCONTINUED | OUTPATIENT
Start: 2021-02-23 | End: 2021-02-24 | Stop reason: HOSPADM

## 2021-02-23 RX ORDER — FUROSEMIDE 40 MG/1
TABLET ORAL
Qty: 30 TAB | Refills: 0 | Status: SHIPPED | OUTPATIENT
Start: 2021-02-24 | End: 2021-03-06

## 2021-02-23 RX ORDER — METOPROLOL SUCCINATE 25 MG/1
25 TABLET, EXTENDED RELEASE ORAL EVERY 12 HOURS
Qty: 60 TAB | Refills: 0 | Status: ON HOLD | OUTPATIENT
Start: 2021-02-23 | End: 2022-01-01 | Stop reason: SDUPTHER

## 2021-02-23 RX ORDER — EZETIMIBE 10 MG/1
10 TABLET ORAL DAILY
Qty: 30 TAB | Refills: 0 | Status: SHIPPED | OUTPATIENT
Start: 2021-02-24

## 2021-02-23 RX ORDER — CAPSAICIN 0.03 G/100G
CREAM TOPICAL 3 TIMES DAILY
Status: DISCONTINUED | OUTPATIENT
Start: 2021-02-23 | End: 2021-02-24 | Stop reason: HOSPADM

## 2021-02-23 RX ORDER — GUAIFENESIN 100 MG/5ML
81 LIQUID (ML) ORAL DAILY
Qty: 30 TAB | Refills: 0 | Status: SHIPPED | OUTPATIENT
Start: 2021-02-24

## 2021-02-23 RX ORDER — CLOPIDOGREL BISULFATE 75 MG/1
75 TABLET ORAL DAILY
Qty: 30 TAB | Refills: 0 | Status: SHIPPED | OUTPATIENT
Start: 2021-02-24 | End: 2022-01-01

## 2021-02-23 RX ORDER — CAPSAICIN 0.03 G/100G
CREAM TOPICAL 3 TIMES DAILY
Qty: 60 G | Refills: 0 | Status: SHIPPED | OUTPATIENT
Start: 2021-02-23 | End: 2021-07-30

## 2021-02-23 RX ADMIN — CAPSAICIN: 0.25 CREAM TOPICAL at 12:00

## 2021-02-23 RX ADMIN — METOPROLOL SUCCINATE 25 MG: 25 TABLET, EXTENDED RELEASE ORAL at 05:56

## 2021-02-23 RX ADMIN — POTASSIUM CHLORIDE 20 MEQ: 1.5 FOR SOLUTION ORAL at 17:00

## 2021-02-23 RX ADMIN — METOPROLOL SUCCINATE 25 MG: 25 TABLET, EXTENDED RELEASE ORAL at 01:14

## 2021-02-23 RX ADMIN — METOPROLOL SUCCINATE 25 MG: 25 TABLET, EXTENDED RELEASE ORAL at 13:14

## 2021-02-23 RX ADMIN — Medication 10 ML: at 15:41

## 2021-02-23 RX ADMIN — METOPROLOL SUCCINATE 25 MG: 25 TABLET, EXTENDED RELEASE ORAL at 18:00

## 2021-02-23 RX ADMIN — CAPSAICIN: 0.25 CREAM TOPICAL at 16:00

## 2021-02-23 RX ADMIN — LIDOCAINE HYDROCHLORIDE 30 ML: 20 SOLUTION ORAL; TOPICAL at 13:21

## 2021-02-23 NOTE — PROGRESS NOTES
Patient has discharge orders for today. CM contacted patient's daughter, Anthony Henderson, at [phone] and notified her the patient has discharge orders to go home with home health. CM reiterated SNF was recommended due to the patient be max assist x2 and her inability to sit up on her own. Pts daughter acknowledged understanding and continued to decline SNF. She feels the patient would do better at home. CM inquired what time to arrange transportation. She asked why she would not be able to pick the patient up. JOYCELYN explained due to the patient's physical deconditioning she is unable to get in a car at this time. Ms. Ava Rodriguez acknowledged her understanding of this and stated it is okay to arrange transportation for the patient to come home at 4pm.     IMM was discussed with daughter. No intent to appeal. Medicare pt has received, reviewed, and signed 2nd IM letter informing them of their right to appeal the discharge. Signed copied has been placed on pt bedside chart. DC orders have been sent to [facility].

## 2021-02-23 NOTE — ROUTINE PROCESS
PT treatment attempted at 1000 however, patient declining participation with therapy at this time. Tried to encourage patient to participate, however patient continued to say no 2/2 side pain. Will continue to follow pt and will attempt treatment at a later time.  Thank you

## 2021-02-23 NOTE — DISCHARGE INSTRUCTIONS
INSTRUCTIONS ON DISCHARGE     (1) diet: Rec cont puree/NTL w/ strict asp/GERD precautions and encourage PO intake. Concerns for adequate nutritional intake are present she appears failure to thrive    (2) aggressive physical therapy: recommended SNF- declined. Need aggressive PT OT to prevent deconditioning    (3) the following medications are added to the regimen:      ASPIRIN and PLAVIX daily      METOPROLOL S 25mg twice a day      ZETIA 10mg daily       LASIX 40mg daily     (4) F/u with cardiology in 1 week.

## 2021-02-23 NOTE — PROGRESS NOTES
OT treatment attempted at 1000 however, patient declining participation with therapy at this time. Attempted to encourage patient to participate, however patient continued to say no d/t abdominal pain. Will continue to follow pt and will attempt treatment at a later time.

## 2021-02-23 NOTE — DISCHARGE SUMMARY
Physician Discharge Summary     Patient ID:    Pamella Barthel  [id number]  7 y.o.  1945    Admit date: 2/16/2021    Discharge date : 2/23/2021    Chronic Diagnoses:    Problem List as of 2/23/2021 Date Reviewed: 12/13/2018          Codes Class Noted - Resolved    Acute coronary syndromes ([facility].) ICD-10-CM: I24.9  ICD-9-CM: 411.1  2/17/2021 - Present        Urinary retention ICD-10-CM: R33.9  ICD-9-CM: 788.20  9/3/2019 - Present        Fecal impaction ([facility].) ICD-10-CM: K56.41  ICD-9-CM: 560.32  9/3/2019 - Present        Choledocholithiasis with acute cholecystitis ICD-10-CM: K80.42  ICD-9-CM: 574.30  9/19/2018 - Present        Vaginal irritation ICD-10-CM: N89.8  ICD-9-CM: 623.9  10/11/2017 - Present        Blurring of vision ICD-10-CM: H53.8  ICD-9-CM: 368.8  10/11/2017 - Present        Advance directive discussed with patient ICD-10-CM: Z71.89  ICD-9-CM: V65.49  7/6/2017 - Present        Gait instability ICD-10-CM: R26.81  ICD-9-CM: 781.2  4/14/2017 - Present        Weakness ICD-10-CM: R53.1  ICD-9-CM: 780.79  4/13/2017 - Present        Assistance needed with transportation ICD-10-CM: Z74.8  ICD-9-CM: V60.89  4/13/2017 - Present        Left-sided chest wall pain ICD-10-CM: R07.89  ICD-9-CM: 786.52  4/11/2017 - Present        Hydronephrosis of left kidney ICD-10-CM: N13.30  ICD-9-CM: 591  4/7/2017 - Present        Orthostatic hypotension ICD-10-CM: I95.1  ICD-9-CM: 458.0  4/4/2017 - Present        Generalized OA ICD-10-CM: M15.9  ICD-9-CM: 715.00  1/5/2017 - Present        Noncompliance with medication regimen-chol medication  ICD-10-CM: Z91.14  ICD-9-CM: V15.81  1/5/2017 - Present        Tightness sensation-head ICD-10-CM: Z78.9  ICD-9-CM: V49.89  9/20/2016 - Present        Diabetes mellitus type 2, diet-controlled ([facility] ICD-10-CM: E11.9  ICD-9-CM: 250.00  5/26/2016 - Present        Somatization disorder ICD-10-CM: F45.0  ICD-9-CM: 300.81  5/26/2016 - Present        Death of parent ICD-10-CM: Z63.4  ICD-9-CM: V61.07  5/26/2016 - Present        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  ICD-9-CM: 297.1  3/22/2016 - Present        Death of child ICD-10-CM: Z63.4  ICD-9-CM: V61.07  2/17/2016 - Present        Broken heart syndrome ICD-10-CM: I51.81  ICD-9-CM: 429.83  1/18/2016 - Present        SOB (shortness of breath) ICD-10-CM: R06.02  ICD-9-CM: 786.05  12/22/2015 - Present        CAD (coronary artery disease), native coronary artery ICD-10-CM: I25.10  ICD-9-CM: 414.01  12/1/2015 - Present    Overview Signed 12/1/2015  1:24 PM by Diamond White MD     Mild, nonobstructive by cardiac cath 12/1/15             Pseudobulbar affect ICD-10-CM: F48.2  ICD-9-CM: 310.81  10/16/2015 - Present        Breast pain, left (Chronic) ICD-10-CM: N64.4  ICD-9-CM: 611.71  4/7/2015 - Present        Personal history of noncompliance with medical treatment, presenting hazards to health (Chronic) ICD-10-CM: Z91.19  ICD-9-CM: V15.81 Chronic 5/30/2014 - Present    Overview Addendum 7/30/2014 12:24 AM by Ruth Ann SAEZ     07/30/2014: Challenging to determine medication compliance, often starts and independently stops prescribed medications, some splitting behaviors frequently asks providers about other providers especially when she is unhappy about their lack of responsiveness to her somatic complaints.              Duplicated right renal collecting system (Chronic) ICD-10-CM: Q62.5  ICD-9-CM: 753.4  3/13/2014 - Present        Nephrolithiasis (Chronic) ICD-10-CM: N20.0  ICD-9-CM: 592.0  3/13/2014 - Present        Onycholysis of toenail (Chronic) ICD-10-CM: L60.1  ICD-9-CM: 703.8 Chronic 2/27/2014 - Present    Overview Signed 2/27/2014  7:54 AM by Ruth Ann SAEZ     2/27/2014: Left 2nd toe             Chronic pain associated with significant psychosocial dysfunction (Chronic) ICD-10-CM: G89.4  ICD-9-CM: 338.4  2/17/2014 - Present        Depression with anxiety (Chronic) ICD-10-CM: F41.8  ICD-9-CM: 300.4 Chronic 2/17/2014 - Present        Other somatoform disorders (Chronic) ICD-10-CM: F45.8  ICD-9-CM: 300.89 Chronic 2/17/2014 - Present        Chronic chest pain (Chronic) ICD-10-CM: R07.9, G89.29  ICD-9-CM: 786.50, 338.29 Chronic 1/13/2014 - Present        Hyperlipidemia (Chronic) ICD-10-CM: E78.5  ICD-9-CM: 272.4 Chronic 12/9/2013 - Present        UTI (urinary tract infection) ICD-10-CM: N39.0  ICD-9-CM: 599.0  12/9/2013 - Present        Insomnia (Chronic) ICD-10-CM: G47.00  ICD-9-CM: 780.52  11/13/2013 - Present        Constipation (Chronic) ICD-10-CM: K59.00  ICD-9-CM: 564.00 Chronic 8/22/2013 - Present        Abdominal pain ICD-10-CM: R10.9  ICD-9-CM: 789.00  8/16/2013 - Present        Dizziness of unknown cause ICD-10-CM: R42  ICD-9-CM: 780.4  8/13/2013 - Present        Neutropenia ([facility] ICD-10-CM: D70.9  ICD-9-CM: 288.00  8/13/2013 - Present        HTN, goal below 130/80 (Chronic) ICD-10-CM: I10  ICD-9-CM: 401.9 Chronic 9/7/2012 - Present        Chronic headache (Chronic) ICD-10-CM: R51.9, G89.29  ICD-9-CM: 784. 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  ICD-9-CM: 301.89 Chronic 2/10/2015 - 10/27/2015        RESOLVED: Vaginal pain (Chronic) ICD-10-CM: R10.2  ICD-9-CM: 625.9 Chronic 7/30/2014 - 10/27/2015        RESOLVED: Agoraphobia without mention of panic attacks (Chronic) ICD-10-CM: F40.00  ICD-9-CM: 300.22 Chronic 2/17/2014 - 2/17/2014    Overview Signed 2/17/2014  3:27 AM by Ruth Ann SAEZ     1/27/14: Severe agoraphobia cannot even see family, almost all communication is now via telephone             RESOLVED: Depression (Chronic) ICD-10-CM: F32.9  ICD-9-CM: 068  8/18/2013 - 2/17/2014        RESOLVED: Anxiety disorder (Chronic) ICD-10-CM: F41.9  ICD-9-CM: 300.00 Chronic 8/18/2013 - 2/17/2014        RESOLVED: Type II or unspecified type diabetes mellitus with neurological manifestations, uncontrolled(250.62) ([facility]) (Chronic) ICD-10-CM: E11.49  ICD-9-CM: 250.62 Chronic 9/7/2012 - 10/16/2015          22    Final Diagnoses:   Acute coronary syndromes ([facility] [I24.9]    Reason for Hospitalization:    Generalized weakness  Nausea and chest pain      Hospital Course:     75F, H/o HTN with generalized weakness s/t COVID-19 pneumonia and NSTEMI- medical management advised by cardiology started on the meds listed below    During the course of her stay SNF was recommended but daughter wanted [facility] with 24/7 care. She did complained of left sided pleuritic chest pain- topical analgesics. INSTRUCTIONS ON DISCHARGE      (1) diet: Rec cont puree/NTL w/ strict asp/GERD precautions and encourage PO intake. Concerns for adequate nutritional intake are present she appears failure to thrive     (2) aggressive physical therapy: recommended SNF- declined. Need aggressive PT OT to prevent deconditioning     (3) the following medications are added to the regimen:      ASPIRIN and PLAVIX daily      METOPROLOL S 25mg twice a day      ZETIA 10mg daily       LASIX 40mg daily      (4) F/u with cardiology in 1 week. Discharge Medications:   Current Discharge Medication List      START taking these medications    Details   aspirin 81 mg chewable tablet Take 1 Tab by mouth daily. Qty: 30 Tab, Refills: 0      capsicum oleoresin 0.025 % topical cream Apply  to affected area three (3) times daily. Qty: 60 g, Refills: 0      clopidogreL (PLAVIX) 75 mg tab Take 1 Tab by mouth daily. Qty: 30 Tab, Refills: 0      ezetimibe (ZETIA) 10 mg tablet Take 1 Tab by mouth daily.   Qty: 30 Tab, Refills: 0      furosemide (LASIX) 40 mg tablet CHF  Qty: 30 Tab, Refills: 0      metoprolol succinate (TOPROL-XL) 25 mg XL tablet Take 1 Tab by mouth every twelve (12) hours.  Qty: 60 Tab, Refills: 0         CONTINUE these medications which have NOT CHANGED    Details   senna (Senna) 8.6 mg tablet Take 1 Tab by mouth two (2) times a day.      bisacodyL (DULCOLAX) 5 mg EC tablet Take 10 mg by mouth daily.      amLODIPine (NORVASC) 2.5 mg tablet Take 2.5 mg by mouth daily.      venlafaxine-SR (EFFEXOR-XR) 37.5 mg capsule Take 37.5 mg by mouth daily.      polyethylene glycol (MIRALAX) 17 gram/dose powder Take 17 g by mouth daily. 1 tablespoon with 8 oz of water daily  Qty: 170 g, Refills: 0         STOP taking these medications       LORazepam (ATIVAN) 0.5 mg tablet Comments:   Reason for Stopping:                 Follow up Care:    1. Bubba Hull MD in 1-2 weeks.  Please call to set up an appointment shortly after discharge.      Diet: cardiac    Disposition:  Denies SNF,  for PT OT speech    Advanced Directive:   FULL x   DNR      Discharge Exam:  PHYSICAL EXAM:  General: Alert and awake  Skin: Extremities and face reveal no rashes.   HEENT: Sclerae anicteric. No oral ulcers. No ENT discharge. The neck is supple.   Cardiovascular: Regular rate and rhythm.  PMI nondisplaced. Carotids without bruits. Mild tenderness on chest wall on left side +ve  Respiratory: Comfortable breathing with no accessory muscle use. Clear breath sounds with no wheezes, rales, or rhonchi.   GI: Abdomen nondistended, soft, and nontender. Normal active bowel sounds.    Rectal: Deferred   Musculoskeletal: No pitting edema of the lower legs. Extremities have good range of motion. No costovertebral tenderness.   Neurological: alert , more oriented then yesterday, Generalized weakness  Psychiatric: calm and cooperative    CONSULTATIONS: cardioloigy    Significant Diagnostic Studies:     Radiologic Studies -   Results from Hospital Encounter encounter on 02/16/21   XR CHEST SNGL V    Narrative Study: Chest radiograph(s), 1 view    Clinical Indication: Shortness of breath, COVID.    Comparison: Acute series  dated 7/16/2020. Findings: The cardiac silhouette is normal in size. Mild tortuosity of the thoracic aorta. Overlying monitoring leads. Patchy bilateral opacities throughout both lungs, right greater than left. No  large pleural effusion. No discernible pneumothorax. Osteopenia. Gaseous distention of the hepatic flexure and transverse colon. Impression Bilateral airspace disease/pneumonia.       CT Results  (Last 48 hours)    None              Discharge time spent 35 minutes    Signed:  Wilfrido Villalta MD  2/23/2021  11:35 AM

## 2021-02-24 NOTE — PROGRESS NOTES
Physician Progress Note      PATIENT:               Wilton Mohan  CSN #:                  [id number]  :                       1945  ADMIT DATE:       2021 10:07 PM  Janice Lujan DATE:        2021 10:17 PM  RESPONDING  PROVIDER #:        Yuniel Overton MD          QUERY TEXTJenette Collet Afternoon    This patient admitted Patient admitted with Chest pain. There is documentation that the patient test positive last week at nursing [facility] for [address] [place] [address] 19 is positive here for this admission . Documentation notes the patient is not hypoxic      If possible, please document in progress notes and discharge summary if patient has an active Covid-19 infection or if patient is testing positive for Covid-19 without a current active infection: The medical record reflects the following:  Risk Factors: Group home/nursing home resident, recent hospitalization at [facility]: H&P notes \"recent COVID positive test. \" Repeat Rapid here is positive. the patient o2 sats 93-97% on RA CXR this admission notes Alex airspace disease/pneumonia  Treatment: Current treatment for this admission notes, droplet isolation, telemetry, o2 monitoring, CXT    Thank you for clarifying  Cathleen Delon, [facility], [address] [place] [address], [facility]  [phone]  Options provided:  -- Active Covid-19 infection is being treated and/or evaluated on current encounter  -- Positive Covid test result without an active current Covid-19 infection  -- Other - I will add my own diagnosis  -- Disagree - Not applicable / Not valid  -- Disagree - Clinically unable to determine / Unknown  -- Refer to Clinical Documentation Reviewer    PROVIDER RESPONSE TEXT:    Patient has an active Covid-19 infection that is being treated and/or evaluated on current encounter.     Query created by: Kellie Land on 2021 3:55 PM      Electronically signed by:  Yuniel Overton MD 2021 3:37 PM

## 2021-02-24 NOTE — PROGRESS NOTES
Patient had stable vital signs. Some nausea/vomitting noted this morning but resolved. Patient has some confusion noted. This nurse bladder scanned the patient due to poor urinary output, bladder scan showed 64 mL. Patient to d/c, IVs removed with catheters intact upon removal.  Awaiting transportation at this time.

## 2021-02-25 ENCOUNTER — HOSPITAL ENCOUNTER (INPATIENT)
Age: 76
LOS: 9 days | Discharge: HOME HEALTH | DRG: 640 | End: 2021-03-06
Attending: EMERGENCY MEDICINE | Admitting: HOSPITALIST
Payer: MEDICARE

## 2021-02-25 DIAGNOSIS — E86.1 HYPOVOLEMIA: ICD-10-CM

## 2021-02-25 DIAGNOSIS — F03.90 DEMENTIA WITHOUT BEHAVIORAL DISTURBANCE, UNSPECIFIED DEMENTIA TYPE: ICD-10-CM

## 2021-02-25 DIAGNOSIS — J18.9 COMMUNITY ACQUIRED PNEUMONIA, UNSPECIFIED LATERALITY: ICD-10-CM

## 2021-02-25 DIAGNOSIS — E87.1 HYPONATREMIA: Primary | ICD-10-CM

## 2021-02-25 DIAGNOSIS — R53.81 PHYSICAL DECONDITIONING: ICD-10-CM

## 2021-02-25 DIAGNOSIS — Z71.89 GOALS OF CARE, COUNSELING/DISCUSSION: ICD-10-CM

## 2021-02-25 DIAGNOSIS — Z66 DNR (DO NOT RESUSCITATE): ICD-10-CM

## 2021-02-25 DIAGNOSIS — R41.0 CONFUSED: ICD-10-CM

## 2021-02-25 DIAGNOSIS — Z78.9 FULL CODE STATUS: ICD-10-CM

## 2021-02-25 PROBLEM — E87.0 HYPERNATREMIA: Status: ACTIVE | Noted: 2021-02-25

## 2021-02-25 PROBLEM — I49.9 DYSRHYTHMIA, CARDIAC: Status: ACTIVE | Noted: 2021-02-25

## 2021-02-25 PROBLEM — Z86.16 HISTORY OF COVID-19: Status: ACTIVE | Noted: 2021-02-25

## 2021-02-25 PROBLEM — N20.0 RENAL STONE: Status: ACTIVE | Noted: 2021-02-25

## 2021-02-25 PROBLEM — N17.9 AKI (ACUTE KIDNEY INJURY) (HCC): Status: ACTIVE | Noted: 2021-02-25

## 2021-02-25 PROBLEM — R11.2 NAUSEA & VOMITING: Status: ACTIVE | Noted: 2021-02-25

## 2021-02-25 LAB
ALBUMIN SERPL-MCNC: 2.5 G/DL (ref 3.5–5)
ALBUMIN/GLOB SERPL: 0.5 {RATIO} (ref 1.1–2.2)
ALP SERPL-CCNC: 65 U/L (ref 45–117)
ALT SERPL-CCNC: 16 U/L (ref 12–78)
ANION GAP SERPL CALC-SCNC: 6 MMOL/L (ref 5–15)
AST SERPL-CCNC: 17 U/L (ref 15–37)
ATRIAL RATE: 69 BPM
BASOPHILS # BLD: 0 K/UL (ref 0–0.1)
BASOPHILS NFR BLD: 0 % (ref 0–1)
BILIRUB SERPL-MCNC: 0.6 MG/DL (ref 0.2–1)
BUN SERPL-MCNC: 68 MG/DL (ref 6–20)
BUN/CREAT SERPL: 56 (ref 12–20)
CALCIUM SERPL-MCNC: 8.9 MG/DL (ref 8.5–10.1)
CALCULATED P AXIS, ECG09: 43 DEGREES
CALCULATED R AXIS, ECG10: 24 DEGREES
CALCULATED T AXIS, ECG11: 20 DEGREES
CHLORIDE SERPL-SCNC: 115 MMOL/L (ref 97–108)
CO2 SERPL-SCNC: 31 MMOL/L (ref 21–32)
CREAT SERPL-MCNC: 1.22 MG/DL (ref 0.55–1.02)
DIAGNOSIS, 93000: NORMAL
DIFFERENTIAL METHOD BLD: ABNORMAL
EOSINOPHIL # BLD: 0 K/UL (ref 0–0.4)
EOSINOPHIL NFR BLD: 0 % (ref 0–7)
ERYTHROCYTE [DISTWIDTH] IN BLOOD BY AUTOMATED COUNT: 14.3 % (ref 11.5–14.5)
EST. AVERAGE GLUCOSE BLD GHB EST-MCNC: 117 MG/DL
GLOBULIN SER CALC-MCNC: 5.5 G/DL (ref 2–4)
GLUCOSE SERPL-MCNC: 101 MG/DL (ref 65–100)
HBA1C MFR BLD: 5.7 % (ref 4–5.6)
HCT VFR BLD AUTO: 37.2 % (ref 35–47)
HGB BLD-MCNC: 11 G/DL (ref 11.5–16)
IMM GRANULOCYTES # BLD AUTO: 0.1 K/UL (ref 0–0.04)
IMM GRANULOCYTES NFR BLD AUTO: 1 % (ref 0–0.5)
LACTATE SERPL-SCNC: 1.7 MMOL/L (ref 0.4–2)
LIPASE SERPL-CCNC: 70 U/L (ref 73–393)
LYMPHOCYTES # BLD: 0.8 K/UL (ref 0.8–3.5)
LYMPHOCYTES NFR BLD: 11 % (ref 12–49)
MAGNESIUM SERPL-MCNC: 2.7 MG/DL (ref 1.6–2.4)
MCH RBC QN AUTO: 26.1 PG (ref 26–34)
MCHC RBC AUTO-ENTMCNC: 29.6 G/DL (ref 30–36.5)
MCV RBC AUTO: 88.2 FL (ref 80–99)
MONOCYTES # BLD: 0.9 K/UL (ref 0–1)
MONOCYTES NFR BLD: 12 % (ref 5–13)
NEUTS SEG # BLD: 5.6 K/UL (ref 1.8–8)
NEUTS SEG NFR BLD: 76 % (ref 32–75)
NRBC # BLD: 0 K/UL (ref 0–0.01)
NRBC BLD-RTO: 0 PER 100 WBC
P-R INTERVAL, ECG05: 108 MS
PLATELET # BLD AUTO: 303 K/UL (ref 150–400)
PMV BLD AUTO: 11.2 FL (ref 8.9–12.9)
POTASSIUM SERPL-SCNC: 3.5 MMOL/L (ref 3.5–5.1)
PROCALCITONIN SERPL-MCNC: 0.23 NG/ML
PROT SERPL-MCNC: 8 G/DL (ref 6.4–8.2)
Q-T INTERVAL, ECG07: 404 MS
QRS DURATION, ECG06: 94 MS
QTC CALCULATION (BEZET), ECG08: 432 MS
RBC # BLD AUTO: 4.22 M/UL (ref 3.8–5.2)
SODIUM SERPL-SCNC: 152 MMOL/L (ref 136–145)
TROPONIN I SERPL-MCNC: 0.06 NG/ML
TROPONIN I SERPL-MCNC: 0.06 NG/ML
VENTRICULAR RATE, ECG03: 69 BPM
WBC # BLD AUTO: 7.4 K/UL (ref 3.6–11)

## 2021-02-25 PROCEDURE — 99285 EMERGENCY DEPT VISIT HI MDM: CPT

## 2021-02-25 PROCEDURE — 83605 ASSAY OF LACTIC ACID: CPT

## 2021-02-25 PROCEDURE — 96374 THER/PROPH/DIAG INJ IV PUSH: CPT

## 2021-02-25 PROCEDURE — 85025 COMPLETE CBC W/AUTO DIFF WBC: CPT

## 2021-02-25 PROCEDURE — 83690 ASSAY OF LIPASE: CPT

## 2021-02-25 PROCEDURE — 74011000258 HC RX REV CODE- 258: Performed by: PHYSICIAN ASSISTANT

## 2021-02-25 PROCEDURE — 36415 COLL VENOUS BLD VENIPUNCTURE: CPT

## 2021-02-25 PROCEDURE — 80053 COMPREHEN METABOLIC PANEL: CPT

## 2021-02-25 PROCEDURE — 96375 TX/PRO/DX INJ NEW DRUG ADDON: CPT

## 2021-02-25 PROCEDURE — 93005 ELECTROCARDIOGRAM TRACING: CPT

## 2021-02-25 PROCEDURE — 84484 ASSAY OF TROPONIN QUANT: CPT

## 2021-02-25 PROCEDURE — 65660000000 HC RM CCU STEPDOWN

## 2021-02-25 PROCEDURE — 84145 PROCALCITONIN (PCT): CPT

## 2021-02-25 PROCEDURE — 74011000258 HC RX REV CODE- 258: Performed by: INTERNAL MEDICINE

## 2021-02-25 PROCEDURE — 87040 BLOOD CULTURE FOR BACTERIA: CPT

## 2021-02-25 PROCEDURE — 94760 N-INVAS EAR/PLS OXIMETRY 1: CPT

## 2021-02-25 PROCEDURE — 74011250637 HC RX REV CODE- 250/637: Performed by: HOSPITALIST

## 2021-02-25 PROCEDURE — 74011250636 HC RX REV CODE- 250/636: Performed by: HOSPITALIST

## 2021-02-25 PROCEDURE — 74011000258 HC RX REV CODE- 258: Performed by: HOSPITALIST

## 2021-02-25 PROCEDURE — 74011250636 HC RX REV CODE- 250/636: Performed by: PHYSICIAN ASSISTANT

## 2021-02-25 PROCEDURE — 83036 HEMOGLOBIN GLYCOSYLATED A1C: CPT

## 2021-02-25 PROCEDURE — 83735 ASSAY OF MAGNESIUM: CPT

## 2021-02-25 RX ORDER — PROMETHAZINE HYDROCHLORIDE 25 MG/1
12.5 TABLET ORAL
Status: DISCONTINUED | OUTPATIENT
Start: 2021-02-25 | End: 2021-03-06 | Stop reason: HOSPADM

## 2021-02-25 RX ORDER — AMLODIPINE BESYLATE 2.5 MG/1
2.5 TABLET ORAL DAILY
Status: DISCONTINUED | OUTPATIENT
Start: 2021-02-25 | End: 2021-03-06 | Stop reason: HOSPADM

## 2021-02-25 RX ORDER — ENOXAPARIN SODIUM 100 MG/ML
40 INJECTION SUBCUTANEOUS DAILY
Status: DISCONTINUED | OUTPATIENT
Start: 2021-02-25 | End: 2021-02-28

## 2021-02-25 RX ORDER — SENNOSIDES 8.6 MG/1
1 TABLET ORAL 2 TIMES DAILY
Status: DISCONTINUED | OUTPATIENT
Start: 2021-02-25 | End: 2021-03-02

## 2021-02-25 RX ORDER — ACETAMINOPHEN 650 MG/1
650 SUPPOSITORY RECTAL
Status: DISCONTINUED | OUTPATIENT
Start: 2021-02-25 | End: 2021-03-06 | Stop reason: HOSPADM

## 2021-02-25 RX ORDER — ACETAMINOPHEN 325 MG/1
650 TABLET ORAL
Status: DISCONTINUED | OUTPATIENT
Start: 2021-02-25 | End: 2021-03-06 | Stop reason: HOSPADM

## 2021-02-25 RX ORDER — LORAZEPAM 0.5 MG/1
0.5 TABLET ORAL
Status: DISCONTINUED | OUTPATIENT
Start: 2021-02-25 | End: 2021-03-06 | Stop reason: HOSPADM

## 2021-02-25 RX ORDER — LEVOFLOXACIN 5 MG/ML
500 INJECTION, SOLUTION INTRAVENOUS EVERY 24 HOURS
Status: DISCONTINUED | OUTPATIENT
Start: 2021-02-25 | End: 2021-02-25

## 2021-02-25 RX ORDER — EZETIMIBE 10 MG/1
10 TABLET ORAL DAILY
Status: DISCONTINUED | OUTPATIENT
Start: 2021-02-25 | End: 2021-03-06 | Stop reason: HOSPADM

## 2021-02-25 RX ORDER — POLYETHYLENE GLYCOL 3350 17 G/17G
17 POWDER, FOR SOLUTION ORAL DAILY PRN
Status: DISCONTINUED | OUTPATIENT
Start: 2021-02-25 | End: 2021-03-06 | Stop reason: HOSPADM

## 2021-02-25 RX ORDER — SODIUM CHLORIDE 0.9 % (FLUSH) 0.9 %
5-40 SYRINGE (ML) INJECTION AS NEEDED
Status: DISCONTINUED | OUTPATIENT
Start: 2021-02-25 | End: 2021-03-06 | Stop reason: HOSPADM

## 2021-02-25 RX ORDER — ONDANSETRON 2 MG/ML
4 INJECTION INTRAMUSCULAR; INTRAVENOUS
Status: COMPLETED | OUTPATIENT
Start: 2021-02-25 | End: 2021-02-25

## 2021-02-25 RX ORDER — CALCIUM GLUCONATE 94 MG/ML
1 INJECTION, SOLUTION INTRAVENOUS
Status: COMPLETED | OUTPATIENT
Start: 2021-02-25 | End: 2021-02-25

## 2021-02-25 RX ORDER — SODIUM CHLORIDE 9 MG/ML
50 INJECTION, SOLUTION INTRAVENOUS CONTINUOUS
Status: DISCONTINUED | OUTPATIENT
Start: 2021-02-25 | End: 2021-02-25

## 2021-02-25 RX ORDER — SODIUM CHLORIDE 450 MG/100ML
75 INJECTION, SOLUTION INTRAVENOUS CONTINUOUS
Status: DISCONTINUED | OUTPATIENT
Start: 2021-02-25 | End: 2021-02-27

## 2021-02-25 RX ORDER — ONDANSETRON 2 MG/ML
4 INJECTION INTRAMUSCULAR; INTRAVENOUS
Status: DISCONTINUED | OUTPATIENT
Start: 2021-02-25 | End: 2021-03-06 | Stop reason: HOSPADM

## 2021-02-25 RX ORDER — VENLAFAXINE HYDROCHLORIDE 37.5 MG/1
37.5 CAPSULE, EXTENDED RELEASE ORAL DAILY
Status: DISCONTINUED | OUTPATIENT
Start: 2021-02-25 | End: 2021-03-06 | Stop reason: HOSPADM

## 2021-02-25 RX ORDER — SODIUM CHLORIDE 0.9 % (FLUSH) 0.9 %
5-40 SYRINGE (ML) INJECTION EVERY 8 HOURS
Status: DISCONTINUED | OUTPATIENT
Start: 2021-02-25 | End: 2021-03-06 | Stop reason: HOSPADM

## 2021-02-25 RX ORDER — METOPROLOL SUCCINATE 25 MG/1
25 TABLET, EXTENDED RELEASE ORAL EVERY 12 HOURS
Status: DISCONTINUED | OUTPATIENT
Start: 2021-02-25 | End: 2021-03-06 | Stop reason: HOSPADM

## 2021-02-25 RX ORDER — HYDRALAZINE HYDROCHLORIDE 20 MG/ML
10 INJECTION INTRAMUSCULAR; INTRAVENOUS
Status: DISCONTINUED | OUTPATIENT
Start: 2021-02-25 | End: 2021-03-06 | Stop reason: HOSPADM

## 2021-02-25 RX ORDER — GUAIFENESIN 100 MG/5ML
81 LIQUID (ML) ORAL DAILY
Status: DISCONTINUED | OUTPATIENT
Start: 2021-02-25 | End: 2021-03-06 | Stop reason: HOSPADM

## 2021-02-25 RX ORDER — LEVOFLOXACIN 5 MG/ML
750 INJECTION, SOLUTION INTRAVENOUS
Status: DISCONTINUED | OUTPATIENT
Start: 2021-02-25 | End: 2021-02-27

## 2021-02-25 RX ORDER — BISACODYL 5 MG
10 TABLET, DELAYED RELEASE (ENTERIC COATED) ORAL DAILY
Status: DISCONTINUED | OUTPATIENT
Start: 2021-02-25 | End: 2021-03-01

## 2021-02-25 RX ORDER — CLOPIDOGREL BISULFATE 75 MG/1
75 TABLET ORAL DAILY
Status: DISCONTINUED | OUTPATIENT
Start: 2021-02-25 | End: 2021-03-06 | Stop reason: HOSPADM

## 2021-02-25 RX ORDER — POLYETHYLENE GLYCOL 3350 17 G/17G
17 POWDER, FOR SOLUTION ORAL DAILY
Status: DISCONTINUED | OUTPATIENT
Start: 2021-02-25 | End: 2021-03-06 | Stop reason: HOSPADM

## 2021-02-25 RX ADMIN — BISACODYL 10 MG: 5 TABLET, COATED ORAL at 08:58

## 2021-02-25 RX ADMIN — LORAZEPAM 0.5 MG: 0.5 TABLET ORAL at 20:10

## 2021-02-25 RX ADMIN — CEFTRIAXONE 1 G: 1 INJECTION, POWDER, FOR SOLUTION INTRAMUSCULAR; INTRAVENOUS at 03:28

## 2021-02-25 RX ADMIN — ASPIRIN 81 MG: 81 TABLET, CHEWABLE ORAL at 08:58

## 2021-02-25 RX ADMIN — Medication 10 ML: at 15:05

## 2021-02-25 RX ADMIN — Medication 10 ML: at 22:00

## 2021-02-25 RX ADMIN — CALCIUM GLUCONATE 1 G: 98 INJECTION, SOLUTION INTRAVENOUS at 02:27

## 2021-02-25 RX ADMIN — METOPROLOL SUCCINATE 25 MG: 50 TABLET, EXTENDED RELEASE ORAL at 08:58

## 2021-02-25 RX ADMIN — VENLAFAXINE HYDROCHLORIDE 37.5 MG: 37.5 CAPSULE, EXTENDED RELEASE ORAL at 11:24

## 2021-02-25 RX ADMIN — SODIUM CHLORIDE 125 ML/HR: 4.5 INJECTION, SOLUTION INTRAVENOUS at 22:34

## 2021-02-25 RX ADMIN — SODIUM CHLORIDE 1000 ML: 9 INJECTION, SOLUTION INTRAVENOUS at 01:46

## 2021-02-25 RX ADMIN — ENOXAPARIN SODIUM 40 MG: 40 INJECTION SUBCUTANEOUS at 08:57

## 2021-02-25 RX ADMIN — AMLODIPINE BESYLATE 2.5 MG: 2.5 TABLET ORAL at 08:59

## 2021-02-25 RX ADMIN — SODIUM CHLORIDE 75 ML/HR: 4.5 INJECTION, SOLUTION INTRAVENOUS at 03:26

## 2021-02-25 RX ADMIN — Medication 10 ML: at 05:43

## 2021-02-25 RX ADMIN — ONDANSETRON 4 MG: 2 INJECTION INTRAMUSCULAR; INTRAVENOUS at 02:11

## 2021-02-25 RX ADMIN — ONDANSETRON 4 MG: 2 INJECTION INTRAMUSCULAR; INTRAVENOUS at 22:40

## 2021-02-25 RX ADMIN — EZETIMIBE 10 MG: 10 TABLET ORAL at 11:23

## 2021-02-25 RX ADMIN — LEVOFLOXACIN 750 MG: 5 INJECTION, SOLUTION INTRAVENOUS at 03:47

## 2021-02-25 RX ADMIN — CLOPIDOGREL BISULFATE 75 MG: 75 TABLET ORAL at 08:58

## 2021-02-25 RX ADMIN — SENNOSIDES 8.6 MG: 8.6 TABLET, FILM COATED ORAL at 11:23

## 2021-02-25 RX ADMIN — POLYETHYLENE GLYCOL 3350 17 G: 17 POWDER, FOR SOLUTION ORAL at 08:58

## 2021-02-25 NOTE — ED NOTES
TRANSFER - OUT REPORT:    Verbal report given to Fortunato(name) on [facility]  being transferred to [facility](unit) for routine progression of care       Report consisted of patients Situation, Background, Assessment and   Recommendations(SBAR). Information from the following report(s) SBAR, Kardex, ED Summary, [facility] and Recent Results was reviewed with the receiving nurse. Lines:   Peripheral IV 02/25/21 Left Forearm (Active)       Peripheral IV 02/25/21 Left Antecubital (Active)   Site Assessment Clean, dry, & intact 02/25/21 0304   Phlebitis Assessment 0 02/25/21 0304   Infiltration Assessment 0 02/25/21 0304   Dressing Status Clean, dry, & intact 02/25/21 0304   Dressing Type Transparent 02/25/21 0304   Hub Color/Line Status Patent; Flushed;Capped 02/25/21 0304   Action Taken Blood drawn 02/25/21 0304       Peripheral IV 02/25/21 Right Forearm (Active)   Site Assessment Clean, dry, & intact 02/25/21 0305   Phlebitis Assessment 0 02/25/21 0305   Infiltration Assessment 0 02/25/21 0305   Dressing Status Clean, dry, & intact 02/25/21 0305   Dressing Type Transparent 02/25/21 0305   Hub Color/Line Status Patent; Flushed;Capped 02/25/21 0305   Action Taken Blood drawn 02/25/21 0305        Opportunity for questions and clarification was provided.       Patient transported with:   Registered Nurse

## 2021-02-25 NOTE — PROGRESS NOTES
Transition of Care Plan:    Disposition: Home with daughter and HH vs SNF/IPR  Recommending PT/OT consults to assist with appropriate disposition and DME needs   -If returning home: pt will need MORENA orders to [facility] home health  -If transitioning to SNF/IPR: pt will need COVID test prior to d/c   Follow up appointments: PCP  DME needed: TBD, pt has no DME at home   Transportation at Discharge: Anticipating BLS transport at d/c  [place] or means to access home:  Pt's daughter to receive pt at home       Medicare letter: Will need 2nd [facility] letter prior to d/c  Caregiver Contact: Pt's daughter, Johny Barrientos [phone]  Discharge Caregiver contacted prior to discharge? This CM has talked with pt's daughter, Val Hassan, on today. Unit CM will continue communication with pt's daughter in preparation for discharge. Reason for Readmission: new infiltrate in the bases greater on right. Recent COVID19 infection           RUR Score/Risk Level:  20% moderate risk for readmission        PCP: First and Last name: Urbano Causey MD     Name of Practice:    Are you a current patient: Yes/No: Yes   Approximate date of last visit: 2 months ago   Can you participate in a virtual visit with your PCP: Yes, with daughter's assistance     Is a Care Conference indicated: No care conference needs identified. Did you attend your follow up appointment (s): If not, why not: Pt returned to hospital prior to following up with PCP          Resources/supports as identified by patient/family: Supportive daughters          Top Challenges facing patient (as identified by patient/family and CM): Finances/Medication cost?  Pt has concerns with affording medications. Pt's daughter has applied for Medicaid to assist. Pt is awaiting Medicaid determination, would benefit from Good [employer] card. Transportation   Will need BLS transport at d/c. Support system or lack thereof? Pt's two daughters     Living arrangements?    Pt resides with her daughter, Jhonatan Allen, in 1 story home with 4 LICHA. Self-care/ADLs/Cognition? Pt's daughter reports that pt needs assistance with all ADL's. Current Advanced Directive/Advance Care Plan:  Advance Care Planning     General Advance Care Planning (ACP) Conversation      Date of Conversation: 2/25/2021  Conducted with: Pt's daughter, Shaunna Virgen:       Content/Action Overview:   Not appropriate to discuss AMD with pt, pt is not alert and oriented. Reviewed DNR/DNI and patient elects Full Code (Attempt Resuscitation)    Additional Comments: Pt's daughter, Stacy Palacio, states that she normally is the decision maker for pt, but that she does not have legal documentation listing her as decision maker. Pt also has another daughter, Yuliya Fernandes. Legal NOK would fall to both daughters without AMD documentation. Length of Voluntary ACP Conversation in minutes:  <16 minutes (Non-Billable)    [address] for utilizing home health:   [facility] would like to use this agency at d/c if returning home with daughter, will need MORENA Orders. Transition of Care Plan:    Based on readmission, the patient's previous Plan of Care   has been evaluated and/or modified. The current Transition of Care Plan is:  Home with daughter and HH vs SNF/IPR - recommending PT/OT consults to assist with disposition; CM has sent message to attending via [employer]     CM reviewed chart. CM completed assessment with pt's daughter, Stacy Palacio, via phone. CM introduced self/role, verified demographics, and discussed discharge planning. Pt's daughter states that pt was discharged from [facility] and returned home with her. They reside in 1 story home with 4 LICHA. Pt's daughter states that pt needs extensive support in home with adl's/iadl's. She states that pt has had issues with ambulation.  Pt's daughter Jhonatan Allen works outside of the home 7AM-3PM, and states that her sister, Eladia Guerrero, stays with pt during this time. She does report that pt has approximately two hours alone during the day based on her and her sister's work schedules. Pt has no DME at home. Pt will need BLS transport at d/c. If returning home, pt's daughter would like to resume [facility] through [facility]. Pt's pharmacy preference [facility] on [address] and [facility]. Of note, pt's daughter states that she has submitted Medicaid application and supporting documents to Jadon Vasquez approximately one month ago. Pt's daughter states that she is hoping for a determination in the next few days. Pt's daughter voices hopes that Medicaid will assist with medication cost for pt and also with caregiver support to assist pt's daughters in providing her care in the home. Unit care management will continue to follow for transition of care planning needs. Care Management Interventions  PCP Verified by CM: Yes  Palliative Care Criteria Met (RRAT>21 & CHF Dx)?: No  Mode of Transport at Discharge: BLS  Transition of Care Consult (CM Consult): Discharge Planning  Discharge Durable Medical Equipment: No(Pt has no DME at home )  Physical Therapy Consult: No  Occupational Therapy Consult: No  Speech Therapy Consult: No  Current Support Network: Relative's Home(Pt discharged from SNF and has been living with Jay leroy Rater.  Pt's main support is her daughters, Fransisca Cornelius and Eladia Guerrero, who takes turns staying with her/providing care around their work schedules )  Confirm Follow Up Transport: Family  Discharge Location  Discharge Placement: Home with home health(Pt previously open with [facility]; recommending PT/OT consults to assist with appropriate disposition and DME )    Readmission Assessment  Number of days since last admission?: 1-7 days  Previous disposition: Home with Home Health  Who is being interviewed?: Caregiver(Pt's daughter, Jay Rater)  What was the patient's/caregiver's perception as to why they think they needed to return back to the hospital?: Other (Comment), Not enough help at home, Did not realize care needs would be so extensive(Pt went to [facility] ER due to nausea, vomiting, dehydration.)  Did you visit your Primary Care Physician after you left the hospital, before you returned this time?: No  Why weren't you able to visit your PCP?: Other (Comment)(Did not get to follow up prior to returning to hospital)  Did you see a specialist, such as Cardiac, Pulmonary, Orthopedic Physician, etc. after you left the hospital?: No  Who advised the patient to return to the hospital?: Self-referral, Caregiver  Does the patient report anything that got in the way of taking their medications?: Yes  What reasons did they give?: Inadequate medication insurance(Pt reports having issues with affording medications. Pt's daughter applied to Medicaid approx one month ago ([facility]) to assist with medication costs. Pt is awaiting determination.  Would benefit from [facility].)  In our efforts to provide the best possible care to you and others like you, can you think of anything that we could have done to help you after you left the hospital the first time, so that you might not have needed to return so soon?: Additional Community resources available for illness support, Arrange for more help when leaving the hospital      Agustina Knight [address], [address]

## 2021-02-25 NOTE — ED NOTES
Report given to [facility], [address] [place] [address]. They were informed of patient chief complaint, current status, orders completed (to include IV access/medications/radiology testing), outstanding orders that still need to be completed, and the treatment plan. Ensured no questions or concerns regarding the patient prior to departure.

## 2021-02-25 NOTE — H&P
Hospitalist Admission Note    NAME: Jostin Harris   :  1945   MRN:  [id number]     Date/Time:  2021 2:33 AM    Patient PCP: Darshan Dawn MD  _____________________________________________________________________  Given the patient's current clinical presentation, I have a high level of concern for decompensation if discharged from the emergency department. Complex decision making was performed, which includes reviewing the patient's available past medical records, laboratory results, and x-ray films. My assessment of this patient's clinical condition and my plan of care is as follows. Assessment / Plan:  Patient being transfered from [facility] for nausea, vomiting and weakness, acute renal failure and dehydration. Patient has dementia and unable to give history. CT scan done at outside hospital reported as below ( CD is in the chart)  1. New infiltrate in the bases greater on the right. Correlate for pneumonia. 2.  Nonobstructive nephrolithiasis is identified without acute hydronephrosis or ureteric calculi  3. Again seen is a fecal retention which is diminishing liquid and caliber now compared to the prior. 4.  There is persistent dilatation of the sigmoid with an air-fluid level with redundancy and next trending into the right upper abdomen anterior to the liver  CXR at OSH reported: Diffuse atelectasis/pneumonia of the right lung, especially the upper lobe      Recent COVID19 infection (She was diagnosed with COVID on 21)  Rapid covid was positive in OSH On  as well  Cont. covid isolation  Possible CAP we will start on IV Levaquin  Renal stones without Hydronephrosis  Of note pt. Has h/o of Renal stone, but no sign of obstruction     Nausea and vomitting  Likely 2/2 severe obstipation CT Abd. At osh as above  Of note pt.  Has h/o chronic constipation   Will start on aggressive bowel regimen including enema  primay team to consider GI consult if no improvement    Hypernatremia sodium 152 at outside hospital lab likely 2/2 hypovolumia  HERBERTH Cr at 1.23  Likely prerenal 2/2 dehydration 2/2 decrease PO intake  SVT vs NSVT While in our ED Which terminated by itself prior to EKG being obtained. EKG NSR 69 Short MT , no acute Ischemic injury  Will check Cardiac lytes k/mag  And Cont. Tele       History of diastolic heart failure  currerntly Hypovolumic Hold diuretics and watch for fluid overload while cont gentle hydration      HTN/HLD/DM Cont. Home meds. ssi check a1c  Dementia  Cont. Supportive care  Anxiety  Hx agoraphobia per chart  History of psychiatric problems per emr        Code Status: FULL  NOK:  Maral Duncan Child   [phone]   unknown, Tiarra Daughter    done at outside hospital showed     DVT Prophylaxis: SQ HEP  GI Prophylaxis: not indicated    Baseline: Dependent/Demented        Subjective:   CHIEF COMPLAINT:  N/V/Weakness/HERBERTH/Dehydration    HISTORY OF PRESENT ILLNESS:     Stevan Beebe, 76 y.o. female with significant PMHx for dementia, HTN, hypelipidemia, and DM  Not on any rx, H/o diastolic HF, Recent H/O NSTEMI  managemd with medicine, presents by EMS to the ED as an ED to ED transfer from the free standing ED in [place]. The patient is a poor historian and all information was provided by EMS and per chart review. The patient has has been in and out of the hospital for the past month. She was diagnosed with COVID on 2/2/21 and admitted at [address] [place] [address] from 2/4-2/11. From there she was discharged to a [facility]. She then went to [facility] and was admitted from 2/17-2/23 for NSTEMI. She has been home for the past 2 days and then presented to [place] this evening for nausea, vomiting and generalized malaise. The patient has dementia and is babbling at baseline. At [place] [facility] her rapid COVID test was again/still positive. It was also determined that she had an elevated serum Cr at 1.23 and was sent to [facility] ER for admission for COVID and dehydration. There are no other complaints, changes, or physical findings at this time. PCP: Ronna Roes MD       ED COURSE:  While in ed patient's heart rate just jumped from the 80's to greater that 200 bpm. Appeared to be in SVT or possibly VTach on cardiac monitor per ed  House staff  but converted back to NSR prior to EKG being obtained. We were asked to admit for work up and evaluation of the above problems. Vital Signs-Reviewed the patient's vital signs.   Patient Vitals for the past 12 hrs:    Temp Pulse Resp BP SpO2   02/25/21 0128 98.2 °F (36.8 °C) 89 20 (!) 102/48 97 %          Past Medical History:   Diagnosis Date    Agoraphobia without mention of panic attacks 2/17/2014    Anxiety disorder 8/18/2013    Arthritis     osteo    CAD (coronary artery disease), native coronary artery 12/1/2015    no stents    Chronic chest pain 1/13/2014    Chronic pain associated with significant psychosocial dysfunction 2/17/2014    Depression 8/18/2013    Diabetes ([address]     type II    Duplicated right renal collecting system 3/13/2014    GERD (gastroesophageal reflux disease)     Gout, joint     Hypercholesteremia     hyercholesterolemia    Hypertension     Nephrolithiasis 3/13/2014    Personal history of noncompliance with medical treatment, presenting hazards to health 5/30/2014        Past Surgical History:   Procedure Laterality Date    EGD  4/23/2010         HX CHOLECYSTECTOMY  09/20/2018    lap jesus    HX CYST REMOVAL      cyst removed from left wrist    HX HYSTERECTOMY      partial    HX OTHER SURGICAL      bladder dilitation    HX TUBAL LIGATION      HX UROLOGICAL      kidney stones       Social History     Tobacco Use    Smoking status: Never Smoker    Smokeless tobacco: Never Used   Substance Use Topics    Alcohol use: No        Family History   Problem Relation Age of Onset    Stroke Mother     Heart Disease Mother     Cancer Father         type unknown    Heart Disease Son     Liver Disease Son     Heart Disease Daughter     Malignant Hyperthermia Neg Hx     Pseudocholinesterase Deficiency Neg Hx     Delayed Awakening Neg Hx     Post-op Nausea/Vomiting Neg Hx     Emergence Delirium Neg Hx     Post-op Cognitive Dysfunction Neg Hx     Other Neg Hx      Allergies   Allergen Reactions    Amoxicillin Hives    Sulfa (Sulfonamide Antibiotics) Hives and Itching    Mirtazapine Itching and Nausea Only     Funny feeling in chest    Percocet [Oxycodone-Acetaminophen] Nausea and Vomiting    Codeine Nausea and Vomiting    Crestor [Rosuvastatin] Other (comments)     myalgias    Nitroglycerin Unknown (comments)     Patient cannot remember why she is allergic to it      Prednisone Itching    Zithromax [Azithromycin] Itching     Not sure what it does,taken long time ago        Prior to Admission medications    Medication Sig Start Date End Date Taking? Authorizing Provider   aspirin 81 mg chewable tablet Take 1 Tab by mouth daily. 2/24/21   Migue Simental MD   capsicum oleoresin 0.025 % topical cream Apply  to affected area three (3) times daily. 2/23/21   Migue Simental MD   clopidogreL (PLAVIX) 75 mg tab Take 1 Tab by mouth daily. 2/24/21   Migue Simental MD   ezetimibe (ZETIA) 10 mg tablet Take 1 Tab by mouth daily. 2/24/21   Migue Simental MD   furosemide (LASIX) 40 mg tablet CHF 2/24/21   Migue Simental MD   metoprolol succinate (TOPROL-XL) 25 mg XL tablet Take 1 Tab by mouth every twelve (12) hours. 2/23/21   Migue Simental MD   senna (Senna) 8.6 mg tablet Take 1 Tab by mouth two (2) times a day. Provider, Historical   bisacodyL (DULCOLAX) 5 mg EC tablet Take 10 mg by mouth daily. Provider, Historical   amLODIPine (NORVASC) 2.5 mg tablet Take 2.5 mg by mouth daily. Provider, Historical   venlafaxine-SR (EFFEXOR-XR) 37.5 mg capsule Take 37.5 mg by mouth daily.  1/19/21   Provider, Historical   polyethylene glycol (MIRALAX) 17 gram/dose powder Take 17 g by mouth daily. 1 tablespoon with 8 oz of water daily 10/29/19   Herrera Noriega PA-C       REVIEW OF SYSTEMS:     I am not able to complete the review of systems because: The patient is intubated and sedated    The patient has altered mental status due to his acute medical problems    The patient has baseline aphasia from prior stroke(s)   x The patient has baseline dementia and is not reliable historian    The patient is in acute medical distress and unable to provide information           Total of 12 systems reviewed as follows:       POSITIVE= underlined text  Negative = text not underlined  General:  fever, chills, sweats, generalized weakness, weight loss/gain,      loss of appetite   Eyes:    blurred vision, eye pain, loss of vision, double vision  ENT:    rhinorrhea, pharyngitis   Respiratory:   cough, sputum production, SOB, MOORE, wheezing, pleuritic pain   Cardiology:   chest pain, palpitations, orthopnea, PND, edema, syncope   Gastrointestinal:  abdominal pain , N/V, diarrhea, dysphagia, constipation, bleeding   Genitourinary:  frequency, urgency, dysuria, hematuria, incontinence   Muskuloskeletal :  arthralgia, myalgia, back pain  Hematology:  easy bruising, nose or gum bleeding, lymphadenopathy   Dermatological: rash, ulceration, pruritis, color change / jaundice  Endocrine:   hot flashes or polydipsia   Neurological:  headache, dizziness, confusion, focal weakness, paresthesia,     Speech difficulties, memory loss, gait difficulty  Psychological: Feelings of anxiety, depression, agitation    Objective:   VITALS:    Visit Vitals  /66 (BP 1 Location: Right upper arm, BP Patient Position: At rest;Sitting)   Pulse 83   Temp 98.5 °F (36.9 °C)   Resp 20   Ht 5' 7\" (1.702 m)   Wt 62.5 kg (137 lb 12.6 oz)   SpO2 98%   BMI 21.58 kg/m²       PHYSICAL EXAM:     Constitutional:       General: She is not in acute distress. Chronically ill looking, cachectic.         Appearance: She is well-developed. She is not diaphoretic. HENT:      Head: Normocephalic and atraumatic. Eyes:      General:         Right eye: No discharge. Left eye: No discharge. Conjunctiva/sclera: Conjunctivae normal.   Neck:      Musculoskeletal: Normal range of motion and neck supple. Cardiovascular:      Rate and Rhythm: Normal rate and regular rhythm. Heart sounds: Normal heart sounds. No murmur. Pulmonary:      Effort: Pulmonary effort is normal. No respiratory distress. Breath sounds: Normal breath sounds. Skin:     General: Skin is warm and dry. Neurological : Awake, alert, not completely oriented. Psychiatric : Confused. _______________________________________________________________________  Care Plan discussed with:    Comments   Patient Y    Family      RN Y    Care Manager                    Consultant:  GONZALEZ ED MD   _______________________________________________________________________  Expected  Disposition:   Home with Family Y   HH/PT/OT/RN    SNF/LTC    [place]    ________________________________________________________________________  TOTAL TIME: 72   Minutes    Critical Care Provided     Minutes non procedure based      Comments    Y Reviewed previous records   >50% of visit spent in counseling and coordination of care Y Discussion with patient and/or family and questions answered       Given the patient's current clinical presentation, I have a high level of concern for decompensation if discharged from the ED. Complex decision making was performed which includes reviewing the patient's available past medical records, laboratory results, and Xray films.  I have also directly communicated my plan and discussed this case with the involved ED physician.     ____________________________________________________________________  Bernard Leblanc MD    Procedures: see electronic medical records for all procedures/Xrays and details which were not copied into this note but were reviewed prior to creation of Plan.     LAB DATA REVIEWED:    Recent Results (from the past 24 hour(s))   EKG, 12 LEAD, INITIAL    Collection Time: 02/25/21  1:56 AM   Result Value Ref Range    Ventricular Rate 69 BPM    Atrial Rate 69 BPM    P-R Interval 108 ms    QRS Duration 94 ms    Q-T Interval 404 ms    QTC Calculation (Bezet) 432 ms    Calculated P Axis 43 degrees    Calculated R Axis 24 degrees    Calculated T Axis 20 degrees    Diagnosis       Sinus rhythm with short SD  When compared with ECG of 16-JUL-2020 13:41,  Nonspecific T wave abnormality no longer evident in Anterolateral leads

## 2021-02-25 NOTE — ED PROVIDER NOTES
EMERGENCY DEPARTMENT HISTORY AND PHYSICAL EXAM      Date: 2/25/2021  Patient Name: Ruiz Gonzalez    History of Presenting Illness     Chief Complaint   Patient presents with    Dehydration     Patient being transfered from [facility] for nausea, vomiting and weakness. She was at [facility] from 2/17-2/23 for an NSTEMI. Three days before that she was discharged from [address] [place] [address] where she stayed from 2/4-2/11 for COVID and was sent to rehab from 2/11-2/14. Patient has dementia and babbling speech at times. Patient's rapid COVID was positive today as well. Being transfered for acute renal failure and dehydration. History Provided By: EMS    HPI: Ruiz Gonzalez, 76 y.o. female with significant PMHx for dementia, HTN, hypelipidemia, and DM, presents by EMS to the ED as an ED to ED transfer from the free standing ED in [place]. The patient is a poor historian and all information was provided by EMS and per chart review. The patient has has been in and out of the hospital for the past month. She was diagnosed with COVID on 2/2/21 and admitted at [address] [place] [address] from 2/4-2/11. From there she was discharged to a [facility]. She then went to [address] [place] [address] and was admitted from 2/17-2/23 for NSTEMI. She has been home for the past 2 days and then presented to [place] this evening for nausea, vomiting and generalized malaise. The patient has dementia and is babbling at baseline. At [facility] her rapid COVID test was again/still positive. It was also determined that she had an elevated serum Cr at 1.23 and was sent to [facility] for admission for COVID and dehydration. There are no other complaints, changes, or physical findings at this time. PCP: Marta Allen MD    No current facility-administered medications on file prior to encounter. Current Outpatient Medications on File Prior to Encounter   Medication Sig Dispense Refill    aspirin 81 mg chewable tablet Take 1 Tab by mouth daily.  30 Tab 0    capsicum oleoresin 0. 025 % topical cream Apply  to affected area three (3) times daily. 60 g 0    clopidogreL (PLAVIX) 75 mg tab Take 1 Tab by mouth daily. 30 Tab 0    ezetimibe (ZETIA) 10 mg tablet Take 1 Tab by mouth daily. 30 Tab 0    furosemide (LASIX) 40 mg tablet CHF 30 Tab 0    metoprolol succinate (TOPROL-XL) 25 mg XL tablet Take 1 Tab by mouth every twelve (12) hours. 60 Tab 0    senna (Senna) 8.6 mg tablet Take 1 Tab by mouth two (2) times a day.  bisacodyL (DULCOLAX) 5 mg EC tablet Take 10 mg by mouth daily.  amLODIPine (NORVASC) 2.5 mg tablet Take 2.5 mg by mouth daily.  venlafaxine-SR (EFFEXOR-XR) 37.5 mg capsule Take 37.5 mg by mouth daily.  polyethylene glycol (MIRALAX) 17 gram/dose powder Take 17 g by mouth daily.  1 tablespoon with 8 oz of water daily 170 g 0       Past History     Past Medical History:  Past Medical History:   Diagnosis Date    Agoraphobia without mention of panic attacks 2/17/2014    Anxiety disorder 8/18/2013    Arthritis     osteo    CAD (coronary artery disease), native coronary artery 12/1/2015    no stents    Chronic chest pain 1/13/2014    Chronic pain associated with significant psychosocial dysfunction 2/17/2014    Depression 8/18/2013    Diabetes ([facility] [address]     type II    Duplicated right renal collecting system 3/13/2014    GERD (gastroesophageal reflux disease)     Gout, joint     Hypercholesteremia     hyercholesterolemia    Hypertension     Nephrolithiasis 3/13/2014    Personal history of noncompliance with medical treatment, presenting hazards to health 5/30/2014       Past Surgical History:  Past Surgical History:   Procedure Laterality Date    EGD  4/23/2010         HX CHOLECYSTECTOMY  09/20/2018    lap jesus    HX CYST REMOVAL      cyst removed from left wrist    HX HYSTERECTOMY      partial    HX OTHER SURGICAL      bladder dilitation    HX TUBAL LIGATION      HX UROLOGICAL      kidney stones       Family History:  Family History   Problem Relation Age of Onset    Stroke Mother     Heart Disease Mother     Cancer Father         type unknown    Heart Disease Son     Liver Disease Son     Heart Disease Daughter     Malignant Hyperthermia Neg Hx     Pseudocholinesterase Deficiency Neg Hx     Delayed Awakening Neg Hx     Post-op Nausea/Vomiting Neg Hx     Emergence Delirium Neg Hx     Post-op Cognitive Dysfunction Neg Hx     Other Neg Hx        Social History:  Social History     Tobacco Use    Smoking status: Never Smoker    Smokeless tobacco: Never Used   Substance Use Topics    Alcohol use: No    Drug use: No       Allergies: Allergies   Allergen Reactions    Amoxicillin Hives    Sulfa (Sulfonamide Antibiotics) Hives and Itching    Mirtazapine Itching and Nausea Only     Funny feeling in chest    Percocet [Oxycodone-Acetaminophen] Nausea and Vomiting    Codeine Nausea and Vomiting    Crestor [Rosuvastatin] Other (comments)     myalgias    Nitroglycerin Unknown (comments)     Patient cannot remember why she is allergic to it      Prednisone Itching    Zithromax [Azithromycin] Itching     Not sure what it does,taken long time ago         Review of Systems   Review of Systems   Unable to perform ROS: Dementia       Physical Exam   Physical Exam  Vitals signs and nursing note reviewed. Constitutional:       General: She is not in acute distress. Appearance: She is well-developed. She is not diaphoretic. Comments: 76 y.o. -American female    HENT:      Head: Normocephalic and atraumatic. Eyes:      General:         Right eye: No discharge. Left eye: No discharge. Conjunctiva/sclera: Conjunctivae normal.   Neck:      Musculoskeletal: Normal range of motion and neck supple. Cardiovascular:      Rate and Rhythm: Normal rate and regular rhythm. Heart sounds: Normal heart sounds. No murmur. Pulmonary:      Effort: Pulmonary effort is normal. No respiratory distress.       Breath sounds: Normal breath sounds. Skin:     General: Skin is warm and dry. Neurological:      Mental Status: She is alert and oriented to person, place, and time. Psychiatric:         Behavior: Behavior normal.         Diagnostic Study Results     Labs -     Recent Results (from the past 12 hour(s))   EKG, 12 LEAD, INITIAL    Collection Time: 02/25/21  1:56 AM   Result Value Ref Range    Ventricular Rate 69 BPM    Atrial Rate 69 BPM    P-R Interval 108 ms    QRS Duration 94 ms    Q-T Interval 404 ms    QTC Calculation (Bezet) 432 ms    Calculated P Axis 43 degrees    Calculated R Axis 24 degrees    Calculated T Axis 20 degrees    Diagnosis       Sinus rhythm with short NC  When compared with ECG of 16-JUL-2020 13:41,  Nonspecific T wave abnormality no longer evident in Anterolateral leads         Radiologic Studies - See Results Tab    Medical Decision Making   I am the first provider for this patient. I reviewed the vital signs, available nursing notes, past medical history, past surgical history, family history and social history. Vital Signs-Reviewed the patient's vital signs. Patient Vitals for the past 12 hrs:   Temp Pulse Resp BP SpO2   02/25/21 0229 98.5 °F (36.9 °C) 83 20 119/66 98 %   02/25/21 0128 98.2 °F (36.8 °C) 89 20 (!) 102/48 97 %       Records Reviewed: Nursing Notes and Old Medical Records   Reviewed records from [facility] and recent admission at [facility]. Patient noted to have a Na of 153 and a Cr of 1.23. Chest Xray and CT were both suggestive of infiltrate. Abx not started at [facility]. Provider Notes (Medical Decision Making):   Patient presents the ED by EMS. She has had several recent hospital admissions. She is on a provider history or review of systems but was sent to the ED for evaluation of potential dehydration due to nausea and vomiting.   Differential diagnoses include dehydration, hypovolemia, anemia, electrolyte abnormality, hypoglycemia, pneumonia, UTI, arrhythmia, acute coronary syndrome, failure to thrive. Patient shown to be slightly dehydrated and hyponatremic in the ED with concerns for potential community or hospital-acquired pneumonia and imaging studies. Will require admission and further discussion with the family as to their wishes for patient management after hospitalization (i.e. Home health, family care, nursing home placement). ED Course:   Initial assessment performed. The patients presenting problems have been discussed, and they are in agreement with the care plan formulated and outlined with them. I have encouraged them to ask questions as they arise throughout their visit. 1:53 AM  The patient's heart rate just jumped from the 80's to greater that 200 bpm. Appeared to be in SVT or possibly VTach on cardiac monitor but converted back to NSR prior to EKG being obtained. CONSULT NOTE:  2:38 AM  JD Mcdonald spoke with Dr. Mamta Guan, Consult for Hospitalist. Discussed available diagnostic tests and clinical findings. He is in agreement with care plans as outlined. He/she will evaluate the patient in the ED for admission. Critical Care Time: None    Disposition:  Admit Note:  2:45 AM  Pt is being admitted by Internal Medicine. The results of their tests and reason(s) for their admission have been discussed with pt and/or available family. They convey agreement and understanding for the need to be admitted and for admission diagnosis. PLAN:  Admit    Diagnosis     Clinical Impression:   1. Hyponatremia    2. Hypovolemia    3. Community acquired pneumonia, unspecified laterality    4. Dementia without behavioral disturbance, unspecified dementia type [facility])          Please note that this dictation was completed with [id number], the computer voice recognition software. Quite often unanticipated grammatical, syntax, homophones, and other interpretive errors are inadvertently transcribed by the computer software. Please disregards these errors.  Please excuse any errors that have escaped final proofreading.

## 2021-02-25 NOTE — ED NOTES
Called daughter and spoke to her per the patient's request. Daughter was informed of the patients condition and that the patient was requesting her to be here. She was also informed that since the patient tested positive for COVID at [facility], that she would not be able to come and see the patient but she could call as much as she likes.

## 2021-02-25 NOTE — ED NOTES
Patient heart rate jumped up to 190-209 bpm. We ordered an EKG but by the time we came in to hook her up, her heart rate leveled back out into the 80's. EKG still going to be obtained and the strip will be printed from the monitor and placed in the chart.

## 2021-02-25 NOTE — PROGRESS NOTES
Pharmacy Antimicrobial Kinetic Dosing    Indication for Antimicrobials: CAP     Current Regimen of Each Antimicrobial:  Levofloxacin 750mg (Start Date ; Day # )    Previous Antimicrobial Therapy:  Significant Positive Cultures:   : blood - prelim    Conditions for Dosing Consideration: HERBERTH    Labs:  Recent Labs     21  0305   CREA 1.22*   BUN 68*   WBC 7.4     Temp (24hrs), Av.1 °F (36.7 °C), Min:97.6 °F (36.4 °C), Max:98.5 °F (36.9 °C)    Creatinine Clearance (mL/min):   CrCl (Ideal Body Weight): 38.7   If actual weight < IBW: CrCl (Actual Body Weight) 39.3    Impression/Plan:   Renal adjust levofloxacin 750mg daily  BMP and CBC   Antimicrobial stop date 7 days     Pharmacy will follow daily and adjust medications as appropriate for renal function and/or serum levels. Thank you,  TRACY Kramer    Recommended duration of therapy  http://[facility]/[place]/[facility]/Pharmacy/Clinical%20Companion/Duration%20of%20ABX%20therapy. docx    Renal Dosing  http://[facility]/[place]/[facility]/Pharmacy/Clinical%20Companion/Renal%[id number]. pdf

## 2021-02-25 NOTE — ED NOTES
Incontinence care provided and pt placed on pure wick. Pt also assisted with eating breakfast but pt now refusing to eat any more. Pt meal tray remains at bedside for pt consumption and call bell within reach.

## 2021-02-26 LAB
ANION GAP SERPL CALC-SCNC: 7 MMOL/L (ref 5–15)
BASOPHILS # BLD: 0 K/UL (ref 0–0.1)
BASOPHILS NFR BLD: 0 % (ref 0–1)
BUN SERPL-MCNC: 45 MG/DL (ref 6–20)
BUN/CREAT SERPL: 53 (ref 12–20)
CALCIUM SERPL-MCNC: 8.5 MG/DL (ref 8.5–10.1)
CHLORIDE SERPL-SCNC: 114 MMOL/L (ref 97–108)
CO2 SERPL-SCNC: 27 MMOL/L (ref 21–32)
CREAT SERPL-MCNC: 0.85 MG/DL (ref 0.55–1.02)
DIFFERENTIAL METHOD BLD: ABNORMAL
EOSINOPHIL # BLD: 0.1 K/UL (ref 0–0.4)
EOSINOPHIL NFR BLD: 1 % (ref 0–7)
ERYTHROCYTE [DISTWIDTH] IN BLOOD BY AUTOMATED COUNT: 14 % (ref 11.5–14.5)
GLUCOSE SERPL-MCNC: 80 MG/DL (ref 65–100)
HCT VFR BLD AUTO: 34.4 % (ref 35–47)
HGB BLD-MCNC: 10.1 G/DL (ref 11.5–16)
IMM GRANULOCYTES # BLD AUTO: 0.1 K/UL (ref 0–0.04)
IMM GRANULOCYTES NFR BLD AUTO: 2 % (ref 0–0.5)
LYMPHOCYTES # BLD: 0.7 K/UL (ref 0.8–3.5)
LYMPHOCYTES NFR BLD: 11 % (ref 12–49)
MAGNESIUM SERPL-MCNC: 2.5 MG/DL (ref 1.6–2.4)
MCH RBC QN AUTO: 26.1 PG (ref 26–34)
MCHC RBC AUTO-ENTMCNC: 29.4 G/DL (ref 30–36.5)
MCV RBC AUTO: 88.9 FL (ref 80–99)
MONOCYTES # BLD: 0.6 K/UL (ref 0–1)
MONOCYTES NFR BLD: 9 % (ref 5–13)
NEUTS SEG # BLD: 5.3 K/UL (ref 1.8–8)
NEUTS SEG NFR BLD: 77 % (ref 32–75)
NRBC # BLD: 0 K/UL (ref 0–0.01)
NRBC BLD-RTO: 0 PER 100 WBC
PLATELET # BLD AUTO: 242 K/UL (ref 150–400)
PMV BLD AUTO: 11 FL (ref 8.9–12.9)
POTASSIUM SERPL-SCNC: 3.6 MMOL/L (ref 3.5–5.1)
RBC # BLD AUTO: 3.87 M/UL (ref 3.8–5.2)
RBC MORPH BLD: ABNORMAL
SODIUM SERPL-SCNC: 148 MMOL/L (ref 136–145)
TROPONIN I SERPL-MCNC: 0.06 NG/ML
WBC # BLD AUTO: 6.8 K/UL (ref 3.6–11)

## 2021-02-26 PROCEDURE — 74011000258 HC RX REV CODE- 258: Performed by: INTERNAL MEDICINE

## 2021-02-26 PROCEDURE — 85025 COMPLETE CBC W/AUTO DIFF WBC: CPT

## 2021-02-26 PROCEDURE — 36415 COLL VENOUS BLD VENIPUNCTURE: CPT

## 2021-02-26 PROCEDURE — 74011250636 HC RX REV CODE- 250/636: Performed by: NURSE PRACTITIONER

## 2021-02-26 PROCEDURE — 80048 BASIC METABOLIC PNL TOTAL CA: CPT

## 2021-02-26 PROCEDURE — 92610 EVALUATE SWALLOWING FUNCTION: CPT

## 2021-02-26 PROCEDURE — 74011250636 HC RX REV CODE- 250/636: Performed by: HOSPITALIST

## 2021-02-26 PROCEDURE — 2709999900 HC NON-CHARGEABLE SUPPLY

## 2021-02-26 PROCEDURE — 84484 ASSAY OF TROPONIN QUANT: CPT

## 2021-02-26 PROCEDURE — 65660000000 HC RM CCU STEPDOWN

## 2021-02-26 PROCEDURE — 83735 ASSAY OF MAGNESIUM: CPT

## 2021-02-26 PROCEDURE — 74011250637 HC RX REV CODE- 250/637: Performed by: INTERNAL MEDICINE

## 2021-02-26 PROCEDURE — 94760 N-INVAS EAR/PLS OXIMETRY 1: CPT

## 2021-02-26 RX ORDER — FACIAL-BODY WIPES
10 EACH TOPICAL DAILY
Status: COMPLETED | OUTPATIENT
Start: 2021-02-26 | End: 2021-02-27

## 2021-02-26 RX ADMIN — Medication 10 ML: at 22:00

## 2021-02-26 RX ADMIN — HYDRALAZINE HYDROCHLORIDE 10 MG: 20 INJECTION INTRAMUSCULAR; INTRAVENOUS at 00:07

## 2021-02-26 RX ADMIN — Medication 10 ML: at 14:00

## 2021-02-26 RX ADMIN — SODIUM CHLORIDE 125 ML/HR: 4.5 INJECTION, SOLUTION INTRAVENOUS at 16:26

## 2021-02-26 RX ADMIN — ENOXAPARIN SODIUM 40 MG: 40 INJECTION SUBCUTANEOUS at 09:43

## 2021-02-26 RX ADMIN — HYDRALAZINE HYDROCHLORIDE 10 MG: 20 INJECTION INTRAMUSCULAR; INTRAVENOUS at 10:02

## 2021-02-26 RX ADMIN — Medication 10 ML: at 06:00

## 2021-02-26 RX ADMIN — BISACODYL 10 MG: 10 SUPPOSITORY RECTAL at 18:53

## 2021-02-26 NOTE — PROGRESS NOTES
Received message from patient's nurse Jeovanny Cano stating :    Patient has scheduled oral Metoprolol. She is currently NPO except for medications, however, I had a difficult time giving her oral Ativan. She has a difficult time swallowing pills. I was going to give her Tylenol due to her complaining of a headache, I decided to give her the Ativan first because it is a small pill and she had a hard time swallowing that so I held the oral Tylenol. Would you like for me to hold off on the oral Metoprolol tonight or would you like to switch her to IV Metoprolol? Discussion / orders:    Hold oral metoprolol  Ordered hydralazine as needed  Requested SLP eval         Please note that this note was dictated using Dragon computer voice recognition software. Quite often unanticipated grammatical, syntax, homophones, and other interpretive errors are inadvertently transcribed by the computer software. Please disregard these errors. Please excuse any errors that have escaped final proofreading.

## 2021-02-26 NOTE — PROGRESS NOTES
Bedside and Verbal shift change report given to Meena Castañeda (oncoming nurse) by Deon Allen (offgoing nurse). Report included the following information SBAR, Kardex, ED Summary, Procedure Summary, MAR, Accordion and Recent Results.

## 2021-02-26 NOTE — PROGRESS NOTES
Problem: Dysphagia (Adult)  Goal: *Acute Goals and Plan of Care (Insert Text)  Description: 2/26/2021  Speech path goals  1. Patient will tolerate purees and thins with no overt s/s of aspiration. 2. Patient will tolerate soft solids with no overt s/s of aspiration. Outcome: Not Met   SPEECH LANGUAGE PATHOLOGY BEDSIDE SWALLOW EVALUATION  Patient: Daren Patton (82 y.o. female)  Date: 2/26/2021  Primary Diagnosis: HERBERTH (acute kidney injury) ([facility] [N17.9]  Nausea & vomiting [R11.2]  Hypernatremia [E87.0]  History of COVID-19 [Z86.16]  Pneumonia [J18.9]  Dysrhythmia, cardiac [I49.9]  Dementia ([facility] [F03.90]  Renal stone [N20.0]        Precautions:        ASSESSMENT :  Based on the objective data described below, the patient presents with functional swallow of thins and purees. She accepted the straw and spoon well. Good oral clearance. Pharyngeal swallow was characterized by mild swallow delay. No coughing or change in vocal quality. Patient will benefit from skilled intervention to address the above impairments. Patients rehabilitation potential is considered to be Fair     PLAN :  Recommendations and Planned Interventions:  Recommend purees and thins. Frequency/Duration: Patient will be followed by speech-language pathology 3 times a week to address goals. Discharge Recommendations: None     SUBJECTIVE:   Patient stated she thought she was going to fall out of the bed.      OBJECTIVE:     Past Medical History:   Diagnosis Date    Agoraphobia without mention of panic attacks 2/17/2014    Anxiety disorder 8/18/2013    Arthritis     osteo    CAD (coronary artery disease), native coronary artery 12/1/2015    no stents    Chronic chest pain 1/13/2014    Chronic pain associated with significant psychosocial dysfunction 2/17/2014    Depression 8/18/2013    Diabetes ([facility]     type II    Duplicated right renal collecting system 3/13/2014    GERD (gastroesophageal reflux disease)     Gout, joint Hypercholesteremia     hyercholesterolemia    Hypertension     Nephrolithiasis 3/13/2014    Personal history of noncompliance with medical treatment, presenting hazards to health 5/30/2014     Past Surgical History:   Procedure Laterality Date    EGD  4/23/2010         HX CHOLECYSTECTOMY  09/20/2018    lap jesus    HX CYST REMOVAL      cyst removed from left wrist    HX HYSTERECTOMY      partial    HX OTHER SURGICAL      bladder dilitation    HX TUBAL LIGATION      HX UROLOGICAL      kidney stones     Prior Level of Function/Home Situation:      Diet prior to admission: purees and nectar thick liquids at [facility]   Current Diet:  NPO   Cognitive and Communication Status:  Neurologic State: Confused  Orientation Level: Oriented to person, Oriented to place, Disoriented to situation, Disoriented to time  Cognition: Follows commands  Perception: Appears intact  Perseveration: Perseverates during conversation     Oral Assessment:  Oral Assessment  Labial: No impairment  Dentition: Edentulous  Lingual: Other (comment)  Velum: Other (comment)  Mandible: No impairment  P.O. Trials:  Patient Position: upright in bed  Vocal quality prior to P.O.: No impairment  Consistency Presented: Thin liquid;Puree  How Presented: Self-fed/presented;Straw;SLP-fed/presented;Spoon     Bolus Acceptance: No impairment  Bolus Formation/Control: No impairment     Propulsion: No impairment  Oral Residue: None  Initiation of Swallow: No impairment  Laryngeal Elevation: Functional  Aspiration Signs/Symptoms: None  Pharyngeal Phase Characteristics: No impairment, issues, or problems              Oral Phase Severity: No impairment  Pharyngeal Phase Severity : No impairment    NOMS:   The NOMS functional outcome measure was used to quantify this patient's level of swallowing impairment.   Based on the NOMS, the patient was determined to be at level 4 for swallow function       NOMS Swallowing Levels:  Level 1 (CN): NPO  Level 2 (CM): NPO but takes consistency in therapy  Level 3 (CL): Takes less than 50% of nutrition p.o. and continues with nonoral feedings; and/or safe with mod cues; and/or max diet restriction  Level 4 (CK): Safe swallow but needs mod cues; and/or mod diet restriction; and/or still requires some nonoral feeding/supplements  Level 5 (CJ): Safe swallow with min diet restriction; and/or needs min cues  Level 6 (CI): Independent with p.o.; rare cues; usually self cues; may need to avoid some foods or needs extra time  Level 7 ([address] [place] [address]): Independent for all p.o.  MARGARET. (2003). National Outcomes Measurement System (NOMS): Adult Speech-Language Pathology User's Guide. Pain:  Pain Scale 1: Numeric (0 - 10)  Pain Intensity 1: 0       After treatment:   Patient left in no apparent distress in bed    COMMUNICATION/EDUCATION:   Patient was educated regarding her deficit(s) of dysphagia   The patient's plan of care including recommendations, planned interventions, and recommended diet changes were discussed with: Registered nurse. Patient/family have participated as able in goal setting and plan of care.     Thank you for this referral.  MARÍA Lott  Time Calculation: 13 mins

## 2021-02-26 NOTE — PROGRESS NOTES
Hospitalist Progress Note    NAME: Fan Klein   :  1945   MRN:  [id number]     Admit date: 2021    Today's date: 21    PCP: MD Sushant Bruno M.D. Cell [phone]    Assessment / Plan:    Hypernatremia POA Na 152  Hypovolemia POA BUN/creat 68 and 1.22  Dysphagia POA reports trouble swallowing  Nausea and vomiting POA   Recent obstipation POA appears more chronic  -CT scan done at outside hospital reported as below      1. New infiltrate in the bases greater on the right. 2.  Nonobstructive nephrolithiasis     3. fecal retention which is diminishing liquid prior. 4.  Persistent dilatation of the sigmoid with an air-fluid level with redundancy  -Reports dysphagia, was spitting up in ED  -Hypovolemia and dehydration improved with IVF  -Speech saw, recommended starting dysphagia diet  -Reduce IVF  -Bowel regimen  -Ask GI to see    Recent COVID19 infection (diagnosed with COVID on 21)  ? CAP POA  Was at \[facility]", unclear what treatment she received  Not hypoxic  CXR with bibasilar ASD  Rapid covid was positive in OSH  Not hypoxic at admit, hold on RX  IV levaquin day 3  Procalcitonin 0.24    Renal stones without Hydronephrosis    SVT vs NSVT in ED POA  Recent non ST elevation MI at OSH , peak troponin 2.11  Essential HTN POA  Tele  EKG NSR 69 Short [place] , no acute Ischemic injury  Resume Lopressor daily, continue norvasc  PRN hydralazine  ASA  Unclear if tolerant of statins, allergic to crestor  Check pBNP     Dementia  Cont. Supportive care  Anxiety  Hx agoraphobia per chart       Code Status: FULL  NOK:  Maral Duncan Child     [phone]   unknown, Tiarra Daughter      done at outside hospital showed      DVT Prophylaxis: SQ HEP  GI Prophylaxis: not indicated     Baseline: Dependent/Demented      Body mass index is 21.58 kg/m².          Subjective:     Chief Complaint / Reason for Physician Visit  \"I am hungry but having trouble swallowing\". Discussed with RN events overnight. No BMs overnight  Review of Systems:  Symptom Y/N Comments  Symptom Y/N Comments   Fever/Chills n   Chest Pain n    Poor Appetite    Edema     Cough n   Abdominal Pain n    Sputum    Joint Pain     SOB/MOORE n   Headache     Nausea/vomit y   Tolerating PT/OT     Diarrhea n   Tolerating Diet y    Constipation    Other       Could NOT obtain due to:      Objective:     VITALS:   Last 24hrs VS reviewed since prior progress note. Most recent are:  Patient Vitals for the past 24 hrs:   Temp Pulse Resp BP SpO2   02/26/21 1113 97.9 °F (36.6 °C) 70 16 127/63 98 %   02/26/21 0759 97.5 °F (36.4 °C) 62 14 (!) 190/87 99 %   02/26/21 0343 98.4 °F (36.9 °C) 77 18 (!) 156/69 96 %   02/25/21 2348 98.4 °F (36.9 °C) 73 18 (!) 169/67 97 %   02/25/21 1931 98.3 °F (36.8 °C) 80 18 (!) 143/73 96 %     No intake or output data in the 24 hours ending 02/26/21 1621     Wt Readings from Last 12 Encounters:   02/25/21 62.5 kg (137 lb 12.6 oz)   02/17/21 73.9 kg (162 lb 14.7 oz)   12/10/20 74.1 kg (163 lb 6.4 oz)   07/16/20 81.2 kg (179 lb)   06/23/20 75.8 kg (167 lb)   03/24/20 75.8 kg (167 lb)   01/20/20 78.9 kg (174 lb)   01/03/20 77.1 kg (170 lb)   11/28/19 84.7 kg (186 lb 11.7 oz)   10/18/19 84.7 kg (186 lb 11.7 oz)   09/08/19 75.8 kg (167 lb)   09/04/19 78 kg (172 lb)       PHYSICAL EXAM:    I had a face to face encounter and independently examined this patient on 02/26/21 as outlined below:    General: WD, WN. Alert, cooperative, no acute distress    EENT:  PERRL. Anicteric sclerae. MMM  Neck:  No meningismus, no thyromegaly  Resp:  CTA bilaterally, no wheezing or rales. No accessory muscle use  CV:  Regular  rhythm,  No edema  GI:  Soft, Non distended, Non tender. +Bowel sounds, no rebound  LN:  No cervical or inguinal TYRONE  Neurologic:  Alert, normal speech, non focal motor exam  Psych:   Not anxious nor agitated  Skin:  No rashes.   No jaundice    Reviewed most current lab test results and cultures  YES  Reviewed most current radiology test results   YES  Review and summation of old records today    NO  Reviewed patient's current orders and MAR    YES  PMH/SH reviewed - no change compared to H&P  ________________________________________________________________________  Care Plan discussed with:    Comments   Patient x    Family      RN x    Care Manager     Consultant                        Multidiciplinary team rounds were held today with , nursing, pharmacist and clinical coordinator. Patient's plan of care was discussed; medications were reviewed and discharge planning was addressed. ________________________________________________________________________      Comments   >50% of visit spent in counseling and coordination of care     ________________________________________________________________________  Salome Brambila MD     Procedures: see electronic medical records for all procedures/Xrays and details which were not copied into this note but were reviewed prior to creation of Plan. LABS:  I reviewed today's most current labs and imaging studies.   Pertinent labs include:  Recent Labs     02/26/21 0107 02/25/21  0305   WBC 6.8 7.4   HGB 10.1* 11.0*   HCT 34.4* 37.2    303     Recent Labs     02/26/21 0107 02/25/21  0305   * 152*   K 3.6 3.5   * 115*   CO2 27 31   GLU 80 101*   BUN 45* 68*   CREA 0.85 1.22*   CA 8.5 8.9   MG 2.5* 2.7*   ALB  --  2.5*   TBILI  --  0.6   ALT  --  16

## 2021-02-27 ENCOUNTER — APPOINTMENT (OUTPATIENT)
Dept: GENERAL RADIOLOGY | Age: 76
DRG: 640 | End: 2021-02-27
Attending: NURSE PRACTITIONER
Payer: MEDICARE

## 2021-02-27 ENCOUNTER — APPOINTMENT (OUTPATIENT)
Dept: CT IMAGING | Age: 76
DRG: 640 | End: 2021-02-27
Attending: INTERNAL MEDICINE
Payer: MEDICARE

## 2021-02-27 LAB
ALBUMIN SERPL-MCNC: 2.1 G/DL (ref 3.5–5)
ALBUMIN/GLOB SERPL: 0.4 {RATIO} (ref 1.1–2.2)
ALP SERPL-CCNC: 59 U/L (ref 45–117)
ALT SERPL-CCNC: 20 U/L (ref 12–78)
ANION GAP SERPL CALC-SCNC: 9 MMOL/L (ref 5–15)
AST SERPL-CCNC: 27 U/L (ref 15–37)
BASOPHILS # BLD: 0.1 K/UL (ref 0–0.1)
BASOPHILS NFR BLD: 1 % (ref 0–1)
BILIRUB SERPL-MCNC: 0.7 MG/DL (ref 0.2–1)
BUN SERPL-MCNC: 30 MG/DL (ref 6–20)
BUN/CREAT SERPL: 43 (ref 12–20)
CALCIUM SERPL-MCNC: 8.4 MG/DL (ref 8.5–10.1)
CHLORIDE SERPL-SCNC: 113 MMOL/L (ref 97–108)
CO2 SERPL-SCNC: 21 MMOL/L (ref 21–32)
CREAT SERPL-MCNC: 0.69 MG/DL (ref 0.55–1.02)
DIFFERENTIAL METHOD BLD: ABNORMAL
EOSINOPHIL # BLD: 0.1 K/UL (ref 0–0.4)
EOSINOPHIL NFR BLD: 1 % (ref 0–7)
ERYTHROCYTE [DISTWIDTH] IN BLOOD BY AUTOMATED COUNT: 13.8 % (ref 11.5–14.5)
GLOBULIN SER CALC-MCNC: 4.8 G/DL (ref 2–4)
GLUCOSE BLD STRIP.AUTO-MCNC: 116 MG/DL (ref 65–100)
GLUCOSE BLD STRIP.AUTO-MCNC: 129 MG/DL (ref 65–100)
GLUCOSE BLD STRIP.AUTO-MCNC: 91 MG/DL (ref 65–100)
GLUCOSE SERPL-MCNC: 57 MG/DL (ref 65–100)
HCT VFR BLD AUTO: 42.3 % (ref 35–47)
HGB BLD-MCNC: 11.7 G/DL (ref 11.5–16)
IMM GRANULOCYTES # BLD AUTO: 0.2 K/UL (ref 0–0.04)
IMM GRANULOCYTES NFR BLD AUTO: 2 % (ref 0–0.5)
LYMPHOCYTES # BLD: 0.9 K/UL (ref 0.8–3.5)
LYMPHOCYTES NFR BLD: 10 % (ref 12–49)
MCH RBC QN AUTO: 25.9 PG (ref 26–34)
MCHC RBC AUTO-ENTMCNC: 27.7 G/DL (ref 30–36.5)
MCV RBC AUTO: 93.8 FL (ref 80–99)
MONOCYTES # BLD: 0.9 K/UL (ref 0–1)
MONOCYTES NFR BLD: 10 % (ref 5–13)
NEUTS SEG # BLD: 6.5 K/UL (ref 1.8–8)
NEUTS SEG NFR BLD: 76 % (ref 32–75)
NRBC # BLD: 0 K/UL (ref 0–0.01)
NRBC BLD-RTO: 0 PER 100 WBC
PLATELET # BLD AUTO: 192 K/UL (ref 150–400)
PMV BLD AUTO: 11 FL (ref 8.9–12.9)
POTASSIUM SERPL-SCNC: 3.9 MMOL/L (ref 3.5–5.1)
PROT SERPL-MCNC: 6.9 G/DL (ref 6.4–8.2)
RBC # BLD AUTO: 4.51 M/UL (ref 3.8–5.2)
RBC MORPH BLD: ABNORMAL
SERVICE CMNT-IMP: ABNORMAL
SERVICE CMNT-IMP: ABNORMAL
SERVICE CMNT-IMP: NORMAL
SODIUM SERPL-SCNC: 143 MMOL/L (ref 136–145)
WBC # BLD AUTO: 8.7 K/UL (ref 3.6–11)

## 2021-02-27 PROCEDURE — 74011000250 HC RX REV CODE- 250: Performed by: NURSE PRACTITIONER

## 2021-02-27 PROCEDURE — 36415 COLL VENOUS BLD VENIPUNCTURE: CPT

## 2021-02-27 PROCEDURE — 74011250636 HC RX REV CODE- 250/636: Performed by: INTERNAL MEDICINE

## 2021-02-27 PROCEDURE — 70450 CT HEAD/BRAIN W/O DYE: CPT

## 2021-02-27 PROCEDURE — 77030038269 HC DRN EXT URIN PURWCK BARD -A

## 2021-02-27 PROCEDURE — 74011250636 HC RX REV CODE- 250/636: Performed by: HOSPITALIST

## 2021-02-27 PROCEDURE — 80053 COMPREHEN METABOLIC PANEL: CPT

## 2021-02-27 PROCEDURE — 85025 COMPLETE CBC W/AUTO DIFF WBC: CPT

## 2021-02-27 PROCEDURE — 82962 GLUCOSE BLOOD TEST: CPT

## 2021-02-27 PROCEDURE — 74011250637 HC RX REV CODE- 250/637: Performed by: HOSPITALIST

## 2021-02-27 PROCEDURE — 71045 X-RAY EXAM CHEST 1 VIEW: CPT

## 2021-02-27 PROCEDURE — 74011250637 HC RX REV CODE- 250/637: Performed by: INTERNAL MEDICINE

## 2021-02-27 PROCEDURE — 65660000000 HC RM CCU STEPDOWN

## 2021-02-27 PROCEDURE — 74011250636 HC RX REV CODE- 250/636: Performed by: NURSE PRACTITIONER

## 2021-02-27 RX ORDER — DEXAMETHASONE 4 MG/1
6 TABLET ORAL DAILY
Status: DISCONTINUED | OUTPATIENT
Start: 2021-02-27 | End: 2021-02-27

## 2021-02-27 RX ORDER — DEXTROSE 50 % IN WATER (D50W) INTRAVENOUS SYRINGE
12.5-25 AS NEEDED
Status: DISCONTINUED | OUTPATIENT
Start: 2021-02-27 | End: 2021-03-06 | Stop reason: HOSPADM

## 2021-02-27 RX ORDER — LEVOFLOXACIN 5 MG/ML
750 INJECTION, SOLUTION INTRAVENOUS EVERY 24 HOURS
Status: COMPLETED | OUTPATIENT
Start: 2021-02-28 | End: 2021-03-03

## 2021-02-27 RX ORDER — DEXTROSE, SODIUM CHLORIDE, AND POTASSIUM CHLORIDE 5; .2; .15 G/100ML; G/100ML; G/100ML
75 INJECTION INTRAVENOUS CONTINUOUS
Status: DISCONTINUED | OUTPATIENT
Start: 2021-02-27 | End: 2021-03-06 | Stop reason: HOSPADM

## 2021-02-27 RX ORDER — MAGNESIUM SULFATE 100 %
4 CRYSTALS MISCELLANEOUS AS NEEDED
Status: DISCONTINUED | OUTPATIENT
Start: 2021-02-27 | End: 2021-03-06 | Stop reason: HOSPADM

## 2021-02-27 RX ADMIN — ONDANSETRON 4 MG: 2 INJECTION INTRAMUSCULAR; INTRAVENOUS at 18:08

## 2021-02-27 RX ADMIN — Medication 10 ML: at 13:25

## 2021-02-27 RX ADMIN — METOPROLOL SUCCINATE 25 MG: 50 TABLET, EXTENDED RELEASE ORAL at 21:52

## 2021-02-27 RX ADMIN — LEVOFLOXACIN 750 MG: 5 INJECTION, SOLUTION INTRAVENOUS at 04:26

## 2021-02-27 RX ADMIN — BISACODYL 10 MG: 5 TABLET, COATED ORAL at 09:33

## 2021-02-27 RX ADMIN — POTASSIUM CHLORIDE, DEXTROSE MONOHYDRATE AND SODIUM CHLORIDE 75 ML/HR: 150; 5; 200 INJECTION, SOLUTION INTRAVENOUS at 13:26

## 2021-02-27 RX ADMIN — DEXTRAN 70, GLYCERIN, HYPROMELLOSE 1 DROP: 1; 2; 3 SOLUTION/ DROPS OPHTHALMIC at 14:40

## 2021-02-27 RX ADMIN — LORAZEPAM 0.5 MG: 0.5 TABLET ORAL at 00:16

## 2021-02-27 RX ADMIN — ASPIRIN 81 MG: 81 TABLET, CHEWABLE ORAL at 09:33

## 2021-02-27 RX ADMIN — SENNOSIDES 8.6 MG: 8.6 TABLET, FILM COATED ORAL at 09:00

## 2021-02-27 RX ADMIN — ENOXAPARIN SODIUM 40 MG: 40 INJECTION SUBCUTANEOUS at 09:33

## 2021-02-27 RX ADMIN — HYDRALAZINE HYDROCHLORIDE 10 MG: 20 INJECTION INTRAMUSCULAR; INTRAVENOUS at 00:16

## 2021-02-27 RX ADMIN — Medication 10 ML: at 22:00

## 2021-02-27 RX ADMIN — ONDANSETRON 4 MG: 2 INJECTION INTRAMUSCULAR; INTRAVENOUS at 11:19

## 2021-02-27 RX ADMIN — METOPROLOL SUCCINATE 25 MG: 50 TABLET, EXTENDED RELEASE ORAL at 09:40

## 2021-02-27 RX ADMIN — BISACODYL 10 MG: 10 SUPPOSITORY RECTAL at 09:33

## 2021-02-27 RX ADMIN — AMLODIPINE BESYLATE 2.5 MG: 2.5 TABLET ORAL at 09:33

## 2021-02-27 RX ADMIN — DEXTROSE MONOHYDRATE 25 G: 25 INJECTION, SOLUTION INTRAVENOUS at 04:25

## 2021-02-27 RX ADMIN — VENLAFAXINE HYDROCHLORIDE 37.5 MG: 37.5 CAPSULE, EXTENDED RELEASE ORAL at 09:33

## 2021-02-27 RX ADMIN — Medication 10 ML: at 06:00

## 2021-02-27 RX ADMIN — POLYETHYLENE GLYCOL 3350 17 G: 17 POWDER, FOR SOLUTION ORAL at 09:34

## 2021-02-27 RX ADMIN — EZETIMIBE 10 MG: 10 TABLET ORAL at 09:33

## 2021-02-27 RX ADMIN — CLOPIDOGREL BISULFATE 75 MG: 75 TABLET ORAL at 09:34

## 2021-02-27 NOTE — PROGRESS NOTES
Received message from patient's nurse Migue Arzate stating :    Her blood sugar is also 57       Discussion / orders:    Hypoglycemic panel/protocol order entered           Please note that this note was dictated using Dragon computer voice recognition software. Quite often unanticipated grammatical, syntax, homophones, and other interpretive errors are inadvertently transcribed by the computer software. Please disregard these errors. Please excuse any errors that have escaped final proofreading.

## 2021-02-27 NOTE — PROGRESS NOTES
Hospitalist Progress Note    NAME: Gayla Bermudez   :  1945   MRN:  [id number]     Admit date: 2021    Today's date: 21    PCP: MD Dayana Vyas M.D. Cell [phone]    History of chronic constipation for over 10 years  Only mention of a colonoscopy note was about 10 years ago per Dr. Mckenzie Aparicio    Status post cholecystectomy, history of choledocholithiasis status post ERCP with Dr. Lawrence Gibbons     Daughter reports possible EGD with dilatation required, could not find documentation of this in chart    Diagnosed with Covid pneumonia 2021    Admitted to [facility]  to 2021 with severe constipation   Daughter not aware of any treatments for the Covid   Daughter reports required sedation and disimpaction  Discharged to rehab at [facility]    Admitted  to 2021 at [facility] with non-ST elevation MI  Presented with generalized weakness  Not taking p.o. well at discharge  Peak troponin 2.11  Echo LVEF 65%, moderate MS, valve gradient 15 mm  Managed medically  SNF was recommended, family declined and took her home with home health    Daughter says after arriving home \"she would not take p.o. and kept vomiting\"  Home for less than a day and was taken to [address] [place] [address] ER   Dehydrated with sodium 153, BUN 72 creatinine 1.23   UA with 0-5 WBCs, 0-3 RBCs   CT scan at outside hospital as below  Transferred to MR [address]    Assessment / Plan:    Hypernatremia POA Na 152 improving  Hypovolemia POA BUN/creat 68 and 1.22 improving  Dysphagia POA reports trouble swallowing, ? history of esophageal stricture in past  Nausea and vomiting at home POA   Recent obstipation POA appears more chronic  -CT scan done at outside hospital reported as below      1. New infiltrate in the bases greater on the right. 2.  Nonobstructive nephrolithiasis     3. fecal retention which is diminishing liquid prior.      4.  Persistent dilatation of the sigmoid with an air-fluid level with redundancy  -Reports dysphagia, was spitting up in ED  -Hypovolemia and dehydration improved with IVF  -Speech saw, recommended starting dysphagia diet  -Reduce IVF  -Bowel regimen with daily MiraLAX and lactulose  -Spoke with Dr Rafy Rutherford, ? Needs EGD and anything else to do with the constipation         The constipation appears chronic, the sigmoid dilatation may be as well         PPI  - Given the recent COVID can consider barium swallow first  -Spoke with daughter by phone  -Check head CT to rule out neurologic    Recent COVID19 infection (diagnosed with COVID on 2/2/21)  ? CAP POA  Was at [facility], unclear what treatment she received  Not hypoxic, appears asymptpomatic  CXR with bibasilar ASD  Rapid covid was positive in OSH  Not hypoxic at admit, hold on RX  IV levaquin day 3  Procalcitonin 0.24  Suspect non infectious at this point despite the positive testing  Check inflammatory markers    SVT vs NSVT in ED POA  Recent non ST elevation MI at OSH 2/17, peak troponin 2.11  Essential HTN POA  Tele  EKG NSR 69 Short [place] , no acute Ischemic injury  Resume Lopressor daily, continue norvasc  PRN hydralazine  ASA  Unclear if tolerant of statins, allergic to crestor  Check pBNP    Generalized weakness POA  Daughter notes ambulatory in January, past month is hardly walked at all  Appears to have good strength in arms and distally in legs  A bit of trouble lifting right thigh off bed otherwise nonfocal  SPECT related underlying medical issues, recent Covid, needs PT and OT    Renal stones without Hydronephrosis     Dementia  ?   No formal diagnosis, daughter notes increasing memory issues  Oriented x2-3 here, daughter notes was scheduled to see neurologist as outpatient  Anxiety  Hx agoraphobia per chart    Code Status: FULL  NOK:  Maral Duncan Child     [phone]   unknown, Tiarra Daughter      done at outside hospital showed      DVT Prophylaxis: SQ HEP  GI Prophylaxis: not indicated     Baseline: Dependent/Demented    Body mass index is 21.58 kg/m². Subjective:     Chief Complaint / Reason for Physician Visit follow-up hypernatremia, nausea vomiting  \" My eyes are hurting\". Discussed with RN events overnight. Complains of sore eyes, vision intact to watching TV and counting fingers  Complaining of trouble swallowing, things caught in her throat  Seen by speech yesterday started on puréed diet, ate very little this morning  No bowel movements reported by nursing staff overnight  No nausea or vomiting, abdominal pain, chest pain, shortness of breath  Remains on room air  Mild cough  Daughter said patient often has frequent somatic complaints    Review of Systems:  Symptom Y/N Comments  Symptom Y/N Comments   Fever/Chills n   Chest Pain n    Poor Appetite    Edema     Cough y   Abdominal Pain n    Sputum    Joint Pain     SOB/MOORE n   Headache     Nausea/vomit n   Tolerating PT/OT     Diarrhea y   Tolerating Diet y    Constipation    Other       Could NOT obtain due to:      Objective:     VITALS:   Last 24hrs VS reviewed since prior progress note.  Most recent are:  Patient Vitals for the past 24 hrs:   Temp Pulse Resp BP SpO2   02/27/21 0740 98 °F (36.7 °C) 83 16 (!) 146/78 97 %   02/27/21 0246 99.1 °F (37.3 °C) 78 18 (!) 170/75 96 %   02/27/21 0015 98.8 °F (37.1 °C) 76 18 (!) 197/88 95 %   02/26/21 1955 98 °F (36.7 °C) 76 18 (!) 181/82 96 %   02/26/21 1631 97.5 °F (36.4 °C) 65 18 (!) 160/65 98 %   02/26/21 1113 97.9 °F (36.6 °C) 70 16 127/63 98 %       Intake/Output Summary (Last 24 hours) at 2/27/2021 0959  Last data filed at 2/26/2021 1550  Gross per 24 hour   Intake --   Output 400 ml   Net -400 ml        Wt Readings from Last 12 Encounters:   02/25/21 62.5 kg (137 lb 12.6 oz)   02/17/21 73.9 kg (162 lb 14.7 oz)   12/10/20 74.1 kg (163 lb 6.4 oz)   07/16/20 81.2 kg (179 lb)   06/23/20 75.8 kg (167 lb)   03/24/20 75.8 kg (167 lb)   01/20/20 78.9 kg (174 lb)   01/03/20 77.1 kg (170 lb)   11/28/19 84.7 kg (186 lb 11.7 oz)   10/18/19 84.7 kg (186 lb 11.7 oz)   09/08/19 75.8 kg (167 lb)   09/04/19 78 kg (172 lb)       PHYSICAL EXAM:    I had a face to face encounter and independently examined this patient on 02/27/21 as outlined below:    General: WD, WN. Alert, cooperative, no acute distress    EENT:  PERRL. Anicteric sclerae. MMM  Neck:  No meningismus, no thyromegaly  Resp:  Crackles at bases bilaterally, no wheezing. No accessory muscle use  CV:  Regular  rhythm,  No edema  GI:  Soft, Non distended, Non tender. +Bowel sounds, no rebound  LN:  No cervical or inguinal TYRONE  Neurologic:  Alert, oriented to birthday, current place and city, a bit trouble getting current year eventually said 2021     normal speech,    5/5 strength in upper extremities bilaterally, 5/5 strength in feet bilaterally    Some trouble lifting right thigh off bed  Psych:   Not anxious nor agitated  Skin:  No rashes. No jaundice    Reviewed most current lab test results and cultures  YES  Reviewed most current radiology test results   YES  Review and summation of old records today    NO  Reviewed patient's current orders and MAR    YES  PMH/ reviewed - no change compared to H&P  ________________________________________________________________________  Care Plan discussed with:    Comments   Patient x    Family      RN x    Care Manager     Consultant                        Multidiciplinary team rounds were held today with , nursing, pharmacist and clinical coordinator. Patient's plan of care was discussed; medications were reviewed and discharge planning was addressed.      ________________________________________________________________________      Comments   >50% of visit spent in counseling and coordination of care     ________________________________________________________________________  Aurea Suggs MD     Procedures: see electronic medical records for all procedures/Xrays and details which were not copied into this note but were reviewed prior to creation of Plan. LABS:  I reviewed today's most current labs and imaging studies.   Pertinent labs include:  Recent Labs     02/27/21 0243 02/26/21 0107 02/25/21  0305   WBC 8.7 6.8 7.4   HGB 11.7 10.1* 11.0*   HCT 42.3 34.4* 37.2    242 303     Recent Labs     02/27/21 0243 02/26/21 0107 02/25/21  0305    148* 152*   K 3.9 3.6 3.5   * 114* 115*   CO2 21 27 31   GLU 57* 80 101*   BUN 30* 45* 68*   CREA 0.69 0.85 1.22*   CA 8.4* 8.5 8.9   MG  --  2.5* 2.7*   ALB 2.1*  --  2.5*   TBILI 0.7  --  0.6   ALT 20  --  16

## 2021-02-27 NOTE — PROGRESS NOTES
Received message from patient's nurse Claudine Pineda stating :    Patients oxygen saturation dropped to 68%. I put her on 3L of oxygen via nasal canula and asked her to breathe in through her nose/out through her mouth and it came up with the oxygen to 96%. Discussion / orders:  Patient is admitted for Covid pneumonia on 02/15/2021    · Chest x-ray  · ABG  · Respiratory care consult  · Dexamethasone 6 mg daily x10 days  · Consulted pharmacy for remdesivir dosing now that patient is requiring oxygen supplement. 0430  Received call from respiratory therapist informing me that the reason patient sat had dropped/was showing low is because patient's hands were cold. She states she replaced the sensor and the patient is now on room air satting 99%. States she does not require oxygen and is in no distress. · I had initially started patient on dexamethasone and have consulted pharmacy for remdesivir dosing however given this new information, I will discontinue. · Continuous pulse oximetry x 12 hours            Please note that this note was dictated using Dragon computer voice recognition software. Quite often unanticipated grammatical, syntax, homophones, and other interpretive errors are inadvertently transcribed by the computer software. Please disregard these errors. Please excuse any errors that have escaped final proofreading.

## 2021-02-27 NOTE — PROGRESS NOTES
Verbal shift change report given to [facility], RN (oncoming nurse) by Pk Mcguire RN(offgoing nurse). Report included the following information SBAR, Kardex, MAR and Recent Results.

## 2021-02-28 ENCOUNTER — APPOINTMENT (OUTPATIENT)
Dept: CT IMAGING | Age: 76
DRG: 640 | End: 2021-02-28
Attending: INTERNAL MEDICINE
Payer: MEDICARE

## 2021-02-28 ENCOUNTER — APPOINTMENT (OUTPATIENT)
Dept: ULTRASOUND IMAGING | Age: 76
DRG: 640 | End: 2021-02-28
Attending: INTERNAL MEDICINE
Payer: MEDICARE

## 2021-02-28 LAB
ALBUMIN SERPL-MCNC: 1.9 G/DL (ref 3.5–5)
ALBUMIN/GLOB SERPL: 0.5 {RATIO} (ref 1.1–2.2)
ALP SERPL-CCNC: 55 U/L (ref 45–117)
ALT SERPL-CCNC: 18 U/L (ref 12–78)
ANION GAP SERPL CALC-SCNC: 5 MMOL/L (ref 5–15)
APTT PPP: 31 SEC (ref 22.1–31)
AST SERPL-CCNC: 18 U/L (ref 15–37)
BASOPHILS # BLD: 0 K/UL (ref 0–0.1)
BASOPHILS NFR BLD: 0 % (ref 0–1)
BILIRUB SERPL-MCNC: 0.6 MG/DL (ref 0.2–1)
BUN SERPL-MCNC: 18 MG/DL (ref 6–20)
BUN/CREAT SERPL: 26 (ref 12–20)
CALCIUM SERPL-MCNC: 8 MG/DL (ref 8.5–10.1)
CHLORIDE SERPL-SCNC: 109 MMOL/L (ref 97–108)
CO2 SERPL-SCNC: 27 MMOL/L (ref 21–32)
CREAT SERPL-MCNC: 0.68 MG/DL (ref 0.55–1.02)
CRP SERPL-MCNC: 6.3 MG/DL (ref 0–0.6)
D DIMER PPP FEU-MCNC: 4.28 MG/L FEU (ref 0–0.65)
DIFFERENTIAL METHOD BLD: ABNORMAL
EOSINOPHIL # BLD: 0.1 K/UL (ref 0–0.4)
EOSINOPHIL NFR BLD: 1 % (ref 0–7)
ERYTHROCYTE [DISTWIDTH] IN BLOOD BY AUTOMATED COUNT: 13.6 % (ref 11.5–14.5)
FERRITIN SERPL-MCNC: 301 NG/ML (ref 8–252)
GLOBULIN SER CALC-MCNC: 4 G/DL (ref 2–4)
GLUCOSE BLD STRIP.AUTO-MCNC: 139 MG/DL (ref 65–100)
GLUCOSE BLD STRIP.AUTO-MCNC: 149 MG/DL (ref 65–100)
GLUCOSE BLD STRIP.AUTO-MCNC: 153 MG/DL (ref 65–100)
GLUCOSE BLD STRIP.AUTO-MCNC: 171 MG/DL (ref 65–100)
GLUCOSE SERPL-MCNC: 111 MG/DL (ref 65–100)
HCT VFR BLD AUTO: 31.5 % (ref 35–47)
HGB BLD-MCNC: 9.6 G/DL (ref 11.5–16)
IMM GRANULOCYTES # BLD AUTO: 0.1 K/UL (ref 0–0.04)
IMM GRANULOCYTES NFR BLD AUTO: 1 % (ref 0–0.5)
LDH SERPL L TO P-CCNC: 225 U/L (ref 81–246)
LYMPHOCYTES # BLD: 0.7 K/UL (ref 0.8–3.5)
LYMPHOCYTES NFR BLD: 7 % (ref 12–49)
MCH RBC QN AUTO: 25.7 PG (ref 26–34)
MCHC RBC AUTO-ENTMCNC: 30.5 G/DL (ref 30–36.5)
MCV RBC AUTO: 84.5 FL (ref 80–99)
MONOCYTES # BLD: 0.5 K/UL (ref 0–1)
MONOCYTES NFR BLD: 5 % (ref 5–13)
NEUTS SEG # BLD: 7.9 K/UL (ref 1.8–8)
NEUTS SEG NFR BLD: 86 % (ref 32–75)
NRBC # BLD: 0 K/UL (ref 0–0.01)
NRBC BLD-RTO: 0 PER 100 WBC
PLATELET # BLD AUTO: 189 K/UL (ref 150–400)
PMV BLD AUTO: 11 FL (ref 8.9–12.9)
POTASSIUM SERPL-SCNC: 3.5 MMOL/L (ref 3.5–5.1)
PROCALCITONIN SERPL-MCNC: 0.12 NG/ML
PROT SERPL-MCNC: 5.9 G/DL (ref 6.4–8.2)
RBC # BLD AUTO: 3.73 M/UL (ref 3.8–5.2)
SERVICE CMNT-IMP: ABNORMAL
SODIUM SERPL-SCNC: 141 MMOL/L (ref 136–145)
THERAPEUTIC RANGE,PTTT: NORMAL SECS (ref 58–77)
WBC # BLD AUTO: 9.2 K/UL (ref 3.6–11)

## 2021-02-28 PROCEDURE — 74011250637 HC RX REV CODE- 250/637: Performed by: HOSPITALIST

## 2021-02-28 PROCEDURE — 94760 N-INVAS EAR/PLS OXIMETRY 1: CPT

## 2021-02-28 PROCEDURE — 74011250636 HC RX REV CODE- 250/636: Performed by: NURSE PRACTITIONER

## 2021-02-28 PROCEDURE — 36415 COLL VENOUS BLD VENIPUNCTURE: CPT

## 2021-02-28 PROCEDURE — 85379 FIBRIN DEGRADATION QUANT: CPT

## 2021-02-28 PROCEDURE — 82728 ASSAY OF FERRITIN: CPT

## 2021-02-28 PROCEDURE — 85025 COMPLETE CBC W/AUTO DIFF WBC: CPT

## 2021-02-28 PROCEDURE — 93970 EXTREMITY STUDY: CPT

## 2021-02-28 PROCEDURE — 82962 GLUCOSE BLOOD TEST: CPT

## 2021-02-28 PROCEDURE — 85730 THROMBOPLASTIN TIME PARTIAL: CPT

## 2021-02-28 PROCEDURE — P9047 ALBUMIN (HUMAN), 25%, 50ML: HCPCS | Performed by: NURSE PRACTITIONER

## 2021-02-28 PROCEDURE — 74011250636 HC RX REV CODE- 250/636: Performed by: HOSPITALIST

## 2021-02-28 PROCEDURE — 84145 PROCALCITONIN (PCT): CPT

## 2021-02-28 PROCEDURE — 83615 LACTATE (LD) (LDH) ENZYME: CPT

## 2021-02-28 PROCEDURE — 65660000000 HC RM CCU STEPDOWN

## 2021-02-28 PROCEDURE — 74011000636 HC RX REV CODE- 636: Performed by: INTERNAL MEDICINE

## 2021-02-28 PROCEDURE — 74011250636 HC RX REV CODE- 250/636: Performed by: INTERNAL MEDICINE

## 2021-02-28 PROCEDURE — 71275 CT ANGIOGRAPHY CHEST: CPT

## 2021-02-28 PROCEDURE — 80053 COMPREHEN METABOLIC PANEL: CPT

## 2021-02-28 PROCEDURE — 86140 C-REACTIVE PROTEIN: CPT

## 2021-02-28 RX ORDER — HEPARIN SODIUM 5000 [USP'U]/ML
40 INJECTION, SOLUTION INTRAVENOUS; SUBCUTANEOUS AS NEEDED
Status: DISCONTINUED | OUTPATIENT
Start: 2021-02-28 | End: 2021-03-01

## 2021-02-28 RX ORDER — DEXAMETHASONE SODIUM PHOSPHATE 4 MG/ML
6 INJECTION, SOLUTION INTRA-ARTICULAR; INTRALESIONAL; INTRAMUSCULAR; INTRAVENOUS; SOFT TISSUE EVERY 24 HOURS
Status: DISCONTINUED | OUTPATIENT
Start: 2021-02-28 | End: 2021-02-28

## 2021-02-28 RX ORDER — HEPARIN SODIUM 5000 [USP'U]/ML
80 INJECTION, SOLUTION INTRAVENOUS; SUBCUTANEOUS AS NEEDED
Status: DISCONTINUED | OUTPATIENT
Start: 2021-02-28 | End: 2021-03-01

## 2021-02-28 RX ORDER — ALBUMIN HUMAN 250 G/1000ML
25 SOLUTION INTRAVENOUS ONCE
Status: COMPLETED | OUTPATIENT
Start: 2021-02-28 | End: 2021-02-28

## 2021-02-28 RX ORDER — HEPARIN SODIUM 10000 [USP'U]/100ML
16-36 INJECTION, SOLUTION INTRAVENOUS
Status: DISCONTINUED | OUTPATIENT
Start: 2021-02-28 | End: 2021-02-28

## 2021-02-28 RX ADMIN — CLOPIDOGREL BISULFATE 75 MG: 75 TABLET ORAL at 10:53

## 2021-02-28 RX ADMIN — SENNOSIDES 8.6 MG: 8.6 TABLET, FILM COATED ORAL at 10:43

## 2021-02-28 RX ADMIN — EZETIMIBE 10 MG: 10 TABLET ORAL at 10:45

## 2021-02-28 RX ADMIN — BISACODYL 10 MG: 5 TABLET, COATED ORAL at 10:43

## 2021-02-28 RX ADMIN — IOPAMIDOL 100 ML: 755 INJECTION, SOLUTION INTRAVENOUS at 10:00

## 2021-02-28 RX ADMIN — DEXAMETHASONE SODIUM PHOSPHATE 6 MG: 4 INJECTION, SOLUTION INTRAMUSCULAR; INTRAVENOUS at 05:11

## 2021-02-28 RX ADMIN — Medication 10 ML: at 18:13

## 2021-02-28 RX ADMIN — POLYETHYLENE GLYCOL 3350 17 G: 17 POWDER, FOR SOLUTION ORAL at 10:53

## 2021-02-28 RX ADMIN — HYDRALAZINE HYDROCHLORIDE 10 MG: 20 INJECTION INTRAMUSCULAR; INTRAVENOUS at 18:08

## 2021-02-28 RX ADMIN — LEVOFLOXACIN 750 MG: 5 INJECTION, SOLUTION INTRAVENOUS at 05:37

## 2021-02-28 RX ADMIN — AMLODIPINE BESYLATE 2.5 MG: 2.5 TABLET ORAL at 10:43

## 2021-02-28 RX ADMIN — ACETAMINOPHEN 650 MG: 325 TABLET ORAL at 10:47

## 2021-02-28 RX ADMIN — POTASSIUM CHLORIDE, DEXTROSE MONOHYDRATE AND SODIUM CHLORIDE 75 ML/HR: 150; 5; 200 INJECTION, SOLUTION INTRAVENOUS at 03:14

## 2021-02-28 RX ADMIN — SENNOSIDES 8.6 MG: 8.6 TABLET, FILM COATED ORAL at 18:08

## 2021-02-28 RX ADMIN — HEPARIN SODIUM AND DEXTROSE 16 UNITS/KG/HR: 10000; 5 INJECTION INTRAVENOUS at 05:58

## 2021-02-28 RX ADMIN — ASPIRIN 81 MG: 81 TABLET, CHEWABLE ORAL at 10:45

## 2021-02-28 RX ADMIN — ONDANSETRON 4 MG: 2 INJECTION INTRAMUSCULAR; INTRAVENOUS at 18:08

## 2021-02-28 RX ADMIN — Medication 10 ML: at 05:11

## 2021-02-28 RX ADMIN — VENLAFAXINE HYDROCHLORIDE 37.5 MG: 37.5 CAPSULE, EXTENDED RELEASE ORAL at 10:45

## 2021-02-28 RX ADMIN — ONDANSETRON 4 MG: 2 INJECTION INTRAMUSCULAR; INTRAVENOUS at 10:39

## 2021-02-28 RX ADMIN — ALBUMIN (HUMAN) 25 G: 0.25 INJECTION, SOLUTION INTRAVENOUS at 05:14

## 2021-02-28 RX ADMIN — METOPROLOL SUCCINATE 25 MG: 50 TABLET, EXTENDED RELEASE ORAL at 10:42

## 2021-02-28 RX ADMIN — ACETAMINOPHEN 650 MG: 325 TABLET ORAL at 18:13

## 2021-02-28 RX ADMIN — Medication 10 ML: at 21:28

## 2021-02-28 NOTE — PROGRESS NOTES
Received message from patient's nurse Jeovanny Cano stating :    Patients d dimer is 4.28 and her albumin is 1.9       Discussion / orders:    Patient currently on Lovenox 40 mg subcu daily  · Discontinued Lovenox  · Started heparin drip  · Started dexamethasone 6 mg IV daily x10 days  · Check D-dimer daily  · Albumin 25% 25 g IV infusion x1           Please note that this note was dictated using Dragon computer voice recognition software. Quite often unanticipated grammatical, syntax, homophones, and other interpretive errors are inadvertently transcribed by the computer software. Please disregard these errors. Please excuse any errors that have escaped final proofreading.

## 2021-02-28 NOTE — PROGRESS NOTES
Bedside and Verbal shift change report given to Tammy (oncoming nurse) by Ismael Joel (offgoing nurse). Report included the following information SBAR, Kardex, ED Summary, Procedure Summary, MAR and Recent Results.

## 2021-02-28 NOTE — PROGRESS NOTES
Dictated  [id number]    Acute abdominal series    Continue bowel regimen    Dr Saravanan Cottrell to follow in am    Ynes House MD  6:12 PM  2/28/2021

## 2021-03-01 ENCOUNTER — APPOINTMENT (OUTPATIENT)
Dept: GENERAL RADIOLOGY | Age: 76
DRG: 640 | End: 2021-03-01
Attending: SPECIALIST
Payer: MEDICARE

## 2021-03-01 LAB
ALBUMIN SERPL-MCNC: 2.2 G/DL (ref 3.5–5)
ALBUMIN/GLOB SERPL: 0.6 {RATIO} (ref 1.1–2.2)
ALP SERPL-CCNC: 45 U/L (ref 45–117)
ALT SERPL-CCNC: 17 U/L (ref 12–78)
ANION GAP SERPL CALC-SCNC: 5 MMOL/L (ref 5–15)
AST SERPL-CCNC: 10 U/L (ref 15–37)
BASOPHILS # BLD: 0 K/UL (ref 0–0.1)
BASOPHILS NFR BLD: 0 % (ref 0–1)
BILIRUB SERPL-MCNC: 0.3 MG/DL (ref 0.2–1)
BUN SERPL-MCNC: 17 MG/DL (ref 6–20)
BUN/CREAT SERPL: 24 (ref 12–20)
CALCIUM SERPL-MCNC: 8.1 MG/DL (ref 8.5–10.1)
CHLORIDE SERPL-SCNC: 106 MMOL/L (ref 97–108)
CO2 SERPL-SCNC: 27 MMOL/L (ref 21–32)
CREAT SERPL-MCNC: 0.71 MG/DL (ref 0.55–1.02)
DIFFERENTIAL METHOD BLD: ABNORMAL
EOSINOPHIL # BLD: 0.1 K/UL (ref 0–0.4)
EOSINOPHIL NFR BLD: 1 % (ref 0–7)
ERYTHROCYTE [DISTWIDTH] IN BLOOD BY AUTOMATED COUNT: 13.8 % (ref 11.5–14.5)
GLOBULIN SER CALC-MCNC: 3.6 G/DL (ref 2–4)
GLUCOSE BLD STRIP.AUTO-MCNC: 102 MG/DL (ref 65–100)
GLUCOSE BLD STRIP.AUTO-MCNC: 104 MG/DL (ref 65–100)
GLUCOSE BLD STRIP.AUTO-MCNC: 124 MG/DL (ref 65–100)
GLUCOSE BLD STRIP.AUTO-MCNC: 98 MG/DL (ref 65–100)
GLUCOSE SERPL-MCNC: 104 MG/DL (ref 65–100)
HCT VFR BLD AUTO: 29.5 % (ref 35–47)
HGB BLD-MCNC: 9.3 G/DL (ref 11.5–16)
IMM GRANULOCYTES # BLD AUTO: 0.1 K/UL (ref 0–0.04)
IMM GRANULOCYTES NFR BLD AUTO: 2 % (ref 0–0.5)
LYMPHOCYTES # BLD: 0.8 K/UL (ref 0.8–3.5)
LYMPHOCYTES NFR BLD: 15 % (ref 12–49)
MCH RBC QN AUTO: 26.2 PG (ref 26–34)
MCHC RBC AUTO-ENTMCNC: 31.5 G/DL (ref 30–36.5)
MCV RBC AUTO: 83.1 FL (ref 80–99)
MONOCYTES # BLD: 0.4 K/UL (ref 0–1)
MONOCYTES NFR BLD: 8 % (ref 5–13)
NEUTS SEG # BLD: 3.8 K/UL (ref 1.8–8)
NEUTS SEG NFR BLD: 74 % (ref 32–75)
NRBC # BLD: 0 K/UL (ref 0–0.01)
NRBC BLD-RTO: 0 PER 100 WBC
PLATELET # BLD AUTO: 182 K/UL (ref 150–400)
PMV BLD AUTO: 11.2 FL (ref 8.9–12.9)
POTASSIUM SERPL-SCNC: 3.4 MMOL/L (ref 3.5–5.1)
PROT SERPL-MCNC: 5.8 G/DL (ref 6.4–8.2)
RBC # BLD AUTO: 3.55 M/UL (ref 3.8–5.2)
RBC MORPH BLD: ABNORMAL
SERVICE CMNT-IMP: ABNORMAL
SERVICE CMNT-IMP: NORMAL
SODIUM SERPL-SCNC: 138 MMOL/L (ref 136–145)
WBC # BLD AUTO: 5.2 K/UL (ref 3.6–11)

## 2021-03-01 PROCEDURE — 36415 COLL VENOUS BLD VENIPUNCTURE: CPT

## 2021-03-01 PROCEDURE — 74011250636 HC RX REV CODE- 250/636: Performed by: INTERNAL MEDICINE

## 2021-03-01 PROCEDURE — 74011250636 HC RX REV CODE- 250/636: Performed by: HOSPITALIST

## 2021-03-01 PROCEDURE — 77030038269 HC DRN EXT URIN PURWCK BARD -A

## 2021-03-01 PROCEDURE — 74011250637 HC RX REV CODE- 250/637: Performed by: HOSPITALIST

## 2021-03-01 PROCEDURE — 65660000000 HC RM CCU STEPDOWN

## 2021-03-01 PROCEDURE — 74022 RADEX COMPL AQT ABD SERIES: CPT

## 2021-03-01 PROCEDURE — 82962 GLUCOSE BLOOD TEST: CPT

## 2021-03-01 PROCEDURE — 85025 COMPLETE CBC W/AUTO DIFF WBC: CPT

## 2021-03-01 PROCEDURE — 80053 COMPREHEN METABOLIC PANEL: CPT

## 2021-03-01 PROCEDURE — 92526 ORAL FUNCTION THERAPY: CPT

## 2021-03-01 RX ORDER — SORBITOL SOLUTION 70 %
30 SOLUTION, ORAL MISCELLANEOUS 2 TIMES DAILY
Status: DISPENSED | OUTPATIENT
Start: 2021-03-01 | End: 2021-03-03

## 2021-03-01 RX ADMIN — VENLAFAXINE HYDROCHLORIDE 37.5 MG: 37.5 CAPSULE, EXTENDED RELEASE ORAL at 10:12

## 2021-03-01 RX ADMIN — POTASSIUM CHLORIDE, DEXTROSE MONOHYDRATE AND SODIUM CHLORIDE 75 ML/HR: 150; 5; 200 INJECTION, SOLUTION INTRAVENOUS at 03:17

## 2021-03-01 RX ADMIN — AMLODIPINE BESYLATE 2.5 MG: 2.5 TABLET ORAL at 10:12

## 2021-03-01 RX ADMIN — Medication 10 ML: at 19:35

## 2021-03-01 RX ADMIN — ONDANSETRON 4 MG: 2 INJECTION INTRAMUSCULAR; INTRAVENOUS at 19:32

## 2021-03-01 RX ADMIN — METOPROLOL SUCCINATE 25 MG: 50 TABLET, EXTENDED RELEASE ORAL at 10:12

## 2021-03-01 RX ADMIN — Medication 10 ML: at 05:20

## 2021-03-01 RX ADMIN — Medication 10 ML: at 21:19

## 2021-03-01 RX ADMIN — EZETIMIBE 10 MG: 10 TABLET ORAL at 10:12

## 2021-03-01 RX ADMIN — LEVOFLOXACIN 750 MG: 5 INJECTION, SOLUTION INTRAVENOUS at 05:18

## 2021-03-01 RX ADMIN — POTASSIUM CHLORIDE, DEXTROSE MONOHYDRATE AND SODIUM CHLORIDE 75 ML/HR: 150; 5; 200 INJECTION, SOLUTION INTRAVENOUS at 19:32

## 2021-03-01 NOTE — PROGRESS NOTES
GI PROGRESS NOTE  Chase Cueva, NICK  [phone] NP in-hospital cell phone M-F until 4:30  After 5pm or on weekends, please call  for physician on call    Alejo Bain :1945 IUZ:[id number]   ATTG: Dr Sammi Saavedra  PCP: Frida Long MD  Date/Time:  3/1/2021 9:02 AM     Primary GI: Dr. Saravanan Cottrell    Reason for following: sigmoid dilatation, recent obstipation, new dysphagia    Assessment:   Sigmoid Dilatation on CT scan  Chronic Constipation  - Fecal retention - no BM during hospitalization here  - abdomen is soft, non-tender, flat  - Colonoscopy in  with Dr Zana Basilio [phone]    Dysphagia - new  Nausea and Vomiting - stared at dx of COVID  - States new for the last few days to couple weeks  - Nursing states she is only taking PO at small amounts, refusing some of her pills  - Lots of nausea and deterring PO intake  - Speech therapy saw and recommended purees and thin liquids  - No previous EGD but had ERCP in 2018 and had grossly normal exam on duodenscope    COVID + on 21 and 21  - wheezing in upper lobes anteriorly    Dementia       Plan:   - KUB of Abdomen, no need for abdominal GI series as will be poor study with little po intake  - Encourage ensures - added nutritional snack  - Antiemetics scheduled  - Added low dose sorbitol BID x 4 doses, discontinue bisacodyl  - Still getting senna BID and miralax daily  - Will need barium swallow and possible EGD but recommend her to recover from [facility] first prior to completing this    She has had a steady decline in her health over the last 2 years. Recommend a consult to palliative care for goals of care to discussed. Subjective:   Discussed with RN events overnight. Very little discussion this AM. Kept during her head to side when asking questions but denies any pain or needs at this time.      Complaint Y/N Description   Abdominal Pain n    Hematemesis n    Hematochezia n    Melena n    Constipation  No BM during this hospitalization per nursing   Diarrhea n    Dyspepsia n    Dysphagia n    Jaundiced n    Nausea/vomiting n      Review of Systems:  Symptom Y/N Comments  Symptom Y/N Comments   Fever/Chills    Chest Pain     Cough    Headaches     Sputum    Joint Pain     SOB/MOORE    Pruritis/Rash     Tolerating Diet  Little intake  Other       Could NOT obtain due to:      Objective:   VITALS:   Last 24hrs VS reviewed since prior progress note. Most recent are:  Visit Vitals  BP (!) 142/83   Pulse 67   Temp 97.6 °F (36.4 °C)   Resp 17   Ht 5' 7\" (1.702 m)   Wt 62.5 kg (137 lb 12.6 oz)   SpO2 96%   BMI 21.58 kg/m²       Intake/Output Summary (Last 24 hours) at 3/1/2021 0902  Last data filed at 2/28/2021 0913  Gross per 24 hour   Intake --   Output 750 ml   Net -750 ml     PHYSICAL EXAM:  General: WD, Chronically ill appearing, frail. Alert, cooperative, no acute distress    HEENT: NC, Atraumatic. Anicteric sclerae. Lungs:  Upper wheezing anteriorly bilaterally  Heart:  Regular  rhythm,  No murmur, No Rubs, No Gallops  Abdomen: Soft, Non distended, Non tender. Flat, +Bowel sounds, no HSM  Extremities: Trace BLE edema  Neurologic:  Alert and oriented X self and place, but not great with time. No acute neurological distress   Psych:   Unclear insight. Not anxious nor agitated. Lab and Radiology Data Reviewed: (see below)    Medications Reviewed: (see below)  PMH/SH reviewed - no change compared to H&P  ________________________________________________________________________  Total time spent with patient: 20 minutes ________________________________________________________________________  Care Plan discussed with:  Patient y   Family     RN derrick Munoz              Consultant: Kd Khan NP     Procedures: see electronic medical records for all procedures/Xrays and details which were not copied into this note but were reviewed prior to creation of Plan.       LABS:  Recent Labs     03/01/21  0422 02/28/21  0338   WBC 5.2 9.2   HGB 9.3* 9.6* HCT 29.5* 31.5*    189     Recent Labs     03/01/21  0422 02/28/21 0338 02/27/21 0243    141 143   K 3.4* 3.5 3.9    109* 113*   CO2 27 27 21   BUN 17 18 30*   CREA 0.71 0.68 0.69   * 111* 57*   CA 8.1* 8.0* 8.4*     Recent Labs     03/01/21 0422 02/28/21 0338 02/27/21 0243   AP 45 55 59   TP 5.8* 5.9* 6.9   ALB 2.2* 1.9* 2.1*   GLOB 3.6 4.0 4.8*     Recent Labs     02/28/21 0338   APTT 31.0      Recent Labs     02/28/21 0338   FERR 301*      Lab Results   Component Value Date/Time    Folate >19.9 03/04/2015 12:32 PM     No results for input(s): PH, PCO2, PO2 in the last 72 hours. No results for input(s): CPK, CKMB in the last 72 hours.     No lab exists for component: TROPONINI  Lab Results   Component Value Date/Time    Color DARK YELLOW 12/10/2020 03:56 PM    Appearance CLOUDY (A) 12/10/2020 03:56 PM    Specific gravity 1.020 12/10/2020 03:56 PM    Specific gravity 1.016 07/16/2020 03:47 PM    pH (UA) 5.0 12/10/2020 03:56 PM    Protein 30 (A) 12/10/2020 03:56 PM    Glucose Negative 12/10/2020 03:56 PM    Ketone TRACE (A) 12/10/2020 03:56 PM    Bilirubin Negative 06/23/2020 01:54 PM    Urobilinogen 1.0 12/10/2020 03:56 PM    Nitrites Positive (A) 12/10/2020 03:56 PM    Leukocyte Esterase SMALL (A) 12/10/2020 03:56 PM    Epithelial cells FEW 12/10/2020 03:56 PM    Bacteria 3+ (A) 12/10/2020 03:56 PM    WBC 10-20 12/10/2020 03:56 PM    RBC 0-5 12/10/2020 03:56 PM       MEDICATIONS:  Current Facility-Administered Medications   Medication Dose Route Frequency    heparin (porcine) injection 2,500 Units  40 Units/kg IntraVENous PRN    Or    heparin (porcine) injection 5,000 Units  80 Units/kg IntraVENous PRN    glucose chewable tablet 16 g  4 Tab Oral PRN    dextrose (D50W) injection syrg 12.5-25 g  12.5-25 g IntraVENous PRN    glucagon (GLUCAGEN) injection 1 mg  1 mg IntraMUSCular PRN    levoFLOXacin (LEVAQUIN) 750 mg in D5W IVPB  750 mg IntraVENous Q24H    dextrose 5% - 0.2% NaCl with KCl 20 mEq/L infusion  75 mL/hr IntraVENous CONTINUOUS    artificial tears (dextran-hypromellose-glycerin) (GENTEAL) ophthalmic solution 1 Drop  1 Drop Both Eyes PRN    amLODIPine (NORVASC) tablet 2.5 mg  2.5 mg Oral DAILY    aspirin chewable tablet 81 mg  81 mg Oral DAILY    bisacodyL (DULCOLAX) tablet 10 mg  10 mg Oral DAILY    clopidogreL (PLAVIX) tablet 75 mg  75 mg Oral DAILY    ezetimibe (ZETIA) tablet 10 mg  10 mg Oral DAILY    metoprolol succinate (TOPROL-XL) XL tablet 25 mg  25 mg Oral Q12H    polyethylene glycol (MIRALAX) packet 17 g  17 g Oral DAILY    senna (SENOKOT) tablet 8.6 mg  1 Tab Oral BID    venlafaxine-SR (EFFEXOR-XR) capsule 37.5 mg  37.5 mg Oral DAILY    LORazepam (ATIVAN) tablet 0.5 mg  0.5 mg Oral Q8H PRN    sodium chloride (NS) flush 5-40 mL  5-40 mL IntraVENous Q8H    sodium chloride (NS) flush 5-40 mL  5-40 mL IntraVENous PRN    acetaminophen (TYLENOL) tablet 650 mg  650 mg Oral Q6H PRN    Or    acetaminophen (TYLENOL) suppository 650 mg  650 mg Rectal Q6H PRN    polyethylene glycol (MIRALAX) packet 17 g  17 g Oral DAILY PRN    promethazine (PHENERGAN) tablet 12.5 mg  12.5 mg Oral Q6H PRN    Or    ondansetron (ZOFRAN) injection 4 mg  4 mg IntraVENous Q6H PRN    hydrALAZINE (APRESOLINE) 20 mg/mL injection 10 mg  10 mg IntraVENous Q6H PRN

## 2021-03-01 NOTE — PROGRESS NOTES
Bedside and Verbal shift change report given to Jackie Viramontes (oncoming nurse) by Yeimi Valdez (offgoing nurse). Report included the following information SBAR, Kardex, Intake/Output, MAR and Recent Results.

## 2021-03-01 NOTE — PROGRESS NOTES
End of Shift Note    Bedside shift change report given to Tammy (oncoming nurse) by Sandra Cabral (offgoing nurse). Report included the following information SBAR, Kardex, Intake/Output, MAR and Recent Results    Shift worked:  [id number]     Shift summary and any significant changes:     No major changes, pt rested well and was cooperative throughout shift. Concerns for physician to address:  none     Zone phone for oncoming shift:          Activity:  Activity Level: Bed Rest  Number times ambulated in hallways past shift: 0  Number of times OOB to chair past shift: 0    Cardiac:   Cardiac Monitoring: Yes      Cardiac Rhythm: Normal sinus rhythm    Access:   Current line(s): PIV     Genitourinary:   Urinary status: voiding    Respiratory:   O2 Device: Room air  Chronic home O2 use?: NO  Incentive spirometer at bedside: NO     GI:  Last Bowel Movement Date: 02/25/21  Current diet:  DIET NPO Except Meds  Passing flatus: YES  Tolerating current diet: YES  % Diet Eaten: 10 %    Pain Management:   Patient states pain is manageable on current regimen: N/A    Skin:  Freddy Score: 12  Interventions: float heels    Patient Safety:  Fall Score:  Total Score: 3  Interventions: gripper socks  High Fall Risk: Yes    Length of Stay:  Expected LOS: 4d 7h  Actual LOS: 4      Sandra Cabral

## 2021-03-01 NOTE — PROGRESS NOTES
Speech path progress note. The patient was treated today. nsg reported her po intake is very poor. Offered the patient thins via straw and purees via spoon. She had a mildly slow oral phase with purees. No overt s/s of aspiration with any texture. She took only a few bites and sips and then refused to take any more. She verbalizes that she does not want to eat. We will follow tomorrow as needed. nsg indicated that Palliative is being considered.    Bruce Melendez, SLP

## 2021-03-01 NOTE — CONSULTS
[facility]    Name:  Marcelino Dance  MR#:  [id number]  :  1945  ACCOUNT #:  [de-identified]  DATE OF SERVICE:  2021    REASON FOR CONSULTATION:  I am asked to see by Dr. Thony Valentino for esophageal abnormalities on CT and also for a history of fecal impaction. HISTORY OF PRESENT ILLNESS:  I cannot get much of the history from the patient, she talks about foot pain most of the time. She thinks it is  and that she is in [place]. I reviewed Dr. Rafy Jones's admission history and physical, I spoke to Dr. Thony Valentino regarding her yesterday and today. She has had a recent COVID-19 infection with nausea and vomiting. She has severe obstipation. She was seen and evaluated at [facility] and reportedly had a colonoscopy for fecal disimpaction. She has persistent nausea per the nursing staff. In addition, her CT scan of the chest here showed distension of the proximal to mid esophagus without convincing evidence of underlying mass lesion. I reviewed the images with a radiologist.  I cannot get further history from the patient. PAST MEDICAL AND SURGICAL HISTORY:  Hyponatremia, dementia, anxiety, agoraphobia. She has had ERCP by Dr. Anival mosquera. MEDICATIONS:  Not obtainable from the patient. ALLERGIES:  NOT OBTAINABLE FROM THE PATIENT. SOCIAL HISTORY:  Not obtainable from the patient. FAMILY HISTORY:  Not obtainable from the patient. REVIEW OF SYSTEMS:  Not obtainable from the patient. PHYSICAL EXAMINATION:  GENERAL:  Agitated, complaining of foot pain. Thin, no acute distress. VITAL SIGNS:  Temperature 98.8, pulse 76, blood pressure 178/85, respirations 16. HEENT:  No icterus. I cannot get her to cooperate further with eye exam or ENMT exam.  CHEST:  Clear to auscultation. HEART:  Regular rate and rhythm. ABDOMEN:  Soft, nontender. No palpable stool. Scaphoid, bowel sounds present, no mass.   RECTUM:  Dullness present, no stool in the vault.  EXTREMITIES:  No edema. I see no cause of her foot pain. IMAGING STUDIES:  No recent imaging to review; specifically no colon or abdominal imaging to review. I reviewed the CTA with the radiologist tonight. COMMENT:  I note that her current bowel regimen is senna, MiraLax. IMPRESSION REPORT AND PLAN:  1,   History of obstipation, severe. 2.  Abnormal esophagus on imaging study. --retained contents due to stricture or other. RECOMMENDATIONS:  1. Treat her COVID infection. No invasive test planned, and no non-portable radiology test planned until COVID has resolved. 2.  Dr. Patricia Gonzalez to follow tomorrow. 3.  KUB acute series on the floor tomorrow to look and see status of any fecal impaction. 4.  I appreciate this interesting consultation.       Bonnie Dickerson MD      [id number]  D:  02/28/2021 18:12  T:  02/28/2021 22:11  JOB #:  [id number]  CC:  MD Oswaldo Sheridan MD

## 2021-03-01 NOTE — PROGRESS NOTES
Hospitalist Progress Note    NAME: Daren Patton   :  1945   MRN:  [id number]     Admit date: 2021    Today's date: 21    PCP: MD Kady Mayfield M.D. Cell [phone]    History of chronic constipation for over 10 years  Only mention of a colonoscopy note was about 10 years ago per Dr. Nellie Magallon    Status post cholecystectomy, history of choledocholithiasis status post ERCP with Dr. Patricia Gonzalez     Daughter reports possible EGD with dilatation required, could not find documentation of this in chart    Diagnosed with Covid pneumonia 2021    Admitted to [address] [place] [address]  to 2021 with severe constipation   Daughter not aware of any treatments for the Covid   Daughter reports required sedation and disimpaction  Discharged to rehab at [place]    Admitted  to 2021 at [facility] with non-ST elevation MI  Presented with generalized weakness  Not taking p.o. well at discharge  Peak troponin 2.11  Echo LVEF 65%, moderate MS, valve gradient 15 mm  Managed medically  SNF was recommended, family declined and took her home with home health    Daughter says after arriving home \"she would not take p.o. and kept vomiting\"  Home for less than a day and was taken to [facility] ER   Dehydrated with sodium 153, BUN 72 creatinine 1.23   UA with 0-5 WBCs, 0-3 RBCs   CT scan at outside hospital as below  Transferred to  [address]    Assessment / Plan:    Hypernatremia POA Na 152 improved  Hypovolemia POA BUN/creat 68 and 1.22 improved  Dysphagia POA reports trouble swallowing, ? history of esophageal stricture in past  Nausea and vomiting at home POA   Recent obstipation POA appears more chronic  -CT scan done at outside hospital reported as below      1. New infiltrate in the bases greater on the right. 2.  Nonobstructive nephrolithiasis     3. fecal retention which is diminishing liquid prior.      4.  Persistent dilatation of the sigmoid with an air-fluid level with redundancy  -Reports dysphagia, was spitting up in ED  -Hypovolemia and dehydration improved with IVF  -Speech saw, recommended starting dysphagia diet  -Reduce IVF  -Bowel regimen with daily MiraLAX and lactulose  -Spoke with Dr Sylvie Claude, ? Needs EGD and anything else to do with the constipation         The constipation appears chronic, the sigmoid dilatation may be as well         PPI  - Given the recent COVID can consider barium swallow first  -Spoke with daughter by phone  -Head CT negative    Recent COVID19 infection (diagnosed with COVID on 2/2/21)  ? CAP POA  Was at [facility], unclear what treatment she received  Not hypoxic, appears asymptpomatic  CXR with bibasilar ASD  Rapid covid was positive in OSH  Not hypoxic at admit, hold on RX  IV levaquin day 3  Procalcitonin 0.24  Suspect non infectious at this point despite the positive tests  D-dimer elevated, x-cover started heparin     I checked CTA chest and LE dopplers, both negative or VTE     Heparin stopped  Not hypoxic and 1 month out from COVID, no indication for steroids, stopped    SVT vs NSVT in ED POA  Recent non ST elevation MI at OSH 2/17, peak troponin 2.11  Essential HTN POA  Tele  EKG NSR [address] , no acute Ischemic injury  Resume Lopressor daily, continue norvasc  PRN hydralazine  ASA  Unclear if tolerant of statins, allergic to crestor    Generalized weakness POA  Daughter notes ambulatory in January, past month is hardly walked at all  Appears to have good strength in arms and distally in legs  A bit of trouble lifting right thigh off bed otherwise nonfocal  SPECT related underlying medical issues, recent Covid  Needs PT and OT    Renal stones without Hydronephrosis     Dementia  ?   No formal diagnosis, daughter notes increasing memory issues  Oriented x2-3 here, daughter notes was scheduled to see neurologist as outpatient  Anxiety  Hx agoraphobia per chart    Code Status: FULL  NOK:  Roniaarti Yoel     [phone] unknown, Tiarra Daughter      done at outside hospital showed      DVT Prophylaxis: SQ HEP  GI Prophylaxis: not indicated     Baseline: Dependent/Demented    Body mass index is 21.58 kg/m². Subjective:     Chief Complaint / Reason for Physician Visit follow-up hypernatremia, nausea vomiting  \" I feel nauseated\". Discussed with RN events overnight. Nausea, no vomiting  No SOB, currently on Room air  Mild cough    Review of Systems:  Symptom Y/N Comments  Symptom Y/N Comments   Fever/Chills n   Chest Pain n    Poor Appetite    Edema     Cough y   Abdominal Pain n    Sputum    Joint Pain     SOB/MOORE n   Headache     Nausea/vomit n   Tolerating PT/OT     Diarrhea y   Tolerating Diet y    Constipation    Other       Could NOT obtain due to:      Objective:     VITALS:   Last 24hrs VS reviewed since prior progress note. Most recent are:  Patient Vitals for the past 24 hrs:   Temp Pulse Resp BP SpO2   02/28/21 2018 -- -- -- 110/66 --   02/28/21 1808 98.3 °F (36.8 °C) 77 16 (!) 178/82 96 %   02/28/21 1600 98.6 °F (37 °C) 77 16 (!) 178/85 97 %   02/28/21 1131 98.5 °F (36.9 °C) 78 16 (!) 141/65 94 %   02/28/21 0738 98.5 °F (36.9 °C) 73 16 (!) 187/88 94 %   02/28/21 0356 97.9 °F (36.6 °C) 84 17 139/79 95 %       Intake/Output Summary (Last 24 hours) at 2/28/2021 7924  Last data filed at 2/28/2021 0913  Gross per 24 hour   Intake --   Output 750 ml   Net -750 ml        Wt Readings from Last 12 Encounters:   02/25/21 62.5 kg (137 lb 12.6 oz)   02/17/21 73.9 kg (162 lb 14.7 oz)   12/10/20 74.1 kg (163 lb 6.4 oz)   07/16/20 81.2 kg (179 lb)   06/23/20 75.8 kg (167 lb)   03/24/20 75.8 kg (167 lb)   01/20/20 78.9 kg (174 lb)   01/03/20 77.1 kg (170 lb)   11/28/19 84.7 kg (186 lb 11.7 oz)   10/18/19 84.7 kg (186 lb 11.7 oz)   09/08/19 75.8 kg (167 lb)   09/04/19 78 kg (172 lb)       PHYSICAL EXAM:    I had a face to face encounter and independently examined this patient on 02/28/21 as outlined below:    General: WD, WN. Alert, cooperative, no acute distress    EENT:  PERRL. Anicteric sclerae. MMM  Resp:  Crackles at bases bilaterally, no wheezing. No accessory muscle use  CV:  Regular  rhythm,  No edema  GI:  Soft, Non distended, Non tender. +Bowel sounds, no rebound  LN:  No cervical or inguinal TYRONE  Neurologic:  Alert, oriented to birthday, current place and city, a bit trouble getting current year eventually said 2021     normal speech,    5/5 strength in upper extremities bilaterally, 5/5 strength in feet bilaterally    Some trouble lifting right thigh off bed  Psych:   Not anxious nor agitated  Skin:  No rashes. No jaundice    Reviewed most current lab test results and cultures  YES  Reviewed most current radiology test results   YES  Review and summation of old records today    NO  Reviewed patient's current orders and MAR    YES  PMH/SH reviewed - no change compared to H&P  ________________________________________________________________________  Care Plan discussed with:    Comments   Patient x    Family      RN x    Care Manager     Consultant                        Multidiciplinary team rounds were held today with , nursing, pharmacist and clinical coordinator. Patient's plan of care was discussed; medications were reviewed and discharge planning was addressed. ________________________________________________________________________      Comments   >50% of visit spent in counseling and coordination of care     ________________________________________________________________________  Anmol Leon MD     Procedures: see electronic medical records for all procedures/Xrays and details which were not copied into this note but were reviewed prior to creation of Plan. LABS:  I reviewed today's most current labs and imaging studies.   Pertinent labs include:  Recent Labs     02/28/21  0338 02/27/21  0243 02/26/21  0107   WBC 9.2 8.7 6.8   HGB 9.6* 11.7 10.1*   HCT 31.5* 42.3 34.4*    192 242 Recent Labs     02/28/21  0338 02/27/21  0243 02/26/21  0107    143 148*   K 3.5 3.9 3.6   * 113* 114*   CO2 27 21 27   * 57* 80   BUN 18 30* 45*   CREA 0.68 0.69 0.85   CA 8.0* 8.4* 8.5   MG  --   --  2.5*   ALB 1.9* 2.1*  --    TBILI 0.6 0.7  --    ALT 18 20  --

## 2021-03-02 LAB
ALBUMIN SERPL-MCNC: 2.3 G/DL (ref 3.5–5)
ALBUMIN/GLOB SERPL: 0.6 {RATIO} (ref 1.1–2.2)
ALP SERPL-CCNC: 49 U/L (ref 45–117)
ALT SERPL-CCNC: 16 U/L (ref 12–78)
ANION GAP SERPL CALC-SCNC: 5 MMOL/L (ref 5–15)
AST SERPL-CCNC: 9 U/L (ref 15–37)
BACTERIA SPEC CULT: NORMAL
BASOPHILS # BLD: 0 K/UL (ref 0–0.1)
BASOPHILS NFR BLD: 1 % (ref 0–1)
BILIRUB SERPL-MCNC: 0.4 MG/DL (ref 0.2–1)
BUN SERPL-MCNC: 13 MG/DL (ref 6–20)
BUN/CREAT SERPL: 20 (ref 12–20)
CALCIUM SERPL-MCNC: 8.1 MG/DL (ref 8.5–10.1)
CHLORIDE SERPL-SCNC: 105 MMOL/L (ref 97–108)
CO2 SERPL-SCNC: 25 MMOL/L (ref 21–32)
COVID-19 RAPID TEST, COVR: DETECTED
CREAT SERPL-MCNC: 0.66 MG/DL (ref 0.55–1.02)
DIFFERENTIAL METHOD BLD: ABNORMAL
EOSINOPHIL # BLD: 0.1 K/UL (ref 0–0.4)
EOSINOPHIL NFR BLD: 1 % (ref 0–7)
ERYTHROCYTE [DISTWIDTH] IN BLOOD BY AUTOMATED COUNT: 14 % (ref 11.5–14.5)
GLOBULIN SER CALC-MCNC: 3.8 G/DL (ref 2–4)
GLUCOSE BLD STRIP.AUTO-MCNC: 101 MG/DL (ref 65–100)
GLUCOSE BLD STRIP.AUTO-MCNC: 113 MG/DL (ref 65–100)
GLUCOSE BLD STRIP.AUTO-MCNC: 130 MG/DL (ref 65–100)
GLUCOSE BLD STRIP.AUTO-MCNC: 94 MG/DL (ref 65–100)
GLUCOSE SERPL-MCNC: 90 MG/DL (ref 65–100)
HCT VFR BLD AUTO: 36.3 % (ref 35–47)
HGB BLD-MCNC: 11.3 G/DL (ref 11.5–16)
IMM GRANULOCYTES # BLD AUTO: 0.1 K/UL (ref 0–0.04)
IMM GRANULOCYTES NFR BLD AUTO: 2 % (ref 0–0.5)
LYMPHOCYTES # BLD: 1 K/UL (ref 0.8–3.5)
LYMPHOCYTES NFR BLD: 24 % (ref 12–49)
MCH RBC QN AUTO: 26 PG (ref 26–34)
MCHC RBC AUTO-ENTMCNC: 31.1 G/DL (ref 30–36.5)
MCV RBC AUTO: 83.4 FL (ref 80–99)
MONOCYTES # BLD: 0.4 K/UL (ref 0–1)
MONOCYTES NFR BLD: 10 % (ref 5–13)
NEUTS SEG # BLD: 2.6 K/UL (ref 1.8–8)
NEUTS SEG NFR BLD: 62 % (ref 32–75)
NRBC # BLD: 0 K/UL (ref 0–0.01)
NRBC BLD-RTO: 0 PER 100 WBC
PLATELET # BLD AUTO: 195 K/UL (ref 150–400)
PMV BLD AUTO: 11.2 FL (ref 8.9–12.9)
POTASSIUM SERPL-SCNC: 3.7 MMOL/L (ref 3.5–5.1)
PROT SERPL-MCNC: 6.1 G/DL (ref 6.4–8.2)
RBC # BLD AUTO: 4.35 M/UL (ref 3.8–5.2)
SERVICE CMNT-IMP: ABNORMAL
SERVICE CMNT-IMP: NORMAL
SERVICE CMNT-IMP: NORMAL
SODIUM SERPL-SCNC: 135 MMOL/L (ref 136–145)
SOURCE, COVRS: ABNORMAL
WBC # BLD AUTO: 4.2 K/UL (ref 3.6–11)

## 2021-03-02 PROCEDURE — 85025 COMPLETE CBC W/AUTO DIFF WBC: CPT

## 2021-03-02 PROCEDURE — 94760 N-INVAS EAR/PLS OXIMETRY 1: CPT

## 2021-03-02 PROCEDURE — 99223 1ST HOSP IP/OBS HIGH 75: CPT | Performed by: INTERNAL MEDICINE

## 2021-03-02 PROCEDURE — 36415 COLL VENOUS BLD VENIPUNCTURE: CPT

## 2021-03-02 PROCEDURE — 65660000000 HC RM CCU STEPDOWN

## 2021-03-02 PROCEDURE — 74011250637 HC RX REV CODE- 250/637: Performed by: NURSE PRACTITIONER

## 2021-03-02 PROCEDURE — 74011250637 HC RX REV CODE- 250/637: Performed by: HOSPITALIST

## 2021-03-02 PROCEDURE — 92526 ORAL FUNCTION THERAPY: CPT

## 2021-03-02 PROCEDURE — 74011250636 HC RX REV CODE- 250/636: Performed by: INTERNAL MEDICINE

## 2021-03-02 PROCEDURE — 97165 OT EVAL LOW COMPLEX 30 MIN: CPT

## 2021-03-02 PROCEDURE — 97110 THERAPEUTIC EXERCISES: CPT

## 2021-03-02 PROCEDURE — 80053 COMPREHEN METABOLIC PANEL: CPT

## 2021-03-02 PROCEDURE — 87635 SARS-COV-2 COVID-19 AMP PRB: CPT

## 2021-03-02 PROCEDURE — 97535 SELF CARE MNGMENT TRAINING: CPT

## 2021-03-02 PROCEDURE — 97161 PT EVAL LOW COMPLEX 20 MIN: CPT

## 2021-03-02 PROCEDURE — 82962 GLUCOSE BLOOD TEST: CPT

## 2021-03-02 RX ORDER — SENNOSIDES 8.6 MG/1
1 TABLET ORAL DAILY
Status: DISCONTINUED | OUTPATIENT
Start: 2021-03-03 | End: 2021-03-06 | Stop reason: HOSPADM

## 2021-03-02 RX ADMIN — Medication 10 ML: at 05:09

## 2021-03-02 RX ADMIN — METOPROLOL SUCCINATE 25 MG: 50 TABLET, EXTENDED RELEASE ORAL at 09:58

## 2021-03-02 RX ADMIN — CLOPIDOGREL BISULFATE 75 MG: 75 TABLET ORAL at 09:58

## 2021-03-02 RX ADMIN — POTASSIUM CHLORIDE, DEXTROSE MONOHYDRATE AND SODIUM CHLORIDE 75 ML/HR: 150; 5; 200 INJECTION, SOLUTION INTRAVENOUS at 02:36

## 2021-03-02 RX ADMIN — LEVOFLOXACIN 750 MG: 5 INJECTION, SOLUTION INTRAVENOUS at 05:09

## 2021-03-02 RX ADMIN — Medication 10 ML: at 18:53

## 2021-03-02 RX ADMIN — AMLODIPINE BESYLATE 2.5 MG: 2.5 TABLET ORAL at 09:59

## 2021-03-02 RX ADMIN — POLYETHYLENE GLYCOL 3350 17 G: 17 POWDER, FOR SOLUTION ORAL at 10:00

## 2021-03-02 RX ADMIN — VENLAFAXINE HYDROCHLORIDE 37.5 MG: 37.5 CAPSULE, EXTENDED RELEASE ORAL at 09:59

## 2021-03-02 RX ADMIN — EZETIMIBE 10 MG: 10 TABLET ORAL at 09:58

## 2021-03-02 RX ADMIN — ASPIRIN 81 MG: 81 TABLET, CHEWABLE ORAL at 09:58

## 2021-03-02 NOTE — PROGRESS NOTES
Hospitalist Progress Note    NAME: Ethel Nicole   :  1945   MRN:  [id number]       Assessment / Plan:  Hypernatremia POA Na 152 improved, now 135  Hypovolemia POA BUN/creat 68 and 1.22 improved, now 0.66  Dysphagia POA reports trouble swallowing, ? history of esophageal stricture in past  Nausea and vomiting at home POA   Recent obstipation POA appears more chronic  -CT scan done at outside ER reported as below      1.  New infiltrate in the bases greater on the right. 2.  Nonobstructive nephrolithiasis     3.  fecal retention which is diminishing liquid prior. 4.  Persistent dilatation of the sigmoid with an air-fluid level with redundancy  -Reports dysphagia, was spitting up in ED  -Hypovolemia and dehydration improved with IVF  -Speech saw, recommended starting dysphagia diet  -Bowel regimen with daily MiraLAX and lactulose  -Spoke with Dr Cade Forman, ? Needs EGD and anything else to do with the constipation         The constipation appears chronic, the sigmoid dilatation may be as well         PPI  - Given the recent COVID can consider barium swallow first  Daughter spoken on Monday      She wants to bring patient home with home health      Discussed she will 24 hour supervision, daughter says she can provide this      Daughter was asking about hospital bed at home  -Head CT negative     Recent COVID19 infection (diagnosed with COVID on 21)  ?  CAP POA  Was at [facility],[address], unclear what treatment she received  Not hypoxic, appears asymptpomatic  CXR with bibasilar ASD  Rapid covid was positive in OSH  Not hypoxic at admit, hold on RX  IV levaquin day 5 total,- now off it  Procalcitonin 0.24  Suspect non infectious at this point despite the positive tests       1 month out from original test- repeat today to help with status for further workup like MBS as recommended  D-dimer elevated, x-cover started heparin     I checked CTA chest and LE dopplers, both negative or VTE     Heparin stopped  Not hypoxic and 1 month out from COVID, no indication for steroids, stopped     SVT vs NSVT in ED POA  Recent non ST elevation MI at OSH 2/17, peak troponin 2.11  Essential HTN POA  Tele  EKG NSR 69 Short [place] , no acute Ischemic injury  Resume Lopressor daily, continue norvasc  PRN hydralazine  ASA  Unclear if tolerant of statins, allergic to crestor     Generalized weakness POA  Daughter notes ambulatory in January, past month is hardly walked at all  Appears to have good strength in arms and distally in legs  A bit of trouble lifting right thigh off bed otherwise nonfocal  Suspect related to underlying medical issues, recent Covid  Needs PT and OT     Renal stones without Hydronephrosis     Dementia  ?  No formal diagnosis, daughter notes increasing memory issues  Oriented x2-3 here, daughter notes was scheduled to see neurologist as outpatient  Anxiety  Hx agoraphobia per chart     Code Status: FULL  NOK:  Maral Duncan Child     [phone]   unknown, Tiarra Daughter      done at outside hospital showed      DVT Prophylaxis: SQ HEP  GI Prophylaxis: not indicated     Baseline: Dependent/Demented     Body mass index is 21.58 kg/m².       Recommended Disposition: SNF/LTC +/- Hospice at home/LTC? ? TBD     Subjective:     Chief Complaint / Reason for Physician Visit :F/U Dysphagia, Chronic constipation/fecal impaction, Post COVID syndrome  \"am I dying? \". Discussed with RN events overnight. Review of Systems:  Symptom Y/N Comments  Symptom Y/N Comments   Fever/Chills    Chest Pain     Poor Appetite    Edema     Cough    Abdominal Pain     Sputum    Joint Pain     SOB/MOORE    Pruritis/Rash     Nausea/vomit    Tolerating PT/OT     Diarrhea    Tolerating Diet     Constipation    Other       Could NOT obtain due to: Dementia     Objective:     VITALS:   Last 24hrs VS reviewed since prior progress note.  Most recent are:  Patient Vitals for the past 24 hrs:   Temp Pulse Resp BP SpO2   03/02/21 1603 97.9 °F (36.6 °C) 69 18 139/81 97 %   03/02/21 1257 -- 73 -- 131/79 --   03/02/21 1111 97.7 °F (36.5 °C) 69 18 (!) 187/93 96 %   03/02/21 0741 97.8 °F (36.6 °C) 69 18 (!) 161/86 98 %   03/02/21 0239 98 °F (36.7 °C) 69 18 (!) 180/84 97 %   03/01/21 2313 98.7 °F (37.1 °C) 67 18 (!) 175/83 94 %   03/01/21 2055 98.3 °F (36.8 °C) 63 17 (!) 164/79 96 %   03/01/21 1623 98 °F (36.7 °C) 72 16 (!) 164/83 96 %       Intake/Output Summary (Last 24 hours) at 3/2/2021 1604  Last data filed at 3/2/2021 6156  Gross per 24 hour   Intake 0 ml   Output 900 ml   Net -900 ml        I had a face to face encounter and independently examined this patient on 3/2/2021, as outlined below:  PHYSICAL EXAM:  General: WD, WN. Alert, cooperative, no acute distress    EENT:  EOMI. Anicteric sclerae. MMM  Resp:  CTA bilaterally, no wheezing or rales. No accessory muscle use  CV:  Regular  rhythm,  No edema  GI:  Soft, Non distended, Non tender. +Bowel sounds  Neurologic:  Alert and oriented X 3, normal speech,   Psych:   Good insight. Not anxious nor agitated  Skin:  No rashes. No jaundice    Reviewed most current lab test results and cultures  YES  Reviewed most current radiology test results   YES  Review and summation of old records today    NO  Reviewed patient's current orders and MAR    YES  PMH/SH reviewed - no change compared to H&P  ________________________________________________________________________  Care Plan discussed with:    Comments   Patient x    Family      RN x    Care Manager x    Consultant  x SLP                    x Multidiciplinary team rounds were held today with , nursing, pharmacist and clinical coordinator. Patient's plan of care was discussed; medications were reviewed and discharge planning was addressed.      ________________________________________________________________________  Total NON critical care TIME:  26   Minutes    Total CRITICAL CARE TIME Spent:   Minutes non procedure based      Comments   >50% of visit spent in counseling and coordination of care     ________________________________________________________________________  Esthela Proctor MD     Procedures: see electronic medical records for all procedures/Xrays and details which were not copied into this note but were reviewed prior to creation of Plan. LABS:  I reviewed today's most current labs and imaging studies.   Pertinent labs include:  Recent Labs     03/02/21 0247 03/01/21 0422 02/28/21  0338   WBC 4.2 5.2 9.2   HGB 11.3* 9.3* 9.6*   HCT 36.3 29.5* 31.5*    182 189     Recent Labs     03/02/21 0247 03/01/21 0422 02/28/21  0338   * 138 141   K 3.7 3.4* 3.5    106 109*   CO2 25 27 27   GLU 90 104* 111*   BUN 13 17 18   CREA 0.66 0.71 0.68   CA 8.1* 8.1* 8.0*   ALB 2.3* 2.2* 1.9*   TBILI 0.4 0.3 0.6   ALT 16 17 18       Signed: Esthela Proctor MD

## 2021-03-02 NOTE — PROGRESS NOTES
Problem: Self Care Deficits Care Plan (Adult)  Goal: *Acute Goals and Plan of Care (Insert Text)  Description:   FUNCTIONAL STATUS PRIOR TO ADMISSION: Per chart pt was dependent in her care and has dementia     HOME SUPPORT: The patient lived with family and was total care for family per chart. .    Occupational Therapy Goals  Initiated 3/2/2021  1. Patient will perform self-feeding with moderate assistance  within 7 day(s). 2.  Patient will perform grooming in bed with maximal assistance within 7 day(s). 3.  Patient will perform bathing UB in bed with maximal assistance within 7 day(s). 4.  Patient will to be able to sit on EOB  with max of 1 for 2 minutes in prep for ADLs    Outcome: Not Met   OCCUPATIONAL THERAPY EVALUATION  Patient: Bessie Bradshaw (54 y.o. female)  Date: 3/2/2021  Primary Diagnosis: HERBERTH (acute kidney injury) ([facility] [address] [N17.9]  Nausea & vomiting [R11.2]  Hypernatremia [E87.0]  History of COVID-19 [Z86.16]  Pneumonia [J18.9]  Dysrhythmia, cardiac [I49.9]  Dementia ([facility] [address] [F03.90]  Renal stone [N20.0]        Precautions: fall       ASSESSMENT  Based on the objective data described below, the patient presents with confusion, stating that she was having chest pains and that she had not had her meds and had no food today. Pt was able to state she was in hospital when asked but in conversation she asked to go to  hospital because she was having chest pain. Pt was able to raise B arms with VC and extra time for processing verbal commands. Pt was not willing to roll in bed or work on any other tasks. She perseverates a great deal on wanting her meds and having chest pains. Nursing notified. Pt to be a trial for therapy and if she returns home she will need a hospital bed, may need a spencer lift, wheelchair and 24/7 assist.     Current Level of Function Impacting Discharge (ADLs/self-care): total care for ADL     Functional Outcome Measure:   The patient scored 0/100 on the Barthel outcome measure which is indicative of total care for ADLs . Patient will benefit from skilled therapy intervention to address the above noted impairments. PLAN :  Recommendations and Planned Interventions: self care training, functional mobility training, therapeutic exercise, balance training, therapeutic activities, cognitive retraining, endurance activities, and patient education    Frequency/Duration: Patient will be followed by occupational therapy 2 times a week to address goals. Recommendation for discharge: (in order for the patient to meet his/her long term goals)  To be determined: pending her progress and assist she has at home    This discharge recommendation:  Has been made in collaboration with the attending provider and/or case management    IF patient discharges home will need the following DME: tbd pending progress, if she was to d/c today she would need hospital bed, wheelchair, and spencer lift and 24/7 assist        SUBJECTIVE:   Patient stated My chest hurts.     OBJECTIVE DATA SUMMARY:   HISTORY:   Past Medical History:   Diagnosis Date    Agoraphobia without mention of panic attacks 2/17/2014    Anxiety disorder 8/18/2013    Arthritis     osteo    CAD (coronary artery disease), native coronary artery 12/1/2015    no stents    Chronic chest pain 1/13/2014    Chronic pain associated with significant psychosocial dysfunction 2/17/2014    Depression 8/18/2013    Diabetes ([address]     type II    Duplicated right renal collecting system 3/13/2014    GERD (gastroesophageal reflux disease)     Gout, joint     Hypercholesteremia     hyercholesterolemia    Hypertension     Nephrolithiasis 3/13/2014    Personal history of noncompliance with medical treatment, presenting hazards to health 5/30/2014     Past Surgical History:   Procedure Laterality Date    EGD  4/23/2010         HX CHOLECYSTECTOMY  09/20/2018    lap jesus    HX CYST REMOVAL      cyst removed from left wrist    HX HYSTERECTOMY      partial HX OTHER SURGICAL      bladder dilitation    HX TUBAL LIGATION      HX UROLOGICAL      kidney stones       Expanded or extensive additional review of patient history:          Hand dominance: Right    EXAMINATION OF PERFORMANCE DEFICITS:  Cognitive/Behavioral Status:  Neurologic State: Confused; Alert  Orientation Level: Oriented to person  Cognition: Impaired decision making;Memory loss;Decreased attention/concentration     Perseveration: Perseverates during conversation  Safety/Judgement: Fall prevention    Skin: in fair health     Edema: none    Hearing: Auditory  Auditory Impairment: None    Vision/Perceptual:                 Appears intact                     Range of Motion:    AROM: Generally decreased, functional(left more range)  PROM: Generally decreased, functional                      Strength:    Strength: Generally decreased, functional                Coordination:  Coordination: Generally decreased, functional  Fine Motor Skills-Upper: Left Intact; Right Intact    Gross Motor Skills-Upper: Left Impaired;Right Impaired    Tone & Sensation:    Tone: Normal              Balance:  Sitting: (pt refused , due to chest pain )    Functional Mobility and Transfers for ADLs:  Bed Mobility:  Rolling: (pt refused due to chest pain)    Transfers:   Not tested    ADL Assessment:  Feeding: Total assistance    Oral Facial Hygiene/Grooming: Total assistance    Bathing: Total assistance    Upper Body Dressing: Total assistance    Lower Body Dressing: Total assistance    Toileting: Total assistance           Cognitive Retraining  Safety/Judgement: Fall prevention       Functional Measure:  Barthel Index:    Bathin  Bladder: 0  Bowels: 0  Groomin  Dressin  Feedin  Mobility: 0  Stairs: 0  Toilet Use: 0  Transfer (Bed to Chair and Back): 0  Total: 0/100        The Barthel ADL Index: Guidelines  1. The index should be used as a record of what a patient does, not as a record of what a patient could do.   2. The main aim is to establish degree of independence from any help, physical or verbal, however minor and for whatever reason. 3. The need for supervision renders the patient not independent. 4. A patient's performance should be established using the best available evidence. Asking the patient, friends/relatives and nurses are the usual sources, but direct observation and common sense are also important. However direct testing is not needed. 5. Usually the patient's performance over the preceding 24-48 hours is important, but occasionally longer periods will be relevant. 6. Middle categories imply that the patient supplies over 50 per cent of the effort. 7. Use of aids to be independent is allowed. [place] Collier., Barthel, D.W. (5360). Functional evaluation: the Barthel Index. [facility] (14)2. Rigoberto Desir yue SILVER Correa, Leigh Blackwell., Charan Armenta., [place], [address] [facility] (1999). Measuring the change indisability after inpatient rehabilitation; comparison of the responsiveness of the Barthel Index and Functional Rowley Measure. Journal of Neurology, Neurosurgery, and Psychiatry, 66(4), 911-506. [place] Bishop, N.[place].A, SUMAN Florian, & Hang Trinh, M.A. (2004.) Assessment of post-stroke quality of life in cost-effectiveness studies: The usefulness of the Barthel Index and the EuroQoL-5D. Quality of Life Research, 15, 829-97         Occupational Therapy Evaluation Charge Determination   History Examination Decision-Making   MEDIUM Complexity : Expanded review of history including physical, cognitive and psychosocial  history  MEDIUM Complexity : 3-5 performance deficits relating to physical, cognitive , or psychosocial skils that result in activity limitations and / or participation restrictions MEDIUM Complexity : Patient may present with comorbidities that affect occupational performnce.  Miniml to moderate modification of tasks or assistance (eg, physical or verbal ) with assesment(s) is necessary to enable patient to complete evaluation       Based on the above components, the patient evaluation is determined to be of the following complexity level: MEDIUM  Pain Rating:  Pt stated she had chest pain but could not rate    Activity Tolerance:   Poor    After treatment patient left in no apparent distress:    Supine in bed, Heels elevated for pressure relief, Call bell within reach, and Bed / chair alarm activated    COMMUNICATION/EDUCATION:   The patients plan of care was discussed with: Physical therapist and Registered nurse. Patient is unable to participate in goal setting and plan of care. This patients plan of care is appropriate for delegation to [facility].     Thank you for this referral.  Bill Sweeney, OT  Time Calculation: 22 mins

## 2021-03-02 NOTE — PROGRESS NOTES
Hospitalist Progress Note NAME: Naomie Barker :  1945 MRN:  [id number] Admit date: 2021 Today's date: 21 PCP: MD Lesley Gillis M.D. Cell [phone] History of chronic constipation for over 10 years Only mention of a colonoscopy note was about 10 years ago per Dr. Regi Albarado, normal 
 
Status post cholecystectomy, history of choledocholithiasis status post ERCP with Dr. Mildred Ortiz  Normal esophagus and stomach Daughter reports possible EGD with dilatation required in past, could not find documentation of this in chart Diagnosed with Covid pneumonia 2021 Admitted to [facility]  to 2021 with severe constipation Daughter not aware of any treatments for the Covid Daughter reports required sedation and disimpaction Discharged to rehab at [facility] off O2 Admitted  to 2021 at [facility] with non-ST elevation MI 
Presented with generalized weakness Not taking p.o. well at discharge Peak troponin 2.11 Echo LVEF 65%, moderate MS, valve gradient 15 mm Managed medically SNF was recommended, family declined and took her home with home health Daughter says after arriving home [facility] would not take p.o. and kept vomiting\" Home for less than a day and was taken to [facility] ER Dehydrated with sodium 153, BUN 72 creatinine 1.23 
 UA with 0-5 WBCs, 0-3 RBCs CT scan at outside hospital as below Transferred to [facility] Assessment / Plan: Hypernatremia POA Na 152 improved Hypovolemia POA BUN/creat 68 and 1.22 improved Dysphagia POA reports trouble swallowing, ? history of esophageal stricture in past 
Nausea and vomiting at home POA Recent obstipation POA appears more chronic 
-CT scan done at outside ER reported as below 1. New infiltrate in the bases greater on the right. 2.  Nonobstructive nephrolithiasis 3. fecal retention which is diminishing liquid prior. 4.  Persistent dilatation of the sigmoid with an air-fluid level with redundancy -Reports dysphagia, was spitting up in ED 
-Hypovolemia and dehydration improved with IVF 
-Speech saw, recommended starting dysphagia diet 
-Bowel regimen with daily MiraLAX and lactulose 
-Spoke with Dr Alva Madrigal, ? Needs EGD and anything else to do with the constipation The constipation appears chronic, the sigmoid dilatation may be as well PPI 
- Given the recent COVID can consider barium swallow first 
-Spoke with daughter by phone sunday 
-Head CT negative Recent COVID19 infection (diagnosed with COVID on 2/2/21) ? CAP POA Was at [facility], unclear what treatment she received Not hypoxic, appears asymptpomatic CXR with bibasilar ASD Rapid covid was positive in OSH Not hypoxic at admit, hold on RX 
IV levaquin day 3 Procalcitonin 0.24 Suspect non infectious at this point despite the positive tests D-dimer elevated, x-cover started heparin I checked CTA chest and LE dopplers, both negative or VTE Heparin stopped Not hypoxic and 1 month out from COVID, no indication for steroids, stopped SVT vs NSVT in ED POA Recent non ST elevation MI at OSH 2/17, peak troponin 2.11 Essential HTN POA Tele EKG NSR 69 Short [place] , no acute Ischemic injury Resume Lopressor daily, continue norvasc PRN hydralazine ASA Unclear if tolerant of statins, allergic to crestor Generalized weakness POA Daughter notes ambulatory in January, past month is hardly walked at all Appears to have good strength in arms and distally in legs A bit of trouble lifting right thigh off bed otherwise nonfocal 
Suspect related to underlying medical issues, recent Covid Needs PT and OT Renal stones without Hydronephrosis 
  
Dementia  ? No formal diagnosis, daughter notes increasing memory issues Oriented x2-3 here, daughter notes was scheduled to see neurologist as outpatient Anxiety Hx agoraphobia per chart Code Status: FULL 
NOK: 
Maral Duncan Child     [phone]  
unknown, Sanders Daughter      done at outside hospital showed  
  
DVT Prophylaxis: SQ HEP 
GI Prophylaxis: not indicated 
  
Baseline: Dependent/Demented Body mass index is 21.58 kg/m². Subjective: Chief Complaint / Reason for Physician Visit follow-up hypernatremia, nausea vomiting \"still feels nauseated\". Discussed with RN events overnight. Nausea, no vomiting, refusing medications No SOB, currently on Room air Mild cough Review of Systems: 
Symptom Y/N Comments  Symptom Y/N Comments Fever/Chills n   Chest Pain n   
Poor Appetite    Edema Cough y   Abdominal Pain n   
Sputum    Joint Pain SOB/MOORE n   Headache Nausea/vomit n   Tolerating PT/OT Diarrhea y   Tolerating Diet y Constipation    Other Could NOT obtain due to:   
 
Objective: VITALS:  
Last 24hrs VS reviewed since prior progress note. Most recent are: 
Patient Vitals for the past 24 hrs: 
 Temp Pulse Resp BP SpO2  
03/01/21 2055 98.3 °F (36.8 °C) 63 17 (!) 164/79 96 % 03/01/21 1623 98 °F (36.7 °C) 72 16 (!) 164/83 96 % 03/01/21 1137 97.6 °F (36.4 °C) 62 16 (!) 141/77 98 % 03/01/21 0820 97.6 °F (36.4 °C) 67 17 (!) 142/83 96 % 03/01/21 0330 98.3 °F (36.8 °C) 66 17 115/61 96 % 03/01/21 0051 98 °F (36.7 °C) 71 17 123/74 97 % Intake/Output Summary (Last 24 hours) at 3/1/2021 2116 Last data filed at 3/1/2021 2721 Gross per 24 hour Intake 0 ml Output 300 ml Net -300 ml Wt Readings from Last 12 Encounters:  
02/25/21 62.5 kg (137 lb 12.6 oz) 02/17/21 73.9 kg (162 lb 14.7 oz) 12/10/20 74.1 kg (163 lb 6.4 oz) 07/16/20 81.2 kg (179 lb)  
06/23/20 75.8 kg (167 lb)  
03/24/20 75.8 kg (167 lb)  
01/20/20 78.9 kg (174 lb) 01/03/20 77.1 kg (170 lb)  
11/28/19 84.7 kg (186 lb 11.7 oz) 10/18/19 84.7 kg (186 lb 11.7 oz) 09/08/19 75.8 kg (167 lb) 09/04/19 78 kg (172 lb) PHYSICAL EXAM: 
 
 I had a face to face encounter and independently examined this patient on 03/01/21 as outlined below: 
 
General: WD, WN. Alert, cooperative, no acute distress EENT:  PERRL. Anicteric sclerae. MMM Resp:  Crackles at bases bilaterally, no wheezing. No accessory muscle use CV:  Regular  rhythm,  No edema GI:  Soft, Non distended, Non tender. +Bowel sounds, no rebound Neurologic:  Alert, oriented to birthday, current place and city, a bit trouble getting current year eventually said 2021  
  normal speech, 
  5/5 strength in upper extremities bilaterally, 5/5 strength in feet bilaterally Some trouble lifting right thigh off bed Psych:   Not anxious nor agitated Skin:  No rashes. No jaundice Reviewed most current lab test results and cultures  YES Reviewed most current radiology test results   YES Review and summation of old records today    NO Reviewed patient's current orders and MAR    YES 
PMH/SH reviewed - no change compared to H&P 
________________________________________________________________________ Care Plan discussed with: 
  Comments Patient x Family RN x Care Manager Consultant Multidiciplinary team rounds were held today with , nursing, pharmacist and clinical coordinator. Patient's plan of care was discussed; medications were reviewed and discharge planning was addressed. ________________________________________________________________________ Comments >50% of visit spent in counseling and coordination of care    
________________________________________________________________________ Jocelin Dalton MD  
 
Procedures: see electronic medical records for all procedures/Xrays and details which were not copied into this note but were reviewed prior to creation of Plan. LABS: 
I reviewed today's most current labs and imaging studies. Pertinent labs include: 
Recent Labs 03/01/21 
0422 02/28/21 7629 02/27/21 
0666 WBC 5.2 9.2 8.7 HGB 9.3* 9.6* 11.7 HCT 29.5* 31.5* 42.3  189 192 Recent Labs 03/01/21 
0422 02/28/21 
3827 02/27/21 
4170  141 143  
K 3.4* 3.5 3.9  109* 113* CO2 27 27 21 * 111* 57* BUN 17 18 30* CREA 0.71 0.68 0.69 CA 8.1* 8.0* 8.4* ALB 2.2* 1.9* 2.1* TBILI 0.3 0.6 0.7 ALT 17 18 20

## 2021-03-02 NOTE — PROGRESS NOTES
Bedside and Verbal shift change report given to Mary Pate (oncoming nurse) by Macey Coats (offgoing nurse). Report included the following information SBAR, Kardex, Intake/Output, MAR and Recent Results.

## 2021-03-02 NOTE — PROGRESS NOTES
Patient is refusing all the meds. She did not take anything by mouth. She is refusing all her meals. She only took a sip of water and refused to drink anything further. Speech therapy and GI NP notified.

## 2021-03-02 NOTE — PROGRESS NOTES
GI PROGRESS NOTE  Avtar Delacruz, NP  [phone] NP in-hospital cell phone M-F until 4:30  After 5pm or on weekends, please call  for physician on call    Kiera Valladares :1945 [facility]:[id number]   ATTG: Dr Lucero Blanton  PCP: Per Garcia MD  Date/Time:  3/2/2021 9:30 AM     Primary GI: Dr. Mildred Ortiz    Reason for following: sigmoid dilatation, recent obstipation, new dysphagia    Assessment:   Sigmoid Dilatation on CT scan  Chronic Constipation  - Fecal retention - small smear this AM  - abdomen is soft, non-tender, flat  - Colonoscopy in  with Dr Regi Carranza 3/1/21 : 1. No significant stool; fecal impaction resolved since July. Dysphagia - new  Nausea and Vomiting - stared at dx of COVID  - SLP saw yesterday, pt is able to swallow solids and liquids but refuses to have more than a few bits. They recommended purees and thin liquids  - States new for the last few days to couple weeks  - No previous EGD but had ERCP in  and had grossly normal exam on duodenscope    COVID + on 21 and 21  - wheezing in upper lobes anteriorly    Dementia       Plan:   - Encourage PO intake and ensures  - Antiemetics  - Continue senna but decrease to daily and miralax daily  - Will need barium swallow and possible EGD but [facility] to [facility] this    She has had a steady decline in her health over the last 2 years. Recommend a consult to palliative care for goals of care to discussed. She would not be a candidate for PEG placement as she is able to swallow oral intake. Nothing further to add at this time. Will sign off. Plan discussed with Dr. Mildred Ortiz. Subjective:   Discussed with RN events overnight. Quiet but states she doesn't want to eat.      Complaint Y/N Description   Abdominal Pain n    Hematemesis n    Hematochezia n    Melena n    Constipation  BM yesterday   Diarrhea n    Dyspepsia n    Dysphagia n    Jaundiced n    Nausea/vomiting n Review of Systems:  Symptom Y/N Comments  Symptom Y/N Comments   Fever/Chills    Chest Pain     Cough    Headaches     Sputum    Joint Pain     SOB/MOORE    Pruritis/Rash     Tolerating Diet  Little intake  Other       Could NOT obtain due to:      Objective:   VITALS:   Last 24hrs VS reviewed since prior progress note. Most recent are:  Visit Vitals  /79   Pulse 73   Temp 97.7 °F (36.5 °C)   Resp 18   Ht 5' 7\" (1.702 m)   Wt 62.5 kg (137 lb 12.6 oz)   SpO2 96%   BMI 21.58 kg/m²       Intake/Output Summary (Last 24 hours) at 3/2/2021 1328  Last data filed at 3/2/2021 0953  Gross per 24 hour   Intake 0 ml   Output 900 ml   Net -900 ml     PHYSICAL EXAM:  General: WD, Chronically ill appearing, frail. Alert, cooperative, no acute distress    HEENT: NC, Atraumatic. Anicteric sclerae. Lungs:  Upper wheezing anteriorly bilaterally  Heart:  Regular  rhythm,  No murmur, No Rubs, No Gallops  Abdomen: Soft, Non distended, Non tender. Flat, +Bowel sounds, no HSM  Extremities: Trace BLE edema  Neurologic:  Alert and oriented X self and place, but not great with time. No acute neurological distress   Psych:   Unclear insight. Not anxious nor agitated. Lab and Radiology Data Reviewed: (see below)    Medications Reviewed: (see below)  PMH/SH reviewed - no change compared to H&P  ________________________________________________________________________  Total time spent with patient: 20 minutes ________________________________________________________________________  Care Plan discussed with:  Patient y   Family     RN y              Consultant: Vandana Lagunas NP     Procedures: see electronic medical records for all procedures/Xrays and details which were not copied into this note but were reviewed prior to creation of Plan.       LABS:  Recent Labs     03/02/21 0247 03/01/21 0422   WBC 4.2 5.2   HGB 11.3* 9.3*   HCT 36.3 29.5*    182     Recent Labs     03/02/21 0247 03/01/21 0422 02/28/21  6415 * 138 141   K 3.7 3.4* 3.5    106 109*   CO2 25 27 27   BUN 13 17 18   CREA 0.66 0.71 0.68   GLU 90 104* 111*   CA 8.1* 8.1* 8.0*     Recent Labs     03/02/21  0247 03/01/21  0422 02/28/21 0338   AP 49 45 55   TP 6.1* 5.8* 5.9*   ALB 2.3* 2.2* 1.9*   GLOB 3.8 3.6 4.0     Recent Labs     02/28/21  0338   APTT 31.0      Recent Labs     02/28/21  0338   FERR 301*      Lab Results   Component Value Date/Time    Folate >19.9 03/04/2015 12:32 PM     No results for input(s): PH, PCO2, PO2 in the last 72 hours. No results for input(s): CPK, CKMB in the last 72 hours.     No lab exists for component: TROPONINI  Lab Results   Component Value Date/Time    Color DARK YELLOW 12/10/2020 03:56 PM    Appearance CLOUDY (A) 12/10/2020 03:56 PM    Specific gravity 1.020 12/10/2020 03:56 PM    Specific gravity 1.016 07/16/2020 03:47 PM    pH (UA) 5.0 12/10/2020 03:56 PM    Protein 30 (A) 12/10/2020 03:56 PM    Glucose Negative 12/10/2020 03:56 PM    Ketone TRACE (A) 12/10/2020 03:56 PM    Bilirubin Negative 06/23/2020 01:54 PM    Urobilinogen 1.0 12/10/2020 03:56 PM    Nitrites Positive (A) 12/10/2020 03:56 PM    Leukocyte Esterase SMALL (A) 12/10/2020 03:56 PM    Epithelial cells FEW 12/10/2020 03:56 PM    Bacteria 3+ (A) 12/10/2020 03:56 PM    WBC 10-20 12/10/2020 03:56 PM    RBC 0-5 12/10/2020 03:56 PM       MEDICATIONS:  Current Facility-Administered Medications   Medication Dose Route Frequency    sorbitoL 70 % solution 30 mL  30 mL Oral BID    glucose chewable tablet 16 g  4 Tab Oral PRN    dextrose (D50W) injection syrg 12.5-25 g  12.5-25 g IntraVENous PRN    glucagon (GLUCAGEN) injection 1 mg  1 mg IntraMUSCular PRN    levoFLOXacin (LEVAQUIN) 750 mg in D5W IVPB  750 mg IntraVENous Q24H    dextrose 5% - 0.2% NaCl with KCl 20 mEq/L infusion  75 mL/hr IntraVENous CONTINUOUS    artificial tears (dextran-hypromellose-glycerin) (GENTEAL) ophthalmic solution 1 Drop  1 Drop Both Eyes PRN    amLODIPine (NORVASC) tablet 2.5 mg  2.5 mg Oral DAILY    aspirin chewable tablet 81 mg  81 mg Oral DAILY    clopidogreL (PLAVIX) tablet 75 mg  75 mg Oral DAILY    ezetimibe (ZETIA) tablet 10 mg  10 mg Oral DAILY    metoprolol succinate (TOPROL-XL) XL tablet 25 mg  25 mg Oral Q12H    polyethylene glycol (MIRALAX) packet 17 g  17 g Oral DAILY    senna (SENOKOT) tablet 8.6 mg  1 Tab Oral BID    venlafaxine-SR (EFFEXOR-XR) capsule 37.5 mg  37.5 mg Oral DAILY    LORazepam (ATIVAN) tablet 0.5 mg  0.5 mg Oral Q8H PRN    sodium chloride (NS) flush 5-40 mL  5-40 mL IntraVENous Q8H    sodium chloride (NS) flush 5-40 mL  5-40 mL IntraVENous PRN    acetaminophen (TYLENOL) tablet 650 mg  650 mg Oral Q6H PRN    Or    acetaminophen (TYLENOL) suppository 650 mg  650 mg Rectal Q6H PRN    polyethylene glycol (MIRALAX) packet 17 g  17 g Oral DAILY PRN    promethazine (PHENERGAN) tablet 12.5 mg  12.5 mg Oral Q6H PRN    Or    ondansetron (ZOFRAN) injection 4 mg  4 mg IntraVENous Q6H PRN    hydrALAZINE (APRESOLINE) 20 mg/mL injection 10 mg  10 mg IntraVENous Q6H PRN

## 2021-03-02 NOTE — CONSULTS
Palliative Medicine Consult  [place]: [phone] ([phone])    Patient Name: Jocelin Roche  YOB: 1945    Date of Initial Consult: 3/2/21  Reason for Consult: Care decisions and Psychosocial distress  Requesting Provider: Kishan Tan MD  Primary Care Physician: Nadiya Lam MD     SUMMARY:   Jocelin Roche is a 76 y.o. female  with a past history of dementia, htn, hld and Dm , in and out of hospital for past one month, recent admission at [address] [place] [address] for [place] from 2/4-2/11/21, d/c to SNF , then admitted at [facility] from 2/17-2/23/21 for NSTEMI, was home for 2 days the presented to [address] [place] [address] for nausea , vomiting and generalized malaise , rapid COVID test was positive , she was transferred to [facility] on 2/25/2021  with a diagnosis of dehydration and HERBERTH, hypernatremia,  rapid COVID positive and possible CAP, not hypoxic on room air . In addition she has hypernatremia, sigmoid dilatation on CT (hx of chronic constipation), no fecal impaction note galina NELSON,  new onset dysphagia, speech recommended Pureed diet), Gi following, recommends barium swallow and EGD after she recovers from COVID, she has nausea since COVID not a candidate for PEG placement as she is able to swallow . She has had a steady decline in her health over the last 2 years, has been in and out of hospital x one month, steep decline since COVID infection last month , requiring  consult to palliative care for goals of care to discussed. She would not be a candidate for PEG placement as she is able to swallow oral intake.        Socail: she lives with friend until last month , used to be independent, she has declined in health since her COVID infection last month, she is lately very weak requires help with ADLS, has four children,  staying with her daughter Val Hassan, her other daughter Yesi Elder is also involved in her care , her two sons are not much involved . PALLIATIVE DIAGNOSES:   1.  Deconditioning for last month S/P Covid infection feb 2021  2. Dementia   3. Dehydration(resolved )   4. HERBERTH (resolved 0.  5. Poor intake  6. Chronic constipation   7. Goals of care /code status review        PLAN:   1. Met with patient, she is alert, confused repetitive \" I want to lie down \"  2. Advance directives ; patient four children are legal next of kin, two daughters Marcie Castillo and Srikanth Virgen are most involved in her care , two sons are not involved . 3. I spoke to her daughter Marcie Castillo , she note since she came back from [facility] after her recent heart attack she was vomiting 2 days requiring her to be admitted , Marcie Castillo brought her to stay with her and she and her sister Srikanth Virgen were providing 24/7 care . 4. We talked about her rapid decline in last one month , due to COVID and heart attack nd given her age and low body reserves due to many chronic illness, she is not expected to bounce back or recover completely to her base lien independent level of function one month ago. 5. We talked about if she continues to eat properly, she is expected to have recurrent cycles of dehydration and electrolytes issues requiring future hospitalization. 10. Marcie Castillo wants to bring her home with 24/7 supervision . 7. Full code status review; we talked about attempts at CPR when she dies , can be very aggressive for her weak body, and there is very little chance that she will survive with any meaningful quality of life , Marcie Castillo wants to discuss with her siblings and will get back to us . 8. Initial consult note routed to primary continuity provider and/or primary health care team members  9.  Communicated plan of care with: Palliative [facility] [address] Team     GOALS OF CARE / TREATMENT PREFERENCES:     GOALS OF CARE:  Patient/Health Care Proxy Stated Goals: Prolong life    TREATMENT PREFERENCES:   Code Status: Full Code    Advance Care Planning:  [x] [facility] Interdisciplinary Team has updated the ACP Navigator with Health Care Decision Maker and Patient Capacity    Primary Decision Maker (Active): [facility] - [phone]    Advance Care Planning 2/18/2021   Patient's Healthcare Decision Maker is: -   Primary Decision Maker Name -   Primary Decision Maker Phone Number -   Primary Decision Maker Relationship to Patient -   Confirm Advance Directive None   Patient Would Like to Complete Advance Directive Unable   Does the patient have other document types Other (comment)       Medical Interventions: (on going discussion)     Other Instructions: Other:    As far as possible, the palliative care team has discussed with patient / health care proxy about goals of care / treatment preferences for patient. HISTORY:     History obtained from: chart, daughter     CHIEF COMPLAINT: altered mental status     HPI/SUBJECTIVE:    The patient is:   [] Verbal, minimal participation     She answered some question with yes and no answers. Clinical Pain Assessment (nonverbal scale for severity on nonverbal patients):   Clinical Pain Assessment  Severity: 0     Activity (Movement): Lying quietly, normal position    Duration: for how long has pt been experiencing pain (e.g., 2 days, 1 month, years)  Frequency: how often pain is an issue (e.g., several times per day, once every few days, constant)     FUNCTIONAL ASSESSMENT:     Palliative Performance Scale (PPS):  PPS: 40       PSYCHOSOCIAL/SPIRITUAL SCREENING:     Palliative IDT has assessed this patient for cultural preferences / practices and a referral made as appropriate to needs (Cultural Services, Patient Advocacy, Ethics, etc.)    Any spiritual / Hinduism concerns:  [] Yes /  [x] No    Caregiver Burnout:  [] Yes /  [x] No /  [] No Caregiver Present      Anticipatory grief assessment:   [x] Normal  / [] Maladaptive       ESAS Anxiety:      ESAS Depression:          REVIEW OF SYSTEMS:     Positive and pertinent negative findings in ROS are noted above in HPI.   The following systems were [x] reviewed  Limited because of patient factors / [] unable to be reviewed as noted in HPI  Other findings are noted below. Systems: constitutional, ears/nose/mouth/throat, respiratory, gastrointestinal, genitourinary, musculoskeletal, integumentary, neurologic, psychiatric, endocrine. Positive findings noted below. Modified ESAS Completed by: provider   Fatigue: 6       Pain: 0     Nausea: 0     Dyspnea: 0                    PHYSICAL EXAM:     From RN flowsheet:  Wt Readings from Last 3 Encounters:   02/25/21 137 lb 12.6 oz (62.5 kg)   02/17/21 162 lb 14.7 oz (73.9 kg)   12/10/20 163 lb 6.4 oz (74.1 kg)     Blood pressure 125/78, pulse 85, temperature 97.2 °F (36.2 °C), resp. rate 18, height 5' 7\" (1.702 m), weight 137 lb 12.6 oz (62.5 kg), SpO2 97 %. Pain Scale 1: Numeric (0 - 10)  Pain Intensity 1: 0     Pain Location 1: Leg     Pain Description 1: Aching  Pain Intervention(s) 1: Declines, Rest  Last bowel movement, if known:     Constitutional: Frail, alert, oriented x2, not able to hold a conversation   Eyes: pupils equal, anicteric  ENMT: no nasal discharge, moist mucous membranes  Cardiovascular: regular rhythm, distal pulses intact  Respiratory: breathing not labored, symmetric  Gastrointestinal: soft non-tender, +bowel sounds  Musculoskeletal: wasting of muscles including neck muscles. Skin: warm, dry  Neurologic: following commands, moving all extremities  Psychiatric: not bale to assess, because of patient factors.   Other:       HISTORY:     Active Problems:    Hypernatremia (2/25/2021)      Renal stone (2/25/2021)      Dysrhythmia, cardiac (2/25/2021)      Pneumonia (2/25/2021)      Nausea & vomiting (2/25/2021)      Dementia ([address] (2/25/2021)      HERBERTH (acute kidney injury) ([facility] (2/25/2021)      History of COVID-19 (2/25/2021)      Past Medical History:   Diagnosis Date    Agoraphobia without mention of panic attacks 2/17/2014    Anxiety disorder 8/18/2013    Arthritis     osteo  CAD (coronary artery disease), native coronary artery 12/1/2015    no stents    Chronic chest pain 1/13/2014    Chronic pain associated with significant psychosocial dysfunction 2/17/2014    Depression 8/18/2013    Diabetes ([facility]     type II    Duplicated right renal collecting system 3/13/2014    GERD (gastroesophageal reflux disease)     Gout, joint     Hypercholesteremia     hyercholesterolemia    Hypertension     Nephrolithiasis 3/13/2014    Personal history of noncompliance with medical treatment, presenting hazards to health 5/30/2014      Past Surgical History:   Procedure Laterality Date    EGD  4/23/2010         HX CHOLECYSTECTOMY  09/20/2018    lap jesus    HX CYST REMOVAL      cyst removed from left wrist    HX HYSTERECTOMY      partial    HX OTHER SURGICAL      bladder dilitation    HX TUBAL LIGATION      HX UROLOGICAL      kidney stones      Family History   Problem Relation Age of Onset    Stroke Mother     Heart Disease Mother     Cancer Father         type unknown    Heart Disease Son     Liver Disease Son     Heart Disease Daughter     Malignant Hyperthermia Neg Hx     Pseudocholinesterase Deficiency Neg Hx     Delayed Awakening Neg Hx     Post-op Nausea/Vomiting Neg Hx     Emergence Delirium Neg Hx     Post-op Cognitive Dysfunction Neg Hx     Other Neg Hx       History reviewed, no pertinent family history.   Social History     Tobacco Use    Smoking status: Never Smoker    Smokeless tobacco: Never Used   Substance Use Topics    Alcohol use: No     Allergies   Allergen Reactions    Amoxicillin Hives    Sulfa (Sulfonamide Antibiotics) Hives and Itching    Mirtazapine Itching and Nausea Only     Funny feeling in chest    Percocet [Oxycodone-Acetaminophen] Nausea and Vomiting    Codeine Nausea and Vomiting    Crestor [Rosuvastatin] Other (comments)     myalgias    Nitroglycerin Unknown (comments)     Patient cannot remember why she is allergic to it     Bull Prednisone Itching    Zithromax [Azithromycin] Itching     Not sure what it does,taken long time ago      Current Facility-Administered Medications   Medication Dose Route Frequency    senna (SENOKOT) tablet 8.6 mg  1 Tab Oral DAILY    sorbitoL 70 % solution 30 mL  30 mL Oral BID    glucose chewable tablet 16 g  4 Tab Oral PRN    dextrose (D50W) injection syrg 12.5-25 g  12.5-25 g IntraVENous PRN    glucagon (GLUCAGEN) injection 1 mg  1 mg IntraMUSCular PRN    levoFLOXacin (LEVAQUIN) 750 mg in D5W IVPB  750 mg IntraVENous Q24H    dextrose 5% - 0.2% NaCl with KCl 20 mEq/L infusion  75 mL/hr IntraVENous CONTINUOUS    artificial tears (dextran-hypromellose-glycerin) (GENTEAL) ophthalmic solution 1 Drop  1 Drop Both Eyes PRN    amLODIPine (NORVASC) tablet 2.5 mg  2.5 mg Oral DAILY    aspirin chewable tablet 81 mg  81 mg Oral DAILY    clopidogreL (PLAVIX) tablet 75 mg  75 mg Oral DAILY    ezetimibe (ZETIA) tablet 10 mg  10 mg Oral DAILY    metoprolol succinate (TOPROL-XL) XL tablet 25 mg  25 mg Oral Q12H    polyethylene glycol (MIRALAX) packet 17 g  17 g Oral DAILY    venlafaxine-SR (EFFEXOR-XR) capsule 37.5 mg  37.5 mg Oral DAILY    LORazepam (ATIVAN) tablet 0.5 mg  0.5 mg Oral Q8H PRN    sodium chloride (NS) flush 5-40 mL  5-40 mL IntraVENous Q8H    sodium chloride (NS) flush 5-40 mL  5-40 mL IntraVENous PRN    acetaminophen (TYLENOL) tablet 650 mg  650 mg Oral Q6H PRN    Or    acetaminophen (TYLENOL) suppository 650 mg  650 mg Rectal Q6H PRN    polyethylene glycol (MIRALAX) packet 17 g  17 g Oral DAILY PRN    promethazine (PHENERGAN) tablet 12.5 mg  12.5 mg Oral Q6H PRN    Or    ondansetron (ZOFRAN) injection 4 mg  4 mg IntraVENous Q6H PRN    hydrALAZINE (APRESOLINE) 20 mg/mL injection 10 mg  10 mg IntraVENous Q6H PRN          LAB AND IMAGING FINDINGS:     Lab Results   Component Value Date/Time    WBC 4.3 03/03/2021 03:41 AM    HGB 11.7 03/03/2021 03:41 AM    PLATELET 443 54/84/8152 03:41 AM Lab Results   Component Value Date/Time    Sodium 135 (L) 03/03/2021 03:41 AM    Potassium 3.7 03/03/2021 03:41 AM    Chloride 104 03/03/2021 03:41 AM    CO2 25 03/03/2021 03:41 AM    BUN 11 03/03/2021 03:41 AM    Creatinine 0.59 03/03/2021 03:41 AM    Calcium 8.1 (L) 03/03/2021 03:41 AM    Magnesium 2.5 (H) 02/26/2021 01:07 AM      Lab Results   Component Value Date/Time    Alk. phosphatase 49 03/02/2021 02:47 AM    Protein, total 6.1 (L) 03/02/2021 02:47 AM    Albumin 2.3 (L) 03/02/2021 02:47 AM    Globulin 3.8 03/02/2021 02:47 AM     Lab Results   Component Value Date/Time    INR 1.2 (H) 02/17/2021 12:00 AM    Prothrombin time 15.3 (H) 02/17/2021 12:00 AM    aPTT 31.0 02/28/2021 03:38 AM    aPTT 149.4 (HH) 02/19/2021 05:21 AM      Lab Results   Component Value Date/Time    Iron 76 07/31/2015 01:27 PM    TIBC 238 (L) 07/31/2015 01:27 PM    Iron % saturation 32 07/31/2015 01:27 PM    Ferritin 301 (H) 02/28/2021 03:38 AM      No results found for: PH, PCO2, PO2  No components found for: [place]   Lab Results   Component Value Date/Time    CK 69 07/20/2018 12:34 PM    CK - MB <1.0 07/20/2018 12:34 PM                Total time:   Counseling / coordination time, spent as noted above:   > 50% counseling / coordination?:     Prolonged service was provided for  []30 min   []75 min in face to face time in the presence of the patient, spent as noted above. Time Start:   Time End:   Note: this can only be billed with 94764 (initial) or 74712 (follow up). If multiple start / stop times, list each separately.

## 2021-03-02 NOTE — PROGRESS NOTES
Problem: Dysphagia (Adult)  Goal: *Acute Goals and Plan of Care (Insert Text)  Description: 2/26/2021  Speech path goals  1. Patient will tolerate purees and thins with no overt s/s of aspiration. 2. Patient will tolerate soft solids with no overt s/s of aspiration. Outcome: Not Progressing Towards Goal   SPEECH LANGUAGE PATHOLOGY DYSPHAGIA TREATMENT  Patient: Jocelin Roche (32 y.o. female)  Date: 3/2/2021  Diagnosis: HERBERTH (acute kidney injury) ([facility] [N17.9]  Nausea & vomiting [R11.2]  Hypernatremia [E87.0]  History of COVID-19 [Z86.16]  Pneumonia [J18.9]  Dysrhythmia, cardiac [I49.9]  Dementia ([facility] [F03.90]  Renal stone [N20.0] <principal problem not specified>       Precautions:       ASSESSMENT:  Patient seen today for reeval of swallowing. She tolerated purees and thins. She took very little ice cream from the spoon at one time, lengthy manipulation. Tolerated cup and straw sips. Pharyngeal phase was characterized by mild swallow delay. Hyolaryngeal elevation was adequate. No coughing. She did not masticate the aurelia cracker. It had to be cleared from her mouth. PLAN:  Recommendations and Planned Interventions:  Recommend purees/thins   Patient continues to benefit from skilled intervention to address the above impairments. Continue treatment per established plan of care. Discharge Recommendations:  None     SUBJECTIVE:   Patient stated What did the doctor say? \" to her daughter who called. .    OBJECTIVE:   Cognitive and Communication Status:  Neurologic State: (P) Confused, Alert  Orientation Level: (P) Oriented to person  Cognition: (P) Impaired decision making, Memory loss, Decreased attention/concentration  Perception: Appears intact  Perseveration: (P) Perseverates during conversation  Safety/Judgement: (P) Fall prevention  Dysphagia Treatment:  Oral Assessment:     P.O.  Trials:     Vocal quality prior to P.O.:             Bolus Acceptance: Impaired  Bolus Formation/Control: Impaired  Type of Impairment: Delayed  Propulsion: Delayed (# of seconds)  Oral Residue: None  Initiation of Swallow: No impairment  Laryngeal Elevation: Functional  Aspiration Signs/Symptoms: None  Pharyngeal Phase Characteristics: No impairment, issues, or problems              Oral Phase Severity: Mild  Pharyngeal Phase Severity : Mild-moderate         Pain:  Pain Scale 1: Numeric (0 - 10)  Pain Intensity 1: 0       After treatment:   Patient left in no apparent distress in bed    COMMUNICATION/EDUCATION:   The patient was encouraged to eat and drink but is not wanting to eat. The patient's plan of care including recommendations, planned interventions, and recommended diet changes were discussed with: Registered nurse.      Maicol Clancy, SLP  Time Calculation: 10 mins

## 2021-03-02 NOTE — PROGRESS NOTES
Problem: Mobility Impaired (Adult and Pediatric)  Goal: *Acute Goals and Plan of Care (Insert Text)  Description: FUNCTIONAL STATUS PRIOR TO ADMISSION: Per chart pt is demented and dependent. Pt reports she walked some times with someone behind her. Questionable PLOF  HOME SUPPORT PRIOR TO ADMISSION: The patient lived with family and was total care per chart. Again, questionable what pt was doing regarding her care. Physical Therapy Goals  Initiated 3/2/2021  1. Patient will roll side to side in bed with moderate assistance  within 7 day(s). 2.  Patient will move from supine to and from sitting eob with from bed with moderate assistance within 7 day(s). Outcome: Not Progressing Towards Goal   PHYSICAL THERAPY EVALUATION  Patient: Sue Antony (89 y.o. female)  Date: 3/2/2021  Primary Diagnosis: HERBERTH (acute kidney injury) ([facility] [address] [N17.9]  Nausea & vomiting [R11.2]  Hypernatremia [E87.0]  History of COVID-19 [Z86.16]  Pneumonia [J18.9]  Dysrhythmia, cardiac [I49.9]  Dementia ([facility] [address] [F03.90]  Renal stone [N20.0]        Precautions:       ASSESSMENT  Based on the objective data described below, the patient presents with confusion, intermittent orientation, poor po intake, persistent report of a HA and chest pain. She reports she hasn't had her medication, but it was clearly documented that she did received her meds. Pt perseverating on her HA, wanting her meds, and chest pain and not willing to roll in bed or lift BLEs even. RN notified of pt's status. Pt will be a trial for therapy. If she returns home she will need a hospital bed, mechanical lift, bedside commode and wheelchair. Not sure what equipment she has already. Current Level of Function Impacting Discharge (mobility/balance): unable to assess, appears to require total assist    Functional Outcome Measure: The patient scored 0/100 on the Barthel Index outcome measure which is indicative of 100% impaired function/adls.       Other factors to consider for discharge: will need 24/7 care     Patient will benefit from skilled therapy intervention to address the above noted impairments. PLAN :  Recommendations and Planned Interventions: bed mobility training, therapeutic exercises, patient and family training/education, and therapeutic activities      Frequency/Duration: Patient will be followed by physical therapy:  2 times a week to address goals. Recommendation for discharge: (in order for the patient to meet his/her long term goals)  To be determined: This discharge recommendation:  A follow-up discussion with the attending provider and/or case management is planned    IF patient discharges home will need the following DME: bedside commode, hospital bed, mechanical lift, and wheelchair         SUBJECTIVE:   Patient stated my chest hurts.     OBJECTIVE DATA SUMMARY:   HISTORY:    Past Medical History:   Diagnosis Date    Agoraphobia without mention of panic attacks 2/17/2014    Anxiety disorder 8/18/2013    Arthritis     osteo    CAD (coronary artery disease), native coronary artery 12/1/2015    no stents    Chronic chest pain 1/13/2014    Chronic pain associated with significant psychosocial dysfunction 2/17/2014    Depression 8/18/2013    Diabetes ([facility] [address]     type II    Duplicated right renal collecting system 3/13/2014    GERD (gastroesophageal reflux disease)     Gout, joint     Hypercholesteremia     hyercholesterolemia    Hypertension     Nephrolithiasis 3/13/2014    Personal history of noncompliance with medical treatment, presenting hazards to health 5/30/2014     Past Surgical History:   Procedure Laterality Date    EGD  4/23/2010         HX CHOLECYSTECTOMY  09/20/2018    lap jesus    HX CYST REMOVAL      cyst removed from left wrist    HX HYSTERECTOMY      partial    HX OTHER SURGICAL      bladder dilitation    HX TUBAL LIGATION      HX UROLOGICAL      kidney stones       Personal factors and/or comorbidities impacting plan of care: dementia, ? dependence         EXAMINATION/PRESENTATION/DECISION MAKING:   Critical Behavior:  Neurologic State: Confused, Alert  Orientation Level: Oriented to person  Cognition: Impaired decision making, Memory loss, Decreased attention/concentration  Safety/Judgement: Fall prevention  Hearing:  Auditory  Auditory Impairment: None  Range Of Motion:  AROM: Generally decreased, functional(left more range)           PROM: Generally decreased, functional           Strength:    Strength: Generally decreased, functional                    Tone & Sensation:   Tone: Normal                              Coordination:  Coordination: Generally decreased, functional     Functional Mobility:  Bed Mobility:  Rolling: (pt refused due to chest pain)         Balance:   Sitting: (pt refused , due to chest pain )          Functional Measure:  Barthel Index:    Bathin  Bladder: 0  Bowels: 0  Groomin  Dressin  Feedin  Mobility: 0  Stairs: 0  Toilet Use: 0  Transfer (Bed to Chair and Back): 0  Total: 0/100       The Barthel ADL Index: Guidelines  1. The index should be used as a record of what a patient does, not as a record of what a patient could do.  2. The main aim is to establish degree of independence from any help, physical or verbal, however minor and for whatever reason.  3. The need for supervision renders the patient not independent.  4. A patient's performance should be established using the best available evidence. Asking the patient, friends/relatives and nurses are the usual sources, but direct observation and common sense are also important. However direct testing is not needed.  5. Usually the patient's performance over the preceding 24-48 hours is important, but occasionally longer periods will be relevant.  6. Middle categories imply that the patient supplies over 50 per cent of the effort.  7. Use of aids to be independent is allowed.    Roseanne Wilhelm., Barthel,  FREDERICK (1965). Functional evaluation: the Barthel Index. [facility] (14)2. SILVER Hua, Maribell London, Jovanni Torres.Anny, [address] (1999). Measuring the change indisability after inpatient rehabilitation; comparison of the responsiveness of the Barthel Index and Functional [place] Measure. Journal of Neurology, Neurosurgery, and Psychiatry, 66(4), 878-585. DOUGLAS Garcia, SUMAN Florian, & Carolina Stearns M.A. (2004.) Assessment of post-stroke quality of life in cost-effectiveness studies: The usefulness of the Barthel Index and the EuroQoL-5D. Quality of Life Research, 15, 649-74           Physical Therapy Evaluation Charge Determination   History Examination Presentation Decision-Making   MEDIUM  Complexity : 1-2 comorbidities / personal factors will impact the outcome/ POC  HIGH Complexity : 4+ Standardized tests and measures addressing body structure, function, activity limitation and / or participation in recreation  MEDIUM Complexity : Evolving with changing characteristics  MEDIUM Complexity : FOTO score of 26-74      Based on the above components, the patient evaluation is determined to be of the following complexity level: MEDIUM    Pain Rating:  Reporting HA and chest pain, not quantified    Activity Tolerance:   Poor, pt perseverating too much    After treatment patient left in no apparent distress:   Supine in bed, Heels elevated for pressure relief, Call bell within reach, and Side rails x 3    COMMUNICATION/EDUCATION:   The patients plan of care was discussed with: Occupational therapist, Registered nurse, and Case management. Fall prevention education was provided and the patient/caregiver indicated understanding. and Patient is unable to participate in goal setting and plan of care.     Thank you for this referral.  Jose Mesa, PT   Time Calculation: 19 mins

## 2021-03-02 NOTE — PROGRESS NOTES
Hospitalist Progress Note    NAME: Fan Klein   :  1945   MRN:  [id number]     Admit date: 2021    Today's date: 21    PCP: MD Sushant Bruno M.D. Cell [phone]    History of chronic constipation for over 10 years  Only mention of a colonoscopy note was about 10 years ago per Dr. Darvin Casarez, normal    Status post cholecystectomy, history of choledocholithiasis status post ERCP with Dr. Rody Suazo   Normal esophagus and stomach    Daughter reports possible EGD with dilatation required in past, could not find documentation of this in chart    Diagnosed with Covid pneumonia 2021    Admitted to [facility]  to 2021 with severe constipation   Daughter not aware of any treatments for the Covid   Daughter reports required sedation and disimpaction  Discharged to rehab at [facility] off O2    Admitted  to 2021 at [facility] with non-ST elevation MI  Presented with generalized weakness  Not taking p.o. well at discharge  Peak troponin 2.11  Echo LVEF 65%, moderate MS, valve gradient 15 mm  Managed medically  SNF was recommended, family declined and took her home with home health    Daughter says after arriving home \"she would not take p.o. and kept vomiting\"  Home for less than a day and was taken to [address] [place] [address] ER   Dehydrated with sodium 153, BUN 72 creatinine 1.23   UA with 0-5 WBCs, 0-3 RBCs   CT scan at outside hospital as below  Transferred to [facility] [address]    Assessment / Plan:    Hypernatremia POA Na 152 improved  Hypovolemia POA BUN/creat 68 and 1.22 improved  Dysphagia POA reports trouble swallowing, ? history of esophageal stricture in past  Nausea and vomiting at home POA   Recent obstipation POA appears more chronic  -CT scan done at outside ER reported as below      1. New infiltrate in the bases greater on the right. 2.  Nonobstructive nephrolithiasis     3. fecal retention which is diminishing liquid prior.      4.  Persistent dilatation of the sigmoid with an air-fluid level with redundancy  -Reports dysphagia, was spitting up in ED  -Hypovolemia and dehydration improved with IVF  -Speech saw, recommended starting dysphagia diet  -Bowel regimen with daily MiraLAX and lactulose  -Spoke with Dr Ramsey Recio, ? Needs EGD and anything else to do with the constipation         The constipation appears chronic, the sigmoid dilatation may be as well         PPI  - Given the recent COVID can consider barium swallow first  -Spoke with daughter by phone Monday      She wants to bring patient home with home health      Discussed she will 24 hour supervision, daughter says she can provide this      Daughter was asking about hospital bed at home  -Head CT negative    Recent COVID19 infection (diagnosed with COVID on 2/2/21)  ?  CAP POA  Was at [facility], unclear what treatment she received  Not hypoxic, appears asymptpomatic  CXR with bibasilar ASD  Rapid covid was positive in OSH  Not hypoxic at admit, hold on RX  IV levaquin day 5 total, will stop in AM  Procalcitonin 0.24  Suspect non infectious at this point despite the positive tests       1 month out from original test  D-dimer elevated, x-cover started heparin     I checked CTA chest and LE dopplers, both negative or VTE     Heparin stopped  Not hypoxic and 1 month out from COVID, no indication for steroids, stopped    SVT vs NSVT in ED POA  Recent non ST elevation MI at OSH 2/17, peak troponin 2.11  Essential HTN POA  Tele  EKG NSR [address] , no acute Ischemic injury  Resume Lopressor daily, continue norvasc  PRN hydralazine  ASA  Unclear if tolerant of statins, allergic to crestor    Generalized weakness POA  Daughter notes ambulatory in January, past month is hardly walked at all  Appears to have good strength in arms and distally in legs  A bit of trouble lifting right thigh off bed otherwise nonfocal  Suspect related to underlying medical issues, recent Covid  Needs PT and OT    Renal stones without Hydronephrosis     Dementia  ? No formal diagnosis, daughter notes increasing memory issues  Oriented x2-3 here, daughter notes was scheduled to see neurologist as outpatient  Anxiety  Hx agoraphobia per chart    Code Status: FULL  NOK:  Maral Duncan Child     [phone]   unknown, Tiarra Daughter      done at outside hospital showed      DVT Prophylaxis: SQ HEP  GI Prophylaxis: not indicated     Baseline: Dependent/Demented    Body mass index is 21.58 kg/m². Subjective:     Chief Complaint / Reason for Physician Visit follow-up hypernatremia, nausea vomiting  \"still feels nauseated\". Discussed with RN events overnight. Nausea, no vomiting, refusing medications  No SOB, currently on Room air  Mild cough    Review of Systems:  Symptom Y/N Comments  Symptom Y/N Comments   Fever/Chills n   Chest Pain n    Poor Appetite    Edema     Cough y   Abdominal Pain n    Sputum    Joint Pain     SOB/MOORE n   Headache     Nausea/vomit n   Tolerating PT/OT     Diarrhea y   Tolerating Diet y    Constipation    Other       Could NOT obtain due to:      Objective:     VITALS:   Last 24hrs VS reviewed since prior progress note.  Most recent are:  Patient Vitals for the past 24 hrs:   Temp Pulse Resp BP SpO2   03/01/21 2055 98.3 °F (36.8 °C) 63 17 (!) 164/79 96 %   03/01/21 1623 98 °F (36.7 °C) 72 16 (!) 164/83 96 %   03/01/21 1137 97.6 °F (36.4 °C) 62 16 (!) 141/77 98 %   03/01/21 0820 97.6 °F (36.4 °C) 67 17 (!) 142/83 96 %   03/01/21 0330 98.3 °F (36.8 °C) 66 17 115/61 96 %   03/01/21 0051 98 °F (36.7 °C) 71 17 123/74 97 %       Intake/Output Summary (Last 24 hours) at 3/1/2021 2126  Last data filed at 3/1/2021 1829  Gross per 24 hour   Intake 0 ml   Output 300 ml   Net -300 ml        Wt Readings from Last 12 Encounters:   02/25/21 62.5 kg (137 lb 12.6 oz)   02/17/21 73.9 kg (162 lb 14.7 oz)   12/10/20 74.1 kg (163 lb 6.4 oz)   07/16/20 81.2 kg (179 lb)   06/23/20 75.8 kg (167 lb)   03/24/20 75.8 kg (167 lb) 01/20/20 78.9 kg (174 lb)   01/03/20 77.1 kg (170 lb)   11/28/19 84.7 kg (186 lb 11.7 oz)   10/18/19 84.7 kg (186 lb 11.7 oz)   09/08/19 75.8 kg (167 lb)   09/04/19 78 kg (172 lb)       PHYSICAL EXAM:    I had a face to face encounter and independently examined this patient on 03/01/21 as outlined below:    General: WD, WN. Alert, cooperative, no acute distress    EENT:  PERRL. Anicteric sclerae. MMM  Resp:  Crackles at bases bilaterally, no wheezing. No accessory muscle use  CV:  Regular  rhythm,  No edema  GI:  Soft, Non distended, Non tender. +Bowel sounds, no rebound  Neurologic:  Alert, oriented to birthday, current place and city, a bit trouble getting current year eventually said 2021     normal speech,    5/5 strength in upper extremities bilaterally, 5/5 strength in feet bilaterally    Some trouble lifting right thigh off bed  Psych:   Not anxious nor agitated  Skin:  No rashes. No jaundice    Reviewed most current lab test results and cultures  YES  Reviewed most current radiology test results   YES  Review and summation of old records today    NO  Reviewed patient's current orders and MAR    YES  PMH/ reviewed - no change compared to H&P  ________________________________________________________________________  Care Plan discussed with:    Comments   Patient x    Family      RN x    Care Manager     Consultant                        Multidiciplinary team rounds were held today with , nursing, pharmacist and clinical coordinator. Patient's plan of care was discussed; medications were reviewed and discharge planning was addressed.      ________________________________________________________________________      Comments   >50% of visit spent in counseling and coordination of care     ________________________________________________________________________  Herrera Salinas MD     Procedures: see electronic medical records for all procedures/Xrays and details which were not copied into this note but were reviewed prior to creation of Plan. LABS:  I reviewed today's most current labs and imaging studies.   Pertinent labs include:  Recent Labs     03/01/21 0422 02/28/21 0338 02/27/21  0243   WBC 5.2 9.2 8.7   HGB 9.3* 9.6* 11.7   HCT 29.5* 31.5* 42.3    189 192     Recent Labs     03/01/21 0422 02/28/21 0338 02/27/21  0243    141 143   K 3.4* 3.5 3.9    109* 113*   CO2 27 27 21   * 111* 57*   BUN 17 18 30*   CREA 0.71 0.68 0.69   CA 8.1* 8.0* 8.4*   ALB 2.2* 1.9* 2.1*   TBILI 0.3 0.6 0.7   ALT 17 18 20

## 2021-03-02 NOTE — PROGRESS NOTES
Spiritual Care Assessment/Progress Note  [facility]      NAME: Sue Antony      MRN: [id number]  AGE: 76 y.o. SEX: female  Synagogue Affiliation: Denominational   Language: English     3/2/2021     Total Time (in minutes): 5     Spiritual Assessment begun in [facility] 3 MED TELE through conversation with:         []Patient        [] Family    [] Friend(s)        Reason for Consult: Palliative Care, Initial/Spiritual Assessment     Spiritual beliefs: (Please include comment if needed)     [] Identifies with a hossein tradition:         [] Supported by a hossein community:            [] Claims no spiritual orientation:           [] Seeking spiritual identity:                [] Adheres to an individual form of spirituality:           [x] Not able to assess:                           Identified resources for coping:      [] Prayer                               [] Music                  [] Guided Imagery     [] Family/friends                 [] Pet visits     [] Devotional reading                         [x] Unknown     [] Other:                                              Interventions offered during this visit: (See comments for more details)                Plan of Care:     [] Support spiritual and/or cultural needs    [] Support AMD and/or advance care planning process      [] Support grieving process   [] Coordinate Rites and/or Rituals    [] Coordination with community clergy   [] No spiritual needs identified at this time   [] Detailed Plan of Care below (See Comments)  [] Make referral to Music Therapy  [] Make referral to Pet Therapy     [] Make referral to Addiction services  [] Make referral to [facility]  [] Make referral to Spiritual Care Partner  [] No future visits requested        [] Follow up upon further referrals     Comments: Attempted Palliative Initial Spiritual Assessment for this pt in [place] [address]. Pt was unable to be assessed at this time. No family/friends present at time of visit. Contact Spiritual Care Services for any spiritual or emotional support needs. Shaun Giordano MDiv.  Staff   Request  Support/Spiritual Care Services via [facility]

## 2021-03-02 NOTE — PROGRESS NOTES
End of Shift Note    Bedside shift change report given to Rowan (oncoming nurse) by Cait Griffith (offgoing nurse). Report included the following information SBAR, Kardex, Intake/Output, MAR and Recent Results    Shift worked:  [id number]     Shift summary and any significant changes:     No major changes, pt refusing PO intake and is confused. Was dialing 911 on her phone last night but when asked about it said she didn't know what she was doing. Phone placed within reach on bedside table. Concerns for physician to address:  none     Zone phone for oncoming shift:          Activity:  Activity Level: Bed Rest  Number times ambulated in hallways past shift: 0  Number of times OOB to chair past shift: 0    Cardiac:   Cardiac Monitoring: Yes      Cardiac Rhythm: Normal sinus rhythm    Access:   Current line(s): PIV     Genitourinary:   Urinary status: voiding and incontinent    Respiratory:   O2 Device: Room air  Chronic home O2 use?: NO  Incentive spirometer at bedside: NO     GI:  Last Bowel Movement Date: 02/25/21  Current diet:  DIET NPO Except Meds  DIET NUTRITIONAL SUPPLEMENTS AM Snack, PM Snack, HS Snack; Ensure Compact  Passing flatus: YES  Tolerating current diet: NO  % Diet Eaten: 0 %    Pain Management:   Patient states pain is manageable on current regimen: N/A    Skin:  Freddy Score: 12  Interventions: float heels    Patient Safety:  Fall Score:  Total Score: 3  Interventions: gripper socks  High Fall Risk: Yes    Length of Stay:  Expected LOS: 4d 7h  Actual LOS: 5      Cait Griffith

## 2021-03-03 LAB
ANION GAP SERPL CALC-SCNC: 6 MMOL/L (ref 5–15)
BUN SERPL-MCNC: 11 MG/DL (ref 6–20)
BUN/CREAT SERPL: 19 (ref 12–20)
CALCIUM SERPL-MCNC: 8.1 MG/DL (ref 8.5–10.1)
CHLORIDE SERPL-SCNC: 104 MMOL/L (ref 97–108)
CO2 SERPL-SCNC: 25 MMOL/L (ref 21–32)
CREAT SERPL-MCNC: 0.59 MG/DL (ref 0.55–1.02)
ERYTHROCYTE [DISTWIDTH] IN BLOOD BY AUTOMATED COUNT: 13.8 % (ref 11.5–14.5)
GLUCOSE BLD STRIP.AUTO-MCNC: 100 MG/DL (ref 65–100)
GLUCOSE BLD STRIP.AUTO-MCNC: 104 MG/DL (ref 65–100)
GLUCOSE BLD STRIP.AUTO-MCNC: 112 MG/DL (ref 65–100)
GLUCOSE BLD STRIP.AUTO-MCNC: 97 MG/DL (ref 65–100)
GLUCOSE SERPL-MCNC: 89 MG/DL (ref 65–100)
HCT VFR BLD AUTO: 38 % (ref 35–47)
HGB BLD-MCNC: 11.7 G/DL (ref 11.5–16)
MCH RBC QN AUTO: 25.7 PG (ref 26–34)
MCHC RBC AUTO-ENTMCNC: 30.8 G/DL (ref 30–36.5)
MCV RBC AUTO: 83.3 FL (ref 80–99)
NRBC # BLD: 0 K/UL (ref 0–0.01)
NRBC BLD-RTO: 0 PER 100 WBC
PLATELET # BLD AUTO: 212 K/UL (ref 150–400)
PMV BLD AUTO: 11.3 FL (ref 8.9–12.9)
POTASSIUM SERPL-SCNC: 3.7 MMOL/L (ref 3.5–5.1)
RBC # BLD AUTO: 4.56 M/UL (ref 3.8–5.2)
SERVICE CMNT-IMP: ABNORMAL
SERVICE CMNT-IMP: ABNORMAL
SERVICE CMNT-IMP: NORMAL
SERVICE CMNT-IMP: NORMAL
SODIUM SERPL-SCNC: 135 MMOL/L (ref 136–145)
WBC # BLD AUTO: 4.3 K/UL (ref 3.6–11)

## 2021-03-03 PROCEDURE — 85027 COMPLETE CBC AUTOMATED: CPT

## 2021-03-03 PROCEDURE — 74011250637 HC RX REV CODE- 250/637: Performed by: NURSE PRACTITIONER

## 2021-03-03 PROCEDURE — 65660000000 HC RM CCU STEPDOWN

## 2021-03-03 PROCEDURE — 94760 N-INVAS EAR/PLS OXIMETRY 1: CPT

## 2021-03-03 PROCEDURE — 82962 GLUCOSE BLOOD TEST: CPT

## 2021-03-03 PROCEDURE — 80048 BASIC METABOLIC PNL TOTAL CA: CPT

## 2021-03-03 PROCEDURE — 74011250636 HC RX REV CODE- 250/636: Performed by: INTERNAL MEDICINE

## 2021-03-03 PROCEDURE — 74011250637 HC RX REV CODE- 250/637: Performed by: HOSPITALIST

## 2021-03-03 PROCEDURE — 36415 COLL VENOUS BLD VENIPUNCTURE: CPT

## 2021-03-03 PROCEDURE — 99233 SBSQ HOSP IP/OBS HIGH 50: CPT | Performed by: INTERNAL MEDICINE

## 2021-03-03 RX ADMIN — EZETIMIBE 10 MG: 10 TABLET ORAL at 08:58

## 2021-03-03 RX ADMIN — Medication 10 ML: at 21:19

## 2021-03-03 RX ADMIN — POTASSIUM CHLORIDE, DEXTROSE MONOHYDRATE AND SODIUM CHLORIDE 75 ML/HR: 150; 5; 200 INJECTION, SOLUTION INTRAVENOUS at 20:21

## 2021-03-03 RX ADMIN — METOPROLOL SUCCINATE 25 MG: 50 TABLET, EXTENDED RELEASE ORAL at 20:23

## 2021-03-03 RX ADMIN — POTASSIUM CHLORIDE, DEXTROSE MONOHYDRATE AND SODIUM CHLORIDE 75 ML/HR: 150; 5; 200 INJECTION, SOLUTION INTRAVENOUS at 05:29

## 2021-03-03 RX ADMIN — POLYETHYLENE GLYCOL 3350 17 G: 17 POWDER, FOR SOLUTION ORAL at 08:58

## 2021-03-03 RX ADMIN — VENLAFAXINE HYDROCHLORIDE 37.5 MG: 37.5 CAPSULE, EXTENDED RELEASE ORAL at 08:58

## 2021-03-03 RX ADMIN — SENNOSIDES 8.6 MG: 8.6 TABLET, FILM COATED ORAL at 08:58

## 2021-03-03 RX ADMIN — METOPROLOL SUCCINATE 25 MG: 50 TABLET, EXTENDED RELEASE ORAL at 08:59

## 2021-03-03 RX ADMIN — AMLODIPINE BESYLATE 2.5 MG: 2.5 TABLET ORAL at 08:59

## 2021-03-03 RX ADMIN — Medication 10 ML: at 17:55

## 2021-03-03 RX ADMIN — Medication 10 ML: at 05:26

## 2021-03-03 RX ADMIN — ASPIRIN 81 MG: 81 TABLET, CHEWABLE ORAL at 08:58

## 2021-03-03 RX ADMIN — CLOPIDOGREL BISULFATE 75 MG: 75 TABLET ORAL at 08:58

## 2021-03-03 RX ADMIN — LEVOFLOXACIN 750 MG: 5 INJECTION, SOLUTION INTRAVENOUS at 05:25

## 2021-03-03 NOTE — CONSULTS
Palliative Medicine Consult  Marcelo: [phone] ([phone])    Patient Name: Ruddy Matthews  YOB: 1945    Date of Initial Consult: 3/2/21  Reason for Consult: Care decisions and Psychosocial distress  Requesting Provider: Roaslba Candelario MD  Primary Care Physician: Milly Lynne MD     SUMMARY:   Ruddy Matthews is a 76 y.o. female  with a past history of dementia, htn, hld and Dm , in and out of hospital for past one month, recent admission at [facility] for [place] from 2/4-2/11/21, d/c to SNF , then admitted at [facility] from 2/17-2/23/21 for NSTEMI, was home for 2 days the presented to [address] [place] [address] for nausea , vomiting and generalized malaise , rapid COVID test was positive , she was transferred to [facility] on 2/25/2021  with a diagnosis of dehydration and HERBERTH, hypernatremia,  rapid COVID positive and possible CAP, not hypoxic on room air . In addition she has hypernatremia, sigmoid dilatation on CT (hx of chronic constipation), no fecal impaction note galina NELSON,  new onset dysphagia, speech recommended Pureed diet), Gi following, recommends barium swallow and EGD after she recovers from COVID, she has nausea since COVID not a candidate for PEG placement as she is able to swallow . She has had a steady decline in her health over the last 2 years, has been in and out of hospital x one month, steep decline since COVID infection last month , requiring  consult to palliative care for goals of care to discussed. She would not be a candidate for PEG placement as she is able to swallow oral intake.        3/3/21: patient is more alert tolerating diet        Socail: she lives with friend until last month , used to be independent, she has declined in health since her COVID infection last month, she is lately very weak requires help with ADLS, has four children,  staying with her daughter Ciro Saldana, her other daughter Jay Rush is also involved in her care , her two sons are not much involved Mahin Coy PALLIATIVE DIAGNOSES:   1. Deconditioning for last month S/P Covid infection feb 2021  2. Dementia   3. Dehydration(resolved )   4. HERBERTH (resolved 0.  5. Poor intake, albumin 2.3  6. Chronic constipation   7. Goals of care /code status review        PLAN:   1. Patient is currently resting per Bed side Rn she is oriented x2, not bale to hold conversation, eating poorly. 2. Advance directives ; patient four children are legal next of kin, two daughters Kimberley Durant and Akila Rogers are most involved in her care , two sons are not involved . 3. I spoke to her daughter Kimberley Durant to follow up on our conversation around goals of care/CPR. · We discussed patient is eating poorly, and is high risk for recurrent dehydration, daughter shared patient will eat better in home environment, she plans to d/c patient to live with her with arrangement for 24/7 care giver between herself, her sister and niece, they are planning on getting medicaid covered aid. · We reviewed lab results /kidney and sodium numbers . · Dtr shared she has discussed with her siblings, they are all in agreement to give her a chance of resuscitation /attempt CPR, based on pur conversation yesterday, she has  understanding patient may not survive CPR attempt or may survive with very poor quality of life . · We talked about to expect slow recovery and a great possibility she may not get back to base line /ambulatory one month ago . · Goal is full code status, d/c to home with home health and continue with hospitalization as needed . · Goals are clear, will sign off, please do not hesitate to call us if anything changes . 4. Initial consult note routed to primary continuity provider and/or primary health care team members  5.  Communicated plan of care with: Palliative [facility] [address] Team     GOALS OF CARE / TREATMENT PREFERENCES:     GOALS OF CARE:  Patient/Health Care Proxy Stated Goals: Prolong life    TREATMENT PREFERENCES:   Code Status: Full Code    Advance Care Planning:  [x] The [place] [address] Team has updated the ACP Navigator with Health Care Decision Maker and Patient Capacity    Primary Decision Maker (Active): Gali Loredo - [phone]    Advance Care Planning 2/18/2021   Patient's Healthcare Decision Maker is: -   Primary Decision Maker Name -   Primary Decision Maker Phone Number -   Primary Decision Maker Relationship to Patient -   Confirm Advance Directive None   Patient Would Like to Complete Advance Directive Unable   Does the patient have other document types Other (comment)       Medical Interventions: Full interventions     Other Instructions: Other:    As far as possible, the palliative care team has discussed with patient / health care proxy about goals of care / treatment preferences for patient. HISTORY:     History obtained from: chart, daughter     CHIEF COMPLAINT: altered mental status     HPI/SUBJECTIVE:    The patient is:   [] Verbal, minimal participation     She answered some question with yes and no answers. 3/3/321    Per bed side Rn, patent is alert orienetd x2, she is eating very poorly refused her lunch .     Clinical Pain Assessment (nonverbal scale for severity on nonverbal patients):   Clinical Pain Assessment  Severity: 0     Activity (Movement): Lying quietly, normal position    Duration: for how long has pt been experiencing pain (e.g., 2 days, 1 month, years)  Frequency: how often pain is an issue (e.g., several times per day, once every few days, constant)     FUNCTIONAL ASSESSMENT:     Palliative Performance Scale (PPS):  PPS: 40       PSYCHOSOCIAL/SPIRITUAL SCREENING:     Palliative IDT has assessed this patient for cultural preferences / practices and a referral made as appropriate to needs (Cultural Services, Patient Advocacy, Ethics, etc.)    Any spiritual / Quaker concerns:  [] Yes /  [x] No    Caregiver Burnout:  [] Yes /  [x] No /  [] No Caregiver Present Anticipatory grief assessment:   [x] Normal  / [] Maladaptive       ESAS Anxiety:      ESAS Depression:          REVIEW OF SYSTEMS:     Positive and pertinent negative findings in ROS are noted above in HPI. The following systems were [x] reviewed  Limited because of patient factors / [] unable to be reviewed as noted in HPI  Other findings are noted below. Systems: constitutional, ears/nose/mouth/throat, respiratory, gastrointestinal, genitourinary, musculoskeletal, integumentary, neurologic, psychiatric, endocrine. Positive findings noted below. Modified ESAS Completed by: provider   Fatigue: 6       Pain: 0     Nausea: 0     Dyspnea: 0                    PHYSICAL EXAM:     From RN flowsheet:  Wt Readings from Last 3 Encounters:   02/25/21 137 lb 12.6 oz (62.5 kg)   02/17/21 162 lb 14.7 oz (73.9 kg)   12/10/20 163 lb 6.4 oz (74.1 kg)     Blood pressure (!) 142/83, pulse 65, temperature 97.8 °F (36.6 °C), resp. rate 18, height 5' 7\" (1.702 m), weight 137 lb 12.6 oz (62.5 kg), SpO2 97 %. Pain Scale 1: Numeric (0 - 10)  Pain Intensity 1: 0     Pain Location 1: Leg     Pain Description 1: Aching  Pain Intervention(s) 1: Declines, Rest  Last bowel movement, if known: To prevent spread and preserve PPE, I viewed her through window.     She is resting comfortably,         HISTORY:     Active Problems:    Hypernatremia (2/25/2021)      Renal stone (2/25/2021)      Dysrhythmia, cardiac (2/25/2021)      Pneumonia (2/25/2021)      Nausea & vomiting (2/25/2021)      Dementia ([facility] [address] (2/25/2021)      HERBERTH (acute kidney injury) ([facility] [address] [facility] (2/25/2021)      History of COVID-19 (2/25/2021)      Past Medical History:   Diagnosis Date    Agoraphobia without mention of panic attacks 2/17/2014    Anxiety disorder 8/18/2013    Arthritis     osteo    CAD (coronary artery disease), native coronary artery 12/1/2015    no stents    Chronic chest pain 1/13/2014    Chronic pain associated with significant psychosocial dysfunction 2/17/2014    Depression 8/18/2013    Diabetes ([facility] [address]     type II    Duplicated right renal collecting system 3/13/2014    GERD (gastroesophageal reflux disease)     Gout, joint     Hypercholesteremia     hyercholesterolemia    Hypertension     Nephrolithiasis 3/13/2014    Personal history of noncompliance with medical treatment, presenting hazards to health 5/30/2014      Past Surgical History:   Procedure Laterality Date    EGD  4/23/2010         HX CHOLECYSTECTOMY  09/20/2018    lap jesus    HX CYST REMOVAL      cyst removed from left wrist    HX HYSTERECTOMY      partial    HX OTHER SURGICAL      bladder dilitation    HX TUBAL LIGATION      HX UROLOGICAL      kidney stones      Family History   Problem Relation Age of Onset    Stroke Mother     Heart Disease Mother     Cancer Father         type unknown    Heart Disease Son     Liver Disease Son     Heart Disease Daughter     Malignant Hyperthermia Neg Hx     Pseudocholinesterase Deficiency Neg Hx     Delayed Awakening Neg Hx     Post-op Nausea/Vomiting Neg Hx     Emergence Delirium Neg Hx     Post-op Cognitive Dysfunction Neg Hx     Other Neg Hx       History reviewed, no pertinent family history.   Social History     Tobacco Use    Smoking status: Never Smoker    Smokeless tobacco: Never Used   Substance Use Topics    Alcohol use: No     Allergies   Allergen Reactions    Amoxicillin Hives    Sulfa (Sulfonamide Antibiotics) Hives and Itching    Mirtazapine Itching and Nausea Only     Funny feeling in chest    Percocet [Oxycodone-Acetaminophen] Nausea and Vomiting    Codeine Nausea and Vomiting    Crestor [Rosuvastatin] Other (comments)     myalgias    Nitroglycerin Unknown (comments)     Patient cannot remember why she is allergic to it      Prednisone Itching    Zithromax [Azithromycin] Itching     Not sure what it does,taken long time ago      Current Facility-Administered Medications   Medication Dose Route Frequency    senna (SENOKOT) tablet 8.6 mg  1 Tab Oral DAILY    glucose chewable tablet 16 g  4 Tab Oral PRN    dextrose (D50W) injection syrg 12.5-25 g  12.5-25 g IntraVENous PRN    glucagon (GLUCAGEN) injection 1 mg  1 mg IntraMUSCular PRN    dextrose 5% - 0.2% NaCl with KCl 20 mEq/L infusion  75 mL/hr IntraVENous CONTINUOUS    artificial tears (dextran-hypromellose-glycerin) (GENTEAL) ophthalmic solution 1 Drop  1 Drop Both Eyes PRN    amLODIPine (NORVASC) tablet 2.5 mg  2.5 mg Oral DAILY    aspirin chewable tablet 81 mg  81 mg Oral DAILY    clopidogreL (PLAVIX) tablet 75 mg  75 mg Oral DAILY    ezetimibe (ZETIA) tablet 10 mg  10 mg Oral DAILY    metoprolol succinate (TOPROL-XL) XL tablet 25 mg  25 mg Oral Q12H    polyethylene glycol (MIRALAX) packet 17 g  17 g Oral DAILY    venlafaxine-SR (EFFEXOR-XR) capsule 37.5 mg  37.5 mg Oral DAILY    LORazepam (ATIVAN) tablet 0.5 mg  0.5 mg Oral Q8H PRN    sodium chloride (NS) flush 5-40 mL  5-40 mL IntraVENous Q8H    sodium chloride (NS) flush 5-40 mL  5-40 mL IntraVENous PRN    acetaminophen (TYLENOL) tablet 650 mg  650 mg Oral Q6H PRN    Or    acetaminophen (TYLENOL) suppository 650 mg  650 mg Rectal Q6H PRN    polyethylene glycol (MIRALAX) packet 17 g  17 g Oral DAILY PRN    promethazine (PHENERGAN) tablet 12.5 mg  12.5 mg Oral Q6H PRN    Or    ondansetron (ZOFRAN) injection 4 mg  4 mg IntraVENous Q6H PRN    hydrALAZINE (APRESOLINE) 20 mg/mL injection 10 mg  10 mg IntraVENous Q6H PRN          LAB AND IMAGING FINDINGS:     Lab Results   Component Value Date/Time    WBC 4.3 03/03/2021 03:41 AM    HGB 11.7 03/03/2021 03:41 AM    PLATELET 898 92/65/7003 03:41 AM     Lab Results   Component Value Date/Time    Sodium 135 (L) 03/03/2021 03:41 AM    Potassium 3.7 03/03/2021 03:41 AM    Chloride 104 03/03/2021 03:41 AM    CO2 25 03/03/2021 03:41 AM    BUN 11 03/03/2021 03:41 AM    Creatinine 0.59 03/03/2021 03:41 AM    Calcium 8.1 (L) 03/03/2021 03:41 AM    Magnesium 2.5 (H) 02/26/2021 01:07 AM      Lab Results   Component Value Date/Time    Alk. phosphatase 49 03/02/2021 02:47 AM    Protein, total 6.1 (L) 03/02/2021 02:47 AM    Albumin 2.3 (L) 03/02/2021 02:47 AM    Globulin 3.8 03/02/2021 02:47 AM     Lab Results   Component Value Date/Time    INR 1.2 (H) 02/17/2021 12:00 AM    Prothrombin time 15.3 (H) 02/17/2021 12:00 AM    aPTT 31.0 02/28/2021 03:38 AM    aPTT 149.4 (HH) 02/19/2021 05:21 AM      Lab Results   Component Value Date/Time    Iron 76 07/31/2015 01:27 PM    TIBC 238 (L) 07/31/2015 01:27 PM    Iron % saturation 32 07/31/2015 01:27 PM    Ferritin 301 (H) 02/28/2021 03:38 AM      No results found for: PH, PCO2, PO2  No components found for: [place]   Lab Results   Component Value Date/Time    CK 69 07/20/2018 12:34 PM    CK - MB <1.0 07/20/2018 12:34 PM                Total time:   Counseling / coordination time, spent as noted above:   > 50% counseling / coordination?:     Prolonged service was provided for  []30 min   []75 min in face to face time in the presence of the patient, spent as noted above. Time Start:   Time End:   Note: this can only be billed with 36840 (initial) or 64704 (follow up). If multiple start / stop times, list each separately.

## 2021-03-03 NOTE — PROGRESS NOTES
End of Shift Note    Bedside shift change report given to Rowan (oncoming nurse) by Magdaline Query (offgoing nurse). Report included the following information SBAR, Kardex, Intake/Output, MAR and Recent Results    Shift worked:  [id number]     Shift summary and any significant changes:     Pt refused PM meds and would not let IV be reconnected until early this AM. No BM and would not take anything PO. Concerns for physician to address:  none     Zone phone for oncoming shift:          Activity:  Activity Level: Bed Rest  Number times ambulated in hallways past shift: 0  Number of times OOB to chair past shift: 0    Cardiac:   Cardiac Monitoring: Yes      Cardiac Rhythm: Normal sinus rhythm    Access:   Current line(s): PIV     Genitourinary:   Urinary status: incontinent and external catheter    Respiratory:   O2 Device: Room air  Chronic home O2 use?: NO  Incentive spirometer at bedside: NO     GI:  Last Bowel Movement Date: 02/25/21  Current diet:  DIET NUTRITIONAL SUPPLEMENTS AM Snack, PM Snack, HS Snack; Ensure Compact  DIET DYSPHAGIA PUREED (NDD1)  Passing flatus: YES  Tolerating current diet: NO  % Diet Eaten: 0 %    Pain Management:   Patient states pain is manageable on current regimen: N/A    Skin:  Freddy Score: 12  Interventions: float heels    Patient Safety:  Fall Score:  Total Score: 3  Interventions: gripper socks  High Fall Risk: Yes    Length of Stay:  Expected LOS: 4d 7h  Actual LOS: 6      Magdaline Query

## 2021-03-03 NOTE — PROGRESS NOTES
Bedside and Verbal shift change report given to Jose Narvaez (oncoming nurse) by Patti John (offgoing nurse). Report included the following information SBAR, Kardex, Intake/Output, MAR and Recent Results.

## 2021-03-03 NOTE — PROGRESS NOTES
Transition of Care Plan:     Disposition: Home with [facility] and 24 hr supervision from daughters  Follow up appointments: PCP, GI  DME needed: TBD, possible bedside commode, hospital bed, mechanical lift, and wheelchair  Transportation at Discharge: Anticipating BLS transport at d/c  [place] or means to access home:  Pt's daughter to receive pt at home      IM Medicare letter: Will need 2nd [facility] letter prior to d/c  Caregiver Contact: Pt's daughter, Ar Austin [phone]  Discharge Caregiver contacted prior to discharge? CM to contact daughter prior to d/c    UPDATE 4:05 PM - Call returned by daughter. Daughter would like to resume HH with [facility]; CM to send referral/order via mention. Daughter also in agreement for CM to order the following DME: hospital bed, mechanical lift, wheelchair, bedside commode, wheelchair, shower chair, and supplies (adult diapers). Daughters plan to provide care in the home though it is anticipated pt will require increased assistance. Daughter is interested in having personal care services via pt's Medicaid. CM will work on coordinating [facility], DME, and personal care. UPDATE 2:27 PM - CM left VM for daughter to discuss d/c planning; [facility] and DME. CM will await return call. Initial Note 10:06 AM - Chart reviewed. Palliative Medicine met with daughter yesterday to discuss goals of care and advance care planning. Discussion ongoing to include other children. Daughter prefers for pt to return home with 24 hr care. IF family decides for hospice care all needed DME will be provided. If comfort/end of life care is not desired, CM will arrange for DME to include bedside commode, hospital bed, mechanical lift, and wheelchair. CM will continue to follow.     Alfonzo Perez, [address]  [phone]

## 2021-03-03 NOTE — PROGRESS NOTES
Hospitalist Progress Note    NAME: Leslie Greene   :  1945   MRN:  [id number]       Assessment / Plan:  Hypernatremia POA Na 152 improved, now 135 & stable  Hypovolemia POA BUN/creat 68 and 1.22 improved, now 0.5  Dysphagia POA reports trouble swallowing, ? history of esophageal stricture in past  Nausea and vomiting at home POA   Recent obstipation POA appears more chronic  -CT scan done at outside ER reported as below      1.  New infiltrate in the bases greater on the right. 2.  Nonobstructive nephrolithiasis     3.  fecal retention which is diminishing liquid prior. 4.  Persistent dilatation of the sigmoid with an air-fluid level with redundancy  -Reports dysphagia, was spitting up in ED  -Hypovolemia and dehydration improved with IVF  -Speech saw, recommended starting dysphagia diet  -Bowel regimen with daily MiraLAX and lactulose    GI signed off-- no plans of EGD at this time, wants pt to recover from Yoel Agustina before any more investigations, Pt not a candidate for PEG tube per Gi team    - Given the recent COVID can consider barium swallow first  Daughter spoken on Monday and again yesterday by palliative MD Tima Pham      She wants to bring patient home with home health      Discussed she will 24 hour supervision, daughter says she can provide this      Daughter was asking about hospital bed at home  -Head CT negative     Recent COVID19 infection (diagnosed with COVID on 21)  ?  CAP POA  Was at [address] [facility],[address] & [address] Floors, unclear what treatment she received  Not hypoxic, appears asymptpomatic  CXR with bibasilar ASD  Rapid covid was positive in OSH  Not hypoxic at admit, hold on RX  IV levaquin day 5 total,- now off it  Procalcitonin 0.24  Suspect non infectious at this point despite the positive tests       1 month out from original test- repeat COVID still positive (3/2)  D-dimer elevated, x-cover started heparin     I checked CTA chest and LE dopplers, both negative or VTE     Heparin stopped  Not hypoxic and 1 month out from COVID, no indication for steroids, stopped     SVT vs NSVT in ED POA  Recent non ST elevation MI at OSH 2/17, peak troponin 2.11  Essential HTN POA  Tele  EKG NSR 69 Short IN , no acute Ischemic injury  Resume Lopressor daily, continue norvasc  PRN hydralazine  ASA  Unclear if tolerant of statins, allergic to crestor     Generalized weakness POA  Daughter notes ambulatory in January, past month is hardly walked at all  Appears to have good strength in arms and distally in legs  A bit of trouble lifting right thigh off bed otherwise nonfocal  Suspect related to underlying medical issues, recent Covid  Needs PT and OT - HH with 24 hrs supervision/care with Bedside commode, Hospital bed, Tashi lift, wheelchair     Renal stones without Hydronephrosis     Dementia  ?  No formal diagnosis, daughter notes increasing memory issues  Oriented x2-3 here, daughter notes was scheduled to see neurologist as outpatient  Anxiety  Hx agoraphobia per chart     Code Status: FULL  NOK:  Maral Duncan Child     [phone]   unknown, [place] Daughter      done at outside hospital showed      DVT Prophylaxis: SQ HEP  GI Prophylaxis: not indicated     Baseline: Dependent/Demented     Body mass index is 21.58 kg/m².       Recommended Disposition: Daughter wants pt home with [facility] & all DMEs  Palliative consulted & following- Dr Karlos Bach     Subjective:     Chief Complaint / Reason for Physician Visit :F/U Dysphagia, Chronic constipation/fecal impaction, Post COVID syndrome  \"am I dying? \". Discussed with RN events overnight.      Review of Systems:  Symptom Y/N Comments  Symptom Y/N Comments   Fever/Chills    Chest Pain     Poor Appetite    Edema     Cough    Abdominal Pain     Sputum    Joint Pain     SOB/MOORE    Pruritis/Rash     Nausea/vomit    Tolerating PT/OT     Diarrhea    Tolerating Diet     Constipation    Other       Could NOT obtain due to: Dementia     Objective:     VITALS:   Last 24hrs VS reviewed since prior progress note. Most recent are:  Patient Vitals for the past 24 hrs:   Temp Pulse Resp BP SpO2   03/03/21 0805 97.8 °F (36.6 °C) 73 18 128/83 98 %   03/03/21 0008 97.2 °F (36.2 °C) 85 18 125/78 97 %   03/02/21 2001 98.2 °F (36.8 °C) 72 18 (!) 148/79 97 %   03/02/21 1603 97.9 °F (36.6 °C) 69 18 139/81 97 %   03/02/21 1257 -- 73 -- 131/79 --   03/02/21 1111 97.7 °F (36.5 °C) 69 18 (!) 187/93 96 %       Intake/Output Summary (Last 24 hours) at 3/3/2021 0940  Last data filed at 3/2/2021 0579  Gross per 24 hour   Intake --   Output 600 ml   Net -600 ml        I had a face to face encounter and independently examined this patient on 3/3/2021, as outlined below:  PHYSICAL EXAM:  General: WD, WN. Alert, cooperative, no acute distress    EENT:  EOMI. Anicteric sclerae. MMM  Resp:  CTA bilaterally, no wheezing or rales. No accessory muscle use  CV:  Regular  rhythm,  No edema  GI:  Soft, Non distended, Non tender. +Bowel sounds  Neurologic:  Alert and oriented X 3, normal speech,   Psych:   Good insight. Not anxious nor agitated  Skin:  No rashes. No jaundice    Reviewed most current lab test results and cultures  YES  Reviewed most current radiology test results   YES  Review and summation of old records today    NO  Reviewed patient's current orders and MAR    YES  PMH/ reviewed - no change compared to H&P  ________________________________________________________________________  Care Plan discussed with:    Comments   Patient x    Family      RN x    Care Manager x    Consultant  x SLP yesterday, Dr Dio Leary (palliative) yesterday                    x Multidiciplinary team rounds were held today with , nursing, pharmacist and clinical coordinator. Patient's plan of care was discussed; medications were reviewed and discharge planning was addressed.      ________________________________________________________________________  Total NON critical care TIME:  26   Minutes    Total CRITICAL CARE TIME Spent:   Minutes non procedure based      Comments   >50% of visit spent in counseling and coordination of care     ________________________________________________________________________  Sahra Dash MD     Procedures: see electronic medical records for all procedures/Xrays and details which were not copied into this note but were reviewed prior to creation of Plan. LABS:  I reviewed today's most current labs and imaging studies.   Pertinent labs include:  Recent Labs     03/03/21  0341 03/02/21  0247 03/01/21  0422   WBC 4.3 4.2 5.2   HGB 11.7 11.3* 9.3*   HCT 38.0 36.3 29.5*    195 182     Recent Labs     03/03/21  0341 03/02/21  0247 03/01/21  0422   * 135* 138   K 3.7 3.7 3.4*    105 106   CO2 25 25 27   GLU 89 90 104*   BUN 11 13 17   CREA 0.59 0.66 0.71   CA 8.1* 8.1* 8.1*   ALB  --  2.3* 2.2*   TBILI  --  0.4 0.3   ALT  --  16 17       Signed: Sahra Dash MD

## 2021-03-04 LAB
GLUCOSE BLD STRIP.AUTO-MCNC: 132 MG/DL (ref 65–100)
GLUCOSE BLD STRIP.AUTO-MCNC: 135 MG/DL (ref 65–100)
GLUCOSE BLD STRIP.AUTO-MCNC: 79 MG/DL (ref 65–100)
SERVICE CMNT-IMP: ABNORMAL
SERVICE CMNT-IMP: ABNORMAL
SERVICE CMNT-IMP: NORMAL

## 2021-03-04 PROCEDURE — 74011250636 HC RX REV CODE- 250/636: Performed by: INTERNAL MEDICINE

## 2021-03-04 PROCEDURE — 97530 THERAPEUTIC ACTIVITIES: CPT

## 2021-03-04 PROCEDURE — 94760 N-INVAS EAR/PLS OXIMETRY 1: CPT

## 2021-03-04 PROCEDURE — 65660000000 HC RM CCU STEPDOWN

## 2021-03-04 PROCEDURE — 82962 GLUCOSE BLOOD TEST: CPT

## 2021-03-04 PROCEDURE — 74011250637 HC RX REV CODE- 250/637: Performed by: HOSPITALIST

## 2021-03-04 PROCEDURE — 74011250637 HC RX REV CODE- 250/637: Performed by: NURSE PRACTITIONER

## 2021-03-04 RX ORDER — HEPARIN SODIUM 5000 [USP'U]/ML
5000 INJECTION, SOLUTION INTRAVENOUS; SUBCUTANEOUS EVERY 8 HOURS
Status: DISCONTINUED | OUTPATIENT
Start: 2021-03-04 | End: 2021-03-06 | Stop reason: HOSPADM

## 2021-03-04 RX ADMIN — METOPROLOL SUCCINATE 25 MG: 50 TABLET, EXTENDED RELEASE ORAL at 09:18

## 2021-03-04 RX ADMIN — Medication 10 ML: at 14:00

## 2021-03-04 RX ADMIN — Medication 10 ML: at 05:24

## 2021-03-04 RX ADMIN — VENLAFAXINE HYDROCHLORIDE 37.5 MG: 37.5 CAPSULE, EXTENDED RELEASE ORAL at 09:18

## 2021-03-04 RX ADMIN — AMLODIPINE BESYLATE 2.5 MG: 2.5 TABLET ORAL at 09:18

## 2021-03-04 RX ADMIN — EZETIMIBE 10 MG: 10 TABLET ORAL at 09:18

## 2021-03-04 RX ADMIN — POLYETHYLENE GLYCOL 3350 17 G: 17 POWDER, FOR SOLUTION ORAL at 09:18

## 2021-03-04 RX ADMIN — POTASSIUM CHLORIDE, DEXTROSE MONOHYDRATE AND SODIUM CHLORIDE 75 ML/HR: 150; 5; 200 INJECTION, SOLUTION INTRAVENOUS at 09:17

## 2021-03-04 RX ADMIN — SENNOSIDES 8.6 MG: 8.6 TABLET, FILM COATED ORAL at 09:18

## 2021-03-04 RX ADMIN — CLOPIDOGREL BISULFATE 75 MG: 75 TABLET ORAL at 09:18

## 2021-03-04 RX ADMIN — Medication 10 ML: at 21:37

## 2021-03-04 RX ADMIN — ASPIRIN 81 MG: 81 TABLET, CHEWABLE ORAL at 09:18

## 2021-03-04 NOTE — PROGRESS NOTES
Problem: Falls - Risk of  Goal: *Absence of Falls  Description: Document Lidia Odell Fall Risk and appropriate interventions in the flowsheet.   Note: Fall Risk Interventions:  Mobility Interventions: Bed/chair exit alarm, Communicate number of staff needed for ambulation/transfer, OT consult for ADLs, Patient to call before getting OOB, PT Consult for mobility concerns, PT Consult for assist device competence, Utilize walker, cane, or other assistive device    Mentation Interventions: Bed/chair exit alarm, More frequent rounding, Reorient patient, Toileting rounds, Update white board    Medication Interventions: Bed/chair exit alarm, Patient to call before getting OOB    Elimination Interventions: Bed/chair exit alarm, Call light in reach, Patient to call for help with toileting needs, Toileting schedule/hourly rounds

## 2021-03-04 NOTE — PROGRESS NOTES
Hospitalist Progress Note    NAME: Angela Mckay   :  1945   MRN:  [id number]       Assessment / Plan:  Hypernatremia POA Na 152 improved, now 135 & stable  Hypovolemia POA BUN/creat 68 and 1.22 improved, now 0.5  Dysphagia POA reports trouble swallowing, ? history of esophageal stricture in past  Nausea and vomiting at home POA   Recent obstipation POA appears more chronic  -CT scan done at outside ER reported as below      1.  New infiltrate in the bases greater on the right. 2.  Nonobstructive nephrolithiasis     3.  fecal retention which is diminishing liquid prior. 4.  Persistent dilatation of the sigmoid with an air-fluid level with redundancy  -Head CT negative    -Reports dysphagia, was spitting up in ED  -Hypovolemia and dehydration improved with IVF  -Speech saw, recommended starting dysphagia diet  -Bowel regimen with daily MiraLAX and lactulose    GI signed off-- no plans of EGD at this time, wants pt to recover from [place] before any more investigations, Pt not a candidate for PEG tube per Gi team    - Given the recent COVID can consider barium swallow first  Daughter spoken on Monday and again yesterday by palliative MD Webber Rater- plan remains as below-      She wants to bring patient home with home health      Discussed she will 24 hour supervision, daughter says she can provide this  IP PT/OT timmy noted-  Pt will need following:  A variable height semi-electric hospital bed with gell mattress overlay is needed for the head of the bed to be elevated greater than 30 degrees most of the time due to dysphagia. Patient will need positioning of the body in ways not feasible with an ordinary bed to reduce risk of aspiration and prevent skin breakdown. A wheelchair is needed as the patient's mobility is significantly limited and is otherwise unable to perform ADL or IADLs. The home provides adequate access for wheelchair maneuvering.  Caregiver will be available and willing to assist in propelling the wheelchair. A tashi lift is needed for transfers between the bed, wheelchair, and commode. Without the use of a lift, the patient would be bed-confined.       Recent COVID19 infection (diagnosed with COVID on 2/2/21)  ? CAP POA  Was at [facility], unclear what treatment she received  Not hypoxic, appears asymptpomatic  CXR with bibasilar ASD  Rapid covid was positive in OSH  Not hypoxic at admit, hold on RX  IV levaquin day 5 total,- now off it  Procalcitonin 0.24  Suspect non infectious at this point despite the positive tests       1 month out from original test- repeat COVID still positive (3/2)  D-dimer elevated, x-cover started heparin     I checked CTA chest and LE dopplers, both negative or VTE     Heparin stopped  Not hypoxic and 1 month out from COVID, no indication for steroids, stopped     SVT vs NSVT in ED POA  Recent non ST elevation MI at OSH 2/17, peak troponin 2.11  Essential HTN POA  Tele  EKG NSR 69 Short [place] , no acute Ischemic injury  Resume Lopressor daily, continue norvasc  PRN hydralazine  ASA  Unclear if tolerant of statins, allergic to crestor     Generalized weakness POA  Daughter notes ambulatory in January, past month is hardly walked at all  Appears to have good strength in arms and distally in legs  A bit of trouble lifting right thigh off bed otherwise nonfocal  Suspect related to underlying medical issues, recent Covid  Needs PT and OT - HH with 24 hrs supervision/care with Bedside commode, Hospital bed, Tashi lift, wheelchair  A variable height semi-electric hospital bed with gell mattress overlay is needed for the head of the bed to be elevated greater than 30 degrees most of the time due to dysphagia. Patient will need positioning of the body in ways not feasible with an ordinary bed to reduce risk of aspiration and prevent skin breakdown.      A wheelchair is needed as the patient's mobility is significantly limited and is otherwise unable to perform ADL or IADLs. The home provides adequate access for wheelchair maneuvering. Caregiver will be available and willing to assist in propelling the wheelchair. A spencer lift is needed for transfers between the bed, wheelchair, and commode. Without the use of a lift, the patient would be bed-confined.       Renal stones without Hydronephrosis     Dementia  ?  No formal diagnosis, daughter notes increasing memory issues  Oriented x2-3 here, daughter notes was scheduled to see neurologist as outpatient  Anxiety  Hx agoraphobia per chart     Code Status: FULL  NOK:  Maral Duncan Child     [phone]   unknown, Tiarra Daughter      done at outside hospital showed      DVT Prophylaxis: SQ HEP  GI Prophylaxis: not indicated     Baseline: Dependent/Demented     Body mass index is 21.58 kg/m².       Recommended Disposition: Daughter wants pt home with [place] & all DMEs  Palliative consulted & following- Dr Marcel Jack     Subjective:     Chief Complaint / Reason for Physician Visit :F/U Dysphagia, Chronic constipation/fecal impaction, Post COVID syndrome  \"I am fine\". Discussed with RN events overnight. Review of Systems:  Symptom Y/N Comments  Symptom Y/N Comments   Fever/Chills    Chest Pain     Poor Appetite    Edema     Cough    Abdominal Pain     Sputum    Joint Pain     SOB/MOORE    Pruritis/Rash     Nausea/vomit    Tolerating PT/OT     Diarrhea    Tolerating Diet     Constipation    Other       Could NOT obtain due to: Dementia     Objective:     VITALS:   Last 24hrs VS reviewed since prior progress note.  Most recent are:  Patient Vitals for the past 24 hrs:   Temp Pulse Resp BP SpO2   03/04/21 1132 97.7 °F (36.5 °C) 67 18 134/78 98 %   03/04/21 0741 97.6 °F (36.4 °C) 64 16 133/82 98 %   03/04/21 0320 97.8 °F (36.6 °C) 69 17 120/73 97 %   03/03/21 2232 97.3 °F (36.3 °C) 63 16 139/80 98 %   03/03/21 1858 97.8 °F (36.6 °C) 65 18 (!) 142/83 97 %   03/03/21 1611 97.9 °F (36.6 °C) 71 18 (!) 136/90 99 %       Intake/Output Summary (Last 24 hours) at 3/4/2021 1430  Last data filed at 3/4/2021 5184  Gross per 24 hour   Intake --   Output 1350 ml   Net -1350 ml        I had a face to face encounter and independently examined this patient on 3/4/2021, as outlined below:  PHYSICAL EXAM:  General: WD, WN. Alert, cooperative, no acute distress    EENT:  EOMI. Anicteric sclerae. MMM  Resp:  CTA bilaterally, no wheezing or rales. No accessory muscle use  CV:  Regular  rhythm,  No edema  GI:  Soft, Non distended, Non tender. +Bowel sounds  Neurologic:  Alert and oriented X 3, normal speech,   Psych:   Good insight. Not anxious nor agitated  Skin:  No rashes. No jaundice    Reviewed most current lab test results and cultures  YES  Reviewed most current radiology test results   YES  Review and summation of old records today    NO  Reviewed patient's current orders and MAR    YES  PMH/ reviewed - no change compared to H&P  ________________________________________________________________________  Care Plan discussed with:    Comments   Patient x    Family      RN x    Care Manager x    Consultant    Dr Khurram Arenas (palliative)                    x Multidiciplinary team rounds were held today with , nursing, pharmacist and clinical coordinator. Patient's plan of care was discussed; medications were reviewed and discharge planning was addressed. ________________________________________________________________________  Total NON critical care TIME:  26   Minutes    Total CRITICAL CARE TIME Spent:   Minutes non procedure based      Comments   >50% of visit spent in counseling and coordination of care     ________________________________________________________________________  Yocasta Bardales MD     Procedures: see electronic medical records for all procedures/Xrays and details which were not copied into this note but were reviewed prior to creation of Plan. LABS:  I reviewed today's most current labs and imaging studies.   Pertinent labs include:  Recent Labs     03/03/21 0341 03/02/21 0247   WBC 4.3 4.2   HGB 11.7 11.3*   HCT 38.0 36.3    195     Recent Labs     03/03/21 0341 03/02/21 0247   * 135*   K 3.7 3.7    105   CO2 25 25   GLU 89 90   BUN 11 13   CREA 0.59 0.66   CA 8.1* 8.1*   ALB  --  2.3*   TBILI  --  0.4   ALT  --  16       Signed: Sadie Boyle MD

## 2021-03-04 NOTE — PROGRESS NOTES
Problem: Self Care Deficits Care Plan (Adult)  Goal: *Acute Goals and Plan of Care (Insert Text)  Description:   FUNCTIONAL STATUS PRIOR TO ADMISSION: Per chart pt was dependent in her care and has dementia     HOME SUPPORT: The patient lived with family and was total care for family per chart. .    Occupational Therapy Goals  Initiated 3/2/2021  1. Patient will perform self-feeding with moderate assistance  within 7 day(s). 2.  Patient will perform grooming in bed with maximal assistance within 7 day(s). 3.  Patient will perform bathing UB in bed with maximal assistance within 7 day(s). 4.  Patient will to be able to sit on EOB  with max of 1 for 2 minutes in prep for ADLs    Outcome: Not Progressing Towards Goal   OCCUPATIONAL THERAPY TREATMENT  Patient: Gayla Bermudez (68 y.o. female)  Date: 3/4/2021  Diagnosis: HERBERTH (acute kidney injury) ([facility] [N17.9]  Nausea & vomiting [R11.2]  Hypernatremia [E87.0]  History of COVID-19 [Z86.16]  Pneumonia [J18.9]  Dysrhythmia, cardiac [I49.9]  Dementia ([facility] [F03.90]  Renal stone [N20.0] <principal problem not specified>       Precautions:    Chart, occupational therapy assessment, plan of care, and goals were reviewed. ASSESSMENT  Patient continues with skilled OT services and is slowly progressing towards goals. Pt was supine in bed and able to state her name, but did not know she was in hospital.  Pt was total assist of 2 for coming supine to sit on EOb and max to total of 2 to scoot to get her feet on floor. Pt was able to sit for approx 30 seconds with CGA and then she started to try to lay back down. Worked with pt on sitting up and he sat up approx 1-2 minutes with mod of 2 and VC to encourage pt to sit up. She was max of 2 to get back to bed and did  assist with getting her legs back in bed.   She was put in chair position  in bed and writer tried to encourage pt to take a few bites of food or drink and she was very anxious at this time and asking what we were going to do to her and could not get her attention span to the task. Current Level of Function Impacting Discharge (ADLs): total for ADLs              PLAN :  Patient continues to benefit from skilled intervention to address the above impairments. Continue treatment per established plan of care. to address goals. Recommend with staff: [facility] or chair position in bed    Recommend next OT session: work on grooming in bed or sitting up on EOb     Recommendation for discharge: (in order for the patient to meet his/her long term goals)  Therapy up to 5 days/week in SNF setting    This discharge recommendation:  Has been made in collaboration with the attending provider and/or case management    IF patient discharges home will need the following DME: tbd       SUBJECTIVE:   Patient stated Jacqueline Serrano are you doing to me now? Leatha Lyons    OBJECTIVE DATA SUMMARY:   Cognitive/Behavioral Status:  Neurologic State: Alert;Confused  Orientation Level: Oriented to person  Cognition: Decreased command following;Decreased attention/concentration; Impaired decision making;Memory loss(appears paranoid at times)     Perseveration: Perseverates during conversation  Safety/Judgement: Fall prevention    Functional Mobility and Transfers for ADLs:  Bed Mobility:  Rolling: Total assistance;Assist x2  Supine to Sit: Total assistance;Assist x2  Sit to Supine: Total assistance;Assist x2  Scooting: Total assistance;Assist x2    Transfers:      Not tested       Balance:  Sitting: Impaired  Sitting - Static: Fair (occasional)  Sitting - Dynamic: Fair (occasional)  Standing: (did not occur )    ADL Intervention:  Feeding  Feeding Assistance: Total assistance (dependent)(pt refusing to eat)    Grooming  Grooming Assistance: Total assistance(dependent)    Upper Body Bathing  Bathing Assistance: Total assistance(dependent)    Lower Body Bathing  Bathing Assistance: Total assistance(dependent)              Toileting  Toileting Assistance:  Total assistance(dependent)  Bladder Hygiene: Total assistance (dependent)  Bowel Hygiene: Total assistance (dependent)    Cognitive Retraining  Orientation Retraining: Place;Situation  Attention to Task: Single task  Maintains Attention For (Time): 30 seconds  Safety/Judgement: Fall prevention  Cues: Verbal cues provided; Tactile cues provided;Visual cues provided        Activity Tolerance:   Poor    After treatment patient left in no apparent distress:   Supine in bed, Call bell within reach, and Bed / chair alarm activated    COMMUNICATION/COLLABORATION:   The patients plan of care was discussed with: Physical therapist and Registered nurse.      Maggie Ruiz OT  Time Calculation: 14 mins

## 2021-03-04 NOTE — PROGRESS NOTES
Problem: Mobility Impaired (Adult and Pediatric)  Goal: *Acute Goals and Plan of Care (Insert Text)  Description: FUNCTIONAL STATUS PRIOR TO ADMISSION: Per chart pt is demented and dependent. Pt reports she walked some times with someone behind her. Questionable PLOF  HOME SUPPORT PRIOR TO ADMISSION: The patient lived with family and was total care per chart. Again, questionable what pt was doing regarding her care. Physical Therapy Goals  Initiated 3/2/2021  1. Patient will roll side to side in bed with moderate assistance  within 7 day(s). 2.  Patient will move from supine to and from sitting eob with from bed with moderate assistance within 7 day(s). Outcome: Progressing Towards Goal   PHYSICAL THERAPY TREATMENT  Patient: Esteban Acharya (78 y.o. female)  Date: 3/4/2021  Diagnosis: HERBERTH (acute kidney [facility]) ([facility] [N17.9]  Nausea & vomiting [R11.2]  Hypernatremia [E87.0]  History of COVID-19 [Z86.16]  Pneumonia [J18.9]  Dysrhythmia, cardiac [I49.9]  Dementia ([facility] [F03.90]  Renal stone [N20.0] <principal problem not specified>       Precautions:    Chart, physical therapy assessment, plan of care and goals were reviewed. ASSESSMENT  Patient continues with skilled PT services and is not progressing towards goals, remaining limited by cognitive status, impaired command following, paranoia?/anxiety, impaired activity tolerance/endurance, and grossly decreased strength. Pt assumed seated position EOB w/ totalAx2. Pt demonstrated brief periods of 5-8 seconds of maintaining static sitting balance independently (close SBA maintained) however overall required maxA to maintain midline sitting. Pt followed <5% of commands throughout and largely remains limited by cognitive status.       Current Level of Function Impacting Discharge (mobility/balance): totalAx2    Other factors to consider for discharge: covid +, dementia, caregiver burden, decline in functional status         PLAN :  Patient continues to benefit from skilled intervention to address the above impairments. Continue treatment per established plan of care. to address goals. Recommendation for discharge: (in order for the patient to meet his/her long term goals)  Physical therapy at least 2 days/week in the home w/ 24hr caregiver support     This discharge recommendation:  Has been made in collaboration with the attending provider and/or case management    IF patient discharges home will need the following DME: bedside commode, hospital bed, mechanical lift, and wheelchair       SUBJECTIVE:   Patient stated Theta Greenfield are you doing to me? ??.    OBJECTIVE DATA SUMMARY:   Critical Behavior:  Neurologic State: Alert, Confused  Orientation Level: Oriented to person, Oriented to place, Oriented to situation, Disoriented to time  Cognition: Decreased attention/concentration  Safety/Judgement: Fall prevention  Functional Mobility Training:  Bed Mobility:  Rolling: Total assistance;Assist x2  Supine to Sit: Total assistance;Assist x2  Sit to Supine: Total assistance;Assist x2  Scooting: Total assistance;Assist x2        Transfers:               Did not occur                    Balance:  Sitting: Impaired  Sitting - Static: Fair (occasional)  Sitting - Dynamic: Fair (occasional)  Standing: (did not occur )  Ambulation/Gait Training:                                Ambulation did not occur. Therapeutic Exercises:   Attempted ankle pumps and LAQ however poor performance due to cognition    Pain Rating:  Unable to report/rate pain due to cognitive status    Activity Tolerance:   Poor however VSS on RA    After treatment patient left in no apparent distress:   Supine in bed, Heels elevated for pressure relief, Call bell within reach, Bed / chair alarm activated, and Side rails x 3    COMMUNICATION/COLLABORATION:   The patients plan of care was discussed with: Occupational therapist and Registered nurse.      Ector Rutherford, PT, DPT   Time Calculation: 68 mins

## 2021-03-04 NOTE — PROGRESS NOTES
End of Shift Note    Bedside shift change report given to Rowan (oncoming nurse) by Serge Payan RN (offgoing nurse). Report included the following information Kardex and MAR    Shift worked:  7p-7a     Shift summary and any significant changes:     no significant changes     Concerns for physician to address:     Zone phone for oncoming shift:   [phone]       Activity:  Activity Level: Bed Rest  Number times ambulated in hallways past shift: 0  Number of times OOB to chair past shift: 0    Cardiac:   Cardiac Monitoring: Yes      Cardiac Rhythm: Normal sinus rhythm    Access:   Current line(s): PIV     Genitourinary:   Urinary status: external catheter    Respiratory:   O2 Device: Room air  Chronic home O2 use?: NO  Incentive spirometer at bedside:      GI:  Last Bowel Movement Date: 02/25/21  Current diet:  DIET NUTRITIONAL SUPPLEMENTS AM Snack, PM Snack, HS Snack; Ensure Compact  DIET DYSPHAGIA PUREED (NDD1)  Passing flatus: Tolerating current diet: NO, decreased appetite  % Diet Eaten: 0 %(pt refused lunch)    Pain Management:   Patient states pain is manageable on current regimen: YES    Skin:  Freddy Score: 14  Interventions: float heels and PT/OT consult    Patient Safety:  Fall Score:  Total Score: 3  Interventions: bed/chair alarm and pt to call before getting OOB  High Fall Risk: Yes    Length of Stay:  Expected LOS: 4d 7h  Actual LOS: [address] RN

## 2021-03-04 NOTE — PROGRESS NOTES
Transition of Care Plan:     Disposition: Home with [address] and 24 hr supervision from daughters/caregivers  [address]: [employer] submitted 3/4/21 for LTSS  Follow up appointments: PCP, GI  DME needed: Bedside commode, hospital bed, mechanical lift, wheelchair, shower chair  Transportation at [address] BLS transport at d/c  [place] or means to access home:  Pt's daughter to receive pt at [facility] Medicare letter: Will need [facility]nd [facility] letter prior to d/c  Caregiver Contact: DaughterElsie ([phone])  Discharge Caregiver contacted prior to discharge?  Daughter contacted 3/3    UPDATE 2:50 PM - [facility] and [address] do not have beds or lifts in stock. [facility] has all DME in stock and is processing order for delivery tomorrow. Unfortunately,  has not been able to locate a personal care agency that accommodates all three factors: accepts Medicaid, services [facility] positive patients. * has tried the following agencies without success: [facility], [facility], [facility], AT [facility], [address],  Chinmay Vickers N' [facility], [facility], [address] [place]. Initial Note 9:53 AM -  [place] order sent to [facility] via [employer]. DME orders sent to [facility] for hospital bed, mechanical lift, bedside commode, wheelchair, and shower chair. [facility] completed and submitted through [address]. Call placed to April Givens with [facility] ([phone]) to request assistance with arranging personal care services. CM will continue to follow.     Toyin Burgos, [facility] [facility],[address]  [phone]

## 2021-03-04 NOTE — PROGRESS NOTES
Verbal shift change report given to [address] [place] [address] Ne (oncoming nurse) by Felix Marion (offgoing nurse). Report included the following information SBAR, Kardex, Intake/Output, MAR and Recent Results.

## 2021-03-04 NOTE — PROGRESS NOTES
Comprehensive Nutrition Assessment    Type and Reason for Visit: Initial, RD nutrition re-screen/LOS    Nutrition Recommendations/Plan:  Continue current diet  Continue ensure compact  Add magic cups BID    Nutrition Assessment:      Patient medically noted for HERBERTH, nausea/vomiting, COVID-19, dementia, dehydration, and recent NSTEMI. PMH HTN, depression, DM, and obstipation. Chart reviewed for length of stay. Patient refusing meals and medications. RN reports she attempts to get patient to eat/drink/take medications but refuses most of the time. Patient with significant weight loss of 16% x 2.5 months per chart review. Viewed patient through window due to isolation. Appeared malnourished with muscle wasting and fat loss. Patient meets criteria for severe malnutrition. Will add magic cups as well; she accepted these at OSH per chart review. Palliative care following; per notes, family feels patient will eat better in home environment. Patient Vitals for the past 72 hrs:   % Diet Eaten   03/03/21 1200 0 %   03/03/21 0805 10 %   03/01/21 1829 0 %   03/01/21 1530 0 %   03/01/21 1137 0 %       Wt Readings from Last 5 Encounters:   02/25/21 62.5 kg (137 lb 12.6 oz)   02/17/21 73.9 kg (162 lb 14.7 oz)   12/10/20 74.1 kg (163 lb 6.4 oz)   07/16/20 81.2 kg (179 lb)   06/23/20 75.8 kg (167 lb)       Malnutrition Assessment:  Malnutrition Status:  Severe malnutrition    Context:  Acute illness     Findings of the 6 clinical characteristics of malnutrition:   Energy Intake:  7 - 50% or less of est energy requirements for 5 or more days  Weight Loss:  7.00 - Greater than 7.5% over 3 months     Body Fat Loss:  7 - Moderate body fat loss,     Muscle Mass Loss:  7 - Moderate muscle mass loss,    Fluid Accumulation:  No significant fluid accumulation,     Strength:  Not performed       Estimated Daily Nutrient Needs:  Energy (kcal): 1742 kcal (BMR 1148 x 1.3AF +250kcal);  Weight Used for Energy Requirements: Current  Protein (g): 74g (1.2 g/kg bw); Weight Used for Protein Requirements: Current  Fluid (ml/day): 1750 mL; Method Used for Fluid Requirements: 1 ml/kcal    Nutrition Related Findings:       [phone]  BM 2/25  Norvasc, Plavix, Zetia, Miralax, Senna, Effexor, D5% + KCl    Wounds:    None       Current Nutrition Therapies:  DIET NUTRITIONAL SUPPLEMENTS AM Snack, PM Snack, HS Snack; Ensure Compact  DIET DYSPHAGIA PUREED (NDD1)    Anthropometric Measures:  · Height:  5' 7\" (170.2 cm)  · Current Body Wt:  62.5 kg (137 lb 12.6 oz)   · BMI Category:  Normal weight (BMI 18.5-24. 9)       Nutrition Diagnosis:   · Inadequate protein-energy intake related to cognitive or neurological impairment(refusal to eat) as evidenced by intake 0-25%    Nutrition Interventions:   Food and/or Nutrient Delivery: Continue current diet, Modify oral nutrition supplement  Nutrition Education and Counseling: No recommendations at this time  Coordination of Nutrition Care: No recommendation at this time    Goals:  PO intake at least 25% of meals and 50% of ONS next 3-5 days       Nutrition Monitoring and Evaluation:   Behavioral-Environmental Outcomes: None identified  Food/Nutrient Intake Outcomes: Food and nutrient intake, Supplement intake  Physical Signs/Symptoms Outcomes: Biochemical data, Weight, Nausea/vomiting, Chewing or swallowing    Discharge Planning:    Continue current diet, Continue oral nutrition supplement     Electronically signed by Shoshana Hickman RD on 3/4/2021 at 10:09 AM    Contact: ext [phone]

## 2021-03-05 LAB
GLUCOSE BLD STRIP.AUTO-MCNC: 100 MG/DL (ref 65–100)
GLUCOSE BLD STRIP.AUTO-MCNC: 151 MG/DL (ref 65–100)
GLUCOSE BLD STRIP.AUTO-MCNC: 162 MG/DL (ref 65–100)
GLUCOSE BLD STRIP.AUTO-MCNC: 43 MG/DL (ref 65–100)
GLUCOSE BLD STRIP.AUTO-MCNC: 61 MG/DL (ref 65–100)
GLUCOSE BLD STRIP.AUTO-MCNC: 64 MG/DL (ref 65–100)
GLUCOSE BLD STRIP.AUTO-MCNC: 67 MG/DL (ref 65–100)
GLUCOSE BLD STRIP.AUTO-MCNC: 69 MG/DL (ref 65–100)
GLUCOSE BLD STRIP.AUTO-MCNC: 80 MG/DL (ref 65–100)
SERVICE CMNT-IMP: ABNORMAL
SERVICE CMNT-IMP: NORMAL

## 2021-03-05 PROCEDURE — 82962 GLUCOSE BLOOD TEST: CPT

## 2021-03-05 PROCEDURE — 74011250636 HC RX REV CODE- 250/636: Performed by: INTERNAL MEDICINE

## 2021-03-05 PROCEDURE — 74011000250 HC RX REV CODE- 250: Performed by: NURSE PRACTITIONER

## 2021-03-05 PROCEDURE — 65660000000 HC RM CCU STEPDOWN

## 2021-03-05 PROCEDURE — 74011250636 HC RX REV CODE- 250/636: Performed by: NURSE PRACTITIONER

## 2021-03-05 RX ADMIN — DEXTROSE MONOHYDRATE 12.5 G: 25 INJECTION, SOLUTION INTRAVENOUS at 11:17

## 2021-03-05 RX ADMIN — DEXTROSE MONOHYDRATE 25 G: 25 INJECTION, SOLUTION INTRAVENOUS at 12:09

## 2021-03-05 RX ADMIN — DEXTROSE MONOHYDRATE 12.5 G: 25 INJECTION, SOLUTION INTRAVENOUS at 08:33

## 2021-03-05 RX ADMIN — Medication 10 ML: at 22:00

## 2021-03-05 RX ADMIN — POTASSIUM CHLORIDE, DEXTROSE MONOHYDRATE AND SODIUM CHLORIDE 75 ML/HR: 150; 5; 200 INJECTION, SOLUTION INTRAVENOUS at 12:12

## 2021-03-05 RX ADMIN — POTASSIUM CHLORIDE, DEXTROSE MONOHYDRATE AND SODIUM CHLORIDE 75 ML/HR: 150; 5; 200 INJECTION, SOLUTION INTRAVENOUS at 00:36

## 2021-03-05 RX ADMIN — HYDRALAZINE HYDROCHLORIDE 10 MG: 20 INJECTION INTRAMUSCULAR; INTRAVENOUS at 17:33

## 2021-03-05 RX ADMIN — Medication 30 ML: at 14:00

## 2021-03-05 RX ADMIN — DEXTROSE MONOHYDRATE 25 G: 25 INJECTION, SOLUTION INTRAVENOUS at 17:28

## 2021-03-05 RX ADMIN — Medication 10 ML: at 06:00

## 2021-03-05 NOTE — PROGRESS NOTES
Speech path   Patient is not wanting to eat. She is safe for purees and thins. We will sign off.   Vinayak Torre, SLP

## 2021-03-05 NOTE — PROGRESS NOTES
Transition of Care Plan:     Disposition: Home with [facility] and 24 hr supervision from daughters/caregivers  Long Term Care Planning: [facility]AI submitted 3/4/21 for LTSS  Follow up appointments: PCP, GI  DME needed: Bedside commode, hospital bed, mechanical lift, wheelchair, shower chair  Transportation at [address] BLS transport at d/c  [place] or means to access home:  Pt's daughter to receive pt at [facility] Medicare letter: Will need 2nd [facility] letter prior to d/c  Caregiver Contact: DaughterIrina ([phone])  Discharge Caregiver contacted prior to discharge?  Daughter contacted 3/3, 3/5    UPDATE 2:52 PM - Per Senia Tom of equipment will be this evening between 6-8 PM. Attending in agreement for pt to d/c tomorrow morning. [facility] scheduled for 10:00 AM pick-up; [facility]CS paperwork placed on chart. [place] updated on d/c date. Daughter updated and in agreement with the plan. Weekend CM to call daughter in the morning to confirm d/c and that DME was delivered. Initial Note 10:56 AM - Call placed to [facility] to confirm DME delivery. Per Ying Maier, they have attempted to call daughter twice with no success. Call placed to daughter; [facility] no phone calls were received. CM provided daughter with number to call to schedule delivery today ([phone] Opt 2). Once DME is delivered pt can d/c home. CM informed daughter to call DSS on Monday to inquire about caregivers; daughter is interested in becoming pt's paid caregiver through [facility]. [place] [facility] DSS contact info added to AVS. [facility] was successfully processed and faxed to [facility] (fx. [phone]).     Ninoska Beltran, [address] [facility],[address]  [phone]

## 2021-03-05 NOTE — PROGRESS NOTES
Verbal shift change report given to Maylin Alexandre (oncoming nurse) by Santiago Downey (offgoing nurse). Report included the following information SBAR, Kardex, Intake/Output, MAR and Recent Results.

## 2021-03-05 NOTE — PROGRESS NOTES
Hospitalist Progress Note    NAME: Ciro Garrido   :  1945   MRN:  [id number]       Assessment / Plan:  Hypernatremia POA Na 152 improved, now 135 & stable  Hypovolemia POA BUN/creat 68 and 1.22 improved, now 0.5  Dysphagia POA reports trouble swallowing, ? history of esophageal stricture in past  Nausea and vomiting at home POA   Recent obstipation POA appears more chronic  -CT scan done at outside ER reported as below      1.  New infiltrate in the bases greater on the right. 2.  Nonobstructive nephrolithiasis     3.  fecal retention which is diminishing liquid prior. 4.  Persistent dilatation of the sigmoid with an air-fluid level with redundancy  -Head CT negative    -Reports dysphagia, was spitting up in ED  -Hypovolemia and dehydration improved with IVF  -Speech saw, recommended starting dysphagia diet  -Bowel regimen with daily MiraLAX and lactulose    GI signed off-- no plans of EGD at this time, wants pt to recover from [place] before any more investigations, Pt not a candidate for PEG tube per Gi team    - Given the recent COVID can consider barium swallow first  Daughter spoken on Monday and again yesterday by palliative MD Cristine Salgado- plan remains as below-      She wants to bring patient home with home health      Discussed she will 24 hour supervision, daughter says she can provide this  IP PT/OT timmy noted-  Pt will need following:  A variable height semi-electric hospital bed with gell mattress overlay is needed for the head of the bed to be elevated greater than 30 degrees most of the time due to dysphagia. Patient will need positioning of the body in ways not feasible with an ordinary bed to reduce risk of aspiration and prevent skin breakdown. A wheelchair is needed as the patient's mobility is significantly limited and is otherwise unable to perform ADL or IADLs. The home provides adequate access for wheelchair maneuvering.  Caregiver will be available and willing to assist in propelling the wheelchair. A tashi lift is needed for transfers between the bed, wheelchair, and commode. Without the use of a lift, the patient would be bed-confined.       Recent COVID19 infection (diagnosed with COVID on 2/2/21)  ? CAP POA  Was at [facility], unclear what treatment she received  Not hypoxic, appears asymptpomatic  CXR with bibasilar ASD  Rapid covid was positive in OSH  Not hypoxic at admit, hold on RX  IV levaquin day 5 total,- now off it  Procalcitonin 0.24  Suspect non infectious at this point despite the positive tests       1 month out from original test- repeat COVID still positive (3/2)  D-dimer elevated, x-cover started heparin     I checked CTA chest and LE dopplers, both negative or VTE     Heparin stopped  Not hypoxic and 1 month out from COVID, no indication for steroids, stopped     SVT vs NSVT in ED POA  Recent non ST elevation MI at OSH 2/17, peak troponin 2.11  Essential HTN POA  Tele  EKG NSR 69 Short ME , no acute Ischemic injury  Resume Lopressor daily, continue norvasc  PRN hydralazine  ASA  Unclear if tolerant of statins, allergic to crestor     Generalized weakness POA  Daughter notes ambulatory in January, past month is hardly walked at all  Appears to have good strength in arms and distally in legs  A bit of trouble lifting right thigh off bed otherwise nonfocal  Suspect related to underlying medical issues, recent Covid  Needs PT and OT - HH with 24 hrs supervision/care with Bedside commode, Hospital bed, Tashi lift, wheelchair- being delivered late today evening- DC in AM anticipated  A variable height semi-electric hospital bed with gell mattress overlay is needed for the head of the bed to be elevated greater than 30 degrees most of the time due to dysphagia. Patient will need positioning of the body in ways not feasible with an ordinary bed to reduce risk of aspiration and prevent skin breakdown.      A wheelchair is needed as the patient's mobility is significantly limited and is otherwise unable to perform ADL or IADLs. The home provides adequate access for wheelchair maneuvering. Caregiver will be available and willing to assist in propelling the wheelchair. A spencer lift is needed for transfers between the bed, wheelchair, and commode. Without the use of a lift, the patient would be bed-confined.       Renal stones without Hydronephrosis     Dementia  ?  No formal diagnosis, daughter notes increasing memory issues  Oriented x2-3 here, daughter notes was scheduled to see neurologist as outpatient  Anxiety  Hx agoraphobia per chart     Code Status: FULL  NOK:  Maral Duncan Child     [phone]   unknown, Tiarra Daughter      done at outside hospital showed      DVT Prophylaxis: SQ HEP  GI Prophylaxis: not indicated     Baseline: Dependent/Demented     Body mass index is 21.58 kg/m².       Recommended Disposition: Daughter wants pt home with [facility] & all DMEs- DC once arranged  Palliative consulted & following- Dr Tima Pham     Subjective:     Chief Complaint / Reason for Physician Visit :F/U Dysphagia, Chronic constipation/fecal impaction, Post COVID syndrome  \"I am fine\". Discussed with RN events overnight. Review of Systems:  Symptom Y/N Comments  Symptom Y/N Comments   Fever/Chills    Chest Pain     Poor Appetite    Edema     Cough    Abdominal Pain     Sputum    Joint Pain     SOB/MOORE    Pruritis/Rash     Nausea/vomit    Tolerating PT/OT     Diarrhea    Tolerating Diet     Constipation    Other       Could NOT obtain due to: Dementia     Objective:     VITALS:   Last 24hrs VS reviewed since prior progress note.  Most recent are:  Patient Vitals for the past 24 hrs:   Temp Pulse Resp BP SpO2   03/05/21 1058 97.2 °F (36.2 °C) (!) 57 16 (!) 147/66 100 %   03/05/21 0757 97.7 °F (36.5 °C) 65 16 (!) 148/70 97 %   03/05/21 0344 98.1 °F (36.7 °C) 63 18 (!) 145/76 94 %   03/04/21 2013 97.9 °F (36.6 °C) 90 18 126/80 100 %   03/04/21 1611 96.8 °F (36 °C) 71 18 (!) 125/59 100 %       Intake/Output Summary (Last 24 hours) at 3/5/2021 1423  Last data filed at 3/5/2021 0841  Gross per 24 hour   Intake --   Output 860 ml   Net -860 ml        I had a face to face encounter and independently examined this patient on 3/5/2021, as outlined below:  PHYSICAL EXAM:  General: WD, WN. Alert, cooperative, no acute distress    EENT:  EOMI. Anicteric sclerae. MMM  Resp:  CTA bilaterally, no wheezing or rales. No accessory muscle use  CV:  Regular  rhythm,  No edema  GI:  Soft, Non distended, Non tender. +Bowel sounds  Neurologic:  Alert and oriented X 3, normal speech,   Psych:   Good insight. Not anxious nor agitated  Skin:  No rashes. No jaundice    Reviewed most current lab test results and cultures  YES  Reviewed most current radiology test results   YES  Review and summation of old records today    NO  Reviewed patient's current orders and MAR    YES  PMH/SH reviewed - no change compared to H&P  ________________________________________________________________________  Care Plan discussed with:    Comments   Patient x    Family      RN x    Care Manager x    Consultant                       x Multidiciplinary team rounds were held today with , nursing, pharmacist and clinical coordinator. Patient's plan of care was discussed; medications were reviewed and discharge planning was addressed. ________________________________________________________________________  Total NON critical care TIME:  26   Minutes    Total CRITICAL CARE TIME Spent:   Minutes non procedure based      Comments   >50% of visit spent in counseling and coordination of care     ________________________________________________________________________  Hilda Robins MD     Procedures: see electronic medical records for all procedures/Xrays and details which were not copied into this note but were reviewed prior to creation of Plan.       LABS:  I reviewed today's most current labs and imaging studies.   Pertinent labs include:  Recent Labs     03/03/21  0341   WBC 4.3   HGB 11.7   HCT 38.0        Recent Labs     03/03/21  0341   *   K 3.7      CO2 25   GLU 89   BUN 11   CREA 0.59   CA 8.1*       Signed: Sahra Dash MD

## 2021-03-05 NOTE — PROGRESS NOTES
End of Shift Note Bedside shift change report given to Beth (oncoming nurse) by Irma Tang RN (offgoing nurse). Report included the following information SBAR, Kardex, ED Summary, Procedure Summary, MAR and Recent Results Shift worked:  [id number] Shift summary and any significant changes:  
  Patient refused her oral metoprolol and SQ heparin last night. Patient also refused her morning labs. Concerns for physician to address:  Patient refused all medications and morning labs. Zone phone for oncoming shift:  N/A Activity: 
Activity Level: Bed Rest 
Number times ambulated in hallways past shift: 0 Number of times OOB to chair past shift: 0 Cardiac:  
Cardiac Monitoring: Yes     
Cardiac Rhythm: Normal sinus rhythm Access:  
Current line(s): PIV Genitourinary:  
Urinary status: incontinent and external catheter Respiratory:  
O2 Device: Room air Chronic home O2 use?: NO Incentive spirometer at bedside: N/A 
  
GI: 
Last Bowel Movement Date: 03/04/21 Current diet:  DIET NUTRITIONAL SUPPLEMENTS AM Snack, PM Snack, HS Snack; Ensure Compact DIET DYSPHAGIA PUREED (NDD1) DIET NUTRITIONAL SUPPLEMENTS Lunch, Dinner; [place] Passing flatus: YES Tolerating current diet: YES 
% Diet Eaten: (pt took one bite of food, refused to eat anymore) Pain Management:  
Patient states pain is manageable on current regimen: N/A Skin: 
Freddy Score: 13 Interventions: turn team, float heels and internal/external urinary devices Patient Safety: 
Fall Score: Total Score: 3 Interventions: bed/chair alarm High Fall Risk: Yes Length of Stay: 
Expected LOS: 4d 7h Actual LOS: 8 Irma Tang RN

## 2021-03-06 VITALS
DIASTOLIC BLOOD PRESSURE: 60 MMHG | HEIGHT: 67 IN | HEART RATE: 68 BPM | OXYGEN SATURATION: 96 % | TEMPERATURE: 97.5 F | SYSTOLIC BLOOD PRESSURE: 141 MMHG | WEIGHT: 137.79 LBS | RESPIRATION RATE: 16 BRPM | BODY MASS INDEX: 21.63 KG/M2

## 2021-03-06 LAB
GLUCOSE BLD STRIP.AUTO-MCNC: 65 MG/DL (ref 65–100)
GLUCOSE BLD STRIP.AUTO-MCNC: 97 MG/DL (ref 65–100)
SERVICE CMNT-IMP: NORMAL
SERVICE CMNT-IMP: NORMAL

## 2021-03-06 PROCEDURE — 94760 N-INVAS EAR/PLS OXIMETRY 1: CPT

## 2021-03-06 PROCEDURE — 74011000250 HC RX REV CODE- 250: Performed by: NURSE PRACTITIONER

## 2021-03-06 PROCEDURE — 74011250636 HC RX REV CODE- 250/636: Performed by: INTERNAL MEDICINE

## 2021-03-06 PROCEDURE — 82962 GLUCOSE BLOOD TEST: CPT

## 2021-03-06 RX ADMIN — DEXTROSE MONOHYDRATE 25 G: 25 INJECTION, SOLUTION INTRAVENOUS at 08:12

## 2021-03-06 RX ADMIN — POTASSIUM CHLORIDE, DEXTROSE MONOHYDRATE AND SODIUM CHLORIDE 75 ML/HR: 150; 5; 200 INJECTION, SOLUTION INTRAVENOUS at 01:16

## 2021-03-06 RX ADMIN — Medication 10 ML: at 06:00

## 2021-03-06 NOTE — DISCHARGE SUMMARY
Hospitalist Discharge Summary     Patient ID:  Ruddy Matthews  [id number]  76 y.o.  1945 2/25/2021    PCP on record: Milly Lynne MD    Admit date: 2/25/2021  Discharge date and time: 3/6/2021    DISCHARGE DIAGNOSIS:    Hypernatremia POA Na 152 improved, now 135 & stable  Dehydration/Hypovolemia POA BUN/creat 68 and 1.22 improved, now 0.5  Dysphagia POA reports trouble swallowing, ? history of esophageal stricture in past- no plans of EGD till pt recovered from COVID & pt is not candidate for PEG tube feeding as per GI  Nausea and vomiting at home POA - controlled  Recent obstipation POA appears more chronic  Recent COVID19 infection (diagnosed with COVID on 2/2/21)  CAP POA- treated  SVT vs NSVT in ED POA  Recent non ST elevation MI at OSH 2/17, peak troponin 2.11  Essential HTN POA  Generalized weakness POA  Dementia   Anxiety  Hx agoraphobia per chart  Full code        CONSULTATIONS:  IP CONSULT TO GASTROENTEROLOGY  IP CONSULT TO PALLIATIVE CARE - PROVIDER    Excerpted HPI from H&P of Nikos Winkler MD:  \"65 y. o. female with significant PMHx for dementia, HTN, hypelipidemia, and DM  Not on any rx, H/o diastolic HF, Recent H/O NSTEMI  managemd with medicine, presents by EMS to the ED as an ED to ED transfer from the free standing ED in [place]. The patient is a poor historian and all information was provided by EMS and per chart review. The patient has has been in and out of the hospital for the past month. She was diagnosed with COVID on 2/2/21 and admitted at [facility] from 2/4-2/11. From there she was discharged to a [facility]. She then went to [facility] and was admitted from 2/17-2/23 for NSTEMI. She has been home for the past 2 days and then presented to [place] this evening for nausea, vomiting and generalized malaise. The patient has dementia and is babbling at baseline. At [place] PTA her rapid COVID test was again/still positive.  It was also determined that she had an elevated serum Cr at 1.23 and was sent to [facility] for admission for COVID and dehydration.      There are no other complaints, changes, or physical findings at this time. \"    ______________________________________________________________________  DISCHARGE SUMMARY/HOSPITAL COURSE:  for full details see H&P, daily progress notes, labs, consult notes. Hypernatremia POA Na 152 improved, now 135 & stable  Hypovolemia POA BUN/creat 68 and 1.22 improved, now 0.5  Dysphagia POA reports trouble swallowing, ? history of esophageal stricture in past  Nausea and vomiting at home POA   Recent obstipation POA appears more chronic  -CT scan done at outside ER reported as below      1.  New infiltrate in the bases greater on the right.     2.  Nonobstructive nephrolithiasis     3.  fecal retention which is diminishing liquid prior.     4.  Persistent dilatation of the sigmoid with an air-fluid level with redundancy  -Head CT negative     -Reports dysphagia, was spitting up in ED  -Hypovolemia and dehydration improved with IVF  -Speech saw, recommended starting dysphagia diet  -Bowel regimen with daily MiraLAX and lactulose     GI signed off-- no plans of EGD at this time, wants pt to recover from [place] before any more investigations, Pt not a candidate for PEG tube per Gi team     - Given the recent COVID can consider barium swallow first  Daughter spoken on Monday and again yesterday by palliative MD Frankie Neal- plan remains as below-      She wants to bring patient home with home health      Discussed she will 24 hour supervision, daughter says she can provide this  IP PT/OT eval noted-  Pt will need following:  A variable height semi-electric hospital bed with gell mattress overlay is needed for the head of the bed to be elevated greater than 30 degrees most of the time due to dysphagia. Patient will need positioning of the body in ways not feasible with an ordinary bed to reduce risk of aspiration and prevent skin breakdown.      A wheelchair is needed as the patient's mobility is significantly limited and is otherwise unable to perform ADL or IADLs. The home provides adequate access for wheelchair maneuvering. Caregiver will be available and willing to assist in propelling the wheelchair. A tashi lift is needed for transfers between the bed, wheelchair, and commode. Without the use of a lift, the patient would be bed-confined.        Recent COVID19 infection (diagnosed with COVID on 2/2/21)  ?  CAP POA  Was at [address] [place] [address], unclear what treatment she received  Not hypoxic, appears asymptpomatic  CXR with bibasilar ASD  Rapid covid was positive in OSH  Not hypoxic at admit, hold on RX  IV levaquin day 5 total,- now off it  Procalcitonin 0.24  Suspect non infectious at this point despite the positive tests       1 month out from original test- repeat COVID still positive (3/2)  D-dimer elevated, x-cover started heparin     I checked CTA chest and LE dopplers, both negative or VTE     Heparin stopped  Not hypoxic and 1 month out from COVID, no indication for steroids, stopped     SVT vs NSVT in ED POA  Recent non ST elevation MI at OSH 2/17, peak troponin 2.11  Essential HTN POA  Tele  EKG NSR 69 Short DC , no acute Ischemic injury  Resume Lopressor daily, continue norvasc  PRN hydralazine  ASA  Unclear if tolerant of statins, allergic to crestor     Generalized weakness POA  Daughter notes ambulatory in January, past month is hardly walked at all  Appears to have good strength in arms and distally in legs  A bit of trouble lifting right thigh off bed otherwise nonfocal  Suspect related to underlying medical issues, recent Covid  Needs PT and OT - HH with 24 hrs supervision/care with Bedside commode, Hospital bed, Tashi lift, wheelchair- being delivered late today evening- DC in AM anticipated  A variable height semi-electric hospital bed with gell mattress overlay is needed for the head of the bed to be elevated greater than 30 degrees most of the time due to dysphagia. Patient will need positioning of the body in ways not feasible with an ordinary bed to reduce risk of aspiration and prevent skin breakdown. A wheelchair is needed as the patient's mobility is significantly limited and is otherwise unable to perform ADL or IADLs. The home provides adequate access for wheelchair maneuvering. Caregiver will be available and willing to assist in propelling the wheelchair. A spencer lift is needed for transfers between the bed, wheelchair, and commode. Without the use of a lift, the patient would be bed-confined.        Renal stones without Hydronephrosis     Dementia  ?  No formal diagnosis, daughter notes increasing memory issues  Oriented x2-3 here, daughter notes was scheduled to see neurologist as outpatient  Anxiety  Hx agoraphobia per chart     Code Status: FULL      _______________________________________________________________________  Patient seen and examined by me on discharge day. Pertinent Findings:  Gen:    Not in distress  Chest: Clear lungs  CVS:   Regular rhythm. No edema  Abd:  Soft, not distended, not tender  Neuro:  Alert, oriented x 1  _______________________________________________________________________  DISCHARGE MEDICATIONS:   Current Discharge Medication List      CONTINUE these medications which have NOT CHANGED    Details   aspirin 81 mg chewable tablet Take 1 Tab by mouth daily. Qty: 30 Tab, Refills: 0      capsicum oleoresin 0.025 % topical cream Apply  to affected area three (3) times daily. Qty: 60 g, Refills: 0      clopidogreL (PLAVIX) 75 mg tab Take 1 Tab by mouth daily. Qty: 30 Tab, Refills: 0      ezetimibe (ZETIA) 10 mg tablet Take 1 Tab by mouth daily. Qty: 30 Tab, Refills: 0      metoprolol succinate (TOPROL-XL) 25 mg XL tablet Take 1 Tab by mouth every twelve (12) hours. Qty: 60 Tab, Refills: 0      senna (Senna) 8.6 mg tablet Take 1 Tab by mouth two (2) times a day.       bisacodyL (DULCOLAX) 5 mg EC tablet Take 10 mg by mouth daily. amLODIPine (NORVASC) 2.5 mg tablet Take 2.5 mg by mouth daily. venlafaxine-SR (EFFEXOR-XR) 37.5 mg capsule Take 37.5 mg by mouth daily. polyethylene glycol (MIRALAX) 17 gram/dose powder Take 17 g by mouth daily. 1 tablespoon with 8 oz of water daily  Qty: 170 g, Refills: 0         STOP taking these medications       furosemide (LASIX) 40 mg tablet Comments:   Reason for Stopping:                 Patient Follow Up Instructions: Activity: Activity as tolerated  Diet: Dysphagia diet-pureed    Follow-up with PCP in 1 week as needed    Follow-up Information     Follow up With Specialties Details Why Contact Info    Ronna Rose MD Family Medicine   [address] [address]   Your home health agency. A nurse will call you to schedule a home visit. [address] [place] [address] Services  This is the company to supply and deliver equipment to the home. [address]  Call Call DSS to inquire about becoming paid personal caregiver.  [phone]        ________________________________________________________________    Risk of deterioration: High    Condition at Discharge:  Stable  __________________________________________________________________    Disposition  Home with family and home health services (family refused [facility]- not ready yet)    ____________________________________________________________________    Code Status: Full Code  ___________________________________________________________________      Total time in minutes spent coordinating this discharge (includes going over instructions, follow-up, prescriptions, and preparing report for sign off to her PCP) :  >30 minutes    Signed:  Rachel Knight MD

## 2021-03-06 NOTE — DISCHARGE INSTRUCTIONS
HOSPITALIST DISCHARGE INSTRUCTIONS    NAME: Naomie Barker   :  1945   MRN:  [id number]     Date/Time:  3/6/2021 9:47 AM    ADMIT DATE: 2021     DISCHARGE DATE: 3/6/2021     DISCHARGE DIAGNOSIS:  Hypernatremia POA Na 152 improved, now 135 & stable  Dehydration/Hypovolemia POA BUN/creat 68 and 1.22 improved, now 0.5  Dysphagia POA reports trouble swallowing, ? history of esophageal stricture in past- no plans of EGD till pt recovered from COVID & pt is not candidate for PEG tube feeding as per GI  Nausea and vomiting at home POA - controlled  Recent obstipation POA appears more chronic  Recent COVID19 infection (diagnosed with COVID on 21)  CAP POA- treated  SVT vs NSVT in ED POA  Recent non ST elevation MI at OSH , peak troponin 2.11  Essential HTN POA  Generalized weakness POA  Dementia   Anxiety  Hx agoraphobia per chart  Full code    Active Problems:    Hypernatremia (2021)      Renal stone (2021)      Dysrhythmia, cardiac (2021)      Pneumonia (2021)      Nausea & vomiting (2021)      Dementia ([facility] (2021)      HERBERTH (acute kidney injury) ([facility] (2021)      History of COVID-19 (2021)         MEDICATIONS:  As per medication reconciliation  list  · It is important that you take the medication exactly as they are prescribed. · Keep your medication in the bottles provided by the pharmacist and keep a list of the medication names, dosages, and times to be taken in your wallet. · Do not take other medications without consulting your doctor. Pain Management: per above medications    What to do at Home    Recommended diet:  {diet:87976}    Recommended activity: {discharge activity:69785}    If you have questions regarding the hospital related prescriptions or hospital related issues please call Kia at [phone].     If you experience any of the following symptoms then please call your primary care physician or return to the emergency room if you cannot get hold of your doctor:  Fever, chills, nausea, vomiting, diarrhea, change in mentation, falling, bleeding, shortness of breath, ***    Follow Up:  Dr. Frida Long MD  you are to call and set up an appointment to see them in 7-10 days. Information obtained by :  I understand that if any problems occur once I am at home I am to contact my physician. I understand and acknowledge receipt of the instructions indicated above.                                                                                                                                            Physician's or R.N.'s Signature                                                                  Date/Time                                                                                                                                              Patient or Representative Signature                                                          Date/Time

## 2021-03-06 NOTE — PROGRESS NOTES
HYPOGLYCEMIC EPISODE DOCUMENTATION    Patient with hypoglycemic episode(s) at 0804 ,  1109  , 1706  (time) on  3/5(date). BG value(s) pre-treatment 61 , 64 ,69 , 79    Was patient symptomatic? [] yes, [x] no  Patient was treated with the following rescue medications/treatments: [x] D50                [] Glucose tablets                [] Glucagon                [] 4oz juice                [] 6oz reg soda                [] 8oz low fat milk  BG value post-treatment: 100 , 162 , Refused BG check     BG check and Dextrose administrations delayed because of patient behavior constantly refusing all interventions       Once BG treated and value greater than 80mg/dl, pt was provided with the following:  [] snack  [] meal  Name of MD notified:Dr. Monty Saldana   The following orders were received: no         End of Shift Note    Bedside shift change report given to [address] [place] [address] (oncoming nurse) by Herve Narayanan (offgoing nurse).   Report included the following information SBAR, Kardex, Procedure Summary, Intake/Output, MAR and Recent Results    Shift worked:  7-7pm     Shift summary and any significant changes:     Refusing meds and all PO intake         Concerns for physician to address:   Same as above   Zone phone for oncoming shift:   [address]

## 2021-03-06 NOTE — PROGRESS NOTES
SAE  Discharge       CM confirmed with daughter Zia ARMSTRONG ([phone]) all DME was delivered on 3/5. [facility] transport arranged for 10am.  Medicare, unable to provide 2nd IM letter informing them of their right to appeal the discharge due to COVID-19 isolation status. Copy placed on pt bedside chart, information reviewed with patient daughter. No further CM needs identified.  CM notified pt's nurse of d/c.    Sahil Evans, MSN, RN  Care Manager

## 2021-03-08 ENCOUNTER — TELEPHONE (OUTPATIENT)
Dept: CASE MANAGEMENT | Age: 76
End: 2021-03-08

## 2021-03-08 NOTE — TELEPHONE ENCOUNTER
3/8/21 11:13 AM     Patient contacted regarding [id number] diagnosis\". Discussed COVID-19 related testing which was available at this time. Test results were positive. Patient informed of results, if available? yes     Care Transition Nurse contacted the caregiver by telephone to perform post discharge assessment. Call within 2 business days of discharge: Yes Verified name and  with caregiver as identifiers. Provided introduction to self, and explanation of the CTN/ACM role, and reason for call due to risk factors for infection and/or exposure to COVID-19. Symptoms reviewed with caregiver who verbalized the following symptoms: fatigue, diarrhea, flank pain, no new symptoms and no worsening symptoms      Due to no new or worsening symptoms encounter was not routed to provider for escalation. Discussed follow-up appointments. If no appointment was previously scheduled, appointment scheduling offered:  [facility] follow up appointment(s): No future appointments. Non-[facility] follow up appointment(s): none     Advance Care Planning:   Does patient have an Advance Directive:  not on file; education provided. Primary Decision Maker (Active): Jonas Gardiner - Daughter - [phone]    Secondary Decision Maker (Active): Vance Giordano" - Daughter - [phone]    Patient has following risk factors of: pneumonia and diabetes. CTN reviewed discharge instructions, medical action plan and red flags such as increased shortness of breath, increasing fever and signs of decompensation with caregiver who verbalized understanding. Discussed exposure protocols and quarantine with CDC Guidelines What to do if you are sick with coronavirus disease 2019.  Caregiver was given an opportunity for questions and concerns. The caregiver agrees to contact the [facility] exposure line [phone], [facility] department [address]  ([phone] and PCP office for questions related to their healthcare.  CTN provided contact information for future needs. Daughter states that quarantine has been completed since pt was first dx'd in mid-February. Reviewed and educated caregiver on any new and changed medications related to discharge diagnosis. Advised pt's daughter that pt is to discontinue taking Lasix. Daughter tells me pt was not on any medications prior to her hospital visit. I reviewed the list of medications on the D/C AVS and daughter states that she was not given any prescriptions at discharge. I advised her to call pt's PCP today to discuss this with her at her earliest convenience so she can advised daughter  which of these are essential for pt to continue. Daughter agrees to call PCP today. Advised to also ask PCP if a virtual visit can be arranged since she is not able to take pt to appts at this time. Daughter states that through [facility] she has been given a hospital bed, W/C, bedside commode, shower chair, and Tashi lift. She was instructed by CM at [address] [facility] to make arrangements for personal care aide via [facility] since pt has Medicaid and I encouraged daughter to call today to F/U on this. She states she is home with pt on FMLA until caregivers can be arranged. Was patient discharged with a pulse oximeter? no     Patient/family/caregiver given information for [employer] and agrees to enroll no  Patient's preferred e-mail: N/A   Patient's preferred phone number: N/A  Based on Loop alert triggers, patient will be contacted by nurse care manager for worsening symptoms. Plan for follow-up call in 4 days based on severity of symptoms and risk factors.

## 2021-03-12 ENCOUNTER — TELEPHONE (OUTPATIENT)
Dept: CASE MANAGEMENT | Age: 76
End: 2021-03-12

## 2021-03-12 NOTE — TELEPHONE ENCOUNTER
3/12/21 3:17 PM--contacted pt's daughter Christian Lynch for check-in as I had promised. She reports things are going well and that Dr. Ck Morgan, pt's PCP, has prescribed medication that she is now in line at the pharmacy to collect. She says she is not sure what the medicine is or what it's for. I advised that if she has questions, she could call the on-call MD for her mother's practice over the weekend. She is also busy coordinating FMLA and wonders if I can help with this. I advised that I cannot but referred her again to pt's PCP. She states that the office says they will fax over what is required to daughter's HR department and I advised her to check on this Monday and if it hasn't been received, to F/U with PCP. She is agreeable to this plan. Thanked her for her time and wished her and her mother a great weekend. I will call again in 10 days for a final check-in.

## 2021-03-22 ENCOUNTER — TELEPHONE (OUTPATIENT)
Dept: CASE MANAGEMENT | Age: 76
End: 2021-03-22

## 2021-03-22 NOTE — TELEPHONE ENCOUNTER
3/22/21 4:03 PM     Patient resolved from Transition of Care episode on 3/22/21. ACM/CTN was unsuccessful at contacting this patient today. Patient/family was provided the following resources and education related to COVID-19 during the initial call:                         Signs, symptoms and red flags related to COVID-19            CDC exposure and quarantine guidelines            [employer] exposure contact - [phone]            Contact for their local Department of Health                 Patient has not had any additional ED or hospital visits. No further outreach scheduled with this CTN/ACM. Episode of Care resolved. Patient has this CTN/ACM contact information if future needs arise.

## 2021-03-24 NOTE — PROGRESS NOTES
Physician Progress Note      PATIENT:               Tom Haywood  CSN #:                  [id number]  :                       1945  ADMIT DATE:       2021 10:07 PM  [address] DATE:        2021 10:17 PM  RESPONDING  PROVIDER #:        Lilli Franz MD          QUERY TEXT:    Good Morning Dr. Junior Wiggins    This patient admitted for ACS. A query was posted concurrently to the Provider (Dr. Valery Woods) who stated that  patient also had active COVID 19 infection during this stay. If possible, please document in progress notes and discharge summary the relationship, if any, between ACS  and  COVID 19. The medical record reflects the following:  Risk Factors: COVID 19 Pneumonia tested positive 1 week PTA, , HTN, hx. of CAD  Clinical Indicators: Rapid COVID test positive the patient had tested positive 1 week PTA for COVID 19, and again positive here for this admission. Cars notes Chest pain, atypical for angina. Troponin in the indeterminate range. The patient had no evidence of hypoxia during this admission  Treatment: Cards consulted, Echo, Troponins evaluated, ASA and Plavix, Covid test (positive)  Droplet Plus Isolation    Thank you,  Teresa Mackey, BSn,RN, [facility], [address] [place] [address], [facility]  [phone]  Options provided:  -- ACS was due to [place] 19  -- ACS was unrelated to COVID 19  -- Other - I will add my own diagnosis  -- Disagree - Not applicable / Not valid  -- Disagree - Clinically unable to determine / Unknown  -- Refer to Clinical Documentation Reviewer    PROVIDER RESPONSE TEXT:    This patient has ACS was due to COVID 19 .     Query created by: Sayda Barreto on 3/16/2021 8:18 AM      Electronically signed by:  Lilli Franz MD 3/24/2021 6:29 PM

## 2021-07-22 NOTE — PROGRESS NOTES
[address], [place], [address] [place] [address]  [phone]     Subjective:      Eb Ferreira is a 76 y.o. female is here for long follow up. Pmhx nonobstructive CAD, Aortic stenosis, HTN, HLD, dementia. Last seen by us in 12/18. Admitted at ED [facility] in 2/25 - 3/6/2021 for hypernatremia in the setting of hypovolemia(resolved), dysphagia ? history of esophageal stricture in past- no plans of EGD till pt recovered from [place] & pt is not candidate for PEG tube feeding as per GI, SVT vs NSVT in ED. Note recent NSTEMI at OSH 2/17/2021 peak troponin 2.11--see below     Admitted 2/16/ - 2/23/2021 at [facility] for generalized weakness s/t COVID-19 pneumonia and NSTEMI  2/17/2021 peak troponin 2.11- medical management advised by cardiology And was discharged on asa/plavix/bb/zetia    Today, c/o intermittent headache. She is mostly wheelchair bound, lives at a nursing facility. The patient denies chest pain/ shortness of breath, orthopnea, PND, LE edema, palpitations, syncope, or presyncope. Echo 2/17/2021  · LV: Calculated LVEF is 65%. Normal cavity size and systolic function (ejection fraction normal). Mild concentric hypertrophy. Mild (grade 1) left ventricular diastolic dysfunction. · LA: Dilated left atrium. · AV: Aortic valve leaflet calcification present with reduced excursion. Aortic valve mean gradient is 15 mmHg. Aortic valve area is 1.2 cm2. Moderate to severe aortic valve stenosis is present. · MV: Mitral valve non-specific thickening. Moderate mitral annular calcification. · TV: Mild tricuspid valve regurgitation is present. [facility] 12/01/2015  SUMMARY:     --  CARDIAC STRUCTURES:  --  EF calculated by contrast ventriculography was 60 %.    --  CORONARY CIRCULATION:  --  Coronary angiography demonstrated minor luminal irregularities. --  Proximal LAD: There was a 30 % stenosis. --  Distal LAD: There was a 50 % stenosis.  This lesion is in a small and  tortuous portion of the distal LAD.     Patient Active Problem List    Diagnosis Date Noted    Hypernatremia 02/25/2021    Renal stone 02/25/2021    Dysrhythmia, cardiac 02/25/2021    Pneumonia 02/25/2021    Nausea & vomiting 02/25/2021    Dementia ([address] 02/25/2021    HERBERTH (acute kidney injury) ([address] 02/25/2021    History of COVID-19 02/25/2021    Acute coronary syndromes ([address] 02/17/2021    Urinary retention 09/03/2019    Fecal impaction ([address] 09/03/2019    Choledocholithiasis with acute cholecystitis 09/19/2018    Vaginal irritation 10/11/2017    Blurring of vision 10/11/2017    Advance directive discussed with patient 07/06/2017    Gait instability 04/14/2017    Weakness 04/13/2017    Assistance needed with transportation 04/13/2017    Left-sided chest wall pain 04/11/2017    Hydronephrosis of left kidney 04/07/2017    Orthostatic hypotension 04/04/2017    Generalized OA 01/05/2017    Noncompliance with medication regimen-chol medication  01/05/2017    Tightness sensation-head 09/20/2016    Diabetes mellitus type 2, diet-controlled ([address] 05/26/2016    Somatization disorder 05/26/2016    Death of parent 05/26/2016    Somatic delusion disorder ([address] 03/22/2016    Death of child 02/17/2016    Broken heart syndrome 01/18/2016    SOB (shortness of breath) 12/22/2015    CAD (coronary artery disease), native coronary artery 12/01/2015    Pseudobulbar affect 10/16/2015    Breast pain, left 04/07/2015    Personal history of noncompliance with medical treatment, presenting hazards to health 07/47/1045    Duplicated right renal collecting system 03/13/2014    Nephrolithiasis 03/13/2014    Onycholysis of toenail 02/27/2014    Chronic pain associated with significant psychosocial dysfunction 02/17/2014    Depression with anxiety 02/17/2014    Other somatoform disorders 02/17/2014    Chronic chest pain 01/13/2014    Hyperlipidemia 12/09/2013    UTI (urinary tract infection) 12/09/2013    Insomnia 11/13/2013    Constipation 08/22/2013    Abdominal pain 08/16/2013    Dizziness of unknown cause 08/13/2013    Neutropenia ([facility] 08/13/2013    HTN, goal below 130/80 09/07/2012    Chronic headache 09/07/2012    Acid reflux 09/07/2012      Vee John MD  Past Medical History:   Diagnosis Date    Agoraphobia without mention of panic attacks 2/17/2014    Anxiety disorder 8/18/2013    Arthritis     osteo    CAD (coronary artery disease), native coronary artery 12/1/2015    no stents    Chronic chest pain 1/13/2014    Chronic pain associated with significant psychosocial dysfunction 2/17/2014    Depression 8/18/2013    Diabetes ([facility]     type II    Duplicated right renal collecting system 3/13/2014    GERD (gastroesophageal reflux disease)     Gout, joint     Hypercholesteremia     hyercholesterolemia    Hypertension     Murmur     Nephrolithiasis 3/13/2014    Personal history of noncompliance with medical treatment, presenting hazards to health 5/30/2014      Past Surgical History:   Procedure Laterality Date    EGD  4/23/2010         HX CHOLECYSTECTOMY  09/20/2018    lap jesus    HX CYST REMOVAL      cyst removed from left wrist    HX HEART CATHETERIZATION  12/01/2015    HX HYSTERECTOMY      partial    HX OTHER SURGICAL      bladder dilitation    HX TUBAL LIGATION      HX UROLOGICAL      kidney stones     Allergies   Allergen Reactions    Amoxicillin Hives    Sulfa (Sulfonamide Antibiotics) Hives and Itching    Mirtazapine Itching and Nausea Only     Funny feeling in chest    Percocet [Oxycodone-Acetaminophen] Nausea and Vomiting    Codeine Nausea and Vomiting    Crestor [Rosuvastatin] Other (comments)     myalgias    Nitroglycerin Unknown (comments)     Patient cannot remember why she is allergic to it      Prednisone Itching    Zithromax [Azithromycin] Itching     Not sure what it does,taken long time ago      Family History   Problem Relation Age of Onset    Stroke Mother     Heart Disease Mother     Cancer Father         type unknown    Heart Disease Son     Liver Disease Son     Heart Disease Daughter     Malignant Hyperthermia Neg Hx     Pseudocholinesterase Deficiency Neg Hx     Delayed Awakening Neg Hx     Post-op Nausea/Vomiting Neg Hx     Emergence Delirium Neg Hx     Post-op Cognitive Dysfunction Neg Hx     Other Neg Hx       Social History     Socioeconomic History    Marital status:      Spouse name: Not on file    Number of children: Not on file    Years of education: Not on file    Highest education level: Not on file   Occupational History    Not on file   Tobacco Use    Smoking status: Never Smoker    Smokeless tobacco: Never Used   Substance and Sexual Activity    Alcohol use: No    Drug use: No    Sexual activity: Never   Other Topics Concern    Not on file   Social History Narrative    ** Merged History Encounter **     , lives with her son and Friend. Social Determinants of Health     Financial Resource Strain:     Difficulty of Paying Living Expenses:    Food Insecurity:     Worried About Running Out of Food in the Last Year:     [address] in the Last Year:    Transportation Needs:     Lack of Transportation (Medical):      Lack of Transportation (Non-Medical):    Physical Activity:     Days of Exercise per Week:     Minutes of Exercise per Session:    Stress:     Feeling of Stress :    Social Connections:     Frequency of Communication with Friends and Family:     Frequency of Social Gatherings with Friends and Family:     Attends [employer] Services:     Active Member of Clubs or Organizations:     Attends Club or Organization Meetings:     Marital Status:    Intimate Partner Violence:     Fear of Current or Ex-Partner:     Emotionally Abused:     Physically Abused:     Sexually Abused:       Current Outpatient Medications   Medication Sig    acetaminophen (TYLENOL) 500 mg tablet Take 1,000 mg by mouth every six (6) hours as needed for Pain.  aspirin 81 mg chewable tablet Take 1 Tab by mouth daily.  clopidogreL (PLAVIX) 75 mg tab Take 1 Tab by mouth daily.  ezetimibe (ZETIA) 10 mg tablet Take 1 Tab by mouth daily.  metoprolol succinate (TOPROL-XL) 25 mg XL tablet Take 1 Tab by mouth every twelve (12) hours. (Patient taking differently: Take 25 mg by mouth daily.)    senna (Senna) 8.6 mg tablet Take 1 Tab by mouth two (2) times a day.  bisacodyL (DULCOLAX) 5 mg EC tablet Take 10 mg by mouth daily.  amLODIPine (NORVASC) 2.5 mg tablet Take 2.5 mg by mouth daily.  polyethylene glycol (MIRALAX) 17 gram/dose powder Take 17 g by mouth daily. 1 tablespoon with 8 oz of water daily     No current facility-administered medications for this visit. Review of Symptoms:  11 systems reviewed, negative other than as stated in the HPI    Physical ExamPhysical Exam:    Vitals:    07/30/21 1537   BP: 102/72   Pulse: 72   Resp: 16   SpO2: 99%   Height: 5' 7\" (1.702 m)     Body mass index is 21.58 kg/m². General PE  Gen:  NAD, wheelchair bound  Mental Status - Alert. General Appearance - Not in acute distress. HEENT:  PERRL, no carotid bruits or JVD  Chest and Lung Exam   Inspection: Accessory muscles - No use of accessory muscles in breathing. Auscultation:   Breath sounds: - Normal.   Cardiovascular   Inspection: Jugular vein - Bilateral - Inspection Normal.   Palpation/Percussion:   Apical Impulse: - Normal.   Auscultation: Rhythm - Regular. Heart Sounds - S1 WNL and S2 WNL. No S3 or S4. Murmurs & Other Heart Sounds: Auscultation of the heart reveals - 3/6 JEROME  Peripheral Vascular   Upper Extremity: Inspection - Bilateral - No Cyanotic nailbeds or Digital clubbing. Lower Extremity:   Palpation: Edema - Bilateral - No edema. Abdomen:   Soft, non-tender, bowel sounds are active.   Neuro: A&O times 1,     Labs:   Lab Results   Component Value Date/Time    Cholesterol, total 258 (H) 07/06/2017 12:22 PM    Cholesterol, total 305 (H) 12/15/2016 02:37 PM    Cholesterol, total 308 (H) 09/20/2016 09:53 AM    Cholesterol, total 234 (H) 07/31/2015 01:27 PM    Cholesterol, total 213 (H) 12/04/2014 05:32 PM    HDL Cholesterol 64 07/06/2017 12:22 PM    HDL Cholesterol 57 12/15/2016 02:37 PM    HDL Cholesterol 61 09/20/2016 09:53 AM    HDL Cholesterol 72 07/31/2015 01:27 PM    HDL Cholesterol 70 12/04/2014 05:32 PM    LDL,Direct 209 (H) 01/26/2017 02:06 PM    LDL,Direct 228 (H) 10/16/2015 10:32 AM    LDL, calculated 175 (H) 07/06/2017 12:22 PM    LDL, calculated 218 (H) 12/15/2016 02:37 PM    LDL, calculated 223 (H) 09/20/2016 09:53 AM    LDL, calculated 143 (H) 07/31/2015 01:27 PM    LDL, calculated 122 (H) 12/04/2014 05:32 PM    Triglyceride 95 07/06/2017 12:22 PM    Triglyceride 149 12/15/2016 02:37 PM    Triglyceride 118 09/20/2016 09:53 AM    Triglyceride 94 07/31/2015 01:27 PM    Triglyceride 107 12/04/2014 05:32 PM     Lab Results   Component Value Date/Time    CK 69 07/20/2018 12:34 PM     Lab Results   Component Value Date/Time    Sodium 135 (L) 03/03/2021 03:41 AM    Potassium 3.7 03/03/2021 03:41 AM    Chloride 104 03/03/2021 03:41 AM    CO2 25 03/03/2021 03:41 AM    Anion gap 6 03/03/2021 03:41 AM    Glucose 89 03/03/2021 03:41 AM    BUN 11 03/03/2021 03:41 AM    Creatinine 0.59 03/03/2021 03:41 AM    BUN/Creatinine ratio 19 03/03/2021 03:41 AM    GFR est AA >60 03/03/2021 03:41 AM    GFR est non-AA >60 03/03/2021 03:41 AM    Calcium 8.1 (L) 03/03/2021 03:41 AM    Bilirubin, total 0.4 03/02/2021 02:47 AM    Alk. phosphatase 49 03/02/2021 02:47 AM    Protein, total 6.1 (L) 03/02/2021 02:47 AM    Albumin 2.3 (L) 03/02/2021 02:47 AM    Globulin 3.8 03/02/2021 02:47 AM    A-G Ratio 0.6 (L) 03/02/2021 02:47 AM    ALT (SGPT) 16 03/02/2021 02:47 AM       EKG:  NSR     Assessment:     Assessment:        ICD-10-CM ICD-9-CM    1.  Coronary artery disease involving native coronary artery of native heart without angina pectoris  I25.10 414.01 AMB POC EKG ROUTINE W/ 12 LEADS, INTER & REP   2. Essential hypertension  I10 401.9 AMB POC EKG ROUTINE W/ 12 LEADS, INTER & REP   3. Mixed hyperlipidemia  E78.2 272.2 AMB POC EKG ROUTINE W/ 12 LEADS, INTER & REP   4. Aortic valve stenosis, etiology of cardiac valve disease unspecified  I35.0 424.1 AMB POC EKG ROUTINE W/ 12 LEADS, INTER & REP   5. NSTEMI (non-ST elevated myocardial infarction) (HCC)  I21.4 410.70 AMB POC EKG ROUTINE W/ 12 LEADS, INTER & REP   6. COVID-19  U07.1 079.89 AMB POC EKG ROUTINE W/ 12 LEADS, INTER & REP   7. Hospital discharge follow-up  Z09 V67.59 DE DISCHARGE MEDS RECONCILED W/ CURRENT OUTPATIENT MED LIST       Orders Placed This Encounter    DE DISCHARGE MEDS RECONCILED W/ CURRENT OUTPATIENT MED LIST    AMB POC EKG ROUTINE W/ 12 LEADS, INTER & REP     Order Specific Question:   Reason for Exam:     Answer:   routine    acetaminophen (TYLENOL) 500 mg tablet     Sig: Take 1,000 mg by mouth every six (6) hours as needed for Pain. Plan:     CAD/atypical chest discomfort for years:   History of mild nonobstructive CAD December 2015 showed LAD had minimal irregularities 30% stenosis in the proximal segment and distal LAD has 50% stenosis, RCA had minimal irregularities as well as LCx. NSTEMI - 2/17/2021 peak troponin 2.11 likely d/t hypoxia s/t covid PNA. Most recent trop 0.06 on 2/26/2021  On ASA/Plavix, BB, CCB, Zetia      Aortic stenosis  Mod-severe aortic stenosis per echo in 2/17/2021: Aortic valve mean gradient is 15 mmHg. Aortic valve area is 1.2 cm2. Echo 10/2018 with normal LVEF and mild AS. HTN  Controlled with current therapy  Stable kidney fxn Serum Cr 0.59 in 3/2021    Hyperlipidemia:  Not on statin despite multiple attempts to start one.    LDL was 175 in 2017.     Now on Zetia, with lipids followed by PCP       Continue current care and f/u in 1 yr    Sierra Sutherland NP    Patient seen and examined by me with the above nurse practitioner. I personally performed all components of the history, physical, and medical decision making and agree with the assessment and plan with minor modifications as noted. Today the patient presents with mild headache, no cardiac symptoms. General PE  Gen:  NAD  Mental Status - Alert. General Appearance - Not in acute distress. In a wheelchair  HEENT:  PERRL, no carotid bruits or JVD  Chest and Lung Exam   Inspection: Accessory muscles - No use of accessory muscles in breathing. Auscultation:   Breath sounds: - Normal.   Cardiovascular   Inspection: Jugular vein - Bilateral - Inspection Normal.   Palpation/Percussion:   Apical Impulse: - Normal.   Auscultation: Rhythm - Regular. Heart Sounds - S1 WNL and S2 WNL. No S3 or S4. Murmurs & Other Heart Sounds: Auscultation of the heart reveals - No Murmurs. Peripheral Vascular   Upper Extremity: Inspection - Bilateral - No Cyanotic nailbeds or Digital clubbing. Lower Extremity:   Palpation: Edema - Bilateral - No edema. Abdomen:   Soft, non-tender, bowel sounds are active. Neuro: A&O times 3, CN and motor grossly WNL    Recent Covid pneumonia, with the expected mild troponin elevation. Prior cardiac evaluation has been negative aside from mild nonobstructive coronary artery disease. Continue with aggressive medical management. Follow up in 1 year, sooner as needed.

## 2021-07-30 ENCOUNTER — OFFICE VISIT (OUTPATIENT)
Dept: CARDIOLOGY CLINIC | Age: 76
End: 2021-07-30
Payer: MEDICARE

## 2021-07-30 VITALS
HEIGHT: 67 IN | OXYGEN SATURATION: 99 % | BODY MASS INDEX: 21.58 KG/M2 | SYSTOLIC BLOOD PRESSURE: 102 MMHG | HEART RATE: 72 BPM | DIASTOLIC BLOOD PRESSURE: 72 MMHG | RESPIRATION RATE: 16 BRPM

## 2021-07-30 DIAGNOSIS — I35.0 AORTIC VALVE STENOSIS, ETIOLOGY OF CARDIAC VALVE DISEASE UNSPECIFIED: ICD-10-CM

## 2021-07-30 DIAGNOSIS — I10 ESSENTIAL HYPERTENSION: ICD-10-CM

## 2021-07-30 DIAGNOSIS — I25.10 CORONARY ARTERY DISEASE INVOLVING NATIVE CORONARY ARTERY OF NATIVE HEART WITHOUT ANGINA PECTORIS: Primary | ICD-10-CM

## 2021-07-30 DIAGNOSIS — I21.4 NSTEMI (NON-ST ELEVATED MYOCARDIAL INFARCTION) (HCC): ICD-10-CM

## 2021-07-30 DIAGNOSIS — Z09 HOSPITAL DISCHARGE FOLLOW-UP: ICD-10-CM

## 2021-07-30 DIAGNOSIS — E78.2 MIXED HYPERLIPIDEMIA: ICD-10-CM

## 2021-07-30 DIAGNOSIS — U07.1 COVID-19: ICD-10-CM

## 2021-07-30 PROCEDURE — 93000 ELECTROCARDIOGRAM COMPLETE: CPT | Performed by: INTERNAL MEDICINE

## 2021-07-30 PROCEDURE — G8427 DOCREV CUR MEDS BY ELIG CLIN: HCPCS | Performed by: INTERNAL MEDICINE

## 2021-07-30 PROCEDURE — 1101F PT FALLS ASSESS-DOCD LE1/YR: CPT | Performed by: INTERNAL MEDICINE

## 2021-07-30 PROCEDURE — 1111F DSCHRG MED/CURRENT MED MERGE: CPT | Performed by: INTERNAL MEDICINE

## 2021-07-30 PROCEDURE — 99214 OFFICE O/P EST MOD 30 MIN: CPT | Performed by: INTERNAL MEDICINE

## 2021-07-30 PROCEDURE — 3017F COLORECTAL CA SCREEN DOC REV: CPT | Performed by: INTERNAL MEDICINE

## 2021-07-30 PROCEDURE — G8536 NO DOC ELDER MAL SCRN: HCPCS | Performed by: INTERNAL MEDICINE

## 2021-07-30 PROCEDURE — G9717 DOC PT DX DEP/BP F/U NT REQ: HCPCS | Performed by: INTERNAL MEDICINE

## 2021-07-30 PROCEDURE — G8754 DIAS BP LESS 90: HCPCS | Performed by: INTERNAL MEDICINE

## 2021-07-30 PROCEDURE — G8400 PT W/DXA NO RESULTS DOC: HCPCS | Performed by: INTERNAL MEDICINE

## 2021-07-30 PROCEDURE — 1090F PRES/ABSN URINE INCON ASSESS: CPT | Performed by: INTERNAL MEDICINE

## 2021-07-30 PROCEDURE — G8752 SYS BP LESS 140: HCPCS | Performed by: INTERNAL MEDICINE

## 2021-07-30 PROCEDURE — G8420 CALC BMI NORM PARAMETERS: HCPCS | Performed by: INTERNAL MEDICINE

## 2021-07-30 RX ORDER — ACETAMINOPHEN 500 MG
1000 TABLET ORAL
COMMUNITY

## 2021-07-30 NOTE — LETTER
7/30/2021    Patient: Wai January   YOB: 1945   Date of Visit: 7/30/2021     Luis Stauffer MD  [address]  [address] [facility] [address]  Via Fax: [phone]    Dear Luis Stauffer MD,      Thank you for referring Ms. Ajit Hill to [address] [place] [address] for evaluation. My notes for this consultation are attached. If you have questions, please do not hesitate to call me. I look forward to following your patient along with you.       Sincerely,    Paz Alcala MD

## 2021-07-30 NOTE — PROGRESS NOTES
Chief Complaint   Patient presents with   [facility] Follow Up     Hypernatremia    Follow-up     Last seen 2018    Hypertension    Irregular Heart Beat    Coronary Artery Disease    Cholesterol Problem      Patient resides in nursing home  Her with [facility] transport. She C/O headache.

## 2022-01-01 ENCOUNTER — HOSPITAL ENCOUNTER (OUTPATIENT)
Dept: GENERAL RADIOLOGY | Age: 77
Discharge: HOME | DRG: 392 | End: 2022-05-27
Payer: MEDICARE

## 2022-01-01 ENCOUNTER — APPOINTMENT (OUTPATIENT)
Dept: CT IMAGING | Age: 77
DRG: 660 | End: 2022-01-01
Attending: EMERGENCY MEDICINE
Payer: MEDICARE

## 2022-01-01 ENCOUNTER — APPOINTMENT (OUTPATIENT)
Dept: CT IMAGING | Age: 77
DRG: 391 | End: 2022-01-01
Payer: MEDICARE

## 2022-01-01 ENCOUNTER — ANESTHESIA (OUTPATIENT)
Dept: ENDOSCOPY | Age: 77
DRG: 392 | End: 2022-01-01
Payer: MEDICARE

## 2022-01-01 ENCOUNTER — APPOINTMENT (OUTPATIENT)
Dept: NON INVASIVE DIAGNOSTICS | Age: 77
DRG: 391 | End: 2022-01-01
Attending: INTERNAL MEDICINE
Payer: MEDICARE

## 2022-01-01 ENCOUNTER — ANESTHESIA EVENT (OUTPATIENT)
Dept: SURGERY | Age: 77
DRG: 660 | End: 2022-01-01
Payer: MEDICARE

## 2022-01-01 ENCOUNTER — HOSPITAL ENCOUNTER (INPATIENT)
Age: 77
LOS: 4 days | Discharge: HOME | DRG: 391 | End: 2022-01-14
Attending: EMERGENCY MEDICINE | Admitting: INTERNAL MEDICINE
Payer: MEDICARE

## 2022-01-01 ENCOUNTER — APPOINTMENT (OUTPATIENT)
Dept: GENERAL RADIOLOGY | Age: 77
DRG: 391 | End: 2022-01-01
Attending: INTERNAL MEDICINE
Payer: MEDICARE

## 2022-01-01 ENCOUNTER — HOSPITAL ENCOUNTER (INPATIENT)
Dept: GENERAL RADIOLOGY | Age: 77
DRG: 660 | End: 2022-01-01
Attending: INTERNAL MEDICINE
Payer: MEDICARE

## 2022-01-01 ENCOUNTER — APPOINTMENT (OUTPATIENT)
Dept: GENERAL RADIOLOGY | Age: 77
DRG: 391 | End: 2022-01-01
Payer: MEDICARE

## 2022-01-01 ENCOUNTER — HOSPITAL ENCOUNTER (INPATIENT)
Age: 77
LOS: 5 days | Discharge: HOME HEALTH | DRG: 660 | End: 2022-07-23
Attending: STUDENT IN AN ORGANIZED HEALTH CARE EDUCATION/TRAINING PROGRAM | Admitting: INTERNAL MEDICINE
Payer: MEDICARE

## 2022-01-01 ENCOUNTER — APPOINTMENT (OUTPATIENT)
Dept: CT IMAGING | Age: 77
DRG: 660 | End: 2022-01-01
Attending: INTERNAL MEDICINE
Payer: MEDICARE

## 2022-01-01 ENCOUNTER — APPOINTMENT (OUTPATIENT)
Dept: NON INVASIVE DIAGNOSTICS | Age: 77
DRG: 392 | End: 2022-01-01
Attending: INTERNAL MEDICINE
Payer: MEDICARE

## 2022-01-01 ENCOUNTER — ANESTHESIA (OUTPATIENT)
Dept: SURGERY | Age: 77
DRG: 660 | End: 2022-01-01
Payer: MEDICARE

## 2022-01-01 ENCOUNTER — HOSPITAL ENCOUNTER (INPATIENT)
Age: 77
LOS: 4 days | Discharge: HOME HEALTH | DRG: 660 | End: 2022-07-01
Attending: EMERGENCY MEDICINE | Admitting: HOSPITALIST
Payer: MEDICARE

## 2022-01-01 ENCOUNTER — APPOINTMENT (OUTPATIENT)
Dept: GENERAL RADIOLOGY | Age: 77
DRG: 391 | End: 2022-01-01
Attending: HOSPITALIST
Payer: MEDICARE

## 2022-01-01 ENCOUNTER — APPOINTMENT (OUTPATIENT)
Dept: ULTRASOUND IMAGING | Age: 77
DRG: 391 | End: 2022-01-01
Attending: INTERNAL MEDICINE
Payer: MEDICARE

## 2022-01-01 ENCOUNTER — APPOINTMENT (OUTPATIENT)
Dept: GENERAL RADIOLOGY | Age: 77
DRG: 391 | End: 2022-01-01
Attending: STUDENT IN AN ORGANIZED HEALTH CARE EDUCATION/TRAINING PROGRAM
Payer: MEDICARE

## 2022-01-01 ENCOUNTER — HOSPITAL ENCOUNTER (EMERGENCY)
Age: 77
Discharge: HOME | DRG: 391 | End: 2022-07-23
Attending: EMERGENCY MEDICINE
Payer: MEDICARE

## 2022-01-01 ENCOUNTER — APPOINTMENT (OUTPATIENT)
Dept: GENERAL RADIOLOGY | Age: 77
DRG: 660 | End: 2022-01-01
Attending: HOSPITALIST
Payer: MEDICARE

## 2022-01-01 ENCOUNTER — APPOINTMENT (OUTPATIENT)
Dept: GENERAL RADIOLOGY | Age: 77
DRG: 391 | End: 2022-01-01
Attending: EMERGENCY MEDICINE
Payer: MEDICARE

## 2022-01-01 ENCOUNTER — APPOINTMENT (OUTPATIENT)
Dept: GENERAL RADIOLOGY | Age: 77
DRG: 660 | End: 2022-01-01
Attending: UROLOGY
Payer: MEDICARE

## 2022-01-01 ENCOUNTER — HOSPITAL ENCOUNTER (INPATIENT)
Age: 77
LOS: 6 days | DRG: 391 | End: 2022-07-31
Attending: EMERGENCY MEDICINE | Admitting: INTERNAL MEDICINE
Payer: MEDICARE

## 2022-01-01 ENCOUNTER — TRANSCRIBE ORDER (OUTPATIENT)
Dept: SCHEDULING | Age: 77
End: 2022-01-01

## 2022-01-01 ENCOUNTER — ANESTHESIA EVENT (OUTPATIENT)
Dept: ENDOSCOPY | Age: 77
DRG: 392 | End: 2022-01-01
Payer: MEDICARE

## 2022-01-01 ENCOUNTER — APPOINTMENT (OUTPATIENT)
Dept: CT IMAGING | Age: 77
DRG: 391 | End: 2022-01-01
Attending: INTERNAL MEDICINE
Payer: MEDICARE

## 2022-01-01 ENCOUNTER — APPOINTMENT (OUTPATIENT)
Dept: GENERAL RADIOLOGY | Age: 77
DRG: 660 | End: 2022-01-01
Attending: EMERGENCY MEDICINE
Payer: MEDICARE

## 2022-01-01 ENCOUNTER — HOSPITAL ENCOUNTER (EMERGENCY)
Age: 77
Discharge: HOME | DRG: 660 | End: 2022-07-15
Attending: EMERGENCY MEDICINE
Payer: MEDICARE

## 2022-01-01 ENCOUNTER — HOSPITAL ENCOUNTER (INPATIENT)
Age: 77
LOS: 6 days | Discharge: HOME | DRG: 392 | End: 2022-06-02
Attending: EMERGENCY MEDICINE | Admitting: INTERNAL MEDICINE
Payer: MEDICARE

## 2022-01-01 VITALS
BODY MASS INDEX: 22.13 KG/M2 | OXYGEN SATURATION: 100 % | TEMPERATURE: 97.5 F | WEIGHT: 141 LBS | HEART RATE: 74 BPM | SYSTOLIC BLOOD PRESSURE: 103 MMHG | RESPIRATION RATE: 18 BRPM | DIASTOLIC BLOOD PRESSURE: 65 MMHG | HEIGHT: 67 IN

## 2022-01-01 VITALS
OXYGEN SATURATION: 100 % | BODY MASS INDEX: 21.97 KG/M2 | DIASTOLIC BLOOD PRESSURE: 83 MMHG | SYSTOLIC BLOOD PRESSURE: 163 MMHG | HEART RATE: 63 BPM | RESPIRATION RATE: 18 BRPM | WEIGHT: 140 LBS | HEIGHT: 67 IN | TEMPERATURE: 98.1 F

## 2022-01-01 VITALS
HEART RATE: 75 BPM | HEIGHT: 68 IN | WEIGHT: 139 LBS | SYSTOLIC BLOOD PRESSURE: 147 MMHG | DIASTOLIC BLOOD PRESSURE: 91 MMHG | RESPIRATION RATE: 16 BRPM | TEMPERATURE: 98 F | BODY MASS INDEX: 21.07 KG/M2 | OXYGEN SATURATION: 100 %

## 2022-01-01 VITALS
TEMPERATURE: 97.6 F | WEIGHT: 129.19 LBS | DIASTOLIC BLOOD PRESSURE: 66 MMHG | HEIGHT: 68 IN | BODY MASS INDEX: 19.58 KG/M2 | HEART RATE: 72 BPM | SYSTOLIC BLOOD PRESSURE: 159 MMHG | OXYGEN SATURATION: 100 % | RESPIRATION RATE: 18 BRPM

## 2022-01-01 VITALS
TEMPERATURE: 98 F | DIASTOLIC BLOOD PRESSURE: 23 MMHG | WEIGHT: 138.89 LBS | BODY MASS INDEX: 21.05 KG/M2 | HEIGHT: 68 IN | OXYGEN SATURATION: 73 % | RESPIRATION RATE: 5 BRPM | SYSTOLIC BLOOD PRESSURE: 56 MMHG

## 2022-01-01 VITALS
BODY MASS INDEX: 21.35 KG/M2 | HEART RATE: 68 BPM | HEIGHT: 67 IN | RESPIRATION RATE: 16 BRPM | SYSTOLIC BLOOD PRESSURE: 146 MMHG | OXYGEN SATURATION: 97 % | WEIGHT: 136 LBS | DIASTOLIC BLOOD PRESSURE: 92 MMHG | TEMPERATURE: 98 F

## 2022-01-01 VITALS
SYSTOLIC BLOOD PRESSURE: 151 MMHG | HEIGHT: 67 IN | BODY MASS INDEX: 21.66 KG/M2 | HEART RATE: 72 BPM | OXYGEN SATURATION: 99 % | DIASTOLIC BLOOD PRESSURE: 88 MMHG | RESPIRATION RATE: 18 BRPM | WEIGHT: 138.01 LBS | TEMPERATURE: 98.7 F

## 2022-01-01 DIAGNOSIS — R13.10 DIFFICULTY IN SWALLOWING: Primary | ICD-10-CM

## 2022-01-01 DIAGNOSIS — F03.90 DEMENTIA WITHOUT BEHAVIORAL DISTURBANCE, UNSPECIFIED DEMENTIA TYPE: ICD-10-CM

## 2022-01-01 DIAGNOSIS — K22.2 ESOPHAGEAL OBSTRUCTION: Primary | ICD-10-CM

## 2022-01-01 DIAGNOSIS — Z87.440 HISTORY OF UTI: ICD-10-CM

## 2022-01-01 DIAGNOSIS — R19.7 DIARRHEA, UNSPECIFIED TYPE: ICD-10-CM

## 2022-01-01 DIAGNOSIS — R11.2 NAUSEA AND VOMITING, UNSPECIFIED VOMITING TYPE: ICD-10-CM

## 2022-01-01 DIAGNOSIS — R13.10 DIFFICULTY IN SWALLOWING: ICD-10-CM

## 2022-01-01 DIAGNOSIS — E87.6 HYPOKALEMIA: ICD-10-CM

## 2022-01-01 DIAGNOSIS — E87.6 HYPOKALEMIA: Primary | ICD-10-CM

## 2022-01-01 DIAGNOSIS — N30.00 ACUTE CYSTITIS WITHOUT HEMATURIA: ICD-10-CM

## 2022-01-01 DIAGNOSIS — N39.0 COMPLICATED UTI (URINARY TRACT INFECTION): ICD-10-CM

## 2022-01-01 DIAGNOSIS — K63.89 COLON DISTENTION: ICD-10-CM

## 2022-01-01 DIAGNOSIS — N20.0 NEPHROLITHIASIS: ICD-10-CM

## 2022-01-01 DIAGNOSIS — E86.0 DEHYDRATION: ICD-10-CM

## 2022-01-01 DIAGNOSIS — G93.41 ACUTE METABOLIC ENCEPHALOPATHY: ICD-10-CM

## 2022-01-01 DIAGNOSIS — N30.01 ACUTE CYSTITIS WITH HEMATURIA: ICD-10-CM

## 2022-01-01 DIAGNOSIS — R41.0 DELIRIUM: ICD-10-CM

## 2022-01-01 DIAGNOSIS — R10.11 ABDOMINAL PAIN, RIGHT UPPER QUADRANT: ICD-10-CM

## 2022-01-01 DIAGNOSIS — K59.00 CONSTIPATION, UNSPECIFIED CONSTIPATION TYPE: Primary | ICD-10-CM

## 2022-01-01 DIAGNOSIS — N20.1 OBSTRUCTION OF LEFT URETEROPELVIC JUNCTION (UPJ) DUE TO STONE: ICD-10-CM

## 2022-01-01 DIAGNOSIS — N39.0 URINARY TRACT INFECTION WITHOUT HEMATURIA, SITE UNSPECIFIED: Primary | ICD-10-CM

## 2022-01-01 DIAGNOSIS — R06.00 ACUTE DYSPNEA: Primary | ICD-10-CM

## 2022-01-01 LAB
ALBUMIN SERPL-MCNC: 2 G/DL (ref 3.5–5)
ALBUMIN SERPL-MCNC: 2.5 G/DL (ref 3.5–5)
ALBUMIN SERPL-MCNC: 2.5 G/DL (ref 3.5–5)
ALBUMIN SERPL-MCNC: 2.6 G/DL (ref 3.5–5)
ALBUMIN SERPL-MCNC: 2.7 G/DL (ref 3.5–5)
ALBUMIN SERPL-MCNC: 2.7 G/DL (ref 3.5–5)
ALBUMIN SERPL-MCNC: 2.9 G/DL (ref 3.5–5)
ALBUMIN SERPL-MCNC: 3 G/DL (ref 3.5–5)
ALBUMIN/GLOB SERPL: 0.7 {RATIO} (ref 1.1–2.2)
ALBUMIN/GLOB SERPL: 0.8 {RATIO} (ref 1.1–2.2)
ALBUMIN/GLOB SERPL: 0.9 {RATIO} (ref 1.1–2.2)
ALP SERPL-CCNC: 52 U/L (ref 45–117)
ALP SERPL-CCNC: 54 U/L (ref 45–117)
ALP SERPL-CCNC: 55 U/L (ref 45–117)
ALP SERPL-CCNC: 56 U/L (ref 45–117)
ALP SERPL-CCNC: 59 U/L (ref 45–117)
ALP SERPL-CCNC: 59 U/L (ref 45–117)
ALP SERPL-CCNC: 61 U/L (ref 45–117)
ALP SERPL-CCNC: 62 U/L (ref 45–117)
ALP SERPL-CCNC: 68 U/L (ref 45–117)
ALT SERPL-CCNC: 10 U/L (ref 12–78)
ALT SERPL-CCNC: 12 U/L (ref 12–78)
ALT SERPL-CCNC: 14 U/L (ref 12–78)
ALT SERPL-CCNC: 8 U/L (ref 12–78)
ALT SERPL-CCNC: 9 U/L (ref 12–78)
ANION GAP BLD CALC-SCNC: 15 MMOL/L (ref 10–20)
ANION GAP SERPL CALC-SCNC: 3 MMOL/L (ref 5–15)
ANION GAP SERPL CALC-SCNC: 4 MMOL/L (ref 5–15)
ANION GAP SERPL CALC-SCNC: 5 MMOL/L (ref 5–15)
ANION GAP SERPL CALC-SCNC: 6 MMOL/L (ref 5–15)
ANION GAP SERPL CALC-SCNC: 7 MMOL/L (ref 5–15)
ANION GAP SERPL CALC-SCNC: 8 MMOL/L (ref 5–15)
ANION GAP SERPL CALC-SCNC: 8 MMOL/L (ref 5–15)
ANION GAP SERPL CALC-SCNC: 9 MMOL/L (ref 5–15)
ANION GAP SERPL CALC-SCNC: 9 MMOL/L (ref 5–15)
APPEARANCE UR: ABNORMAL
APTT PPP: 34.5 SEC (ref 22.1–31)
APTT PPP: 49.5 SEC (ref 22.1–31)
APTT PPP: >130 SEC (ref 22.1–31)
AST SERPL-CCNC: 10 U/L (ref 15–37)
AST SERPL-CCNC: 10 U/L (ref 15–37)
AST SERPL-CCNC: 11 U/L (ref 15–37)
AST SERPL-CCNC: 13 U/L (ref 15–37)
AST SERPL-CCNC: 15 U/L (ref 15–37)
AST SERPL-CCNC: 17 U/L (ref 15–37)
AST SERPL-CCNC: 18 U/L (ref 15–37)
ATRIAL RATE: 59 BPM
ATRIAL RATE: 65 BPM
ATRIAL RATE: 74 BPM
ATRIAL RATE: 78 BPM
BACTERIA SPEC CULT: ABNORMAL
BACTERIA SPEC CULT: NORMAL
BACTERIA URNS QL MICRO: ABNORMAL /HPF
BACTERIA URNS QL MICRO: NEGATIVE /HPF
BASOPHILS # BLD: 0 K/UL (ref 0–0.1)
BASOPHILS # BLD: 0.1 K/UL (ref 0–0.1)
BASOPHILS # BLD: 0.1 K/UL (ref 0–0.1)
BASOPHILS NFR BLD: 1 % (ref 0–1)
BILIRUB SERPL-MCNC: 0.4 MG/DL (ref 0.2–1)
BILIRUB SERPL-MCNC: 0.5 MG/DL (ref 0.2–1)
BILIRUB SERPL-MCNC: 0.5 MG/DL (ref 0.2–1)
BILIRUB SERPL-MCNC: 0.6 MG/DL (ref 0.2–1)
BILIRUB SERPL-MCNC: 0.6 MG/DL (ref 0.2–1)
BILIRUB SERPL-MCNC: 0.7 MG/DL (ref 0.2–1)
BILIRUB UR QL CFM: NEGATIVE
BILIRUB UR QL: NEGATIVE
BNP SERPL-MCNC: 3198 PG/ML
BUN SERPL-MCNC: 10 MG/DL (ref 6–20)
BUN SERPL-MCNC: 11 MG/DL (ref 6–20)
BUN SERPL-MCNC: 12 MG/DL (ref 6–20)
BUN SERPL-MCNC: 13 MG/DL (ref 6–20)
BUN SERPL-MCNC: 13 MG/DL (ref 6–20)
BUN SERPL-MCNC: 14 MG/DL (ref 6–20)
BUN SERPL-MCNC: 15 MG/DL (ref 6–20)
BUN SERPL-MCNC: 16 MG/DL (ref 6–20)
BUN SERPL-MCNC: 4 MG/DL (ref 6–20)
BUN SERPL-MCNC: 5 MG/DL (ref 6–20)
BUN SERPL-MCNC: 7 MG/DL (ref 6–20)
BUN SERPL-MCNC: 8 MG/DL (ref 6–20)
BUN/CREAT SERPL: 13 (ref 12–20)
BUN/CREAT SERPL: 14 (ref 12–20)
BUN/CREAT SERPL: 15 (ref 12–20)
BUN/CREAT SERPL: 16 (ref 12–20)
BUN/CREAT SERPL: 17 (ref 12–20)
BUN/CREAT SERPL: 18 (ref 12–20)
BUN/CREAT SERPL: 19 (ref 12–20)
BUN/CREAT SERPL: 19 (ref 12–20)
BUN/CREAT SERPL: 20 (ref 12–20)
BUN/CREAT SERPL: 21 (ref 12–20)
BUN/CREAT SERPL: 22 (ref 12–20)
BUN/CREAT SERPL: 22 (ref 12–20)
BUN/CREAT SERPL: 23 (ref 12–20)
BUN/CREAT SERPL: 24 (ref 12–20)
BUN/CREAT SERPL: 25 (ref 12–20)
BUN/CREAT SERPL: 27 (ref 12–20)
BUN/CREAT SERPL: 33 (ref 12–20)
BUN/CREAT SERPL: 7 (ref 12–20)
BUN/CREAT SERPL: 9 (ref 12–20)
C DIFF TOX GENS STL QL NAA+PROBE: POSITIVE
CA-I BLD-MCNC: 1.09 MMOL/L (ref 1.12–1.32)
CA-I BLD-MCNC: 1.12 MMOL/L (ref 1.12–1.32)
CALCIUM SERPL-MCNC: 6.9 MG/DL (ref 8.5–10.1)
CALCIUM SERPL-MCNC: 7.4 MG/DL (ref 8.5–10.1)
CALCIUM SERPL-MCNC: 7.6 MG/DL (ref 8.5–10.1)
CALCIUM SERPL-MCNC: 8.1 MG/DL (ref 8.5–10.1)
CALCIUM SERPL-MCNC: 8.2 MG/DL (ref 8.5–10.1)
CALCIUM SERPL-MCNC: 8.2 MG/DL (ref 8.5–10.1)
CALCIUM SERPL-MCNC: 8.3 MG/DL (ref 8.5–10.1)
CALCIUM SERPL-MCNC: 8.3 MG/DL (ref 8.5–10.1)
CALCIUM SERPL-MCNC: 8.4 MG/DL (ref 8.5–10.1)
CALCIUM SERPL-MCNC: 8.5 MG/DL (ref 8.5–10.1)
CALCIUM SERPL-MCNC: 8.6 MG/DL (ref 8.5–10.1)
CALCIUM SERPL-MCNC: 8.7 MG/DL (ref 8.5–10.1)
CALCIUM SERPL-MCNC: 8.7 MG/DL (ref 8.5–10.1)
CALCIUM SERPL-MCNC: 8.8 MG/DL (ref 8.5–10.1)
CALCIUM SERPL-MCNC: 9.1 MG/DL (ref 8.5–10.1)
CALCULATED P AXIS, ECG09: 34 DEGREES
CALCULATED P AXIS, ECG09: 44 DEGREES
CALCULATED P AXIS, ECG09: 8 DEGREES
CALCULATED R AXIS, ECG10: 28 DEGREES
CALCULATED R AXIS, ECG10: 29 DEGREES
CALCULATED R AXIS, ECG10: 31 DEGREES
CALCULATED R AXIS, ECG10: 54 DEGREES
CALCULATED T AXIS, ECG11: 18 DEGREES
CALCULATED T AXIS, ECG11: 18 DEGREES
CALCULATED T AXIS, ECG11: 2 DEGREES
CAMPYLOBACTER SPECIES, DNA: NEGATIVE
CAMPYLOBACTER SPECIES, DNA: NEGATIVE
CAOX CRY URNS QL MICRO: ABNORMAL
CC UR VC: ABNORMAL
CHLORIDE BLD-SCNC: 102 MMOL/L (ref 98–107)
CHLORIDE BLD-SCNC: 103 MMOL/L (ref 98–107)
CHLORIDE SERPL-SCNC: 102 MMOL/L (ref 97–108)
CHLORIDE SERPL-SCNC: 102 MMOL/L (ref 97–108)
CHLORIDE SERPL-SCNC: 103 MMOL/L (ref 97–108)
CHLORIDE SERPL-SCNC: 104 MMOL/L (ref 97–108)
CHLORIDE SERPL-SCNC: 105 MMOL/L (ref 97–108)
CHLORIDE SERPL-SCNC: 106 MMOL/L (ref 97–108)
CHLORIDE SERPL-SCNC: 106 MMOL/L (ref 97–108)
CHLORIDE SERPL-SCNC: 107 MMOL/L (ref 97–108)
CHLORIDE SERPL-SCNC: 108 MMOL/L (ref 97–108)
CHLORIDE SERPL-SCNC: 108 MMOL/L (ref 97–108)
CHLORIDE SERPL-SCNC: 109 MMOL/L (ref 97–108)
CHLORIDE SERPL-SCNC: 110 MMOL/L (ref 97–108)
CHLORIDE SERPL-SCNC: 112 MMOL/L (ref 97–108)
CHLORIDE SERPL-SCNC: 113 MMOL/L (ref 97–108)
CHLORIDE SERPL-SCNC: 113 MMOL/L (ref 97–108)
CHLORIDE SERPL-SCNC: 114 MMOL/L (ref 97–108)
CHLORIDE SERPL-SCNC: 115 MMOL/L (ref 97–108)
CHLORIDE SERPL-SCNC: 117 MMOL/L (ref 97–108)
CO2 BLD-SCNC: 30.3 MMOL/L (ref 21–32)
CO2 SERPL-SCNC: 23 MMOL/L (ref 21–32)
CO2 SERPL-SCNC: 24 MMOL/L (ref 21–32)
CO2 SERPL-SCNC: 25 MMOL/L (ref 21–32)
CO2 SERPL-SCNC: 26 MMOL/L (ref 21–32)
CO2 SERPL-SCNC: 27 MMOL/L (ref 21–32)
CO2 SERPL-SCNC: 28 MMOL/L (ref 21–32)
CO2 SERPL-SCNC: 29 MMOL/L (ref 21–32)
CO2 SERPL-SCNC: 30 MMOL/L (ref 21–32)
CO2 SERPL-SCNC: 31 MMOL/L (ref 21–32)
CO2 SERPL-SCNC: 32 MMOL/L (ref 21–32)
CO2 SERPL-SCNC: 33 MMOL/L (ref 21–32)
CO2 SERPL-SCNC: 33 MMOL/L (ref 21–32)
CO2 SERPL-SCNC: 35 MMOL/L (ref 21–32)
COLOR UR: ABNORMAL
COMMENT, HOLDF: NORMAL
COVID-19 RAPID TEST, COVR: NOT DETECTED
COVID-19 RAPID TEST, COVR: NOT DETECTED
CREAT BLD-MCNC: 0.55 MG/DL (ref 0.6–1.3)
CREAT BLD-MCNC: 0.74 MG/DL (ref 0.6–1.3)
CREAT SERPL-MCNC: 0.39 MG/DL (ref 0.55–1.02)
CREAT SERPL-MCNC: 0.47 MG/DL (ref 0.55–1.02)
CREAT SERPL-MCNC: 0.48 MG/DL (ref 0.55–1.02)
CREAT SERPL-MCNC: 0.49 MG/DL (ref 0.55–1.02)
CREAT SERPL-MCNC: 0.51 MG/DL (ref 0.55–1.02)
CREAT SERPL-MCNC: 0.52 MG/DL (ref 0.55–1.02)
CREAT SERPL-MCNC: 0.53 MG/DL (ref 0.55–1.02)
CREAT SERPL-MCNC: 0.55 MG/DL (ref 0.55–1.02)
CREAT SERPL-MCNC: 0.55 MG/DL (ref 0.55–1.02)
CREAT SERPL-MCNC: 0.58 MG/DL (ref 0.55–1.02)
CREAT SERPL-MCNC: 0.62 MG/DL (ref 0.55–1.02)
CREAT SERPL-MCNC: 0.63 MG/DL (ref 0.55–1.02)
CREAT SERPL-MCNC: 0.63 MG/DL (ref 0.55–1.02)
CREAT SERPL-MCNC: 0.65 MG/DL (ref 0.55–1.02)
CREAT SERPL-MCNC: 0.66 MG/DL (ref 0.55–1.02)
CREAT SERPL-MCNC: 0.67 MG/DL (ref 0.55–1.02)
CREAT SERPL-MCNC: 0.67 MG/DL (ref 0.55–1.02)
CREAT SERPL-MCNC: 0.68 MG/DL (ref 0.55–1.02)
CREAT SERPL-MCNC: 0.69 MG/DL (ref 0.55–1.02)
CREAT SERPL-MCNC: 0.7 MG/DL (ref 0.55–1.02)
CREAT SERPL-MCNC: 0.71 MG/DL (ref 0.55–1.02)
CREAT SERPL-MCNC: 0.71 MG/DL (ref 0.55–1.02)
CREAT SERPL-MCNC: 0.72 MG/DL (ref 0.55–1.02)
CREAT SERPL-MCNC: 0.73 MG/DL (ref 0.55–1.02)
CREAT SERPL-MCNC: 0.75 MG/DL (ref 0.55–1.02)
CREAT SERPL-MCNC: 0.77 MG/DL (ref 0.55–1.02)
CREAT SERPL-MCNC: 0.79 MG/DL (ref 0.55–1.02)
CREAT SERPL-MCNC: 0.8 MG/DL (ref 0.55–1.02)
D DIMER PPP FEU-MCNC: 0.94 MG/L FEU (ref 0–0.65)
DATE LAST DOSE: ABNORMAL
DIAGNOSIS, 93000: NORMAL
DIFFERENTIAL METHOD BLD: ABNORMAL
ECHO AO ASC DIAM: 3.3 CM
ECHO AO ASCENDING AORTA INDEX: 1.9 CM/M2
ECHO AO ROOT DIAM: 3 CM
ECHO AO ROOT INDEX: 1.72 CM/M2
ECHO AV AREA PEAK VELOCITY: 1.1 CM2
ECHO AV AREA VTI: 1 CM2
ECHO AV AREA/BSA PEAK VELOCITY: 0.6 CM2/M2
ECHO AV AREA/BSA VTI: 0.6 CM2/M2
ECHO AV MEAN GRADIENT: 15 MMHG
ECHO AV MEAN GRADIENT: 19 MMHG
ECHO AV MEAN VELOCITY: 1.8 M/S
ECHO AV MEAN VELOCITY: 2.1 M/S
ECHO AV PEAK GRADIENT: 28 MMHG
ECHO AV PEAK GRADIENT: 31 MMHG
ECHO AV PEAK VELOCITY: 2.7 M/S
ECHO AV PEAK VELOCITY: 2.8 M/S
ECHO AV VELOCITY RATIO: 0.19
ECHO AV VELOCITY RATIO: 0.36
ECHO AV VTI: 51.8 CM
ECHO AV VTI: 58.9 CM
ECHO LA DIAMETER INDEX: 1.43 CM/M2
ECHO LA DIAMETER INDEX: 1.78 CM/M2
ECHO LA DIAMETER: 2.5 CM
ECHO LA DIAMETER: 3.1 CM
ECHO LA TO AORTIC ROOT RATIO: 1.03
ECHO LV E' LATERAL VELOCITY: 4 CM/S
ECHO LV E' LATERAL VELOCITY: 5 CM/S
ECHO LV E' SEPTAL VELOCITY: 4 CM/S
ECHO LV E' SEPTAL VELOCITY: 5 CM/S
ECHO LV EDV A4C: 88 ML
ECHO LV EDV INDEX A4C: 50 ML/M2
ECHO LV EJECTION FRACTION A4C: 43 %
ECHO LV ESV A4C: 50 ML
ECHO LV ESV INDEX A4C: 29 ML/M2
ECHO LV FRACTIONAL SHORTENING: 31 % (ref 28–44)
ECHO LV FRACTIONAL SHORTENING: 33 % (ref 28–44)
ECHO LV INTERNAL DIMENSION DIASTOLE INDEX: 2.07 CM/M2
ECHO LV INTERNAL DIMENSION DIASTOLE INDEX: 2.29 CM/M2
ECHO LV INTERNAL DIMENSION DIASTOLIC: 3.6 CM (ref 3.9–5.3)
ECHO LV INTERNAL DIMENSION DIASTOLIC: 4 CM (ref 3.9–5.3)
ECHO LV INTERNAL DIMENSION SYSTOLIC INDEX: 1.44 CM/M2
ECHO LV INTERNAL DIMENSION SYSTOLIC INDEX: 1.54 CM/M2
ECHO LV INTERNAL DIMENSION SYSTOLIC: 2.5 CM
ECHO LV INTERNAL DIMENSION SYSTOLIC: 2.7 CM
ECHO LV IVSD: 1.1 CM (ref 0.6–0.9)
ECHO LV IVSD: 1.3 CM (ref 0.6–0.9)
ECHO LV MASS 2D: 136.2 G (ref 67–162)
ECHO LV MASS 2D: 179.9 G (ref 67–162)
ECHO LV MASS INDEX 2D: 103.4 G/M2 (ref 43–95)
ECHO LV MASS INDEX 2D: 77.8 G/M2 (ref 43–95)
ECHO LV POSTERIOR WALL DIASTOLIC: 1 CM (ref 0.6–0.9)
ECHO LV POSTERIOR WALL DIASTOLIC: 1.5 CM (ref 0.6–0.9)
ECHO LV RELATIVE WALL THICKNESS RATIO: 0.5
ECHO LV RELATIVE WALL THICKNESS RATIO: 0.83
ECHO LVOT AREA: 3.1 CM2
ECHO LVOT AV VTI INDEX: 0.23
ECHO LVOT AV VTI INDEX: 0.31
ECHO LVOT DIAM: 2 CM
ECHO LVOT MEAN GRADIENT: 1 MMHG
ECHO LVOT MEAN GRADIENT: 2 MMHG
ECHO LVOT PEAK GRADIENT: 1 MMHG
ECHO LVOT PEAK GRADIENT: 4 MMHG
ECHO LVOT PEAK VELOCITY: 0.5 M/S
ECHO LVOT PEAK VELOCITY: 1 M/S
ECHO LVOT STROKE VOLUME INDEX: 28.9 ML/M2
ECHO LVOT SV: 50.6 ML
ECHO LVOT VTI: 13.8 CM
ECHO LVOT VTI: 16.1 CM
ECHO MV A VELOCITY: 0.67 M/S
ECHO MV A VELOCITY: 0.91 M/S
ECHO MV AREA VTI: 2.2 CM2
ECHO MV E DECELERATION TIME (DT): 244.4 MS
ECHO MV E DECELERATION TIME (DT): 280.5 MS
ECHO MV E VELOCITY: 0.33 M/S
ECHO MV E VELOCITY: 0.91 M/S
ECHO MV E/A RATIO: 0.49
ECHO MV E/A RATIO: 1
ECHO MV E/E' LATERAL: 18.2
ECHO MV E/E' LATERAL: 8.25
ECHO MV E/E' RATIO (AVERAGED): 18.2
ECHO MV E/E' RATIO (AVERAGED): 8.25
ECHO MV E/E' SEPTAL: 18.2
ECHO MV E/E' SEPTAL: 8.25
ECHO MV LVOT VTI INDEX: 1.44
ECHO MV MAX VELOCITY: 1.1 M/S
ECHO MV MEAN GRADIENT: 2 MMHG
ECHO MV MEAN VELOCITY: 0.6 M/S
ECHO MV PEAK GRADIENT: 5 MMHG
ECHO MV VTI: 23.2 CM
ECHO PV MAX VELOCITY: 0.8 M/S
ECHO PV MAX VELOCITY: 1 M/S
ECHO PV PEAK GRADIENT: 3 MMHG
ECHO PV PEAK GRADIENT: 4 MMHG
ECHO TV REGURGITANT MAX VELOCITY: 2.73 M/S
ECHO TV REGURGITANT PEAK GRADIENT: 30 MMHG
ENTEROTOXIGEN E COLI, DNA: NEGATIVE
ENTEROTOXIGEN E COLI, DNA: NEGATIVE
EOSINOPHIL # BLD: 0 K/UL (ref 0–0.4)
EOSINOPHIL # BLD: 0 K/UL (ref 0–0.4)
EOSINOPHIL # BLD: 0.1 K/UL (ref 0–0.4)
EOSINOPHIL NFR BLD: 0 % (ref 0–7)
EOSINOPHIL NFR BLD: 1 % (ref 0–7)
EOSINOPHIL NFR BLD: 1 % (ref 0–7)
EOSINOPHIL NFR BLD: 2 % (ref 0–7)
EOSINOPHIL NFR BLD: 3 % (ref 0–7)
EOSINOPHIL NFR BLD: 4 % (ref 0–7)
EOSINOPHIL NFR BLD: 4 % (ref 0–7)
EPITH CASTS URNS QL MICRO: ABNORMAL /LPF
ERYTHROCYTE [DISTWIDTH] IN BLOOD BY AUTOMATED COUNT: 13.4 % (ref 11.5–14.5)
ERYTHROCYTE [DISTWIDTH] IN BLOOD BY AUTOMATED COUNT: 13.5 % (ref 11.5–14.5)
ERYTHROCYTE [DISTWIDTH] IN BLOOD BY AUTOMATED COUNT: 13.6 % (ref 11.5–14.5)
ERYTHROCYTE [DISTWIDTH] IN BLOOD BY AUTOMATED COUNT: 13.7 % (ref 11.5–14.5)
ERYTHROCYTE [DISTWIDTH] IN BLOOD BY AUTOMATED COUNT: 13.7 % (ref 11.5–14.5)
ERYTHROCYTE [DISTWIDTH] IN BLOOD BY AUTOMATED COUNT: 13.9 % (ref 11.5–14.5)
ERYTHROCYTE [DISTWIDTH] IN BLOOD BY AUTOMATED COUNT: 14.1 % (ref 11.5–14.5)
ERYTHROCYTE [DISTWIDTH] IN BLOOD BY AUTOMATED COUNT: 14.2 % (ref 11.5–14.5)
ERYTHROCYTE [DISTWIDTH] IN BLOOD BY AUTOMATED COUNT: 14.9 % (ref 11.5–14.5)
ERYTHROCYTE [DISTWIDTH] IN BLOOD BY AUTOMATED COUNT: 15 % (ref 11.5–14.5)
ERYTHROCYTE [DISTWIDTH] IN BLOOD BY AUTOMATED COUNT: 15.1 % (ref 11.5–14.5)
ERYTHROCYTE [DISTWIDTH] IN BLOOD BY AUTOMATED COUNT: 15.3 % (ref 11.5–14.5)
ERYTHROCYTE [DISTWIDTH] IN BLOOD BY AUTOMATED COUNT: 15.6 % (ref 11.5–14.5)
ERYTHROCYTE [DISTWIDTH] IN BLOOD BY AUTOMATED COUNT: 15.8 % (ref 11.5–14.5)
ERYTHROCYTE [DISTWIDTH] IN BLOOD BY AUTOMATED COUNT: 15.9 % (ref 11.5–14.5)
ERYTHROCYTE [DISTWIDTH] IN BLOOD BY AUTOMATED COUNT: 16 % (ref 11.5–14.5)
ERYTHROCYTE [DISTWIDTH] IN BLOOD BY AUTOMATED COUNT: 16.2 % (ref 11.5–14.5)
ERYTHROCYTE [DISTWIDTH] IN BLOOD BY AUTOMATED COUNT: 16.5 % (ref 11.5–14.5)
EST. AVERAGE GLUCOSE BLD GHB EST-MCNC: 97 MG/DL
EST. AVERAGE GLUCOSE BLD GHB EST-MCNC: 97 MG/DL
FOLATE SERPL-MCNC: 7.8 NG/ML (ref 5–21)
GLOBULIN SER CALC-MCNC: 3.1 G/DL (ref 2–4)
GLOBULIN SER CALC-MCNC: 3.1 G/DL (ref 2–4)
GLOBULIN SER CALC-MCNC: 3.3 G/DL (ref 2–4)
GLOBULIN SER CALC-MCNC: 3.4 G/DL (ref 2–4)
GLOBULIN SER CALC-MCNC: 3.5 G/DL (ref 2–4)
GLOBULIN SER CALC-MCNC: 3.7 G/DL (ref 2–4)
GLOBULIN SER CALC-MCNC: 3.8 G/DL (ref 2–4)
GLUCOSE BLD STRIP.AUTO-MCNC: 100 MG/DL (ref 65–117)
GLUCOSE BLD STRIP.AUTO-MCNC: 100 MG/DL (ref 65–117)
GLUCOSE BLD STRIP.AUTO-MCNC: 101 MG/DL (ref 65–117)
GLUCOSE BLD STRIP.AUTO-MCNC: 102 MG/DL (ref 65–117)
GLUCOSE BLD STRIP.AUTO-MCNC: 102 MG/DL (ref 65–117)
GLUCOSE BLD STRIP.AUTO-MCNC: 104 MG/DL (ref 65–117)
GLUCOSE BLD STRIP.AUTO-MCNC: 105 MG/DL (ref 65–117)
GLUCOSE BLD STRIP.AUTO-MCNC: 105 MG/DL (ref 65–117)
GLUCOSE BLD STRIP.AUTO-MCNC: 106 MG/DL (ref 65–117)
GLUCOSE BLD STRIP.AUTO-MCNC: 107 MG/DL (ref 65–117)
GLUCOSE BLD STRIP.AUTO-MCNC: 110 MG/DL (ref 65–117)
GLUCOSE BLD STRIP.AUTO-MCNC: 115 MG/DL (ref 65–117)
GLUCOSE BLD STRIP.AUTO-MCNC: 118 MG/DL (ref 65–117)
GLUCOSE BLD STRIP.AUTO-MCNC: 123 MG/DL (ref 65–117)
GLUCOSE BLD STRIP.AUTO-MCNC: 123 MG/DL (ref 65–117)
GLUCOSE BLD STRIP.AUTO-MCNC: 125 MG/DL (ref 65–117)
GLUCOSE BLD STRIP.AUTO-MCNC: 134 MG/DL (ref 65–117)
GLUCOSE BLD STRIP.AUTO-MCNC: 181 MG/DL (ref 65–117)
GLUCOSE BLD STRIP.AUTO-MCNC: 220 MG/DL (ref 65–117)
GLUCOSE BLD STRIP.AUTO-MCNC: 315 MG/DL (ref 65–117)
GLUCOSE BLD STRIP.AUTO-MCNC: 58 MG/DL (ref 65–117)
GLUCOSE BLD STRIP.AUTO-MCNC: 59 MG/DL (ref 65–117)
GLUCOSE BLD STRIP.AUTO-MCNC: 60 MG/DL (ref 65–117)
GLUCOSE BLD STRIP.AUTO-MCNC: 61 MG/DL (ref 65–117)
GLUCOSE BLD STRIP.AUTO-MCNC: 65 MG/DL (ref 65–117)
GLUCOSE BLD STRIP.AUTO-MCNC: 66 MG/DL (ref 65–117)
GLUCOSE BLD STRIP.AUTO-MCNC: 69 MG/DL (ref 65–117)
GLUCOSE BLD STRIP.AUTO-MCNC: 69 MG/DL (ref 65–117)
GLUCOSE BLD STRIP.AUTO-MCNC: 72 MG/DL (ref 65–117)
GLUCOSE BLD STRIP.AUTO-MCNC: 73 MG/DL (ref 65–117)
GLUCOSE BLD STRIP.AUTO-MCNC: 73 MG/DL (ref 65–117)
GLUCOSE BLD STRIP.AUTO-MCNC: 76 MG/DL (ref 65–117)
GLUCOSE BLD STRIP.AUTO-MCNC: 77 MG/DL (ref 65–117)
GLUCOSE BLD STRIP.AUTO-MCNC: 77 MG/DL (ref 65–117)
GLUCOSE BLD STRIP.AUTO-MCNC: 78 MG/DL (ref 65–117)
GLUCOSE BLD STRIP.AUTO-MCNC: 79 MG/DL (ref 65–117)
GLUCOSE BLD STRIP.AUTO-MCNC: 80 MG/DL (ref 65–117)
GLUCOSE BLD STRIP.AUTO-MCNC: 81 MG/DL (ref 65–117)
GLUCOSE BLD STRIP.AUTO-MCNC: 83 MG/DL (ref 65–117)
GLUCOSE BLD STRIP.AUTO-MCNC: 84 MG/DL (ref 65–117)
GLUCOSE BLD STRIP.AUTO-MCNC: 85 MG/DL (ref 65–117)
GLUCOSE BLD STRIP.AUTO-MCNC: 87 MG/DL (ref 65–117)
GLUCOSE BLD STRIP.AUTO-MCNC: 87 MG/DL (ref 65–117)
GLUCOSE BLD STRIP.AUTO-MCNC: 88 MG/DL (ref 65–117)
GLUCOSE BLD STRIP.AUTO-MCNC: 89 MG/DL (ref 65–117)
GLUCOSE BLD STRIP.AUTO-MCNC: 91 MG/DL (ref 65–117)
GLUCOSE BLD STRIP.AUTO-MCNC: 91 MG/DL (ref 65–117)
GLUCOSE BLD STRIP.AUTO-MCNC: 92 MG/DL (ref 65–117)
GLUCOSE BLD STRIP.AUTO-MCNC: 93 MG/DL (ref 65–117)
GLUCOSE BLD STRIP.AUTO-MCNC: 94 MG/DL (ref 65–117)
GLUCOSE BLD STRIP.AUTO-MCNC: 95 MG/DL (ref 65–117)
GLUCOSE BLD STRIP.AUTO-MCNC: 95 MG/DL (ref 65–117)
GLUCOSE BLD STRIP.AUTO-MCNC: 96 MG/DL (ref 65–117)
GLUCOSE BLD STRIP.AUTO-MCNC: 96 MG/DL (ref 65–117)
GLUCOSE BLD STRIP.AUTO-MCNC: 97 MG/DL (ref 65–117)
GLUCOSE BLD STRIP.AUTO-MCNC: 97 MG/DL (ref 65–117)
GLUCOSE BLD STRIP.AUTO-MCNC: 98 MG/DL (ref 65–117)
GLUCOSE BLD-MCNC: 105 MG/DL (ref 65–100)
GLUCOSE BLD-MCNC: 74 MG/DL (ref 65–100)
GLUCOSE SERPL-MCNC: 100 MG/DL (ref 65–100)
GLUCOSE SERPL-MCNC: 101 MG/DL (ref 65–100)
GLUCOSE SERPL-MCNC: 108 MG/DL (ref 65–100)
GLUCOSE SERPL-MCNC: 108 MG/DL (ref 65–100)
GLUCOSE SERPL-MCNC: 114 MG/DL (ref 65–100)
GLUCOSE SERPL-MCNC: 118 MG/DL (ref 65–100)
GLUCOSE SERPL-MCNC: 120 MG/DL (ref 65–100)
GLUCOSE SERPL-MCNC: 126 MG/DL (ref 65–100)
GLUCOSE SERPL-MCNC: 57 MG/DL (ref 65–100)
GLUCOSE SERPL-MCNC: 69 MG/DL (ref 65–100)
GLUCOSE SERPL-MCNC: 72 MG/DL (ref 65–100)
GLUCOSE SERPL-MCNC: 74 MG/DL (ref 65–100)
GLUCOSE SERPL-MCNC: 75 MG/DL (ref 65–100)
GLUCOSE SERPL-MCNC: 78 MG/DL (ref 65–100)
GLUCOSE SERPL-MCNC: 79 MG/DL (ref 65–100)
GLUCOSE SERPL-MCNC: 81 MG/DL (ref 65–100)
GLUCOSE SERPL-MCNC: 82 MG/DL (ref 65–100)
GLUCOSE SERPL-MCNC: 83 MG/DL (ref 65–100)
GLUCOSE SERPL-MCNC: 84 MG/DL (ref 65–100)
GLUCOSE SERPL-MCNC: 84 MG/DL (ref 65–100)
GLUCOSE SERPL-MCNC: 86 MG/DL (ref 65–100)
GLUCOSE SERPL-MCNC: 87 MG/DL (ref 65–100)
GLUCOSE SERPL-MCNC: 87 MG/DL (ref 65–100)
GLUCOSE SERPL-MCNC: 88 MG/DL (ref 65–100)
GLUCOSE SERPL-MCNC: 89 MG/DL (ref 65–100)
GLUCOSE SERPL-MCNC: 90 MG/DL (ref 65–100)
GLUCOSE SERPL-MCNC: 93 MG/DL (ref 65–100)
GLUCOSE SERPL-MCNC: 94 MG/DL (ref 65–100)
GLUCOSE SERPL-MCNC: 94 MG/DL (ref 65–100)
GLUCOSE SERPL-MCNC: 95 MG/DL (ref 65–100)
GLUCOSE SERPL-MCNC: 95 MG/DL (ref 65–100)
GLUCOSE SERPL-MCNC: 98 MG/DL (ref 65–100)
GLUCOSE UR STRIP.AUTO-MCNC: NEGATIVE MG/DL
HBA1C MFR BLD: 5 % (ref 4–5.6)
HBA1C MFR BLD: 5 % (ref 4–5.6)
HCT VFR BLD AUTO: 25.1 % (ref 35–47)
HCT VFR BLD AUTO: 28.5 % (ref 35–47)
HCT VFR BLD AUTO: 28.7 % (ref 35–47)
HCT VFR BLD AUTO: 29.9 % (ref 35–47)
HCT VFR BLD AUTO: 30.6 % (ref 35–47)
HCT VFR BLD AUTO: 30.7 % (ref 35–47)
HCT VFR BLD AUTO: 30.8 % (ref 35–47)
HCT VFR BLD AUTO: 31.1 % (ref 35–47)
HCT VFR BLD AUTO: 31.4 % (ref 35–47)
HCT VFR BLD AUTO: 31.6 % (ref 35–47)
HCT VFR BLD AUTO: 31.9 % (ref 35–47)
HCT VFR BLD AUTO: 32.7 % (ref 35–47)
HCT VFR BLD AUTO: 32.8 % (ref 35–47)
HCT VFR BLD AUTO: 33.1 % (ref 35–47)
HCT VFR BLD AUTO: 33.2 % (ref 35–47)
HCT VFR BLD AUTO: 33.2 % (ref 35–47)
HCT VFR BLD AUTO: 33.4 % (ref 35–47)
HCT VFR BLD AUTO: 33.6 % (ref 35–47)
HCT VFR BLD AUTO: 33.6 % (ref 35–47)
HCT VFR BLD AUTO: 34.1 % (ref 35–47)
HCT VFR BLD AUTO: 34.2 % (ref 35–47)
HCT VFR BLD AUTO: 35 % (ref 35–47)
HCT VFR BLD AUTO: 35.7 % (ref 35–47)
HCT VFR BLD AUTO: 36.5 % (ref 35–47)
HCT VFR BLD AUTO: 37 % (ref 35–47)
HCT VFR BLD AUTO: 37.6 % (ref 35–47)
HGB BLD-MCNC: 10.1 G/DL (ref 11.5–16)
HGB BLD-MCNC: 10.2 G/DL (ref 11.5–16)
HGB BLD-MCNC: 10.3 G/DL (ref 11.5–16)
HGB BLD-MCNC: 10.4 G/DL (ref 11.5–16)
HGB BLD-MCNC: 10.5 G/DL (ref 11.5–16)
HGB BLD-MCNC: 10.5 G/DL (ref 11.5–16)
HGB BLD-MCNC: 10.6 G/DL (ref 11.5–16)
HGB BLD-MCNC: 10.6 G/DL (ref 11.5–16)
HGB BLD-MCNC: 10.7 G/DL (ref 11.5–16)
HGB BLD-MCNC: 10.9 G/DL (ref 11.5–16)
HGB BLD-MCNC: 11 G/DL (ref 11.5–16)
HGB BLD-MCNC: 11.1 G/DL (ref 11.5–16)
HGB BLD-MCNC: 11.8 G/DL (ref 11.5–16)
HGB BLD-MCNC: 7.6 G/DL (ref 11.5–16)
HGB BLD-MCNC: 8.9 G/DL (ref 11.5–16)
HGB BLD-MCNC: 9 G/DL (ref 11.5–16)
HGB BLD-MCNC: 9.2 G/DL (ref 11.5–16)
HGB BLD-MCNC: 9.3 G/DL (ref 11.5–16)
HGB BLD-MCNC: 9.4 G/DL (ref 11.5–16)
HGB BLD-MCNC: 9.5 G/DL (ref 11.5–16)
HGB BLD-MCNC: 9.6 G/DL (ref 11.5–16)
HGB BLD-MCNC: 9.7 G/DL (ref 11.5–16)
HGB BLD-MCNC: 9.8 G/DL (ref 11.5–16)
HGB BLD-MCNC: 9.9 G/DL (ref 11.5–16)
HGB UR QL STRIP: ABNORMAL
HGB UR QL STRIP: NEGATIVE
HYALINE CASTS URNS QL MICRO: ABNORMAL /LPF (ref 0–5)
IMM GRANULOCYTES # BLD AUTO: 0 K/UL (ref 0–0.04)
IMM GRANULOCYTES NFR BLD AUTO: 0 % (ref 0–0.5)
IMM GRANULOCYTES NFR BLD AUTO: 1 % (ref 0–0.5)
INTERPRETATION: ABNORMAL
KETONES UR QL STRIP.AUTO: NEGATIVE MG/DL
LACTATE BLD-SCNC: 1.06 MMOL/L (ref 0.4–2)
LACTATE SERPL-SCNC: 0.5 MMOL/L (ref 0.4–2)
LEUKOCYTE ESTERASE UR QL STRIP.AUTO: ABNORMAL
LIPASE SERPL-CCNC: 49 U/L (ref 73–393)
LIPASE SERPL-CCNC: 68 U/L (ref 73–393)
LYMPHOCYTES # BLD: 0.8 K/UL (ref 0.8–3.5)
LYMPHOCYTES # BLD: 0.9 K/UL (ref 0.8–3.5)
LYMPHOCYTES # BLD: 1 K/UL (ref 0.8–3.5)
LYMPHOCYTES # BLD: 1.1 K/UL (ref 0.8–3.5)
LYMPHOCYTES # BLD: 1.2 K/UL (ref 0.8–3.5)
LYMPHOCYTES # BLD: 1.3 K/UL (ref 0.8–3.5)
LYMPHOCYTES # BLD: 1.4 K/UL (ref 0.8–3.5)
LYMPHOCYTES # BLD: 1.5 K/UL (ref 0.8–3.5)
LYMPHOCYTES # BLD: 1.6 K/UL (ref 0.8–3.5)
LYMPHOCYTES # BLD: 1.8 K/UL (ref 0.8–3.5)
LYMPHOCYTES # BLD: 1.8 K/UL (ref 0.8–3.5)
LYMPHOCYTES # BLD: 1.9 K/UL (ref 0.8–3.5)
LYMPHOCYTES # BLD: 2.1 K/UL (ref 0.8–3.5)
LYMPHOCYTES # BLD: 2.3 K/UL (ref 0.8–3.5)
LYMPHOCYTES NFR BLD: 12 % (ref 12–49)
LYMPHOCYTES NFR BLD: 15 % (ref 12–49)
LYMPHOCYTES NFR BLD: 24 % (ref 12–49)
LYMPHOCYTES NFR BLD: 29 % (ref 12–49)
LYMPHOCYTES NFR BLD: 31 % (ref 12–49)
LYMPHOCYTES NFR BLD: 33 % (ref 12–49)
LYMPHOCYTES NFR BLD: 34 % (ref 12–49)
LYMPHOCYTES NFR BLD: 35 % (ref 12–49)
LYMPHOCYTES NFR BLD: 38 % (ref 12–49)
LYMPHOCYTES NFR BLD: 42 % (ref 12–49)
LYMPHOCYTES NFR BLD: 45 % (ref 12–49)
LYMPHOCYTES NFR BLD: 49 % (ref 12–49)
LYMPHOCYTES NFR BLD: 52 % (ref 12–49)
LYMPHOCYTES NFR BLD: 54 % (ref 12–49)
LYMPHOCYTES NFR BLD: 55 % (ref 12–49)
MAGNESIUM SERPL-MCNC: 1.4 MG/DL (ref 1.6–2.4)
MAGNESIUM SERPL-MCNC: 1.7 MG/DL (ref 1.6–2.4)
MAGNESIUM SERPL-MCNC: 1.8 MG/DL (ref 1.6–2.4)
MAGNESIUM SERPL-MCNC: 1.8 MG/DL (ref 1.6–2.4)
MAGNESIUM SERPL-MCNC: 1.9 MG/DL (ref 1.6–2.4)
MAGNESIUM SERPL-MCNC: 1.9 MG/DL (ref 1.6–2.4)
MAGNESIUM SERPL-MCNC: 2.1 MG/DL (ref 1.6–2.4)
MAGNESIUM SERPL-MCNC: 2.2 MG/DL (ref 1.6–2.4)
MCH RBC QN AUTO: 25.8 PG (ref 26–34)
MCH RBC QN AUTO: 26.2 PG (ref 26–34)
MCH RBC QN AUTO: 26.2 PG (ref 26–34)
MCH RBC QN AUTO: 26.6 PG (ref 26–34)
MCH RBC QN AUTO: 26.6 PG (ref 26–34)
MCH RBC QN AUTO: 26.7 PG (ref 26–34)
MCH RBC QN AUTO: 26.8 PG (ref 26–34)
MCH RBC QN AUTO: 26.8 PG (ref 26–34)
MCH RBC QN AUTO: 26.9 PG (ref 26–34)
MCH RBC QN AUTO: 27.1 PG (ref 26–34)
MCH RBC QN AUTO: 27.2 PG (ref 26–34)
MCH RBC QN AUTO: 27.3 PG (ref 26–34)
MCH RBC QN AUTO: 27.3 PG (ref 26–34)
MCH RBC QN AUTO: 27.4 PG (ref 26–34)
MCH RBC QN AUTO: 27.5 PG (ref 26–34)
MCH RBC QN AUTO: 27.5 PG (ref 26–34)
MCH RBC QN AUTO: 27.6 PG (ref 26–34)
MCH RBC QN AUTO: 27.7 PG (ref 26–34)
MCHC RBC AUTO-ENTMCNC: 28.9 G/DL (ref 30–36.5)
MCHC RBC AUTO-ENTMCNC: 29.9 G/DL (ref 30–36.5)
MCHC RBC AUTO-ENTMCNC: 30.1 G/DL (ref 30–36.5)
MCHC RBC AUTO-ENTMCNC: 30.3 G/DL (ref 30–36.5)
MCHC RBC AUTO-ENTMCNC: 30.4 G/DL (ref 30–36.5)
MCHC RBC AUTO-ENTMCNC: 30.7 G/DL (ref 30–36.5)
MCHC RBC AUTO-ENTMCNC: 30.7 G/DL (ref 30–36.5)
MCHC RBC AUTO-ENTMCNC: 30.8 G/DL (ref 30–36.5)
MCHC RBC AUTO-ENTMCNC: 30.9 G/DL (ref 30–36.5)
MCHC RBC AUTO-ENTMCNC: 31 G/DL (ref 30–36.5)
MCHC RBC AUTO-ENTMCNC: 31 G/DL (ref 30–36.5)
MCHC RBC AUTO-ENTMCNC: 31.1 G/DL (ref 30–36.5)
MCHC RBC AUTO-ENTMCNC: 31.1 G/DL (ref 30–36.5)
MCHC RBC AUTO-ENTMCNC: 31.2 G/DL (ref 30–36.5)
MCHC RBC AUTO-ENTMCNC: 31.3 G/DL (ref 30–36.5)
MCHC RBC AUTO-ENTMCNC: 31.3 G/DL (ref 30–36.5)
MCHC RBC AUTO-ENTMCNC: 31.4 G/DL (ref 30–36.5)
MCHC RBC AUTO-ENTMCNC: 31.4 G/DL (ref 30–36.5)
MCHC RBC AUTO-ENTMCNC: 31.5 G/DL (ref 30–36.5)
MCHC RBC AUTO-ENTMCNC: 31.5 G/DL (ref 30–36.5)
MCHC RBC AUTO-ENTMCNC: 32 G/DL (ref 30–36.5)
MCV RBC AUTO: 83.6 FL (ref 80–99)
MCV RBC AUTO: 84.7 FL (ref 80–99)
MCV RBC AUTO: 85.2 FL (ref 80–99)
MCV RBC AUTO: 85.2 FL (ref 80–99)
MCV RBC AUTO: 85.4 FL (ref 80–99)
MCV RBC AUTO: 85.8 FL (ref 80–99)
MCV RBC AUTO: 86.5 FL (ref 80–99)
MCV RBC AUTO: 87 FL (ref 80–99)
MCV RBC AUTO: 87.3 FL (ref 80–99)
MCV RBC AUTO: 87.4 FL (ref 80–99)
MCV RBC AUTO: 87.4 FL (ref 80–99)
MCV RBC AUTO: 87.6 FL (ref 80–99)
MCV RBC AUTO: 87.7 FL (ref 80–99)
MCV RBC AUTO: 87.9 FL (ref 80–99)
MCV RBC AUTO: 88.2 FL (ref 80–99)
MCV RBC AUTO: 88.2 FL (ref 80–99)
MCV RBC AUTO: 88.3 FL (ref 80–99)
MCV RBC AUTO: 88.6 FL (ref 80–99)
MCV RBC AUTO: 88.6 FL (ref 80–99)
MCV RBC AUTO: 88.7 FL (ref 80–99)
MCV RBC AUTO: 88.9 FL (ref 80–99)
MCV RBC AUTO: 95.4 FL (ref 80–99)
MONOCYTES # BLD: 0.2 K/UL (ref 0–1)
MONOCYTES # BLD: 0.2 K/UL (ref 0–1)
MONOCYTES # BLD: 0.3 K/UL (ref 0–1)
MONOCYTES # BLD: 0.4 K/UL (ref 0–1)
MONOCYTES # BLD: 0.5 K/UL (ref 0–1)
MONOCYTES NFR BLD: 10 % (ref 5–13)
MONOCYTES NFR BLD: 11 % (ref 5–13)
MONOCYTES NFR BLD: 12 % (ref 5–13)
MONOCYTES NFR BLD: 14 % (ref 5–13)
MONOCYTES NFR BLD: 6 % (ref 5–13)
MONOCYTES NFR BLD: 7 % (ref 5–13)
MONOCYTES NFR BLD: 7 % (ref 5–13)
MONOCYTES NFR BLD: 8 % (ref 5–13)
MONOCYTES NFR BLD: 9 % (ref 5–13)
NEUTS SEG # BLD: 0.9 K/UL (ref 1.8–8)
NEUTS SEG # BLD: 1 K/UL (ref 1.8–8)
NEUTS SEG # BLD: 1.1 K/UL (ref 1.8–8)
NEUTS SEG # BLD: 1.2 K/UL (ref 1.8–8)
NEUTS SEG # BLD: 1.4 K/UL (ref 1.8–8)
NEUTS SEG # BLD: 1.4 K/UL (ref 1.8–8)
NEUTS SEG # BLD: 1.6 K/UL (ref 1.8–8)
NEUTS SEG # BLD: 1.6 K/UL (ref 1.8–8)
NEUTS SEG # BLD: 1.7 K/UL (ref 1.8–8)
NEUTS SEG # BLD: 1.9 K/UL (ref 1.8–8)
NEUTS SEG # BLD: 2 K/UL (ref 1.8–8)
NEUTS SEG # BLD: 2.1 K/UL (ref 1.8–8)
NEUTS SEG # BLD: 2.3 K/UL (ref 1.8–8)
NEUTS SEG # BLD: 2.5 K/UL (ref 1.8–8)
NEUTS SEG # BLD: 2.5 K/UL (ref 1.8–8)
NEUTS SEG # BLD: 4.1 K/UL (ref 1.8–8)
NEUTS SEG # BLD: 5.9 K/UL (ref 1.8–8)
NEUTS SEG NFR BLD: 32 % (ref 32–75)
NEUTS SEG NFR BLD: 33 % (ref 32–75)
NEUTS SEG NFR BLD: 35 % (ref 32–75)
NEUTS SEG NFR BLD: 36 % (ref 32–75)
NEUTS SEG NFR BLD: 37 % (ref 32–75)
NEUTS SEG NFR BLD: 37 % (ref 32–75)
NEUTS SEG NFR BLD: 41 % (ref 32–75)
NEUTS SEG NFR BLD: 49 % (ref 32–75)
NEUTS SEG NFR BLD: 49 % (ref 32–75)
NEUTS SEG NFR BLD: 51 % (ref 32–75)
NEUTS SEG NFR BLD: 55 % (ref 32–75)
NEUTS SEG NFR BLD: 56 % (ref 32–75)
NEUTS SEG NFR BLD: 58 % (ref 32–75)
NEUTS SEG NFR BLD: 58 % (ref 32–75)
NEUTS SEG NFR BLD: 62 % (ref 32–75)
NEUTS SEG NFR BLD: 62 % (ref 32–75)
NEUTS SEG NFR BLD: 63 % (ref 32–75)
NEUTS SEG NFR BLD: 74 % (ref 32–75)
NEUTS SEG NFR BLD: 81 % (ref 32–75)
NITRITE UR QL STRIP.AUTO: NEGATIVE
NRBC # BLD: 0 K/UL (ref 0–0.01)
NRBC BLD-RTO: 0 PER 100 WBC
P SHIGELLOIDES DNA STL QL NAA+PROBE: NEGATIVE
P SHIGELLOIDES DNA STL QL NAA+PROBE: NEGATIVE
P-R INTERVAL, ECG05: 134 MS
P-R INTERVAL, ECG05: 138 MS
P-R INTERVAL, ECG05: 158 MS
PCR REFLEX: ABNORMAL
PH BLDV: 7.4 [PH] (ref 7.32–7.42)
PH UR STRIP: 6 [PH] (ref 5–8)
PH UR STRIP: 6.5 [PH] (ref 5–8)
PHOSPHATE SERPL-MCNC: 1.8 MG/DL (ref 2.6–4.7)
PHOSPHATE SERPL-MCNC: 3 MG/DL (ref 2.6–4.7)
PHOSPHATE SERPL-MCNC: 3 MG/DL (ref 2.6–4.7)
PLATELET # BLD AUTO: 158 K/UL (ref 150–400)
PLATELET # BLD AUTO: 166 K/UL (ref 150–400)
PLATELET # BLD AUTO: 177 K/UL (ref 150–400)
PLATELET # BLD AUTO: 178 K/UL (ref 150–400)
PLATELET # BLD AUTO: 180 K/UL (ref 150–400)
PLATELET # BLD AUTO: 183 K/UL (ref 150–400)
PLATELET # BLD AUTO: 188 K/UL (ref 150–400)
PLATELET # BLD AUTO: 192 K/UL (ref 150–400)
PLATELET # BLD AUTO: 195 K/UL (ref 150–400)
PLATELET # BLD AUTO: 198 K/UL (ref 150–400)
PLATELET # BLD AUTO: 199 K/UL (ref 150–400)
PLATELET # BLD AUTO: 203 K/UL (ref 150–400)
PLATELET # BLD AUTO: 204 K/UL (ref 150–400)
PLATELET # BLD AUTO: 211 K/UL (ref 150–400)
PLATELET # BLD AUTO: 217 K/UL (ref 150–400)
PLATELET # BLD AUTO: 217 K/UL (ref 150–400)
PLATELET # BLD AUTO: 219 K/UL (ref 150–400)
PLATELET # BLD AUTO: 221 K/UL (ref 150–400)
PLATELET # BLD AUTO: 222 K/UL (ref 150–400)
PLATELET # BLD AUTO: 229 K/UL (ref 150–400)
PLATELET # BLD AUTO: 229 K/UL (ref 150–400)
PLATELET # BLD AUTO: 232 K/UL (ref 150–400)
PLATELET # BLD AUTO: 234 K/UL (ref 150–400)
PLATELET # BLD AUTO: 246 K/UL (ref 150–400)
PMV BLD AUTO: 10 FL (ref 8.9–12.9)
PMV BLD AUTO: 10.2 FL (ref 8.9–12.9)
PMV BLD AUTO: 10.3 FL (ref 8.9–12.9)
PMV BLD AUTO: 10.4 FL (ref 8.9–12.9)
PMV BLD AUTO: 10.5 FL (ref 8.9–12.9)
PMV BLD AUTO: 10.5 FL (ref 8.9–12.9)
PMV BLD AUTO: 10.6 FL (ref 8.9–12.9)
PMV BLD AUTO: 10.6 FL (ref 8.9–12.9)
PMV BLD AUTO: 10.7 FL (ref 8.9–12.9)
PMV BLD AUTO: 10.8 FL (ref 8.9–12.9)
PMV BLD AUTO: 10.9 FL (ref 8.9–12.9)
PMV BLD AUTO: 11.2 FL (ref 8.9–12.9)
PMV BLD AUTO: 11.3 FL (ref 8.9–12.9)
PMV BLD AUTO: 11.4 FL (ref 8.9–12.9)
PMV BLD AUTO: 11.8 FL (ref 8.9–12.9)
PMV BLD AUTO: 11.9 FL (ref 8.9–12.9)
PMV BLD AUTO: 12 FL (ref 8.9–12.9)
PMV BLD AUTO: 9.7 FL (ref 8.9–12.9)
POTASSIUM BLD-SCNC: 2.2 MMOL/L (ref 3.5–5.1)
POTASSIUM BLD-SCNC: 3 MMOL/L (ref 3.5–5.1)
POTASSIUM SERPL-SCNC: 2.4 MMOL/L (ref 3.5–5.1)
POTASSIUM SERPL-SCNC: 2.6 MMOL/L (ref 3.5–5.1)
POTASSIUM SERPL-SCNC: 2.7 MMOL/L (ref 3.5–5.1)
POTASSIUM SERPL-SCNC: 2.8 MMOL/L (ref 3.5–5.1)
POTASSIUM SERPL-SCNC: 2.8 MMOL/L (ref 3.5–5.1)
POTASSIUM SERPL-SCNC: 2.9 MMOL/L (ref 3.5–5.1)
POTASSIUM SERPL-SCNC: 2.9 MMOL/L (ref 3.5–5.1)
POTASSIUM SERPL-SCNC: 3 MMOL/L (ref 3.5–5.1)
POTASSIUM SERPL-SCNC: 3.1 MMOL/L (ref 3.5–5.1)
POTASSIUM SERPL-SCNC: 3.2 MMOL/L (ref 3.5–5.1)
POTASSIUM SERPL-SCNC: 3.2 MMOL/L (ref 3.5–5.1)
POTASSIUM SERPL-SCNC: 3.3 MMOL/L (ref 3.5–5.1)
POTASSIUM SERPL-SCNC: 3.3 MMOL/L (ref 3.5–5.1)
POTASSIUM SERPL-SCNC: 3.4 MMOL/L (ref 3.5–5.1)
POTASSIUM SERPL-SCNC: 3.4 MMOL/L (ref 3.5–5.1)
POTASSIUM SERPL-SCNC: 3.5 MMOL/L (ref 3.5–5.1)
POTASSIUM SERPL-SCNC: 3.5 MMOL/L (ref 3.5–5.1)
POTASSIUM SERPL-SCNC: 3.6 MMOL/L (ref 3.5–5.1)
POTASSIUM SERPL-SCNC: 3.6 MMOL/L (ref 3.5–5.1)
POTASSIUM SERPL-SCNC: 3.7 MMOL/L (ref 3.5–5.1)
POTASSIUM SERPL-SCNC: 3.7 MMOL/L (ref 3.5–5.1)
POTASSIUM SERPL-SCNC: 3.8 MMOL/L (ref 3.5–5.1)
POTASSIUM SERPL-SCNC: 3.8 MMOL/L (ref 3.5–5.1)
POTASSIUM SERPL-SCNC: 4.3 MMOL/L (ref 3.5–5.1)
PROT SERPL-MCNC: 5.6 G/DL (ref 6.4–8.2)
PROT SERPL-MCNC: 5.7 G/DL (ref 6.4–8.2)
PROT SERPL-MCNC: 5.9 G/DL (ref 6.4–8.2)
PROT SERPL-MCNC: 6 G/DL (ref 6.4–8.2)
PROT SERPL-MCNC: 6.3 G/DL (ref 6.4–8.2)
PROT SERPL-MCNC: 6.4 G/DL (ref 6.4–8.2)
PROT SERPL-MCNC: 6.5 G/DL (ref 6.4–8.2)
PROT SERPL-MCNC: 6.5 G/DL (ref 6.4–8.2)
PROT SERPL-MCNC: 6.7 G/DL (ref 6.4–8.2)
PROT UR STRIP-MCNC: 100 MG/DL
PROT UR STRIP-MCNC: 100 MG/DL
PROT UR STRIP-MCNC: 300 MG/DL
PROT UR STRIP-MCNC: >300 MG/DL
PROT UR STRIP-MCNC: ABNORMAL MG/DL
Q-T INTERVAL, ECG07: 402 MS
Q-T INTERVAL, ECG07: 404 MS
Q-T INTERVAL, ECG07: 420 MS
Q-T INTERVAL, ECG07: 436 MS
QRS DURATION, ECG06: 90 MS
QRS DURATION, ECG06: 92 MS
QRS DURATION, ECG06: 98 MS
QRS DURATION, ECG06: 98 MS
QTC CALCULATION (BEZET), ECG08: 431 MS
QTC CALCULATION (BEZET), ECG08: 436 MS
QTC CALCULATION (BEZET), ECG08: 451 MS
QTC CALCULATION (BEZET), ECG08: 458 MS
RBC # BLD AUTO: 2.83 M/UL (ref 3.8–5.2)
RBC # BLD AUTO: 3.25 M/UL (ref 3.8–5.2)
RBC # BLD AUTO: 3.25 M/UL (ref 3.8–5.2)
RBC # BLD AUTO: 3.4 M/UL (ref 3.8–5.2)
RBC # BLD AUTO: 3.5 M/UL (ref 3.8–5.2)
RBC # BLD AUTO: 3.5 M/UL (ref 3.8–5.2)
RBC # BLD AUTO: 3.54 M/UL (ref 3.8–5.2)
RBC # BLD AUTO: 3.55 M/UL (ref 3.8–5.2)
RBC # BLD AUTO: 3.56 M/UL (ref 3.8–5.2)
RBC # BLD AUTO: 3.71 M/UL (ref 3.8–5.2)
RBC # BLD AUTO: 3.72 M/UL (ref 3.8–5.2)
RBC # BLD AUTO: 3.79 M/UL (ref 3.8–5.2)
RBC # BLD AUTO: 3.8 M/UL (ref 3.8–5.2)
RBC # BLD AUTO: 3.83 M/UL (ref 3.8–5.2)
RBC # BLD AUTO: 3.83 M/UL (ref 3.8–5.2)
RBC # BLD AUTO: 3.84 M/UL (ref 3.8–5.2)
RBC # BLD AUTO: 3.85 M/UL (ref 3.8–5.2)
RBC # BLD AUTO: 3.85 M/UL (ref 3.8–5.2)
RBC # BLD AUTO: 3.86 M/UL (ref 3.8–5.2)
RBC # BLD AUTO: 3.88 M/UL (ref 3.8–5.2)
RBC # BLD AUTO: 3.9 M/UL (ref 3.8–5.2)
RBC # BLD AUTO: 3.91 M/UL (ref 3.8–5.2)
RBC # BLD AUTO: 4.01 M/UL (ref 3.8–5.2)
RBC # BLD AUTO: 4.03 M/UL (ref 3.8–5.2)
RBC # BLD AUTO: 4.14 M/UL (ref 3.8–5.2)
RBC # BLD AUTO: 4.5 M/UL (ref 3.8–5.2)
RBC #/AREA URNS HPF: >100 /HPF (ref 0–5)
RBC #/AREA URNS HPF: >100 /HPF (ref 0–5)
RBC #/AREA URNS HPF: ABNORMAL /HPF (ref 0–5)
RBC MORPH BLD: ABNORMAL
RBC MORPH BLD: ABNORMAL
REPORTED DOSE,DOSE: ABNORMAL UNITS
REPORTED DOSE/TIME,TMG: ABNORMAL
SALMONELLA SPECIES, DNA: NEGATIVE
SALMONELLA SPECIES, DNA: NEGATIVE
SAMPLES BEING HELD,HOLD: NORMAL
SERVICE CMNT-IMP: 20
SERVICE CMNT-IMP: ABNORMAL
SERVICE CMNT-IMP: NORMAL
SHIGA TOXIN PRODUCING, DNA: NEGATIVE
SHIGA TOXIN PRODUCING, DNA: NEGATIVE
SHIGELLA SP+EIEC IPAH STL QL NAA+PROBE: NEGATIVE
SHIGELLA SP+EIEC IPAH STL QL NAA+PROBE: NEGATIVE
SODIUM BLD-SCNC: 141 MMOL/L (ref 136–145)
SODIUM BLD-SCNC: 146 MMOL/L (ref 136–145)
SODIUM SERPL-SCNC: 136 MMOL/L (ref 136–145)
SODIUM SERPL-SCNC: 136 MMOL/L (ref 136–145)
SODIUM SERPL-SCNC: 137 MMOL/L (ref 136–145)
SODIUM SERPL-SCNC: 139 MMOL/L (ref 136–145)
SODIUM SERPL-SCNC: 140 MMOL/L (ref 136–145)
SODIUM SERPL-SCNC: 141 MMOL/L (ref 136–145)
SODIUM SERPL-SCNC: 142 MMOL/L (ref 136–145)
SODIUM SERPL-SCNC: 143 MMOL/L (ref 136–145)
SODIUM SERPL-SCNC: 144 MMOL/L (ref 136–145)
SODIUM SERPL-SCNC: 145 MMOL/L (ref 136–145)
SODIUM SERPL-SCNC: 145 MMOL/L (ref 136–145)
SODIUM SERPL-SCNC: 146 MMOL/L (ref 136–145)
SODIUM SERPL-SCNC: 147 MMOL/L (ref 136–145)
SOURCE, COVRS: NORMAL
SOURCE, COVRS: NORMAL
SP GR UR REFRACTOMETRY: 1.01
SP GR UR REFRACTOMETRY: 1.01
SP GR UR REFRACTOMETRY: 1.01 (ref 1–1.03)
SP GR UR REFRACTOMETRY: 1.02
SP GR UR REFRACTOMETRY: 1.03 (ref 1–1.03)
SPECIMEN TYPE: NORMAL
THERAPEUTIC RANGE,PTTT: ABNORMAL SECS (ref 58–77)
TROPONIN-HIGH SENSITIVITY: 26 NG/L (ref 0–51)
TROPONIN-HIGH SENSITIVITY: 27 NG/L (ref 0–51)
TROPONIN-HIGH SENSITIVITY: 28 NG/L (ref 0–51)
TROPONIN-HIGH SENSITIVITY: 30 NG/L (ref 0–51)
TROPONIN-HIGH SENSITIVITY: 40 NG/L (ref 0–51)
TROPONIN-HIGH SENSITIVITY: 41 NG/L (ref 0–51)
TROPONIN-HIGH SENSITIVITY: 4527 NG/L (ref 0–51)
TROPONIN-HIGH SENSITIVITY: 59 NG/L (ref 0–51)
TROPONIN-HIGH SENSITIVITY: 7140 NG/L (ref 0–51)
TROPONIN-HIGH SENSITIVITY: 81 NG/L (ref 0–51)
TROPONIN-HIGH SENSITIVITY: 88 NG/L (ref 0–51)
TSH SERPL DL<=0.05 MIU/L-ACNC: 3.01 UIU/ML (ref 0.36–3.74)
UA: UC IF INDICATED,UAUC: ABNORMAL
UROBILINOGEN UR QL STRIP.AUTO: 1 EU/DL (ref 0.2–1)
VANCOMYCIN SERPL-MCNC: 18.6 UG/ML
VANCOMYCIN SERPL-MCNC: 23 UG/ML
VANCOMYCIN TROUGH SERPL-MCNC: 21.9 UG/ML (ref 5–10)
VENTRICULAR RATE, ECG03: 59 BPM
VENTRICULAR RATE, ECG03: 65 BPM
VENTRICULAR RATE, ECG03: 75 BPM
VENTRICULAR RATE, ECG03: 78 BPM
VIBRIO SPECIES, DNA: NEGATIVE
VIBRIO SPECIES, DNA: NEGATIVE
VIT B12 SERPL-MCNC: 222 PG/ML (ref 193–986)
WBC # BLD AUTO: 10.7 K/UL (ref 3.6–11)
WBC # BLD AUTO: 2.7 K/UL (ref 3.6–11)
WBC # BLD AUTO: 2.9 K/UL (ref 3.6–11)
WBC # BLD AUTO: 3.2 K/UL (ref 3.6–11)
WBC # BLD AUTO: 3.3 K/UL (ref 3.6–11)
WBC # BLD AUTO: 3.3 K/UL (ref 3.6–11)
WBC # BLD AUTO: 3.4 K/UL (ref 3.6–11)
WBC # BLD AUTO: 3.6 K/UL (ref 3.6–11)
WBC # BLD AUTO: 3.7 K/UL (ref 3.6–11)
WBC # BLD AUTO: 3.8 K/UL (ref 3.6–11)
WBC # BLD AUTO: 3.9 K/UL (ref 3.6–11)
WBC # BLD AUTO: 4.2 K/UL (ref 3.6–11)
WBC # BLD AUTO: 4.3 K/UL (ref 3.6–11)
WBC # BLD AUTO: 4.3 K/UL (ref 3.6–11)
WBC # BLD AUTO: 4.4 K/UL (ref 3.6–11)
WBC # BLD AUTO: 5.6 K/UL (ref 3.6–11)
WBC # BLD AUTO: 5.6 K/UL (ref 3.6–11)
WBC # BLD AUTO: 7.3 K/UL (ref 3.6–11)
WBC #/AREA STL HPF: NORMAL /HPF (ref 0–4)
WBC URNS QL MICRO: >100 /HPF (ref 0–4)
WBC URNS QL MICRO: >100 /HPF (ref 0–4)
WBC URNS QL MICRO: ABNORMAL /HPF (ref 0–4)
Y. ENTEROCOLITICA, DNA: NEGATIVE
Y. ENTEROCOLITICA, DNA: NEGATIVE

## 2022-01-01 PROCEDURE — 74011250637 HC RX REV CODE- 250/637: Performed by: HOSPITALIST

## 2022-01-01 PROCEDURE — 74011250636 HC RX REV CODE- 250/636: Performed by: INTERNAL MEDICINE

## 2022-01-01 PROCEDURE — 83880 ASSAY OF NATRIURETIC PEPTIDE: CPT

## 2022-01-01 PROCEDURE — 74011000250 HC RX REV CODE- 250: Performed by: INTERNAL MEDICINE

## 2022-01-01 PROCEDURE — 74011250637 HC RX REV CODE- 250/637: Performed by: STUDENT IN AN ORGANIZED HEALTH CARE EDUCATION/TRAINING PROGRAM

## 2022-01-01 PROCEDURE — 80048 BASIC METABOLIC PNL TOTAL CA: CPT

## 2022-01-01 PROCEDURE — 87186 SC STD MICRODIL/AGAR DIL: CPT

## 2022-01-01 PROCEDURE — G0378 HOSPITAL OBSERVATION PER HR: HCPCS

## 2022-01-01 PROCEDURE — 82962 GLUCOSE BLOOD TEST: CPT

## 2022-01-01 PROCEDURE — 65270000029 HC RM PRIVATE

## 2022-01-01 PROCEDURE — 74176 CT ABD & PELVIS W/O CONTRAST: CPT

## 2022-01-01 PROCEDURE — 76010000149 HC OR TIME 1 TO 1.5 HR: Performed by: UROLOGY

## 2022-01-01 PROCEDURE — 74011250637 HC RX REV CODE- 250/637

## 2022-01-01 PROCEDURE — 36415 COLL VENOUS BLD VENIPUNCTURE: CPT

## 2022-01-01 PROCEDURE — 82746 ASSAY OF FOLIC ACID SERUM: CPT

## 2022-01-01 PROCEDURE — 74011250637 HC RX REV CODE- 250/637: Performed by: UROLOGY

## 2022-01-01 PROCEDURE — 76210000006 HC OR PH I REC 0.5 TO 1 HR: Performed by: UROLOGY

## 2022-01-01 PROCEDURE — 74011250637 HC RX REV CODE- 250/637: Performed by: INTERNAL MEDICINE

## 2022-01-01 PROCEDURE — 65270000015 HC RM PRIVATE ONCOLOGY

## 2022-01-01 PROCEDURE — 99285 EMERGENCY DEPT VISIT HI MDM: CPT

## 2022-01-01 PROCEDURE — 74018 RADEX ABDOMEN 1 VIEW: CPT

## 2022-01-01 PROCEDURE — 76937 US GUIDE VASCULAR ACCESS: CPT

## 2022-01-01 PROCEDURE — 80053 COMPREHEN METABOLIC PANEL: CPT

## 2022-01-01 PROCEDURE — 99221 1ST HOSP IP/OBS SF/LOW 40: CPT | Performed by: SURGERY

## 2022-01-01 PROCEDURE — 65270000046 HC RM TELEMETRY

## 2022-01-01 PROCEDURE — 74011250636 HC RX REV CODE- 250/636: Performed by: ANESTHESIOLOGY

## 2022-01-01 PROCEDURE — 87086 URINE CULTURE/COLONY COUNT: CPT

## 2022-01-01 PROCEDURE — 99221 1ST HOSP IP/OBS SF/LOW 40: CPT | Performed by: NURSE PRACTITIONER

## 2022-01-01 PROCEDURE — 96367 TX/PROPH/DG ADDL SEQ IV INF: CPT

## 2022-01-01 PROCEDURE — 85025 COMPLETE CBC W/AUTO DIFF WBC: CPT

## 2022-01-01 PROCEDURE — 76010000161 HC OR TIME 1 TO 1.5 HR INTENSV-TIER 1: Performed by: UROLOGY

## 2022-01-01 PROCEDURE — 77010033678 HC OXYGEN DAILY

## 2022-01-01 PROCEDURE — 70450 CT HEAD/BRAIN W/O DYE: CPT

## 2022-01-01 PROCEDURE — 83735 ASSAY OF MAGNESIUM: CPT

## 2022-01-01 PROCEDURE — 87506 IADNA-DNA/RNA PROBE TQ 6-11: CPT

## 2022-01-01 PROCEDURE — 74011250636 HC RX REV CODE- 250/636: Performed by: UROLOGY

## 2022-01-01 PROCEDURE — 80202 ASSAY OF VANCOMYCIN: CPT

## 2022-01-01 PROCEDURE — 2709999900 HC NON-CHARGEABLE SUPPLY: Performed by: INTERNAL MEDICINE

## 2022-01-01 PROCEDURE — 74011250636 HC RX REV CODE- 250/636

## 2022-01-01 PROCEDURE — 74011000250 HC RX REV CODE- 250: Performed by: STUDENT IN AN ORGANIZED HEALTH CARE EDUCATION/TRAINING PROGRAM

## 2022-01-01 PROCEDURE — 83605 ASSAY OF LACTIC ACID: CPT

## 2022-01-01 PROCEDURE — 96366 THER/PROPH/DIAG IV INF ADDON: CPT

## 2022-01-01 PROCEDURE — 87040 BLOOD CULTURE FOR BACTERIA: CPT

## 2022-01-01 PROCEDURE — 94760 N-INVAS EAR/PLS OXIMETRY 1: CPT

## 2022-01-01 PROCEDURE — 74011000258 HC RX REV CODE- 258: Performed by: INTERNAL MEDICINE

## 2022-01-01 PROCEDURE — 81001 URINALYSIS AUTO W/SCOPE: CPT

## 2022-01-01 PROCEDURE — 77030010509 HC AIRWY LMA MSK TELE -A: Performed by: ANESTHESIOLOGY

## 2022-01-01 PROCEDURE — 94761 N-INVAS EAR/PLS OXIMETRY MLT: CPT

## 2022-01-01 PROCEDURE — 74011000250 HC RX REV CODE- 250: Performed by: EMERGENCY MEDICINE

## 2022-01-01 PROCEDURE — 74011250636 HC RX REV CODE- 250/636: Performed by: NURSE ANESTHETIST, CERTIFIED REGISTERED

## 2022-01-01 PROCEDURE — 0TC48ZZ EXTIRPATION OF MATTER FROM LEFT KIDNEY PELVIS, VIA NATURAL OR ARTIFICIAL OPENING ENDOSCOPIC: ICD-10-PCS | Performed by: UROLOGY

## 2022-01-01 PROCEDURE — 93306 TTE W/DOPPLER COMPLETE: CPT | Performed by: INTERNAL MEDICINE

## 2022-01-01 PROCEDURE — C9113 INJ PANTOPRAZOLE SODIUM, VIA: HCPCS | Performed by: INTERNAL MEDICINE

## 2022-01-01 PROCEDURE — 74011250637 HC RX REV CODE- 250/637: Performed by: EMERGENCY MEDICINE

## 2022-01-01 PROCEDURE — 76040000019: Performed by: INTERNAL MEDICINE

## 2022-01-01 PROCEDURE — 76060000031 HC ANESTHESIA FIRST 0.5 HR: Performed by: INTERNAL MEDICINE

## 2022-01-01 PROCEDURE — 77030040831 HC BAG URINE DRNG MDII -A: Performed by: INTERNAL MEDICINE

## 2022-01-01 PROCEDURE — C1769 GUIDE WIRE: HCPCS | Performed by: UROLOGY

## 2022-01-01 PROCEDURE — 85027 COMPLETE CBC AUTOMATED: CPT

## 2022-01-01 PROCEDURE — 51798 US URINE CAPACITY MEASURE: CPT

## 2022-01-01 PROCEDURE — 84484 ASSAY OF TROPONIN QUANT: CPT

## 2022-01-01 PROCEDURE — 84443 ASSAY THYROID STIM HORMONE: CPT

## 2022-01-01 PROCEDURE — 74011000250 HC RX REV CODE- 250: Performed by: UROLOGY

## 2022-01-01 PROCEDURE — 96365 THER/PROPH/DIAG IV INF INIT: CPT

## 2022-01-01 PROCEDURE — 74011250636 HC RX REV CODE- 250/636: Performed by: EMERGENCY MEDICINE

## 2022-01-01 PROCEDURE — 74011250636 HC RX REV CODE- 250/636: Performed by: HOSPITALIST

## 2022-01-01 PROCEDURE — 74177 CT ABD & PELVIS W/CONTRAST: CPT

## 2022-01-01 PROCEDURE — 74011250636 HC RX REV CODE- 250/636: Performed by: STUDENT IN AN ORGANIZED HEALTH CARE EDUCATION/TRAINING PROGRAM

## 2022-01-01 PROCEDURE — 74011000250 HC RX REV CODE- 250: Performed by: ANESTHESIOLOGY

## 2022-01-01 PROCEDURE — 87324 CLOSTRIDIUM AG IA: CPT

## 2022-01-01 PROCEDURE — 77030019927 HC TBNG IRR CYSTO BAXT -A: Performed by: UROLOGY

## 2022-01-01 PROCEDURE — 87493 C DIFF AMPLIFIED PROBE: CPT

## 2022-01-01 PROCEDURE — 2709999900 HC NON-CHARGEABLE SUPPLY: Performed by: UROLOGY

## 2022-01-01 PROCEDURE — BT1F1ZZ FLUOROSCOPY OF LEFT KIDNEY, URETER AND BLADDER USING LOW OSMOLAR CONTRAST: ICD-10-PCS | Performed by: UROLOGY

## 2022-01-01 PROCEDURE — 92610 EVALUATE SWALLOWING FUNCTION: CPT

## 2022-01-01 PROCEDURE — 74011000636 HC RX REV CODE- 636: Performed by: EMERGENCY MEDICINE

## 2022-01-01 PROCEDURE — 93306 TTE W/DOPPLER COMPLETE: CPT

## 2022-01-01 PROCEDURE — 80047 BASIC METABLC PNL IONIZED CA: CPT

## 2022-01-01 PROCEDURE — 93005 ELECTROCARDIOGRAM TRACING: CPT

## 2022-01-01 PROCEDURE — 71045 X-RAY EXAM CHEST 1 VIEW: CPT

## 2022-01-01 PROCEDURE — 74011000250 HC RX REV CODE- 250: Performed by: NURSE ANESTHETIST, CERTIFIED REGISTERED

## 2022-01-01 PROCEDURE — 83036 HEMOGLOBIN GLYCOSYLATED A1C: CPT

## 2022-01-01 PROCEDURE — 74011000258 HC RX REV CODE- 258: Performed by: UROLOGY

## 2022-01-01 PROCEDURE — 84100 ASSAY OF PHOSPHORUS: CPT

## 2022-01-01 PROCEDURE — C1726 CATH, BAL DIL, NON-VASCULAR: HCPCS | Performed by: INTERNAL MEDICINE

## 2022-01-01 PROCEDURE — 76210000017 HC OR PH I REC 1.5 TO 2 HR: Performed by: UROLOGY

## 2022-01-01 PROCEDURE — 96375 TX/PRO/DX INJ NEW DRUG ADDON: CPT

## 2022-01-01 PROCEDURE — 74420 UROGRAPHY RTRGR +-KUB: CPT

## 2022-01-01 PROCEDURE — 76060000033 HC ANESTHESIA 1 TO 1.5 HR: Performed by: UROLOGY

## 2022-01-01 PROCEDURE — 74011000258 HC RX REV CODE- 258: Performed by: HOSPITALIST

## 2022-01-01 PROCEDURE — 0D7P8ZZ DILATION OF RECTUM, VIA NATURAL OR ARTIFICIAL OPENING ENDOSCOPIC: ICD-10-PCS | Performed by: INTERNAL MEDICINE

## 2022-01-01 PROCEDURE — 87077 CULTURE AEROBIC IDENTIFY: CPT

## 2022-01-01 PROCEDURE — 92526 ORAL FUNCTION THERAPY: CPT

## 2022-01-01 PROCEDURE — 77030019948 HC FBR LSR HOLM DISP OCOA -E: Performed by: UROLOGY

## 2022-01-01 PROCEDURE — 74011000250 HC RX REV CODE- 250: Performed by: HOSPITALIST

## 2022-01-01 PROCEDURE — 74011000258 HC RX REV CODE- 258: Performed by: NURSE ANESTHETIST, CERTIFIED REGISTERED

## 2022-01-01 PROCEDURE — 77030008684 HC TU ET CUF COVD -B: Performed by: ANESTHESIOLOGY

## 2022-01-01 PROCEDURE — 74011000258 HC RX REV CODE- 258: Performed by: EMERGENCY MEDICINE

## 2022-01-01 PROCEDURE — 82607 VITAMIN B-12: CPT

## 2022-01-01 PROCEDURE — 74011250637 HC RX REV CODE- 250/637: Performed by: NURSE PRACTITIONER

## 2022-01-01 PROCEDURE — 0D738ZZ DILATION OF LOWER ESOPHAGUS, VIA NATURAL OR ARTIFICIAL OPENING ENDOSCOPIC: ICD-10-PCS | Performed by: INTERNAL MEDICINE

## 2022-01-01 PROCEDURE — 94762 N-INVAS EAR/PLS OXIMTRY CONT: CPT

## 2022-01-01 PROCEDURE — 0T778DZ DILATION OF LEFT URETER WITH INTRALUMINAL DEVICE, VIA NATURAL OR ARTIFICIAL OPENING ENDOSCOPIC: ICD-10-PCS | Performed by: UROLOGY

## 2022-01-01 PROCEDURE — 74011000258 HC RX REV CODE- 258: Performed by: STUDENT IN AN ORGANIZED HEALTH CARE EDUCATION/TRAINING PROGRAM

## 2022-01-01 PROCEDURE — 96376 TX/PRO/DX INJ SAME DRUG ADON: CPT

## 2022-01-01 PROCEDURE — 85379 FIBRIN DEGRADATION QUANT: CPT

## 2022-01-01 PROCEDURE — 96372 THER/PROPH/DIAG INJ SC/IM: CPT

## 2022-01-01 PROCEDURE — 74011636637 HC RX REV CODE- 636/637: Performed by: STUDENT IN AN ORGANIZED HEALTH CARE EDUCATION/TRAINING PROGRAM

## 2022-01-01 PROCEDURE — 77030008634 HC TU COLON DCOMPRS COOK -C: Performed by: INTERNAL MEDICINE

## 2022-01-01 PROCEDURE — 77030018712 HC DEV BLLN INFL BSC -B: Performed by: INTERNAL MEDICINE

## 2022-01-01 PROCEDURE — 97530 THERAPEUTIC ACTIVITIES: CPT | Performed by: PHYSICAL THERAPIST

## 2022-01-01 PROCEDURE — 87635 SARS-COV-2 COVID-19 AMP PRB: CPT

## 2022-01-01 PROCEDURE — 83690 ASSAY OF LIPASE: CPT

## 2022-01-01 PROCEDURE — 0T768DZ DILATION OF RIGHT URETER WITH INTRALUMINAL DEVICE, VIA NATURAL OR ARTIFICIAL OPENING ENDOSCOPIC: ICD-10-PCS | Performed by: UROLOGY

## 2022-01-01 PROCEDURE — 74011000250 HC RX REV CODE- 250

## 2022-01-01 PROCEDURE — C1894 INTRO/SHEATH, NON-LASER: HCPCS | Performed by: UROLOGY

## 2022-01-01 PROCEDURE — C2617 STENT, NON-COR, TEM W/O DEL: HCPCS | Performed by: UROLOGY

## 2022-01-01 PROCEDURE — 74011000636 HC RX REV CODE- 636: Performed by: UROLOGY

## 2022-01-01 PROCEDURE — 74019 RADEX ABDOMEN 2 VIEWS: CPT

## 2022-01-01 PROCEDURE — 97161 PT EVAL LOW COMPLEX 20 MIN: CPT | Performed by: PHYSICAL THERAPIST

## 2022-01-01 PROCEDURE — 82803 BLOOD GASES ANY COMBINATION: CPT

## 2022-01-01 PROCEDURE — 74011636637 HC RX REV CODE- 636/637: Performed by: INTERNAL MEDICINE

## 2022-01-01 PROCEDURE — 89055 LEUKOCYTE ASSESSMENT FECAL: CPT

## 2022-01-01 PROCEDURE — 96361 HYDRATE IV INFUSION ADD-ON: CPT

## 2022-01-01 PROCEDURE — 96368 THER/DIAG CONCURRENT INF: CPT

## 2022-01-01 PROCEDURE — 85730 THROMBOPLASTIN TIME PARTIAL: CPT

## 2022-01-01 PROCEDURE — 73521 X-RAY EXAM HIPS BI 2 VIEWS: CPT

## 2022-01-01 PROCEDURE — 77030026438 HC STYL ET INTUB CARD -A: Performed by: ANESTHESIOLOGY

## 2022-01-01 PROCEDURE — C1758 CATHETER, URETERAL: HCPCS | Performed by: UROLOGY

## 2022-01-01 PROCEDURE — 74220 X-RAY XM ESOPHAGUS 1CNTRST: CPT

## 2022-01-01 PROCEDURE — 80069 RENAL FUNCTION PANEL: CPT

## 2022-01-01 PROCEDURE — 93970 EXTREMITY STUDY: CPT

## 2022-01-01 PROCEDURE — 77030012961 HC IRR KT CYSTO/TUR ICUM -A: Performed by: UROLOGY

## 2022-01-01 DEVICE — URETERAL STENT
Type: IMPLANTABLE DEVICE | Site: URETER | Status: FUNCTIONAL
Brand: POLARIS™ ULTRA

## 2022-01-01 RX ORDER — ACETAMINOPHEN 650 MG/1
650 SUPPOSITORY RECTAL
Status: DISCONTINUED | OUTPATIENT
Start: 2022-01-01 | End: 2022-01-01 | Stop reason: HOSPADM

## 2022-01-01 RX ORDER — ACETAMINOPHEN 325 MG/1
650 TABLET ORAL
Status: COMPLETED | OUTPATIENT
Start: 2022-01-01 | End: 2022-01-01

## 2022-01-01 RX ORDER — SODIUM CHLORIDE 0.9 % (FLUSH) 0.9 %
5-40 SYRINGE (ML) INJECTION AS NEEDED
Status: DISCONTINUED | OUTPATIENT
Start: 2022-01-01 | End: 2022-01-01 | Stop reason: HOSPADM

## 2022-01-01 RX ORDER — ENOXAPARIN SODIUM 100 MG/ML
40 INJECTION SUBCUTANEOUS EVERY 24 HOURS
Status: DISCONTINUED | OUTPATIENT
Start: 2022-01-01 | End: 2022-01-01

## 2022-01-01 RX ORDER — FENTANYL CITRATE 50 UG/ML
25 INJECTION, SOLUTION INTRAMUSCULAR; INTRAVENOUS
Status: DISCONTINUED | OUTPATIENT
Start: 2022-01-01 | End: 2022-01-01 | Stop reason: HOSPADM

## 2022-01-01 RX ORDER — POTASSIUM CHLORIDE 7.45 MG/ML
10 INJECTION INTRAVENOUS
Status: COMPLETED | OUTPATIENT
Start: 2022-01-01 | End: 2022-01-01

## 2022-01-01 RX ORDER — TROSPIUM CHLORIDE 20 MG/1
20 TABLET, FILM COATED ORAL DAILY
Status: DISCONTINUED | OUTPATIENT
Start: 2022-01-01 | End: 2022-01-01 | Stop reason: HOSPADM

## 2022-01-01 RX ORDER — POTASSIUM CHLORIDE 20 MEQ/1
40 TABLET, EXTENDED RELEASE ORAL
Status: COMPLETED | OUTPATIENT
Start: 2022-01-01 | End: 2022-01-01

## 2022-01-01 RX ORDER — ONDANSETRON 2 MG/ML
4 INJECTION INTRAMUSCULAR; INTRAVENOUS
Status: DISCONTINUED | OUTPATIENT
Start: 2022-01-01 | End: 2022-01-01

## 2022-01-01 RX ORDER — GUAIFENESIN 100 MG/5ML
81 LIQUID (ML) ORAL DAILY
Status: DISCONTINUED | OUTPATIENT
Start: 2022-01-01 | End: 2022-01-01 | Stop reason: HOSPADM

## 2022-01-01 RX ORDER — POLYETHYLENE GLYCOL 3350 17 G/17G
17 POWDER, FOR SOLUTION ORAL 2 TIMES DAILY
Qty: 60 PACKET | Refills: 0 | Status: SHIPPED | OUTPATIENT
Start: 2022-01-01

## 2022-01-01 RX ORDER — CEFTRIAXONE 1 G/1
1 INJECTION, POWDER, FOR SOLUTION INTRAMUSCULAR; INTRAVENOUS ONCE
Status: DISCONTINUED | OUTPATIENT
Start: 2022-01-01 | End: 2022-01-01

## 2022-01-01 RX ORDER — ONDANSETRON 2 MG/ML
4 INJECTION INTRAMUSCULAR; INTRAVENOUS
Status: DISCONTINUED | OUTPATIENT
Start: 2022-01-01 | End: 2022-01-01 | Stop reason: SDUPTHER

## 2022-01-01 RX ORDER — EZETIMIBE 10 MG/1
10 TABLET ORAL DAILY
Status: DISCONTINUED | OUTPATIENT
Start: 2022-01-01 | End: 2022-01-01 | Stop reason: HOSPADM

## 2022-01-01 RX ORDER — POTASSIUM CHLORIDE 750 MG/1
40 TABLET, FILM COATED, EXTENDED RELEASE ORAL
Status: COMPLETED | OUTPATIENT
Start: 2022-01-01 | End: 2022-01-01

## 2022-01-01 RX ORDER — MIDAZOLAM HYDROCHLORIDE 1 MG/ML
1 INJECTION, SOLUTION INTRAMUSCULAR; INTRAVENOUS AS NEEDED
Status: DISCONTINUED | OUTPATIENT
Start: 2022-01-01 | End: 2022-01-01

## 2022-01-01 RX ORDER — ACETAMINOPHEN 500 MG
1000 TABLET ORAL
Status: COMPLETED | OUTPATIENT
Start: 2022-01-01 | End: 2022-01-01

## 2022-01-01 RX ORDER — POTASSIUM CHLORIDE 7.45 MG/ML
10 INJECTION INTRAVENOUS ONCE
Status: COMPLETED | OUTPATIENT
Start: 2022-01-01 | End: 2022-01-01

## 2022-01-01 RX ORDER — AMLODIPINE BESYLATE 2.5 MG/1
2.5 TABLET ORAL DAILY
Status: DISCONTINUED | OUTPATIENT
Start: 2022-01-01 | End: 2022-01-01

## 2022-01-01 RX ORDER — MORPHINE SULFATE 2 MG/ML
1 INJECTION, SOLUTION INTRAMUSCULAR; INTRAVENOUS
Status: DISCONTINUED | OUTPATIENT
Start: 2022-01-01 | End: 2022-01-01 | Stop reason: HOSPADM

## 2022-01-01 RX ORDER — AMLODIPINE BESYLATE 2.5 MG/1
2.5 TABLET ORAL DAILY
Status: DISCONTINUED | OUTPATIENT
Start: 2022-01-01 | End: 2022-01-01 | Stop reason: HOSPADM

## 2022-01-01 RX ORDER — DEXTROSE MONOHYDRATE AND SODIUM CHLORIDE 5; .45 G/100ML; G/100ML
100 INJECTION, SOLUTION INTRAVENOUS CONTINUOUS
Status: DISCONTINUED | OUTPATIENT
Start: 2022-01-01 | End: 2022-01-01

## 2022-01-01 RX ORDER — PANTOPRAZOLE SODIUM 40 MG/1
40 TABLET, DELAYED RELEASE ORAL DAILY
Qty: 14 TABLET | Refills: 0 | Status: SHIPPED | OUTPATIENT
Start: 2022-01-01 | End: 2022-01-01

## 2022-01-01 RX ORDER — POTASSIUM CHLORIDE 20 MEQ/1
20 TABLET, EXTENDED RELEASE ORAL DAILY
Status: DISCONTINUED | OUTPATIENT
Start: 2022-01-01 | End: 2022-01-01

## 2022-01-01 RX ORDER — PROPOFOL 10 MG/ML
INJECTION, EMULSION INTRAVENOUS AS NEEDED
Status: DISCONTINUED | OUTPATIENT
Start: 2022-01-01 | End: 2022-01-01 | Stop reason: HOSPADM

## 2022-01-01 RX ORDER — SERTRALINE HYDROCHLORIDE 50 MG/1
100 TABLET, FILM COATED ORAL DAILY
Status: DISCONTINUED | OUTPATIENT
Start: 2022-01-01 | End: 2022-01-01 | Stop reason: HOSPADM

## 2022-01-01 RX ORDER — DIPHENHYDRAMINE HYDROCHLORIDE 50 MG/ML
12.5 INJECTION, SOLUTION INTRAMUSCULAR; INTRAVENOUS AS NEEDED
Status: DISCONTINUED | OUTPATIENT
Start: 2022-01-01 | End: 2022-01-01 | Stop reason: HOSPADM

## 2022-01-01 RX ORDER — FACIAL-BODY WIPES
10 EACH TOPICAL DAILY
Status: DISCONTINUED | OUTPATIENT
Start: 2022-01-01 | End: 2022-01-01

## 2022-01-01 RX ORDER — POTASSIUM CHLORIDE 20 MEQ/1
40 TABLET, EXTENDED RELEASE ORAL DAILY
Status: DISCONTINUED | OUTPATIENT
Start: 2022-01-01 | End: 2022-01-01

## 2022-01-01 RX ORDER — ONDANSETRON 2 MG/ML
4 INJECTION INTRAMUSCULAR; INTRAVENOUS
Status: COMPLETED | OUTPATIENT
Start: 2022-01-01 | End: 2022-01-01

## 2022-01-01 RX ORDER — POLYETHYLENE GLYCOL 3350 17 G/17G
17 POWDER, FOR SOLUTION ORAL DAILY
Status: DISCONTINUED | OUTPATIENT
Start: 2022-01-01 | End: 2022-01-01

## 2022-01-01 RX ORDER — SODIUM CHLORIDE 0.9 % (FLUSH) 0.9 %
5-40 SYRINGE (ML) INJECTION AS NEEDED
Status: DISCONTINUED | OUTPATIENT
Start: 2022-01-01 | End: 2022-01-01 | Stop reason: SDUPTHER

## 2022-01-01 RX ORDER — METOPROLOL SUCCINATE 25 MG/1
25 TABLET, EXTENDED RELEASE ORAL DAILY
Status: DISCONTINUED | OUTPATIENT
Start: 2022-01-01 | End: 2022-01-01 | Stop reason: HOSPADM

## 2022-01-01 RX ORDER — LABETALOL HYDROCHLORIDE 5 MG/ML
10 INJECTION, SOLUTION INTRAVENOUS
Status: DISCONTINUED | OUTPATIENT
Start: 2022-01-01 | End: 2022-01-01 | Stop reason: HOSPADM

## 2022-01-01 RX ORDER — DEXTROSE MONOHYDRATE AND SODIUM CHLORIDE 5; .9 G/100ML; G/100ML
50 INJECTION, SOLUTION INTRAVENOUS CONTINUOUS
Status: DISCONTINUED | OUTPATIENT
Start: 2022-01-01 | End: 2022-01-01

## 2022-01-01 RX ORDER — ONDANSETRON 4 MG/1
4 TABLET, ORALLY DISINTEGRATING ORAL
Status: DISCONTINUED | OUTPATIENT
Start: 2022-01-01 | End: 2022-01-01 | Stop reason: HOSPADM

## 2022-01-01 RX ORDER — SORBITOL SOLUTION 70 %
30 SOLUTION, ORAL MISCELLANEOUS DAILY
Status: DISPENSED | OUTPATIENT
Start: 2022-01-01 | End: 2022-01-01

## 2022-01-01 RX ORDER — LIDOCAINE 50 MG/G
PATCH TOPICAL
Qty: 10 EACH | Refills: 0 | Status: SHIPPED | OUTPATIENT
Start: 2022-01-01

## 2022-01-01 RX ORDER — AMOXICILLIN 250 MG
1 CAPSULE ORAL 2 TIMES DAILY
Status: DISCONTINUED | OUTPATIENT
Start: 2022-01-01 | End: 2022-01-01 | Stop reason: HOSPADM

## 2022-01-01 RX ORDER — HYDROXYZINE HYDROCHLORIDE 10 MG/1
10 TABLET, FILM COATED ORAL
Status: DISCONTINUED | OUTPATIENT
Start: 2022-01-01 | End: 2022-01-01 | Stop reason: HOSPADM

## 2022-01-01 RX ORDER — HEPARIN SODIUM 1000 [USP'U]/ML
60 INJECTION, SOLUTION INTRAVENOUS; SUBCUTANEOUS AS NEEDED
Status: DISCONTINUED | OUTPATIENT
Start: 2022-01-01 | End: 2022-01-01 | Stop reason: HOSPADM

## 2022-01-01 RX ORDER — MIDAZOLAM HYDROCHLORIDE 1 MG/ML
INJECTION, SOLUTION INTRAMUSCULAR; INTRAVENOUS AS NEEDED
Status: DISCONTINUED | OUTPATIENT
Start: 2022-01-01 | End: 2022-01-01 | Stop reason: HOSPADM

## 2022-01-01 RX ORDER — NITROFURANTOIN (MACROCRYSTALS) 100 MG/1
100 CAPSULE ORAL 2 TIMES DAILY
Qty: 10 CAPSULE | Refills: 0 | Status: SHIPPED | OUTPATIENT
Start: 2022-01-01 | End: 2022-01-01

## 2022-01-01 RX ORDER — POTASSIUM CHLORIDE, DEXTROSE MONOHYDRATE AND SODIUM CHLORIDE 300; 5; 900 MG/100ML; G/100ML; MG/100ML
INJECTION, SOLUTION INTRAVENOUS CONTINUOUS
Status: DISCONTINUED | OUTPATIENT
Start: 2022-01-01 | End: 2022-01-01 | Stop reason: HOSPADM

## 2022-01-01 RX ORDER — ONDANSETRON 4 MG/1
4 TABLET, ORALLY DISINTEGRATING ORAL
Status: DISCONTINUED | OUTPATIENT
Start: 2022-01-01 | End: 2022-01-01 | Stop reason: SDUPTHER

## 2022-01-01 RX ORDER — ASPIRIN 325 MG
325 TABLET ORAL ONCE
Status: COMPLETED | OUTPATIENT
Start: 2022-01-01 | End: 2022-01-01

## 2022-01-01 RX ORDER — BUTALBITAL, ACETAMINOPHEN AND CAFFEINE 50; 325; 40 MG/1; MG/1; MG/1
2 TABLET ORAL
Status: COMPLETED | OUTPATIENT
Start: 2022-01-01 | End: 2022-01-01

## 2022-01-01 RX ORDER — METOPROLOL SUCCINATE 25 MG/1
25 TABLET, EXTENDED RELEASE ORAL DAILY
Qty: 30 TABLET | Refills: 0 | Status: SHIPPED | OUTPATIENT
Start: 2022-01-01 | End: 2022-01-01

## 2022-01-01 RX ORDER — HEPARIN SODIUM 10000 [USP'U]/100ML
12-25 INJECTION, SOLUTION INTRAVENOUS
Status: DISCONTINUED | OUTPATIENT
Start: 2022-01-01 | End: 2022-01-01 | Stop reason: HOSPADM

## 2022-01-01 RX ORDER — CEFTRIAXONE 1 G/50ML
1 INJECTION, SOLUTION INTRAVENOUS ONCE
Status: DISCONTINUED | OUTPATIENT
Start: 2022-01-01 | End: 2022-01-01

## 2022-01-01 RX ORDER — SERTRALINE HYDROCHLORIDE 100 MG/1
100 TABLET, FILM COATED ORAL DAILY
COMMUNITY

## 2022-01-01 RX ORDER — DEXTROSE 50 % IN WATER (D50W) INTRAVENOUS SYRINGE
12.5 ONCE
Status: COMPLETED | OUTPATIENT
Start: 2022-01-01 | End: 2022-01-01

## 2022-01-01 RX ORDER — VANCOMYCIN HYDROCHLORIDE 250 MG/5ML
125 POWDER, FOR SOLUTION ORAL EVERY 6 HOURS
Status: DISCONTINUED | OUTPATIENT
Start: 2022-01-01 | End: 2022-01-01

## 2022-01-01 RX ORDER — ENOXAPARIN SODIUM 100 MG/ML
40 INJECTION SUBCUTANEOUS DAILY
Status: COMPLETED | OUTPATIENT
Start: 2022-01-01 | End: 2022-01-01

## 2022-01-01 RX ORDER — METOPROLOL SUCCINATE 25 MG/1
25 TABLET, EXTENDED RELEASE ORAL DAILY
COMMUNITY

## 2022-01-01 RX ORDER — EPINEPHRINE 0.1 MG/ML
1 INJECTION INTRACARDIAC; INTRAVENOUS
Status: DISCONTINUED | OUTPATIENT
Start: 2022-01-01 | End: 2022-01-01 | Stop reason: HOSPADM

## 2022-01-01 RX ORDER — DEXTROSE, SODIUM CHLORIDE, AND POTASSIUM CHLORIDE 5; .45; .3 G/100ML; G/100ML; G/100ML
INJECTION INTRAVENOUS CONTINUOUS
Status: DISCONTINUED | OUTPATIENT
Start: 2022-01-01 | End: 2022-01-01

## 2022-01-01 RX ORDER — POTASSIUM CHLORIDE 20 MEQ/1
20 TABLET, EXTENDED RELEASE ORAL DAILY
Qty: 5 TABLET | Refills: 0 | Status: SHIPPED | OUTPATIENT
Start: 2022-01-01 | End: 2022-01-01

## 2022-01-01 RX ORDER — POLYETHYLENE GLYCOL 3350 17 G/17G
17 POWDER, FOR SOLUTION ORAL 2 TIMES DAILY
Status: DISCONTINUED | OUTPATIENT
Start: 2022-01-01 | End: 2022-01-01 | Stop reason: HOSPADM

## 2022-01-01 RX ORDER — ATROPINE SULFATE 0.1 MG/ML
0.5 INJECTION INTRAVENOUS
Status: DISCONTINUED | OUTPATIENT
Start: 2022-01-01 | End: 2022-01-01 | Stop reason: HOSPADM

## 2022-01-01 RX ORDER — LIDOCAINE HYDROCHLORIDE 20 MG/ML
INJECTION, SOLUTION EPIDURAL; INFILTRATION; INTRACAUDAL; PERINEURAL AS NEEDED
Status: DISCONTINUED | OUTPATIENT
Start: 2022-01-01 | End: 2022-01-01 | Stop reason: HOSPADM

## 2022-01-01 RX ORDER — DEXTROMETHORPHAN/PSEUDOEPHED 2.5-7.5/.8
1.2 DROPS ORAL
Status: DISCONTINUED | OUTPATIENT
Start: 2022-01-01 | End: 2022-01-01 | Stop reason: HOSPADM

## 2022-01-01 RX ORDER — ONDANSETRON 2 MG/ML
INJECTION INTRAMUSCULAR; INTRAVENOUS AS NEEDED
Status: DISCONTINUED | OUTPATIENT
Start: 2022-01-01 | End: 2022-01-01 | Stop reason: HOSPADM

## 2022-01-01 RX ORDER — MAGNESIUM SULFATE 100 %
4 CRYSTALS MISCELLANEOUS AS NEEDED
Status: DISCONTINUED | OUTPATIENT
Start: 2022-01-01 | End: 2022-01-01 | Stop reason: HOSPADM

## 2022-01-01 RX ORDER — POTASSIUM CHLORIDE 750 MG/1
20 TABLET, FILM COATED, EXTENDED RELEASE ORAL
Status: COMPLETED | OUTPATIENT
Start: 2022-01-01 | End: 2022-01-01

## 2022-01-01 RX ORDER — MIDAZOLAM HYDROCHLORIDE 1 MG/ML
5 INJECTION, SOLUTION INTRAMUSCULAR; INTRAVENOUS
Status: DISCONTINUED | OUTPATIENT
Start: 2022-01-01 | End: 2022-01-01 | Stop reason: HOSPADM

## 2022-01-01 RX ORDER — SODIUM CHLORIDE 0.9 % (FLUSH) 0.9 %
5-40 SYRINGE (ML) INJECTION EVERY 8 HOURS
Status: DISCONTINUED | OUTPATIENT
Start: 2022-01-01 | End: 2022-01-01 | Stop reason: HOSPADM

## 2022-01-01 RX ORDER — HEPARIN SODIUM 1000 [USP'U]/ML
4000 INJECTION, SOLUTION INTRAVENOUS; SUBCUTANEOUS
Status: COMPLETED | OUTPATIENT
Start: 2022-01-01 | End: 2022-01-01

## 2022-01-01 RX ORDER — ONDANSETRON 2 MG/ML
4 INJECTION INTRAMUSCULAR; INTRAVENOUS
Status: DISCONTINUED | OUTPATIENT
Start: 2022-01-01 | End: 2022-01-01 | Stop reason: HOSPADM

## 2022-01-01 RX ORDER — FACIAL-BODY WIPES
10 EACH TOPICAL
Status: DISCONTINUED | OUTPATIENT
Start: 2022-01-01 | End: 2022-01-01 | Stop reason: HOSPADM

## 2022-01-01 RX ORDER — HYOSCYAMINE SULFATE 0.12 MG/1
0.12 TABLET SUBLINGUAL
Qty: 20 TABLET | Refills: 0 | Status: SHIPPED | OUTPATIENT
Start: 2022-01-01

## 2022-01-01 RX ORDER — PROMETHAZINE HYDROCHLORIDE 25 MG/1
12.5 TABLET ORAL
Status: DISCONTINUED | OUTPATIENT
Start: 2022-01-01 | End: 2022-01-01

## 2022-01-01 RX ORDER — FACIAL-BODY WIPES
10 EACH TOPICAL ONCE
Status: COMPLETED | OUTPATIENT
Start: 2022-01-01 | End: 2022-01-01

## 2022-01-01 RX ORDER — ACETAMINOPHEN 325 MG/1
650 TABLET ORAL
Status: DISCONTINUED | OUTPATIENT
Start: 2022-01-01 | End: 2022-01-01 | Stop reason: HOSPADM

## 2022-01-01 RX ORDER — CLOPIDOGREL BISULFATE 75 MG/1
75 TABLET ORAL DAILY
Status: DISCONTINUED | OUTPATIENT
Start: 2022-01-01 | End: 2022-01-01 | Stop reason: HOSPADM

## 2022-01-01 RX ORDER — DEXAMETHASONE SODIUM PHOSPHATE 4 MG/ML
INJECTION, SOLUTION INTRA-ARTICULAR; INTRALESIONAL; INTRAMUSCULAR; INTRAVENOUS; SOFT TISSUE AS NEEDED
Status: DISCONTINUED | OUTPATIENT
Start: 2022-01-01 | End: 2022-01-01 | Stop reason: HOSPADM

## 2022-01-01 RX ORDER — DIAZEPAM 10 MG/2ML
2 INJECTION INTRAMUSCULAR ONCE
Status: COMPLETED | OUTPATIENT
Start: 2022-01-01 | End: 2022-01-01

## 2022-01-01 RX ORDER — INSULIN GLARGINE 100 [IU]/ML
10 INJECTION, SOLUTION SUBCUTANEOUS
Status: DISCONTINUED | OUTPATIENT
Start: 2022-01-01 | End: 2022-01-01 | Stop reason: HOSPADM

## 2022-01-01 RX ORDER — TRAZODONE HYDROCHLORIDE 50 MG/1
50 TABLET ORAL
Status: DISCONTINUED | OUTPATIENT
Start: 2022-01-01 | End: 2022-01-01 | Stop reason: HOSPADM

## 2022-01-01 RX ORDER — SODIUM CHLORIDE AND POTASSIUM CHLORIDE .9; .15 G/100ML; G/100ML
SOLUTION INTRAVENOUS CONTINUOUS
Status: DISCONTINUED | OUTPATIENT
Start: 2022-01-01 | End: 2022-01-01

## 2022-01-01 RX ORDER — ACETAMINOPHEN 325 MG/1
650 TABLET ORAL
Status: DISCONTINUED | OUTPATIENT
Start: 2022-01-01 | End: 2022-01-01

## 2022-01-01 RX ORDER — BARIUM SULFATE 20 MG/ML
900 SUSPENSION ORAL
Status: DISCONTINUED | OUTPATIENT
Start: 2022-01-01 | End: 2022-01-01

## 2022-01-01 RX ORDER — ACETAMINOPHEN 325 MG/1
650 TABLET ORAL
Status: DISCONTINUED | OUTPATIENT
Start: 2022-01-01 | End: 2022-01-01 | Stop reason: SDUPTHER

## 2022-01-01 RX ORDER — DEXTROSE MONOHYDRATE 100 MG/ML
INJECTION, SOLUTION INTRAVENOUS
Status: COMPLETED
Start: 2022-01-01 | End: 2022-01-01

## 2022-01-01 RX ORDER — ERGOCALCIFEROL 1.25 MG/1
50000 CAPSULE ORAL
COMMUNITY

## 2022-01-01 RX ORDER — PROPOFOL 10 MG/ML
INJECTION, EMULSION INTRAVENOUS
Status: DISCONTINUED | OUTPATIENT
Start: 2022-01-01 | End: 2022-01-01 | Stop reason: HOSPADM

## 2022-01-01 RX ORDER — POTASSIUM CHLORIDE 7.45 MG/ML
10 INJECTION INTRAVENOUS
Status: DISCONTINUED | OUTPATIENT
Start: 2022-01-01 | End: 2022-01-01

## 2022-01-01 RX ORDER — POLYETHYLENE GLYCOL 3350 17 G/17G
17 POWDER, FOR SOLUTION ORAL DAILY PRN
Status: DISCONTINUED | OUTPATIENT
Start: 2022-01-01 | End: 2022-01-01

## 2022-01-01 RX ORDER — SODIUM CHLORIDE 0.9 % (FLUSH) 0.9 %
5-40 SYRINGE (ML) INJECTION EVERY 8 HOURS
Status: DISCONTINUED | OUTPATIENT
Start: 2022-01-01 | End: 2022-01-01 | Stop reason: SDUPTHER

## 2022-01-01 RX ORDER — FENTANYL CITRATE 50 UG/ML
100 INJECTION, SOLUTION INTRAMUSCULAR; INTRAVENOUS
Status: DISCONTINUED | OUTPATIENT
Start: 2022-01-01 | End: 2022-01-01 | Stop reason: HOSPADM

## 2022-01-01 RX ORDER — PHENYLEPHRINE HCL IN 0.9% NACL 0.4MG/10ML
SYRINGE (ML) INTRAVENOUS AS NEEDED
Status: DISCONTINUED | OUTPATIENT
Start: 2022-01-01 | End: 2022-01-01 | Stop reason: HOSPADM

## 2022-01-01 RX ORDER — CLONIDINE 0.1 MG/24H
1 PATCH, EXTENDED RELEASE TRANSDERMAL
Status: DISCONTINUED | OUTPATIENT
Start: 2022-01-01 | End: 2022-01-01

## 2022-01-01 RX ORDER — SODIUM CHLORIDE, SODIUM LACTATE, POTASSIUM CHLORIDE, CALCIUM CHLORIDE 600; 310; 30; 20 MG/100ML; MG/100ML; MG/100ML; MG/100ML
125 INJECTION, SOLUTION INTRAVENOUS CONTINUOUS
Status: DISCONTINUED | OUTPATIENT
Start: 2022-01-01 | End: 2022-01-01

## 2022-01-01 RX ORDER — LIDOCAINE 4 G/100G
1 PATCH TOPICAL ONCE
Status: DISCONTINUED | OUTPATIENT
Start: 2022-01-01 | End: 2022-01-01 | Stop reason: HOSPADM

## 2022-01-01 RX ORDER — LANOLIN ALCOHOL/MO/W.PET/CERES
3 CREAM (GRAM) TOPICAL
Qty: 30 TABLET | Refills: 0 | OUTPATIENT
Start: 2022-01-01 | End: 2022-01-01

## 2022-01-01 RX ORDER — ENOXAPARIN SODIUM 100 MG/ML
40 INJECTION SUBCUTANEOUS DAILY
Status: DISCONTINUED | OUTPATIENT
Start: 2022-01-01 | End: 2022-01-01

## 2022-01-01 RX ORDER — HYOSCYAMINE SULFATE 0.12 MG/1
0.12 TABLET SUBLINGUAL
Status: DISCONTINUED | OUTPATIENT
Start: 2022-01-01 | End: 2022-01-01

## 2022-01-01 RX ORDER — SORBITOL SOLUTION 70 %
30 SOLUTION, ORAL MISCELLANEOUS DAILY
Status: DISCONTINUED | OUTPATIENT
Start: 2022-01-01 | End: 2022-01-01

## 2022-01-01 RX ORDER — DEXTROSE MONOHYDRATE 100 MG/ML
250 INJECTION, SOLUTION INTRAVENOUS ONCE
Status: DISPENSED | OUTPATIENT
Start: 2022-01-01 | End: 2022-01-01

## 2022-01-01 RX ORDER — VANCOMYCIN HYDROCHLORIDE 250 MG/5ML
125 POWDER, FOR SOLUTION ORAL EVERY 6 HOURS
Status: DISCONTINUED | OUTPATIENT
Start: 2022-01-01 | End: 2022-01-01 | Stop reason: HOSPADM

## 2022-01-01 RX ORDER — AMLODIPINE BESYLATE 2.5 MG/1
2.5 TABLET ORAL
Status: DISCONTINUED | OUTPATIENT
Start: 2022-01-01 | End: 2022-01-01 | Stop reason: HOSPADM

## 2022-01-01 RX ORDER — LANOLIN ALCOHOL/MO/W.PET/CERES
3 CREAM (GRAM) TOPICAL
Status: DISCONTINUED | OUTPATIENT
Start: 2022-01-01 | End: 2022-01-01 | Stop reason: HOSPADM

## 2022-01-01 RX ORDER — TRAMADOL HYDROCHLORIDE 50 MG/1
50 TABLET ORAL
Status: DISCONTINUED | OUTPATIENT
Start: 2022-01-01 | End: 2022-01-01 | Stop reason: HOSPADM

## 2022-01-01 RX ORDER — POTASSIUM CHLORIDE 20 MEQ/1
20 TABLET, EXTENDED RELEASE ORAL
Status: COMPLETED | OUTPATIENT
Start: 2022-01-01 | End: 2022-01-01

## 2022-01-01 RX ORDER — BUSPIRONE HYDROCHLORIDE 5 MG/1
5 TABLET ORAL DAILY
Status: DISCONTINUED | OUTPATIENT
Start: 2022-01-01 | End: 2022-01-01 | Stop reason: HOSPADM

## 2022-01-01 RX ORDER — SODIUM CHLORIDE, SODIUM LACTATE, POTASSIUM CHLORIDE, CALCIUM CHLORIDE 600; 310; 30; 20 MG/100ML; MG/100ML; MG/100ML; MG/100ML
25 INJECTION, SOLUTION INTRAVENOUS CONTINUOUS
Status: DISCONTINUED | OUTPATIENT
Start: 2022-01-01 | End: 2022-01-01

## 2022-01-01 RX ORDER — POLYETHYLENE GLYCOL 3350 17 G/17G
17 POWDER, FOR SOLUTION ORAL DAILY PRN
Status: DISCONTINUED | OUTPATIENT
Start: 2022-01-01 | End: 2022-01-01 | Stop reason: HOSPADM

## 2022-01-01 RX ORDER — SODIUM CHLORIDE 9 MG/ML
50 INJECTION, SOLUTION INTRAVENOUS CONTINUOUS
Status: DISCONTINUED | OUTPATIENT
Start: 2022-01-01 | End: 2022-01-01 | Stop reason: HOSPADM

## 2022-01-01 RX ORDER — POTASSIUM CHLORIDE 20 MEQ/1
20 TABLET, EXTENDED RELEASE ORAL
Status: DISCONTINUED | OUTPATIENT
Start: 2022-01-01 | End: 2022-01-01

## 2022-01-01 RX ORDER — AMOXICILLIN 250 MG
1 CAPSULE ORAL 2 TIMES DAILY
Qty: 60 TABLET | Refills: 0 | Status: SHIPPED | OUTPATIENT
Start: 2022-01-01

## 2022-01-01 RX ORDER — OXYCODONE HYDROCHLORIDE 5 MG/1
5 TABLET ORAL
Status: DISCONTINUED | OUTPATIENT
Start: 2022-01-01 | End: 2022-01-01 | Stop reason: HOSPADM

## 2022-01-01 RX ORDER — POLYETHYLENE GLYCOL 3350 17 G/17G
17 POWDER, FOR SOLUTION ORAL DAILY
Status: DISCONTINUED | OUTPATIENT
Start: 2022-01-01 | End: 2022-01-01 | Stop reason: SDUPTHER

## 2022-01-01 RX ORDER — SODIUM CHLORIDE 0.9 % (FLUSH) 0.9 %
5-40 SYRINGE (ML) INJECTION EVERY 8 HOURS
Status: CANCELLED | OUTPATIENT
Start: 2022-01-01

## 2022-01-01 RX ORDER — POLYETHYLENE GLYCOL 3350 17 G/17G
17 POWDER, FOR SOLUTION ORAL DAILY PRN
Status: DISCONTINUED | OUTPATIENT
Start: 2022-01-01 | End: 2022-01-01 | Stop reason: SDUPTHER

## 2022-01-01 RX ORDER — MELATONIN 5 MG
5 CAPSULE ORAL
Qty: 30 CAPSULE | Refills: 0 | Status: SHIPPED | OUTPATIENT
Start: 2022-01-01 | End: 2022-01-01

## 2022-01-01 RX ORDER — METOPROLOL SUCCINATE 25 MG/1
25 TABLET, EXTENDED RELEASE ORAL DAILY
Status: DISCONTINUED | OUTPATIENT
Start: 2022-01-01 | End: 2022-01-01

## 2022-01-01 RX ORDER — DEXTROSE MONOHYDRATE 100 MG/ML
0-250 INJECTION, SOLUTION INTRAVENOUS AS NEEDED
Status: DISCONTINUED | OUTPATIENT
Start: 2022-01-01 | End: 2022-01-01 | Stop reason: HOSPADM

## 2022-01-01 RX ORDER — SODIUM CHLORIDE, SODIUM LACTATE, POTASSIUM CHLORIDE, CALCIUM CHLORIDE 600; 310; 30; 20 MG/100ML; MG/100ML; MG/100ML; MG/100ML
25 INJECTION, SOLUTION INTRAVENOUS CONTINUOUS
Status: DISCONTINUED | OUTPATIENT
Start: 2022-01-01 | End: 2022-01-01 | Stop reason: HOSPADM

## 2022-01-01 RX ORDER — HEPARIN SODIUM 1000 [USP'U]/ML
30 INJECTION, SOLUTION INTRAVENOUS; SUBCUTANEOUS AS NEEDED
Status: DISCONTINUED | OUTPATIENT
Start: 2022-01-01 | End: 2022-01-01 | Stop reason: HOSPADM

## 2022-01-01 RX ORDER — SODIUM CHLORIDE 9 MG/ML
75 INJECTION, SOLUTION INTRAVENOUS CONTINUOUS
Status: DISCONTINUED | OUTPATIENT
Start: 2022-01-01 | End: 2022-01-01 | Stop reason: HOSPADM

## 2022-01-01 RX ORDER — FENTANYL CITRATE 50 UG/ML
INJECTION, SOLUTION INTRAMUSCULAR; INTRAVENOUS AS NEEDED
Status: DISCONTINUED | OUTPATIENT
Start: 2022-01-01 | End: 2022-01-01 | Stop reason: HOSPADM

## 2022-01-01 RX ORDER — VANCOMYCIN HYDROCHLORIDE
1250 ONCE
Status: COMPLETED | OUTPATIENT
Start: 2022-01-01 | End: 2022-01-01

## 2022-01-01 RX ORDER — DEXTROSE 50 % IN WATER (D50W) INTRAVENOUS SYRINGE
12.5-25 AS NEEDED
Status: DISCONTINUED | OUTPATIENT
Start: 2022-01-01 | End: 2022-01-01 | Stop reason: HOSPADM

## 2022-01-01 RX ORDER — METOPROLOL SUCCINATE 50 MG/1
50 TABLET, EXTENDED RELEASE ORAL DAILY
Status: DISCONTINUED | OUTPATIENT
Start: 2022-01-01 | End: 2022-01-01 | Stop reason: HOSPADM

## 2022-01-01 RX ORDER — FENTANYL CITRATE 50 UG/ML
50 INJECTION, SOLUTION INTRAMUSCULAR; INTRAVENOUS AS NEEDED
Status: DISCONTINUED | OUTPATIENT
Start: 2022-01-01 | End: 2022-01-01

## 2022-01-01 RX ORDER — INSULIN LISPRO 100 [IU]/ML
INJECTION, SOLUTION INTRAVENOUS; SUBCUTANEOUS
Status: DISCONTINUED | OUTPATIENT
Start: 2022-01-01 | End: 2022-01-01 | Stop reason: HOSPADM

## 2022-01-01 RX ORDER — HYDROMORPHONE HYDROCHLORIDE 1 MG/ML
0.2 INJECTION, SOLUTION INTRAMUSCULAR; INTRAVENOUS; SUBCUTANEOUS
Status: DISCONTINUED | OUTPATIENT
Start: 2022-01-01 | End: 2022-01-01 | Stop reason: HOSPADM

## 2022-01-01 RX ORDER — HYDRALAZINE HYDROCHLORIDE 20 MG/ML
10 INJECTION INTRAMUSCULAR; INTRAVENOUS
Status: DISCONTINUED | OUTPATIENT
Start: 2022-01-01 | End: 2022-01-01 | Stop reason: HOSPADM

## 2022-01-01 RX ORDER — POTASSIUM CHLORIDE 20 MEQ/1
40 TABLET, EXTENDED RELEASE ORAL
Status: DISCONTINUED | OUTPATIENT
Start: 2022-01-01 | End: 2022-01-01

## 2022-01-01 RX ORDER — LIDOCAINE HYDROCHLORIDE 10 MG/ML
0.1 INJECTION, SOLUTION EPIDURAL; INFILTRATION; INTRACAUDAL; PERINEURAL AS NEEDED
Status: CANCELLED | OUTPATIENT
Start: 2022-01-01

## 2022-01-01 RX ORDER — ACETAMINOPHEN 650 MG/1
650 SUPPOSITORY RECTAL
Status: DISCONTINUED | OUTPATIENT
Start: 2022-01-01 | End: 2022-01-01

## 2022-01-01 RX ORDER — ENOXAPARIN SODIUM 100 MG/ML
40 INJECTION SUBCUTANEOUS DAILY
Status: DISCONTINUED | OUTPATIENT
Start: 2022-01-01 | End: 2022-01-01 | Stop reason: SDUPTHER

## 2022-01-01 RX ORDER — ENOXAPARIN SODIUM 100 MG/ML
40 INJECTION SUBCUTANEOUS DAILY
Status: DISCONTINUED | OUTPATIENT
Start: 2022-01-01 | End: 2022-01-01 | Stop reason: HOSPADM

## 2022-01-01 RX ORDER — ALBUMIN HUMAN 250 G/1000ML
25 SOLUTION INTRAVENOUS EVERY 6 HOURS
Status: DISCONTINUED | OUTPATIENT
Start: 2022-01-01 | End: 2022-01-01

## 2022-01-01 RX ORDER — ONDANSETRON 2 MG/ML
4 INJECTION INTRAMUSCULAR; INTRAVENOUS AS NEEDED
Status: DISCONTINUED | OUTPATIENT
Start: 2022-01-01 | End: 2022-01-01 | Stop reason: HOSPADM

## 2022-01-01 RX ORDER — NITROFURANTOIN 25; 75 MG/1; MG/1
100 CAPSULE ORAL 2 TIMES DAILY
Qty: 10 CAPSULE | Refills: 0 | Status: SHIPPED | OUTPATIENT
Start: 2022-01-01 | End: 2022-01-01

## 2022-01-01 RX ORDER — ACETAMINOPHEN 650 MG/1
650 SUPPOSITORY RECTAL
Status: DISCONTINUED | OUTPATIENT
Start: 2022-01-01 | End: 2022-01-01 | Stop reason: SDUPTHER

## 2022-01-01 RX ORDER — AMLODIPINE BESYLATE 5 MG/1
5 TABLET ORAL DAILY
Status: DISCONTINUED | OUTPATIENT
Start: 2022-01-01 | End: 2022-01-01

## 2022-01-01 RX ORDER — LIDOCAINE 4 G/100G
1 PATCH TOPICAL DAILY PRN
Status: DISCONTINUED | OUTPATIENT
Start: 2022-01-01 | End: 2022-01-01 | Stop reason: HOSPADM

## 2022-01-01 RX ORDER — METRONIDAZOLE 500 MG/100ML
500 INJECTION, SOLUTION INTRAVENOUS EVERY 12 HOURS
Status: DISCONTINUED | OUTPATIENT
Start: 2022-01-01 | End: 2022-01-01

## 2022-01-01 RX ORDER — SODIUM CHLORIDE, SODIUM LACTATE, POTASSIUM CHLORIDE, CALCIUM CHLORIDE 600; 310; 30; 20 MG/100ML; MG/100ML; MG/100ML; MG/100ML
INJECTION, SOLUTION INTRAVENOUS
Status: DISCONTINUED | OUTPATIENT
Start: 2022-01-01 | End: 2022-01-01 | Stop reason: HOSPADM

## 2022-01-01 RX ORDER — HYDRALAZINE HYDROCHLORIDE 20 MG/ML
20 INJECTION INTRAMUSCULAR; INTRAVENOUS
Status: DISCONTINUED | OUTPATIENT
Start: 2022-01-01 | End: 2022-01-01 | Stop reason: HOSPADM

## 2022-01-01 RX ORDER — BUTALBITAL, ACETAMINOPHEN AND CAFFEINE 50; 325; 40 MG/1; MG/1; MG/1
1 TABLET ORAL
Status: DISCONTINUED | OUTPATIENT
Start: 2022-01-01 | End: 2022-01-01 | Stop reason: HOSPADM

## 2022-01-01 RX ORDER — LIDOCAINE HYDROCHLORIDE 10 MG/ML
0.1 INJECTION, SOLUTION EPIDURAL; INFILTRATION; INTRACAUDAL; PERINEURAL AS NEEDED
Status: DISCONTINUED | OUTPATIENT
Start: 2022-01-01 | End: 2022-01-01

## 2022-01-01 RX ORDER — POTASSIUM CHLORIDE, DEXTROSE MONOHYDRATE AND SODIUM CHLORIDE 300; 5; 900 MG/100ML; G/100ML; MG/100ML
INJECTION, SOLUTION INTRAVENOUS CONTINUOUS
Status: DISCONTINUED | OUTPATIENT
Start: 2022-01-01 | End: 2022-01-01

## 2022-01-01 RX ORDER — METRONIDAZOLE 500 MG/100ML
500 INJECTION, SOLUTION INTRAVENOUS EVERY 8 HOURS
Status: DISCONTINUED | OUTPATIENT
Start: 2022-01-01 | End: 2022-01-01

## 2022-01-01 RX ORDER — EPHEDRINE SULFATE/0.9% NACL/PF 50 MG/5 ML
SYRINGE (ML) INTRAVENOUS AS NEEDED
Status: DISCONTINUED | OUTPATIENT
Start: 2022-01-01 | End: 2022-01-01 | Stop reason: HOSPADM

## 2022-01-01 RX ORDER — CEPHALEXIN 500 MG/1
500 CAPSULE ORAL 4 TIMES DAILY
Qty: 28 CAPSULE | Refills: 0 | Status: SHIPPED | OUTPATIENT
Start: 2022-01-01 | End: 2022-01-01

## 2022-01-01 RX ORDER — FLUMAZENIL 0.1 MG/ML
0.2 INJECTION INTRAVENOUS
Status: DISCONTINUED | OUTPATIENT
Start: 2022-01-01 | End: 2022-01-01 | Stop reason: HOSPADM

## 2022-01-01 RX ORDER — SODIUM CHLORIDE 9 MG/ML
125 INJECTION, SOLUTION INTRAVENOUS CONTINUOUS
Status: DISCONTINUED | OUTPATIENT
Start: 2022-01-01 | End: 2022-01-01

## 2022-01-01 RX ORDER — VANCOMYCIN/0.9 % SOD CHLORIDE 1.5G/250ML
1500 PLASTIC BAG, INJECTION (ML) INTRAVENOUS
Status: COMPLETED | OUTPATIENT
Start: 2022-01-01 | End: 2022-01-01

## 2022-01-01 RX ORDER — DEXTROSE AND POTASSIUM CHLORIDE 5; .15 G/100ML; G/100ML
SOLUTION INTRAVENOUS CONTINUOUS
Status: DISCONTINUED | OUTPATIENT
Start: 2022-01-01 | End: 2022-01-01 | Stop reason: HOSPADM

## 2022-01-01 RX ORDER — MAGNESIUM SULFATE 1 G/100ML
1 INJECTION INTRAVENOUS ONCE
Status: COMPLETED | OUTPATIENT
Start: 2022-01-01 | End: 2022-01-01

## 2022-01-01 RX ORDER — CEFUROXIME AXETIL 250 MG/1
250 TABLET ORAL 2 TIMES DAILY
Qty: 14 TABLET | Refills: 0 | Status: SHIPPED | OUTPATIENT
Start: 2022-01-01 | End: 2022-01-01

## 2022-01-01 RX ORDER — SODIUM CHLORIDE 0.9 % (FLUSH) 0.9 %
5-40 SYRINGE (ML) INJECTION AS NEEDED
Status: CANCELLED | OUTPATIENT
Start: 2022-01-01

## 2022-01-01 RX ORDER — KETOROLAC TROMETHAMINE 30 MG/ML
15 INJECTION, SOLUTION INTRAMUSCULAR; INTRAVENOUS
Status: COMPLETED | OUTPATIENT
Start: 2022-01-01 | End: 2022-01-01

## 2022-01-01 RX ORDER — SENNOSIDES 8.6 MG/1
2 TABLET ORAL DAILY
Status: DISCONTINUED | OUTPATIENT
Start: 2022-01-01 | End: 2022-01-01

## 2022-01-01 RX ORDER — SORBITOL SOLUTION 70 %
30 SOLUTION, ORAL MISCELLANEOUS 3 TIMES DAILY
Status: DISCONTINUED | OUTPATIENT
Start: 2022-01-01 | End: 2022-01-01

## 2022-01-01 RX ORDER — SODIUM CHLORIDE 9 MG/ML
25 INJECTION, SOLUTION INTRAVENOUS CONTINUOUS
Status: DISPENSED | OUTPATIENT
Start: 2022-01-01 | End: 2022-01-01

## 2022-01-01 RX ORDER — HYDROMORPHONE HYDROCHLORIDE 1 MG/ML
.2-.5 INJECTION, SOLUTION INTRAMUSCULAR; INTRAVENOUS; SUBCUTANEOUS
Status: DISCONTINUED | OUTPATIENT
Start: 2022-01-01 | End: 2022-01-01 | Stop reason: HOSPADM

## 2022-01-01 RX ORDER — HYOSCYAMINE SULFATE 0.12 MG/1
0.12 TABLET SUBLINGUAL
Status: DISCONTINUED | OUTPATIENT
Start: 2022-01-01 | End: 2022-01-01 | Stop reason: HOSPADM

## 2022-01-01 RX ORDER — LORAZEPAM 2 MG/ML
0.5 CONCENTRATE ORAL
Status: DISCONTINUED | OUTPATIENT
Start: 2022-01-01 | End: 2022-01-01 | Stop reason: HOSPADM

## 2022-01-01 RX ORDER — POTASSIUM CHLORIDE 14.9 MG/ML
10 INJECTION INTRAVENOUS ONCE
Status: DISCONTINUED | OUTPATIENT
Start: 2022-01-01 | End: 2022-01-01 | Stop reason: SDUPTHER

## 2022-01-01 RX ORDER — DEXTROMETHORPHAN POLISTIREX 30 MG/5 ML
SUSPENSION, EXTENDED RELEASE 12 HR ORAL
Status: COMPLETED | OUTPATIENT
Start: 2022-01-01 | End: 2022-01-01

## 2022-01-01 RX ORDER — NALOXONE HYDROCHLORIDE 0.4 MG/ML
0.4 INJECTION, SOLUTION INTRAMUSCULAR; INTRAVENOUS; SUBCUTANEOUS
Status: DISCONTINUED | OUTPATIENT
Start: 2022-01-01 | End: 2022-01-01 | Stop reason: HOSPADM

## 2022-01-01 RX ORDER — FACIAL-BODY WIPES
10 EACH TOPICAL
Qty: 30 SUPPOSITORY | Refills: 0 | Status: SHIPPED | OUTPATIENT
Start: 2022-01-01

## 2022-01-01 RX ORDER — POTASSIUM CHLORIDE 20 MEQ/1
40 TABLET, EXTENDED RELEASE ORAL 2 TIMES DAILY
Status: DISCONTINUED | OUTPATIENT
Start: 2022-01-01 | End: 2022-01-01

## 2022-01-01 RX ORDER — BISACODYL 5 MG
5 TABLET, DELAYED RELEASE (ENTERIC COATED) ORAL DAILY PRN
Status: DISCONTINUED | OUTPATIENT
Start: 2022-01-01 | End: 2022-01-01 | Stop reason: HOSPADM

## 2022-01-01 RX ORDER — METOPROLOL TARTRATE 5 MG/5ML
2.5 INJECTION INTRAVENOUS EVERY 6 HOURS
Status: DISCONTINUED | OUTPATIENT
Start: 2022-01-01 | End: 2022-01-01

## 2022-01-01 RX ADMIN — SODIUM CHLORIDE, POTASSIUM CHLORIDE, SODIUM LACTATE AND CALCIUM CHLORIDE 125 ML/HR: 600; 310; 30; 20 INJECTION, SOLUTION INTRAVENOUS at 11:42

## 2022-01-01 RX ADMIN — DEXTROSE MONOHYDRATE 250 ML: 100 INJECTION, SOLUTION INTRAVENOUS at 17:29

## 2022-01-01 RX ADMIN — EZETIMIBE 10 MG: 10 TABLET ORAL at 10:36

## 2022-01-01 RX ADMIN — SODIUM CHLORIDE 1000 ML: 9 INJECTION, SOLUTION INTRAVENOUS at 18:14

## 2022-01-01 RX ADMIN — ENOXAPARIN SODIUM 40 MG: 100 INJECTION SUBCUTANEOUS at 09:04

## 2022-01-01 RX ADMIN — POLYETHYLENE GLYCOL 3350 17 G: 17 POWDER, FOR SOLUTION ORAL at 14:07

## 2022-01-01 RX ADMIN — POLYETHYLENE GLYCOL 3350 17 G: 17 POWDER, FOR SOLUTION ORAL at 09:31

## 2022-01-01 RX ADMIN — SODIUM CHLORIDE, PRESERVATIVE FREE 10 ML: 5 INJECTION INTRAVENOUS at 06:19

## 2022-01-01 RX ADMIN — PROPOFOL 50 MG: 10 INJECTION, EMULSION INTRAVENOUS at 13:16

## 2022-01-01 RX ADMIN — POTASSIUM CHLORIDE 10 MEQ: 7.46 INJECTION, SOLUTION INTRAVENOUS at 14:23

## 2022-01-01 RX ADMIN — SODIUM CHLORIDE, PRESERVATIVE FREE 10 ML: 5 INJECTION INTRAVENOUS at 23:03

## 2022-01-01 RX ADMIN — AMLODIPINE BESYLATE 2.5 MG: 2.5 TABLET ORAL at 09:23

## 2022-01-01 RX ADMIN — ACETAMINOPHEN 650 MG: 325 TABLET ORAL at 16:08

## 2022-01-01 RX ADMIN — SODIUM CHLORIDE, PRESERVATIVE FREE 10 ML: 5 INJECTION INTRAVENOUS at 05:41

## 2022-01-01 RX ADMIN — SODIUM CHLORIDE, PRESERVATIVE FREE 10 ML: 5 INJECTION INTRAVENOUS at 06:38

## 2022-01-01 RX ADMIN — CLOPIDOGREL BISULFATE 75 MG: 75 TABLET, FILM COATED ORAL at 11:41

## 2022-01-01 RX ADMIN — SERTRALINE 100 MG: 50 TABLET, FILM COATED ORAL at 08:19

## 2022-01-01 RX ADMIN — EZETIMIBE 10 MG: 10 TABLET ORAL at 08:33

## 2022-01-01 RX ADMIN — POTASSIUM CHLORIDE 40 MEQ: 20 TABLET, EXTENDED RELEASE ORAL at 17:31

## 2022-01-01 RX ADMIN — TROSPIUM CHLORIDE 20 MG: 20 TABLET, FILM COATED ORAL at 08:37

## 2022-01-01 RX ADMIN — OXYCODONE 5 MG: 5 TABLET ORAL at 19:59

## 2022-01-01 RX ADMIN — BUSPIRONE HYDROCHLORIDE 5 MG: 5 TABLET ORAL at 08:36

## 2022-01-01 RX ADMIN — DEXTROSE MONOHYDRATE, SODIUM CHLORIDE, AND POTASSIUM CHLORIDE: 50; 4.5; 2.98 INJECTION, SOLUTION INTRAVENOUS at 15:38

## 2022-01-01 RX ADMIN — KETOROLAC TROMETHAMINE 15 MG: 30 INJECTION, SOLUTION INTRAMUSCULAR; INTRAVENOUS at 16:52

## 2022-01-01 RX ADMIN — METOPROLOL SUCCINATE 25 MG: 25 TABLET, EXTENDED RELEASE ORAL at 10:15

## 2022-01-01 RX ADMIN — EZETIMIBE 10 MG: 10 TABLET ORAL at 09:52

## 2022-01-01 RX ADMIN — AMLODIPINE BESYLATE 2.5 MG: 2.5 TABLET ORAL at 08:17

## 2022-01-01 RX ADMIN — SODIUM CHLORIDE, PRESERVATIVE FREE 10 ML: 5 INJECTION INTRAVENOUS at 13:36

## 2022-01-01 RX ADMIN — TROSPIUM CHLORIDE 20 MG: 20 TABLET, FILM COATED ORAL at 09:10

## 2022-01-01 RX ADMIN — ONDANSETRON HYDROCHLORIDE 4 MG: 2 INJECTION, SOLUTION INTRAMUSCULAR; INTRAVENOUS at 17:09

## 2022-01-01 RX ADMIN — MORPHINE SULFATE 1 MG: 2 INJECTION, SOLUTION INTRAMUSCULAR; INTRAVENOUS at 11:05

## 2022-01-01 RX ADMIN — ASPIRIN 81 MG CHEWABLE TABLET 81 MG: 81 TABLET CHEWABLE at 10:35

## 2022-01-01 RX ADMIN — FENTANYL CITRATE 12.5 MCG: 50 INJECTION, SOLUTION INTRAMUSCULAR; INTRAVENOUS at 16:36

## 2022-01-01 RX ADMIN — SODIUM CHLORIDE 1.25 MG: 9 INJECTION, SOLUTION INTRAVENOUS at 03:36

## 2022-01-01 RX ADMIN — SENNOSIDES 17.2 MG: 8.6 TABLET, FILM COATED ORAL at 11:41

## 2022-01-01 RX ADMIN — SERTRALINE 100 MG: 50 TABLET, FILM COATED ORAL at 08:54

## 2022-01-01 RX ADMIN — POTASSIUM CHLORIDE 10 MEQ: 7.46 INJECTION, SOLUTION INTRAVENOUS at 02:53

## 2022-01-01 RX ADMIN — METOPROLOL SUCCINATE 25 MG: 25 TABLET, FILM COATED, EXTENDED RELEASE ORAL at 09:49

## 2022-01-01 RX ADMIN — SODIUM CHLORIDE, PRESERVATIVE FREE 10 ML: 5 INJECTION INTRAVENOUS at 22:15

## 2022-01-01 RX ADMIN — SENNOSIDES AND DOCUSATE SODIUM 1 TABLET: 50; 8.6 TABLET ORAL at 18:42

## 2022-01-01 RX ADMIN — POTASSIUM CHLORIDE 10 MEQ: 10 INJECTION, SOLUTION INTRAVENOUS at 06:19

## 2022-01-01 RX ADMIN — ASPIRIN 81 MG: 81 TABLET, CHEWABLE ORAL at 08:19

## 2022-01-01 RX ADMIN — SODIUM CHLORIDE 75 ML/HR: 9 INJECTION, SOLUTION INTRAVENOUS at 06:25

## 2022-01-01 RX ADMIN — TROSPIUM CHLORIDE 20 MG: 20 TABLET, FILM COATED ORAL at 09:52

## 2022-01-01 RX ADMIN — DOCUSATE SODIUM 50MG AND SENNOSIDES 8.6MG 1 TABLET: 8.6; 5 TABLET, FILM COATED ORAL at 09:29

## 2022-01-01 RX ADMIN — ACETAMINOPHEN 650 MG: 325 TABLET ORAL at 05:57

## 2022-01-01 RX ADMIN — ACETAMINOPHEN 650 MG: 325 TABLET ORAL at 14:27

## 2022-01-01 RX ADMIN — AMLODIPINE BESYLATE 2.5 MG: 2.5 TABLET ORAL at 10:15

## 2022-01-01 RX ADMIN — POTASSIUM CHLORIDE 40 MEQ: 20 TABLET, EXTENDED RELEASE ORAL at 18:52

## 2022-01-01 RX ADMIN — SODIUM CHLORIDE, PRESERVATIVE FREE 10 ML: 5 INJECTION INTRAVENOUS at 16:04

## 2022-01-01 RX ADMIN — ASPIRIN 81 MG: 81 TABLET, CHEWABLE ORAL at 08:54

## 2022-01-01 RX ADMIN — VANCOMYCIN HYDROCHLORIDE 750 MG: 750 INJECTION, POWDER, LYOPHILIZED, FOR SOLUTION INTRAVENOUS at 04:19

## 2022-01-01 RX ADMIN — SERTRALINE 100 MG: 50 TABLET, FILM COATED ORAL at 08:48

## 2022-01-01 RX ADMIN — MORPHINE SULFATE 1 MG: 2 INJECTION, SOLUTION INTRAMUSCULAR; INTRAVENOUS at 19:09

## 2022-01-01 RX ADMIN — ENOXAPARIN SODIUM 40 MG: 100 INJECTION SUBCUTANEOUS at 08:18

## 2022-01-01 RX ADMIN — EZETIMIBE 10 MG: 10 TABLET ORAL at 12:19

## 2022-01-01 RX ADMIN — POTASSIUM CHLORIDE 10 MEQ: 7.46 INJECTION, SOLUTION INTRAVENOUS at 07:08

## 2022-01-01 RX ADMIN — SODIUM CHLORIDE, PRESERVATIVE FREE 10 ML: 5 INJECTION INTRAVENOUS at 21:40

## 2022-01-01 RX ADMIN — AMLODIPINE BESYLATE 2.5 MG: 2.5 TABLET ORAL at 08:49

## 2022-01-01 RX ADMIN — SODIUM CHLORIDE, PRESERVATIVE FREE 40 MG: 5 INJECTION INTRAVENOUS at 09:33

## 2022-01-01 RX ADMIN — METOPROLOL SUCCINATE 25 MG: 25 TABLET, EXTENDED RELEASE ORAL at 08:37

## 2022-01-01 RX ADMIN — SODIUM CHLORIDE, PRESERVATIVE FREE 10 ML: 5 INJECTION INTRAVENOUS at 22:43

## 2022-01-01 RX ADMIN — SODIUM CHLORIDE 125 ML/HR: 9 INJECTION, SOLUTION INTRAVENOUS at 02:27

## 2022-01-01 RX ADMIN — Medication 80 MCG: at 15:20

## 2022-01-01 RX ADMIN — AMLODIPINE BESYLATE 2.5 MG: 2.5 TABLET ORAL at 23:32

## 2022-01-01 RX ADMIN — ENOXAPARIN SODIUM 40 MG: 100 INJECTION SUBCUTANEOUS at 09:07

## 2022-01-01 RX ADMIN — SODIUM CHLORIDE, PRESERVATIVE FREE 10 ML: 5 INJECTION INTRAVENOUS at 15:47

## 2022-01-01 RX ADMIN — SODIUM CHLORIDE, PRESERVATIVE FREE 10 ML: 5 INJECTION INTRAVENOUS at 05:01

## 2022-01-01 RX ADMIN — SODIUM CHLORIDE, PRESERVATIVE FREE 40 MG: 5 INJECTION INTRAVENOUS at 10:35

## 2022-01-01 RX ADMIN — SODIUM CHLORIDE, PRESERVATIVE FREE 10 ML: 5 INJECTION INTRAVENOUS at 13:35

## 2022-01-01 RX ADMIN — ASPIRIN 81 MG: 81 TABLET, CHEWABLE ORAL at 10:02

## 2022-01-01 RX ADMIN — HYDROXYZINE HYDROCHLORIDE 10 MG: 10 TABLET ORAL at 10:25

## 2022-01-01 RX ADMIN — LACTULOSE 30 ML: 20 SOLUTION ORAL at 17:36

## 2022-01-01 RX ADMIN — POTASSIUM CHLORIDE 10 MEQ: 7.45 INJECTION INTRAVENOUS at 14:08

## 2022-01-01 RX ADMIN — SODIUM CHLORIDE, PRESERVATIVE FREE 10 ML: 5 INJECTION INTRAVENOUS at 06:01

## 2022-01-01 RX ADMIN — SODIUM CHLORIDE, PRESERVATIVE FREE 10 ML: 5 INJECTION INTRAVENOUS at 22:26

## 2022-01-01 RX ADMIN — CEFTRIAXONE 1 G: 1 INJECTION, POWDER, FOR SOLUTION INTRAMUSCULAR; INTRAVENOUS at 16:15

## 2022-01-01 RX ADMIN — METOPROLOL SUCCINATE 25 MG: 25 TABLET, EXTENDED RELEASE ORAL at 09:14

## 2022-01-01 RX ADMIN — ONDANSETRON 4 MG: 2 INJECTION INTRAMUSCULAR; INTRAVENOUS at 08:18

## 2022-01-01 RX ADMIN — POTASSIUM CHLORIDE AND DEXTROSE MONOHYDRATE: 150; 5 INJECTION, SOLUTION INTRAVENOUS at 19:15

## 2022-01-01 RX ADMIN — ENOXAPARIN SODIUM 40 MG: 100 INJECTION SUBCUTANEOUS at 10:35

## 2022-01-01 RX ADMIN — BUTALBITAL, ACETAMINOPHEN, AND CAFFEINE 2 TABLET: 50; 325; 40 TABLET ORAL at 06:54

## 2022-01-01 RX ADMIN — BUTALBITAL, ACETAMINOPHEN, AND CAFFEINE 1 TABLET: 50; 325; 40 TABLET ORAL at 15:58

## 2022-01-01 RX ADMIN — MINERAL OIL: 100 ENEMA RECTAL at 19:06

## 2022-01-01 RX ADMIN — SODIUM CHLORIDE, POTASSIUM CHLORIDE, SODIUM LACTATE AND CALCIUM CHLORIDE: 600; 310; 30; 20 INJECTION, SOLUTION INTRAVENOUS at 14:25

## 2022-01-01 RX ADMIN — ACETAMINOPHEN 650 MG: 325 TABLET ORAL at 13:31

## 2022-01-01 RX ADMIN — SENNOSIDES 17.2 MG: 8.6 TABLET, FILM COATED ORAL at 11:46

## 2022-01-01 RX ADMIN — POTASSIUM CHLORIDE 10 MEQ: 7.46 INJECTION, SOLUTION INTRAVENOUS at 09:50

## 2022-01-01 RX ADMIN — ONDANSETRON 4 MG: 2 INJECTION INTRAMUSCULAR; INTRAVENOUS at 09:17

## 2022-01-01 RX ADMIN — TROSPIUM CHLORIDE 20 MG: 20 TABLET, FILM COATED ORAL at 10:12

## 2022-01-01 RX ADMIN — PROPOFOL 50 MCG/KG/MIN: 10 INJECTION, EMULSION INTRAVENOUS at 16:34

## 2022-01-01 RX ADMIN — MORPHINE SULFATE 1 MG: 2 INJECTION, SOLUTION INTRAMUSCULAR; INTRAVENOUS at 21:21

## 2022-01-01 RX ADMIN — METOPROLOL SUCCINATE 25 MG: 25 TABLET, EXTENDED RELEASE ORAL at 11:41

## 2022-01-01 RX ADMIN — SODIUM CHLORIDE, PRESERVATIVE FREE 10 ML: 5 INJECTION INTRAVENOUS at 16:41

## 2022-01-01 RX ADMIN — HYDROXYZINE HYDROCHLORIDE 10 MG: 10 TABLET ORAL at 11:59

## 2022-01-01 RX ADMIN — SODIUM CHLORIDE, PRESERVATIVE FREE 10 ML: 5 INJECTION INTRAVENOUS at 06:56

## 2022-01-01 RX ADMIN — Medication 40 MCG: at 13:07

## 2022-01-01 RX ADMIN — ENOXAPARIN SODIUM 40 MG: 100 INJECTION SUBCUTANEOUS at 11:38

## 2022-01-01 RX ADMIN — ONDANSETRON HYDROCHLORIDE 4 MG: 2 INJECTION, SOLUTION INTRAMUSCULAR; INTRAVENOUS at 15:20

## 2022-01-01 RX ADMIN — SENNOSIDES AND DOCUSATE SODIUM 1 TABLET: 50; 8.6 TABLET ORAL at 18:00

## 2022-01-01 RX ADMIN — POTASSIUM CHLORIDE 40 MEQ: 20 TABLET, EXTENDED RELEASE ORAL at 17:56

## 2022-01-01 RX ADMIN — ENOXAPARIN SODIUM 40 MG: 100 INJECTION SUBCUTANEOUS at 08:49

## 2022-01-01 RX ADMIN — AMLODIPINE BESYLATE 2.5 MG: 2.5 TABLET ORAL at 11:40

## 2022-01-01 RX ADMIN — POTASSIUM CHLORIDE, DEXTROSE MONOHYDRATE AND SODIUM CHLORIDE: 300; 5; 900 INJECTION, SOLUTION INTRAVENOUS at 04:18

## 2022-01-01 RX ADMIN — CLOPIDOGREL BISULFATE 75 MG: 75 TABLET, FILM COATED ORAL at 09:30

## 2022-01-01 RX ADMIN — SENNOSIDES 17.2 MG: 8.6 TABLET, FILM COATED ORAL at 14:07

## 2022-01-01 RX ADMIN — POTASSIUM CHLORIDE AND DEXTROSE MONOHYDRATE: 150; 5 INJECTION, SOLUTION INTRAVENOUS at 10:54

## 2022-01-01 RX ADMIN — SODIUM CHLORIDE, PRESERVATIVE FREE 10 ML: 5 INJECTION INTRAVENOUS at 22:28

## 2022-01-01 RX ADMIN — Medication 4 UNITS: at 11:52

## 2022-01-01 RX ADMIN — TRAMADOL HYDROCHLORIDE 50 MG: 50 TABLET, COATED ORAL at 11:52

## 2022-01-01 RX ADMIN — BUSPIRONE HYDROCHLORIDE 5 MG: 5 TABLET ORAL at 08:17

## 2022-01-01 RX ADMIN — SODIUM CHLORIDE, PRESERVATIVE FREE 10 ML: 5 INJECTION INTRAVENOUS at 06:00

## 2022-01-01 RX ADMIN — CEFTRIAXONE 1 G: 1 INJECTION, POWDER, FOR SOLUTION INTRAMUSCULAR; INTRAVENOUS at 12:20

## 2022-01-01 RX ADMIN — SENNOSIDES AND DOCUSATE SODIUM 1 TABLET: 50; 8.6 TABLET ORAL at 17:00

## 2022-01-01 RX ADMIN — POTASSIUM CHLORIDE 10 MEQ: 7.46 INJECTION, SOLUTION INTRAVENOUS at 11:17

## 2022-01-01 RX ADMIN — CEFTRIAXONE 1 G: 1 INJECTION, POWDER, FOR SOLUTION INTRAMUSCULAR; INTRAVENOUS at 06:17

## 2022-01-01 RX ADMIN — SORBITOL SOLUTION (BULK) 30 ML: 70 SOLUTION at 18:52

## 2022-01-01 RX ADMIN — SODIUM CHLORIDE, POTASSIUM CHLORIDE, SODIUM LACTATE AND CALCIUM CHLORIDE 25 ML/HR: 600; 310; 30; 20 INJECTION, SOLUTION INTRAVENOUS at 13:18

## 2022-01-01 RX ADMIN — DEXTROSE MONOHYDRATE, SODIUM CHLORIDE, AND POTASSIUM CHLORIDE: 50; 4.5; 2.98 INJECTION, SOLUTION INTRAVENOUS at 20:57

## 2022-01-01 RX ADMIN — SORBITOL SOLUTION (BULK) 30 ML: 70 SOLUTION at 11:47

## 2022-01-01 RX ADMIN — AMLODIPINE BESYLATE 2.5 MG: 2.5 TABLET ORAL at 09:48

## 2022-01-01 RX ADMIN — POTASSIUM CHLORIDE 40 MEQ: 750 TABLET, FILM COATED, EXTENDED RELEASE ORAL at 18:52

## 2022-01-01 RX ADMIN — EZETIMIBE 10 MG: 10 TABLET ORAL at 09:48

## 2022-01-01 RX ADMIN — METOPROLOL SUCCINATE 25 MG: 25 TABLET, EXTENDED RELEASE ORAL at 09:02

## 2022-01-01 RX ADMIN — ASPIRIN 81 MG CHEWABLE TABLET 81 MG: 81 TABLET CHEWABLE at 11:40

## 2022-01-01 RX ADMIN — ONDANSETRON 4 MG: 2 INJECTION INTRAMUSCULAR; INTRAVENOUS at 08:25

## 2022-01-01 RX ADMIN — METOPROLOL TARTRATE 2.5 MG: 5 INJECTION INTRAVENOUS at 01:01

## 2022-01-01 RX ADMIN — CEFTRIAXONE 1 G: 1 INJECTION, POWDER, FOR SOLUTION INTRAMUSCULAR; INTRAVENOUS at 12:13

## 2022-01-01 RX ADMIN — VANCOMYCIN HYDROCHLORIDE 650 MG: 1 INJECTION, POWDER, LYOPHILIZED, FOR SOLUTION INTRAVENOUS at 23:35

## 2022-01-01 RX ADMIN — SODIUM CHLORIDE, PRESERVATIVE FREE 40 MG: 5 INJECTION INTRAVENOUS at 09:16

## 2022-01-01 RX ADMIN — POTASSIUM CHLORIDE 40 MEQ: 20 TABLET, EXTENDED RELEASE ORAL at 11:40

## 2022-01-01 RX ADMIN — SERTRALINE 100 MG: 50 TABLET, FILM COATED ORAL at 11:46

## 2022-01-01 RX ADMIN — SODIUM CHLORIDE, PRESERVATIVE FREE 10 ML: 5 INJECTION INTRAVENOUS at 18:26

## 2022-01-01 RX ADMIN — SODIUM CHLORIDE 1 G: 900 INJECTION INTRAVENOUS at 07:46

## 2022-01-01 RX ADMIN — POTASSIUM CHLORIDE 10 MEQ: 7.45 INJECTION INTRAVENOUS at 13:24

## 2022-01-01 RX ADMIN — SODIUM CHLORIDE, PRESERVATIVE FREE 10 ML: 5 INJECTION INTRAVENOUS at 13:24

## 2022-01-01 RX ADMIN — FENTANYL CITRATE 50 MCG: 50 INJECTION, SOLUTION INTRAMUSCULAR; INTRAVENOUS at 14:32

## 2022-01-01 RX ADMIN — Medication 80 MCG: at 13:10

## 2022-01-01 RX ADMIN — PROPOFOL 20 MG: 10 INJECTION, EMULSION INTRAVENOUS at 15:03

## 2022-01-01 RX ADMIN — CEFTRIAXONE SODIUM 1 G: 1 INJECTION, POWDER, FOR SOLUTION INTRAMUSCULAR; INTRAVENOUS at 21:39

## 2022-01-01 RX ADMIN — SODIUM CHLORIDE, POTASSIUM CHLORIDE, SODIUM LACTATE AND CALCIUM CHLORIDE 125 ML/HR: 600; 310; 30; 20 INJECTION, SOLUTION INTRAVENOUS at 21:35

## 2022-01-01 RX ADMIN — SODIUM CHLORIDE, PRESERVATIVE FREE 10 ML: 5 INJECTION INTRAVENOUS at 22:17

## 2022-01-01 RX ADMIN — AMLODIPINE BESYLATE 2.5 MG: 2.5 TABLET ORAL at 12:19

## 2022-01-01 RX ADMIN — SODIUM CHLORIDE, PRESERVATIVE FREE 10 ML: 5 INJECTION INTRAVENOUS at 22:59

## 2022-01-01 RX ADMIN — DEXTROSE MONOHYDRATE 12.5 G: 25 INJECTION, SOLUTION INTRAVENOUS at 15:17

## 2022-01-01 RX ADMIN — METRONIDAZOLE 500 MG: 500 INJECTION, SOLUTION INTRAVENOUS at 06:10

## 2022-01-01 RX ADMIN — SODIUM CHLORIDE, PRESERVATIVE FREE 10 ML: 5 INJECTION INTRAVENOUS at 16:42

## 2022-01-01 RX ADMIN — EZETIMIBE 10 MG: 10 TABLET ORAL at 09:03

## 2022-01-01 RX ADMIN — POTASSIUM CHLORIDE 10 MEQ: 7.45 INJECTION INTRAVENOUS at 17:59

## 2022-01-01 RX ADMIN — SODIUM CHLORIDE, PRESERVATIVE FREE 10 ML: 5 INJECTION INTRAVENOUS at 14:00

## 2022-01-01 RX ADMIN — EZETIMIBE 10 MG: 10 TABLET ORAL at 09:10

## 2022-01-01 RX ADMIN — METOPROLOL TARTRATE 2.5 MG: 5 INJECTION INTRAVENOUS at 12:54

## 2022-01-01 RX ADMIN — TROSPIUM CHLORIDE 20 MG: 20 TABLET, FILM COATED ORAL at 09:48

## 2022-01-01 RX ADMIN — POTASSIUM CHLORIDE 20 MEQ: 750 TABLET, FILM COATED, EXTENDED RELEASE ORAL at 10:08

## 2022-01-01 RX ADMIN — SODIUM CHLORIDE, PRESERVATIVE FREE 10 ML: 5 INJECTION INTRAVENOUS at 05:18

## 2022-01-01 RX ADMIN — ACETAMINOPHEN 325MG 650 MG: 325 TABLET ORAL at 09:02

## 2022-01-01 RX ADMIN — BUSPIRONE HYDROCHLORIDE 5 MG: 5 TABLET ORAL at 08:54

## 2022-01-01 RX ADMIN — ENOXAPARIN SODIUM 40 MG: 100 INJECTION SUBCUTANEOUS at 09:29

## 2022-01-01 RX ADMIN — POLYETHYLENE GLYCOL 3350 17 G: 17 POWDER, FOR SOLUTION ORAL at 08:19

## 2022-01-01 RX ADMIN — SODIUM CHLORIDE, PRESERVATIVE FREE 10 ML: 5 INJECTION INTRAVENOUS at 20:49

## 2022-01-01 RX ADMIN — SODIUM CHLORIDE 75 ML/HR: 9 INJECTION, SOLUTION INTRAVENOUS at 09:11

## 2022-01-01 RX ADMIN — SODIUM CHLORIDE, PRESERVATIVE FREE 10 ML: 5 INJECTION INTRAVENOUS at 05:27

## 2022-01-01 RX ADMIN — CLOPIDOGREL BISULFATE 75 MG: 75 TABLET, FILM COATED ORAL at 10:35

## 2022-01-01 RX ADMIN — SODIUM CHLORIDE, PRESERVATIVE FREE 10 ML: 5 INJECTION INTRAVENOUS at 13:16

## 2022-01-01 RX ADMIN — ONDANSETRON 4 MG: 2 INJECTION INTRAMUSCULAR; INTRAVENOUS at 22:35

## 2022-01-01 RX ADMIN — DEXTROSE MONOHYDRATE 12.5 G: 25 INJECTION, SOLUTION INTRAVENOUS at 18:09

## 2022-01-01 RX ADMIN — POTASSIUM BICARBONATE 40 MEQ: 782 TABLET, EFFERVESCENT ORAL at 17:36

## 2022-01-01 RX ADMIN — MORPHINE SULFATE 1 MG: 2 INJECTION, SOLUTION INTRAMUSCULAR; INTRAVENOUS at 11:59

## 2022-01-01 RX ADMIN — METOPROLOL TARTRATE 2.5 MG: 5 INJECTION INTRAVENOUS at 06:20

## 2022-01-01 RX ADMIN — VANCOMYCIN HYDROCHLORIDE 125 MG: KIT at 18:17

## 2022-01-01 RX ADMIN — SODIUM CHLORIDE 1 G: 900 INJECTION INTRAVENOUS at 09:40

## 2022-01-01 RX ADMIN — OXYCODONE 5 MG: 5 TABLET ORAL at 20:23

## 2022-01-01 RX ADMIN — ASPIRIN 81 MG CHEWABLE TABLET 81 MG: 81 TABLET CHEWABLE at 09:29

## 2022-01-01 RX ADMIN — MORPHINE SULFATE 1 MG: 2 INJECTION, SOLUTION INTRAMUSCULAR; INTRAVENOUS at 09:43

## 2022-01-01 RX ADMIN — SODIUM CHLORIDE 50 ML/HR: 9 INJECTION, SOLUTION INTRAVENOUS at 11:48

## 2022-01-01 RX ADMIN — EZETIMIBE 10 MG: 10 TABLET ORAL at 09:37

## 2022-01-01 RX ADMIN — POTASSIUM CHLORIDE 40 MEQ: 20 TABLET, EXTENDED RELEASE ORAL at 08:48

## 2022-01-01 RX ADMIN — SODIUM CHLORIDE 1000 ML: 9 INJECTION, SOLUTION INTRAVENOUS at 07:08

## 2022-01-01 RX ADMIN — ENOXAPARIN SODIUM 40 MG: 100 INJECTION SUBCUTANEOUS at 08:54

## 2022-01-01 RX ADMIN — ENOXAPARIN SODIUM 40 MG: 100 INJECTION SUBCUTANEOUS at 11:46

## 2022-01-01 RX ADMIN — METRONIDAZOLE 500 MG: 500 INJECTION, SOLUTION INTRAVENOUS at 06:23

## 2022-01-01 RX ADMIN — TROSPIUM CHLORIDE 20 MG: 20 TABLET, FILM COATED ORAL at 08:19

## 2022-01-01 RX ADMIN — TRAMADOL HYDROCHLORIDE 50 MG: 50 TABLET, COATED ORAL at 11:18

## 2022-01-01 RX ADMIN — Medication 80 MCG: at 14:44

## 2022-01-01 RX ADMIN — METOPROLOL SUCCINATE 25 MG: 25 TABLET, EXTENDED RELEASE ORAL at 09:37

## 2022-01-01 RX ADMIN — POLYETHYLENE GLYCOL 3350 17 G: 17 POWDER, FOR SOLUTION ORAL at 11:47

## 2022-01-01 RX ADMIN — DEXAMETHASONE SODIUM PHOSPHATE 4 MG: 4 INJECTION, SOLUTION INTRAMUSCULAR; INTRAVENOUS at 16:41

## 2022-01-01 RX ADMIN — SODIUM CHLORIDE, PRESERVATIVE FREE 10 ML: 5 INJECTION INTRAVENOUS at 05:15

## 2022-01-01 RX ADMIN — EZETIMIBE 10 MG: 10 TABLET ORAL at 10:02

## 2022-01-01 RX ADMIN — DEXTROSE MONOHYDRATE, SODIUM CHLORIDE, AND POTASSIUM CHLORIDE: 50; 9; 2.98 INJECTION, SOLUTION INTRAVENOUS at 20:06

## 2022-01-01 RX ADMIN — TROSPIUM CHLORIDE 20 MG: 20 TABLET, FILM COATED ORAL at 09:37

## 2022-01-01 RX ADMIN — SODIUM CHLORIDE, PRESERVATIVE FREE 10 ML: 5 INJECTION INTRAVENOUS at 05:48

## 2022-01-01 RX ADMIN — VANCOMYCIN HYDROCHLORIDE 750 MG: 750 INJECTION, POWDER, LYOPHILIZED, FOR SOLUTION INTRAVENOUS at 04:27

## 2022-01-01 RX ADMIN — LABETALOL HYDROCHLORIDE 10 MG: 5 INJECTION INTRAVENOUS at 15:20

## 2022-01-01 RX ADMIN — SODIUM CHLORIDE, PRESERVATIVE FREE 10 ML: 5 INJECTION INTRAVENOUS at 21:51

## 2022-01-01 RX ADMIN — SERTRALINE 100 MG: 50 TABLET, FILM COATED ORAL at 12:19

## 2022-01-01 RX ADMIN — POLYETHYLENE GLYCOL 3350 17 G: 17 POWDER, FOR SOLUTION ORAL at 09:10

## 2022-01-01 RX ADMIN — SODIUM CHLORIDE, PRESERVATIVE FREE 10 ML: 5 INJECTION INTRAVENOUS at 06:16

## 2022-01-01 RX ADMIN — ACETAMINOPHEN 325MG 650 MG: 325 TABLET ORAL at 02:36

## 2022-01-01 RX ADMIN — ASPIRIN 81 MG CHEWABLE TABLET 81 MG: 81 TABLET CHEWABLE at 09:04

## 2022-01-01 RX ADMIN — VANCOMYCIN HYDROCHLORIDE 750 MG: 750 INJECTION, POWDER, LYOPHILIZED, FOR SOLUTION INTRAVENOUS at 18:20

## 2022-01-01 RX ADMIN — SODIUM CHLORIDE 500 ML: 9 INJECTION, SOLUTION INTRAVENOUS at 19:56

## 2022-01-01 RX ADMIN — SODIUM CHLORIDE 1 G: 900 INJECTION INTRAVENOUS at 08:59

## 2022-01-01 RX ADMIN — SODIUM CHLORIDE, POTASSIUM CHLORIDE, SODIUM LACTATE AND CALCIUM CHLORIDE 125 ML/HR: 600; 310; 30; 20 INJECTION, SOLUTION INTRAVENOUS at 01:46

## 2022-01-01 RX ADMIN — POTASSIUM CHLORIDE 10 MEQ: 7.46 INJECTION, SOLUTION INTRAVENOUS at 23:24

## 2022-01-01 RX ADMIN — MORPHINE SULFATE 1 MG: 2 INJECTION, SOLUTION INTRAMUSCULAR; INTRAVENOUS at 14:42

## 2022-01-01 RX ADMIN — Medication 80 MCG: at 15:08

## 2022-01-01 RX ADMIN — EZETIMIBE 10 MG: 10 TABLET ORAL at 08:17

## 2022-01-01 RX ADMIN — BUTALBITAL, ACETAMINOPHEN, AND CAFFEINE 1 TABLET: 50; 325; 40 TABLET ORAL at 19:51

## 2022-01-01 RX ADMIN — SENNOSIDES AND DOCUSATE SODIUM 1 TABLET: 50; 8.6 TABLET ORAL at 09:10

## 2022-01-01 RX ADMIN — TROSPIUM CHLORIDE 20 MG: 20 TABLET, FILM COATED ORAL at 10:02

## 2022-01-01 RX ADMIN — POTASSIUM CHLORIDE 10 MEQ: 7.46 INJECTION, SOLUTION INTRAVENOUS at 13:03

## 2022-01-01 RX ADMIN — DEXTROSE AND SODIUM CHLORIDE 100 ML/HR: 5; 450 INJECTION, SOLUTION INTRAVENOUS at 06:18

## 2022-01-01 RX ADMIN — POTASSIUM CHLORIDE 10 MEQ: 7.46 INJECTION, SOLUTION INTRAVENOUS at 14:52

## 2022-01-01 RX ADMIN — LIDOCAINE HYDROCHLORIDE 80 MG: 20 INJECTION, SOLUTION EPIDURAL; INFILTRATION; INTRACAUDAL; PERINEURAL at 12:56

## 2022-01-01 RX ADMIN — SODIUM CHLORIDE 1 G: 900 INJECTION INTRAVENOUS at 10:28

## 2022-01-01 RX ADMIN — POTASSIUM CHLORIDE 20 MEQ: 750 TABLET, FILM COATED, EXTENDED RELEASE ORAL at 09:52

## 2022-01-01 RX ADMIN — SODIUM CHLORIDE, PRESERVATIVE FREE 10 ML: 5 INJECTION INTRAVENOUS at 00:06

## 2022-01-01 RX ADMIN — POTASSIUM CHLORIDE, DEXTROSE MONOHYDRATE AND SODIUM CHLORIDE: 300; 5; 900 INJECTION, SOLUTION INTRAVENOUS at 15:47

## 2022-01-01 RX ADMIN — VANCOMYCIN HYDROCHLORIDE 1000 MG: 1 INJECTION, POWDER, LYOPHILIZED, FOR SOLUTION INTRAVENOUS at 04:54

## 2022-01-01 RX ADMIN — SODIUM CHLORIDE, PRESERVATIVE FREE 10 ML: 5 INJECTION INTRAVENOUS at 16:20

## 2022-01-01 RX ADMIN — PROPOFOL 120 MG: 10 INJECTION, EMULSION INTRAVENOUS at 14:40

## 2022-01-01 RX ADMIN — METOPROLOL TARTRATE 2.5 MG: 5 INJECTION INTRAVENOUS at 23:55

## 2022-01-01 RX ADMIN — POTASSIUM CHLORIDE 10 MEQ: 7.46 INJECTION, SOLUTION INTRAVENOUS at 13:52

## 2022-01-01 RX ADMIN — METOPROLOL SUCCINATE 25 MG: 25 TABLET, EXTENDED RELEASE ORAL at 09:52

## 2022-01-01 RX ADMIN — CEFTRIAXONE SODIUM 1 G: 1 INJECTION, POWDER, FOR SOLUTION INTRAMUSCULAR; INTRAVENOUS at 21:33

## 2022-01-01 RX ADMIN — DEXTROSE MONOHYDRATE 12.5 G: 25 INJECTION, SOLUTION INTRAVENOUS at 14:14

## 2022-01-01 RX ADMIN — AMLODIPINE BESYLATE 2.5 MG: 2.5 TABLET ORAL at 21:51

## 2022-01-01 RX ADMIN — ACETAMINOPHEN 650 MG: 325 TABLET ORAL at 01:22

## 2022-01-01 RX ADMIN — METOPROLOL SUCCINATE 25 MG: 25 TABLET, EXTENDED RELEASE ORAL at 08:54

## 2022-01-01 RX ADMIN — EZETIMIBE 10 MG: 10 TABLET ORAL at 11:46

## 2022-01-01 RX ADMIN — SODIUM CHLORIDE, PRESERVATIVE FREE 10 ML: 5 INJECTION INTRAVENOUS at 16:24

## 2022-01-01 RX ADMIN — Medication 10 MG: at 15:18

## 2022-01-01 RX ADMIN — POTASSIUM CHLORIDE, DEXTROSE MONOHYDRATE AND SODIUM CHLORIDE: 300; 5; 900 INJECTION, SOLUTION INTRAVENOUS at 15:44

## 2022-01-01 RX ADMIN — SODIUM CHLORIDE, PRESERVATIVE FREE 10 ML: 5 INJECTION INTRAVENOUS at 18:53

## 2022-01-01 RX ADMIN — AMLODIPINE BESYLATE 2.5 MG: 2.5 TABLET ORAL at 09:52

## 2022-01-01 RX ADMIN — ACETAMINOPHEN 325MG 650 MG: 325 TABLET ORAL at 18:55

## 2022-01-01 RX ADMIN — BISACODYL 10 MG: 10 SUPPOSITORY RECTAL at 11:46

## 2022-01-01 RX ADMIN — ENOXAPARIN SODIUM 40 MG: 100 INJECTION SUBCUTANEOUS at 09:39

## 2022-01-01 RX ADMIN — BISACODYL 10 MG: 10 SUPPOSITORY RECTAL at 11:45

## 2022-01-01 RX ADMIN — SODIUM CHLORIDE, PRESERVATIVE FREE 40 MG: 5 INJECTION INTRAVENOUS at 08:30

## 2022-01-01 RX ADMIN — DEXTROSE MONOHYDRATE, SODIUM CHLORIDE, AND POTASSIUM CHLORIDE: 50; 9; 2.98 INJECTION, SOLUTION INTRAVENOUS at 00:30

## 2022-01-01 RX ADMIN — Medication 2 UNITS: at 17:53

## 2022-01-01 RX ADMIN — SODIUM CHLORIDE, PRESERVATIVE FREE 10 ML: 5 INJECTION INTRAVENOUS at 05:13

## 2022-01-01 RX ADMIN — MORPHINE SULFATE 1 MG: 2 INJECTION, SOLUTION INTRAMUSCULAR; INTRAVENOUS at 08:59

## 2022-01-01 RX ADMIN — AMLODIPINE BESYLATE 2.5 MG: 2.5 TABLET ORAL at 10:35

## 2022-01-01 RX ADMIN — CEFTRIAXONE 1 G: 1 INJECTION, POWDER, FOR SOLUTION INTRAMUSCULAR; INTRAVENOUS at 06:41

## 2022-01-01 RX ADMIN — SODIUM CHLORIDE, PRESERVATIVE FREE 10 ML: 5 INJECTION INTRAVENOUS at 16:47

## 2022-01-01 RX ADMIN — VANCOMYCIN HYDROCHLORIDE 750 MG: 750 INJECTION, POWDER, LYOPHILIZED, FOR SOLUTION INTRAVENOUS at 18:03

## 2022-01-01 RX ADMIN — OXYCODONE 5 MG: 5 TABLET ORAL at 09:46

## 2022-01-01 RX ADMIN — Medication 1 CAPSULE: at 08:17

## 2022-01-01 RX ADMIN — CEFTRIAXONE 1 G: 1 INJECTION, POWDER, FOR SOLUTION INTRAMUSCULAR; INTRAVENOUS at 06:16

## 2022-01-01 RX ADMIN — Medication 3 AMPULE: at 13:18

## 2022-01-01 RX ADMIN — SODIUM CHLORIDE, PRESERVATIVE FREE 10 ML: 5 INJECTION INTRAVENOUS at 06:49

## 2022-01-01 RX ADMIN — SODIUM CHLORIDE 125 ML/HR: 9 INJECTION, SOLUTION INTRAVENOUS at 18:19

## 2022-01-01 RX ADMIN — Medication 1 CAPSULE: at 08:54

## 2022-01-01 RX ADMIN — HYDRALAZINE HYDROCHLORIDE 10 MG: 20 INJECTION, SOLUTION INTRAMUSCULAR; INTRAVENOUS at 18:23

## 2022-01-01 RX ADMIN — MORPHINE SULFATE 1 MG: 2 INJECTION, SOLUTION INTRAMUSCULAR; INTRAVENOUS at 05:01

## 2022-01-01 RX ADMIN — METOPROLOL TARTRATE 2.5 MG: 5 INJECTION INTRAVENOUS at 17:50

## 2022-01-01 RX ADMIN — VANCOMYCIN HYDROCHLORIDE 1000 MG: 1 INJECTION, POWDER, LYOPHILIZED, FOR SOLUTION INTRAVENOUS at 05:47

## 2022-01-01 RX ADMIN — METOPROLOL SUCCINATE 25 MG: 25 TABLET, EXTENDED RELEASE ORAL at 08:48

## 2022-01-01 RX ADMIN — Medication 0.5 MG: at 21:20

## 2022-01-01 RX ADMIN — METOPROLOL SUCCINATE 25 MG: 25 TABLET, FILM COATED, EXTENDED RELEASE ORAL at 08:19

## 2022-01-01 RX ADMIN — EZETIMIBE 10 MG: 10 TABLET ORAL at 09:29

## 2022-01-01 RX ADMIN — ACETAMINOPHEN 650 MG: 325 TABLET ORAL at 22:06

## 2022-01-01 RX ADMIN — VANCOMYCIN HYDROCHLORIDE 1500 MG: 10 INJECTION, POWDER, LYOPHILIZED, FOR SOLUTION INTRAVENOUS at 16:03

## 2022-01-01 RX ADMIN — SORBITOL SOLUTION (BULK) 30 ML: 70 SOLUTION at 10:04

## 2022-01-01 RX ADMIN — SODIUM CHLORIDE, PRESERVATIVE FREE 10 ML: 5 INJECTION INTRAVENOUS at 22:10

## 2022-01-01 RX ADMIN — TROSPIUM CHLORIDE 20 MG: 20 TABLET, FILM COATED ORAL at 12:19

## 2022-01-01 RX ADMIN — POTASSIUM CHLORIDE 10 MEQ: 7.46 INJECTION, SOLUTION INTRAVENOUS at 15:25

## 2022-01-01 RX ADMIN — CEFTRIAXONE SODIUM 1 G: 1 INJECTION, POWDER, FOR SOLUTION INTRAMUSCULAR; INTRAVENOUS at 23:05

## 2022-01-01 RX ADMIN — IOPAMIDOL 100 ML: 755 INJECTION, SOLUTION INTRAVENOUS at 03:47

## 2022-01-01 RX ADMIN — POLYETHYLENE GLYCOL 3350 17 G: 17 POWDER, FOR SOLUTION ORAL at 18:42

## 2022-01-01 RX ADMIN — SERTRALINE 100 MG: 50 TABLET, FILM COATED ORAL at 09:10

## 2022-01-01 RX ADMIN — CLOPIDOGREL BISULFATE 75 MG: 75 TABLET, FILM COATED ORAL at 11:46

## 2022-01-01 RX ADMIN — OXYCODONE 5 MG: 5 TABLET ORAL at 15:20

## 2022-01-01 RX ADMIN — ACETAMINOPHEN 325MG 650 MG: 325 TABLET ORAL at 10:18

## 2022-01-01 RX ADMIN — SODIUM CHLORIDE 1.25 MG: 9 INJECTION, SOLUTION INTRAVENOUS at 17:46

## 2022-01-01 RX ADMIN — SERTRALINE 100 MG: 50 TABLET, FILM COATED ORAL at 08:31

## 2022-01-01 RX ADMIN — METOPROLOL SUCCINATE 25 MG: 25 TABLET, EXTENDED RELEASE ORAL at 10:35

## 2022-01-01 RX ADMIN — SODIUM CHLORIDE, PRESERVATIVE FREE 10 ML: 5 INJECTION INTRAVENOUS at 09:11

## 2022-01-01 RX ADMIN — SODIUM CHLORIDE, POTASSIUM CHLORIDE, SODIUM LACTATE AND CALCIUM CHLORIDE 125 ML/HR: 600; 310; 30; 20 INJECTION, SOLUTION INTRAVENOUS at 06:16

## 2022-01-01 RX ADMIN — SODIUM CHLORIDE, PRESERVATIVE FREE 10 ML: 5 INJECTION INTRAVENOUS at 13:23

## 2022-01-01 RX ADMIN — VANCOMYCIN HYDROCHLORIDE 125 MG: KIT at 13:55

## 2022-01-01 RX ADMIN — LIDOCAINE HYDROCHLORIDE 60 MG: 20 INJECTION, SOLUTION EPIDURAL; INFILTRATION; INTRACAUDAL; PERINEURAL at 14:40

## 2022-01-01 RX ADMIN — SODIUM CHLORIDE, PRESERVATIVE FREE 10 ML: 5 INJECTION INTRAVENOUS at 13:38

## 2022-01-01 RX ADMIN — LIDOCAINE HYDROCHLORIDE 20 MG: 20 INJECTION, SOLUTION EPIDURAL; INFILTRATION; INTRACAUDAL; PERINEURAL at 16:34

## 2022-01-01 RX ADMIN — SERTRALINE 100 MG: 50 TABLET, FILM COATED ORAL at 08:37

## 2022-01-01 RX ADMIN — PROPOFOL 50 MG: 10 INJECTION, EMULSION INTRAVENOUS at 14:49

## 2022-01-01 RX ADMIN — SERTRALINE 100 MG: 50 TABLET, FILM COATED ORAL at 10:15

## 2022-01-01 RX ADMIN — ASPIRIN 81 MG CHEWABLE TABLET 81 MG: 81 TABLET CHEWABLE at 08:48

## 2022-01-01 RX ADMIN — EZETIMIBE 10 MG: 10 TABLET ORAL at 09:13

## 2022-01-01 RX ADMIN — BUSPIRONE HYDROCHLORIDE 5 MG: 5 TABLET ORAL at 09:36

## 2022-01-01 RX ADMIN — SERTRALINE 100 MG: 50 TABLET, FILM COATED ORAL at 10:02

## 2022-01-01 RX ADMIN — MIDAZOLAM 1 MG: 1 INJECTION INTRAMUSCULAR; INTRAVENOUS at 11:56

## 2022-01-01 RX ADMIN — SODIUM CHLORIDE, POTASSIUM CHLORIDE, SODIUM LACTATE AND CALCIUM CHLORIDE 25 ML/HR: 600; 310; 30; 20 INJECTION, SOLUTION INTRAVENOUS at 15:58

## 2022-01-01 RX ADMIN — POLYETHYLENE GLYCOL 3350 17 G: 17 POWDER, FOR SOLUTION ORAL at 18:52

## 2022-01-01 RX ADMIN — CLOPIDOGREL BISULFATE 75 MG: 75 TABLET, FILM COATED ORAL at 09:04

## 2022-01-01 RX ADMIN — ONDANSETRON 4 MG: 4 TABLET, ORALLY DISINTEGRATING ORAL at 07:09

## 2022-01-01 RX ADMIN — SODIUM CHLORIDE 125 ML/HR: 9 INJECTION, SOLUTION INTRAVENOUS at 23:05

## 2022-01-01 RX ADMIN — METRONIDAZOLE 500 MG: 500 INJECTION, SOLUTION INTRAVENOUS at 17:53

## 2022-01-01 RX ADMIN — SERTRALINE 100 MG: 50 TABLET, FILM COATED ORAL at 09:29

## 2022-01-01 RX ADMIN — SODIUM CHLORIDE, PRESERVATIVE FREE 10 ML: 5 INJECTION INTRAVENOUS at 22:16

## 2022-01-01 RX ADMIN — POLYETHYLENE GLYCOL 3350 17 G: 17 POWDER, FOR SOLUTION ORAL at 10:02

## 2022-01-01 RX ADMIN — METOPROLOL SUCCINATE 25 MG: 25 TABLET, FILM COATED, EXTENDED RELEASE ORAL at 10:02

## 2022-01-01 RX ADMIN — SODIUM CHLORIDE 75 ML/HR: 9 INJECTION, SOLUTION INTRAVENOUS at 04:28

## 2022-01-01 RX ADMIN — SODIUM CHLORIDE 75 ML/HR: 9 INJECTION, SOLUTION INTRAVENOUS at 07:47

## 2022-01-01 RX ADMIN — POTASSIUM CHLORIDE 40 MEQ: 20 TABLET, EXTENDED RELEASE ORAL at 11:46

## 2022-01-01 RX ADMIN — HYDRALAZINE HYDROCHLORIDE 20 MG: 20 INJECTION INTRAMUSCULAR; INTRAVENOUS at 15:53

## 2022-01-01 RX ADMIN — ACETAMINOPHEN 650 MG: 325 TABLET ORAL at 13:49

## 2022-01-01 RX ADMIN — SERTRALINE 100 MG: 50 TABLET, FILM COATED ORAL at 09:48

## 2022-01-01 RX ADMIN — SODIUM CHLORIDE, PRESERVATIVE FREE 10 ML: 5 INJECTION INTRAVENOUS at 21:45

## 2022-01-01 RX ADMIN — SODIUM CHLORIDE 25 ML/HR: 9 INJECTION, SOLUTION INTRAVENOUS at 13:00

## 2022-01-01 RX ADMIN — METOPROLOL SUCCINATE 25 MG: 25 TABLET, FILM COATED, EXTENDED RELEASE ORAL at 09:10

## 2022-01-01 RX ADMIN — VANCOMYCIN HYDROCHLORIDE 750 MG: 750 INJECTION, POWDER, LYOPHILIZED, FOR SOLUTION INTRAVENOUS at 04:21

## 2022-01-01 RX ADMIN — SERTRALINE 100 MG: 50 TABLET, FILM COATED ORAL at 09:52

## 2022-01-01 RX ADMIN — POTASSIUM CHLORIDE AND DEXTROSE MONOHYDRATE: 150; 5 INJECTION, SOLUTION INTRAVENOUS at 01:35

## 2022-01-01 RX ADMIN — POTASSIUM CHLORIDE 40 MEQ: 20 TABLET, EXTENDED RELEASE ORAL at 12:19

## 2022-01-01 RX ADMIN — BUSPIRONE HYDROCHLORIDE 5 MG: 5 TABLET ORAL at 12:19

## 2022-01-01 RX ADMIN — SODIUM CHLORIDE, PRESERVATIVE FREE 10 ML: 5 INJECTION INTRAVENOUS at 02:31

## 2022-01-01 RX ADMIN — BUTALBITAL, ACETAMINOPHEN, AND CAFFEINE 1 TABLET: 50; 325; 40 TABLET ORAL at 09:48

## 2022-01-01 RX ADMIN — SENNOSIDES AND DOCUSATE SODIUM 1 TABLET: 50; 8.6 TABLET ORAL at 08:19

## 2022-01-01 RX ADMIN — KETOROLAC TROMETHAMINE 15 MG: 30 INJECTION, SOLUTION INTRAMUSCULAR at 07:46

## 2022-01-01 RX ADMIN — METOPROLOL TARTRATE 2.5 MG: 5 INJECTION INTRAVENOUS at 06:13

## 2022-01-01 RX ADMIN — ENOXAPARIN SODIUM 40 MG: 100 INJECTION SUBCUTANEOUS at 09:57

## 2022-01-01 RX ADMIN — AMLODIPINE BESYLATE 2.5 MG: 2.5 TABLET ORAL at 09:04

## 2022-01-01 RX ADMIN — HYDRALAZINE HYDROCHLORIDE 10 MG: 20 INJECTION, SOLUTION INTRAMUSCULAR; INTRAVENOUS at 15:20

## 2022-01-01 RX ADMIN — VANCOMYCIN HYDROCHLORIDE 750 MG: 750 INJECTION, POWDER, LYOPHILIZED, FOR SOLUTION INTRAVENOUS at 04:09

## 2022-01-01 RX ADMIN — METOPROLOL SUCCINATE 25 MG: 25 TABLET, EXTENDED RELEASE ORAL at 08:18

## 2022-01-01 RX ADMIN — ENOXAPARIN SODIUM 40 MG: 100 INJECTION SUBCUTANEOUS at 09:49

## 2022-01-01 RX ADMIN — DEXTROSE MONOHYDRATE, SODIUM CHLORIDE, AND POTASSIUM CHLORIDE: 50; 9; 2.98 INJECTION, SOLUTION INTRAVENOUS at 10:30

## 2022-01-01 RX ADMIN — Medication 2 DROP: at 20:55

## 2022-01-01 RX ADMIN — SODIUM CHLORIDE, PRESERVATIVE FREE 10 ML: 5 INJECTION INTRAVENOUS at 22:00

## 2022-01-01 RX ADMIN — SERTRALINE 100 MG: 50 TABLET, FILM COATED ORAL at 09:38

## 2022-01-01 RX ADMIN — Medication 1 AMPULE: at 22:16

## 2022-01-01 RX ADMIN — AMLODIPINE BESYLATE 2.5 MG: 2.5 TABLET ORAL at 09:36

## 2022-01-01 RX ADMIN — BUSPIRONE HYDROCHLORIDE 5 MG: 5 TABLET ORAL at 09:52

## 2022-01-01 RX ADMIN — SODIUM CHLORIDE, PRESERVATIVE FREE 40 MG: 5 INJECTION INTRAVENOUS at 15:37

## 2022-01-01 RX ADMIN — TROSPIUM CHLORIDE 20 MG: 20 TABLET, FILM COATED ORAL at 10:15

## 2022-01-01 RX ADMIN — Medication 1 AMPULE: at 09:00

## 2022-01-01 RX ADMIN — POTASSIUM BICARBONATE 40 MEQ: 782 TABLET, EFFERVESCENT ORAL at 02:44

## 2022-01-01 RX ADMIN — SODIUM CHLORIDE 1.25 MG: 9 INJECTION, SOLUTION INTRAVENOUS at 09:00

## 2022-01-01 RX ADMIN — ENOXAPARIN SODIUM 40 MG: 100 INJECTION SUBCUTANEOUS at 09:22

## 2022-01-01 RX ADMIN — POTASSIUM CHLORIDE 10 MEQ: 7.46 INJECTION, SOLUTION INTRAVENOUS at 11:51

## 2022-01-01 RX ADMIN — POTASSIUM CHLORIDE 10 MEQ: 7.45 INJECTION INTRAVENOUS at 11:54

## 2022-01-01 RX ADMIN — Medication 1 AMPULE: at 09:53

## 2022-01-01 RX ADMIN — VANCOMYCIN HYDROCHLORIDE 125 MG: KIT at 18:35

## 2022-01-01 RX ADMIN — VANCOMYCIN HYDROCHLORIDE 125 MG: KIT at 06:48

## 2022-01-01 RX ADMIN — VANCOMYCIN HYDROCHLORIDE 125 MG: KIT at 00:22

## 2022-01-01 RX ADMIN — MIDAZOLAM HYDROCHLORIDE 1 MG: 1 INJECTION, SOLUTION INTRAMUSCULAR; INTRAVENOUS at 14:20

## 2022-01-01 RX ADMIN — PROPOFOL 20 MG: 10 INJECTION, EMULSION INTRAVENOUS at 16:36

## 2022-01-01 RX ADMIN — DEXTROSE MONOHYDRATE, SODIUM CHLORIDE, AND POTASSIUM CHLORIDE: 50; 9; 2.98 INJECTION, SOLUTION INTRAVENOUS at 00:53

## 2022-01-01 RX ADMIN — METOPROLOL SUCCINATE 25 MG: 25 TABLET, EXTENDED RELEASE ORAL at 08:31

## 2022-01-01 RX ADMIN — ENOXAPARIN SODIUM 40 MG: 100 INJECTION SUBCUTANEOUS at 10:36

## 2022-01-01 RX ADMIN — CEFTRIAXONE 1 G: 1 INJECTION, POWDER, FOR SOLUTION INTRAMUSCULAR; INTRAVENOUS at 02:31

## 2022-01-01 RX ADMIN — SERTRALINE 100 MG: 50 TABLET, FILM COATED ORAL at 09:13

## 2022-01-01 RX ADMIN — SODIUM CHLORIDE, PRESERVATIVE FREE 10 ML: 5 INJECTION INTRAVENOUS at 21:23

## 2022-01-01 RX ADMIN — SODIUM CHLORIDE, PRESERVATIVE FREE 10 ML: 5 INJECTION INTRAVENOUS at 07:13

## 2022-01-01 RX ADMIN — DIAZEPAM 2 MG: 5 INJECTION, SOLUTION INTRAMUSCULAR; INTRAVENOUS at 23:39

## 2022-01-01 RX ADMIN — POTASSIUM CHLORIDE 40 MEQ: 20 TABLET, EXTENDED RELEASE ORAL at 13:16

## 2022-01-01 RX ADMIN — EZETIMIBE 10 MG: 10 TABLET ORAL at 11:41

## 2022-01-01 RX ADMIN — SODIUM CHLORIDE, POTASSIUM CHLORIDE, SODIUM LACTATE AND CALCIUM CHLORIDE 125 ML/HR: 600; 310; 30; 20 INJECTION, SOLUTION INTRAVENOUS at 16:18

## 2022-01-01 RX ADMIN — Medication 1 AMPULE: at 22:06

## 2022-01-01 RX ADMIN — HEPARIN SODIUM AND DEXTROSE 20 UNITS/KG/HR: 10000; 5 INJECTION INTRAVENOUS at 04:43

## 2022-01-01 RX ADMIN — SODIUM CHLORIDE, PRESERVATIVE FREE 10 ML: 5 INJECTION INTRAVENOUS at 05:09

## 2022-01-01 RX ADMIN — POTASSIUM CHLORIDE AND SODIUM CHLORIDE: 900; 150 INJECTION, SOLUTION INTRAVENOUS at 15:09

## 2022-01-01 RX ADMIN — POTASSIUM CHLORIDE 10 MEQ: 10 INJECTION, SOLUTION INTRAVENOUS at 06:16

## 2022-01-01 RX ADMIN — CEFTRIAXONE 1 G: 1 INJECTION, POWDER, FOR SOLUTION INTRAMUSCULAR; INTRAVENOUS at 12:42

## 2022-01-01 RX ADMIN — POTASSIUM CHLORIDE 20 MEQ: 20 TABLET, EXTENDED RELEASE ORAL at 08:36

## 2022-01-01 RX ADMIN — ASPIRIN 81 MG CHEWABLE TABLET 81 MG: 81 TABLET CHEWABLE at 11:47

## 2022-01-01 RX ADMIN — ACETAMINOPHEN 650 MG: 325 TABLET ORAL at 10:27

## 2022-01-01 RX ADMIN — SODIUM CHLORIDE 75 ML/HR: 9 INJECTION, SOLUTION INTRAVENOUS at 18:15

## 2022-01-01 RX ADMIN — IOPAMIDOL 100 ML: 755 INJECTION, SOLUTION INTRAVENOUS at 16:01

## 2022-01-01 RX ADMIN — SODIUM CHLORIDE, PRESERVATIVE FREE 10 ML: 5 INJECTION INTRAVENOUS at 05:57

## 2022-01-01 RX ADMIN — ACETAMINOPHEN 650 MG: 325 TABLET ORAL at 18:19

## 2022-01-01 RX ADMIN — SODIUM CHLORIDE, PRESERVATIVE FREE 10 ML: 5 INJECTION INTRAVENOUS at 15:39

## 2022-01-01 RX ADMIN — AMLODIPINE BESYLATE 2.5 MG: 2.5 TABLET ORAL at 08:19

## 2022-01-01 RX ADMIN — EZETIMIBE 10 MG: 10 TABLET ORAL at 10:15

## 2022-01-01 RX ADMIN — BISACODYL 10 MG: 10 SUPPOSITORY RECTAL at 09:11

## 2022-01-01 RX ADMIN — AMLODIPINE BESYLATE 2.5 MG: 2.5 TABLET ORAL at 09:30

## 2022-01-01 RX ADMIN — ASPIRIN 81 MG: 81 TABLET, CHEWABLE ORAL at 08:17

## 2022-01-01 RX ADMIN — SODIUM CHLORIDE, PRESERVATIVE FREE 10 ML: 5 INJECTION INTRAVENOUS at 07:14

## 2022-01-01 RX ADMIN — MORPHINE SULFATE 1 MG: 2 INJECTION, SOLUTION INTRAMUSCULAR; INTRAVENOUS at 00:23

## 2022-01-01 RX ADMIN — POTASSIUM CHLORIDE 10 MEQ: 10 INJECTION, SOLUTION INTRAVENOUS at 18:52

## 2022-01-01 RX ADMIN — FENTANYL CITRATE 25 MCG: 50 INJECTION, SOLUTION INTRAMUSCULAR; INTRAVENOUS at 11:56

## 2022-01-01 RX ADMIN — Medication 3 AMPULE: at 15:57

## 2022-01-01 RX ADMIN — SODIUM CHLORIDE, PRESERVATIVE FREE 10 ML: 5 INJECTION INTRAVENOUS at 23:09

## 2022-01-01 RX ADMIN — SENNOSIDES AND DOCUSATE SODIUM 1 TABLET: 50; 8.6 TABLET ORAL at 10:02

## 2022-01-01 RX ADMIN — SODIUM CHLORIDE, PRESERVATIVE FREE 10 ML: 5 INJECTION INTRAVENOUS at 21:42

## 2022-01-01 RX ADMIN — EZETIMIBE 10 MG: 10 TABLET ORAL at 08:54

## 2022-01-01 RX ADMIN — ASPIRIN 81 MG: 81 TABLET, CHEWABLE ORAL at 09:48

## 2022-01-01 RX ADMIN — SODIUM CHLORIDE, POTASSIUM CHLORIDE, SODIUM LACTATE AND CALCIUM CHLORIDE 25 ML/HR: 600; 310; 30; 20 INJECTION, SOLUTION INTRAVENOUS at 08:17

## 2022-01-01 RX ADMIN — CLOPIDOGREL BISULFATE 75 MG: 75 TABLET, FILM COATED ORAL at 08:49

## 2022-01-01 RX ADMIN — SODIUM CHLORIDE 1 G: 900 INJECTION INTRAVENOUS at 12:03

## 2022-01-01 RX ADMIN — POLYETHYLENE GLYCOL 3350 17 G: 17 POWDER, FOR SOLUTION ORAL at 09:49

## 2022-01-01 RX ADMIN — VANCOMYCIN HYDROCHLORIDE 125 MG: KIT at 12:55

## 2022-01-01 RX ADMIN — HEPARIN SODIUM 4000 UNITS: 1000 INJECTION INTRAVENOUS; SUBCUTANEOUS at 04:41

## 2022-01-01 RX ADMIN — VANCOMYCIN HYDROCHLORIDE 1250 MG: 10 INJECTION, POWDER, LYOPHILIZED, FOR SOLUTION INTRAVENOUS at 17:58

## 2022-01-01 RX ADMIN — AMLODIPINE BESYLATE 2.5 MG: 2.5 TABLET ORAL at 08:36

## 2022-01-01 RX ADMIN — DEXTROSE AND SODIUM CHLORIDE 75 ML/HR: 5; 900 INJECTION, SOLUTION INTRAVENOUS at 16:03

## 2022-01-01 RX ADMIN — DEXTROSE MONOHYDRATE, SODIUM CHLORIDE, AND POTASSIUM CHLORIDE: 50; 9; 2.98 INJECTION, SOLUTION INTRAVENOUS at 16:02

## 2022-01-01 RX ADMIN — METOPROLOL SUCCINATE 25 MG: 25 TABLET, EXTENDED RELEASE ORAL at 09:48

## 2022-01-01 RX ADMIN — SODIUM CHLORIDE, PRESERVATIVE FREE 10 ML: 5 INJECTION INTRAVENOUS at 01:13

## 2022-01-01 RX ADMIN — POTASSIUM CHLORIDE 10 MEQ: 10 INJECTION, SOLUTION INTRAVENOUS at 07:00

## 2022-01-01 RX ADMIN — ENOXAPARIN SODIUM 40 MG: 100 INJECTION SUBCUTANEOUS at 09:10

## 2022-01-01 RX ADMIN — SERTRALINE 100 MG: 50 TABLET, FILM COATED ORAL at 08:17

## 2022-01-01 RX ADMIN — METOPROLOL SUCCINATE 25 MG: 25 TABLET, FILM COATED, EXTENDED RELEASE ORAL at 09:23

## 2022-01-01 RX ADMIN — POTASSIUM CHLORIDE 40 MEQ: 750 TABLET, FILM COATED, EXTENDED RELEASE ORAL at 20:32

## 2022-01-01 RX ADMIN — SODIUM CHLORIDE, PRESERVATIVE FREE 10 ML: 5 INJECTION INTRAVENOUS at 21:50

## 2022-01-01 RX ADMIN — EZETIMIBE 10 MG: 10 TABLET ORAL at 08:19

## 2022-01-01 RX ADMIN — SODIUM CHLORIDE, PRESERVATIVE FREE 10 ML: 5 INJECTION INTRAVENOUS at 22:23

## 2022-01-01 RX ADMIN — METRONIDAZOLE 500 MG: 500 INJECTION, SOLUTION INTRAVENOUS at 22:28

## 2022-01-01 RX ADMIN — METOPROLOL TARTRATE 2.5 MG: 5 INJECTION INTRAVENOUS at 12:17

## 2022-01-01 RX ADMIN — VANCOMYCIN HYDROCHLORIDE 125 MG: KIT at 05:14

## 2022-01-01 RX ADMIN — SENNOSIDES AND DOCUSATE SODIUM 1 TABLET: 50; 8.6 TABLET ORAL at 09:49

## 2022-01-01 RX ADMIN — SODIUM CHLORIDE 75 ML/HR: 9 INJECTION, SOLUTION INTRAVENOUS at 06:45

## 2022-01-01 RX ADMIN — VANCOMYCIN HYDROCHLORIDE 750 MG: 750 INJECTION, POWDER, LYOPHILIZED, FOR SOLUTION INTRAVENOUS at 23:42

## 2022-01-01 RX ADMIN — CEFTRIAXONE SODIUM 1 G: 2 INJECTION, POWDER, FOR SOLUTION INTRAMUSCULAR; INTRAVENOUS at 14:45

## 2022-01-01 RX ADMIN — ASPIRIN 81 MG: 81 TABLET, CHEWABLE ORAL at 09:10

## 2022-01-01 RX ADMIN — AMLODIPINE BESYLATE 2.5 MG: 2.5 TABLET ORAL at 11:47

## 2022-01-01 RX ADMIN — AMLODIPINE BESYLATE 2.5 MG: 2.5 TABLET ORAL at 09:10

## 2022-01-01 RX ADMIN — METOPROLOL SUCCINATE 25 MG: 25 TABLET, EXTENDED RELEASE ORAL at 11:46

## 2022-01-01 RX ADMIN — SENNOSIDES AND DOCUSATE SODIUM 1 TABLET: 50; 8.6 TABLET ORAL at 08:31

## 2022-01-01 RX ADMIN — SODIUM CHLORIDE, PRESERVATIVE FREE 10 ML: 5 INJECTION INTRAVENOUS at 13:49

## 2022-01-01 RX ADMIN — POLYETHYLENE GLYCOL 3350 17 G: 17 POWDER, FOR SOLUTION ORAL at 18:57

## 2022-01-01 RX ADMIN — METOPROLOL SUCCINATE 25 MG: 25 TABLET, EXTENDED RELEASE ORAL at 12:19

## 2022-01-01 RX ADMIN — ACETAMINOPHEN 1000 MG: 500 TABLET ORAL at 18:15

## 2022-01-01 RX ADMIN — EZETIMIBE 10 MG: 10 TABLET ORAL at 08:37

## 2022-01-01 RX ADMIN — CEFTRIAXONE 1 G: 1 INJECTION, POWDER, FOR SOLUTION INTRAMUSCULAR; INTRAVENOUS at 06:39

## 2022-01-01 RX ADMIN — DEXTROSE AND SODIUM CHLORIDE 100 ML/HR: 5; 450 INJECTION, SOLUTION INTRAVENOUS at 15:36

## 2022-01-01 RX ADMIN — SODIUM CHLORIDE, PRESERVATIVE FREE 40 MG: 5 INJECTION INTRAVENOUS at 08:53

## 2022-01-01 RX ADMIN — POLYETHYLENE GLYCOL 3350 17 G: 17 POWDER, FOR SOLUTION ORAL at 18:07

## 2022-01-01 RX ADMIN — BUSPIRONE HYDROCHLORIDE 5 MG: 5 TABLET ORAL at 10:15

## 2022-01-01 RX ADMIN — MAGNESIUM SULFATE HEPTAHYDRATE 1 G: 1 INJECTION, SOLUTION INTRAVENOUS at 13:23

## 2022-01-01 RX ADMIN — AMLODIPINE BESYLATE 2.5 MG: 2.5 TABLET ORAL at 10:02

## 2022-01-01 RX ADMIN — SODIUM CHLORIDE 500 ML: 9 INJECTION, SOLUTION INTRAVENOUS at 10:29

## 2022-01-01 RX ADMIN — ONDANSETRON 4 MG: 2 INJECTION INTRAMUSCULAR; INTRAVENOUS at 06:54

## 2022-01-01 RX ADMIN — ASPIRIN 325 MG ORAL TABLET 325 MG: 325 PILL ORAL at 04:37

## 2022-01-01 RX ADMIN — SODIUM CHLORIDE, PRESERVATIVE FREE 10 ML: 5 INJECTION INTRAVENOUS at 17:58

## 2022-01-01 RX ADMIN — EZETIMIBE 10 MG: 10 TABLET ORAL at 08:48

## 2022-01-01 RX ADMIN — POTASSIUM CHLORIDE AND DEXTROSE MONOHYDRATE: 150; 5 INJECTION, SOLUTION INTRAVENOUS at 22:17

## 2022-01-01 RX ADMIN — ASPIRIN 81 MG: 81 TABLET, CHEWABLE ORAL at 10:33

## 2022-01-01 RX ADMIN — BUSPIRONE HYDROCHLORIDE 5 MG: 5 TABLET ORAL at 09:48

## 2022-01-01 RX ADMIN — POTASSIUM CHLORIDE 40 MEQ: 750 TABLET, FILM COATED, EXTENDED RELEASE ORAL at 13:41

## 2022-01-01 RX ADMIN — POTASSIUM CHLORIDE AND DEXTROSE MONOHYDRATE: 150; 5 INJECTION, SOLUTION INTRAVENOUS at 10:33

## 2022-01-01 RX ADMIN — MORPHINE SULFATE 1 MG: 2 INJECTION, SOLUTION INTRAMUSCULAR; INTRAVENOUS at 02:49

## 2022-01-01 RX ADMIN — Medication 3 AMPULE: at 08:17

## 2022-01-01 RX ADMIN — BISACODYL 10 MG: 10 SUPPOSITORY RECTAL at 08:49

## 2022-01-01 RX ADMIN — ENOXAPARIN SODIUM 40 MG: 100 INJECTION SUBCUTANEOUS at 09:33

## 2022-01-01 RX ADMIN — SODIUM CHLORIDE 250 ML: 900 INJECTION, SOLUTION INTRAVENOUS at 15:47

## 2022-01-01 RX ADMIN — SODIUM CHLORIDE, PRESERVATIVE FREE 10 ML: 5 INJECTION INTRAVENOUS at 16:09

## 2022-01-01 RX ADMIN — DEXTROSE MONOHYDRATE, SODIUM CHLORIDE, AND POTASSIUM CHLORIDE: 50; 4.5; 2.98 INJECTION, SOLUTION INTRAVENOUS at 03:32

## 2022-01-01 RX ADMIN — SODIUM CHLORIDE, PRESERVATIVE FREE 10 ML: 5 INJECTION INTRAVENOUS at 20:56

## 2022-01-01 RX ADMIN — SODIUM CHLORIDE, PRESERVATIVE FREE 10 ML: 5 INJECTION INTRAVENOUS at 00:22

## 2022-01-01 RX ADMIN — VANCOMYCIN HYDROCHLORIDE 125 MG: KIT at 00:26

## 2022-01-01 RX ADMIN — SENNOSIDES AND DOCUSATE SODIUM 1 TABLET: 50; 8.6 TABLET ORAL at 09:13

## 2022-01-01 RX ADMIN — SODIUM CHLORIDE, PRESERVATIVE FREE 10 ML: 5 INJECTION INTRAVENOUS at 14:25

## 2022-01-01 RX ADMIN — SODIUM CHLORIDE 125 ML/HR: 9 INJECTION, SOLUTION INTRAVENOUS at 09:56

## 2022-01-01 RX ADMIN — POTASSIUM CHLORIDE 10 MEQ: 7.46 INJECTION, SOLUTION INTRAVENOUS at 16:48

## 2022-01-01 RX ADMIN — VANCOMYCIN HYDROCHLORIDE 125 MG: KIT at 13:35

## 2022-01-01 RX ADMIN — EZETIMIBE 10 MG: 10 TABLET ORAL at 10:12

## 2022-01-01 RX ADMIN — POTASSIUM CHLORIDE 10 MEQ: 10 INJECTION, SOLUTION INTRAVENOUS at 16:28

## 2022-01-01 RX ADMIN — ENOXAPARIN SODIUM 40 MG: 100 INJECTION SUBCUTANEOUS at 10:03

## 2022-01-08 NOTE — ED PROVIDER NOTES
EMERGENCY DEPARTMENT HISTORY AND PHYSICAL EXAM          Date: 1/8/2022  Patient Name: Yoshi Richter    History of Presenting Illness     Chief Complaint   Patient presents with    Abdominal Pain     per family pt started taking meds for constipation, now has diarhea.  meds not given today d/t abd pain/diahrea. BP now elevated. 160/110 per ems    Hypertension       History Provided By: Patient and EMS    HPI: Yoshi Richter is a 68 y.o. female, pmhx hypertension, GERD, diabetes, chronic pain, high cholesterol, CAD, renal colic/calculi who presents via EMS to the ED c/o headache    According to EMS patient was complaining of abdominal pain after having loose stools today. She was seen by primary care who prescribed her MiraLAX and Colace for some constipation. Patient denies any abdominal pain as well as any nausea or vomiting. She states she has a headache and her mouth feels dry and she would like something to eat. PCP: Peace Irby MD    Allergies: Extensive list  Social Hx: -tobacco, -vaping, -EtOH, -Illicit Drugs; Lives with her family    There are no other complaints, changes, or physical findings at this time. Current Outpatient Medications   Medication Sig Dispense Refill    sertraline (Zoloft) 100 mg tablet Take 100 mg by mouth daily.  mirabegron ER (MYRBETRIQ) 50 mg ER tablet Take 50 mg by mouth daily.  acetaminophen (TYLENOL) 500 mg tablet Take 1,000 mg by mouth every six (6) hours as needed for Pain.  aspirin 81 mg chewable tablet Take 1 Tab by mouth daily. 30 Tab 0    clopidogreL (PLAVIX) 75 mg tab Take 1 Tab by mouth daily. 30 Tab 0    ezetimibe (ZETIA) 10 mg tablet Take 1 Tab by mouth daily. 30 Tab 0    metoprolol succinate (TOPROL-XL) 25 mg XL tablet Take 1 Tab by mouth every twelve (12) hours. (Patient taking differently: Take 25 mg by mouth daily.) 60 Tab 0    senna (Senna) 8.6 mg tablet Take 1 Tab by mouth two (2) times a day.       bisacodyL (DULCOLAX) 5 mg EC tablet Take 10 mg by mouth daily.  amLODIPine (NORVASC) 2.5 mg tablet Take 2.5 mg by mouth daily.  polyethylene glycol (MIRALAX) 17 gram/dose powder Take 17 g by mouth daily.  1 tablespoon with 8 oz of water daily 170 g 0       Past History     Past Medical History:  Past Medical History:   Diagnosis Date    Agoraphobia without mention of panic attacks 2/17/2014    Anxiety disorder 8/18/2013    Arthritis     osteo    CAD (coronary artery disease), native coronary artery 12/1/2015    no stents    Chronic chest pain 1/13/2014    Chronic pain associated with significant psychosocial dysfunction 2/17/2014    Depression 8/18/2013    Diabetes ([facility] [address]     type II    Duplicated right renal collecting system 3/13/2014    GERD (gastroesophageal reflux disease)     Gout, joint     Hypercholesteremia     hyercholesterolemia    Hypertension     Murmur     Nephrolithiasis 3/13/2014    Personal history of noncompliance with medical treatment, presenting hazards to health 5/30/2014       Past Surgical History:  Past Surgical History:   Procedure Laterality Date    EGD  4/23/2010         HX CHOLECYSTECTOMY  09/20/2018    lap jesus    HX CYST REMOVAL      cyst removed from left wrist    HX HEART CATHETERIZATION  12/01/2015    HX HYSTERECTOMY      partial    HX OTHER SURGICAL      bladder dilitation    HX TUBAL LIGATION      HX UROLOGICAL      kidney stones       Family History:  Family History   Problem Relation Age of Onset    Stroke Mother     Heart Disease Mother     Cancer Father         type unknown    Heart Disease Son     Liver Disease Son     Heart Disease Daughter     Malignant Hyperthermia Neg Hx     Pseudocholinesterase Deficiency Neg Hx     Delayed Awakening Neg Hx     Post-op Nausea/Vomiting Neg Hx     Emergence Delirium Neg Hx     Post-op Cognitive Dysfunction Neg Hx     Other Neg Hx        Social History:  Social History     Tobacco Use    Smoking status: Never Smoker    Smokeless tobacco: Never Used   Substance Use Topics    Alcohol use: No    Drug use: No       Allergies: Allergies   Allergen Reactions    Amoxicillin Hives    Sulfa (Sulfonamide Antibiotics) Hives and Itching    Mirtazapine Itching and Nausea Only     Funny feeling in chest    Percocet [Oxycodone-Acetaminophen] Nausea and Vomiting    Codeine Nausea and Vomiting    Crestor [Rosuvastatin] Other (comments)     myalgias    Nitroglycerin Unknown (comments)     Patient cannot remember why she is allergic to it      Prednisone Itching    Zithromax [Azithromycin] Itching     Not sure what it does,taken long time ago         Review of Systems   Review of Systems   Constitutional: Negative for activity change, appetite change, chills, fever and unexpected weight change. HENT: Negative for congestion. Eyes: Negative for pain and visual disturbance. Respiratory: Negative for cough and shortness of breath. Cardiovascular: Negative for chest pain. Gastrointestinal: Positive for abdominal pain and diarrhea. Negative for nausea and vomiting. Genitourinary: Negative for dysuria. Musculoskeletal: Negative for back pain. Skin: Negative for rash. Neurological: Positive for headaches. Physical Exam   Physical Exam  Vitals and nursing note reviewed. Constitutional:       Appearance: She is well-developed. She is not diaphoretic. Comments: Elderly female with minimally elevated blood pressure, otherwise normal vital signs, in mild distress secondary to pain   HENT:      Head: Normocephalic and atraumatic. Comments: Mildly dry mouth  Eyes:      General:         Right eye: No discharge. Left eye: No discharge. Conjunctiva/sclera: Conjunctivae normal.      Pupils: Pupils are equal, round, and reactive to light. Neck:      Comments: kyphosis and bilateral neck muscle tension  Cardiovascular:      Rate and Rhythm: Normal rate and regular rhythm.       Heart sounds: Normal heart sounds. No murmur heard. Pulmonary:      Effort: Pulmonary effort is normal. No respiratory distress. Breath sounds: Normal breath sounds. No wheezing or rales. Abdominal:      General: Abdomen is flat. Bowel sounds are normal. There is no distension. Palpations: Abdomen is soft. There is no mass. Tenderness: There is no abdominal tenderness. There is no guarding or rebound. Negative signs include Aranda's sign, Rovsing's sign and McBurney's sign. Hernia: No hernia is present. Musculoskeletal:         General: Normal range of motion. Cervical back: Normal range of motion and neck supple. Skin:     General: Skin is warm and dry. Capillary Refill: Capillary refill takes less than 2 seconds. Findings: No rash. Comments: Moderate poor skin turgor   Neurological:      Mental Status: She is alert and oriented to person, place, and time. Cranial Nerves: No cranial nerve deficit. Motor: No abnormal muscle tone. Diagnostic Study Results     Labs -     Recent Results (from the past 12 hour(s))   CBC WITH AUTOMATED DIFF    Collection Time: 01/08/22  5:30 PM   Result Value Ref Range    WBC 4.2 3.6 - 11.0 K/uL    RBC 4.50 3.80 - 5.20 M/uL    HGB 11.8 11.5 - 16.0 g/dL    HCT 37.6 35.0 - 47.0 %    MCV 83.6 80.0 - 99.0 FL    MCH 26.2 26.0 - 34.0 PG    MCHC 31.4 30.0 - 36.5 g/dL    RDW 14.9 (H) 11.5 - 14.5 %    PLATELET 559 074 - 032 K/uL    MPV 11.9 8.9 - 12.9 FL    NRBC 0.0 0  WBC    ABSOLUTE NRBC 0.00 0.00 - 0.01 K/uL    NEUTROPHILS 49 32 - 75 %    LYMPHOCYTES 42 12 - 49 %    MONOCYTES 7 5 - 13 %    EOSINOPHILS 1 0 - 7 %    BASOPHILS 1 0 - 1 %    IMMATURE GRANULOCYTES 0 0.0 - 0.5 %    ABS. NEUTROPHILS 2.0 1.8 - 8.0 K/UL    ABS. LYMPHOCYTES 1.8 0.8 - 3.5 K/UL    ABS. MONOCYTES 0.3 0.0 - 1.0 K/UL    ABS. EOSINOPHILS 0.0 0.0 - 0.4 K/UL    ABS. BASOPHILS 0.1 0.0 - 0.1 K/UL    ABS. IMM.  GRANS. 0.0 0.00 - 0.04 K/UL    DF AUTOMATED     METABOLIC PANEL, BASIC    Collection Time: 01/08/22  5:30 PM   Result Value Ref Range    Sodium 140 136 - 145 mmol/L    Potassium 2.6 (LL) 3.5 - 5.1 mmol/L    Chloride 104 97 - 108 mmol/L    CO2 31 21 - 32 mmol/L    Anion gap 5 5 - 15 mmol/L    Glucose 94 65 - 100 mg/dL    BUN 16 6 - 20 MG/DL    Creatinine 0.63 0.55 - 1.02 MG/DL    BUN/Creatinine ratio 25 (H) 12 - 20      GFR est AA >60 >60 ml/min/1.73m2    GFR est non-AA >60 >60 ml/min/1.73m2    Calcium 8.6 8.5 - 10.1 MG/DL       Radiologic Studies -   No orders to display     CT Results  (Last 48 hours)    None        CXR Results  (Last 48 hours)    None            Medical Decision Making   I am the first provider for this patient. I reviewed the vital signs, available nursing notes, past medical history, past surgical history, family history and social history. Vital Signs-Reviewed the patient's vital signs. Patient Vitals for the past 12 hrs:   Temp Pulse Resp BP SpO2   01/08/22 1730 -- 78 15 112/61 --   01/08/22 1627 97.4 °F (36.3 °C) 68 16 (!) 145/81 100 %       Pulse Oximetry Analysis - 100% on RA    Cardiac Monitor:   Rate: 70bpm  Rhythm: Normal Sinus Rhythm      Records Reviewed: Nursing Notes, Old Medical Records, Previous electrocardiograms, Ambulance Run Sheet, Previous Radiology Studies and Previous Laboratory Studies    Provider Notes (Medical Decision Making):   MDM: Elderly female sent from family for loose stools with abdominal pain. Arrives with no evidence of abdominal pain and a benign abdominal exam.  Currently complaining of a headache and feeling thirsty. Will provide patient her medications with fluids orally and IV as well as symptomatic pain management for her headache. ED Course:   Initial assessment performed. The patients presenting problems have been discussed, and they are in agreement with the care plan formulated and outlined with them. I have encouraged them to ask questions as they arise throughout their visit.     PROGRESS NOTE:    ED Course as of 01/08/22 2039   Sat Jan 08, 2022   1910 Call placed to daughter as no family in the ED. She has been having diarrhea for over a month. She notes the diarrhea seems to be worse in the morning and she will awake in a pool of stool. Her doctor knew that and recommended increased hydration. Her daughter notes she has been confused since yesterday with decreased PO intake for the past two days. [JT]      ED Course User Index  [JT] Elizabeth Nassar., MD      9:20 PM  Urinalysis noted to be positive. Antibiotics, cultures and lactic for infectious work-up to be initiated. Blood pressure remained stable in the 130s and heart rate is 68, does not appear septic. Patient is sleeping currently but easily arousable and perfusion appears normal.  Will discuss with hospitalist.    CONSULT NOTE:   9:37 PM  Lemon Olszewski, MD spoke with Dr. Mrina Blanco  Specialty: Hospitalist  Discussed pt's hx, disposition, and available diagnostic and imaging results. Reviewed care plans. Consultant agrees with plans as outlined. He will evaluate patient for admission. Critical Care Time:   CRITICAL CARE NOTE :  9:37 PM   IMPENDING DETERIORATION -Airway, Respiratory, Cardiovascular, CNS, Metabolic, Renal and Hepatic  ASSOCIATED RISK FACTORS - Hypotension, Shock, Hypoxia, Dysrhythmia, Metabolic changes and Dehydration  MANAGEMENT- Bedside Assessment and Supervision of Care  INTERPRETATION -  ECG and Blood Pressure  INTERVENTIONS - hemodynamic mngmt, Metobolic interventions and IV potassium infusions  CASE REVIEW - Hospitalist/Intensivist, Nursing and Family  TREATMENT RESPONSE -Stable  PERFORMED BY - Self    NOTES   :  I have spent 40 minutes of critical care time involved in lab review, consultations with specialist, family decision- making, bedside attention and documentation. During this entire length of time I was immediately available to the patient. Elizabeth Cifuentes.  MD Cesario        Diagnosis     Clinical Impression:   1. Hypokalemia    2. Dehydration    3. Diarrhea, unspecified type    4. Delirium        PLAN:  Admission for higher level of care      Please note, this dictation was completed with [employer], the computer voice recognition software. Quite often unanticipated grammatical, syntax, homophones, and other interpretive errors are inadvertently transcribed by the computer software. Please disregard these errors. Please excuse any errors that have escaped final proof reading.

## 2022-01-09 PROBLEM — R41.82 AMS (ALTERED MENTAL STATUS): Status: ACTIVE | Noted: 2022-01-01

## 2022-01-09 PROBLEM — E87.0 HYPERNATREMIA: Status: RESOLVED | Noted: 2021-02-25 | Resolved: 2022-01-01

## 2022-01-09 PROBLEM — J18.9 PNEUMONIA: Status: RESOLVED | Noted: 2021-02-25 | Resolved: 2022-01-01

## 2022-01-09 NOTE — PROGRESS NOTES
Transition of Care Plan:    RUR: N/A - observation status   Disposition: Home with daughter  Follow up appointments: PCP - Holly HERNÁNDEZ  DME needed: Pt has a wheelchair, walker, shower bench   Transportation at Discharge: Will need transport coordinated, ramp to enter home  Keys or means to access home:  Daughter to receive pt       IM Medicare Letter: N/A - observation status   Is patient a BCPI-A Bundle:  N/A         If yes, was Bundle Letter given?:    Is patient a  and connected with the [place]? N/A  If yes, was [place] transfer form completed and [facility] notified? Caregiver Contact: Pt's daughter, Shaunna Christianson [phone]  Discharge Caregiver contacted prior to discharge? Oral and Written notification given to patient and/or caregiver informing patient of current Observation status receiving care in our facility. CM notified pt's daughter, Tanya Elizabeth, via phone. Patient copies of documents left at bedside. Copies requiring signatures were reviewed over phone but left at bedside to be reviewed by daughter upon arrival to hospital. Once reviewed, daughter will sign documents and they will be placed on bedside chart. 5:10 PM  Obtained signed observation notices and placed on bedside chart. Reason for Admission:  AMS, Urinary tract infection, diarrhea, etc.                     RUR Score: N/A - observation status                     Plan for utilizing home health: Pending hospital course         PCP: First and Last name:  No primary care provider on file.      Name of Practice: [address] [place] [address] of provider   Are you a current patient: Yes/No: Yes   Approximate date of last visit: 2 weeks ago    Can you participate in a virtual visit with your PCP: N/A sees provider(s) at [facility] location, has transport provided to and from appointments from [facility]                    Current Advanced Directive/Advance Care Plan: Full [place] (ACP) Conversation      Date of Conversation: 1/9/2022  Conducted with: Healthcare Decision Maker: Next of Kin by law (only applies in absence of a Healthcare Power of  or [id number]: No ACP documents on file - legal NOK is pt's children. Today we documented Decision Maker(s) consistent with Legal Next of Kin hierarchy. Content/Action Overview:   DECLINED ACP conversation - will revisit periodically   Reviewed DNR/DNI and patient elects Full Code (Attempt Resuscitation)      Length of Voluntary ACP Conversation in minutes:  <16 minutes (Non-Billable)    [facility]:     Primary Decision Maker (Active): Joshua Morin - Daughter - [phone]    Secondary Decision Maker (Active): uJan R Petersen \"Tiarra\" - Daughter - [phone]                  Transition of Care Plan:   Home with continued support of daughters and in home care from [facility], outpatient follow up    CM reviewed chart. CM completed assessment with pt's daughter, Yusef Portillo, via phone. CM introduced self/role, verified demographics, and discussed discharge planning. Pt resides with daughter, Yusef Portillo, in 1 level home with ramp to enter. Pt has a wheelchair, walker, and shower bench at home. Pt's daughters provide assistance with care needs for patient, also has in home support provided through [facility]. When daughter is at work there are other supports in home to care for pt, pt is not alone at home. Previous PT hx at [facility], pt's daughter reports that pt has had a decline since. Pt also receives her medications through [facility] who delivers them to home. Care management will continue to follow for transition of care planning needs. Care Management Interventions  PCP Verified by CM: No (Sees doctor at [facility], uncertain of doctor's name)  Palliative Care Criteria Met (RRAT>21 & CHF Dx)?: No  Mode of Transport at Discharge:  Other (see comment) (Will need Medicaid transport coordinated )  Transition of Care Consult (CM Consult): Discharge Planning  Discharge Durable Medical Equipment: No (Pt has a wheelchair, walker, shower bench)  Physical Therapy Consult: No  Occupational Therapy Consult: No  Speech Therapy Consult: No  Support Systems: Child(jim) (Daughters, in home support from [id number] [facility] [address])  Confirm Follow Up Transport: Other (see comment) ([facility] transport)  Discharge Location  Discharge Placement: Home (Home with daughter )    [address], [address]

## 2022-01-09 NOTE — H&P
Hospitalist Admission Note    NAME: Merlyn Whipple   :  1945   MRN:  [id number]     Date/Time:  2022 10:57 PM    Patient PCP: Gonzalez Meyer MD  ______________________________________________________________________  Given the patient's current clinical presentation, I have a high level of concern for decompensation if discharged from the emergency department. Complex decision making was performed, which includes reviewing the patient's available past medical records, laboratory results, and x-ray films. My assessment of this patient's clinical condition and my plan of care is as follows. Assessment / Plan:    AMS  -confusion x 1 day  -Likely metabolic encephalopathy related to UTI  -Admit as observation  -Treat UTI, correct electrolytes, IV fluid    Urinary tract infection  -UA suggestive of UTI was bacteriuria and pyuria  -Follow urine culture  -We will start ceftriaxone     Diarrhea  Hx of chronic constipation (requiring admission and disimpaction in the past)  -Suspect related to laxative use vs overflow from constipation  -Get KUB  -Check stool WBC and enteric panel   -IV fluid resuscitation    Hypokalemia  -Potassium 2.6  -Check magnesium  -Replete and monitor    History of type II DM   -Not on treatment at home  -Check A1c  -Start sliding scale insulin     Hypertension  Hyperlipidemia  Coronary artery disease  -Continue aspirin, Plavix, and statin  -Continue Toprol with holding parameters    History of SVT  -Telemetry monitoring    GERD  Chronic pain syndrome  Anxiety/depression  Dementia  -Continue Zoloft and as needed analgesics    Code Status: Fill  Surrogate Decision Maker: Tanya Dumont    DVT Prophylaxis: Heparinn  GI Prophylaxis: not indicated    Baseline: Dependinete/Demented      Subjective:   CHIEF COMPLAINT: Confusion, diarrhea    HISTORY OF PRESENT ILLNESS:     Natacha Esposito is a 68 y.o.    female with past medical history significant for CAD, DM, HTN, HLD, GERD, and others as listed below who presents with confusion and diarrhea. Patient is confused and unable to provide history. Unsuccessful attempt to reach daughter. Goes to [facility]. History taken from ED physician and chart review. Apparently patient has been having diarrhea for over a month now. For the last couple of days she has had poor oral intake. Yesterday she started becoming more confused which prompted family to bring her to the ED. Patient complains of headache. Admits to abdominal pain, nausea, or diarrhea. We were asked to admit for work up and evaluation of the above problems.      Past Medical History:   Diagnosis Date    Agoraphobia without mention of panic attacks 2/17/2014    Anxiety disorder 8/18/2013    Arthritis     osteo    CAD (coronary artery disease), native coronary artery 12/1/2015    no stents    Chronic chest pain 1/13/2014    Chronic pain associated with significant psychosocial dysfunction 2/17/2014    Depression 8/18/2013    Diabetes ([address]     type II    Duplicated right renal collecting system 3/13/2014    GERD (gastroesophageal reflux disease)     Gout, joint     Hypercholesteremia     hyercholesterolemia    Hypertension     Murmur     Nephrolithiasis 3/13/2014    Personal history of noncompliance with medical treatment, presenting hazards to health 5/30/2014        Past Surgical History:   Procedure Laterality Date    EGD  4/23/2010         HX CHOLECYSTECTOMY  09/20/2018    lap jesus    HX CYST REMOVAL      cyst removed from left wrist    HX HEART CATHETERIZATION  12/01/2015    HX HYSTERECTOMY      partial    HX OTHER SURGICAL      bladder dilitation    HX TUBAL LIGATION      HX UROLOGICAL      kidney stones       Social History     Tobacco Use    Smoking status: Never Smoker    Smokeless tobacco: Never Used   Substance Use Topics    Alcohol use: No        Family History   Problem Relation Age of Onset    Stroke Mother     Heart Disease Mother     Cancer Father         type unknown    Heart Disease Son     Liver Disease Son     Heart Disease Daughter     Malignant Hyperthermia Neg Hx     Pseudocholinesterase Deficiency Neg Hx     Delayed Awakening Neg Hx     Post-op Nausea/Vomiting Neg Hx     Emergence Delirium Neg Hx     Post-op Cognitive Dysfunction Neg Hx     Other Neg Hx      Allergies   Allergen Reactions    Amoxicillin Hives    Sulfa (Sulfonamide Antibiotics) Hives and Itching    Mirtazapine Itching and Nausea Only     Funny feeling in chest    Percocet [Oxycodone-Acetaminophen] Nausea and Vomiting    Codeine Nausea and Vomiting    Crestor [Rosuvastatin] Other (comments)     myalgias    Nitroglycerin Unknown (comments)     Patient cannot remember why she is allergic to it      Prednisone Itching    Zithromax [Azithromycin] Itching     Not sure what it does,taken long time ago        Prior to Admission medications    Medication Sig Start Date End Date Taking? Authorizing Provider   sertraline (Zoloft) 100 mg tablet Take 100 mg by mouth daily. Yes Other, MD Shun   mirabegron ER (MYRBETRIQ) 50 mg ER tablet Take 50 mg by mouth daily. Yes Other, MD Shun   acetaminophen (TYLENOL) 500 mg tablet Take 1,000 mg by mouth every six (6) hours as needed for Pain. Provider, Historical   aspirin 81 mg chewable tablet Take 1 Tab by mouth daily. 2/24/21   Albin Buckley MD   clopidogreL (PLAVIX) 75 mg tab Take 1 Tab by mouth daily. 2/24/21   Albin Buckley MD   ezetimibe (ZETIA) 10 mg tablet Take 1 Tab by mouth daily. 2/24/21   Albin Buckley MD   metoprolol succinate (TOPROL-XL) 25 mg XL tablet Take 1 Tab by mouth every twelve (12) hours. Patient taking differently: Take 25 mg by mouth daily. 2/23/21   Albin Buckley MD   senna (Senna) 8.6 mg tablet Take 1 Tab by mouth two (2) times a day. Provider, Historical   bisacodyL (DULCOLAX) 5 mg EC tablet Take 10 mg by mouth daily. Provider, Historical   amLODIPine (NORVASC) 2.5 mg tablet Take 2.5 mg by mouth daily. Provider, Historical   polyethylene glycol (MIRALAX) 17 gram/dose powder Take 17 g by mouth daily. 1 tablespoon with 8 oz of water daily 10/29/19   Lucretia Noriega PA-C       REVIEW OF SYSTEMS:     I am not able to complete the review of systems because:    The patient is intubated and sedated    The patient has altered mental status due to his acute medical problems    The patient has baseline aphasia from prior stroke(s)    The patient has baseline dementia and is not reliable historian    The patient is in acute medical distress and unable to provide information           Total of 12 systems reviewed as follows:       POSITIVE= underlined text  Negative = text not underlined  General:  fever, chills, sweats, generalized weakness, weight loss/gain,      loss of appetite   Eyes:    blurred vision, eye pain, loss of vision, double vision  ENT:    rhinorrhea, pharyngitis   Respiratory:   cough, sputum production, SOB, MOORE, wheezing, pleuritic pain   Cardiology:   chest pain, palpitations, orthopnea, PND, edema, syncope   Gastrointestinal:  abdominal pain , N/V, diarrhea, dysphagia, constipation, bleeding   Genitourinary:  frequency, urgency, dysuria, hematuria, incontinence   Muskuloskeletal :  arthralgia, myalgia, back pain,  Hematology:  easy bruising, nose or gum bleeding, lymphadenopathy   Dermatological: rash, ulceration, pruritis, color change / jaundice  Endocrine:   hot flashes or polydipsia   Neurological:  headache, dizziness, confusion, focal weakness, paresthesia,     Speech difficulties, memory loss, gait difficulty  Psychological: Feelings of anxiety, depression, agitation    Objective:   VITALS:    Visit Vitals  /61   Pulse 78   Temp 97.4 °F (36.3 °C)   Resp 15   Ht 5' 7\" (1.702 m)   Wt 63.5 kg (140 lb)   SpO2 100%   BMI 21.93 kg/m²       PHYSICAL EXAM:    General:    Alert, cooperative, no distress, appears stated age. HEENT: Atraumatic, anicteric sclerae, pink conjunctivae     No oral ulcers, mucosa moist, throat clear, dentition fair  Neck:  Supple, symmetrical,  thyroid: non tender  Lungs:   Clear to auscultation bilaterally. No Wheezing or Rhonchi. No rales. Chest wall:  No tenderness  No Accessory muscle use. Heart:   Regular  rhythm,  No  murmur   No edema  Abdomen:   Soft, non-tender. Not distended. Bowel sounds normal  Extremities: No cyanosis. No clubbing,      Skin turgor normal, Capillary refill normal, Radial dial pulse 2+  Skin:     Not pale. Not Jaundiced  No rashes   Psych:  Good insight. Not depressed. Not anxious or agitated. Neurologic: EOMs intact. No facial asymmetry. No aphasia or slurred speech. Symmetrical strength, Sensation grossly intact. Alert and oriented X 1.     _______________________________________________________________________  Care Plan discussed with:    Comments   Patient x    Family      RN     Care Manager                    Consultant:  x    _______________________________________________________________________  Expected  Disposition:   Home with Family    HH/PT/OT/RN x   SNF/LTC    [place]    ________________________________________________________________________  TOTAL TIME:  48 Minutes    Critical Care Provided     Minutes non procedure based      Comments     Reviewed previous records   >50% of visit spent in counseling and coordination of care  Discussion with patient and/or family and questions answered       ________________________________________________________________________  Signed: Willow Duverney, MD    Procedures: see electronic medical records for all procedures/Xrays and details which were not copied into this note but were reviewed prior to creation of Plan.     LAB DATA REVIEWED:    Recent Results (from the past 24 hour(s))   CBC WITH AUTOMATED DIFF    Collection Time: 01/08/22  5:30 PM   Result Value Ref Range    WBC 4.2 3.6 - 11.0 K/uL    RBC 4.50 3.80 - 5.20 M/uL    HGB 11.8 11.5 - 16.0 g/dL    HCT 37.6 35.0 - 47.0 %    MCV 83.6 80.0 - 99.0 FL    MCH 26.2 26.0 - 34.0 PG    MCHC 31.4 30.0 - 36.5 g/dL    RDW 14.9 (H) 11.5 - 14.5 %    PLATELET 299 397 - 574 K/uL    MPV 11.9 8.9 - 12.9 FL    NRBC 0.0 0  WBC    ABSOLUTE NRBC 0.00 0.00 - 0.01 K/uL    NEUTROPHILS 49 32 - 75 %    LYMPHOCYTES 42 12 - 49 %    MONOCYTES 7 5 - 13 %    EOSINOPHILS 1 0 - 7 %    BASOPHILS 1 0 - 1 %    IMMATURE GRANULOCYTES 0 0.0 - 0.5 %    ABS. NEUTROPHILS 2.0 1.8 - 8.0 K/UL    ABS. LYMPHOCYTES 1.8 0.8 - 3.5 K/UL    ABS. MONOCYTES 0.3 0.0 - 1.0 K/UL    ABS. EOSINOPHILS 0.0 0.0 - 0.4 K/UL    ABS. BASOPHILS 0.1 0.0 - 0.1 K/UL    ABS. IMM.  GRANS. 0.0 0.00 - 0.04 K/UL    DF AUTOMATED     METABOLIC PANEL, BASIC    Collection Time: 01/08/22  5:30 PM   Result Value Ref Range    Sodium 140 136 - 145 mmol/L    Potassium 2.6 (LL) 3.5 - 5.1 mmol/L    Chloride 104 97 - 108 mmol/L    CO2 31 21 - 32 mmol/L    Anion gap 5 5 - 15 mmol/L    Glucose 94 65 - 100 mg/dL    BUN 16 6 - 20 MG/DL    Creatinine 0.63 0.55 - 1.02 MG/DL    BUN/Creatinine ratio 25 (H) 12 - 20      GFR est AA >60 >60 ml/min/1.73m2    GFR est non-AA >60 >60 ml/min/1.73m2    Calcium 8.6 8.5 - 10.1 MG/DL   URINALYSIS W/ REFLEX CULTURE    Collection Time: 01/08/22  8:11 PM    Specimen: Urine   Result Value Ref Range    Color YELLOW/STRAW      Appearance CLOUDY (A) CLEAR      Specific gravity 1.015 1.003 - 1.030      pH (UA) 6.5 5.0 - 8.0      Protein TRACE (A) NEG mg/dL    Glucose Negative NEG mg/dL    Ketone Negative NEG mg/dL    Bilirubin Negative NEG      Blood Negative NEG      Urobilinogen 1.0 0.2 - 1.0 EU/dL    Nitrites Negative NEG      Leukocyte Esterase SMALL (A) NEG      WBC 10-20 0 - 4 /hpf    RBC 0-5 0 - 5 /hpf    Epithelial cells FEW FEW /lpf    Bacteria 4+ (A) NEG /hpf    UA:UC IF INDICATED URINE CULTURE ORDERED (A) CNI      Hyaline cast 0-2 0 - 5 /lpf   LACTIC ACID    Collection Time: 01/08/22  9:47 PM Result Value Ref Range    Lactic acid 0.5 0.4 - 2.0 MMOL/L

## 2022-01-09 NOTE — ED NOTES
Patient is being transferred to \[facility]\"" Medical Oncology, Room # 25 [id number]. Report given to Reagan Fish RN on [facility] for routine progression of care. Report consisted of the following information SBAR, Kardex, ED Summary, Intake/Output, MAR, Recent Results and Med Rec Status. Patient transferred to receiving unit by: Tech (RN or tech name). Outstanding consults needed: No     Next labs due: Yes    The following personal items will be sent with the patient during transfer to the floor:     All valuables:    Cardiac monitoring ordered: Yes    The following CURRENT information was reported to the receiving RN:    Code status: Full Code at time of transfer    Last set of vital signs:  Vital Signs  Level of Consciousness: Alert (0) (01/08/22 1627)  Temp: 97.4 °F (36.3 °C) (01/08/22 1627)  Temp Source: Oral (01/08/22 1627)  Pulse (Heart Rate): 61 (01/09/22 0027)  Heart Rate Source: Monitor (01/08/22 1627)  Cardiac Rhythm: Sinus Rhythm (01/08/22 1627)  Resp Rate: 10 (01/09/22 0027)  BP: 139/63 (01/09/22 0027)  MAP (Monitor): 81 (01/09/22 0027)  MAP (Calculated): 88 (01/09/22 0027)  BP 1 Location: Left upper arm (01/08/22 1627)  MEWS Score: 1 (01/08/22 1627)         Oxygen Therapy  O2 Sat (%): 100 % (01/09/22 0027)  Pulse via Oximetry: 61 beats per minute (01/09/22 0027)  O2 Device: None (Room air) (01/08/22 1627)      Last pain assessment:  Pain 1  Pain Scale 1: Numeric (0 - 10)  Pain Intensity 1: 6  Patient Stated Pain Goal: 0  Pain Description 1: Cramping      Wounds: No     Urinary catheter: voiding and incontinent  Is there a martinez order: No     LDAs:       Peripheral IV 01/08/22 Right Arm (Active)   Site Assessment Clean, dry, & intact 01/08/22 1735   Phlebitis Assessment 0 01/08/22 1735   Infiltration Assessment 0 01/08/22 1735   Dressing Status Clean, dry, & intact 01/08/22 1735   Dressing Type Tape;Transparent 01/08/22 1735   Hub Color/Line Status Pink;Capped;Flushed 01/08/22 1735   Action Taken Blood drawn 01/08/22 0865         Opportunity for questions and clarification was provided.     Rachna Lehman RN

## 2022-01-09 NOTE — ED NOTES
Verbal shift change report given to Mando Capone (oncoming nurse) by Adonis Brenner (offgoing nurse). Report included the following information SBAR, Kardex, ED Summary, [facility] and Recent Results.

## 2022-01-09 NOTE — PROGRESS NOTES
I reviewed pertinent labs and imaging, and discussed /agreed on the plan of care with Dr. Fernando Luna. Hospitalist Progress Note    NAME: Marty Rodriguez   :  1945   MRN:  [id number]       Assessment / Plan:  79-year-old female with a past medical history of hypertension, GERD, type 2 diabetes, chronic pain, high cholesterol, CAD, renal colic/calculi, murmur, noncompliance with medical treatment, who presented to the emergency department  with complaints of a headache. ROS in the ED was positive for abdominal pain, diarrhea, headaches. Family reported that the patient has been having diarrhea for 1 month, worse in the morning, associated with incontinence, confusion, decreased p.o. intake x2 days. UA was also noted to be positive. She was admitted for diarrhea, hypokalemia, dehydration, delirium and a UTI.     Chronic headache, likely tension type, worsening  -Associated with tension in the neck and shoulders  -This was actually her CC in the ED but no imaging was ordered  -No neuro sx on exam, but given the severity of the HA and that she states it is associated with blurred vision, will check a head CT  -No fever, normal WBC, no photophobia   -Prn Tylenol, added prn Ultram  -Update 1400: Head CT negative- continue above assessment and plan     Diarrhea  -Suspected related to overflow from constipation  -KUB dated : Chronic colonic/rectal distention with large amount of stool.  -Ordered suppository x 1   -Stool WBC, enteric panel pending  -Continue IV fluids  -Consider GI consult     Dehydration, likely secondary to diarrhea  -Management of diarrhea as above  -Management of hypokalemia as below  -Management of UTI as below  -Continue IVF, monitor labs    Hypokalemia, improving,likely secondary to diarrhea as above   -K+ 3 from 2.6 yesterday  -Replete  -Management of diarrhea as above   -Repeat labs this afternoon and again in AM     UTI, stable  -WBC 4.2 as of yesterday  -Afebrile  -UA dated 1/8 cloudy with trace protein, small leuk esterase, negative for nitrates, 10-12 WBC, 4+ bacteria. Reflex culture pending.  -Blood cx pending  -Continue trending cultures  -Denies fevers, chills, dysuria   -Continue ceftriaxone  -Repeat labs tomorrow    Acute metabolic encephalopathy, likely secondary to UTI, dehydration, in the setting of dementia, stable   -Mental status on exam A&O x 3   -We will check vitamin B12, folate, TSH, liver function  -Sodium and creatinine are normal  -Treat underlying cause, as above   -Avoid sedation if able  -Delirium mgt: shades up during the day, daylight reorientation, cluster  activities for rest, etc.    Hypertension, stable  -Continue amlodipine, Toprol-XL    Type 2 diabetes  -A1c pending  -Blood sugar in the 90s here  -Continue ACHS blood sugar checks, sliding scale    Chronic pain  -Continue Zoloft  -Tylenol and Ultram prn     Elevated cholesterol  -Continue Zetia    History of CAD, without a history of stents  -Continue aspirin, Plavix    History of SVT  -In NSR now   -Management of hypokalemia as above  -We will check magnesium this afternoon and tomorrow morning as well  -Continue monitoring on telemetry        18.5 - 24.9 Normal weight / Body mass index is 21.93 kg/m². Estimated discharge date: January 10  Barriers: Medical stability     Code status: Full  Prophylaxis: Lovenox  Recommended Disposition: Home w/Family-lives at home with family at baseline     Subjective:     Chief Complaint / Reason for Physician Visit  Seen for hospital f/u re: headache. She is c/o of this still when I came to see her this AM, associated with blurred vision. States that she has been dealing with HA for years, and that this has been previously worked up, but that it is worse now. No focal neuro deficits on exam. She denies fevers, chills or photophobia. We discussed her diarrhea. She was unable to tell me much about it including the presence or absence of blood or recent weight loss.  She does c/o abd pain since this AM, associated with \"stomach gurgling\". I updated her to the plan of care; she verbalized understanding. Discussed with RN events overnight. Review of Systems:  Symptom Y/N Comments  Symptom Y/N Comments   Fever/Chills N   Chest Pain N    Poor Appetite N   Edema N    Cough N   Abdominal Pain Y    Sputum N   Joint Pain N    SOB/MOORE N   Pruritis/Rash N    Nausea/vomit N   Tolerating PT/OT -    Diarrhea Y   Tolerating Diet Y    Constipation N   Other Y AJRRETT, as above      Could NOT obtain due to: N/A     Objective:     VITALS:   Last 24hrs VS reviewed since prior progress note. Most recent are:  Patient Vitals for the past 24 hrs:   Temp Pulse Resp BP SpO2   01/09/22 0459 97.6 °F (36.4 °C) 61 16 (!) 120/59 100 %   01/09/22 0400 -- (!) 58 -- -- --   01/09/22 0142 98 °F (36.7 °C) 63 14 (!) 142/68 100 %   01/09/22 0027 -- 61 10 139/63 100 %   01/09/22 0012 -- 69 12 -- 100 %   01/08/22 2342 -- 63 14 -- 100 %   01/08/22 2327 -- 62 10 -- 100 %   01/08/22 2257 -- 72 12 119/65 100 %   01/08/22 2227 -- 65 10 -- 100 %   01/08/22 2157 -- 67 12 (!) 142/73 98 %   01/08/22 2127 -- 70 13 131/74 100 %   01/08/22 2112 -- -- -- (!) 142/65 --   01/08/22 2057 -- 72 12 131/67 100 %   01/08/22 2042 -- -- -- 137/73 --   01/08/22 1730 -- 78 15 112/61 --   01/08/22 1627 97.4 °F (36.3 °C) 68 16 (!) 145/81 100 %       Intake/Output Summary (Last 24 hours) at 1/9/2022 2288  Last data filed at 1/8/2022 2132  Gross per 24 hour   Intake 600 ml   Output --   Net 600 ml        I had a face to face encounter and independently examined this patient on 1/9/2022, as outlined below:  PHYSICAL EXAM:  General: Alert, cooperative, anxious, holding the top of her head with her neck flexed    EENT:  Anicteric sclerae. MMM  Resp:  CTA bilaterally, no wheezing or rales. No accessory muscle use  CV:  Regular  rhythm,  No murmurs, rubs or gallops   GI:  Soft, Non distended, Non tender.   +Bowel sounds  Neurologic:  Alert and oriented X 3, normal speech,   Psych:   Good insight. Anxious   Skin:  No rashes. No jaundice  MSK:  Moves all extremities  PVS:   Palpable pulses, no edema     Reviewed most current lab test results and cultures  YES  Reviewed most current radiology test results   YES  Review and summation of old records today    NO  Reviewed patient's current orders and MAR    YES  PMH/SH reviewed - no change compared to H&P  ________________________________________________________________________  Care Plan discussed with:    Comments   Patient Y    Family      RN Y    Care Manager     Consultant                        Multidiciplinary team rounds were held today with , nursing, pharmacist and clinical coordinator. Patient's plan of care was discussed; medications were reviewed and discharge planning was addressed. ________________________________________________________________________      Comments   >50% of visit spent in counseling and coordination of care Y    ________________________________________________________________________  Thai Lindsay NP     Procedures: see electronic medical records for all procedures/Xrays and details which were not copied into this note but were reviewed prior to creation of Plan. LABS:  I reviewed today's most current labs and imaging studies.   Pertinent labs include:  Recent Labs     01/08/22  1730   WBC 4.2   HGB 11.8   HCT 37.6        Recent Labs     01/09/22  0538 01/08/22  1730    140   K 3.0* 2.6*   * 104   CO2 27 31   * 94   BUN 16 16   CREA 0.70 0.63   CA 8.8 8.6   MG  --  1.9       Signed: Thai Lindsay NP

## 2022-01-09 NOTE — PROGRESS NOTES
End of Shift Note    Bedside shift change report given to Mike Melendez (oncoming nurse) by Rigoberto Flores RN (offgoing nurse). Report included the following information SBAR, Kardex, ED Summary, Intake/Output, MAR, Recent Results and Cardiac Rhythm NSR to Sinus Elver Jose    Shift worked:  7p-7a     Shift summary and any significant changes:     Pt transferred from the ED during shift, pt stable, scheduled meds given by ED, tylenol given for headache twice during shift, incontinence care performed - stool collected for labs, education given, labs drawn      Concerns for physician to address:       Zone phone for oncoming shift:  [phone]       Activity:  Activity Level: Bed Rest  Number times ambulated in hallways past shift: 0  Number of times OOB to chair past shift: 0    Cardiac:   Cardiac Monitoring: Yes      Cardiac Rhythm: Sinus Rhythm to Sinus Elver Jose    Access:   Current line(s): PIV     Genitourinary:   Urinary status: incontinent    Respiratory:   O2 Device: None (Room air)  Chronic home O2 use?: NO  Incentive spirometer at bedside: NO     GI:  Last Bowel Movement Date: 01/08/22  Current diet:  ADULT DIET Regular  Passing flatus: YES  Tolerating current diet: YES       Pain Management:   Patient states pain is manageable on current regimen: YES    Skin:  Freddy Score: 16  Interventions: turn team, float heels, PT/OT consult and nutritional support     Patient Safety:  Fall Score:  Total Score: 4  Interventions: bed/chair alarm, gripper socks, pt to call before getting OOB and stay with me (per policy)  High Fall Risk: Yes    Length of Stay:  Expected LOS: - - -  Actual LOS: 0      Rigoberto Flores RN

## 2022-01-09 NOTE — PROGRESS NOTES
End of Shift Note    Bedside shift change report given to Jesus Navarrete (oncoming nurse) by Itzel Gagnon RN (offgoing nurse). Report included the following information SBAR, Kardex, ED Summary, Intake/Output, MAR and Recent Results    Shift worked:  [id number]     Shift summary and any significant changes:     Pt complained of a headache and blurry vision throughout most of this shift, tramadol added to PRN list and stat head CT obtained. Head Ct was negative. Pt still has not passed hard stool in colon. Stool softeners given throughout shift. Pt repositioned during this shift.      Concerns for physician to address: Constipation     Zone phone for oncoming shift:            Itzel Gagnon, RN

## 2022-01-09 NOTE — PROGRESS NOTES
TRANSFER - IN REPORT:    Verbal report received from Madison Schmidt, [facility] (name) on Merlyn Whipple  being received from ED Bed 12 (unit) for routine progression of care      Report consisted of patients Situation, Background, Assessment and   Recommendations(SBAR). Information from the following report(s) SBAR, Kardex, ED Summary, Intake/Output, MAR, Recent Results and Cardiac Rhythm NSR was reviewed with the receiving nurse. Opportunity for questions and clarification was provided. Assessment completed upon patients arrival to unit and care assumed.

## 2022-01-09 NOTE — PROGRESS NOTES
Problem: Pressure Injury - Risk of  Goal: *Prevention of pressure injury  Description: Document Freddy Scale and appropriate interventions in the flowsheet. Outcome: Progressing Towards Goal  Note: Pressure Injury Interventions:  Sensory Interventions: Assess changes in LOC,Check visual cues for pain,Discuss PT/OT consult with provider,Minimize linen layers    Moisture Interventions: Absorbent underpads,Apply protective barrier, creams and emollients,Check for incontinence Q2 hours and as needed,Maintain skin hydration (lotion/cream)    Activity Interventions: Assess need for specialty bed,PT/OT evaluation    Mobility Interventions: Assess need for specialty bed,HOB 30 degrees or less,PT/OT evaluation    Nutrition Interventions: Offer support with meals,snacks and hydration                     Problem: Falls - Risk of  Goal: *Absence of Falls  Description: Document Romi Fall Risk and appropriate interventions in the flowsheet.   Outcome: Progressing Towards Goal  Note: Fall Risk Interventions:       Mentation Interventions: Bed/chair exit alarm,Adequate sleep, hydration, pain control,Evaluate medications/consider consulting pharmacy,More frequent rounding,Reorient patient,Room close to nurse's station,Toileting rounds,Update white board    Medication Interventions: Bed/chair exit alarm,Evaluate medications/consider consulting pharmacy,Patient to call before getting OOB,Teach patient to arise slowly,Assess postural VS orthostatic hypotension    Elimination Interventions: Bed/chair exit alarm,Patient to call for help with toileting needs,Toileting schedule/hourly rounds,Toilet paper/wipes in reach    History of Falls Interventions: Bed/chair exit alarm,Door open when patient unattended,Consult care management for discharge planning,Room close to nurse's station         Problem: Pain  Goal: *Control of Pain  Outcome: Progressing Towards Goal

## 2022-01-10 NOTE — PROGRESS NOTES
Orders received, chart reviewed. Attempted to see pt for PT evaluation however pt with elevated BP (212/109) while at supine rest. Pt also extremely anxious and adamantly refusing OOB mobility or participation in PT evaluation. Daughter present at bedside and reports that at baseline, pt is dependent for all functional mobility and ADLs. Daughter states that pt mostly stays in bed, only mobilizing to wheelchair 1 day/wk. Pt requires x2 person assist for bed<>wheelchair transfer and is dependent for wheelchair mobility. Pt with advancing dementia and increased anxiety, adamantly refusing any mobility, not appropriate for skilled intervention. Will complete PT order at this time. Recommend pt return home w/ continued 24hr assist of family vs LTC.     Jero Haynes, PT, DPT

## 2022-01-10 NOTE — ROUTINE PROCESS
Patient had feces in bed and on her, also was experiencing anxiety, wanted to wait for physician before films.  TRDAMEON

## 2022-01-10 NOTE — PROGRESS NOTES
Problem: Pressure Injury - Risk of  Goal: *Prevention of pressure injury  Description: Document Freddy Scale and appropriate interventions in the flowsheet. Outcome: Progressing Towards Goal  Note: Pressure Injury Interventions:  Sensory Interventions: Assess changes in LOC,Check visual cues for pain,Discuss PT/OT consult with provider,Keep linens dry and wrinkle-free,Turn and reposition approx. every two hours (pillows and wedges if needed)    Moisture Interventions: Absorbent underpads,Apply protective barrier, creams and emollients,Check for incontinence Q2 hours and as needed,Maintain skin hydration (lotion/cream),Limit adult briefs    Activity Interventions: PT/OT evaluation,Assess need for specialty bed    Mobility Interventions: PT/OT evaluation,Assess need for specialty bed    Nutrition Interventions: Document food/fluid/supplement intake,Offer support with meals,snacks and hydration                     Problem: Falls - Risk of  Goal: *Absence of Falls  Description: Document Romi Fall Risk and appropriate interventions in the flowsheet.   Outcome: Progressing Towards Goal  Note: Fall Risk Interventions:       Mentation Interventions: Adequate sleep, hydration, pain control,Bed/chair exit alarm,Door open when patient unattended,More frequent rounding,Reorient patient,Room close to nurse's station,Toileting rounds,Update white board    Medication Interventions: Bed/chair exit alarm,Evaluate medications/consider consulting pharmacy,Assess postural VS orthostatic hypotension    Elimination Interventions: Call light in reach,Toileting schedule/hourly rounds,Toilet paper/wipes in reach,Stay With Me (per policy)    History of Falls Interventions: Bed/chair exit alarm,Door open when patient unattended,Consult care management for discharge planning,Room close to nurse's station         Problem: Pain  Goal: *Control of Pain  Outcome: Progressing Towards Goal

## 2022-01-10 NOTE — PROGRESS NOTES
I reviewed pertinent labs and imaging, and discussed /agreed on the plan of care with Dr. Minh House. Hospitalist Progress Note    NAME: Bianca Sánchez   :  1945   MRN:  [id number]       Assessment / Plan:  19-year-old female with a past medical history of hypertension, GERD, type 2 diabetes, chronic pain, high cholesterol, CAD, renal colic/calculi, murmur, noncompliance with medical treatment, who presented to the emergency department  with complaints of a headache. ROS in the ED was positive for abdominal pain, diarrhea, headaches. Family reported that the patient has been having diarrhea for 1 month, worse in the morning, associated with incontinence, confusion, decreased p.o. intake x2 days. UA was also noted to be positive.  She was admitted for diarrhea, hypokalemia, dehydration, delirium and a UTI.     Chronic headache, likely tension type, stable   -Associated with tension in the neck and shoulders  -This was actually her CC in the ED  -No neuro sx on exam, but given the severity of the HA and that she states it is associated with blurred vision, head CT was ordered yesterday, which was negative  -She appears more comfortable today, although she is still c/o discomfort   -Prn Tylenol, Ultram  -Hesitant to add on anything else at this moment with her AMS and issues with constipation   -I suspect that she has some cataracts causing blurred vision from my exam without eye pain- she has gray-white opacities over bilateral pupils, which are reactive, EOMI intact- she will need OP f/u for this.      Diarrhea  -Suspected related to overflow from constipation  -KUB dated : Chronic colonic/rectal distention with large amount of stool.  -Status post suppository, MiraLAX, senna  -Nursing reports that she had 2 large bowel movements overnight  -Repeat KUB 1-10-: Slight improvement of colorectal gas and stool distention.  -Stool WBC, enteric bacterial panel negative  -Continue IV fluids  -Added sorbitol and dulcolax  -Consulted GI per Dr. Maeve Choe- appreciate the consult      Dehydration, likely secondary to diarrhea, resolved  -Management of diarrhea as above  -Management of hypokalemia as below  -Management of UTI as below  -Continue IVF, monitor labs     Hypokalemia, improving,likely secondary to diarrhea as above   -K+  3.5 today after repletion  -Management of diarrhea as above   -Repeat labs  tomorrow morning    Hyperchloremia, mild  -Chloride 114 this morning, likely secondary to IV fluids  -We will change fluids from normal saline to LR    Acute normocytic anemia  -Hgb 9.6 from 11.8 yesterday-could be dilutional effect, but will obtain an occult stool given drop and GI symptoms  -Denies S/S of bleeding  -Denies S/S of anemia  -Repeat labs tomorrow    UTI, stable  -WBC  3.9 this morning  -She remains afebrile  -UA dated 1/8 cloudy with trace protein, small leuk esterase, negative for nitrates, 10-12 WBC, 4+ bacteria. Culture with GNRs. -Blood cx  no growth to date  -Continue trending cultures  -Denies fevers, chills, dysuria   -Continue ceftriaxone  -Repeat labs tomorrow     Acute metabolic encephalopathy, likely secondary to UTI, dehydration, in the setting of dementia, stable   -Mental status on exam A&O x 3, but highly anxious  - vitamin B12, folate normal.  Liver enzymes unremarkable, slightly low.   -Sodium and creatinine are normal  -Treat underlying cause, as above   -Avoid sedation if able  -Delirium mgt: shades up during the day, daylight reorientation, cluster  activities for rest, etc.     Hypertension, stable  -Continue amlodipine, Toprol-XL     Type 2 diabetes  -A1c  5.0  -Blood sugar in the 90s here  -Continue ACHS blood sugar checks, sliding scale     Chronic pain  -Continue Zoloft  -Tylenol and Ultram prn      Elevated cholesterol  -Continue Zetia     History of CAD, without a history of stents  -Continue aspirin, Plavix     History of SVT  -In NSR now   -Management of hypokalemia as above  -Magnesium 1.8 can consider repleting if issues with rhythm  -Continue monitoring on telemetry    Update 1500- called to bedside from nursing for complaints of CP after attempting to work with PT. Reportedly, pt got very agitated by the eval process. When I saw her, she was breathing comfortably, no acute distress. C/o \"chest tightness\" to the R side of the chest, associated with elevated Bps. I have a low suspicion for ACS, but will go ahead and check an EKG and troponin. Nursing to medicate with prn anxiolytic if available as well. 1738: Troponin still pending. Normal EKG. 18.5 - 24.9 Normal weight / Body mass index is 21.93 kg/m². Estimated discharge date: January 12  Barriers: PT/OT eval, ? Placement   Code status: Full  Prophylaxis: Lovenox  Recommended Disposition: home with family vs placement     Subjective:     Chief Complaint / Reason for Physician Visit  Seen for hospital f/u re: headache. She still endorses HA and blurred vision, and is very anxious about both. Denies fevers/chills. Nursing reports multiple large BMs over night without any reports of blood in the stool. I updated her to the plan of care including CT scan results and updated her to the plan of care; she verbalized understanding. Discussed with RN events overnight. Review of Systems:  Symptom Y/N Comments  Symptom Y/N Comments   Fever/Chills N   Chest Pain N    Poor Appetite N   Edema N    Cough N   Abdominal Pain N    Sputum N   Joint Pain N    SOB/MOORE N   Pruritis/Rash N    Nausea/vomit N   Tolerating PT/OT -    Diarrhea N   Tolerating Diet Y    Constipation N   Other Y HA, blurred vision     Could NOT obtain due to: N/A     Objective:     VITALS:   Last 24hrs VS reviewed since prior progress note.  Most recent are:  Patient Vitals for the past 24 hrs:   Temp Pulse Resp BP SpO2   01/10/22 0300 98.3 °F (36.8 °C) 68 16 (!) 158/116 99 %   01/10/22 0000 -- 64 -- -- --   01/09/22 2300 97.9 °F (36.6 °C) 61 17 136/77 98 % 01/09/22 1956 -- (!) 58 -- -- --   01/09/22 1930 98.1 °F (36.7 °C) 60 17 132/66 98 %   01/09/22 1544 98.2 °F (36.8 °C) 68 16 (!) 148/78 98 %   01/09/22 1119 98.4 °F (36.9 °C) 66 16 (!) 153/87 100 %   01/09/22 0724 (!) 96.5 °F (35.8 °C) 60 16 139/77 90 %       Intake/Output Summary (Last 24 hours) at 1/10/2022 0715  Last data filed at 1/9/2022 1819  Gross per 24 hour   Intake 2016 ml   Output --   Net 2016 ml        I had a face to face encounter and independently examined this patient on 1/10/2022, as outlined below:  PHYSICAL EXAM:  General: Alert, cooperative, no acute distress    EENT:  Anicteric sclerae. MMM  Resp:  CTA bilaterally, no wheezing or rales. No accessory muscle use  CV:  Regular  rhythm,  No edema  GI:  Soft, Non distended, Non tender. +Bowel sounds  Neurologic:  Alert and oriented X 3, normal speech,   Psych:   Good insight. Anxious  Skin:  No rashes. No jaundice  MSK:  Moves all extremities  PVS:   Palpable pulses, trace to +1 pitting edema BLE     Reviewed most current lab test results and cultures  YES  Reviewed most current radiology test results   YES  Review and summation of old records today    NO  Reviewed patient's current orders and MAR    YES  PMH/SH reviewed - no change compared to H&P  ________________________________________________________________________  Care Plan discussed with:    Comments   Patient Y    Family  Y Called and updated Tiarra at [address] [place] [address] 100- she verbalized understanding    RN Y    Care Manager     Consultant                       Y Multidiciplinary team rounds were held today with , nursing, pharmacist and clinical coordinator. Patient's plan of care was discussed; medications were reviewed and discharge planning was addressed.      ________________________________________________________________________      Comments   >50% of visit spent in counseling and coordination of care Y ________________________________________________________________________  Coral Massey NP     Procedures: see electronic medical records for all procedures/Xrays and details which were not copied into this note but were reviewed prior to creation of Plan. LABS:  I reviewed today's most current labs and imaging studies. Pertinent labs include:  Recent Labs     01/10/22  0314 01/08/22  1730   WBC 3.9 4.2   HGB 9.6* 11.8   HCT 31.9* 37.6    177     Recent Labs     01/10/22  0314 01/09/22  1614 01/09/22  0538 01/08/22  1730 01/08/22  1730    142 143   < > 140   K 3.5 3.3* 3.0*   < > 2.6*   * 110* 109*   < > 104   CO2 27 28 27   < > 31   GLU 78 114* 101*   < > 94   BUN 15 16 16   < > 16   CREA 0.71 0.68 0.70   < > 0.63   CA 9.1 8.6 8.8   < > 8.6   MG 1.8 1.9  --   --  1.9   PHOS 3.0  --   --   --   --    ALB 3.0*  --   --   --   --    TBILI 0.4  --   --   --   --    ALT 10*  --   --   --   --     < > = values in this interval not displayed.        Signed: Coral Massey NP

## 2022-01-10 NOTE — PROGRESS NOTES
End of Shift Note    Bedside shift change report given to Catharine Ahumada, RN (oncoming nurse) by Angela Armenta RN (offgoing nurse). Report included the following information SBAR, Kardex, ED Summary, Intake/Output, MAR, Recent Results and Cardiac Rhythm NSR to Sinus Francisco Waters with Multiform PVC's    Shift worked:  7p-7a     Shift summary and any significant changes:     Pt stable, scheduled meds given, pt complained of headache early in shift but did not want the PRN pain medicine I offered her, no lispro coverage needed, Incontinence care performed diarrhea BM during shift, education given, labs drawn   X ray attempted portable KUB but pt refused currently stating she felt anxious. Spoke with pt and will retry to get X-ray this morning   Concerns for physician to address:       Zone phone for oncoming shift:  [phone]       Activity:  Activity Level: Bed Rest  Number times ambulated in hallways past shift: 0  Number of times OOB to chair past shift: 0    Cardiac:   Cardiac Monitoring: Yes      Cardiac Rhythm: Sinus Rhythm,Multiform PVCs to Sinus Rick    Access:   Current line(s): PIV     Genitourinary:   Urinary status: incontinent    Respiratory:   O2 Device: None (Room air)  Chronic home O2 use?: NO  Incentive spirometer at bedside: NO     GI:  Last Bowel Movement Date: 01/09/21  Current diet:  ADULT DIET Regular  Passing flatus: YES  Tolerating current diet: YES       Pain Management:   Patient states pain is manageable on current regimen: YES    Skin:  Freddy Score: 15  Interventions: turn team, float heels, PT/OT consult and nutritional support     Patient Safety:  Fall Score:  Total Score: 4  Interventions: bed/chair alarm, gripper socks, pt to call before getting OOB and stay with me (per policy)  High Fall Risk: Yes    Length of Stay:  Expected LOS: - - -  Actual LOS: 0      Angela Armenta RN

## 2022-01-10 NOTE — CONSULTS
Gastroenterology Attending Physician Koki Capps) attestation statement and comments. This patient was seen and examined by me in a face-to-face visit today. I reviewed the medical record including lab work, imaging and other provider notes. I confirmed the history as described above. I spoke to the patient, reviewed the medical record including lab work, imaging and other provider notes. I discussed this case in detail with NICK Ibrahim. I formulated an  assessment of this patient and developed a treatment plan. I agree with the above consultation note. I agree with the history, exam and assessment and plan as outlined in the note. I would like to add the followinyo F with chronic constipation, seen by GI for same in the past with abnl GI imaging noting chronic colonic distention. Noted to have episodes of diarrhea here, though to represent overflow incontinence. She is largely bed-bound, getting out of bed 1x/d for a short period only. PE with mild abd distention and tympany, RRR, CTA b/l. Imaging, meds, and labs reviewed reviewed with normocytic anemia. Suspect constipation multifactorial with contribution from immobility, diet, medication adverse effect, UTI, and prior surgeries. Minimal BMs here with suppoitory, Senna, and Mirlalax. Plan: 1) TID Sorbitol, 2) QD Miralax, 3) Mobilize OOB TID, 4) Avoid narcotic analgesics, 5) Regular diet, 6) Follow her exam over time. Will sign off as no active GI intervention planned. Thank you for this interesting consult.   Mikki Sánchez MD            GI CONSULTATION NOTE  Tonya Ramos NP  [phone] NP in-hospital cell phone M-F until 4:30  After 5pm or on weekends, please call  for physician on call  NAME: Drake Franco   :  1945   MRN:  [id number]   Attending:  Dr. Sandra Sharp     Primary GI:  Dr. Saintclair Basset; Dr. Allyson Sanders covering   Date/Time:  1/10/2022 12:29 PM  Assessment:   Chronic constipation   S/p suppository, miralax, and senna with minimal results   01/09/2022:  KUB showed chronic colonic/rectal distention with large amount of stool  01/10/2022- KUB with slight improvement of colorectal gas and stool distention  Chronic headache  Treatment per primary team  Plan:   Start sorbitol 30 ml TID   Miralax daily   Regular diet  Encourage ambulation   Avoid opiates if possible   Repeat KUB in AM  Follow for improvement  Plan discussed with Dr. Raymundo Rushing   Subjective:     HISTORY OF PRESENT ILLNESS:     Yohan Freeman is an 68 y.o.  female who we are asked to see for complaint of chronic constipation. Patient admitted to hospital for complaints of chronic headache, UTI and encephalopathy. Patient reported diarrhea. KUB shows large amount of fecal retention. She has a hx of the same.     Past Medical History:   Diagnosis Date    Agoraphobia without mention of panic attacks 2/17/2014    Anxiety disorder 8/18/2013    Arthritis     osteo    CAD (coronary artery disease), native coronary artery 12/1/2015    no stents    Chronic chest pain 1/13/2014    Chronic pain associated with significant psychosocial dysfunction 2/17/2014    Depression 8/18/2013    Diabetes ([facility] [address]     type II    Duplicated right renal collecting system 3/13/2014    GERD (gastroesophageal reflux disease)     Gout, joint     Hypercholesteremia     hyercholesterolemia    Hypertension     Murmur     Nephrolithiasis 3/13/2014    Personal history of noncompliance with medical treatment, presenting hazards to health 5/30/2014      Past Surgical History:   Procedure Laterality Date    EGD  4/23/2010         HX CHOLECYSTECTOMY  09/20/2018    lap jesus    HX CYST REMOVAL      cyst removed from left wrist    HX HEART CATHETERIZATION  12/01/2015    HX HYSTERECTOMY      partial    HX OTHER SURGICAL      bladder dilitation    HX TUBAL LIGATION      HX UROLOGICAL      kidney stones     Social History     Tobacco Use    Smoking status: Never Smoker    Smokeless tobacco: Never Used   Substance Use Topics    Alcohol use: No      Family History   Problem Relation Age of Onset    Stroke Mother     Heart Disease Mother     Cancer Father         type unknown    Heart Disease Son     Liver Disease Son     Heart Disease Daughter     Malignant Hyperthermia Neg Hx     Pseudocholinesterase Deficiency Neg Hx     Delayed Awakening Neg Hx     Post-op Nausea/Vomiting Neg Hx     Emergence Delirium Neg Hx     Post-op Cognitive Dysfunction Neg Hx     Other Neg Hx       Allergies   Allergen Reactions    Amoxicillin Hives    Sulfa (Sulfonamide Antibiotics) Hives and Itching    Mirtazapine Itching and Nausea Only     Funny feeling in chest    Percocet [Oxycodone-Acetaminophen] Nausea and Vomiting    Codeine Nausea and Vomiting    Crestor [Rosuvastatin] Other (comments)     myalgias    Nitroglycerin Unknown (comments)     Patient cannot remember why she is allergic to it      Prednisone Itching    Zithromax [Azithromycin] Itching     Not sure what it does,taken long time ago      Prior to Admission medications    Medication Sig Start Date End Date Taking? Authorizing Provider   sertraline (Zoloft) 100 mg tablet Take 100 mg by mouth daily. Yes Other, MD Shun   mirabegron ER (MYRBETRIQ) 50 mg ER tablet Take 50 mg by mouth daily. Yes Other, MD Shun   acetaminophen (TYLENOL) 500 mg tablet Take 1,000 mg by mouth every six (6) hours as needed for Pain. Provider, Historical   aspirin 81 mg chewable tablet Take 1 Tab by mouth daily. 2/24/21   Lauro Hardy MD   clopidogreL (PLAVIX) 75 mg tab Take 1 Tab by mouth daily. 2/24/21   Lauro Hardy MD   ezetimibe (ZETIA) 10 mg tablet Take 1 Tab by mouth daily. 2/24/21   Lauro Hardy MD   metoprolol succinate (TOPROL-XL) 25 mg XL tablet Take 1 Tab by mouth every twelve (12) hours. Patient taking differently: Take 25 mg by mouth daily. 2/23/21   Lauro Hardy MD   senna (Senna) 8.6 mg tablet Take 1 Tab by mouth two (2) times a day.     Provider, Historical bisacodyL (DULCOLAX) 5 mg EC tablet Take 10 mg by mouth daily. Provider, Historical   amLODIPine (NORVASC) 2.5 mg tablet Take 2.5 mg by mouth daily. Provider, Historical   polyethylene glycol (MIRALAX) 17 gram/dose powder Take 17 g by mouth daily.  1 tablespoon with 8 oz of water daily 10/29/19   Yaritza Argueta PA-C       Patient Active Problem List   Diagnosis Code    HTN, goal below 130/80 I10    Chronic headache R51.9, G89.29    Acid reflux K21.9    Dizziness of unknown cause R42    Neutropenia (HCC) D70.9    Abdominal pain R10.9    Constipation K59.00    Insomnia G47.00    Hyperlipidemia E78.5    UTI (urinary tract infection) N39.0    Chronic chest pain R07.9, G89.29    Chronic pain associated with significant psychosocial dysfunction G89.4    Depression with anxiety F41.8    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    Onycholysis of toenail I75.7    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    Personal history of noncompliance with medical treatment, presenting hazards to health Z91.19    Breast pain, left N64.4    Pseudobulbar affect F48.2    CAD (coronary artery disease), native coronary artery I25.10    SOB (shortness of breath) R06.02    Broken heart syndrome I51.81    Death of child Z63.4    Somatic delusion disorder ([facility] [address] F22    Diabetes mellitus type 2, diet-controlled ([facility] [address] E11.9    Somatization disorder F45.0    Death of parent Z63.4    Tightness sensation-head Z78.9    Generalized OA M15.9    Noncompliance with medication regimen-chol medication  Z91.14    Orthostatic hypotension I95.1    Hydronephrosis of left kidney N13.30    Left-sided chest wall pain R07.89    Weakness R53.1    Assistance needed with transportation Z74.8    Gait instability R26.81    Advance directive discussed with patient Z71.89    Vaginal irritation N89.8    Blurring of vision H53.8    Choledocholithiasis with acute cholecystitis K80.42    Urinary retention R33.9    Fecal impaction (HCC) K56.41    Acute coronary syndromes ([facility]) I24.9    Renal stone N20.0    Dysrhythmia, cardiac I49.9    Nausea & vomiting R11.2    Dementia ([facility]) F03.90    HERBERTH (acute kidney injury) ([facility] [address] N17.9    History of COVID-19 Z86.16    AMS (altered mental status) R41.82       REVIEW OF SYSTEMS:    Constitutional: negative fever, negative chills, negative weight loss  Eyes:   negative visual changes  ENT:   negative sore throat, tongue or lip swelling   Respiratory:  negative cough, negative dyspnea  Cards:  negative for chest pain, palpitations, lower extremity edema  GI:   See HPI  :  negative for frequency, dysuria  Integument:  negative for rash and pruritus  Heme:  negative for easy bruising and gum/nose bleeding  Musculoskel: negative for myalgias,  back pain and muscle weakness  Neuro: negative for headaches, dizziness, vertigo  Psych: negative for feelings of anxiety, depression     Objective:   VITALS:    Visit Vitals  BP (!) 170/104   Pulse 65   Temp 96.9 °F (36.1 °C)   Resp 16   Ht 5' 7\" (1.702 m)   Wt 63.5 kg (140 lb)   SpO2 100%   BMI 21.93 kg/m²       PHYSICAL EXAM:   General:          Pleasant elderly AA female. NAD    Head:               Normocephalic, without obvious abnormality, atraumatic. Eyes:               Conjunctivae clear and pale, anicteric sclerae. Pupils are equal  Nose:               Nares normal. No drainage or sinus tenderness. Throat:             Lips, mucosa, and tongue normal.  No Thrush  Neck:               Supple, symmetrical,  no adenopathy, thyroid: non tender  Back:               Symmetric,  No CVA tenderness. Lungs:             CTA bilaterally. No wheezing/rhonchi/rales. Chest wall:      No tenderness or deformity. No Accessory muscle use. Heart:              Regular rate and rhythm,  no murmur, rub or gallop. Abdomen:        Soft, non-tender. Not distended. Bowel sounds normal. No masses  Extremities:     Atraumatic, No cyanosis. No edema.  No clubbing  Skin:                Texture, turgor normal. No rashes/lesions/jaundice  Psych:             Fair insight. Not depressed. Not anxious or agitated. Neurologic:      EOMs intact. No facial asymmetry. No aphasia or slurred speech. A/O X 3. LAB DATA REVIEWED:    Recent Results (from the past 24 hour(s))   METABOLIC PANEL, BASIC    Collection Time: 01/09/22  4:14 PM   Result Value Ref Range    Sodium 142 136 - 145 mmol/L    Potassium 3.3 (L) 3.5 - 5.1 mmol/L    Chloride 110 (H) 97 - 108 mmol/L    CO2 28 21 - 32 mmol/L    Anion gap 4 (L) 5 - 15 mmol/L    Glucose 114 (H) 65 - 100 mg/dL    BUN 16 6 - 20 MG/DL    Creatinine 0.68 0.55 - 1.02 MG/DL    BUN/Creatinine ratio 24 (H) 12 - 20      GFR est AA >60 >60 ml/min/1.73m2    GFR est non-AA >60 >60 ml/min/1.73m2    Calcium 8.6 8.5 - 10.1 MG/DL   MAGNESIUM    Collection Time: 01/09/22  4:14 PM   Result Value Ref Range    Magnesium 1.9 1.6 - 2.4 mg/dL   GLUCOSE, POC    Collection Time: 01/09/22  4:40 PM   Result Value Ref Range    Glucose (POC) 134 (H) 65 - 117 mg/dL    Performed by Panchito Hogan    GLUCOSE, POC    Collection Time: 01/09/22  9:17 PM   Result Value Ref Range    Glucose (POC) 94 65 - 117 mg/dL    Performed by Yady Fields PCT    HEMOGLOBIN A1C WITH EAG    Collection Time: 01/10/22  3:14 AM   Result Value Ref Range    Hemoglobin A1c 5.0 4.0 - 5.6 %    Est. average glucose 97 mg/dL   CBC WITH AUTOMATED DIFF    Collection Time: 01/10/22  3:14 AM   Result Value Ref Range    WBC 3.9 3.6 - 11.0 K/uL    RBC 3.72 (L) 3.80 - 5.20 M/uL    HGB 9.6 (L) 11.5 - 16.0 g/dL    HCT 31.9 (L) 35.0 - 47.0 %    MCV 85.8 80.0 - 99.0 FL    MCH 25.8 (L) 26.0 - 34.0 PG    MCHC 30.1 30.0 - 36.5 g/dL    RDW 15.0 (H) 11.5 - 14.5 %    PLATELET 007 506 - 240 K/uL    MPV 11.3 8.9 - 12.9 FL    NRBC 0.0 0  WBC    ABSOLUTE NRBC 0.00 0.00 - 0.01 K/uL    NEUTROPHILS 37 32 - 75 %    LYMPHOCYTES 52 (H) 12 - 49 %    MONOCYTES 8 5 - 13 %    EOSINOPHILS 2 0 - 7 %    BASOPHILS 1 0 - 1 %    IMMATURE GRANULOCYTES 0 0.0 - 0.5 %    ABS. NEUTROPHILS 1.4 (L) 1.8 - 8.0 K/UL    ABS. LYMPHOCYTES 2.1 0.8 - 3.5 K/UL    ABS. MONOCYTES 0.3 0.0 - 1.0 K/UL    ABS. EOSINOPHILS 0.1 0.0 - 0.4 K/UL    ABS. BASOPHILS 0.0 0.0 - 0.1 K/UL    ABS. IMM. GRANS. 0.0 0.00 - 0.04 K/UL    DF AUTOMATED     FOLATE    Collection Time: 01/10/22  3:14 AM   Result Value Ref Range    Folate 7.8 5.0 - 21.0 ng/mL   VITAMIN B12    Collection Time: 01/10/22  3:14 AM   Result Value Ref Range    Vitamin B12 222 193 - 487 pg/mL   METABOLIC PANEL, COMPREHENSIVE    Collection Time: 01/10/22  3:14 AM   Result Value Ref Range    Sodium 145 136 - 145 mmol/L    Potassium 3.5 3.5 - 5.1 mmol/L    Chloride 114 (H) 97 - 108 mmol/L    CO2 27 21 - 32 mmol/L    Anion gap 4 (L) 5 - 15 mmol/L    Glucose 78 65 - 100 mg/dL    BUN 15 6 - 20 MG/DL    Creatinine 0.71 0.55 - 1.02 MG/DL    BUN/Creatinine ratio 21 (H) 12 - 20      GFR est AA >60 >60 ml/min/1.73m2    GFR est non-AA >60 >60 ml/min/1.73m2    Calcium 9.1 8.5 - 10.1 MG/DL    Bilirubin, total 0.4 0.2 - 1.0 MG/DL    ALT (SGPT) 10 (L) 12 - 78 U/L    AST (SGOT) 11 (L) 15 - 37 U/L    Alk.  phosphatase 52 45 - 117 U/L    Protein, total 6.4 6.4 - 8.2 g/dL    Albumin 3.0 (L) 3.5 - 5.0 g/dL    Globulin 3.4 2.0 - 4.0 g/dL    A-G Ratio 0.9 (L) 1.1 - 2.2     MAGNESIUM    Collection Time: 01/10/22  3:14 AM   Result Value Ref Range    Magnesium 1.8 1.6 - 2.4 mg/dL   PHOSPHORUS    Collection Time: 01/10/22  3:14 AM   Result Value Ref Range    Phosphorus 3.0 2.6 - 4.7 MG/DL   TSH 3RD GENERATION    Collection Time: 01/10/22  3:14 AM   Result Value Ref Range    TSH 3.01 0.36 - 3.74 uIU/mL   GLUCOSE, POC    Collection Time: 01/10/22  7:57 AM   Result Value Ref Range    Glucose (POC) 79 65 - 117 mg/dL    Performed by Sammy 64, POC    Collection Time: 01/10/22 11:20 AM   Result Value Ref Range    Glucose (POC) 87 65 - 117 mg/dL    Performed by [address] [place] [address]:  I have personally reviewed the imaging reports      Total time spent with patient: 25 minutes ________________________________________________________________________  Care Plan discussed with:  Patient x   Family     RN x              Consultant:  Hospitalist      CT  1/10/2022:  ________________________________________________________________________    ___________________________________________________  Consulting Provider: Phillip Cisse NP      1/10/2022  12:29 PM

## 2022-01-11 NOTE — PROGRESS NOTES
I reviewed pertinent labs and imaging, and discussed /agreed on the plan of care with Dr. Yuni Castro. Hospitalist Progress Note    NAME: Marty Rodriguez   :  1945   MRN:  [id number]       Assessment / Plan:   68-year-old female with a past medical history of hypertension, GERD, type 2 diabetes, chronic pain, high cholesterol, CAD, renal colic/calculi, murmur, noncompliance with medical treatment, who presented to the emergency department  with complaints of a headache. ROS in the ED was positive for abdominal pain, diarrhea, headaches. Family reported that the patient has been having diarrhea for 1 month, worse in the morning, associated with incontinence, confusion, decreased p.o. intake x2 days. UA was also noted to be positive. She was admitted for diarrhea, hypokalemia, dehydration, delirium and a UTI.     Diarrhea 2/2 overflow from constipation   -Chronic issue but unfortunately extensive and we have not had much success in decreasing colonic distension. -GI on board- appreciate the consult  -KUB dated : Chronic colonic/rectal distention with large amount of stool.  -Status post suppository, MiraLAX, senna, sorbitol, Dulcolax- nursing notes document the patient has been refusing these intermittently- I have ordered an enema for today and requested that nursing encourage compliance with bowel regimen, and reinforced that she ultimately needs these to fix her diarrhea-requested that providers be notified when she refuses  -She received a soap suds enema today  -Nursing reports that she has been having many BMs today, unfortunately, repeat KUB 22 shows: Stable substantial colonic distention with some improvement in fecal stasis.   -Miralax has been increased to BID, requested nursing witness the patient consume all of it.  -Spoke with Marcos Mendoza NP with GI- she suggested adding on a Mineral Oil enema, which has been ordered  -Continue IV fluids  -Consider dc when colonic distension has stabilized     Dehydration, likely secondary to diarrhea, resolved  -Management of diarrhea as above  -Management of hypokalemia as below  -Management of UTI as below  -Continue IVF with her current bowel regimen, continue monitoring labs    Hypokalemia, resolved, likely secondary to diarrhea as above   -K+ 4.3  -Management of diarrhea as above   -Repeat labs  tomorrow morning     Hyperchloremia, improving  -Chloride down from 114 and 112 after fluids were changed to LR  -Repeat labs in a.m. Chronic headache, likely tension type, stable   -Associated with tension in the neck and shoulders  -This was actually her CC in the ED  -No neuro sx on exam, but given the severity of the HA and that she states it is associated with blurred vision, head CT was ordered 1-9, which was negative  -She appears more comfortable today, her main complaint is the vision today (see below)  -Prn Tylenol, Ultram  -Hesitant to add on anything else at this moment with her AMS and issues with constipation   -I suspect that she has some cataracts causing blurred vision from my exam without eye pain- she has gray-white opacities over bilateral pupils, which are reactive, EOMI intact- she will need OP f/u for this. Chest pain x1 January 10, resolved  -Associated with hypertension, agitation and anxiety related to PT/OT eval  -EKG sinus bradycardia, otherwise normal EKG  -High-sensitivity troponin was not drawn until this morning, but resulted at 41  -Monitor, consider cardiology consult if it becomes a recurrent issue  -Hypertension management as below, including Toprol    Simple cystitis, resolved  -WBC   4.4 this morning  -She remains afebrile  -UA dated 1/8 cloudy with trace protein, small leuk esterase, negative for nitrates, 10-12 WBC, 4+ bacteria.  Culture with KLEBSIELLA PNEUMONIAE -she completed a course of ceftriaxone.   -Blood cx  no growth to date  -Continue trending cultures  -Denies fevers, chills, dysuria   -Repeat labs tomorrow     Acute metabolic encephalopathy, likely secondary to UTI, dehydration, in the setting of dementia, resolved   -She is A&O x 3 on exam   - vitamin B12, folate normal.  Liver enzymes unremarkable, slightly low. -Sodium and creatinine are normal  -Treat underlying cause, as above   -Avoid sedation if able  -Delirium mgt: shades up during the day, daylight reorientation, cluster  activities for rest, etc.      Acute normocytic anemia  -Hgb stable at 10.1  -Occult stool still not been sent, but hemoglobin is actually increased which is reassuring  -Denies S/S of bleeding  -Denies S/S of anemia   -Repeat labs tomorrow     Anxiety  -Continue Zoloft  -Supportive measures     Hypertension, stable  -Continue amlodipine, Toprol-XL     Type 2 diabetes  -A1c  5.0  -Blood sugar in the 90s here  -Continue ACHS blood sugar checks, sliding scale     Chronic pain  -Continue Zoloft  -Tylenol and Ultram prn      Elevated cholesterol  -Continue Zetia     History of CAD, without a history of stents  -Continue aspirin, Plavix     History of SVT  -In SB now   -Management of hypokalemia as above  -Magnesium 1.8 can consider repleting if issues with rhythm  -Continue monitoring on telemetry         18.5 - 24.9 Normal weight / Body mass index is 21.93 kg/m². Estimated discharge date: January 12  Barriers: improvement in GI status as above  Code status: Full  Prophylaxis: Lovenox  Recommended Disposition: Home w/Family     Subjective:     Chief Complaint / Reason for Physician Visit  Seen for hospital f/u re: headache. She is seen laying in bed. Her blurred vision is bothering her again today (see above). Her ankles are bothering her. She is requesting something for anxiety. She is also asking for something to help her sleep tonight. She denies fevers, chills, CP or SOB.  Updated to the plan of care, including that she needs to take all of her bowel regimen so that we can get her out of the hospital; she verbalized understanding. Discussed with RN events overnight. Review of Systems:  Symptom Y/N Comments  Symptom Y/N Comments   Fever/Chills N   Chest Pain N    Poor Appetite N   Edema N    Cough N   Abdominal Pain N    Sputum N   Joint Pain N    SOB/MOORE N   Pruritis/Rash N    Nausea/vomit N   Tolerating PT/OT - REFUSED YESTERDAY   Diarrhea N   Tolerating Diet Y    Constipation N   Other Y Chronic issues, as above     Could NOT obtain due to: N/A     Objective:     VITALS:   Last 24hrs VS reviewed since prior progress note. Most recent are:  Patient Vitals for the past 24 hrs:   Temp Pulse Resp BP SpO2   01/11/22 0300 98.1 °F (36.7 °C) 60 18 (!) 160/83 100 %   01/10/22 1953 98.1 °F (36.7 °C) 62 18 139/69 97 %   01/10/22 1519 98.2 °F (36.8 °C) 60 18 (!) 169/78 100 %   01/10/22 1122 96.9 °F (36.1 °C) 65 16 (!) 170/104 100 %     No intake or output data in the 24 hours ending 01/11/22 0750     I had a face to face encounter and independently examined this patient on 1/11/2022, as outlined below:  PHYSICAL EXAM:  General: Alert, cooperative, no acute distress    EENT:  Anicteric sclerae. MMM  Resp:  CTA bilaterally, no wheezing or rales. No accessory muscle use  CV:  Regular  rhythm,  No murmurs, rubs or gallops  GI:  Soft, Non distended, Non tender. +Bowel sounds  Neurologic:  Alert and oriented X 3, normal speech,   Psych:   Anxious (baseline) nor agitated  Skin:  No rashes.   No jaundice  MSK:  Moves all extremities  PVS:   Palpable pulses, no edema     Reviewed most current lab test results and cultures  YES  Reviewed most current radiology test results   YES  Review and summation of old records today    NO  Reviewed patient's current orders and MAR    YES  PMH/SH reviewed - no change compared to H&P  ________________________________________________________________________  Care Plan discussed with:    Comments   Patient Y    Family      RN Y    Care Manager     Consultant                       Y Multidiciplinary team rounds were held today with , nursing, pharmacist and clinical coordinator. Patient's plan of care was discussed; medications were reviewed and discharge planning was addressed. ________________________________________________________________________      Comments   >50% of visit spent in counseling and coordination of care Y    ________________________________________________________________________  Edita Peralta NP     Procedures: see electronic medical records for all procedures/Xrays and details which were not copied into this note but were reviewed prior to creation of Plan. LABS:  I reviewed today's most current labs and imaging studies.   Pertinent labs include:  Recent Labs     01/11/22  0319 01/10/22  0314 01/08/22  1730   WBC 4.4 3.9 4.2   HGB 10.1* 9.6* 11.8   HCT 32.8* 31.9* 37.6    188 177     Recent Labs     01/11/22  0319 01/10/22  0314 01/09/22  1614    145 142   K 4.3 3.5 3.3*   * 114* 110*   CO2 28 27 28   GLU 72 78 114*   BUN 12 15 16   CREA 0.65 0.71 0.68   CA 8.8 9.1 8.6   MG 1.8 1.8 1.9   PHOS 3.0 3.0  --    ALB  --  3.0*  --    TBILI  --  0.4  --    ALT  --  10*  --        Signed: Edita Peralta NP

## 2022-01-11 NOTE — PROGRESS NOTES
End of Shift Note    Bedside shift change report given to Niharika Reid RN (oncoming nurse) by Kavon Granado RN (offgoing nurse). Report included the following information SBAR, Kardex and MAR    Shift worked:  7a - 7p     Shift summary and any significant changes:     Soap suds enema and fleets enema given as ordered. Pt had liquid stool after soap suds and wasn't able to retain the fleets at all; it came right back out. Pt stated she can feel that she needs to have a BM but has no control when it happens. Pt took all PO laxatives as ordered. Pt also reported dysuria, frequency, and urinary incontinence. Pt hallucinating people in the room who aren't there. Concerns for physician to address:       Zone phone for oncoming shift:          Activity:  Activity Level: Bed Rest  Number times ambulated in hallways past shift: 0  Number of times OOB to chair past shift: 0    Cardiac:   Cardiac Monitoring: Yes      Cardiac Rhythm: Sinus Rhythm    Access:   Current line(s): PIV     Genitourinary:   Urinary status: voiding and incontinent    Respiratory:   O2 Device: None (Room air)  Chronic home O2 use?: NO  Incentive spirometer at bedside: NO     GI:  Last Bowel Movement Date: 01/10/22  Current diet:  ADULT DIET Regular  Passing flatus: YES  Tolerating current diet: YES       Pain Management:   Patient states pain is manageable on current regimen: YES    Skin:  Freddy Score: 15  Interventions: float heels, PT/OT consult, limit briefs and nutritional support     Patient Safety:  Fall Score:  Total Score: 4  Interventions: bed/chair alarm, gripper socks, pt to call before getting OOB and stay with me (per policy)  High Fall Risk: Yes    Length of Stay:  Expected LOS: 3d 19h  Actual LOS: 1      Kavon Granado RN

## 2022-01-11 NOTE — PROGRESS NOTES
Problem: Pressure Injury - Risk of  Goal: *Prevention of pressure injury  Description: Document Freddy Scale and appropriate interventions in the flowsheet. Outcome: Progressing Towards Goal  Note: Pressure Injury Interventions:  Sensory Interventions: Assess changes in LOC    Moisture Interventions: Absorbent underpads    Activity Interventions: PT/OT evaluation    Mobility Interventions: PT/OT evaluation    Nutrition Interventions: Document food/fluid/supplement intake                     Problem: Falls - Risk of  Goal: *Absence of Falls  Description: Document Romi Fall Risk and appropriate interventions in the flowsheet. Outcome: Progressing Towards Goal  Note: Fall Risk Interventions:       Mentation Interventions: Bed/chair exit alarm    Medication Interventions: Bed/chair exit alarm    Elimination Interventions: Call light in reach    History of Falls Interventions:  Investigate reason for fall

## 2022-01-11 NOTE — PROGRESS NOTES
End of Shift Note    Bedside shift change report given to alejandro FROST (oncoming nurse) by Tracy Calzada (offgoing nurse). Report included the following information SBAR, Kardex, Intake/Output, MAR, Recent Results and Cardiac Rhythm NSR    Shift worked:  7p-7a     Shift summary and any significant changes:     patient rested fairly this night sleeping at long intervals, bedrest, had one large episode of loose BM. Concerns for physician to address:  none           Activity:  Activity Level: Bed Rest  Number times ambulated in hallways past shift: 0  Number of times OOB to chair past shift: 0    Cardiac:   Cardiac Monitoring: Yes      Cardiac Rhythm: Sinus Rhythm    Access:   Current line(s): PIV     Genitourinary:   Urinary status: voiding and incontinent    Respiratory:   O2 Device: None (Room air)  Chronic home O2 use?: NO  Incentive spirometer at bedside: NO     GI:  Last Bowel Movement Date: 01/10/22  Current diet:  ADULT DIET Regular  Passing flatus: YES  Tolerating current diet: YES       Pain Management:   Patient states pain is manageable on current regimen: YES    Skin:  Freddy Score: 16  Interventions: float heels, increase time out of bed and PT/OT consult    Patient Safety:  Fall Score:  Total Score: 4  Interventions: bed/chair alarm, gripper socks and pt to call before getting OOB  High Fall Risk: Yes    Length of Stay:  Expected LOS: 3d 19h  Actual LOS: [address] [place]

## 2022-01-11 NOTE — PROGRESS NOTES
Occupational Therapy  Per chart review, pt is dependent for all functional mobility and ADLs. Daughter states that pt mostly stays in bed, only mobilizing to wheelchair 1 day/wk. Pt requires x2 person assist for bed<>wheelchair transfer and is dependent for wheelchair mobility and ADLs. Pt with advancing dementia and increased anxiety, not appropriate for skilled intervention. Will complete OT order at this time. Recommend pt return home w/ continued 24hr assist of family vs LTC.  Janey Antonio OTR/L

## 2022-01-11 NOTE — PROGRESS NOTES
End of Shift Note    Bedside shift change report given to Claude Pinch, RN (oncoming nurse) by Ling Ahmadi RN (offgoing nurse). Report included the following information SBAR, Kardex and MAR    Shift worked:  7a - 7p     Shift summary and any significant changes:     Pt anxious throughout shift. /109 when PT tried to work w/ her. Pt c/o right-sided chest pain and tightness, headache, and denied SOB. NP Milo Merida informed. EKG performed and lab ordered. PRN tramadol given x 1 for h/a; pt reported it didn't help the pain but declined other pain meds at the time. Pt A&O x 4 during day but sundowns and becomes quite confused. Concerns for physician to address:  Pt needs something prn for anxiety. Pt refusing scheduled meds to promote BM, stating they make her \"shit all the time\". Zone phone for oncoming shift:   [phone]       Activity:  Activity Level: Bed Rest  Number times ambulated in hallways past shift: 0  Number of times OOB to chair past shift: 0    Cardiac:   Cardiac Monitoring: Yes      Cardiac Rhythm: Sinus Rhythm,Multiform PVCs    Access:   Current line(s): PIV     Genitourinary:   Urinary status: voiding and incontinent    Respiratory:   O2 Device: None (Room air)  Chronic home O2 use?: NO  Incentive spirometer at bedside: NO     GI:  Last Bowel Movement Date: 01/09/22  Current diet:  ADULT DIET Regular  Passing flatus: YES  Tolerating current diet: YES       Pain Management:   Patient states pain is manageable on current regimen: NO    Skin:  Freddy Score: 16  Interventions: float heels, PT/OT consult, limit briefs, internal/external urinary devices and nutritional support     Patient Safety:  Fall Score:  Total Score: 4  Interventions: bed/chair alarm, gripper socks and pt to call before getting OOB  High Fall Risk: Yes    Length of Stay:  Expected LOS: 3d 19h  Actual LOS: 0      Ling Ahmadi RN

## 2022-01-11 NOTE — PROGRESS NOTES
Transition of Care Plan:    RUR: 13%   Disposition: Home with family support, Care Aides-[facility]  Follow up appointments: Follow up with PCP and/or Specialist-appointment will be arranged by [facility] worker   DME needed: TBD by therapist-pt has DME at home   Transportation at Discharge:[facility] worker to arrange   [address] [place] [address] Avenue or means to access home: N/A        Medicare Letter: 2nd  Medicare Letter to be given   Is patient a BCPI-A Bundle:  N/A    If yes, was Bundle Letter given?:    Is patient a [place] and connected with the [address] ? N/A  If yes, was [employer] transfer form completed and [facility] notified? Caregiver Contact: Zoila Mcmanus (daughter) [phone]  Discharge Caregiver contacted prior to discharge? Family to be contact             CM: Sam Turner is currently working with pt in the [place] [facility]. CM informed that pt is known to be a [place] pt. CM received call from [place] CM: Harjinder Spaulding ([phone]). CM informed that pt has care aides at home, provided by [facility], and they will follow up with pt within 24hrs, upon d/c.  CM informed that Harjinder Spaulding will assist CM will arranging transport at the time of d/c. CM will continue to follow and verify pt's d/c with clinical team.  CM will inform [place] CM: Harjinder Spaulding, when pt is medical stable to d/c. CM will continue to follow.     FERNANDO Tyler, [address] [place] [address]

## 2022-01-12 NOTE — PROGRESS NOTES
Hospitalist Progress Note    NAME: Andrea Del Cid   :  1945   MRN:  [id number]     Summary excerpted from prior days note:  75-year-old female with a past medical history of hypertension, GERD, type 2 diabetes, chronic pain, high cholesterol, CAD, renal colic/calculi, murmur, noncompliance with medical treatment, who presented to the emergency department  with complaints of a headache. ROS in the ED was positive for abdominal pain, diarrhea, headaches. Family reported that the patient has been having diarrhea for 1 month, worse in the morning, associated with incontinence, confusion, decreased p.o. intake x2 days. UA was also noted to be positive. She was admitted for diarrhea, hypokalemia, dehydration, delirium and a UTI. Assessment / Plan:  Chronic constipation   Diarrhea  GERD  KUB 22:  Chronic colonic/rectal distention with large amount of stool. KUB 01/10/22:  Slight improvement of colorectal gas and stool distention. KUB 22 A.M.:  Diffuse gaseous distention of the colon and rectum, with interval  improvement in the stool burden and distention of the rectum since the prior study. KUB 22 P.M.:  Stable substantial colonic distention with some improvement in fecal stasis. - GI input appreciated. - KUB from this a.m. pending.  - Patient received SS and mineral oil enema 21.   - Patient will loose brown stool this a.m.  - Continue bisacodyl supp 10 mg CO daily. - Continue polyethylene glycol 17 g po bid. - Continue senna 17.2 mg, 2 tabs po daily. - Continue sorbitol 70% 30 cc po tid. Dehydration   - Likely related to poor po intake and diarrhea. - Continue hydration with IVF. Anemia  - Looking historically it appears that patient has had intermittent anemia.  - Initial H/H likely hemoconcentrated. - Guaiac stool.  - Monitor.       Chronic headache  CT head 21:  No acute process or change compared to the prior exam.   - No complaints of headache this a.m.  - Neuro exam non-focal.    - Supportive care with analgesics prn. CAD  HTN  Dyslipidemia  - No complains of chest pain. - Continue asa 81 mg po daily. - Continue clopidogrel 75 mg po daily. - Continue amlodipine 2.5 mg po daily. - Continue metoprolol succinate 25 mg po daily. - Continue ezetimie 10 mg po daily. DM II   - It appears that DM was diet controlled PTA.  - Continue FSBS with SSI correction scale. Depression   - Continue sertraline  100 mgpo daiy. History of renal calculi  - No symptoms currently. Noncompliance  - Discussed importance of following through with medical recommendations in order to achieve best state of wellness. Hypokalemia, resolved  Hyperchloremia   - Stop scheduled KCl.  - Continue IVF. - Supplement prn.    - Monitor. UTI/  UA 01/08/22:  Cloudy, negative blood, negative nitrites, small LE, 4/+ bact. Urine culture 01/08/22:  Klebsiella Pneumoniae. Blood culture 01/08/22:  NG at 3 days.    - Patient completed 3 day course of ceftriaxone. 18.5 - 24.9 Normal weight / Body mass index is 21.93 kg/m². Code status: Full  Prophylaxis: Lovenox  Recommended Disposition: Home w/Family     Subjective:     Chief Complaint / Reason for Physician Visit  Patient complains of generally not feeling well. Continue to complain of abdominal pain. Plan of care and pertinent events reviewed with bedside nurse. Review of Systems:  Symptom Y/N Comments  Symptom Y/N Comments   Fever/Chills Y Low grade  Chest Pain N    Poor Appetite Y   Edema N    Cough N   Abdominal Pain Y    Sputum N   Joint Pain N    SOB/MOORE N   Pruritis/Rash N    Nausea/vomit N   Tolerating PT/OT     Diarrhea Y   Tolerating Diet Y    Constipation Y   Other       Could NOT obtain due to:      Objective:     VITALS:   Last 24hrs VS reviewed since prior progress note.  Most recent are:  Patient Vitals for the past 24 hrs:   Temp Pulse Resp BP SpO2   01/12/22 0344 99.3 °F (37.4 °C) 68 18 (!) 165/72 100 %   01/11/22 2323 98.3 °F (36.8 °C) 60 18 (!) 173/80 100 %   01/11/22 1940 99.3 °F (37.4 °C) (!) 59 18 (!) 161/82 100 %   01/11/22 1434 98.3 °F (36.8 °C) 67 17 (!) 185/95 99 %   01/11/22 1157 98.2 °F (36.8 °C) 61 18 (!) 160/82 --   01/11/22 0855 98.8 °F (37.1 °C) 61 17 (!) 158/79 100 %     No intake or output data in the 24 hours ending 01/12/22 0725     I had a face to face encounter and independently examined this patient on 1/12/2022, as outlined below:  PHYSICAL EXAM:  General:  Awake and alert. Oriented to place. NAD. HEENT:  Normocephalic. Sclera anicteric. EOMI. Mucous membranes moist.    Chest:  Resps even/unlabored with symmetrical CWE. Air entry full. Lungs CTA. No use of accessory muscles. CV:  RRR. Normal S1/S2. II/VI systolic murmur noted. No JVD. No peripheral edema. Cap refill < 3 sec. Peripheral pulses 2+. GI:  Abdomen soft. Slightly distended and tympanic to percussion. Mildly TTP. No peritoneal signs. ABT X 4.    :  Voiding. Neurologic:  Nonfocal.  CN II-XII grossly intact. Face symmetrical.  Speech normal.     Psych:  Cooperative. No agitation. Anxious. Skin:  Warm and color appropriate. No rashes or jaundice. Turgor elastic. Reviewed most current lab test results and cultures  YES  Reviewed most current radiology test results   YES  Review and summation of old records today    NO  Reviewed patient's current orders and MAR    YES  PMH/SH reviewed - no change compared to H&P  ________________________________________________________________________  Care Plan discussed with:    Comments   Patient [address]     Consultant                        Multidiciplinary team rounds were held today with , nursing, pharmacist and clinical coordinator. Patient's plan of care was discussed; medications were reviewed and discharge planning was addressed. ________________________________________________________________________  Benita Cabello NP     Procedures: see electronic medical records for all procedures/Xrays and details which were not copied into this note but were reviewed prior to creation of Plan. LABS:  I reviewed today's most current labs and imaging studies.   Pertinent labs include:  Recent Labs     01/11/22  0319 01/10/22  0314   WBC 4.4 3.9   HGB 10.1* 9.6*   HCT 32.8* 31.9*    188     Recent Labs     01/11/22  0319 01/10/22  0314 01/09/22  1614    145 142   K 4.3 3.5 3.3*   * 114* 110*   CO2 28 27 28   GLU 72 78 114*   BUN 12 15 16   CREA 0.65 0.71 0.68   CA 8.8 9.1 8.6   MG 1.8 1.8 1.9   PHOS 3.0 3.0  --    ALB  --  3.0*  --    TBILI  --  0.4  --    ALT  --  10*  --        Signed: Benita Cabello NP

## 2022-01-13 NOTE — PROGRESS NOTES
Hospitalist Progress Note    NAME: Sincere Bardales   :  1945   MRN:  [id number]     Summary excerpted from prior days note:  55-year-old female with a past medical history of hypertension, GERD, type 2 diabetes, chronic pain, high cholesterol, CAD, renal colic/calculi, murmur, noncompliance with medical treatment, who presented to the emergency department  with complaints of a headache. ROS in the ED was positive for abdominal pain, diarrhea, headaches. Family reported that the patient has been having diarrhea for 1 month, worse in the morning, associated with incontinence, confusion, decreased p.o. intake x2 days. UA was also noted to be positive. She was admitted for diarrhea, hypokalemia, dehydration, delirium and a UTI. Assessment / Plan:  Chronic constipation   Diarrhea  GERD  KUB 22:  Chronic colonic/rectal distention with large amount of stool. KUB 01/10/22:  Slight improvement of colorectal gas and stool distention. KUB 22 A.M.:  Diffuse gaseous distention of the colon and rectum, with interval  improvement in the stool burden and distention of the rectum since the prior study. KUB 22 P.M.:  Stable substantial colonic distention with some improvement in fecal stasis. KUB 22:  Colon distention with fecal impaction. KUB 22:  Resolution of rectal impaction. No significant change of generalized  colonic gaseous distention. - GI input appreciated. - Patient with diarrhea this a.m.  - Stop bisacodyl supp 10 mg MS daily.    - Stop senna 17.2 mg, 2 tabs po daily.   - Stop sorbitol 70% 30 cc po tid. - Continue polyethylene glycol 17 g po bid. - Add senna-docusate 8.6-50 mg po bid. - Monitor today to see if diarrhea improves, need to make sure that patient continues to have BM to avoid rebound impaction. Anemia  - Looking historically it appears that patient has had intermittent anemia.  - Initial H/H likely hemoconcentrated.     - Guaiac stool, pending.  - Monitor. Chronic headache  CT head 01/09/21:  No acute process or change compared to the prior exam.   - Denies headache today. - Neuro exam non-focal.    - Supportive care with analgesics prn. CAD  HTN  Dyslipidemia  - No complains of chest pain. - Continue asa 81 mg po daily. - Continue clopidogrel 75 mg po daily. - Continue amlodipine 2.5 mg po daily. - Continue metoprolol succinate 25 mg po daily. - Continue ezetimie 10 mg po daily. DM II   - It appears that DM was diet controlled PTA.  - Continue FSBS with SSI correction scale. Depression   - Continue sertraline  100 mgpo daiy. History of renal calculi  - No symptoms currently. Hypokalemia  Hyperchloremia, resolved   - Supplement K+. - Stop IVF. - Monitor. Noncompliance  - Reinforced importance of following through with medical recommendations in order to achieve best state of wellness. UTI, resolved  UA 01/08/22:  Cloudy, negative blood, negative nitrites, small LE, 4/+ bact. Urine culture 01/08/22:  Klebsiella Pneumoniae. Blood culture 01/08/22:  NG at 3 days.    - Patient completed 3 day course of ceftriaxone. Dehydration, resolved   - Likely related to poor po intake and diarrhea. - IVF stopped. 18.5 - 24.9 Normal weight / Body mass index is 21.93 kg/m². Code status: Full  Prophylaxis: Lovenox  Recommended Disposition: Home w/Family     Subjective:     Chief Complaint / Reason for Physician Visit  Patient with significant diarrhea this a.m. Plan of care and pertinent events reviewed with bedside nurse.      Review of Systems:  Symptom Y/N Comments  Symptom Y/N Comments   Fever/Chills N   Chest Pain N    Poor Appetite N   Edema N    Cough N   Abdominal Pain Y    Sputum N   Joint Pain N    SOB/MOORE N   Pruritis/Rash N    Nausea/vomit N   Tolerating PT/OT     Diarrhea Y   Tolerating Diet Y    Constipation N   Other       Could NOT obtain due to:      Objective: VITALS:   Last 24hrs VS reviewed since prior progress note. Most recent are:  Patient Vitals for the past 24 hrs:   Temp Pulse Resp BP SpO2   01/13/22 0827 98.3 °F (36.8 °C) 60 18 (!) 138/58 99 %   01/13/22 0009 97.6 °F (36.4 °C) 61 16 (!) 118/58 99 %   01/12/22 1500 98.9 °F (37.2 °C) (!) 59 18 (!) 155/77 100 %     No intake or output data in the 24 hours ending 01/13/22 1023     I had a face to face encounter and independently examined this patient on 1/13/2022, as outlined below:  PHYSICAL EXAM:  General:  Awake and alert. Oriented to place. NAD. HEENT:  Normocephalic. Sclera anicteric. Mucous membranes moist.    Chest:  Resps even/unlabored with symmetrical CWE. Air entry full. Lungs CTA. No use of accessory muscles. CV:  RRR. Normal S1/S2. II/VI systolic murmur noted. No JVD. No peripheral edema. Cap refill < 3 sec. Peripheral pulses 2+. GI:  Abdomen soft. Slightly distended and tympanic to percussion. Mildly TTP. No peritoneal signs. ABT X 4.    :  Voiding. Neurologic:  Nonfocal.  CN II-XII grossly intact. Face symmetrical.  Speech normal.     Psych:  Cooperative. No agitation. Anxious. Skin:  Warm and color appropriate. No rashes or jaundice. Turgor elastic. Reviewed most current lab test results and cultures  YES  Reviewed most current radiology test results   YES  Review and summation of old records today    NO  Reviewed patient's current orders and MAR    YES  PMH/SH reviewed - no change compared to H&P  ________________________________________________________________________  Care Plan discussed with:    Comments   Patient [address] [place] [address]     Consultant                        Multidiciplinary team rounds were held today with , nursing, pharmacist and clinical coordinator. Patient's plan of care was discussed; medications were reviewed and discharge planning was addressed. ________________________________________________________________________  Steven Virgen NP     Procedures: see electronic medical records for all procedures/Xrays and details which were not copied into this note but were reviewed prior to creation of Plan. LABS:  I reviewed today's most current labs and imaging studies.   Pertinent labs include:  Recent Labs     01/11/22 0319   WBC 4.4   HGB 10.1*   HCT 32.8*        Recent Labs     01/13/22  0511 01/11/22 0319    143   K 3.4* 4.3    112*   CO2 29 28   GLU 79 72   BUN 11 12   CREA 0.66 0.65   CA 8.6 8.8   MG  --  1.8   PHOS  --  3.0       Signed: Steven Virgen NP

## 2022-01-13 NOTE — PROGRESS NOTES
Transition of Care Plan:    RUR: 13%   Disposition: Home with family support, Care Aides-[facility]  Follow up appointments: Follow up with PCP and/or Specialist-appointment will be arranged by P[facility] worker   DME needed: TBD by therapist-pt has DME at home   Transportation at Discharge:Pace worker to arrange   [address] or means to access home: N/A       IM Medicare Letter: 2nd  Medicare Letter to be given   Is patient a BCPI-A Bundle:  N/A    If yes, was Bundle Letter given?:    Is patient a [place] and connected with the [place]? N/A  If yes, was [place] transfer form completed and [facility] notified? Caregiver Contact: Liss Rondon (daughter) [phone]  Discharge Caregiver contacted prior to discharge? Family to be contact     CM informed that pt will remain at [facility] for an additional night due to having diarrhea on today. CM will continue to follow. Pt will return home with care aides, provided by [facility], and family support. CM will continue to follow.     FERNANDO Baires, [address] [place] [address]

## 2022-01-13 NOTE — PROGRESS NOTES
End of Shift Note    Bedside shift change report given to Les Sahu RN (oncoming nurse) by Elisa Hendrix RN (offgoing nurse). Report included the following information SBAR, Kardex, Intake/Output and MAR     Assumed care of patient at 0, patient stable through shift, no complaints of pain, all scheduled meds provided and frequent rounding provided.

## 2022-01-13 NOTE — PROGRESS NOTES
Problem: Pressure Injury - Risk of  Goal: *Prevention of pressure injury  Description: Document Freddy Scale and appropriate interventions in the flowsheet. Outcome: Progressing Towards Goal  Note: Pressure Injury Interventions:  Sensory Interventions: Assess changes in LOC,Avoid rigorous massage over bony prominences,Check visual cues for pain,Keep linens dry and wrinkle-free,Monitor skin under medical devices,Turn and reposition approx. every two hours (pillows and wedges if needed)    Moisture Interventions: Absorbent underpads,Apply protective barrier, creams and emollients,Check for incontinence Q2 hours and as needed,Limit adult briefs,Maintain skin hydration (lotion/cream)    Activity Interventions: Assess need for specialty bed,PT/OT evaluation    Mobility Interventions: Assess need for specialty bed,Float heels,HOB 30 degrees or less,PT/OT evaluation    Nutrition Interventions: Document food/fluid/supplement intake,Offer support with meals,snacks and hydration    Friction and Shear Interventions: Apply protective barrier, creams and emollients,HOB 30 degrees or less                Problem: Falls - Risk of  Goal: *Absence of Falls  Description: Document Romi Fall Risk and appropriate interventions in the flowsheet.   Outcome: Progressing Towards Goal  Note: Fall Risk Interventions:  Mobility Interventions: Bed/chair exit alarm,Communicate number of staff needed for ambulation/transfer,PT Consult for mobility concerns,OT consult for ADLs,Patient to call before getting OOB    Mentation Interventions: Bed/chair exit alarm,Door open when patient unattended,More frequent rounding,Reorient patient,Room close to nurse's station,Toileting rounds,Update white board,Evaluate medications/consider consulting pharmacy,Familiar objects from home,Adequate sleep, hydration, pain control    Medication Interventions: Bed/chair exit alarm,Evaluate medications/consider consulting pharmacy,Patient to call before getting OOB,Teach patient to arise slowly    Elimination Interventions: Bed/chair exit alarm,Call light in reach,Patient to call for help with toileting needs,Toilet paper/wipes in reach,Toileting schedule/hourly rounds    History of Falls Interventions: Bed/chair exit alarm,Door open when patient unattended,Room close to nurse's station         Problem: Pain  Goal: *Control of Pain  Outcome: Progressing Towards Goal

## 2022-01-13 NOTE — PROGRESS NOTES
End of Shift Note    Bedside shift change report given to SANTOSH Clifton (oncoming nurse) by Soha Khoury RN (offgoing nurse).   Report included the following information SBAR, Kardex, Intake/Output, MAR and Recent Results    Shift worked:  7p-7a     Shift summary and any significant changes:     Pt stable, scheduled meds held - no insulin coverage needed and pt had a massive BM during day shift so sorbitol Held for the diarrhea; labs drawn, IV fluids running, KUB X-ray performed near end of shift       Concerns for physician to address:  none     Zone phone for oncoming shift:   [phone]           Soha Khoury RN

## 2022-01-14 NOTE — PROGRESS NOTES
Transition of Care Plan:    RUR: 13%   Disposition: Home with family support, Care Aides-[place]  Follow up appointments: Follow up with PCP and/or Specialist-appointment will be arranged by PACE worker   DME needed: TBD by therapist-pt has DME at home   Transportation at Discharge:Pace worker to arrange-transport: [id number] [facility] to arrive between 4-4:15PM  Keys or means to access home: N/A       IM Medicare Letter: 2nd  Medicare Letter to be given   Is patient a BCPI-A Bundle:  N/A    If yes, was Bundle Letter given?:    Is patient a  and connected with the [place]? N/A  If yes, was [employer] transfer form completed and [facility] notified? Caregiver Contact: Cheryle Bucco (daughter) [phone]  Discharge Caregiver contacted prior to discharge? Family to be contact       CM informed that pt will be d/c from [facility] on today and will transition home with family and care givers assigned from [place]. CM spoke with pt's daughter: Taisha Baker, via telephone regarding pt's d/c needs and plans. Taisha Button is agreeable to pt d/c on today and returning home. Taisha Button is aware that PACE workers will assist with pt's care within 24hrs. Taisha Button was informed verbally, via telephone of 2nd IM Medicare Letter (placed in chart). CM will provide Taisha Button with transport time once arranged by PACE worker. JOYCELYN spoke with EDGAR CM: Laura Cosme, via telephone regarding pt's d/c. Laura Cosme informed CM that she will arrange transport for pt home today between 4-4:15PM, by [facility] . Laura Cosme reported that she will contact pt's daughter: Taisha Baker, via telephone to inform her of transport and care giver arrival time that will take place within 24hrs. PACE worker will arrange follow up appointment and transport to and from appointment once pt returns home     JOYCELYN completed the needs of the pt at this time.     FERNANDO Perez, [address] [place] [address]

## 2022-01-14 NOTE — DISCHARGE INSTRUCTIONS
Patient Education        Diarrhea: Care Instructions  Your Care Instructions     Diarrhea is loose, watery stools (bowel movements). The exact cause is often hard to find. Sometimes diarrhea is your body's way of getting rid of what caused an upset stomach. Viruses, food poisoning, and many medicines can cause diarrhea. Some people get diarrhea in response to emotional stress, anxiety, or certain foods. Almost everyone has diarrhea now and then. It usually isn't serious, and your stools will return to normal soon. The important thing to do is replace the fluids you have lost, so you can prevent dehydration. The doctor has checked you carefully, but problems can develop later. If you notice any problems or new symptoms, get medical treatment right away. Follow-up care is a key part of your treatment and safety. Be sure to make and go to all appointments, and call your doctor if you are having problems. It's also a good idea to know your test results and keep a list of the medicines you take. How can you care for yourself at home? · Watch for signs of dehydration, which means your body has lost too much water. Dehydration is a serious condition and should be treated right away. Signs of dehydration are:  ? Increasing thirst and dry eyes and mouth. ? Feeling faint or lightheaded. ? A smaller amount of urine than normal.  · To prevent dehydration, drink plenty of fluids. Choose water and other clear liquids until you feel better. If you have kidney, heart, or liver disease and have to limit fluids, talk with your doctor before you increase the amount of fluids you drink. · When you feel like eating, start with small amounts of food. · The doctor may recommend that you take over-the-counter medicine, such as loperamide (Imodium), if you still have diarrhea after 6 hours. Read and follow all instructions on the label.  Do not use this medicine if you have bloody diarrhea, a high fever, or other signs of serious illness. Call your doctor if you think you are having a problem with your medicine. When should you call for help? Call 911 anytime you think you may need emergency care. For example, call if:    · You passed out (lost consciousness).     · Your stools are maroon or very bloody. Call your doctor now or seek immediate medical care if:    · You are dizzy or lightheaded, or you feel like you may faint.     · Your stools are black and look like tar, or they have streaks of blood.     · You have new or worse belly pain.     · You have symptoms of dehydration, such as:  ? Dry eyes and a dry mouth. ? Passing only a little urine. ? Feeling thirstier than usual.     · You have a new or higher fever. Watch closely for changes in your health, and be sure to contact your doctor if:    · Your diarrhea is getting worse.     · You see pus in the diarrhea.     · You are not getting better after 2 days (48 hours). Where can you learn more? Go to http://www.gray.com/  Enter [id number] in the search box to learn more about \"Diarrhea: Care Instructions. \"  Current as of: July 1, 2021               Content Version: 13.0  © 7774-3843 [employer]. Care instructions adapted under license by [employer] (which disclaims liability or warranty for this information). If you have questions about a medical condition or this instruction, always ask your healthcare professional. [place] [address] any warranty or liability for your use of this information. Patient Education        Hypokalemia: Care Instructions  Your Care Instructions     Hypokalemia (say \"bd-lc-mpw-MIGUEL-leticia-uh\") is a low level of potassium. The heart, muscles, kidneys, and nervous system all need potassium to work well. This problem has many different causes. Kidney problems, diet, and medicines like diuretics and laxatives can cause it. So can vomiting or diarrhea. In some cases, cancer is the cause. Your doctor may do tests to find the cause of your low potassium levels. You may need medicines to bring your potassium levels back to normal. You may also need regular blood tests to check your potassium. If you have very low potassium, you may need intravenous (IV) medicines. You also may need tests to check the electrical activity of your heart. Heart problems caused by low potassium levels can be very serious. Follow-up care is a key part of your treatment and safety. Be sure to make and go to all appointments, and call your doctor if you are having problems. It's also a good idea to know your test results and keep a list of the medicines you take. How can you care for yourself at home? · If your doctor recommends it, eat foods that have a lot of potassium. These include fresh fruits, juices, and vegetables. They also include nuts, beans, and milk. · Be safe with medicines. If your doctor prescribes medicines or potassium supplements, take them exactly as directed. Call your doctor if you have any problems with your medicines. · Get your potassium levels tested as often as your doctor tells you. When should you call for help? Call 911 anytime you think you may need emergency care. For example, call if:    · You feel like your heart is missing beats. Heart problems caused by low potassium can cause death.     · You passed out (lost consciousness).     · You have a seizure. Call your doctor now or seek immediate medical care if:    · You feel weak or unusually tired.     · You have severe arm or leg cramps.     · You have tingling or numbness.     · You feel sick to your stomach, or you vomit.     · You have belly cramps.     · You feel bloated or constipated.     · You have to urinate a lot.     · You feel very thirsty most of the time.     · You are dizzy or lightheaded, or you feel like you may faint.     · You feel depressed, or you lose touch with reality.    Watch closely for changes in your health, and be sure to contact your doctor if:    · You do not get better as expected. Where can you learn more? Go to http://www.gray.com/  Enter G358 in the search box to learn more about \"Hypokalemia: Care Instructions. \"  Current as of: December 2, 2020               Content Version: 13.0  © 2006-2021 "[employer]. Care instructions adapted under license by [employer] (which disclaims liability or warranty for this information). If you have questions about a medical condition or this instruction, always ask your healthcare professional. [place] [address] any warranty or liability for your use of this information. Patient Education        Urinary Tract Infection (UTI) in Women: Care Instructions  Overview     A urinary tract infection, or UTI, is a general term for an infection anywhere between the kidneys and the urethra (where urine comes out). Most UTIs are bladder infections. They often cause pain or burning when you urinate. UTIs are caused by bacteria and can be cured with antibiotics. Be sure to complete your treatment so that the infection does not get worse. Follow-up care is a key part of your treatment and safety. Be sure to make and go to all appointments, and call your doctor if you are having problems. It's also a good idea to know your test results and keep a list of the medicines you take. How can you care for yourself at home? · Take your antibiotics as directed. Do not stop taking them just because you feel better. You need to take the full course of antibiotics. · Drink extra water and other fluids for the next day or two. This will help make the urine less concentrated and help wash out the bacteria that are causing the infection.  (If you have kidney, heart, or liver disease and have to limit fluids, talk with your doctor before you increase the amount of fluids you drink.)  · Avoid drinks that are carbonated or have caffeine. They can irritate the bladder. · Urinate often. Try to empty your bladder each time. · To relieve pain, take a hot bath or lay a heating pad set on low over your lower belly or genital area. Never go to sleep with a heating pad in place. To prevent UTIs  · Drink plenty of water each day. This helps you urinate often, which clears bacteria from your system. (If you have kidney, heart, or liver disease and have to limit fluids, talk with your doctor before you increase the amount of fluids you drink.)  · Urinate when you need to. · If you are sexually active, urinate right after you have sex. · Change sanitary pads often. · Avoid douches, bubble baths, feminine hygiene sprays, and other feminine hygiene products that have deodorants. · After going to the bathroom, wipe from front to back. When should you call for help? Call your doctor now or seek immediate medical care if:    · Symptoms such as fever, chills, nausea, or vomiting get worse or appear for the first time.     · You have new pain in your back just below your rib cage. This is called flank pain.     · There is new blood or pus in your urine.     · You have any problems with your antibiotic medicine. Watch closely for changes in your health, and be sure to contact your doctor if:    · You are not getting better after taking an antibiotic for 2 days.     · Your symptoms go away but then come back. Where can you learn more? Go to http://www.gray.com/  Enter J096 in the search box to learn more about \"Urinary Tract Infection (UTI) in Women: Care Instructions. \"  Current as of: February 10, 2021               Content Version: 13.0  © 6395-7566 [employer]. Care instructions adapted under license by [employer] (which disclaims liability or warranty for this information).  If you have questions about a medical condition or this instruction, always ask your healthcare professional. [employer] disclaims any warranty or liability for your use of this information. HOSPITALIST DISCHARGE INSTRUCTIONS    NAME: Yohan Freeman   :  1945   MRN:  [id number]     Date/Time:  2022 1:05 PM    ADMIT DATE: 2022   DISCHARGE DATE: 2022     Attending Physician: Santiago Bob NP    DISCHARGE DIAGNOSIS:  Chronic constipation   Diarrhea  Gastroesophageal reflux disease (reflux of stomach contents into the esophagus)  Anemia (low blood count)  Chronic headache  Coronary artery disease (abnormal blood flow to the heart muscle)  Hypertension (elevated blood pressure)  Dyslipidemia (abnormal blood lipids/fats)  Diabetes  Depression   History of renal calculi (kidney stones)  Hypokalemia (low blood potassium which resolved prior to hospital discharge)  Hyperchloremia (elevated blood chloride)  Urinary tract infection  Dehydration (resolved prior to hospital discharge)      Medications: Per above medication reconciliation. Pain Management: per above medications    Recommended diet: Diabetic Diet    Recommended activity: Activity as tolerated    Wound care: None    Indwelling devices:  None    Supplemental Oxygen: None    Required Lab work: Basic Metabolic Profile (BMP) and Complete Blood Count (CBC)    Glucose management:  As you did prior to hospitalization     Code status: Full     Take all discharge paperwork with you to all of your follow up doctor appointments. Take your medications exactly as outlined in your discharge paperwork. Do not take any other medications unless specifically prescribed after your hospital stay. If your symptoms return/worsen seek medical attention immediately. Outside physician follow up:     Follow-up Information     Follow up With Specialties Details Why Contact Info    Jonatan Almazan MD Family Medicine In 1 week [facility] f/u  [address] [place] [address]  [phone]      Noelle Kenny MD Gastroenterology In 1 month hospital f/u, severe constipation with overflow diarrhea [address]  [address] [facility] APPOINTMENT AND TRANSPORT  [phone]               Skilled nursing facility/ SNF MD responsible for above on discharge. Information obtained by :  I understand that if any problems occur once I am at home I am to contact my physician. I understand and acknowledge receipt of the instructions indicated above.                                                                                                                                            Physician's or R.N.'s Signature                                                                  Date/Time                                                                                                                                              Patient or Representative

## 2022-01-14 NOTE — PROGRESS NOTES
End of Shift Note    Bedside shift change report given to Miranda Augustin RN (oncoming nurse) by Charly Menard RN (offgoing nurse). Report included the following information SBAR, Kardex, Intake/Output and MAR    Shift worked:  5321-9006     Shift summary and any significant changes:     Patient stable through shift, no complaints of pain. Patient had several loose, explosive bowel movements during shift, incontinent care provided, held all stool softeners. All other scheduled meds and frequent rounding provided. Assisted patient with meal set up, patient ate 70% of meals (did change diet order to easy chew - as the meats are too tough for any patient to cut in bed with plastics utensils). Patient has periods of confusion, states people come in her room in the dark at night, also stated she was driving home tonight.         Concerns for physician to address:       Zone phone for oncoming shift:

## 2022-01-14 NOTE — PROGRESS NOTES
Problem: Pressure Injury - Risk of  Goal: *Prevention of pressure injury  Description: Document Freddy Scale and appropriate interventions in the flowsheet. Outcome: Resolved/Met     Problem: Patient Education: Go to Patient Education Activity  Goal: Patient/Family Education  Outcome: Resolved/Met     Problem: Falls - Risk of  Goal: *Absence of Falls  Description: Document Romi Fall Risk and appropriate interventions in the flowsheet.   Outcome: Resolved/Met     Problem: Patient Education: Go to Patient Education Activity  Goal: Patient/Family Education  Outcome: Resolved/Met     Problem: Pain  Goal: *Control of Pain  Outcome: Resolved/Met  Goal: *PALLIATIVE CARE:  Alleviation of Pain  Outcome: Resolved/Met     Problem: Patient Education: Go to Patient Education Activity  Goal: Patient/Family Education  Outcome: Resolved/Met

## 2022-01-14 NOTE — DISCHARGE SUMMARY
Hospitalist Discharge Summary     Patient ID:  Sandra Hills  [id number]  89 y.o.  1945 1/8/2022    PCP on record: Isadora Pickett MD    Admit date: 1/8/2022  Discharge date and time: 1/14/2022    DISCHARGE DIAGNOSIS:  Chronic constipation   Diarrhea  GERD  Anemia  Chronic headache  CAD  HTN  Dyslipidemia  DM II   Depression   History of renal calculi  Hypokalemia  Hyperchloremia, resolved   Noncompliance   UTI, resolved  Dehydration, resolved     CONSULTATIONS:  IP CONSULT TO HOSPITALIST  IP CONSULT TO GASTROENTEROLOGY    Excerpted HPI from H&P:  Vishnu Stone is a 68 y.o.  female with past medical history significant for CAD, DM, HTN, HLD, GERD, and others as listed below who presents with confusion and diarrhea. Patient is confused and unable to provide history. Unsuccessful attempt to reach daughter. Goes to [facility]. History taken from ED physician and chart review. Apparently patient has been having diarrhea for over a month now. For the last couple of days she has had poor oral intake. Yesterday she started becoming more confused which prompted family to bring her to the ED. Patient complains of headache. Admits to abdominal pain, nausea, or diarrhea. \"    ______________________________________________________________________  DISCHARGE SUMMARY/HOSPITAL COURSE:  for full details see H&P, daily progress notes, labs, consult notes. Chronic constipation   Diarrhea  GERD  KUB 01/09/22:  Chronic colonic/rectal distention with large amount of stool. KUB 01/10/22:  Slight improvement of colorectal gas and stool distention. KUB 01/11/22 A.M.:  Diffuse gaseous distention of the colon and rectum, with interval  improvement in the stool burden and distention of the rectum since the prior study. KUB 01/11/22 P.M.:  Stable substantial colonic distention with some improvement in fecal stasis. KUB 01/12/22:  Colon distention with fecal impaction.   KUB 01/13/22:  Resolution of rectal impaction. No significant change of generalized  colonic gaseous distention.  - Patient was valuated by GI while hospitalized. - Patient with loose stool this a.m. but no further significant diarrhea. - Continue polyethylene glycol 17 g po bid. - Continue senna-docusate 8.6-50 mg po bid. - Add dulcolax suppository daily prn to promote 3-4 BMs per week.     Anemia  - Looking historically it appears that patient has had intermittent anemia.  - Initial H/H likely hemoconcentrated. - H/H overall stable. - Stool for occult blood was ordered however not obtained. - Will need OP follow-up.      Chronic headache  CT head 01/09/21:  No acute process or change compared to the prior exam.   - Supportive care with analgesics prn.      CAD  HTN  Dyslipidemia  - No complains of chest pain. - Continue asa 81 mg po daily. - Continue clopidogrel 75 mg po daily. - Continue amlodipine 2.5 mg po daily. - Continue metoprolol succinate 25 mg po daily. - Continue ezetimie 10 mg po daily.       DM II   - It appears that DM was diet controlled PTA. - Will need OP follow-up.     Depression   - Continue sertraline  100 mgpo daiy.       History of renal calculi  - No symptoms currently.       Noncompliance  - Reinforced importance of following through with medical recommendations in order to achieve best state of wellness.       UTI, resolved  UA 01/08/22:  Cloudy, negative blood, negative nitrites, small LE, 4/+ bact. Urine culture 01/08/22:  Klebsiella Pneumoniae. Blood culture 01/08/22:  NG at 3 days.    - Patient completed 3 day course of ceftriaxone.       Dehydration, resolved   - Likely related to poor po intake and diarrhea. - IVF stopped. Hypokalemia, resolved  Hyperchloremia, resolved   - Patient given potassium supplement on day of hospital discharge. - Will need OP follow-up. All discharge instructions and discharge medications were reviewed with the patient. At the time of this dictation patient's vital signs were as follows:  T 97.8, /84, HR 55, RR 18, SpO2 100% on room air.    _______________________________________________________________________  Patient seen and examined by me on discharge day. Pertinent Findings:  General: Awake and alert. Oriented to place. NAD. HEENT:  Normocephalic. Sclera anicteric. Mucous membranes moist.    Chest:  Resps even/unlabored with symmetrical CWE. Air entry full. Lungs CTA. No use of accessory muscles. CV:  RRR. Normal S1/S2. II/VI systolic murmur noted. No JVD. No peripheral edema. Cap refill < 3 sec. Peripheral pulses 2+. GI:  Abdomen soft/slightly distended/NT. ABT  X 4.      :  Voiding. Neurologic:  Nonfocal.  CN II-XII grossly intact. Face symmetrical.  Speech normal.     Psych:  Cooperative. No agitation. Anxious. Skin:  Warm and color appropriate. No rashes or jaundice. Turgor elastic.   _______________________________________________________________________  DISCHARGE MEDICATIONS:   Current Discharge Medication List      START taking these medications    Details   melatonin 3 mg tablet Take 1 Tablet by mouth nightly as needed for Insomnia. Qty: 30 Tablet, Refills: 0  Start date: 1/14/2022      senna-docusate (PERICOLACE) 8.6-50 mg per tablet Take 1 Tablet by mouth two (2) times a day. Qty: 60 Tablet, Refills: 0  Start date: 1/14/2022      bisacodyL (Dulcolax, bisacodyl,) 10 mg supp Insert 10 mg into rectum daily as needed for Constipation (To promote 3-4 bowel movements weekly). Qty: 30 Suppository, Refills: 0  Start date: 1/14/2022      polyethylene glycol (MIRALAX) 17 gram packet Take 1 Packet by mouth two (2) times a day. Qty: 60 Packet, Refills: 0  Start date: 1/14/2022         CONTINUE these medications which have NOT CHANGED    Details   sertraline (Zoloft) 100 mg tablet Take 100 mg by mouth daily. mirabegron ER (MYRBETRIQ) 50 mg ER tablet Take 50 mg by mouth daily. acetaminophen (TYLENOL) 500 mg tablet Take 1,000 mg by mouth every six (6) hours as needed for Pain. aspirin 81 mg chewable tablet Take 1 Tab by mouth daily. Qty: 30 Tab, Refills: 0      clopidogreL (PLAVIX) 75 mg tab Take 1 Tab by mouth daily. Qty: 30 Tab, Refills: 0      ezetimibe (ZETIA) 10 mg tablet Take 1 Tab by mouth daily. Qty: 30 Tab, Refills: 0      metoprolol succinate (TOPROL-XL) 25 mg XL tablet Take 1 Tab by mouth every twelve (12) hours. Qty: 60 Tab, Refills: 0      amLODIPine (NORVASC) 2.5 mg tablet Take 2.5 mg by mouth daily. STOP taking these medications       senna (Senna) 8.6 mg tablet Comments:   Reason for Stopping:         bisacodyL (DULCOLAX) 5 mg EC tablet Comments:   Reason for Stopping:         polyethylene glycol (MIRALAX) 17 gram/dose powder Comments:   Reason for Stopping:                 Patient Follow Up Instructions: Activity: Activity as tolerated  Diet: Diabetic Diet  Wound Care: None needed    Follow-up as noted below  Follow-up tests/labs: BMP/CBC    Follow-up Information     Follow up With Specialties Details Why Contact Bassam Crews MD Family Medicine In 1 week Call to schedule hospital follow up appointment to be seen within 1 week of hospital discharge.   You will need labs drawn (BMP/CBC) [address] [place] [address]  [phone]      Aftab Tang MD Gastroenterology In 1 month hospital f/u, severe constipation with overflow diarrhea [address]  [address] [place]  [address]  Jazz Guest [address] [facility]  [phone]        ________________________________________________________________    Risk of deterioration: Moderate    Condition at Discharge:  Stable  __________________________________________________________________    Disposition  Home with family and home health services    ____________________________________________________________________    Code Status: Full Code  ___________________________________________________________________      Total time in minutes spent coordinating this discharge (includes going over instructions, follow-up, prescriptions, and preparing report for sign off to her PCP) :  >30 minutes    Signed:  Georgia Martin NP

## 2022-05-27 PROBLEM — K22.2 ESOPHAGEAL OBSTRUCTION: Status: ACTIVE | Noted: 2022-01-01

## 2022-05-27 NOTE — CONSULTS
GI CONSULTATION NOTE  Radha Otero, NICK  [phone] NP in-hospital cell phone M-F until 4:30  After 5pm or on weekends, please call  for physician on call    NAME: Tabitha Torres   :  1945   MRN:  [id number]   Attending: Dr. Veronica Jay  Primary GI: Dr. Nadia Thomas  Date/Time:  2022 4:11 PM  Assessment:   Dysphagia  · OP radiology 22 for barium swallow study for work up for dysphagia. She became very dizzy, headache, hypotensive, and taken to ED for further evaluation. Denies abdominal pain, SOB. She has c/o dysphagia. Poor PO intake. Feels like things getting stuck in her esophagus. · XR BA SWALLOW ESOPHOGRAM 22 noting high grade obstruction of the distal esophagus at the GE junction. · Labs 22 Hgb 11.1 otherwise unremarkable CBC  · Reviewed medication list with daughter Mrs. Duncan who manages patient's care; patient not on anticoagulation according to listed medications  · Patient has reported dementia per daughter Breanne Franco who is an excellent patient historian    Plan:   · EGD with dilation 22 @ 0930 hrs w Dr. Eileen Wright  · Hold anticoagulation/NSAID; hold Lovenox 24 hrs prior to EGD  · Maintain NPO for now; can  Have sips of liquids only  and Monday; she needs to continue to clear Barium from Esophogram  · Symptomatic care per primary team    Plan discussed with Dr. Zamarripa Nurse  Subjective:     HISTORY OF PRESENT ILLNESS:     Tabitha Torres is an 68 y.o. female who we are asked to see for complaint of dysphagia. Patient was in OP radiology 22 for barium swallow study for work up for dysphagia. She became very dizzy, headache, hypotensive, and taken to ED for further evaluation. Denies abdominal pain, SOB. She has c/o dysphagia. Poor PO intake. Feels like things getting stuck in her esophagus. Denies N/V at this time. XR BA SWALLOW ESOPHOGRAM 22 noting high grade obstruction of the distal esophagus at the GE junction.  Endoscopic findings on ERCP 12/19/18 noted a grossly normal esophagus, stomach and duodenum when viewed by Duodenoscope.      Past Medical History:   Diagnosis Date    Agoraphobia without mention of panic attacks 2/17/2014    Anxiety disorder 8/18/2013    Arthritis     osteo    CAD (coronary artery disease), native coronary artery 12/1/2015    no stents    Chronic chest pain 1/13/2014    Chronic pain associated with significant psychosocial dysfunction 2/17/2014    Depression 8/18/2013    Diabetes ([facility] [address]     type II    Duplicated right renal collecting system 3/13/2014    GERD (gastroesophageal reflux disease)     Gout, joint     Hypercholesteremia     hyercholesterolemia    Hypertension     Murmur     Nephrolithiasis 3/13/2014    Personal history of noncompliance with medical treatment, presenting hazards to health 5/30/2014      Past Surgical History:   Procedure Laterality Date    EGD  4/23/2010         HX CHOLECYSTECTOMY  09/20/2018    lap jesus    HX CYST REMOVAL      cyst removed from left wrist    HX HEART CATHETERIZATION  12/01/2015    HX HYSTERECTOMY      partial    HX OTHER SURGICAL      bladder dilitation    HX TUBAL LIGATION      HX UROLOGICAL      kidney stones     Social History     Tobacco Use    Smoking status: Never Smoker    Smokeless tobacco: Never Used   Substance Use Topics    Alcohol use: No      Family History   Problem Relation Age of Onset    Stroke Mother     Heart Disease Mother     Cancer Father         type unknown    Heart Disease Son     Liver Disease Son     Heart Disease Daughter     Malignant Hyperthermia Neg Hx     Pseudocholinesterase Deficiency Neg Hx     Delayed Awakening Neg Hx     Post-op Nausea/Vomiting Neg Hx     Emergence Delirium Neg Hx     Post-op Cognitive Dysfunction Neg Hx     Other Neg Hx       Allergies   Allergen Reactions    Amoxicillin Hives    Sulfa (Sulfonamide Antibiotics) Hives and Itching    Mirtazapine Itching and Nausea Only Funny feeling in chest    Percocet [Oxycodone-Acetaminophen] Nausea and Vomiting    Codeine Nausea and Vomiting    Crestor [Rosuvastatin] Other (comments)     myalgias    Nitroglycerin Unknown (comments)     Patient cannot remember why she is allergic to it      Prednisone Itching    Zithromax [Azithromycin] Itching     Not sure what it does,taken long time ago      Prior to Admission medications    Medication Sig Start Date End Date Taking? Authorizing Provider   cephALEXin (Keflex) 500 mg capsule Take 1 Capsule by mouth four (4) times daily for 7 days. 5/27/22 6/3/22 Yes Kelly Perdomo MD   melatonin 5 mg cap capsule Take 1 Capsule by mouth nightly as needed (insomnia) for up to 30 days. 5/27/22 6/26/22 Yes Kelly Perdomo MD   senna-docusate (PERICOLACE) 8.6-50 mg per tablet Take 1 Tablet by mouth two (2) times a day. 1/14/22   Chanellkarina Pierson NP   bisacodyL (Dulcolax, bisacodyl,) 10 mg supp Insert 10 mg into rectum daily as needed for Constipation (To promote 3-4 bowel movements weekly). 1/14/22   Chanell Pierson NP   polyethylene glycol (MIRALAX) 17 gram packet Take 1 Packet by mouth two (2) times a day. 1/14/22   Chanell Pierson NP   sertraline (Zoloft) 100 mg tablet Take 100 mg by mouth daily. Other, MD Shun   mirabegron ER (MYRBETRIQ) 50 mg ER tablet Take 50 mg by mouth daily. Shun Sevilla MD   acetaminophen (TYLENOL) 500 mg tablet Take 1,000 mg by mouth every six (6) hours as needed for Pain. Provider, Historical   aspirin 81 mg chewable tablet Take 1 Tab by mouth daily. 2/24/21   Marian Muolton MD   clopidogreL (PLAVIX) 75 mg tab Take 1 Tab by mouth daily. 2/24/21   Marian Moulton MD   ezetimibe (ZETIA) 10 mg tablet Take 1 Tab by mouth daily. 2/24/21   Marian Moulton MD   metoprolol succinate (TOPROL-XL) 25 mg XL tablet Take 1 Tab by mouth every twelve (12) hours. Patient taking differently: Take 25 mg by mouth daily.  2/23/21   Marian Moulton MD   amLODIPine (NORVASC) 2.5 mg tablet Take 2.5 mg by mouth daily.     Provider, Historical       Patient Active Problem List   Diagnosis Code    HTN, goal below 130/80 I10    Chronic headache R51.9, G89.29    Acid reflux K21.9    Dizziness of unknown cause R42    Neutropenia ([facility] [address] D70.9    Abdominal pain R10.9    Constipation K59.00    Insomnia G47.00    Hyperlipidemia E78.5    UTI (urinary tract infection) N39.0    Chronic chest pain R07.9, G89.29    Chronic pain associated with significant psychosocial dysfunction G89.4    Depression with anxiety F41.8    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    Onycholysis of toenail N35.7    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    Personal history of noncompliance with medical treatment, presenting hazards to health Z91.19    Breast pain, left N64.4    Pseudobulbar affect F48.2    CAD (coronary artery disease), native coronary artery I25.10    SOB (shortness of breath) R06.02    Broken heart syndrome I51.81    Death of child Z63.4    Somatic delusion disorder ([facility] [address]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    Diabetes mellitus type 2, diet-controlled ([facility] [address] E11.9    Somatization disorder F45.0    Death of parent Z63.4    Tightness sensation-head Z78.9    Generalized OA M15.9    Noncompliance with medication regimen-chol medication  Z91.14    Orthostatic hypotension I95.1    Hydronephrosis of left kidney N13.30    Left-sided chest wall pain R07.89    Weakness R53.1    Assistance needed with transportation Z74.8    Gait instability R26.81    Advance directive discussed with patient Z71.89    Vaginal irritation N89.8    Blurring of vision H53.8    Choledocholithiasis with acute cholecystitis K80.42    Urinary retention R33.9    Fecal impaction (HCC) K56.41    Acute coronary syndromes (HCC) I24.9    Renal stone N20.0    Dysrhythmia, cardiac I49.9    Nausea & vomiting R11.2    Dementia (HCC) F03.90    HERBERTH (acute kidney injury) ([facility] [address] N17.9    History of COVID-19 Z86.16    AMS (altered mental status) R41.82    Esophageal obstruction K22.2     REVIEW OF SYSTEMS:    Constitutional: negative fever, negative chills, negative weight loss  Eyes:   negative visual changes  ENT:   negative sore throat, tongue or lip swelling   Respiratory:  negative cough, negative dyspnea  Cards:  negative for chest pain, palpitations, lower extremity edema  GI:   See HPI  :  negative for frequency, dysuria  Integument:  negative for rash and pruritus  Heme:  negative for easy bruising and gum/nose bleeding  Musculoskel: negative for myalgias,  back pain and muscle weakness  Neuro: negative for headaches, dizziness, vertigo  Psych: negative for feelings of anxiety, depression     Objective:   VITALS:    Visit Vitals  /83 (BP 1 Location: Left upper arm, BP Patient Position: Sitting)   Pulse 72   Temp 98.4 °F (36.9 °C)   Resp 14   Ht 5' 7\" (1.702 m)   Wt 64 kg (141 lb 1.5 oz)   SpO2 100%   BMI 22.10 kg/m²     PHYSICAL EXAM:   General:          Alert, WD, WN, cooperative, appears stated age. Head:               Normocephalic, without obvious abnormality, atraumatic. Eyes:               Conjunctivae clear and pale, anicteric sclerae. Pupils are equal  Nose:               Nares normal. No drainage or sinus tenderness. Throat:             Lips, mucosa, and tongue normal.  No Thrush  Neck:               Supple, symmetrical,  no adenopathy, thyroid: non tender  Lungs:             CTA bilaterally. No wheezing/rhonchi/rales. Chest wall:      No tenderness or deformity. No Accessory muscle use. Heart:              Regular rate and rhythm,  no murmur, rub or gallop. Abdomen:        Soft, non-tender. Not distended. Bowel sounds normal. No masses  Extremities:     Atraumatic, No cyanosis. No edema. No clubbing  Skin:                Texture, turgor normal. No rashes/lesions/jaundice  Psych:             Good insight. Not depressed. Not anxious or agitated. Neurologic:      EOMs intact.  No facial asymmetry. No aphasia or slurred speech. Normal                        strength, A/O X 3. LAB DATA REVIEWED:    Recent Results (from the past 24 hour(s))   GLUCOSE, POC    Collection Time: 05/27/22  9:34 AM   Result Value Ref Range    Glucose (POC) 73 65 - 117 mg/dL    Performed by [employer]    EKG, 12 LEAD, INITIAL    Collection Time: 05/27/22  9:52 AM   Result Value Ref Range    Ventricular Rate 78 BPM    Atrial Rate 78 BPM    P-R Interval 138 ms    QRS Duration 92 ms    Q-T Interval 402 ms    QTC Calculation (Bezet) 458 ms    Calculated P Axis 34 degrees    Calculated R Axis 54 degrees    Calculated T Axis 18 degrees    Diagnosis       Normal sinus rhythm  Cannot rule out Inferior infarct , age undetermined  When compared with ECG of 10-MICHAEL-2022 15:07,  Minimal criteria for Inferior infarct are now present     CBC WITH AUTOMATED DIFF    Collection Time: 05/27/22 10:05 AM   Result Value Ref Range    WBC 3.8 3.6 - 11.0 K/uL    RBC 4.14 3.80 - 5.20 M/uL    HGB 11.1 (L) 11.5 - 16.0 g/dL    HCT 36.5 35.0 - 47.0 %    MCV 88.2 80.0 - 99.0 FL    MCH 26.8 26.0 - 34.0 PG    MCHC 30.4 30.0 - 36.5 g/dL    RDW 16.5 (H) 11.5 - 14.5 %    PLATELET 339 331 - 994 K/uL    MPV 10.8 8.9 - 12.9 FL    NRBC 0.0 0  WBC    ABSOLUTE NRBC 0.00 0.00 - 0.01 K/uL    NEUTROPHILS 55 32 - 75 %    LYMPHOCYTES 34 12 - 49 %    MONOCYTES 8 5 - 13 %    EOSINOPHILS 2 0 - 7 %    BASOPHILS 1 0 - 1 %    IMMATURE GRANULOCYTES 0 0.0 - 0.5 %    ABS. NEUTROPHILS 2.1 1.8 - 8.0 K/UL    ABS. LYMPHOCYTES 1.3 0.8 - 3.5 K/UL    ABS. MONOCYTES 0.3 0.0 - 1.0 K/UL    ABS. EOSINOPHILS 0.1 0.0 - 0.4 K/UL    ABS. BASOPHILS 0.1 0.0 - 0.1 K/UL    ABS. IMM.  GRANS. 0.0 0.00 - 0.04 K/UL    DF AUTOMATED     METABOLIC PANEL, COMPREHENSIVE    Collection Time: 05/27/22 10:05 AM   Result Value Ref Range    Sodium 143 136 - 145 mmol/L    Potassium 3.4 (L) 3.5 - 5.1 mmol/L    Chloride 108 97 - 108 mmol/L    CO2 30 21 - 32 mmol/L    Anion gap 5 5 - 15 mmol/L    Glucose 87 65 - 100 mg/dL    BUN 14 6 - 20 MG/DL    Creatinine 0.75 0.55 - 1.02 MG/DL    BUN/Creatinine ratio 19 12 - 20      GFR est AA >60 >60 ml/min/1.73m2    GFR est non-AA >60 >60 ml/min/1.73m2    Calcium 8.7 8.5 - 10.1 MG/DL    Bilirubin, total 0.6 0.2 - 1.0 MG/DL    ALT (SGPT) 14 12 - 78 U/L    AST (SGOT) 17 15 - 37 U/L    Alk.  phosphatase 68 45 - 117 U/L    Protein, total 6.7 6.4 - 8.2 g/dL    Albumin 2.9 (L) 3.5 - 5.0 g/dL    Globulin 3.8 2.0 - 4.0 g/dL    A-G Ratio 0.8 (L) 1.1 - 2.2     TROPONIN-HIGH SENSITIVITY    Collection Time: 05/27/22 10:05 AM   Result Value Ref Range    Troponin-High Sensitivity 26 0 - 51 ng/L   URINALYSIS W/ RFLX MICROSCOPIC    Collection Time: 05/27/22 11:43 AM   Result Value Ref Range    Color YELLOW/STRAW      Appearance CLOUDY (A) CLEAR      Specific gravity 1.013      pH (UA) 6.5 5.0 - 8.0      Protein 100 (A) NEG mg/dL    Glucose Negative NEG mg/dL    Ketone Negative NEG mg/dL    Bilirubin Negative NEG      Blood LARGE (A) NEG      Urobilinogen 1.0 0.2 - 1.0 EU/dL    Nitrites Negative NEG      Leukocyte Esterase LARGE (A) NEG      WBC >100 (H) 0 - 4 /hpf    RBC  0 - 5 /hpf    Epithelial cells MANY (A) FEW /lpf    Bacteria 4+ (A) NEG /hpf    CA Oxalate crystals FEW (A) NEG     COVID-19 RAPID TEST    Collection Time: 05/27/22  3:13 PM   Result Value Ref Range    Specimen source Nasopharyngeal      COVID-19 rapid test Not detected NOTD       IMAGING RESULTS:  I have personally reviewed the imaging reports    Total time spent with patient: 25 minutes ________________________________________________________________________  Care Plan discussed with:  Patient y   Family  n   RN n              Consultant:  n     ________________________________________________________________________    ___________________________________________________  Consulting Provider: Christos Garner NP      5/27/2022  4:11 PM

## 2022-05-27 NOTE — H&P
Hospitalist Admission Note    NAME: Marilin Thayer   :  1945   MRN:  [id number]     Date/Time:  2022 3:50 PM    Patient PCP: Jim Nava MD  ________________________________________________________________________    My assessment of this patient's clinical condition and my plan of care is as follows. Assessment / Plan:  High-grade esophageal obstruction  -Barium swallow shows evidence of high-grade esophageal obstruction  -Keep NPO. GI consulted. Start IV fluids with D5 normal saline. Start Protonix. UTI suspect gram-negative organism  -Urine culture sent. Start ceftriaxone. Dizziness could be contributed by dehydration from poor oral intake and also moderate to severe aortic stenosis  -She had a recent echo that is showing evidence of moderate to severe aortic stenosis  -We will check orthostatic vitals. Telemetry monitoring. Repeat echo to assess size of aortic valve. -Consider cardiology evaluation based on repeat echocardiogram    Chronic headaches  Chronic vision changes  -Reports symptoms since many years and she has recently seen a ophthalmologist but is not sure of the diagnosis      Hypokalemia  -Replaced with KCl. Recheck in a.m.     History of GERD  Dyslipidemia  Depression with anxiety  history of coronary artery disease  Agoraphobia  Gout  Chronic constipation  -Continue home Protonix, Zetia  -Hold home Norvasc as blood pressure is borderline low but will continue metoprolol  -Continue scheduled and as needed laxatives  -Continue home Zoloft    Code Status: Full code  Surrogate Decision Maker: Daughter    DVT Prophylaxis: SCDs due to anticipated procedure in a.m. tomorrow  GI Prophylaxis: not indicated    Baseline: From home, independent of ADLs        Subjective:   CHIEF COMPLAINT: Dizziness    HISTORY OF PRESENT ILLNESS:     Darrel Felix is a 68 y.o. female who presents with past medical history of coronary artery disease, hypertension is coming the hospital chief complaints of dizziness after undergoing a x-ray barium swallow. Patient was having dysphagia for which she came to the hospital to have a barium swallow done and shortly after the procedure patient developed dizziness and was also noted to have borderline low blood pressure for which she was brought to the emergency department for evaluation. Patient reports that she has dysphagia since many months and also had poor oral intake for which she presented to the hospital to have the test done. She also reports chronic headaches. She reports dizziness which has been going on for at least few weeks. Does not report it is a spinning sensation. Does not report any abdominal pain. Reports mild nausea but no vomiting. Does not report any focal weakness, tingling or numbness. On arrival to ED, vital signs are normal.  On labs CBC is normal.  BMP shows a potassium of 3.4. Creatinine is normal.  ALT AST normal.  Troponin is 26. Barium swallow shows high-grade obstruction of the distal esophagus. We were asked to admit for work up and evaluation of the above problems.      Past Medical History:   Diagnosis Date    Agoraphobia without mention of panic attacks 2/17/2014    Anxiety disorder 8/18/2013    Arthritis     osteo    CAD (coronary artery disease), native coronary artery 12/1/2015    no stents    Chronic chest pain 1/13/2014    Chronic pain associated with significant psychosocial dysfunction 2/17/2014    Depression 8/18/2013    Diabetes ([address]     type II    Duplicated right renal collecting system 3/13/2014    GERD (gastroesophageal reflux disease)     Gout, joint     Hypercholesteremia     hyercholesterolemia    Hypertension     Murmur     Nephrolithiasis 3/13/2014    Personal history of noncompliance with medical treatment, presenting hazards to health 5/30/2014        Past Surgical History:   Procedure Laterality Date    EGD  4/23/2010         HX CHOLECYSTECTOMY 09/20/2018    lap jesus    HX CYST REMOVAL      cyst removed from left wrist    HX HEART CATHETERIZATION  12/01/2015    HX HYSTERECTOMY      partial    HX OTHER SURGICAL      bladder dilitation    HX TUBAL LIGATION      HX UROLOGICAL      kidney stones       Social History     Tobacco Use    Smoking status: Never Smoker    Smokeless tobacco: Never Used   Substance Use Topics    Alcohol use: No        Family History   Problem Relation Age of Onset    Stroke Mother     Heart Disease Mother     Cancer Father         type unknown    Heart Disease Son     Liver Disease Son     Heart Disease Daughter     Malignant Hyperthermia Neg Hx     Pseudocholinesterase Deficiency Neg Hx     Delayed Awakening Neg Hx     Post-op Nausea/Vomiting Neg Hx     Emergence Delirium Neg Hx     Post-op Cognitive Dysfunction Neg Hx     Other Neg Hx      Allergies   Allergen Reactions    Amoxicillin Hives    Sulfa (Sulfonamide Antibiotics) Hives and Itching    Mirtazapine Itching and Nausea Only     Funny feeling in chest    Percocet [Oxycodone-Acetaminophen] Nausea and Vomiting    Codeine Nausea and Vomiting    Crestor [Rosuvastatin] Other (comments)     myalgias    Nitroglycerin Unknown (comments)     Patient cannot remember why she is allergic to it      Prednisone Itching    Zithromax [Azithromycin] Itching     Not sure what it does,taken long time ago        Prior to Admission medications    Medication Sig Start Date End Date Taking? Authorizing Provider   cephALEXin (Keflex) 500 mg capsule Take 1 Capsule by mouth four (4) times daily for 7 days. 5/27/22 6/3/22 Yes Maggie Giles MD   melatonin 5 mg cap capsule Take 1 Capsule by mouth nightly as needed (insomnia) for up to 30 days. 5/27/22 6/26/22 Yes Maggie Giles MD   senna-docusate (PERICOLACE) 8.6-50 mg per tablet Take 1 Tablet by mouth two (2) times a day.  1/14/22   Jessica Trujillo NP   bisacodyL (Dulcolax, bisacodyl,) 10 mg supp Insert 10 mg into rectum daily as needed for Constipation (To promote 3-4 bowel movements weekly). 1/14/22   rFancia Jeronimo NP   polyethylene glycol (MIRALAX) 17 gram packet Take 1 Packet by mouth two (2) times a day. 1/14/22   Francia Jeronimo NP   sertraline (Zoloft) 100 mg tablet Take 100 mg by mouth daily. Shun Sevilla MD   mirabegron ER (MYRBETRIQ) 50 mg ER tablet Take 50 mg by mouth daily. Shun Sevilla MD   acetaminophen (TYLENOL) 500 mg tablet Take 1,000 mg by mouth every six (6) hours as needed for Pain. Provider, Historical   aspirin 81 mg chewable tablet Take 1 Tab by mouth daily. 2/24/21   Taqueria Shaw MD   clopidogreL (PLAVIX) 75 mg tab Take 1 Tab by mouth daily. 2/24/21   Taqueria Shaw MD   ezetimibe (ZETIA) 10 mg tablet Take 1 Tab by mouth daily. 2/24/21   Taqueria Shaw MD   metoprolol succinate (TOPROL-XL) 25 mg XL tablet Take 1 Tab by mouth every twelve (12) hours. Patient taking differently: Take 25 mg by mouth daily. 2/23/21   Taqueria Shaw MD   amLODIPine (NORVASC) 2.5 mg tablet Take 2.5 mg by mouth daily. Provider, Historical       REVIEW OF SYSTEMS:     I am not able to complete the review of systems because:    The patient is intubated and sedated    The patient has altered mental status due to his acute medical problems    The patient has baseline aphasia from prior stroke(s)    The patient has baseline dementia and is not reliable historian    The patient is in acute medical distress and unable to provide information           Total of 12 systems reviewed as follows:       POSITIVE= underlined text  Negative = text not underlined  General:  fever, chills, sweats, generalized weakness, weight loss/gain,      loss of appetite   Eyes:    blurred vision, eye pain, loss of vision, double vision  ENT:    rhinorrhea, pharyngitis   Respiratory:   cough, sputum production, SOB, MOORE, wheezing, pleuritic pain   Cardiology:   chest pain, palpitations, orthopnea, PND, edema, syncope Gastrointestinal:  abdominal pain , N/V, diarrhea, dysphagia, constipation, bleeding   Genitourinary:  frequency, urgency, dysuria, hematuria, incontinence   Muskuloskeletal :  arthralgia, myalgia, back pain  Hematology:  easy bruising, nose or gum bleeding, lymphadenopathy   Dermatological: rash, ulceration, pruritis, color change / jaundice  Endocrine:   hot flashes or polydipsia   Neurological:  headache, dizziness, confusion, focal weakness, paresthesia,     Speech difficulties, memory loss, gait difficulty  Psychological: Feelings of anxiety, depression, agitation    Objective:   VITALS:    Visit Vitals  /83 (BP 1 Location: Left upper arm, BP Patient Position: Sitting)   Pulse 72   Temp 98.4 °F (36.9 °C)   Resp 14   Ht 5' 7\" (1.702 m)   Wt 64 kg (141 lb 1.5 oz)   SpO2 100%   BMI 22.10 kg/m²       PHYSICAL EXAM:    General:    Alert, cooperative, no distress, appears stated age. HEENT: Atraumatic, anicteric sclerae, pink conjunctivae     No oral ulcers, mucosa moist, throat clear, dentition fair  Neck:  Supple, symmetrical,  thyroid: non tender  Lungs:   Clear to auscultation bilaterally. No Wheezing or Rhonchi. No rales. Chest wall:  No tenderness  No Accessory muscle use. Heart:   Regular  rhythm,  No  murmur   No edema  Abdomen:   Soft, non-tender. Not distended. Bowel sounds normal  Extremities: No cyanosis. No clubbing,      Skin turgor normal, Capillary refill normal, Radial dial pulse 2+  Skin:     Not pale. Not Jaundiced  No rashes   Psych:  Not anxious or agitated. Neurologic: EOMs intact. No facial asymmetry. No aphasia or slurred speech. Symmetrical strength, Sensation grossly intact.  Alert and oriented X 4.     _______________________________________________________________________  Care Plan discussed with:    Comments   Patient y    Family      RN y    Care Manager                    Consultant: _______________________________________________________________________  Expected  Disposition:   Home with Family y   HH/PT/OT/RN    SNF/LTC    [place]    ________________________________________________________________________  TOTAL TIME:  61  Minutes    Critical Care Provided     Minutes non procedure based      Comments    y Reviewed previous records   >50% of visit spent in counseling and coordination of care y Discussion with patient and/or family and questions answered       ________________________________________________________________________  Signed: sIi Covington MD    Procedures: see electronic medical records for all procedures/Xrays and details which were not copied into this note but were reviewed prior to creation of Plan.     LAB DATA REVIEWED:    Recent Results (from the past 24 hour(s))   GLUCOSE, POC    Collection Time: 05/27/22  9:34 AM   Result Value Ref Range    Glucose (POC) 73 65 - 117 mg/dL    Performed by Zari Ferraro    EKG, 12 LEAD, INITIAL    Collection Time: 05/27/22  9:52 AM   Result Value Ref Range    Ventricular Rate 78 BPM    Atrial Rate 78 BPM    P-R Interval 138 ms    QRS Duration 92 ms    Q-T Interval 402 ms    QTC Calculation (Bezet) 458 ms    Calculated P Axis 34 degrees    Calculated R Axis 54 degrees    Calculated T Axis 18 degrees    Diagnosis       Normal sinus rhythm  Cannot rule out Inferior infarct , age undetermined  When compared with ECG of 10-MICHAEL-2022 15:07,  Minimal criteria for Inferior infarct are now present     CBC WITH AUTOMATED DIFF    Collection Time: 05/27/22 10:05 AM   Result Value Ref Range    WBC 3.8 3.6 - 11.0 K/uL    RBC 4.14 3.80 - 5.20 M/uL    HGB 11.1 (L) 11.5 - 16.0 g/dL    HCT 36.5 35.0 - 47.0 %    MCV 88.2 80.0 - 99.0 FL    MCH 26.8 26.0 - 34.0 PG    MCHC 30.4 30.0 - 36.5 g/dL    RDW 16.5 (H) 11.5 - 14.5 %    PLATELET 958 601 - 761 K/uL    MPV 10.8 8.9 - 12.9 FL    NRBC 0.0 0  WBC    ABSOLUTE NRBC 0.00 0.00 - 0.01 K/uL    NEUTROPHILS 55 32 - 75 %    LYMPHOCYTES 34 12 - 49 %    MONOCYTES 8 5 - 13 %    EOSINOPHILS 2 0 - 7 %    BASOPHILS 1 0 - 1 %    IMMATURE GRANULOCYTES 0 0.0 - 0.5 %    ABS. NEUTROPHILS 2.1 1.8 - 8.0 K/UL    ABS. LYMPHOCYTES 1.3 0.8 - 3.5 K/UL    ABS. MONOCYTES 0.3 0.0 - 1.0 K/UL    ABS. EOSINOPHILS 0.1 0.0 - 0.4 K/UL    ABS. BASOPHILS 0.1 0.0 - 0.1 K/UL    ABS. IMM. GRANS. 0.0 0.00 - 0.04 K/UL    DF AUTOMATED     METABOLIC PANEL, COMPREHENSIVE    Collection Time: 05/27/22 10:05 AM   Result Value Ref Range    Sodium 143 136 - 145 mmol/L    Potassium 3.4 (L) 3.5 - 5.1 mmol/L    Chloride 108 97 - 108 mmol/L    CO2 30 21 - 32 mmol/L    Anion gap 5 5 - 15 mmol/L    Glucose 87 65 - 100 mg/dL    BUN 14 6 - 20 MG/DL    Creatinine 0.75 0.55 - 1.02 MG/DL    BUN/Creatinine ratio 19 12 - 20      GFR est AA >60 >60 ml/min/1.73m2    GFR est non-AA >60 >60 ml/min/1.73m2    Calcium 8.7 8.5 - 10.1 MG/DL    Bilirubin, total 0.6 0.2 - 1.0 MG/DL    ALT (SGPT) 14 12 - 78 U/L    AST (SGOT) 17 15 - 37 U/L    Alk.  phosphatase 68 45 - 117 U/L    Protein, total 6.7 6.4 - 8.2 g/dL    Albumin 2.9 (L) 3.5 - 5.0 g/dL    Globulin 3.8 2.0 - 4.0 g/dL    A-G Ratio 0.8 (L) 1.1 - 2.2     TROPONIN-HIGH SENSITIVITY    Collection Time: 05/27/22 10:05 AM   Result Value Ref Range    Troponin-High Sensitivity 26 0 - 51 ng/L   URINALYSIS W/ RFLX MICROSCOPIC    Collection Time: 05/27/22 11:43 AM   Result Value Ref Range    Color YELLOW/STRAW      Appearance CLOUDY (A) CLEAR      Specific gravity 1.013      pH (UA) 6.5 5.0 - 8.0      Protein 100 (A) NEG mg/dL    Glucose Negative NEG mg/dL    Ketone Negative NEG mg/dL    Bilirubin Negative NEG      Blood LARGE (A) NEG      Urobilinogen 1.0 0.2 - 1.0 EU/dL    Nitrites Negative NEG      Leukocyte Esterase LARGE (A) NEG      WBC >100 (H) 0 - 4 /hpf    RBC  0 - 5 /hpf    Epithelial cells MANY (A) FEW /lpf    Bacteria 4+ (A) NEG /hpf    CA Oxalate crystals FEW (A) NEG     COVID-19 RAPID TEST    Collection Time: 05/27/22  3:13 PM   Result Value Ref Range    Specimen source Nasopharyngeal      COVID-19 rapid test Not detected NOTD

## 2022-05-27 NOTE — ED PROVIDER NOTES
EMERGENCY DEPARTMENT HISTORY AND PHYSICAL EXAM          Date: 5/27/2022  Patient Name: Edilberto Rondon  Attending of Record: Alyssa Going, DO    History of Presenting Illness     Chief Complaint   Patient presents with    Dizziness     Patient in from radiology for dizzines and low blood pressure. BG 73mg/dl. Patient came to out patient radiology to have barium swallow done. Patient stated she was dizzy and was having a headache. History Provided By: Patient and Radiology Staff    HPI: Edilberto Rondon is a 68 y.o. female with history of CAD, HTN, DM, chronic diarrhea and constipation who presents to the ED with dizziness. The patient was at radiology today for a barium swallow study for working up dysphagia. She complained of some dizziness prior to her study and was also complaining of headache. Blood pressure was checked in radiology which was 99/65. Blood glucose was 73. She was brought to the ED for further evaluation. On discussion she states that the headache has been chronic for several months (since her last admission in January), it is parietal, dull, has not had any recent changes with it. No associated numbness, paresthesias, weakness. She also complains of a year history of worsening vision and diplopia, has seen an eye doctor for this and is unsure which she has been diagnosed with but it is unchanged today. She denies any urinary symptoms, abdominal pain, chest pain or shortness of breath. She has had chronic diarrhea and poor p.o. intake due to dysphagia. PCP: Garret Rivers MD    There are no other complaints, changes, or physical findings at this time.      Current Facility-Administered Medications   Medication Dose Route Frequency Provider Last Rate Last Admin    cefTRIAXone (ROCEPHIN) 1 g in 0.9% sodium chloride (MBP/ADV) 50 mL MBP  1 g IntraVENous Q24H Rian Lennox, MD   IV Completed at 05/27/22 1250    sodium chloride (NS) flush 5-40 mL  5-40 mL IntraVENous Q8H Orbie Royal, DO        sodium chloride (NS) flush 5-40 mL  5-40 mL IntraVENous PRN Angel Stewart, DO        dextrose 5 % - 0.45% NaCl infusion  100 mL/hr IntraVENous CONTINUOUS Orbdebi Royal,  mL/hr at 05/27/22 1536 100 mL/hr at 05/27/22 1536    sodium chloride (NS) flush 5-40 mL  5-40 mL IntraVENous Q8H Clarice Reed MD   10 mL at 05/27/22 1539    sodium chloride (NS) flush 5-40 mL  5-40 mL IntraVENous PRN Kamron Arnold MD        acetaminophen (TYLENOL) tablet 650 mg  650 mg Oral Q6H PRN Kamron Arnold MD        Or    acetaminophen (TYLENOL) suppository 650 mg  650 mg Rectal Q6H PRN Kamron Arnold MD        [START ON 5/28/2022] polyethylene glycol (MIRALAX) packet 17 g  17 g Oral DAILY Clarice Reed MD        bisacodyL (DULCOLAX) tablet 5 mg  5 mg Oral DAILY PRN Kamron Arnold MD        promethazine (PHENERGAN) tablet 12.5 mg  12.5 mg Oral Q6H PRN Kamron Arnold MD        Or    [facility] 4 mg  4 mg IntraVENous Q6H PRN Kamron Arnold MD        [START ON 5/28/2022] enoxaparin (LOVENOX) injection 40 mg  40 mg SubCUTAneous DAILY Clarice Reed MD        pantoprazole (PROTONIX) 40 mg in 0.9% sodium chloride 10 mL injection  40 mg IntraVENous DAILY Kamron Arnold MD   40 mg at 05/27/22 1537    hydrALAZINE (APRESOLINE) 20 mg/mL injection 20 mg  20 mg IntraVENous Q6H PRN Kamron Arnold MD         Current Outpatient Medications   Medication Sig Dispense Refill    cephALEXin (Keflex) 500 mg capsule Take 1 Capsule by mouth four (4) times daily for 7 days. 28 Capsule 0    melatonin 5 mg cap capsule Take 1 Capsule by mouth nightly as needed (insomnia) for up to 30 days. 30 Capsule 0    senna-docusate (PERICOLACE) 8.6-50 mg per tablet Take 1 Tablet by mouth two (2) times a day.  60 Tablet 0    bisacodyL (Dulcolax, bisacodyl,) 10 mg supp Insert 10 mg into rectum daily as needed for Constipation (To promote 3-4 bowel movements weekly). 30 Suppository 0    polyethylene glycol (MIRALAX) 17 gram packet Take 1 Packet by mouth two (2) times a day. 60 Packet 0    sertraline (Zoloft) 100 mg tablet Take 100 mg by mouth daily.  mirabegron ER (MYRBETRIQ) 50 mg ER tablet Take 50 mg by mouth daily.  acetaminophen (TYLENOL) 500 mg tablet Take 1,000 mg by mouth every six (6) hours as needed for Pain.  aspirin 81 mg chewable tablet Take 1 Tab by mouth daily. 30 Tab 0    clopidogreL (PLAVIX) 75 mg tab Take 1 Tab by mouth daily. 30 Tab 0    ezetimibe (ZETIA) 10 mg tablet Take 1 Tab by mouth daily. 30 Tab 0    metoprolol succinate (TOPROL-XL) 25 mg XL tablet Take 1 Tab by mouth every twelve (12) hours. (Patient taking differently: Take 25 mg by mouth daily.) 60 Tab 0    amLODIPine (NORVASC) 2.5 mg tablet Take 2.5 mg by mouth daily.          Past History     Past Medical History:  Past Medical History:   Diagnosis Date    Agoraphobia without mention of panic attacks 2/17/2014    Anxiety disorder 8/18/2013    Arthritis     osteo    CAD (coronary artery disease), native coronary artery 12/1/2015    no stents    Chronic chest pain 1/13/2014    Chronic pain associated with significant psychosocial dysfunction 2/17/2014    Depression 8/18/2013    Diabetes ([facility] [address]     type II    Duplicated right renal collecting system 3/13/2014    GERD (gastroesophageal reflux disease)     Gout, joint     Hypercholesteremia     hyercholesterolemia    Hypertension     Murmur     Nephrolithiasis 3/13/2014    Personal history of noncompliance with medical treatment, presenting hazards to health 5/30/2014       Past Surgical History:  Past Surgical History:   Procedure Laterality Date    EGD  4/23/2010         HX CHOLECYSTECTOMY  09/20/2018    lap jesus    HX CYST REMOVAL      cyst removed from left wrist    HX HEART CATHETERIZATION  12/01/2015    HX HYSTERECTOMY      partial    HX OTHER SURGICAL      bladder dilitation    HX TUBAL LIGATION      HX UROLOGICAL      kidney stones       Family History:  Family History   Problem Relation Age of Onset    Stroke Mother     Heart Disease Mother     Cancer Father         type unknown    Heart Disease Son     Liver Disease Son     Heart Disease Daughter     Malignant Hyperthermia Neg Hx     Pseudocholinesterase Deficiency Neg Hx     Delayed Awakening Neg Hx     Post-op Nausea/Vomiting Neg Hx     Emergence Delirium Neg Hx     Post-op Cognitive Dysfunction Neg Hx     Other Neg Hx        Social History:  Social History     Tobacco Use    Smoking status: Never Smoker    Smokeless tobacco: Never Used   Substance Use Topics    Alcohol use: No    Drug use: No       Allergies: Allergies   Allergen Reactions    Amoxicillin Hives    Sulfa (Sulfonamide Antibiotics) Hives and Itching    Mirtazapine Itching and Nausea Only     Funny feeling in chest    Percocet [Oxycodone-Acetaminophen] Nausea and Vomiting    Codeine Nausea and Vomiting    Crestor [Rosuvastatin] Other (comments)     myalgias    Nitroglycerin Unknown (comments)     Patient cannot remember why she is allergic to it      Prednisone Itching    Zithromax [Azithromycin] Itching     Not sure what it does,taken long time ago         Review of Systems   Review of Systems   Constitutional: Negative for chills and fever. HENT: Positive for trouble swallowing. Negative for rhinorrhea and sore throat. Respiratory: Negative for cough and shortness of breath. Cardiovascular: Negative for chest pain. Gastrointestinal: Negative for abdominal pain, nausea and vomiting. Genitourinary: Negative for dysuria and hematuria. Musculoskeletal: Negative for myalgias. Skin: Negative for rash. Neurological: Positive for dizziness and headaches. Psychiatric/Behavioral: Negative for agitation. Physical Exam   Physical Exam  Constitutional:       Appearance: She is well-developed. She is not ill-appearing.    HENT: Head: Normocephalic and atraumatic. Mouth/Throat:      Mouth: Mucous membranes are dry. Eyes:      Pupils: Pupils are equal, round, and reactive to light. Cardiovascular:      Rate and Rhythm: Normal rate and regular rhythm. Heart sounds: Normal heart sounds. No murmur heard. Pulmonary:      Effort: Pulmonary effort is normal. No tachypnea or accessory muscle usage. Breath sounds: Normal breath sounds. Chest:      Chest wall: No deformity. Abdominal:      Palpations: Abdomen is soft. Tenderness: There is no abdominal tenderness. There is no guarding or rebound. Musculoskeletal:      Cervical back: Neck supple. Right lower leg: No edema. Left lower leg: No edema. Skin:     General: Skin is warm and dry. Neurological:      General: No focal deficit present. Mental Status: She is alert and oriented to person, place, and time.          Diagnostic Study Results     Labs -     Recent Results (from the past 12 hour(s))   GLUCOSE, POC    Collection Time: 05/27/22  9:34 AM   Result Value Ref Range    Glucose (POC) 73 65 - 117 mg/dL    Performed by Madiha Pham    EKG, 12 LEAD, INITIAL    Collection Time: 05/27/22  9:52 AM   Result Value Ref Range    Ventricular Rate 78 BPM    Atrial Rate 78 BPM    P-R Interval 138 ms    QRS Duration 92 ms    Q-T Interval 402 ms    QTC Calculation (Bezet) 458 ms    Calculated P Axis 34 degrees    Calculated R Axis 54 degrees    Calculated T Axis 18 degrees    Diagnosis       Normal sinus rhythm  Cannot rule out Inferior infarct , age undetermined  When compared with ECG of 10-MICHAEL-2022 15:07,  Minimal criteria for Inferior infarct are now present     CBC WITH AUTOMATED DIFF    Collection Time: 05/27/22 10:05 AM   Result Value Ref Range    WBC 3.8 3.6 - 11.0 K/uL    RBC 4.14 3.80 - 5.20 M/uL    HGB 11.1 (L) 11.5 - 16.0 g/dL    HCT 36.5 35.0 - 47.0 %    MCV 88.2 80.0 - 99.0 FL    MCH 26.8 26.0 - 34.0 PG    MCHC 30.4 30.0 - 36.5 g/dL    RDW 16. 5 (H) 11.5 - 14.5 %    PLATELET 666 419 - 759 K/uL    MPV 10.8 8.9 - 12.9 FL    NRBC 0.0 0  WBC    ABSOLUTE NRBC 0.00 0.00 - 0.01 K/uL    NEUTROPHILS 55 32 - 75 %    LYMPHOCYTES 34 12 - 49 %    MONOCYTES 8 5 - 13 %    EOSINOPHILS 2 0 - 7 %    BASOPHILS 1 0 - 1 %    IMMATURE GRANULOCYTES 0 0.0 - 0.5 %    ABS. NEUTROPHILS 2.1 1.8 - 8.0 K/UL    ABS. LYMPHOCYTES 1.3 0.8 - 3.5 K/UL    ABS. MONOCYTES 0.3 0.0 - 1.0 K/UL    ABS. EOSINOPHILS 0.1 0.0 - 0.4 K/UL    ABS. BASOPHILS 0.1 0.0 - 0.1 K/UL    ABS. IMM. GRANS. 0.0 0.00 - 0.04 K/UL    DF AUTOMATED     METABOLIC PANEL, COMPREHENSIVE    Collection Time: 05/27/22 10:05 AM   Result Value Ref Range    Sodium 143 136 - 145 mmol/L    Potassium 3.4 (L) 3.5 - 5.1 mmol/L    Chloride 108 97 - 108 mmol/L    CO2 30 21 - 32 mmol/L    Anion gap 5 5 - 15 mmol/L    Glucose 87 65 - 100 mg/dL    BUN 14 6 - 20 MG/DL    Creatinine 0.75 0.55 - 1.02 MG/DL    BUN/Creatinine ratio 19 12 - 20      GFR est AA >60 >60 ml/min/1.73m2    GFR est non-AA >60 >60 ml/min/1.73m2    Calcium 8.7 8.5 - 10.1 MG/DL    Bilirubin, total 0.6 0.2 - 1.0 MG/DL    ALT (SGPT) 14 12 - 78 U/L    AST (SGOT) 17 15 - 37 U/L    Alk.  phosphatase 68 45 - 117 U/L    Protein, total 6.7 6.4 - 8.2 g/dL    Albumin 2.9 (L) 3.5 - 5.0 g/dL    Globulin 3.8 2.0 - 4.0 g/dL    A-G Ratio 0.8 (L) 1.1 - 2.2     TROPONIN-HIGH SENSITIVITY    Collection Time: 05/27/22 10:05 AM   Result Value Ref Range    Troponin-High Sensitivity 26 0 - 51 ng/L   URINALYSIS W/ RFLX MICROSCOPIC    Collection Time: 05/27/22 11:43 AM   Result Value Ref Range    Color YELLOW/STRAW      Appearance CLOUDY (A) CLEAR      Specific gravity 1.013      pH (UA) 6.5 5.0 - 8.0      Protein 100 (A) NEG mg/dL    Glucose Negative NEG mg/dL    Ketone Negative NEG mg/dL    Bilirubin Negative NEG      Blood LARGE (A) NEG      Urobilinogen 1.0 0.2 - 1.0 EU/dL    Nitrites Negative NEG      Leukocyte Esterase LARGE (A) NEG      WBC >100 (H) 0 - 4 /hpf    RBC  0 - 5 /hpf    Epithelial cells MANY (A) FEW /lpf    Bacteria 4+ (A) NEG /hpf    CA Oxalate crystals FEW (A) NEG     COVID-19 RAPID TEST    Collection Time: 05/27/22  3:13 PM   Result Value Ref Range    Specimen source Nasopharyngeal      COVID-19 rapid test Not detected NOTD         Radiologic Studies -   No orders to display     CT Results  (Last 48 hours)    None        CXR Results  (Last 48 hours)    None            Medical Decision Making   I am the first provider for this patient. I reviewed the vital signs, available nursing notes, past medical history, past surgical history, family history and social history. Vital Signs-Reviewed the patient's vital signs. Patient Vitals for the past 12 hrs:   Temp Pulse Resp BP SpO2   05/27/22 1009 -- 72 14 132/83 100 %   05/27/22 1007 -- 67 17 (!) 159/82 99 %   05/27/22 0948 98.4 °F (36.9 °C) 79 20 139/84 98 %       ECG Interpretation: Normal sinus rhythm, normal axis, normal intervals, inferior Q waves, nonspecific ST-T wave changes    Records Reviewed: Nursing Notes and Old Medical Records    Provider Notes (Medical Decision Making):   DDx: Dehydration, electrolyte abnormalities, UTI  59-year-old female with multiple medical problems as listed above who presents the ED from radiology after she developed dizziness before her barium swallow study today, initial blood pressure was 99/65 and she was brought to the ED. Blood glucose was 73. On exam, her blood pressure is improved to 139/84, she is well-appearing, conversational, abdomen soft and nontender, cardiopulmonary exam unremarkable, no nystagmus or other focal neurologic deficits. Suspect her dizziness is in the setting of dehydration from poor p.o. intake from her dysphagia, will also rule out UTI or other electrolyte abnormalities. Checking labs and urine. Giving 500 cc fluid bolus.   Regarding her headache, this has been chronic in nature, no acute changes or neurologic deficits, she had a CT in January related to this complaint and it showed no acute process. We will give her Tylenol for symptomatic management, do not feel additional imaging is warranted at this time. Her vision changes have been chronic in nature and I recommended she follow-up with her eye doctor. Will likely discharge after labs and fluid bolus, I spoke with radiology and they will perform the barium swallow after she is medically cleared. ED Course and Progress Notes:   Initial assessment performed. The patients presenting problems have been discussed, and they are in agreement with the care plan formulated and outlined with them. I have encouraged them to ask questions as they arise throughout their visit. ED Course as of 05/27/22 1544   Fri May 27, 2022   1206 Labs notable for large leukocyte Estrace and blood, and concern for UTI. We will give her dose of Rocephin, discharged with Keflex. She states her dizziness feels much better. We will speak to radiology about bringing her over for her barium swallow. [AA]   26 Tried reaching patient's daughter by phone with both contacts available in chart, unable to reach them [AA]   1407 Patient brought back to ED after barium swallow study, she was reportedly found to have a distal GEJ obstruction. Will page GI, admit the patient to hospitalist service, have paged hospitalist team.  We will start D5 half-normal maintenance fluids. [AA]      ED Course User Index  [AA] Dwane Cushing, MD           Critical Care: 0 minutes    Diagnosis     Clinical Impression:   1. Esophageal obstruction    2. Acute cystitis with hematuria    3. Dehydration    4. Diarrhea, unspecified type        Disposition:  Admit to hospitalist        Resident Signature:    Sonia Mendes MD  Emergency Medicine Resident, PGY-2

## 2022-05-27 NOTE — ED NOTES
Patient returned back to ED from radiology for admission after barium swallow indicated that patient had a esophageal obstruction. Patient assigned [place] bed in ED.

## 2022-05-27 NOTE — ED NOTES
Dr. Sussy Arambula spoke with patient sister. She was informed that she is being discharged to go back to radiology to have her barium swallow done. Reviewed discharge instructions with patient which includes prescription for keflex and melatonin. Patient left ED room to radiology accompanied by staff on stretcher and to be discharged from radiology to home after completing study.

## 2022-05-27 NOTE — ED NOTES
TRANSITION OF CARE - SBAR OUT    Patient is being transferred to [address], Room# 2133. Report GIVEN TO [address], RN on Edilberto Rondon for routine progression of care. Report is consisted of the following information SBAR, ED Summary, Intake/Output, MAR, Recent Results and Cardiac Rhythm Patient assessment. Patient transferred to receiving unit by: Transport staff (RN or [facility] Name)     Called outstanding consults: No    Collected routine labs: Yes     All current orders reviewed with accepting nurse: Yes    The following personal items will be sent with the patient during transfer to the floor:   All valuables:      Visual Aid: None                   CARDIAC MONITORING ORDERED: Yes     The following CURRENT information were reported to the receiving RN:    CODE STATUS: Full Code    NIH SCORE:    RONNIE SCREENING: Swallow Screening  Is the Patient Unable to Remain Alert for Testing?: No (05/27/22 1026)  Is the Patient on a Modified Diet (Thickened Liquids) Due to Pre-existing Dysphagia?: No (05/27/22 1026)  Is There Presence of Existing Enteral Tube Feeding via the Stomach or Nose?: No (05/27/22 1026)  Is There Presence of Head-of-Bed Restrictions (Less than 30 Degrees)?: No (05/27/22 1026)  Is There Presence of Tracheotomy Tube?: No (05/27/22 1026)  Is the Patient Ordered Nothing-by-Mouth Status?: No (05/27/22 1026)  3 oz Water Swallow Screen: Pass (05/27/22 1026)    NEURO ASSESSMENT:        RESTRAINTS IN USE: No      IS DOCUMENTATION COMPLETE: Yes      Vital Signs  Level of Consciousness: Alert (0) (05/27/22 0948)  Temp: 98.4 °F (36.9 °C) (05/27/22 0948)  Temp Source: Oral (05/27/22 0948)  Pulse (Heart Rate): 72 (05/27/22 1009)  Heart Rate Source: Monitor (05/27/22 0948)  Cardiac Rhythm: Sinus Rhythm (05/27/22 0948)  Resp Rate: 14 (05/27/22 1009)  BP: 132/83 (05/27/22 1009)  MAP (Monitor): 97 (05/27/22 1009)  MAP (Calculated): 99 (05/27/22 1009)  BP 1 Location: Left upper arm (05/27/22 1009)  BP 1 Method: Automatic (05/27/22 1009)  BP Patient Position: Sitting (05/27/22 1009)  MEWS Score: 1 (05/27/22 0948)  Pain 1  Pain Scale 1: Numeric (0 - 10) (05/27/22 0948)  Pain Intensity 1: 7 (05/27/22 0948)  Patient Stated Pain Goal: 0 (05/27/22 0948)  Pain Location 1: Head (05/27/22 0948)      OXYGEN: Oxygen Therapy  O2 Device: None (Room air) (05/27/22 1009)    MARYELLEN FALL RISK:        WOUNDS: No      URINARY CATHETER: incontinent    LINE ACCESS:   Peripheral IV 05/27/22 Left Hand (Active)   Site Assessment Clean, dry, & intact 05/27/22 1529   Phlebitis Assessment 0 05/27/22 1529   Infiltration Assessment 0 05/27/22 1529   Dressing Status Clean, dry, & intact 05/27/22 1529   Hub Color/Line Status Pink 05/27/22 1529   Action Taken Blood drawn 05/27/22 1529        Opportunity for questions and clarification were provided.   Alex Canales RN

## 2022-05-28 NOTE — PROGRESS NOTES
Hospitalist Progress Note    NAME: Edilberto Rondon   :  1945   MRN:  [id number]     Admit date: 2022    Today's date: 22    PCP: Garret Rivers MD      Anticipated discharge date:    Barriers:      Assessment / Plan:    High-grade esophageal obstruction POA  - Etiology unclear  - Prior ERCP  noted normal esophagus, stomach, duodenum  - progressive dysphagia x weeks  - outpatient barium swallow  showed evidence of high-grade esophageal obstruction   --> sent to ED  - dizziness during test, sent to ED  -Keep NPO except for clears  - IVF, add KCL to IVF  - GI consulted, plan for EGD tuesday  - IV protonix     UTI suspect gram-negative organism POA  - No Urine culture sent. - ceftriaxone day 2, stop after 3 days     Dizziness, ? dehydration from poor oral intake  Moderate to severe aortic stenosis  - Recent echo that is showing evidence of moderate to severe aortic stenosis  - Tele  - Continue IVF  - Repeat echo to assess size of aortic valve. -Consider cardiology evaluation based on repeat echocardiogram     Chronic headaches  Chronic vision changes  -Reports symptoms since many years and she has recently seen a ophthalmologist but is not sure of the diagnosis     History of GERD  Dyslipidemia  Depression with anxiety  history of coronary artery disease  Agoraphobia  Gout  Chronic constipation  -Continue home Protonix, Zetia  -Hold home Norvasc as blood pressure is borderline low but will continue metoprolol  -Continue scheduled and as needed laxatives  -Continue home Zoloft     Code Status: Full code  Surrogate Decision Maker: Daughter     DVT Prophylaxis: lovenox, stop after Monday AM for EGD tuesday  GI Prophylaxis: not indicated     Baseline: From home, independent of ADLs    Body mass index is 22.08 kg/m². Subjective:     Chief Complaint / Reason for Physician Visit  \"I am hungry\". Discussed with RN events overnight.    Asking for drink something, sips of clears per GI  No other complaints    Review of Systems:  Symptom Y/N Comments  Symptom Y/N Comments   Fever/Chills n   Chest Pain n    Poor Appetite    Edema     Cough n   Abdominal Pain n    Sputum    Joint Pain     SOB/MOORE n   Headache     Nausea/vomit n   Tolerating PT/OT     Diarrhea n   Tolerating Diet y    Constipation    Other       Could NOT obtain due to:      Objective:     VITALS:   Last 24hrs VS reviewed since prior progress note. Most recent are:  Patient Vitals for the past 24 hrs:   Temp Pulse Resp BP SpO2   05/28/22 0954 -- -- -- (!) 176/81 --   05/28/22 0749 97.7 °F (36.5 °C) 60 16 (!) 176/81 99 %   05/28/22 0033 97.7 °F (36.5 °C) (!) 56 16 (!) 152/72 99 %   05/27/22 1644 98.6 °F (37 °C) 64 16 (!) 191/84 99 %   05/27/22 1613 98.6 °F (37 °C) 64 18 (!) 166/79 99 %       Intake/Output Summary (Last 24 hours) at 5/28/2022 1427  Last data filed at 5/27/2022 2103  Gross per 24 hour   Intake 545 ml   Output --   Net 545 ml        Wt Readings from Last 12 Encounters:   05/28/22 64 kg (141 lb)   01/08/22 63.5 kg (140 lb)   02/25/21 62.5 kg (137 lb 12.6 oz)   02/17/21 73.9 kg (162 lb 14.7 oz)   12/10/20 74.1 kg (163 lb 6.4 oz)   07/16/20 81.2 kg (179 lb)   06/23/20 75.8 kg (167 lb)   03/24/20 75.8 kg (167 lb)   01/20/20 78.9 kg (174 lb)   01/03/20 77.1 kg (170 lb)   11/28/19 84.7 kg (186 lb 11.7 oz)   10/18/19 84.7 kg (186 lb 11.7 oz)       PHYSICAL EXAM:    I had a face to face encounter and independently examined this patient on 05/28/22 as outlined below:    General: WD, WN. Alert, cooperative, no acute distress    EENT:  PERRL. Anicteric sclerae. MMM  Neck:  No meningismus, no thyromegaly  Resp:  CTA bilaterally, no wheezing or rales. No accessory muscle use  CV:  Regular  rhythm,  No edema  GI:  Soft, Non distended, Non tender. +Bowel sounds, no rebound  LN:  No cervical or inguinal TYRONE  Neurologic:  Alert and oriented X 3, normal speech, non focal motor exam  Psych:   Good insight.  Not anxious nor agitated  Skin:  No rashes. No jaundice    Reviewed most current lab test results and cultures  YES  Reviewed most current radiology test results   YES  Review and summation of old records today    NO  Reviewed patient's current orders and MAR    YES  PMH/SH reviewed - no change compared to H&P  ________________________________________________________________________  Care Plan discussed with:    Comments   Patient x    Family      RN x    Care Manager     Consultant                        Multidiciplinary team rounds were held today with , nursing, pharmacist and clinical coordinator. Patient's plan of care was discussed; medications were reviewed and discharge planning was addressed. ________________________________________________________________________      Comments   >50% of visit spent in counseling and coordination of care     ________________________________________________________________________  Lorri Hardy MD     Procedures: see electronic medical records for all procedures/Xrays and details which were not copied into this note but were reviewed prior to creation of Plan. LABS:  I reviewed today's most current labs and imaging studies.   Pertinent labs include:  Recent Labs     05/28/22  1020 05/28/22  0544 05/27/22  1005   WBC 3.2* 3.2* 3.8   HGB 10.4* 10.7* 11.1*   HCT 33.4* 37.0 36.5    166 203     Recent Labs     05/28/22  1020 05/27/22  1005    143   K 2.8* 3.4*    108   CO2 31 30   GLU 86 87   BUN 10 14   CREA 0.53* 0.75   CA 8.1* 8.7   ALB 2.6* 2.9*   TBILI 0.7 0.6   ALT 12 14

## 2022-05-28 NOTE — PROGRESS NOTES
Problem: Pressure Injury - Risk of  Goal: *Prevention of pressure injury  Description: Document Freddy Scale and appropriate interventions in the flowsheet.   Outcome: Progressing Towards Goal  Note: Pressure Injury Interventions:  Sensory Interventions: Assess need for specialty bed    Moisture Interventions: Apply protective barrier, creams and emollients    Activity Interventions: Assess need for specialty bed    Mobility Interventions: Assess need for specialty bed    Nutrition Interventions: Document food/fluid/supplement intake    Friction and Shear Interventions: Apply protective barrier, creams and emollients                Problem: Patient Education: Go to Patient Education Activity  Goal: Patient/Family Education  Outcome: Progressing Towards Goal

## 2022-05-28 NOTE — PROGRESS NOTES
Bedside and Verbal shift change report given to Pola Vee (oncoming nurse) by АНДРЕЙ Sloan RN (offgoing nurse). Report given with SBAR, Kardex, Intake/Output, MAR and Recent Results.

## 2022-05-28 NOTE — PROGRESS NOTES
Order received. Chart reviewed. Pt is dependent for all functional mobility. She lays in bed and only mobilizes to wheelchair 1 day/wk. She requires 2 person A for transfers and is dependent for wheelchair mobility. Not appropriate for acute PT services. Recommendation from last admission was 24/7 care or LTC. Will sign off.

## 2022-05-28 NOTE — PROGRESS NOTES
End of Shift Note    Bedside shift change report given to Aylin (oncoming nurse) by Lisa Molina (offgoing nurse). Report included the following information SBAR, Kardex, Intake/Output, MAR and Recent Results    Shift worked:  [id number]     Shift summary and any significant changes:     No significant changes. Pt refused additional AM lab draw after only 1 tube successfully drawn. Will notify day shift.      Concerns for physician to address:  none     Zone phone for oncoming shift:          Lisa Molina Complex Repair And Tissue Cultured Epidermal Autograft Text: The defect edges were debeveled with a #15 scalpel blade.  The primary defect was closed partially with a complex linear closure.  Given the location of the defect, shape of the defect and the proximity to free margins an tissue cultured epidermal autograft was deemed most appropriate to repair the remaining defect.  The graft was trimmed to fit the size of the remaining defect.  The graft was then placed in the primary defect, oriented appropriately, and sutured into place.

## 2022-05-28 NOTE — PROGRESS NOTES
Occupational Therapy note:    Order acknowledged and chart reviewed. Per chart review, patient is dependent for all ADLs and functional mobility. From last admission, daughter reported patient stays in bed most days and will mobilize to w/c 1 day/wk. Patient required x2 person assist for transfers and dependent for w/c mobility. Patient not appropriate for OT services during acute hospital stay. Will sign off.     Dong Diaz OTR/L

## 2022-05-29 NOTE — PROGRESS NOTES
Hospitalist Progress Note    NAME: Kenya Davis   :  1945   MRN:  [id number]     Admit date: 2022    Today's date: 22    PCP: Enrique Giraldo MD      Anticipated discharge date:    Barriers:  Waiting 3 days in house for EGD given last of staffing to do it on weekends    Assessment / Plan:    High-grade esophageal obstruction POA  - Etiology unclear, daughter notes prior she had a stricture  - Prior ERCP  noted normal esophagus, stomach, duodenum  - progressive dysphagia x weeks  - outpatient barium swallow  showed evidence of high-grade esophageal obstruction   --> sent to ED  - dizziness during test, resolved  -Keep NPO except for clears  - IVF with KCL  - GI consulted, plan for EGD tuesday  - IV protonix     UTI suspect gram-negative organism POA  - No Urine culture sent. - ceftriaxone, stop after 3 days, last dose      Dizziness, ? dehydration from poor oral intake  Mild aortic stenosis  - Tele  - Continue IVF  - Repeat echo to assess size of aortic valve. - Consider cardiology evaluation based on repeat echocardiogram  - Echo read as mild AS     Chronic headaches  Chronic vision changes  -Reports symptoms since many years and she has recently seen a ophthalmologist but is not sure of the diagnosis     History of GERD  Dyslipidemia  Depression with anxiety  history of coronary artery disease  Agoraphobia  Gout  Chronic constipation  -Continue home Protonix, Zetia  -Hold home Norvasc as blood pressure is borderline low but will continue metoprolol  -Continue scheduled and as needed laxatives  -Continue home Zoloft     Code Status: Full code  Surrogate Decision Maker: Daughter     DVT Prophylaxis: lovenox, stop after Monday AM for EGD tuesday  GI Prophylaxis: not indicated     Baseline: From home, independent of ADLs    Body mass index is 22.08 kg/m². Subjective:     Chief Complaint / Reason for Physician Visit  \"Okay\".   Discussed with RN events overnight. Still asking for food, complains of being hungry  No other complaints  Spoke with daughter at bedside    Review of Systems:  Symptom Y/N Comments  Symptom Y/N Comments   Fever/Chills n   Chest Pain n    Poor Appetite    Edema     Cough n   Abdominal Pain n    Sputum    Joint Pain     SOB/MOORE n   Headache     Nausea/vomit n   Tolerating PT/OT     Diarrhea n   Tolerating Diet y    Constipation    Other       Could NOT obtain due to:      Objective:     VITALS:   Last 24hrs VS reviewed since prior progress note. Most recent are:  Patient Vitals for the past 24 hrs:   Temp Pulse Resp BP SpO2   05/29/22 0851 98.3 °F (36.8 °C) 62 17 (!) 171/76 99 %   05/28/22 2319 98.2 °F (36.8 °C) 63 18 (!) 155/70 93 %   05/28/22 1623 97.8 °F (36.6 °C) 65 16 (!) 146/78 99 %   05/28/22 1553 -- 68 -- (!) 196/90 --   05/28/22 0954 -- -- -- (!) 176/81 --     No intake or output data in the 24 hours ending 05/29/22 0918     Wt Readings from Last 12 Encounters:   05/28/22 64 kg (141 lb)   01/08/22 63.5 kg (140 lb)   02/25/21 62.5 kg (137 lb 12.6 oz)   02/17/21 73.9 kg (162 lb 14.7 oz)   12/10/20 74.1 kg (163 lb 6.4 oz)   07/16/20 81.2 kg (179 lb)   06/23/20 75.8 kg (167 lb)   03/24/20 75.8 kg (167 lb)   01/20/20 78.9 kg (174 lb)   01/03/20 77.1 kg (170 lb)   11/28/19 84.7 kg (186 lb 11.7 oz)   10/18/19 84.7 kg (186 lb 11.7 oz)       PHYSICAL EXAM:    I had a face to face encounter and independently examined this patient on 05/29/22 as outlined below:    General: WD, WN. Alert, cooperative, no acute distress    EENT:  PERRL. Anicteric sclerae. MMM  Neck:  No meningismus, no thyromegaly  Resp:  CTA bilaterally, no wheezing or rales. No accessory muscle use  CV:  Regular  rhythm,  grade 2/6 SM at LSB, No edema  GI:  Soft, Non distended, mildly tender epigastric region. +Bowel sounds, no rebound  Neurologic:  Alert, normal speech, non focal motor exam  Psych:   Not anxious nor agitated  Skin:  No rashes.   No jaundice    Reviewed most current lab test results and cultures  YES  Reviewed most current radiology test results   YES  Review and summation of old records today    NO  Reviewed patient's current orders and MAR    YES  PMH/SH reviewed - no change compared to H&P  ________________________________________________________________________  Care Plan discussed with:    Comments   Patient x    Family  x Daughter   RN x    Care Manager     Consultant                        Multidiciplinary team rounds were held today with , nursing, pharmacist and clinical coordinator. Patient's plan of care was discussed; medications were reviewed and discharge planning was addressed. ________________________________________________________________________      Comments   >50% of visit spent in counseling and coordination of care     ________________________________________________________________________  Tata Rios MD     Procedures: see electronic medical records for all procedures/Xrays and details which were not copied into this note but were reviewed prior to creation of Plan. LABS:  I reviewed today's most current labs and imaging studies.   Pertinent labs include:  Recent Labs     05/29/22  0530 05/28/22  1020 05/28/22  0544   WBC 3.2* 3.2* 3.2*   HGB 11.0* 10.4* 10.7*   HCT 35.7 33.4* 37.0    192 166     Recent Labs     05/29/22  0530 05/28/22  1020 05/27/22  1005    140 143   K 3.0* 2.8* 3.4*    106 108   CO2 27 31 30   GLU 89 86 87   BUN 7 10 14   CREA 0.53* 0.53* 0.75   CA 8.4* 8.1* 8.7   ALB  --  2.6* 2.9*   TBILI  --  0.7 0.6   ALT  --  12 14

## 2022-05-29 NOTE — PROGRESS NOTES
End of Shift Note    Bedside shift change report given to Alli Peng (oncoming nurse) by Sarath Sales (offgoing nurse). Report included the following information SBAR, Kardex, Intake/Output, MAR and Recent Results    Shift worked:  [id number]     Shift summary and any significant changes:     No significant changes. Pt expressing desires to eat and says she \"feels miserable. \" Pt reassured and offered sips of clear liquids.      Concerns for physician to address:  none     Zone phone for oncoming shift:            Sarath Sales

## 2022-05-30 NOTE — PROGRESS NOTES
Hospitalist Progress Note    NAME: Maureen Vela   :  1945   MRN:  [id number]     Admit date: 2022    Today's date: 22    PCP: Carlos Barcenas MD      Anticipated discharge date:    Barriers:  Waiting 4 days in house for EGD given last of staffing to do it on weekends/holiday    Assessment / Plan:    High-grade esophageal obstruction POA  - Etiology unclear, daughter notes prior she had a stricture  - Prior ERCP  noted normal esophagus, stomach, duodenum  - progressive dysphagia x weeks  - outpatient barium swallow  showed evidence of high-grade esophageal obstruction   --> sent to ED  - dizziness during test, resolved  - Keep NPO except for clears  - IVF with KCL  - GI consulted, plan for EGD tomorrow  - IV protonix     UTI suspect gram-negative organism POA  - No Urine culture sent. - ceftriaxone, stop after 3 days, last dose      Dizziness, ? dehydration from poor oral intake  Mild aortic stenosis  - Tele  - Continue IVF  - Repeat echo to assess size of aortic valve. - Consider cardiology evaluation based on repeat echocardiogram  - Echo read as mild AS     Chronic headaches  Chronic vision changes  -Reports symptoms since many years and she has recently seen a ophthalmologist but is not sure of the diagnosis     History of GERD  Dyslipidemia  Depression with anxiety  history of coronary artery disease  Agoraphobia  Gout  Chronic constipation  -Continue home Protonix, Zetia  -Hold home Norvasc as blood pressure is borderline low but will continue metoprolol  -Continue scheduled and as needed laxatives  -Continue home Zoloft     Code Status: Full code  Surrogate Decision Maker: Daughter     DVT Prophylaxis: lovenox, stop after Monday AM for EGD tuesday  GI Prophylaxis: not indicated     Baseline: From home, independent of ADLs    Body mass index is 22.08 kg/m². Subjective:     Chief Complaint / Reason for Physician Visit  \"I am not well\". Discussed with RN events overnight. Still hungry,feels weak all over  No other complaints  Planning for EGD in AM    Review of Systems:  Symptom Y/N Comments  Symptom Y/N Comments   Fever/Chills n   Chest Pain n    Poor Appetite    Edema     Cough n   Abdominal Pain n    Sputum    Joint Pain     SOB/MOORE n   Headache     Nausea/vomit n   Tolerating PT/OT     Diarrhea n   Tolerating Diet y    Constipation    Other       Could NOT obtain due to:      Objective:     VITALS:   Last 24hrs VS reviewed since prior progress note. Most recent are:  Patient Vitals for the past 24 hrs:   Temp Pulse Resp BP SpO2   05/30/22 1103 97.9 °F (36.6 °C) 63 18 (!) 169/74 100 %   05/30/22 0751 97.8 °F (36.6 °C) 79 18 (!) 153/73 98 %   05/29/22 2310 97.9 °F (36.6 °C) 66 18 (!) 155/77 97 %   05/29/22 1625 98.3 °F (36.8 °C) 65 18 (!) 152/82 99 %       Intake/Output Summary (Last 24 hours) at 5/30/2022 1228  Last data filed at 5/30/2022 0528  Gross per 24 hour   Intake --   Output 600 ml   Net -600 ml        Wt Readings from Last 12 Encounters:   05/28/22 64 kg (141 lb)   01/08/22 63.5 kg (140 lb)   02/25/21 62.5 kg (137 lb 12.6 oz)   02/17/21 73.9 kg (162 lb 14.7 oz)   12/10/20 74.1 kg (163 lb 6.4 oz)   07/16/20 81.2 kg (179 lb)   06/23/20 75.8 kg (167 lb)   03/24/20 75.8 kg (167 lb)   01/20/20 78.9 kg (174 lb)   01/03/20 77.1 kg (170 lb)   11/28/19 84.7 kg (186 lb 11.7 oz)   10/18/19 84.7 kg (186 lb 11.7 oz)       PHYSICAL EXAM:    I had a face to face encounter and independently examined this patient on 05/30/22 as outlined below:    General: WD, WN. Alert, cooperative, no acute distress    EENT:  PERRL. Anicteric sclerae. MMM  Resp:  CTA bilaterally, no wheezing or rales. No accessory muscle use  CV:  Regular  rhythm,  grade 2/6 SM at LSB, No edema  GI:  Soft, Non distended, mildly tender epigastric region.   +Bowel sounds, no rebound  Neurologic:  Alert, normal speech, non focal motor exam  Psych:   Not anxious nor agitated  Skin:  No arik. No jaundice    Reviewed most current lab test results and cultures  YES  Reviewed most current radiology test results   YES  Review and summation of old records today    NO  Reviewed patient's current orders and MAR    YES  PMH/SH reviewed - no change compared to H&P  ________________________________________________________________________  Care Plan discussed with:    Comments   Patient x    Family      RN x    Care Manager     Consultant                        Multidiciplinary team rounds were held today with , nursing, pharmacist and clinical coordinator. Patient's plan of care was discussed; medications were reviewed and discharge planning was addressed. ________________________________________________________________________      Comments   >50% of visit spent in counseling and coordination of care     ________________________________________________________________________  Zachariah Almaraz MD     Procedures: see electronic medical records for all procedures/Xrays and details which were not copied into this note but were reviewed prior to creation of Plan. LABS:  I reviewed today's most current labs and imaging studies.   Pertinent labs include:  Recent Labs     05/30/22  0437 05/29/22  0530 05/28/22  1020   WBC 2.7* 3.2* 3.2*   HGB 10.5* 11.0* 10.4*   HCT 34.1* 35.7 33.4*    204 192     Recent Labs     05/30/22  0437 05/29/22  0530 05/28/22  1020    143 140   K 3.2* 3.0* 2.8*   * 107 106   CO2 28 27 31   GLU 84 89 86   BUN 5* 7 10   CREA 0.53* 0.53* 0.53*   CA 8.3* 8.4* 8.1*   ALB  --   --  2.6*   TBILI  --   --  0.7   ALT  --   --  12

## 2022-05-30 NOTE — PROGRESS NOTES
End of Shift Note    Bedside shift change report given to Benson Rosenbaum (oncoming nurse) by Rudi Yanes (offgoing nurse). Report included the following information SBAR, Kardex, Intake/Output, MAR and Recent Results    Shift worked:  [id number]     Shift summary and any significant changes:     No significant changes. Pt IV replaced after infiltration. No concerns overnight.      Concerns for physician to address:  none     Zone phone for oncoming shift:            Rudi Yanes

## 2022-05-31 NOTE — PROCEDURES
NAME:  Uvaldo Arredondo   :   1945   MRN:   [id number]     Date/Time:  2022 12:56 PM    Esophagogastroduodenoscopy (EGD) Procedure Note    Procedure: Esophagogastroduodenoscopy with esophageal dilation    Indication: dysphagia  Pre-operative Diagnosis: see indication above  Post-operative Diagnosis: see findings below  :  Norma Cerna MD  PRIMARY GI:  Carlie Freeman MD  Referring Provider:   Braden Carranza MD    Exam:  Airway: clear, no airway problems anticipated  Heart: RRR, without gallops or rubs  Lungs: clear bilaterally without wheezes, crackles, or rhonchi  Abdomen: soft, nontender, nondistended, bowel sounds present  Mental Status: awake, alert and oriented to person, place and time     Anethesia/Sedation:  MAC anesthesia Propofol  Procedure Details   After informed consent was obtained for the procedure, with all risks and benefits of procedure explained the patient was taken to the endoscopy suite and placed in the left lateral decubitus position. Following sequential administration of sedation as per above, the [id number] gastroscope was inserted into the mouth and advanced under direct vision to third portion of the duodenum. A careful inspection was made as the gastroscope was withdrawn, including a retroflexed view of the proximal stomach; findings and interventions are described below. Findings:   OROPHARYNX: Cords and pyriform recesses normal.   ESOPHAGUS:   -- dilated proximal esophagus with angulation and tortuosity. -- tight LES with \"popping\" sensation when endoscope passes through. Dilated with TTS balloon to 12 mm, 13.5 mm ,then 15 mm under endoscopic visualization. -- The Z-Line is intact. STOMACH: The fundus on antegrade and retroflex views is normal. The body, antrum, and pylorus are normal.   DUODENUM: The bulb, second and third portions are normal.    Therapies:   esophageal dilation with 12-15 mm sized balloon    Specimens: none    EBL:  None. Complications:   None; patient tolerated the procedure well. Impression:  -- esophageal obstruction, endoscopically consistent with achalasia with spastic appearing, tight LES and dilated proximal esophagus; Dilated LES to 15 mm with TTS balloon as above.  No discrete stricture  -- presbyesophagus    Recommendations:  -- await effect of dilation  -- consider botox injection trial if dilation ineffective  -- resume diet, starting at full liquids and resume from there  -- GI will sign off    Discharge disposition:  Back to wards    Ky Finn MD

## 2022-05-31 NOTE — PROGRESS NOTES
End of Shift Note    Bedside shift change report given to Jim Mendoza (oncoming nurse) by Leilani Brantley (offgoing nurse).   Report included the following information SBAR, Kardex, Intake/Output, MAR and Recent Results    Shift worked:  [id number]     Shift summary and any significant changes:     Pt more confused/distressed overnight; repeatedly calling out and very emotional. EGD scheduled for 10 AM.     Concerns for physician to address:  none     Zone phone for oncoming shift:          Leilani Brantley

## 2022-05-31 NOTE — PERIOP NOTES
Endoscopy Case End Note:    1310:  Procedure scope was pre-cleaned, per protocol, at bedside by SARITHA MONTGOMERY      1612:  Report received from anesthesia - Dr. Kandi Simmons. See anesthesia flowsheet for intra-procedure vital signs and events.

## 2022-05-31 NOTE — PROGRESS NOTES
Hospitalist Progress Note    NAME: Tabitha Torres   :  1945   MRN:  [id number]     Admit date: 2022    Today's date: 22    PCP: Arin Villarreal MD      Anticipated discharge date:    Barriers:  Waiting 4 days in house for EGD given lack of staffing to do it on weekends/holiday   EGD today, as soon as diet advanced, will discharge    Assessment / Plan:    High-grade esophageal obstruction POA  - Etiology unclear, daughter notes She had a stricture years ago  - Prior ERCP  noted normal esophagus, stomach, duodenum  - progressive dysphagia x weeks  - outpatient barium swallow  showed evidence of high-grade esophageal obstruction   --> sent to ED  - dizziness during test, resolved  - Keep NPO except for clears  - IVF with KCL  - GI consulted, EGD today  - Advance diet pending results of EGD  - PT/OT consults  - IV protonix    Hypokalemia K 2.8  Added KCL toIVF, now resolved     UTI suspect gram-negative organism POA  - No Urine culture sent. - ceftriaxone x 3 days, last dose      Dizziness, ? dehydration from poor oral intake  Mild aortic stenosis  - Tele  - reduce IVF  - Repeat echo to assess size of aortic valve.   - Consider cardiology evaluation based on repeat echocardiogram  - Echo read as mild AS     Chronic headaches  Chronic vision changes  -Reports symptoms since many years and she has recently seen a ophthalmologist but is not sure of the diagnosis     History of GERD  Dyslipidemia  Depression with anxiety  history of coronary artery disease  Agoraphobia  Gout  Chronic constipation  -Continue home Protonix, Zetia  -Hold home Norvasc as blood pressure is borderline low but will continue metoprolol  -Continue scheduled and as needed laxatives  -Continue home Zoloft     Code Status: Full code  Surrogate Decision Maker: Daughter     DVT Prophylaxis: lovenox, stop after Monday AM for EGD tuesday  GI Prophylaxis: not indicated     Baseline: From home, independent of ADLs    Body mass index is 22.08 kg/m². Subjective:     Chief Complaint / Reason for Physician Visit  \"I am not well\". Discussed with RN events overnight. Still hungry,feels weak all over  No other complaints  Planning for EGD in AM    Review of Systems:  Symptom Y/N Comments  Symptom Y/N Comments   Fever/Chills n   Chest Pain n    Poor Appetite    Edema     Cough n   Abdominal Pain n    Sputum    Joint Pain     SOB/MOORE n   Headache     Nausea/vomit n   Tolerating PT/OT     Diarrhea n   Tolerating Diet y    Constipation    Other       Could NOT obtain due to:      Objective:     VITALS:   Last 24hrs VS reviewed since prior progress note. Most recent are:  Patient Vitals for the past 24 hrs:   Temp Pulse Resp BP SpO2   05/31/22 0008 98.2 °F (36.8 °C) 82 18 (!) 171/90 96 %   05/30/22 1948 98 °F (36.7 °C) 71 18 (!) 165/85 97 %   05/30/22 1647 98.6 °F (37 °C) 69 18 (!) 145/82 100 %   05/30/22 1103 97.9 °F (36.6 °C) 63 18 (!) 169/74 100 %   05/30/22 0751 97.8 °F (36.6 °C) 79 18 (!) 153/73 98 %       Intake/Output Summary (Last 24 hours) at 5/31/2022 0729  Last data filed at 5/30/2022 1854  Gross per 24 hour   Intake 1320 ml   Output 700 ml   Net 620 ml        Wt Readings from Last 12 Encounters:   05/28/22 64 kg (141 lb)   01/08/22 63.5 kg (140 lb)   02/25/21 62.5 kg (137 lb 12.6 oz)   02/17/21 73.9 kg (162 lb 14.7 oz)   12/10/20 74.1 kg (163 lb 6.4 oz)   07/16/20 81.2 kg (179 lb)   06/23/20 75.8 kg (167 lb)   03/24/20 75.8 kg (167 lb)   01/20/20 78.9 kg (174 lb)   01/03/20 77.1 kg (170 lb)   11/28/19 84.7 kg (186 lb 11.7 oz)   10/18/19 84.7 kg (186 lb 11.7 oz)       PHYSICAL EXAM:    I had a face to face encounter and independently examined this patient on 05/31/22 as outlined below:    General: WD, WN. Alert, cooperative, no acute distress    EENT:  PERRL. Anicteric sclerae. MMM  Resp:  CTA bilaterally, no wheezing or rales.   No accessory muscle use  CV:  Regular  rhythm,  grade 2/6 SM at LSB, No edema  GI:  Soft, Non distended, mildly tender epigastric region. +Bowel sounds, no rebound  Neurologic:  Alert, normal speech, non focal motor exam  Psych:   Not anxious nor agitated  Skin:  No rashes. No jaundice    Reviewed most current lab test results and cultures  YES  Reviewed most current radiology test results   YES  Review and summation of old records today    NO  Reviewed patient's current orders and MAR    YES  PMH/SH reviewed - no change compared to H&P  ________________________________________________________________________  Care Plan discussed with:    Comments   Patient x    Family      RN x    Care Manager     Consultant                        Multidiciplinary team rounds were held today with , nursing, pharmacist and clinical coordinator. Patient's plan of care was discussed; medications were reviewed and discharge planning was addressed. ________________________________________________________________________      Comments   >50% of visit spent in counseling and coordination of care     ________________________________________________________________________  Asael Stone MD     Procedures: see electronic medical records for all procedures/Xrays and details which were not copied into this note but were reviewed prior to creation of Plan. LABS:  I reviewed today's most current labs and imaging studies.   Pertinent labs include:  Recent Labs     05/31/22  0342 05/30/22  0437 05/29/22  0530   WBC 2.9* 2.7* 3.2*   HGB 10.9* 10.5* 11.0*   HCT 35.0 34.1* 35.7    198 204     Recent Labs     05/31/22  0342 05/30/22  0437 05/29/22  0530 05/28/22  1020 05/28/22  1020    144 143   < > 140   K 3.6 3.2* 3.0*   < > 2.8*    110* 107   < > 106   CO2 27 28 27   < > 31   GLU 93 84 89   < > 86   BUN 4* 5* 7   < > 10   CREA 0.55 0.53* 0.53*   < > 0.53*   CA 8.4* 8.3* 8.4*   < > 8.1*   ALB  --   --   --   --  2.6*   TBILI  --   --   --   --  0.7   ALT  --   --   -- --  12    < > = values in this interval not displayed.

## 2022-05-31 NOTE — ANESTHESIA PREPROCEDURE EVALUATION
Anesthetic History   No history of anesthetic complications            Review of Systems / Medical History  Patient summary reviewed, nursing notes reviewed and pertinent labs reviewed    Pulmonary  Within defined limits            Pertinent negatives: No smoker     Neuro/Psych         Headaches, psychiatric history and dementia     Cardiovascular    Hypertension          CAD    Exercise tolerance: >4 METS  Comments: TTE (05/2022): Left Ventricle: Normal left ventricular systolic function with a visually estimated EF of 55 - 60%. Left ventricle size is normal. Moderately increased wall thickness. Normal wall motion. Normal diastolic function.   Pericardium: Small (<1 cm) pericardial effusion present. Pericardial effusion has a hematoma. No indication of cardiac tamponade     GI/Hepatic/Renal     GERD           Endo/Other    Diabetes: type 2    Arthritis and anemia  Pertinent negatives: No obesity and morbid obesity   Other Findings   Comments: Current admission (05/2022) for esophageal obstruction    Chronic pain associated with significant psychosocial dysfunction     Personal history of noncompliance with medical treatment, presenting hazards to health    Pseudobulbar affect    Somatization disorder    Gout         Physical Exam    Airway  Mallampati: III  TM Distance: > 6 cm  Neck ROM: normal range of motion   Mouth opening: Normal     Cardiovascular  Regular rate and rhythm,  S1 and S2 normal,  no murmur, click, rub, or gallop  Rhythm: regular  Rate: normal         Dental    Dentition: Edentulous     Pulmonary  Breath sounds clear to auscultation               Abdominal  GI exam deferred       Other Findings            Anesthetic Plan    ASA: 3  Anesthesia type: MAC            Anesthetic plan and risks discussed with: Patient and Family      Patient confused at baseline.  Consent obtained from daughter

## 2022-05-31 NOTE — ANESTHESIA POSTPROCEDURE EVALUATION
Procedure(s):  ESOPHAGOGASTRODUODENOSCOPY (EGD)  ESOPHAGEAL DILATION. total IV anesthesia    Anesthesia Post Evaluation        Patient location during evaluation: PACU  Note status: Adequate. Level of consciousness: responsive to verbal stimuli and sleepy but conscious  Pain management: satisfactory to patient  Airway patency: patent  Anesthetic complications: no  Cardiovascular status: acceptable  Respiratory status: acceptable  Hydration status: acceptable  Comments: +Post-Anesthesia Evaluation and Assessment    Patient: Steph Lindsay MRN: [id number]  SSN: [id number]   YOB: 1945  Age: 68 y.o. Sex: female      Cardiovascular Function/Vital Signs    /81   Pulse 67   Temp 36.4 °C (97.5 °F)   Resp 10   Ht 5' 7\" (1.702 m)   Wt 64 kg (141 lb)   SpO2 96%   BMI 22.08 kg/m²     Patient is status post Procedure(s):  ESOPHAGOGASTRODUODENOSCOPY (EGD)  ESOPHAGEAL DILATION. Nausea/Vomiting: Controlled. Postoperative hydration reviewed and adequate. Pain:  Pain Scale 1: Numeric (0 - 10) (05/31/22 1339)  Pain Intensity 1: 0 (05/31/22 1339)   Managed. Neurological Status: At baseline. Mental Status and Level of Consciousness: Arousable. Pulmonary Status:   O2 Device: None (Room air) (05/31/22 1339)   Adequate oxygenation and airway patent. Complications related to anesthesia: None    Post-anesthesia assessment completed. No concerns. Signed By: Abram Hansen DO    5/31/2022  Post anesthesia nausea and vomiting:  controlled      INITIAL Post-op Vital signs:   Vitals Value Taken Time   /81 05/31/22 1339   Temp 36.4 °C (97.5 °F) 05/31/22 1327   Pulse 70 05/31/22 1342   Resp 12 05/31/22 1342   SpO2 89 % 05/31/22 1339   Vitals shown include unvalidated device data.

## 2022-05-31 NOTE — H&P
Date of Surgery Update:  José Iverson was seen and examined. History and physical has been reviewed. The patient has been examined.  There have been no significant clinical changes since the completion of the originally dated History and Physical.    Signed By: Steph Ratliff MD     May 31, 2022 12:55 PM

## 2022-05-31 NOTE — ROUTINE PROCESS
Doak Goodpasture  1945  [id number]    Situation:  Verbal report received from: Kassie Narvaez, RN  Procedure: Procedure(s):  ESOPHAGOGASTRODUODENOSCOPY (EGD)  ESOPHAGEAL DILATION    Background:    Preoperative diagnosis: dysphagia  Postoperative diagnosis: Esophageal Obstruction    :  Dr. Paulina Figueroa  Assistant(s): Endoscopy Technician-1: Kiran Maurer  Endoscopy RN-Relief: Quinton MARTÍNEZ    Specimens: * No specimens in log *  H. Pylori  no    Assessment:  Intra-procedure medications     Anesthesia gave intra-procedure sedation and medications, see anesthesia flow sheet yes    Intravenous fluids: NS@ Javier Ruel     Vital signs stable   yes    Abdominal assessment: round and soft   yes    Recommendation:    Return to floor  Yes, inpatient, room 2133  Family or Argdebi Ferraris, daughter  Permission to share finding with family or friend yes

## 2022-05-31 NOTE — PERIOP NOTES
CRE balloon dilatation of the esophagus   13.5 mm Balloon inflated to 4.5 ATMs and held for 53 seconds. 15 mm Balloon inflated to 8 ATMs and held for 60 seconds. No subcutaneous crepitus of the chest or cervical region was noted post dilatation.

## 2022-05-31 NOTE — PROGRESS NOTES
.. TRANSFER - IN REPORT:    [facility](unit) for ordered procedure      Report consisted of patients Situation, Background, Assessment and   Recommendations(SBAR). Information from the following report(s) Procedure Summary, Intake/Output, MAR and Accordion was reviewed with the receiving nurse. Opportunity for questions and clarification was provided. Assessment completed upon patients arrival to unit and care assumed.      Need to call daughter for consent

## 2022-05-31 NOTE — PROGRESS NOTES
@1332-Attempted to call report x2, no answer. 2:16 PM  TRANSFER - OUT REPORT:    Verbal report given to Aron Busby  being transferred to [facility](unit) for routine progression of care       Report consisted of patients Situation, Background, Assessment and   Recommendations(SBAR). Information from the following report(s) Procedure Summary, MAR and Cardiac Rhythm NSR was reviewed with the receiving nurse. Lines:   Peripheral IV 05/30/22 Distal;Left;Posterior Forearm (Active)   Site Assessment Clean, dry, & intact 05/31/22 1336   Phlebitis Assessment 0 05/31/22 1336   Infiltration Assessment 0 05/31/22 1336   Dressing Status Clean, dry, & intact 05/31/22 1336   Dressing Type Tape;Transparent 05/31/22 1336   Hub Color/Line Status Capped 05/31/22 1336   Alcohol Cap Used Yes 05/31/22 1336        Opportunity for questions and clarification was provided.       Patient transported with:  Patient Chart

## 2022-06-01 NOTE — DISCHARGE SUMMARY
Hospitalist Discharge Summary     Patient ID:  Uvaldo Arredondo  [id number]  32 y.o.  1945 5/27/2022    PCP on record: Patricia Dawkins MD    Admit date: 5/27/2022  Discharge date and time: 6/2/22    DISCHARGE DIAGNOSIS:    High-grade esophageal obstruction POA due to Achalasia  Hypokalemia K 2.8  UTI suspect gram-negative organism POA  Dizziness, ? dehydration from poor oral intake  Mild aortic stenosis  Chronic headaches  Chronic vision changes  History of GERD  Dyslipidemia  Depression with anxiety  history of coronary artery disease  Agoraphobia    CONSULTATIONS:  IP CONSULT TO GASTROENTEROLOGY  IP CONSULT TO GASTROENTEROLOGY    Excerpted HPI from H&P of Daren Lewis MD:  Mimi Huffman is a 68 y.o. female who presents with past medical history of coronary artery disease, hypertension is coming the hospital chief complaints of dizziness after undergoing a x-ray barium swallow. Patient was having dysphagia for which she came to the hospital to have a barium swallow done and shortly after the procedure patient developed dizziness and was also noted to have borderline low blood pressure for which she was brought to the emergency department for evaluation. Patient reports that she has dysphagia since many months and also had poor oral intake for which she presented to the hospital to have the test done. She also reports chronic headaches. She reports dizziness which has been going on for at least few weeks. Does not report it is a spinning sensation. Does not report any abdominal pain. Reports mild nausea but no vomiting. Does not report any focal weakness, tingling or numbness. ______________________________________________________________________  DISCHARGE SUMMARY/HOSPITAL COURSE:  for full details see H&P, daily progress notes, labs, consult notes. Hospital course problem wise:    Patient was admitted to hospital and underwent management as follows.       High-grade esophageal obstruction POA  -CT showed evidence of high-grade esophageal obstruction. GI was consulted. Patient underwent a endoscopy and was noted to have achalasia and it was dilated and GI recommended that if symptoms are not improved as she will need Botox on outpatient basis. She was placed on a liquid diet and GI recommended she repeat her liquid diet for 2 days and advance as tolerated and have outpatient follow-up in the clinic. They cleared the patient for discharge for outpatient follow-up.     Hypokalemia-potassium is normal today     UTI suspect gram-negative organism POA  -S/p empiric treatment with ceftriaxone     Dizziness, ? dehydration from poor oral intake  Mild aortic stenosis  -Likely from dehydration which is now improved. Echo shows mild aortic stenosis.     Chronic headaches  Chronic vision changes  -Reports symptoms since many years and she has recently seen a ophthalmologist but is not sure of the diagnosis  -She will follow-up with her ophthalmologist on outpatient basis.     History of GERD  Dyslipidemia  Depression with anxiety  history of coronary artery disease  Agoraphobia  Gout  Chronic constipation  -Continue home Protonix, Zetia  -Continue metoprolol as below      _______________________________________________________________________  Patient seen and examined by me on discharge day. Pertinent Findings:  Gen:    Not in distress  Chest: Clear lungs  CVS:   Regular rhythm. No edema  Abd:  Soft, not distended, not tender  Neuro:  Alert, awake  _______________________________________________________________________  DISCHARGE MEDICATIONS:   Current Discharge Medication List      START taking these medications    Details   pantoprazole (Protonix) 40 mg tablet Take 1 Tablet by mouth daily for 14 days.   Qty: 14 Tablet, Refills: 0  Start date: 6/1/2022, End date: 6/15/2022         CONTINUE these medications which have NOT CHANGED    Details   senna-docusate (PERICOLACE) 8.6-50 mg per tablet Take 1 Tablet by mouth two (2) times a day. Qty: 60 Tablet, Refills: 0      bisacodyL (Dulcolax, bisacodyl,) 10 mg supp Insert 10 mg into rectum daily as needed for Constipation (To promote 3-4 bowel movements weekly). Qty: 30 Suppository, Refills: 0      polyethylene glycol (MIRALAX) 17 gram packet Take 1 Packet by mouth two (2) times a day. Qty: 60 Packet, Refills: 0      sertraline (Zoloft) 100 mg tablet Take 100 mg by mouth daily. mirabegron ER (MYRBETRIQ) 50 mg ER tablet Take 50 mg by mouth daily. acetaminophen (TYLENOL) 500 mg tablet Take 1,000 mg by mouth every six (6) hours as needed for Pain. aspirin 81 mg chewable tablet Take 1 Tab by mouth daily. Qty: 30 Tab, Refills: 0      clopidogreL (PLAVIX) 75 mg tab Take 1 Tab by mouth daily. Qty: 30 Tab, Refills: 0      ezetimibe (ZETIA) 10 mg tablet Take 1 Tab by mouth daily. Qty: 30 Tab, Refills: 0      metoprolol succinate (TOPROL-XL) 25 mg XL tablet Take 1 Tab by mouth every twelve (12) hours. Qty: 60 Tab, Refills: 0      amLODIPine (NORVASC) 2.5 mg tablet Take 2.5 mg by mouth daily. STOP taking these medications       melatonin 3 mg tablet Comments:   Reason for Stopping:                 Patient Follow Up Instructions:     1. Recommended diet: full liquid diet for 2 days and advance as tolerated    2. Recommended activity: Activity as tolerated    3. If you experience any of the following symptoms then please call your primary care physician or return to the emergency room if you cannot get hold of your doctor:    4. Wound Care: none     5. Lab work: cbc and bmp in 1 week     6. Bring these papers with you to your follow up appointments.  The papers will help your doctors be sure to continue the care plan from the hospital.    7. If symptoms are not better, you will need botox treatment for Esophageal obstruction      Follow-up Information     Follow up With Specialties Details Why Contact Yasmany Villar MD Gastroenterology Schedule an appointment as soon as possible for a visit in 1 week for GI Follow up appt.   [address]  [facility]  [phone]      [address] [place] [address] will call and schedule follow up appt for Pt.          ________________________________________________________________    Risk of deterioration: High    Condition at Discharge:  Stable  __________________________________________________________________    Disposition  Home with family and home health services    ____________________________________________________________________    Code Status: Full Code  ___________________________________________________________________      Total time in minutes spent coordinating this discharge (includes going over instructions, follow-up, prescriptions, and preparing report for sign off to her PCP) :  35  minutes    Signed:  Jad Kelley MD

## 2022-06-01 NOTE — PROGRESS NOTES
Duplicate PT order received and chart reviewed. Per chart review and therapist's previous conversation with daughter, pt is dependent for all functional mobility and ADLs. Daughter states that pt mostly stays in bed, only mobilizing to wheelchair 1 day/wk. Pt requires x2 person assist for bed<>wheelchair transfer and is dependent for wheelchair mobility. Pt with advancing dementia and increased anxiety, not appropriate for skilled intervention. Will complete PT order at this time. Recommend pt return home w/ continued 24hr assist of family vs LTC.

## 2022-06-01 NOTE — PROGRESS NOTES
Transition of Care Plan:    RUR: 12    DC today. SMART tool on door  DTR is not available to let in transport until after 3:30 PM   EDGAR GARCIA indicated that Moe Reyes will call Pt directly to schedule appts. [facility] should be here around 6:30 PM tonight. PCS on bedside chart. Disposition: Plan Home today. DTR will be home this afternoon after 3 PM   [facility] provides Medicaid Caregivers for her at home while DTR is working. Pt is bedbound at baseline. Follow up appointments:   DME needed: n/a  Transportation at Discharge: Lisa Ra will set up transportation and let us know what time they will be here. 1:10 PM CM called EDGAR GARCIA and left a VM   2:19 PM EDGAR GARCIA was not able to secure [facility]  Asked CM to set up with [facility] Member ID: [id number]  [facility] able to  at 6:30 PM   PCS on bedside chart. Family is aware of  time. Keys or means to access home:  DTR     IM Medicare Letter: delivered today over the phone to [address]. Copy placed on bedside chart. Is patient a BCPI-A Bundle:        no   If yes, was Bundle Letter given?:    Is patient a [place] and connected with the [employer] [address]?  no              If yes, was [employer] transfer form completed and [facility] notified? Caregiver Contact: DTR: Brittany Desai: [phone]  DTR: Erica Camacho: [phone]  Discharge Caregiver contacted prior to discharge? Yes. . reviewed plan with Maggie Woods over the phone. Care Conference needed?:      n/a             Reason for Admission:  Pt was admitted on 5/27/22 d/t a dizziness and low blood pressure. Dx: High-grade esophageal obstruction  UTI suspect gram-negative organism  Dizziness could be contributed by dehydration from poor oral intake and also moderate to severe aortic stenosis  Chronic headaches  Chronic vision changes                   RUR Score:         12            Plan for utilizing home health:      N/a Pt is bed bound at home with Medicaid caregiver.     PCP: First and Last name:  [facility] MD   [facility] of Practice: [facility]   Are you a current patient: Yes/No: yes   Approximate date of last visit: last week   Can you participate in a virtual visit with your PCP: yes                     Current Advanced Directive/Advance Care Plan: Full Code      Healthcare Decision Maker:       Primary Decision Maker (Active): Joshua Morin - Daughter - [phone]    Secondary Decision Maker (Active): Kt Antonio \"[place]\" - Daughter - [phone]                  Eval:   Pt is alert but confused. CM called and discussed DC plan with Pablo Shelton. CM talked to One Mahsa Maki Lieu: [phone] and she indicated that Pt is Bedbound and has [facility] Caregivers when DTR is at work. Pt lives with DTR in one story home with ramp to enter. D for all ADLs. No further CM needs. Care Management Interventions  PCP Verified by CM:  Yes  Palliative Care Criteria Met (RRAT>21 & CHF Dx)?: No  Mode of Transport at Discharge: BLS  Transition of Care Consult (CM Consult): Discharge Planning  MyChart Signup: No  Discharge Durable Medical Equipment: No  Physical Therapy Consult: No  Occupational Therapy Consult: No  Speech Therapy Consult: No  Support Systems: Child(jim),Caregiver/Home Care Staff,Other (Comment)  Confirm Follow Up Transport: Other (see comment)  Discharge Location  Patient Expects to be Discharged to[de-identified] Home with outpatient services    [facility]

## 2022-06-01 NOTE — DISCHARGE INSTRUCTIONS
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 dehydration from poor oral intake  Mild aortic stenosis  Chronic headaches  Chronic vision changes  History of GERD  Dyslipidemia  Depression with anxiety  history of coronary artery disease  Agoraphobia  Gout  Chronic constipation       Take Home Medications     {Medication reconciliation information is now added to the patient's AVS automatically when it is printed. There is no need to use this SmartLink in discharge instructions. Highlight this text and delete it to clear this message}      General drug facts      If you have a very bad allergy, wear an allergy ID at all times.  It is important that you take the medication exactly as they are prescribed.  Keep your medication in the bottles provided by the pharmacist.  Lei Likens a list of all your drugs (prescription, natural products, vitamins, OTC) with you. Give this list to your doctor.  Do not take other medications without consulting your doctor.   Do not share your drugs with others and do not take anyone else's drugs.  Keep all drugs out of the reach of children and pets.   Most drugs may be thrown away in household trash after mixing with coffee grounds or juan litter and sealing in a plastic bag.   Keep a list Call your doctor for help with any side effects. If in the [place] you may also call the FDA at 9-561-AVA-9028     Talk with the doctor before starting any new drug, including OTC, natural products, or vitamins. What to do at Home    1. Recommended diet: full liquid diet for 2 days and advance as tolerated    2. Recommended activity: Activity as tolerated    3.  If you experience any of the following symptoms then please call your primary care physician or return to the emergency room if you cannot get hold of your doctor:    4. Wound Care: none     5. Lab work: cbc and bmp in 1 week     6. Bring these papers with you to your follow up appointments. The papers will help your doctors be sure to continue the care plan from the hospital.    7. If symptoms are not better, you will need botox treatment for Esophageal obstruction      If you have questions regarding the hospital related prescriptions or hospital related issues please call [facility] at [phone]. You can always direct your questions to your primary care doctor if you are unable to reach your hospital physician; your PCP works as an extension of your hospital doctor just like your hospital doctor is an extension of your PCP for your time at the hospital [facility], [facility])      Follow-up with:   PCP: Susana Parrish MD  Follow-up Information     Follow up With Specialties Details Why Contact Info    Susana Parrish MD Family Medicine   [facility] [id number] [phone]      Linda Hernandez MD Gastroenterology Schedule an appointment as soon as possible for a visit in 1 week  [address] [phone]             Please call for your own appointment        Information obtained by :  I understand that if any problems occur once I am at home I am to contact my physician. I understand and acknowledge receipt of the instructions indicated above. Physician's or R.N.'s Signature                                                                  Date/Time                                                                                                                                              Patient or Representative Signature                                                          Date/Time    irst-line.

## 2022-06-02 NOTE — PROGRESS NOTES
[facility] estimated time of arrival for pickup for discharge was 2300. Daughter stated this time was too late and wanted patient to stay until tomorrow.  Notified MD on call and told not to cancel discharge, but have transport scheduled for the A.M.

## 2022-06-02 NOTE — PROGRESS NOTES
Pt is cleared for d/c from a CM standpoint. Transition of Care Plan:     RUR: 12%   Disposition: Home   DTR will be home this afternoon after 3 PM   [facility] provides Medicaid Caregivers for her at home while DTR is working. Pt is bedbound at baseline. Follow up appointments: [facility] schedules all f/u appts. DME needed: n/a  Transportation at Discharge: [facility] at 4 p.m.  [place] or means to access home:  DTR      Medicare Letter: delivered 6/1/22 over the phone to [address]. Copy placed on bedside chart. Is patient a BCPI-A Bundle:        no              If yes, was Bundle Letter given?:    Is patient a  and connected with the Room [address]?  no              If yes, was [employer] transfer form completed and [facility] notified? Caregiver Contact: DTR: Cheryle Cindikarla: [phone]  DTR: Kumar Almonte: [phone]  Discharge Caregiver contacted prior to discharge? Yes. . reviewed plan with Nicolas Meneses over the phone. Care Conference needed?:      n/a    9:46 a.m. CM updated Sarah Rutherford of [facility].    9:42 a.m. [facility] scheduled for 4 p.m. PCS on chart. 9:30 a.m.  CM called [facility] and they do not have any openings for 3:30 p.m. or after. CM will set up through [facility].        9:11 a.m. CM attempted [facility] again and left [facility].     CM reviewed chart. CM called and spoke with pt's daughter who stated [facility] was unable to transport pt until 11:30 p.m. last night, so daughter refused. Dtr will be available after 3:30 p.m. today. CM called and spoke with Sarah Rutherford of [facility] who stated to use [facility]. CM attempted to call [facility] x2 but received their voice mail. CM will continue to try. Care Management Interventions  PCP Verified by CM:  Yes  Palliative Care Criteria Met (RRAT>21 & CHF Dx)?: No  Mode of Transport at Discharge: BLS  Transition of Care Consult (CM Consult): Discharge Planning  MyChart Signup: No  Discharge Durable Medical Equipment: No  Physical Therapy Consult: No  Occupational Therapy Consult: No  Speech Therapy Consult: No  Support Systems: Child(jim),Caregiver/Home Care Staff,Other (Comment)  Confirm Follow Up Transport: Other (see comment)  Discharge Location  Patient Expects to be Discharged to[de-identified] Home with outpatient services      [facility], [address] [place] [address]

## 2022-06-02 NOTE — DISCHARGE SUMMARY
Hospitalist Discharge Summary     Patient ID:  Noelle Montgomery  [id number]  58 y.o.  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 dehydration from poor oral intake  Mild aortic stenosis  Chronic headaches  Chronic vision changes  History of GERD  Dyslipidemia  Depression with anxiety  history of coronary artery disease  Agoraphobia    CONSULTATIONS:  IP CONSULT TO GASTROENTEROLOGY  IP CONSULT TO GASTROENTEROLOGY    Excerpted HPI from H&P of Valerio Morse MD:  Sobia Scott is a 68 y.o. female who presents with past medical history of coronary artery disease, hypertension is coming the hospital chief complaints of dizziness after undergoing a x-ray barium swallow. Patient was having dysphagia for which she came to the hospital to have a barium swallow done and shortly after the procedure patient developed dizziness and was also noted to have borderline low blood pressure for which she was brought to the emergency department for evaluation. Patient reports that she has dysphagia since many months and also had poor oral intake for which she presented to the hospital to have the test done. She also reports chronic headaches. She reports dizziness which has been going on for at least few weeks. Does not report it is a spinning sensation. Does not report any abdominal pain. Reports mild nausea but no vomiting. Does not report any focal weakness, tingling or numbness. ______________________________________________________________________  DISCHARGE SUMMARY/HOSPITAL COURSE:  for full details see H&P, daily progress notes, labs, consult notes. Hospital course problem wise:    Patient was admitted to hospital and underwent management as follows.       High-grade esophageal obstruction POA  -CT showed evidence of high-grade esophageal obstruction. GI was consulted. Patient underwent a endoscopy and was noted to have achalasia and it was dilated and GI recommended that if symptoms are not improved as she will need Botox on outpatient basis. She was placed on a liquid diet and GI recommended she repeat her liquid diet for 2 days and advance as tolerated and have outpatient follow-up in the clinic. They cleared the patient for discharge for outpatient follow-up.     Hypokalemia-potassium is normal today     UTI suspect gram-negative organism POA  -S/p empiric treatment with ceftriaxone     Dizziness, ? dehydration from poor oral intake  Mild aortic stenosis  -Likely from dehydration which is now improved. Echo shows mild aortic stenosis.     Chronic headaches  Chronic vision changes  -Reports symptoms since many years and she has recently seen a ophthalmologist but is not sure of the diagnosis  -She will follow-up with her ophthalmologist on outpatient basis.     History of GERD  Dyslipidemia  Depression with anxiety  history of coronary artery disease  Agoraphobia  Gout  Chronic constipation  -Continue home Protonix, Zetia  -Continue metoprolol as below      _______________________________________________________________________  Patient seen and examined by me on discharge day. Pertinent Findings:  Gen:    Not in distress  Chest: Clear lungs  CVS:   Regular rhythm. No edema  Abd:  Soft, not distended, not tender  Neuro:  Alert, awake  _______________________________________________________________________  DISCHARGE MEDICATIONS:   Current Discharge Medication List      START taking these medications    Details   pantoprazole (Protonix) 40 mg tablet Take 1 Tablet by mouth daily for 14 days.   Qty: 14 Tablet, Refills: 0  Start date: 6/1/2022, End date: 6/15/2022         CONTINUE these medications which have CHANGED    Details   metoprolol succinate (TOPROL-XL) 25 mg XL tablet Take 1 Tablet by mouth daily for 30 days. Qty: 30 Tablet, Refills: 0  Start date: 6/2/2022, End date: 7/2/2022         CONTINUE these medications which have NOT CHANGED    Details   senna-docusate (PERICOLACE) 8.6-50 mg per tablet Take 1 Tablet by mouth two (2) times a day. Qty: 60 Tablet, Refills: 0      bisacodyL (Dulcolax, bisacodyl,) 10 mg supp Insert 10 mg into rectum daily as needed for Constipation (To promote 3-4 bowel movements weekly). Qty: 30 Suppository, Refills: 0      polyethylene glycol (MIRALAX) 17 gram packet Take 1 Packet by mouth two (2) times a day. Qty: 60 Packet, Refills: 0      sertraline (Zoloft) 100 mg tablet Take 100 mg by mouth daily. mirabegron ER (MYRBETRIQ) 50 mg ER tablet Take 50 mg by mouth daily. acetaminophen (TYLENOL) 500 mg tablet Take 1,000 mg by mouth every six (6) hours as needed for Pain. aspirin 81 mg chewable tablet Take 1 Tab by mouth daily. Qty: 30 Tab, Refills: 0      ezetimibe (ZETIA) 10 mg tablet Take 1 Tab by mouth daily. Qty: 30 Tab, Refills: 0      amLODIPine (NORVASC) 2.5 mg tablet Take 2.5 mg by mouth daily. STOP taking these medications       melatonin 3 mg tablet Comments:   Reason for Stopping:         clopidogreL (PLAVIX) 75 mg tab Comments:   Reason for Stopping:                 Patient Follow Up Instructions:     1. Recommended diet: full liquid diet for 2 days and advance as tolerated    2. Recommended activity: Activity as tolerated    3. If you experience any of the following symptoms then please call your primary care physician or return to the emergency room if you cannot get hold of your doctor:    4. Wound Care: none     5. Lab work: cbc and bmp in 1 week     6. Bring these papers with you to your follow up appointments.  The papers will help your doctors be sure to continue the care plan from the hospital.    7. If symptoms are not better, you will need botox treatment for Esophageal obstruction      Follow-up Information     Follow up With Specialties Details Why Mortimer Sneddon, MD Gastroenterology Schedule an appointment as soon as possible for a visit in 1 week for GI Follow up appt.   [address] [facility] [facility]  [phone] [address] [facility] will call and schedule follow up appt for Pt.          ________________________________________________________________    Risk of deterioration: High    Condition at Discharge:  Stable  __________________________________________________________________    Disposition  Home with family and home health services    ____________________________________________________________________    Code Status: Full Code  ___________________________________________________________________      Total time in minutes spent coordinating this discharge (includes going over instructions, follow-up, prescriptions, and preparing report for sign off to her PCP) :  35  minutes    Signed:  Edie Chaudhary MD

## 2022-06-02 NOTE — PROGRESS NOTES
Pharmacist Discharge Medication Reconciliation    Significant PMH:   Past Medical History:   Diagnosis Date    Agoraphobia without mention of panic attacks 2/17/2014    Anxiety disorder 8/18/2013    Arthritis     osteo    CAD (coronary artery disease), native coronary artery 12/1/2015    no stents    Chronic chest pain 1/13/2014    Chronic pain associated with significant psychosocial dysfunction 2/17/2014    Depression 8/18/2013    Diabetes ([facility] [address]     type II    Duplicated right renal collecting system 3/13/2014    GERD (gastroesophageal reflux disease)     Gout, joint     Hypercholesteremia     hyercholesterolemia    Hypertension     Murmur     Nephrolithiasis 3/13/2014    Personal history of noncompliance with medical treatment, presenting hazards to health 5/30/2014     Encounter Diagnoses:   Encounter Diagnoses   Name Primary?  Acute cystitis with hematuria     Dehydration     Diarrhea, unspecified type     Esophageal obstruction Yes     Allergies: Amoxicillin, Sulfa (sulfonamide antibiotics), Mirtazapine, Percocet [oxycodone-acetaminophen], Codeine, Crestor [rosuvastatin], Nitroglycerin, Prednisone, and Zithromax [azithromycin]    Discharge Medications:   Current Discharge Medication List      START taking these medications    Details   pantoprazole (Protonix) 40 mg tablet Take 1 Tablet by mouth daily for 14 days. Qty: 14 Tablet, Refills: 0  Start date: 6/1/2022, End date: 6/15/2022         CONTINUE these medications which have CHANGED    Details   metoprolol succinate (TOPROL-XL) 25 mg XL tablet Take 1 Tablet by mouth daily for 30 days. Qty: 30 Tablet, Refills: 0  Start date: 6/2/2022, End date: 7/2/2022         CONTINUE these medications which have NOT CHANGED    Details   senna-docusate (PERICOLACE) 8.6-50 mg per tablet Take 1 Tablet by mouth two (2) times a day.   Qty: 60 Tablet, Refills: 0      bisacodyL (Dulcolax, bisacodyl,) 10 mg supp Insert 10 mg into rectum daily as needed for Constipation (To promote 3-4 bowel movements weekly). Qty: 30 Suppository, Refills: 0      polyethylene glycol (MIRALAX) 17 gram packet Take 1 Packet by mouth two (2) times a day. Qty: 60 Packet, Refills: 0      sertraline (Zoloft) 100 mg tablet Take 100 mg by mouth daily. mirabegron ER (MYRBETRIQ) 50 mg ER tablet Take 50 mg by mouth daily. acetaminophen (TYLENOL) 500 mg tablet Take 1,000 mg by mouth every six (6) hours as needed for Pain. aspirin 81 mg chewable tablet Take 1 Tab by mouth daily. Qty: 30 Tab, Refills: 0      ezetimibe (ZETIA) 10 mg tablet Take 1 Tab by mouth daily. Qty: 30 Tab, Refills: 0      amLODIPine (NORVASC) 2.5 mg tablet Take 2.5 mg by mouth daily. STOP taking these medications       melatonin 3 mg tablet Comments:   Reason for Stopping:         clopidogreL (PLAVIX) 75 mg tab Comments:   Reason for Stopping:               The patient's chart, MAR and AVS were reviewed by Yris Ferrell, [facility].     Discharging Provider: Rick Daniel MD    Thank you,     Yris Ferrell, [facility]

## 2022-06-02 NOTE — ADT AUTH CERT NOTES
Completed 6/2/2022 10:07      Criteria Review      Care Day: 6 Care Date: 6/1/2022 Level of Care: Inpatient Floor    Guideline Day 2    Clinical Status    (X) * Hemodynamic stability    6/2/2022 10:07:31 EDT by Jessica Velarde      98.8 °F, 82, 147//92, 17, 100% RA    (X) * Vomiting absent or managed    (X) * No evidence of perforation or severe injury    (X) * Electrolyte abnormalities absent or acceptable for next level of care    (X) * Pain absent or managed    ( ) * Discharge plans and education understood    6/2/2022 10:07:31 EDT by Jessica Velarde      Pt with advancing dementia and increased anxiety. Ambulance service not able to  patient until 11pm due to dementia, daughter requesting transfer in the AM    Activity    ( ) * Ambulatory or acceptable for next level of care    6/2/2022 10:07:31 EDT by Aicha Crook dependent for all functional mobility and ADLs, Daughter states that pt mostly stays in bed, only mobilizing to wheelchair 1 day/wk. Pt requires x2 person assist for bed<>wheelchair transfer and is dependent for wheelchair mobility    Routes    (X) * Oral hydration    (X) * Oral medication or regimen acceptable for next level of care    6/2/2022 10:07:31 EDT by Jessica Velarde      norvasc 2.5mg QHS PO, zetia 10mg QD PO, toprol-xl 25mg QD PO, pericolace 1tab PO 1x, zoloft 100mg QD PO    (X) * Oral diet or acceptable for next level of care    Medications    (X) Possible acid suppressants    6/2/2022 10:07:31 EDT by Aicha Crook protonix 40mg QD IV    * Milestone   Additional Notes    DATE: 06/01/2022      Pertinent Updates:   -Pt with advancing dementia and increased anxiety.  Ambulance service not able to  patient until 11pm due to dementia, daughter requesting transfer in the AM          Physical Exam:   Gen:    Not in distress   Chest:  Clear lungs   CVS:    Regular rhythm.  No edema   Abd:     Soft, not distended, not tender   Neuro:  Alert, awake Assessments & Plans:   **Internal Medicine**   Patient was admitted to hospital and underwent management as follows.           High-grade esophageal obstruction POA   -CT showed evidence of high-grade esophageal obstruction.  GI was consulted.  Patient underwent a endoscopy and was noted to have achalasia and it was dilated and GI recommended that if symptoms are not improved as she will need Botox on outpatient basis.  She was placed on a liquid diet and GI recommended she repeat her liquid diet for 2 days and advance as tolerated and have outpatient follow-up in the clinic.  They cleared the patient for discharge for outpatient follow-up.       Hypokalemia-potassium is normal today       UTI suspect gram-negative organism POA   -S/p empiric treatment with ceftriaxone       Dizziness, ? dehydration from poor oral intake   Mild aortic stenosis   -Likely from dehydration which is now improved.  Echo shows mild aortic stenosis.       Chronic headaches   Chronic vision changes   -Reports symptoms since many years and she has recently seen a ophthalmologist but is not sure of the diagnosis   -She will follow-up with her ophthalmologist on outpatient basis.       History of GERD   Dyslipidemia   Depression with anxiety   history of coronary artery disease   Agoraphobia   Gout   Chronic constipation   -Continue home Protonix, Zetia   -Continue metoprolol as below             Esophageal Disease - Care Day 5 (5/31/2022) by Nadine Mcfadden       Review Status Review Entered   Completed 6/1/2022 09:31      Criteria Review      Care Day: 5 Care Date: 5/31/2022 Level of Care: Inpatient Floor    Guideline Day 2    Clinical Status    (X) * Hemodynamic stability    6/1/2022 09:31:41 EDT by Arlington Closs      99.1 °F, 98, 93/65- 185/94  , 18, 100% RA    (X) * Vomiting absent or managed    (X) * No evidence of perforation or severe injury    (X) * Electrolyte abnormalities absent or acceptable for next level of care (X) * Pain absent or managed    6/1/2022 09:31:41 EDT by Bev Holly [place]ond taking tylenol 650mg q6 prn PO    ( ) * Discharge plans and education understood    Activity    (X) * Ambulatory or acceptable for next level of care    6/1/2022 09:31:41 EDT by Bev [place] with assist    Routes    (X) * Oral hydration    (X) * Oral medication or regimen acceptable for next level of care    6/1/2022 09:31:41 EDT by Phyllis Quant      Tylenol 650mg q6 PRN PO 1x, Norvasc 2.5mg QHS PO, Miaralax 17g BID PO 1x, Pericolace 1tab BID PO 1x,     other med  NS 25ml/hr continuous IV  D5 0.45% NS with Kcl 40meq 100ml/hr continuous IV    (X) * Oral diet or acceptable for next level of care    6/1/2022 09:31:41 EDT by Phyllis Quant      full liquid diet    Interventions    (X) Electrolytes    Medications    (X) Possible acid suppressants    6/1/2022 09:31:41 EDT by Phyllis Quant      Protonix 40mg QD IV    * Milestone   Additional Notes    DATE: 05/31/2022      Pertinent Updates:   - S/P Esophagogastroduodenoscopy with esophageal dilation      LABS   WBC: 2.9 (L)   HGB: 10.9 (L)   RDW: 15.6 (H)   LYMPHOCYTES: 52 (H)   ABS. NEUTROPHILS: 0.9 (L)   BUN: 4 (L)   BUN/Creatinine ratio: 7 (L)   Calcium: 8.4 (L)         Physical Exam:   General:          WD, WN. Alert, cooperative, no acute distress     EENT:              PERRL. Anicteric sclerae.  MMM   Resp:               CTA bilaterally, no wheezing or rales.  No accessory muscle use   CV:                  Regular  rhythm,  grade 2/6 SM at LSB, No edema   Neurologic:       Alert, normal speech, non focal motor exam   Psych:   Not anxious nor agitated   Skin:                No rashes.  No jaundice      Assessments & Plans:   **Internal Medicine**   Assessment / Plan:       High-grade esophageal obstruction POA   - Etiology unclear, daughter notes She had a stricture years ago   - Prior ERCP 2018 noted normal esophagus, stomach, duodenum   - progressive dysphagia x weeks   - outpatient barium swallow 5/27 showed evidence of high-grade esophageal obstruction               --> sent to ED   - dizziness during test, resolved   - Keep NPO except for clears   - IVF with KCL   - GI consulted, EGD today   - Advance diet pending results of EGD   - PT/OT consults   - IV protonix       Hypokalemia K 2.8   Added KCL toIVF, now resolved       UTI suspect gram-negative organism POA   - No Urine culture sent. - ceftriaxone x 3 days, last dose 5/29       Dizziness, ? dehydration from poor oral intake   Mild aortic stenosis   - Tele   - reduce IVF   - Repeat echo to assess size of aortic valve. - Consider cardiology evaluation based on repeat echocardiogram   - Echo read as mild AS       Chronic headaches   Chronic vision changes   -Reports symptoms since many years and she has recently seen a ophthalmologist but is not sure of the diagnosis       History of GERD   Dyslipidemia   Depression with anxiety   history of coronary artery disease   Agoraphobia   Gout   Chronic constipation   -Continue home Protonix, Zetia   -Hold home Norvasc as blood pressure is borderline low but will continue metoprolol   -Continue scheduled and as needed laxatives   -Continue home Zoloft       Code Status: Full code   Surrogate Decision Maker: Daughter       DVT Prophylaxis: lovenox, stop after Monday AM for EGD tuesday   GI Prophylaxis: not indicated       Baseline: From home, independent of ADLs       Body mass index is 22.08 kg/m².       **Gastroenterology**   Esophagogastroduodenoscopy (EGD) Procedure Note       Procedure: Esophagogastroduodenoscopy with esophageal dilation       Indication:      dysphagia   Pre-operative Diagnosis: see indication above   Post-operative Diagnosis: see findings below          Exam:   Airway: clear, no airway problems anticipated   Heart: RRR, without gallops or rubs   Lungs: clear bilaterally without wheezes, crackles, or rhonchi   Abdomen: soft, nontender, nondistended, bowel sounds present   Mental Status: awake, alert and oriented to person, place and time        Anethesia/Sedation: [facility] anesthesia Propofol   Procedure Details    After informed consent was obtained for the procedure, with all risks and benefits of procedure explained the patient was taken to the endoscopy suite and placed in the left lateral decubitus position.  Following sequential administration of sedation as per above, the [id number] gastroscope was inserted into the mouth and advanced under direct vision to third portion of the duodenum.  A careful inspection was made as the gastroscope was withdrawn, including a retroflexed view of the proximal stomach; findings and interventions are described below.                 Findings:    OROPHARYNX: Cords and pyriform recesses normal.    ESOPHAGUS:    -- dilated proximal esophagus with angulation and tortuosity. -- tight LES with \"popping\" sensation when endoscope passes through. Dilated with TTS balloon to 12 mm, 13.5 mm ,then 15 mm under endoscopic visualization. -- The Z-Line is intact. STOMACH: The fundus on antegrade and retroflex views is normal. The body, antrum, and pylorus are normal.    DUODENUM: The bulb, second and third portions are normal.       Therapies:   esophageal dilation with 12-15 mm sized balloon       Specimens: none       EBL:  None.          Complications:   None; patient tolerated the procedure well.             Impression:   -- esophageal obstruction, endoscopically consistent with achalasia with spastic appearing, tight LES and dilated proximal esophagus; Dilated LES to 15 mm with TTS balloon as above.  No discrete stricture   -- presbyesophagus       Recommendations:   -- await effect of dilation   -- consider botox injection trial if dilation ineffective   -- resume diet, starting at full liquids and resume from there   -- GI will sign off       Discharge disposition:  Back to wards                           Esophageal Disease - Care Day 4 (5/30/2022) by Talha Jay       Review Status Review Entered   Completed 5/31/2022 10:49      Criteria Review      Care Day: 4 Care Date: 5/30/2022 Level of Care: Inpatient Floor    Guideline Day 2    Clinical Status    (X) * Hemodynamic stability    5/31/2022 10:49:51 EDT by Talha Jay      97.9 °F 63 169/74 18 100 % on RA    (X) * Vomiting absent or managed    (X) * No evidence of perforation or severe injury    ( ) * Electrolyte abnormalities absent or acceptable for next level of care    5/31/2022 10:49:51 EDT by Talha Jay      Potassium: 3.2 (L)  Chloride: 110 (H)  Calcium: 8.3 (L)  HGB: 10.5 (L)  HCT: 34.1 (L)    (X) * Pain absent or managed    ( ) * Discharge plans and education understood    Activity    (X) * Ambulatory or acceptable for next level of care    5/31/2022 10:49:51 EDT by Talha Jay      as tolerated w/ assist    Routes    (X) * Oral hydration    5/31/2022 10:49:51 EDT by Edie Graves on NPO w/ Sips of Water & Meds    ( ) * Oral medication or regimen acceptable for next level of care    5/31/2022 10:49:51 EDT by Talha Jay      D5 NS with KCl 40 mEq/L infusion 100ml/hr IV, Lovenox 40mg d sc Oral meds held    ( ) * Oral diet or acceptable for next level of care    Interventions    (X) Electrolytes    Medications    (X) Possible acid suppressants    5/31/2022 10:49:51 EDT by Talha Jay      Protonix 40mg d IV    * Milestone   Additional Notes    DATE: 05/30/22      Pertinent Updates:   - pt. verbalized \"I am not well\" Still hungry,feels weak all over; on NPO except for clears; IVF with KCL, IV Protonix   -Planning for EGD in AM      Physical Exam:   EENT:              PERRL. Anicteric sclerae.  MMM   Resp:               CTA bilaterally, no wheezing or rales.  No accessory muscle use   CV:                  Regular  rhythm,  grade 2/6 SM at LSB, No edema   GI:                   Soft, Non distended, mildly tender epigastric region.  +Bowel sounds, no rebound Neurologic:       Alert, normal speech, non focal motor exam      **Hospitalist Notes**   Assessment / Plan:   High-grade esophageal obstruction POA   - Etiology unclear, daughter notes prior she had a stricture   - Prior ERCP 2018 noted normal esophagus, stomach, duodenum   - progressive dysphagia x weeks   - outpatient barium swallow 5/27 showed evidence of high-grade esophageal obstruction               --> sent to ED   - dizziness during test, resolved   - Keep NPO except for clears   - IVF with KCL   - GI consulted, plan for EGD tomorrow   - IV protonix       UTI suspect gram-negative organism POA   - No Urine culture sent. - ceftriaxone, stop after 3 days, last dose 5/29       Dizziness, ? dehydration from poor oral intake   Mild aortic stenosis   - Tele   - Continue IVF   - Repeat echo to assess size of aortic valve.    - Consider cardiology evaluation based on repeat echocardiogram   - Echo read as mild AS       Chronic headaches   Chronic vision changes   -Reports symptoms since many years and she has recently seen a ophthalmologist but is not sure of the diagnosis       History of GERD   Dyslipidemia   Depression with anxiety   history of coronary artery disease   Agoraphobia   Gout   Chronic constipation   -Continue home Protonix, Zetia   -Hold home Norvasc as blood pressure is borderline low but will continue metoprolol   -Continue scheduled and as needed laxatives   -Continue home Zoloft       Code Status: Full code   DVT Prophylaxis: lovenox, stop after Monday AM for EGD Tuesday        Esophageal Disease - Care Day 3 (5/29/2022) by Mitra Saul       Review Status Review Entered   Completed 5/31/2022 10:29      Criteria Review      Care Day: 3 Care Date: 5/29/2022 Level of Care: Inpatient Floor    Guideline Day 2    Clinical Status    (X) * Hemodynamic stability    5/31/2022 10:29:56 EDT by Mitra Saul      98.3 °F 65 152/82 18 99 % RA    (X) * Vomiting absent or managed    (X) * No evidence of perforation or severe injury    ( ) * Electrolyte abnormalities absent or acceptable for next level of care    5/31/2022 10:29:56 EDT by Margarita Leal      Calcium: 8.4 (L)  Potassium: 3.0 (L)  Creatinine: 0.53 (L)  HGB: 11.0 (L)    (X) * Pain absent or managed    ( ) * Discharge plans and education understood    Activity    (X) * Ambulatory or acceptable for next level of care    5/31/2022 10:29:56 EDT by Filomena Badillo as tolerated w/ assist    Routes    (X) * Oral hydration    5/31/2022 10:29:56 EDT by Filomena Badillo pt. on Sips of clear liquid    ( ) * Oral medication or regimen acceptable for next level of care    5/31/2022 10:29:56 EDT by Margarita Leal      Rocephin 1g q24 IV, D5 NS with KCl 40 mEq/L infusion 100ml/hr IV, Lovenox 40mg d sc  Oral meds on hold    ( ) * Oral diet or acceptable for next level of care    Interventions    (X) Electrolytes    Medications    (X) Possible acid suppressants    5/31/2022 10:29:56 EDT by Margarita Leal      Protonix 40mg d IV    * Milestone   Additional Notes    DATE: 05/29/22      Pertinent Updates:   -Pt. still asking for food, complains of being hungry on sips of clear liquid;   -KCL on IVF, IV protonix;PO meds still held   -Waiting 3 days in house for EGD given last of staffing to do it on weekends      Physical Exam:   EENT:              PERRL. Anicteric sclerae.  MMM   Neck:               No meningismus, no thyromegaly   Resp:               CTA bilaterally, no wheezing or rales.  No accessory muscle use   CV:                  Regular  rhythm,  grade 2/6 SM at LSB, No edema   GI:                   Soft, Non distended, mildly tender epigastric region.  +Bowel sounds, no rebound          **Hospitalist Notes**   Assessment / Plan:   High-grade esophageal obstruction POA   - Etiology unclear, daughter notes prior she had a stricture   - Prior ERCP 2018 noted normal esophagus, stomach, duodenum   - progressive dysphagia x weeks   - outpatient barium swallow 5/27 showed evidence of high-grade esophageal obstruction               --> sent to ED   - dizziness during test, resolved   -Keep NPO except for clears   - IVF with KCL   - GI consulted, plan for EGD tuesday   - IV protonix       UTI suspect gram-negative organism POA   - No Urine culture sent. - ceftriaxone, stop after 3 days, last dose 5/29       Dizziness, ? dehydration from poor oral intake   Mild aortic stenosis   - Tele   - Continue IVF   - Repeat echo to assess size of aortic valve. - Consider cardiology evaluation based on repeat echocardiogram   - Echo read as mild AS       Chronic headaches   Chronic vision changes   -Reports symptoms since many years and she has recently seen a ophthalmologist but is not sure of the diagnosis       History of GERD   Dyslipidemia   Depression with anxiety   history of coronary artery disease   Agoraphobia   Gout   Chronic constipation   -Continue home Protonix, Zetia   -Hold home Norvasc as blood pressure is borderline low but will continue metoprolol   -Continue scheduled and as needed laxatives   -Continue home Zoloft       Code Status: Full code    DVT Prophylaxis: lovenox, stop after Monday AM for EGD tuesday        Esophageal Disease - Care Day 2 (5/28/2022) by Genoveva Morin       Review Status Review Entered   Completed 5/31/2022 09:58      Criteria Review      Care Day: 2 Care Date: 5/28/2022 Level of Care: Inpatient Floor    Guideline Day 2    Level Of Care    ( ) Floor to discharge    5/31/2022 09:58:08 EDT by Genoveva Morin      IP Medical    Clinical Status    (X) * Hemodynamic stability    5/31/2022 09:58:08 EDT by Genoveva Morin      98.2 °F 65 196/90 16 99% on RA    (X) * Vomiting absent or managed    (X) * No evidence of perforation or severe injury    5/31/2022 09:58:08 EDT by Genoveva Morin      High-grade obstruction of the distal esophagus at the  gastroesophageal junction, A definite mass or filling body is not seen at this time.  5/27 ( ) * Electrolyte abnormalities absent or acceptable for next level of care    5/31/2022 09:58:08 EDT by Nicolas Mckeon      Potassium: 2.8 (L)  Creatinine: 0.53 (L)  Calcium: 8.1 (L)  HGB: 10.4 (L)  HCT: 33.4 (L)    (X) * Pain absent or managed    ( ) * Discharge plans and education understood    Activity    (X) * Ambulatory or acceptable for next level of care    5/31/2022 09:58:08 EDT by Kimberly Stein Pt is dependent for all functional mobility requires 2 person for transfers and is dependent for wheelchair mobility; at baseline    Routes    (X) * Oral hydration    5/31/2022 09:58:08 EDT by Mando Reasons of clear liquid per GI    ( ) * Oral medication or regimen acceptable for next level of care    5/31/2022 09:58:08 EDT by Nicolas Mckeon      Apresoline 20mg prn x1 IV, Rocephin 1g q24 IV,D5 0.9% NaCl with KCl 40 mEq/L infusion 100mL/hr IV, Lovenox 40mg d sc    ( ) * Oral diet or acceptable for next level of care    5/31/2022 09:58:08 EDT by Nicolas Mckeon      Diet: Sips of Clear    Interventions    (X) Electrolytes    Medications    (X) Possible acid suppressants    5/31/2022 09:58:08 EDT by Nicolas Mckeon      Protonix 40mg d IV    * Milestone   Additional Notes    DATE: 05/28/22      Pertinent Updates:   -pt. asking for something to drink, sips of clears per GI; KCL added to IVF; on IV protonix   -GI consulted, plan for EGD tuesday      ECHO ADULT   -Left Ventricle: Normal left ventricular systolic function with a visually estimated EF of 55 - 60%. Left ventricle size is normal. Moderately increased wall thickness. Normal wall motion. Normal diastolic function.   -Pericardium: Small (<1 cm) pericardial effusion present. Pericardial effusion has a hematoma. No indication of cardiac tamponade.          Physical Exam:   General:          WD, WN. Alert, cooperative, no acute distress     EENT:              PERRL. Anicteric sclerae.  MMM   Neck:               No meningismus, no thyromegaly Resp:               CTA bilaterally, no wheezing or rales.  No accessory muscle use   CV:                  Regular  rhythm,  No edema   GI:                   Soft, Non distended, Non tender.  +Bowel sounds, no rebound   LN:                   No cervical or inguinal TYRONE   Neurologic:       Alert and oriented X 3, normal speech, non focal motor exam   Psych:   Good insight. Not anxious nor agitated   Skin:                No rashes.  No jaundice         **Hospitalist Notes**   Assessment / Plan:   High-grade esophageal obstruction POA   - Etiology unclear   - Prior ERCP 2018 noted normal esophagus, stomach, duodenum   - progressive dysphagia x weeks   - outpatient barium swallow 5/27 showed evidence of high-grade esophageal obstruction               --> sent to ED   - dizziness during test, sent to ED   -Keep NPO except for clears   - IVF, add KCL to IVF   - GI consulted, plan for EGD tuesday   - IV protonix       UTI suspect gram-negative organism POA   - No Urine culture sent. - ceftriaxone day 2, stop after 3 days       Dizziness, ? dehydration from poor oral intake   Moderate to severe aortic stenosis   - Recent echo that is showing evidence of moderate to severe aortic stenosis   - Tele   - Continue IVF   - Repeat echo to assess size of aortic valve.    -Consider cardiology evaluation based on repeat echocardiogram       Chronic headaches   Chronic vision changes   -Reports symptoms since many years and she has recently seen a ophthalmologist but is not sure of the diagnosis       History of GERD   Dyslipidemia   Depression with anxiety   history of coronary artery disease   Agoraphobia   Gout   Chronic constipation   -Continue home Protonix, Zetia   -Hold home Norvasc as blood pressure is borderline low but will continue metoprolol   -Continue scheduled and as needed laxatives   -Continue home Zoloft       Code Status: Full code   DVT Prophylaxis: lovenox, stop after Monday AM for EGD tuesday         PT/OT Assessments or Notes:   **PT Notes**   Order received. Chart reviewed. Pt is dependent for all functional mobility. She lays in bed and only mobilizes to wheelchair 1 day/wk. She requires 2 person A for transfers and is dependent for wheelchair mobility. Not appropriate for acute PT services. Recommendation from last admission was 24/7 care or LTC.      **OT Notes**   Order acknowledged and chart reviewed. Per chart review, patient is dependent for all ADLs and functional mobility. From last admission, daughter reported patient stays in bed most days and will mobilize to w/c 1 day/wk. Patient required x2 person assist for transfers and dependent for w/c mobility.  Patient not appropriate for OT services during acute hospital stay

## 2022-06-27 PROBLEM — N20.1 LEFT URETERAL STONE: Status: ACTIVE | Noted: 2022-01-01

## 2022-06-27 PROBLEM — E87.6 HYPOKALEMIA: Status: ACTIVE | Noted: 2022-01-01

## 2022-06-27 NOTE — ED NOTES
Bedside and Verbal shift change report given to this RN and emperatriz Robles RN (oncoming nurse) by Conchis Gaytan RN (offgoing nurse). Report included the following information SBAR. Patients vitals stable. Pur wick in place and pt into gown.

## 2022-06-27 NOTE — PROGRESS NOTES
Problem: Dysphagia (Adult)  Goal: *Acute Goals and Plan of Care (Insert Text)  Description: 6/27/2022  Speech path  1. Patient will tolerate minced and moist diet with thins with no overt s/s of aspiration. 2. Patient will tolerate diet upgrade with no overt s/s of aspiration. Outcome: Not Met   SPEECH LANGUAGE PATHOLOGY BEDSIDE SWALLOW EVALUATION  Patient: Bessie Bradshaw (21 y.o. female)  Date: 6/27/2022  Primary Diagnosis: UTI (urinary tract infection) [N39.0]  Left ureteral stone [N20.1]  Hypokalemia [E87.6]  Procedure(s) (LRB):  CYSTOSCOPY LEFT URETERAL STENT INSERTION (Left) Day of Surgery   Precautions:        ASSESSMENT :  Based on the objective data described below, the patient presents with functional oropharyngeal swallow. Orally she accepted the spoon and straw well. Mastication of soft solids was mildly slow with min oral residue on the right. Min loss of applesauce on the right side of her mouth. Her swallowing is audible and seems timely. No overt s/s of aspiration. Due to being edentulous, recommend minced diet with thins. Her speech was clear and basic language was functional answering questions. OX4. Pleasant and asking for her needs to be met. Patient will benefit from skilled intervention to address the above impairments. Patients rehabilitation potential is considered to be Good     PLAN :  Recommendations and Planned Interventions:  Minced diet is recommended with thins   Frequency/Duration: Patient will be followed by speech-language pathology 2 times a week to address goals. Discharge Recommendations: None     SUBJECTIVE:   Patient asked if her dentures were in her mouth.      OBJECTIVE:     Past Medical History:   Diagnosis Date    Agoraphobia without mention of panic attacks 2/17/2014    Anxiety disorder 8/18/2013    Arthritis     osteo    CAD (coronary artery disease), native coronary artery 12/1/2015    no stents    Chronic chest pain 1/13/2014    Chronic pain associated with significant psychosocial dysfunction 2/17/2014    Depression 8/18/2013    Diabetes ([facility] [address]     type II    Duplicated right renal collecting system 3/13/2014    GERD (gastroesophageal reflux disease)     Gout, joint     Hypercholesteremia     hyercholesterolemia    Hypertension     Murmur     Nephrolithiasis 3/13/2014    Personal history of noncompliance with medical treatment, presenting hazards to health 5/30/2014     Past Surgical History:   Procedure Laterality Date    EGD  4/23/2010         HX CHOLECYSTECTOMY  09/20/2018    lap jesus    HX CYST REMOVAL      cyst removed from left wrist    HX HEART CATHETERIZATION  12/01/2015    HX HYSTERECTOMY      partial    HX OTHER SURGICAL      bladder dilitation    HX TUBAL LIGATION      HX UROLOGICAL      kidney stones    UPPER GI ENDOSCOPY,BALL DIL,30MM  5/31/2022          Prior Level of Function/Home Situation:      Diet prior to admission:   Current Diet:  NPO   Cognitive and Communication Status:  Neurologic State: Alert  Orientation Level: Oriented X4  Cognition: Follows commands  Perception: Appears intact  Perseveration: No perseveration noted     Oral Assessment:  Oral Assessment  Labial: No impairment  Dentition: Edentulous  Lingual: No impairment  Mandible: No impairment  P.O. Trials:  Patient Position: sitting upright with her head leaning to the R; very stiff  Vocal quality prior to P.O.: No impairment  Consistency Presented: Thin liquid;Puree;Mechanical soft  How Presented: Self-fed/presented;Straw     Bolus Acceptance: No impairment  Bolus Formation/Control: Impaired  Type of Impairment: Delayed;Mastication  Propulsion: No impairment  Oral Residue: Less than 10% of bolus; Other (comment) (on the right under her tongue)  Initiation of Swallow: No impairment  Laryngeal Elevation: Functional  Aspiration Signs/Symptoms: None  Pharyngeal Phase Characteristics: No impairment, issues, or problems              Oral Phase Severity: Mild  Pharyngeal Phase Severity : No impairment    NOMS:   The NOMS functional outcome measure was used to quantify this patient's level of swallowing impairment. Based on the NOMS, the patient was determined to be at level 5 for swallow function       NOMS Swallowing Levels:  Level 1 (CN): NPO  Level 2 (CM): NPO but takes consistency in therapy  Level 3 (CL): Takes less than 50% of nutrition p.o. and continues with nonoral feedings; and/or safe with mod cues; and/or max diet restriction  Level 4 (CK): Safe swallow but needs mod cues; and/or mod diet restriction; and/or still requires some nonoral feeding/supplements  Level 5 (CJ): Safe swallow with min diet restriction; and/or needs min cues  Level 6 (CI): Independent with p.o.; rare cues; usually self cues; may need to avoid some foods or needs extra time  Level 7 ([address] [place] [address]): Independent for all p.o.  MARGARET. (2003). National Outcomes Measurement System (NOMS): Adult Speech-Language Pathology User's Guide. Pain:  Pain Scale 1: Numeric (0 - 10)  Pain Intensity 1: 0       After treatment:   Patient left in no apparent distress in bed    COMMUNICATION/EDUCATION:   Patient educated that she can eat safely   The patient's plan of care including recommendations, planned interventions, and recommended diet changes were discussed with: Registered nurse.          Thank you for this referral.  MARÍA Gee  Time Calculation: 10 mins

## 2022-06-27 NOTE — CONSULTS
Urology Consult    Patient: Bere Current MRN: [id number]  SSN: [id number]    YOB: 1945  Age: 68 y.o. Sex: female          Date of Consultation:  June 27, 2022  Requesting Physician: Wilder Messina MD  Reason for Consultation: infected stone           Assessment/Plan:  UTI with obstructing left 1.6cm UPJ stone -  Bladder stones  OAB managed on myrbetriq     -Discussed with pt and daugther CT findings , and need for surgical intervention  To relieve obstruction , allow infection to drain and definitve stone treatment at a later date   - requesting urgent cystoscopy left ureteral stent placement today. Keep NPO. Nursing to obtain consent. Risks discussed which include but are not limited to infection, bleeding, ureteral injury, inability to place stents, possible need for PCN's. She voices understanding and wishes to proceed. Expected irritative voiding symptoms post procedure also described. She knows stents are temporary and that the stones will still need to be treated. -empiric abx until able to target therapy to culture results  -hospitalist admission- managing electrolytes  -will follow    Supervising MD, Dr. Mindy Santiago       History of Present Illness:  Patient is a 68 y.o. female admitted 6/26/2022 to the hospital for UTI (urinary tract infection) [N39.0]  Left ureteral stone [N20.1]  Hypokalemia [E87.6].   She has a PMHx significant for anxiety, coronary artery disease, GERD, hyperlipidemia, hypertension, type 2 diabetes, dementia, recurrent UTIs, aortic stenosis, kidney stones, esophageal obstruction, status post EGD with esophageal dilation on May 31, 2022 presents to the ED with abdominal pain and diarrhea for the past 3 weeks.  Daughter reports patient has had 3-4 episodes of nonbloody diarrhea daily for the past several weeks.  Daughter reports patient has been more confused than usual and she is also concerned that patient has developed another urinary tract infection.  Denies any fevers or chills.  Patient reports sometimes she has trouble swallowing. [facility]ACE provider sent x-ray tech to patient's home to obtain abdominal x-ray today and there was concern for possible obstruction so they called daughter and told her to take patient to the ED for CT scan.  Patient had COVID in January 2021 and daughter reports she has been very weak and in a wheelchair ever since. Urology consulted for obstructing ureteral stone on CT a/p 6/27/22. Images personally reviewed along with Dr. Bronwyn Castillo. ROS limited as pt disoriented and AMS, baseline unknown. Spoke to daughter on phone results of CT a/p. Pt with severe debility and wheel chair bound since covid infection 6 months ago. She follows with Dr. Woodard at [facility]. She has a urologic hx of frequent UTIs, OAB and with kidney stones. No recent episodes of passing kidney stones. Denies hematuria. Daughter denying fevers or chills  Chart reviewed:  Af, vss, /86. Wbc 3.4, hgb 9.4, plts 203  UA with >100 wbc and 3+ bacteria with +leuks, pending urine cx  Creat WNL, K 2.8  Blood cx pending  CT images reviewed also by Dr. Bronwyn Castillo  CT Results  (Last 48 hours)               06/27/22 0347  CT ABD PELV W CONT Final result    Impression:  1. 1.6 cm left UPJ calculus. More distal proximal left ureter is thickened and   dilated. 2. Improved dilatation of the sigmoid colon and rectum. Small of stool within   the rectum. Otherwise moderate amount of stool within the colon. Narrative:  INDICATION: Headache, urinary frequency, diarrhea. Abdominal pain. CT of the abdomen and pelvis is performed with 5 mm collimation. Study is   performed with 100 cc of nonionic Isovue 370. Sagittal and coronal reformatted   images were also performed. CT dose reduction was achieved with the use of the standardized protocol   tailored for this examination and automatic exposure control for dose   modulation.        Direct comparison is made to prior CT dated December 2020. Findings:       Lung bases: There is very mild right basilar atelectasis. There is punctate   granuloma within the left lower lobe. Liver: The liver is normal.       Adrenals: Adrenal glands are normal.       Pancreas: The pancreas is normal.       Gallbladder: The gallbladder is surgically absent. Kidneys: There is bilateral renal cortical thinning. There is no perinephric   stranding. There is a 1.5 cm calculus located at the left UPJ. The more distal   visualized proximal left ureter is thickened and dilated. Distal left ureter   does not appear dilated. There is a 3 mm calculus within the urinary bladder   base just medial to the expected location of the left UVJ. There is nondependent   gas within the urinary bladder. Spleen: The spleen is normal.       Lymph nodes. There is no xochitl hepatitis, mesenteric, retroperitoneal or pelvic   lymphadenopathy. Bowel: There is dilatation of the sigmoid and rectum which is improved as   compared to prior CT dated December 2020. There is a small amount of stool   within the rectum as compared to the large amount seen on prior CT dated   December 2020. There is otherwise a moderate amount of stool within the colon. Small bowel is nondilated. Urinary bladder: Urinary bladder is partially filled and grossly normal.       Findings: The osseous structures are diffusely demineralized. T11, L3 and L4   mild compression deformities are unchanged compared to prior CT dated 12/2020. Miscellaneous: There is no free intraperitoneal fluid or gas. There is no focal   fluid collection to suggest abscess. Subcutaneous soft tissues are injected   consistent with anasarca. Past Medical History:   Allergies   Allergen Reactions    Amoxicillin Hives    Sulfa (Sulfonamide Antibiotics) Hives and Itching    Mirtazapine Itching and Nausea Only     Funny feeling in chest    Percocet [Oxycodone-Acetaminophen] Nausea and Vomiting    Codeine Nausea and Vomiting    Crestor [Rosuvastatin] Other (comments)     myalgias    Nitroglycerin Unknown (comments)     Patient cannot remember why she is allergic to it      Prednisone Itching    Zithromax [Azithromycin] Itching     Not sure what it does,taken long time ago      Prior to Admission medications    Medication Sig Start Date End Date Taking? Authorizing Provider   metoprolol succinate (TOPROL-XL) 25 mg XL tablet Take 1 Tablet by mouth daily for 30 days. 6/2/22 7/2/22  Aniceto Mckeon MD   senna-docusate (PERICOLACE) 8.6-50 mg per tablet Take 1 Tablet by mouth two (2) times a day. 1/14/22   Vickii Fruits, NP   bisacodyL (Dulcolax, bisacodyl,) 10 mg supp Insert 10 mg into rectum daily as needed for Constipation (To promote 3-4 bowel movements weekly). 1/14/22   Jesi Young NP   polyethylene glycol (MIRALAX) 17 gram packet Take 1 Packet by mouth two (2) times a day. 1/14/22   Jesi Young NP   sertraline (Zoloft) 100 mg tablet Take 100 mg by mouth daily. Other, MD Shun   mirabegron ER (MYRBETRIQ) 50 mg ER tablet Take 50 mg by mouth daily. Other, MD Shun   acetaminophen (TYLENOL) 500 mg tablet Take 1,000 mg by mouth every six (6) hours as needed for Pain. Provider, Historical   aspirin 81 mg chewable tablet Take 1 Tab by mouth daily. 2/24/21   Migue Simental MD   ezetimibe (ZETIA) 10 mg tablet Take 1 Tab by mouth daily. 2/24/21   Migue Simental MD   amLODIPine (NORVASC) 2.5 mg tablet Take 2.5 mg by mouth daily.     Provider, Historical      PMHx:  has a past medical history of Agoraphobia without mention of panic attacks (2/17/2014), Anxiety disorder (8/18/2013), Arthritis, CAD (coronary artery disease), native coronary artery (12/1/2015), Chronic chest pain (1/13/2014), Chronic pain associated with significant psychosocial dysfunction (2/17/2014), Depression (8/18/2013), Diabetes ([facility] [address] Duplicated right renal collecting system (3/13/2014), GERD (gastroesophageal reflux disease), Gout, joint, Hypercholesteremia, Hypertension, Murmur, Nephrolithiasis (3/13/2014), and Personal history of noncompliance with medical treatment, presenting hazards to health (5/30/2014). She has no past medical history of Asthma, Atrial fibrillation ([facility] [address] Cancer ([facility] [address] Carotid artery disease ([facility] [address] Chronic kidney disease, Chronic obstructive pulmonary disease ([facility] [address] Clotting disorder ([facility] [address] Congestive heart failure ([facility] [address] Glaucoma, ICD (implantable cardioverter-defibrillator) in place, Long term current use of anticoagulant therapy, Myocardial infarction [facility]), Pacemaker, Pulmonary embolism ([facility] Rheumatic fever, Stroke [facility]), Syncope, Thyroid disease, or Valvular heart disease. PSurgHx:  has a past surgical history that includes egd (4/23/2010); hx cyst removal; hx cholecystectomy (09/20/2018); hx tubal ligation; hx hysterectomy; hx other surgical; hx urological; hx heart catheterization (12/01/2015); and upper gi endoscopy,ball dil,30mm (5/31/2022). PSocHx:  reports that she has never smoked. She has never used smokeless tobacco. She reports that she does not drink alcohol and does not use drugs. ROS:  Admission ROS by Leonel Dasilva MD from 6/26/2022 were reviewed with the patient and changes (other than per HPI) include: none.     Physical Exam    General Appearance: NAD, awake  HENT: atraumatic, normal ears  Cardiovascular: not tachycardic, no LE edema  Respiratory: no distress, room air  Abdomen: soft, no suprapubic fullness or tenderness  : left CVA tenderness  Extremities: moves all  Musculoskeletal: normal alignment of neck and head  Neuro: Appropriate, no focal neurological deficits  Mood/Affect: appropriate, A&O x 1      Lab Results   Component Value Date/Time    WBC 3.4 (L) 06/26/2022 11:59 PM    HCT 30.6 (L) 06/26/2022 11:59 PM    PLATELET 388 10/00/2177 11:59 PM    Sodium 143 06/26/2022 11:59 PM    Potassium 2.8 (L) 06/26/2022 11:59 PM    Chloride 104 06/26/2022 11:59 PM    CO2 33 (H) 06/26/2022 11:59 PM    BUN 16 06/26/2022 11:59 PM    Creatinine 0.73 06/26/2022 11:59 PM    Glucose 118 (H) 06/26/2022 11:59 PM    Calcium 8.5 06/26/2022 11:59 PM    Magnesium 2.1 06/26/2022 11:59 PM    INR 1.2 (H) 02/17/2021 12:00 AM       UA:   Lab Results   Component Value Date/Time    Color YELLOW/STRAW 06/27/2022 12:56 AM    Appearance TURBID (A) 06/27/2022 12:56 AM    Specific gravity 1.017 06/27/2022 12:56 AM    Specific gravity 1.020 12/10/2020 03:56 PM    pH (UA) 6.0 06/27/2022 12:56 AM    Protein 100 (A) 06/27/2022 12:56 AM    Glucose Negative 06/27/2022 12:56 AM    Ketone Negative 06/27/2022 12:56 AM    Bilirubin Negative 06/27/2022 12:56 AM    Urobilinogen 1.0 06/27/2022 12:56 AM    Nitrites Negative 06/27/2022 12:56 AM    Leukocyte Esterase LARGE (A) 06/27/2022 12:56 AM    Epithelial cells FEW 06/27/2022 12:56 AM    Bacteria 3+ (A) 06/27/2022 12:56 AM    WBC >100 (H) 06/27/2022 12:56 AM    RBC 5-10 06/27/2022 12:56 AM         Signed By: Mike Bryson NP  - June 27, 2022

## 2022-06-27 NOTE — ED PROVIDER NOTES
EMERGENCY DEPARTMENT HISTORY AND PHYSICAL EXAM      Date: 6/26/2022  Patient Name: Daren Patton    History of Presenting Illness     Chief Complaint   Patient presents with    Headache     6/10    Urinary Frequency     family thinks she has developed another UTI    Diarrhea     increased output; home xray shows possible blockage       History Provided By: Patient and Patient's Daughter    HPI: Daren Patton, 68 y.o. female with PMHx significant for anxiety, coronary artery disease, GERD, hyperlipidemia, hypertension, type 2 diabetes, recurrent UTIs, aortic stenosis, esophageal obstruction, status post EGD with esophageal dilation on May 31, 2022 presents to the ED with abdominal pain and diarrhea for the past 3 weeks. Daughter reports patient has had 3-4 episodes of nonbloody diarrhea daily for the past several weeks. She also complains of headache which has been intermittent. Daughter reports patient has been more confused than usual and she is also concerned that patient has developed another urinary tract infection. Patient reports that \"it hurts when I pee and it hurts when I do not pee\". Denies any fevers or chills. Daughter reports she has been eating and drinking well. Patient reports sometimes she has trouble swallowing. PACE provider sent x-ray tech to patient's home to obtain abdominal x-ray today and there was concern for possible obstruction so they called daughter and told her to take patient to the ED for CT scan. Patient had COVID in January 2021 and daughter reports she has been very weak and in a wheelchair ever since. Patient complains of blurry vision, but daughter states that this has been an ongoing problem and patient has known cataracts. PCP: Willy Guerra MD    No current facility-administered medications on file prior to encounter.      Current Outpatient Medications on File Prior to Encounter   Medication Sig Dispense Refill    metoprolol succinate (TOPROL-XL) 25 mg XL tablet Take 1 Tablet by mouth daily for 30 days. 30 Tablet 0    senna-docusate (PERICOLACE) 8.6-50 mg per tablet Take 1 Tablet by mouth two (2) times a day. 60 Tablet 0    bisacodyL (Dulcolax, bisacodyl,) 10 mg supp Insert 10 mg into rectum daily as needed for Constipation (To promote 3-4 bowel movements weekly). 30 Suppository 0    polyethylene glycol (MIRALAX) 17 gram packet Take 1 Packet by mouth two (2) times a day. 60 Packet 0    sertraline (Zoloft) 100 mg tablet Take 100 mg by mouth daily.  mirabegron ER (MYRBETRIQ) 50 mg ER tablet Take 50 mg by mouth daily.  acetaminophen (TYLENOL) 500 mg tablet Take 1,000 mg by mouth every six (6) hours as needed for Pain.  aspirin 81 mg chewable tablet Take 1 Tab by mouth daily. 30 Tab 0    ezetimibe (ZETIA) 10 mg tablet Take 1 Tab by mouth daily. 30 Tab 0    amLODIPine (NORVASC) 2.5 mg tablet Take 2.5 mg by mouth daily.          Past History     Past Medical History:  Past Medical History:   Diagnosis Date    Agoraphobia without mention of panic attacks 2/17/2014    Anxiety disorder 8/18/2013    Arthritis     osteo    CAD (coronary artery disease), native coronary artery 12/1/2015    no stents    Chronic chest pain 1/13/2014    Chronic pain associated with significant psychosocial dysfunction 2/17/2014    Depression 8/18/2013    Diabetes ([facility] [address]     type II    Duplicated right renal collecting system 3/13/2014    GERD (gastroesophageal reflux disease)     Gout, joint     Hypercholesteremia     hyercholesterolemia    Hypertension     Murmur     Nephrolithiasis 3/13/2014    Personal history of noncompliance with medical treatment, presenting hazards to health 5/30/2014       Past Surgical History:  Past Surgical History:   Procedure Laterality Date    EGD  4/23/2010         HX CHOLECYSTECTOMY  09/20/2018    lap jesus    HX CYST REMOVAL      cyst removed from left wrist    HX HEART CATHETERIZATION  12/01/2015    HX HYSTERECTOMY partial    HX OTHER SURGICAL      bladder dilitation    HX TUBAL LIGATION      HX UROLOGICAL      kidney stones    UPPER GI ENDOSCOPY,BALL DIL,30MM  5/31/2022            Family History:  Family History   Problem Relation Age of Onset    Stroke Mother     Heart Disease Mother     Cancer Father         type unknown    Heart Disease Son     Liver Disease Son     Heart Disease Daughter     Malignant Hyperthermia Neg Hx     Pseudocholinesterase Deficiency Neg Hx     Delayed Awakening Neg Hx     Post-op Nausea/Vomiting Neg Hx     Emergence Delirium Neg Hx     Post-op Cognitive Dysfunction Neg Hx     Other Neg Hx        Social History:  Social History     Tobacco Use    Smoking status: Never Smoker    Smokeless tobacco: Never Used   Substance Use Topics    Alcohol use: No    Drug use: No       Allergies: Allergies   Allergen Reactions    Amoxicillin Hives    Sulfa (Sulfonamide Antibiotics) Hives and Itching    Mirtazapine Itching and Nausea Only     Funny feeling in chest    Percocet [Oxycodone-Acetaminophen] Nausea and Vomiting    Codeine Nausea and Vomiting    Crestor [Rosuvastatin] Other (comments)     myalgias    Nitroglycerin Unknown (comments)     Patient cannot remember why she is allergic to it      Prednisone Itching    Zithromax [Azithromycin] Itching     Not sure what it does,taken long time ago         Review of Systems   Review of Systems   Constitutional: Positive for fatigue. Negative for chills and fever. HENT: Positive for trouble swallowing. Negative for congestion and rhinorrhea. Eyes: Positive for visual disturbance. Respiratory: Negative for cough and chest tightness. Cardiovascular: Negative for chest pain and palpitations. Gastrointestinal: Positive for abdominal pain, diarrhea and nausea. Genitourinary: Positive for dysuria. Negative for flank pain and hematuria. Musculoskeletal: Negative for back pain and myalgias. Skin: Negative for color change. Neurological: Positive for headaches. Negative for syncope and light-headedness. Psychiatric/Behavioral: Positive for confusion. The patient is not nervous/anxious.           Physical Exam   General appearance -elderly, frail, well appearing, and in no distress  Eyes - pupils equal and reactive, extraocular eye movements intact  ENT - mucous membranes moist, pharynx normal without lesions  Neck - supple, no significant adenopathy; non-tender to palpation  Chest - clear to auscultation, no wheezes, rales or rhonchi; non-tender to palpation  Heart - normal rate and regular rhythm, S1 and S2 normal, no murmurs noted  Abdomen - soft, generalized abdominal tenderness, no rebound or guarding, nondistended, no masses or organomegaly  Musculoskeletal - no joint tenderness, deformity or swelling; normal ROM  Extremities - peripheral pulses normal, no pedal edema  Skin - normal coloration and turgor, no rashes  Neurological -drowsy, opens eyes to voice, oriented to person and place, but thinks it is 2002., normal speech, no focal findings or movement disorder noted    Diagnostic Study Results     Labs -     Recent Results (from the past 24 hour(s))   POC VENOUS BLOOD GAS    Collection Time: 06/26/22 11:58 PM   Result Value Ref Range    pH, venous (POC) 7.40 7.32 - 7.42      Specimen type (POC) VENOUS BLOOD      Performed by Larry FARRELL     Critical value read back OBIER    CBC WITH AUTOMATED DIFF    Collection Time: 06/26/22 11:59 PM   Result Value Ref Range    WBC 3.4 (L) 3.6 - 11.0 K/uL    RBC 3.50 (L) 3.80 - 5.20 M/uL    HGB 9.4 (L) 11.5 - 16.0 g/dL    HCT 30.6 (L) 35.0 - 47.0 %    MCV 87.4 80.0 - 99.0 FL    MCH 26.9 26.0 - 34.0 PG    MCHC 30.7 30.0 - 36.5 g/dL    RDW 14.1 11.5 - 14.5 %    PLATELET 601 329 - 754 K/uL    MPV 10.8 8.9 - 12.9 FL    NRBC 0.0 0  WBC    ABSOLUTE NRBC 0.00 0.00 - 0.01 K/uL    NEUTROPHILS 41 32 - 75 %    LYMPHOCYTES 49 12 - 49 %    MONOCYTES 6 5 - 13 %    EOSINOPHILS 3 0 - 7 % BASOPHILS 1 0 - 1 %    IMMATURE GRANULOCYTES 0 0.0 - 0.5 %    ABS. NEUTROPHILS 1.4 (L) 1.8 - 8.0 K/UL    ABS. LYMPHOCYTES 1.6 0.8 - 3.5 K/UL    ABS. MONOCYTES 0.2 0.0 - 1.0 K/UL    ABS. EOSINOPHILS 0.1 0.0 - 0.4 K/UL    ABS. BASOPHILS 0.0 0.0 - 0.1 K/UL    ABS. IMM. GRANS. 0.0 0.00 - 0.04 K/UL    DF AUTOMATED     METABOLIC PANEL, COMPREHENSIVE    Collection Time: 06/26/22 11:59 PM   Result Value Ref Range    Sodium 143 136 - 145 mmol/L    Potassium 2.8 (L) 3.5 - 5.1 mmol/L    Chloride 104 97 - 108 mmol/L    CO2 33 (H) 21 - 32 mmol/L    Anion gap 6 5 - 15 mmol/L    Glucose 118 (H) 65 - 100 mg/dL    BUN 16 6 - 20 MG/DL    Creatinine 0.73 0.55 - 1.02 MG/DL    BUN/Creatinine ratio 22 (H) 12 - 20      GFR est AA >60 >60 ml/min/1.73m2    GFR est non-AA >60 >60 ml/min/1.73m2    Calcium 8.5 8.5 - 10.1 MG/DL    Bilirubin, total 0.4 0.2 - 1.0 MG/DL    ALT (SGPT) 9 (L) 12 - 78 U/L    AST (SGOT) 10 (L) 15 - 37 U/L    Alk.  phosphatase 54 45 - 117 U/L    Protein, total 5.9 (L) 6.4 - 8.2 g/dL    Albumin 2.6 (L) 3.5 - 5.0 g/dL    Globulin 3.3 2.0 - 4.0 g/dL    A-G Ratio 0.8 (L) 1.1 - 2.2     LIPASE    Collection Time: 06/26/22 11:59 PM   Result Value Ref Range    Lipase 68 (L) 73 - 393 U/L   TROPONIN-HIGH SENSITIVITY    Collection Time: 06/26/22 11:59 PM   Result Value Ref Range    Troponin-High Sensitivity 28 0 - 51 ng/L   MAGNESIUM    Collection Time: 06/26/22 11:59 PM   Result Value Ref Range    Magnesium 2.1 1.6 - 2.4 mg/dL   URINALYSIS W/ REFLEX CULTURE    Collection Time: 06/27/22 12:56 AM    Specimen: Urine   Result Value Ref Range    Color YELLOW/STRAW      Appearance TURBID (A) CLEAR      Specific gravity 1.017      pH (UA) 6.0 5.0 - 8.0      Protein 100 (A) NEG mg/dL    Glucose Negative NEG mg/dL    Ketone Negative NEG mg/dL    Bilirubin Negative NEG      Blood LARGE (A) NEG      Urobilinogen 1.0 0.2 - 1.0 EU/dL    Nitrites Negative NEG      Leukocyte Esterase LARGE (A) NEG      WBC >100 (H) 0 - 4 /hpf    RBC 5-10 0 - 5 /hpf    Epithelial cells FEW FEW /lpf    Bacteria 3+ (A) NEG /hpf    UA:UC IF INDICATED URINE CULTURE ORDERED (A) CNI     CULTURE, BLOOD, PAIRED    Collection Time: 06/27/22  2:30 AM    Specimen: Blood   Result Value Ref Range    Special Requests: NO SPECIAL REQUESTS      Culture result: NO GROWTH AFTER 4 HOURS     POC LACTIC ACID    Collection Time: 06/27/22  2:33 AM   Result Value Ref Range    Lactic Acid (POC) 1.06 0.40 - 2.00 mmol/L   TROPONIN-HIGH SENSITIVITY    Collection Time: 06/27/22  5:39 AM   Result Value Ref Range    Troponin-High Sensitivity 30 0 - 51 ng/L   GLUCOSE, POC    Collection Time: 06/27/22  3:00 PM   Result Value Ref Range    Glucose (POC) 59 (L) 65 - 117 mg/dL    Performed by Marjorie Severe    GLUCOSE, POC    Collection Time: 06/27/22  3:02 PM   Result Value Ref Range    Glucose (POC) 72 65 - 117 mg/dL    Performed by Marjorie Severe        Radiologic Studies -   CT ABD PELV W CONT   Final Result   1. 1.6 cm left UPJ calculus. More distal proximal left ureter is thickened and   dilated. 2. Improved dilatation of the sigmoid colon and rectum. Small of stool within   the rectum. Otherwise moderate amount of stool within the colon. XR CHEST PORT   Final Result   No acute cardiopulmonary disease. Gaseous distention of the   visualized colon. CT Results  (Last 48 hours)    None        CXR Results  (Last 48 hours)    None            Medical Decision Making   I am the first provider for this patient. I reviewed the vital signs, available nursing notes, past medical history, past surgical history, family history and social history. Vital Signs-Reviewed the patient's vital signs.   Patient Vitals for the past 12 hrs:   Temp Pulse Resp BP SpO2   06/26/22 2235 97.7 °F (36.5 °C) 69 15 (!) 145/80 99 %           Records Reviewed: Nursing Notes and Old Medical Records    Provider Notes (Medical Decision Making):   Differential diagnosis: Metabolic encephalopathy, UTI, bowel obstruction, gastroenteritis, constipation, dehydration, electrolyte abnormality  We will check CBC, CMP, UA, abdominal pelvis CT    ED Course:   Initial assessment performed. The patients presenting problems have been discussed, and they are in agreement with the care plan formulated and outlined with them. I have encouraged them to ask questions as they arise throughout their visit. Progress Notes:  ED Course as of 06/27/22 1504   Mon Jun 27, 2022   [id number]  patient presents with abdominal pain, confusion, concerns for possible UTI versus bowel obstruction. Noted to have UTI with 1.6 cm left UPJ stone on CT. Patient complained of diarrhea, however evidence of chronic constipation on CT. Also noted to be hypokalemic. Treated with Rocephin in the ED and potassium repleted. Patient is generally weak and deconditioned. Does not walk at all at home. Daughter is concerned about increased confusion. Discussed with Dr. Danica Vitale (hospitalist) who will see and admit the patient. [AO]   [phone] Case discussed with Dr. Eloy Fairbanks (urology). [place] [facility] will consult on patient. [AO]      ED Course User Index  [AO] Sridevi Holley MD       Disposition:  Admit to hospitalist        Diagnosis     Clinical Impression:   1. Urinary tract infection without hematuria, site unspecified    2. Acute metabolic encephalopathy    3. Obstruction of left ureteropelvic junction (UPJ) due to stone    4.  Diarrhea, unspecified type

## 2022-06-27 NOTE — ANESTHESIA POSTPROCEDURE EVALUATION
Procedure(s):  CYSTOSCOPY LEFT URETERAL STENT INSERTION. MAC, general - backup    Anesthesia Post Evaluation      Multimodal analgesia: multimodal analgesia used between 6 hours prior to anesthesia start to PACU discharge  Patient location during evaluation: PACU  Patient participation: complete - patient participated  Level of consciousness: awake and alert  Pain management: adequate  Airway patency: patent  Anesthetic complications: no  Cardiovascular status: acceptable, hemodynamically stable and blood pressure returned to baseline  Respiratory status: acceptable and room air  Hydration status: euvolemic  Post anesthesia nausea and vomiting:  none  Final Post Anesthesia Temperature Assessment:  Normothermia (36.0-37.5 degrees C)      INITIAL Post-op Vital signs:   Vitals Value Taken Time   /91 06/27/22 1900   Temp 36.4 °C (97.5 °F) 06/27/22 1738   Pulse 62 06/27/22 1911   Resp 11 06/27/22 1911   SpO2 100 % 06/27/22 1911   Vitals shown include unvalidated device data.

## 2022-06-27 NOTE — PERIOP NOTES
Attempt to call daughter listed as emergency contact, went straight to . Report given to Caron GOODRICH RN to assume primary care of patient.

## 2022-06-27 NOTE — PERIOP NOTES
0- SBAR received from [employer]. Assumed patient care. 2135- TRANSFER - OUT REPORT:    Verbal report given to [id number] RN([facility]  being transferred to 217(unit) for routine post - op       Report consisted of patients Situation, Background, Assessment and   Recommendations(SBAR). Information from the following report(s) OR Summary, Procedure Summary, Intake/Output and MAR was reviewed with the receiving nurse. Lines:   Peripheral IV 06/26/22 Left Antecubital (Active)   Site Assessment Clean, dry, & intact 06/27/22 2100   Phlebitis Assessment 0 06/27/22 2100   Infiltration Assessment 0 06/27/22 2100   Dressing Status Clean, dry, & intact 06/27/22 2100   Dressing Type Tape;Transparent 06/27/22 2100   Hub Color/Line Status Pink; Infusing 06/27/22 2100   Action Taken Blood drawn 06/26/22 2357        Opportunity for questions and clarification was provided. Patient transported with:   Monitor  O2 @ 2l liters  Registered Nurse  Tech   CHART  PERSONAL BELONGINGS  Patient daughter updated on patient care and new room number.

## 2022-06-27 NOTE — PROGRESS NOTES
Phone call from 1 Micheal Henry, wanting to provide information that patient is in the PACE program and can assist with discharge transport when ready. Phone contact for Ms. Zachary Alvarez is [phone]. Fara Malone, [facility] [place] - ED [facility]  [employer] ACP Facilitator  Zone Phone: [phone]

## 2022-06-27 NOTE — ROUTINE PROCESS
sbar in note pt From er   Via stretcher by two nurses. Pt reported to have eatened 2 teaspoon apple sauc with swallowing study by speech   DR. POPE AWARE PROCEDURE TO CONTINUE. GLUCOSE READING    AT   61      then 72 BY ERIKA CUELLO R.N, DR. [facility] INFORMED PT ADMINISTERED    D50  1/2 AMP PER ORDERS AT    1517. RESULTS PENDING. .    `1536  REPEAT GLUCOSE READING AT   105. DAUGHTER BROUGHT TO BEDSIDE TO SEE MOTHER    WHILE SHE IS WAITING   FOR PROCEDURE  .

## 2022-06-27 NOTE — H&P
Hospitalist Admission Note    NAME: Jocelin Roche   :  1945   MRN:  [id number]     Date/Time:  2022 7:04 AM    Patient PCP: Nadiya Lam MD  Cardiologist:  Dr. Payton Jaramillo  Urologist:  Dr. Helen Boxer    Please note that this dictation was completed with [employer]rad, the computer voice recognition software. Quite often unanticipated grammatical, syntax, homophones, and other interpretive errors are inadvertently transcribed by the computer software. Please disregard these errors. Please excuse any errors that have escaped final proofreading  ______________________________________________________________________   Assessment & Plan:  UTI, POA  Hx recurrent uti  --cont rocephin. F/u blood and urine cx    Left UPJ stone, POA  Left flank pain, POA  --urology consult. Plan for stent today. Keep npo. --outpatient f/u for stone treatment    Hypokalemia 2.8  --KCl 40meq oral given and KCl 10meq IV given in ER  --recheck in AM along with mag level    Diarrhea with loose stool but moderate stool in colon  Chronic constipation baseline  --cont miralax bid, pericolace, dulcolax suppository prn  --add sorbitol daily x 2 doses    CAD, hx nstemi 2021 in setting of covid-1 PNA  Aortic stenosis, moderate to severe 2021. EF 65%  --cont aspirin, toprol XL. 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 Thinks it is July. Baseline    Esophageal obstruction 2022  EGD by Dr. Govind Golden 22  Impression:  -- esophageal obstruction, endoscopically consistent with achalasia with spastic appearing, tight LES and dilated proximal esophagus; Dilated LES to 15 mm with TTS balloon as above.  No discrete stricture  -- presbyesophagus     Recommendations:  -- await effect of dilation  -- consider botox injection trial if dilation ineffective  -- resume diet, starting at full liquids and resume from there    Consult speech therapy since finding dried food and liquids staining shirt. Once resume diet (dysphagia), need aspiration precaution  Body mass index is 21.62 kg/m². Code:  Full code. Discussed with daughter to consider if she wants to keep patient full code given her dementia, severe physical debility, aortic stenosis. She will think about it. DVT prophylaxis: lovenox  Surrogate decision maker:  Daughter Ida Mendes          Subjective:   CHIEF COMPLAINT:  Uti?, possible bowel obstruction    HISTORY OF PRESENT ILLNESS:     Angela Mckay is a 68 y.o. female PMH significant for anxiety, coronary artery disease, GERD, hyperlipidemia, hypertension, type 2 diabetes, recurrent UTIs, aortic stenosis, esophageal obstruction, status post EGD with esophageal dilation on May 31, 2022 presents to the ED with abdominal pain and diarrhea for the past 3 weeks. Daughter reports patient has had 3-4 episodes of nonbloody diarrhea daily for the past several weeks. She also complains of headache which has been intermittent. Daughter reports patient has been more confused than usual and she is also concerned that patient has developed another urinary tract infection. Patient reports that \"it hurts when I pee and it hurts when I do not pee\". Denies any fevers or chills. Daughter reports she has been eating and drinking well. Patient reports sometimes she has trouble swallowing. [facility] provider sent x-ray tech to patient's home to obtain abdominal x-ray today and there was concern for possible obstruction so they called daughter and told her to take patient to the ED for CT scan. Patient had COVID in January 2021 and daughter reports she has been very weak and in a wheelchair ever since. Patient complains of blurry vision, but daughter states that this has been an ongoing problem and patient has known cataracts.     We were asked to admit for work up and evaluation of the above problems.      Past Medical History:   Diagnosis Date    Agoraphobia without mention of panic attacks 2/17/2014    Anxiety disorder 8/18/2013    Arthritis     osteo    CAD (coronary artery disease), native coronary artery 12/1/2015    no stents    Chronic chest pain 1/13/2014    Chronic pain associated with significant psychosocial dysfunction 2/17/2014    Depression 8/18/2013    Diabetes ([address]     type II    Duplicated right renal collecting system 3/13/2014    GERD (gastroesophageal reflux disease)     Gout, joint     Hypercholesteremia     hyercholesterolemia    Hypertension     Murmur     Nephrolithiasis 3/13/2014    Personal history of noncompliance with medical treatment, presenting hazards to health 5/30/2014      Past Surgical History:   Procedure Laterality Date    EGD  4/23/2010         HX CHOLECYSTECTOMY  09/20/2018    lap jesus    HX CYST REMOVAL      cyst removed from left wrist    HX HEART CATHETERIZATION  12/01/2015    HX HYSTERECTOMY      partial    HX OTHER SURGICAL      bladder dilitation    HX TUBAL LIGATION      HX UROLOGICAL      kidney stones    UPPER GI ENDOSCOPY,BALL DIL,30MM  5/31/2022          Social History     Tobacco Use    Smoking status: Never Smoker    Smokeless tobacco: Never Used   Substance Use Topics    Alcohol use: No      Drug use:        Family History   Problem Relation Age of Onset    Stroke Mother     Heart Disease Mother     Cancer Father         type unknown    Heart Disease Son     Liver Disease Son     Heart Disease Daughter     Malignant Hyperthermia Neg Hx     Pseudocholinesterase Deficiency Neg Hx     Delayed Awakening Neg Hx     Post-op Nausea/Vomiting Neg Hx     Emergence Delirium Neg Hx     Post-op Cognitive Dysfunction Neg Hx     Other Neg Hx       Allergies   Allergen Reactions    Amoxicillin Hives    Sulfa (Sulfonamide Antibiotics) Hives and Itching    Mirtazapine Itching and Nausea Only     Funny feeling in chest    Percocet [Oxycodone-Acetaminophen] Nausea and Vomiting    Codeine Nausea and Vomiting    Crestor [Rosuvastatin] Other (comments)     myalgias    Nitroglycerin Unknown (comments)     Patient cannot remember why she is allergic to it      Prednisone Itching    Zithromax [Azithromycin] Itching     Not sure what it does,taken long time ago        Prior to Admission medications    Medication Sig Start Date End Date Taking? Authorizing Provider   metoprolol succinate (TOPROL-XL) 25 mg XL tablet Take 1 Tablet by mouth daily for 30 days. 6/2/22 7/2/22  Renita Rodriguez MD   senna-docusate (PERICOLACE) 8.6-50 mg per tablet Take 1 Tablet by mouth two (2) times a day. 1/14/22   Mikki Anderson NP   bisacodyL (Dulcolax, bisacodyl,) 10 mg supp Insert 10 mg into rectum daily as needed for Constipation (To promote 3-4 bowel movements weekly). 1/14/22   Mikki Anderson NP   polyethylene glycol (MIRALAX) 17 gram packet Take 1 Packet by mouth two (2) times a day. 1/14/22   Mikki Anderson NP   sertraline (Zoloft) 100 mg tablet Take 100 mg by mouth daily. OtherShun MD   mirabegron ER (MYRBETRIQ) 50 mg ER tablet Take 50 mg by mouth daily. Shun Sevilla MD   acetaminophen (TYLENOL) 500 mg tablet Take 1,000 mg by mouth every six (6) hours as needed for Pain. Provider, Historical   aspirin 81 mg chewable tablet Take 1 Tab by mouth daily. 2/24/21   Delores Larios MD   ezetimibe (ZETIA) 10 mg tablet Take 1 Tab by mouth daily. 2/24/21   Delores Larios MD   amLODIPine (NORVASC) 2.5 mg tablet Take 2.5 mg by mouth daily. Provider, Historical     REVIEW OF SYSTEMS:  POSITIVE= Bold.   Negative = normal text  General:  fever, chills, sweats, generalized weakness, weight loss/gain, loss of appetite  Eyes:  blurred vision chronic, eye pain, loss of vision, diplopia  Ear Nose and Throat:  rhinorrhea, pharyngitis  Respiratory:   cough, sputum production, SOB, wheezing, MOORE, pleuritic pain  Cardiology:  chest pain, palpitations, orthopnea, PND, edema, syncope   Gastrointestinal:  abdominal pain, N/V, dysphagia, diarrhea, constipation, bleeding  Genitourinary:  frequency, urgency, dysuria, hematuria, incontinence  Muskuloskeletal :  arthralgia, myalgia  Hematology:  easy bruising, bleeding, lymphadenopathy  Dermatological:  rash, ulceration, pruritis  Endocrine:  hot flashes or polydipsia  Neurological:  headache, dizziness, confusion, focal weakness, paresthesia, memory loss, gait disturbance  Psychological: anxiety, depression, agitation      Objective:   VITALS:    Visit Vitals  BP (!) 145/80 (BP 1 Location: Right upper arm, BP Patient Position: At rest;Lying)   Pulse 69   Temp 97.7 °F (36.5 °C)   Resp 15   Ht 5' 7\" (1.702 m)   Wt 62.6 kg (138 lb 0.1 oz)   SpO2 99%   BMI 21.62 kg/m²     Temp (24hrs), Av.7 °F (36.5 °C), Min:97.7 °F (36.5 °C), Max:97.7 °F (36.5 °C)    Body mass index is 21.62 kg/m². PHYSICAL EXAM:    General:    Alert, chronically debilitated female, cooperative, no distress, appears stated age. Unkempt, dried food particles/stain on shirt  HEENT: Atraumatic, anicteric sclerae, pink conjunctivae     No oral ulcers, mucosa dry, edentulous, throat clear. Hearing intact. Neck:  Supple, symmetrical,  thyroid: non tender  Lungs:   Decreased BS right base, clear on left. No Wheezing or Rhonchi. No rales. Chest wall:  No tenderness  No Accessory muscle use. Heart:   Regular  rhythm,  2/6 systolic  murmur   No gallop. No edema. Abdomen:   Soft, tender in left sided, no guarding or rebound, non-tender. Not distended. Bowel sounds hypoactive. No masses  Extremities: No cyanosis. No clubbing  Skin:     Not pale Not Jaundiced  No rashes   Psych:  Good insight. Not depressed. Not anxious or agitated. Neurologic: EOMs intact. No facial asymmetry. No aphasia or slurred speech.  Symmetrical strength 3/5 arms--can't raise right arm as high as left, right leg 3/5, left leg 4/5, Alert and oriented X 3. Right toes contracted into plantarflexion, right foot drop. Peripheral pulse: Bilateral, DP, 2+  Capillary refill:  normal    IMAGING RESULTS:   []       I have personally reviewed the actual   []     CXR  []     CT scan  CXR:  CT abd/pelvis:  Impression:    1. 1.6 cm left UPJ calculus. More distal proximal left ureter is thickened and   dilated. 2. Improved dilatation of the sigmoid colon and rectum. Small of stool within   the rectum. Otherwise moderate amount of stool within the colon. Narrative:    INDICATION: Headache, urinary frequency, diarrhea. Abdominal pain. CT of the abdomen and pelvis is performed with 5 mm collimation. Study is   performed with 100 cc of nonionic Isovue 370. Sagittal and coronal reformatted   images were also performed. CT dose reduction was achieved with the use of the standardized protocol   tailored for this examination and automatic exposure control for dose   modulation. Direct comparison is made to prior CT dated December 2020. Findings:     Lung bases: There is very mild right basilar atelectasis. There is punctate   granuloma within the left lower lobe. Liver: The liver is normal.     Adrenals: Adrenal glands are normal.     Pancreas: The pancreas is normal.     Gallbladder: The gallbladder is surgically absent. Kidneys: There is bilateral renal cortical thinning. There is no perinephric   stranding. There is a 1.5 cm calculus located at the left UPJ. The more distal   visualized proximal left ureter is thickened and dilated. Distal left ureter   does not appear dilated. There is a 3 mm calculus within the urinary bladder   base just medial to the expected location of the left UVJ. There is nondependent   gas within the urinary bladder. Spleen: The spleen is normal.     Lymph nodes. There is no xochitl hepatitis, mesenteric, retroperitoneal or pelvic   lymphadenopathy. Bowel:  There is dilatation of the sigmoid and rectum which is improved as   compared to prior CT dated December 2020. There is a small amount of stool   within the rectum as compared to the large amount seen on prior CT dated   December 2020. There is otherwise a moderate amount of stool within the colon. Small bowel is nondilated. Urinary bladder: Urinary bladder is partially filled and grossly normal.     Findings: The osseous structures are diffusely demineralized. T11, L3 and L4   mild compression deformities are unchanged compared to prior CT dated 12/2020. Miscellaneous: There is no free intraperitoneal fluid or gas. There is no focal   fluid collection to suggest abscess. Subcutaneous soft tissues are injected   consistent with anasarca. EKG:   ________________________________________________________________________  Care Plan discussed with:    Comments   Patient y    Family      RN     Care Manager                    Consultant:  w[place] Belle   ________________________________________________________________________  Prophylaxis:  GI none   DVT lovenox   ________________________________________________________________________  Recommended Disposition:   Home with Family y   HH/PT/OT/RN y   SNF/LTC    [place]    ________________________________________________________________________  Code Status:  Full Code y   DNR/DNI    ________________________________________________________________________  TOTAL TIME:  55 minutes      Comments     Reviewed previous records   >50% of visit spent in counseling and coordination of care  Discussion with patient and/or family and questions answered         ______________________________________________________________________  Lizzie Fischer MD      Procedures: see electronic medical records for all procedures/Xrays and details which were not copied into this note but were reviewed prior to creation of Plan.     LAB DATA REVIEWED:    Recent Results (from the past 24 hour(s)) POC VENOUS BLOOD GAS    Collection Time: 06/26/22 11:58 PM   Result Value Ref Range    pH, venous (POC) 7.40 7.32 - 7.42      Specimen type (POC) VENOUS BLOOD      Performed by Adryan FARRELL     Critical value read back OBIER    CBC WITH AUTOMATED DIFF    Collection Time: 06/26/22 11:59 PM   Result Value Ref Range    WBC 3.4 (L) 3.6 - 11.0 K/uL    RBC 3.50 (L) 3.80 - 5.20 M/uL    HGB 9.4 (L) 11.5 - 16.0 g/dL    HCT 30.6 (L) 35.0 - 47.0 %    MCV 87.4 80.0 - 99.0 FL    MCH 26.9 26.0 - 34.0 PG    MCHC 30.7 30.0 - 36.5 g/dL    RDW 14.1 11.5 - 14.5 %    PLATELET 960 410 - 938 K/uL    MPV 10.8 8.9 - 12.9 FL    NRBC 0.0 0  WBC    ABSOLUTE NRBC 0.00 0.00 - 0.01 K/uL    NEUTROPHILS 41 32 - 75 %    LYMPHOCYTES 49 12 - 49 %    MONOCYTES 6 5 - 13 %    EOSINOPHILS 3 0 - 7 %    BASOPHILS 1 0 - 1 %    IMMATURE GRANULOCYTES 0 0.0 - 0.5 %    ABS. NEUTROPHILS 1.4 (L) 1.8 - 8.0 K/UL    ABS. LYMPHOCYTES 1.6 0.8 - 3.5 K/UL    ABS. MONOCYTES 0.2 0.0 - 1.0 K/UL    ABS. EOSINOPHILS 0.1 0.0 - 0.4 K/UL    ABS. BASOPHILS 0.0 0.0 - 0.1 K/UL    ABS. IMM. GRANS. 0.0 0.00 - 0.04 K/UL    DF AUTOMATED     METABOLIC PANEL, COMPREHENSIVE    Collection Time: 06/26/22 11:59 PM   Result Value Ref Range    Sodium 143 136 - 145 mmol/L    Potassium 2.8 (L) 3.5 - 5.1 mmol/L    Chloride 104 97 - 108 mmol/L    CO2 33 (H) 21 - 32 mmol/L    Anion gap 6 5 - 15 mmol/L    Glucose 118 (H) 65 - 100 mg/dL    BUN 16 6 - 20 MG/DL    Creatinine 0.73 0.55 - 1.02 MG/DL    BUN/Creatinine ratio 22 (H) 12 - 20      GFR est AA >60 >60 ml/min/1.73m2    GFR est non-AA >60 >60 ml/min/1.73m2    Calcium 8.5 8.5 - 10.1 MG/DL    Bilirubin, total 0.4 0.2 - 1.0 MG/DL    ALT (SGPT) 9 (L) 12 - 78 U/L    AST (SGOT) 10 (L) 15 - 37 U/L    Alk.  phosphatase 54 45 - 117 U/L    Protein, total 5.9 (L) 6.4 - 8.2 g/dL    Albumin 2.6 (L) 3.5 - 5.0 g/dL    Globulin 3.3 2.0 - 4.0 g/dL    A-G Ratio 0.8 (L) 1.1 - 2.2     LIPASE    Collection Time: 06/26/22 11:59 PM   Result Value Ref Range    Lipase 68 (L) 73 - 393 U/L   TROPONIN-HIGH SENSITIVITY    Collection Time: 06/26/22 11:59 PM   Result Value Ref Range    Troponin-High Sensitivity 28 0 - 51 ng/L   MAGNESIUM    Collection Time: 06/26/22 11:59 PM   Result Value Ref Range    Magnesium 2.1 1.6 - 2.4 mg/dL   URINALYSIS W/ REFLEX CULTURE    Collection Time: 06/27/22 12:56 AM    Specimen: Urine   Result Value Ref Range    Color YELLOW/STRAW      Appearance TURBID (A) CLEAR      Specific gravity 1.017      pH (UA) 6.0 5.0 - 8.0      Protein 100 (A) NEG mg/dL    Glucose Negative NEG mg/dL    Ketone Negative NEG mg/dL    Bilirubin Negative NEG      Blood LARGE (A) NEG      Urobilinogen 1.0 0.2 - 1.0 EU/dL    Nitrites Negative NEG      Leukocyte Esterase LARGE (A) NEG      WBC >100 (H) 0 - 4 /hpf    RBC 5-10 0 - 5 /hpf    Epithelial cells FEW FEW /lpf    Bacteria 3+ (A) NEG /hpf    UA:UC IF INDICATED URINE CULTURE ORDERED (A) CNI     POC LACTIC ACID    Collection Time: 06/27/22  2:33 AM   Result Value Ref Range    Lactic Acid (POC) 1.06 0.40 - 2.00 mmol/L   TROPONIN-HIGH SENSITIVITY    Collection Time: 06/27/22  5:39 AM   Result Value Ref Range    Troponin-High Sensitivity 30 0 - 51 ng/L

## 2022-06-27 NOTE — PERIOP NOTES
Handoff Report from Operating Room to PACU    Report received from Grant Ludwig CRNA and Dot Downing RN regarding Naomie Barker. Surgeon(s):  Stephanie Lee MD  And Procedure(s) (LRB):  CYSTOSCOPY LEFT URETERAL STENT INSERTION (Left)  confirmed   with allergies and dressings discussed. Anesthesia type, drugs, patient history, complications, estimated blood loss, vital signs, intake and output, and last pain medication, lines and temperature were reviewed.

## 2022-06-27 NOTE — ANESTHESIA PREPROCEDURE EVALUATION
Anesthetic History     PONV          Review of Systems / Medical History  Patient summary reviewed, nursing notes reviewed and pertinent labs reviewed    Pulmonary  Within defined limits                 Neuro/Psych         Headaches, psychiatric history (Somatization disorder, Depression) and dementia    Comments: Gait instability    Pseudobulbar affect   Cardiovascular    Hypertension        Dysrhythmias   CAD and hyperlipidemia    Exercise tolerance: >4 METS  Comments: TTE (05/28/22): Left Ventricle: Normal left ventricular systolic function with a visually estimated EF of 55 - 60%. Left ventricle size is normal. Moderately increased wall thickness. Normal wall motion. Normal diastolic function.   Pericardium: Small (<1 cm) pericardial effusion present. Pericardial effusion has a hematoma. No indication of cardiac tamponade    ECG (5/27/22):   Normal sinus rhythm   Baseline artifact   Confirmed      GI/Hepatic/Renal     GERD    Renal disease (Left UPJ stone, Bladder stones): stones      Comments: UTI (6/27/22)  Overactive Bladder    Hx Esophageal obstruction s/p EGD with dilation (5/31/22) Endo/Other    Diabetes: type 2    Obesity, arthritis and anemia     Other Findings   Comments: Chronic pain associated with significant psychosocial dysfunction     Personal history of noncompliance with medical treatment, presenting hazards to health            Gout         Physical Exam    Airway  Mallampati: III  TM Distance: > 6 cm  Neck ROM: normal range of motion   Mouth opening: Normal     Cardiovascular  Regular rate and rhythm,  S1 and S2 normal,  no murmur, click, rub, or gallop  Rhythm: regular  Rate: normal         Dental    Dentition: Edentulous     Pulmonary  Breath sounds clear to auscultation               Abdominal  GI exam deferred       Other Findings            Anesthetic Plan    ASA: 3  Anesthesia type: MAC and general - backup          Induction: Intravenous  Anesthetic plan and risks discussed with: Patient and Son / Daughter

## 2022-06-27 NOTE — BRIEF OP NOTE
Brief Postoperative Note    Patient: Monse Hall  YOB: 1945  MRN: [id number]    Date of Procedure: 6/27/2022     Pre-Op Diagnosis: STONE    Post-Op Diagnosis: Same as preoperative diagnosis. Procedure(s):  CYSTOSCOPY, left ureteroscopy, left retrograde ,  LEFT URETERAL STENT INSERTION    Surgeon(s):  Devon Denney MD    Surgical Assistant: None    Anesthesia: General     Estimated Blood Loss (mL): Minimal    Complications: None    Specimens: * No specimens in log *     Implants:   Implant Name Type Inv.  Item Serial No.  Lot No. LRB No. Used Action   STENT URET TAPR 6 FRX24 CM 6 FR SFT FIRM STRL POLARIS ULTRA - SNA Stent STENT URET TAPR 6 FRX24 CM 6 FR SFT FIRM STRL POLARIS ULTRA NA [employer] [id number] Left 1 Implanted       Drains:   [REMOVED] [place]-Oneal Drain 09/20/18 Right Abdomen (Removed)       [REMOVED] External Female Catheter 09/06/19 (Removed)       [REMOVED] External Female Catheter 02/26/21 (Removed)       Findings: obstructing calculus distal left ureter    Electronically Signed by Remedios Prajapati MD on 6/27/2022 at 5:13 PM

## 2022-06-27 NOTE — PERIOP NOTES
TRANSFER - IN REPORT:    Verbal report received from covering nurse   Lester Graham R.N. For [id number] Barron Tee R.N. on Vishnu Johnson  being received from  ER # [phone]      Report consisted of patients Situation, Background, Assessment and   Recommendations(SBAR). Information from the following report(s) labs, medical history and kardex ,mar reviewed was reviewed with the receiving nurse. Opportunity for questions and clarification was provided. Assessment  Will be completed upon patients arrival to unit and care assumed.

## 2022-06-27 NOTE — ED TRIAGE NOTES
Pt arrived via ems for evaluation of UTI, bowel blockage and headache. Pt bed bound as baseline, lives at home with family. Has been having increased BMs, home health had Xray of abdomen at home, shows possible blockage.   Nothing done in route, confusion baseline, vitals WNL

## 2022-06-28 NOTE — OP NOTES
[address]  OPERATIVE REPORT    Name:  Juliocesar Rain  MR#:  [id number]  :  1945  ACCOUNT #:  [de-identified]  DATE OF SERVICE:  2022    PREOPERATIVE DIAGNOSES:  Left ureteropelvic junction stone as well as distal ureteral stone. POSTOPERATIVE DIAGNOSES:  Left ureteropelvic junction stone as well as distal ureteral stone. PROCEDURE PERFORMED:  Anesthetic cystoscopic examination with left retrograde pyelogram, rigid ureteroscopy with placement of left 6 x 24 cm double-J stent. SURGEON:  Gianna Blanca MD    ASSISTANT:  NONE    ANESTHESIA:  GEN. COMPLICATIONS:  No complications. SPECIMENS REMOVED:  None. IMPLANTS:  Left double-J stent. ESTIMATED BLOOD LOSS:  None. PROCEDURE:  After informed consent was obtained, the patient received preoperative antibiotics, and was placed on the operating table. After adequate induction of general anesthetic, we had to actually hold her left leg to avoid any undo tension on her left hip. The vagina was then prepped and draped in sterile fashion. A full time-out was accomplished. The cystoscope was advanced under direct visualization into the bladder. The bladder was surveyed and found to be free of disease. The left ureteral orifice was cannulated with [place]. Retrograde revealed complete obstruction in the intravesical portion of the left ureter. I then tried to get a wire above this but it did not, therefore I switched out to the rigid ureteroscope and there was some false passage noted in the intravesical portion of the ureter. I was able to get the ureteroscope pass the large obstructing stone and then placed the wire up in the left kidney. Over the wire, I was able to place a 6 x 24 stent under fluoroscopic guidance and the wire was removed. There was good curl in the kidney and the bladder. The bladder was then drained and the scope was removed. The patient tolerated the procedure well.   There were no complications.       MD MIMI [id number]/[id number]  D:  06/27/2022 17:23  T:  06/28/2022 0:52  JOB #:  [id number]  CC:  Gabo Suazo MD       [facility]

## 2022-06-28 NOTE — PROGRESS NOTES
Hospitalist Progress Note    NAME: Jon Mendiola   :  1945   MRN:  [id number]       Assessment / Plan:  UTI, POA  Hx recurrent uti  --cont rocephin. F/u blood and urine cx     Left UPJ stone, POA  Left flank pain, POA  --urology consult. -- CYSTOSCOPY LEFT URETERAL STENT INSERTION     Hypokalemia 2.8  -- Resolved     Diarrhea with loose stool but moderate stool in colon  Chronic constipation baseline  --cont miralax bid, pericolace, dulcolax suppository prn  --add sorbitol daily x 2 doses     CAD, hx nstemi 2021 in setting of covid-1 PNA  Aortic stenosis, moderate to severe 2021. EF 65%  --cont aspirin, toprol XL.     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 Diana Cunha 22  Impression:  -- esophageal obstruction, endoscopically consistent with achalasia with spastic appearing, tight LES and dilated proximal esophagus; Dilated LES to 15 mm with TTS balloon as above. No discrete stricture  -- presbyesophagus     Recommendations:  -- await effect of dilation  -- consider botox injection trial if dilation ineffective  -- resume diet, starting at full liquids and resume from there         18.5 - 24.9 Normal weight / Body mass index is 21.62 kg/m². Estimated discharge date:   Barriers:    Code status: Full  Prophylaxis: Lovenox  Recommended Disposition: Home w/Family     Subjective:     Chief Complaint / Reason for Physician Visit  \"\". Discussed with RN events overnight.   Feels better, PT OT evaluation, discharge plan    Review of Systems:  Symptom Y/N Comments  Symptom Y/N Comments   Fever/Chills n   Chest Pain n    Poor Appetite    Edema     Cough    Abdominal Pain     Sputum    Joint Pain     SOB/MOORE n   Pruritis/Rash     Nausea/vomit Tolerating PT/OT     Diarrhea    Tolerating Diet y    Constipation    Other       Could NOT obtain due to:      Objective:     VITALS:   Last 24hrs VS reviewed since prior progress note. Most recent are:  Patient Vitals for the past 24 hrs:   Temp Pulse Resp BP SpO2   06/28/22 1100 97.6 °F (36.4 °C) 60 18 (!) 105/59 95 %   06/28/22 0828 98 °F (36.7 °C) 72 18 (!) 143/77 95 %   06/28/22 0428 98.4 °F (36.9 °C) 69 18 (!) 154/76 100 %   06/28/22 0236 98.4 °F (36.9 °C) 72 18 (!) 152/82 100 %   06/28/22 0120 98.3 °F (36.8 °C) 67 16 132/71 100 %   06/28/22 0010 98.5 °F (36.9 °C) 64 16 118/71 100 %   06/27/22 2300 98.3 °F (36.8 °C) 65 16 135/71 98 %   06/27/22 2210 98 °F (36.7 °C) 70 18 139/83 100 %   06/27/22 2146 97.6 °F (36.4 °C) 76 16 (!) 154/93 100 %   06/27/22 2130 -- 72 11 (!) 150/85 99 %   06/27/22 2100 97.9 °F (36.6 °C) 71 12 (!) 151/84 99 %   06/27/22 2030 -- 63 11 (!) 153/84 100 %   06/27/22 2000 -- 64 11 (!) 158/90 100 %   06/27/22 1930 -- 64 11 (!) 164/89 100 %   06/27/22 1900 -- 63 11 (!) 178/91 100 %   06/27/22 1845 -- 61 13 (!) 172/87 100 %   06/27/22 1830 -- 61 13 (!) 180/86 100 %   06/27/22 1815 -- 60 11 (!) 187/99 100 %   06/27/22 1800 -- 62 11 (!) 183/95 100 %   06/27/22 1750 -- 62 11 (!) 169/93 100 %   06/27/22 1745 -- 63 12 (!) 175/94 100 %   06/27/22 1740 -- 65 14 (!) 159/86 100 %   06/27/22 1738 97.5 °F (36.4 °C) 63 14 (!) 163/89 100 %   06/27/22 1735 -- 63 13 (!) 155/90 100 %   06/27/22 1451 98.4 °F (36.9 °C) 85 18 (!) 151/91 98 %       Intake/Output Summary (Last 24 hours) at 6/28/2022 1223  Last data filed at 6/28/2022 0428  Gross per 24 hour   Intake 1100.13 ml   Output --   Net 1100.13 ml        I had a face to face encounter and independently examined this patient on 6/28/2022, as outlined below:  PHYSICAL EXAM:  General: WD, WN. Alert, cooperative, no acute distress    EENT:  EOMI. Anicteric sclerae. MMM  Resp:  CTA bilaterally, no wheezing or rales.   No accessory muscle use  CV:  Regular rhythm,  No edema  GI:  Soft, Non distended, Non tender. +Bowel sounds  Neurologic:  Alert and oriented X 3, normal speech,   Psych:   Good insight. Not anxious nor agitated  Skin:  No rashes. No jaundice    Reviewed most current lab test results and cultures  YES  Reviewed most current radiology test results   YES  Review and summation of old records today    NO  Reviewed patient's current orders and MAR    YES  PMH/SH reviewed - no change compared to H&P  ________________________________________________________________________  Care Plan discussed with:    Comments   Patient y    Family      RN y    Care Manager     Consultant                        Multidiciplinary team rounds were held today with , nursing, pharmacist and clinical coordinator. Patient's plan of care was discussed; medications were reviewed and discharge planning was addressed. ________________________________________________________________________  Total NON critical care TIME:  35   Minutes    Total CRITICAL CARE TIME Spent:   Minutes non procedure based      Comments   >50% of visit spent in counseling and coordination of care     ________________________________________________________________________  Jorgito Damon MD     Procedures: see electronic medical records for all procedures/Xrays and details which were not copied into this note but were reviewed prior to creation of Plan. LABS:  I reviewed today's most current labs and imaging studies. Pertinent labs include:  Recent Labs     06/28/22 0425 06/26/22 2359   WBC 5.6 3.4*   HGB 10.3* 9.4*   HCT 33.2* 30.6*    203     Recent Labs     06/28/22 0425 06/26/22  2359    143   K 3.6 2.8*    104   CO2 33* 33*   * 118*   BUN 14 16   CREA 0.69 0.73   CA 8.5 8.5   MG 2.2 2.1   ALB  --  2.6*   TBILI  --  0.4   ALT  --  9*       Signed:  Jorgito Damon MD

## 2022-06-28 NOTE — PROGRESS NOTES
Physical Therapy  PT evaluation completed; daja note to follow. Currently patient requires max assist for bed mobility and is not cooperative with assessment. Patient is quite confused and following limited commands. Per chart, patient appears to be at baseline. No further PT recommended at this time. Recommend resume previous care.

## 2022-06-28 NOTE — PROGRESS NOTES
Pt states \"please call my daughter Alyssa Diallos", Attempt x 2 to call pt's daughter Jose Walsh @ [phone] unsuccessful and unable to leave voice mail @ this number.

## 2022-06-28 NOTE — PROGRESS NOTES
Progress Note    Patient: Angela Mckay MRN: [id number]  SSN: [id number]    YOB: 1945  Age: 68 y.o. Sex: female        ADMITTED:  2022 to Noel Chao MD  for UTI (urinary tract infection) [N39.0]  Left ureteral stone [N20.1]  Hypokalemia [E87.6]         Angela Mckay is 1 Day Post-Op Procedure(s):  CYSTOSCOPY LEFT URETERAL STENT INSERTION. She is doing well. She has no pain. She has no nausea. She is tolerating a solid diet. Patient is voiding without difficulty. Denies stent colic. Baseline confusion. Urine and blood cx still pending. HD and renal function remain NL. Treated on rocephin         Vitals:  Temp (24hrs), Av.1 °F (36.7 °C), Min:97.5 °F (36.4 °C), Max:98.5 °F (36.9 °C)     Blood pressure (!) 143/77, pulse 72, temperature 98 °F (36.7 °C), resp. rate 18, height 5' 7\" (1.702 m), weight 62.6 kg (138 lb 0.1 oz), SpO2 95 %. I&O's:   1901 -  0700  In: 1200.1 [P.O.:200; I.V.:1000.1]  Out: 250 [Urine:250]   No intake/output data recorded. Exam:  PHYSICAL EXAM:   General:           pleasant, confused  Nose:               Nares normal. No drainage or sinus tenderness. Back:               Symmetric,  No CVA tenderness. Heart:              Not tachycardic  Abdomen:        Soft, generalized tenderness. Non tender, non distended. No masses  :                  No flank pain   Extremities:     Atraumatic, No cyanosis. No edema. No clubbing  Skin:                Texture, turgor normal. No rashes/lesions/jaundice  Neurologic:      EOMs intact.  No facial asymmetry, A/O X 1      Labs:   Recent Labs     22  0425 22  2359   WBC 5.6 3.4*   HGB 10.3* 9.4*   HCT 33.2* 30.6*    203     Recent Labs     22  0425 22  2359    143   K 3.6 2.8*    104   CO2 33* 33*   * 118*   BUN 14 16   CREA 0.69 0.73   CA 8.5 8.5        Cultures:      Imaging:       Assessment:     - 1 Day Post-Op Procedure(s):  CYSTOSCOPY LEFT URETERAL STENT INSERTION    Active Problems:    UTI (urinary tract infection) (12/9/2013)      Hypokalemia (6/27/2022)      Left ureteral stone (6/27/2022)        Plan:     - s/p ureteral L stent placement for large 1.6 cm obstructing UPJ stone  -follow up arranged for definitive stone treatment  -target abx therapy once able  -urology will sign off, please call if needed    Supervising MD, Dr. Harlan Terry  Signed By: Obdulio Alvarado NP - June 28, 2022     Agree with above

## 2022-06-28 NOTE — PROGRESS NOTES
Occupational Therapy:    Order acknowledged and chart reviewed. Per chart, patient is dependent for all ADLs and functional mobility. Patient stays in bed most days and will mobilize to w/c 1 day/wk. Pt is dependent on x2 person assist for transfers and dependent for w/c mobility. Spoke with PT who assessed Pt and determined Pt is are her dependent functional baseline. Patient is inappropriate for OT services during this acute hospital stay. OT will sign off.      [facility], OTR/L

## 2022-06-28 NOTE — PROGRESS NOTES
Bedside and Verbal shift change report given to [facility], RN (oncoming nurse) by Jasmyne Olmstead RN (offgoing nurse). Report included the following information SBAR, Kardex, Intake/Output, MAR and Recent Results.

## 2022-06-28 NOTE — PROGRESS NOTES
Bedside shift change report given to [address] [place] [address] (oncoming nurse) by Anna Swartz RN (offgoing nurse). Report included the following information SBAR, Kardex, Procedure Summary, Intake/Output, MAR and Cardiac Rhythm SR. Azam Daniels

## 2022-06-28 NOTE — PROGRESS NOTES
RX message sent to pharmacy regarding adjusting administration time for scheduled Rocephin as pt received @ 33 64 74 and next scheduled dose is 06/28/2022 @ 0600, per RX message from pharmacy ISAIAS Jay PHARMD rx message states Ellene Anamika as scheduled\".

## 2022-06-28 NOTE — PROGRESS NOTES
Transition of Care Plan:    RUR:  15% readmit  Disposition:  Home with [facility] program   Ricci Marin  [phone], Member #[id number]  Follow up appointments: [facility] schedules  DME needed: Pt has a w/c, hospital bed, & shower bench. Transportation at Discharge: [facility] arranges  [facility] or means to access home:      yes  IM Medicare Letter: needed at d/c  Is patient a BCPI-A Bundle:        n/a   If yes, was Bundle Letter given?:    Is patient a  and connected with the [facility]?  n/a              If yes, was [employer] transfer form completed and [facility] notified? Caregiver Contact:Maral Cuellar [phone]  Discharge Caregiver contacted prior to discharge? CM will contact prior to d/c   Care Conference needed?:          Reason for Readmission:     UTI; Left Ureteral Stone; Hypokalemia         RUR Score/Risk Level:    15% moderate     PCP: First and Last name:  Diamond Ruiz MD   Name of Practice: [facility]   Are you a current patient: Yes/No: yes   Approximate date of last visit: ongoing   Can you participate in a virtual visit with your PCP:     Is a Care Conference indicated: tbd      Did you attend your follow up appointment (s): If not, why not:         Resources/supports as identified by patient/family:          Top Challenges facing patient (as identified by patient/family and CM): Finances/Medication cost?  Pt obtains medications through [facility] program.     Transportation      [facility] provides transportation. Support system or lack thereof? Pt has caregivers and a supportive daughter. Living arrangements? Pt lives with her daughter in a one fl home with a ramp. Self-care/ADLs/Cognition? Pt is dependent with ADL care. Pt is alert and oriented x1. Current Advanced Directive/Advance Care Plan:  Full Code           Plan for utilizing home health: tbd              Transition of Care Plan:    Based on readmission, the patient's previous Plan of Care   has been evaluated and/or modified.  The current Transition of Care Plan is:     Home with caregivers through [facility]      CM spoke with pt's daughter via phone to introduce self/role, verify demographics, insurance and PCP. CM also discussed d/c plan. Pt lives with her daughter in a one fl home with a ramp. Pt sees the provider through [facility] and obtains all medications through them. Pt is dependent with ADL care. Pt has a w/c, hospital bed and shower bench. Pt has a hx of HH through [facility]. Pt has a hx of SNF at [facility]. Pt does not have a hx of IPR. [facility] will set up transportation at d/c. CM will continue to assess for d/c needs. Care Management Interventions  PCP Verified by CM: Yes (Pt sees the providers through [facility].)  Mode of Transport at Discharge:  Other (see comment) ([facility] sets up)  Transition of Care Consult (CM Consult): Discharge Planning  MyChart Signup:  (Pt has a hospital bed, w/c and shower bench.)  Discharge Durable Medical Equipment: No  Physical Therapy Consult: Yes  Occupational Therapy Consult: Yes  Speech Therapy Consult: No  Support Systems: Child(jim)  Confirm Follow Up Transport: Wheelchair [facility]  The Patient and/or Patient Representative was Provided with a Choice of Provider and Agrees with the Discharge Plan?: Yes  Freedom of Choice List was Provided with Basic Dialogue that Supports the Patient's Individualized Plan of Care/Goals, Treatment Preferences and Shares the Quality Data Associated with the Providers?: Yes  Discharge Location  Patient Expects to be Discharged to[de-identified] Home with outpatient services ([facility])    Readmission Assessment  Number of days since last admission?: 8-30 days  Previous disposition: Other (comment) (home with [facility] program)  What was the patient's/caregiver's perception as to why they think they needed to return back to the hospital?: Other (Comment) (ongoing medical issues)  Did you visit your Primary Care Physician after you left the hospital, before you returned this time?: Yes  Did you see a specialist, such as Cardiac, Pulmonary, Orthopedic Physician, etc. after you left the hospital?: No  Who advised the patient to return to the hospital?: Other (Comment) ([facility] nurse)  Does the patient report anything that got in the way of taking their medications?: No  In our efforts to provide the best possible care to you and others like you, can you think of anything that we could have done to help you after you left the hospital the first time, so that you might not have needed to return so soon?: Other (Comment) (Pt's daughter reported pt had x-rays which revealed she was impacted and has a large kidney stone.)    Elvie Lefort, FERNNADO  Care Management, Lucio

## 2022-06-28 NOTE — PROGRESS NOTES
Problem: Mobility Impaired (Adult and Pediatric)  Goal: *Acute Goals and Plan of Care (Insert Text)  Note:   PHYSICAL THERAPY EVALUATION/DISCHARGE  Patient: Juma Harvey (2 y.o. female)  Date: 6/28/2022  Primary Diagnosis: UTI (urinary tract infection) [N39.0]  Left ureteral stone [N20.1]  Hypokalemia [E87.6]  Procedure(s) (LRB):  CYSTOSCOPY LEFT URETERAL STENT INSERTION (Left) 1 Day Post-Op   Precautions: fall         ASSESSMENT  Based on the objective data described below, the patient presents with generalized weakness, decreased command following, confusion. Upon entering room, patient states that she is not moving today. Patient found to be wet and pads soaked as well. Assisted patient with rolling to each side in order to change bedding and for hygiene. Patient unwilling to attempt to sit. Throughout evaluation, patient is fixated on \"a big bag of chips\" that she thinks is in her room. Per chart, patient is wheelchair dependent and performs little mobility throughout the day. Patient is likely at baseline for mobility. Will discharge from PT. Recommend for patient to resume services and care at discharge. Functional Outcome Measure: The patient scored 5/100 on the Barthel Index outcome measure which is indicative of dependent mobility. Other factors to consider for discharge: confusion     Further skilled acute physical therapy is not indicated at this time. PLAN :  Recommendation for discharge: (in order for the patient to meet his/her long term goals)  No skilled physical therapy/ follow up rehabilitation needs identified at this time. This discharge recommendation:  Has not yet been discussed the attending provider and/or case management    IF patient discharges home will need the following DME: patient owns DME required for discharge       SUBJECTIVE:   Patient stated Clearnce Trimont you get me that big bag of chips?     OBJECTIVE DATA SUMMARY:   HISTORY:    Past Medical History: Diagnosis Date    Agoraphobia without mention of panic attacks 2/17/2014    Anxiety disorder 8/18/2013    Arthritis     osteo    CAD (coronary artery disease), native coronary artery 12/1/2015    no stents    Chronic chest pain 1/13/2014    Chronic pain associated with significant psychosocial dysfunction 2/17/2014    Depression 8/18/2013    Diabetes ([facility] [address]     type II    Duplicated right renal collecting system 3/13/2014    GERD (gastroesophageal reflux disease)     Gout, joint     Hypercholesteremia     hyercholesterolemia    Hypertension     Murmur     Nephrolithiasis 3/13/2014    Personal history of noncompliance with medical treatment, presenting hazards to health 5/30/2014     Past Surgical History:   Procedure Laterality Date    EGD  4/23/2010         HX CHOLECYSTECTOMY  09/20/2018    lap jesus    HX CYST REMOVAL      cyst removed from left wrist    HX HEART CATHETERIZATION  12/01/2015    HX HYSTERECTOMY      partial    HX OTHER SURGICAL      bladder dilitation    HX TUBAL LIGATION      HX UROLOGICAL      kidney stones    UPPER GI ENDOSCOPY,BALL DIL,30MM  5/31/2022            Prior level of function: dependent with all mobility  Personal factors and/or comorbidities impacting plan of care: has assistance/care at home    [address]: Private residence  # Steps to Enter: 3  One/Two Story Residence: One story  Support Systems: Child(jim)  Patient Expects to be Discharged to[de-identified] Home with outpatient services ([facility])  Current DME Used/Available at Home: Wheelchair    EXAMINATION/PRESENTATION/DECISION MAKING:   Critical Behavior:  Neurologic State: Alert,Confused  Orientation Level: Oriented to person,Disoriented to place,Disoriented to situation,Disoriented to time  Cognition: Decreased command following,Decreased attention/concentration  Safety/Judgement: Lack of insight into deficits  Hearing:   Auditory  Auditory Impairment: None  Skin:  intact; dressing over sacrum  Edema: none noted  Range Of Motion:  AROM: Grossly decreased, non-functional           PROM: Generally decreased, functional           Strength:    Strength: Generally decreased, functional (LE's not functional)                    Tone & Sensation:   Tone: Normal              Sensation:  (unable to assess)               Coordination:  Coordination: Generally decreased, functional  Vision:      Functional Mobility:  Bed Mobility:  Rolling: Maximum assistance;Assist x1  Supine to Sit:  (unable)        Transfers:                             Balance:   Sitting:  (not tested)  Standing:  (not tested)  Ambulation/Gait Training:                    Right Side Weight Bearing: Full  Left Side Weight Bearing: Full                               Functional Measure:  Barthel Index:    Bathin  Bladder: 0  Bowels: 0  Groomin  Dressin  Feedin  Mobility: 0  Stairs: 0  Toilet Use: 0  Transfer (Bed to Chair and Back): 0  Total: 5/100       The Barthel ADL Index: Guidelines  1. The index should be used as a record of what a patient does, not as a record of what a patient could do. 2. The main aim is to establish degree of independence from any help, physical or verbal, however minor and for whatever reason. 3. The need for supervision renders the patient not independent. 4. A patient's performance should be established using the best available evidence. Asking the patient, friends/relatives and nurses are the usual sources, but direct observation and common sense are also important. However direct testing is not needed. 5. Usually the patient's performance over the preceding 24-48 hours is important, but occasionally longer periods will be relevant. 6. Middle categories imply that the patient supplies over 50 per cent of the effort. 7. Use of aids to be independent is allowed.     Score Interpretation (from [address] [address])    Independent   60-79 Minimally independent   40-59 Partially dependent   20-39 Very dependent   <20 Totally dependent     Parth Ziegler, Barthel, D.W. ([id number]). Functional evaluation: the Barthel Index. [address] ([address] (1997). The Barthel activities of daily living index: self-reporting versus actual performance in the old (> or = 75 years). Journal of [address] [place] [address] 45(7), 14 [facility], SILVER, Miguelito Peralta., Akilah Valladares. (1999). Measuring the change in disability after inpatient rehabilitation; comparison of the responsiveness of the Barthel Index and Functional [place] Measure. Journal of Neurology, Neurosurgery, and Psychiatry, 66(4), 143-571. Mustapha Elmore, N.J.A, SUMAN Florian, & Lucila Frederick M.A. (2004) Assessment of post-stroke quality of life in cost-effectiveness studies: The usefulness of the Barthel Index and the EuroQoL-5D. Quality of Life Research, 15, 464-66            Physical Therapy Evaluation Charge Determination   History Examination Presentation Decision-Making   MEDIUM  Complexity : 1-2 comorbidities / personal factors will impact the outcome/ POC  LOW Complexity : 1-2 Standardized tests and measures addressing body structure, function, activity limitation and / or participation in recreation  LOW Complexity : Stable, uncomplicated  LOW Complexity : FOTO score of       Based on the above components, the patient evaluation is determined to be of the following complexity level: LOW     Pain Rating:  Pain around genitals    Activity Tolerance:   Poor      After treatment patient left in no apparent distress:   Supine in bed, Heels elevated for pressure relief, Call bell within reach, Bed / chair alarm activated, and Side rails x 3    COMMUNICATION/EDUCATION:   The patients plan of care was discussed with: Occupational therapist and Registered nurse. Fall prevention education was provided and the patient/caregiver indicated understanding.  and Patient is unable to participate in goal setting and plan of care.    Thank you for this referral.  Rush Linder, PT   Time Calculation: 29 mins

## 2022-06-28 NOTE — PROGRESS NOTES
Problem: Dysphagia (Adult)  Goal: *Acute Goals and Plan of Care (Insert Text)  Description: 6/27/2022  Speech path  1. Patient will tolerate minced and moist diet with thins with no overt s/s of aspiration. MET 6/28/2022  2. Patient will tolerate diet upgrade with no overt s/s of aspiration. 6/28/2022 1545 by MARÍA De Souza  Outcome: Resolved/Met    SPEECH LANGUAGE PATHOLOGY DYSPHAGIA TREATMENT/DISCHARGE  Patient: Daren Patton (12 y.o. female)  Date: 6/28/2022  Diagnosis: UTI (urinary tract infection) [N39.0]  Left ureteral stone [N20.1]  Hypokalemia [E87.6] <principal problem not specified>  Procedure(s) (LRB):  CYSTOSCOPY LEFT URETERAL STENT INSERTION (Left) 1 Day Post-Op  Precautions:       ASSESSMENT:  Patient continues to be followed by SLP and initial evaluation on 6/27 revealed a functional oropharyngeal swallow and Minced&Moist/Thin Liquids was recommended. On this date, patient observed with mildly prolonged mastication, likely 2/2 to edentulism, yet patient achieves efficient oral clearance. No signs of aspiration observed with any consistency tried. Given patient's positioning as well as patient's history of dementia, patient is at increased risk of aspiration. At this juncture, Minced&Moist/Thin Liquids continues to be the most appropriate diet consistency. No further acute SLP needs. Please reconsult if needs arise. PLAN:  -- Minced&Moist/Thin Liquids  -- Medication as Tolerated  -- 1:1 Assistance as Needed  -- Small Bites/Sips    Patient will be discharged from acute skilled speech therapy at this time. Rationale for discharge:  Goals achieved    Discharge Recommendations: To Be Determined     SUBJECTIVE:   Patient stated i'm hungry.     OBJECTIVE:   Cognitive and Communication Status:  Neurologic State: Alert,Confused  Orientation Level: Oriented to person,Disoriented to place,Disoriented to situation,Disoriented to time  Cognition: Decreased attention/concentration,Decreased command following    Perception: Appears intact    Perseveration: Perseverates during conversation    Safety/Judgement: Lack of insight into deficits    Dysphagia Treatment:    P.O. Trials:  Patient Position: Upright in bed  Vocal quality prior to P.O.: No impairment  Consistency Presented: Thin liquid;Puree; Solid  How Presented: Self-fed/presented;Straw;Successive swallows;Spoon     Bolus Acceptance: No impairment  Bolus Formation/Control: Impaired  Type of Impairment: Delayed;Mastication  Propulsion: No impairment  Oral Residue: None  Initiation of Swallow: No impairment  Laryngeal Elevation: Functional  Aspiration Signs/Symptoms: None                      NOMS:   The NOMS functional outcome measure was used to quantify this patient's level of swallowing impairment. Based on the NOMS, the patient was determined to be at level 5 for swallow function. NOMS Swallowing Levels:  Level 1 (CN): NPO  Level 2 (CM): NPO but takes consistency in therapy  Level 3 (CL): Takes less than 50% of nutrition p.o. and continues with nonoral feedings; and/or safe with mod cues; and/or max diet restriction  Level 4 (CK): Safe swallow but needs mod cues; and/or mod diet restriction; and/or still requires some nonoral feeding/supplements  Level 5 (CJ): Safe swallow with min diet restriction; and/or needs min cues  Level 6 (CI): Independent with p.o.; rare cues; usually self cues; may need to avoid some foods or needs extra time  Level 7 ([address] [place] [address]): Independent for all p.o.  MARGARET. (2003). National Outcomes Measurement System (NOMS): Adult Speech-Language Pathology User's Guide.        Pain:  Pain Scale 1: Numeric (0 - 10)  Pain Intensity 1: 0  Pain Location 1: Head    After treatment:   Patient left in no apparent distress in bed, Call bell within reach, and Nursing notified    COMMUNICATION/EDUCATION:   The patient's plan of care including recommendations, planned interventions, and recommended diet changes were discussed with: Registered nurse.     Shaila Trevizo, SLP  Time Calculation: 20 mins

## 2022-06-29 NOTE — PROGRESS NOTES
Hospitalist Progress Note    NAME: Juma Harvey   :  1945   MRN:  [id number]       Assessment / Plan:  UTI, POA  Hx recurrent uti  --cont rocephin. F/u blood and urine cx  -Urine culture growing Klebsiella pneumonia sensitive to ceftriaxone     Left UPJ stone, POA  Left flank pain, POA  --urology consult. -- CYSTOSCOPY LEFT URETERAL STENT INSERTION     Hypokalemia 2.8  -- Replaced         Chronic constipation baseline  --cont miralax bid, pericolace, dulcolax suppository prn    Headache  - Start patient Fioricet  - Follow-up head CT scan     CAD, hx nstemi 2021 in setting of covid-1 PNA  Aortic stenosis, moderate to severe 2021. EF 65%  --cont aspirin, toprol XL.     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 Farheen Medina 22  Impression:  -- esophageal obstruction, endoscopically consistent with achalasia with spastic appearing, tight LES and dilated proximal esophagus; Dilated LES to 15 mm with TTS balloon as above. No discrete stricture  -- presbyesophagus     Recommendations:  -- await effect of dilation  -- consider botox injection trial if dilation ineffective  -- resume diet, starting at full liquids and resume from there         18.5 - 24.9 Normal weight / Body mass index is 21.62 kg/m². Estimated discharge date:   Barriers:    Code status: Full  Prophylaxis: Lovenox  Recommended Disposition: Home w/Family     Subjective:     Chief Complaint / Reason for Physician Visit  \"\". Discussed with RN events overnight.   Patient complained from headache, follow-up head CT scan, possible discharge tomorrow with oral antibiotic   review of Systems:  Symptom Y/N Comments  Symptom Y/N Comments   Fever/Chills n   Chest Pain n    Poor Appetite Edema     Cough    Abdominal Pain     Sputum    Joint Pain     SOB/MOORE n   Pruritis/Rash     Nausea/vomit    Tolerating PT/OT     Diarrhea    Tolerating Diet y    Constipation    Other       Could NOT obtain due to:      Objective:     VITALS:   Last 24hrs VS reviewed since prior progress note. Most recent are:  Patient Vitals for the past 24 hrs:   Temp Pulse Resp BP SpO2   06/29/22 0753 97.6 °F (36.4 °C) 70 15 (!) 156/85 95 %   06/28/22 2310 97.4 °F (36.3 °C) (!) 59 14 (!) 147/73 95 %   06/28/22 2024 97.6 °F (36.4 °C) 64 16 (!) 120/58 95 %   06/28/22 2000 -- -- -- -- 97 %   06/28/22 1508 98 °F (36.7 °C) 65 18 121/61 95 %       Intake/Output Summary (Last 24 hours) at 6/29/2022 1353  Last data filed at 6/28/2022 1748  Gross per 24 hour   Intake 1000 ml   Output 2 ml   Net 998 ml        I had a face to face encounter and independently examined this patient on 6/29/2022, as outlined below:  PHYSICAL EXAM:  General: WD, WN. Alert, cooperative, no acute distress    EENT:  EOMI. Anicteric sclerae. MMM  Resp:  CTA bilaterally, no wheezing or rales. No accessory muscle use  CV:  Regular  rhythm,  No edema  GI:  Soft, Non distended, Non tender. +Bowel sounds  Neurologic:  Alert and oriented X 2 normal speech,   Psych:   Good insight. Not anxious nor agitated  Skin:  No rashes. No jaundice    Reviewed most current lab test results and cultures  YES  Reviewed most current radiology test results   YES  Review and summation of old records today    NO  Reviewed patient's current orders and MAR    YES  PMH/SH reviewed - no change compared to H&P  ________________________________________________________________________  Care Plan discussed with:    Comments   Patient y    Family      RN y    Care Manager     Consultant                        Multidiciplinary team rounds were held today with , nursing, pharmacist and clinical coordinator.   Patient's plan of care was discussed; medications were reviewed and discharge planning was addressed. ________________________________________________________________________  Total NON critical care TIME:  35   Minutes    Total CRITICAL CARE TIME Spent:   Minutes non procedure based      Comments   >50% of visit spent in counseling and coordination of care     ________________________________________________________________________  Alinda Peabody, MD     Procedures: see electronic medical records for all procedures/Xrays and details which were not copied into this note but were reviewed prior to creation of Plan. LABS:  I reviewed today's most current labs and imaging studies. Pertinent labs include:  Recent Labs     06/29/22  0515 06/28/22  0425 06/26/22  2359   WBC 3.2* 5.6 3.4*   HGB 8.9* 10.3* 9.4*   HCT 28.5* 33.2* 30.6*    203 203     Recent Labs     06/29/22  0515 06/28/22  0425 06/26/22  2359    142 143   K 3.0* 3.6 2.8*    106 104   CO2 31 33* 33*   GLU 81 120* 118*   BUN 16 14 16   CREA 0.63 0.69 0.73   CA 8.2* 8.5 8.5   MG  --  2.2 2.1   ALB  --   --  2.6*   TBILI  --   --  0.4   ALT  --   --  9*       Signed:  Alinda Peabody, MD

## 2022-06-30 NOTE — PROGRESS NOTES
Transition of Care Plan:     RUR:  15% readmit  Disposition:  Home with [facility]   Tania Barone  [phone], Member #[id number]  Follow up appointments: [facility] schedules  DME needed: Pt has a w/c, hospital bed, & shower bench. Transportation at Discharge: [facility] arranges  [address] or means to access home:      yes   Medicare Letter: needed at d/c  Is patient a BCPI-A Bundle:        n/a              If yes, was Bundle Letter given?:    Is patient a [place] and connected with the [place]?  n/a              If yes, was [employer] transfer form completed and [place] notified? Caregiver Contact:Maral Christie [phone]  Discharge Caregiver contacted prior to discharge? CM will contact prior to d/c   Care Conference needed?:          CM reviewed chart. Pt expected to d/c tomorrow. CM attempted to leave a msg with Tania Lizabeth of [facility]; however, voice mail full.      Yaz Thomas MSW  Care Management, [address]

## 2022-06-30 NOTE — PROGRESS NOTES
Report endorsed to incoming nurse. Pt in bed, stable at this time with no apparent distress noted. VSS. Purewick placed per increase urine output. Tele remains on NSR.  Call light within reach

## 2022-06-30 NOTE — PROGRESS NOTES
Problem: Falls - Risk of  Goal: *Absence of Falls  Description: Document Bib Bertha Fall Risk and appropriate interventions in the flowsheet.   Outcome: Progressing Towards Goal  Note: Fall Risk Interventions:  Mobility Interventions: Bed/chair exit alarm    Mentation Interventions: Adequate sleep, hydration, pain control    Medication Interventions: Bed/chair exit alarm,Patient to call before getting OOB,Teach patient to arise slowly    Elimination Interventions: Call light in reach    History of Falls Interventions: Bed/chair exit alarm

## 2022-06-30 NOTE — ROUTINE PROCESS
Bedside shift change report given to SANTOSH OLMSTEAD (oncoming nurse) by Kaitlynn Hickman RN (offgoing nurse). Report included the following information SBAR and Kardex.

## 2022-06-30 NOTE — PROGRESS NOTES
Hospitalist Progress Note    NAME: Jon Mendiola   :  1945   MRN:  [id number]       Assessment / Plan:  UTI, POA  Hx recurrent uti  --cont rocephin. F/u blood and urine cx     Left UPJ stone, POA  Left flank pain, POA  --urology consult. -- CYSTOSCOPY LEFT URETERAL STENT INSERTION     Hypokalemia 2.8  -- Replace K     Diarrhea with loose stool but moderate stool in colon  Chronic constipation baseline  --cont miralax bid, pericolace, dulcolax suppository prn  --add sorbitol daily x 2 doses     CAD, hx nstemi 2021 in setting of covid-1 PNA  Aortic stenosis, moderate to severe 2021. EF 65%  --cont aspirin, toprol XL.     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 Ortiz Drivers 22  Impression:  -- esophageal obstruction, endoscopically consistent with achalasia with spastic appearing, tight LES and dilated proximal esophagus; Dilated LES to 15 mm with TTS balloon as above. No discrete stricture  -- presbyesophagus     Recommendations:  -- await effect of dilation  -- consider botox injection trial if dilation ineffective  -- resume diet, starting at full liquids and resume from there         18.5 - 24.9 Normal weight / Body mass index is 21.62 kg/m². Estimated discharge date: . I called her daughter she tells me she is at [employer] and dont want her to come back todayshe should be discharged tomorrow   Barriers:    Code status: Full  Prophylaxis: Lovenox  Recommended Disposition: Home w/Family     Subjective:     Chief Complaint / Reason for Physician Visit  \"\". Discussed with RN events overnight. No issues daughter not available to take her home today .     Review of Systems:  Symptom Y/N Comments  Symptom Y/N Comments   Fever/Chills n Chest Pain n    Poor Appetite    Edema     Cough    Abdominal Pain     Sputum    Joint Pain     SOB/MOORE n   Pruritis/Rash     Nausea/vomit    Tolerating PT/OT     Diarrhea    Tolerating Diet y    Constipation    Other       Could NOT obtain due to:      Objective:     VITALS:   Last 24hrs VS reviewed since prior progress note. Most recent are:  Patient Vitals for the past 24 hrs:   Temp Pulse Resp BP SpO2   06/30/22 1035 97.6 °F (36.4 °C) 75 16 (!) 146/81 97 %   06/30/22 0736 97.3 °F (36.3 °C) 67 18 (!) 152/77 97 %   06/30/22 0330 98 °F (36.7 °C) 74 20 (!) 158/80 96 %   06/29/22 2305 97.8 °F (36.6 °C) 71 20 (!) 161/56 100 %   06/29/22 2019 98.8 °F (37.1 °C) 67 18 (!) 155/80 100 %   06/29/22 1509 98.1 °F (36.7 °C) 70 18 (!) 155/87 95 %       Intake/Output Summary (Last 24 hours) at 6/30/2022 1136  Last data filed at 6/30/2022 1043  Gross per 24 hour   Intake 3068.75 ml   Output 400 ml   Net 2668.75 ml        I had a face to face encounter and independently examined this patient on 6/30/2022, as outlined below:  PHYSICAL EXAM:  General: WD, WN. Alert, cooperative, no acute distress    EENT:  EOMI. Anicteric sclerae. MMM  Resp:  CTA bilaterally, no wheezing or rales. No accessory muscle use  CV:  Regular  rhythm,  No edema  GI:  Soft, Non distended, Non tender. +Bowel sounds  Neurologic:  Alert and oriented X 3, normal speech,   Psych:   Good insight. Not anxious nor agitated  Skin:  No rashes.   No jaundice    Reviewed most current lab test results and cultures  YES  Reviewed most current radiology test results   YES  Review and summation of old records today    NO  Reviewed patient's current orders and MAR    YES  PMH/SH reviewed - no change compared to H&P  ________________________________________________________________________  Care Plan discussed with:    Comments   Patient y    Family      RN y    Care Manager     Consultant                        Multidiciplinary team rounds were held today with , nursing, pharmacist and clinical coordinator. Patient's plan of care was discussed; medications were reviewed and discharge planning was addressed. ________________________________________________________________________  Total NON critical care TIME:  35   Minutes    Total CRITICAL CARE TIME Spent:   Minutes non procedure based      Comments   >50% of visit spent in counseling and coordination of care     ________________________________________________________________________  Peter Escobedo MD     Procedures: see electronic medical records for all procedures/Xrays and details which were not copied into this note but were reviewed prior to creation of Plan. LABS:  I reviewed today's most current labs and imaging studies.   Pertinent labs include:  Recent Labs     06/29/22  0515 06/28/22  0425   WBC 3.2* 5.6   HGB 8.9* 10.3*   HCT 28.5* 33.2*    203     Recent Labs     06/30/22  0151 06/29/22  0515 06/28/22  0425    142 142   K 3.1* 3.0* 3.6    108 106   CO2 28 31 33*   GLU 75 81 120*   BUN 14 16 14   CREA 0.52* 0.63 0.69   CA 8.2* 8.2* 8.5   MG  --   --  2.2       Signed: Peter Escobedo MD

## 2022-07-01 NOTE — DISCHARGE SUMMARY
Discharge Summary    Patient: Esteban Acharya               Sex: female          DOA: 6/26/2022         YOB: 1945      Age:  68 y.o.        LOS:  LOS: 4 days                Admit Date: 6/26/2022    Discharge Date: 7/1/2022    Admission Diagnoses: UTI (urinary tract infection) [N39.0]  Left ureteral stone [N20.1]  Hypokalemia [E87.6]    Discharge Diagnoses:    Problem List as of 7/1/2022 Date Reviewed: 6/27/2022          Codes Class Noted - Resolved    Hypokalemia ICD-10-CM: E87.6  ICD-9-CM: 276.8  6/27/2022 - Present        Left ureteral stone ICD-10-CM: N20.1  ICD-9-CM: 592.1  6/27/2022 - Present        Esophageal obstruction ICD-10-CM: K22.2  ICD-9-CM: 530.3  5/27/2022 - Present        AMS (altered mental status) ICD-10-CM: R41.82  ICD-9-CM: 780.97  1/9/2022 - Present        Renal stone ICD-10-CM: N20.0  ICD-9-CM: 592.0  2/25/2021 - Present        Dysrhythmia, cardiac ICD-10-CM: I49.9  ICD-9-CM: 427.9  2/25/2021 - Present        Nausea & vomiting ICD-10-CM: R11.2  ICD-9-CM: 787.01  2/25/2021 - Present        Dementia ([facility] ICD-10-CM: F03.90  ICD-9-CM: 294.20  2/25/2021 - Present        HERBERTH (acute kidney injury) ([facility] ICD-10-CM: N17.9  ICD-9-CM: 584.9  2/25/2021 - Present        History of COVID-19 ICD-10-CM: Z86.16  ICD-9-CM: V12.09  2/25/2021 - Present        Acute coronary syndromes ([facility] ICD-10-CM: I24.9  ICD-9-CM: 411.1  2/17/2021 - Present        Urinary retention ICD-10-CM: R33.9  ICD-9-CM: 788.20  9/3/2019 - Present        Fecal impaction ([facility] ICD-10-CM: K56.41  ICD-9-CM: 560.32  9/3/2019 - Present        Choledocholithiasis with acute cholecystitis ICD-10-CM: K80.42  ICD-9-CM: 574.30  9/19/2018 - Present        Vaginal irritation ICD-10-CM: N89.8  ICD-9-CM: 623.9  10/11/2017 - Present        Blurring of vision ICD-10-CM: H53.8  ICD-9-CM: 368.8  10/11/2017 - Present        Advance directive discussed with patient ICD-10-CM: Z71.89  ICD-9-CM: V65.49  7/6/2017 - Present        Gait instability ICD-10-CM: R26.81  ICD-9-CM: 781.2  4/14/2017 - Present        Weakness ICD-10-CM: R53.1  ICD-9-CM: 780.79  4/13/2017 - Present        Assistance needed with transportation ICD-10-CM: Z74.8  ICD-9-CM: V60.89  4/13/2017 - Present        Left-sided chest wall pain ICD-10-CM: R07.89  ICD-9-CM: 786.52  4/11/2017 - Present        Hydronephrosis of left kidney ICD-10-CM: N13.30  ICD-9-CM: 591  4/7/2017 - Present        Orthostatic hypotension ICD-10-CM: I95.1  ICD-9-CM: 458.0  4/4/2017 - Present        Generalized OA ICD-10-CM: M15.9  ICD-9-CM: 715.00  1/5/2017 - Present        Noncompliance with medication regimen-chol medication  ICD-10-CM: Z91.14  ICD-9-CM: V15.81  1/5/2017 - Present        Tightness sensation-head ICD-10-CM: Z78.9  ICD-9-CM: V49.89  9/20/2016 - Present        Diabetes mellitus type 2, diet-controlled ([facility] ICD-10-CM: E11.9  ICD-9-CM: 250.00  5/26/2016 - Present        Somatization disorder ICD-10-CM: F45.0  ICD-9-CM: 300.81  5/26/2016 - Present        Death of parent ICD-10-CM: Z63.4  ICD-9-CM: V61.07  5/26/2016 - Present        Somatic delusion disorder ([facility] ICD-10-CM: F22  ICD-9-CM: 297.1  3/22/2016 - Present        Death of child ICD-10-CM: Z63.4  ICD-9-CM: V61.07  2/17/2016 - Present        Broken heart syndrome ICD-10-CM: I51.81  ICD-9-CM: 429.83  1/18/2016 - Present        SOB (shortness of breath) ICD-10-CM: R06.02  ICD-9-CM: 786.05  12/22/2015 - Present        CAD (coronary artery disease), native coronary artery ICD-10-CM: I25.10  ICD-9-CM: 414.01  12/1/2015 - Present    Overview Signed 12/1/2015  1:24 PM by Rosa Santiago MD     Mild, nonobstructive by cardiac cath 12/1/15             Pseudobulbar affect ICD-10-CM: F48.2  ICD-9-CM: 310.81  10/16/2015 - Present        Breast pain, left (Chronic) ICD-10-CM: N64.4  ICD-9-CM: 611.71  4/7/2015 - Present        Personal history of noncompliance with medical treatment, presenting hazards to health (Chronic) ICD-10-CM: Z91.19  ICD-9-CM: V15.81 Chronic 5/30/2014 - Present    Overview Addendum 7/30/2014 12:24 AM by Elizabeth SAEZ     07/30/2014: Challenging to determine medication compliance, often starts and independently stops prescribed medications, some splitting behaviors frequently asks providers about other providers especially when she is unhappy about their lack of responsiveness to her somatic complaints.              Duplicated right renal collecting system (Chronic) ICD-10-CM: Q62.5  ICD-9-CM: 753.4  3/13/2014 - Present        Onycholysis of toenail (Chronic) ICD-10-CM: L60.1  ICD-9-CM: 703.8 Chronic 2/27/2014 - Present    Overview Signed 2/27/2014  7:54 AM by Elizabeth SAEZ     2/27/2014: Left 2nd toe             Chronic pain associated with significant psychosocial dysfunction (Chronic) ICD-10-CM: G89.4  ICD-9-CM: 338.4  2/17/2014 - Present        Depression with anxiety (Chronic) ICD-10-CM: F41.8  ICD-9-CM: 300.4 Chronic 2/17/2014 - Present        Other somatoform disorders (Chronic) ICD-10-CM: F45.8  ICD-9-CM: 300.89 Chronic 2/17/2014 - Present        Chronic chest pain (Chronic) ICD-10-CM: R07.9, G89.29  ICD-9-CM: 786.50, 338.29 Chronic 1/13/2014 - Present        Hyperlipidemia (Chronic) ICD-10-CM: E78.5  ICD-9-CM: 272.4 Chronic 12/9/2013 - Present        UTI (urinary tract infection) ICD-10-CM: N39.0  ICD-9-CM: 599.0  12/9/2013 - Present        Insomnia (Chronic) ICD-10-CM: G47.00  ICD-9-CM: 780.52  11/13/2013 - Present        Constipation (Chronic) ICD-10-CM: K59.00  ICD-9-CM: 564.00 Chronic 8/22/2013 - Present        Abdominal pain ICD-10-CM: R10.9  ICD-9-CM: 789.00  8/16/2013 - Present        Dizziness of unknown cause ICD-10-CM: R42  ICD-9-CM: 780.4  8/13/2013 - Present        Neutropenia (HCC) ICD-10-CM: D70.9  ICD-9-CM: 288.00  8/13/2013 - Present        HTN, goal below 130/80 (Chronic) ICD-10-CM: I10  ICD-9-CM: 401.9 Chronic 9/7/2012 - Present        Chronic headache (Chronic) ICD-10-CM: R51.9, G89.29  ICD-9-CM: 853. 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.89  ICD-9-CM: 301.89 Chronic 2/10/2015 - 10/27/2015        RESOLVED: Vaginal pain (Chronic) ICD-10-CM: R10.2  ICD-9-CM: 625.9 Chronic 7/30/2014 - 10/27/2015        RESOLVED: Nephrolithiasis (Chronic) ICD-10-CM: N20.0  ICD-9-CM: 592.0  3/13/2014 - 8/3/2021        RESOLVED: Agoraphobia without mention of panic attacks (Chronic) ICD-10-CM: F40.00  ICD-9-CM: 300.22 Chronic 2/17/2014 - 2/17/2014    Overview Signed 2/17/2014  3:27 AM by Lyndsey SAEZ     1/27/14: Severe agoraphobia cannot even see family, almost all communication is now via telephone             RESOLVED: Depression (Chronic) ICD-10-CM: F32. A  ICD-9-CM: 001  8/18/2013 - 2/17/2014        RESOLVED: Anxiety disorder (Chronic) ICD-10-CM: F41.9  ICD-9-CM: 300.00 Chronic 8/18/2013 - 2/17/2014        RESOLVED: Type II or unspecified type diabetes mellitus with neurological manifestations, uncontrolled(250.62) (HCC) (Chronic) ICD-10-CM: E11.49  ICD-9-CM: 250.62 Chronic 9/7/2012 - 10/16/2015              Discharge Medications:     Current Discharge Medication List      START taking these medications    Details   cefUROXime (CEFTIN) 250 mg tablet Take 1 Tablet by mouth two (2) times a day. Qty: 14 Tablet, Refills: 0         CONTINUE these medications which have NOT CHANGED    Details   metoprolol succinate (TOPROL-XL) 25 mg XL tablet Take 1 Tablet by mouth daily for 30 days.   Qty: 30 Tablet, Refills: 0      senna-docusate (PERICOLACE) 8.6-50 mg per tablet Take 1 Tablet by mouth two (2) times a day. Qty: 60 Tablet, Refills: 0      bisacodyL (Dulcolax, bisacodyl,) 10 mg supp Insert 10 mg into rectum daily as needed for Constipation (To promote 3-4 bowel movements weekly). Qty: 30 Suppository, Refills: 0      polyethylene glycol (MIRALAX) 17 gram packet Take 1 Packet by mouth two (2) times a day. Qty: 60 Packet, Refills: 0      sertraline (Zoloft) 100 mg tablet Take 100 mg by mouth daily. mirabegron ER (MYRBETRIQ) 50 mg ER tablet Take 50 mg by mouth daily. acetaminophen (TYLENOL) 500 mg tablet Take 1,000 mg by mouth every six (6) hours as needed for Pain. aspirin 81 mg chewable tablet Take 1 Tab by mouth daily. Qty: 30 Tab, Refills: 0      ezetimibe (ZETIA) 10 mg tablet Take 1 Tab by mouth daily. Qty: 30 Tab, Refills: 0      amLODIPine (NORVASC) 2.5 mg tablet Take 2.5 mg by mouth daily. Follow-up:pcp,urology - Dr Lisa Rodriguez. Discharge Condition: Stable    Activity: Activity as tolerated    Diet: Resume previous diet    Wound Care: As directed    Labs:  Labs: Results:       Chemistry Recent Labs     07/01/22  0451 06/30/22  0151 06/29/22  0515   GLU 57* 75 81    141 142   K 3.0* 3.1* 3.0*    107 108   CO2 26 28 31   BUN 14 14 16   CREA 0.55 0.52* 0.63   CA 8.4* 8.2* 8.2*   AGAP 8 6 3*   BUCR 25* 27* 25*      CBC w/Diff Recent Labs     07/01/22  0451 06/29/22  0515   WBC 3.8 3.2*   RBC 3.83 3.25*   HGB 10.4* 8.9*   HCT 33.6* 28.5*    158   GRANS  --  33   LYMPH  --  55*   EOS  --  4      Cardiac Enzymes No results for input(s): CPK, CKND1, BLANCA in the last 72 hours. No lab exists for component: CKRMB, TROIP   Coagulation No results for input(s): PTP, INR, APTT, INREXT in the last 72 hours.     Lipid Panel Lab Results   Component Value Date/Time    Cholesterol, total 258 (H) 07/06/2017 12:22 PM    HDL Cholesterol 64 07/06/2017 12:22 PM    LDL,Direct 209 (H) 01/26/2017 02:06 PM    LDL, calculated 175 (H) 07/06/2017 12:22 PM    VLDL, calculated 19 07/06/2017 12:22 PM    Triglyceride 95 07/06/2017 12:22 PM      BNP No results for input(s): BNPP in the last 72 hours. Liver Enzymes No results for input(s): TP, ALB, TBIL, AP in the last 72 hours. No lab exists for component: SGOT, GPT, DBIL   Thyroid Studies Lab Results   Component Value Date/Time    T4, Total 7.0 03/04/2015 12:32 PM    T3 Uptake 27 03/04/2015 12:32 PM    TSH 3.01 01/10/2022 03:14 AM          Imaging:  CT head:No acute intracranial process identified     Consults: None    Treatment Team: Treatment Team: Attending Provider: David Siegel MD; Consulting Provider: Rachael Snyder MD; Care Manager: Devan Covington; Utilization Review: Lázaro Aleman RN; Primary Nurse: Reina Jones RN    Significant Diagnostic Studies: labs: cbc,bmp and microbiology: urine culture: positive for Klebsiella pneumoniae    Hospital Course:    HPI:  Pamella Barthel is a 68 y.o. female PMH significant for anxiety, coronary artery disease, GERD, hyperlipidemia, hypertension, type 2 diabetes, recurrent UTIs, aortic stenosis, esophageal obstruction, status post EGD with esophageal dilation on May 31, 2022 presents to the ED with abdominal pain and diarrhea for the past 3 weeks.  Daughter reports patient has had 3-4 episodes of nonbloody diarrhea daily for the past several weeks. Liu Morris also complains of headache which has been intermittent.  Daughter reports patient has been more confused than usual and she is also concerned that patient has developed another urinary tract infection.  Patient reports that \"it hurts when I pee and it hurts when I do not pee\".  Denies any fevers or chills.  Daughter reports she has been eating and drinking well.  Patient reports sometimes she has trouble swallowing. [facility] provider sent x-ray tech to patient's home to obtain abdominal x-ray today and there was concern for possible obstruction so they called daughter and told her to take patient to the ED for CT scan.  Patient had COVID in January 2021 and daughter reports she has been very weak and in a wheelchair ever since.  Patient complains of blurry vision, but daughter states that this has been an ongoing problem and patient has known cataracts. Hospital course:  UTI, POA  Hx recurrent uti  --Treated with ont rocephin. --Blood cx negative. Urine cx grew Klebsiella Pneumoniae sensitive to multiple atbx. --Patient will be treated with PO cefuroxime for 7 days     Left UPJ stone, POA  Left flank pain, POA  --Urology consult. -- CYSTOSCOPY LEFT URETERAL STENT INSERTION  --Follow-up with urology - Dr Charlie Clarke     Hypokalemia 2.8  -- Replaced  --Will discharge on po potassium  --Labs to be repeated by pcp     Diarrhea with loose stool but moderate stool in colon  Chronic constipation baseline  --cont miralax bid, pericolace, dulcolax suppository prn  --add sorbitol daily x 2 doses     CAD, hx nstemi 2/2021 in setting of covid-1 PNA  Aortic stenosis, moderate to severe 2/2021.  EF 65%  --cont aspirin, toprol XL.     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 Carmen Jacobsen 5/31/22  -- esophageal obstruction, endoscopically consistent with achalasia with spastic appearing, tight LES and dilated proximal esophagus; Dilated LES to 15 mm with TTS balloon as above. No discrete stricture  -- presbyesophagus     Patient evaluated and noted to be medically stable for discharge. Discharge plan discussed with patient,nurse and . [place] home with [facility] services.     Time for discharge:40 minutes     Gurdeep Villanueva MD  July 1, 2022

## 2022-07-01 NOTE — PROGRESS NOTES
Pt is cleared for d/c from a CM standpoint. Transition of Care Plan:     RUR:  15% readmit  Disposition:  Home with PACE program   Samuel Ayoub  [phone], Member #[id number]  Follow up appointments: [facility] schedules  DME needed: Pt has a w/c, hospital bed, & shower bench. Transportation at [facility] or means to access home:      yes  IM Medicare Letter: emailed. Is patient a BCPI-A Bundle:        n/a              If yes, was Bundle Letter given?:    Is patient a  and connected with the [facility]?  n/a              If yes, was [employer] transfer form completed and [facility] notified? Caregiver Contact:Maral Denson [phone]  Discharge Caregiver contacted prior to discharge? CM will contact prior to d/c   Care Conference needed?:      3:50 p.m. CM received a call from Teddy Perdomo of [facility] stating d/c is pushed back to 6 p.m. CM will inform nurse and dtr. 1:30 p.m. CM spoke with Samuel Ayoub of [facility] to inform of d/c time. 12:24 p.m. PCS placed on bedside chart. 12:06 p.m. CM arranged transportation through [facility]. [facility] accepted and can transport at 3:30 p.m. CM spoke with pt's daughter to confirm d/c.  CM verbally reviewed 2IM letter and emailed to pt's daughter at [email or web address]     11:41 a.m. CM attempted to reach [facility] x4 but received voice mail when attempting to call.          CM spoke with Samuel Ayoub of [facility] who stated pt's daughter is home and ready to accept pt today. Ms. Champion Seen advised to use [facility] if they are able to provide a stretcher transport at d/c. If not, CM can use provider of her choice. Care Management Interventions  PCP Verified by CM: Yes (Pt sees the providers through [facility].)  Mode of Transport at Discharge: BLS  Transition of Care Consult (CM Consult):  Other (home with PACE program)  MyChart Signup:  (Pt has a hospital bed, w/c and shower bench.)  Discharge Durable Medical Equipment: No  Physical Therapy Consult: Yes  Occupational Therapy Consult: Yes  Speech Therapy Consult: Yes  Support Systems: Child(jim)  Confirm Follow Up Transport: Other (see comment) ([facility]ACE transport)  The Patient and/or Patient Representative was Provided with a Choice of Provider and Agrees with the Discharge Plan?: Yes  Freedom of Choice List was Provided with Basic Dialogue that Supports the Patient's Individualized Plan of Care/Goals, Treatment Preferences and Shares the Quality Data Associated with the Providers?: Yes  Discharge Location  Patient Expects to be Discharged to[de-identified] Home with outpatient services (PACE program)    FERNANDO Stapleton  Care Management, [address]

## 2022-07-13 NOTE — PERIOP NOTES
[facility]  Preoperative Instructions        Surgery Date 7/18/2022      Time of Arrival To Be Called  Contact# [phone] Ted Cotton    1. On the day of your surgery, please report to the Surgical Services Registration Desk and sign in at your designated time. The Surgery Center is located to the right of the Emergency Room. 2. You must have someone with you to drive you home. You should not drive a car for 24 hours following surgery. Please make arrangements for a friend or family member to stay with you for the first 24 hours after your surgery. 3. Do not have anything to eat or drink (including water, gum, mints, coffee, juice) after midnight  7/17/2022 . ? This may not apply to medications prescribed by your physician. ?(Please note below the special instructions with medications to take the morning of your procedure.)    4. We recommend you do not drink any alcoholic beverages for 24 hours before and after your surgery. 5. Contact your surgeons office for instructions on the following medications: non-steroidal anti-inflammatory drugs (i.e. Advil, Aleve), vitamins, and supplements. (Some surgeons will want you to stop these medications prior to surgery and others may allow you to take them)  **If you are currently taking Plavix, Coumadin, Aspirin and/or other blood-thinning agents, contact your surgeon for instructions. ** Your surgeon will partner with the physician prescribing these medications to determine if it is safe to stop or if you need to continue taking. Please do not stop taking these medications without instructions from your surgeon    6. Wear comfortable clothes. Wear glasses instead of contacts. Do not bring any money or jewelry. Please bring picture ID, insurance card, and any prearranged co-payment or hospital payment. Do not wear make-up, particularly mascara the morning of your surgery.   Do not wear nail polish, particularly if you are having foot /hand surgery. Wear your hair loose or down, no ponytails, buns, clarence pins or clips. All body piercings must be removed. Please shower with antibacterial soap for three consecutive days before and on the morning of surgery, but do not apply any lotions, powders or deodorants after the shower on the day of surgery. Please use a fresh towels after each shower. Please sleep in clean clothes and change bed linens the night before surgery. Please do not shave for 48 hours prior to surgery. Shaving of the face is acceptable. 7. You should understand that if you do not follow these instructions your surgery may be cancelled. If your physical condition changes (I.e. fever, cold or flu) please contact your surgeon as soon as possible. 8. It is important that you be on time. If a situation occurs where you may be late, please call [phone] (OR Holding Area). 9. If you have any questions and or problems, please call [phone] (Pre-admission Testing). 10. Your surgery time may be subject to change. You will receive a phone call the evening prior if your time changes. 11.  If having outpatient surgery, you must have someone to drive you here, stay with you during the duration of your stay, and to drive you home at time of discharge. 12.  Due to current COVID restrictions, only ONE adult may accompany you the day of the procedure. We have limited seating available. If our waiting room is at capacity, your ride may be asked to remain in their vehicle. No children are allowed in the waiting room    Special Instructions: Follow all instructions your doctor has given you. TAKE ALL MEDICATIONS DAY OF SURGERY EXCEPT: Aspirin, Vitamins and supplements      I understand a pre-operative phone call will be made to verify my surgery time. In the event that I am not available, I give permission for a message to be left on my answering service and/or with another person?   yes ___________________      __________   _________    (Signature of Patient)             (Witness)                (Date and Time)

## 2022-07-15 NOTE — DISCHARGE INSTRUCTIONS
Emir Perez was seen in the ER for her symptoms. Her CAT scan did show some constipation but showed that the stool softeners you gave were starting to work. She was given lactulose which is a strong laxative in the ER. Her low potassium was replaced here. You can use the medicine Levsin that dissolves under the tongue to help with pain if it returns. Please continue to take the antibiotics, there is no bacteria in the urine. There does not appear to be any kidney stone blocking her flow of urine. Please follow-up with urologist.    Return for new or worsening symptoms including fever or chills, chest pain, vomiting or inability to pass gas or have a bowel movement.

## 2022-07-15 NOTE — ED PROVIDER NOTES
EMERGENCY DEPARTMENT HISTORY AND PHYSICAL EXAM      Date: 7/15/2022  Patient Name: Angela Mckay    History of Presenting Illness     Chief Complaint   Patient presents with    Constipation     arrives by rescue altered mental status due to constipation l;she has dementia; bgl 118-caregiver states she is more confused than usual. pt is bedridden.  Altered mental status       History Provided By: EMS    HPI: Angela Mckay, 68 y.o. female presents to the ED with cc of constipation. 72-year-old female with a history of dementia, hypertension and GERD presents to the emergency department with a chief complaint of shortness of breath. Family reports history of confusion with constipation and UTIs in past.  Reports has not had a bowel movement in 2 days. Patient reports she is \"spitting up\" but otherwise a poor historian. Denies abdominal pain. Glucose normal for EMS. EMS denies any trauma. There are no other complaints, changes, or physical findings at this time. PCP: Harris Grande MD    No current facility-administered medications on file prior to encounter. Current Outpatient Medications on File Prior to Encounter   Medication Sig Dispense Refill    metoprolol succinate (TOPROL-XL) 25 mg XL tablet Take 25 mg by mouth daily.  buspirone HCl (BUSPAR PO) Take 5 mg by mouth daily.  ergocalciferol (Vitamin D2) 1,250 mcg (50,000 unit) capsule Take 50,000 Units by mouth every seven (7) days.  cefUROXime (CEFTIN) 250 mg tablet Take 1 Tablet by mouth two (2) times a day. 14 Tablet 0    senna-docusate (PERICOLACE) 8.6-50 mg per tablet Take 1 Tablet by mouth two (2) times a day. 60 Tablet 0    bisacodyL (Dulcolax, bisacodyl,) 10 mg supp Insert 10 mg into rectum daily as needed for Constipation (To promote 3-4 bowel movements weekly). 30 Suppository 0    polyethylene glycol (MIRALAX) 17 gram packet Take 1 Packet by mouth two (2) times a day.  60 Packet 0    sertraline (Zoloft) 100 mg tablet Take 100 mg by mouth daily.  mirabegron ER (MYRBETRIQ) 50 mg ER tablet Take 50 mg by mouth daily.  acetaminophen (TYLENOL) 500 mg tablet Take 1,000 mg by mouth every six (6) hours as needed for Pain.  aspirin 81 mg chewable tablet Take 1 Tab by mouth daily. 30 Tab 0    ezetimibe (ZETIA) 10 mg tablet Take 1 Tab by mouth daily. 30 Tab 0    amLODIPine (NORVASC) 2.5 mg tablet Take 2.5 mg by mouth daily.          Past History     Past Medical History:  Past Medical History:   Diagnosis Date    Agoraphobia without mention of panic attacks 2/17/2014    Anxiety disorder 8/18/2013    Arthritis     osteo    CAD (coronary artery disease), native coronary artery 12/1/2015    no stents    Chronic chest pain 1/13/2014    Chronic pain associated with significant psychosocial dysfunction 2/17/2014    D[facility] [address]     Depression 8/18/2013    Diabetes ([facility] [address]     type II    Duplicated right renal collecting system 3/13/2014    GERD (gastroesophageal reflux disease)     Gout, joint     Hypercholesteremia     hyercholesterolemia    Hypertension     Murmur     Nephrolithiasis 3/13/2014    Personal history of noncompliance with medical treatment, presenting hazards to health 5/30/2014       Past Surgical History:  Past Surgical History:   Procedure Laterality Date    EGD  4/23/2010         HX CHOLECYSTECTOMY  09/20/2018    lap jesus    HX CYST REMOVAL      cyst removed from left wrist    HX HEART CATHETERIZATION  12/01/2015    HX HYSTERECTOMY      partial    HX OTHER SURGICAL      bladder dilitation    HX TUBAL LIGATION      HX UROLOGICAL      kidney stones    UPPER GI ENDOSCOPY,BALL DIL,30MM  5/31/2022            Family History:  Family History   Problem Relation Age of Onset    Stroke Mother     Heart Disease Mother     Cancer Father         type unknown    Heart Disease Son     Liver Disease Son     Heart Disease Daughter     Malignant Hyperthermia Neg Hx     Pseudocholinesterase Deficiency Neg Hx     Delayed Awakening Neg Hx     Post-op Nausea/Vomiting Neg Hx     Emergence Delirium Neg Hx     Post-op Cognitive Dysfunction Neg Hx     Other Neg Hx        Social History:  Social History     Tobacco Use    Smoking status: Never Smoker    Smokeless tobacco: Never Used   Vaping Use    Vaping Use: Never used   Substance Use Topics    Alcohol use: No    Drug use: No       Allergies: Allergies   Allergen Reactions    Amoxicillin Hives    Sulfa (Sulfonamide Antibiotics) Hives and Itching    Mirtazapine Itching and Nausea Only     Funny feeling in chest    Percocet [Oxycodone-Acetaminophen] Nausea and Vomiting    Codeine Nausea and Vomiting    Crestor [Rosuvastatin] Other (comments)     myalgias    Nitroglycerin Unknown (comments)     Patient cannot remember why she is allergic to it      Prednisone Itching    Zithromax [Azithromycin] Itching     Not sure what it does,taken long time ago         Review of Systems   Review of Systems   Unable to perform ROS: Dementia       Physical Exam   Physical Exam  Vitals and nursing note reviewed. Exam conducted with a chaperone present. Constitutional:       Comments: 70-year-old female, resting in bed, no acute distress   HENT:      Head: Normocephalic and atraumatic. Cardiovascular:      Rate and Rhythm: Normal rate and regular rhythm. Heart sounds: No murmur heard. No friction rub. No gallop. Pulmonary:      Effort: Pulmonary effort is normal.      Breath sounds: Normal breath sounds. Abdominal:      General: Abdomen is flat. Palpations: Abdomen is soft. Tenderness: There is no abdominal tenderness. There is no guarding. Genitourinary:     Comments: No stool ball, brown stool, non-bleeding external hemorrhoids. Musculoskeletal:         General: Normal range of motion. Cervical back: Normal range of motion. No rigidity. Skin:     General: Skin is warm.       Capillary Refill: Capillary refill takes less than 2 seconds. Neurological:      General: No focal deficit present. Mental Status: She is alert. Psychiatric:         Mood and Affect: Mood normal.         Diagnostic Study Results     Labs -     Recent Results (from the past 12 hour(s))   CBC WITH AUTOMATED DIFF    Collection Time: 07/15/22  2:55 PM   Result Value Ref Range    WBC 4.3 3.6 - 11.0 K/uL    RBC 3.80 3.80 - 5.20 M/uL    HGB 10.4 (L) 11.5 - 16.0 g/dL    HCT 33.2 (L) 35.0 - 47.0 %    MCV 87.4 80.0 - 99.0 FL    MCH 27.4 26.0 - 34.0 PG    MCHC 31.3 30.0 - 36.5 g/dL    RDW 13.6 11.5 - 14.5 %    PLATELET 711 063 - 658 K/uL    MPV 10.7 8.9 - 12.9 FL    NRBC 0.0 0  WBC    ABSOLUTE NRBC 0.00 0.00 - 0.01 K/uL    NEUTROPHILS 58 32 - 75 %    LYMPHOCYTES 33 12 - 49 %    MONOCYTES 6 5 - 13 %    EOSINOPHILS 2 0 - 7 %    BASOPHILS 1 0 - 1 %    IMMATURE GRANULOCYTES 0 0.0 - 0.5 %    ABS. NEUTROPHILS 2.5 1.8 - 8.0 K/UL    ABS. LYMPHOCYTES 1.4 0.8 - 3.5 K/UL    ABS. MONOCYTES 0.3 0.0 - 1.0 K/UL    ABS. EOSINOPHILS 0.1 0.0 - 0.4 K/UL    ABS. BASOPHILS 0.0 0.0 - 0.1 K/UL    ABS. IMM. GRANS. 0.0 0.00 - 0.04 K/UL    DF AUTOMATED     METABOLIC PANEL, COMPREHENSIVE    Collection Time: 07/15/22  2:55 PM   Result Value Ref Range    Sodium 141 136 - 145 mmol/L    Potassium 2.4 (LL) 3.5 - 5.1 mmol/L    Chloride 102 97 - 108 mmol/L    CO2 35 (H) 21 - 32 mmol/L    Anion gap 4 (L) 5 - 15 mmol/L    Glucose 95 65 - 100 mg/dL    BUN 14 6 - 20 MG/DL    Creatinine 0.62 0.55 - 1.02 MG/DL    BUN/Creatinine ratio 23 (H) 12 - 20      GFR est AA >60 >60 ml/min/1.73m2    GFR est non-AA >60 >60 ml/min/1.73m2    Calcium 8.4 (L) 8.5 - 10.1 MG/DL    Bilirubin, total 0.5 0.2 - 1.0 MG/DL    ALT (SGPT) 10 (L) 12 - 78 U/L    AST (SGOT) 10 (L) 15 - 37 U/L    Alk.  phosphatase 59 45 - 117 U/L    Protein, total 6.5 6.4 - 8.2 g/dL    Albumin 2.7 (L) 3.5 - 5.0 g/dL    Globulin 3.8 2.0 - 4.0 g/dL    A-G Ratio 0.7 (L) 1.1 - 2.2     LIPASE    Collection Time: 07/15/22  2:55 PM Result Value Ref Range    Lipase 49 (L) 73 - 393 U/L   URINALYSIS W/ REFLEX CULTURE    Collection Time: 07/15/22  4:32 PM    Specimen: Urine   Result Value Ref Range    Color RED      Appearance TURBID (A) CLEAR      Specific gravity 1.015      pH (UA) 6.5 5.0 - 8.0      Protein 300 (A) NEG mg/dL    Glucose Negative NEG mg/dL    Ketone Negative NEG mg/dL    Bilirubin Negative NEG      Blood LARGE (A) NEG      Urobilinogen 1.0 0.2 - 1.0 EU/dL    Nitrites Negative NEG      Leukocyte Esterase MODERATE (A) NEG      WBC 10-20 0 - 4 /hpf    RBC >100 (H) 0 - 5 /hpf    Epithelial cells FEW FEW /lpf    Bacteria Negative NEG /hpf    UA:UC IF INDICATED URINE CULTURE ORDERED (A) CNI         Radiologic Studies -   CT ABD PELV W CONT   Final Result   1. Markedly patulous esophagus. 2. Anasarca. 3. Fluid in the rectum suggesting diarrhea. 4. Incidental findings as above. CT Results  (Last 48 hours)               07/15/22 1601  CT ABD PELV W CONT Final result    Impression:  1. Markedly patulous esophagus. 2. Anasarca. 3. Fluid in the rectum suggesting diarrhea. 4. Incidental findings as above. Narrative:  EXAM:  CT ABDOMEN PELVIS WITH CONTRAST   INDICATION:  constipation, abdominal pain, \"spitting up\". Additional history:   COMPARISON: CT of the abdomen and pelvis, 6/27/2022. .   TECHNIQUE:    Multislice helical CT was performed from the diaphragm to the symphysis pubis   with oral and intravenous contrast administration. Contiguous 5 mm axial images   were reconstructed and lung and soft tissue windows were generated. Coronal and   sagittal reformations were generated. CT dose reduction was achieved through use of a standardized protocol tailored   for this examination and automatic exposure control for dose modulation. Tammy Dye FINDINGS:   INCIDENTALLY IMAGED CHEST:   Mediastinum: Calcified right hilar lymph nodes. Heart/vessels: Calcifications about the aortic valve.  Mitral annulus   calcification. Calcifications in the coronary arteries. Lungs/Pleura: Respiratory motion with scarring/atelectasis in the bases. Elevated right hemidiaphragm   . ABDOMEN:   Liver: Within normal limits. Gallbladder/Biliary: Status post cholecystectomy. Spleen: Within normal limits. Pancreas: Within normal limits. Adrenals: Within normal limits. Kidneys: Calculi in the left kidney which are not significantly changed. Peritoneum/Mesenteries: Within normal limits. Extraperitoneum: Within normal limits. Gastrointestinal tract: Markedly patulous esophagus. Normal-appearing appendix   Redundant, gas filled sigmoid colon extending into the upper abdomen. Fluid in   the rectum. Vascular: Calcifications in the aorta. Cleophus Milford PELVIS:   Extraperitoneum: Within normal limits. Ureters: There is a double-J ureteral stent reconstituted in the left renal   pelvis, traversing the left ureter and reconstituted at the left bladder   trigone. Bladder: Decompressed and unremarkable. Reproductive System: Within normal limits. .   MSK:    Stranding throughout the body wall suggesting anasarca. Degenerative changes   throughout the spine with inferior endplate compression of T11, superior   endplate compression of L3 and L4. .           CXR Results  (Last 48 hours)    None          Medical Decision Making   I am the first provider for this patient. I reviewed the vital signs, available nursing notes, past medical history, past surgical history, family history and social history. Vital Signs-Reviewed the patient's vital signs.   Patient Vitals for the past 12 hrs:   Temp Pulse Resp BP SpO2   07/15/22 1918 -- 73 16 (!) 143/90 97 %   07/15/22 1859 -- -- -- (!) 158/87 96 %   07/15/22 1738 -- -- -- (!) 141/108 98 %   07/15/22 1616 -- -- -- (!) 174/55 100 %   07/15/22 1546 -- -- -- (!) 161/79 97 %   07/15/22 1501 -- -- -- (!) 154/89 98 %   07/15/22 1447 -- 67 16 (!) 137/99 99 %   07/15/22 1421 98 °F (36.7 °C) 67 16 120/77 99 %     Records Reviewed: Nursing Notes and Old Medical Records    Provider Notes (Medical Decision Making):     22-year-old female presents emergency department with a chief complaint of constipation. Vital signs are unremarkable. Appears well. Does report some increased confusion, history of dementia. Oriented to person, acting appropriately, does not appear to be in any distress. No trauma per EMS report. Patient had a chaperoned rectal exam, this showed no large stool ball or fecal impaction. Will CT abdomen pelvis given patient cannot provide a history as well as check basic labs. Check urinalysis. If ED work-up is negative, anticipate patient can be discharged with oral bowel regimen. ED Course:   Initial assessment performed. The patients presenting problems have been discussed, and they are in agreement with the care plan formulated and outlined with them. I have encouraged them to ask questions as they arise throughout their visit. ED Course as of 07/15/22 2226   Fri Jul 15, 2022   1548 CBC with baseline anemia. We will replete patient's potassium. Lipase unremarkable. Awaiting CT and urine. [MB]   8698 CT abdomen/pelvis with no acute findings. Unchanged nephrolithiasis, stent in place. Currently on preop cefuroxime for urology instrumentation, stent removal, lithotripsy. Discussed with patient's daughter, she is concerned due to having more confusion and the fact that she was \"hollering out in pain before. \"  Patient has been observed in the ED without any pain. Awaiting urine. [MB]   1732 Bacteria: Negative [MB]   1743 Urinalysis [MB]   1319 Reassessed the patient, continues to rest in the ER no pain. Her urinalysis is not consistent with infection with no bacteria, she should continue to take antibiotics. Notably there is no hydronephrosis on CT. Consider transient episode of pain due to constipation or passing a stone given hematuria.   Neurologic status appears consistent with history of dementia. I will discharge with Levsin for pain. Given lactulose for constipation. There is no obstruction on CT. Patient safe to follow-up with urology and return precautions. [MB]      ED Course User Index  [MB] MD Sarahi Bettencourt MD      Disposition:    Discharged    DISCHARGE PLAN:  1. Current Discharge Medication List      START taking these medications    Details   hyoscyamine SL (LEVSIN/SL) 0.125 mg SL tablet 1 Tablet by SubLINGual route every six (6) hours as needed for Cramping. Qty: 20 Tablet, Refills: 0  Start date: 7/15/2022           2. Follow-up Information     Follow up With Specialties Details Why Contact Info    Luh Ramirez MD Family Medicine In 3 days  [facility] [address]  [place] [address]  [phone]      [facility] EMERGENCY DEPT Emergency Medicine  If symptoms worsen [address]  [facility] [address]   [phone] [id number] [facility] Urology  In 3 days as scheduled [address] [address]        3. Return to ED if worse     Diagnosis     Clinical Impression:   1. Constipation, unspecified constipation type    2. Abdominal pain, right upper quadrant    3. Nephrolithiasis    4. History of UTI        Attestations:    Sarahi Sullivan MD        Please note that this dictation was completed with [employer], the computer voice recognition software. Quite often unanticipated grammatical, syntax, homophones, and other interpretive errors are inadvertently transcribed by the computer software. Please disregard these errors. Please excuse any errors that have escaped final proofreading. Thank you.

## 2022-07-16 NOTE — ED NOTES
Called patient's daughter, Kelley Reeves, to verify that she would be home upon patient's arrival with [facility].

## 2022-07-18 NOTE — PERIOP NOTES
Patient arrived via wheelchair with daughter. Patient alert to name and place. Hx of Dementia. Complains of head and neck pain. Constant head and neck turned to right. Posterior neck  muscles stiff and tense. Iv sticks at bedside  X 5, left EJ  # 22. Chemistry  Run at bedside, K+ is 2.2, Anesthesia and Dr Melo Malin notified. Case cancelled. Patient to be admitted for medical management. TRANSFER - OUT REPORT:    Verbal report given to Angie(name) on Donnice Rounds  being transferred to [facility](unit) for  Medical management for hypokalemia       Report consisted of patients Situation, Background, Assessment and   Recommendations(SBAR). Information from the following report(s) Current medications,history and current status was reviewed with the receiving nurse. Lines:   Peripheral IV 07/18/22 Anterior; Left External jugular (Active)   Site Assessment Clean, dry, & intact 07/18/22 0816   Phlebitis Assessment 0 07/18/22 0816   Infiltration Assessment 0 07/18/22 0816   Dressing Status Clean, dry, & intact;Dry 07/18/22 0816   Dressing Type Tape;Transparent 07/18/22 0816   Hub Color/Line Status Blue; Infusing 07/18/22 0816       Peripheral IV 07/18/22 Anterior;Right Forearm (Active)   Site Assessment Clean, dry, & intact 07/18/22 0935   Phlebitis Assessment 0 07/18/22 0935   Infiltration Assessment 0 07/18/22 0935   Dressing Status Clean, dry, & intact 07/18/22 0935   Hub Color/Line Status Pink; Infusing 07/18/22 0935        Opportunity for questions and clarification was provided.

## 2022-07-18 NOTE — PROGRESS NOTES
Pharmacy Automatic Renal Dosing Protocol - Antimicrobials    Indication for Antimicrobials: UTI        Current Regimen of Each Antimicrobial:  Vancomycin 1250 mg x 1 then 750 mg Q12H (Start Date; Day # 1)    Previous Antimicrobial Therapy:    Vancomycin Goal AUC: 400-600 mg*hour/liter per day     Vancomycin Levels  Date Dose & Interval Measured (mcg/mL) Steady State (mcg/mL)                       Date & time of next level:     Significant Cultures:     Radiology / Imaging results: (X-ray, CT scan or MRI):       Labs:  Recent Labs     22  0919 07/15/22  1455   CREA 0.72 0.62   BUN 16 14   WBC  --  4.3     Temp (24hrs), Av °F (36.7 °C), Min:97.6 °F (36.4 °C), Max:98.3 °F (36.8 °C)      Paralysis, amputations, malnutrition:   Creatinine Clearance (mL/min) or Dialysis: 61.5    Impression/Plan:   Antibiotics as above with anticipated AUC of 450 mg*hour/liter per day  BMP daily     Pharmacy will follow daily and adjust medications as appropriate for renal function and/or serum levels. Thank you,  [place], [facility]      Recommended duration of therapy  [email or web address]/Pharmacy/[email or web address]/[email or web address]. docx    Renal Dosing  [email or web address]/[facility]/Pharmacy/Clinical%20Companion/Renal%[email or web address]. pdf

## 2022-07-18 NOTE — H&P
History and Physical    Patient: Jostin Harris MRN: [id number]  SSN: [id number]    YOB: 1945  Age: 68 y.o.   Sex: female      Subjective:      Jostin Harris is a 68 y.o. female who had uti and left sided ureteral stones s/p stent 3 weeks ago here today for definitive stone treatment  Did go to er for some abdominal pain over the week end    Past Medical History:   Diagnosis Date    Agoraphobia without mention of panic attacks 2/17/2014    Anxiety disorder 8/18/2013    Arthritis     osteo    CAD (coronary artery disease), native coronary artery 12/1/2015    no stents    Chronic chest pain 1/13/2014    Chronic pain associated with significant psychosocial dysfunction 2/17/2014    D[facility] [address]     Depression 8/18/2013    Diabetes ([facility] [address]     type II    Duplicated right renal collecting system 3/13/2014    GERD (gastroesophageal reflux disease)     Gout, joint     Hypercholesteremia     hyercholesterolemia    Hypertension     Murmur     Nephrolithiasis 3/13/2014    Personal history of noncompliance with medical treatment, presenting hazards to health 5/30/2014     Past Surgical History:   Procedure Laterality Date    EGD  4/23/2010         HX CHOLECYSTECTOMY  09/20/2018    lap jesus    HX CYST REMOVAL      cyst removed from left wrist    HX HEART CATHETERIZATION  12/01/2015    HX HYSTERECTOMY      partial    HX OTHER SURGICAL      bladder dilitation    HX TUBAL LIGATION      HX UROLOGICAL      kidney stones    UPPER GI ENDOSCOPY,BALL DIL,30MM  5/31/2022           Family History   Problem Relation Age of Onset    Stroke Mother     Heart Disease Mother     Cancer Father         type unknown    Heart Disease Son     Liver Disease Son     Heart Disease Daughter     Malignant Hyperthermia Neg Hx     Pseudocholinesterase Deficiency Neg Hx     Delayed Awakening Neg Hx     Post-op Nausea/Vomiting Neg Hx     Emergence Delirium Neg Hx     Post-op Cognitive Dysfunction Neg Hx     Other Neg Hx      Social History     Tobacco Use    Smoking status: Never Smoker    Smokeless tobacco: Never Used   Substance Use Topics    Alcohol use: No      Prior to Admission medications    Medication Sig Start Date End Date Taking? Authorizing Provider   metoprolol succinate (TOPROL-XL) 25 mg XL tablet Take 25 mg by mouth daily. Yes Provider, Historical   buspirone HCl (BUSPAR PO) Take 5 mg by mouth daily. Yes Provider, Historical   ergocalciferol (Vitamin D2) 1,250 mcg (50,000 unit) capsule Take 50,000 Units by mouth every seven (7) days. Yes Provider, Historical   cefUROXime (CEFTIN) 250 mg tablet Take 1 Tablet by mouth two (2) times a day. 7/1/22  Yes Barry Dawn MD   senna-docusate (PERICOLACE) 8.6-50 mg per tablet Take 1 Tablet by mouth two (2) times a day. 1/14/22  Yes Rajiv Parekh NP   bisacodyL (Dulcolax, bisacodyl,) 10 mg supp Insert 10 mg into rectum daily as needed for Constipation (To promote 3-4 bowel movements weekly). 1/14/22  Yes Rajiv Parekh NP   polyethylene glycol (MIRALAX) 17 gram packet Take 1 Packet by mouth two (2) times a day. 1/14/22  Yes Rajiv Parekh NP   sertraline (Zoloft) 100 mg tablet Take 100 mg by mouth daily. Yes Shun Sevilla MD   mirabegron ER (MYRBETRIQ) 50 mg ER tablet Take 50 mg by mouth daily. Yes Shun Sevilla MD   acetaminophen (TYLENOL) 500 mg tablet Take 1,000 mg by mouth every six (6) hours as needed for Pain. Yes Provider, Historical   aspirin 81 mg chewable tablet Take 1 Tab by mouth daily. 2/24/21  Yes Lisbet Yuan MD   ezetimibe (ZETIA) 10 mg tablet Take 1 Tab by mouth daily. 2/24/21  Yes Lisbet Yuan MD   amLODIPine (NORVASC) 2.5 mg tablet Take 2.5 mg by mouth daily.    Yes Provider, Historical   hyoscyamine SL (LEVSIN/SL) 0.125 mg SL tablet 1 Tablet by SubLINGual route every six (6) hours as needed for Cramping. 7/15/22   Carlene Fiore MD        Allergies   Allergen Reactions   [facility] Amoxicillin Hives    Sulfa (Sulfonamide Antibiotics) Hives and Itching    Mirtazapine Itching and Nausea Only     Funny feeling in chest    Percocet [Oxycodone-Acetaminophen] Nausea and Vomiting    Codeine Nausea and Vomiting    Crestor [Rosuvastatin] Other (comments)     myalgias    Nitroglycerin Unknown (comments)     Patient cannot remember why she is allergic to it      Prednisone Itching    Zithromax [Azithromycin] Itching     Not sure what it does,taken long time ago       Review of Systems:  Denies fever chill snausea vomtiing sob chest pain     Objective:     Vitals:    07/13/22 1147   Weight: 54.4 kg (120 lb)   Height: 5' 8\" (1.727 m)        Physical Exam:  nad  Warm well perfused  Breathing comf symmetric exp  nt/nd    Assessment:     Hospital Problems  Date Reviewed: 7/18/2022    None        Left ureteral and renal pelvis stone prestented with abx  Plan:    Will proced with left CRULS  Discussed risks of urosepsis damage to collecting system need for stent after procedure    Per anesthesia K too low to safely anesthetize today will try and have her admitted for optimization and potentially see if we can arrange case to be done inpatient      Signed By: Alina Vance MD     July 18, 2022

## 2022-07-18 NOTE — H&P
Hospitalist Admission Note    NAME: Esteban Acharya   :  1945   MRN:  [id number]     Date/Time:  2022 10:04 AM    Patient PCP: Srikanth Olivares MD  ______________________________________________________________________  Given the patient's current clinical presentation, I have a high level of concern for decompensation if discharged from the emergency department. Complex decision making was performed, which includes reviewing the patient's available past medical records, laboratory results, and x-ray films. My assessment of this patient's clinical condition and my plan of care is as follows. Assessment / Plan:  Hypokalemia, K 2.4 on   Complicated UTI  History of left ureteral stone status post stent placement  Urine culture from 7/15 growing possible Enterococcus, had been on ceftin outpt. We will start empiric vancomycin and IV rocephin  Repeat UA and follow-up with urine culture from 7/15  Continue gentle IV fluids as above  Hold bowel regimen  Replete K with p.o. and IV KCl, repeat BMP in the a.m. check mag  Keep n.p.o. for possible procedure with urology tomorrow  Urology is following    Hypertension  CAD  GERD  HLD  Depression wi th anxiety  Resume home medications    History of dementia  Supportive care        Code Status: full   DVT Prophylaxis: scd  GI Prophylaxis: not indicated  Baseline: from home      Subjective:   CHIEF COMPLAINT: severe hypokalemia    HISTORY OF PRESENT ILLNESS:     Eulalia Donovan is a 68 y.o.  female with PMHx significant for hypertension, chronic constipation, hyperlipidemia, history of urethral stone/UTI status post stent 3 weeks ago presents to [facility] for an outpatient procedure/L CRULS with urology however was found to have severe hypokalemia to safely anesthetize today. Hospitalist was asked to evaluate patient for admission with plans to proceed with procedure tomorrow. Patient's daughter was at bedside who provided history. Patient was seen in the ER for abdominal pain this past weekend and was treated for constipation. UA was checked. Patient was discharged on oral bowel regiment and as per family she has been having multiple episodes of soft/loose stool per day. Patient was seen in preop holding, denies any abdominal pain, CP, SOB, N/V/D. We were asked to admit for work up and evaluation of the above problems.      Past Medical History:   Diagnosis Date    Agoraphobia without mention of panic attacks 2/17/2014    Anxiety disorder 8/18/2013    Arthritis     osteo    CAD (coronary artery disease), native coronary artery 12/1/2015    no stents    Chronic chest pain 1/13/2014    Chronic pain associated with significant psychosocial dysfunction 2/17/2014    Dementia ([facility] [address]     Depression 8/18/2013    Diabetes ([facility] [address]     type II    Duplicated right renal collecting system 3/13/2014    GERD (gastroesophageal reflux disease)     Gout, joint     Hypercholesteremia     hyercholesterolemia    Hypertension     Murmur     Nephrolithiasis 3/13/2014    Personal history of noncompliance with medical treatment, presenting hazards to health 5/30/2014        Past Surgical History:   Procedure Laterality Date    EGD  4/23/2010         HX CHOLECYSTECTOMY  09/20/2018    lap jesus    HX CYST REMOVAL      cyst removed from left wrist    HX HEART CATHETERIZATION  12/01/2015    HX HYSTERECTOMY      partial    HX OTHER SURGICAL      bladder dilitation    HX TUBAL LIGATION      HX UROLOGICAL      kidney stones    UPPER GI ENDOSCOPY,BALL DIL,30MM  5/31/2022            Social History     Tobacco Use    Smoking status: Never Smoker    Smokeless tobacco: Never Used   Substance Use Topics    Alcohol use: No        Family History   Problem Relation Age of Onset    Stroke Mother     Heart Disease Mother     Cancer Father         type unknown    Heart Disease Son     Liver Disease Son     Heart Disease Daughter     Malignant Hyperthermia Neg Hx     Pseudocholinesterase Deficiency Neg Hx     Delayed Awakening Neg Hx     Post-op Nausea/Vomiting Neg Hx     Emergence Delirium Neg Hx     Post-op Cognitive Dysfunction Neg Hx     Other Neg Hx      Allergies   Allergen Reactions    Amoxicillin Hives    Sulfa (Sulfonamide Antibiotics) Hives and Itching    Mirtazapine Itching and Nausea Only     Funny feeling in chest    Percocet [Oxycodone-Acetaminophen] Nausea and Vomiting    Codeine Nausea and Vomiting    Crestor [Rosuvastatin] Other (comments)     myalgias    Nitroglycerin Unknown (comments)     Patient cannot remember why she is allergic to it      Prednisone Itching    Zithromax [Azithromycin] Itching     Not sure what it does,taken long time ago        Prior to Admission medications    Medication Sig Start Date End Date Taking? Authorizing Provider   metoprolol succinate (TOPROL-XL) 25 mg XL tablet Take 25 mg by mouth daily. Yes Provider, Historical   buspirone HCl (BUSPAR PO) Take 5 mg by mouth daily. Yes Provider, Historical   ergocalciferol (Vitamin D2) 1,250 mcg (50,000 unit) capsule Take 50,000 Units by mouth every seven (7) days. Yes Provider, Historical   cefUROXime (CEFTIN) 250 mg tablet Take 1 Tablet by mouth two (2) times a day. 7/1/22  Yes Davdi Siegel MD   senna-docusate (PERICOLACE) 8.6-50 mg per tablet Take 1 Tablet by mouth two (2) times a day. 1/14/22  Yes Jere Burnett NP   bisacodyL (Dulcolax, bisacodyl,) 10 mg supp Insert 10 mg into rectum daily as needed for Constipation (To promote 3-4 bowel movements weekly). 1/14/22  Yes Jere Burnett NP   polyethylene glycol (MIRALAX) 17 gram packet Take 1 Packet by mouth two (2) times a day. 1/14/22  Yes Jere Burnett NP   sertraline (Zoloft) 100 mg tablet Take 100 mg by mouth daily. Yes Shun Sevilla MD   mirabegron ER (MYRBETRIQ) 50 mg ER tablet Take 50 mg by mouth daily.    Yes Other, MD Shun   acetaminophen (TYLENOL) 500 mg tablet Take 1,000 mg by mouth every six (6) hours as needed for Pain. Yes Provider, Historical   aspirin 81 mg chewable tablet Take 1 Tab by mouth daily. 2/24/21  Yes Gifty Coleman MD   ezetimibe (ZETIA) 10 mg tablet Take 1 Tab by mouth daily. 2/24/21  Yes Gifty Coleman MD   amLODIPine (NORVASC) 2.5 mg tablet Take 2.5 mg by mouth daily. Yes Provider, Historical   hyoscyamine SL (LEVSIN/SL) 0.125 mg SL tablet 1 Tablet by SubLINGual route every six (6) hours as needed for Cramping. 7/15/22   Eunice Whiting MD       REVIEW OF SYSTEMS:     I am not able to complete the review of systems because:    The patient is intubated and sedated    The patient has altered mental status due to his acute medical problems    The patient has baseline aphasia from prior stroke(s)    The patient has baseline dementia and is not reliable historian    The patient is in acute medical distress and unable to provide information           Total of 12 systems reviewed as follows:       POSITIVE= BOLD text  Negative = text not BOLD  General:  fever, chills, sweats, generalized weakness, weight loss/gain,      loss of appetite   Eyes:    blurred vision, eye pain, loss of vision, double vision  ENT:    rhinorrhea, pharyngitis   Respiratory:   cough, sputum production, SOB, MOORE, wheezing, pleuritic pain   Cardiology:   chest pain, palpitations, orthopnea, PND, edema, syncope   Gastrointestinal:  abdominal pain , N/V, frequent loose stool, dysphagia, constipation, bleeding   Genitourinary:  frequency, urgency, dysuria, hematuria, incontinence   Muskuloskeletal :  arthralgia, myalgia, back pain  Hematology:  easy bruising, nose or gum bleeding, lymphadenopathy   Dermatological: rash, ulceration, pruritis, color change / jaundice  Endocrine:   hot flashes or polydipsia   Neurological:  headache, dizziness, confusion, focal weakness, paresthesia,     Speech difficulties, memory loss, gait difficulty  Psychological: Feelings of anxiety, depression, agitation    Objective:   VITALS:    Visit Vitals  BP (!) 158/84 (BP 1 Location: Left upper arm, BP Patient Position: At rest)   Pulse 79   Temp 98.3 °F (36.8 °C)   Resp 18   Ht 5' 8\" (1.727 m)   Wt 58.6 kg (129 lb 3 oz)   SpO2 99%   BMI 19.64 kg/m²       PHYSICAL EXAM:    General:    Alert, cooperative, no distress, appears stated age. HEENT: Atraumatic, anicteric sclerae, pink conjunctivae     No oral ulcers, mucosa moist, throat clear  Neck:  Supple, symmetrical,  thyroid: non tender  Lungs:   CTA b/l. No wheezing or Rhonchi. No rales. Chest wall:  No tenderness. No accessory muscle use. Heart:   Regular  rhythm,  No  Murmur. No edema  Abdomen:   Soft, NT. ND  BS+  Extremities: No cyanosis. No clubbing,      Skin turgor normal, Radial dial pulse 2+. Capillary refill normal  Skin:     Not pale. Not Jaundiced  No rashes   Psych:  Not depressed. Not anxious or agitated. Neurologic: Awake, following some commands, moving all extremities    _______________________________________________________________________  Care Plan discussed with:    Comments   Patient x    Family  x  daughter's   RN x    Care Manager                    Consultant:  x  urology   _______________________________________________________________________  Expected  Disposition:   Home with Family    HH/PT/OT/RN x   SNF/LTC    [place]    ________________________________________________________________________  TOTAL TIME:  72 Minutes    Critical Care Provided     Minutes non procedure based      Comments    x Reviewed previous records   >50% of visit spent in counseling and coordination of care x Discussion with patient and/or family and questions answered       ________________________________________________________________________  Signed: Kathe Oliva MD    Procedures: see electronic medical records for all procedures/Xrays and details which were not copied into this note but were reviewed prior to creation of Plan.     LAB DATA REVIEWED: Recent Results (from the past 24 hour(s))   POC CHEM8    Collection Time: 07/18/22  8:19 AM   Result Value Ref Range    Calcium, ionized (POC) 1.12 1.12 - 1.32 mmol/L    Sodium (POC) 146 (H) 136 - 145 mmol/L    Potassium (POC) 2.2 (LL) 3.5 - 5.1 mmol/L    Chloride (POC) 102 98 - 107 mmol/L    CO2 (POC) 30.3 21 - 32 mmol/L    Anion gap (POC) 15 10 - 20 mmol/L    Glucose (POC) 105 (H) 65 - 100 mg/dL    Creatinine (POC) 0.55 (L) 0.6 - 1.3 mg/dL    GFRAA, POC >60 >60 ml/min/1.73m2    GFRNA, POC >60 >60 ml/min/1.73m2    Comment Comment Not Indicated.      METABOLIC PANEL, BASIC    Collection Time: 07/18/22  9:19 AM   Result Value Ref Range    Sodium 142 136 - 145 mmol/L    Potassium 3.1 (L) 3.5 - 5.1 mmol/L    Chloride 104 97 - 108 mmol/L    CO2 32 21 - 32 mmol/L    Anion gap 6 5 - 15 mmol/L    Glucose 88 65 - 100 mg/dL    BUN 16 6 - 20 MG/DL    Creatinine 0.72 0.55 - 1.02 MG/DL    BUN/Creatinine ratio 22 (H) 12 - 20      GFR est AA >60 >60 ml/min/1.73m2    GFR est non-AA >60 >60 ml/min/1.73m2    Calcium 8.7 8.5 - 10.1 MG/DL

## 2022-07-18 NOTE — ANESTHESIA PREPROCEDURE EVALUATION
Relevant Problems   RESPIRATORY SYSTEM   (+) History of COVID-19   (+) SOB (shortness of breath)      NEUROLOGY   (+) Chronic headache   (+) Dementia (HCC)   (+) Depression with anxiety   (+) Other somatoform disorders   (+) Somatic delusion disorder (HCC)   (+) Somatization disorder      CARDIOVASCULAR   (+) CAD (coronary artery disease), native coronary artery   (+) Dysrhythmia, cardiac   (+) HTN, goal below 130/80      GASTROINTESTINAL   (+) Acid reflux      RENAL FAILURE   (+) HERBERTH (acute kidney injury) ([address]   (+) Duplicated right renal collecting system   (+) Hydronephrosis of left kidney   (+) Renal stone       Anesthetic History     PONV          Review of Systems / Medical History  Patient summary reviewed, nursing notes reviewed and pertinent labs reviewed    Pulmonary  Within defined limits                 Neuro/Psych         Psychiatric history     Cardiovascular    Hypertension        Dysrhythmias : atrial fibrillation  CAD    Exercise tolerance: >4 METS  Comments: TTE (05/28/22): Left Ventricle: Normal left ventricular systolic function with a visually estimated EF of 55 - 60%. Left ventricle size is normal. Moderately increased wall thickness. Normal wall motion. Normal diastolic function.   Pericardium: Small (<1 cm) pericardial effusion present. Pericardial effusion has a hematoma. No indication of cardiac tamponade     ECG (5/27/22):   Normal sinus rhythm   Baseline artifact   Confirmed    GI/Hepatic/Renal     GERD           Endo/Other    Diabetes: type 2    Arthritis     Other Findings   Comments: Dementia    Neck contracture 30 degrees to right         Physical Exam    Airway  Mallampati: II  TM Distance: 4 - 6 cm  Neck ROM: normal range of motion   Mouth opening: Normal     Cardiovascular  Regular rate and rhythm,  S1 and S2 normal,  no murmur, click, rub, or gallop  Rhythm: regular  Rate: normal         Dental  No notable dental hx       Pulmonary  Breath sounds clear to auscultation               Abdominal  GI exam deferred       Other Findings            Anesthetic Plan    ASA: 3  Anesthesia type: general    Monitoring Plan: BIS      Induction: Intravenous  Anesthetic plan and risks discussed with: Patient

## 2022-07-19 NOTE — PERIOP NOTES
1109 - TRANSFER - IN REPORT:    Verbal report received from [facility], THE RN(name) on Tati Hair  being received from 2174(unit) for ordered procedure      Report consisted of patients Situation, Background, Assessment and   Recommendations(SBAR). Information from the following report(s) Pre Procedure Checklist and Procedure Verification was reviewed with the receiving nurse. Opportunity for questions and clarification was provided. K+ 2.9 AT 0330. PT HAS BEEN BOLUSES AS ORDERED. [facility] REPEAT BMP TO BE SENT. Assessment completed upon patients arrival to unit and care assumed. [address] JEANETTE MERRILL REPEAT K+ 2.9. WILL CONTINUE WITH PLANNED SURGERY. PT ARRIVED INTO PRE-OP HOLDING F.  ORIENTED TO SITUATION, PERSON AND PLACE. PT PERIODICALLY  CONFUSED, HOWEVER EASILY REORIENTS. RESP EVEN AND UNLABORED. POX ON RA > 96%. BSB AND DIMINISHED IN BASES. PT C/O 8/10 LEFT FLANK PAIN. REPOSITIONED FOR COMFORT. PRE-OP TCHING DONE - UNABLE TO ASCERTAIN PT'S LEVEL OF COMPREHENSION. SR UP X4,  CB IN PLACE AWAITING SURGERY. 1407 - DR. REID AWARE FS=66.  1414 - 1/2 AMP D50 ADM IVP AS ORDERED.

## 2022-07-19 NOTE — OP NOTES
Operative Note    Patient: Jon Mendiola  YOB: 1945  MRN: [id number]    Date of Procedure: 7/19/2022     Pre-Op Diagnosis: Left Kidney stone [N20.0]    Post-Op Diagnosis: Same as preoperative diagnosis. Procedure(s):  CYSTOSCOPY, LEFT URETEROSCOPY WITH HOLMIUM LASER LITHROTRIPSY AND STENT PLACEMENT    Surgeon(s):  Lisbet Cox MD    Surgical Assistant: None    Anesthesia: General     Estimated Blood Loss (mL):  Minimal    Complications: None    Specimens: * No specimens in log *     Implants:   Implant Name Type Inv. Item Serial No.  Lot No. LRB No. Used [place] [employer] [id number] [employer] [id number] [id number] Miguel Doran [id number] [facility]_ [id number] Left 1 Implanted       Drains:   External Urinary Catheter 07/19/22 (Active)   Site Assessment Clean, dry, & intact 07/19/22 0701   Repositioned Yes 07/19/22 0701   Perineal Care Yes 07/19/22 0701   Wick Changed Yes 07/19/22 0701   Suction Canister/Tubing Changed No 07/19/22 0701       Ureteral Catheter 6 (Active)       [REMOVED] [place]-Oneal Drain 09/20/18 Right Abdomen (Removed)       [REMOVED] External Female Catheter 09/06/19 (Removed)       [REMOVED] External Female Catheter 02/26/21 (Removed)       Findings:  widely patent ureter  Stone in renal pelvis dusted  Stent in position with string    Detailed Description of Procedure: The patient was seen in the pre-operative area. The risks, benefits, complications, alternative treatment options, and expected outcomes were again discussed with the patient. The possibilities of reaction to medication, pain, infection, bleeding, major cardiovascular event, death, damage to surrounding structures were specifically addressed. Informed consent was then obtained. The site of surgery properly noted/marked.     Upon arrival to the operative suite, the patient, procedure, and side were confirmed via a pre-operative \"time-out. \" All were in agreement. The patient was positioned on the operating room table, bilateral sequential compression devices were applied, and anesthesia was induced. Perioperative antibiotics were given. The patient was then placed in the dorsal lithotomy position and prepped and draped in usual sterile fashion. She was somewhat contracted, limiting leg spacing. All pressure points were padded.     A well-lubricated 21 Pashto 30 degree cystoscope was inserted per urethral into the bladder without difficulty. The bladder was drained. Pancystoscopy was performed. There were no bladder tumors or bladder stones. The bilateral ureteral orifices were in orthotopic position. The indwelling left ureteral stent was grasped with stent graspers and pulled to the meatus. Through this a 0.035 guidewire was advanced into the renal pelvis. The stent was removed intact without encrustation. Alongside the guidwire, a semirigid ureteroscope was advanced to the distal ureter, no further due to running into legs limiting angulation. A 0.035 glide was advanced through the ureteroscope into the renal pelvis. An 13/15 fr 28cm ureteral access sheath was advanced over the wire without resistance under fluoroscopic guidance to the level of the UPJ/proximal ureter to maintain a low pressure system. A flexible ureteroscope was advanced. Panpyeloscopy was performed. The stone was identified in the renal pelvis. It was dusted with a 200 micron holmium laser fiber. The outer layer was soft but the inner core was very dense. The stone was very brittle. All the stone fragments were approximately the width of the wire. Panpyeloscopy was repeated and there were no other sizeable stone fragments. The ureteroscope and access sheath were removed under direct visualization and there was no ureteral stone or ureteral injury. Over the safety wire, a 6fr x 24 cm double j ureteral stent with a string was advanced.   It was noted to have a good proximal curl in the kidney on fluoroscopy and a good distal curl in the bladder on direct vision. The bladder was drained with a the scope. The patient was awakened from anesthesia, extubated in the operating room and taken to recovery in stable position.      Electronically Signed by Raoul Lua MD on 7/19/2022 at 3:29 PM

## 2022-07-19 NOTE — ANESTHESIA PREPROCEDURE EVALUATION
Relevant Problems   RESPIRATORY SYSTEM   (+) History of COVID-19   (+) SOB (shortness of breath)      NEUROLOGY   (+) Chronic headache   (+) Dementia (HCC)   (+) Depression with anxiety   (+) Other somatoform disorders   (+) Somatic delusion disorder (HCC)   (+) Somatization disorder      CARDIOVASCULAR   (+) CAD (coronary artery disease), native coronary artery   (+) Dysrhythmia, cardiac   (+) HTN, goal below 130/80      GASTROINTESTINAL   (+) Acid reflux      RENAL FAILURE   (+) HERBERTH (acute kidney injury) ([address]   (+) Duplicated right renal collecting system   (+) Hydronephrosis of left kidney   (+) Renal stone     Relevant Problems   RESPIRATORY SYSTEM   (+) History of COVID-19   (+) SOB (shortness of breath)      NEUROLOGY   (+) Chronic headache   (+) Dementia (HCC)   (+) Depression with anxiety   (+) Other somatoform disorders   (+) Somatic delusion disorder (HCC)   (+) Somatization disorder      CARDIOVASCULAR   (+) CAD (coronary artery disease), native coronary artery   (+) Dysrhythmia, cardiac   (+) HTN, goal below 130/80      GASTROINTESTINAL   (+) Acid reflux      RENAL FAILURE   (+) HERBERTH (acute kidney injury) ([address]   (+) Duplicated right renal collecting system   (+) Hydronephrosis of left kidney   (+) Renal stone       Anesthetic History     PONV          Review of Systems / Medical History  Patient summary reviewed, nursing notes reviewed and pertinent labs reviewed    Pulmonary  Within defined limits                 Neuro/Psych         Psychiatric history     Cardiovascular    Hypertension        Dysrhythmias : atrial fibrillation  CAD    Exercise tolerance: >4 METS  Comments: TTE (05/28/22): Left Ventricle: Normal left ventricular systolic function with a visually estimated EF of 55 - 60%. Left ventricle size is normal. Moderately increased wall thickness. Normal wall motion. Normal diastolic function.   Pericardium: Small (<1 cm) pericardial effusion present. Pericardial effusion has a hematoma.  No indication of cardiac tamponade     ECG (5/27/22): Normal sinus rhythm   Baseline artifact   Confirmed    GI/Hepatic/Renal     GERD           Endo/Other    Diabetes: type 2    Arthritis     Other Findings   Comments: Dementia    Neck contracture 30 degrees to right         Physical Exam    Airway  Mallampati: II  TM Distance: 4 - 6 cm  Neck ROM: normal range of motion   Mouth opening: Normal     Cardiovascular  Regular rate and rhythm,  S1 and S2 normal,  no murmur, click, rub, or gallop  Rhythm: regular  Rate: normal         Dental  No notable dental hx       Pulmonary  Breath sounds clear to auscultation               Abdominal  GI exam deferred       Other Findings            Anesthetic Plan    ASA: 3  Anesthesia type: general    Monitoring Plan: BIS      Induction: Intravenous  Anesthetic plan and risks discussed with: Patient          Anesthetic History     PONV          Review of Systems / Medical History  Patient summary reviewed, nursing notes reviewed and pertinent labs reviewed    Pulmonary  Within defined limits                 Neuro/Psych         Headaches, psychiatric history (Somatization disorder, Depression) and dementia    Comments: Gait instability    Pseudobulbar affect   Cardiovascular    Hypertension        Dysrhythmias   CAD and hyperlipidemia    Exercise tolerance: >4 METS  Comments: TTE (05/28/22): Left Ventricle: Normal left ventricular systolic function with a visually estimated EF of 55 - 60%. Left ventricle size is normal. Moderately increased wall thickness. Normal wall motion. Normal diastolic function.   Pericardium: Small (<1 cm) pericardial effusion present. Pericardial effusion has a hematoma. No indication of cardiac tamponade    ECG (5/27/22):   Normal sinus rhythm   Baseline artifact   Confirmed      GI/Hepatic/Renal     GERD    Renal disease (Left UPJ stone, Bladder stones): stones      Comments: UTI (6/27/22)  Overactive Bladder    Hx Esophageal obstruction s/p EGD with dilation (5/31/22) Endo/Other    Diabetes: type 2    Obesity, arthritis and anemia     Other Findings   Comments: Chronic pain associated with significant psychosocial dysfunction     Personal history of noncompliance with medical treatment, presenting hazards to health            Gout         Physical Exam    Airway  Mallampati: III  TM Distance: > 6 cm  Neck ROM: normal range of motion   Mouth opening: Normal     Cardiovascular  Regular rate and rhythm,  S1 and S2 normal,  no murmur, click, rub, or gallop  Rhythm: regular  Rate: normal         Dental    Dentition: Edentulous     Pulmonary  Breath sounds clear to auscultation               Abdominal  GI exam deferred       Other Findings            Anesthetic Plan    ASA: 3  Anesthesia type: MAC and general - backup          Induction: Intravenous  Anesthetic plan and risks discussed with: Patient and Son / Daughter          Anesthetic History   No history of anesthetic complications            Review of Systems / Medical History  Patient summary reviewed, nursing notes reviewed and pertinent labs reviewed    Pulmonary  Within defined limits                 Neuro/Psych         Headaches and psychiatric history     Cardiovascular    Hypertension          CAD    Exercise tolerance: >4 METS     GI/Hepatic/Renal     GERD           Endo/Other    Diabetes    Arthritis     Other Findings   Comments: Chronic pain-Chronic pain associated with significant psychosocial dysfunction     Personal history of noncompliance with medical treatment, presenting hazards to health    Pseudobulbar affect    Somatization disorder    Gout                Anesthetic Plan    ASA: 3  Anesthesia type: general            Anesthetic plan and risks discussed with: Patient and Son / Daughter      Pt poor historian and has numerous psyc issues and I  talked to daughter and explained everything to both of them and answered  Their questions.         Anesthetic History     PONV          Review of Systems / Medical History  Patient summary reviewed, nursing notes reviewed and pertinent labs reviewed    Pulmonary  Within defined limits                 Neuro/Psych         Headaches, psychiatric history (agoraphobia, schitzophrenia Somatization disorder) and dementia     Cardiovascular    Hypertension        Dysrhythmias   CAD    Exercise tolerance: <4 METS  Comments: Echo: normal EF, mild AS   GI/Hepatic/Renal     GERD    Renal disease: stones and CRI       Endo/Other    Diabetes    Arthritis     Other Findings   Comments: Gout         Physical Exam    Airway  Mallampati: III  TM Distance: 4 - 6 cm  Neck ROM: decreased range of motion   Mouth opening: Normal    Comments: Neck in right sided flexion, pt states she cannot move it  Cardiovascular  Regular rate and rhythm,  S1 and S2 normal,  no murmur, click, rub, or gallop             Dental    Dentition: Edentulous     Pulmonary  Breath sounds clear to auscultation               Abdominal  GI exam deferred       Other Findings            Anesthetic Plan    ASA: 3  Anesthesia type: general          Induction: Intravenous  Anesthetic plan and risks discussed with: Patient      LMA and/or have glidescope available

## 2022-07-19 NOTE — PROGRESS NOTES
Patient transferred post cystoscopy with stent placement left ureter. Patient opens eyes to verbal, communicating shes \"hungry\"  Message sent to MD valdivia diet and glucose monitoring orders. Daughter at bedside. Maintenance fluids resumed. So care done, clean bed pad placed. Will continue to monitor. No complaints of pain at this time.

## 2022-07-19 NOTE — ANESTHESIA POSTPROCEDURE EVALUATION
Procedure(s):  CYSTOSCOPY LEFT  RETROGRADE PYELOGRAM, LEFT URETEROSCOPY WITH HOLMIUM LASER LITHROTRIPSY AND STENT PLACEMENT. general    Anesthesia Post Evaluation        Patient location during evaluation: PACU  Note status: Adequate. Level of consciousness: responsive to verbal stimuli and sleepy but conscious  Pain management: satisfactory to patient  Airway patency: patent  Anesthetic complications: no  Cardiovascular status: acceptable  Respiratory status: acceptable  Hydration status: acceptable  Comments: +Post-Anesthesia Evaluation and Assessment    Patient: Pamella Barthel MRN: [id number]  SSN: [id number]   YOB: 1945  Age: 68 y.o. Sex: female      Cardiovascular Function/Vital Signs    BP (!) 161/79   Pulse 71   Temp 36.2 °C (97.1 °F)   Resp 11   Ht 5' 8\" (1.727 m)   Wt 58.6 kg (129 lb 3 oz)   SpO2 99%   BMI 19.64 kg/m²     Patient is status post Procedure(s) with comments:  CYSTOSCOPY LEFT  RETROGRADE PYELOGRAM, LEFT URETEROSCOPY WITH HOLMIUM LASER LITHROTRIPSY AND STENT PLACEMENT - laser conf # [id number]. Nausea/Vomiting: Controlled. Postoperative hydration reviewed and adequate. Pain:  Pain Scale 1: Numeric (0 - 10) (07/19/22 1615)  Pain Intensity 1: 0 (07/19/22 1615)   Managed. Neurological Status:   Neuro (WDL): Exceptions to WDL (07/19/22 1550)   At baseline. Mental Status and Level of Consciousness: Arousable. Pulmonary Status:   O2 Device: Nasal cannula (07/19/22 1615)   Adequate oxygenation and airway patent. Complications related to anesthesia: None    Post-anesthesia assessment completed. No concerns. Signed By: Rosalina Cheadle, MD    7/19/2022  Post anesthesia nausea and vomiting:  controlled      INITIAL Post-op Vital signs:   Vitals Value Taken Time   /79 07/19/22 1625   Temp 36.2 °C (97.1 °F) 07/19/22 1550   Pulse 72 07/19/22 1628   Resp 11 07/19/22 1628   SpO2 98 % 07/19/22 1628   Vitals shown include unvalidated device data.

## 2022-07-19 NOTE — PROGRESS NOTES
No acute events. On cx specific abx. K being replaced. AFVSS  Reviewed rba cysto, left ureteroscopy, laser lithotripsy, stent exchange. Agrees to proceed.

## 2022-07-19 NOTE — PROGRESS NOTES
Bedside shift change report given to Rella Cogan (oncoming nurse) by Anabella Pino (offgoing nurse).  Report included the following information SBAR, Kardex, Procedure Summary, MAR and Cardiac Rhythm SR.

## 2022-07-19 NOTE — PERIOP NOTES
[address] from Operating Room to PACU    Report received from [address] and Ana Casper CRNA regarding Enrique Saunders. Surgeon(s):  Darylene Milks, MD  And Procedure(s) (LRB):  CYSTOSCOPY LEFT  RETROGRADE PYELOGRAM, LEFT URETEROSCOPY WITH HOLMIUM LASER LITHROTRIPSY AND STENT PLACEMENT (N/A)  confirmed   with allergies discussed. Anesthesia type, drugs, patient history, complications, estimated blood loss, vital signs, intake and output, and last pain medication, lines, reversal medications and temperature were reviewed. 1615 TRANSFER - OUT REPORT:    Verbal report given to [facility]) on Enrique Saunders  being transferred to [facility](unit) for routine post - op       Report consisted of patients Situation, Background, Assessment and   Recommendations(SBAR). Information from the following report(s) SBAR, Kardex, OR Summary, Procedure Summary, Intake/Output and MAR was reviewed with the receiving nurse. Opportunity for questions and clarification was provided. Patient transported with:   O2 @ 2 liters  Tech   Patient chart  No belongings in PACU with patient.

## 2022-07-19 NOTE — PROGRESS NOTES
Blood pressure re-assessed. Medicated with prn hydralazine per order. Patient awake enough to take a few bites of pudding, and drank water. But declined anything else.

## 2022-07-19 NOTE — BRIEF OP NOTE
Brief Postoperative Note    Patient: Fan Klein  YOB: 1945  MRN: [id number]    Date of Procedure: 7/19/2022     Pre-Op Diagnosis: Left Kidney stone [N20.0]    Post-Op Diagnosis: Same as preoperative diagnosis. Procedure(s):  CYSTOSCOPY LEFT URETEROSCOPY WITH HOLMIUM LASER LITHROTRIPSY AND STENT PLACEMENT    Surgeon(s):  Glenna Castro MD    Surgical Assistant: None    Anesthesia: General     Estimated Blood Loss (mL): Minimal    Complications: None    Specimens: * No specimens in log *     Implants:   Implant Name Type Inv.  Item Serial No.  Lot No. LRB No. Used Action   Jax Cancino [id number] [id number] Angelika Elliott [id number]  Jax Cancino DBL-PGTL [id number] Ailyn Noemi [id number] [employer] [facility]_ [id number] Left 1 Implanted       Drains:   External Urinary Catheter 07/19/22 (Active)   Site Assessment Clean, dry, & intact 07/19/22 0701   Repositioned Yes 07/19/22 0701   Perineal Care Yes 07/19/22 0701   Wick Changed Yes 07/19/22 0701   Suction Canister/Tubing Changed No 07/19/22 0701       Ureteral Catheter 6 (Active)       [REMOVED] [place]-Oneal Drain 09/20/18 Right Abdomen (Removed)       [REMOVED] External Female Catheter 09/06/19 (Removed)       [REMOVED] External Female Catheter 02/26/21 (Removed)       Findings: widely patent ureter  Stone in renal pelvis dusted  Stent in position with string    Electronically Signed by Nickolas James MD on 7/19/2022 at 3:29 PM

## 2022-07-19 NOTE — PROGRESS NOTES
Physician Progress Note      Sam Álvarez  CSN #:                  [id number]  :                       1945  ADMIT DATE:       2022 6:15 AM  DISCH DATE:  RESPONDING  PROVIDER #:        Morena Meyers MD          QUERY TEXT:    Pt admitted with UTI. Pt noted to have  uti and left sided ureteral stones s/p stent 3 weeks ago . If possible, please document in the progress notes and discharge summary if you are evaluating and/or treating any of the following: The medical record reflects the following:  Risk Factors: Urine culture from 7/15 growing possible Enterococcus, had been on ceftin outpt  Clinical Indicators: H&P-Complicated UTI,   urology pn-had uti and left sided ureteral stones s/p stent 3 weeks ago here today for definitive stone treatment  Treatment: pending  CRULS, urology cx, ivfs, vancomycin and IV rocephin  Thanks  Burgess Tory FROST/CDI   Options provided:  -- UTI due to ureteral stent  -- UTI not due to ureteral stent  -- UTI as a complication of procedure 3 wks ago  -- UTI not a complication of procedure  -- Other - I will add my own diagnosis  -- Disagree - Not applicable / Not valid  -- Disagree - Clinically unable to determine / Unknown  -- Refer to Clinical Documentation Reviewer    PROVIDER RESPONSE TEXT:    UTI is due to ureteral stent.     Query created by: Temi Mccauley on 2022 11:04 AM      Electronically signed by:  Pio Worthington MD 2022 4:59 PM

## 2022-07-20 NOTE — PROGRESS NOTES
Hospitalist Progress Note    NAME: Gricel Null   :  1945   MRN:  [id number]       Assessment / Plan:    Hypokalemia, K 2.4 on 3/49  Complicated UTI  History of left ureteral stone status post stent placement  Urine culture from 7/15 growing possible Enterococcus, had been on ceftin outpt. Started on empiric vancomycin and IV rocephin on admission, will dc ceftriaxone given final cultures results  Repeat UA with bacteriuria, significant pyuria and hematuria  Cultures ENTEROCOCCUS FAECALIS (PREDOMINATING)  Continue gentle IV fluids with D5 and potassium  Magnesium within normal limits  Replace potassium aggressively, K is still low  Follow BMP closely  Urology following, s/p CYSTOSCOPY LEFT URETEROSCOPY WITH HOLMIUM LASER LITHROTRIPSY AND STENT PLACEMENT  -PT/OT eval is appreciated     Hypocalcemia  - s/p 1 g of calcium gluconate      Hypertension  CAD  GERD  HLD  Depression wi th anxiety  Resume home medications    PreDM  -A1c 5% on 2022  -episodes of hypoglycemia episodes  -cont D5     History of dementia    18.5 - 24.9 Normal weight / Body mass index is 19.64 kg/m². Estimated discharge date:   Barriers: Clinical improvement    Code status: Full  Prophylaxis: SCD's  Recommended Disposition:  TBD     Subjective:   Patient was seen and examined. No acute events overnight. Discussed with RN overnight events. All patient's questions were answered. \"Doing well\"    Review of Systems:  Symptom Y/N Comments  Symptom Y/N Comments   Fever/Chills n   Chest Pain n    Poor Appetite    Edema     Cough n   Abdominal Pain n    Sputum    Joint Pain     SOB/MOORE n   Pruritis/Rash     Nausea/vomit n   Tolerating PT/OT     Diarrhea    Tolerating Diet y    Constipation    Other       Could NOT obtain due to:          Objective:     VITALS:   Last 24hrs VS reviewed since prior progress note.  Most recent are:  Patient Vitals for the past 24 hrs:   Temp Pulse Resp BP SpO2   22 1212 98.3 °F (36.8 °C) 70 18 123/67 99 %   07/20/22 0824 98 °F (36.7 °C) 64 18 (!) 140/65 98 %   07/20/22 0435 -- 66 18 124/78 98 %   07/20/22 0421 98.2 °F (36.8 °C) 60 18 126/76 99 %   07/20/22 0400 -- 60 -- -- --   07/20/22 0000 -- 66 -- -- --   07/19/22 2336 98 °F (36.7 °C) 80 19 100/67 100 %   07/19/22 2009 98.2 °F (36.8 °C) 78 18 98/65 99 %   07/19/22 1851 -- -- -- -- 100 %   07/19/22 1823 -- 68 -- (!) 178/87 --   07/19/22 1642 97.8 °F (36.6 °C) 72 14 (!) 184/94 --   07/19/22 1625 97 °F (36.1 °C) 71 11 (!) 161/79 99 %   07/19/22 1615 -- 71 11 (!) 162/81 99 %   07/19/22 1610 -- 69 11 (!) 160/84 99 %   07/19/22 1605 -- 70 10 (!) 163/83 97 %   07/19/22 1600 -- 70 10 (!) 160/85 96 %   07/19/22 1555 -- 70 12 (!) 159/87 94 %   07/19/22 1550 97.1 °F (36.2 °C) 72 10 (!) 159/86 96 %         Intake/Output Summary (Last 24 hours) at 7/20/2022 1306  Last data filed at 7/20/2022 0945  Gross per 24 hour   Intake 1000 ml   Output 400 ml   Net 600 ml          I had a face to face encounter and independently examined this patient on 7/20/2022, as outlined below:  PHYSICAL EXAM:  General: WD, WN. Alert, cooperative, no acute distress    EENT:  EOMI. Anicteric sclerae. MMM  Resp:  CTA bilaterally, no wheezing or rales. No accessory muscle use  CV:  Regular  rhythm,  No edema  GI:  Soft, Non distended, Non tender. +Bowel sounds  Neurologic:  Alert and oriented X 2 to place and time. Following all commands, moving 4 extremities, pupils are reactive bilaterally, sensations are intact, strength 5/5 all over  Psych:   Good insight. Not anxious nor agitated  Skin:  No rashes.   No jaundice    Reviewed most current lab test results and cultures  YES  Reviewed most current radiology test results   YES  Review and summation of old records today    NO  Reviewed patient's current orders and MAR    YES  PMH/SH reviewed - no change compared to H&P  ________________________________________________________________________  Care Plan discussed with: Comments   Patient X    Family      RN X    Care Manager     Consultant                        Multidiciplinary team rounds were held today with , nursing, pharmacist and clinical coordinator. Patient's plan of care was discussed; medications were reviewed and discharge planning was addressed. ________________________________________________________________________        Comments   >50% of visit spent in counseling and coordination of care     ________________________________________________________________________  Emmanuelle Leon MD     Procedures: see electronic medical records for all procedures/Xrays and details which were not copied into this note but were reviewed prior to creation of Plan. LABS:  I reviewed today's most current labs and imaging studies.   Pertinent labs include:  Recent Labs     07/20/22 0245   WBC 3.9   HGB 10.6*   HCT 33.6*        Recent Labs     07/20/22  0245 07/19/22  1150 07/19/22  0358 07/18/22  0919    145 147* 142   K 3.3* 2.9* 2.9* 3.1*    109* 117* 104   CO2 31 31 23 32   * 98 69 88   BUN 10 11 13 16   CREA 0.58 0.48* 0.39* 0.72   CA 8.3* 7.6* 6.9* 8.7   MG  --  1.7  --  2.1   ALB 2.6*  --   --   --    TBILI 0.4  --   --   --    ALT 10*  --   --   --          Signed: Emmanuelle Leon MD

## 2022-07-20 NOTE — PROGRESS NOTES
Pharmacy Automatic Renal Dosing Protocol - Antimicrobials    Indication for Antimicrobials: UTI    Current Regimen of Each Antimicrobial:  Vancomycin 1250 mg x 1 then 750 mg Q12H (Start Date; Day # 3)  Ceftriaxone 1 g IV Q24H (Start day  - Day 3  Previous Antimicrobial Therapy:    Vancomycin Goal AUC: 400-600 mg*hour/liter per day     Vancomycin Levels  Date Dose & Interval Measured (mcg/mL) Steady State (mcg/mL)   22 750 mg IV Q12H 23 21.2                 Date & time of next level:     Significant Cultures:     7/15/22 Urine - E. Faecalis - Final    Radiology / Imaging results: (X-ray, CT scan or MRI):       Labs:  Recent Labs     22  0245 22  1150 22  0358 22  0919   CREA 0.58 0.48* 0.39* 0.72   BUN 10 11 13 16   WBC 3.9  --   --   --      Temp (24hrs), Av.8 °F (36.6 °C), Min:97 °F (36.1 °C), Max:98.3 °F (36.8 °C)      Paralysis, amputations, malnutrition:   Creatinine Clearance (mL/min) or Dialysis: 61.5    Impression/Plan:     Change vancomycin to 1000 mg IV Q24H for predicted AUC of 446  Continue Ceftriaxone for now, but likely not necessary       Pharmacy will follow daily and adjust medications as appropriate for renal function and/or serum levels. Thank you,  Alain Klinefelter, PHARMD      Recommended duration of therapy  [email or web address]/[place]/[facility]/Pharmacy/Clinical%20Companion/Duration%20of%20ABX%20therapy. docx    Renal Dosing  [email or web address]/[place]/[facility]/Pharmacy/Clinical%20Companion/Renal%[id number]. pdf

## 2022-07-20 NOTE — PROGRESS NOTES
Chart reviewed. Patient was seen 6/28/22 during prior hospital admission and found to be a baseline with dependence for all mobility skills. She was not seen further due to being unlikely to benefit from PT skilled intervention. Will complete order and sign off.     Triny Funes, PT

## 2022-07-20 NOTE — PROGRESS NOTES
Occupational Therapy  Order consult received, chart reviewed. Per chart, patient is dependent for all ADLs and functional mobility, and has been for at least a year. Patient stays in bed most days and is dependent on 2 person assist for transfers and dependent for w/c mobility. Patient is inappropriate for skilled OT services. OT will complete the order and sign off.

## 2022-07-20 NOTE — PROGRESS NOTES
Patient: Bere Milligan MRN: [id number]  SSN: [id number]    YOB: 1945  Age: 68 y.o. Sex: female        ADMITTED: 2022 to Alessandra Borrero MD by Ruth Kenny MD for Kidney stone [N20.0]  Hypokalemia [E87.6]  POD# 1 Day Post-Op Procedure(s):  CYSTOSCOPY LEFT  RETROGRADE PYELOGRAM, LEFT URETEROSCOPY WITH HOLMIUM [address] AND STENT PLACEMENT    Bere Milligan is doing fair states soreness . LLQ . Discussed procedure and stent in place . Urine visualized in cannister tea colored . Explained to pt  stent discomfort and possible hematuria . Wbc 3.9   Hgb 10.6   Cr 0.58     Vitals: Temp (24hrs), Av.8 °F (36.6 °C), Min:97 °F (36.1 °C), Max:98.3 °F (36.8 °C)    Blood pressure (!) 140/65, pulse 64, temperature 98 °F (36.7 °C), resp. rate 18, height 5' 8\" (1.727 m), weight 58.6 kg (129 lb 3 oz), SpO2 98 %. Intake and Output:   190 -  0700  In: 900 [I.V.:900]  Out: 550 [Urine:550]   07 -  190  In: 100 [P.O.:100]  Out: -   AIRAM Output lats 24 hrs: No data found. AIRAM Output last 8 hrs: No data found. BM over last 24 hrs: No data found.     Exam:   EXAMINATION:     Appearance: well-developed NAD   Conjunctiva/Lids: conjunctivae and lids normal   External Ears/Nose: normal no lesions or deformities   Neck: trachea midline   Respiratory Effort: breathing easily   Lower Extremity: no edema   Abdomen/Flank: soft non-tender without masses; no CVA tenderness   Liver/Spleen: no organomegaly   Hernia: no ventral hernia    Gait/Station: normal   Skin Inspection: warm and dry   Mood/Affect: normal         Labs:  CBC:   Lab Results   Component Value Date/Time    WBC 3.9 2022 02:45 AM    HCT 33.6 (L) 2022 02:45 AM    PLATELET 616  02:45 AM     BMP:   Lab Results   Component Value Date/Time    Glucose 108 (H) 2022 02:45 AM    Sodium 140 2022 02:45 AM    Potassium 3.3 (L) 2022 02:45 AM    Chloride 105 2022 02:45 AM    CO2 31 07/20/2022 02:45 AM    BUN 10 07/20/2022 02:45 AM    Creatinine 0.58 07/20/2022 02:45 AM    Calcium 8.3 (L) 07/20/2022 02:45 AM     Cultures: N/A    Imaging: N/A  Assessment/Plan:     1. S/p CRULS - stent in place   - will need op follow up for stent removal     Case discussed with Dr Shira Chanel   Scheduled follow up with Dr Shira Chanel 7/27 at 1:40 pm  [facility] By: Farheen Massey.  Joe Salvador, NP - July 20, 2022 nusrat

## 2022-07-21 NOTE — PROGRESS NOTES
Hospitalist Progress Note    NAME: Sergio Linton   :  1945   MRN:  [id number]       Assessment / Plan:    Hypokalemia, K 2.4 on   Complicated UTI  History of left ureteral stone status post stent placement  Urine culture from 7/15 growing possible Enterococcus, had been on ceftin outpt. Started on empiric vancomycin and IV rocephin on admission, will dc ceftriaxone given final cultures results  Repeat UA with bacteriuria, significant pyuria and hematuria  Cultures ENTEROCOCCUS FAECALIS (PREDOMINATING)  Continue gentle IV fluids with D5 and potassium  Magnesium within normal limits  Replace potassium aggressively, K is still low  Follow BMP closely  Urology following, s/p CYSTOSCOPY LEFT URETEROSCOPY WITH HOLMIUM LASER LITHROTRIPSY AND STENT PLACEMENT  -PT/OT eval is appreciated   -Patient complaining of hip pain, obtain x-rays bilateral to rule out fractures  Consult Ortho/neurosurgery for compression fracture      Hypocalcemia  - s/p 1 g of calcium gluconate      Hypertension  CAD  GERD  HLD  Depression wi th anxiety  Resume home medications    PreDM  -A1c 5% on 2022  -episodes of hypoglycemia episodes  -cont D5     History of dementia    18.5 - 24.9 Normal weight / Body mass index is 19.64 kg/m². Estimated discharge date:   Barriers: Clinical improvement    Code status: Full  Prophylaxis: SCD's  Recommended Disposition:  TBD     Subjective:   Patient was seen and examined. No acute events overnight. Discussed with RN overnight events. All patient's questions were answered.       \"Feeling much better\"    Review of Systems:  Symptom Y/N Comments  Symptom Y/N Comments   Fever/Chills n   Chest Pain n    Poor Appetite    Edema     Cough n   Abdominal Pain n    Sputum    Joint Pain y Hip, b/l   SOB/MOORE n   Pruritis/Rash     Nausea/vomit n   Tolerating PT/OT     Diarrhea    Tolerating Diet y    Constipation    Other       Could NOT obtain due to:          Objective: VITALS:   Last 24hrs VS reviewed since prior progress note. Most recent are:  Patient Vitals for the past 24 hrs:   Temp Pulse Resp BP SpO2   07/21/22 1126 97.2 °F (36.2 °C) 72 18 116/64 95 %   07/21/22 0937 -- 64 -- 138/63 --   07/21/22 0936 -- 64 -- 138/63 --   07/21/22 0822 97.4 °F (36.3 °C) 64 18 138/63 95 %   07/21/22 0800 -- (!) 58 -- -- --   07/21/22 0317 -- 65 18 (!) 147/77 95 %   07/20/22 2342 98.1 °F (36.7 °C) 66 18 (!) 147/76 95 %   07/20/22 1511 98.5 °F (36.9 °C) 65 18 127/67 97 %         Intake/Output Summary (Last 24 hours) at 7/21/2022 1311  Last data filed at 7/21/2022 0600  Gross per 24 hour   Intake 240 ml   Output 1100 ml   Net -860 ml          I had a face to face encounter and independently examined this patient on 7/21/2022, as outlined below:  PHYSICAL EXAM:  General: WD, WN. Alert, cooperative, no acute distress    EENT:  EOMI. Anicteric sclerae. MMM  Resp:  CTA bilaterally, no wheezing or rales. No accessory muscle use  CV:  Regular  rhythm,  No edema  GI:  Soft, Non distended, Non tender. +Bowel sounds  Neurologic:  Alert and oriented X 2 to place and time. Following all commands, moving 4 extremities, pupils are reactive bilaterally, sensations are intact, strength 5/5 all over  Psych:   Good insight. Not anxious nor agitated  Skin:  No rashes. No jaundice    Reviewed most current lab test results and cultures  YES  Reviewed most current radiology test results   YES  Review and summation of old records today    NO  Reviewed patient's current orders and MAR    YES  PMH/SH reviewed - no change compared to H&P  ________________________________________________________________________  Care Plan discussed with:    Comments   Patient X    Family      RN X    Care Manager     Consultant                        Multidiciplinary team rounds were held today with , nursing, pharmacist and clinical coordinator.   Patient's plan of care was discussed; medications were reviewed and discharge planning was addressed. ________________________________________________________________________        Comments   >50% of visit spent in counseling and coordination of care     ________________________________________________________________________  Severino Turner MD     Procedures: see electronic medical records for all procedures/Xrays and details which were not copied into this note but were reviewed prior to creation of Plan. LABS:  I reviewed today's most current labs and imaging studies.   Pertinent labs include:  Recent Labs     07/21/22 0330 07/20/22  0245   WBC 4.3 3.9   HGB 10.2* 10.6*   HCT 32.7* 33.6*    183       Recent Labs     07/21/22 0330 07/20/22 0245 07/19/22  1150    140 145   K 3.1* 3.3* 2.9*    105 109*   CO2 30 31 31   * 108* 98   BUN 11 10 11   CREA 0.67 0.58 0.48*   CA 8.4* 8.3* 7.6*   MG  --   --  1.7   ALB 2.5* 2.6*  --    TBILI 0.4 0.4  --    ALT 9* 10*  --          Signed: Severino Turner MD

## 2022-07-21 NOTE — PROGRESS NOTES
Transition of Care    RUR: 18%    Casa Milian called from [facility].  ([phone]). She was informed that Mr. Sj Pena could be discharged today. She agreed to set up transportation. She will call me back. Ms. Rachel Anguiano called. Mr. Sj Pena is set up with transportation with [employer] for 3:30pm.      Ms. Yumiko Hinton was called and informed. 3:15pm  The discharge was moved to Friday due to ortho and neurosurgery consult. Casa Milian was called. She was asked to arrange transportation for Ms. Queen for tomorrow. She agreed to do this. Mr. Tru Parsons was called. She was informed that her mother would be discharged tomorrow. Specialist were being consulted for her mother's compression fractures. Will continue to follow for discharge planning.   Signed By: Nandini Shaffer LCSW     July 21, 2022        Signed By: Nandini Shaffer LCSW     July 21, 2022

## 2022-07-21 NOTE — PROGRESS NOTES
Bedside and Verbal shift change report given to Shannan Lock (oncoming nurse) by Stanislaw Fernández (offgoing nurse). Report included the following information SBAR and Kardex.

## 2022-07-21 NOTE — PROGRESS NOTES
CM made effort to contact pt's daughter Alysha Gonzalez at [phone]  to complete  CM readmission  assessment was unsuccessful. Left vm and requested a return call. Floor CM will follow up.     Mustapha Ramirez MSW     Ext -[phone]

## 2022-07-21 NOTE — CONSULTS
Neurosurgery Consult note:      CT imaging reviewed:   EXAM:  CT ABDOMEN PELVIS WITH CONTRAST  MSK:  Stranding throughout the body wall suggesting anasarca. Degenerative changes  throughout the spine with inferior endplate compression of T11, superior  endplate compression of L3 and L4. Plan:   - No plan for further intervention from neurosurgery standpoint. Stable appearing end plate compression fractures on CT. Will sign off from neurosurgery standpoint.  [facility] aware of the above.      JD Ndiaye

## 2022-07-21 NOTE — PROGRESS NOTES
Transition of Care    Norma Rachel was called with the [facility].  [phone]. A message was left for her to return my call. Reason for Readmission: Hypokalemia, Complicated UTI. RUR Score/Risk Level:   18%      PCP: First and Last name:  Unknown   Name of Practice: [facility]   Are you a current patient: Yes/No: Yes   Approximate date of last visit:    Can you participate in a virtual visit with your PCP: No    Is a Care Conference indicated:  Not at this time. Did you attend your follow up appointment (s): Yes  If not, why not:         Resources/supports as identified by patient/family:   Ms. Pradeep Rae is part of the [facility].  They provide health services for Mrs. Logan. Top Challenges facing patient (as identified by patient/family and CM): There were not challenges identified. Finances/Medication cost?   none    Transportation      None    Support system or lack thereof? Ms. Pradeep Rae has family support. She is part of the [facility].     Living arrangements? Ms. Pradeep Rae lives with her daughter in a single family residence with a ramp to enter. Current Advanced Directive/Advance Care Plan:           Ms. Pradeep Rae does have advance care plan. She does have documents on the chart. Plan for utilizing home health: Mr Pradeep Rae is part of the [facility].  She receives home health services through them. Transition of Care Plan:    Based on readmission, the patient's previous Plan of Care   has been evaluated and/or modified. The current Transition of Care Plan is:   Ms. Pradeep Rae will return to her daughter's home with the [facility].  She will receive services through them. Care Management Interventions  PCP Verified by CM:  Yes  Last Visit to PCP: 07/05/22  Palliative Care Criteria Met (RRAT>21 & CHF Dx)?: No  Mode of Transport at Discharge: BLS  Transition of Care Consult (CM Consult): Discharge Planning  MyChart Signup: No  Discharge Durable Medical Equipment: No  Physical Therapy Consult: Yes  Occupational Therapy Consult: Yes  Speech Therapy Consult: No  Support Systems: Child(jim), /  Confirm Follow Up Transport: Other (see comment)  The Patient and/or Patient Representative was Provided with a Choice of Provider and Agrees with the Discharge Plan?: No  Freedom of Choice List was Provided with Basic Dialogue that Supports the Patient's Individualized Plan of Care/Goals, Treatment Preferences and Shares the Quality Data Associated with the Providers?: No  [place] Resource Information Provided?: No  Discharge Location  Patient Expects to be Discharged to[de-identified] Home with home health     Will continue to follow for discharge planning.   Signed By: Dasia Shea LCSW     July 21, 2022

## 2022-07-21 NOTE — PROGRESS NOTES
Problem: Dysphagia (Adult)  Goal: *Acute Goals and Plan of Care (Insert Text)  Description: Speech Pathology Goal  Initiated 7/21/2022    1. Patient will tolerate Minced&Moist/Thin Liquids without signs of aspiration within 7 days. Outcome: Progressing Towards Goal     SPEECH LANGUAGE PATHOLOGY BEDSIDE SWALLOW EVALUATION  Patient: Esteban Acharya (47 y.o. female)  Date: 7/21/2022  Primary Diagnosis: Kidney stone [N20.0]  Hypokalemia [E87.6]  Procedure(s) (LRB):  CYSTOSCOPY LEFT  RETROGRADE PYELOGRAM, LEFT URETEROSCOPY WITH HOLMIUM LASER LITHROTRIPSY AND STENT PLACEMENT (N/A) 2 Days Post-Op   Precautions:        ASSESSMENT :  Based on the objective data described below, the patient presents with mild to moderate oral dysphagia, likely 2/2 to advanced age and edentulism, and a grossly functional pharyngeal state. Patient is familiar to SLP from previous admissions. Patient Ox2 on this date and appeared anxious about SLP raising the [facility], as evidenced by perseveration about lowering the bed down. Patient observed with prolonged mastication with breakfast tray on this date, leading to suspected delayed propulsion. RN present for administration of medication. Patient tolerated meds whole in puree without difficulty. No signs of aspiration observed with any consistency tried. Recommend Minced&Moist/Thin Liquids with general aspiration precautions outlined below. RN educated re: SLP recommendations. Patient will benefit from skilled intervention to address the above impairments. Patients rehabilitation potential is considered to be fair. PLAN :  Recommendations and Planned Interventions:  -- Minced&Moist/Thin Liquids  -- Medication Whole in Puree or as tolerated  -- Sitting Upright for all PO  -- Small Bites/Sips    Frequency/Duration: Patient will be followed by speech-language pathology 2 times a week to address goals. Discharge Recommendations:  To Be Determined     SUBJECTIVE:   Patient stated Samul Chew you lower my bed?  repeatedly?     OBJECTIVE:     Past Medical History:   Diagnosis Date    Agoraphobia without mention of panic attacks 2/17/2014    Anxiety disorder 8/18/2013    Arthritis     osteo    CAD (coronary artery disease), native coronary artery 12/1/2015    no stents    Chronic chest pain 1/13/2014    Chronic pain associated with significant psychosocial dysfunction 2/17/2014    Dementia ([facility] [address]     Depression 8/18/2013    Diabetes ([facility] [address]     type II    Duplicated right renal collecting system 3/13/2014    GERD (gastroesophageal reflux disease)     Gout, joint     Hypercholesteremia     hyercholesterolemia    Hypertension     Murmur     Nephrolithiasis 3/13/2014    Personal history of noncompliance with medical treatment, presenting hazards to health 5/30/2014     Past Surgical History:   Procedure Laterality Date    EGD  4/23/2010         HX CHOLECYSTECTOMY  09/20/2018    lap jesus    HX CYST REMOVAL      cyst removed from left wrist    HX HEART CATHETERIZATION  12/01/2015    HX HYSTERECTOMY      partial    HX OTHER SURGICAL      bladder dilitation    HX TUBAL LIGATION      HX UROLOGICAL      kidney stones    UPPER GI ENDOSCOPY,BALL DIL,30MM  5/31/2022          Prior Level of Function/Home Situation:   Home Situation  Home Environment: Trailer/mobile home  # Steps to Enter: 0 (ramp)  One/Two Story Residence: One story  Living Alone: No  Support Systems: Child(jim), /  Patient Expects to be Discharged to[de-identified] Home with home health  Current DME Used/Available at Home: Grab bars, Wheelchair, Shower chair, Blood pressure cuff    Diet prior to admission: Patient is a poor historian, but per chart review, patient most recently on Minced&Moist/Thin Liquid during prior admission  Current Diet: Easy to Chew/Thin Liquid     Cognitive and Communication Status:  Neurologic State: Alert  Orientation Level: Oriented to person, Oriented to place, Disoriented to situation, Disoriented to time  Cognition: Decreased command following     Perseveration: Perseverates during conversation  Safety/Judgement: Awareness of environment    Oral Assessment:  Oral Assessment  Labial: No impairment  Dentition: Edentulous  Oral Hygiene: Moist oral mucosa  Lingual: Other (comment) (Unable to assess due to decreased command following)  Velum: Unable to visualize  Mandible: No impairment    P.O. Trials:  Patient Position: Upright in bed  Vocal quality prior to P.O.: No impairment  Consistency Presented: Thin liquid;Puree; Solid  How Presented: Self-fed/presented;Spoon;SLP-fed/presented;Straw;Successive swallows     Bolus Acceptance: No impairment  Bolus Formation/Control: Impaired  Type of Impairment: Delayed;Mastication  Propulsion: Delayed (# of seconds)  Oral Residue: None  Initiation of Swallow: No impairment  Laryngeal Elevation: Functional  Aspiration Signs/Symptoms: None  Pharyngeal Phase Characteristics: No impairment, issues, or problems              Oral Phase Severity: Mild-moderate  Pharyngeal Phase Severity : Other (comment) (WFL)    NOMS:   The NOMS functional outcome measure was used to quantify this patient's level of swallowing impairment. Based on the NOMS, the patient was determined to be at level 5 for swallow function. NOMS Swallowing Levels:  Level 1 (CN): NPO  Level 2 (CM): NPO but takes consistency in therapy  Level 3 (CL): Takes less than 50% of nutrition p.o. and continues with nonoral feedings; and/or safe with mod cues; and/or max diet restriction  Level 4 (CK): Safe swallow but needs mod cues; and/or mod diet restriction; and/or still requires some nonoral feeding/supplements  Level 5 (CJ): Safe swallow with min diet restriction; and/or needs min cues  Level 6 (CI): Independent with p.o.; rare cues; usually self cues; may need to avoid some foods or needs extra time  Level 7 ([address] [place] [address]): Independent for all p.o.  MARGARET. (2003).  National Outcomes Measurement System (NOMS): Adult Speech-Language Pathology User's Guide.     Pain:  Pain Scale 1: Numeric (0 - 10)  Pain Intensity 1: 0       After treatment:   Patient left in no apparent distress in bed, Call bell within reach, Nursing notified, and Caregiver / family present    COMMUNICATION/EDUCATION:   Patient was educated regarding role of SLP. She demonstrated poor understanding due to history of dementia. The patient's plan of care including recommendations, planned interventions, and recommended diet changes were discussed with: Registered nurse. Patient/family have participated as able in goal setting and plan of care. Patient/family agree to work toward stated goals and plan of care.     Thank you for this referral.  MARÍA Grubbs  Time Calculation: 20 mins

## 2022-07-22 NOTE — PROGRESS NOTES
Ureteral stent string pulled out by nursing as directed. Follow up adjusted to 6 week from now with Renal US.

## 2022-07-22 NOTE — PROGRESS NOTES
SBAR shift change report given to Madonna Wilson RN. Opportunities for questions and concerns given.

## 2022-07-22 NOTE — PROGRESS NOTES
Hospitalist Progress Note    NAME: Evelyn Herrera   :  1945   MRN:  [id number]       Assessment / Plan:    Hypokalemia, K 2.4 on   Complicated UTI  History of left ureteral stone status post stent placement  Urine culture from 7/15 growing possible Enterococcus, had been on ceftin outpt. Empiric vancomycin and IV rocephin on admission, will dc ceftriaxone given final cultures results  Repeat UA with bacteriuria, significant pyuria and hematuria  Cultures ENTEROCOCCUS FAECALIS (PREDOMINATING)  Continue gentle IV fluids with D5 and potassium  Magnesium within normal limits  Replace potassium aggressively, K is still low  Follow BMP closely  Urology following, s/p CYSTOSCOPY LEFT URETEROSCOPY WITH HOLMIUM LASER LITHROTRIPSY AND STENT PLACEMENT. Removed at bedside. -PT/OT eval is appreciated   -Patient complaining of hip pain, obtain x-rays bilateral to rule out fractures  Consult Ortho/neurosurgery for compression fracture. Continue with conservative management   Aticipated discharge AM.       Hypocalcemia  - s/p 1 g of calcium gluconate      Hypertension  CAD  GERD  HLD  Depression wi th anxiety  Resume home medications    PreDM  -A1c 5% on 2022  -episodes of hypoglycemia episodes  -cont D5     History of dementia    18.5 - 24.9 Normal weight / Body mass index is 19.64 kg/m². Estimated discharge date:   Barriers: Clinical improvement    Code status: Full  Prophylaxis: SCD's  Recommended Disposition:  TBD     Subjective:   Patient was seen and examined. No acute events overnight. Discussed with RN overnight events. All patient's questions were answered.       \"Feeling much better\"    Review of Systems:  Symptom Y/N Comments  Symptom Y/N Comments   Fever/Chills n   Chest Pain n    Poor Appetite    Edema     Cough n   Abdominal Pain n    Sputum    Joint Pain y Hip, b/l   SOB/MOORE n   Pruritis/Rash     Nausea/vomit n   Tolerating PT/OT     Diarrhea    Tolerating Diet y Constipation    Other       Could NOT obtain due to:          Objective:     VITALS:   Last 24hrs VS reviewed since prior progress note. Most recent are:  Patient Vitals for the past 24 hrs:   Temp Pulse Resp BP SpO2   07/22/22 1133 97.6 °F (36.4 °C) 66 16 (!) 176/83 100 %   07/22/22 0852 98.2 °F (36.8 °C) 62 16 109/62 100 %   07/22/22 0150 98 °F (36.7 °C) 65 19 118/76 99 %   07/21/22 2239 97.8 °F (36.6 °C) 62 18 121/72 98 %   07/21/22 1946 97.6 °F (36.4 °C) (!) 59 17 119/69 95 %   07/21/22 1700 97.2 °F (36.2 °C) 65 16 120/68 93 %   07/21/22 1600 -- 61 -- -- --     No intake or output data in the 24 hours ending 07/22/22 1422     I had a face to face encounter and independently examined this patient on 7/22/2022, as outlined below:  PHYSICAL EXAM:  General: WD, WN. Alert, cooperative, no acute distress    EENT:  EOMI. Anicteric sclerae. MMM  Resp:  CTA bilaterally, no wheezing or rales. No accessory muscle use  CV:  Regular  rhythm,  No edema  GI:  Soft, Non distended, Non tender. +Bowel sounds  Neurologic:  Alert and oriented X 2 to place and time. Following all commands, moving 4 extremities, pupils are reactive bilaterally, sensations are intact, strength 5/5 all over  Psych:   Good insight. Not anxious nor agitated  Skin:  No rashes. No jaundice    Reviewed most current lab test results and cultures  YES  Reviewed most current radiology test results   YES  Review and summation of old records today    NO  Reviewed patient's current orders and MAR    YES  PMH/SH reviewed - no change compared to H&P  ________________________________________________________________________  Care Plan discussed with:    Comments   Patient X    Family      RN X    Care Manager     Consultant                        Multidiciplinary team rounds were held today with , nursing, pharmacist and clinical coordinator. Patient's plan of care was discussed; medications were reviewed and discharge planning was addressed. ________________________________________________________________________        Comments   >50% of visit spent in counseling and coordination of care     ________________________________________________________________________  Chacha Wade MD     Procedures: see electronic medical records for all procedures/Xrays and details which were not copied into this note but were reviewed prior to creation of Plan. LABS:  I reviewed today's most current labs and imaging studies.   Pertinent labs include:  Recent Labs     07/22/22  0349 07/21/22  0330 07/20/22  0245   WBC 3.7 4.3 3.9   HGB 9.9* 10.2* 10.6*   HCT 31.6* 32.7* 33.6*    222 183     Recent Labs     07/22/22  0349 07/21/22  0330 07/20/22  0245    136 140   K 3.0* 3.1* 3.3*    102 105   CO2 30 30 31   GLU 87 126* 108*   BUN 10 11 10   CREA 0.67 0.67 0.58   CA 8.5 8.4* 8.3*   ALB 2.5* 2.5* 2.6*   TBILI 0.6 0.4 0.4   ALT 8* 9* 10*       Signed: Chacha Wade MD

## 2022-07-22 NOTE — PROGRESS NOTES
Problem: Falls - Risk of  Goal: *Absence of Falls  Description: Document Brenda Brar Fall Risk and appropriate interventions in the flowsheet.   Outcome: Progressing Towards Goal  Note: Fall Risk Interventions:       Mentation Interventions: Bed/chair exit alarm    Medication Interventions: Bed/chair exit alarm    Elimination Interventions: Call light in reach

## 2022-07-22 NOTE — PROGRESS NOTES
Spiritual Care Assessment/Progress Note  [facility]      NAME: Brittney Hope      MRN: [id number]  AGE: 68 y.o.  SEX: female  Mormonism Affiliation: Zoroastrian   Language: English     7/22/2022     Total Time (in minutes): 14     Spiritual Assessment begun in [facility] 2 MED TELE through conversation with:         [x]Patient        [] Family    [] Friend(s)        Reason for Consult: Initial/Spiritual assessment, patient floor     Spiritual beliefs: (Please include comment if needed)     [x] Identifies with a hossein tradition:         [] Supported by a hossein community:            [] Claims no spiritual orientation:           [] Seeking spiritual identity:                [] Adheres to an individual form of spirituality:           [] Not able to assess:                           Identified resources for coping:      [] Prayer                               [] Music                  [] Guided Imagery     [x] Family/friends                 [] Pet visits     [] Devotional reading                         [] Unknown     [] Other:                                                Interventions offered during this visit: (See comments for more details)    Patient Interventions: Affirmation of emotions/emotional suffering, Coping skills reviewed/reinforced, Initial/Spiritual assessment, patient floor, Normalization of emotional/spiritual concerns, Life review/legacy, Prayer (assurance of), Mormonism beliefs/image of God discussed, Integration of medical assessment with existing values and beliefs, Catharsis/review of pertinent events in supportive environment, Iconic (affirming the presence of God/Higher Power), Affirmation of hossein           Plan of Care:     [] Support spiritual and/or cultural needs    [] Support AMD and/or advance care planning process      [] Support grieving process   [] Coordinate Rites and/or Rituals    [] Coordination with community clergy   [] No spiritual needs identified at this time   [] Detailed Plan of Care below (See Comments)  [] Make referral to Music Therapy  [] Make referral to Pet Therapy     [] Make referral to Addiction services  [] Make referral to [facility]  [] Make referral to Spiritual Care Partner  [] No future visits requested        [x] Contact Spiritual Care for further referrals     Comments:  Patient, Miss Hilary Nowak, was in bed and appeared distressed when  visited in 2174 for initial spiritual assessment. Patient received  kindly and engaged in conversation. She stated that in the past two years she has been hospitalized several time. She was tired with having to constantly deal with her medical condition.  learned that hossein and family are important coping resources. She shared that she had 7 children but lost three of them.  listened with empathy and encouraged focus on the present. She expressed no need when  enquired how he may support. Patient thanked  for the visit. Visited by: Jaden Ayala.    Paging Service: [phone])

## 2022-07-23 NOTE — PROGRESS NOTES
Patient alert and responsive bedside report given to NAVOS RN no distress during shift change will continue to assess

## 2022-07-23 NOTE — PROGRESS NOTES
Transition of Care Plan:     RUR:  19%   Disposition:  Home with PACE program  Kinza Nunez  [phone], Member #[id number]  Follow up appointments: [facility] schedules  DME needed: Pt has a w/c, hospital bed, & shower bench. Transportation at Discharge:  Pt will transported by medical transport   Ebb Lien or means to access home:      yes  IM Medicare Letter: 7/23/2022  Is patient a BCPI-A Bundle:        n/a              If yes, was Bundle Letter given?:    Is patient a  and connected with the [facility]?  n/a              If yes, was [employer] transfer form completed and [facility] notified? Caregiver Contact:Maral Parsons [phone]  Discharge Caregiver contacted prior to discharge? Yes  Care Conference needed?:  No    12:21pm-No further CM needs identified. CM notified pt's nurse of d/c.    12:11pm-CM set up medical transport with [facility] via [employer].  time 3:00pm. [facility] paperwork placed in pt's chart. 11:23am- CM called pt's daughter Paula Newsome via phone to discuss d/c plan. CM informed pt's daughter about d/c. Pt's daughter informed CM to arrange medical transport. CM reviewed with pt's daughter the 2nd IM Letter. Care Management Interventions  PCP Verified by CM: Yes  Last Visit to PCP: 07/05/22  Palliative Care Criteria Met (RRAT>21 & CHF Dx)?: No  Mode of Transport at Discharge: Self (Pt needs medical transport at d/c. )  Transition of Care Consult (CM Consult):  Other (Home with outpatient services.)  MyChart Signup: No  Discharge Durable Medical Equipment: No  Physical Therapy Consult: Yes  Occupational Therapy Consult: Yes  Speech Therapy Consult: Yes  Support Systems: Child(jim)  Confirm Follow Up Transport: Family  The Patient and/or Patient Representative was Provided with a Choice of Provider and Agrees with the Discharge Plan?: No  Freedom of Choice List was Provided with Basic Dialogue that Supports the Patient's Individualized Plan of Care/Goals, Treatment Preferences and Shares the Quality Data Associated with the Providers?: No   Resource Information Provided?: No  Discharge Location  Patient Expects to be Discharged to[de-identified] Home with outpatient services (Home with outpatient services. )    Kelsea Penaloza, [facility]  [phone]

## 2022-07-23 NOTE — DISCHARGE SUMMARY
Hospitalist Discharge Summary     Patient ID:  Rik Gutierrez  [id number]  23 y.o.  1945 7/18/2022    PCP on record: Juan Daniel Gonzalez MD    Admit date: 7/18/2022  Discharge date and time: 7/23/2022    DISCHARGE DIAGNOSIS:    Complicated UTI  History of left ureteral stone status post stent placement. Removed 7/22. Cultures ENTEROCOCCUS FAECALIS (PREDOMINATING)  Hypokalemia. Hypertension  CAD  GERD  HLD  Depression wi th anxiety  PreDM  History of dementia        CONSULTATIONS:  IP CONSULT TO HOSPITALIST  IP CONSULT TO ORTHOPEDIC SURGERY  IP CONSULT TO NEUROSURGERY    Excerpted HPI from H&P of Lorie Dumont MD:    Annabelle Pendleton is a 68 y.o.  female with PMHx significant for hypertension, chronic constipation, hyperlipidemia, history of urethral stone/UTI status post stent 3 weeks ago presents to [address] [place] [address] for an outpatient procedure/L CRULS with urology however was found to have severe hypokalemia to safely anesthetize today. Hospitalist was asked to evaluate patient for admission with plans to proceed with procedure tomorrow. Patient's daughter was at bedside who provided history. Patient was seen in the ER for abdominal pain this past weekend and was treated for constipation. UA was checked. Patient was discharged on oral bowel regiment and as per family she has been having multiple episodes of soft/loose stool per day. Patient was seen in preop holding, denies any abdominal pain, CP, SOB, N/V/D.    ______________________________________________________________________  DISCHARGE SUMMARY/HOSPITAL COURSE:  for full details see H&P, daily progress notes, labs, consult notes. 68 y.o.    female with PMHx significant for hypertension, chronic constipation, hyperlipidemia, history of urethral stone/UTI status post stent 3 weeks ago presents to [id number] [place] [address] for an outpatient procedure/L CRULS with urology however was found to have severe hypokalemia and UTI.    Hypokalemia, K 2.4 on 7/15. Replaced. Complicated UTI  History of left ureteral stone status post stent placement  Urine culture from 7/15 growing possible Enterococcus, had been on ceftin outpt. Empiric vancomycin and IV rocephin on admission, changed to nitrofurantion for 5 days. Repeat UA with bacteriuria, significant pyuria and hematuria  Cultures ENTEROCOCCUS FAECALIS (PREDOMINATING)  Continue gentle IV fluids with D5 and potassium given. Magnesium within normal limits  Urology following, s/p CYSTOSCOPY LEFT URETEROSCOPY WITH HOLMIUM LASER LITHROTRIPSY AND STENT PLACEMENT. Removed at bedside 7/22  -PT/OT eval is appreciated  -Patient complaining of hip pain, obtain x-rays bilateral to rule out fractures      Consult Ortho/neurosurgery for compression fracture. Continue with conservative management        Hypocalcemia  - s/p 1 g of calcium gluconate 07/19     Hypertension  CAD  GERD  HLD  Depression wi th anxiety  Resume home medications     PreDM  -A1c 5% on 01/2022  -episodes of hypoglycemia episodes  -cont D5     History of dementia     18.5 - 24.9 Normal weight / Body mass index is 19.64 kg/m².           _______________________________________________________________________  Patient seen and examined by me on discharge day. Pertinent Findings:  Gen:    Not in distress  Chest: Clear lungs  CVS:   Regular rhythm. No edema  Abd:  Soft, not distended, not tender  Neuro:  Alert,   _______________________________________________________________________  DISCHARGE MEDICATIONS:   Current Discharge Medication List        START taking these medications    Details   nitrofurantoin (MACRODANTIN) 100 mg capsule Take 1 Capsule by mouth two (2) times a day for 5 days. Qty: 10 Capsule, Refills: 0  Start date: 7/23/2022, End date: 7/28/2022      potassium chloride (K-DUR, KLOR-CON M20) 20 mEq tablet Take 1 Tablet by mouth in the morning for 5 days.   Qty: 5 Tablet, Refills: 0  Start date: 7/23/2022, End date: 7/28/2022           CONTINUE these medications which have NOT CHANGED    Details   metoprolol succinate (TOPROL-XL) 25 mg XL tablet Take 25 mg by mouth daily. buspirone HCl (BUSPAR PO) Take 5 mg by mouth daily. ergocalciferol (Vitamin D2) 1,250 mcg (50,000 unit) capsule Take 50,000 Units by mouth every seven (7) days. senna-docusate (PERICOLACE) 8.6-50 mg per tablet Take 1 Tablet by mouth two (2) times a day. Qty: 60 Tablet, Refills: 0      bisacodyL (Dulcolax, bisacodyl,) 10 mg supp Insert 10 mg into rectum daily as needed for Constipation (To promote 3-4 bowel movements weekly). Qty: 30 Suppository, Refills: 0      polyethylene glycol (MIRALAX) 17 gram packet Take 1 Packet by mouth two (2) times a day. Qty: 60 Packet, Refills: 0      sertraline (Zoloft) 100 mg tablet Take 100 mg by mouth daily. mirabegron ER (MYRBETRIQ) 50 mg ER tablet Take 50 mg by mouth daily. acetaminophen (TYLENOL) 500 mg tablet Take 1,000 mg by mouth every six (6) hours as needed for Pain. aspirin 81 mg chewable tablet Take 1 Tab by mouth daily. Qty: 30 Tab, Refills: 0      ezetimibe (ZETIA) 10 mg tablet Take 1 Tab by mouth daily. Qty: 30 Tab, Refills: 0      amLODIPine (NORVASC) 2.5 mg tablet Take 2.5 mg by mouth daily. hyoscyamine SL (LEVSIN/SL) 0.125 mg SL tablet 1 Tablet by SubLINGual route every six (6) hours as needed for Cramping. Qty: 20 Tablet, Refills: 0           STOP taking these medications       cefUROXime (CEFTIN) 250 mg tablet Comments:   Reason for Stopping:                 Patient Follow Up Instructions: Activity: Activity as tolerated  Diet: Dysphagia diet-pureed  Wound Care: None needed    Follow-up with PCP in 1 week. Follow-up tests/labs BMP in one week.      Follow-up Information       Follow up With Specialties Details Why Rambo karla [facility] Go on 9/7/2022  [facility] [address]   [id number]  [address] [facility] Go on 9/7/2022 Dr Dash Zamora at 1pm follow up at [facility] office [address] [facility]    [address]  [phone]            ________________________________________________________________    Risk of deterioration: Moderate    Condition at Discharge:  Stable  __________________________________________________________________    Disposition  Home with family and home health services    ____________________________________________________________________    Code Status: Full Code  ___________________________________________________________________      Total time in minutes spent coordinating this discharge (includes going over instructions, follow-up, prescriptions, and preparing report for sign off to her PCP) :  >30 minutes    Signed:  Tita Shankar MD

## 2022-07-24 NOTE — DISCHARGE INSTRUCTIONS
Thank You! It was a pleasure taking care of you in our Emergency Department today. We know that when you come to [facility], you are entrusting us with your health, comfort, and safety. Our physicians and nurses honor that trust, and truly appreciate the opportunity to care for you and your loved ones. We also value your feedback. If you receive a survey about your Emergency Department experience today, please fill it out. We care about our patients' feedback, and we listen to what you have to say. Thank you. Dr. Annemarie Lancaster M.D.      ________________________________________________________________________  I have included a copy of your lab results and/or radiologic studies from today's visit so you can have them easily available at your follow-up visit. We hope you feel better and please do not hesitate to contact the ED if you have any questions at all!     Recent Results (from the past 12 hour(s))   GLUCOSE, POC    Collection Time: 07/23/22 10:55 AM   Result Value Ref Range    Glucose (POC) 97 65 - 117 mg/dL    Performed by Adelia Tovar ([facility])    EKG, 12 LEAD, INITIAL    Collection Time: 07/23/22  7:22 PM   Result Value Ref Range    Ventricular Rate 75 BPM    Atrial Rate 74 BPM    QRS Duration 98 ms    Q-T Interval 404 ms    QTC Calculation (Bezet) 451 ms    Calculated R Axis 29 degrees    Diagnosis       Accelerated Junctional rhythm with occasional premature ventricular complexes  Nonspecific T wave abnormality  When compared with ECG of 27-MAY-2022 09:52,  Junctional rhythm has replaced Sinus rhythm  Minimal criteria for Inferior infarct are no longer present  Nonspecific T wave abnormality, improved in Lateral leads     CBC WITH AUTOMATED DIFF    Collection Time: 07/23/22  7:40 PM   Result Value Ref Range    WBC 3.3 (L) 3.6 - 11.0 K/uL    RBC 3.91 3.80 - 5.20 M/uL    HGB 10.6 (L) 11.5 - 16.0 g/dL    HCT 33.1 (L) 35.0 - 47.0 %    MCV 84.7 80.0 - 99.0 FL    MCH 27.1 26.0 - 34.0 PG    MCHC 32.0 30.0 - 36.5 g/dL    RDW 13.5 11.5 - 14.5 %    PLATELET 789 448 - 706 K/uL    MPV 10.3 8.9 - 12.9 FL    NRBC 0.0 0  WBC    ABSOLUTE NRBC 0.00 0.00 - 0.01 K/uL    NEUTROPHILS 58 32 - 75 %    LYMPHOCYTES 29 12 - 49 %    MONOCYTES 10 5 - 13 %    EOSINOPHILS 2 0 - 7 %    BASOPHILS 1 0 - 1 %    IMMATURE GRANULOCYTES 0 0.0 - 0.5 %    ABS. NEUTROPHILS 1.9 1.8 - 8.0 K/UL    ABS. LYMPHOCYTES 1.0 0.8 - 3.5 K/UL    ABS. MONOCYTES 0.3 0.0 - 1.0 K/UL    ABS. EOSINOPHILS 0.1 0.0 - 0.4 K/UL    ABS. BASOPHILS 0.0 0.0 - 0.1 K/UL    ABS. IMM. GRANS. 0.0 0.00 - 0.04 K/UL    DF AUTOMATED     METABOLIC PANEL, COMPREHENSIVE    Collection Time: 07/23/22  7:40 PM   Result Value Ref Range    Sodium 137 136 - 145 mmol/L    Potassium 3.1 (L) 3.5 - 5.1 mmol/L    Chloride 104 97 - 108 mmol/L    CO2 27 21 - 32 mmol/L    Anion gap 6 5 - 15 mmol/L    Glucose 90 65 - 100 mg/dL    BUN 10 6 - 20 MG/DL    Creatinine 0.71 0.55 - 1.02 MG/DL    BUN/Creatinine ratio 14 12 - 20      GFR est AA >60 >60 ml/min/1.73m2    GFR est non-AA >60 >60 ml/min/1.73m2    Calcium 8.6 8.5 - 10.1 MG/DL    Bilirubin, total 0.5 0.2 - 1.0 MG/DL    ALT (SGPT) 9 (L) 12 - 78 U/L    AST (SGOT) 18 15 - 37 U/L    Alk. phosphatase 62 45 - 117 U/L    Protein, total 6.5 6.4 - 8.2 g/dL    Albumin 2.7 (L) 3.5 - 5.0 g/dL    Globulin 3.8 2.0 - 4.0 g/dL    A-G Ratio 0.7 (L) 1.1 - 2.2     TROPONIN-HIGH SENSITIVITY    Collection Time: 07/23/22  7:40 PM   Result Value Ref Range    Troponin-High Sensitivity 27 0 - 51 ng/L   NT-PRO BNP    Collection Time: 07/23/22  7:40 PM   Result Value Ref Range    NT pro-BNP 3,198 (H) <450 PG/ML   COVID-19 RAPID TEST    Collection Time: 07/23/22  8:18 PM   Result Value Ref Range    Specimen source Nasopharyngeal      COVID-19 rapid test Not detected NOTD         XR CHEST PORT   Final Result   No evidence of acute cardiopulmonary process. No significant change   from the prior study.             CT Results  (Last 48 hours)      None          The exam and treatment you received in the Emergency Department were for an urgent problem and are not intended as complete care. It is important that you follow up with a doctor, nurse practitioner, or physician assistant for ongoing care. If your symptoms become worse or you do not improve as expected and you are unable to reach your usual health care provider, you should return to the Emergency Department. We are available 24 hours a day. Please take your discharge instructions with you when you go to your follow-up appointment. If a prescription has been provided, please have it filled as soon as possible to prevent a delay in treatment. Read the entire medication instruction sheet provided to you by the pharmacy. If you have any questions or reservations about taking the medication due to side effects or interactions with other medications, please call your primary care physician or contact the ER to speak with the charge nurse. Please make an appointment with your family doctor or the physician you were referred to for follow-up of this visit as instructed on your discharge paperwork. Return to the ER if you are unable to be seen or if you are unable to be seen in a timely manner. Should you experience abdominal pain lasting greater than 6 hours, chest pain, difficulty breathing, fever/chills, numbness/tingling, skin changes or other symptoms that concern you, return to the ED sooner. If you feel worse over the next 24 hours, please return to the ED. We are available 24 hours a day. Thank you for trusting us with your care!

## 2022-07-24 NOTE — ED PROVIDER NOTES
EMERGENCY DEPARTMENT HISTORY AND PHYSICAL EXAM    Please note that this dictation was completed with the assistance of \"Dragon\", the computer voice recognition software. Quite often unanticipated grammatical, syntax, homophones, and other interpretive errors are inadvertently transcribed by the computer software. Please disregard these errors and any errors that have escaped final proofreading. Thank you. Date: 07/24/22  Patient: Evelyn Herrera  Patient Age and Sex: 68 y.o. female   MRN: [id number]  CSN: [id number]    History of Presenting Illness     Chief Complaint   Patient presents with    O2/Oxygen     Patient discharged today from inpatient unit to home and once home EMS called for AMS. Sats found to be 82% on RA. Patient placed on 4L with improved mentation. History Provided By: Patient/family/EMS (if applicable)    HPI: Evelyn Herrera, 68 y.o. female with past medical history as documented below presents to the ED with c/o of hypoxia while en route home from the hospital. Pt was admitted 7/68-8/92 for complicated UTI secondary to a left ureteral stone status post stent placement. Patient was also being treated for hypokalemia and hypertension. Patient was discharged home with Macrobid after being treated with empiric antibiotics. Pt is able to answer simple questions though she does have dementia. Per family, she's at her baseline. Pt denies any other exacerbating or ameliorating factors. Additionally, pt specifically denies any recent fever, chills, headache, nausea, vomiting, abdominal pain, CP, lightheadedness, dizziness, numbness, weakness, lower extremity swelling, heart palpitations, urinary sxs, diarrhea, constipation, melena, hematochezia, cough, or congestion. History limited due to dementia. There are no other complaints, changes or physical findings pertinent to the HPI at this time.     PCP: Nubia Ventura MD  Past History   Past Medical History:  Past Medical History: Diagnosis Date    Agoraphobia without mention of panic attacks 2/17/2014    Anxiety disorder 8/18/2013    Arthritis     osteo    CAD (coronary artery disease), native coronary artery 12/1/2015    no stents    Chronic chest pain 1/13/2014    Chronic pain associated with significant psychosocial dysfunction 2/17/2014    Dementia ([facility] [address]     Depression 8/18/2013    Diabetes ([facility]     type II    Duplicated right renal collecting system 3/13/2014    GERD (gastroesophageal reflux disease)     Gout, joint     Hypercholesteremia     hyercholesterolemia    Hypertension     Murmur     Nephrolithiasis 3/13/2014    Personal history of noncompliance with medical treatment, presenting hazards to health 5/30/2014       Past Surgical History:  Past Surgical History:   Procedure Laterality Date    EGD  4/23/2010         HX CHOLECYSTECTOMY  09/20/2018    lap jesus    HX CYST REMOVAL      cyst removed from left wrist    HX HEART CATHETERIZATION  12/01/2015    HX HYSTERECTOMY      partial    HX OTHER SURGICAL      bladder dilitation    HX TUBAL LIGATION      HX UROLOGICAL      kidney stones    UPPER GI ENDOSCOPY,BALL DIL,30MM  5/31/2022            Family History:   Family history reviewed and was non-contributory, unless specified below:  Family History   Problem Relation Age of Onset    Stroke Mother     Heart Disease Mother     Cancer Father         type unknown    Heart Disease Son     Liver Disease Son     Heart Disease Daughter     Malignant Hyperthermia Neg Hx     Pseudocholinesterase Deficiency Neg Hx     Delayed Awakening Neg Hx     Post-op Nausea/Vomiting Neg Hx     Emergence Delirium Neg Hx     Post-op Cognitive Dysfunction Neg Hx     Other Neg Hx        Social History:  Social History     Tobacco Use    Smoking status: Never    Smokeless tobacco: Never   Vaping Use    Vaping Use: Never used   Substance Use Topics    Alcohol use: No    Drug use: No       Allergies:   Allergies   Allergen Reactions    Amoxicillin Hives    Sulfa (Sulfonamide Antibiotics) Hives and Itching    Mirtazapine Itching and Nausea Only     Funny feeling in chest    Percocet [Oxycodone-Acetaminophen] Nausea and Vomiting    Codeine Nausea and Vomiting    Crestor [Rosuvastatin] Other (comments)     myalgias    Nitroglycerin Unknown (comments)     Patient cannot remember why she is allergic to it      Prednisone Itching    Zithromax [Azithromycin] Itching     Not sure what it does,taken long time ago       Current Medications:  Current Facility-Administered Medications on File Prior to Encounter   Medication Dose Route Frequency Provider Last Rate Last Admin    [COMPLETED] potassium chloride (K-DUR, KLOR-CON M20) SR tablet 20 mEq  20 mEq Oral NOW El Beltran MD   20 mEq at 07/23/22 7683    [DISCONTINUED] glucose chewable tablet 16 g  4 Tablet Oral PRN El Beltran MD        [DISCONTINUED] glucagon (GLUCAGEN) injection 1 mg  1 mg IntraMUSCular PRN El Beltran MD        [DISCONTINUED] dextrose 10% infusion 0-250 mL  0-250 mL IntraVENous PRN El Beltran MD        [DISCONTINUED] insulin glargine (LANTUS) injection 10 Units  10 Units SubCUTAneous QHS El Beltran MD        [DISCONTINUED] vancomycin (VANCOCIN) 650 mg in 0.9% sodium chloride 250 mL IVPB  650 mg IntraVENous Q18H El Beltran  mL/hr at 07/22/22 2335 650 mg at 07/22/22 2335    [DISCONTINUED] dextrose 5% with KCl 20 mEq/L infusion   IntraVENous CONTINUOUS Prema Cheng MD   Stopped at 07/23/22 1455    [DISCONTINUED] lactated Ringers infusion  25 mL/hr IntraVENous CONTINUOUS Carter Villeda MD   Stopped at 07/23/22 1455    [DISCONTINUED] alcohol 62% (NOZIN) nasal  1 Ampule  1 Ampule Topical Q12H Carter Villeda MD   1 Ampule at 07/22/22 2206    [DISCONTINUED] hydrALAZINE (APRESOLINE) 20 mg/mL injection 10 mg  10 mg IntraVENous Q6H PRN Prema Cheng MD   10 mg at 07/22/22 1520    [DISCONTINUED] sodium chloride (NS) flush 5-40 mL  5-40 mL IntraVENous Q8H Tana Daniel MD   10 mL at 07/23/22 0541    [DISCONTINUED] sodium chloride (NS) flush 5-40 mL  5-40 mL IntraVENous PRN Barbara Boothe MD        [DISCONTINUED] acetaminophen (TYLENOL) tablet 650 mg  650 mg Oral Q6H PRN Barbara Boothe MD   650 mg at 07/22/22 2206    [DISCONTINUED] acetaminophen (TYLENOL) suppository 650 mg  650 mg Rectal Q6H PRN Barbara Boothe MD        [DISCONTINUED] polyethylene glycol (MIRALAX) packet 17 g  17 g Oral DAILY PRN Barbara Boothe MD        [DISCONTINUED] ondansetron (ZOFRAN ODT) tablet 4 mg  4 mg Oral Q8H PRN Barbara Boothe MD        [DISCONTINUED] ondansetron [facility]) injection 4 mg  4 mg IntraVENous Q6H PRN Barbara Boothe MD   4 mg at 07/20/22 2235    [DISCONTINUED] amLODIPine (NORVASC) tablet 2.5 mg  2.5 mg Oral DAILY Barbara Boothe MD   2.5 mg at 07/23/22 5626    [DISCONTINUED] busPIRone (BUSPAR) tablet 5 mg  5 mg Oral DAILY Barbara Boothe MD   5 mg at 07/23/22 1063    [DISCONTINUED] metoprolol succinate (TOPROL-XL) XL tablet 25 mg  25 mg Oral DAILY Barbara Boothe MD   25 mg at 07/23/22 1094    [DISCONTINUED] trospium (SANCTURA) tablet 20 mg  20 mg Oral DAILY Barbara Boothe MD   20 mg at 07/23/22 2181    [DISCONTINUED] polyethylene glycol (MIRALAX) packet 17 g  17 g Oral BID Barbara Boothe MD        [DISCONTINUED] sertraline (ZOLOFT) tablet 100 mg  100 mg Oral DAILY Barbara Boothe MD   100 mg at 07/23/22 9043    [DISCONTINUED] ezetimibe (ZETIA) tablet 10 mg  10 mg Oral DAILY Barbara Boothe MD   10 mg at 07/23/22 0734    [DISCONTINUED] traZODone (DESYREL) tablet 50 mg  50 mg Oral QHS PRN Barbara Boothe MD        [DISCONTINUED] oxyCODONE IR (ROXICODONE) tablet 5 mg  5 mg Oral Q6H PRN Barbara Boothe MD   5 mg at 07/22/22 1520    [DISCONTINUED] morphine injection 1 mg  1 mg IntraVENous Q4H PRN Barbara Boothe MD   1 mg at 07/20/22 0501     Current Outpatient Medications on File Prior to Encounter   Medication Sig Dispense Refill    nitrofurantoin (MACRODANTIN) 100 mg capsule Take 1 Capsule by mouth two (2) times a day for 5 days.  10 Capsule 0    potassium chloride (K-DUR, KLOR-CON M20) 20 mEq tablet Take 1 Tablet by mouth in the morning for 5 days. 5 Tablet 0    hyoscyamine SL (LEVSIN/SL) 0.125 mg SL tablet 1 Tablet by SubLINGual route every six (6) hours as needed for Cramping. 20 Tablet 0    metoprolol succinate (TOPROL-XL) 25 mg XL tablet Take 25 mg by mouth daily. buspirone HCl (BUSPAR PO) Take 5 mg by mouth daily. ergocalciferol (Vitamin D2) 1,250 mcg (50,000 unit) capsule Take 50,000 Units by mouth every seven (7) days. senna-docusate (PERICOLACE) 8.6-50 mg per tablet Take 1 Tablet by mouth two (2) times a day. 60 Tablet 0    bisacodyL (Dulcolax, bisacodyl,) 10 mg supp Insert 10 mg into rectum daily as needed for Constipation (To promote 3-4 bowel movements weekly). 30 Suppository 0    polyethylene glycol (MIRALAX) 17 gram packet Take 1 Packet by mouth two (2) times a day. 60 Packet 0    sertraline (Zoloft) 100 mg tablet Take 100 mg by mouth daily. mirabegron ER (MYRBETRIQ) 50 mg ER tablet Take 50 mg by mouth daily. acetaminophen (TYLENOL) 500 mg tablet Take 1,000 mg by mouth every six (6) hours as needed for Pain. aspirin 81 mg chewable tablet Take 1 Tab by mouth daily. 30 Tab 0    ezetimibe (ZETIA) 10 mg tablet Take 1 Tab by mouth daily. 30 Tab 0    amLODIPine (NORVASC) 2.5 mg tablet Take 2.5 mg by mouth daily. Review of Systems   A complete ROS was reviewed by me today and all other systems negative, unless otherwise specified below:  Review of Systems   Unable to perform ROS: Dementia   Physical Exam   Physical Exam  Vitals and nursing note reviewed. Constitutional:       General: She is not in acute distress. Appearance: She is well-developed. She is not diaphoretic. HENT:      Head: Normocephalic and atraumatic. Mouth/Throat:      Pharynx: No oropharyngeal exudate. Eyes:      Conjunctiva/sclera: Conjunctivae normal.      Pupils: Pupils are equal, round, and reactive to light.    Cardiovascular:      Rate and Rhythm: Normal rate and regular rhythm. Heart sounds: Normal heart sounds. No murmur heard. No friction rub. No gallop. Pulmonary:      Effort: Pulmonary effort is normal. No respiratory distress. Breath sounds: Normal breath sounds. No wheezing or rales. Chest:      Chest wall: No tenderness. Abdominal:      General: Bowel sounds are normal. There is no distension. Palpations: Abdomen is soft. There is no mass. Tenderness: There is no abdominal tenderness. There is no guarding or rebound. Musculoskeletal:         General: No tenderness or deformity. Normal range of motion. Cervical back: Normal range of motion. Skin:     General: Skin is warm. Findings: No rash. Neurological:      Mental Status: She is alert. Mental status is at baseline. Cranial Nerves: No cranial nerve deficit. Motor: No abnormal muscle tone. Coordination: Coordination normal.      Deep Tendon Reflexes: Reflexes normal.     Diagnostic Study Results     Laboratory Data  I have personally reviewed and interpreted all available laboratory results.    Recent Results (from the past 24 hour(s))   GLUCOSE, POC    Collection Time: 07/23/22  7:34 AM   Result Value Ref Range    Glucose (POC) 96 65 - 117 mg/dL    Performed by Jennifer Huff (PCT)    GLUCOSE, POC    Collection Time: 07/23/22 10:55 AM   Result Value Ref Range    Glucose (POC) 97 65 - 117 mg/dL    Performed by Jennifer Huff (PCT)    EKG, 12 LEAD, INITIAL    Collection Time: 07/23/22  7:22 PM   Result Value Ref Range    Ventricular Rate 75 BPM    Atrial Rate 74 BPM    QRS Duration 98 ms    Q-T Interval 404 ms    QTC Calculation (Bezet) 451 ms    Calculated R Axis 29 degrees    Diagnosis       Accelerated Junctional rhythm with occasional premature ventricular complexes  Nonspecific T wave abnormality  When compared with ECG of 27-MAY-2022 09:52,  Junctional rhythm has replaced Sinus rhythm  Minimal criteria for Inferior infarct are no longer present  Nonspecific T wave abnormality, improved in Lateral leads     CBC WITH AUTOMATED DIFF    Collection Time: 07/23/22  7:40 PM   Result Value Ref Range    WBC 3.3 (L) 3.6 - 11.0 K/uL    RBC 3.91 3.80 - 5.20 M/uL    HGB 10.6 (L) 11.5 - 16.0 g/dL    HCT 33.1 (L) 35.0 - 47.0 %    MCV 84.7 80.0 - 99.0 FL    MCH 27.1 26.0 - 34.0 PG    MCHC 32.0 30.0 - 36.5 g/dL    RDW 13.5 11.5 - 14.5 %    PLATELET 412 869 - 157 K/uL    MPV 10.3 8.9 - 12.9 FL    NRBC 0.0 0  WBC    ABSOLUTE NRBC 0.00 0.00 - 0.01 K/uL    NEUTROPHILS 58 32 - 75 %    LYMPHOCYTES 29 12 - 49 %    MONOCYTES 10 5 - 13 %    EOSINOPHILS 2 0 - 7 %    BASOPHILS 1 0 - 1 %    IMMATURE GRANULOCYTES 0 0.0 - 0.5 %    ABS. NEUTROPHILS 1.9 1.8 - 8.0 K/UL    ABS. LYMPHOCYTES 1.0 0.8 - 3.5 K/UL    ABS. MONOCYTES 0.3 0.0 - 1.0 K/UL    ABS. EOSINOPHILS 0.1 0.0 - 0.4 K/UL    ABS. BASOPHILS 0.0 0.0 - 0.1 K/UL    ABS. IMM. GRANS. 0.0 0.00 - 0.04 K/UL    DF AUTOMATED     METABOLIC PANEL, COMPREHENSIVE    Collection Time: 07/23/22  7:40 PM   Result Value Ref Range    Sodium 137 136 - 145 mmol/L    Potassium 3.1 (L) 3.5 - 5.1 mmol/L    Chloride 104 97 - 108 mmol/L    CO2 27 21 - 32 mmol/L    Anion gap 6 5 - 15 mmol/L    Glucose 90 65 - 100 mg/dL    BUN 10 6 - 20 MG/DL    Creatinine 0.71 0.55 - 1.02 MG/DL    BUN/Creatinine ratio 14 12 - 20      GFR est AA >60 >60 ml/min/1.73m2    GFR est non-AA >60 >60 ml/min/1.73m2    Calcium 8.6 8.5 - 10.1 MG/DL    Bilirubin, total 0.5 0.2 - 1.0 MG/DL    ALT (SGPT) 9 (L) 12 - 78 U/L    AST (SGOT) 18 15 - 37 U/L    Alk.  phosphatase 62 45 - 117 U/L    Protein, total 6.5 6.4 - 8.2 g/dL    Albumin 2.7 (L) 3.5 - 5.0 g/dL    Globulin 3.8 2.0 - 4.0 g/dL    A-G Ratio 0.7 (L) 1.1 - 2.2     TROPONIN-HIGH SENSITIVITY    Collection Time: 07/23/22  7:40 PM   Result Value Ref Range    Troponin-High Sensitivity 27 0 - 51 ng/L   NT-PRO BNP    Collection Time: 07/23/22  7:40 PM   Result Value Ref Range    NT pro-BNP 3,198 (H) <450 PG/ML COVID-19 RAPID TEST    Collection Time: 07/23/22  8:18 PM   Result Value Ref Range    Specimen source Nasopharyngeal      COVID-19 rapid test Not detected NOTD         Radiologic Studies   I have personally reviewed and interpreted all available imaging studies and agree with radiology interpretation. XR CHEST PORT   Final Result   No evidence of acute cardiopulmonary process. No significant change   from the prior study. CT Results  (Last 48 hours)      None          CXR Results  (Last 48 hours)                 07/23/22 1934  XR CHEST PORT Final result    Impression:  No evidence of acute cardiopulmonary process. No significant change   from the prior study. Narrative:  INDICATION: Shortness of breath       COMPARISON: 6/26/2022       FINDINGS: AP portable imaging of the chest performed at 8:08 PM demonstrates a   stable cardiomediastinal silhouette. Right upper lobe is obscured by head   positioning. There is unchanged elevation of the right hemidiaphragm. No new   airspace disease or pleural effusion is evident. Gaseous colonic distention is   similar to the prior study. There are degenerative changes in the thoracic   spine. Medical Decision Making   I am the first and primary ED physician for this patient's ED visit today. I reviewed our electronic medical record system for any past medical records that may contribute to the patient's current condition, including their past medical history, surgical history, social and family history. This also includes their most recent ED visits, previous hospitalizations and prior diagnostic data. I have reviewed and summarized the most pertinent findings in my HPI and MDM.     Vital Signs Reviewed:  Patient Vitals for the past 24 hrs:   Temp Pulse Resp BP SpO2   07/23/22 2158 98 °F (36.7 °C) 75 16 (!) 147/91 100 %   07/23/22 1809 98.2 °F (36.8 °C) 71 16 139/89 99 %     Pulse Oximetry Analysis: 99% on RA    Cardiac Monitor:   Rate: 75 bpm  The cardiac monitor revealed the following rhythm as interpreted by me: Normal Sinus Rhythm  Cardiac monitoring was ordered to monitor patient for signs of cardiac dysrhythmia, which they are at risk for based on their history and/or risk for cardiovascular disease and/or metabolic abnormalities. EKG interpretation:   Billable EKG reviewed by ED Physician in the absence of a cardiologist: Yes  Rhythm: Junctional rhythm with PVC; and regular . Rate (approx.): 75; Axis: normal; P wave: normal; QRS interval: normal ; ST/T wave: normal; Other findings: normal. This EKG was interpreted by Rody Marrero MD    Records Reviewed: Nursing Notes, Old Medical Records, Previous electrocardiograms, Previous Radiology Studies and Previous Laboratory Studies, EMS reports    Provider Notes (Medical Decision Making):   Patient presenting with resolved hypoxia; Stable vitals and nontoxic appearing. DDx: infection, anemia, electrolyte anomaly (hypo or hyperkalemia, hypomagnesemia), hypothyroid, dehydration, depression, CA, ACS. Will obtain EKG, UA, labwork for any urgent/emergent pathology. Will reassess and monitor while in ED. ED Course:   Initial assessment performed. I discussed presenting problems and concerns, and my formulated plan for today's visit with the patient and any available family members. I have encouraged them to ask questions as they arise throughout the visit.    Social History     Tobacco Use    Smoking status: Never    Smokeless tobacco: Never   Vaping Use    Vaping Use: Never used   Substance Use Topics    Alcohol use: No    Drug use: No       ED Orders Placed:  Orders Placed This Encounter    COVID-19 RAPID TEST    XR CHEST  Port (Per MD Order)    CBC WITH AUTOMATED DIFF    METABOLIC PANEL, COMPREHENSIVE    TROPONIN-HIGH SENSITIVITY    NT-PRO BNP    EKG, 12 LEAD, INITIAL    DISCONTD: lidocaine 4 % patch 1 Patch    lidocaine (Lidoderm) 5 %       ED Medications Administered During ED Course:  Medications - No data to display  ED Course as of 08/01/22 0806   Sat Jul 23, 2022 2030 Family updated on plan of care. I was able to wean patient's oxygen off. We will continue to monitor at this time. Clinically without any acute respiratory concerns. Anticipate discharge home with previous treatment plan. [HW]   2031 BNP slightly elevated, patient's last echocardiogram in May 2022 shows a preserved EF of 55 to 60%. Clinically euvolemic. We will hold off on diuresis at this time. [HW]      ED Course User Index  [HW] Ritika Max MD     Progress Note:  I have just re-evaluated the patient. Patient reports improvement of sx's. I have reviewed Her vital signs and determined there is currently no worsening in their condition or physical exam. Results have been reviewed with them and their questions have been answered. We will continue to review further results as they come available. Progress Note:  Pt reassessed and symptoms noted to have improved significantly after ED treatment. Pt is clinically stable for discharge. Alana Vital labs and imaging have been reviewed with her and available family. She verbally conveys understanding and agreement of the signs, symptoms, diagnosis, treatment and prognosis and additionally agrees to follow up as recommended with Dr. Sharee Ferrell MD and/or specialist as instructed. She agrees with the care plan we have created and conveys that all of her questions have been answered. Additionally, I have put together a packet of discharge instructions for her that include: 1) educational information regarding their diagnosis, 2) how to care for their diagnosis at home, as well a 3) list of reasons why they would want to return to the ED prior to their follow-up appointment should the patient's condition change or symptoms worsen. I have answered all questions to the patient's satisfaction. Strict return precautions given.  She conveyed understanding and agreement with care plan. Vital signs stable for discharge. Disposition:  DISCHARGE  The pt is ready for discharge. The pt's signs, symptoms, diagnosis, and discharge instructions have been discussed and pt has conveyed their understanding. The pt is to follow up as recommended or return to ER should their symptoms worsen. Plan has been discussed and pt is in agreement. Plan:  1. Return precautions as discussed with patient and available family/caregiver. 2.   Discharge Medication List as of 7/23/2022  9:14 PM        3. Follow-up Information       Follow up With Specialties Details Why Contact Info    [facility] EMERGENCY DEPT Emergency Medicine  As needed, If symptoms worsen [address] [place] [address]    Ashley Oconnor MD Family Medicine  If symptoms worsen [address] [id number] [phone]            Instructed to return to ED if worse  Diagnosis   Clinical Impression:  1. Acute dyspnea    2. Hypokalemia    3. Complicated UTI (urinary tract infection)    4. Dementia without behavioral disturbance, unspecified dementia type (HCC)      Attestation:  Tone Bañuelos MD, am the attending of record for this patient. I personally performed the services described in this documentation on this date, 7/23/2022 for patient, Stevan Beebe. I have reviewed the chart and verified that the record is accurate and complete.

## 2022-07-25 PROBLEM — N12 PYELONEPHRITIS: Status: ACTIVE | Noted: 2022-01-01

## 2022-07-25 PROBLEM — K22.0 ACHALASIA: Status: ACTIVE | Noted: 2022-01-01

## 2022-07-25 NOTE — ED NOTES
This rn at bedside spoke to family and changed the pt in the bed. Pt was also placed on a purwick. Vs stable and updated.

## 2022-07-25 NOTE — PROGRESS NOTES
1515:   TRANSITION OF CARE - SBAR OUT    Patient is being transferred to [address], Room# 2114. Report GIVEN TO Dulce Edwards RN on Donnice Rounds for routine progression of care. Report is consisted of the following information SBAR, Kardex, and ED Summary. Patient transferred to receiving unit by: Transport (RN or [facility] Name)     Called outstanding consults: No    Collected routine labs: No    All current orders reviewed with accepting nurse: Yes    The following personal items will be sent with the patient during transfer to the floor:   All valuables:                          CARDIAC MONITORING ORDERED: YES    The following CURRENT information were reported to the receiving RN:    CODE STATUS: Full Code    NIH SCORE:    RONNIE SCREENING: Swallow Screening  Is the Patient Unable to Remain Alert for Testing?: No (07/25/22 0847)  Is the Patient on a Modified Diet (Thickened Liquids) Due to Pre-existing Dysphagia?: No (07/25/22 0847)  Is There Presence of Existing Enteral Tube Feeding via the Stomach or Nose?: No (07/25/22 0847)  Is There Presence of Head-of-Bed Restrictions (Less than 30 Degrees)?: No (07/25/22 0847)  Is There Presence of Tracheotomy Tube?: No (07/25/22 0847)  Is the Patient Ordered Nothing-by-Mouth Status?: No (07/25/22 0847)  3 oz Water Swallow Screen: (!) Fail (07/25/22 0847)  Reason for Fail: Unable to drink 3 oz in sequential swallows without interruption, Coughing/choking during water intake (07/25/22 0847)    NEURO ASSESSMENT: Neuro  Neurologic State: Alert, Restless (07/25/22 1532)  Orientation Level: Oriented to person, Oriented to place, Oriented to time (07/25/22 1517)  Cognition: Follows commands (07/25/22 1517)      RESTRAINTS IN USE: No      IS DOCUMENTATION COMPLETE: Yes      Vital Signs  Level of Consciousness: Alert (0) (07/25/22 1532)  Temp: 97.8 °F (36.6 °C) (07/25/22 1532)  Temp Source: Oral (07/25/22 1532)  Pulse (Heart Rate): 66 (07/25/22 1532)  Heart Rate Source: Monitor (07/25/22 1532)  Resp Rate: 15 (07/25/22 1532)  BP: (!) 152/81 (07/25/22 1532)  MAP (Monitor): 101 (07/25/22 0915)  MAP (Calculated): 105 (07/25/22 1532)  BP 1 Location: Right upper arm (07/25/22 1532)  BP 1 Method: Automatic (07/25/22 1532)  BP Patient Position: At rest (07/25/22 1532)  MEWS Score: 1 (07/25/22 1532)  Pain 1  Pain Scale 1: Numeric (0 - 10) (07/25/22 1532)  Pain Intensity 1: 0 (07/25/22 1532)  Patient Stated Pain Goal: 0 (07/25/22 1532)  Pain Reassessment 1: Yes (07/25/22 1532)  Pain Location 1: Abdomen (07/25/22 0507)  Pain Intervention(s) 1: Therapeutic presence (07/25/22 0507)      OXYGEN: Oxygen Therapy  O2 Device: None (Room air) (07/25/22 0507)    MARYELLEN FALL RISK:        WOUNDS:     URINARY CATHETER: voiding and incontinent    LINE ACCESS:   Peripheral IV Left Forearm (Active)        Opportunity for questions and clarification were provided.   Carter Avila RN

## 2022-07-25 NOTE — H&P
Hospitalist Admission Note    NAME: Enrique Saunders   :  1945   MRN:  [id number]     Date/Time:  2022 2:41 PM    Patient PCP: Rogelio Keith MD  ______________________________________________________________________  Given the patient's current clinical presentation, I have a high level of concern for decompensation if discharged from the emergency department. Complex decision making was performed, which includes reviewing the patient's available past medical records, laboratory results, and x-ray films. My assessment of this patient's clinical condition and my plan of care is as follows. Assessment / Plan:  Intractable nausea vomiting most likely due to UTI with concern for pyelonephritis  History of recent lithotripsy ,  ureteral stent placement and removal  Leukopenia  Dysphagia  Will admit to telemetry, continue with IV ceftriaxone, will add IV vancomycin based on last urine culture which was showing Enterococcus faecalis. UA showed 1+ bacteria  Will consult urology  Patient failed bedside swallow, formal speech therapy is cleared for dysphagia diet  Continue with IV fluid    Hypokalemia  Replete ,check magnesium  Hypertension  Continue with metoprolol and amlodipine  CAD  GERD  HLD  Continue home meds  History of dementia  Depression with anxiety  Per daughter patient is completely dependent for her ADLs    PreDM  Episode of hypoglycemia  Sliding scale insulin with high sensitivity         Code Status: Full code  Surrogate Decision Maker: Patient daughter    DVT Prophylaxis: Lovenox  GI Prophylaxis: not indicated    Baseline: Mostly bedbound      Subjective:   CHIEF COMPLAINT: Nausea and vomiting and decreased p.o. intake    HISTORY OF PRESENT ILLNESS:     Ebony Monsivais is a 68 y.o.    female with past medical history of dementia, dysphagia, anxiety disorder, CAD, kidney stones, hypertension who presents with complaint of nausea vomiting and decreased p.o. intake. Patient was discharged last Friday being treated for complicated UTI, had lithotripsy and stent placement on July 19, the day of discharge was brought back to the ED because she was hypoxic. Work-up did not show anything and was sent back home. Daughter reported that patient started having nausea vomiting and flank pain on Saturday and was not eating much, she presented to [facility] for evaluation. Patient was transferred to [address] for further evaluation  In the ED at [facility] vital signs showed that she was hypertensive, her labs showed [id number], BMP showed potassium of 3.1, CBC showed WBC of 3.3  proBNP elevated  Chest x-ray was negative for any acute pathology   CT  abdomen at new [facility] showed:  IMPRESSION:   1. Abnormalities of the left renal collecting system which could be associated   with pyelitis or ureteriti/UTI. A recently passed stone cannot be excluded. Multiple bilateral renal stones are present including within left renal pelvis. 2. Severe anasarca and other findings within the lung bases which could be   associated with sepsis organ failure although exact etiology uncertain. 3. Interval gaseous moderate distension. Of distal colon with air-fluid level. This pattern does not appear to be obstructive and a definite mass is not   delineated. 4. Distal esophageal distension with retained desiccated food. This could be   from chronic retention achalasia and possibly a stricture. Similar pattern   although less pronounced with seen previously. We were asked to admit for work up and evaluation of the above problems.      Past Medical History:   Diagnosis Date    Agoraphobia without mention of panic attacks 2/17/2014    Anxiety disorder 8/18/2013    Arthritis     osteo    CAD (coronary artery disease), native coronary artery 12/1/2015    no stents    Chronic chest pain 1/13/2014    Chronic pain associated with significant psychosocial dysfunction 2/17/2014    Dementia ([facility] [address]     Depression 8/18/2013    Diabetes ([facility]     type II    Duplicated right renal collecting system 3/13/2014    GERD (gastroesophageal reflux disease)     Gout, joint     Hypercholesteremia     hyercholesterolemia    Hypertension     Murmur     Nephrolithiasis 3/13/2014    Personal history of noncompliance with medical treatment, presenting hazards to health 5/30/2014        Past Surgical History:   Procedure Laterality Date    EGD  4/23/2010         HX CHOLECYSTECTOMY  09/20/2018    lap jesus    HX CYST REMOVAL      cyst removed from left wrist    HX HEART CATHETERIZATION  12/01/2015    HX HYSTERECTOMY      partial    HX OTHER SURGICAL      bladder dilitation    HX TUBAL LIGATION      HX UROLOGICAL      kidney stones    UPPER GI ENDOSCOPY,BALL DIL,30MM  5/31/2022            Social History     Tobacco Use    Smoking status: Never    Smokeless tobacco: Never   Substance Use Topics    Alcohol use: No        Family History   Problem Relation Age of Onset    Stroke Mother     Heart Disease Mother     Cancer Father         type unknown    Heart Disease Son     Liver Disease Son     Heart Disease Daughter     Malignant Hyperthermia Neg Hx     Pseudocholinesterase Deficiency Neg Hx     Delayed Awakening Neg Hx     Post-op Nausea/Vomiting Neg Hx     Emergence Delirium Neg Hx     Post-op Cognitive Dysfunction Neg Hx     Other Neg Hx      Allergies   Allergen Reactions    Amoxicillin Hives    Sulfa (Sulfonamide Antibiotics) Hives and Itching    Mirtazapine Itching and Nausea Only     Funny feeling in chest    Percocet [Oxycodone-Acetaminophen] Nausea and Vomiting    Codeine Nausea and Vomiting    Crestor [Rosuvastatin] Other (comments)     myalgias    Nitroglycerin Unknown (comments)     Patient cannot remember why she is allergic to it      Prednisone Itching    Zithromax [Azithromycin] Itching     Not sure what it does,taken long time ago        Prior to Admission medications Medication Sig Start Date End Date Taking? Authorizing Provider   nitrofurantoin (MACRODANTIN) 100 mg capsule Take 1 Capsule by mouth two (2) times a day for 5 days. 7/23/22 7/28/22  Marilyn Rodas MD   potassium chloride (K-DUR, KLOR-CON M20) 20 mEq tablet Take 1 Tablet by mouth in the morning for 5 days. 7/23/22 7/28/22  Marilyn Rodas MD   lidocaine (Lidoderm) 5 % Apply patch to the affected area for 12 hours a day and remove for 12 hours a day. 7/23/22   Isael Bernardo MD   hyoscyamine SL (LEVSIN/SL) 0.125 mg SL tablet 1 Tablet by SubLINGual route every six (6) hours as needed for Cramping. 7/15/22   Clarisa Jeffries MD   metoprolol succinate (TOPROL-XL) 25 mg XL tablet Take 25 mg by mouth daily. Provider, Historical   buspirone HCl (BUSPAR PO) Take 5 mg by mouth daily. Provider, Historical   ergocalciferol (Vitamin D2) 1,250 mcg (50,000 unit) capsule Take 50,000 Units by mouth every seven (7) days. Provider, Historical   senna-docusate (PERICOLACE) 8.6-50 mg per tablet Take 1 Tablet by mouth two (2) times a day. 1/14/22   Tarun Zarate NP   bisacodyL (Dulcolax, bisacodyl,) 10 mg supp Insert 10 mg into rectum daily as needed for Constipation (To promote 3-4 bowel movements weekly). 1/14/22   Tarun Zarate NP   polyethylene glycol (MIRALAX) 17 gram packet Take 1 Packet by mouth two (2) times a day. 1/14/22   Tarun Zarate NP   sertraline (Zoloft) 100 mg tablet Take 100 mg by mouth daily. Other, MD Shun   mirabegron ER (MYRBETRIQ) 50 mg ER tablet Take 50 mg by mouth daily. Other, MD Shun   acetaminophen (TYLENOL) 500 mg tablet Take 1,000 mg by mouth every six (6) hours as needed for Pain. Provider, Historical   aspirin 81 mg chewable tablet Take 1 Tab by mouth daily. 2/24/21   Sandra Vasquez MD   ezetimibe (ZETIA) 10 mg tablet Take 1 Tab by mouth daily. 2/24/21   Sandra Vasquez MD   amLODIPine (NORVASC) 2.5 mg tablet Take 2.5 mg by mouth daily.     Provider, Historical REVIEW OF SYSTEMS:     I am not able to complete the review of systems because: The patient is intubated and sedated    The patient has altered mental status due to his acute medical problems    The patient has baseline aphasia from prior stroke(s)    The patient has baseline dementia and is not reliable historian    The patient is in acute medical distress and unable to provide information           Total of 12 systems reviewed as follows:       POSITIVE= underlined text  Negative = text not underlined  General:  fever, chills, sweats, generalized weakness, weight loss/gain,      loss of appetite   Eyes:    blurred vision, eye pain, loss of vision, double vision  ENT:    rhinorrhea, pharyngitis   Respiratory:   cough, sputum production, SOB, MOORE, wheezing, pleuritic pain   Cardiology:   chest pain, palpitations, orthopnea, PND, edema, syncope   Gastrointestinal:  abdominal pain , N/V, diarrhea, dysphagia, constipation, bleeding   Genitourinary:  frequency, urgency, dysuria, hematuria, incontinence   Muskuloskeletal :  arthralgia, myalgia, back pain  Hematology:  easy bruising, nose or gum bleeding, lymphadenopathy   Dermatological: rash, ulceration, pruritis, color change / jaundice  Endocrine:   hot flashes or polydipsia   Neurological:  headache, dizziness, confusion, focal weakness, paresthesia,     Speech difficulties, memory loss, gait difficulty  Psychological: Feelings of anxiety, depression, agitation    Objective:   VITALS:    Visit Vitals  /85   Pulse 72   Temp 98.2 °F (36.8 °C)   Resp 18   Ht 5' 8\" (1.727 m)   Wt 63 kg (138 lb 14.2 oz)   SpO2 99%   BMI 21.12 kg/m²       PHYSICAL EXAM:    General:    Alert, cooperative, no distress, appears stated age. HEENT: Atraumatic, anicteric sclerae, pink conjunctivae     No oral ulcers, mucosa moist, throat clear, dentition fair  Neck:  Supple, symmetrical,  thyroid: non tender  Lungs:   Clear to auscultation bilaterally. No Wheezing or Rhonchi. No rales. Chest wall:  No tenderness  No Accessory muscle use. Heart:   Regular  rhythm,  No  murmur   No edema  Abdomen:   Soft, non-tender. Not distended. Bowel sounds normal  Extremities: No cyanosis. No clubbing,      Skin turgor normal, Capillary refill normal, Radial dial pulse 2+  Skin:     Not pale. Not Jaundiced  No rashes   Psych:  Good insight. Not depressed. Not anxious or agitated. Neurologic: EOMs intact. No facial asymmetry. No aphasia or slurred speech. Symmetrical strength, Sensation grossly intact. Alert and oriented X 4.     _______________________________________________________________________  Care Plan discussed with:    Comments   Patient x    Family  x    RN x    Care Manager                    Consultant:      _______________________________________________________________________  Expected  Disposition:   Home with Family x   HH/PT/OT/RN    SNF/LTC    [facility]    ________________________________________________________________________  TOTAL TIME:  72 Minutes    Critical Care Provided     Minutes non procedure based      Comments     Reviewed previous records   >50% of visit spent in counseling and coordination of care  Discussion with patient and/or family and questions answered       ________________________________________________________________________  Signed: Brit Rdz MD    Procedures: see electronic medical records for all procedures/Xrays and details which were not copied into this note but were reviewed prior to creation of Plan.     LAB DATA REVIEWED:    Recent Results (from the past 24 hour(s))   POC CHEM8    Collection Time: 07/25/22  6:51 AM   Result Value Ref Range    Calcium, ionized (POC) 1.09 (L) 1.12 - 1.32 mmol/L    Sodium (POC) 141 136 - 145 mmol/L    Potassium (POC) 3.0 (L) 3.5 - 5.1 mmol/L    Chloride (POC) 103 98 - 107 mmol/L    Glucose (POC) 74 65 - 100 mg/dL    Creatinine (POC) 0.74 0.6 - 1.3 mg/dL    GFRAA, POC >60 >60 ml/min/1.73m2    GFRNA, POC >60 >60 ml/min/1.73m2    Comment Comment Not Indicated.       Critical value read back 20

## 2022-07-25 NOTE — PROGRESS NOTES
Pharmacy Antimicrobial Kinetic Dosing    Indication for Antimicrobials: uti     Current Regimen of Each Antimicrobial:  Vancomycin pharmacy to dose (Start Date ; Day # )  Ceftriaxone 1g q 24h (start: , day )    Previous Antimicrobial Therapy:    Goal Level: AUC: 400-600 mg/hr/Liter/day and VANCOMYCIN TROUGH GOAL RANGE    Vancomycin Trough: 15 - 20 mcg/mL    Date Dose & Interval Measured (mcg/mL) Predicted AUC/ROYAL                       Date & time of next level:     Dosing calculator used: [employer] calculator    Significant Positive Cultures:  none new    Conditions for Dosing Consideration: None    Labs:  Recent Labs     220 22  0251   CREA 0.71 0.69   BUN 10 10     Recent Labs     22   WBC 3.3*     Temp (24hrs), Av.2 °F (36.8 °C), Min:98.2 °F (36.8 °C), Max:98.2 °F (36.8 °C)    Creatinine Clearance (mL/min):   CrCl (Ideal Body Weight): 68.0   If actual weight < IBW: CrCl (Actual Body Weight) 67.0    Impression/Plan:   Vancomycin 1500mg IV load, then 750mg IV q 12h for auc 523 and tr 17mcg/mL  Continue ceftriaxone as above  BMP and CBC ordered daily  Antimicrobial stop date 7 days     Pharmacy will follow daily and adjust medications as appropriate for renal function and/or serum levels.     Thank you,  [address], [place]    Vancomycin Dosing Document    Documents located on pharmacy Teams site: Clinical Practice -> Antimicrobial Stewardship -> Antibiotics_Vancomycin     Aminoglycoside Dosing Document    Documents located on pharmacy Teams site: Clinical Practice -> Antimicrobial Stewardship -> Antibiotics_Aminoglycosides

## 2022-07-25 NOTE — PROGRESS NOTES
Problem: Dysphagia (Adult)  Goal: *Acute Goals and Plan of Care (Insert Text)  Description: 7/25/2022  Speech path goals  1. Patient will tolerate purees/thins with no overt s/s of aspiration  2. Patient will tolerate diet upgrade with no overt s/s of aspiration. 7/25/2022 1520 by MARÍA Kaur  Outcome: Not Met  SPEECH [address] EVALUATION  Patient: Ruddy Matthews (89 y.o. female)  Date: 7/25/2022  Primary Diagnosis: Pyelonephritis [N12]  Hypokalemia [E87.6]  Nausea & vomiting [R11.2]  Achalasia [K22.0]       Precautions:        ASSESSMENT :  Based on the objective data described below, the patient presents with functional swallow of purees and thins. She did not want to eat or drink but took several sips of thins via straw and spoonfuls of purees with no overt s/s of aspiration. She appears to have torticollis and it was painful to turn her head to midline. Patient will benefit from skilled intervention to address the above impairments. Patients rehabilitation potential is considered to be Good     PLAN :  Recommendations and Planned Interventions:  Purees and thins ; she would not attempt solids. Frequency/Duration: Patient will be followed by speech-language pathology 2 times a week to address goals. Discharge Recommendations: None     SUBJECTIVE:   Patient stated I'm gonna die. .  \"My daughter really wanted to talk to you\"    OBJECTIVE:     Past Medical History:   Diagnosis Date    Agoraphobia without mention of panic attacks 2/17/2014    Anxiety disorder 8/18/2013    Arthritis     osteo    CAD (coronary artery disease), native coronary artery 12/1/2015    no stents    Chronic chest pain 1/13/2014    Chronic pain associated with significant psychosocial dysfunction 2/17/2014    Dementia ([facility] [address]     Depression 8/18/2013    Diabetes ([facility] [address]     type II    Duplicated right renal collecting system 3/13/2014    GERD (gastroesophageal reflux disease)     Gout, joint Hypercholesteremia     hyercholesterolemia    Hypertension     Murmur     Nephrolithiasis 3/13/2014    Personal history of noncompliance with medical treatment, presenting hazards to health 5/30/2014     Past Surgical History:   Procedure Laterality Date    EGD  4/23/2010         HX CHOLECYSTECTOMY  09/20/2018    lap jesus    HX CYST REMOVAL      cyst removed from left wrist    HX HEART CATHETERIZATION  12/01/2015    HX HYSTERECTOMY      partial    HX OTHER SURGICAL      bladder dilitation    HX TUBAL LIGATION      HX UROLOGICAL      kidney stones    UPPER GI ENDOSCOPY,BALL DIL,30MM  5/31/2022          Prior Level of Function/Home Situation:   Home Situation  Support Systems: Child(jim), Caregiver/Home Care Staff, Other (Comment) ([facility])  Patient Expects to be Discharged to[de-identified] Home with family assistance  Diet prior to admission:   Current Diet:  NPO    Cognitive and Communication Status:  Neurologic State: Alert  Orientation Level: Oriented to person, Oriented to place, Oriented to time  Cognition: Follows commands  Perception: Appears intact  Perseveration: Perseverates during conversation     Oral Assessment:  Oral Assessment  Labial: No impairment  Dentition: Poor;Edentulous  Lingual: No impairment  Mandible: No impairment  P.O. Trials:  Patient Position: upright in bed  Vocal quality prior to P.O.: No impairment     How Presented: Straw;Self-fed/presented;SLP-fed/presented;Spoon     Bolus Acceptance: No impairment  Bolus Formation/Control: Impaired  Type of Impairment: Delayed  Propulsion: Delayed (# of seconds)  Oral Residue: None  Initiation of Swallow: No impairment  Laryngeal Elevation: Functional  Aspiration Signs/Symptoms: None  Pharyngeal Phase Characteristics: No impairment, issues, or problems              Oral Phase Severity: Mild  Pharyngeal Phase Severity : No impairment    NOMS:   The NOMS functional outcome measure was used to quantify this patient's level of swallowing impairment.   Based on the NOMS, the patient was determined to be at level 4 for swallow function       NOMS Swallowing Levels:  Level 1 (CN): NPO  Level 2 (CM): NPO but takes consistency in therapy  Level 3 (CL): Takes less than 50% of nutrition p.o. and continues with nonoral feedings; and/or safe with mod cues; and/or max diet restriction  Level 4 (CK): Safe swallow but needs mod cues; and/or mod diet restriction; and/or still requires some nonoral feeding/supplements  Level 5 (CJ): Safe swallow with min diet restriction; and/or needs min cues  Level 6 (CI): Independent with p.o.; rare cues; usually self cues; may need to avoid some foods or needs extra time  Level 7 ([address] [place] [address]): Independent for all p.o.  MARGARET. (2003). National Outcomes Measurement System (NOMS): Adult Speech-Language Pathology User's Guide. Pain:  Pain Scale 1: Numeric (0 - 10)  Pain Intensity 1: 8  Pain Location 1: Abdomen    After treatment:   Patient left in no apparent distress in bed    COMMUNICATION/EDUCATION:   Patient was educated regarding her deficit(s) of dysphagia but doubt she can retain info. The patient's plan of care including recommendations, planned interventions, and recommended diet changes were discussed with: Registered nurse. Patient is unable to participate in goal setting and plan of care.     Thank you for this referral.  Shira Moran, SLP  Time Calculation: 15 mins

## 2022-07-25 NOTE — PROGRESS NOTES
Pt with recent L urs, laser stone and stent on L. Admitted with recurrent pyelo. KUB ordered to assess stent placement. Previous cx:     ROYAL     Ampicillin ($) Susceptible     Ciprofloxacin ($) Resistant     Daptomycin ($$$$$) Susceptible     Levofloxacin ($) Resistant     Linezolid ($$$$$) Susceptible     Nitrofurantoin Susceptible     Tetracycline Susceptible     Vancomycin ($) Susceptible                  Specimen Collected: 07/15/22 16:32 Last Resulted: 07/18      Previous note reviewed. Stent pulled already. Outside ct shows pyelo.   Cancel KUB

## 2022-07-25 NOTE — PROGRESS NOTES
Transition of Care Plan:    RUR: Not available at time of assessment   Disposition:  Home with [facility] and home health care aides. Barrie Cheatham  [phone]  Follow up appointments: PCP  DME needed: Pt has a hospital bed, wheelchair, shower bench at home  Transportation at Discharge: [address] transport preferred  Keys or means to access home:   Family has access      IM Medicare Letter: 2nd IM letter before discharge   Is patient a BCPI-A Bundle:   Bundle letter will be distributed by unit CM if pt meets bundle criteria. If yes, was Bundle Letter given?:    Is patient a  and connected with the [place]? N/A          If yes, was [employer] transfer form completed and [facility] notified? Caregiver Contact: Daughter Ashley Ta [phone]  Discharge Caregiver contacted prior to discharge? Unit CM to follow up before discharge  Care Conference needed?:         Reason for Readmission:     Hypokalemia         RUR Score/Risk Level:     Not available at time of assessment    PCP: First and Last name:  Dr. Melida Strange   Name of Practice: [facility]    Are you a current patient: Yes/No: Yes   Approximate date of last visit: unknown   Can you participate in a virtual visit with your PCP: No    Is a Care Conference indicated: IDRs      Did you attend your follow up appointment (s): If not, why not:  Patient discharged on Saturday 7/23       Resources/supports as identified by patient/family:        Pt has a supportive family as well as support from [facility] and care aides  Top Challenges facing patient (as identified by patient/family and CM): Finances/Medication cost?     No issues reported; Pt has Medicare as well as Medicaid   Transportation    Medical transport     Support system or lack thereof? Pt has a supportive family as well as support from [facility] and care aides  Living arrangements? Pt lives in a one level home w/ ramp with her daughter  Luanne Shelton.  Pt has caregivers from [facility] to provide care when pt's daughter is working. Self-care/ADLs/Cognition? Pt is bed bound at baseline has caregivers. Pt has a hospital bed, wheelchair, shower bench at home. Current Advanced Directive/Advance Care Plan:         Advance Care Planning     General Advance Care Planning (ACP) Conversation    Date of Conversation: 7/25/2022  Conducted with: Healthcare Decision Maker: Named in Advance Directive or Healthcare Power of [address]:     Primary Decision Maker (Active): Joshua Morin - Daughter - [phone]    Secondary Decision Maker (Active): Bry Alvarez" - Daughter - [phone]  Click here to complete [address] including selection of the Healthcare Decision Maker Relationship (ie \"Primary\")        Content/Action Overview: Has ACP document(s) on file - reflects the patient's care preferences  Reviewed DNR/DNI and patient elects Full Code (Attempt Resuscitation)    Length of Voluntary ACP Conversation in minutes:  <16 minutes (Non-Billable)    Leif Abdullahi RN             Plan for utilizing home health:   Patient is open with home health via [facility]             Transition of Care Plan:    Based on readmission, the patient's previous Plan of Care   has been evaluated and/or modified. The current Transition of Care Plan is:     Home with [facility] and home health care aides      Care management will continue to be available to assist as transition of care planning needs arise.                Readmission Assessment  Number of days since last admission?: 1-7 days  Previous disposition: Home with Family  Who is being interviewed?: Caregiver  What was the patient's/caregiver's perception as to why they think they needed to return back to the hospital?: Other (Comment) (Patient with abdominal pain requiring medical attention)  Did you visit your Primary Care Physician after you left the hospital, before you returned this time?: No  Why weren't you able to visit your PCP?: Other (Comment) (Patient discharged saturday 7/23)  Did you see a specialist, such as Cardiac, Pulmonary, Orthopedic Physician, etc. after you left the hospital?: No  Who advised the patient to return to the hospital?: Self-referral, Caregiver  Does the patient report anything that got in the way of taking their medications?: No  In our efforts to provide the best possible care to you and others like you, can you think of anything that we could have done to help you after you left the hospital the first time, so that you might not have needed to return so soon?: Other (Comment) (Patient with abdominal pain requiring medical attention)    Care Management Interventions  PCP Verified by CM:  Yes  Mode of Transport at Discharge: BLS  Transition of Care Consult (CM Consult): Discharge Planning  Physical Therapy Consult: No  Occupational Therapy Consult: No  Speech Therapy Consult: Yes  Support Systems: Child(jim), Caregiver/Home Care Staff, Other (Comment) ([facility])  Confirm Follow Up Transport: Family  Discharge Location  Patient Expects to be Discharged to[de-identified] Home with family assistance    AMMON HernandezN, RN, [address] [facility]   RN Care Manager

## 2022-07-26 PROBLEM — R54 FRAILTY: Status: ACTIVE | Noted: 2022-01-01

## 2022-07-26 PROBLEM — R19.7 DIARRHEA, UNSPECIFIED TYPE: Status: ACTIVE | Noted: 2022-01-01

## 2022-07-26 PROBLEM — R53.83 OTHER FATIGUE: Status: ACTIVE | Noted: 2017-04-13

## 2022-07-26 PROBLEM — R63.0 ANOREXIA: Status: ACTIVE | Noted: 2022-01-01

## 2022-07-26 NOTE — PROGRESS NOTES
Bedside shift change report given to Josué 1690 (oncoming nurse) by Leonidas Ramirez RN (offgoing nurse). Report included the following information SBAR, Kardex, Intake/Output, MAR, Recent Results, and Med Rec Status. Urinary retention noted. Straight x1 performed x1. Multiple loose watery BM noted. K 2.7, new order to give 20 meq. Incoming nurse aware.

## 2022-07-26 NOTE — PROGRESS NOTES
RAPID RESPONSE TEAM    1004 Responded to overhead rapid response CHEST PAIN to 2114    On arrival, pt is anxious,c/o bilateral rib pain, rates it 8/10, described as a constant ache, pain is reproducible. Denies SOB, denies recent falls. Lidocaine patch noted to left anterior ribs. Morphine given at 0940 for pain. Patient requesting a \"nerve pill\". Dr. Dionne Caraballo at bedside on my arrival. STAT EKG obtained- SR, non-ischemic. STAT TROP-HS, MG collected and sent. New 20G PIV placed in left cephalic. Potassium level low in AM labs, KCl 10 eEq x4 ordered. Lab Results   Component Value Date/Time    Glucose 74 07/26/2022 02:26 AM    Glucose (POC) 81 07/26/2022 07:30 AM     Lab Results   Component Value Date/Time    Sodium 142 07/26/2022 02:26 AM    Potassium 2.7 (LL) 07/26/2022 02:26 AM    Chloride 109 (H) 07/26/2022 02:26 AM    CO2 24 07/26/2022 02:26 AM    Anion gap 9 07/26/2022 02:26 AM    Glucose 74 07/26/2022 02:26 AM    BUN 13 07/26/2022 02:26 AM    Creatinine 0.77 07/26/2022 02:26 AM    BUN/Creatinine ratio 17 07/26/2022 02:26 AM    GFR est AA >60 07/26/2022 02:26 AM    GFR est non-AA >60 07/26/2022 02:26 AM    Calcium 8.4 (L) 07/26/2022 02:26 AM    Bilirubin, total 0.5 07/23/2022 07:40 PM    Alk. phosphatase 62 07/23/2022 07:40 PM    Protein, total 6.5 07/23/2022 07:40 PM    Albumin 2.7 (L) 07/23/2022 07:40 PM    Globulin 3.8 07/23/2022 07:40 PM    A-G Ratio 0.7 (L) 07/23/2022 07:40 PM    ALT (SGPT) 9 (L) 07/23/2022 07:40 PM    AST (SGOT) 18 07/23/2022 07:40 PM     Patient to remain in 2114. Please call back if needed.       Cody Stauffer RN  Rapid Response Team  Ext [phone]

## 2022-07-26 NOTE — CONSULTS
Urology Consult    Patient: Monse Hall MRN: [id number]  SSN: [id number]    YOB: 1945  Age: 68 y.o. Sex: female          Date of Consultation:  July 25, 2022  Requesting Physician: Tiffani Mckinnon MD  Reason for Consultation:  Erasto Garcia         History of Present Illness:  Monse Hall is a 68 y.o. female admitted 7/25/2022 to the hospital for Pyelonephritis [N12]  Hypokalemia [E87.6]  Nausea & vomiting [R11.2]  Achalasia [K22.0]. She is pod 6 CRULS, stent removed a few days ago. Renal and ureteral stones cleared. See previous note earlier with previous culture. Pt had stent placed prior due to stone and uti.came in with n/v. Outside ct suggested pyelo. I do not have films.   Pt states she has mild L abd pain and is \"scared\"      Past Medical History:   Diagnosis Date    Agoraphobia without mention of panic attacks 2/17/2014    Anxiety disorder 8/18/2013    Arthritis     osteo    CAD (coronary artery disease), native coronary artery 12/1/2015    no stents    Chronic chest pain 1/13/2014    Chronic pain associated with significant psychosocial dysfunction 2/17/2014    Dementia ([facility] [address]     Depression 8/18/2013    Diabetes ([facility] [address]     type II    Duplicated right renal collecting system 3/13/2014    GERD (gastroesophageal reflux disease)     Gout, joint     Hypercholesteremia     hyercholesterolemia    Hypertension     Murmur     Nephrolithiasis 3/13/2014    Personal history of noncompliance with medical treatment, presenting hazards to health 5/30/2014        Past Surgical History:   Procedure Laterality Date    EGD  4/23/2010         HX CHOLECYSTECTOMY  09/20/2018    lap jesus    HX CYST REMOVAL      cyst removed from left wrist    HX HEART CATHETERIZATION  12/01/2015    HX HYSTERECTOMY      partial    HX OTHER SURGICAL      bladder dilitation    HX TUBAL LIGATION      HX UROLOGICAL      kidney stones    UPPER GI ENDOSCOPY,BALL DIL,30MM  5/31/2022            Allergies Allergen Reactions    Amoxicillin Hives    Sulfa (Sulfonamide Antibiotics) Hives and Itching    Mirtazapine Itching and Nausea Only     Funny feeling in chest    Percocet [Oxycodone-Acetaminophen] Nausea and Vomiting    Codeine Nausea and Vomiting    Crestor [Rosuvastatin] Other (comments)     myalgias    Nitroglycerin Unknown (comments)     Patient cannot remember why she is allergic to it      Prednisone Itching    Zithromax [Azithromycin] Itching     Not sure what it does,taken long time ago        Prior to Admission medications    Medication Sig Start Date End Date Taking? Authorizing Provider   nitrofurantoin (MACRODANTIN) 100 mg capsule Take 1 Capsule by mouth two (2) times a day for 5 days. 7/23/22 7/28/22  Richi Dennis MD   potassium chloride (K-DUR, KLOR-CON M20) 20 mEq tablet Take 1 Tablet by mouth in the morning for 5 days. 7/23/22 7/28/22  Richi Dennis MD   lidocaine (Lidoderm) 5 % Apply patch to the affected area for 12 hours a day and remove for 12 hours a day. 7/23/22   Derick Haney MD   hyoscyamine SL (LEVSIN/SL) 0.125 mg SL tablet 1 Tablet by SubLINGual route every six (6) hours as needed for Cramping. 7/15/22   Calvin Loya MD   metoprolol succinate (TOPROL-XL) 25 mg XL tablet Take 25 mg by mouth daily. Provider, Historical   buspirone HCl (BUSPAR PO) Take 5 mg by mouth daily. Provider, Historical   ergocalciferol (Vitamin D2) 1,250 mcg (50,000 unit) capsule Take 50,000 Units by mouth every seven (7) days. Provider, Historical   senna-docusate (PERICOLACE) 8.6-50 mg per tablet Take 1 Tablet by mouth two (2) times a day. 1/14/22   Klarissa Romero NP   bisacodyL (Dulcolax, bisacodyl,) 10 mg supp Insert 10 mg into rectum daily as needed for Constipation (To promote 3-4 bowel movements weekly). 1/14/22   Klarissa Romero NP   polyethylene glycol (MIRALAX) 17 gram packet Take 1 Packet by mouth two (2) times a day.  1/14/22   Klarissa Romero NP   sertraline (Zoloft) 100 mg tablet Take 100 mg by mouth daily. Shun Sevilla MD   mirabegron ER (MYRBETRIQ) 50 mg ER tablet Take 50 mg by mouth daily. Shun Sevilla MD   acetaminophen (TYLENOL) 500 mg tablet Take 1,000 mg by mouth every six (6) hours as needed for Pain. Provider, Historical   aspirin 81 mg chewable tablet Take 1 Tab by mouth daily. 21   Stephanie Corley MD   ezetimibe (ZETIA) 10 mg tablet Take 1 Tab by mouth daily. 21   Stephanie Corley MD   amLODIPine (NORVASC) 2.5 mg tablet Take 2.5 mg by mouth daily. Provider, Historical       Social History     Tobacco Use    Smoking status: Never    Smokeless tobacco: Never   Substance Use Topics    Alcohol use: No        Family History   Problem Relation Age of Onset    Stroke Mother     Heart Disease Mother     Cancer Father         type unknown    Heart Disease Son     Liver Disease Son     Heart Disease Daughter     Malignant Hyperthermia Neg Hx     Pseudocholinesterase Deficiency Neg Hx     Delayed Awakening Neg Hx     Post-op Nausea/Vomiting Neg Hx     Emergence Delirium Neg Hx     Post-op Cognitive Dysfunction Neg Hx     Other Neg Hx         ROS:  Admission ROS by Sarah Sheppard MD from 2022 was reviewed and changes (other than per HPI) include: none. Physical Exam:            Vitals[de-identified]    Temp (24hrs), Av °F (36.7 °C), Min:97.8 °F (36.6 °C), Max:98.2 °F (36.8 °C)   Blood pressure (!) 140/84, pulse 71, temperature 98.2 °F (36.8 °C), resp. rate 18, height 5' 8\" (1.727 m), weight 63 kg (138 lb 14.2 oz), SpO2 97 %. I&O's:    No intake/output data recorded.              General:    alert and  appears nontoxic, no acute distress                     Skin:   Warm and dry                HEENT:  NCAT, anicteric sclerae, moist mucus membranes                 Chest[de-identified]  normal respiratory effort      CV::               Abdomen/Flank::  no CVAT, soft, mildly tender abdomen without masses, organomegaly or hernia             Bladder[de-identified]  bladder not palpable     Lymph nodes:  no cervical or supraclavicular LAD     Musculoskeletal:  Arms seem contracted    Genitalia:       Neuro/Psychiatric:   normal affect     Lab Review:     CBC   Recent Labs     07/23/22 1940   WBC 3.3*   HGB 10.6*   HCT 33.1*        CMP   Recent Labs     07/23/22 1940 07/23/22  0251    136   K 3.1* 3.1*    103   CO2 27 25   GLU 90 108*   BUN 10 10   CREA 0.71 0.69   CA 8.6 8.8   ALB 2.7*  --    TBILI 0.5  --    ALT 9*  --        Other No results for input(s): INR, PSA, INREXT in the last 72 hours. No lab exists for component: PTT    UA:   Lab Results   Component Value Date/Time    Color YELLOW/STRAW 07/18/2022 03:13 PM    Appearance CLOUDY (A) 07/18/2022 03:13 PM    Specific gravity 1.026 07/18/2022 03:13 PM    Specific gravity 1.020 12/10/2020 03:56 PM    pH (UA) 6.5 07/18/2022 03:13 PM    Protein >300 (A) 07/18/2022 03:13 PM    Glucose Negative 07/18/2022 03:13 PM    Ketone Negative 07/18/2022 03:13 PM    Bilirubin Negative 07/15/2022 04:32 PM    Urobilinogen 1.0 07/18/2022 03:13 PM    Nitrites Negative 07/18/2022 03:13 PM    Leukocyte Esterase MODERATE (A) 07/18/2022 03:13 PM    Epithelial cells FEW 07/18/2022 03:13 PM    Bacteria 1+ (A) 07/18/2022 03:13 PM    WBC 5-10 07/18/2022 03:13 PM    RBC >100 (H) 07/18/2022 03:13 PM       Cultures:  p    Imaging:      Assessment:     Active Problems:    Nausea & vomiting (2/25/2021)      Hypokalemia (6/27/2022)      Achalasia (7/25/2022)      Pyelonephritis (7/25/2022)          Plan:     1. Pyelonephrits, Supportive care, adjust abx per cx.  Renal US or repeat CT if not better in 24-48h    Will follow      Signed By: Federica Oliveira MD  - July 25, 2022

## 2022-07-26 NOTE — PROGRESS NOTES
Pharmacy Antimicrobial Kinetic Dosing    Indication for Antimicrobials: uti     Current Regimen of Each Antimicrobial:  Vancomycin pharmacy to dose (Start Date ; Day # )  Ceftriaxone 1g q 24h (start: , day 2)    Previous Antimicrobial Therapy:    Goal Level: AUC: 400-600 mg/hr/Liter/day and VANCOMYCIN TROUGH GOAL RANGE    Vancomycin Trough: 15 - 20 mcg/mL    Date Dose & Interval Measured (mcg/mL) Predicted AUC/ROYAL                       Date & time of next level:  with am labs    Dosing calculator used: [employer] calculator    Significant Positive Cultures:  none new    Conditions for Dosing Consideration: None    Labs:  Recent Labs     22  0226 22   CREA 0.77 0.71   BUN 13 10     Recent Labs     22   WBC 5.6 3.3*     Temp (24hrs), Av.9 °F (36.6 °C), Min:97.3 °F (36.3 °C), Max:98.8 °F (37.1 °C)    Creatinine Clearance (mL/min):   CrCl (Ideal Body Weight): 62.7   If actual weight < IBW: CrCl (Actual Body Weight) 61.8    Impression/Plan:   Vancomycin 1500mg IV load, then 750mg IV q 12h for auc 523 and tr 17mcg/mL  Vancomycin trough on  with AM labs  Continue ceftriaxone as above  BMP and CBC ordered daily  Antimicrobial stop date 7 days     Pharmacy will follow daily and adjust medications as appropriate for renal function and/or serum levels.     Thank you,  [facility] Sylvie Lainez, PHARMD    Vancomycin Dosing Document    Documents located on pharmacy Teams site: Clinical Practice -> Antimicrobial Stewardship -> Antibiotics_Vancomycin     Aminoglycoside Dosing Document    Documents located on pharmacy Teams site: Clinical Practice -> Antimicrobial Stewardship -> Antibiotics_Aminoglycosides

## 2022-07-26 NOTE — PROGRESS NOTES
Hospitalist Progress Note    NAME: Fan Klein   :  1945   MRN:  [id number]       Assessment / Plan:  Rapid response for chest pain  Patient had a rapid response this morning complaining of producible chest pain upon palpation. Patient was very anxious  Nurse had reported that she was having difficulty swallowing  Patient seen and examined  EKG completed today EKG on admission was nonischemic  Patient was already given IV morphine earlier but reported that it was not helping   VS showed that she was hypertensive  Troponin and chest x-ray ordered  A.m. labs reviewed, which showed  hypokalemia potassium of 2.7  Magnesium is within normal limit  Troponin mildly elevated at 88, will trend 2 more sets  Chest x-ray was concerning for possible pneumoperitoneum, spoke with radiologist who advised abdominal x-ray. Stat abdominal x-ray ordered  Keep n.p.o., GI consulted  Check 2D echo  Abdominal x-ray showed  IMPRESSION  1. Markedly ectatic proximal esophagus containing enteric content. 2. Lucency over the entire abdomen which is concerning for pneumoperitoneum. Recommend supine and left lateral decubitus imaging of the abdomen. Intractable nausea vomiting most likely due to UTI with concern for pyelonephritis  History of recent lithotripsy ,  ureteral stent placement and removal  Leukopenia  Dysphagia   continue with IV ceftriaxone, will add IV vancomycin based on last urine culture which was showing Enterococcus faecalis. UA showed 1+ bacteria  Will consult urology, urology input noted  Patient failed bedside swallow, formal speech therapy is cleared for dysphagia diet. Dysphagia diet on hold as patient is still having difficulty swallowing. Keep n.p.o. Continue with IV fluid   CT scan abdomen  from outside hospital showed:  CT  abdomen at new [facility] showed:  IMPRESSION:   1. Abnormalities of the left renal collecting system which could be associated   with pyelitis or ureteriti/UTI.  A recently passed stone cannot be excluded. Multiple bilateral renal stones are present including within left renal pelvis. 2. Severe anasarca and other findings within the lung bases which could be   associated with sepsis organ failure although exact etiology uncertain. 3. Interval gaseous moderate distension. Of distal colon with air-fluid level. This pattern does not appear to be obstructive and a definite mass is not   delineated. 4. Distal esophageal distension with retained desiccated food. This could be   from chronic retention achalasia and possibly a stricture. Similar pattern   although less pronounced with seen previously. Intractable diarrhea  Watery diarrhea, check enteric panel and C. Difficile  Hypokalemia  Replete as needed, magnesium is within normal limit  Hypertension  Hold P.o. metoprolol and Norvasc as patient is n.p.o. start IV metoprolol 2.5 mg every 6 and as needed hydralazine  CAD  GERD  HLD  Continue home meds  History of dementia  Depression with anxiety  Per daughter patient is completely dependent for her ADLs     PreDM  Episode of hypoglycemia  Sliding scale insulin with high sensitivity           Code Status: Full code  Surrogate Decision Maker: Patient daughter     DVT Prophylaxis: Lovenox  GI Prophylaxis: not indicated     Baseline: Mostly bedbound      18.5 - 24.9 Normal weight / Body mass index is 21.12 kg/m². Estimated discharge date: July 31st  Barriers: Unstable   I have spent critical care time involved in lab review, consultations with specialist, family decision-making, and documentation. During this entire length of time I was immediately available to the patient. The reason for providing this level of medical care for this critically ill patient was due to a critical illness that impaired one or more vital organ systems such that there was a high probability of imminent or life threatening deterioration in the patients condition.  This care involved high complexity decision making to assess, manipulate, and support vital system functions, to treat this degreee vital organ system failure and to prevent further life threatening deterioration of the patients condition. Code status: DNR  Prophylaxis: Lovenox  Recommended Disposition: SNF/LTC     Subjective:     Chief Complaint / Reason for Physician Visit  Status post rapid response today for chest pain. Patient reported chest pain on the thoracic area, reproducible upon palpation, she also looks very anxious   discussed with RN events overnight. Review of Systems:  Symptom Y/N Comments  Symptom Y/N Comments   Fever/Chills n   Chest Pain y    Poor Appetite    Edema     Cough    Abdominal Pain n    Sputum    Joint Pain     SOB/MOORE n   Pruritis/Rash     Nausea/vomit n   Tolerating PT/OT     Diarrhea    Tolerating Diet  N.p.o.   Constipation    Other       Could NOT obtain due to:      Objective:     VITALS:   Last 24hrs VS reviewed since prior progress note. Most recent are:  Patient Vitals for the past 24 hrs:   Temp Pulse Resp BP SpO2   07/26/22 0753 -- 70 -- -- --   07/26/22 0729 98 °F (36.7 °C) 69 18 (!) 166/72 90 %   07/26/22 0241 97.4 °F (36.3 °C) 70 17 (!) 143/70 99 %   07/25/22 2239 97.3 °F (36.3 °C) 68 16 (!) 154/83 98 %   07/25/22 2000 98.2 °F (36.8 °C) 71 18 (!) 140/84 97 %   07/25/22 1740 97.9 °F (36.6 °C) 69 16 138/85 93 %   07/25/22 1532 97.8 °F (36.6 °C) 66 15 (!) 152/81 100 %   07/25/22 1201 -- -- -- 125/85 --   07/25/22 1157 -- 72 18 -- 99 %   07/25/22 1156 -- 73 13 -- 98 %   07/25/22 0915 -- 71 13 (!) 166/75 98 %   07/25/22 0847 -- 74 14 (!) 165/85 100 %       Intake/Output Summary (Last 24 hours) at 7/26/2022 0824  Last data filed at 7/26/2022 0409  Gross per 24 hour   Intake --   Output 550 ml   Net -550 ml        I had a face to face encounter and independently examined this patient on 7/26/2022, as outlined below:  PHYSICAL EXAM:  General: WD, WN.  Alert, cooperative, no acute distress EENT:  EOMI. Anicteric sclerae. MMM  Resp:  CTA bilaterally, no wheezing or rales. No accessory muscle use  CV:  Regular  rhythm,  No edema  GI:  Soft, Non distended, Non tender. +Bowel sounds  Neurologic:  Alert and oriented X 3, normal speech,   Psych:   Good insight. Not anxious nor agitated  Skin:  No rashes. No jaundice    Reviewed most current lab test results and cultures  YES  Reviewed most current radiology test results   YES  Review and summation of old records today    NO  Reviewed patient's current orders and MAR    YES  PMH/SH reviewed - no change compared to H&P  ________________________________________________________________________  Care Plan discussed with:    Comments   Patient     Family  x Daughter    RN x    Care Manager     Consultant  x Radiologist                      Multidiciplinary team rounds were held today with , nursing, pharmacist and clinical coordinator. Patient's plan of care was discussed; medications were reviewed and discharge planning was addressed. ________________________________________________________________________  Total NON critical care TIME:     Minutes    Total CRITICAL CARE TIME Spent:  70 Minutes non procedure based      Comments   >50% of visit spent in counseling and coordination of care     ________________________________________________________________________  Sofia Sheridan MD     Procedures: see electronic medical records for all procedures/Xrays and details which were not copied into this note but were reviewed prior to creation of Plan. LABS:  I reviewed today's most current labs and imaging studies.   Pertinent labs include:  Recent Labs     07/26/22 0226 07/23/22  1940   WBC 5.6 3.3*   HGB 9.0* 10.6*   HCT 28.7* 33.1*    229     Recent Labs     07/26/22  0226 07/23/22 1940    137   K 2.7* 3.1*   * 104   CO2 24 27   GLU 74 90   BUN 13 10   CREA 0.77 0.71   CA 8.4* 8.6   ALB  --  2.7*   TBILI  --  0.5   ALT --  9*       Signed: Heather Moncada MD

## 2022-07-26 NOTE — PROGRESS NOTES
Spiritual Care Assessment/Progress Note  [facility]      NAME: Enrique Saunders      MRN: [id number]  AGE: 68 y.o. SEX: female  Hoahaoism Affiliation: Episcopalian   Language: English     7/26/2022     Total Time (in minutes): 10     Spiritual Assessment begun in [facility] 2 MED TELE through conversation with:         []Patient        [] Family    [] Friend(s)        Reason for Consult: Palliative Care, Initial/Spiritual Assessment     Spiritual beliefs: (Please include comment if needed)     [] Identifies with a hossein tradition:         [] Supported by a hossein community:            [] Claims no spiritual orientation:           [] Seeking spiritual identity:                [] Adheres to an individual form of spirituality:           [x] Not able to assess:                           Identified resources for coping:      [] Prayer                               [] Music                  [] Guided Imagery     [] Family/friends                 [] Pet visits     [] Devotional reading                         [x] Unknown     [] Other:                                                Interventions offered during this visit: (See comments for more details)    Patient Interventions:  Other (comment) (unable to assess)           Plan of Care:     [] Support spiritual and/or cultural needs    [] Support AMD and/or advance care planning process      [] Support grieving process   [] Coordinate Rites and/or Rituals    [] Coordination with community clergy   [] No spiritual needs identified at this time   [] Detailed Plan of Care below (See Comments)  [] Make referral to Music Therapy  [] Make referral to Pet Therapy     [] Make referral to Addiction services  [] Make referral to [facility]  [] Make referral to Spiritual Care Partner  [] No future visits requested        [x] Contact Spiritual Care for further referrals     Comments:   reviewed patient's notes and attempting visiting for initial spiritual assessment in room 2114. Patient appeared to be sleeping, no family present. Please contact spiritual care for referrals. Visited by: Lashay Yost.    Paging Service: [phone])

## 2022-07-26 NOTE — CONSULTS
Palliative Medicine Consult  Marcelo: [phone] ([phone])    Patient Name: Daren Patton  YOB: 1945    Date of Initial Consult: 7/26/2022  Reason for Consult: care decisions  Requesting Provider: Jose Ramon Steel MD  Primary Care Physician: Prashant Moore MD     SUMMARY:   Daren Patton is a 68 y.o. with a past history of dementia, dysphagia, anxiety, CAD, kidney stones, and HTN, who was admitted on 7/25/2022 from Home with a diagnosis of Intractable nausea and vomiting likely due to UTI with concern for pyelonephritis in the setting of a recent lithotripsy with uretal stent placement and removal.     She was just discharged last Friday after being treated for a complicated UTI- she had lithotripsy and stent placement on 7/19, and removal on 7/22. On the day of discharge she was brought back to the ED because she was hypoxic. Work up was negative so she was sent back home. She started having nausea and vomiting on Saturday, so she presented to ED in [place] for evaluation. She was transferred to [facility] for further evaluation. She has been reporting dysphagia, but would not participate in speech evaluation which her daughter shared is normal for her- she gets her mind set on something and no one will be able to convince her otherwise. PALLIATIVE DIAGNOSES:   DNR Discussion  Frailty  Fatigue  Physical debility  Anorexia  Diarrhea  Vomiting     PLAN:   Prior to meeting Mrs Citlalli Carney- I reviewed her chart. She is a [facility] participant- so I spoke with her  Ankita Hoffman as well. She shared with me that patients daughter Mihaela Robbins had called her this morning to let her know that she wanted to make her mom a DNR  I called Mihaela Robbins to coordinate a time when she could be present and we could do a DDNR together. I met her at bedside- Mrs Citlalli Carney did not actively participate in the conversation, other than to tell me that she wants us to sebastien her better.   Mihaela Robbins is in agreement with DNR, and completed a DDNR with me. I faxed a copy to [facility] to put on her chart there  As she is with [facility], I did not discuss the idea of hospice support with them, so as not to complicate things. PACE and Hospice are both medicaid/medicare benefits, so you cannot have both. If the family opts for a more comfort oriented approach, and decides not to pursue hospital based care, [facility] will adjust their care of  [facility] accordingly. At this time, they are still wanting treatment of her acute on chronic issues that brought her into the hospital.    Karli Clifford shared with me that her moms insistence that she has dysphagia, and her many many complaints are not new- this is her normal baseline. In talking to the team at [facility], they do intend to try and move her towards a more comfort oriented approach as she does have many many complaints that do not have an overtly fixable cause, but until now the family goals were for full restorative care. We will be available for support as needed, but for now goals are clear  Initial consult note routed to primary continuity provider and/or primary health care team members  Communicated plan of care with: Palliative IDTRocky [facility] Team     GOALS OF CARE / TREATMENT PREFERENCES:     GOALS OF CARE:  Patient/Health Care Proxy Stated Goals: Cure    TREATMENT PREFERENCES:   Code Status: DNR    Advance Care Planning:  [x] [facility] Interdisciplinary Team has updated the ACP Navigator with [address] and Patient Capacity      Primary Decision Maker (Active): Joshua Morin - Daughter - [phone]    Secondary Decision Maker (Active): Cory Combs" - Daughter - [phone]      Medical Interventions: Limited additional interventions       Other:    As far as possible, the palliative care team has discussed with patient / health care proxy about goals of care / treatment preferences for patient.      HISTORY:     History obtained from: chart, daughter Jose Walsh    CHIEF COMPLAINT: Many- overall she just feels poorly and wants to be \"made better\"    HPI/SUBJECTIVE:    The patient is:   [x] Verbal and participatory  [] Non-participatory due to:     Minimal interaction     Clinical Pain Assessment (nonverbal scale for severity on nonverbal patients):   Clinical Pain Assessment  Severity: 0          Duration: for how long has pt been experiencing pain (e.g., 2 days, 1 month, years)  Frequency: how often pain is an issue (e.g., several times per day, once every few days, constant)     FUNCTIONAL ASSESSMENT:     Palliative Performance Scale (PPS):  PPS: 30       PSYCHOSOCIAL/SPIRITUAL SCREENING:     Palliative IDT has assessed this patient for cultural preferences / practices and a referral made as appropriate to needs (Cultural Services, Patient Advocacy, Ethics, etc.)    Any spiritual / Bahai concerns:  [] Yes /  [x] No   If \"Yes\" to discuss with pastoral care during IDT     Does caregiver feel burdened by caring for their loved one:   [] Yes /  [x] No /  [] No Caregiver Present/Available [] No Caregiver [] Pt Lives at [place] [id number]  If \"Yes\" to discuss with social work during IDT    Anticipatory grief assessment:   [x] Normal  / [] Maladaptive     If \"Maladaptive\" to discuss with social work during IDT    ESAS Anxiety: Anxiety: 4    ESAS Depression: Depression: 0        REVIEW OF SYSTEMS:     Positive and pertinent negative findings in ROS are noted above in HPI. The following systems were [x] reviewed / [] unable to be reviewed as noted in HPI  Other findings are noted below. Systems: constitutional, ears/nose/mouth/throat, respiratory, gastrointestinal, genitourinary, musculoskeletal, integumentary, neurologic, psychiatric, endocrine. Positive findings noted below.   Modified ESAS Completed by: provider   Fatigue: 4 Drowsiness: 0   Depression: 0 Pain: 0   Anxiety: 4 Nausea: 0   Anorexia: 8 Dyspnea: 0     Constipation: No     Stool Occurrence(s): 1        PHYSICAL EXAM:     From RN flowsheet:  Wt Readings from Last 3 Encounters:   07/25/22 138 lb 14.2 oz (63 kg)   07/23/22 139 lb (63 kg)   07/19/22 129 lb 3 oz (58.6 kg)     Blood pressure 139/71, pulse 66, temperature 98.8 °F (37.1 °C), resp. rate 18, height 5' 8\" (1.727 m), weight 138 lb 14.2 oz (63 kg), SpO2 94 %.     Pain Scale 1: Numeric (0 - 10)  Pain Intensity 1: 2  Pain Onset 1: acute  Pain Location 1: Rib cage  Pain Orientation 1: Left, Right  Pain Description 1: Aching  Pain Intervention(s) 1: Medication (see MAR)  Last bowel movement, if known:     Constitutional: frail, lethargic, not interested in participating much in the conversation  Eyes: closed  ENMT: no nasal discharge, moist mucous membranes  Cardiovascular: regular rhythm, distal pulses intact  Respiratory: breathing not labored, symmetric  Gastrointestinal: soft non-tender, +bowel sounds  Musculoskeletal: no deformity, no tenderness to palpation  Skin: warm, dry  Neurologic: following commands, moving all extremities  Psychiatric: full affect, no hallucinations  Other:       HISTORY:     Active Problems:    Nausea & vomiting (2/25/2021)      Hypokalemia (6/27/2022)      Achalasia (7/25/2022)      Pyelonephritis (7/25/2022)    Past Medical History:   Diagnosis Date    Agoraphobia without mention of panic attacks 2/17/2014    Anxiety disorder 8/18/2013    Arthritis     osteo    CAD (coronary artery disease), native coronary artery 12/1/2015    no stents    Chronic chest pain 1/13/2014    Chronic pain associated with significant psychosocial dysfunction 2/17/2014    Dementia ([facility] [address]     Depression 8/18/2013    Diabetes ([facility] [address] [facility]     type II    Duplicated right renal collecting system 3/13/2014    GERD (gastroesophageal reflux disease)     Gout, joint     Hypercholesteremia     hyercholesterolemia    Hypertension     Murmur     Nephrolithiasis 3/13/2014    Personal history of noncompliance with medical treatment, presenting hazards to health 5/30/2014      Past Surgical History:   Procedure Laterality Date    EGD  4/23/2010         HX CHOLECYSTECTOMY  09/20/2018    lap jesus    HX CYST REMOVAL      cyst removed from left wrist    HX HEART CATHETERIZATION  12/01/2015    HX HYSTERECTOMY      partial    HX OTHER SURGICAL      bladder dilitation    HX TUBAL LIGATION      HX UROLOGICAL      kidney stones    UPPER GI ENDOSCOPY,BALL DIL,30MM  5/31/2022           Family History   Problem Relation Age of Onset    Stroke Mother     Heart Disease Mother     Cancer Father         type unknown    Heart Disease Son     Liver Disease Son     Heart Disease Daughter     Malignant Hyperthermia Neg Hx     Pseudocholinesterase Deficiency Neg Hx     Delayed Awakening Neg Hx     Post-op Nausea/Vomiting Neg Hx     Emergence Delirium Neg Hx     Post-op Cognitive Dysfunction Neg Hx     Other Neg Hx       History reviewed, no pertinent family history.   Social History     Tobacco Use    Smoking status: Never    Smokeless tobacco: Never   Substance Use Topics    Alcohol use: No     Allergies   Allergen Reactions    Amoxicillin Hives    Sulfa (Sulfonamide Antibiotics) Hives and Itching    Mirtazapine Itching and Nausea Only     Funny feeling in chest    Percocet [Oxycodone-Acetaminophen] Nausea and Vomiting    Codeine Nausea and Vomiting    Crestor [Rosuvastatin] Other (comments)     myalgias    Nitroglycerin Unknown (comments)     Patient cannot remember why she is allergic to it      Prednisone Itching    Zithromax [Azithromycin] Itching     Not sure what it does,taken long time ago      Current Facility-Administered Medications   Medication Dose Route Frequency    dextrose 5% - 0.9% NaCl with KCl 40 mEq/L infusion   IntraVENous CONTINUOUS    L.acidophilus-paracasei-S.thermophil-bifidobacter (RISAQUAD) 8 billion cell capsule  1 Capsule Oral DAILY    metoprolol (LOPRESSOR) injection 2.5 mg  2.5 mg IntraVENous Q6H    potassium chloride 10 mEq in 100 ml IVPB  10 mEq IntraVENous Q1H    hydrOXYzine HCL (ATARAX) tablet 10 mg  10 mg Oral TID PRN    [START ON 7/27/2022] Vancomycin Trough:  Wednesday, 7/27, with AM labs - RN please collect   Other ONCE    ondansetron (ZOFRAN) injection 4 mg  4 mg IntraVENous Q1H PRN    acetaminophen (TYLENOL) tablet 650 mg  650 mg Oral Q6H PRN    ondansetron (ZOFRAN) injection 4 mg  4 mg IntraVENous Q4H PRN    amLODIPine (NORVASC) tablet 2.5 mg  2.5 mg Oral DAILY    aspirin chewable tablet 81 mg  81 mg Oral DAILY    bisacodyL (DULCOLAX) suppository 10 mg  10 mg Rectal DAILY PRN    busPIRone (BUSPAR) tablet 5 mg  5 mg Oral DAILY    ezetimibe (ZETIA) tablet 10 mg  10 mg Oral DAILY    hyoscyamine SL (LEVSIN/SL) tablet 0.125 mg  0.125 mg SubLINGual Q6H PRN    lidocaine 4 % patch 1 Patch  1 Patch TransDERmal DAILY PRN    [Held by provider] metoprolol succinate (TOPROL-XL) XL tablet 25 mg  25 mg Oral DAILY    [Held by provider] trospium (SANCTURA) tablet 20 mg  20 mg Oral DAILY    sertraline (ZOLOFT) tablet 100 mg  100 mg Oral DAILY    sodium chloride (NS) flush 5-40 mL  5-40 mL IntraVENous Q8H    sodium chloride (NS) flush 5-40 mL  5-40 mL IntraVENous PRN    cefTRIAXone (ROCEPHIN) 1 g in 0.9% sodium chloride (MBP/ADV) 50 mL MBP  1 g IntraVENous Q24H    morphine injection 1 mg  1 mg IntraVENous Q4H PRN    enoxaparin (LOVENOX) injection 40 mg  40 mg SubCUTAneous Q24H    vancomycin (VANCOCIN) 750 mg in 0.9% sodium chloride 250 mL (VIAL-MATE)  750 mg IntraVENous Q12H    insulin lispro (HUMALOG) injection   SubCUTAneous AC&HS    glucose chewable tablet 16 g  4 Tablet Oral PRN    glucagon (GLUCAGEN) injection 1 mg  1 mg IntraMUSCular PRN    dextrose 10% infusion 0-250 mL  0-250 mL IntraVENous PRN          LAB AND IMAGING FINDINGS:     Lab Results   Component Value Date/Time    WBC 5.6 07/26/2022 02:26 AM    HGB 9.0 (L) 07/26/2022 02:26 AM    PLATELET 919 50/70/7016 02:26 AM     Lab Results   Component Value Date/Time    Sodium 142 07/26/2022 02:26 AM    Potassium 2.7 (LL) 07/26/2022 02:26 AM Chloride 109 (H) 07/26/2022 02:26 AM    CO2 24 07/26/2022 02:26 AM    BUN 13 07/26/2022 02:26 AM    Creatinine 0.77 07/26/2022 02:26 AM    Calcium 8.4 (L) 07/26/2022 02:26 AM    Magnesium 2.1 07/26/2022 10:19 AM    Phosphorus 3.0 01/11/2022 03:19 AM      Lab Results   Component Value Date/Time    Alk. phosphatase 62 07/23/2022 07:40 PM    Protein, total 6.5 07/23/2022 07:40 PM    Albumin 2.7 (L) 07/23/2022 07:40 PM    Globulin 3.8 07/23/2022 07:40 PM     Lab Results   Component Value Date/Time    INR 1.2 (H) 02/17/2021 12:00 AM    Prothrombin time 15.3 (H) 02/17/2021 12:00 AM    aPTT 31.0 02/28/2021 03:38 AM    aPTT 149.4 (HH) 02/19/2021 05:21 AM      Lab Results   Component Value Date/Time    Iron 76 07/31/2015 01:27 PM    TIBC 238 (L) 07/31/2015 01:27 PM    Iron % saturation 32 07/31/2015 01:27 PM    Ferritin 301 (H) 02/28/2021 03:38 AM      No results found for: PH, PCO2, PO2  No components found for: [place]   Lab Results   Component Value Date/Time    CK 69 07/20/2018 12:34 PM    CK - MB <1.0 07/20/2018 12:34 PM                Total time:   Counseling / coordination time, spent as noted above:   > 50% counseling / coordination?:     Prolonged service was provided for  []30 min   []75 min in face to face time in the presence of the patient, spent as noted above. Time Start:   Time End:   Note: this can only be billed with 79777 (initial) or 97320 (follow up). If multiple start / stop times, list each separately.

## 2022-07-26 NOTE — PROGRESS NOTES
1254 Attempted to give medication with thin liquids. Pt unable to swallow and states she feels like something is in the back of her throat. Pt began to cough up secretions. All PO medication held due to aspiration risk. Pt suctioned. 0940 Pt also complaining of pain more pronounced on left upper ribs. Lidocaine 4% patch placed. 0582 Morphine 1 mg Q4H PRN given. MD notified and aware. 1004 Rapid response team called due to pt stating pain is not relieved by medications and increased secretions. Verbal bedside orders per MD include: Troponin, Mag, EKG, Metoprolol IV 2.5 mg Q6H, Chest X-Ray    1430 Bladder scan pt revealed 95 mL in bladder. Will continue to monitor.

## 2022-07-26 NOTE — PROGRESS NOTES
Pt having multiple loose stool. No urine noted. Bladder scan > 589. There's order for post void residual, but no order for urinary retention.  Perfect serve Dr Yonny Fountain.

## 2022-07-26 NOTE — PROGRESS NOTES
Progress Note    Patient: Gricel Null MRN: [id number]  SSN: [id number]    YOB: 1945  Age: 68 y.o. Sex: female          ADMITTED:  2022 to Meena Gilliam MD  for Pyelonephritis [N12]  Hypokalemia [E87.6]  Nausea & vomiting [R11.2]  Achalasia [K22.0]           Gricel Null was admitted for Pyelonephritis [N12]  Hypokalemia [E87.6]  Nausea & vomiting [R11.2]  Achalasia [K22.0]. POD 7 CRULs with stent removed 22 prior to discharge. ureteral stones cleared. Left renal stones remain. Presented back to free standing ED yesterday with N/V and mild left flank pain. Notably hypokalemic    OSH CT a/p suggesting pyelo. No images to review. UA relitively clean from 22 (wbc 0-5, few bacteria, trace leuks and no nitrites, pending cx  ROS unreliable as patient with hx of dementia  Creat remains NL, K 2.7 today   HD stable  No flank pain today    Vitals:  Temp (24hrs), Av.8 °F (36.6 °C), Min:97.3 °F (36.3 °C), Max:98.2 °F (36.8 °C)     Blood pressure (!) 166/72, pulse 70, temperature 98 °F (36.7 °C), resp. rate 18, height 5' 8\" (1.727 m), weight 63 kg (138 lb 14.2 oz), SpO2 90 %. I&O's:  1901 - 700  In: -   Out: 550 [Urine:550]   No intake/output data recorded.      Exam:   NAD. abdomen soft, NT  Disoriented at baseline  No cva tenderness on exam   Moves all extremities  Frail      Labs:   Recent Labs     22   WBC 5.6 3.3*   HGB 9.0* 10.6*   HCT 28.7* 33.1*    229     Recent Labs     22    137   K 2.7* 3.1*   * 104   CO2 24 27   GLU 74 90   BUN 13 10   CREA 0.77 0.71   CA 8.4* 8.6        Cultures:        Imaging:       Assessment:     - Active Problems:    Nausea & vomiting (2021)      Hypokalemia (2022)      Achalasia (2022)      Pyelonephritis (2022)        Plan:     -supportive treatment of pyelonephritis, follow cultures, continue empiric abx until able to target therapy   -can re-image if clinically worsens, request images/disc from OSH to review  -follow up with urology as previously scheduled   -will sign off, please call if needed    Discussed with supervising MD, Dr. Maynor Orozco By: Maria E Bui NP - July 26, 2022

## 2022-07-26 NOTE — PROGRESS NOTES
Discussed with Dr. Barbara Allen radiologist who reported that the patient does not have a free air, does not have pneumoperitoneum but has extensive colonic distention  Awaiting final reading abdomen xray   Talk to the nurse to update on-call physician on the results of the KUB

## 2022-07-26 NOTE — PROGRESS NOTES
VAT asked by primary nurse to evaluate patient for possible Midline. Upon arrival noted patient has 2 patent PIVs, 22g L hand and 20g L upper arm, both currently infusing, no s/sxs of infiltration, patient denies pain, no swelling, or redness noted. Spoke with primary nurse, advised to call team if patient looses access and will re-evaluate at that time.

## 2022-07-26 NOTE — PROGRESS NOTES
Per lab, blood drawn and sent to laboratory thrown out. Tubes were labeled. Labs to be redrawn again and sent.

## 2022-07-27 NOTE — PROGRESS NOTES
Hospitalist Progress Note    NAME: Pamella Barthel   :  1945   MRN:  [id number]       Assessment / Plan:  Colonic distension   Abdominal xray showed colonic distension concerning for olgivie sd   Keep npo for now , pt having loose stools   Spoke with surgeon , will order CT abdomen   Might need colonic decompression , defer to GI   C/w IVF and potassium repletion   Spoke with GI via perfect serve , no indication for rectal tube per GI   S/p Rapid response for chest pain on   Patient had a rapid response this morning complaining of producible chest pain upon palpation. Patient was very anxious  Nurse had reported that she was having difficulty swallowing  Patient seen and examined  EKG completed today EKG on admission was nonischemic  Patient was already given IV morphine earlier but reported that it was not helping   VS showed that she was hypertensive  Troponin and chest x-ray ordered  A.m. labs reviewed, which showed  hypokalemia potassium of 2.7  Magnesium is within normal limit  Troponin mildly elevated at 88, will trend 2 more sets  Chest x-ray was concerning for possible pneumoperitoneum, spoke with radiologist who advised abdominal x-ray. Stat abdominal x-ray ordered  Keep n.p.o., GI consulted  Check 2D echo  Abdominal x-ray showed  IMPRESSION  1. Markedly ectatic proximal esophagus containing enteric content. 2. Lucency over the entire abdomen which is concerning for pneumoperitoneum. Recommend supine and left lateral decubitus imaging of the abdomen. Intractable nausea vomiting most likely due to UTI with concern for pyelonephritis  History of recent lithotripsy ,  ureteral stent placement and removal  Leukopenia  Dysphagia  H/o esophageal dilation   Urinary retention    continue with IV ceftriaxone,  and V vancomycin based on last urine culture which was showing Enterococcus faecalis. UA showed 1+ bacteria.  Ucx negative   Will consult urology, urology input noted  Patient failed bedside swallow, formal speech therapy is cleared for dysphagia diet. Dysphagia diet on hold as patient is still having difficulty swallowing. Keep n.p.o. Continue with IV fluid   CT scan abdomen  from outside hospital showed:  CT  abdomen at new [facility] showed:  IMPRESSION:   1. Abnormalities of the left renal collecting system which could be associated   with pyelitis or ureteriti/UTI. A recently passed stone cannot be excluded. Multiple bilateral renal stones are present including within left renal pelvis. 2. Severe anasarca and other findings within the lung bases which could be   associated with sepsis organ failure although exact etiology uncertain. 3. Interval gaseous moderate distension. Of distal colon with air-fluid level. This pattern does not appear to be obstructive and a definite mass is not   delineated. 4. Distal esophageal distension with retained desiccated food. This could be   from chronic retention achalasia and possibly a stricture. Similar pattern   although less pronounced with seen previously. GI input reviewed , no plans for procedure   Ucx negative but will c/w abx to cover possible as pt was sob yesterday after having difficulty swallowing . She has lots of thick secretions  Intractable diarrhea  Watery diarrhea, check enteric panel and C.  Difficile  Hypokalemia  Replete as needed, magnesium is within normal limit  Hypertension  Hold P.o. metoprolol and Norvasc as patient is n.p.o. start IV metoprolol 2.5 mg every 6 and as needed hydralazine, add scheduled enalaprilat   CAD  GERD  HLD  Continue home meds  History of dementia  Depression with anxiety  Per daughter patient is completely dependent for her ADLs     PreDM  Episode of hypoglycemia  Sliding scale insulin with high sensitivity           Code Status: Full code  Surrogate Decision Maker: Patient daughter     DVT Prophylaxis: Lovenox  GI Prophylaxis: not indicated     Baseline: Mostly bedbound      18.5 - 24.9 Normal weight / Body mass index is 21.12 kg/m². Estimated discharge date: July 31st  Barriers: Unstable       Updated patient daughter at bedside, all questions answered  Code status: DNR  Prophylaxis: Lovenox  Recommended Disposition: SNF/LTC     Subjective:     Chief Complaint / Reason for Physician Visit  FU ileus/dysphagia . Continue to c/o flank pain , has lot of thick secretions  discussed with RN events overnight. Review of Systems:  Symptom Y/N Comments  Symptom Y/N Comments   Fever/Chills n   Chest Pain y    Poor Appetite    Edema     Cough    Abdominal Pain n    Sputum    Joint Pain     SOB/MOORE n   Pruritis/Rash     Nausea/vomit y   Tolerating PT/OT     Diarrhea    Tolerating Diet  N.p.o.   Constipation    Other       Could NOT obtain due to:      Objective:     VITALS:   Last 24hrs VS reviewed since prior progress note. Most recent are:  Patient Vitals for the past 24 hrs:   Temp Pulse Resp BP SpO2   07/27/22 0613 -- 80 -- (!) 152/75 96 %   07/27/22 0412 98.4 °F (36.9 °C) 80 18 (!) 164/83 94 %   07/27/22 0058 -- 64 -- (!) 153/68 95 %   07/27/22 0000 -- -- -- -- 92 %   07/26/22 2359 -- -- -- -- 99 %   07/26/22 2241 99.3 °F (37.4 °C) 77 18 (!) 169/87 100 %   07/26/22 1953 98 °F (36.7 °C) 72 17 (!) 148/75 95 %   07/26/22 1600 -- 75 -- -- --   07/26/22 1553 98.7 °F (37.1 °C) 75 18 (!) 157/71 95 %   07/26/22 1200 -- 66 -- -- --   07/26/22 1151 -- 64 -- 139/71 --   07/26/22 1052 98.8 °F (37.1 °C) 72 18 (!) 165/91 94 %   07/26/22 1032 -- 69 -- -- 92 %   07/26/22 1004 -- 65 16 (!) 144/75 98 %   07/26/22 0753 -- 70 -- -- 99 %       No intake or output data in the 24 hours ending 07/27/22 1031       I had a face to face encounter and independently examined this patient on 7/27/2022, as outlined below:  PHYSICAL EXAM:  General: WD, WN. Alert, cooperative, no acute distress    EENT:  EOMI. Anicteric sclerae. MMM  Resp:  CTA bilaterally, no wheezing or rales.   No accessory muscle use  CV:  Regular  rhythm,  No edema  GI:  Soft, Non distended, Non tender. +Bowel sounds  Neurologic:  Alert and oriented X 3, normal speech,   Psych:   Poor insight. Not anxious nor agitated  Skin:  No rashes. No jaundice    Reviewed most current lab test results and cultures  YES  Reviewed most current radiology test results   YES  Review and summation of old records today    NO  Reviewed patient's current orders and MAR    YES  PMH/SH reviewed - no change compared to H&P  ________________________________________________________________________  Care Plan discussed with:    Comments   Patient     Family  x Daughter    RN x    Care Manager     Consultant  x Surgery                      Multidiciplinary team rounds were held today with , nursing, pharmacist and clinical coordinator. Patient's plan of care was discussed; medications were reviewed and discharge planning was addressed. ________________________________________________________________________  Total NON critical care TIME:   45  Minutes    Total CRITICAL CARE TIME Spent:  Minutes non procedure based      Comments   >50% of visit spent in counseling and coordination of care     ________________________________________________________________________  Jean Bowie MD     Procedures: see electronic medical records for all procedures/Xrays and details which were not copied into this note but were reviewed prior to creation of Plan. LABS:  I reviewed today's most current labs and imaging studies.   Pertinent labs include:  Recent Labs     07/27/22  0353 07/26/22  2229 07/26/22  0226   WBC 7.3 10.7 5.6   HGB 9.7* 10.5* 9.0*   HCT 30.8* 34.2* 28.7*    234 199       Recent Labs     07/27/22  0353 07/26/22  2229 07/26/22  1019 07/26/22  0226    141  --  142   K 3.7 3.8  --  2.7*   * 110*  --  109*   CO2 24 24  --  24   GLU 95 83  --  74   BUN 12 12  --  13   CREA 0.69 0.80  --  0.77   CA 8.4* 8.4*  --  8.4*   MG  --   --  2.1  --          Signed: Dayday Dan MD

## 2022-07-27 NOTE — PROGRESS NOTES
Problem: Aspiration - Risk of  Goal: *Absence of aspiration  Outcome: Progressing Towards Goal     Problem: Patient Education: Go to Patient Education Activity  Goal: Patient/Family Education  Outcome: Progressing Towards Goal     Problem: Patient Education: Go to Patient Education Activity  Goal: Patient/Family Education  Outcome: Progressing Towards Goal     Problem: Pressure Injury - Risk of  Goal: *Prevention of pressure injury  Description: Document Freddy Scale and appropriate interventions in the flowsheet. Outcome: Progressing Towards Goal  Note: Pressure Injury Interventions:  Sensory Interventions: Assess changes in LOC    Moisture Interventions: Check for incontinence Q2 hours and as needed    Activity Interventions: Increase time out of bed    Mobility Interventions: Assess need for specialty bed    Nutrition Interventions: Document food/fluid/supplement intake    Friction and Shear Interventions: Apply protective barrier, creams and emollients                Problem: Patient Education: Go to Patient Education Activity  Goal: Patient/Family Education  Outcome: Progressing Towards Goal     Problem: Risk for Spread of Infection  Goal: Prevent transmission of infectious organism to others  Description: Prevent the transmission of infectious organisms to other patients, staff members, and visitors. Outcome: Progressing Towards Goal     Problem: Patient Education:  Go to Education Activity  Goal: Patient/Family Education  Outcome: Progressing Towards Goal     Problem: Falls - Risk of  Goal: *Absence of Falls  Description: Document Jt Brar Fall Risk and appropriate interventions in the flowsheet.   Outcome: Progressing Towards Goal  Note: Fall Risk Interventions:       Mentation Interventions: Bed/chair exit alarm    Medication Interventions: Bed/chair exit alarm, Evaluate medications/consider consulting pharmacy, Patient to call before getting OOB    Elimination Interventions: Call light in reach, Bed/chair exit alarm              Problem: Patient Education: Go to Patient Education Activity  Goal: Patient/Family Education  Outcome: Progressing Towards Goal

## 2022-07-27 NOTE — PROGRESS NOTES
Speech Pathology:  Chart reviewed in preparation for dysphagia treatment. Patient NPO for procedure this date and currently off floor. PO trials for dysphagia treatment deferred. Note patient receiving pureed diet. SLP to continue to follow. Thank you.     Myra Mishra, SLP

## 2022-07-27 NOTE — PROGRESS NOTES
Pharmacy Antimicrobial Kinetic Dosing    Indication for Antimicrobials: UTI, aspiration pneumonia    Current Regimen of Each Antimicrobial:  Vancomycin 750 mg IV q12h (Start Date ; Day # 3/7)  Ceftriaxone 1g q 24h (start: , day 3)  Metronidazole 500 mg IV q12h (start date ; Day # 2)     Goal Level: AUC: 400-600 mg/hr/Liter/day and VANCOMYCIN TROUGH GOAL RANGE    Vancomycin Trough: 15 - 20 mcg/mL    Date Dose & Interval Measured (mcg/mL) Predicted AUC/ROYAL    03:53 750 mg q12h 21.9 636                 Dosing calculator used: Yandex calculator    Significant Positive Cultures:     urine: no growth, Final    Conditions for Dosing Consideration: None    Labs:  Recent Labs     22  0353 22   CREA 0.69 0.80 0.77   BUN 12 12 13     Recent Labs     22  0353 226   WBC 7.3 10.7 5.6     Temp (24hrs), Av.5 °F (36.9 °C), Min:98 °F (36.7 °C), Max:99.3 °F (37.4 °C)    Creatinine Clearance (mL/min):   CrCl (Ideal Body Weight): 69.0   If actual weight < IBW: CrCl (Actual Body Weight) 68.0    Impression/Plan:   The vancomycin level resulted at 21.9 mcg/ml (). Will decrease the dose to 750 mg IV q18h (predicted , predicted trough 13.3 mcg/ml)  Continue ceftriaxone as above  BMP and CBC ordered daily  Antimicrobial stop date 7 days     Pharmacy will follow daily and adjust medications as appropriate for renal function and/or serum levels.     Thank you,  Yates Lesch, LILLYD

## 2022-07-27 NOTE — CONSULTS
Surgery Consult  Consulted by: Dr. Oakes President. Thank you. PCP: Chelsie Gaytan MD    History and data reviewed. Pt interviewed/examined. Abdomen softly distended. No focal tenderness. No rebound or guarding. 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 Plan:  Needs rectal tube for decompression. Discussed with Dr. Oakes President. Surgical intervention last step in the algorithm. FULL NOTE ADDENDUM WILL BE ADDED BELOW. Thanks. Signed By: Osker Boeck, MD     July 27, 2022      ------------------------------------------------------------------------       ADDENDUM:     Patient interviewed and examined. Subjective:      Jocelin Roche is a 68 y.o. female who was admitted on 7/25/2022 with nausea and vomiting. She had been here the week prior for complicated UTI with lithotripsy on July 19. This admission she was found on CAT scan to have colonic distention. GI has seen her and is not planning to decompress at this time. Objective:   See flowsheet for vitals.       Physical Exam:  PHYSICAL EXAM:  Gen:  [x]     A&O     [x]      No acute distress    [x]     non-toxic     []     ill apearing     []     Critical        HEENT:   [x]     anicteric    []      scleral icterus    [x]     moist mucosa     []     dry mucosa    RESP:   [x]     CTA bilaterally, no wheezing/rhonchi/rales/crackles    []     wheezing     []     rhonchi     []     crackles     []     use of accessory muscles    CARD:  [x]     regular rate and rhythm     [x]     No murmurs/rubs/gallops    []     irregular rhythm     []     Murmur     []     Rubs     []     Gallops    ABD:    See above    SKIN:   [x]     normal      []     Rashes      []     Ulcers     EXT:  [x]      No CCE     []      2+ pulses throughout    []      Clubbing     []     Cyanosis     []     Edema     []     diminished pulses    NEUR:   [x]     Strength normal     []weakness  []LUE    []RUE    []LLE    []RLE    [x]     follows commands          PSYCH:   insight []poor   [x]good                      []     depressed     []     anxious     []     agitated    Past Medical History:   Diagnosis Date    Agoraphobia without mention of panic attacks 2/17/2014    Anxiety disorder 8/18/2013    Arthritis     osteo    CAD (coronary artery disease), native coronary artery 12/1/2015    no stents    Chronic chest pain 1/13/2014    Chronic pain associated with significant psychosocial dysfunction 2/17/2014    Dementia ([address]     Depression 8/18/2013    Diabetes ([address]     type II    Duplicated right renal collecting system 3/13/2014    GERD (gastroesophageal reflux disease)     Gout, joint     Hypercholesteremia     hyercholesterolemia    Hypertension     Murmur     Nephrolithiasis 3/13/2014    Personal history of noncompliance with medical treatment, presenting hazards to health 5/30/2014     Past Surgical History:   Procedure Laterality Date    EGD  4/23/2010         FLEXIBLE SIGMOIDOSCOPY N/A 7/29/2022    SIGMOIDOSCOPY FLEXIBLE performed by Kristin Espino MD at [facility] ENDOSCOPY    HX CHOLECYSTECTOMY  09/20/2018    lap jesus    HX CYST REMOVAL      cyst removed from left wrist    HX HEART CATHETERIZATION  12/01/2015    HX HYSTERECTOMY      partial    HX OTHER SURGICAL      bladder dilitation    HX TUBAL LIGATION      HX UROLOGICAL      kidney stones    UPPER GI ENDOSCOPY,BALL DIL,30MM  5/31/2022           Family History   Problem Relation Age of Onset    Stroke Mother     Heart Disease Mother     Cancer Father         type unknown    Heart Disease Son     Liver Disease Son     Heart Disease Daughter     Malignant Hyperthermia Neg Hx     Pseudocholinesterase Deficiency Neg Hx     Delayed Awakening Neg Hx     Post-op Nausea/Vomiting Neg Hx     Emergence Delirium Neg Hx     Post-op Cognitive Dysfunction Neg Hx     Other Neg Hx      Social History     Socioeconomic History    Marital status:    Tobacco Use    Smoking status: Never    Smokeless tobacco: Never   Vaping Use    Vaping Use: Never used   Substance and Sexual Activity    Alcohol use: No    Drug use: No    Sexual activity: Never   Social History Narrative    ** Merged History Encounter **     , lives with her son and Friend. Prior to Admission medications    Medication Sig Start Date End Date Taking? Authorizing Provider   lidocaine (Lidoderm) 5 % Apply patch to the affected area for 12 hours a day and remove for 12 hours a day. 7/23/22   Damaris Pacheco MD   hyoscyamine SL (LEVSIN/SL) 0.125 mg SL tablet 1 Tablet by SubLINGual route every six (6) hours as needed for Cramping. 7/15/22   Frank Yu MD   metoprolol succinate (TOPROL-XL) 25 mg XL tablet Take 25 mg by mouth daily. Provider, Historical   buspirone HCl (BUSPAR PO) Take 5 mg by mouth daily. Provider, Historical   ergocalciferol (Vitamin D2) 1,250 mcg (50,000 unit) capsule Take 50,000 Units by mouth every seven (7) days. Provider, Historical   senna-docusate (PERICOLACE) 8.6-50 mg per tablet Take 1 Tablet by mouth two (2) times a day. 1/14/22   Wendy Reed NP   bisacodyL (Dulcolax, bisacodyl,) 10 mg supp Insert 10 mg into rectum daily as needed for Constipation (To promote 3-4 bowel movements weekly). 1/14/22   Wendy Reed NP   polyethylene glycol (MIRALAX) 17 gram packet Take 1 Packet by mouth two (2) times a day. 1/14/22   Wendy Reed NP   sertraline (Zoloft) 100 mg tablet Take 100 mg by mouth daily. OtherShun MD   mirabegron ER (MYRBETRIQ) 50 mg ER tablet Take 50 mg by mouth daily. Shun Sevilla MD   acetaminophen (TYLENOL) 500 mg tablet Take 1,000 mg by mouth every six (6) hours as needed for Pain. Provider, Historical   aspirin 81 mg chewable tablet Take 1 Tab by mouth daily. 2/24/21   Tanay Esposito MD   ezetimibe (ZETIA) 10 mg tablet Take 1 Tab by mouth daily. 2/24/21   Tanya Esposito MD   amLODIPine (NORVASC) 2.5 mg tablet Take 2.5 mg by mouth daily.     Provider, Historical     ALLERGIES:    Allergies   Allergen Reactions    Amoxicillin Hives    Sulfa (Sulfonamide Antibiotics) Hives and Itching    Mirtazapine Itching and Nausea Only     Funny feeling in chest    Percocet [Oxycodone-Acetaminophen] Nausea and Vomiting    Codeine Nausea and Vomiting    Crestor [Rosuvastatin] Other (comments)     myalgias    Nitroglycerin Unknown (comments)     Patient cannot remember why she is allergic to it      Prednisone Itching    Zithromax [Azithromycin] Itching     Not sure what it does,taken long time ago       Review of Systems:  (unchecked were asked but negative)          []      Fever/chills     []      Abd Pain   []      Fatique                                   [x]      Nausea/Vomit   []      Weight loss    [x]      Diarrhea []      Constipation    []      Headache    []      Blood in stool  []      Visual loss    []      Hematuria   []      Hearing loss    []      Dysuria      []      Heat intolerance    []      Myalgias  []      Cold intolerance    []      Arthralgias  []      Reflux     []      Neuropathy   []      Dysphagia    []      Easy bruising  []      Chest Pain    []      Prolonged bleeding   []      Palpitations    []      Anxiety   []      Cough                                                    []      Depression                                []      Sputum           []      SOB/MOORE                                   []     Unable to obtain  ROS due to  []     mental status change  []     sedated   []     intubated    LABS:  No results for input(s): WBC, HGB, HCT, PLT, HGBEXT, HCTEXT, PLTEXT in the last 72 hours. No results for input(s): NA, K, CL, CO2, BUN, CREA, GLU, CA, MG, PHOS, URICA in the last 72 hours. No results for input(s): AP, TBIL, TP, ALB, GLOB, GGT, AML, LPSE in the last 72 hours.     No lab exists for component: SGOT, GPT, AMYP, HLPSE  Recent Labs     07/30/22  1426   APTT 49.5*         Signed By: Teri Narayan MD     August 2, 2022

## 2022-07-27 NOTE — PROGRESS NOTES
This RN gave report to Jacoby Tan (oncoming RN). All questions answered. Pt had her KUB reviewed by the on-call Hospitalist who subsequently ordered Surgery consult. In-house Hospitalist notified and had no acute interventions at present. This AM, AM MD noted that pt need to see Surgery consult and GI consult. AM RN made aware. Pt's Vanc level came back supratherapeutic, 21.9. Attempted to notify Pharmacist without success. Notified MD instead. This AM, pt had a bladder scan of 400cc. AM MD notified. Pt to get a Crowder placed. This AM, pt was found alert and awake in bed.

## 2022-07-27 NOTE — PROGRESS NOTES
0830- zofran iv given per  for nausea. See mar. 7924- pt transported off unit for Echocardiogram.  0920- back in room now. IV's infusing   2919-5820- pt complaining of chest discomfort. Asking for anxiety medication and pain meds. Dr Adamaris Coyne texted thru [employer] about patients pain and anxiety . Awaiting response. 1013- 98.3-74-24  BP- 131/78 . Also called daughter for patient and she  spoke to her briefly. 1100- Morphine iv 1mg given for pain continuing to be a 8 on 1-10  1200- pt continues to c/o pain 7-8 on 1-10 scale. Lidoderm patch given per prn orders. Daughter at bedside with patient. 1325- pt off floor to CT scan. Transported on tele and 2 LNC. 1415- back in room . CT completed.

## 2022-07-27 NOTE — PROGRESS NOTES
0700:  Bedside shift change report given to [address] (oncoming nurse) by Juan Haro (offgoing nurse). Report included the following information SBAR, Kardex, MAR, and Cardiac Rhythm NSR .      0809: Crowder catheter placed for urinary retention of >400 ml of urine in bladder per provider order. Nurse driven protocol to be placed. 1508: Bedside shift change report given to [address] (oncoming nurse) by [address] (offgoing nurse). Report included the following information SBAR, Kardex, and MAR.

## 2022-07-27 NOTE — PROGRESS NOTES
AM MD Gladys Fox) placed \"NURSING-MISCELLANEOUS: please update night doctor on kub reports by calling or perfect serve him, very important ONE TIME\"    On call MD notified of KUB has resulted. On call MD noted to page [facility] to examine the patient.     On call MD ordered: KEEP NPO, CBC, Chem 7, and surgery consult    This RN spoke with [facility] who noted that there is no acute issues to treat presently and that this can be followed up tomorrow with GI.

## 2022-07-27 NOTE — CONSULTS
GI CONSULTATION NOTE  Dorothy Campbell NP  [phone] NP in-hospital cell phone M-F until 4:30  After 5pm or on weekends, please call  for physician on call    NAME: Sergio Linton   :  1945   MRN:  [id number]   Attending:  Dr. Viri Lares  Primary GI:  Dr. Saravanan Cottrell; Dr. Riki Shen covering   Date/Time:  2022 9:41 AM  Assessment:   Dysphagia   Hx of the same   Last EGD with dilation in May 2022 showing esophageal obstruction, consistent with achalasia with spastic appearance, tight LES and dilated proximal esophagus. No discrete stricture  Botox injection was recommended if previous dilation was ineffective  Patient able to tolerate purees and thins per SLP evaluation  CT at [facility] ED (2022) showed distal esophageal distension with retained desiccated food. This could be from chronic retention achalasia and possibly a stricture. Similar pattern although less pronounced with seen previously    Abnormal GI findings on imaging   CT at [facility] ED (2022) showed interval gaseous moderate distension. Of distal colon with air-fluid level. This pattern does not appear to be obstructive and a definite mass is not delineated. KUB (2022): No evidence of intraperitoneal free air. There is ongoing moderate to severe gaseous distention of the colon which may be related to distal colonic obstruction versus Ubaldo syndrome    Plan:   No direct plans for aggressive measures from a GI standpoint at this time. Patient is recovering from urosepsis and has associated ileus. Needs bowel regimen. Dulcolax suppository ordered   Appreciate SLP input  General surgery consult pending   Continue PPI  Diet per speech recommendations   Symptomatic care per primary team   Plan discussed with Dr. Yaima Carvajal:     HISTORY OF PRESENT ILLNESS:     Sergio Linton is an 68 y.o.  female who we are asked to see for complaint of dysphagia.   Patient has a past medical history of dementia, dysphagia, anxiety disorder, CAD, kidney stones, and hypertension who presents with complaint of nausea vomiting and decreased p.o. intake. Patient was discharged last Friday after being treated for complicated UTI, had lithotripsy and stent placement on July 19, the day of discharge was brought back to the ED because she was hypoxic. Work-up did not show anything and was sent back home. Daughter reported that patient started having nausea vomiting and flank pain on Saturday and was not eating much, she presented to [facility] for evaluation. Patient was transferred to [facility] for further evaluation.     Past Medical History:   Diagnosis Date    Agoraphobia without mention of panic attacks 2/17/2014    Anxiety disorder 8/18/2013    Arthritis     osteo    CAD (coronary artery disease), native coronary artery 12/1/2015    no stents    Chronic chest pain 1/13/2014    Chronic pain associated with significant psychosocial dysfunction 2/17/2014    Dementia ([facility] [address]     Depression 8/18/2013    Diabetes ([facility] [address]     type II    Duplicated right renal collecting system 3/13/2014    GERD (gastroesophageal reflux disease)     Gout, joint     Hypercholesteremia     hyercholesterolemia    Hypertension     Murmur     Nephrolithiasis 3/13/2014    Personal history of noncompliance with medical treatment, presenting hazards to health 5/30/2014      Past Surgical History:   Procedure Laterality Date    EGD  4/23/2010         HX CHOLECYSTECTOMY  09/20/2018    lap jesus    HX CYST REMOVAL      cyst removed from left wrist    HX HEART CATHETERIZATION  12/01/2015    HX HYSTERECTOMY      partial    HX OTHER SURGICAL      bladder dilitation    HX TUBAL LIGATION      HX UROLOGICAL      kidney stones    UPPER GI ENDOSCOPY,BALL DIL,30MM  5/31/2022          Social History     Tobacco Use    Smoking status: Never    Smokeless tobacco: Never   Substance Use Topics    Alcohol use: No      Family History   Problem Relation Age of Onset    Stroke Mother     Heart Disease Mother     Cancer Father         type unknown    Heart Disease Son     Liver Disease Son     Heart Disease Daughter     Malignant Hyperthermia Neg Hx     Pseudocholinesterase Deficiency Neg Hx     Delayed Awakening Neg Hx     Post-op Nausea/Vomiting Neg Hx     Emergence Delirium Neg Hx     Post-op Cognitive Dysfunction Neg Hx     Other Neg Hx       Allergies   Allergen Reactions    Amoxicillin Hives    Sulfa (Sulfonamide Antibiotics) Hives and Itching    Mirtazapine Itching and Nausea Only     Funny feeling in chest    Percocet [Oxycodone-Acetaminophen] Nausea and Vomiting    Codeine Nausea and Vomiting    Crestor [Rosuvastatin] Other (comments)     myalgias    Nitroglycerin Unknown (comments)     Patient cannot remember why she is allergic to it      Prednisone Itching    Zithromax [Azithromycin] Itching     Not sure what it does,taken long time ago      Prior to Admission medications    Medication Sig Start Date End Date Taking? Authorizing Provider   nitrofurantoin (MACRODANTIN) 100 mg capsule Take 1 Capsule by mouth two (2) times a day for 5 days. 7/23/22 7/28/22  Trever Camarillo MD   potassium chloride (K-DUR, KLOR-CON M20) 20 mEq tablet Take 1 Tablet by mouth in the morning for 5 days. 7/23/22 7/28/22  Trever Camarillo MD   lidocaine (Lidoderm) 5 % Apply patch to the affected area for 12 hours a day and remove for 12 hours a day. 7/23/22   Neo Everett MD   hyoscyamine SL (LEVSIN/SL) 0.125 mg SL tablet 1 Tablet by SubLINGual route every six (6) hours as needed for Cramping. 7/15/22   Yaritza Dan MD   metoprolol succinate (TOPROL-XL) 25 mg XL tablet Take 25 mg by mouth daily. Provider, Historical   buspirone HCl (BUSPAR PO) Take 5 mg by mouth daily. Provider, Historical   ergocalciferol (Vitamin D2) 1,250 mcg (50,000 unit) capsule Take 50,000 Units by mouth every seven (7) days.     Provider, Historical   senna-docusate (PERICOLACE) 8.6-50 mg per tablet Take 1 Tablet by mouth two (2) times a day. 1/14/22   Indira Desir NP   bisacodyL (Dulcolax, bisacodyl,) 10 mg supp Insert 10 mg into rectum daily as needed for Constipation (To promote 3-4 bowel movements weekly). 1/14/22   Indira Desir NP   polyethylene glycol (MIRALAX) 17 gram packet Take 1 Packet by mouth two (2) times a day. 1/14/22   Indira Desir NP   sertraline (Zoloft) 100 mg tablet Take 100 mg by mouth daily. Shun Sevilla MD   mirabegron ER (MYRBETRIQ) 50 mg ER tablet Take 50 mg by mouth daily. Shun Sevilla MD   acetaminophen (TYLENOL) 500 mg tablet Take 1,000 mg by mouth every six (6) hours as needed for Pain. Provider, Historical   aspirin 81 mg chewable tablet Take 1 Tab by mouth daily. 2/24/21   Darrel Sheets MD   ezetimibe (ZETIA) 10 mg tablet Take 1 Tab by mouth daily. 2/24/21   Darrel Sheets MD   amLODIPine (NORVASC) 2.5 mg tablet Take 2.5 mg by mouth daily.     Provider, Historical       Patient Active Problem List   Diagnosis Code    HTN, goal below 130/80 I10    Chronic headache R51.9, G89.29    Acid reflux K21.9    Dizziness of unknown cause R42    Neutropenia (HCC) D70.9    Abdominal pain R10.9    Constipation K59.00    Insomnia G47.00    Hyperlipidemia E78.5    UTI (urinary tract infection) N39.0    Chronic chest pain R07.9, G89.29    Chronic pain associated with significant psychosocial dysfunction G89.4    Depression with anxiety F41.8    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    Onycholysis of toenail Q07.6    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    Personal history of noncompliance with medical treatment, presenting hazards to health Z91.19    Breast pain, left N64.4    Pseudobulbar affect F48.2    CAD (coronary artery disease), native coronary artery I25.10    SOB (shortness of breath) R06.02    Broken heart syndrome I51.81    Death of child Z63.4    Somatic delusion disorder ([facility] [address] F22    Diabetes mellitus type 2, diet-controlled ([address] E11.9    Somatization disorder F45.0    Death of parent Z63.4    Tightness sensation-head Z78.9    Generalized OA M15.9    Noncompliance with medication regimen-chol medication  Z91.14    Orthostatic hypotension I95.1    Hydronephrosis of left kidney N13.30    Left-sided chest wall pain R07.89    Other fatigue R53.83    Assistance needed with transportation Z74.8    Gait instability R26.81    DNR (do not resuscitate) discussion Z71.89    Vaginal irritation N89.8    Blurring of vision H53.8    Choledocholithiasis with acute cholecystitis K80.42    Urinary retention R33.9    Fecal impaction (HCC) K56.41    Acute coronary syndromes (HCC) I24.9    Renal stone N20.0    Dysrhythmia, cardiac I49.9    Nausea & vomiting R11.2    Dementia ([facility]) F03.90    HERBERTH (acute kidney injury) ([facility]) N17.9    History of COVID-19 Z86.16    AMS (altered mental status) R41.82    Esophageal obstruction K22.2    Hypokalemia E87.6    Left ureteral stone N20.1    Achalasia K22.0    Pyelonephritis N12    Frailty R54    Anorexia R63.0    Diarrhea, unspecified type R19.7       REVIEW OF SYSTEMS:    Constitutional: negative fever, negative chills, negative weight loss  Eyes:   negative visual changes  ENT:   negative sore throat, tongue or lip swelling   Respiratory:  negative cough, negative dyspnea  Cards:  negative for chest pain, palpitations, lower extremity edema  GI:   See HPI  :  negative for frequency, dysuria  Integument:  negative for rash and pruritus  Heme:  negative for easy bruising and gum/nose bleeding  Musculoskel: negative for myalgias,  back pain and muscle weakness  Neuro: negative for headaches, dizziness, vertigo  Psych: negative for feelings of anxiety, depression     Objective:   VITALS:    Visit Vitals  BP (!) 153/71   Pulse 72   Temp 98 °F (36.7 °C)   Resp 18   Ht 5' 8\" (1.727 m)   Wt 63 kg (138 lb 14.2 oz)   SpO2 97%   BMI 21.12 kg/m²       PHYSICAL EXAM:   General:          Elderly frail AA female.  NAD   Head:               Normocephalic, without obvious abnormality, atraumatic. Eyes:               Conjunctivae clear and pale, anicteric sclerae. Pupils are equal  Nose:               Nares normal. No drainage or sinus tenderness. Throat:             Lips, mucosa, and tongue normal.  No Thrush  Neck:               Supple, symmetrical,  no adenopathy, thyroid: non tender  Back:               Symmetric,  No CVA tenderness. Lungs:             CTA bilaterally. No wheezing/rhonchi/rales. Chest wall:      No tenderness or deformity. No Accessory muscle use. Heart:              Regular rate and rhythm,  no murmur, rub or gallop. Abdomen:        Soft, non-tender. Not distended. Bowel sounds normal. No masses  Extremities:     Atraumatic, No cyanosis. No edema. No clubbing  Psych:             Little to no insight. Not depressed. Not anxious or agitated. Neurologic:      EOMs intact. No facial asymmetry. No aphasia or slurred speech.   Alert and    confused at baseline      LAB DATA REVIEWED:    Recent Results (from the past 24 hour(s))   EKG, 12 LEAD, INITIAL    Collection Time: 07/26/22  9:50 AM   Result Value Ref Range    Ventricular Rate 65 BPM    Atrial Rate 65 BPM    P-R Interval 134 ms    QRS Duration 98 ms    Q-T Interval 420 ms    QTC Calculation (Bezet) 436 ms    Calculated P Axis 8 degrees    Calculated R Axis 31 degrees    Calculated T Axis 2 degrees    Diagnosis       Normal sinus rhythm  Minimal voltage criteria for LVH, may be normal variant ( [place] product )      Confirmed by Joan Duque MD, Michael Whitfield ([id number]) on 7/26/2022 4:37:36 PM     ECHO ADULT COMPLETE    Collection Time: 07/26/22 10:14 AM   Result Value Ref Range    IVSd 1.2 (A) 0.6 - 0.9 cm    LVIDd 4.0 3.9 - 5.3 cm    LVIDs 2.7 cm    LVOT Diameter 2.0 cm    LVPWd 1.0 (A) 0.6 - 0.9 cm    LV Ejection Fraction A4C 43 %    LV EDV A4C 88 mL    LV ESV A4C 50 mL    LVOT Peak Gradient 4 mmHg    LVOT Mean Gradient 2 mmHg    LVOT SV 52.8 ml    LVOT Peak Velocity 1.0 m/s    LVOT VTI 16.1 cm LA Diameter 2.5 cm    AV Area by Peak Velocity 1.1 cm2    AV Area by VTI 1.0 cm2    AV Peak Gradient 31 mmHg    AV Mean Gradient 19 mmHg    AV Peak Velocity 2.8 m/s    AV Mean Velocity 2.1 m/s    AV VTI 51.8 cm    MV A Velocity 0.91 m/s    MV E Wave Deceleration Time 244.4 ms    MV E Velocity 0.91 m/s    LV E' Lateral Velocity 5 cm/s    LV E' Septal Velocity 5 cm/s    MV Area by VTI 2.3 cm2    MV Peak Gradient 5 mmHg    MV Mean Gradient 2 mmHg    MV Max Velocity 1.1 m/s    MV Mean Velocity 0.6 m/s    MV VTI 23.2 cm    PV Peak Gradient 4 mmHg    PV Max Velocity 1.0 m/s    TR Peak Gradient 30 mmHg    TR Max Velocity 2.73 m/s   TROPONIN-HIGH SENSITIVITY    Collection Time: 07/26/22 10:19 AM   Result Value Ref Range    Troponin-High Sensitivity 88 (H) 0 - 51 ng/L   MAGNESIUM    Collection Time: 07/26/22 10:19 AM   Result Value Ref Range    Magnesium 2.1 1.6 - 2.4 mg/dL   SAMPLES BEING HELD    Collection Time: 07/26/22 10:19 AM   Result Value Ref Range    SAMPLES BEING HELD lav     COMMENT        Add-on orders for these samples will be processed based on acceptable specimen integrity and analyte stability, which may vary by analyte.    GLUCOSE, POC    Collection Time: 07/26/22 11:01 AM   Result Value Ref Range    Glucose (POC) 315 (H) 65 - 117 mg/dL    Performed by Richard Ford (PCT)    TROPONIN-HIGH SENSITIVITY    Collection Time: 07/26/22  4:11 PM   Result Value Ref Range    Troponin-High Sensitivity 81 (H) 0 - 51 ng/L   GLUCOSE, POC    Collection Time: 07/26/22  5:17 PM   Result Value Ref Range    Glucose (POC) 58 (L) 65 - 117 mg/dL    Performed by Susana Moore PCT    GLUCOSE, POC    Collection Time: 07/26/22  6:15 PM   Result Value Ref Range    Glucose (POC) 118 (H) 65 - 117 mg/dL    Performed by Mata Almaraz (TRV RN)    GLUCOSE, POC    Collection Time: 07/26/22  8:23 PM   Result Value Ref Range    Glucose (POC) 81 65 - 117 mg/dL    Performed by [address] [place] [address], BASIC    Collection Time: 07/26/22 10:29 PM   Result Value Ref Range    Sodium 141 136 - 145 mmol/L    Potassium 3.8 3.5 - 5.1 mmol/L    Chloride 110 (H) 97 - 108 mmol/L    CO2 24 21 - 32 mmol/L    Anion gap 7 5 - 15 mmol/L    Glucose 83 65 - 100 mg/dL    BUN 12 6 - 20 MG/DL    Creatinine 0.80 0.55 - 1.02 MG/DL    BUN/Creatinine ratio 15 12 - 20      GFR est AA >60 >60 ml/min/1.73m2    GFR est non-AA >60 >60 ml/min/1.73m2    Calcium 8.4 (L) 8.5 - 10.1 MG/DL   CBC W/O DIFF    Collection Time: 07/26/22 10:29 PM   Result Value Ref Range    WBC 10.7 3.6 - 11.0 K/uL    RBC 3.90 3.80 - 5.20 M/uL    HGB 10.5 (L) 11.5 - 16.0 g/dL    HCT 34.2 (L) 35.0 - 47.0 %    MCV 87.7 80.0 - 99.0 FL    MCH 26.9 26.0 - 34.0 PG    MCHC 30.7 30.0 - 36.5 g/dL    RDW 13.9 11.5 - 14.5 %    PLATELET 231 358 - 627 K/uL    MPV 10.4 8.9 - 12.9 FL    NRBC 0.0 0  WBC    ABSOLUTE NRBC 0.00 0.00 - 8.52 K/uL   METABOLIC PANEL, BASIC    Collection Time: 07/27/22  3:53 AM   Result Value Ref Range    Sodium 141 136 - 145 mmol/L    Potassium 3.7 3.5 - 5.1 mmol/L    Chloride 110 (H) 97 - 108 mmol/L    CO2 24 21 - 32 mmol/L    Anion gap 7 5 - 15 mmol/L    Glucose 95 65 - 100 mg/dL    BUN 12 6 - 20 MG/DL    Creatinine 0.69 0.55 - 1.02 MG/DL    BUN/Creatinine ratio 17 12 - 20      GFR est AA >60 >60 ml/min/1.73m2    GFR est non-AA >60 >60 ml/min/1.73m2    Calcium 8.4 (L) 8.5 - 10.1 MG/DL   CBC WITH AUTOMATED DIFF    Collection Time: 07/27/22  3:53 AM   Result Value Ref Range    WBC 7.3 3.6 - 11.0 K/uL    RBC 3.54 (L) 3.80 - 5.20 M/uL    HGB 9.7 (L) 11.5 - 16.0 g/dL    HCT 30.8 (L) 35.0 - 47.0 %    MCV 87.0 80.0 - 99.0 FL    MCH 27.4 26.0 - 34.0 PG    MCHC 31.5 30.0 - 36.5 g/dL    RDW 13.7 11.5 - 14.5 %    PLATELET 392 621 - 181 K/uL    MPV 10.2 8.9 - 12.9 FL    NRBC 0.0 0  WBC    ABSOLUTE NRBC 0.00 0.00 - 0.01 K/uL    NEUTROPHILS 81 (H) 32 - 75 %    LYMPHOCYTES 12 12 - 49 %    MONOCYTES 6 5 - 13 %    EOSINOPHILS 0 0 - 7 %    BASOPHILS 1 0 - 1 %    IMMATURE GRANULOCYTES 0 0.0 - 0.5 %    ABS. NEUTROPHILS 5.9 1.8 - 8.0 K/UL    ABS. LYMPHOCYTES 0.9 0.8 - 3.5 K/UL    ABS. MONOCYTES 0.4 0.0 - 1.0 K/UL    ABS. EOSINOPHILS 0.0 0.0 - 0.4 K/UL    ABS. BASOPHILS 0.0 0.0 - 0.1 K/UL    ABS. IMM.  GRANS. 0.0 0.00 - 0.04 K/UL    DF AUTOMATED     VANCOMYCIN, TROUGH    Collection Time: 07/27/22  3:53 AM   Result Value Ref Range    Vancomycin,trough 21.9 (HH) 5.0 - 10.0 ug/mL    Reported dose date NOT PROVIDED      Reported dose time: NOT PROVIDED      Reported dose: NOT PROVIDED UNITS   GLUCOSE, POC    Collection Time: 07/27/22  7:31 AM   Result Value Ref Range    Glucose (POC) 95 65 - 117 mg/dL    Performed by Neftaly Thakkar PCT        IMAGING RESULTS:  I have personally reviewed the imaging reports      Total time spent with patient: 25 minutes ________________________________________________________________________  Care Plan discussed with:  Patient x   Family     RN x              Consultant:  Hospitalist      CT  7/27/2022:  ________________________________________________________________________    ___________________________________________________  Consulting Provider: Eden Draper NP      7/27/2022  9:41 AM

## 2022-07-27 NOTE — PROGRESS NOTES
Update:  Darnell Weaver MD notified in place of Pharmacist.      This RN attempted to call [phone] twice to inform Pharmacy of the elevated Vancomycin level (21.9); however, no answer.

## 2022-07-28 NOTE — PROGRESS NOTES
Bedside shift change report given to [address] (oncoming nurse) by Terrence Florence RN (offgoing nurse). Report included the following information SBAR, Kardex, Intake/Output, MAR, Recent Results, and Cardiac Rhythm NSR .

## 2022-07-28 NOTE — PROGRESS NOTES
Hospitalist Progress Note    NAME: Leslie Greene   :  1945   MRN:  [id number]       Assessment / Plan:  C. difficile colitis  Colonic distension   C. difficile back positive, started on p.o. vancomycin  Discussed with GI, no indication for colonic decompression as colonic distention is chronic and patient is having loose stools  CT scan abdomen   1. Persistent colon distention affecting mainly the rectosigmoid. Definite  etiology is not clear from this study but rectal tube or direct visualization  may be of value for relief of symptoms and diagnosis. 2. Multiple incidentals as above. 3. Prominent anasarca. Bilateral pleural effusions. S/p Rapid response for chest pain on   Patient had a rapid response this morning complaining of producible chest pain upon palpation. Patient was very anxious  Nurse had reported that she was having difficulty swallowing  Patient seen and examined  EKG completed today EKG on admission was nonischemic  Patient was already given IV morphine earlier but reported that it was not helping   VS showed that she was hypertensive  Troponin and chest x-ray ordered  A.m. labs reviewed, which showed  hypokalemia potassium of 2.7  Magnesium is within normal limit  Troponin mildly elevated at 88, will trend 2 more sets  Chest x-ray was concerning for possible pneumoperitoneum, spoke with radiologist who advised abdominal x-ray. Stat abdominal x-ray ordered  ., GI consulted  Echo showed EF of 55%  Abdominal x-ray showed  IMPRESSION  1. Markedly ectatic proximal esophagus containing enteric content. 2. Lucency over the entire abdomen which is concerning for pneumoperitoneum. Recommend supine and left lateral decubitus imaging of the abdomen. Intractable nausea vomiting most likely due to UTI with concern for pyelonephritis  History of recent lithotripsy ,  ureteral stent placement and removal  Leukopenia  Dysphagia  H/o esophageal dilation   Urinary retention   ? Aspiration pneumonia  Status post ceftriaxone and vancomycin. UA showed 1+ bacteria. Ucx negative. Will start p.o. vancomycin for C. difficile  Will consult urology, urology input noted  Patient failed bedside swallow, formal speech therapy is cleared for dysphagia diet. Dysphagia diet on hold as patient is still having difficulty swallowing. Keep n.p.o. Continue IV fluid   CT scan abdomen  from outside hospital showed:  CT  abdomen at new [facility] showed:  IMPRESSION:   1. Abnormalities of the left renal collecting system which could be associated   with pyelitis or ureteriti/UTI. A recently passed stone cannot be excluded. Multiple bilateral renal stones are present including within left renal pelvis. 2. Severe anasarca and other findings within the lung bases which could be   associated with sepsis organ failure although exact etiology uncertain. 3. Interval gaseous moderate distension. Of distal colon with air-fluid level. This pattern does not appear to be obstructive and a definite mass is not   delineated. 4. Distal esophageal distension with retained desiccated food. This could be   from chronic retention achalasia and possibly a stricture. Similar pattern   although less pronounced with seen previously. GI input reviewed , no plans for procedure   Ucx negative but will c/w abx to cover possible as pt was sob fter having difficulty swallowing . She has lots of thick secretions. Chest x-ray is negative for aspiration pneumonia  Was seen by speech , allow dysphagia diet  GI recommended esophagogram if patient able to tolerate  Intractable diarrhea  Cdiff  positive, started on p.o.  Vanco  Hypokalemia  Replete as needed, magnesium is within normal limit  Hypertension  Continue with metoprolol and amlodipine  DC IV metoprolol and IV enalaprilat  CAD  GERD  HLD  Continue home meds  History of dementia  Depression with anxiety  Per daughter patient is completely dependent for her ADLs PreDM  Episode of hypoglycemia  Sliding scale insulin with high sensitivity           Code Status: Full code  Surrogate Decision Maker: Patient daughter     DVT Prophylaxis: Lovenox  GI Prophylaxis: not indicated     Baseline: Mostly bedbound      18.5 - 24.9 Normal weight / Body mass index is 21.12 kg/m². Estimated discharge date: July 31st  Barriers: Unstable       Updated patient daughter at bedside, all questions answered  Code status: DNR  Prophylaxis: Lovenox  Recommended Disposition: SNF/LTC     Subjective:     Chief Complaint / Reason for Physician Visit  FU ileus/dysphagia . Persistent diarrhea, recurrent pain on flank area    Discussed with RN events overnight. Review of Systems:  Symptom Y/N Comments  Symptom Y/N Comments   Fever/Chills n   Chest Pain y    Poor Appetite    Edema     Cough    Abdominal Pain n    Sputum    Joint Pain     SOB/MOORE n   Pruritis/Rash     Nausea/vomit y   Tolerating PT/OT     Diarrhea    Tolerating Diet     Constipation    Other       Could NOT obtain due to:      Objective:     VITALS:   Last 24hrs VS reviewed since prior progress note.  Most recent are:  Patient Vitals for the past 24 hrs:   Temp Pulse Resp BP SpO2   07/28/22 0620 98.4 °F (36.9 °C) 63 16 (!) 142/86 100 %   07/28/22 0329 98 °F (36.7 °C) 70 18 131/80 94 %   07/27/22 2353 98.3 °F (36.8 °C) 74 16 (!) 147/71 100 %   07/27/22 2025 98.5 °F (36.9 °C) 72 18 (!) 153/87 95 %   07/27/22 1630 98.2 °F (36.8 °C) 68 17 133/81 93 %   07/27/22 1214 -- 74 20 138/85 97 %   07/27/22 1013 98.3 °F (36.8 °C) 76 24 131/78 95 %   07/27/22 0822 98 °F (36.7 °C) 72 -- (!) 153/71 97 %   07/27/22 0807 98.6 °F (37 °C) 82 -- (!) 156/69 98 %         Intake/Output Summary (Last 24 hours) at 7/28/2022 0759  Last data filed at 7/27/2022 1706  Gross per 24 hour   Intake 636.25 ml   Output 975 ml   Net -338.75 ml          I had a face to face encounter and independently examined this patient on 7/28/2022, as outlined below:  PHYSICAL EXAM:  General: WD, WN. Alert, cooperative, no acute distress    EENT:  EOMI. Anicteric sclerae. MMM  Resp:  CTA bilaterally, no wheezing or rales. No accessory muscle use  CV:  Regular  rhythm,  No edema  GI:  Soft, Non distended, Non tender. +Bowel sounds  Neurologic:  Alert and oriented X 3, normal speech,   Psych:   Poor insight. Not anxious nor agitated  Skin:  No rashes. No jaundice    Reviewed most current lab test results and cultures  YES  Reviewed most current radiology test results   YES  Review and summation of old records today    NO  Reviewed patient's current orders and MAR    YES  PMH/SH reviewed - no change compared to H&P  ________________________________________________________________________  Care Plan discussed with:    Comments   Patient     Family  x    RN x    Care Manager     Consultant  x                      Multidiciplinary team rounds were held today with , nursing, pharmacist and clinical coordinator. Patient's plan of care was discussed; medications were reviewed and discharge planning was addressed. ________________________________________________________________________  Total NON critical care TIME:  35 Minutes    Total CRITICAL CARE TIME Spent:  Minutes non procedure based      Comments   >50% of visit spent in counseling and coordination of care     ________________________________________________________________________  Addy Biggs MD     Procedures: see electronic medical records for all procedures/Xrays and details which were not copied into this note but were reviewed prior to creation of Plan. LABS:  I reviewed today's most current labs and imaging studies.   Pertinent labs include:  Recent Labs     07/27/22  0353 07/26/22 2229 07/26/22 0226   WBC 7.3 10.7 5.6   HGB 9.7* 10.5* 9.0*   HCT 30.8* 34.2* 28.7*    234 199       Recent Labs     07/27/22  0353 07/26/22 2229 07/26/22  1019 07/26/22 0226    141  --  142   K 3.7 3.8  -- 2.7*   * 110*  --  109*   CO2 24 24  --  24   GLU 95 83  --  74   BUN 12 12  --  13   CREA 0.69 0.80  --  0.77   CA 8.4* 8.4*  --  8.4*   MG  --   --  2.1  --          Signed: Addy Biggs MD

## 2022-07-28 NOTE — PROGRESS NOTES
Transition of Care Plan:     RUR: 24% high; readmit  Disposition:  Home with [facility] and home health care aides. Reyna Sloan  [phone]  Follow up appointments: PCP  DME needed: Pt has a hospital bed, wheelchair, shower bench at home  Transportation at Discharge: [address] transport preferred  Keys or means to access home:   Family has access      IM Medicare Letter: 2nd IM letter before discharge  Is patient a BCPI-A Bundle:   Bundle letter will be distributed by unit CM if pt meets bundle criteria. If yes, was Bundle Letter given?:    Is patient a [place] and connected with the [place]? N/A          If yes, was [employer] transfer form completed and [facility] notified? Caregiver Contact: Daughter Lisa Rodgers [phone]  Discharge Caregiver contacted prior to discharge? Unit CM to follow up before discharge  Care Conference needed?:         CM attended IDR. Pt has a JAEL of 8/1/22. CM will continue to follow.      Ciro Don, MSW  Care Management, [address]

## 2022-07-28 NOTE — PROGRESS NOTES
Problem: Dysphagia (Adult)  Goal: *Acute Goals and Plan of Care (Insert Text)  Description: 7/25/2022  Speech path goals  1. Patient will tolerate purees/thins with no overt s/s of aspiration  2. Patient will tolerate diet upgrade with no overt s/s of aspiration. Outcome: Progressing Towards Goal     SPEECH LANGUAGE PATHOLOGY DYSPHAGIA TREATMENT  Patient: Kezia Carias (24 y.o. female)  Date: 7/28/2022  Diagnosis: Pyelonephritis [N12]  Hypokalemia [E87.6]  Nausea & vomiting [R11.2]  Achalasia [K22.0] <principal problem not specified>      Precautions: aspiration      ASSESSMENT:  Patient only agreeable to minimal amounts of purees and thin liquids. Gurgling noted after the swallow, however no s/s aspiration observed. Overall, oral/pharyngeal swallow appears functional, however patient will always be at risk for post-prandial aspiration secondary to achalasia. PLAN:  Recommendations and Planned Interventions:  -Puree/thin liquid diet, upright for all PO intake, remain upright for 60 minutes after PO intake, slow rate, smaller/more frequent meals  -Meds crushed in puree  -Defer to GI for further POC for achalasia  Patient continues to benefit from skilled intervention to address the above impairments. Continue treatment per established plan of care. Discharge Recommendations:  None     SUBJECTIVE:   Patient stated Will you call my daughter?     OBJECTIVE:   Cognitive and Communication Status:  Neurologic State: Alert, Confused  Orientation Level: Oriented to person  Cognition: Decreased attention/concentration, Decreased command following  Perception: Appears intact  Perseveration: Perseverates during conversation     Dysphagia Treatment:     P.O. Trials:  Patient Position: upright in bed  Vocal quality prior to P.O.: No impairment; Wet  Consistency Presented:  Thin liquid;Puree  How Presented: SLP-fed/presented;Straw;Spoon     Bolus Acceptance: No impairment  Bolus Formation/Control: No impairment Propulsion: No impairment  Oral Residue: None        Aspiration Signs/Symptoms: None;Change vocal quality                Pain:  Pain Scale 1: Numeric (0 - 10)  Pain Intensity 1: 0       After treatment:   Patient left in no apparent distress in bed, Call bell within reach, and Nursing notified    COMMUNICATION/EDUCATION:   Patient was educated regarding her deficit(s) of aspiration precautions as this relates to her diagnosis. She demonstrated Guarded understanding as evidenced by immediately asking for something unrelated. The patient's plan of care including recommendations, planned interventions, and recommended diet changes were discussed with: Registered nurse, Dr. Erica Gray, Dr. Pablo Morrison.      Chin Munson, SLP  Time Calculation: 15 mins

## 2022-07-28 NOTE — PROGRESS NOTES
GI PROGRESS NOTE  Lorraine AbreuNICK  [phone] NP in-hospital cell phone M-F until 4:30  After 5pm or on weekends, please call  for physician on call    Yulia Romero :1945 SFP:[id number]   ATTG: Dr. Ella Turner   PCP: Yasir Alvarez MD  Date/Time:  2022 9:44 AM   Primary GI: Dr. Kimberley Askew; Dr. Carmen Allen covering  Reason for following: Dysphagia     Assessment:     Dysphagia  Hx of the same  Last EGD with dilation in May 2022 showing esophageal obstruction, consistent with achalasia with spastic appearance, tight LES and dilated proximal esophagus. No discrete stricture  Botox injection was recommended if previous dilation was ineffective  Patient able to tolerate purees and thins per SLP evaluation  CT at [facility] (2022) showed distal esophageal distension with retained desiccated food. This could be from chronic retention achalasia and possibly a stricture. Similar pattern although less pronounced with seen previously     Abnormal GI findings on imaging  CT at [facility] (2022) showed interval gaseous moderate distension. Of distal colon with air-fluid level. This pattern does not appear to be obstructive and a definite mass is not delineated. KUB (2022): No evidence of intraperitoneal free air. There is ongoing moderate to severe gaseous distention of the colon which may be related to distal colonic obstruction versus Ubaldo syndrome     C. Diff +     Plan:     No direct plans for endoscopic procedures at this time given patient is C. Diff + and recovering from urosepsis   Recommend vancomycin PO QID x 10 days   Appreciate SLP input  Aspiration precautions for 2 hours post meals   May help to get esophagram if/when patient can tolerate   Appreciate general surgery input   Continue PPI  Diet per speech recommendations  Symptomatic care per primary team  *Plan of care discussed with Dr. Carmen Allen      Subjective:   Discussed with RN events overnight.   Patient tearful while on the phone. Nursing at bedside. Review of Systems:  Symptom Y/N Comments  Symptom Y/N Comments   Fever/Chills n   Chest Pain n    Cough n   Headaches n    Sputum n   Joint Pain n    SOB/MOORE n   Pruritis/Rash n    Tolerating Diet y   Other       Could NOT obtain due to:      Objective:   VITALS:   Last 24hrs VS reviewed since prior progress note. Most recent are:  Visit Vitals  BP (!) 142/86   Pulse 63   Temp 98.4 °F (36.9 °C)   Resp 16   Ht 5' 8\" (1.727 m)   Wt 63 kg (138 lb 14.2 oz)   SpO2 100%   BMI 21.12 kg/m²       Intake/Output Summary (Last 24 hours) at 7/28/2022 0944  Last data filed at 7/28/2022 6816  Gross per 24 hour   Intake 536.25 ml   Output 975 ml   Net -438.75 ml     PHYSICAL EXAM:  General: Chronically ill-appearing AA female. HEENT: NC, Atraumatic. Anicteric sclerae. Lungs:  CTA Bilaterally. No Wheezing/Rhonchi/Rales. Heart:  Regular  rhythm,  No murmur (), No Rubs, No Gallops  Abdomen: Soft, Non distended, Non tender. +Bowel sounds, no HSM  Extremities: No c/c/e  Neurologic:  Alert and confused. No acute neurological distress   Psych:   Little to no insight. Not anxious nor agitated. Lab and Radiology Data Reviewed: (see below)    Medications Reviewed: (see below)  PMH/SH reviewed - no change compared to H&P  ________________________________________________________________________  Total time spent with patient: 15 minutes ________________________________________________________________________  Care Plan discussed with:  Patient x   Family     RN x              Consultant:       Connee Letters, NP     Procedures: see electronic medical records for all procedures/Xrays and details which were not copied into this note but were reviewed prior to creation of Plan.       LABS:  Recent Labs     07/27/22  0353 07/26/22 2229   WBC 7.3 10.7   HGB 9.7* 10.5*   HCT 30.8* 34.2*    234     Recent Labs     07/27/22  0353 07/26/22  2229 07/26/22  1019 07/26/22  0226  141  --  142   K 3.7 3.8  --  2.7*   * 110*  --  109*   CO2 24 24  --  24   BUN 12 12  --  13   CREA 0.69 0.80  --  0.77   GLU 95 83  --  74   CA 8.4* 8.4*  --  8.4*   MG  --   --  2.1  --      No results for input(s): AP, TBIL, TP, ALB, GLOB, GGT, AML, LPSE in the last 72 hours. No lab exists for component: SGOT, GPT, AMYP, HLPSE  No results for input(s): INR, PTP, APTT, INREXT in the last 72 hours. No results for input(s): FE, TIBC, PSAT, FERR in the last 72 hours. Lab Results   Component Value Date/Time    Folate 7.8 01/10/2022 03:14 AM     No results for input(s): PH, PCO2, PO2 in the last 72 hours. No results for input(s): CPK, CKMB in the last 72 hours.     No lab exists for component: TROPONINI  Lab Results   Component Value Date/Time    Color YELLOW/STRAW 07/18/2022 03:13 PM    Appearance CLOUDY (A) 07/18/2022 03:13 PM    Specific gravity 1.026 07/18/2022 03:13 PM    Specific gravity 1.020 12/10/2020 03:56 PM    pH (UA) 6.5 07/18/2022 03:13 PM    Protein >300 (A) 07/18/2022 03:13 PM    Glucose Negative 07/18/2022 03:13 PM    Ketone Negative 07/18/2022 03:13 PM    Bilirubin Negative 07/15/2022 04:32 PM    Urobilinogen 1.0 07/18/2022 03:13 PM    Nitrites Negative 07/18/2022 03:13 PM    Leukocyte Esterase MODERATE (A) 07/18/2022 03:13 PM    Epithelial cells FEW 07/18/2022 03:13 PM    Bacteria 1+ (A) 07/18/2022 03:13 PM    WBC 5-10 07/18/2022 03:13 PM    RBC >100 (H) 07/18/2022 03:13 PM       MEDICATIONS:  Current Facility-Administered Medications   Medication Dose Route Frequency    vancomycin (VANCOCIN) 750 mg in 0.9% sodium chloride 250 mL (VIAL-MATE)  750 mg IntraVENous Q18H    metroNIDAZOLE (FLAGYL) IVPB premix 500 mg  500 mg IntraVENous Q12H    bisacodyL (DULCOLAX) suppository 10 mg  10 mg Rectal DAILY    LORazepam (INTENSOL) 2 mg/mL oral concentrate 0.5 mg  0.5 mg SubLINGual BID PRN    enalaprilat (VASOTEC) 1.25 mg in 0.9% sodium chloride 50 mL IVPB  1.25 mg IntraVENous Q8H dextrose 5% - 0.9% NaCl with KCl 40 mEq/L infusion   IntraVENous CONTINUOUS    L.acidophilus-paracasei-S.thermophil-bifidobacter (RISAQUAD) 8 billion cell capsule  1 Capsule Oral DAILY    metoprolol (LOPRESSOR) injection 2.5 mg  2.5 mg IntraVENous Q6H    hydrOXYzine HCL (ATARAX) tablet 10 mg  10 mg Oral TID PRN    hydrALAZINE (APRESOLINE) 20 mg/mL injection 10 mg  10 mg IntraVENous Q6H PRN    acetaminophen (TYLENOL) tablet 650 mg  650 mg Oral Q6H PRN    ondansetron (ZOFRAN) injection 4 mg  4 mg IntraVENous Q4H PRN    [Held by provider] amLODIPine (NORVASC) tablet 2.5 mg  2.5 mg Oral DAILY    aspirin chewable tablet 81 mg  81 mg Oral DAILY    bisacodyL (DULCOLAX) suppository 10 mg  10 mg Rectal DAILY PRN    busPIRone (BUSPAR) tablet 5 mg  5 mg Oral DAILY    ezetimibe (ZETIA) tablet 10 mg  10 mg Oral DAILY    hyoscyamine SL (LEVSIN/SL) tablet 0.125 mg  0.125 mg SubLINGual Q6H PRN    lidocaine 4 % patch 1 Patch  1 Patch TransDERmal DAILY PRN    [Held by provider] metoprolol succinate (TOPROL-XL) XL tablet 25 mg  25 mg Oral DAILY    [Held by provider] trospium (SANCTURA) tablet 20 mg  20 mg Oral DAILY    sertraline (ZOLOFT) tablet 100 mg  100 mg Oral DAILY    sodium chloride (NS) flush 5-40 mL  5-40 mL IntraVENous Q8H    sodium chloride (NS) flush 5-40 mL  5-40 mL IntraVENous PRN    cefTRIAXone (ROCEPHIN) 1 g in 0.9% sodium chloride (MBP/ADV) 50 mL MBP  1 g IntraVENous Q24H    morphine injection 1 mg  1 mg IntraVENous Q4H PRN    enoxaparin (LOVENOX) injection 40 mg  40 mg SubCUTAneous Q24H    insulin lispro (HUMALOG) injection   SubCUTAneous AC&HS    glucose chewable tablet 16 g  4 Tablet Oral PRN    glucagon (GLUCAGEN) injection 1 mg  1 mg IntraMUSCular PRN    dextrose 10% infusion 0-250 mL  0-250 mL IntraVENous PRN

## 2022-07-29 NOTE — PROGRESS NOTES
No c/o pain at rest.  Rectal tube never placed. Cont to have diarrhea. C.diff positive. AVSS  NAD  Abd soft, ND, min TTP in the LLQ. No rebound or guarding. AP: C.diff diarrhea. Chronic distal colonic distention. ?Ankur Paddock? No role for surgical intervention. Consider rectal tube. Will sign off. Please call with concerns.

## 2022-07-29 NOTE — PROGRESS NOTES
MD Chacon came to bedside to follow up on pt. Pt in NSR and /90s; MD stated to cancel albumin, dilt gtt, and cardiology consult.

## 2022-07-29 NOTE — PERIOP NOTES
TRANSFER - OUT REPORT:    Verbal report given to Maylin Mobley RN on Leslie Greene  being transferred to 2114(unit) for routine progression of care       Report consisted of patients Situation, Background, Assessment and   Recommendations(SBAR). Information from the following report(s) Procedure Summary was reviewed with the receiving nurse. Opportunity for questions and clarification was provided.

## 2022-07-29 NOTE — PROGRESS NOTES
Attempted to see pt for OT services. Pt is currently having a flex sig placed. Will defer but continue to follow.

## 2022-07-29 NOTE — PROGRESS NOTES
As still has pain in LLQ, and with imaging findings, we opted to attempt flex sig with rectal tube placement for decompression, in hopes it will help her pain.  Discussed with her daughter, Titi Bejarano, and obtained phone consent this AM.

## 2022-07-29 NOTE — PROCEDURES
NAME:  Angela Mckay   :   1945   MRN:   [id number]     Date/Time:  2022 12:13 PM    Simoidoscopy Operative Report    Procedure Type:  Sigmoidoscopy with rectal tube placement    Indications:  c.diff. timothy's   Pre-operative Diagnosis: see indication above  Post-operative Diagnosis:  See findings below  :  Richard Marcus MD  Referring Provider: --Kirill Martin MD    Exam:  Airway: clear, no airway problems anticipated  Heart: RRR, without gallops or rubs  Lungs: clear bilaterally without wheezes, crackles, or rhonchi  Abdomen: soft, nontender, nondistended, bowel sounds present  Mental Status: awake, alert and oriented to person, place and time    Sedation:  Versed 1 mg IV and Fentanyl 25 mcg IV    Procedure Details:  After informed consent was obtained with all risks and benefits of procedure explained and preoperative exam completed, the patient was taken to the endoscopy suite and placed in the left lateral decubitus position. Upon sequential sedation as per above, a digital rectal exam was performed demonstrating internal hemorrhoids. The Olympus videocolonoscope  was inserted in the rectum and carefully advanced to the splenic flexure. The quality of preparation was fair to poor. The colonoscope was slowly withdrawn with careful evaluation between folds. Retroflexion in the rectum was completed demonstrating internal hemorrhoids. Findings:   ANUS: Anal exam reveals no masses but hemorrhoids, sphincter tone is normal.   RECTUM: Rectal exam reveals no masses but hemorrhoids. Lumen is distended with air and some mucous clings to walls. Suctioned air and liquid stool out. Rectal tube inserted on withdrawal and left in place. SIGMOID COLON: luminal distention, air and mucous and liquid stool present. Suctioned. Healthy pink mucosa. DESCENDING COLON: luminal distention, air and mucous and liquid stool present. Suctioned. Healthy pink mucosa.     Specimen Removed: none  Complications:  None. EBL:     None. Impression:  -- distended distal colon and rectum, but with healthy pink muocosa without any signs of compromise. Suctioned air and mucous and liquid stool then, rectal tube left in place. -- hemorrhoids    Recommendations:   -- allow rectal tube to drain to martinez bag  -- continue treatment for c.diff  -- KUB  -- GI will see on request this weekend only, please call if concerns.     Discharge Disposition:  back to wards      Indigo Pizarro MD

## 2022-07-29 NOTE — PROGRESS NOTES
Hospitalist Progress Note    NAME: Leslie Greene   :  1945   MRN:  [id number]       Assessment / Plan:  C. difficile colitis/Colonic distension   p.o. vancomycin  per GI had flex sig  with dilated colon stools suctioned  Per surgery cont rectal tube    CT scan abdomen   1. Persistent colon distention affecting mainly the rectosigmoid. Definite  etiology is not clear from this study but rectal tube or direct visualization  may be of value for relief of symptoms and diagnosis. 2. Multiple incidentals as above. 3. Prominent anasarca. Bilateral pleural effusions. Tachycardia  4PM-  Sinus 130 post flex sig  Bolus 250cc NS no responseIV albumin as BP soft  EKG ? Afib vs ST cardizem drip  Stat cbc/K/mag levels  Trop q8 hr x 3  DD if elevated will get CTA chest  KUB pending  transfer to PCU if       S/p Rapid response for chest pain on   EKG nonischemic  Echo showed EF of 55%  Cont medical rx    Dysphagia/Achalasia with N/V  H/o esophageal dilation   Abdominal x-ray showed  IMPRESSION  1. Markedly ectatic proximal esophagus containing enteric content. 2. Lucency over the entire abdomen which is concerning for pneumoperitoneum. Recommend supine and left lateral decubitus imaging of the abdomen. GI on  clinically   Consider botus injections  Persistent nausea  Deit as per SLP      Pyelonephrits/Urinary retention   Status post ceftriaxone and vancomycin. Ucx negative. Off all abx now as [address] u[facility]       HTN- Continue with metoprolol as BP allows, hold Norvasc as bp soft  Hypokalemia-replete potassium  Dementia-continue PTA home meds  Depression with anxiety-continue PTA home meds  Debility/deconditioning state-  PT OT once stable        Code Status: DNR  Surrogate Decision Maker: Patient daughter [phone] called   updated daughter on phone.   DVT Prophylaxis: Lovenox  Recommended Disposition: SNF/LTC     Subjective:     Chief Complaint / Reason for Physician Visit  FU ileus/dysphagia . Patient seen and examined, chart was reviewed. GI placed  rectal tube under flex sig today  Abdominal exam is benign. She continues to have persistent nausea. Objective:     VITALS:   Last 24hrs VS reviewed since prior progress note. Most recent are:  Patient Vitals for the past 24 hrs:   Temp Pulse Resp BP SpO2   07/29/22 1531 -- (!) 132 -- 106/67 --   07/29/22 1433 98.2 °F (36.8 °C) 61 16 107/89 99 %   07/29/22 1233 -- 75 16 (!) 176/100 96 %   07/29/22 1229 -- 76 16 (!) 179/102 100 %   07/29/22 1224 -- 75 17 (!) 173/98 97 %   07/29/22 1220 -- 73 17 (!) 218/117 98 %   07/29/22 1215 -- 80 15 (!) 237/126 99 %   07/29/22 1210 -- 75 15 (!) 237/124 100 %   07/29/22 1207 -- 78 16 (!) 226/93 99 %   07/29/22 1204 -- 75 15 (!) 223/90 99 %   07/29/22 1201 -- 75 16 (!) 194/76 100 %   07/29/22 1158 -- 74 16 (!) 226/93 100 %   07/29/22 1155 -- 75 15 (!) 197/107 99 %   07/29/22 1148 -- 76 15 (!) 178/112 99 %   07/29/22 1110 97.9 °F (36.6 °C) 67 18 (!) 188/94 --   07/29/22 0741 98 °F (36.7 °C) 70 17 (!) 159/82 --   07/29/22 0425 97.5 °F (36.4 °C) 69 18 (!) 147/87 98 %   07/29/22 0018 98.5 °F (36.9 °C) 64 17 (!) 152/83 95 %   07/28/22 2057 99 °F (37.2 °C) 76 18 (!) 149/96 98 %       Intake/Output Summary (Last 24 hours) at 7/29/2022 1632  Last data filed at 7/29/2022 0410  Gross per 24 hour   Intake --   Output 1200 ml   Net -1200 ml        I had a face to face encounter and independently examined this patient on 7/29/2022, as outlined below:  PHYSICAL EXAM:  General: Chronically looking, no acute distress    corrina:  No accessory muscle use  CV:  RRR  No edema  GI:  Soft, Non distended, Non tender. Neurologic:  Alert and normal speech,   Psych:   Poor insight. Skin:  No rashes.   No jaundice    LABS:    Pertinent labs include:  Recent Labs     07/29/22  0316 07/27/22  0353 07/26/22  2229   WBC 3.9 7.3 10.7   HGB 9.8* 9.7* 10.5*   HCT 31.4* 30.8* 34.2*    211 234     Recent Labs     07/29/22  0608 07/27/22  0353 07/26/22  2229    141 141   K 3.2* 3.7 3.8   * 110* 110*   CO2 25 24 24    95 83   BUN 8 12 12   CREA 0.47* 0.69 0.80   CA 8.1* 8.4* 8.4*       Current Facility-Administered Medications:     [Held by provider] amLODIPine (NORVASC) tablet 5 mg, 5 mg, Oral, DAILY, Donavon Chacon MD    fentaNYL citrate (PF) injection 100 mcg, 100 mcg, IntraVENous, MULTIPLE DOSE GIVEN, Sofia Kaiser MD, 25 mcg at 07/29/22 1156    midazolam (VERSED) injection 5 mg, 5 mg, IntraVENous, MULTIPLE DOSE GIVEN, Sofia Kaiser MD, 1 mg at 07/29/22 1156    0.9% sodium chloride infusion, 50 mL/hr, IntraVENous, CONTINUOUS, Sofia Kaiser MD, Stopped at 07/29/22 1208    labetaloL (NORMODYNE;TRANDATE) injection 10 mg, 10 mg, IntraVENous, Q2H PRN, Donavon Chacon MD, 10 mg at 07/29/22 1520    [Held by provider] cloNIDine (CATAPRES) 0.1 mg/24 hr patch 1 Patch, 1 Patch, TransDERmal, Q7D, Donavon Chacon MD    albumin human 25% (BUMINATE) solution 25 g, 25 g, IntraVENous, Q6H, Donavon Chacon MD    dilTIAZem (CARDIZEM) 100 mg in dextrose 5% (MBP/ADV) 100 mL infusion, 0-15 mg/hr, IntraVENous, TITRATE, Donavon Chacon MD    vancomycin (FIRVANQ) 50 mg/mL oral solution 125 mg, 125 mg, Oral, Q6H, Henry Hinton MD, 125 mg at 07/29/22 1355    bisacodyL (DULCOLAX) suppository 10 mg, 10 mg, Rectal, DAILY, Heidi Bolaños NP    LORazepam (INTENSOL) 2 mg/mL oral concentrate 0.5 mg, 0.5 mg, SubLINGual, BID PRN, Tom Orellana MD    dextrose 5% - 0.9% NaCl with KCl 40 mEq/L infusion, , IntraVENous, CONTINUOUS, Henry Hinton MD, Last Rate: 50 mL/hr at 07/27/22 1602, New Bag at 07/27/22 1602    L.acidophilus-paracasei-S.thermophil-bifidobacter (RISAQUAD) 8 billion cell capsule, 1 Capsule, Oral, DAILY, Henry Hinton MD, 1 Capsule at 07/29/22 0817    hydrOXYzine HCL (ATARAX) tablet 10 mg, 10 mg, Oral, TID PRN, Henry Hinton MD, 10 mg at 07/26/22 1025    hydrALAZINE (APRESOLINE) 20 mg/mL injection 10 mg, 10 mg, IntraVENous, Q6H PRN, Nilda Estrada MD    acetaminophen (TYLENOL) tablet 650 mg, 650 mg, Oral, Q6H PRN, Nilda Estrada MD    [facility]etron [facility] injection 4 mg, 4 mg, IntraVENous, Q4H PRN, Nilda Estrada MD, 4 mg at 07/29/22 0818    aspirin chewable tablet 81 mg, 81 mg, Oral, DAILY, Nilda Estrada MD, 81 mg at 07/29/22 0817    bisacodyL (DULCOLAX) suppository 10 mg, 10 mg, Rectal, DAILY PRN, Nilda Estrada MD    busPIRone (BUSPAR) tablet 5 mg, 5 mg, Oral, DAILY, Nilda Estrada MD, 5 mg at 07/29/22 0001    ezetimibe (ZETIA) tablet 10 mg, 10 mg, Oral, DAILY, Nilda Estrada MD, 10 mg at 07/29/22 0817    hyoscyamine SL (LEVSIN/SL) tablet 0.125 mg, 0.125 mg, SubLINGual, Q6H PRN, Nilda Estrada MD    lidocaine 4 % patch 1 Patch, 1 Patch, TransDERmal, DAILY PRN, Nilda Estrada MD, 1 Patch at 07/27/22 1211    metoprolol succinate (TOPROL-XL) XL tablet 25 mg, 25 mg, Oral, DAILY, Nilda Estrada MD, 25 mg at 07/29/22 0818    [Held by provider] trospium (SANCTURA) tablet 20 mg, 20 mg, Oral, DAILY, Nilda Estrada MD    sertraline (ZOLOFT) tablet 100 mg, 100 mg, Oral, DAILY, Naomie BACH MD, 100 mg at 07/29/22 0817    sodium chloride (NS) flush 5-40 mL, 5-40 mL, IntraVENous, Q8H, Nilda Estrada MD, 10 mL at 07/29/22 0513    sodium chloride (NS) flush 5-40 mL, 5-40 mL, IntraVENous, PRN, Nilda Estrada MD    morphine injection 1 mg, 1 mg, IntraVENous, Q4H PRN, Nilda Estrada MD, 1 mg at 07/29/22 0023    enoxaparin (LOVENOX) injection 40 mg, 40 mg, SubCUTAneous, Q24H, Nilda Estrada MD, 40 mg at 07/29/22 0818    insulin lispro (HUMALOG) injection, , SubCUTAneous, AC&HS, Nilda Estrada MD, 4 Units at 07/26/22 1152    glucose chewable tablet 16 g, 4 Tablet, Oral, PRN, Nilda Estrada MD    glucagon (GLUCAGEN) injection 1 mg, 1 mg, IntraMUSCular, PRN, Nilda Estrada MD    dextrose 10% infusion 0-250 mL, 0-250 mL, IntraVENous, PRN, Nilda Estrada MD, 250 mL at 07/26/22 5991       Signed: Donavon Harris MD

## 2022-07-29 NOTE — PROGRESS NOTES
Physical Therapy:  Attempted to see pt for PT services. Pt is currently having a flex sig placed. Will defer but continue to follow.

## 2022-07-29 NOTE — PROGRESS NOTES
Unit secretary told me that the registration and medicare form needs to be signed by Maral-patient's daughter the next time she visits.

## 2022-07-29 NOTE — PROGRESS NOTES
TRANSFER - OUT REPORT:    Verbal report given to SANTOSH Gay(name) on Tati Hair  being transferred to PCU(unit) for change in patient condition(tachycardia. Need for titratable cardizem drip)       Report consisted of patients Situation, Background, Assessment and   Recommendations(SBAR). Information from the following report(s) SBAR, Kardex, Procedure Summary, Intake/Output, MAR, Accordion, Recent Results, Med Rec Status, and Cardiac Rhythm sinus tach  was reviewed with the receiving nurse. Lines:   Peripheral IV 07/26/22 Anterior;Distal;Left Forearm (Active)   Site Assessment Clean, dry, & intact 07/29/22 1155   Phlebitis Assessment 0 07/29/22 1155   Infiltration Assessment 0 07/29/22 1155   Dressing Status Clean, dry, & intact 07/29/22 1155   Dressing Type Tape;Transparent 07/29/22 1155   Hub Color/Line Status Blue; Infusing 07/29/22 1155   Action Taken Open ports on tubing capped 07/28/22 1950   Alcohol Cap Used Yes 07/28/22 1950       Peripheral IV Anterior; Left Forearm (Active)   Site Assessment Clean, dry, & intact 07/29/22 0800   Phlebitis Assessment 0 07/29/22 0800   Infiltration Assessment 0 07/29/22 0800   Dressing Status Clean, dry, & intact 07/29/22 0800   Dressing Type Tape;Transparent 07/29/22 0800        Opportunity for questions and clarification was provided.       Patient transported with:   Monitor  O2 @ 3 liters  Registered Nurse  [facility]

## 2022-07-29 NOTE — PROGRESS NOTES
Endurance Catheter insertion note     Inserted 20 G, 8 cm long  Endurance Extended Dwell Peripheral Catheter into left upper arm using ultrasound guidance and sterile technique. Positive blood return. Patient tolerated procedure well. Denies questions or concerns at this time. The Endurance catheter is an extended dwell peripheral catheter and may remain in place 29 days. Blood samples can be obtained from this catheter. To obtain labs, a tourniquet may be used, flush with 10ml NS, waste 3ml, draw labs, flush with 20ml NS. Dressings needs to be changed with central line dressing kit using bio patch and stat lock every 7 days by nurse. Primary nurse, Cornelius Loaiza, SANTOSH notified. Erica Watson RN .   Vascular Access Team. PICC Nurse

## 2022-07-30 NOTE — PROGRESS NOTES
Dr Shellie Mahan gave new oder to give patient Aspirin 325 mg PO once now, to give heparin bolus, to start a heparin drip at 20 units,kg,hr and to order a cardiology consult

## 2022-07-30 NOTE — CONSULTS
LUCIANO GENTILE [facility]ORAC[facility]  [address]  [facility], [address] [place] [address]  [phone]  WWW. [employer]    CARDIOLOGY CONSULT NOTE             Date of  Admission: 7/25/2022  4:54 AM     Admission type:Emergency     Subjective:     Tremaine Woods is a 68 y.o.  female with past medical history of dementia, dysphagia, anxiety disorder, CAD, kidney stones, hypertension who presents with complaint of nausea vomiting and decreased p.o. intake. Patient was discharged last Friday being treated for complicated UTI, had lithotripsy and stent placement on July 19, the day of discharge was brought back to the ED because she was hypoxic. Work-up did not show anything and was sent back home. Daughter reported that patient started having nausea vomiting and flank pain on Saturday and was not eating much, she presented to [facility] for evaluation. Patient was transferred to  [address] for further evaluation. She is noted to have achalasia, distended colon. Has rectal tube. Echo normal on this admision. Cath 2015 showed moderate CAD. She has been having intermittent chest pain,abdominal pain for 2-3 weeks. Last night noted to have troponin > 7000. EKG shows no acute changes. Her chest pain is unchanged.     Patient Active Problem List    Diagnosis Date Noted    Frailty 07/26/2022    Anorexia 07/26/2022    Diarrhea, unspecified type 07/26/2022    Achalasia 07/25/2022    Pyelonephritis 07/25/2022    Hypokalemia 06/27/2022    Left ureteral stone 06/27/2022    Esophageal obstruction 05/27/2022    AMS (altered mental status) 01/09/2022    Renal stone 02/25/2021    Dysrhythmia, cardiac 02/25/2021    Nausea & vomiting 02/25/2021    Dementia ([address] 02/25/2021    HERBERTH (acute kidney injury) ([address] 02/25/2021    History of COVID-19 02/25/2021    Acute coronary syndromes ([address] 02/17/2021    Urinary retention 09/03/2019    Fecal impaction ([address] 09/03/2019    Choledocholithiasis with acute cholecystitis 09/19/2018    Vaginal irritation 10/11/2017    Blurring of vision 10/11/2017    DNR (do not resuscitate) discussion 07/06/2017    Gait instability 04/14/2017    Other fatigue 04/13/2017    Assistance needed with transportation 04/13/2017    Left-sided chest wall pain 04/11/2017    Hydronephrosis of left kidney 04/07/2017    Orthostatic hypotension 04/04/2017    Generalized OA 01/05/2017    Noncompliance with medication regimen-chol medication  01/05/2017    Tightness sensation-head 09/20/2016    Diabetes mellitus type 2, diet-controlled ([facility] 05/26/2016    Somatization disorder 05/26/2016    Death of parent 05/26/2016    Somatic delusion disorder ([facility] 03/22/2016    Death of child 02/17/2016    Broken heart syndrome 01/18/2016    SOB (shortness of breath) 12/22/2015    CAD (coronary artery disease), native coronary artery 12/01/2015    Pseudobulbar affect 10/16/2015    Breast pain, left 04/07/2015    Personal history of noncompliance with medical treatment, presenting hazards to health 18/29/7593    Duplicated right renal collecting system 03/13/2014    Onycholysis of toenail 02/27/2014    Chronic pain associated with significant psychosocial dysfunction 02/17/2014    Depression with anxiety 02/17/2014    Other somatoform disorders 02/17/2014    Chronic chest pain 01/13/2014    Hyperlipidemia 12/09/2013    UTI (urinary tract infection) 12/09/2013    Insomnia 11/13/2013    Constipation 08/22/2013    Abdominal pain 08/16/2013    Dizziness of unknown cause 08/13/2013    Neutropenia ([address] 08/13/2013    HTN, goal below 130/80 09/07/2012    Chronic headache 09/07/2012    Acid reflux 09/07/2012      Denver Eli MD  Past Medical History:   Diagnosis Date    Agoraphobia without mention of panic attacks 2/17/2014    Anxiety disorder 8/18/2013    Arthritis     osteo    CAD (coronary artery disease), native coronary artery 12/1/2015    no stents    Chronic chest pain 1/13/2014    Chronic pain associated with significant psychosocial dysfunction 2/17/2014    Dementia ([facility]     Depression 8/18/2013    Diabetes ([facility]     type II    Duplicated right renal collecting system 3/13/2014    GERD (gastroesophageal reflux disease)     Gout, joint     Hypercholesteremia     hyercholesterolemia    Hypertension     Murmur     Nephrolithiasis 3/13/2014    Personal history of noncompliance with medical treatment, presenting hazards to health 5/30/2014      Past Surgical History:   Procedure Laterality Date    EGD  4/23/2010         FLEXIBLE SIGMOIDOSCOPY N/A 7/29/2022    SIGMOIDOSCOPY FLEXIBLE performed by Speedy Dominguez MD at [facility] ENDOSCOPY    HX CHOLECYSTECTOMY  09/20/2018    lap jesus    HX CYST REMOVAL      cyst removed from left wrist    HX HEART CATHETERIZATION  12/01/2015    HX HYSTERECTOMY      partial    HX OTHER SURGICAL      bladder dilitation    HX TUBAL LIGATION      HX UROLOGICAL      kidney stones    UPPER GI ENDOSCOPY,BALL DIL,30MM  5/31/2022          Allergies   Allergen Reactions    Amoxicillin Hives    Sulfa (Sulfonamide Antibiotics) Hives and Itching    Mirtazapine Itching and Nausea Only     Funny feeling in chest    Percocet [Oxycodone-Acetaminophen] Nausea and Vomiting    Codeine Nausea and Vomiting    Crestor [Rosuvastatin] Other (comments)     myalgias    Nitroglycerin Unknown (comments)     Patient cannot remember why she is allergic to it      Prednisone Itching    Zithromax [Azithromycin] Itching     Not sure what it does,taken long time ago      Family History   Problem Relation Age of Onset    Stroke Mother     Heart Disease Mother     Cancer Father         type unknown    Heart Disease Son     Liver Disease Son     Heart Disease Daughter     Malignant Hyperthermia Neg Hx     Pseudocholinesterase Deficiency Neg Hx     Delayed Awakening Neg Hx     Post-op Nausea/Vomiting Neg Hx     Emergence Delirium Neg Hx     Post-op Cognitive Dysfunction Neg Hx     Other Neg Hx       Current Facility-Administered Medications   Medication Dose Route Frequency    heparin 25,000 units in D5W 250 ml infusion  12-25 Units/kg/hr IntraVENous TITRATE    heparin (porcine) 1,000 unit/mL injection 1,890 Units  30 Units/kg IntraVENous PRN    Or    heparin (porcine) 1,000 unit/mL injection 3,780 Units  60 Units/kg IntraVENous PRN    fentaNYL citrate (PF) injection 100 mcg  100 mcg IntraVENous MULTIPLE DOSE GIVEN    midazolam (VERSED) injection 5 mg  5 mg IntraVENous MULTIPLE DOSE GIVEN    0.9% sodium chloride infusion  50 mL/hr IntraVENous CONTINUOUS    labetaloL (NORMODYNE;TRANDATE) injection 10 mg  10 mg IntraVENous Q2H PRN    dilTIAZem (CARDIZEM) 100 mg in dextrose 5% (MBP/ADV) 100 mL infusion  0-15 mg/hr IntraVENous TITRATE    sodium chloride (AYR SALINE) 0.65 % nasal drops 2 Drop  2 Drop Both Nostrils Q2H PRN    vancomycin (FIRVANQ) 50 mg/mL oral solution 125 mg  125 mg Oral Q6H    LORazepam (INTENSOL) 2 mg/mL oral concentrate 0.5 mg  0.5 mg SubLINGual BID PRN    dextrose 5% - 0.9% NaCl with KCl 40 mEq/L infusion   IntraVENous CONTINUOUS    L.acidophilus-paracasei-S.thermophil-bifidobacter (RISAQUAD) 8 billion cell capsule  1 Capsule Oral DAILY    hydrOXYzine HCL (ATARAX) tablet 10 mg  10 mg Oral TID PRN    hydrALAZINE (APRESOLINE) 20 mg/mL injection 10 mg  10 mg IntraVENous Q6H PRN    acetaminophen (TYLENOL) tablet 650 mg  650 mg Oral Q6H PRN    ondansetron (ZOFRAN) injection 4 mg  4 mg IntraVENous Q4H PRN    aspirin chewable tablet 81 mg  81 mg Oral DAILY    bisacodyL (DULCOLAX) suppository 10 mg  10 mg Rectal DAILY PRN    busPIRone (BUSPAR) tablet 5 mg  5 mg Oral DAILY    ezetimibe (ZETIA) tablet 10 mg  10 mg Oral DAILY    hyoscyamine SL (LEVSIN/SL) tablet 0.125 mg  0.125 mg SubLINGual Q6H PRN    lidocaine 4 % patch 1 Patch  1 Patch TransDERmal DAILY PRN    metoprolol succinate (TOPROL-XL) XL tablet 25 mg  25 mg Oral DAILY    [Held by provider] trospium (SANCTURA) tablet 20 mg  20 mg Oral DAILY    sertraline (ZOLOFT) tablet 100 mg  100 mg Oral DAILY    sodium chloride (NS) flush 5-40 mL  5-40 mL IntraVENous Q8H    sodium chloride (NS) flush 5-40 mL  5-40 mL IntraVENous PRN    morphine injection 1 mg  1 mg IntraVENous Q4H PRN    [Held by provider] enoxaparin (LOVENOX) injection 40 mg  40 mg SubCUTAneous Q24H    insulin lispro (HUMALOG) injection   SubCUTAneous AC&HS    glucose chewable tablet 16 g  4 Tablet Oral PRN    glucagon (GLUCAGEN) injection 1 mg  1 mg IntraMUSCular PRN    dextrose 10% infusion 0-250 mL  0-250 mL IntraVENous PRN         Review of Symptoms:  Review of Systems   Constitutional: Negative. Negative for chills and fever. HENT: Negative. Negative for hearing loss. Respiratory: Negative. Negative for cough, hemoptysis and shortness of breath. Cardiovascular:  Positive for chest pain. Negative for palpitations, orthopnea, claudication, leg swelling and PND. Gastrointestinal:  Positive for abdominal pain, nausea and vomiting. Musculoskeletal:  Negative for myalgias. Skin: Negative. Negative for rash. Neurological: Negative. Negative for dizziness, loss of consciousness and headaches. Physical Exam    Physical Exam  Vitals and nursing note reviewed. Constitutional:       Appearance: She is ill-appearing. Cardiovascular:      Rate and Rhythm: Regular rhythm. Heart sounds: Murmur heard. Pulmonary:      Breath sounds: Normal breath sounds. Musculoskeletal:      Right lower leg: No edema. Left lower leg: No edema. Skin:     General: Skin is warm. Neurological:      Mental Status: She is alert. Mental status is at baseline.    Psychiatric:         Mood and Affect: Mood normal.        Cardiographics    Telemetry: normal sinus rhythm  ECG: normal EKG, normal sinus rhythm, unchanged from previous tracings  Echocardiogram: Normal and reviewed by myself    Labs:   Recent Results (from the past 24 hour(s))   GLUCOSE, POC    Collection Time: 07/29/22 11:20 AM   Result Value Ref Range    Glucose (POC) 76 65 - 117 mg/dL    Performed by [address], POC    Collection Time: 07/29/22  4:30 PM   Result Value Ref Range    Glucose (POC) 73 65 - 117 mg/dL    Performed by [address] [place] [address]    TROPONIN-HIGH SENSITIVITY    Collection Time: 07/29/22  4:47 PM   Result Value Ref Range    Troponin-High Sensitivity 59 (H) 0 - 51 ng/L   MAGNESIUM    Collection Time: 07/29/22  4:47 PM   Result Value Ref Range    Magnesium 1.4 (L) 1.6 - 2.4 mg/dL   RENAL FUNCTION PANEL    Collection Time: 07/29/22  4:47 PM   Result Value Ref Range    Sodium 146 (H) 136 - 145 mmol/L    Potassium 3.5 3.5 - 5.1 mmol/L    Chloride 115 (H) 97 - 108 mmol/L    CO2 25 21 - 32 mmol/L    Anion gap 6 5 - 15 mmol/L    Glucose 82 65 - 100 mg/dL    BUN 7 6 - 20 MG/DL    Creatinine 0.51 (L) 0.55 - 1.02 MG/DL    BUN/Creatinine ratio 14 12 - 20      GFR est AA >60 >60 ml/min/1.73m2    GFR est non-AA >60 >60 ml/min/1.73m2    Calcium 7.4 (L) 8.5 - 10.1 MG/DL    Phosphorus 1.8 (L) 2.6 - 4.7 MG/DL    Albumin 2.0 (L) 3.5 - 5.0 g/dL   CBC WITH AUTOMATED DIFF    Collection Time: 07/29/22  4:47 PM   Result Value Ref Range    WBC 3.8 3.6 - 11.0 K/uL    RBC 3.50 (L) 3.80 - 5.20 M/uL    HGB 9.3 (L) 11.5 - 16.0 g/dL    HCT 31.1 (L) 35.0 - 47.0 %    MCV 88.9 80.0 - 99.0 FL    MCH 26.6 26.0 - 34.0 PG    MCHC 29.9 (L) 30.0 - 36.5 g/dL    RDW 13.6 11.5 - 14.5 %    PLATELET 043 382 - 411 K/uL    MPV 10.4 8.9 - 12.9 FL    NRBC 0.0 0  WBC    ABSOLUTE NRBC 0.00 0.00 - 0.01 K/uL    NEUTROPHILS 56 32 - 75 %    LYMPHOCYTES 31 12 - 49 %    MONOCYTES 10 5 - 13 %    EOSINOPHILS 2 0 - 7 %    BASOPHILS 1 0 - 1 %    IMMATURE GRANULOCYTES 0 0.0 - 0.5 %    ABS. NEUTROPHILS 2.1 1.8 - 8.0 K/UL    ABS. LYMPHOCYTES 1.2 0.8 - 3.5 K/UL    ABS. MONOCYTES 0.4 0.0 - 1.0 K/UL    ABS. EOSINOPHILS 0.1 0.0 - 0.4 K/UL    ABS. BASOPHILS 0.0 0.0 - 0.1 K/UL    ABS. IMM.  GRANS. 0.0 0.00 - 0.04 K/UL    DF AUTOMATED     D DIMER    Collection Time: 07/29/22  4:47 PM Result Value Ref Range    D-dimer 0.94 (H) 0.00 - 0.65 mg/L FEU   GLUCOSE, POC    Collection Time: 07/29/22  8:53 PM   Result Value Ref Range    Glucose (POC) 84 65 - 117 mg/dL    Performed by Constanza Coats PCT    METABOLIC PANEL, BASIC    Collection Time: 07/30/22  2:27 AM   Result Value Ref Range    Sodium 144 136 - 145 mmol/L    Potassium 3.8 3.5 - 5.1 mmol/L    Chloride 113 (H) 97 - 108 mmol/L    CO2 25 21 - 32 mmol/L    Anion gap 6 5 - 15 mmol/L    Glucose 94 65 - 100 mg/dL    BUN 7 6 - 20 MG/DL    Creatinine 0.49 (L) 0.55 - 1.02 MG/DL    BUN/Creatinine ratio 14 12 - 20      GFR est AA >60 >60 ml/min/1.73m2    GFR est non-AA >60 >60 ml/min/1.73m2    Calcium 8.1 (L) 8.5 - 10.1 MG/DL   CBC WITH AUTOMATED DIFF    Collection Time: 07/30/22  2:27 AM   Result Value Ref Range    WBC 3.8 3.6 - 11.0 K/uL    RBC 3.40 (L) 3.80 - 5.20 M/uL    HGB 9.2 (L) 11.5 - 16.0 g/dL    HCT 29.9 (L) 35.0 - 47.0 %    MCV 87.9 80.0 - 99.0 FL    MCH 27.1 26.0 - 34.0 PG    MCHC 30.8 30.0 - 36.5 g/dL    RDW 13.7 11.5 - 14.5 %    PLATELET 804 073 - 666 K/uL    MPV 9.7 8.9 - 12.9 FL    NRBC 0.0 0  WBC    ABSOLUTE NRBC 0.00 0.00 - 0.01 K/uL    NEUTROPHILS 62 32 - 75 %    LYMPHOCYTES 24 12 - 49 %    MONOCYTES 9 5 - 13 %    EOSINOPHILS 3 0 - 7 %    BASOPHILS 1 0 - 1 %    IMMATURE GRANULOCYTES 1 (H) 0.0 - 0.5 %    ABS. NEUTROPHILS 2.3 1.8 - 8.0 K/UL    ABS. LYMPHOCYTES 0.9 0.8 - 3.5 K/UL    ABS. MONOCYTES 0.3 0.0 - 1.0 K/UL    ABS. EOSINOPHILS 0.1 0.0 - 0.4 K/UL    ABS. BASOPHILS 0.0 0.0 - 0.1 K/UL    ABS. IMM.  GRANS. 0.0 0.00 - 0.04 K/UL    DF AUTOMATED     TROPONIN-HIGH SENSITIVITY    Collection Time: 07/30/22  2:27 AM   Result Value Ref Range    Troponin-High Sensitivity 7,140 (HH) 0 - 51 ng/L   PTT    Collection Time: 07/30/22  4:26 AM   Result Value Ref Range    aPTT 34.5 (H) 22.1 - 31.0 sec    aPTT, therapeutic range     58.0 - 77.0 SECS   CBC WITH AUTOMATED DIFF    Collection Time: 07/30/22  4:26 AM   Result Value Ref Range    WBC 3.3 (L) 3.6 - 11.0 K/uL    RBC 2.83 (L) 3.80 - 5.20 M/uL    HGB 7.6 (L) 11.5 - 16.0 g/dL    HCT 25.1 (L) 35.0 - 47.0 %    MCV 88.7 80.0 - 99.0 FL    MCH 26.9 26.0 - 34.0 PG    MCHC 30.3 30.0 - 36.5 g/dL    RDW 13.4 11.5 - 14.5 %    PLATELET 653 907 - 050 K/uL    MPV 10.0 8.9 - 12.9 FL    NRBC 0.0 0  WBC    ABSOLUTE NRBC 0.00 0.00 - 0.01 K/uL    NEUTROPHILS 63 32 - 75 %    LYMPHOCYTES 24 12 - 49 %    MONOCYTES 9 5 - 13 %    EOSINOPHILS 3 0 - 7 %    BASOPHILS 1 0 - 1 %    IMMATURE GRANULOCYTES 0 0.0 - 0.5 %    ABS. NEUTROPHILS 2.1 1.8 - 8.0 K/UL    ABS. LYMPHOCYTES 0.8 0.8 - 3.5 K/UL    ABS. MONOCYTES 0.3 0.0 - 1.0 K/UL    ABS. EOSINOPHILS 0.1 0.0 - 0.4 K/UL    ABS. BASOPHILS 0.0 0.0 - 0.1 K/UL    ABS. IMM. GRANS. 0.0 0.00 - 0.04 K/UL    DF SMEAR SCANNED      RBC COMMENTS ANISOCYTOSIS  1+       GLUCOSE, POC    Collection Time: 07/30/22  8:21 AM   Result Value Ref Range    Glucose (POC) 101 65 - 117 mg/dL    Performed by Montez Narayanan PCT         Assessment:     Assessment:         Hospital Problems  Date Reviewed: 7/19/2022            Codes Class Noted POA    Frailty ICD-10-CM: R54  ICD-9-CM: 238  7/26/2022 Unknown        Anorexia ICD-10-CM: R63.0  ICD-9-CM: 783.0  7/26/2022 Unknown        Diarrhea, unspecified type ICD-10-CM: R19.7  ICD-9-CM: 787.91  7/26/2022 Unknown        Achalasia ICD-10-CM: K22.0  ICD-9-CM: 530.0  7/25/2022 Unknown        Pyelonephritis ICD-10-CM: N12  ICD-9-CM: 590.80  7/25/2022 Unknown        Hypokalemia ICD-10-CM: E87.6  ICD-9-CM: 276.8  6/27/2022 Unknown        Nausea & vomiting ICD-10-CM: R11.2  ICD-9-CM: 787.01  2/25/2021 Unknown        DNR (do not resuscitate) discussion ICD-10-CM: Z71.89  ICD-9-CM: V65.49  7/6/2017 Yes        Other fatigue ICD-10-CM: R53.83  ICD-9-CM: 780.79  4/13/2017 Yes            Plan:     NSTEMI;    Has known moderate CAD. Agree with heparin, ASA, betablocker. Will add topical NTG.     Cath/PCI when clinically better unless clinical course worse.    Discussed with daughter at bedside. Thank you for the referral.    Suri Lopez MD    NTG listed as allergy. Will increase toprol.

## 2022-07-30 NOTE — PROGRESS NOTES
Problem: Pressure Injury - Risk of  Goal: *Prevention of pressure injury  Description: Document Freddy Scale and appropriate interventions in the flowsheet. Outcome: Progressing Towards Goal  Note: Pressure Injury Interventions:  Sensory Interventions: Assess changes in LOC, Discuss PT/OT consult with provider    Moisture Interventions: Internal/External fecal devices, Internal/External urinary devices    Activity Interventions: PT/OT evaluation    Mobility Interventions: PT/OT evaluation    Nutrition Interventions: Document food/fluid/supplement intake    Friction and Shear Interventions: Minimize layers                Problem: Patient Education: Go to Patient Education Activity  Goal: Patient/Family Education  Outcome: Progressing Towards Goal     Problem: Risk for Spread of Infection  Goal: Prevent transmission of infectious organism to others  Description: Prevent the transmission of infectious organisms to other patients, staff members, and visitors.   Outcome: Progressing Towards Goal

## 2022-07-30 NOTE — PROGRESS NOTES
1700 - Upon start of shift patient tachy up to 140s. Appears to be in sinus tach. EKG obtained per order. RRT nurse called. Dr. Osmany Magallanes stated to give a 250 mL bolus, check labs, and order albumin. MD placed transfer order to transfer patient to [facility] for titratable dilt drip.

## 2022-07-30 NOTE — PROGRESS NOTES
OT orders received and chart reviewed for OT Evaluation. From review of past hospitalizations (recently), patient's baseline of ADLS  is total dependence. She is largely bedbound and requires assist x 2 to transfer to wheelchair. Patient is unlikely to benefit from skilled OT intervention.  Will complete order and sign off.      p

## 2022-07-30 NOTE — PROGRESS NOTES
RAPID RESPONSE TEAM    Overhead rapid response paged to room # 2269/01  at  [phone]    Reason for rapid response:  CHEST PAIN    Initial assessment: Pt alert, oriented, complaining of new chest discomfort. Patient Vitals for the past 4 hrs:   Pulse Resp BP   07/30/22 0403 74 19 (!) 163/86          Interventions: EKG showed no changes, NSTEMI, heparin gtt started. Outcome: pt to remain in 2269/01     Please call with any questions or concerns    Loi Andrew RN  Rapid Response Team  Ext [phone]     Recent Results (from the past 8 hour(s))   METABOLIC PANEL, BASIC    Collection Time: 07/30/22  2:27 AM   Result Value Ref Range    Sodium 144 136 - 145 mmol/L    Potassium 3.8 3.5 - 5.1 mmol/L    Chloride 113 (H) 97 - 108 mmol/L    CO2 25 21 - 32 mmol/L    Anion gap 6 5 - 15 mmol/L    Glucose 94 65 - 100 mg/dL    BUN 7 6 - 20 MG/DL    Creatinine 0.49 (L) 0.55 - 1.02 MG/DL    BUN/Creatinine ratio 14 12 - 20      GFR est AA >60 >60 ml/min/1.73m2    GFR est non-AA >60 >60 ml/min/1.73m2    Calcium 8.1 (L) 8.5 - 10.1 MG/DL   CBC WITH AUTOMATED DIFF    Collection Time: 07/30/22  2:27 AM   Result Value Ref Range    WBC 3.8 3.6 - 11.0 K/uL    RBC 3.40 (L) 3.80 - 5.20 M/uL    HGB 9.2 (L) 11.5 - 16.0 g/dL    HCT 29.9 (L) 35.0 - 47.0 %    MCV 87.9 80.0 - 99.0 FL    MCH 27.1 26.0 - 34.0 PG    MCHC 30.8 30.0 - 36.5 g/dL    RDW 13.7 11.5 - 14.5 %    PLATELET 604 583 - 304 K/uL    MPV 9.7 8.9 - 12.9 FL    NRBC 0.0 0  WBC    ABSOLUTE NRBC 0.00 0.00 - 0.01 K/uL    NEUTROPHILS 62 32 - 75 %    LYMPHOCYTES 24 12 - 49 %    MONOCYTES 9 5 - 13 %    EOSINOPHILS 3 0 - 7 %    BASOPHILS 1 0 - 1 %    IMMATURE GRANULOCYTES 1 (H) 0.0 - 0.5 %    ABS. NEUTROPHILS 2.3 1.8 - 8.0 K/UL    ABS. LYMPHOCYTES 0.9 0.8 - 3.5 K/UL    ABS. MONOCYTES 0.3 0.0 - 1.0 K/UL    ABS. EOSINOPHILS 0.1 0.0 - 0.4 K/UL    ABS. BASOPHILS 0.0 0.0 - 0.1 K/UL    ABS. IMM.  GRANS. 0.0 0.00 - 0.04 K/UL    DF AUTOMATED     TROPONIN-HIGH SENSITIVITY    Collection Time: 07/30/22  2:27 AM   Result Value Ref Range    Troponin-High Sensitivity 7,140 (HH) 0 - 51 ng/L   PTT    Collection Time: 07/30/22  4:26 AM   Result Value Ref Range    aPTT 34.5 (H) 22.1 - 31.0 sec    aPTT, therapeutic range     58.0 - 77.0 SECS   CBC WITH AUTOMATED DIFF    Collection Time: 07/30/22  4:26 AM   Result Value Ref Range    WBC 3.3 (L) 3.6 - 11.0 K/uL    RBC 2.83 (L) 3.80 - 5.20 M/uL    HGB 7.6 (L) 11.5 - 16.0 g/dL    HCT 25.1 (L) 35.0 - 47.0 %    MCV 88.7 80.0 - 99.0 FL    MCH 26.9 26.0 - 34.0 PG    MCHC 30.3 30.0 - 36.5 g/dL    RDW 13.4 11.5 - 14.5 %    PLATELET 340 648 - 588 K/uL    MPV 10.0 8.9 - 12.9 FL    NRBC 0.0 0  WBC    ABSOLUTE NRBC 0.00 0.00 - 0.01 K/uL    NEUTROPHILS 63 32 - 75 %    LYMPHOCYTES 24 12 - 49 %    MONOCYTES 9 5 - 13 %    EOSINOPHILS 3 0 - 7 %    BASOPHILS 1 0 - 1 %    IMMATURE GRANULOCYTES 0 0.0 - 0.5 %    ABS. NEUTROPHILS 2.1 1.8 - 8.0 K/UL    ABS. LYMPHOCYTES 0.8 0.8 - 3.5 K/UL    ABS. MONOCYTES 0.3 0.0 - 1.0 K/UL    ABS. EOSINOPHILS 0.1 0.0 - 0.4 K/UL    ABS. BASOPHILS 0.0 0.0 - 0.1 K/UL    ABS. IMM.  GRANS. 0.0 0.00 - 0.04 K/UL    DF SMEAR SCANNED      RBC COMMENTS ANISOCYTOSIS  1+

## 2022-07-30 NOTE — PROGRESS NOTES
End of Shift Note    Bedside shift change report given to Cheri Saint (oncoming nurse) by Shirlene Hilton (offgoing nurse). Report included the following information SBAR, Kardex, Procedure Summary, Intake/Output, MAR, Recent Results, and Cardiac Rhythm NSR    Shift worked:  7 am to 7 pm     Shift summary and any significant changes:     Pt admitted to unit ~1700. Dual skin completed. MD Chacon came to bedside to see pt and instructed to cancel cardiology consult and hold off on giving albumin and starting dilt gtt (see prior note). Pain meds given for 9/10 pain. KUB completed at shift change. Concerns for physician to address:  none     Zone phone for oncoming shift:   [phone]       Activity:  Activity Level: Bed Rest  Number times ambulated in hallways past shift: 0  Number of times OOB to chair past shift: 0    Cardiac:   Cardiac Monitoring: Yes      Cardiac Rhythm: Sinus Rhythm, Multiform PVCs    Access:   Current line(s): PIV     Genitourinary:   Urinary status: martinez    Respiratory:   O2 Device: Nasal cannula  Chronic home O2 use?: NO  Incentive spirometer at bedside: N/A       GI:  Last Bowel Movement Date: 07/27/22  Current diet:  DIET NPO  Passing flatus: YES  Tolerating current diet: pt NPO       Pain Management:   Patient states pain is manageable on current regimen: YES    Skin:  Freddy Score: 12  Interventions: turn team, float heels, and internal/external urinary devices    Patient Safety:  Fall Score:  Total Score: 3  Interventions: bed/chair alarm and pt to call before getting OOB  High Fall Risk: Yes    Length of Stay:  Expected LOS: 2d 21h  Actual LOS: [address]

## 2022-07-30 NOTE — PROGRESS NOTES
2052: Pt's Kub resulted, RN paged Svetlana Pires MD regarding results. MD stated to keep pt NPO except for oral vancomycin and primary team will evaluate in the morning.

## 2022-07-30 NOTE — PROGRESS NOTES
Problem: Pressure Injury - Risk of  Goal: *Prevention of pressure injury  Description: Document Freddy Scale and appropriate interventions in the flowsheet. Outcome: Progressing Towards Goal  Note: Pressure Injury Interventions:  Sensory Interventions: Assess changes in LOC, Assess need for specialty bed, Keep linens dry and wrinkle-free, Maintain/enhance activity level, Minimize linen layers, Monitor skin under medical devices, Pressure redistribution bed/mattress (bed type), Turn and reposition approx. every two hours (pillows and wedges if needed)    Moisture Interventions: Absorbent underpads, Apply protective barrier, creams and emollients, Assess need for specialty bed, Maintain skin hydration (lotion/cream), Minimize layers, Moisture barrier    Activity Interventions: Assess need for specialty bed, Chair cushion, Increase time out of bed, Pressure redistribution bed/mattress(bed type), PT/OT evaluation    Mobility Interventions: Assess need for specialty bed, Chair cushion, Float heels, HOB 30 degrees or less, Pressure redistribution bed/mattress (bed type), PT/OT evaluation    Nutrition Interventions: Document food/fluid/supplement intake, Discuss nutritional consult with provider, Offer support with meals,snacks and hydration    Friction and Shear Interventions: Apply protective barrier, creams and emollients, Feet elevated on foot rest, Foam dressings/transparent film/skin sealants, HOB 30 degrees or less, Lift team/patient mobility team, Transfer aides:transfer board/Tashi lift/ceiling lift                Problem: Falls - Risk of  Goal: *Absence of Falls  Description: Document Romi Fall Risk and appropriate interventions in the flowsheet.   Outcome: Progressing Towards Goal  Note: Fall Risk Interventions:       Mentation Interventions: Adequate sleep, hydration, pain control, Bed/chair exit alarm, Door open when patient unattended, Evaluate medications/consider consulting pharmacy, Eyeglasses and hearing aids, Gait belt with transfers/ambulation, HELP ([address]) if available, Increase mobility, More frequent rounding, Room close to nurse's station, Update white board, Toileting rounds, Self-releasing belt    Medication Interventions: Assess postural VS orthostatic hypotension, Bed/chair exit alarm, Evaluate medications/consider consulting pharmacy, Patient to call before getting OOB, Teach patient to arise slowly, Utilize gait belt for transfers/ambulation    Elimination Interventions: Bed/chair exit alarm, Call light in reach, Elevated toilet seat, Patient to call for help with toileting needs, Stay With Me (per policy), Toileting schedule/hourly rounds, Toilet paper/wipes in reach              Problem: Pain  Goal: *Control of Pain  Outcome: Progressing Towards Goal

## 2022-07-30 NOTE — PROGRESS NOTES
Hospitalist Progress Note    NAME: Evelyn Herrera   :  1945   MRN:  [id number]       Assessment / Plan:  C. difficile colitis/Colonic distension   p.o. vancomycin  per GI had flex sig  with dilated colon stools suctioned  Per surgery cont rectal tube    CT scan abdomen   1. Persistent colon distention affecting mainly the rectosigmoid. Definite  etiology is not clear from this study but rectal tube or direct visualization  may be of value for relief of symptoms and diagnosis. 2. Multiple incidentals as above. 3. Prominent anasarca. Bilateral pleural effusions.    -Hold Dulcolax    NSTEMI , not POA  Tachycardia  4PM  -Chest pain overnight, troponin significantly elevated. Patient has a known moderate CAD  -Likely procedure when she is more medically stable  -Continue heparin drip, aspirin  -Continue beta-blocker, heart rate controlled  -Cardizem drip if needed  -Get Doppler of bilateral lower extremity, has unilateral leg swelling      Dysphagia/Achalasia with N/V  H/o esophageal dilation   Abdominal x-ray showed  IMPRESSION  1. Markedly ectatic proximal esophagus containing enteric content. 2. Lucency over the entire abdomen which is concerning for pneumoperitoneum. Recommend supine and left lateral decubitus imaging of the abdomen. GI on  clinically   Consider botus injections  Persistent nausea  Deit as per SLP      Pyelonephrits/Urinary retention   Status post ceftriaxone and vancomycin. Ucx negative.     Off all abx now as Cdiff  Apprecaite urology       HTN- Continue with metoprolol as BP allows, hold Norvasc as bp soft  Hypokalemia-replete potassium  Dementia-continue PTA home meds  Depression with anxiety-continue PTA home meds  Debility/deconditioning state-  PT OT once stable        Code Status: DNR  Surrogate Decision Maker: Patient daughter [phone] c  DVT Prophylaxis: Heparin drip  Recommended Disposition: SNF/LTC     Subjective:     Chief Complaint / Reason for Physician Visit  FU ileus/dysphagia . Patient seen and examined. She still complains of left upper abdomen pain. Her abdomen is soft. In no distress. She is asking for med for nerve pain    Objective:     VITALS:   Last 24hrs VS reviewed since prior progress note. Most recent are:  Patient Vitals for the past 24 hrs:   Temp Pulse Resp BP SpO2   07/30/22 1154 98.4 °F (36.9 °C) 81 17 (!) 159/94 100 %   07/30/22 0854 -- 77 -- (!) 153/95 --   07/30/22 0845 -- 72 12 (!) 153/95 99 %   07/30/22 0403 -- 74 19 (!) 163/86 --   07/30/22 0023 98.5 °F (36.9 °C) 72 19 117/65 --   07/29/22 2303 98.5 °F (36.9 °C) 74 19 (!) 138/93 95 %   07/29/22 2124 -- -- -- -- 94 %   07/29/22 1914 99 °F (37.2 °C) 83 18 135/68 100 %   07/29/22 1710 98.2 °F (36.8 °C) 86 16 (!) 151/92 --   07/29/22 1531 -- (!) 132 -- 106/67 --   07/29/22 1433 98.2 °F (36.8 °C) 61 16 107/89 99 %   07/29/22 1233 -- 75 16 (!) 176/100 96 %   07/29/22 1229 -- 76 16 (!) 179/102 100 %   07/29/22 1224 -- 75 17 (!) 173/98 97 %   07/29/22 1220 -- 73 17 (!) 218/117 98 %         Intake/Output Summary (Last 24 hours) at 7/30/2022 1218  Last data filed at 7/30/2022 0700  Gross per 24 hour   Intake 1324.52 ml   Output 550 ml   Net 774.52 ml          I had a face to face encounter and independently examined this patient on 7/30/2022, as outlined below:  PHYSICAL EXAM:  General: Chronically looking, no acute distress    corrina:  No accessory muscle use  CV:  RRR  No edema  GI:  Soft, Non distended, Non tender. Neurologic:  Alert and normal speech,   Psych:   Poor insight. Skin:  No rashes.   No jaundice    LABS:    Pertinent labs include:  Recent Labs     07/30/22  0426 07/30/22 0227 07/29/22  1647   WBC 3.3* 3.8 3.8   HGB 7.6* 9.2* 9.3*   HCT 25.1* 29.9* 31.1*    229 246       Recent Labs     07/30/22 0227 07/29/22  1647 07/29/22  0608    146* 144   K 3.8 3.5 3.2*   * 115* 113*   CO2 25 25 25   GLU 94 82 100   BUN 7 7 8   CREA 0.49* 0.51* 0.47*   CA 8. 1* 7.4* 8.1*   MG  --  1.4*  --    PHOS  --  1.8*  --    ALB  --  2.0*  --          Current Facility-Administered Medications:     heparin 25,000 units in D5W 250 ml infusion, 12-25 Units/kg/hr, IntraVENous, TITRATE, Oren Hernandez MD, Held at 07/30/22 1206    heparin (porcine) 1,000 unit/mL injection 1,890 Units, 30 Units/kg, IntraVENous, PRN **OR** heparin (porcine) 1,000 unit/mL injection 3,780 Units, 60 Units/kg, IntraVENous, PRN, MD Woodrow Spears ON 7/31/2022] metoprolol succinate (TOPROL-XL) XL tablet 50 mg, 50 mg, Oral, DAILY, Anais Segovia MD    fentaNYL citrate (PF) injection 100 mcg, 100 mcg, IntraVENous, MULTIPLE DOSE GIVEN, Jinny Flores MD, 25 mcg at 07/29/22 1156    midazolam (VERSED) injection 5 mg, 5 mg, IntraVENous, MULTIPLE DOSE GIVEN, Jinny Flores MD, 1 mg at 07/29/22 1156    0.9% sodium chloride infusion, 50 mL/hr, IntraVENous, CONTINUOUS, Jinny Flores MD, Stopped at 07/29/22 1208    labetaloL (NORMODYNE;TRANDATE) injection 10 mg, 10 mg, IntraVENous, Q2H PRN, Donavon Chacon MD, 10 mg at 07/29/22 1520    dilTIAZem (CARDIZEM) 100 mg in dextrose 5% (MBP/ADV) 100 mL infusion, 0-15 mg/hr, IntraVENous, TITRATE, Donavon Chacon MD, Held at 07/29/22 1700    sodium chloride (AYR SALINE) 0.65 % nasal drops 2 Drop, 2 Drop, Both Nostrils, Q2H PRN, Josse Jones MD, 2 Drop at 07/29/22 2055    vancomycin (FIRVANQ) 50 mg/mL oral solution 125 mg, 125 mg, Oral, Q6H, Slim Lebron MD, 125 mg at 07/30/22 0648    LORazepam (INTENSOL) 2 mg/mL oral concentrate 0.5 mg, 0.5 mg, SubLINGual, BID PRN, Nate BACH MD, 0.5 mg at 07/29/22 2120    dextrose 5% - 0.9% NaCl with KCl 40 mEq/L infusion, , IntraVENous, CONTINUOUS, Slim Lebron MD, Last Rate: 50 mL/hr at 07/30/22 0030, New Bag at 07/30/22 0030    L.acidophilus-paracasei-S.thermophil-bifidobacter (RISAQUAD) 8 billion cell capsule, 1 Capsule, Oral, DAILY, Slim Lebron MD, 1 Capsule at 07/30/22 9219    hydrOXYzine HCL (ATARAX) tablet 10 mg, 10 mg, Oral, TID PRN, Slim Lebron MD, 10 mg at 07/30/22 1159    hydrALAZINE (APRESOLINE) 20 mg/mL injection 10 mg, 10 mg, IntraVENous, Q6H PRN, Slim Lebron MD    acetaminophen (TYLENOL) tablet 650 mg, 650 mg, Oral, Q6H PRN, Slim Lebron MD    ondansetron [facility]) injection 4 mg, 4 mg, IntraVENous, Q4H PRN, Slim Lebron MD, 4 mg at 07/29/22 0818    aspirin chewable tablet 81 mg, 81 mg, Oral, DAILY, lSim Lebron MD, 81 mg at 07/30/22 0854    bisacodyL (DULCOLAX) suppository 10 mg, 10 mg, Rectal, DAILY PRN, Slim Lebron MD    busPIRone (BUSPAR) tablet 5 mg, 5 mg, Oral, DAILY, Slim Lebron MD, 5 mg at 07/30/22 0854    ezetimibe (ZETIA) tablet 10 mg, 10 mg, Oral, DAILY, Slim Lebron MD, 10 mg at 07/30/22 0854    hyoscyamine SL (LEVSIN/SL) tablet 0.125 mg, 0.125 mg, SubLINGual, Q6H PRN, Slim Lebron MD    lidocaine 4 % patch 1 Patch, 1 Patch, TransDERmal, DAILY PRN, Slim Lebron MD, 1 Patch at 07/27/22 1211    [Held by provider] trospium (SANCTURA) tablet 20 mg, 20 mg, Oral, DAILY, Slim Lebron MD    sertraline (ZOLOFT) tablet 100 mg, 100 mg, Oral, DAILY, Slim Lebron MD, 100 mg at 07/30/22 0854    sodium chloride (NS) flush 5-40 mL, 5-40 mL, IntraVENous, Q8H, Tom Quinones MD, 10 mL at 07/30/22 0600    sodium chloride (NS) flush 5-40 mL, 5-40 mL, IntraVENous, PRN, Slim Lebron MD    morphine injection 1 mg, 1 mg, IntraVENous, Q4H PRN, Slim Lebron MD, 1 mg at 07/30/22 1159    insulin lispro (HUMALOG) injection, , SubCUTAneous, AC&HS, Slim Lebron MD, 4 Units at 07/26/22 1152    glucose chewable tablet 16 g, 4 Tablet, Oral, PRN, Slim Lebron MD    glucagon (GLUCAGEN) injection 1 mg, 1 mg, IntraMUSCular, PRN, Slim Lebron MD    dextrose 10% infusion 0-250 mL, 0-250 mL, IntraVENous, PRN, Slim Lebron MD, 250 mL at 07/26/22 1729       Signed: Kt Console, MD

## 2022-07-30 NOTE — PROGRESS NOTES
PT orders received and chart reviewed for PT Evaluation. From review of past hospitalizations (recently), patient's baseline of mobility is total dependence. She is largely bedbound and requires assist x 2 to transfer to wheelchair. Patient is unlikely to benefit from skilled PT intervention. Will complete order and sign off.

## 2022-07-31 NOTE — PROGRESS NOTES
Spiritual Care Assessment/Progress Note  [facility]      NAME: Vishnu Johnson      MRN: [id number]  AGE: 68 y.o. SEX: female  Episcopalian Affiliation: Lutheran   Language: English     7/31/2022     Total Time (in minutes): 28     Spiritual Assessment begun in [facility] through conversation with:         []Patient        [x] Family    [] Friend(s)        Reason for Consult: Death, Inpatient     Spiritual beliefs: (Please include comment if needed)     [x] Identifies with a hossein tradition:         [] Supported by a hossein community:            [] Claims no spiritual orientation:           [] Seeking spiritual identity:                [] Adheres to an individual form of spirituality:           [] Not able to assess:                           Identified resources for coping:      [x] Prayer                               [] Music                  [] Guided Imagery     [x] Family/friends                 [] Pet visits     [] Devotional reading                         [] Unknown     [] Other:                                                Interventions offered during this visit: (See comments for more details)    Patient Interventions:  Other (comment) (unable to assess)     Family/Friend(s): Prayer (actual), Normalization of emotional/spiritual concerns, Coping skills reviewed/reinforced, Affirmation of emotions/emotional suffering, End of life issues discussed, Episcopalian beliefs/image of God discussed, Affirmation of hossein, Guidance concerning next steps/process to be expected, Life review/legacy     Plan of Care:     [] Support spiritual and/or cultural needs    [] Support AMD and/or advance care planning process      [] Support grieving process   [] Coordinate Rites and/or Rituals    [] Coordination with community clergy   [] No spiritual needs identified at this time   [] Detailed Plan of Care below (See Comments)  [] Make referral to Music Therapy  [] Make referral to Pet Therapy     [] Make referral to Addiction services  [] Make referral to [facility]  [] Make referral to Spiritual Care Partner  [x] No future visits requested        [] Contact Spiritual Care for further referrals     Comments:   responded to death of Miss Curtis Walsh in . A daughter  was present when  arrived, she was very distressed and shed much tears as she processed her thoughts and emotions about current situation. Another sister and her son arrived whiles  was still present. She also grieved tearfully expressing her regrets for not being present to say good bye. Both sisters indicated that patient's death was unexpected. They were of the impression that she was going to be around for a while.  offered supportive listening presence and his condolences. Calm words of comfort was also spoken for support. They requested for prayer when  asked how they would like to be supported. Requested prayer was offered, family advised to inform nurse about  home of choice if they have one in mind. Space was then offered family to process current situation. They thanked  for the support. Visited by: Lashay Yost.    Paging Service: [phone])

## 2022-07-31 NOTE — PROGRESS NOTES
Responded to rapid response call for patient and upon arrival patient received unresponsive with agonal breathing on a 100% non-rebreather mask with sat of 97% and HR of 70bpm;  due to patient being DNR/DNI no escalation of respiratory intervention was initiated at this time; at the physician's request patient was removed from the non-rebreather and placed on 6L NC and a venti-mask was obtained and left at the bedside.

## 2022-07-31 NOTE — PROGRESS NOTES
Rapid response called as patient has sudden change in mental status, she is altered with agonal breathing. Patient was already made to DO NOT RESUSCITATE and DO NOT INTUBATE on discussion with the palliative care team was done few days ago. She is kept on oxygen supplementation with nonrebreather, drooling in bed and not responsive at all. Per nursing staff she was responding until a few minutes ago. Became completely unresponsive at this time. As in the note stated patient family like to treat to improve her accepted DO NOT RESUSCITATE. .  Try to call the daughters members that we have on file for both of them and left a message as they did not answer phone. Deferred any further work-up as patient seems having a stroke or hemorrhage  in the brain with significant agonal breathing or she might have had a cardiac event. If this condition need to be treated patient to be intubated but she was made for DO NOT RESUSCITATE and DO NOT INTUBATE. I will keep her on comfort measures at this time, we will wait for the daughters to call back.

## 2022-07-31 NOTE — DEATH NOTE
Death Pronouncement Note    Patient's Name: Monse Hall   Patient's YOB: 1945  MRN Number: [id number]    Admitting Provider: Tye Myrick MD  Attending Provider: Kavon Weaver MD     Patient was examined and the following were absent: Pulses, Blood Pressure, and Respiratory effort    I declared the patient dead on  at [address] on 07/31/2022    Preliminary Cause of Death:     Electronically signed by Doug Santillan MD on 7/31/2022 at 12:28 AM

## 2022-07-31 NOTE — PROGRESS NOTES
2250 called RR on pt change in mental status, pt not responding, agonal breathing. Chest xray ordered. Physician bedside and tried to called family several times to update about pt status but they were not available.     0010 pt time of death called by 2340 [facility] made aware of pt death    80  paged    [address] [place] [address] Pkwy called    18 family arrived    18 family left and post mortem care done    [phone] pt transported to the [facility]

## 2022-07-31 NOTE — PROGRESS NOTES
RAPID RESPONSE TEAM    Overhead rapid response paged to room # 2269/01  at  2252    Reason for rapid response:  change in mental status with agonal breathing    Initial assessment: pt unresponsive, DNR/DNI. Sudden onset of unresponsiveness with agonal breathing. Patient Vitals for the past 4 hrs:   SpO2   22 2124 92 %          Interventions: chest xray. Asked mikey enamorado about stroke workup, she deferred due to treatment would necessitate intubation and pt is a DNR/DNI. Several calls to family members by physician kelsea and nursing staff, no answer, messages left. Pt  and was pronounced at 65. Family did answer the phone, was notified, and will be visiting. Outcome: pt , time of death 0100.   In room: 2269/     Please call with any questions or concerns    Yue Childers RN  Rapid Response Team  Ext [phone]     Recent Results (from the past 8 hour(s))   GLUCOSE, POC    Collection Time: 22  5:49 PM   Result Value Ref Range    Glucose (POC) 105 65 - 117 mg/dL    Performed by Arvind Pimentel PCT    GLUCOSE, POC    Collection Time: 22  8:50 PM   Result Value Ref Range    Glucose (POC) 115 65 - 117 mg/dL    Performed by Williams Zambrano PCT

## 2022-08-02 NOTE — DISCHARGE SUMMARY
Hospitalist Discharge Summary     Patient ID:  Gricel Null  [id number]  89 y.o.  1945 7/25/2022    PCP on record: Feng Hyde MD    Admit date: 7/25/2022  Discharge date and time: 8/2/2022    DISCHARGE DIAGNOSIS:    See below    CONSULTATIONS:  IP CONSULT TO UROLOGY  IP CONSULT TO GASTROENTEROLOGY  IP CONSULT TO GENERAL SURGERY  IP CONSULT TO CARDIOLOGY    Excerpted HPI from H&P of Hua العلي MD:  Zonia Zayas is a 68 y.o.  female with past medical history of dementia, dysphagia, anxiety disorder, CAD, kidney stones, hypertension who presents with complaint of nausea vomiting and decreased p.o. intake. Patient was discharged last Friday being treated for complicated UTI, had lithotripsy and stent placement on July 19, the day of discharge was brought back to the ED because she was hypoxic. Work-up did not show anything and was sent back home. Daughter reported that patient started having nausea vomiting and flank pain on Saturday and was not eating much, she presented to [facility] for evaluation. Patient was transferred to [facility] for [facility] evaluation  In the ED at [facility] vital signs showed that she was hypertensive, her labs showed [id number], BMP showed potassium of 3.1, CBC showed WBC of 3.3  proBNP elevated    ______________________________________________________________________  DISCHARGE SUMMARY/HOSPITAL COURSE:  for full details see H&P, daily progress notes, labs, consult notes. C. difficile colitis  NSTEMI  Dysphagia  Pyelonephritis  HTN  Hypokalemia      -Ms. Curtis Walsh was admitted to our hospital on 7/25 for nausea/vomiting, with initial diagnosis of UTI, was placed on IV Rocephin, eventually her CT of was positive, antibiotic was discontinued and was placed on p.o. vancomycin.   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 Which improved her symptoms. The patient had a multiple rapid response for chest pain. On  during work-up for rapid response, she found to have elevated troponin, cardiology consult was obtained. Patient had a moderate CAD, plan was to do cath/PCI when clinically better. On  evening patient became unresponsive with agonal breathing, chest x-ray showed near complete opacification of right hemithorax. Patient was DNR/DNI.   patient   at 65 on 2022        _______________________________________________________________________  DISCHARGE MEDICATIONS:   Discharge Medication List as of 2022  5:31 AM                    ________________________________________________________________    ______________________________________________________________        ____________________________________________________________________    ___________________________________________________________________      Total time in minutes spent coordinating this discharge (includes going over instructions, follow-up, prescriptions, and preparing report for sign off to her PCP) :  >30 minutes    Signed:  Eric Springer MD  .

## 2023-01-01 NOTE — IP AVS SNAPSHOT
[address] [facility] 
[phone] Patient: Kaitlynn Weeks MRN: [id number] GPI:[id number] A check juan indicates which time of day the medication should be taken. My Medications CHANGE how you take these medications Instructions Each Dose to Equal  
 Morning Noon Evening Bedtime  
 dilTIAZem  mg ER capsule Commonly known as:  CARDIZEM CD What changed:  Another medication with the same name was removed. Continue taking this medication, and follow the directions you see here. Your last dose was: Your next dose is: Take 120 mg by mouth nightly. 120 mg  
    
   
   
   
  
 nystatin topical cream  
Commonly known as:  MYCOSTATIN What changed:  Another medication with the same name was removed. Continue taking this medication, and follow the directions you see here. Your last dose was: Your next dose is:    
   
   
 Apply  to affected area two (2) times daily as needed for Skin Irritation. CONTINUE taking these medications Instructions Each Dose to Equal  
 Morning Noon Evening Bedtime  
 aspirin delayed-release 81 mg tablet Your last dose was: Your next dose is: Take 1 Tab by mouth daily. 81 mg  
    
   
   
   
  
 bisacodyl 10 mg suppository Commonly known as:  DULCOLAX (BISACODYL) Your last dose was: Your next dose is: Insert 10 mg into rectum daily as needed. 10 mg  
    
   
   
   
  
 docusate sodium 100 mg capsule Commonly known as:  Orien  Your last dose was: Your next dose is: TAKE 1 CAP BY MOUTH DAILY AS NEEDED FOR CONSTIPATION FOR UP TO 90 DAYS. erythromycin ophthalmic ointment Commonly known as:  ILOTYCIN Your last dose was: Your next dose is: APPLY 1 APPLICATION TO BOTH EYELIDS TWICE DAILY  
     
   
   
   
  
 famotidine 20 mg tablet Commonly known as:  PEPCID Your last dose was: Your next dose is: Take 1 Tab by mouth two (2) times a day. 20 mg  
    
   
   
   
  
 losartan 25 mg tablet Commonly known as:  COZAAR Your last dose was: Your next dose is: Take 25 mg by mouth daily. 25 mg  
    
   
   
   
  
 polyethylene glycol 17 gram/dose powder Commonly known as:  Wilnette Seeds Your last dose was: Your next dose is: Take 17 g by mouth daily as needed. 1 tablespoon with 8 oz of water daily 17 g STOP taking these medications   
 acetaminophen 500 mg tablet Commonly known as:  TYLENOL  
   
  
  
ASK your doctor about these medications Instructions Each Dose to Equal  
 Morning Noon Evening Bedtime  
 acetaminophen 325 mg tablet Commonly known as:  TYLENOL Your last dose was: Your next dose is: Take 2 Tabs by mouth every four (4) hours as needed for Pain. 650 mg Where to Get Your Medications These medications were sent to [address] [place] [address] [address] [facility]  [address] [place] [address] Hours:  24-hours Phone:  [phone]  
  famotidine 20 mg tablet No cyanosis, no pallor, no jaundice, no rash

## 2023-03-03 NOTE — PROGRESS NOTES
Problem: Dysphagia (Adult)  Goal: *Acute Goals and Plan of Care (Insert Text)  Description: Speech Therapy Goals  Initiated 2/18/2021  -Patient will participate in modified barium swallow study within 7 day(s), pending pt's prgress. [ ] Not met  [ ]  MET   [ ] Progressing  [ ] David Askew  -Patient will tolerate full liquids diet with nectar thick liquids, advance to puree/nectar diet without signs/symptoms of aspiration given minimal cues within 7day(s). [ ] Not met  [ ]  MET   [ ] Progressing  [ ] David Askew  -Patient will demonstrate understanding of swallow safety precautions and aspiration precautions, diet recs with minimal cues within 7 day(s). [ ] Not met  [ ]  MET   [ ] Progressing  [ ] Discontinue  Outcome: Not Met   SPEECH [address] EVALUATIONS  Patient: Justyna Guzmán (58 y.o. female)  Date: 2/18/2021  Primary Diagnosis: Acute coronary syndromes ([facility] [address] [I24.9]        Precautions: aspiration, droplet plus       ASSESSMENT :  Based on the objective data described below, the patient presents with mild-mod oropharyngeal dysphagia. Oral phase with generalized weakness, reduced mastication with ice chips, and ill-fitting dentures. Pt reports burning sensation on tongue, tongue appears red. MD made aware. Pt declines solids trials. Pharyngeal phase with persistent delay, appears WFL once initiated. Reduced oropharyngeal control with thin. Pt admitted with chest pain, found to have elevated troponin. CXR not available for review. Pt c/o general malaise and is asking for ice cream. Pt reportedly converted to A-fib after session and back to sinus rhythm per nsg. Pt currently COVID +, recent disimpaction procedure at [facility] reported. Patient will benefit from skilled intervention to address the above impairments.   Patients rehabilitation potential is considered to be Fair     PLAN :  Recommendations and Planned Interventions:  Recommend initiate full NECTAR What Type Of Note Output Would You Prefer (Optional)?: Standard Output Hpi Title: Evaluation of Skin Lesions liquids diet with ice cream per pt's request. Recommend advance diet to puree/nectar if pt's intake improves. Recommend 1:1 assistance with ALL PO intake, STRICT aspiration and GERD precautions, monitor pt closely for s/s aspiration, meds crushed if able in puree, FEED ONLY IF AWAKE AND ALERT. Frequency/Duration: Patient will be followed by speech-language pathology 4 times a week to address goals. Discharge Recommendations: To Be Determined     SUBJECTIVE:   Patient alert, agreeable, reports general malaise and food not tasting good.     OBJECTIVE:     Past Medical History:   Diagnosis Date    Agoraphobia without mention of panic attacks 2/17/2014    Anxiety disorder 8/18/2013    Arthritis     osteo    CAD (coronary artery disease), native coronary artery 12/1/2015    no stents    Chronic chest pain 1/13/2014    Chronic pain associated with significant psychosocial dysfunction 2/17/2014    Depression 8/18/2013    Diabetes ([facility] [address]     type II    Duplicated right renal collecting system 3/13/2014    GERD (gastroesophageal reflux disease)     Gout, joint     Hypercholesteremia     hyercholesterolemia    Hypertension     Nephrolithiasis 3/13/2014    Personal history of noncompliance with medical treatment, presenting hazards to health 5/30/2014     Past Surgical History:   Procedure Laterality Date    EGD  4/23/2010         HX CHOLECYSTECTOMY  09/20/2018    lap jesus    HX CYST REMOVAL      cyst removed from left wrist    HX HYSTERECTOMY      partial    HX OTHER SURGICAL      bladder dilitation    HX TUBAL LIGATION      HX UROLOGICAL      kidney stones     Prior Level of Function/Home Situation: unknown  Home Situation  Home Environment: Private residence  One/Two Story Residence: Two story  Living Alone: No  Support Systems: Family member(s), Friends \ neighbors  Patient Expects to be Discharged to[de-identified] Private residence  Current DME Used/Available at Home: None  Diet prior to admission: How Severe Are Your Spot(S)?: mild unknown  Current Diet:  Regular/thin, pending SLP assessment   Cognitive and Communication Status:  Neurologic State: Alert, Confused  Orientation Level: Oriented to person  Cognition: Decreased attention/concentration     Perseveration: Perseverates during conversation     Swallowing Evaluation:   Oral Assessment:  Oral Assessment  Labial: No impairment  Dentition: Upper & lower dentures  Oral Hygiene: trace lingual residue noted, tongue appears red and pt reports tongue is burning  Lingual: Decreased strength  Velum: No impairment  P.O. Trials:  Patient Position: upright in bed  Vocal quality prior to P.O.: Hoarse  Consistency Presented: Ice chips; Nectar thick liquid;Puree; Thin liquid  How Presented: Spoon;Straw;Successive swallows     Bolus Acceptance: Impaired  Bolus Formation/Control: Impaired  Type of Impairment: Delayed  Propulsion: Delayed (# of seconds)  Oral Residue: None  Initiation of Swallow: Delayed (# of seconds)  Laryngeal Elevation: Functional  Aspiration Signs/Symptoms: None    Oral Phase Severity: Mild-moderate  Pharyngeal Phase Severity : Mild-moderate  Voice:  Vocal Quality: Hoarse     Pain:  Pain Scale 1: Numeric (0 - 10)  Pain Intensity 1: 6  Pain Location 1: Rib cage    After treatment:   Patient left in no apparent distress in bed, Call bell within reach and Nursing notified    COMMUNICATION/EDUCATION:   Patient was educated regarding purpose of SLP assessment, POC, diet recs and sw safety precautions. Patient demonstrated [address] understanding as evidenced by AMS, verbal responsiveness. The patient's plan of care including recommendations, planned interventions, and recommended diet changes were discussed with: Registered nurse and Physician. Patient/family have participated as able in goal setting and plan of care. Patient/family agree to work toward stated goals and plan of care.     Thank you for this referral.  Collin Rodriguez M.S. [facility]-SLP  Time Calculation: 13 mins Have Your Spot(S) Been Treated In The Past?: has been treated Additional History: Recently had a biopsy at [facility] dermatology 2 weeks ago. Here for second opinion

## 2023-05-08 NOTE — PROGRESS NOTES
Spiritual Care Assessment/Progress Note  [facility]      NAME: Cindy Lozada      MRN: [id number]  AGE: 68 y.o. SEX: female  Latter day Affiliation: Caodaism   Language: English     1/12/2022     Total Time (in minutes): 6     Spiritual Assessment begun in [facility] through conversation with:         []Patient        [] Family    [] Friend(s)        Reason for Consult: Initial/Spiritual assessment, patient floor     Spiritual beliefs: (Please include comment if needed)     [] Identifies with a hossein tradition:         [] Supported by a hossein community:            [] Claims no spiritual orientation:           [] Seeking spiritual identity:                [] Adheres to an individual form of spirituality:           [x] Not able to assess:                           Identified resources for coping:      [] Prayer                               [] Music                  [] Guided Imagery     [] Family/friends                 [] Pet visits     [] Devotional reading                         [x] Unknown     [] Other:                                              Interventions offered during this visit: (See comments for more details)    Patient Interventions: Initial visit           Plan of Care:     [] Support spiritual and/or cultural needs    [] Support AMD and/or advance care planning process      [] Support grieving process   [] Coordinate Rites and/or Rituals    [] Coordination with community clergy   [] No spiritual needs identified at this time   [] Detailed Plan of Care below (See Comments)  [] Make referral to Music Therapy  [] Make referral to Pet Therapy     [] Make referral to Addiction services  [] Make referral to [facility]  [] Make referral to Spiritual Care Partner  [] No future visits requested        [x] Contact Spiritual Care for further referrals     Comments:     Initial Spiritual Care Assessment attempt for Mrs. Logan in [phone].  Performed chart review before the What Type Of Note Output Would You Prefer (Optional)?: Standard Output How Severe Are Your Spot(S)?: mild visit. Upon arrival, patient was appeared sleeping.  respected patient's privacy. Contact  if emotional/spiritual needs arise.      [id number] Pema Hankins,MckayM, [address] Provider   Paging Service [phone]) Hpi Title: Evaluation of Skin Lesions

## 2023-05-08 NOTE — PROGRESS NOTES
Hospitalist Progress Note    NAME: Stevan Beebe   :  1945   MRN:  [id number]       Assessment / Plan:    Hypokalemia, K 2.4 on   Complicated UTI  History of left ureteral stone status post stent placement  Urine culture from 7/15 growing possible Enterococcus, had been on ceftin outpt. Continue empiric vancomycin and IV rocephin  Repeat UA with bacteriuria, significant pyuria and hematuria  Cultures pending  Continue gentle IV fluids with D5 and potassium  Hold bowel regimen  Magnesium within normal limits  Replace potassium aggressively  Follow BMP closely  Urology following, 4 OR today today    Hypocalcemia  - We will give 1 g of calcium gluconate     Hypertension  CAD  GERD  HLD  Depression wi th anxiety  Resume home medications     History of dementia    18.5 - 24.9 Normal weight / Body mass index is 19.64 kg/m². Estimated discharge date:   Barriers: Clinical improvement    Code status: Full  Prophylaxis: SCD's  Recommended Disposition: TBD     Subjective:   Patient was seen and examined. No acute events overnight. Discussed with RN overnight events. All patient's questions were answered. \"Doing fine\"    Objective:     VITALS:   Last 24hrs VS reviewed since prior progress note.  Most recent are:  Patient Vitals for the past 24 hrs:   Temp Pulse Resp BP SpO2   22 1605 -- 70 10 (!) 163/83 --   22 1600 -- 70 10 (!) 160/85 --   22 1555 -- 70 12 (!) 159/87 94 %   22 1550 97.1 °F (36.2 °C) 72 10 (!) 159/86 96 %   22 1253 97.8 °F (36.6 °C) 72 16 (!) 159/94 100 %   22 1216 98.3 °F (36.8 °C) 70 18 137/84 100 %   22 0815 98 °F (36.7 °C) (!) 58 18 (!) 183/84 98 %   22 0316 98 °F (36.7 °C) (!) 57 18 (!) 154/76 99 %   22 0022 98.7 °F (37.1 °C) 60 18 (!) 153/83 99 %   22 1953 98.5 °F (36.9 °C) 63 20 (!) 142/67 100 %       Intake/Output Summary (Last 24 hours) at 2022 1612  Last data filed at 2022 1545  Gross per 24 Occupational Therapy Visit    Visit Type: Daily Treatment Note  Visit: 10  Referring Provider: Niki Carreon DO  Medical Diagnosis (from order): Diagnosis Information    Diagnosis  434.11 (ICD-9-CM) - I63.40 (ICD-10-CM) - Cerebrovascular accident (CVA) due to embolism of cerebral artery (CMD)         SUBJECTIVE                                                                                                               \"I feel dizzy when I sit up\"     Pain / Symptoms  - Pain rating (out of 10): Current: 0     Function (patient/family/caregiver reported):   Current: Pt reporting improvement in her ability to utilize LUE to wash face, get hand to mouth. Pt able to utilize it as stabilizer. Pt also reporting she barb ble to get dressed better however contineus to be difficult bc of LUE Proximal weakness.   Prior Level: indep with ADL, IADL tasks.   Current functional limitations: dec'd endurance, dec'd balance, LUE weakness, impaired LUE ROM, mm tightness in LUE, dizziness with change in position, and L ant tib pain limiitng her ability to dress LB indep .     OBJECTIVE                                                                                                                     Vitals:  Blood Pressure (mmhg):      - Supine: 168/106, symptomatic     - Seated: 146/84, asymptomatic     - Standin/93                     Treatment     Therapeutic Exercise  LUE AAROM 1x10 reps supine  Shoulder FF, abd/add, horz/ abd add    Manual Therapy   Kinesiotape applied to R ant tib due to pain present (pt had negative DPLX) pt educated re wearing schedule, precautions with verbalized udnerstanding. Pt reporting pain gone once donned. OT also discussed this with AK, PT.     Manual scap mobs, reduction in muscle trigger points in upper trap, levator, supraspinatus and rhomb areas. Pt reporgin significant\" pain reduction post.     Vitals assessed with client as pt reporting dizzy with change in posiiton, see vitals tab for  details. D/w pt as well, pt with dizziness, reported incd mm tightness, d/w pt if persists call PCP.   Writer verbally educated and received verbal consent for hand placement, positioning of patient, and techniques to be performed today from patient for clothing adjustments for techniques, therapist position for techniques and hand placement and palpation for techniques as described above and how they are pertinent to the patient's plan of care.    Home Exercise Program  *above indicates provided as part of home exercise program      ASSESSMENT                                                                                                            Pt demod improvement in LUE hand to mouth this date. Pt encouraged to continue to complete HEP with verbalized understanding. D/w pt walking schedule to inc endurance pt reporting feels as if endurance dec'd while in hospital for a few days for afib with RVR. OT recommending cont POC at this time. Pt agreeable.   Pain/symptoms after session (out of 10): 0  Education:   - Results of above outlined education: Verbalizes understanding and Demonstrates understanding    PLAN                                                                                                                           Suggestions for next session as indicated: Progress per plan of care      Goals  Long Term Goals: to be met by end of plan of care   1. Pt will improve ora  strength by #4-8 to increase independence with opening jars. Status: progressing/ongoing  2. Pt will engage in ADL task of dressing: donning/doffing pants and footwear with SUP and DME PRN to reduce caregiver burden upon discharge.Status: met   3. Pt will engage in task of cutting food using BUE with SUP to increase independence with feeding DME PRN Status: progressing/ongoing  4. Pt will engage in a simple meal prep task with Zac and less than 2 vc using LUE to increase independence with cooking.Status:  hour   Intake 1400 ml   Output 350 ml   Net 1050 ml        I had a face to face encounter and independently examined this patient on 7/19/2022, as outlined below:  PHYSICAL EXAM:  General: WD, WN. Alert, cooperative, no acute distress    EENT:  EOMI. Anicteric sclerae. MMM  Resp:  CTA bilaterally, no wheezing or rales. No accessory muscle use  CV:  Regular  rhythm,  No edema  GI:  Soft, Non distended, Non tender. +Bowel sounds  Neurologic:  Alert and oriented X 2 to place and time. Following all commands, moving 4 extremities, pupils are reactive bilaterally, sensations are intact, strength 5/5 all over  Psych:   Good insight. Not anxious nor agitated  Skin:  No rashes. No jaundice    Reviewed most current lab test results and cultures  YES  Reviewed most current radiology test results   YES  Review and summation of old records today    NO  Reviewed patient's current orders and MAR    YES  PMH/SH reviewed - no change compared to H&P  ________________________________________________________________________  Care Plan discussed with:    Comments   Patient X    Family      RN X    Care Manager     Consultant                        Multidiciplinary team rounds were held today with , nursing, pharmacist and clinical coordinator. Patient's plan of care was discussed; medications were reviewed and discharge planning was addressed. ________________________________________________________________________        Comments   >50% of visit spent in counseling and coordination of care     ________________________________________________________________________  Lauren Lock MD     Procedures: see electronic medical records for all procedures/Xrays and details which were not copied into this note but were reviewed prior to creation of Plan. LABS:  I reviewed today's most current labs and imaging studies.   Pertinent labs include:  No results for input(s): WBC, HGB, HCT, PLT, HGBEXT, HCTEXT, PLTEXT in the last 72 hours.   Recent Labs     07/19/22  1150 07/19/22  0358 07/18/22  0919    147* 142   K 2.9* 2.9* 3.1*   * 117* 104   CO2 31 23 32   GLU 98 69 88   BUN 11 13 16   CREA 0.48* 0.39* 0.72   CA 7.6* 6.9* 8.7   MG 1.7  --  2.1       Signed: Ike Vicente MD progressing/ongoing  5.  Pt will improve muscle strength in LUE by 1/2 mm grade to improve functional use of LUEStatus: progressing/ongoing  6.  Pt will perform HEP with Mod-I to improve ROM and strengthStatus: met       Therapy procedure time and total treatment time can be found documented on the Time Entry flowsheet

## 2024-01-17 NOTE — ED NOTES
After several phone calls, a family member has agreed to provide the pt with a ride home-[place] [phone]    Dr. Isla Sacks at bedside to provide discharge paperwork. Vital signs stable. Pt in no apparent distress at this time. Mental status at baseline. To waiting room via wheelchair, discharge paperwork in hand. Last Office Visit Info:   The patient's last visit with Malgorzata Natarajan MD was on 1/12/2024.    The patient's last visit in current department was on Visit date not found.        Last CBC Results:   Lab Results   Component Value Date    WBC 11.2 (H) 01/12/2024    HGB 14.4 01/12/2024    HCT 43.5 01/12/2024     01/12/2024       Last CMP Results  Lab Results   Component Value Date     01/12/2024    K 4.1 01/12/2024     01/12/2024    CO2 27 01/12/2024    BUN 12 01/12/2024    CREATININE 0.62 01/12/2024    CALCIUM 9.4 01/12/2024    ALBUMIN 4.1 01/12/2024    AST 48 (H) 01/12/2024     (H) 01/12/2024       Last Lipids  Lab Results   Component Value Date    CHOL 230 (H) 01/12/2024    TRIG 140 01/12/2024    HDL 74 01/12/2024    LDLCALC 131 (H) 01/12/2024       Last A1C  Lab Results   Component Value Date    HGBA1C 9.3 (H) 01/12/2024       Last TSH  Lab Results   Component Value Date    TSH 2.53 01/12/2024             Current Med Refills  Medication List with Changes/Refills   Current Medications    ACETAMINOPHEN (TYLENOL) 325 MG TABLET    Take 325 mg by mouth every 4 (four) hours as needed for Pain.       Start Date: --        End Date: --    AMLODIPINE (NORVASC) 10 MG TABLET    TAKE 1 TABLET(10 MG) BY MOUTH EVERY DAY       Start Date: 7/24/2023 End Date: --    AMOXICILLIN-CLAVULANATE 875-125MG (AUGMENTIN) 875-125 MG PER TABLET    Take 1 tablet by mouth 2 (two) times a day. for 7 days       Start Date: 1/12/2024 End Date: 1/19/2024    ATORVASTATIN (LIPITOR) 20 MG TABLET    Take 1 tablet (20 mg total) by mouth every evening.       Start Date: 9/15/2023 End Date: --    AZELASTINE (ASTELIN) 137 MCG (0.1 %) NASAL SPRAY    1 spray (137 mcg total) by Nasal route 2 (two) times daily. for 10 days       Start Date: 3/30/2022 End Date: 4/9/2022    BLOOD SUGAR DIAGNOSTIC (BLOOD GLUCOSE TEST) STRP    1 each by Misc.(Non-Drug; Combo Route) route 2 (two) times a day.       Start Date: 2/8/2023  End Date: --     BLOOD-GLUCOSE METER KIT    Use as instructed       Start Date: 12/19/2021End Date: 12/17/2022    BLOOD-GLUCOSE METER,CONTINUOUS (DEXCOM G6 ) MISC    1 each by Misc.(Non-Drug; Combo Route) route 3 (three) times daily. TO MONITOR BLOOD GLUCOSE CONTINUOUSLY       Start Date: 11/21/2023End Date: 11/20/2024    BLOOD-GLUCOSE SENSOR (DEXCOM G6 SENSOR) CARLOZ    1 each by Misc.(Non-Drug; Combo Route) route daily as needed (Monitor Blood Sugars PRN).       Start Date: 11/21/2023End Date: 11/20/2024    BLOOD-GLUCOSE TRANSMITTER (DEXCOM G6 TRANSMITTER) CARLOZ    1 each by Misc.(Non-Drug; Combo Route) route once daily.       Start Date: 11/21/2023End Date: 11/20/2024    CLINDAMYCIN (CLEOCIN T) 1 % EXTERNAL SOLUTION    1 application 2 (two) times daily. Apply to affected area       Start Date: 2/14/2023 End Date: --    DULAGLUTIDE (TRULICITY) 1.5 MG/0.5 ML PEN INJECTOR    Inject 1.5 mg into the skin every 7 days.       Start Date: 11/15/2023End Date: 11/14/2024    FEROSUL 325 MG (65 MG IRON) TAB TABLET    Take 1 tablet by mouth 2 (two) times daily.       Start Date: 6/5/2021  End Date: --    HYDROCORTISONE 2.5 % CREAM    APPLY TO RED FLAKY AREAS AROUND NOSE AND FACE TWICE DAILY AS NEEDED       Start Date: 1/24/2023 End Date: --    LANCETS MISC    1 each by Misc.(Non-Drug; Combo Route) route 2 (two) times a day.       Start Date: 12/19/2021End Date: --    MELOXICAM (MOBIC) 15 MG TABLET    TAKE ONE TABLET BY MOUTH DAILY AT 9 AM WITH FOOD       Start Date: 10/4/2023 End Date: --    METFORMIN (GLUCOPHAGE-XR) 500 MG ER 24HR TABLET    Take 2 tablets (1,000 mg total) by mouth 2 (two) times daily with meals.       Start Date: 9/26/2022 End Date: 6/17/2024    NYSTATIN (MYCOSTATIN) CREAM    Apply topically 2 (two) times daily.       Start Date: 9/27/2022 End Date: --    OMEPRAZOLE (PRILOSEC) 40 MG CAPSULE    TAKE ONE CAPSULE BY MOUTH DAILY AT 9 AM       Start Date: 12/8/2023 End Date: --    ONETOUCH DELICA PLUS LANCET 33 GAUGE MIS     Inject 1 lancet into the skin 2 (two) times a day.       Start Date: 5/10/2023 End Date: 5/9/2024    PAROXETINE (PAXIL) 20 MG TABLET    Take 1 tablet (20 mg total) by mouth every morning.       Start Date: 1/12/2024 End Date: 1/11/2025    RAMELTEON (ROZEREM) 8 MG TABLET    Take 1 tablet (8 mg total) by mouth every evening.       Start Date: 1/12/2024 End Date: 1/11/2025       Order(s) placed per written order guidelines: none    Please advise.

## 2024-11-25 NOTE — DISCHARGE INSTRUCTIONS
Patient Education        Constipation: Care Instructions  Your Care Instructions    Constipation means that you have a hard time passing stools (bowel movements). People pass stools from 3 times a day to once every 3 days. What is normal for you may be different. Constipation may occur with pain in the rectum and cramping. The pain may get worse when you try to pass stools. Sometimes there are small amounts of bright red blood on toilet paper or the surface of stools. This is because of enlarged veins near the rectum (hemorrhoids). A few changes in your diet and lifestyle may help you avoid ongoing constipation. Your doctor may also prescribe medicine to help loosen your stool. Some medicines can cause constipation. These include pain medicines and antidepressants. Tell your doctor about all the medicines you take. Your doctor may want to make a medicine change to ease your symptoms. Follow-up care is a key part of your treatment and safety. Be sure to make and go to all appointments, and call your doctor if you are having problems. It's also a good idea to know your test results and keep a list of the medicines you take. How can you care for yourself at home? · Drink plenty of fluids, enough so that your urine is light yellow or clear like water. If you have kidney, heart, or liver disease and have to limit fluids, talk with your doctor before you increase the amount of fluids you drink. · Include high-fiber foods in your diet each day. These include fruits, vegetables, beans, and whole grains. · Get at least 30 minutes of exercise on most days of the week. Walking is a good choice. You also may want to do other activities, such as running, swimming, cycling, or playing tennis or team sports. · Take a fiber supplement, such as Citrucel or Metamucil, every day. Read and follow all instructions on the label. · Schedule time each day for a bowel movement. A daily routine may help.  Take your time having your normal mood with appropriate affect bowel movement. · Support your feet with a small step stool when you sit on the toilet. This helps flex your hips and places your pelvis in a squatting position. · Your doctor may recommend an over-the-counter laxative to relieve your constipation. Examples are Milk of Magnesia and MiraLax. Read and follow all instructions on the label. Do not use laxatives on a long-term basis. When should you call for help? Call your doctor now or seek immediate medical care if:    · You have new or worse belly pain.     · You have new or worse nausea or vomiting.     · You have blood in your stools.    Watch closely for changes in your health, and be sure to contact your doctor if:    · Your constipation is getting worse.     · You do not get better as expected. Where can you learn more? Go to http://lobo-michel.info/. Enter 21 [id number] in the search box to learn more about \"Constipation: Care Instructions. \"  Current as of: June 26, 2019  Content Version: 12.2  © 3022-7496 [employer]. Care instructions adapted under license by [employer] (which disclaims liability or warranty for this information). If you have questions about a medical condition or this instruction, always ask your healthcare professional. [place] [address] any warranty or liability for your use of this information. Patient Education        Urinary Tract Infection in Women: Care Instructions  Your Care Instructions    A urinary tract infection, or UTI, is a general term for an infection anywhere between the kidneys and the urethra (where urine comes out). Most UTIs are bladder infections. They often cause pain or burning when you urinate. UTIs are caused by bacteria and can be cured with antibiotics. Be sure to complete your treatment so that the infection goes away. Follow-up care is a key part of your treatment and safety.  Be sure to make and go to all appointments, and call your doctor if you are having problems. It's also a good idea to know your test results and keep a list of the medicines you take. How can you care for yourself at home? · Take your antibiotics as directed. Do not stop taking them just because you feel better. You need to take the full course of antibiotics. · Drink extra water and other fluids for the next day or two. This may help wash out the bacteria that are causing the infection. (If you have kidney, heart, or liver disease and have to limit fluids, talk with your doctor before you increase your fluid intake.)  · Avoid drinks that are carbonated or have caffeine. They can irritate the bladder. · Urinate often. Try to empty your bladder each time. · To relieve pain, take a hot bath or lay a heating pad set on low over your lower belly or genital area. Never go to sleep with a heating pad in place. To prevent UTIs  · Drink plenty of water each day. This helps you urinate often, which clears bacteria from your system. (If you have kidney, heart, or liver disease and have to limit fluids, talk with your doctor before you increase your fluid intake.)  · Urinate when you need to. · Urinate right after you have sex. · Change sanitary pads often. · Avoid douches, bubble baths, feminine hygiene sprays, and other feminine hygiene products that have deodorants. · After going to the bathroom, wipe from front to back. When should you call for help? Call your doctor now or seek immediate medical care if:    · Symptoms such as fever, chills, nausea, or vomiting get worse or appear for the first time.     · You have new pain in your back just below your rib cage.  This is called flank pain.     · There is new blood or pus in your urine.     · You have any problems with your antibiotic medicine.    Watch closely for changes in your health, and be sure to contact your doctor if:    · You are not getting better after taking an antibiotic for 2 days.     · Your symptoms go away but then come back. Where can you learn more? Go to http://lobo-michel.info/. Enter T346 in the search box to learn more about \"Urinary Tract Infection in Women: Care Instructions. \"  Current as of: December 19, 2018  Content Version: 12.2  © 6281-7960 [employer]. Care instructions adapted under license by [employer] (which disclaims liability or warranty for this information). If you have questions about a medical condition or this instruction, always ask your healthcare professional. [place] [address] any warranty or liability for your use of this information. Patient Education        Hip Arthritis: Care Instructions  Your Care Instructions    Arthritis, also called osteoarthritis, is a breakdown of the tissue (cartilage) that cushions your joints. Many people have some arthritis as they age. When the cartilage in your hip joints wears down, your hip bone rubs against the hip socket. This causes pain and stiffness. Work with your doctor to find the right mix of treatments for your arthritis. There are things you can do at home to protect your hip joints, ease your pain, and help you stay active. But if your arthritis becomes so bad that you cannot walk, you may need surgery to replace the hip joint. Follow-up care is a key part of your treatment and safety. Be sure to make and go to all appointments, and call your doctor if you are having problems. It's also a good idea to know your test results and keep a list of the medicines you take. How can you care for yourself at home? · Stay at a healthy weight. Being overweight puts extra strain on your hip joints. · Talk to your doctor or physical therapist about exercises that will help ease hip pain. These tips may help. ? Stretch to help prevent stiffness and to prevent injury before you exercise. You may enjoy gentle forms of yoga to help keep your joints and muscles flexible.   ? Walk instead of jog. Other types of exercise that are less stressful on the joints include riding a bike, swimming, and doing water exercise. ? Lift weights. Strong muscles help reduce stress on your joints. Stronger thigh muscles, for example, take some of the stress off of the knees and hips. Learn the right way to lift weights so you do not make joint pain worse. · Take pain medicines exactly as directed. ? If the doctor gave you a prescription medicine for pain, take it as prescribed. ? If you are not taking a prescription pain medicine, ask your doctor if you can take an over-the-counter medicine. · Use a cane, crutch, walker, or another device if you need help to get around. These can help rest your hips. You also can use other things to make life easier, such as a higher toilet seat. · Do not sit in low chairs, which can make it painful to get up. · Put heat or cold on your sore hips as needed. Use whichever helps you most. You also can go back and forth between hot and cold packs. ? Apply heat 2 or 3 times a day for 20 to 30 minutes--using a heating pad, hot shower, or hot pack--to relieve pain and stiffness. ? Put ice or a cold pack on your sore hips for 10 to 20 minutes at a time to numb the area. Put a thin cloth between the ice and your skin. · Think about talking to your doctor about using capsaicin, a cream you apply to the skin for pain relief. When should you call for help? Call your doctor now or seek immediate medical care if:    · You have sudden swelling, warmth, or pain in any joint.     · You have joint pain and a fever or rash.     · You have such bad pain that you cannot use the joint.    Watch closely for changes in your health, and be sure to contact your doctor if:    · You have mild joint symptoms that continue even with more than 6 weeks of care at home.     · You do not get better as expected.     · You have stomach pain or other problems with your medicine.    Where can you learn more?  Go to http://lobo-michel.info/. Enter B050 in the search box to learn more about \"Hip Arthritis: Care Instructions. \"  Current as of: April 1, 2019  Content Version: 12.2  © 9697-0773 [employer]. Care instructions adapted under license by [employer] (which disclaims liability or warranty for this information). If you have questions about a medical condition or this instruction, always ask your healthcare professional. [address] any warranty or liability for your use of this information.

## (undated) DEVICE — TIDISHIELD UROLOGY DRAIN BAGS FROSTY VINYL STERILE FITS SIEMENS UROSKOP ACCESS 20 PER CASE: Brand: TIDISHIELD

## (undated) DEVICE — NEONATAL-ADULT SPO2 SENSOR: Brand: NELLCOR

## (undated) DEVICE — GOWN,SIRUS,FABRNF,XL,20/CS: Brand: MEDLINE

## (undated) DEVICE — DEVICE LCK BILI RAP EXCHG OLPS --

## (undated) DEVICE — KIT,1200CC CANISTER,3/16"X6' TUBING: Brand: MEDLINE INDUSTRIES, INC.

## (undated) DEVICE — MARKER,SKIN,WI/RULER AND LABELS: Brand: MEDLINE

## (undated) DEVICE — Device

## (undated) DEVICE — SPHINCTEROTOME: Brand: DREAMTOME™ RX 44

## (undated) DEVICE — NEEDLE BX 14GA LAIN 20MM SPEC NOTCH SCALP SHRP SURG STL CUT

## (undated) DEVICE — SOLUTION IRRIGATION H2O 0797305] ICU MEDICAL INC]

## (undated) DEVICE — SOLUTION IRRIG 1000ML H2O STRL BLT

## (undated) DEVICE — HYPODERMIC SAFETY NEEDLE: Brand: MAGELLAN

## (undated) DEVICE — GUIDEWIRE ENDOSCP L150CM DIA0.035IN TIP L15CM BENT PTFE

## (undated) DEVICE — BLADE ASSEMB CLP HAIR FINE --

## (undated) DEVICE — APPLIER CLP M L L11.4IN DIA10MM ENDOSCP ROT MULT FOR LIG

## (undated) DEVICE — PACK,CYSTOSCOPY,PK III,SIRUS: Brand: MEDLINE

## (undated) DEVICE — SURGICAL PROCEDURE KIT GEN LAPAROSCOPY LF

## (undated) DEVICE — SOLUTION IRRIG 3000ML 0.9% SOD CHL USP UROMATIC PLAS CONT

## (undated) DEVICE — OPEN-END URETERAL CATHETER: Brand: COOK

## (undated) DEVICE — DRAIN SURG 19FR L025IN DIA63MM SIL CHN RND FULL FLUT CLS

## (undated) DEVICE — NDL PRT INJ NSAF BLNT 18GX1.5 --

## (undated) DEVICE — BLOCK BITE ENDOSCP AD 21 MM W/ DIL BLU LF DISP

## (undated) DEVICE — SET ADMIN 16ML TBNG L100IN 2 Y INJ SITE IV PIGGY BK DISP

## (undated) DEVICE — CYSTO MRMC: Brand: MEDLINE INDUSTRIES, INC.

## (undated) DEVICE — GDWIRE UROL STR 150CM FLX TP -- BX/5 SENSOR

## (undated) DEVICE — Device: Brand: ENDO SMARTCAP

## (undated) DEVICE — SOLUTION IRRIG 3000ML 0.9% SOD CHL FLX CONT 0797208] ICU MEDICAL INC]

## (undated) DEVICE — DEVON™ KNEE AND BODY STRAP 60" X 3" (1.5 M X 7.6 CM): Brand: DEVON

## (undated) DEVICE — (D)PREP SKN CHLRAPRP APPL 26ML -- CONVERT TO ITEM 371833

## (undated) DEVICE — 1200 GUARD II KIT W/5MM TUBE W/O VAC TUBE: Brand: GUARDIAN

## (undated) DEVICE — SUTURE SZ 0 27IN 5/8 CIR UR-6  TAPER PT VIOLET ABSRB VICRYL J603H

## (undated) DEVICE — BASIN EMSIS 16OZ GRAPHITE PLAS KID SHP MOLD GRAD FOR ORAL

## (undated) DEVICE — UNIVERSAL FIXATION CANNULA: Brand: VERSAONE

## (undated) DEVICE — CONTAINER,SPECIMEN,4OZ,OR STRL: Brand: MEDLINE

## (undated) DEVICE — SYR ASSEMB INFL BLLN 60ML --

## (undated) DEVICE — KENDALL SCD EXPRESS SLEEVES, KNEE LENGTH, MEDIUM: Brand: KENDALL SCD

## (undated) DEVICE — TOWEL 4 PLY TISS 19X30 SUE WHT

## (undated) DEVICE — APPLICATOR BNDG 1MM ADH PREMIERPRO EXOFIN

## (undated) DEVICE — ELECTRODE,RADIOTRANSLUCENT,FOAM,5PK: Brand: MEDLINE

## (undated) DEVICE — SYR 10ML LUER LOK 1/5ML GRAD --

## (undated) DEVICE — SPONGE HEMOSTAT CELLULS 4X8IN -- SURGICEL

## (undated) DEVICE — DRSG PATCH ANTIMIC 1INX4.0MM -- CONVERT TO ITEM 356053

## (undated) DEVICE — REM POLYHESIVE ADULT PATIENT RETURN ELECTRODE: Brand: VALLEYLAB

## (undated) DEVICE — GDWIRE URET STR STD .035X150 -- ZIPWIRE STD

## (undated) DEVICE — SYRINGE MED 20ML STD CLR PLAS LUERLOCK TIP N CTRL DISP

## (undated) DEVICE — SOLIDIFIER FLD 2OZ 1500CC N DISINF IN BTL DISP SAFESORB

## (undated) DEVICE — Y-TYPE TUR/BLADDER IRRIGATION SET, REGULATING CLAMP

## (undated) DEVICE — GUIDEWIRE ENDOSCP WRK L450CM DIA0.035IN STD SHFT STR RND

## (undated) DEVICE — CATH IV AUTOGRD BC PNK 20GA 25 -- INSYTE

## (undated) DEVICE — SOLUTION IV 1000ML 0.9% SOD CHL

## (undated) DEVICE — BITE BLK ENDOSCP AD 54FR GRN POLYETH ENDOSCP W STRP SLD

## (undated) DEVICE — SOLUTION IRRIG 1000ML 09% SOD CHL USP PIC PLAS CONTAINER

## (undated) DEVICE — SYR 20ML LL STRL LF --

## (undated) DEVICE — STERILE POLYISOPRENE POWDER-FREE SURGICAL GLOVES: Brand: PROTEXIS

## (undated) DEVICE — BLADELESS OPTICAL TROCAR WITH FIXATION CANNULA: Brand: VERSAPORT

## (undated) DEVICE — SYR 3ML LL TIP 1/10ML GRAD --

## (undated) DEVICE — TUBING, SUCTION, 1/4" X 10', STRAIGHT: Brand: MEDLINE

## (undated) DEVICE — GOWN,SIRUS,POLYRNF,BRTHSLV,XL,30/CS: Brand: MEDLINE

## (undated) DEVICE — BAG SPEC RETRV 275ML 10ML DISPOSABLE RELIACATCH

## (undated) DEVICE — 3M™ TEGADERM™ TRANSPARENT FILM DRESSING FRAME STYLE, 1626W, 4 IN X 4-3/4 IN (10 CM X 12 CM), 50/CT 4CT/CASE: Brand: 3M™ TEGADERM™

## (undated) DEVICE — Z DISCONTINUED NO SUB IDED SET EXTN W/ 4 W STPCOCK M SPIN LOK 36IN

## (undated) DEVICE — GOWN,SIRUS,NONRNF,SETINSLV,2XL,18/CS: Brand: MEDLINE

## (undated) DEVICE — Z DISCONTINUED PER MEDLINE LINE GAS SAMPLING O2/CO2 LNG AD 13 FT NSL W/ TBNG FILTERLINE

## (undated) DEVICE — 3M™ DURAPORE™ SURGICAL TAPE 1538-3, 3 INCH X 10 YARD (7,5CM X 9,1M), 4 ROLLS/BOX: Brand: 3M™ DURAPORE™

## (undated) DEVICE — GUIDEWIRE URO L150CM DIA0.035IN STD NIT HYDRPHLC STR TIP

## (undated) DEVICE — COVER,MAYO STAND,STERILE: Brand: MEDLINE

## (undated) DEVICE — GLOVE SURG SZ 6 THK91MIL LTX FREE SYN POLYISOPRENE ANTI

## (undated) DEVICE — URETERAL ACCESS SHEATH SET: Brand: NAVIGATOR

## (undated) DEVICE — RETRIEVAL BALLOON CATHETER: Brand: EXTRACTOR™ PRO RX

## (undated) DEVICE — BLADELESS OPTICAL TROCAR WITH FIXATION CANNULA: Brand: VERSAONE

## (undated) DEVICE — SOLUTION IV 500ML 0.9% SOD CHL FLX CONT

## (undated) DEVICE — NEEDLE HYPO 22GA L1.5IN BLK S STL HUB POLYPR SHLD REG BVL

## (undated) DEVICE — 14FR COLON DECOMPRESSION SET: Brand: COOK

## (undated) DEVICE — INFECTION CONTROL KIT SYS

## (undated) DEVICE — SOLUTION IRRIG 1000ML STRL H2O USP PLAS POUR BTL

## (undated) DEVICE — BAG PRESSURE INFUSION 3 W STOPCOCK 3000 CC PREMIERPRO DISP

## (undated) DEVICE — SET CHOLANGIOGRAPHY 4FR L60CM W/ ARW KARLAN BLLN CATH CRV

## (undated) DEVICE — HANDLE LT SNAP ON ULT DURABLE LENS FOR TRUMPF ALC DISPOSABLE

## (undated) DEVICE — SUTURE MCRYL SZ 4-0 L27IN ABSRB UD L19MM PS-2 1/2 CIR PRIM Y426H

## (undated) DEVICE — GUIDEWIRE ENDO L150CM DIA0.035IN STR TIP

## (undated) DEVICE — TUBING IRRIG L77IN DIA0.241IN L BOR FOR CYSTO W/ NVENT

## (undated) DEVICE — YANKAUER,TAPERED BULBOUS TIP,W/O VENT: Brand: MEDLINE

## (undated) DEVICE — FALCON® SAMPLE CONTAINER, WITH LID, 4.5OZ (110ML), INDIVIDUALLY WRAPPED, STERILE, 100/CASE: Brand: FALCON A CORNING BRAND

## (undated) DEVICE — ESOPHAGEAL BALLOON DILATATION CATHETER: Brand: CRE FIXED WIRE

## (undated) DEVICE — SUT ETHLN 3-0 18IN PS2 BLK --

## (undated) DEVICE — JELLY,LUBE,STERILE,FLIP TOP,TUBE,4-OZ: Brand: MEDLINE

## (undated) DEVICE — DRAPE XR C ARM 41X74IN LF --

## (undated) DEVICE — SOLUTION SCRB 2OZ 10% POVIDONE IOD ANTIMIC BTL

## (undated) DEVICE — ELECTRODE ES 36CM LAP FLAT L HK COAT DISP CLEANCOAT

## (undated) DEVICE — DRAINBAG,ANTI-REFLUX TOWER,L/F,2000ML,LL: Brand: MEDLINE